# Patient Record
Sex: MALE | Race: WHITE | NOT HISPANIC OR LATINO | Employment: OTHER | URBAN - METROPOLITAN AREA
[De-identification: names, ages, dates, MRNs, and addresses within clinical notes are randomized per-mention and may not be internally consistent; named-entity substitution may affect disease eponyms.]

---

## 2017-01-02 ENCOUNTER — PATIENT MESSAGE (OUTPATIENT)
Dept: ORTHOPEDICS | Facility: CLINIC | Age: 72
End: 2017-01-02

## 2017-01-05 ENCOUNTER — ANESTHESIA EVENT (OUTPATIENT)
Dept: SURGERY | Facility: HOSPITAL | Age: 72
DRG: 470 | End: 2017-01-05
Payer: MEDICARE

## 2017-01-05 DIAGNOSIS — M79.661 PAIN OF RIGHT LOWER LEG: Primary | ICD-10-CM

## 2017-01-05 NOTE — ANESTHESIA PREPROCEDURE EVALUATION
Anesthesia Assessment: Preoperative EQUATION    Planned Procedure: Procedure(s) (LRB):  REPLACEMENT-KNEE-TOTAL right sherri (Right)  Requested Anesthesia Type:Regional  Surgeon: Jose Armando Knight MD  Service: Orthopedics  Known or anticipated Date of Surgery:1/24/2017    Surgeon notes: reviewed    Electronic QUestionnaire Assessment completed via nurse interview with patient.  Sakshi Dovricki PAT PegueroJorge [[id number]] - 71 y.o. Male        Providers Outside of [facility]      No data to display       Surgical Risk Level      Surgical Risk Level:  3           caRDScore (Clinical Anesthesia Rapid Decision Score)        Very High  Total Score: 30      34  Sum of Clinical Scores       caRDScores (Grouped)      caRDScore - Ane:  9                caRDScore - CVD:  3                caRDScore - Pul:  5                caRDScore - Met:  8                caRDScore - Phy:  5           caRDScore Items           Pre-admit from 1/24/2017 in [facility]     Anesthesia      Has large neck size >40cm (15.7in., large male shirt size, large male collar size >16)  Yes [21]     Has decreased jaw mobility/TMJ dysfunction  Yes     S/P facial/plastic reconstruction/chin implant  Yes [moh procedure]     Has severe degenerative arthritis (osteoarthritis)  Yes     Has sleep apnea confirmed by a sleep study / on CPAP  Yes     Has GERD, hiatal hernia, or chronic heartburn/dyspepsia requiring Rx some or all times  Yes     CVD      Activity similar to best ability for maximal activity or exercise  METS 2     Diagnosed with high blood pressure  Yes     Typical BP runs <150/90  Yes     Has/has had congestive heart failure  Yes     Pulmonary      Total smoking adds up to 20 - 40 years  Yes     During most of those years, smoked 2 packs per day or more  Yes     Has sleep apnea confirmed by a sleep study / on CPAP  Yes     Metabolic      Has been diagnosed with CKD  Yes [stage 3]     Type 2 Diabetes  Yes     Taking chronic Insulin Rx now or  in past, (not by pump)  Yes     Thyroid disease (Specify)  Yes     Physiologic      Obesity Status  Morbid Obesity (BMI 40-50)     Problems with memory  Yes     Other blood thinners  Apixaban (Eliquis)       Flags      Red Flag Score:  2                Yellow Flag Score:  11           Red Flags           Pre-admit from 1/24/2017 in [facility]     Obesity Status  Morbid Obesity (BMI 40-50)     Other blood thinners  Apixaban (Eliquis)     Has been diagnosed with CKD  Yes [stage 3]       Yellow Flags           Pre-admit from 1/24/2017 in [facility]     Has had steroids in the last year  Yes     Takes herbal medications or vitamin supplements  Yes     Obesity Status  Morbid Obesity (BMI 40-50)     Has significant hearing impairment  Yes     Is or has been a Smoker  Yes     Has decreased jaw mobility/TMJ dysfunction  Yes     S/P facial/plastic reconstruction/chin implant  Yes [moh procedure]     Has sleep apnea confirmed by a sleep study / on CPAP  Yes     Has/has had congestive heart failure  Yes     Has pain  Yes       PONV Risk Score (assumes periop narcotic use = +1, Max=4)      PONV Risk Score:  2           PONV Risk Factors  Total Score: 1      1 Non-Smoker at present       Sleep Apnea  Total Score: 1      1 Has sleep apnea confirmed by a sleep study / on CPAP       HEIDI STOP-Bang Risk Factors (Max=8)  Total Score: 5      1 Takes medication for high blood pressure     1 Obesity Status: Obesity (BMI >35)     1 Age >50     1 Has large neck size >40cm     1 Male       HEIDI Risk Level - 1 (Low), 2 (Moderate), 3 (High)      HEIDI Risk Level:  3           RCRI (Revised Cardiac Risk Indices of ACC/AHA guidelines, Max=6)  Total Score: 2      1 Has/has had CHF     1 Taking chronic insulin       CAD Risk Factors  Total Score: 5      1 Male. Age >45     1 Obesity Status: Morbid Obesity (BMI 40-50)     1 Total smoking adds up to 20 - 40 years     1 Diagnosed with high blood pressure     1  Has Diabetes       CHADS Score if applicable (history of atrial fib/flutter, Max=6)  Total Score: 3      1 Diagnosed with high blood pressure     1 Has/has had congestive heart failure     1 Has Diabetes       Maximal Exercise Capacity           Pre-admit from 1/24/2017 in [facility]     Maximal Exercise Capacity  METS 2       Summary of Dependence  Total Score: 1      1 Is totally independent of others for activities of daily living       Phone Fraility Score (Max = 17)  Total Score: 2      1 Uses 5 or more meds on reg basis     1 Functional capacity limit: METS 2       Pain Factors           Pre-admit from 1/24/2017 in [facility]     Has pain  Yes     Location and description of pain  knee pain sharp,      Duration of pain  Pain is chronic, has been present greater than 12 mo     Typical Pain Scores  7 to 10     Current regimen controls pain/makes pain tolerable  Yes       Risk Triggers (Evidence-Based Risk Triggers)        Pulmonary Risk Triggers  Total Score: 4      1 Age > or = 60     1 Obesity Status: Morbid Obesity (BMI 40-50)     1 Total smoking adds up to 20 - 40 years     1 Has sleep apnea confirmed by a sleep study / on CPAP       Renal Risk Triggers  Total Score: 4      1 Diagnosed with high blood pressure     1 Has been diagnosed with CKD     1 Type 2 Diabetes     1 Taking chronic Insulin Rx now or in past, (not by pump)       Delirium Risk Triggers  Total Score: 2      1 Problems with memory     1 Has significant hearing impairment       Urologic Risk Triggers  Total Score: 0        Logistics        Pre-op Clinic Logistics  Total Score: 10      2 Takes herbal medications or vitamin supplements     2 Problems with memory     2 Has significant hearing impairment     1 Major Ambulatory limits (cane, walker, wheelchair, stretcher)     1 Has had anesthesia, either as adult or as a child     1 Takes medication for high blood pressure     1 Has been diagnosed with CKD        [facility] Logistics  Total Score: 9      2 Problems with memory     2 Has significant hearing impairment     1 Major Ambulatory limits (cane, walker, wheelchair, stretcher)     2 S/P facial/plastic reconstruction/chin implant     1 Has been diagnosed with CKD     1 Has peripheral neuropathy       Discharge Logistics  Total Score: 11      1 Obesity Status: Morbid Obesity (BMI 40-50)     2 Functional capacity limit: METS 2     2 Problems with memory     1 Has significant hearing impairment     2 Major Ambulatory limits (cane, walker, wheelchair, stretcher)     1 Has severe degenerative arthritis (osteoarthritis)     1 Has sleep apnea confirmed by a sleep study / on CPAP     1 Has peripheral neuropathy       Discharge Planning           Pre-admit from 1/24/2017 in [facility]     Discharge Planning      Discharge plans  Home with assistance     Will assist patient 24/7, if needed  rebecca [phone] WIFE     Who will transport you to therapy, if need  family     Any home barriers to going home  Yes       Anes & Int Med <For office use only>      Total Score: 28        Surgical Risk Level Assessment                       Triage considerations:     The patient has no apparent active cardiac condition (No unstable coronary Syndrome such as severe unstable angina or recent [<1 month] myocardial infarction, decompensated CHF, severe valvular   disease or significant arrhythmia)    Previous anesthesia records:GETA  Grade II; Complicating Factors: Poor neck/head extension, Oropharyngeal edema or fat, Obesity;     Last PCP note: 3-6 months ago , within [facility]   Subspecialty notes: Cardiology: General, Endocrinology, Nephrology, Vascular Surgery, PLASTIC SURGERY    Other important co-morbidities:      Tests already available:  6/17 EKG, 5/30/16 2D ECHO, 12/6/16 BMP,A1C 5.9, 12/15/16 U/A            Instructions given. (See in Nurse's note)    Optimization:  Anesthesia Preop Clinic Assessment  Indicated     Medical Opinion Indicated  Dr DELVALLE            Pre-habilitation suggested:   Physical Therapy  Plan:      Testing:  Hematology Profile and PT/INR   Pre-anesthesia  visit       Visit focus: possible regional anesthesia and/or nerve block      Consultation:IM Perioperative Hospitalist    Navigation: Tests Scheduled.              Consults scheduled.             Results will be tracked by Preop Clinic.                 VLAD AYALA 1/5/17 01/05/2017  [facility] Cantor Jr. is a 71 y.o., male     LAST ANTICOAG 7 DAYS AGO    [facility] Anesthesia Evaluation    I have reviewed the Patient Summary Reports.     I have reviewed the Medications.     Review of Systems  Anesthesia Hx:  Hx of Anesthetic complications 6/2016:two person bag with no OA, mod difficult but good exchange); Intubated: Postinduction; Blade: Peters #2; Airway Device Size: 8.0; Style: Cuffed; Cuff Inflation: Minimal occlusive pressure; Placement Verified By: Auscultation, Capnometry, ETT Condensation; Grade: Grade II; Complicating Factors: Poor neck/head  extension, Oropharyngeal edema or fat, Obesity History of prior surgery of interest to airway management or planning: Denies Family Hx of Anesthesia complications.    Social:  Non-Smoker, No Alcohol Use    Hematology/Oncology:        Hematology Comments: apixaban will hold 5 days before sx Current/Recent Cancer. Oncology Comments: R-Cheek melanoma 12/2015 s/p excision with skin graft    R-Eye sx 2016 with skin graft    EENT/Dental:   Eyes: Slight redness and puffiness to right eye Jaw Problems: Hx of jaw locking when mouth is open wide  Cardiovascular:   Hypertension (b/p today 95/53. patient reports when in A-fib, his b/p would drop and he would get fatigued easily) Dysrhythmias (hx of cardioversion (stopped eliquis 7-8 days ago)) atrial fibrillation Uses cane. Housework, cooks,grocery shopping.   Climbs one  flight of stairs without difficulty. Diastolic dysfunction-2015 Chronic Venous Insufficiency, bilateral lower extremity    Pulmonary:   Sleep Apnea (Uses CPAP on occasions. s/p UPPP 2009 ), CPAP    Renal/:  Neoplasm/Tumor (renal cyst).  Kidney Function/Disease, Chronic Kidney Disease (CKD) (creat 1.6) , CKD Stage III (GFR 30-59)    Hepatic/GI:   GERD (TUMS at night) Denies Liver Disease.    Musculoskeletal:   Arthritis   Musculoskeletal General/Symptoms: Functional capacity is ambulatory with cane. L-TKR with spinal anesthesia 2003 Lumbar Spine Disorders, Lumbar Disc Disease (herniated/buldging disc) CBP- films in EPIC  Neurological:   Denies CVA. Denies Seizures.  Pain , onset is chronic , location of knee , quality of sharp , precipitating factors are standing , alleviating factors are sit or lie down and rest.  Peripheral Neuropathy    Endocrine:   Diabetes, type 2, using insulin A1c 5.9   Dermatological:  Skin Normal    Psych:  Psychiatric Normal           Physical Exam  General:  Morbid Obesity, Well nourished    Airway/Jaw/Neck:  Airway Findings: Mouth Opening: Normal Tongue: Large  General Airway Assessment: Adult  Mallampati: IV  Improves to III with phonation.  TM Distance: Normal, at least 6 cm  Jaw/Neck Findings:  Neck ROM: Normal ROM  Neck Findings:  Girth Increased      Dental:  Dental Findings: molar caps   Chest/Lungs:  Chest/Lungs Findings: Clear to auscultation, Normal Respiratory Rate     Heart/Vascular:  Heart Findings: Rate: Normal  Rhythm: Irregularly Irregular  Vascular Findings:  Edema Locations: BLE  Vascular Exam Findings: LLE > RLE. Difficulty placing regular XXL teds on therefore to the LLE pt has a velcro wrapping compression device.          Mental Status:  Mental Status Findings:  Cooperative, Alert and Oriented         Labs reviewed    Please refer to , Internal Medicine, perioperative risk assessment and recommendations.     Debbie Brar RN 1/11/17    Anesthesia  Plan  Type of Anesthesia, risks & benefits discussed:  Anesthesia Type:  general, spinal, regional, MAC  Patient's Preference:   Intra-op Monitoring Plan: standard ASA monitors  Intra-op Monitoring Plan Comments:   Post Op Pain Control Plan: continuous nerve block and single-shot nerve block  Post Op Pain Control Plan Comments:   Induction:   IV  Beta Blocker:  Patient is on a Beta-Blocker and has received one dose within the past 24 hours (No further documentation required).       Informed Consent: Patient understands risks and agrees with Anesthesia plan.  Questions answered. Anesthesia consent signed with patient.  ASA Score: 3     Day of Surgery Review of History & Physical:            Ready For Surgery From Anesthesia Perspective.

## 2017-01-10 ENCOUNTER — INITIAL CONSULT (OUTPATIENT)
Dept: INTERNAL MEDICINE | Facility: CLINIC | Age: 72
End: 2017-01-10
Payer: MEDICARE

## 2017-01-10 ENCOUNTER — HOSPITAL ENCOUNTER (OUTPATIENT)
Dept: PREADMISSION TESTING | Facility: HOSPITAL | Age: 72
Discharge: HOME | End: 2017-01-10
Attending: ANESTHESIOLOGY
Payer: MEDICARE

## 2017-01-10 ENCOUNTER — OFFICE VISIT (OUTPATIENT)
Dept: UROLOGY | Facility: CLINIC | Age: 72
End: 2017-01-10
Payer: MEDICARE

## 2017-01-10 ENCOUNTER — HOSPITAL ENCOUNTER (OUTPATIENT)
Dept: RADIOLOGY | Facility: HOSPITAL | Age: 72
Discharge: HOME | End: 2017-01-10
Attending: INTERNAL MEDICINE
Payer: MEDICARE

## 2017-01-10 VITALS
SYSTOLIC BLOOD PRESSURE: 95 MMHG | WEIGHT: 296.75 LBS | OXYGEN SATURATION: 97 % | HEART RATE: 84 BPM | DIASTOLIC BLOOD PRESSURE: 53 MMHG | BODY MASS INDEX: 43.95 KG/M2 | HEIGHT: 69 IN

## 2017-01-10 VITALS
WEIGHT: 296.06 LBS | HEART RATE: 85 BPM | SYSTOLIC BLOOD PRESSURE: 117 MMHG | HEIGHT: 69 IN | DIASTOLIC BLOOD PRESSURE: 69 MMHG | BODY MASS INDEX: 43.85 KG/M2

## 2017-01-10 DIAGNOSIS — N40.0 ENLARGED PROSTATE: ICD-10-CM

## 2017-01-10 DIAGNOSIS — I48.20 CHRONIC ATRIAL FIBRILLATION: ICD-10-CM

## 2017-01-10 DIAGNOSIS — E11.22 TYPE 2 DIABETES MELLITUS WITH STAGE 3 CHRONIC KIDNEY DISEASE, WITH LONG-TERM CURRENT USE OF INSULIN: ICD-10-CM

## 2017-01-10 DIAGNOSIS — N18.30 TYPE 2 DIABETES MELLITUS WITH STAGE 3 CHRONIC KIDNEY DISEASE, WITH LONG-TERM CURRENT USE OF INSULIN: Primary | ICD-10-CM

## 2017-01-10 DIAGNOSIS — I10 ESSENTIAL HYPERTENSION: ICD-10-CM

## 2017-01-10 DIAGNOSIS — N40.1 BPH (BENIGN PROSTATIC HYPERTROPHY) WITH URINARY OBSTRUCTION: ICD-10-CM

## 2017-01-10 DIAGNOSIS — I51.89 DIASTOLIC DYSFUNCTION: ICD-10-CM

## 2017-01-10 DIAGNOSIS — N18.30 CHRONIC KIDNEY DISEASE, STAGE III (MODERATE): ICD-10-CM

## 2017-01-10 DIAGNOSIS — Z79.4 TYPE 2 DIABETES MELLITUS WITH STAGE 3 CHRONIC KIDNEY DISEASE, WITH LONG-TERM CURRENT USE OF INSULIN: Primary | ICD-10-CM

## 2017-01-10 DIAGNOSIS — Z79.4 TYPE 2 DIABETES MELLITUS WITH STAGE 3 CHRONIC KIDNEY DISEASE, WITH LONG-TERM CURRENT USE OF INSULIN: ICD-10-CM

## 2017-01-10 DIAGNOSIS — E66.01 MORBID OBESITY WITH BMI OF 40.0-44.9, ADULT: ICD-10-CM

## 2017-01-10 DIAGNOSIS — Z79.01 LONG TERM CURRENT USE OF ANTICOAGULANT THERAPY: ICD-10-CM

## 2017-01-10 DIAGNOSIS — R35.0 URINARY FREQUENCY: ICD-10-CM

## 2017-01-10 DIAGNOSIS — R31.29 MICROHEMATURIA: ICD-10-CM

## 2017-01-10 DIAGNOSIS — L81.8 TATTOOS: ICD-10-CM

## 2017-01-10 DIAGNOSIS — N13.8 BPH (BENIGN PROSTATIC HYPERTROPHY) WITH URINARY OBSTRUCTION: ICD-10-CM

## 2017-01-10 DIAGNOSIS — I12.9 BENIGN HYPERTENSIVE RENAL DISEASE WITHOUT RENAL FAILURE: ICD-10-CM

## 2017-01-10 DIAGNOSIS — E11.22 TYPE 2 DIABETES MELLITUS WITH STAGE 3 CHRONIC KIDNEY DISEASE, WITH LONG-TERM CURRENT USE OF INSULIN: Primary | ICD-10-CM

## 2017-01-10 DIAGNOSIS — N18.30 TYPE 2 DIABETES MELLITUS WITH STAGE 3 CHRONIC KIDNEY DISEASE, WITH LONG-TERM CURRENT USE OF INSULIN: ICD-10-CM

## 2017-01-10 DIAGNOSIS — N20.0 CALCULUS OF KIDNEY: ICD-10-CM

## 2017-01-10 DIAGNOSIS — N28.1 ACQUIRED CYST OF KIDNEY: ICD-10-CM

## 2017-01-10 DIAGNOSIS — I87.2 VENOUS INSUFFICIENCY OF BOTH LOWER EXTREMITIES: ICD-10-CM

## 2017-01-10 DIAGNOSIS — I27.20 PULMONARY HYPERTENSION: ICD-10-CM

## 2017-01-10 DIAGNOSIS — G47.30 SLEEP APNEA, UNSPECIFIED TYPE: Primary | ICD-10-CM

## 2017-01-10 LAB
BILIRUB SERPL-MCNC: NORMAL MG/DL
BLOOD URINE, POC: NORMAL
COLOR, POC UA: NORMAL
GLUCOSE UR QL STRIP: NORMAL
KETONES UR QL STRIP: NORMAL
LEUKOCYTE ESTERASE URINE, POC: NORMAL
NITRITE, POC UA: NORMAL
PH, POC UA: 5
PROTEIN, POC: NORMAL
SPECIFIC GRAVITY, POC UA: 1.01
UROBILINOGEN, POC UA: NORMAL

## 2017-01-10 PROCEDURE — 3074F SYST BP LT 130 MM HG: CPT | Mod: S$GLB,,, | Performed by: UROLOGY

## 2017-01-10 PROCEDURE — 99499 UNLISTED E&M SERVICE: CPT | Mod: S$PBB,,, | Performed by: UROLOGY

## 2017-01-10 PROCEDURE — 2022F DILAT RTA XM EVC RTNOPTHY: CPT | Mod: S$GLB,,, | Performed by: HOSPITALIST

## 2017-01-10 PROCEDURE — 99999 PR PBB SHADOW E&M-EST. PATIENT-LVL II: CPT | Mod: PBBFAC,,, | Performed by: HOSPITALIST

## 2017-01-10 PROCEDURE — 3066F NEPHROPATHY DOC TX: CPT | Mod: S$GLB,,, | Performed by: HOSPITALIST

## 2017-01-10 PROCEDURE — 3078F DIAST BP <80 MM HG: CPT | Mod: S$GLB,,, | Performed by: UROLOGY

## 2017-01-10 PROCEDURE — 1157F ADVNC CARE PLAN IN RCRD: CPT | Mod: S$GLB,,, | Performed by: UROLOGY

## 2017-01-10 PROCEDURE — 76770 US EXAM ABDO BACK WALL COMP: CPT | Mod: 26,,, | Performed by: RADIOLOGY

## 2017-01-10 PROCEDURE — 81001 URINALYSIS AUTO W/SCOPE: CPT | Mod: S$GLB,,, | Performed by: UROLOGY

## 2017-01-10 PROCEDURE — 1159F MED LIST DOCD IN RCRD: CPT | Mod: S$GLB,,, | Performed by: UROLOGY

## 2017-01-10 PROCEDURE — 51798 US URINE CAPACITY MEASURE: CPT | Mod: S$GLB,,, | Performed by: UROLOGY

## 2017-01-10 PROCEDURE — 99204 OFFICE O/P NEW MOD 45 MIN: CPT | Mod: 25,S$GLB,, | Performed by: UROLOGY

## 2017-01-10 PROCEDURE — 1126F AMNT PAIN NOTED NONE PRSNT: CPT | Mod: S$GLB,,, | Performed by: UROLOGY

## 2017-01-10 PROCEDURE — 1159F MED LIST DOCD IN RCRD: CPT | Mod: S$GLB,,, | Performed by: HOSPITALIST

## 2017-01-10 PROCEDURE — 3066F NEPHROPATHY DOC TX: CPT | Mod: S$GLB,,, | Performed by: UROLOGY

## 2017-01-10 PROCEDURE — 1160F RVW MEDS BY RX/DR IN RCRD: CPT | Mod: S$GLB,,, | Performed by: HOSPITALIST

## 2017-01-10 PROCEDURE — 3044F HG A1C LEVEL LT 7.0%: CPT | Mod: S$GLB,,, | Performed by: UROLOGY

## 2017-01-10 PROCEDURE — 99499 UNLISTED E&M SERVICE: CPT | Mod: S$PBB,,, | Performed by: HOSPITALIST

## 2017-01-10 PROCEDURE — 3044F HG A1C LEVEL LT 7.0%: CPT | Mod: S$GLB,,, | Performed by: HOSPITALIST

## 2017-01-10 PROCEDURE — 99999 PR PBB SHADOW E&M-EST. PATIENT-LVL III: CPT | Mod: PBBFAC,,, | Performed by: UROLOGY

## 2017-01-10 PROCEDURE — 99214 OFFICE O/P EST MOD 30 MIN: CPT | Mod: S$GLB,,, | Performed by: HOSPITALIST

## 2017-01-10 PROCEDURE — 1160F RVW MEDS BY RX/DR IN RCRD: CPT | Mod: S$GLB,,, | Performed by: UROLOGY

## 2017-01-10 PROCEDURE — 1157F ADVNC CARE PLAN IN RCRD: CPT | Mod: S$GLB,,, | Performed by: HOSPITALIST

## 2017-01-10 RX ORDER — ASPIRIN 81 MG/1
81 TABLET ORAL DAILY
COMMUNITY
End: 2017-01-20 | Stop reason: ALTCHOICE

## 2017-01-10 RX ORDER — OXYBUTYNIN CHLORIDE 5 MG/1
5 TABLET, EXTENDED RELEASE ORAL DAILY
Qty: 30 TABLET | Refills: 11 | Status: SHIPPED | OUTPATIENT
Start: 2017-01-10 | End: 2018-01-02 | Stop reason: SDUPTHER

## 2017-01-10 RX ORDER — CLOTRIMAZOLE AND BETAMETHASONE DIPROPIONATE 10; .64 MG/G; MG/G
CREAM TOPICAL 2 TIMES DAILY
Qty: 45 G | Refills: 1 | Status: SHIPPED | OUTPATIENT
Start: 2017-01-10 | End: 2018-01-30

## 2017-01-10 NOTE — PROGRESS NOTES
Subjective:       Patient ID: Stephen Cantor Jr. is a 71 y.o. male.    Chief Complaint: Cyst (ref by kidney doctor)    HPI Comments: Stephen Cantor Jr. is a 71 y.o. Male referred by Dr. Moraes for parapelvic renal cyst and blood in the urine.  Ultrasound 2017 and 2015 showed this cyst. Cyst is 1cm in size.   Takes Lasix and has urinary frequency every 15-30 minutes for 4-5 hours.  Has urinary frequency every hours evening time.  Nocturia 3-4 times, but in last month only once.   Has sleep apnea. Has been wearing cpap in last month, but not last 3-4 days. Doesn't wear it when he has a cold.   No intermittency. No hesitancy. Has some urgency. Rare urge incontinence.   No gross hematuria.   History of tobacco 30 years ago. Smoked for 30 years. 1.5ppd.   Cysto 2010 with Dr. Overton - had BPH and obstruction noted.  CT 2010 showed left renal stone.    Lab Results       Component                Value               Date                       PSA                      1.7                 09/17/2015                 PSA                      1.5                 06/23/2014                 PSA                      1.45                10/05/2012                 PSA                      1.6                 11/14/2011                 PSA                      1.1                 07/12/2010                 PSA                      0.84                01/16/2009                 PSA                      0.6                 02/29/2008                 PSA                      0.8                 02/14/2007                 PSA                      4.0                 02/21/2006           No family hx of prostate cancer.              Cyst   Pertinent negatives include no chest pain, chills, fever, joint swelling or rash.       Past Surgical History   Procedure Laterality Date    Appendectomy      Joint replacement       Knee    Meniere's disease surgery      Total knee arthroplasty      Tonsillectomy      Cardioversion      Colonoscopy  N/A 3/3/2016     Procedure: COLONOSCOPY;  Surgeon: Bob Poe MD;  Location: [facility] ([address] [place] [address]);  Service: Endoscopy;  Laterality: N/A;  Holding Eliquis for 3 days prior to colonoscopy. EC       Past Medical History   Diagnosis Date    *Atrial fibrillation      On Coumadin    Anticoagulant long-term use     Basal cell carcinoma 12/2015     left eye brow    BCC (basal cell carcinoma) 12/2015     left shoulder    Cataract     Chronic kidney disease     Diabetes mellitus with renal manifestations, uncontrolled     Dyslipidemia associated with type 2 diabetes mellitus     Fever blister     HTN (hypertension)     Keloid cicatrix     Melanoma 12/07/2015     right cheek-in situ    Obesity     PN (peripheral neuropathy)     Screening for colon cancer 3/3/2016    Sleep apnea     Type II or unspecified type diabetes mellitus with other specified manifestations, uncontrolled        Social History     Social History    Marital status:      Spouse name: N/A    Number of children: 3    Years of education: N/A     Occupational History    retired Retired     Social History Main Topics    Smoking status: Former Smoker     Quit date: 7/10/1985    Smokeless tobacco: Never Used    Alcohol use No      Comment: quit 2007- had excess in the past    Drug use: No    Sexual activity: Not on file     Other Topics Concern    Not on file     Social History Narrative       Family History   Problem Relation Age of Onset    Diabetes Mother     Stroke Mother     Diabetes Father     Heart disease Father     Hypertension Father     Cancer Sister      brain    Eczema Sister     Cancer Brother      brain    Cancer Sister      leukemia, stomach cancer    No Known Problems Sister     ALS Brother      stomach cancer    No Known Problems Brother     No Known Problems Maternal Aunt     No Known Problems Maternal Uncle     No Known Problems Paternal Aunt     No Known Problems Paternal Uncle     No  "Known Problems Maternal Grandmother     No Known Problems Maternal Grandfather     No Known Problems Paternal Grandmother     No Known Problems Paternal Grandfather     Amblyopia Neg Hx     Blindness Neg Hx     Cataracts Neg Hx     Glaucoma Neg Hx     Macular degeneration Neg Hx     Retinal detachment Neg Hx     Strabismus Neg Hx     Thyroid disease Neg Hx     Melanoma Neg Hx     Psoriasis Neg Hx     Lupus Neg Hx     Acne Neg Hx        Current Outpatient Prescriptions   Medication Sig Dispense Refill    apixaban 5 mg Tab Take 1 tablet (5 mg total) by mouth 2 (two) times daily. 180 tablet 3    ascorbic acid (VITAMIN C) 100 MG tablet Take 1 tablet by mouth every morning.       aspirin (ECOTRIN) 81 MG EC tablet Take 81 mg by mouth once daily.      aspirin 325 MG tablet Take 1 tablet by mouth every morning.       azelastine (ASTELIN) 137 mcg (0.1 %) nasal spray 1 spray (137 mcg total) by Nasal route 2 (two) times daily. 30 mL 3    BD INSULIN PEN NEEDLE UF ORIG 29 x 1/2 " Ndle   12    blood-glucose meter kit Use as instructed, True Result meter 1 each 1    CINNAMON BARK (CINNAMON ORAL) Take by mouth once daily.      clobetasol (TEMOVATE) 0.05 % cream AAA finger bid 60 g 3    desonide (DESOWEN) 0.05 % lotion AAA bid prn redness, scaling, or itching 1 Bottle 3    desoximetasone (TOPICORT) 0.25 % cream 0.25 %.  Cream Topical .  AAA bid back      fenofibrate (TRICOR) 145 MG tablet TAKE ONE TABLET BY MOUTH DAILY (Patient taking differently: TAKE ONE TABLET BY MOUTH DAILY, morning) 90 tablet 3    fluticasone (FLONASE) 50 mcg/actuation nasal spray 1 spray by Each Nare route nightly as needed. 16 g 5    furosemide (LASIX) 40 MG tablet Take 1 tablet (40 mg total) by mouth daily as needed. 90 tablet 3    gabapentin (NEURONTIN) 300 MG capsule Take 1 capsule (300 mg total) by mouth 3 (three) times daily. 90 capsule 11    insulin aspart (NOVOLOG FLEXPEN) 100 unit/mL InPn pen 15-18-18 units with each " "meal, plus correction scale. TDD 81 units 5 Box 3    insulin glargine (LANTUS SOLOSTAR) 100 unit/mL (3 mL) InPn pen Inject 45 Units into the skin every evening. 2 Box 11    ketoconazole (NIZORAL) 2 % cream Apply topically once daily. 30 g 1    lancets 33 gauge Misc 1 lancet by Misc.(Non-Drug; Combo Route) route 4 (four) times daily. Pt uses True Result Meter, bg monitoring 4 times a day. 450 each 4    levothyroxine (SYNTHROID) 25 MCG tablet Take 1 tablet (25 mcg total) by mouth once daily. 90 tablet 1    losartan (COZAAR) 50 MG tablet Take 1 tablet (50 mg total) by mouth once daily. (Patient taking differently: Take 50 mg by mouth every evening. ) 30 tablet 11    metoprolol succinate (TOPROL-XL) 100 MG 24 hr tablet Take 1 tablet (100 mg total) by mouth once daily. 90 tablet 3    metronidazole (METROGEL) 0.75 % gel Apply topically 2 (two) times daily. 1 Tube 6    multivitamin capsule Take 1 capsule by mouth every morning.       mupirocin (BACTROBAN) 2 % ointment Apply topically 2 (two) times daily. Apply to wound twice daily as directed. 30 g 0    nystatin-triamcinolone (MYCOLOG II) cream apply TWICE DAILY (Patient taking differently: apply TWICE DAILY-using as needed for rash) 60 g 1    omega-3 acid ethyl esters (LOVAZA) 1 gram capsule TAKE THREE CAPSULE BY MOUTH TWICE DAILY 540 capsule 3    pen needle, diabetic (BD ULTRA-FINE BASIL PEN NEEDLES) 32 gauge x 5/32" Ndle Pt check blood sugar 4 times a day 150 each 12    pravastatin (PRAVACHOL) 10 MG tablet Take 1 tablet (10 mg total) by mouth once daily. 90 tablet 3    tamsulosin (FLOMAX) 0.4 mg Cp24 Take 1 capsule (0.4 mg total) by mouth once daily. 30 capsule 11    TRUETEST TEST STRIPS Strp 1 strip by Misc.(Non-Drug; Combo Route) route 4 (four) times daily. 450 strip 4    vitamin E 1000 UNIT capsule Take 1,000 Units by mouth every morning. 1 Capsule Oral Every day       No current facility-administered medications for this visit.        Review of " patient's allergies indicates:   Allergen Reactions    Ciprofloxacin Rash    Niacin Itching     Other reaction(s): facial flushing    Terbinafine Other (See Comments)     Other reaction(s): Hepatitis       Review of Systems   Constitutional: Negative for chills, fever and unexpected weight change.   HENT: Positive for hearing loss. Negative for nosebleeds.    Eyes: Positive for visual disturbance.        Has cataracts.   Respiratory: Negative for chest tightness.    Cardiovascular: Negative for chest pain.   Gastrointestinal: Negative for diarrhea.   Genitourinary: Positive for frequency, nocturia and urgency. Negative for dysuria, enuresis and hematuria.   Musculoskeletal: Negative for joint swelling.   Skin: Negative for rash.   Neurological: Negative for seizures.   Hematological: Does not bruise/bleed easily.   Psychiatric/Behavioral: Negative for behavioral problems.       Objective:      Physical Exam   Constitutional: He is oriented to person, place, and time. He appears well-developed and well-nourished.   HENT:   Head: Normocephalic and atraumatic.   Eyes: No scleral icterus.   Neck: Neck supple.   Cardiovascular: Normal rate and regular rhythm.    Pulmonary/Chest: Effort normal. No respiratory distress.   Abdominal: He exhibits no mass. Hernia confirmed negative in the right inguinal area and confirmed negative in the left inguinal area.   Genitourinary: Testes normal. Phimosis present.   Genitourinary Comments: Some mild erythema to glans.  Prostate was smooth without nodularity. No rectal masses.  40 grams.  No external hemorrhoids.   Normal perineum.     Musculoskeletal: He exhibits no tenderness.   Lymphadenopathy:     He has no cervical adenopathy. No inguinal adenopathy noted on the right or left side.   Neurological: He is alert and oriented to person, place, and time.   Skin: Skin is warm. No rash noted.     Psychiatric: He has a normal mood and affect.     urine dip clear.   A post void  residual bladder scan was performed immediately after voiding. The patient's PVR was noted to be 58cc.    Assessment:       1. Type 2 diabetes mellitus with stage 3 chronic kidney disease, with long-term current use of insulin    2. Morbid obesity with BMI of 40.0-44.9, adult    3. Chronic kidney disease, stage III (moderate)    4. Acquired cyst of kidney    5. Benign hypertensive renal disease without renal failure    6. Chronic atrial fibrillation    7. Calculus of kidney    8. Urinary frequency    9. Phimosis/adherent prepuce    10. Microhematuria    11. BPH (benign prostatic hypertrophy) with urinary obstruction        Plan:     oxybutynin XR 5mg trial for frequency.  lotrisome for phimosis.  Cystoscopy to evaluate for hematuria, micro. Last UA was negative.   Continue flomax.  Renal cyst is benign. No further follow up warranted.    I would like to thank Dr. Moraes for referral of this patient.

## 2017-01-10 NOTE — IP AVS SNAPSHOT
[facility]  [address]  [facility] [address]  Phone: [phone]           Patient Discharge Instructions    Our goal is to set you up for success. This packet includes information on your condition, medications, and your home care. It will help you to care for yourself so you don't get sicker.     Please ask your nurse if you have any questions.        There are many details to remember when preparing for your surgery. Here is what you will need to do, please ask your nurse if there are more specific instructions and if you have any questions:    1. 24 hours before procedure Do not smoke or drink alcoholic beverages 24 hours prior to your procedure    2. Eating before procedure Do not eat or drink anything 8 hours before your procedure - this includes gum, mints, and candy.     3. Day of procedure Please remove all jewelry for the procedure. If you wear contact lenses, dentures, hearing aids or glasses, bring a container to put them in during your surgery and give to a family member for safekeeping.  If your doctor has scheduled you for an overnight stay, bring a small overnight bag with any personal items that you need.    4. After procedure Make arrangements in advance for transportation home by a responsible adult. It is not safe to drive a vehicle during the 24 hours following surgery.     PLEASE NOTE: You may be contacted the day before your surgery to confirm your surgery date and arrival time. The Surgery schedule has many variables which may affect the time of your surgery case. Family members should be available if your surgery time changes.                [facility]  Unless otherwise directed by your provider, please contact [facility], our nurse care line that is available for 24/7 assistance.     [phone] (toll-free)    Registered nurses in the [facility] provide clinical advisement, health education, appointment booking, and other  "advisory services.                    ** Verify the list of medication(s) below is accurate and up to date. Carry this with you in case of emergency. If your medications have changed, please notify your healthcare provider.             Medication List      TAKE these medications        Additional Info                      apixaban 5 mg Tab   Quantity:  180 tablet   Refills:  3   Dose:  5 mg    Instructions:  Take 1 tablet (5 mg total) by mouth 2 (two) times daily.     Begin Date    AM    Noon    PM    Bedtime       aspirin 325 MG tablet   Refills:  0   Dose:  1 tablet    Instructions:  Take 1 tablet by mouth every morning.     Begin Date    AM    Noon    PM    Bedtime       azelastine 137 mcg (0.1 %) nasal spray   Commonly known as:  ASTELIN   Quantity:  30 mL   Refills:  3   Dose:  1 spray    Instructions:  1 spray (137 mcg total) by Nasal route 2 (two) times daily.     Begin Date    AM    Noon    PM    Bedtime       BD INSULIN PEN NEEDLE UF ORIG 29 gauge x 1/2" Ndle   Refills:  12   Generic drug:  pen needle, diabetic      Begin Date    AM    Noon    PM    Bedtime       pen needle, diabetic 32 gauge x 5/32" Ndle   Commonly known as:  BD ULTRA-FINE BASIL PEN NEEDLES   Quantity:  150 each   Refills:  12    Instructions:  Pt check blood sugar 4 times a day     Begin Date    AM    Noon    PM    Bedtime       blood-glucose meter kit   Quantity:  1 each   Refills:  1    Instructions:  Use as instructed, True Result meter     Begin Date    AM    Noon    PM    Bedtime       CINNAMON ORAL   Refills:  0    Instructions:  Take by mouth once daily.     Begin Date    AM    Noon    PM    Bedtime       clobetasol 0.05 % cream   Commonly known as:  TEMOVATE   Quantity:  60 g   Refills:  3    Instructions:  AAA finger bid     Begin Date    AM    Noon    PM    Bedtime       desonide 0.05 % lotion   Commonly known as:  DESOWEN   Quantity:  1 Bottle   Refills:  3    Instructions:  AAA bid prn redness, scaling, or itching     Begin " Date    AM    Noon    PM    Bedtime       fenofibrate 145 MG tablet   Commonly known as:  TRICOR   Quantity:  90 tablet   Refills:  3    Instructions:  TAKE ONE TABLET BY MOUTH DAILY     Begin Date    AM    Noon    PM    Bedtime       fluticasone 50 mcg/actuation nasal spray   Commonly known as:  FLONASE   Quantity:  16 g   Refills:  5   Dose:  1 spray    Instructions:  1 spray by Each Nare route nightly as needed.     Begin Date    AM    Noon    PM    Bedtime       furosemide 40 MG tablet   Commonly known as:  LASIX   Quantity:  90 tablet   Refills:  3   Dose:  40 mg    Instructions:  Take 1 tablet (40 mg total) by mouth daily as needed.     Begin Date    AM    Noon    PM    Bedtime       gabapentin 300 MG capsule   Commonly known as:  NEURONTIN   Quantity:  90 capsule   Refills:  11   Dose:  300 mg    Instructions:  Take 1 capsule (300 mg total) by mouth 3 (three) times daily.     Begin Date    AM    Noon    PM    Bedtime       insulin aspart 100 unit/mL Inpn pen   Commonly known as:  NOVOLOG FLEXPEN   Quantity:  5 Box   Refills:  3   Comments:  90 day prescription    Instructions:  15-18-18 units with each meal, plus correction scale. TDD 81 units     Begin Date    AM    Noon    PM    Bedtime       insulin glargine 100 unit/mL (3 mL) Inpn pen   Commonly known as:  LANTUS SOLOSTAR   Quantity:  2 Box   Refills:  11   Dose:  45 Units    Instructions:  Inject 45 Units into the skin every evening.     Begin Date    AM    Noon    PM    Bedtime       ketoconazole 2 % cream   Commonly known as:  NIZORAL   Quantity:  30 g   Refills:  1    Instructions:  Apply topically once daily.     Begin Date    AM    Noon    PM    Bedtime       lancets 33 gauge Misc   Quantity:  450 each   Refills:  4   Dose:  1 lancet    Instructions:  1 lancet by Misc.(Non-Drug; Combo Route) route 4 (four) times daily. Pt uses True Result Meter, bg monitoring 4 times a day.     Begin Date    AM    Noon    PM    Bedtime       levothyroxine 25 MCG  tablet   Commonly known as:  SYNTHROID   Quantity:  90 tablet   Refills:  1   Dose:  25 mcg    Instructions:  Take 1 tablet (25 mcg total) by mouth once daily.     Begin Date    AM    Noon    PM    Bedtime       losartan 50 MG tablet   Commonly known as:  COZAAR   Quantity:  30 tablet   Refills:  11   Dose:  50 mg    Instructions:  Take 1 tablet (50 mg total) by mouth once daily.     Begin Date    AM    Noon    PM    Bedtime       metoprolol succinate 100 MG 24 hr tablet   Commonly known as:  TOPROL-XL   Quantity:  90 tablet   Refills:  3   Dose:  100 mg    Instructions:  Take 1 tablet (100 mg total) by mouth once daily.     Begin Date    AM    Noon    PM    Bedtime       metronidazole 0.75 % gel   Commonly known as:  METROGEL   Quantity:  1 Tube   Refills:  6    Instructions:  Apply topically 2 (two) times daily.     Begin Date    AM    Noon    PM    Bedtime       multivitamin capsule   Refills:  0   Dose:  1 capsule    Instructions:  Take 1 capsule by mouth every morning.     Begin Date    AM    Noon    PM    Bedtime       mupirocin 2 % ointment   Commonly known as:  BACTROBAN   Quantity:  30 g   Refills:  0    Instructions:  Apply topically 2 (two) times daily. Apply to wound twice daily as directed.     Begin Date    AM    Noon    PM    Bedtime       nystatin-triamcinolone cream   Commonly known as:  MYCOLOG II   Quantity:  60 g   Refills:  1    Instructions:  apply TWICE DAILY     Begin Date    AM    Noon    PM    Bedtime       omega-3 acid ethyl esters 1 gram capsule   Commonly known as:  LOVAZA   Quantity:  540 capsule   Refills:  3    Instructions:  TAKE THREE CAPSULE BY MOUTH TWICE DAILY     Begin Date    AM    Noon    PM    Bedtime       pravastatin 10 MG tablet   Commonly known as:  PRAVACHOL   Quantity:  90 tablet   Refills:  3   Dose:  10 mg    Instructions:  Take 1 tablet (10 mg total) by mouth once daily.     Begin Date    AM    Noon    PM    Bedtime       tamsulosin 0.4 mg Cp24   Commonly known as:   FLOMAX   Quantity:  30 capsule   Refills:  11   Dose:  1 capsule   Comments:  This prescription was filled today. Any refills authorized will be placed on file.    Instructions:  Take 1 capsule (0.4 mg total) by mouth once daily.     Begin Date    AM    Noon    PM    Bedtime       TOPICORT 0.25 % cream   Refills:  0   Dose:  0.25 %   Generic drug:  desoximetasone    Instructions:  0.25 %.  Cream Topical .  AAA bid back     Begin Date    AM    Noon    PM    Bedtime       TRUETEST TEST STRIPS Strp   Quantity:  450 strip   Refills:  4   Dose:  1 strip   Generic drug:  blood sugar diagnostic    Instructions:  1 strip by Misc.(Non-Drug; Combo Route) route 4 (four) times daily.     Begin Date    AM    Noon    PM    Bedtime       VITAMIN C 100 MG tablet   Refills:  0   Dose:  1 tablet   Generic drug:  ascorbic acid (vitamin C)    Instructions:  Take 1 tablet by mouth every morning.     Begin Date    AM    Noon    PM    Bedtime       vitamin E 1000 UNIT capsule   Refills:  0   Dose:  1000 Units    Instructions:  Take 1,000 Units by mouth every morning. 1 Capsule Oral Every day     Begin Date    AM    Noon    PM    Bedtime                  Please bring to all follow up appointments:    1. A copy of your discharge instructions.  2. All medicines you are currently taking in their original bottles.  3. Identification and insurance card.    Please arrive 15 minutes ahead of scheduled appointment time.    Please call 24 hours in advance if you must reschedule your appointment and/or time.        Your Scheduled Appointments     Emmanuel 10, 2017 10:40 AM CST   Non-Fasting Lab with LAB, APPOINT[place] [facility] ([facility])    [address] [id number] [phone]            Emmanuel 10, 2017  3:00 PM CST   Consult with Yamilet Montero MD   [facility] - Urology 4th Floor ([facility] )    [address]  [place] [id number] [phone]            Feb 07,  2017  9:00 AM CST   Post OP with Sandy Adams NP   [facility] - Orthopedics ([facility] )    [address]  [place] [address] [phone]            Mar 06, 2017  8:10 AM CST   Fasting Lab with LAB, APPOINT[place] [facility] ([facility])    [address] [address] [phone]            Mar 07, 2017  9:00 AM CST   Established Patient Visit with Jose Hatch DPM   [facility] - Podiatry [facility] )    [address]  [place] [address] [phone]              Your Future Surgeries/Procedures     Jan 24, 2017   Surgery with Jose Armando Knight MD   [facility] ([facility])    [address] [address] [phone]                  Discharge Instructions       Your surgery has been scheduled for:__________________________________________    You should report to:  ____[facility], located on the [place] side of the first floor of the           [facility] ([phone])  ____The Second [facility] Center, located on the [facility] side of the            Second floor of the [facility] ([phone])  ____3rd Floor [facility] located on the [facility] side of the [facility] [phone]  Please Note   - Tell your doctor if you take Aspirin, products containing Aspirin, herbal medications  or blood thinners, such as Coumadin, Ticlid, or Plavix.  (Consult your provider regarding holding or stopping before surgery).  - Arrange for someone to drive you home following surgery.  You will not be allowed to leave the surgical facility alone or drive yourself home following sedation and anesthesia.  Before Surgery  - Stop taking all herbal medications 14days prior to surgery  - No Motrin/Advil (Ibuprofen) 7 days before surgery  - No Aleve (Naproxen) 7 days before surgery  - Stop Taking Asprin,  products containing Asprin _____days before surgery  - Stop taking blood thinners_______days before surgery  - Refrain from drinking alcoholic beverages for 24hours before and after surgery  - Stop or limit smoking _________days before surgery  Night before Surgery  - DO NOT EAT OR DRINK ANYTHING AFTER MIDNIGHT, INCLUDING GUM, HARD CANDY, MINTS, OR CHEWING TOBACCO.  - Take a shower or bath (shower is recommended).  Bathe with Hibiclens soap or an antibacterial soap from the neck down.  If not supplied by your surgeon, hibiclens soap will need to be purchased over the counter in pharmacy.  Rinse soap off thoroughly.  - Shampoo your hair with your regular shampoo  The Day of Surgery  - Take another bath or shower with hibiclens or any antibacterial soap, to reduce the chance of infection.  - Take heart and blood pressure medications with a small sip of water, as advised by the perioperative team.  - Do not take fluid pills  - You may brush your teeth and rinse your mouth, but do not swall any additional water.   - Do not apply perfumes, powder, body lotions or deodorant on the day of surgery.  - Nail polish should be removed.  - Do not wear makeup or moisturizer  - Wear comfortable clothes, such as a button front shirt and loose fitting pants.  - Leave all jewelry, including body piercings, and valuables at home.    - Bring any devices you will neeed after surgery such as crutches or canes.  - If you have sleep apnea, please bring your CPAP machine  In the event that your physical condition changes including the onset of a cold or respiratory illness, or if you have to delay or cancel your surgery, please notify your surgeon.  Anesthesia: Regional Anesthesia  Youre scheduled for surgery. During surgery, youll receive medication called anesthesia to keep you comfortable and pain-free. Your surgeon has decided that youll receive regional anesthesia. This sheet tells you what to expect with this type of  anesthesia.  What Is Regional Anesthesia?  Regional anesthesia numbs one region of your body. The anesthesia may be given around nerves or into veins in your arms, neck, or legs (nerve block or Willy block). Or it may be sent into the spinal fluid (spinal anesthesia) or into the space just outside the spinal fluid (epidural anesthesia). Sedatives may also be given to relax you.  Nerve Block or Willy Block  A small area of the body, such as an arm or leg, can be numbed using a nerve block or Willy block.  · Nerve block: During a nerve block, your skin is numbed. A needle is then inserted near nerves that serve the area to be numbed. Anesthetic is sent through the needle.  · IV regional or [place] block: For this type of block, an IV line is put into a vein. The blood flow to the area to be numbed is blocked for a short time. Anesthetic is sent through the IV.  Spinal Anesthesia  Spinal anesthesia numbs your body from about the waist down.  · Anesthetic is injected into the spinal fluid. This is a substance that surrounds the spinal cord in your spinal column. The anesthetic blocks pain traveling from the body to the brain.  · To receive the anesthetic, your skin is numbed at the injection site.  · A needle is then inserted into the spinal fluid. Anesthetic is sent through the needle.  Anesthesia Tools and Medications  · Local anesthetic given through a needle numbs one region of your body.  · Electrocardiography leads (electrodes) record your heart rate and rhythm.  · A blood pressure cuff monitors your blood pressure.  · A pulse oximeter on the end of the finger measures your blood oxygen level.  · Sedatives may be given through an IV to relax you and keep you comfortable. You may stay awake or sleep slightly.  · Oxygen may be given to you through a facemask.  Risks and Possible Complications  Regional anesthesia carries some risks. These include:  · Nausea and vomiting  · Headache  · Backache  · Decreased blood  pressure  · Allergic reaction to the anesthetic  · Ongoing numbness (rare)  · Irregular heartbeat (rare)  · Cardiac arrest (rare)   © 4447-6484 [employer] [facility], [address] [place] [address], [place] [address]. All rights reserved. This information is not intended as a substitute for professional medical care. Always follow your healthcare professional's instructions.      Admission Information     Date & Time Provider Department CSN    1/10/2017 10:00 AM Bhargav Nassar MD [facility] [id number]      Care Providers     Provider Role Specialty Primary office phone    Bhargav Nassar MD Attending Provider Anesthesiology [phone]      Recent Lab Values        11/19/2015 1/7/2016 1/13/2016 3/23/2016 4/20/2016 6/20/2016 8/23/2016 12/6/2016     11:55 AM 11:22 AM 10:40 AM 11:00 AM  9:54 AM 10:10 AM  9:55 AM  8:39 AM    A1C 7.5 (H) 7.3 (H) 7.2 (H) 7.5 (H) 7.6 (H) 7.2 (H) 7.6 (H) 5.9         7.6 (H)       Comment for A1C at  9:55 AM on 8/23/2016:  According to ADA guidelines, hemoglobin A1C <7.0% represents  optimal control in non-pregnant diabetic patients.  Different  metrics may apply to specific populations.   Standards of Medical Care in Diabetes - 2016.  For the purpose of screening for the presence of diabetes:  <5.7%     Consistent with the absence of diabetes  5.7-6.4%  Consistent with increasing risk for diabetes   (prediabetes)  >or=6.5%  Consistent with diabetes  Currently no consensus exists for use of hemoglobin A1C  for diagnosis of diabetes for children.      Comment for A1C at  8:39 AM on 12/6/2016:  According to ADA guidelines, hemoglobin A1C <7.0% represents  optimal control in non-pregnant diabetic patients.  Different  metrics may apply to specific populations.   Standards of Medical Care in Diabetes - 2016.  For the purpose of screening for the presence of diabetes:  <5.7%     Consistent with the absence of diabetes  5.7-6.4%  Consistent with increasing risk for diabetes    (prediabetes)  >or=6.5%  Consistent with diabetes  Currently no consensus exists for use of hemoglobin A1C  for diagnosis of diabetes for children.        Allergies as of 1/10/2017        Reactions    Ciprofloxacin Rash    Niacin Itching    Other reaction(s): facial flushing    Terbinafine Other (See Comments)    Other reaction(s): Hepatitis      Advance Directives     An advance directive is a document which, in the event you are no longer able to make decisions for yourself, tells your healthcare team what kind of treatment you do or do not want to receive, or who you would like to make those decisions for you.  If you do not currently have an advance directive, [facility] encourages you to create one.  For more information call:  [phone]), [phone] ([phone]),  or log on to [email or web address].        Smoking Cessation     If you would like to quit smoking:   You may be eligible for free services if you are a [place] resident and started smoking cigarettes before September 1, 1988.  Call the Smoking Cessation Trust (SCT) toll free at (919) 821-6062 or (535) 593-7079.   Call 4-156-QUIT-NOW if you do not meet the above criteria.            Language Assistance Services     ATTENTION: Language assistance services are available, free of charge. Please call [phone].      ATENCIÓN: Si habla español, tiene a mancilla disposición servicios gratuitos de asistencia lingüística. Llame al [phone].     CHÚ Ý: N?u b?n nói Ti?ng Vi?t, có các d?ch v? h? tr? ngôn ng? mi?n phí dành cho b?n. G?i s? [phone].        Chronic [place] Disease Education             Diabetes Discharge Instructions                                   Eliquis Informaiton       Apixaban Oral tablet  What is this medicine?  APIXABAN (a PIX a ban) is an anticoagulant (blood thinner). It is used to lower the chance of stroke in people with a medical condition called atrial fibrillation. It is also used to treat or  prevent blood clots in the lungs or in the veins.  This medicine may be used for other purposes; ask your health care provider or pharmacist if you have questions.  What should I tell my health care provider before I take this medicine?  They need to know if you have any of these conditions:  · bleeding disorders  · bleeding in the brain  · blood in your stools (black or tarry stools) or if you have blood in your vomit  · history of stomach bleeding  · kidney disease  · liver disease  · mechanical heart valve  · an unusual or allergic reaction to apixaban, other medicines, foods, dyes, or preservatives  · pregnant or trying to get pregnant  · breast-feeding  How should I use this medicine?  Take this medicine by mouth with a glass of water. Follow the directions on the prescription label. You can take it with or without food. If it upsets your stomach, take it with food. Take your medicine at regular intervals. Do not take it more often than directed. Do not stop taking except on your doctor's advice. Stopping this medicine may increase your risk of a blot clot. Be sure to refill your prescription before you run out of medicine.  Talk to your pediatrician regarding the use of this medicine in children. Special care may be needed.  Overdosage: If you think you have taken too much of this medicine contact a poison control center or emergency room at once.  NOTE: This medicine is only for you. Do not share this medicine with others.  What if I miss a dose?  If you miss a dose, take it as soon as you can. If it is almost time for your next dose, take only that dose. Do not take double or extra doses.  What may interact with this medicine?  This medicine may interact with the following:  · aspirin and aspirin-like medicines  · certain medicines for fungal infections like ketoconazole and itraconazole  · certain medicines for seizures like carbamazepine and phenytoin  · certain medicines that treat or prevent blood clots  like warfarin, enoxaparin, and dalteparin  · clarithromycin  · NSAIDs, medicines for pain and inflammation, like ibuprofen or naproxen  · rifampin  · ritonavir  · Memo's wort  This list may not describe all possible interactions. Give your health care provider a list of all the medicines, herbs, non-prescription drugs, or dietary supplements you use. Also tell them if you smoke, drink alcohol, or use illegal drugs. Some items may interact with your medicine.  What should I watch for while using this medicine?  Notify your doctor or health care professional and seek emergency treatment if you develop breathing problems; changes in vision; chest pain; severe, sudden headache; pain, swelling, warmth in the leg; trouble speaking; sudden numbness or weakness of the face, arm, or leg. These can be signs that your condition has gotten worse.  If you are going to have surgery, tell your doctor or health care professional that you are taking this medicine.  Tell your health care professional that you use this medicine before you have a spinal or epidural procedure. Sometimes people who take this medicine have bleeding problems around the spine when they have a spinal or epidural procedure. This bleeding is very rare. If you have a spinal or epidural procedure while on this medicine, call your health care professional immediately if you have back pain, numbness or tingling (especially in your legs and feet), muscle weakness, paralysis, or loss of bladder or bowel control.  Avoid sports and activities that might cause injury while you are using this medicine. Severe falls or injuries can cause unseen bleeding. Be careful when using sharp tools or knives. Consider using an electric razor. Take special care brushing or flossing your teeth. Report any injuries, bruising, or red spots on the skin to your doctor or health care professional.  What side effects may I notice from receiving this medicine?  Side effects that you  should report to your doctor or health care professional as soon as possible:  · allergic reactions like skin rash, itching or hives, swelling of the face, lips, or tongue  · signs and symptoms of bleeding such as bloody or black, tarry stools; red or dark-brown urine; spitting up blood or brown material that looks like coffee grounds; red spots on the skin; unusual bruising or bleeding from the eye, gums, or nose  This list may not describe all possible side effects. Call your doctor for medical advice about side effects. You may report side effects to FDA at 8-877-ERR-0319.  Where should I keep my medicine?  Keep out of the reach of children.  Store at room temperature between 20 and 25 degrees C (68 and 77 degrees F). Throw away any unused medicine after the expiration date.  NOTE: This sheet is a summary. It may not cover all possible information. If you have questions about this medicine, talk to your doctor, pharmacist, or health care provider.  NOTE:This sheet is a summary. It may not cover all possible information. If you have questions about this medicine, talk to your doctor, pharmacist, or health care provider. Copyright© 2016 [employer]               [facility] complies with applicable Federal civil rights laws and does not discriminate on the basis of race, color, national origin, age, disability, or sex.

## 2017-01-10 NOTE — DISCHARGE INSTRUCTIONS
Your surgery has been scheduled for:__________________________________________    You should report to:  ____[facility], located on the [facility] side of the first floor of the           [facility] ([phone])  ____[facility], located on the [facility] side of the            Second floor of the [facility] ([phone])  ____3rd Floor [facility] located on the [facility] side of the [facility] [phone]  Please Note   - Tell your doctor if you take Aspirin, products containing Aspirin, herbal medications  or blood thinners, such as Coumadin, Ticlid, or Plavix.  (Consult your provider regarding holding or stopping before surgery).  - Arrange for someone to drive you home following surgery.  You will not be allowed to leave the surgical facility alone or drive yourself home following sedation and anesthesia.  Before Surgery  - Stop taking all herbal medications 14days prior to surgery  - No Motrin/Advil (Ibuprofen) 7 days before surgery  - No Aleve (Naproxen) 7 days before surgery  - Stop Taking Asprin, products containing Asprin _____days before surgery  - Stop taking blood thinners_______days before surgery  - Refrain from drinking alcoholic beverages for 24hours before and after surgery  - Stop or limit smoking _________days before surgery  Night before Surgery  - DO NOT EAT OR DRINK ANYTHING AFTER MIDNIGHT, INCLUDING GUM, HARD CANDY, MINTS, OR CHEWING TOBACCO.  - Take a shower or bath (shower is recommended).  Bathe with Hibiclens soap or an antibacterial soap from the neck down.  If not supplied by your surgeon, hibiclens soap will need to be purchased over the counter in pharmacy.  Rinse soap off thoroughly.  - Shampoo your hair with your regular shampoo  The Day of Surgery  - Take another bath or shower with hibiclens or any antibacterial soap, to reduce the chance of infection.  - Take heart and blood  pressure medications with a small sip of water, as advised by the perioperative team.  - Do not take fluid pills  - You may brush your teeth and rinse your mouth, but do not swall any additional water.   - Do not apply perfumes, powder, body lotions or deodorant on the day of surgery.  - Nail polish should be removed.  - Do not wear makeup or moisturizer  - Wear comfortable clothes, such as a button front shirt and loose fitting pants.  - Leave all jewelry, including body piercings, and valuables at home.    - Bring any devices you will neeed after surgery such as crutches or canes.  - If you have sleep apnea, please bring your CPAP machine  In the event that your physical condition changes including the onset of a cold or respiratory illness, or if you have to delay or cancel your surgery, please notify your surgeon.  Anesthesia: Regional Anesthesia  Youre scheduled for surgery. During surgery, youll receive medication called anesthesia to keep you comfortable and pain-free. Your surgeon has decided that youll receive regional anesthesia. This sheet tells you what to expect with this type of anesthesia.  What Is Regional Anesthesia?  Regional anesthesia numbs one region of your body. The anesthesia may be given around nerves or into veins in your arms, neck, or legs (nerve block or [place] block). Or it may be sent into the spinal fluid (spinal anesthesia) or into the space just outside the spinal fluid (epidural anesthesia). Sedatives may also be given to relax you.  Nerve Block or [place] Block  A small area of the body, such as an arm or leg, can be numbed using a nerve block or Willy block.  · Nerve block: During a nerve block, your skin is numbed. A needle is then inserted near nerves that serve the area to be numbed. Anesthetic is sent through the needle.  · IV regional or [place] block: For this type of block, an IV line is put into a vein. The blood flow to the area to be numbed is blocked for a short time.  Anesthetic is sent through the IV.  Spinal Anesthesia  Spinal anesthesia numbs your body from about the waist down.  · Anesthetic is injected into the spinal fluid. This is a substance that surrounds the spinal cord in your spinal column. The anesthetic blocks pain traveling from the body to the brain.  · To receive the anesthetic, your skin is numbed at the injection site.  · A needle is then inserted into the spinal fluid. Anesthetic is sent through the needle.  Anesthesia Tools and Medications  · Local anesthetic given through a needle numbs one region of your body.  · Electrocardiography leads (electrodes) record your heart rate and rhythm.  · A blood pressure cuff monitors your blood pressure.  · A pulse oximeter on the end of the finger measures your blood oxygen level.  · Sedatives may be given through an IV to relax you and keep you comfortable. You may stay awake or sleep slightly.  · Oxygen may be given to you through a facemask.  Risks and Possible Complications  Regional anesthesia carries some risks. These include:  · Nausea and vomiting  · Headache  · Backache  · Decreased blood pressure  · Allergic reaction to the anesthetic  · Ongoing numbness (rare)  · Irregular heartbeat (rare)  · Cardiac arrest (rare)   © 9078-4333 [facility], [address] [place] [address], [place] [address]. All rights reserved. This information is not intended as a substitute for professional medical care. Always follow your healthcare professional's instructions.

## 2017-01-10 NOTE — PROGRESS NOTES
Chief complaint-Preoperative evaluation , Perioperative Medical management, complication reduction plan     Date of Evaluation- 01/10/2017    PCP-  Desmond Barlow MD    Future cases for Stephen Cantor Jr. [[id number]]     Case ID Status Date Time Richardson Procedure Provider Location    [id number] [facility] 1/24/2017 10:30  REPLACEMENT-KNEE-TOTAL right sherri Jose Armando Knight MD [[id number]] [facility] OR 2ND FLR      Rt    History of present illness- I had the pleasure of meeting this pleasant 71 y.o. gentleman in the pre op clinic prior to his elective Orthopedic surgery. The patient is known to me from pre op evaluation in March 2016  .     I have obtained the history by speaking to the patient and by reviewing the electronic health records.    Events leading up to surgery / History of presenting illness -    He has been troubled with moderate-severe  Rt knee  pain for 5 years . Pain increases with activity and decreases with resting.      Relevant health conditions of significance for the perioperative period/ History of presenting illness -    Chronic Atrial fibrillation   had DCCV and as per the history obtained from patient , it was  Not successful   Lately doing good , no problem   Long term current use of anticoagulant therapy- Apixaban    CHADS - No chronic CHF,No stroke . HTN, DM- 2   Acceptable to hold Anticoagulation for surgery-   And as per Dr Granda the Electrophysiologist- 5 days hold      Sleep apnea   Sleep apnea .Uses CPAP when nose does not run and at that time uses the mouth piece .Suggested bringing for hospital use . Informed the risk of worsening sleep apnea in the perioperative period and suggest using CPAP use any time in 24 hrs ( day or night )for planned sleep  Avoidance of  supine sleep, weight gain and alcoholic beverages discussed since all of these can worsen HEIDI     Morbid obesity   Suggested weight loss  Loosing weight - by avoiding starches for 3 months  Hopefully exercise will help with weight loss      HTN  Usual BP- 125/70-80     Type 2 Diabetes Mellitus  On treatment with  Insulin    Hemoglobin A1c- 12/6/2016 - 5.9  Capillary glucose check-yes  Pre breakfast -100-130  Pre lunch -  Pre iejoxx-  Diabetic polyneuropathy   Diabetic chronic kidney disease    Chronic kidney disease, stage III (moderate)   Stages of CKD discussed    mg daily - for 10 years and prior to that was taking 2 of them a day- as his grand mother who lived to be 86 Years used to do that   Renal, ulcer affects discussed      Enlarged prostate - slow urinary stream   Risk of urinary retention post op    Diastolic dysfunction - no recent heart failure    Active cardiac conditions- none    Revised cardiac risk index predictors- history of heart failure and insulin requiring diabetes mellitus     Functional capacity -Examples of physical activity- walks ,( can slowly take multiple flights of stairs)    can take a flight of stairs holding on to the railing and  swimming ----- He can undertake all the above activities without  chest pain,chest tightness, Shortness of breath ,dizziness,lightheadedness making his exercise tolerance more, than 4 Mets.       Review of symptoms    Constitutional -as above ,No fever  Eyes- No new vision changes  ENT- sleep apnea  Cardiac-As above   Respiratory- No cough, expectoration  GI- Bowel movements  regular  No overt GI/ blood losses   -hesitancy and No dysuria  MS-As above  Neurologic-No unilateral weakness   Numbness toes  Psychiatric- No depression,Anxiety  Endocrine-  Recent steroid use-no  Hematological/Lymphatic-No spontaneous bruising, bleeding    Past Medical history- reviewed in EPIC-    Pertinent negatives-   DVT-  Pulmonary embolism-  CAD-  Vascular stenting -    Past Surgical history - reviewed in EPIC-    Most recent surgery - June 2016 - Ectropion   No Anaesthetic,Bleeding ,Cardiac problems with previous surgeries-  No history of delirium -  No history of post operative   nausea, vomiting -    Family history- reviewed in EPIC    Heart disease- father- age of onset - 73/74-had heart surgery CABG  Thrombosis- None-  Anaesthetic complications- None-  Diabetes Mellitus - mother    Social history- reviewed in EPIC-    Lives with wife aged 70 with some health problems   Help available post op- daughter, wife     Medications and Allergies - reviewed in [facility]    Exam    Constitutional-General appearance-Conscious,Coherent  Eyes- No conjunctival icterus,pupils  round  and reactive to light   ENT-Oral cavity- moist  , Hearing grossly normal   Neck- No thyromegaly ,Trachea -central, No jugular venous distension, No Carotid Bruit   Cardiovascular -Heart Sounds-Irregular  and  no murmur   , No gallop rhythm   Respiratory - Normal Respiratory Effort, Normal breath sounds and  no wheeze    Peripheral pitting pedal edema-- mild, no calf pain   Gastrointestinal -Soft abdomen, No palpable masses, Non Tender,Liver,Spleen not palpable. No-- free fluid and shifting dullness  Musculoskeletal- No finger Clubbing. Strength grossly normal   Lymphatic-No Palpable cervical, axillary,Inguinal lymphadenopathy   Psychiatric - normal effect,Orientation  Rt Dorsalis pedis pulses-palpable    Lt Dorsalis pedis pulses- palpable   Rt Posterior tibial pulses -palpable   Left posterior tibial pulses -palpable   Miscellaneous -  no asterixis,  no dupuytren's contracture,  Surgical scarRt face , Left knee , telagiectasia Rt lower extremity, compression stocking Left lower extremity   and  no renal bruit    Investigations    Review of Medicine tests    EKG- I had independently reviewed the EKG from--June 17 th 2016   It was reported to be showing     12/15/2016 - UA no suggestion of UTI     Review of clinical lab tests-Date--- 12/6/2016Creatinine-1.5  Date--8/26/2016 Hemoglobin-15.0 - Platelet count--181    May 2013 -There is no evidence of a discrete hemodynamically significant coronary stenosis    Echo May 2016     1 -  Upper limit of normal left ventricular enlargement.     2 - Normal left ventricular systolic function (EF 55-60%).     3 - Normal left ventricular diastolic function.     4 - Right ventricle is upper limit of normal in size with normal systolic function.     5 - Trivial to mild tricuspid regurgitation.     6 - Trivial pericardial effusion.     7 - The estimated PA systolic pressure is 31 mm    Review of old records- Was done and information gathered regards to events leading to surgery and health conditions of significance in the perioperative period      Assessment and plan      Preoperative cardiac risk assessment    The patient  does not have any active cardiac conditions . Revised cardiac risk index predictors- ---.Functional capacity  Is more than 4 Mets. He will be undergoing a Orthopedic procedure that carries a intermediate risk     The estimated risk of the rate of adverse cardiac outcomes -6.6 %    No further cardiac work up is indicated prior to proceeding with the surgery     Perioperative Medical management    Chronic Atrial fibrillation   Acceptable to hold Anticoagulation for surgery-   As per Electrophysiologist- 5 days hold  Given the neur axial anaesthesia  Risk of cardio embolic event with interruption of anticoagulation-  intermediate  I suggest cardiac monitoring in the perioperative period . Uncontrolled pain, fever, hypovolemia can increase the ventricular rate    ASA use- holding 10 days pre op     Diuretic use-   I suggest fluid status, electrolyte, renal function monitoring in the perioperative period    Diabetes Mellitus type 2 controlled,Diabetic polyneuropathy,Diabetic chronic kidney disease   -I suggest monitoring the glucose in the perioperative period ( Before meals and bed time,if the patient is on oral feeds or every 6 hourly ,if the patient is NPO )  Blood glucose targets in hospitalized patients are ( Critical care patients 140-180 mg/dl, Med/Surg patients ( non critical care)  100-140 mg/dl, patients on continuous enteral or parenteral nutrition 140-180 mg/dl )  .Oral Hypoglycemic agents are generally avoided during the hospital stay . If glucose is consistently elevated ,I suggest using basal ,prandial Insulin regimen to control the glucose , as elevated glucose can be associated with adverse surgical out comes. Please consider involving Hospital Medicine or Endocrinology ,if any help is needed with Glucose control. Patient will be instructed based on the pre op clinic guidelines  about adjustment of diabetic treatment  considering the NPO status for Surgery     Hypothyroidism- I suggest continuation of synthroid replacement in the perioperative period      Hypertension-  Blood pressure is acceptable . I suggest continuation of losartan ( taking nightly), Toprol XL during the entire perioperative period. I suggest blood pressure, electrolyte and renal function monitoring as long as they are acceptable.I suggest addressing pain control as uncontrolled pain can increased blood pressure     HLD-I  suggest continuation of statin during the entire perioperative period.    Sleep apnea- I suggest a sleep study and suggest caution with usage of medication that can cause respiratory suppression in the perioperative period  potential ramifications of untreated sleep apnea, which could include daytime sleepiness, hypertension, heart disease and stroke were discussed    Chronic kidney disease, stage III (moderate)    I  suggest monitoring renal function, in put and out put status evangelina-operatively. I  suggest avoiding nephrotoxic medication including NSAIDs, COX2 inhibitors, intravenous contrast agent,avoiding hypotension to prevent further renal impairment.      Enlarged prostate - slow urinary stream   Risk of urinary retention post op    Diastolic dysfunction - Diastolic Dysfunction:  I  suggest watching his fluid status perioperatively and to watch out for fluid overload in view of his Diastolic  Dysfunction.  I suggest avoidance of the ordering of continuous IV fluids and if IV fluids are required ,to order for a specific duration of time and consider ordering lower IV fluid rate     Pulmonary Hypertension-The estimated PA systolic pressure is 31 mm I suggest that the anaesthesiologist be aware of this . I suggest close monitoring of the hemodynamics and avoid hypotension . I suggest avoiding conditions that can  aggravate pulmonary hypertension like  acidosis, hypercarbia, avoiding use of nitrous oxide and suggest  appropriate hydration     Tattoos- No suggestion of hepatic decompensation       Preventive perioperative care    Thromboembolic prophylaxis:  His risk factors for thrombosis include morbid obesity, surgical procedure and age.I suggest  thromboembolic prophylaxis ( mechanical/pharmacological, weighing the risk benefits of pharmacological agent use considering evangelina procedural bleeding )  during the perioperative period.I suggested being active in the post operative period. I suggest  physical therapy, if felt appropriate  in the post operative period  The patient is a candidate for extended DVT prophylaxis    Postoperative pulmonary complication prophylaxis-Risk factors for post operative pulmonary complications include sleep apnea and  age over 65  years - I suggest incentive Spirometry use,early ambulation and end tidal carbon dioxide  monitoring in the perioperative period.I suggested being active in the post operative period    Renal complication prophylaxis-Risk factors for renal complications include pre-existing renal disease, Diabetes Mellitus and Hypertension . I suggest keeping him well hydrated and avoidance/ minimizing the use of  NSAID's,LOYA 2 Inhibitors ,IV contrast if possible in the perioperative period.I suggested drinking 2 litre's of water a day     Surgical site Infection Prophylaxis-I  suggest appropriate antibiotic for Prophylaxis against Surgical site infections    In view  "of enlarged prostate the patient  is at risk of postoperative urinary retention.  I suggest avoidance / minimizing the of  Benzodiazepines,Anticholinergic medication,antihistamines ( Benadryl) , if possible in the perioperative period. I suggest using the minimum possible use of opioids for the minimum period of time in the perioperative period. Benadryl avoidance suggested     This visit was focussed on Preoperative evaluation , Perioperative Medical management, complication reduction plans and I suggest that the patient follows up with the primary care ,relevant sub specialists for on going health care      I appreciate the opportunity to be involved in this  pleasant  gentleman's care.Please feel free to contact me if there were any questions about this consultation     Patient is -optimized- for the above     Dr JAYME Loyd MD [facility] ( ),FACP   Perioperative Medicine  [facility]   Pager [phone]  -----    1/10/2016- 12 58     Given diabetes   Suggest change of  mg to 81 mg for the perioperative period ( while off of 325 mg )   ----------------------------  1/10/2016- 16 33     Visit Vitals    BP (!) 95/53    Pulse 84    Ht 5' 8.75" (1.746 m)    Wt 134.6 kg (296 lb 11.8 oz)    SpO2 97%    BMI 44.14 kg/m2       Lab 1/10/2017- Hb 15.6, plt 186 ,INR 1.0     --     1/12- 16 25     Called to follow up about BP   Left a message to call   -------------------------    1/20- 1621  Called to follow up about BP  Feeling good  No dizziness , light headedness   Last took Apixaban 1/17 - holding for surgery   Prefers holding ASA  ---------------------------    1/23- 12 56     Called to follow up   Spoke to patient   Doing good   Surgery moved to 1/25   Suggested to call, if needed  ----------------------    1/24- 9 54     Called to follow up   Spoke to patient   Doing really good   CBG- before supper 70- eating light - hypoglycemia awareness- feels hungry- no sweating , heart racing "   Yesterday pre meal 131,95,70- day before 135,103,153- day before 165,217, 136 ( had company ), day before 132, 120, 91  Suggested checking glucose at bed time night  ( goal 100-150)   Plans on having a light supper - reducing supper time novolog  Not monitoring BP  Medication instructions discussed

## 2017-01-10 NOTE — LETTER
January 10, 2017      Jose Armando Knight MD  [address] [facility] [address]           [facility]  [address] [facility]  1st Floor [address]  [facility] [address]  Phone: [phone]          Patient: [facility] PAT Cantor Jr.   MR Number: [id number]   YOB: 1945   Date of Visit: 1/10/2017       Dear Dr. Jose Armando Knight:    Thank you for referring [facility] to me for evaluation. Attached you will find relevant portions of my assessment and plan of care.    If you have questions, please do not hesitate to call me. I look forward to following [facility] along with you.    Sincerely,    Shaye Loyd MD    Enclosure  CC:  MD José Skaggs MD    If you would like to receive this communication electronically, please contact [email or web address] or [phone] to request more information on [employer] Link access.    For providers and/or their staff who would like to refer a patient to [facility], please contact us through our one-stop-shop provider referral line, [facility], at [phone].    If you feel you have received this communication in error or would no longer like to receive these types of communications, please e-mail [email or web address]

## 2017-01-10 NOTE — IP AVS SNAPSHOT
[address]  [place] [address]  Phone: [phone]           I have received a copy of my After Visit Summary and discharge instructions from [facility].    INSTRUCTIONS RECEIVED AND UNDERSTOOD BY:                     Patient/Patient Representative: ________________________________________________________________     Date/Time: ________________________________________________________________                     Instructions Given By: ________________________________________________________________     Date/Time: ________________________________________________________________

## 2017-01-13 RX ORDER — LOSARTAN POTASSIUM 50 MG/1
TABLET ORAL
Qty: 30 TABLET | Refills: 11 | Status: SHIPPED | OUTPATIENT
Start: 2017-01-13 | End: 2017-06-23 | Stop reason: SDUPTHER

## 2017-01-20 ENCOUNTER — TELEPHONE (OUTPATIENT)
Dept: ORTHOPEDICS | Facility: CLINIC | Age: 72
End: 2017-01-20

## 2017-01-20 NOTE — TELEPHONE ENCOUNTER
----- Message from Kristen Desai sent at 1/20/2017 12:11 PM CST -----  Contact: self @[phone]  Sx time and arrival

## 2017-01-24 ENCOUNTER — ANESTHESIA (OUTPATIENT)
Dept: SURGERY | Facility: HOSPITAL | Age: 72
DRG: 470 | End: 2017-01-24
Payer: MEDICARE

## 2017-01-25 ENCOUNTER — SURGERY (OUTPATIENT)
Age: 72
End: 2017-01-25

## 2017-01-25 PROBLEM — M17.11 PRIMARY OSTEOARTHRITIS OF RIGHT KNEE: Status: ACTIVE | Noted: 2017-01-25

## 2017-01-25 PROCEDURE — 25000003 PHARM REV CODE 250: Performed by: ANESTHESIOLOGY

## 2017-01-25 PROCEDURE — 27200750 HC INSULATED NEEDLE/ STIMUPLEX: Performed by: ANESTHESIOLOGY

## 2017-01-25 PROCEDURE — 27200670 HC STIMUCATH COMPLETE KIT: Performed by: ANESTHESIOLOGY

## 2017-01-25 PROCEDURE — D9220A PRA ANESTHESIA: Mod: ,,, | Performed by: ANESTHESIOLOGY

## 2017-01-25 PROCEDURE — 64450 NJX AA&/STRD OTHER PN/BRANCH: CPT | Mod: 59,RT,, | Performed by: ANESTHESIOLOGY

## 2017-01-25 PROCEDURE — 63600175 PHARM REV CODE 636 W HCPCS: Performed by: ANESTHESIOLOGY

## 2017-01-25 PROCEDURE — 76942 ECHO GUIDE FOR BIOPSY: CPT | Performed by: ANESTHESIOLOGY

## 2017-01-25 PROCEDURE — 64448 NJX AA&/STRD FEM NRV NFS IMG: CPT | Mod: 59,RT,, | Performed by: ANESTHESIOLOGY

## 2017-01-25 RX ORDER — SODIUM CHLORIDE 9 MG/ML
INJECTION, SOLUTION INTRAVENOUS CONTINUOUS PRN
Status: DISCONTINUED | OUTPATIENT
Start: 2017-01-25 | End: 2017-01-25

## 2017-01-25 RX ORDER — KETAMINE HCL IN 0.9 % NACL 50 MG/5 ML
SYRINGE (ML) INTRAVENOUS
Status: DISCONTINUED | OUTPATIENT
Start: 2017-01-25 | End: 2017-01-25

## 2017-01-25 RX ORDER — CEFAZOLIN SODIUM 1 G/3ML
INJECTION, POWDER, FOR SOLUTION INTRAMUSCULAR; INTRAVENOUS
Status: DISCONTINUED | OUTPATIENT
Start: 2017-01-25 | End: 2017-01-25

## 2017-01-25 RX ORDER — PHENYLEPHRINE HYDROCHLORIDE 10 MG/ML
INJECTION INTRAVENOUS
Status: DISCONTINUED | OUTPATIENT
Start: 2017-01-25 | End: 2017-01-25

## 2017-01-25 RX ORDER — DEXAMETHASONE SODIUM PHOSPHATE 4 MG/ML
INJECTION, SOLUTION INTRA-ARTICULAR; INTRALESIONAL; INTRAMUSCULAR; INTRAVENOUS; SOFT TISSUE
Status: DISCONTINUED | OUTPATIENT
Start: 2017-01-25 | End: 2017-01-25

## 2017-01-25 RX ORDER — MIDAZOLAM HYDROCHLORIDE 1 MG/ML
INJECTION, SOLUTION INTRAMUSCULAR; INTRAVENOUS
Status: DISCONTINUED | OUTPATIENT
Start: 2017-01-25 | End: 2017-01-25

## 2017-01-25 RX ORDER — PROPOFOL 10 MG/ML
VIAL (ML) INTRAVENOUS CONTINUOUS PRN
Status: DISCONTINUED | OUTPATIENT
Start: 2017-01-25 | End: 2017-01-25

## 2017-01-25 RX ADMIN — PHENYLEPHRINE HYDROCHLORIDE 100 MCG: 10 INJECTION INTRAVENOUS at 08:01

## 2017-01-25 RX ADMIN — SODIUM CHLORIDE, SODIUM GLUCONATE, SODIUM ACETATE, POTASSIUM CHLORIDE, MAGNESIUM CHLORIDE, SODIUM PHOSPHATE, DIBASIC, AND POTASSIUM PHOSPHATE: .53; .5; .37; .037; .03; .012; .00082 INJECTION, SOLUTION INTRAVENOUS at 09:01

## 2017-01-25 RX ADMIN — PHENYLEPHRINE HYDROCHLORIDE 100 MCG: 10 INJECTION INTRAVENOUS at 09:01

## 2017-01-25 RX ADMIN — Medication 40 MG: at 08:01

## 2017-01-25 RX ADMIN — PROPOFOL 60 MCG/KG/MIN: 10 INJECTION, EMULSION INTRAVENOUS at 08:01

## 2017-01-25 RX ADMIN — DEXAMETHASONE SODIUM PHOSPHATE 8 MG: 4 INJECTION, SOLUTION INTRAMUSCULAR; INTRAVENOUS at 08:01

## 2017-01-25 RX ADMIN — SODIUM CHLORIDE: 0.9 INJECTION, SOLUTION INTRAVENOUS at 07:01

## 2017-01-25 RX ADMIN — CEFAZOLIN 3 G: 1 INJECTION, POWDER, FOR SOLUTION INTRAVENOUS at 08:01

## 2017-01-25 RX ADMIN — EPHEDRINE SULFATE 10 MG: 50 INJECTION, SOLUTION INTRAMUSCULAR; INTRAVENOUS; SUBCUTANEOUS at 09:01

## 2017-01-25 RX ADMIN — EPHEDRINE SULFATE 5 MG: 50 INJECTION, SOLUTION INTRAMUSCULAR; INTRAVENOUS; SUBCUTANEOUS at 09:01

## 2017-01-25 RX ADMIN — EPHEDRINE SULFATE 10 MG: 50 INJECTION, SOLUTION INTRAMUSCULAR; INTRAVENOUS; SUBCUTANEOUS at 08:01

## 2017-01-25 RX ADMIN — EPHEDRINE SULFATE 5 MG: 50 INJECTION, SOLUTION INTRAMUSCULAR; INTRAVENOUS; SUBCUTANEOUS at 08:01

## 2017-01-25 RX ADMIN — MIDAZOLAM HYDROCHLORIDE 2 MG: 1 INJECTION, SOLUTION INTRAMUSCULAR; INTRAVENOUS at 07:01

## 2017-01-25 RX ADMIN — TRANEXAMIC ACID 3000 MG: 100 INJECTION, SOLUTION INTRAVENOUS at 08:01

## 2017-01-25 RX ADMIN — MEPIVACAINE HYDROCHLORIDE 45 MG: 15 INJECTION, SOLUTION EPIDURAL; INFILTRATION at 08:01

## 2017-01-25 RX ADMIN — SODIUM CHLORIDE, SODIUM GLUCONATE, SODIUM ACETATE, POTASSIUM CHLORIDE, MAGNESIUM CHLORIDE, SODIUM PHOSPHATE, DIBASIC, AND POTASSIUM PHOSPHATE: .53; .5; .37; .037; .03; .012; .00082 INJECTION, SOLUTION INTRAVENOUS at 08:01

## 2017-01-25 NOTE — TRANSFER OF CARE
"Anesthesia Transfer of Care Note    Patient: [facility] PAT Cantor Jr.    Procedure(s) Performed: Procedure(s) (LRB):  REPLACEMENT-KNEE-TOTAL right sherri (Right)    Patient location: PACU    Anesthesia Type: spinal    Transport from OR: Transported from OR on 100% O2 by closed face mask with adequate spontaneous ventilation    Post pain: adequate analgesia    Post assessment: no apparent anesthetic complications    Post vital signs: stable    Level of consciousness: awake, alert and oriented    Nausea/Vomiting: no nausea/vomiting    Complications: none          Last vitals:   Visit Vitals    /62 (BP Location: Left arm, Patient Position: Lying, BP Method: Automatic)    Pulse 65    Temp 36.4 °C (97.6 °F) (Oral)    Resp 16    Ht 5' 10" (1.778 m)    Wt 133.8 kg (295 lb)    SpO2 99%    BMI 42.33 kg/m2     "

## 2017-01-25 NOTE — ANESTHESIA PROCEDURE NOTES
IPACK Single Shot Block    Patient location during procedure: pre-op   Block not for primary anesthetic.  Reason for block: at surgeon's request and post-op pain management   Post-op Pain Location: right knee pain  Start time: 1/25/2017 7:06 AM  Timeout: 1/25/2017 7:05 AM   End time: 1/25/2017 7:30 AM  Staffing  Anesthesiologist: LINSEY MCFADDEN  Resident/CRNA: NADIA AZEVEDO  Performed by: resident/CRNA   Preanesthetic Checklist  Completed: patient identified, site marked, surgical consent, pre-op evaluation, timeout performed, IV checked, risks and benefits discussed and monitors and equipment checked  Peripheral Block  Patient position: supine  Prep: ChloraPrep  Patient monitoring: heart rate, cardiac monitor, continuous pulse ox, continuous capnometry and frequent blood pressure checks  Block type: I PACK  Laterality: right  Injection technique: single shot  Needle  Needle type: Stimuplex   Needle gauge: 21 G  Needle length: 4 in  Needle localization: anatomical landmarks and ultrasound guidance   -ultrasound image captured on disc.  Assessment  Injection assessment: negative aspiration, negative parasthesia and local visualized surrounding nerve  Paresthesia pain: none  Heart rate change: no  Slow fractionated injection: yes  Medications:  Bolus administered: 20 mL of 0.25 ropivacaine  Epinephrine added: 3.75 mcg/mL (1/300,000)  Additional Notes  VSS.  [facility] RN monitoring vitals throughout procedure.  Patient tolerated procedure well.

## 2017-01-25 NOTE — ANESTHESIA RELEASE NOTE
"Anesthesia Release from PACU Note    Patient: [facility] [place] Obert JrJorge    Procedure(s) Performed: Procedure(s) (LRB):  REPLACEMENT-KNEE-TOTAL right sherri (Right)    Anesthesia type: spinal    Post pain: Adequate analgesia    Post assessment: no apparent anesthetic complications, tolerated procedure well and no evidence of recall    Last Vitals:   Visit Vitals    BP (!) 103/59    Pulse 77    Temp 36.3 °C (97.3 °F) (Axillary)    Resp 15    Ht 5' 10" (1.778 m)    Wt 133.8 kg (295 lb)    SpO2 97%    BMI 42.33 kg/m2       Post vital signs: stable    Level of consciousness: awake, alert  and oriented    Nausea/Vomiting: no nausea/no vomiting    Complications: none    Airway Patency: patent    Respiratory: unassisted, spontaneous ventilation, room air    Cardiovascular: stable and blood pressure at baseline    Hydration: euvolemic  "

## 2017-01-25 NOTE — ANESTHESIA PROCEDURE NOTES
"Spinal    Patient location during procedure: OR  Start time: 1/25/2017 8:05 AM  Timeout: 1/25/2017 8:05 AM  End time: 1/25/2017 8:12 AM  Staffing  Anesthesiologist: KRYSTINA BRENNER  Resident/CRNA: RODRIGUEZ ROMANO  Performed by: resident/CRNA   Preanesthetic Checklist  Completed: patient identified, site marked, surgical consent, pre-op evaluation, timeout performed, IV checked, risks and benefits discussed and monitors and equipment checked  Spinal Block  Patient position: sitting  Prep: ChloraPrep and Draped.  Patient monitoring: heart rate, cardiac monitor and continuous pulse ox  Approach: midline  Location: L4-5  Injection technique: single shot  CSF Fluid: clear free-flowing CSF  Needle  Needle type: Ruben   Needle gauge: 25 G  Needle length: 5 in  Additional Documentation: negative aspiration for heme and no paresthesia on injection  Needle localization: anatomical landmarks  Assessment  Ease of block: easy  Patient's tolerance of the procedure: comfortable throughout block and no complaints  Medications:  Bolus administered: 3 mL of 1.5 mepivacaine  Epinephrine added: none  Additional Notes  Patient tolerated well.  + CSF with 5" spinal needle through spinal introducer, negative for heme, no paresthesias.  Mepivacaine LOT #[id number], exp 5/1/2019.            "

## 2017-01-25 NOTE — ANESTHESIA POSTPROCEDURE EVALUATION
"Anesthesia Post Evaluation    Patient: [facility] [place] PAT Sakshifreddy Mcconnell    Procedure(s) Performed: Procedure(s) (LRB):  REPLACEMENT-KNEE-TOTAL right sherri (Right)    Final Anesthesia Type: spinal  Patient location during evaluation: PACU  Patient participation: Yes- Able to Participate  Level of consciousness: awake and alert, awake and oriented  Post-procedure vital signs: reviewed and stable  Pain management: adequate  Airway patency: patent  PONV status at discharge: No PONV  Anesthetic complications: no      Cardiovascular status: blood pressure returned to baseline and hemodynamically stable  Respiratory status: unassisted, spontaneous ventilation and room air  Hydration status: euvolemic  Follow-up not needed.        Visit Vitals    BP (!) 103/59    Pulse 77    Temp 36.3 °C (97.3 °F) (Axillary)    Resp 15    Ht 5' 10" (1.778 m)    Wt 133.8 kg (295 lb)    SpO2 97%    BMI 42.33 kg/m2       Pain/Bessie Score: Pain Assessment Performed: Yes (1/25/2017 11:00 AM)  Presence of Pain: complains of pain/discomfort (1/25/2017 11:00 AM)  Pain Rating Prior to Med Admin: 0 (1/25/2017 10:43 AM)  Bessie Score: 10 (1/25/2017 11:00 AM)      "

## 2017-01-25 NOTE — ANESTHESIA PROCEDURE NOTES
Adductor Canal Catheter    Patient location during procedure: pre-op   Block not for primary anesthetic.  Reason for block: at surgeon's request and post-op pain management   Post-op Pain Location: right knee pain  Start time: 1/25/2017 7:06 AM  Timeout: 1/25/2017 7:05 AM   End time: 1/25/2017 7:30 AM  Staffing  Anesthesiologist: LINSEY MCFADDEN  Resident/CRNA: NADIA AZEVEDO  Performed by: resident/CRNA   Preanesthetic Checklist  Completed: patient identified, site marked, surgical consent, pre-op evaluation, timeout performed, IV checked, risks and benefits discussed and monitors and equipment checked  Peripheral Block  Patient position: supine  Prep: ChloraPrep and site prepped and draped  Patient monitoring: heart rate, cardiac monitor, continuous pulse ox, continuous capnometry and frequent blood pressure checks  Block type: adductor canal  Laterality: right  Injection technique: continuous  Needle  Needle type: Tuohy   Needle gauge: 17 G  Needle length: 3.5 in  Needle localization: anatomical landmarks and ultrasound guidance  Catheter type: spring wound  Catheter size: 19 G  Test dose: lidocaine 1.5% with Epi 1-to-200,000 and negative   -ultrasound image captured on disc.  Assessment  Injection assessment: negative aspiration, negative parasthesia and local visualized surrounding nerve  Paresthesia pain: none  Heart rate change: no  Slow fractionated injection: yes  Medications:  Bolus administered: 20 mL of 0.25 ropivacaine  Epinephrine added: 3.75 mcg/mL (1/300,000)  Additional Notes  VSS.  [facility] RN monitoring vitals throughout procedure.  Patient tolerated procedure well.

## 2017-01-26 PROCEDURE — 99231 SBSQ HOSP IP/OBS SF/LOW 25: CPT | Mod: ,,, | Performed by: ANESTHESIOLOGY

## 2017-01-26 NOTE — ANESTHESIA POST-OP PAIN MANAGEMENT
Acute Pain Service and Perioperative Surgical Home Progress Note    [place]  [facility] [place] a 71 y.o., male,     Interval history  NAEO. VSS on RA. PAin well controlled. Working w/ PT. Tolerating PO. + flatus, - BM. Glucose elevated yesterday evening 2/2 juice intake in PACU.     Surgery:  Procedure(s) (LRB):  REPLACEMENT-KNEE-TOTAL right sherri (Right)    Post Op Day #: 1    Catheter type: Perineural Adductor Canal    Infusion type: Ropivacaine 0.2%  8 ml/hr basal    Problem List:    Active Hospital Problems    Diagnosis  POA    *Primary osteoarthritis of right knee [M17.11]  Yes      Resolved Hospital Problems    Diagnosis Date Resolved POA   No resolved problems to display.       Subjective:       General appearance of alert, oriented, no complaints   Pain with rest: 2    Numbers   Pain with movement: 7    Numbers   Side Effects    1. Pruritis No    2. Nausea No    3. Motor Blockade No, 0=Ability to raise lower extremities off bed    4. Sedation No, 1=awake and alert    Schedule Medications:    acetaminophen  1,000 mg Oral Q6H    aspirin  325 mg Oral BID    ceFAZolin 2 g/50mL Dextrose IVPB  2 g Intravenous Q6H    famotidine  20 mg Oral BID    fenofibrate  145 mg Oral Daily    gabapentin  400 mg Oral TID    insulin aspart  12 Units Subcutaneous with breakfast    insulin aspart  15 Units Subcutaneous with lunch    insulin aspart  15 Units Subcutaneous TIDWM    insulin detemir  40 Units Subcutaneous QHS    levothyroxine  25 mcg Oral Daily    losartan  50 mg Oral Daily    metoprolol succinate  100 mg Oral QHS    oxybutynin  5 mg Oral Daily    polyethylene glycol  17 g Oral Daily    pravastatin  10 mg Oral QHS    senna-docusate 8.6-50 mg  1 tablet Oral BID    tamsulosin  1 capsule Oral Daily        Continuous Infusions:   ropivacaine (PF) 2 mg/ml (0.2%) 8 mL/hr (01/26/17 1042)    ropivacaine          PRN Medications:  bisacodyl, dextrose 50%, dextrose 50%, glucagon (human recombinant), glucose,  glucose, insulin aspart, morphine, morphine, naloxone, naloxone, naloxone, ondansetron, oxycodone, oxycodone, promethazine (PHENERGAN) IVPB, ramelteon, ropivacaine, sodium chloride 0.9%       Antibiotics:  Antibiotics     Start     Stop Route Frequency Ordered    01/25/17 1430  cefazolin (ANCEF) 2 gram in dextrose 5% 50 mL IVPB (premix)      -- IV Every 6 hours (non-standard times) 01/25/17 0959             Objective:     Catheter site clean, dry, intact          Vital Signs (Most Recent):  Temp: 36.3 °C (97.4 °F) (01/26/17 1145)  Pulse: 93 (01/26/17 1145)  Resp: 16 (01/26/17 1145)  BP: 123/72 (01/26/17 1145)  SpO2: 95 % (01/26/17 1145) Vital Signs Range (Last 24H):  Temp:  [35.6 °C (96.1 °F)-36.4 °C (97.6 °F)]   Pulse:  []   Resp:  [11-21]   BP: (116-149)/(59-88)   SpO2:  [94 %-100 %]          I & O (Last 24H):  Intake/Output Summary (Last 24 hours) at 01/26/17 1227  Last data filed at 01/26/17 1200   Gross per 24 hour   Intake                0 ml   Output             1175 ml   Net            -1175 ml       Physical Exam:    GA: Alert, comfortable, no acute distress.   Pulmonary: Clear to auscultation A/P/L. No wheezing, crackles, or rhonchi.  Cardiac: RRR S1 & S2 w/o rubs/murmurs/gallops.   Abdominal:Bowel sounds present. No tenderness to palpation or distension. No appreciable hepatosplenomegaly.   Skin: No jaundice, rashes, or visible lesions.         Laboratory:  CBC: No results for input(s): WBC, RBC, HGB, HCT, PLT, MCV, MCH, MCHC in the last 72 hours.    BMP: Recent Labs      01/25/17   0949   NA  135*   K  4.5   CO2  23   CL  105   BUN  23   CREATININE  1.6*   GLU  163*   CALCIUM  8.5*       No results for input(s): INR, PROTIME, APTT in the last 72 hours.    Invalid input(s): PT      Anticoagulant dose ASA at 325mg.    Assessment:         Pain control adequate    Plan:     1) Pain:    Adductor canal perineural catheter in place and infusing. Good level. Multimodal pain regimen with acetaminophen,  Celebrex, Lyrica, and prn oxycodone given  Will continue to monitor. Plan to D/C perineural catheter in AM or home with On-Q if patient discharged today.   2) CKD: Cr: 1.6, near baseline, no acute issues   3) DM2: cont detemir 32 daily and 12/15/15 TIDwM of aspart. F/u accuchecks. BS: 142 this AM, improved.   4) HTN: controlled on home emds   5) FEN/GI: Tolerating liquids, advance diet as tolerated. + flatus. - BM.   6) Dispo: Pt working well with PT/OT. Case management and SW for    placement. Plan for D/C tomorrow afternoon or possibly today if patient   works well with PT and meets goals.          Evaluator Urban Smith MD

## 2017-01-27 PROCEDURE — 99231 SBSQ HOSP IP/OBS SF/LOW 25: CPT | Mod: ,,, | Performed by: ANESTHESIOLOGY

## 2017-01-27 NOTE — ANESTHESIA POST-OP PAIN MANAGEMENT
Acute Pain Service and Perioperative Surgical Home Progress Note    HPI  [facility] [place] Cantor Jr. is a 71 y.o., male,     Interval history  NAEO. Per pt, pain intolerable at times. States leg sore from PT. Tolerating PO, + flatus, - BM. Working w/ PT. Voiding well. Catheter paused this AM.    Surgery:  Procedure(s) (LRB):  REPLACEMENT-KNEE-TOTAL right sherri (Right)    Post Op Day #: 2    Catheter type: Perineural Adductor Canal    Infusion type: Ropivacaine 0.2%  8 ml/hr basal    Problem List:    Active Hospital Problems    Diagnosis  POA    *Primary osteoarthritis of right knee [M17.11]  Yes      Resolved Hospital Problems    Diagnosis Date Resolved POA   No resolved problems to display.       Subjective:       General appearance of alert, oriented, no complaints   Pain with rest: 5    Numbers   Pain with movement: 8    Numbers   Side Effects    1. Pruritis No    2. Nausea No    3. Motor Blockade No, 0=Ability to raise lower extremities off bed    4. Sedation No, 1=awake and alert    Schedule Medications:    aspirin  325 mg Oral BID    ceFAZolin 2 g/50mL Dextrose IVPB  2 g Intravenous Q6H    famotidine  20 mg Oral BID    fenofibrate  145 mg Oral Daily    gabapentin  400 mg Oral TID    insulin aspart  12 Units Subcutaneous with breakfast    insulin aspart  15 Units Subcutaneous with lunch    insulin aspart  15 Units Subcutaneous TIDWM    insulin detemir  40 Units Subcutaneous QHS    levothyroxine  25 mcg Oral Daily    losartan  50 mg Oral Daily    metoprolol succinate  100 mg Oral QHS    oxybutynin  5 mg Oral Daily    polyethylene glycol  17 g Oral Daily    pravastatin  10 mg Oral QHS    senna-docusate 8.6-50 mg  1 tablet Oral BID    tamsulosin  1 capsule Oral Daily        Continuous Infusions:   ropivacaine (PF) 2 mg/ml (0.2%) 8 mL/hr (01/26/17 1042)    ropivacaine          PRN Medications:  bisacodyl, dextrose 50%, dextrose 50%, glucagon (human recombinant), glucose, glucose, insulin aspart,  morphine, morphine, naloxone, naloxone, naloxone, ondansetron, oxycodone, oxycodone, promethazine (PHENERGAN) IVPB, ramelteon, ropivacaine, sodium chloride 0.9%       Antibiotics:  Antibiotics     Start     Stop Route Frequency Ordered    01/25/17 1430  cefazolin (ANCEF) 2 gram in dextrose 5% 50 mL IVPB (premix)      -- IV Every 6 hours (non-standard times) 01/25/17 0959             Objective:     Catheter site clean, dry, intact          Vital Signs (Most Recent):  Temp: 36 °C (96.8 °F) (01/27/17 0400)  Pulse: 81 (01/27/17 0700)  Resp: 16 (01/27/17 0400)  BP: (!) 145/70 (01/27/17 0400)  SpO2: 98 % (01/27/17 0400) Vital Signs Range (Last 24H):  Temp:  [35.9 °C (96.7 °F)-36.6 °C (97.8 °F)]   Pulse:  []   Resp:  [16]   BP: (109-146)/(61-77)   SpO2:  [95 %-100 %]          I & O (Last 24H):  Intake/Output Summary (Last 24 hours) at 01/27/17 0847  Last data filed at 01/26/17 1200   Gross per 24 hour   Intake                0 ml   Output              425 ml   Net             -425 ml       Physical Exam:    GA: Alert, comfortable, no acute distress.   Pulmonary: Clear to auscultation A/P/L. No wheezing, crackles, or rhonchi.  Cardiac: RRR S1 & S2 w/o rubs/murmurs/gallops.   Abdominal:Bowel sounds present. No tenderness to palpation or distension. No appreciable hepatosplenomegaly.   Skin: No jaundice, rashes, or visible lesions.         Laboratory:  CBC: No results for input(s): WBC, RBC, HGB, HCT, PLT, MCV, MCH, MCHC in the last 72 hours.    BMP: Recent Labs      01/25/17   0949   NA  135*   K  4.5   CO2  23   CL  105   BUN  23   CREATININE  1.6*   GLU  163*   CALCIUM  8.5*       No results for input(s): INR, PROTIME, APTT in the last 72 hours.    Invalid input(s): PT      Anticoagulant dose ASA at 325mg.    Assessment:         Pain control adequate    Plan:     1) Pain:    Adductor canal perineural catheter stopped this AM. Will likely pull soon. Continue multimodal pain regimen with acetaminophen, Celebrex,  Lyrica, and prn oxycodone given.  Will continue to monitor.    2) CKD: Cr: 1.6, near baseline, no acute issues   3) DM2: cont detemir 32 daily and 12/15/15 TIDwM of aspart. F/u accuchecks. BS: 137 this AM   4) HTN: controlled on home meds   5) FEN/GI: Tolerating liquids, advance diet as tolerated. + flatus. - BM.    5) Dispo: Pt working well with PT/OT. Case management and SW for placement. Plan for D/C this afternoon.          Evaluator Urban Smith MD

## 2017-01-27 NOTE — ADDENDUM NOTE
Addendum  created 01/27/17 1156 by Sheridan Monge RN    Flowsheet accepted, LDA properties accepted, Sign clinical note

## 2017-01-27 NOTE — ADDENDUM NOTE
Addendum  created 01/27/17 0914 by Ashly Mendez MD    Charge Capture section accepted, Cosign clinical note with attestation

## 2017-01-28 NOTE — ADDENDUM NOTE
Addendum  created 01/28/17 0555 by Urban Smith MD    Anesthesia Event edited, Procedure Event Log accessed, Visit Navigator SmartForm Flowsheet section accepted

## 2017-01-29 ENCOUNTER — PATIENT MESSAGE (OUTPATIENT)
Dept: ORTHOPEDICS | Facility: CLINIC | Age: 72
End: 2017-01-29

## 2017-01-29 ENCOUNTER — NURSE TRIAGE (OUTPATIENT)
Dept: ADMINISTRATIVE | Facility: CLINIC | Age: 72
End: 2017-01-29

## 2017-01-29 NOTE — TELEPHONE ENCOUNTER
Reason for Disposition   Other post-op symptom or question (all triage questions negative)    Protocols used: ST POST-OP SYMPTOMS AND [id number]

## 2017-01-29 NOTE — TELEPHONE ENCOUNTER
Additional Information   Commented on: Answer Assessment - Initial Assessment Questions     Addendum (fluid color description) fluidyellowish-orange colored    Protocols used: ST POST-OP SYMPTOMS AND [id number]

## 2017-01-29 NOTE — TELEPHONE ENCOUNTER
"  Answer Assessment - Initial Assessment Questions  1. SYMPTOM: "What's the main symptom you're concerned about?" (e.g., pain, fever, vomiting)      Three fluid-filled blistered skin area under/near surgical dressing   2. ONSET: "When did ________  start?"      Today.   3. SURGERY: "What surgery was performed?"      Right knee replacement   4. DATE of SURGERY: "When was surgery performed?"       1/25/2017  5. ANESTHESIA: " What type of anesthesia did you have?" (e.g., general, spinal, epidural, local)      General   6. PAIN: "Is there any pain?" If so, ask: "How bad is it?"  (Scale 1-10; or mild, moderate, severe)      Moderate pain r/t surgery (not related to blisters)   7. FEVER: "Do you have a fever?" If so, ask: "What is your temperature, how was it measured, and when did it start?"      No  8. VOMITING: "Is there any vomiting?" If yes, ask: "How many times?"      No  9. BLEEDING: "Is there any bleeding?" If so, ask: "How much?" and "Where?"      No  10. OTHER SYMPTOMS: "Do you have any other symptoms?" (e.g., drainage from wound, painful urination, constipation)        No    Protocols used: ST POST-OP SYMPTOMS AND [id number]    "

## 2017-01-29 NOTE — TELEPHONE ENCOUNTER
Spoke with Dr. Ana María nieto to advised patient to arrange appointment with primary surgeon on Monday. MD states to avoid affected area to allow skin to remain intact, however, patient should cover area if open with dry sterile gauze until appointment. EC/Patient advised per OOC protocol verbalized understanding, agreed with recommendations. EC/Patient advised to call OOC again if symptoms persist or worsen or for the development of new symptoms; EC/patient had no further questions at end of call.

## 2017-01-29 NOTE — TELEPHONE ENCOUNTER
EC/patient observe three yellowish-orange colored fluid-filled blistered skin area under/near surgical dressing various sizes 1/4 inch -1 inch in diameter under or near surgical dressing following right knee replacement 1/25/2017. EC/patient denies any other symptoms and states pain is related to surgery. On-call orthopedist paged for notification/advisement.

## 2017-01-30 ENCOUNTER — TELEPHONE (OUTPATIENT)
Dept: ORTHOPEDICS | Facility: CLINIC | Age: 72
End: 2017-01-30

## 2017-01-30 ENCOUNTER — PATIENT MESSAGE (OUTPATIENT)
Dept: ORTHOPEDICS | Facility: CLINIC | Age: 72
End: 2017-01-30

## 2017-01-30 NOTE — TELEPHONE ENCOUNTER
Direct call to Ortho Triage per Merle Landeros RN/[facility] reporting two large blisters and one small blister on shin below pt's  - R TKA 1/25/17 - that have been reported to KAITY Adams NP today. LUIGI Landeros RN reports that one large blister is partially open. She reports that post-op dsg remains intact with minimal drainage. Notified Dr. Knight who instructs that Mepilex be applied to blisters. LUIGI Landeros RN instructed to apply Mepilex and spoke with KAITY Adams NP who gave TORB. Ortho Triage contact number provided for any further questions/concerns. LUIGI Landeros RN contact number: [phone]

## 2017-01-31 ENCOUNTER — PATIENT MESSAGE (OUTPATIENT)
Dept: WOUND CARE | Facility: CLINIC | Age: 72
End: 2017-01-31

## 2017-01-31 ENCOUNTER — TELEPHONE (OUTPATIENT)
Dept: WOUND CARE | Facility: CLINIC | Age: 72
End: 2017-01-31

## 2017-02-01 ENCOUNTER — TELEPHONE (OUTPATIENT)
Dept: WOUND CARE | Facility: CLINIC | Age: 72
End: 2017-02-01

## 2017-02-01 ENCOUNTER — OFFICE VISIT (OUTPATIENT)
Dept: WOUND CARE | Facility: CLINIC | Age: 72
End: 2017-02-01
Payer: MEDICARE

## 2017-02-01 VITALS
DIASTOLIC BLOOD PRESSURE: 71 MMHG | HEART RATE: 95 BPM | HEIGHT: 70 IN | TEMPERATURE: 98 F | SYSTOLIC BLOOD PRESSURE: 144 MMHG | WEIGHT: 304.38 LBS | BODY MASS INDEX: 43.57 KG/M2

## 2017-02-01 DIAGNOSIS — R60.0 BILATERAL LEG EDEMA: ICD-10-CM

## 2017-02-01 DIAGNOSIS — L97.911 ULCER OF LOWER LIMB, RIGHT, LIMITED TO BREAKDOWN OF SKIN: ICD-10-CM

## 2017-02-01 DIAGNOSIS — Z79.4 TYPE 2 DIABETES MELLITUS WITH COMPLICATION, WITH LONG-TERM CURRENT USE OF INSULIN: Primary | ICD-10-CM

## 2017-02-01 DIAGNOSIS — E11.8 TYPE 2 DIABETES MELLITUS WITH COMPLICATION, WITH LONG-TERM CURRENT USE OF INSULIN: Primary | ICD-10-CM

## 2017-02-01 PROCEDURE — 99999 PR PBB SHADOW E&M-EST. PATIENT-LVL V: CPT | Mod: PBBFAC,,, | Performed by: NURSE PRACTITIONER

## 2017-02-01 PROCEDURE — 3078F DIAST BP <80 MM HG: CPT | Mod: S$GLB,,, | Performed by: NURSE PRACTITIONER

## 2017-02-01 PROCEDURE — 3044F HG A1C LEVEL LT 7.0%: CPT | Mod: S$GLB,,, | Performed by: NURSE PRACTITIONER

## 2017-02-01 PROCEDURE — 1160F RVW MEDS BY RX/DR IN RCRD: CPT | Mod: S$GLB,,, | Performed by: NURSE PRACTITIONER

## 2017-02-01 PROCEDURE — 3066F NEPHROPATHY DOC TX: CPT | Mod: S$GLB,,, | Performed by: NURSE PRACTITIONER

## 2017-02-01 PROCEDURE — 99212 OFFICE O/P EST SF 10 MIN: CPT | Mod: 25,S$GLB,, | Performed by: NURSE PRACTITIONER

## 2017-02-01 PROCEDURE — 1159F MED LIST DOCD IN RCRD: CPT | Mod: S$GLB,,, | Performed by: NURSE PRACTITIONER

## 2017-02-01 PROCEDURE — 29580 STRAPPING UNNA BOOT: CPT | Mod: RT,S$GLB,, | Performed by: NURSE PRACTITIONER

## 2017-02-01 PROCEDURE — 1126F AMNT PAIN NOTED NONE PRSNT: CPT | Mod: S$GLB,,, | Performed by: NURSE PRACTITIONER

## 2017-02-01 PROCEDURE — 1157F ADVNC CARE PLAN IN RCRD: CPT | Mod: S$GLB,,, | Performed by: NURSE PRACTITIONER

## 2017-02-01 PROCEDURE — 3077F SYST BP >= 140 MM HG: CPT | Mod: S$GLB,,, | Performed by: NURSE PRACTITIONER

## 2017-02-01 NOTE — PROGRESS NOTES
Subjective:       Patient ID: [facility] PAT Cantor Jr. is a 71 y.o. male.    Chief Complaint: Wound Check    Wound Check        This patient is well known to my service.  He is seen today for evaluation and management of new ulcers to the right lower leg.  He is s/p right total knee replacement on 1/25/17 with Dr. Knight.  A few days later he developed leg edema with blisters and ulcer formation.  He has home health through [facility].  They are using mepilex foam border dressings on the wounds but they are not healing.  He is afebrile.  He denies increased redness or purulent drainage but does note increased swelling.  He does not complain of pain.  His medical history is significant for type II diabetes and venous insufficiency.  Review of Systems    Unchanged from prior visit.  Objective:      Physical Exam   Constitutional: He is oriented to person, place, and time. He appears well-developed and well-nourished. No distress.   HENT:   Head: Normocephalic and atraumatic.   Cardiovascular: Intact distal pulses.    Musculoskeletal: Normal range of motion. He exhibits edema (2-3+ lower leg). He exhibits no tenderness.        Legs:  Neurological: He is alert and oriented to person, place, and time.   Skin: Skin is warm and dry. No rash noted. He is not diaphoretic. No erythema.   Psychiatric: He has a normal mood and affect. His behavior is normal. Judgment and thought content normal.   Nursing note and vitals reviewed.      ..  Hemoglobin A1C   Date Value Ref Range Status   01/25/2017 6.1 4.5 - 6.2 % Final     Comment:     According to ADA guidelines, hemoglobin A1C <7.0% represents  optimal control in non-pregnant diabetic patients.  Different  metrics may apply to specific populations.   Standards of Medical Care in Diabetes - 2016.  For the purpose of screening for the presence of diabetes:  <5.7%     Consistent with the absence of diabetes  5.7-6.4%  Consistent with increasing risk for diabetes    (prediabetes)  >or=6.5%  Consistent with diabetes  Currently no consensus exists for use of hemoglobin A1C  for diagnosis of diabetes for children.     12/06/2016 5.9 4.5 - 6.2 % Final     Comment:     According to ADA guidelines, hemoglobin A1C <7.0% represents  optimal control in non-pregnant diabetic patients.  Different  metrics may apply to specific populations.   Standards of Medical Care in Diabetes - 2016.  For the purpose of screening for the presence of diabetes:  <5.7%     Consistent with the absence of diabetes  5.7-6.4%  Consistent with increasing risk for diabetes   (prediabetes)  >or=6.5%  Consistent with diabetes  Currently no consensus exists for use of hemoglobin A1C  for diagnosis of diabetes for children.     08/23/2016 7.6 (H) 4.5 - 6.2 % Final     Comment:     According to ADA guidelines, hemoglobin A1C <7.0% represents  optimal control in non-pregnant diabetic patients.  Different  metrics may apply to specific populations.   Standards of Medical Care in Diabetes - 2016.  For the purpose of screening for the presence of diabetes:  <5.7%     Consistent with the absence of diabetes  5.7-6.4%  Consistent with increasing risk for diabetes   (prediabetes)  >or=6.5%  Consistent with diabetes  Currently no consensus exists for use of hemoglobin A1C  for diagnosis of diabetes for children.       [facility] was seen in the clinic room and placed in the supine position on the treatment table.  The right leg was cleansed with Easi-clense sponges and dried thoroughly.  A hydrofiber dressing was applied to the wounds.  Eucerin cream was applied to the lower legs.  The patient's foot was positioned at a 90 degree angle.  A zinc oxide wrap, followed by kerlix roll gauze and coban were applied using a spiral technique avoiding creases or folds.  The wrap was started behind the first metatarsal and ended below the tibial tubercle of the knee.  There was overlap of each turn half the width of the previous turn.  The  compression wrap will be changed every 2-3 days.    Assessment:       1. Type 2 diabetes mellitus with complication, with long-term current use of insulin    2. Ulcer of lower limb, right, limited to breakdown of skin    3. Bilateral leg edema        Plan:           Mepore island dressing to right knee incision wound.  Mepilex foam border dressing to left leg wound.  Unna boot right lower leg as detailed above.  Patient was warned not to get the dressings wet and to use cast covers for showering.  Should the dressing become wet, he is to remove it, place a wet-to-dry dressing over the wound, cover with gauze and roll gauze and use ace wraps for compression and to secure bandages.  He should then notify home health as soon as possible to have a new dressing applied.  Return to clinic in 2 weeks.  [facility] notified of orders via [employer] fax.    Home health orders:  Mepore island dressing to right knee incision wound.  Cleanse right leg wounds with wound .  Hydrofiber dressing to right leg ulcers.  Unna boot (zinc oxide, kerlix and coban) right lower leg.  Mepilex foam border dressing to left leg wound.  Change dressings three times weekly.

## 2017-02-01 NOTE — TELEPHONE ENCOUNTER
----- Message from Patience Barker sent at 2/1/2017  1:32 PM CST -----  Contact: michael/home health  Pt is calling in ref to questions she has about unna boot placement and what doctor signs behind Mandi Mode . Please call Michael rossi @[phone].Thank you.

## 2017-02-01 NOTE — MR AVS SNAPSHOT
[facility]  [address]  [facility] [address]  Phone: [phone]                  [facility] PAT Cantor JrJorge   2017 10:20 AM   Office Visit    Description:  Male : 1945   Provider:  Mandi Coello NP   Department:  [facility]           Reason for Visit     Wound Check           Diagnoses this Visit        Comments    Type 2 diabetes mellitus with complication, with long-term current use of insulin    -  Primary     Ulcer of lower limb, right, limited to breakdown of skin         Bilateral leg edema                To Do List           Future Appointments        Provider Department Dept Phone    2/3/2017 10:30 AM Desmond Barlow MD [facility] [address] [phone]    2017 9:00 AM Sandy Adams NP [facility] [phone]    3/1/2017 10:15 AM CHERELLE UROLOGY [facility] [phone]    3/6/2017 8:10 AM LAB, APPOINTMENT [place] [facility] [phone]    3/7/2017 9:00 AM Jose Hatch DPM [facility] [phone]      Goals (5 Years of Data)     None      Follow-Up and Disposition     Return in about 2 weeks (around 2/15/2017) for Wound evaluation.      [facility]     [facility] Care [facility] - 24/7 Assistance  Registered nurses in the [facility] provide clinical advisement, health education, appointment booking, and other advisory services.  Call for this free service at [phone].             Medications           Message regarding Medications     Verify the changes and/or additions to your medication regime listed below are the same as discussed with your clinician today.  If any of these changes or additions are incorrect, please notify your healthcare provider.             Verify that the below list of medications is an accurate representation of the medications you are currently taking.  If none reported, the list may be blank. If incorrect, please contact  "your healthcare provider. Carry this list with you in case of emergency.           Current Medications     apixaban 5 mg Tab Take 1 tablet (5 mg total) by mouth 2 (two) times daily.    ascorbic acid (VITAMIN C) 100 MG tablet Take 1 tablet by mouth every morning.     aspirin 325 MG tablet Take 1 tablet by mouth every morning.     azelastine (ASTELIN) 137 mcg (0.1 %) nasal spray 1 spray (137 mcg total) by Nasal route 2 (two) times daily.    BD INSULIN PEN NEEDLE UF ORIG 29 x 1/2 " Ndle     blood-glucose meter kit Use as instructed, True Result meter    CINNAMON BARK (CINNAMON ORAL) Take by mouth once daily.    clobetasol (TEMOVATE) 0.05 % cream AAA finger bid    clotrimazole-betamethasone 1-0.05% (LOTRISONE) cream Apply topically 2 (two) times daily. Apply twice a day to affected area for 1 week, wait a week, then repeat x 1 week    desonide (DESOWEN) 0.05 % lotion AAA bid prn redness, scaling, or itching    desoximetasone (TOPICORT) 0.25 % cream 0.25 %.  Cream Topical .  AAA bid back    docusate sodium (COLACE) 100 MG capsule Take 1 capsule (100 mg total) by mouth 2 (two) times daily. Available OTC as well    fenofibrate (TRICOR) 145 MG tablet TAKE ONE TABLET BY MOUTH DAILY    fluticasone (FLONASE) 50 mcg/actuation nasal spray 1 spray by Each Nare route nightly as needed.    furosemide (LASIX) 40 MG tablet Take 1 tablet (40 mg total) by mouth daily as needed.    gabapentin (NEURONTIN) 300 MG capsule Take 1 capsule (300 mg total) by mouth 3 (three) times daily.    insulin aspart (NOVOLOG FLEXPEN) 100 unit/mL InPn pen 15-18-18 units with each meal, plus correction scale. TDD 81 units    insulin glargine (LANTUS SOLOSTAR) 100 unit/mL (3 mL) InPn pen Inject 45 Units into the skin every evening.    ketoconazole (NIZORAL) 2 % cream Apply topically once daily.    lancets 33 gauge Misc 1 lancet by Misc.(Non-Drug; Combo Route) route 4 (four) times daily. Pt uses True Result Meter, bg monitoring 4 times a day.    levothyroxine " "(SYNTHROID) 25 MCG tablet Take 1 tablet (25 mcg total) by mouth once daily.    losartan (COZAAR) 50 MG tablet TAKE ONE TABLET BY MOUTH EVERY DAY    metoprolol succinate (TOPROL-XL) 100 MG 24 hr tablet Take 1 tablet (100 mg total) by mouth once daily.    metronidazole (METROGEL) 0.75 % gel Apply topically 2 (two) times daily.    multivitamin capsule Take 1 capsule by mouth every morning.     mupirocin (BACTROBAN) 2 % ointment Apply topically 2 (two) times daily. Apply to wound twice daily as directed.    nystatin-triamcinolone (MYCOLOG II) cream apply TWICE DAILY    omega-3 acid ethyl esters (LOVAZA) 1 gram capsule TAKE THREE CAPSULE BY MOUTH TWICE DAILY    ondansetron (ZOFRAN) 8 MG tablet Take 1 tablet (8 mg total) by mouth every 12 (twelve) hours as needed for Nausea.    oxybutynin (DITROPAN-XL) 5 MG TR24 Take 1 tablet (5 mg total) by mouth once daily.    oxycodone-acetaminophen (PERCOCET)  mg per tablet Take 1 tablet by mouth every 4 (four) hours as needed for Pain.    pen needle, diabetic (BD ULTRA-FINE BASIL PEN NEEDLES) 32 gauge x 5/32" Ndle Pt check blood sugar 4 times a day    pravastatin (PRAVACHOL) 10 MG tablet Take 1 tablet (10 mg total) by mouth once daily.    tamsulosin (FLOMAX) 0.4 mg Cp24 Take 1 capsule (0.4 mg total) by mouth once daily.    TRUETEST TEST STRIPS Strp 1 strip by Misc.(Non-Drug; Combo Route) route 4 (four) times daily.    vitamin E 1000 UNIT capsule Take 1,000 Units by mouth every morning. 1 Capsule Oral Every day           Clinical Reference Information           Vital Signs - Last Recorded  Most recent update: 2/1/2017 10:30 AM by Mandi Coello NP    Ht Wt BMI          5' 10" (1.778 m) (!) 138.1 kg (304 lb 6.4 oz) 43.68 kg/m2        Allergies as of 2/1/2017     Ciprofloxacin    Niacin    Terbinafine      Immunizations Administered on Date of Encounter - 2/1/2017     None      Instructions    Elevate legs as much as possible. Do not get the dressings wet and use cast covers for " showering.  Should the dressing become wet, remove it, place a wet-to-dry dressing over the wound, cover with gauze and roll gauze and use ace wraps for compression and to secure bandages.  Notify home health as soon as possible to have a new dressing applied.

## 2017-02-01 NOTE — TELEPHONE ENCOUNTER
----- Message from Jocelyn Kumar LPN sent at 2/1/2017  3:52 PM CST -----  Contact: michael/home health  Please call Michael - has concerns about the compression wrap - office closes at 430PM     ----- Message -----     From: Patience Barker     Sent: 2/1/2017   1:32 PM       To: Mode Raymond Staff    Pt is calling in ref to questions she has about unna boot placement and what doctor signs behind Mandi Coello . Please call Michael rossi @[phone].Thank you.

## 2017-02-03 ENCOUNTER — LAB VISIT (OUTPATIENT)
Dept: LAB | Facility: HOSPITAL | Age: 72
End: 2017-02-03
Attending: INTERNAL MEDICINE
Payer: MEDICARE

## 2017-02-03 ENCOUNTER — TELEPHONE (OUTPATIENT)
Dept: ORTHOPEDICS | Facility: CLINIC | Age: 72
End: 2017-02-03

## 2017-02-03 ENCOUNTER — OFFICE VISIT (OUTPATIENT)
Dept: INTERNAL MEDICINE | Facility: CLINIC | Age: 72
End: 2017-02-03
Payer: MEDICARE

## 2017-02-03 VITALS
TEMPERATURE: 98 F | DIASTOLIC BLOOD PRESSURE: 63 MMHG | SYSTOLIC BLOOD PRESSURE: 92 MMHG | HEIGHT: 70 IN | BODY MASS INDEX: 43.52 KG/M2 | WEIGHT: 304 LBS | HEART RATE: 95 BPM

## 2017-02-03 DIAGNOSIS — I48.20 CHRONIC ATRIAL FIBRILLATION: ICD-10-CM

## 2017-02-03 DIAGNOSIS — E11.42 DIABETIC POLYNEUROPATHY ASSOCIATED WITH TYPE 2 DIABETES MELLITUS: ICD-10-CM

## 2017-02-03 DIAGNOSIS — I87.2 CHRONIC VENOUS INSUFFICIENCY: ICD-10-CM

## 2017-02-03 DIAGNOSIS — I10 ESSENTIAL HYPERTENSION: ICD-10-CM

## 2017-02-03 DIAGNOSIS — E11.21 TYPE 2 DIABETES MELLITUS WITH DIABETIC NEPHROPATHY, WITH LONG-TERM CURRENT USE OF INSULIN: Primary | ICD-10-CM

## 2017-02-03 DIAGNOSIS — Z79.4 TYPE 2 DIABETES MELLITUS WITH DIABETIC NEPHROPATHY, WITH LONG-TERM CURRENT USE OF INSULIN: ICD-10-CM

## 2017-02-03 DIAGNOSIS — Z79.4 TYPE 2 DIABETES MELLITUS WITH DIABETIC NEPHROPATHY, WITH LONG-TERM CURRENT USE OF INSULIN: Primary | ICD-10-CM

## 2017-02-03 DIAGNOSIS — E11.21 TYPE 2 DIABETES MELLITUS WITH DIABETIC NEPHROPATHY, WITH LONG-TERM CURRENT USE OF INSULIN: ICD-10-CM

## 2017-02-03 LAB
ANION GAP SERPL CALC-SCNC: 10 MMOL/L
BUN SERPL-MCNC: 38 MG/DL
CALCIUM SERPL-MCNC: 9.8 MG/DL
CHLORIDE SERPL-SCNC: 102 MMOL/L
CO2 SERPL-SCNC: 24 MMOL/L
CREAT SERPL-MCNC: 1.8 MG/DL
EST. GFR  (AFRICAN AMERICAN): 42.2 ML/MIN/1.73 M^2
EST. GFR  (NON AFRICAN AMERICAN): 36.5 ML/MIN/1.73 M^2
GLUCOSE SERPL-MCNC: 164 MG/DL
POTASSIUM SERPL-SCNC: 4.7 MMOL/L
SODIUM SERPL-SCNC: 136 MMOL/L

## 2017-02-03 PROCEDURE — 3078F DIAST BP <80 MM HG: CPT | Mod: S$GLB,,, | Performed by: INTERNAL MEDICINE

## 2017-02-03 PROCEDURE — 1125F AMNT PAIN NOTED PAIN PRSNT: CPT | Mod: S$GLB,,, | Performed by: INTERNAL MEDICINE

## 2017-02-03 PROCEDURE — 99999 PR PBB SHADOW E&M-EST. PATIENT-LVL III: CPT | Mod: PBBFAC,,, | Performed by: INTERNAL MEDICINE

## 2017-02-03 PROCEDURE — 3044F HG A1C LEVEL LT 7.0%: CPT | Mod: S$GLB,,, | Performed by: INTERNAL MEDICINE

## 2017-02-03 PROCEDURE — 3066F NEPHROPATHY DOC TX: CPT | Mod: S$GLB,,, | Performed by: INTERNAL MEDICINE

## 2017-02-03 PROCEDURE — 99499 UNLISTED E&M SERVICE: CPT | Mod: S$PBB,,, | Performed by: INTERNAL MEDICINE

## 2017-02-03 PROCEDURE — 1159F MED LIST DOCD IN RCRD: CPT | Mod: S$GLB,,, | Performed by: INTERNAL MEDICINE

## 2017-02-03 PROCEDURE — 1160F RVW MEDS BY RX/DR IN RCRD: CPT | Mod: S$GLB,,, | Performed by: INTERNAL MEDICINE

## 2017-02-03 PROCEDURE — 90732 PPSV23 VACC 2 YRS+ SUBQ/IM: CPT | Mod: S$GLB,,, | Performed by: INTERNAL MEDICINE

## 2017-02-03 PROCEDURE — 36415 COLL VENOUS BLD VENIPUNCTURE: CPT | Mod: PO

## 2017-02-03 PROCEDURE — 3074F SYST BP LT 130 MM HG: CPT | Mod: S$GLB,,, | Performed by: INTERNAL MEDICINE

## 2017-02-03 PROCEDURE — G0009 ADMIN PNEUMOCOCCAL VACCINE: HCPCS | Mod: S$GLB,,, | Performed by: INTERNAL MEDICINE

## 2017-02-03 PROCEDURE — 1157F ADVNC CARE PLAN IN RCRD: CPT | Mod: S$GLB,,, | Performed by: INTERNAL MEDICINE

## 2017-02-03 PROCEDURE — 99214 OFFICE O/P EST MOD 30 MIN: CPT | Mod: 25,S$GLB,, | Performed by: INTERNAL MEDICINE

## 2017-02-03 PROCEDURE — 80048 BASIC METABOLIC PNL TOTAL CA: CPT

## 2017-02-03 NOTE — TELEPHONE ENCOUNTER
----- Message from Jose Armando Knight MD sent at 2/3/2017  8:19 AM CST -----  [phone]  Absolutely. Just make sure the top of the sleeve does not rest on the incision. Must be below or above the incision. Preferably above.   ----- Message -----     From: Katy Poe MA     Sent: 2/2/2017   3:41 PM       To: Jose Armando Knight MD     Is this ok?  ----- Message -----     From: Erika Blake     Sent: 2/2/2017   3:32 PM       To: Antonia Suárez Staff    She is calling to speak with a staff member she want to know if it is ok to put compression sleeves on the surgery site.

## 2017-02-03 NOTE — TELEPHONE ENCOUNTER
Number provided for Siena was incorrect. Did not have the correct number to reach her. Spoke with Ms. Cantor instead and I informed her to explain to siena over at [facility], that it is fine for Mr. Cantor to wear compression sleeves, just as long as it's not on the incision. Ms.Sakshi verbalized understanding.

## 2017-02-03 NOTE — PROGRESS NOTES
REASON FOR VISIT:  This is a 71-year-old male who comes in for hospital   followup.  He is here with his daughter.  He underwent a right total knee   replacement on January 25th by Dr. Knight, essentially no complications.  He   was in the hospital for two days.  After coming home, he started noticing more   edema involving both legs. Three blisters that developed on his anterior leg   eventually popped.  He was seen by the [facility] two days ago.  Pictures   are noted in the note and instructions were given for care regarding dressings,   use of an Unna boot and cleaning.  Also Mepilex foam border dressing was   applied to the left lower leg for the extremely small ulcer.  He has been taking   his Lasix 40 mg once a day daily now, previously it might be half the time.  He   has noticed some improvement.    PAST MEDICAL HISTORY:  Type 2 diabetes with peripheral neuropathy.  Type 2 diabetes with chronic kidney disease.  Chronic atrial fibrillation.  Hypertension.  Hyperlipidemia.  Left total knee replacement.  Sleep apnea with the use of CPAP.  Renal cyst.  Chronic venous insufficiency of the lower extremities.  Based on recent evaluations with Urology in early January, he is going to be   having a cystoscopy for evaluation of hematuria.  From that visit oxybutynin was   added.  His digital rectal exam confirmed a 40 g prostate.    MEDICATIONS:  List is the same and as per MedCard with the exception that now   his Lantus is 45 units and NovoLog is 15 units with breakfast and 18 units with   supper and lunch.  This has gone down.    As a matter of fact within the past six months, he has dramatically changed his   diet where he was having meat or salad.  His hemoglobin A1c has tremendously   improved.    REVIEW OF SYMPTOMS:  He has no chest pain, some dyspnea on exertion.  No   abdominal pain.  He has been constipated for four days.  Urination is frequent.    Right now he cannot tell if the addition of the  oxybutynin has been helpful.    PHYSICAL EXAMINATION:  VITAL SIGNS:  His weight is 304 pounds.  His pulse is 92.  His blood pressure is   90/62.  LUNGS:  Clear.  HEART:  Irregularly irregular.  ABDOMEN:  Soft, nontender.  EXTREMITIES:  His right leg is wrapped.  His left leg has 2-3+ edema.    IMPRESSION:  1.  Type 2 diabetes associated with chronic kidney disease, under better   control.  2.  Diabetic peripheral neuropathy.  3.  Lower extremity edema with leg ulceration.  4.  Hypertension.  5.  Chronic atrial fibrillation.    PLAN:    Today we will repeat a basic metabolic profile. Based on the test results,   proper adjustments of medicines in particularly regarding the diuretic.    His antihypertensive right now does include metoprolol, losartan, Lasix.        /ls [id number] review        ANTONIETTA/EMPERATRIZ  dd: 02/03/2017 11:21:43 (CST)  td: 02/03/2017 15:34:46 (CST)  Doc ID   #[id number]  Job ID #[id number]    CC:

## 2017-02-03 NOTE — MR AVS SNAPSHOT
[facility] [address]  [address]  [address]  [facility] [address]  Phone: [phone]                  ricki Cantor Jr.   2/3/2017 10:30 AM   Office Visit    Description:  Male : 1945   Provider:  Desmond Barlow MD   Department:  [facility] [address]           Reason for Visit     Hospital Follow Up           Diagnoses this Visit        Comments    Type 2 diabetes mellitus with diabetic nephropathy, with long-term current use of insulin    -  Primary     Diabetic polyneuropathy associated with type 2 diabetes mellitus         Chronic venous insufficiency         Chronic atrial fibrillation         Essential hypertension                To Do List           Future Appointments        Provider Department Dept Phone    2/3/2017 11:45 AM ANTONIO, [place] [facility] [phone]    2017 9:00 AM RENE Esposito [facility]oped[facility] [phone]    2/15/2017 4:00 PM RENE [facility] - Wound Care [phone]    3/1/2017 10:15 AM CHERELLE UROLOGY [facility] [phone]    3/6/2017 8:10 AM LAB, APPOINTMENT [place] [facility] [phone]      Goals (5 Years of Data)     None      [facility]     [facility] Care [facility] -  Assistance  Registered nurses in the [facility] provide clinical advisement, health education, appointment booking, and other advisory services.  Call for this free service at [phone].             Medications           Message regarding Medications     Verify the changes and/or additions to your medication regime listed below are the same as discussed with your clinician today.  If any of these changes or additions are incorrect, please notify your healthcare provider.             Verify that the below list of medications is an accurate representation of the medications you are currently taking.  If none reported, the list may be  "blank. If incorrect, please contact your healthcare provider. Carry this list with you in case of emergency.           Current Medications     apixaban 5 mg Tab Take 1 tablet (5 mg total) by mouth 2 (two) times daily.    ascorbic acid (VITAMIN C) 100 MG tablet Take 1 tablet by mouth every morning.     aspirin 325 MG tablet Take 1 tablet by mouth every morning.     BD INSULIN PEN NEEDLE UF ORIG 29 x 1/2 " Ndle     blood-glucose meter kit Use as instructed, True Result meter    CINNAMON BARK (CINNAMON ORAL) Take by mouth once daily.    desonide (DESOWEN) 0.05 % lotion AAA bid prn redness, scaling, or itching    desoximetasone (TOPICORT) 0.25 % cream 0.25 %.  Cream Topical .  AAA bid back    fenofibrate (TRICOR) 145 MG tablet TAKE ONE TABLET BY MOUTH DAILY    furosemide (LASIX) 40 MG tablet Take 1 tablet (40 mg total) by mouth daily as needed.    gabapentin (NEURONTIN) 300 MG capsule Take 1 capsule (300 mg total) by mouth 3 (three) times daily.    insulin aspart (NOVOLOG FLEXPEN) 100 unit/mL InPn pen 15-18-18 units with each meal, plus correction scale. TDD 81 units    insulin glargine (LANTUS SOLOSTAR) 100 unit/mL (3 mL) InPn pen Inject 45 Units into the skin every evening.    ketoconazole (NIZORAL) 2 % cream Apply topically once daily.    lancets 33 gauge Misc 1 lancet by Misc.(Non-Drug; Combo Route) route 4 (four) times daily. Pt uses True Result Meter, bg monitoring 4 times a day.    levothyroxine (SYNTHROID) 25 MCG tablet Take 1 tablet (25 mcg total) by mouth once daily.    losartan (COZAAR) 50 MG tablet TAKE ONE TABLET BY MOUTH EVERY DAY    metoprolol succinate (TOPROL-XL) 100 MG 24 hr tablet Take 1 tablet (100 mg total) by mouth once daily.    metronidazole (METROGEL) 0.75 % gel Apply topically 2 (two) times daily.    multivitamin capsule Take 1 capsule by mouth every morning.     omega-3 acid ethyl esters (LOVAZA) 1 gram capsule TAKE THREE CAPSULE BY MOUTH TWICE DAILY    oxybutynin (DITROPAN-XL) 5 MG TR24 Take " "1 tablet (5 mg total) by mouth once daily.    oxycodone-acetaminophen (PERCOCET)  mg per tablet Take 1 tablet by mouth every 4 (four) hours as needed for Pain.    pen needle, diabetic (BD ULTRA-FINE BASIL PEN NEEDLES) 32 gauge x 5/32" Ndle Pt check blood sugar 4 times a day    pravastatin (PRAVACHOL) 10 MG tablet Take 1 tablet (10 mg total) by mouth once daily.    tamsulosin (FLOMAX) 0.4 mg Cp24 Take 1 capsule (0.4 mg total) by mouth once daily.    TRUETEST TEST STRIPS Strp 1 strip by Misc.(Non-Drug; Combo Route) route 4 (four) times daily.    vitamin E 1000 UNIT capsule Take 1,000 Units by mouth every morning. 1 Capsule Oral Every day    azelastine (ASTELIN) 137 mcg (0.1 %) nasal spray 1 spray (137 mcg total) by Nasal route 2 (two) times daily.    clobetasol (TEMOVATE) 0.05 % cream AAA finger bid    clotrimazole-betamethasone 1-0.05% (LOTRISONE) cream Apply topically 2 (two) times daily. Apply twice a day to affected area for 1 week, wait a week, then repeat x 1 week    docusate sodium (COLACE) 100 MG capsule Take 1 capsule (100 mg total) by mouth 2 (two) times daily. Available OTC as well    fluticasone (FLONASE) 50 mcg/actuation nasal spray 1 spray by Each Nare route nightly as needed.    mupirocin (BACTROBAN) 2 % ointment Apply topically 2 (two) times daily. Apply to wound twice daily as directed.    nystatin-triamcinolone (MYCOLOG II) cream apply TWICE DAILY    ondansetron (ZOFRAN) 8 MG tablet Take 1 tablet (8 mg total) by mouth every 12 (twelve) hours as needed for Nausea.           Clinical Reference Information           Your Vitals Were     BP Pulse Temp Height Weight BMI    92/63 (BP Location: Left arm, Patient Position: Sitting, BP Method: Automatic) 95 98.4 °F (36.9 °C) (Oral) 5' 10" (1.778 m) 137.9 kg (304 lb) 43.62 kg/m2      Blood Pressure          Most Recent Value    BP  92/63      Allergies as of 2/3/2017     Ciprofloxacin    Niacin    Terbinafine      Immunizations Administered on Date of " Encounter - 2/3/2017     Name Date Dose VIS Date Route    Pneumococcal Polysaccharide - 23 Valent 2/3/2017 0.5 mL 4/24/2015 Intramuscular      Orders Placed During Today's Visit      Normal Orders This Visit    Pneumococcal Polysaccharide Vaccine (23 Valent) (SQ/IM)     Future Labs/Procedures Expected by Expires    Basic metabolic panel  2/3/2017 2/3/2018      Language Assistance Services     ATTENTION: Language assistance services are available, free of charge. Please call [phone].      ATENCIÓN: Si habla aakash, tiene a mancilla disposición servicios gratuitos de asistencia lingüística. Llame al [phone].     CHÚ Ý: N?u b?n nói Ti?ng Vi?t, có các d?ch v? h? tr? ngôn ng? mi?n phí dành cho b?n. G?i s? [phone].         [facility] [address] complies with applicable Federal civil rights laws and does not discriminate on the basis of race, color, national origin, age, disability, or sex.

## 2017-02-07 ENCOUNTER — OFFICE VISIT (OUTPATIENT)
Dept: ORTHOPEDICS | Facility: CLINIC | Age: 72
End: 2017-02-07
Payer: MEDICARE

## 2017-02-07 ENCOUNTER — HOSPITAL ENCOUNTER (OUTPATIENT)
Dept: RADIOLOGY | Facility: HOSPITAL | Age: 72
Discharge: HOME | End: 2017-02-07
Attending: NURSE PRACTITIONER
Payer: MEDICARE

## 2017-02-07 VITALS — HEIGHT: 70 IN | WEIGHT: 304 LBS | TEMPERATURE: 98 F | BODY MASS INDEX: 43.52 KG/M2

## 2017-02-07 DIAGNOSIS — Z96.651 S/P TKR (TOTAL KNEE REPLACEMENT) USING CEMENT, RIGHT: ICD-10-CM

## 2017-02-07 DIAGNOSIS — Z96.651 S/P TKR (TOTAL KNEE REPLACEMENT) USING CEMENT, RIGHT: Primary | ICD-10-CM

## 2017-02-07 PROCEDURE — 73560 X-RAY EXAM OF KNEE 1 OR 2: CPT | Mod: 26,59,LT, | Performed by: RADIOLOGY

## 2017-02-07 PROCEDURE — 99999 PR PBB SHADOW E&M-EST. PATIENT-LVL V: CPT | Mod: PBBFAC,,, | Performed by: NURSE PRACTITIONER

## 2017-02-07 PROCEDURE — 99024 POSTOP FOLLOW-UP VISIT: CPT | Mod: S$GLB,,, | Performed by: NURSE PRACTITIONER

## 2017-02-07 PROCEDURE — 73562 X-RAY EXAM OF KNEE 3: CPT | Mod: 26,RT,, | Performed by: RADIOLOGY

## 2017-02-07 PROCEDURE — 73560 X-RAY EXAM OF KNEE 1 OR 2: CPT | Mod: TC,59,LT

## 2017-02-07 RX ORDER — OXYCODONE AND ACETAMINOPHEN 10; 325 MG/1; MG/1
1 TABLET ORAL EVERY 4 HOURS PRN
Qty: 90 TABLET | Refills: 0 | Status: SHIPPED | OUTPATIENT
Start: 2017-02-07 | End: 2017-04-17

## 2017-02-07 NOTE — PROGRESS NOTES
"[facility] PAT Cantor Jorge presents for initial post-operative visit following a right total knee arthroplasty performed by Dr. Knight on 1/24/2017. Tolerating pain medication well.      Exam:   Visit Vitals    Temp 98.1 °F (36.7 °C) (Oral)    Ht 5' 10" (1.778 m)    Wt (!) 137.9 kg (304 lb)    BMI 43.62 kg/m2     Ambulating well with assistive device.  Incision is clean and dry without drainage or erythema. ROM:5-100    Initial post-operative radiographs reviewed today revealing a well fixed and aligned prosthesis.    A/P:  2 weeks s/p right total knee replacement  - The patient was advised to keep the incision clean and dry for the next 24 hours after which he may wash the area with antibacterial soap in the shower. Will not submerge until the incision is completely healed.   - Outpatient PT: [facility] in [place]- phone [phone], fax [phone]  - Continue aspirin for 1 month from surgery.  - Pain medication refilled  - Follow up in 4 weeks with Dr. Knight. Pt will call clinic with problems/concerns.     "

## 2017-02-08 ENCOUNTER — TELEPHONE (OUTPATIENT)
Dept: WOUND CARE | Facility: CLINIC | Age: 72
End: 2017-02-08

## 2017-02-08 NOTE — TELEPHONE ENCOUNTER
----- Message from Erik Coelho sent at 2/8/2017 11:49 AM CST -----  Contact: Merle with [facility]  Caller said she needs some clarification on the frequency of the order. Please call her at [phone] or [phone] and for [place]

## 2017-02-15 ENCOUNTER — OFFICE VISIT (OUTPATIENT)
Dept: WOUND CARE | Facility: CLINIC | Age: 72
End: 2017-02-15
Payer: MEDICARE

## 2017-02-15 VITALS
TEMPERATURE: 98 F | HEART RATE: 89 BPM | HEIGHT: 71 IN | BODY MASS INDEX: 41.63 KG/M2 | DIASTOLIC BLOOD PRESSURE: 88 MMHG | WEIGHT: 297.38 LBS | SYSTOLIC BLOOD PRESSURE: 137 MMHG

## 2017-02-15 DIAGNOSIS — R60.0 BILATERAL LEG EDEMA: ICD-10-CM

## 2017-02-15 DIAGNOSIS — E11.8 TYPE 2 DIABETES MELLITUS WITH COMPLICATION, WITH LONG-TERM CURRENT USE OF INSULIN: Primary | ICD-10-CM

## 2017-02-15 DIAGNOSIS — Z79.4 TYPE 2 DIABETES MELLITUS WITH COMPLICATION, WITH LONG-TERM CURRENT USE OF INSULIN: Primary | ICD-10-CM

## 2017-02-15 DIAGNOSIS — B35.3 TINEA PEDIS OF BOTH FEET: ICD-10-CM

## 2017-02-15 DIAGNOSIS — L97.911 ULCER OF LOWER LIMB, RIGHT, LIMITED TO BREAKDOWN OF SKIN: ICD-10-CM

## 2017-02-15 DIAGNOSIS — I87.2 VENOUS INSUFFICIENCY OF BOTH LOWER EXTREMITIES: ICD-10-CM

## 2017-02-15 PROCEDURE — 29580 STRAPPING UNNA BOOT: CPT | Mod: RT,S$GLB,, | Performed by: NURSE PRACTITIONER

## 2017-02-15 PROCEDURE — 99499 UNLISTED E&M SERVICE: CPT | Mod: S$GLB,,, | Performed by: NURSE PRACTITIONER

## 2017-02-15 PROCEDURE — 99999 PR PBB SHADOW E&M-EST. PATIENT-LVL V: CPT | Mod: PBBFAC,,, | Performed by: NURSE PRACTITIONER

## 2017-02-15 NOTE — PROGRESS NOTES
Subjective:       [place] [facility] [place] is a 71 y.o. male.    Chief Complaint: Wound Check    Wound Check        This patient is seen today for reevaluation of ulcers to the right lower leg.  He is s/p right total knee replacement on 1/25/17 with Dr. Knight.  A few days later he developed leg edema with blisters and ulcer formation.  He has home health through [facility].  An unna boot was placed on the leg on the last visit.  The wounds are healing as evidenced by wound contracture and epitheliazation.  He is afebrile.  He denies increased redness, swelling or purulent drainage.  His pain level is 5/10.  His medical history is significant for type II diabetes and venous insufficiency.  Review of Systems    Unchanged from prior visit.  Objective:      Physical Exam   Constitutional: He is oriented to person, place, and time. He appears well-developed and well-nourished. No distress.   HENT:   Head: Normocephalic and atraumatic.   Cardiovascular: Intact distal pulses.    Musculoskeletal: Normal range of motion. He exhibits edema (2-3+ lower leg). He exhibits no tenderness.        Legs:  Neurological: He is alert and oriented to person, place, and time.   Skin: Skin is warm and dry. No rash noted. He is not diaphoretic. No erythema.   Psychiatric: He has a normal mood and affect. His behavior is normal. Judgment and thought content normal.   Nursing note and vitals reviewed.      ..  Hemoglobin A1C   Date Value Ref Range Status   01/25/2017 6.1 4.5 - 6.2 % Final     Comment:     According to ADA guidelines, hemoglobin A1C <7.0% represents  optimal control in non-pregnant diabetic patients.  Different  metrics may apply to specific populations.   Standards of Medical Care in Diabetes - 2016.  For the purpose of screening for the presence of diabetes:  <5.7%     Consistent with the absence of diabetes  5.7-6.4%  Consistent with increasing risk for diabetes   (prediabetes)  >or=6.5%   Consistent with diabetes  Currently no consensus exists for use of hemoglobin A1C  for diagnosis of diabetes for children.     12/06/2016 5.9 4.5 - 6.2 % Final     Comment:     According to ADA guidelines, hemoglobin A1C <7.0% represents  optimal control in non-pregnant diabetic patients.  Different  metrics may apply to specific populations.   Standards of Medical Care in Diabetes - 2016.  For the purpose of screening for the presence of diabetes:  <5.7%     Consistent with the absence of diabetes  5.7-6.4%  Consistent with increasing risk for diabetes   (prediabetes)  >or=6.5%  Consistent with diabetes  Currently no consensus exists for use of hemoglobin A1C  for diagnosis of diabetes for children.     08/23/2016 7.6 (H) 4.5 - 6.2 % Final     Comment:     According to ADA guidelines, hemoglobin A1C <7.0% represents  optimal control in non-pregnant diabetic patients.  Different  metrics may apply to specific populations.   Standards of Medical Care in Diabetes - 2016.  For the purpose of screening for the presence of diabetes:  <5.7%     Consistent with the absence of diabetes  5.7-6.4%  Consistent with increasing risk for diabetes   (prediabetes)  >or=6.5%  Consistent with diabetes  Currently no consensus exists for use of hemoglobin A1C  for diagnosis of diabetes for children.       Stephen was seen in the clinic room and placed in the supine position on the treatment table.  The right leg was cleansed with Easi-clense sponges and dried thoroughly.  A hydrofiber dressing was applied to the wounds.  Eucerin cream was applied to the lower legs.  The patient's foot was positioned at a 90 degree angle.  A zinc oxide wrap, followed by kerlix roll gauze and coban were applied using a spiral technique avoiding creases or folds.  The wrap was started behind the first metatarsal and ended below the tibial tubercle of the knee.  There was overlap of each turn half the width of the previous turn.  The compression wrap will be  changed every 2-3 days.    Assessment:       1. Type 2 diabetes mellitus with complication, with long-term current use of insulin    2. Ulcer of lower limb, right, limited to breakdown of skin    3. Venous insufficiency of both lower extremities    4. Tinea pedis of both feet    5. Bilateral leg edema        Plan:           Mepore island dressing to right knee incision wound.  Mepilex foam border dressing to left leg wound.  Unna boot right lower leg as detailed above.  Patient was warned not to get the dressings wet and to use cast covers for showering.  Should the dressing become wet, he is to remove it, place a wet-to-dry dressing over the wound, cover with gauze and roll gauze and use ace wraps for compression and to secure bandages.  He should then notify home health as soon as possible to have a new dressing applied.  Return to clinic in 2 weeks.  [facility] notified of orders via [employer] fax.    Home health orders:  Cleanse right leg wounds with wound .  Mepilex foam dressing to right leg ulcers.  Unna boot (zinc oxide, kerlix and coban) right lower leg.  Change dressings three times weekly.

## 2017-02-15 NOTE — MR AVS SNAPSHOT
[facility]  [address]  [facility] [address]  Phone: [phone]                  [facility] [place]   2/15/2017 4:00 PM   Office Visit    Description:  Male : 1945   Provider:  Mandi Coello NP   Department:  [facility]           Reason for Visit     Wound Check           Diagnoses this Visit        Comments    Type 2 diabetes mellitus with complication, with long-term current use of insulin    -  Primary     Ulcer of lower limb, right, limited to breakdown of skin         Venous insufficiency of both lower extremities         Tinea pedis of both feet         Bilateral leg edema                To Do List           Future Appointments        Provider Department Dept Phone    3/6/2017 8:10 AM LAB, APPOINTMENT [place] [facility] [phone]    3/6/2017 10:00 AM Jose Armando Knight MD [facility] Orthoped[facility] [phone]    3/7/2017 9:00 AM Jose Hatch DPM [facility] Podiatry [phone]    3/7/2017 10:00 AM Mandi Coello NP [facility] Wound Care [phone]    3/21/2017 10:00 AM CODY Daily,ANP-C [facility] - Endocrinology [phone]      Goals (5 Years of Data)     None      Follow-Up and Disposition     Return in about 2 weeks (around 3/1/2017) for Wound evaluation.    Follow-up and Disposition History      [facility]     [facility] -  Assistance  Registered nurses in the [facility] provide clinical advisement, health education, appointment booking, and other advisory services.  Call for this free service at [phone].             Medications           Message regarding Medications     Verify the changes and/or additions to your medication regime listed below are the same as discussed with your clinician today.  If any of these changes or additions are incorrect, please notify your healthcare provider.             Verify that the below list of medications is an accurate  "representation of the medications you are currently taking.  If none reported, the list may be blank. If incorrect, please contact your healthcare provider. Carry this list with you in case of emergency.           Current Medications     apixaban 5 mg Tab Take 1 tablet (5 mg total) by mouth 2 (two) times daily.    ascorbic acid (VITAMIN C) 100 MG tablet Take 1 tablet by mouth every morning.     aspirin 325 MG tablet Take 1 tablet by mouth every morning.     azelastine (ASTELIN) 137 mcg (0.1 %) nasal spray 1 spray (137 mcg total) by Nasal route 2 (two) times daily.    BD INSULIN PEN NEEDLE UF ORIG 29 x 1/2 " Ndle     blood-glucose meter kit Use as instructed, True Result meter    CINNAMON BARK (CINNAMON ORAL) Take by mouth once daily.    clobetasol (TEMOVATE) 0.05 % cream AAA finger bid    clotrimazole-betamethasone 1-0.05% (LOTRISONE) cream Apply topically 2 (two) times daily. Apply twice a day to affected area for 1 week, wait a week, then repeat x 1 week    desonide (DESOWEN) 0.05 % lotion AAA bid prn redness, scaling, or itching    desoximetasone (TOPICORT) 0.25 % cream 0.25 %.  Cream Topical .  AAA bid back    docusate sodium (COLACE) 100 MG capsule Take 1 capsule (100 mg total) by mouth 2 (two) times daily. Available OTC as well    fenofibrate (TRICOR) 145 MG tablet TAKE ONE TABLET BY MOUTH DAILY    fluticasone (FLONASE) 50 mcg/actuation nasal spray 1 spray by Each Nare route nightly as needed.    furosemide (LASIX) 40 MG tablet Take 1 tablet (40 mg total) by mouth daily as needed.    gabapentin (NEURONTIN) 300 MG capsule Take 1 capsule (300 mg total) by mouth 3 (three) times daily.    insulin aspart (NOVOLOG FLEXPEN) 100 unit/mL InPn pen 15-18-18 units with each meal, plus correction scale. TDD 81 units    insulin glargine (LANTUS SOLOSTAR) 100 unit/mL (3 mL) InPn pen Inject 45 Units into the skin every evening.    ketoconazole (NIZORAL) 2 % cream Apply topically once daily.    lancets [facility]ancet " "by Misc.(Non-Drug; Combo Route) route 4 (four) times daily. Pt uses True Result Meter, bg monitoring 4 times a day.    levothyroxine (SYNTHROID) 25 MCG tablet Take 1 tablet (25 mcg total) by mouth once daily.    losartan (COZAAR) 50 MG tablet TAKE ONE TABLET BY MOUTH EVERY DAY    metoprolol succinate (TOPROL-XL) 100 MG 24 hr tablet Take 1 tablet (100 mg total) by mouth once daily.    metronidazole (METROGEL) 0.75 % gel Apply topically 2 (two) times daily.    multivitamin capsule Take 1 capsule by mouth every morning.     mupirocin (BACTROBAN) 2 % ointment Apply topically 2 (two) times daily. Apply to wound twice daily as directed.    nystatin-triamcinolone (MYCOLOG II) cream apply TWICE DAILY    omega-3 acid ethyl esters (LOVAZA) 1 gram capsule TAKE THREE CAPSULE BY MOUTH TWICE DAILY    ondansetron (ZOFRAN) 8 MG tablet Take 1 tablet (8 mg total) by mouth every 12 (twelve) hours as needed for Nausea.    oxybutynin (DITROPAN-XL) 5 MG TR24 Take 1 tablet (5 mg total) by mouth once daily.    oxycodone-acetaminophen (PERCOCET)  mg per tablet Take 1 tablet by mouth every 4 (four) hours as needed for Pain.    pen needle, diabetic (BD ULTRA-FINE BASIL PEN NEEDLES) 32 gauge x 5/32" Ndle Pt check blood sugar 4 times a day    pravastatin (PRAVACHOL) 10 MG tablet Take 1 tablet (10 mg total) by mouth once daily.    tamsulosin (FLOMAX) 0.4 mg Cp24 Take 1 capsule (0.4 mg total) by mouth once daily.    TRUETEST TEST STRIPS Strp 1 strip by Misc.(Non-Drug; Combo Route) route 4 (four) times daily.    vitamin E 1000 UNIT capsule Take 1,000 Units by mouth every morning. 1 Capsule Oral Every day           Clinical Reference Information           Your Vitals Were     BP Pulse Temp Height Weight BMI    137/88 89 97.6 °F (36.4 °C) (Oral) 5' 11" (1.803 m) 134.9 kg (297 lb 6.4 oz) 41.48 kg/m2      Blood Pressure          Most Recent Value    BP  137/88      Allergies as of 2/15/2017     Ciprofloxacin    Niacin    Terbinafine    "   Immunizations Administered on Date of Encounter - 2/15/2017     None      Instructions    Elevate legs as much as possible. Do not get the dressings wet and use cast covers for showering.  Should the dressing become wet, remove it, place a wet-to-dry dressing over the wound, cover with gauze and roll gauze and use ace wraps for compression and to secure bandages.  Notify home health as soon as possible to have a new dressing applied.         Language Assistance Services     ATTENTION: Language assistance services are available, free of charge. Please call [phone].      ATENCIÓN: Si habla español, tiene a mancilla disposición servicios gratuitos de asistencia lingüística. Llame al [phone].     CHÚ Ý: N?u b?n nói Ti?ng Vi?t, có các d?ch v? h? tr? ngôn ng? mi?n phí dành cho b?n. G?i s? [phone].         [facility] complies with applicable Federal civil rights laws and does not discriminate on the basis of race, color, national origin, age, disability, or sex.

## 2017-02-27 ENCOUNTER — PATIENT MESSAGE (OUTPATIENT)
Dept: WOUND CARE | Facility: CLINIC | Age: 72
End: 2017-02-27

## 2017-03-01 ENCOUNTER — TELEPHONE (OUTPATIENT)
Dept: WOUND CARE | Facility: CLINIC | Age: 72
End: 2017-03-01

## 2017-03-01 NOTE — TELEPHONE ENCOUNTER
----- Message from Patience Barker sent at 3/1/2017  3:16 PM CST -----  Contact: Wife/Dorothy  Caller states the pt broke out with blisters around the knee where he had surgery. She states she would like to know if it is ok to keep it wrapped until his next appt on 03/07/2016 with wound care because, home health discharged him yesterday and he's no longer getting care at home for it. Please call [email or web address]@ [phone].Thank you.

## 2017-03-03 ENCOUNTER — TELEPHONE (OUTPATIENT)
Dept: WOUND CARE | Facility: CLINIC | Age: 72
End: 2017-03-03

## 2017-03-05 RX ORDER — FENOFIBRATE 145 MG/1
TABLET, FILM COATED ORAL
Qty: 90 TABLET | Refills: 2 | Status: SHIPPED | OUTPATIENT
Start: 2017-03-05 | End: 2018-02-24 | Stop reason: SDUPTHER

## 2017-03-06 ENCOUNTER — OFFICE VISIT (OUTPATIENT)
Dept: ORTHOPEDICS | Facility: CLINIC | Age: 72
End: 2017-03-06
Payer: MEDICARE

## 2017-03-06 ENCOUNTER — HOSPITAL ENCOUNTER (OUTPATIENT)
Dept: RADIOLOGY | Facility: HOSPITAL | Age: 72
Discharge: HOME | End: 2017-03-06
Attending: ORTHOPAEDIC SURGERY
Payer: MEDICARE

## 2017-03-06 VITALS
HEIGHT: 70 IN | SYSTOLIC BLOOD PRESSURE: 116 MMHG | HEART RATE: 106 BPM | DIASTOLIC BLOOD PRESSURE: 74 MMHG | BODY MASS INDEX: 42.52 KG/M2 | WEIGHT: 297 LBS

## 2017-03-06 DIAGNOSIS — M25.561 RIGHT KNEE PAIN, UNSPECIFIED CHRONICITY: ICD-10-CM

## 2017-03-06 DIAGNOSIS — M25.561 RIGHT KNEE PAIN, UNSPECIFIED CHRONICITY: Primary | ICD-10-CM

## 2017-03-06 PROCEDURE — 73560 X-RAY EXAM OF KNEE 1 OR 2: CPT | Mod: 26,RT,, | Performed by: RADIOLOGY

## 2017-03-06 PROCEDURE — 73560 X-RAY EXAM OF KNEE 1 OR 2: CPT | Mod: TC,RT

## 2017-03-06 PROCEDURE — 99999 PR PBB SHADOW E&M-EST. PATIENT-LVL III: CPT | Mod: PBBFAC,,, | Performed by: ORTHOPAEDIC SURGERY

## 2017-03-06 PROCEDURE — 99024 POSTOP FOLLOW-UP VISIT: CPT | Mod: S$GLB,,, | Performed by: ORTHOPAEDIC SURGERY

## 2017-03-06 NOTE — PROGRESS NOTES
Stephen Cantor Jr. presents for post-operative evaluation.  He is status-post  right knee arthroplasty.  The wound is healing well with no signs of erythema or warmth.  There is no drainage.  No clinical signs or symptoms of infection are present.  All staples were removed today.  ROM 0-105.    Post-operative radiographs were obtained today and show satisfactory position of the prosthesis.    We will continue post-operative physical therapy.    Follow-up in 6 weeks.

## 2017-03-06 NOTE — MR AVS SNAPSHOT
[facility]  [address] [facility] [address]  Phone: [phone]                  [facility] PAT Cantor JrJorge   3/6/2017 10:00 AM   Office Visit    Description:  Male : 1945   Provider:  Jose Armando Knight MD   Department:  [facility]           Reason for Visit     Post-op Evaluation           Diagnoses this Visit        Comments    Right knee pain, unspecified chronicity    -  Primary            To Do List           Future Appointments        Provider Department Dept Phone    3/7/2017 9:00 AM Jose Hatch DPM [facility] - Podiatry [phone]    3/7/2017 10:00 AM Mandi Coello NP [facility] - Wound Care [phone]    3/21/2017 10:00 AM CODY Daily,ANP-C [facility] - Endocrinology [phone]    2017 8:45 AM TANNER VILLARREAL [facility] [phone]    2017 10:00 AM Jose Armando Knight MD [facility] [phone]      Goals (5 Years of Data)     None      Follow-Up and Disposition     Return in about 6 weeks (around 2017).    Follow-up and Disposition History      [facility]     [facility] -  Assistance  Registered nurses in the [facility] provide clinical advisement, health education, appointment booking, and other advisory services.  Call for this free service at [phone].             Medications           Message regarding Medications     Verify the changes and/or additions to your medication regime listed below are the same as discussed with your clinician today.  If any of these changes or additions are incorrect, please notify your healthcare provider.             Verify that the below list of medications is an accurate representation of the medications you are currently taking.  If none reported, the list may be blank. If incorrect, please contact your healthcare provider. Carry this list with you in case of emergency.           Current Medications     apixaban  "5 mg Tab Take 1 tablet (5 mg total) by mouth 2 (two) times daily.    ascorbic acid (VITAMIN C) 100 MG tablet Take 1 tablet by mouth every morning.     aspirin 325 MG tablet Take 1 tablet by mouth every morning.     azelastine (ASTELIN) 137 mcg (0.1 %) nasal spray 1 spray (137 mcg total) by Nasal route 2 (two) times daily.    BD INSULIN PEN NEEDLE UF ORIG 29 x 1/2 " Ndle     blood-glucose meter kit Use as instructed, True Result meter    CINNAMON BARK (CINNAMON ORAL) Take by mouth once daily.    clobetasol (TEMOVATE) 0.05 % cream AAA finger bid    clotrimazole-betamethasone 1-0.05% (LOTRISONE) cream Apply topically 2 (two) times daily. Apply twice a day to affected area for 1 week, wait a week, then repeat x 1 week    desonide (DESOWEN) 0.05 % lotion AAA bid prn redness, scaling, or itching    desoximetasone (TOPICORT) 0.25 % cream 0.25 %.  Cream Topical .  AAA bid back    docusate sodium (COLACE) 100 MG capsule Take 1 capsule (100 mg total) by mouth 2 (two) times daily. Available OTC as well    fenofibrate (TRICOR) 145 MG tablet TAKE ONE TABLET BY MOUTH EVERY DAY    fluticasone (FLONASE) 50 mcg/actuation nasal spray 1 spray by Each Nare route nightly as needed.    furosemide (LASIX) 40 MG tablet Take 1 tablet (40 mg total) by mouth daily as needed.    gabapentin (NEURONTIN) 300 MG capsule Take 1 capsule (300 mg total) by mouth 3 (three) times daily.    insulin aspart (NOVOLOG FLEXPEN) 100 unit/mL InPn pen 15-18-18 units with each meal, plus correction scale. TDD 81 units    insulin glargine (LANTUS SOLOSTAR) 100 unit/mL (3 mL) InPn pen Inject 45 Units into the skin every evening.    ketoconazole (NIZORAL) 2 % cream Apply topically once daily.    lancets 33 gauge Misc 1 lancet by Misc.(Non-Drug; Combo Route) route 4 (four) times daily. Pt uses True Result Meter, bg monitoring 4 times a day.    levothyroxine (SYNTHROID) 25 MCG tablet Take 1 tablet (25 mcg total) by mouth once daily.    losartan (COZAAR) 50 MG tablet " "TAKE ONE TABLET BY MOUTH EVERY DAY    metoprolol succinate (TOPROL-XL) 100 MG 24 hr tablet Take 1 tablet (100 mg total) by mouth once daily.    metronidazole (METROGEL) 0.75 % gel Apply topically 2 (two) times daily.    multivitamin capsule Take 1 capsule by mouth every morning.     mupirocin (BACTROBAN) 2 % ointment Apply topically 2 (two) times daily. Apply to wound twice daily as directed.    nystatin-triamcinolone (MYCOLOG II) cream apply TWICE DAILY    omega-3 acid ethyl esters (LOVAZA) 1 gram capsule TAKE THREE CAPSULE BY MOUTH TWICE DAILY    ondansetron (ZOFRAN) 8 MG tablet Take 1 tablet (8 mg total) by mouth every 12 (twelve) hours as needed for Nausea.    oxybutynin (DITROPAN-XL) 5 MG TR24 Take 1 tablet (5 mg total) by mouth once daily.    oxycodone-acetaminophen (PERCOCET)  mg per tablet Take 1 tablet by mouth every 4 (four) hours as needed for Pain.    pen needle, diabetic (BD ULTRA-FINE BASIL PEN NEEDLES) 32 gauge x 5/32" Ndle Pt check blood sugar 4 times a day    pravastatin (PRAVACHOL) 10 MG tablet Take 1 tablet (10 mg total) by mouth once daily.    tamsulosin (FLOMAX) 0.4 mg Cp24 Take 1 capsule (0.4 mg total) by mouth once daily.    TRUETEST TEST STRIPS Strp 1 strip by Misc.(Non-Drug; Combo Route) route 4 (four) times daily.    vitamin E 1000 UNIT capsule Take 1,000 Units by mouth every morning. 1 Capsule Oral Every day           Clinical Reference Information           Your Vitals Were     BP Pulse Height Weight BMI    116/74 (BP Location: Right arm, Patient Position: Sitting, BP Method: Automatic) 106 5' 10" (1.778 m) 134.7 kg (297 lb) 42.62 kg/m2      Blood Pressure          Most Recent Value    BP  116/74      Allergies as of 3/6/2017     Ciprofloxacin    Niacin    Terbinafine      Immunizations Administered on Date of Encounter - 3/6/2017     None      Orders Placed During Today's Visit     Future Labs/Procedures Expected by Expires    X-Ray Knee 1 or 2 View Right  3/6/2017 3/6/2018    "   Language Assistance Services     ATTENTION: Language assistance services are available, free of charge. Please call [phone].      ATENCIÓN: Si habla aakash, tiene a mancilla disposición servicios gratuitos de asistencia lingüística. Llame al [phone].     CHÚ Ý: N?u b?n nói Ti?ng Vi?t, có các d?ch v? h? tr? ngôn ng? mi?n phí dành cho b?n. G?i s? [phone].         [facility] complies with applicable Federal civil rights laws and does not discriminate on the basis of race, color, national origin, age, disability, or sex.

## 2017-03-07 ENCOUNTER — OFFICE VISIT (OUTPATIENT)
Dept: WOUND CARE | Facility: CLINIC | Age: 72
End: 2017-03-07
Payer: MEDICARE

## 2017-03-07 ENCOUNTER — OFFICE VISIT (OUTPATIENT)
Dept: PODIATRY | Facility: CLINIC | Age: 72
End: 2017-03-07
Payer: MEDICARE

## 2017-03-07 VITALS
HEART RATE: 90 BPM | TEMPERATURE: 98 F | WEIGHT: 296.13 LBS | HEIGHT: 70 IN | DIASTOLIC BLOOD PRESSURE: 82 MMHG | BODY MASS INDEX: 42.39 KG/M2 | SYSTOLIC BLOOD PRESSURE: 121 MMHG

## 2017-03-07 VITALS
SYSTOLIC BLOOD PRESSURE: 128 MMHG | BODY MASS INDEX: 42.33 KG/M2 | HEART RATE: 85 BPM | WEIGHT: 295.69 LBS | HEIGHT: 70 IN | DIASTOLIC BLOOD PRESSURE: 72 MMHG

## 2017-03-07 DIAGNOSIS — B35.1 DERMATOPHYTOSIS OF NAIL: ICD-10-CM

## 2017-03-07 DIAGNOSIS — I87.2 VENOUS INSUFFICIENCY OF BOTH LOWER EXTREMITIES: Primary | ICD-10-CM

## 2017-03-07 DIAGNOSIS — I87.2 VENOUS STASIS DERMATITIS OF RIGHT LOWER EXTREMITY: ICD-10-CM

## 2017-03-07 DIAGNOSIS — B35.3 TINEA PEDIS OF BOTH FEET: ICD-10-CM

## 2017-03-07 DIAGNOSIS — L84 CORNS AND CALLOSITIES: ICD-10-CM

## 2017-03-07 DIAGNOSIS — Z79.01 LONG TERM CURRENT USE OF ANTICOAGULANT THERAPY: ICD-10-CM

## 2017-03-07 DIAGNOSIS — I89.0 LYMPHEDEMA OF BOTH LOWER EXTREMITIES: ICD-10-CM

## 2017-03-07 DIAGNOSIS — R60.0 BILATERAL LEG EDEMA: ICD-10-CM

## 2017-03-07 DIAGNOSIS — E11.42 DIABETIC POLYNEUROPATHY ASSOCIATED WITH TYPE 2 DIABETES MELLITUS: Primary | ICD-10-CM

## 2017-03-07 PROBLEM — E11.8 TYPE II DIABETES MELLITUS WITH COMPLICATION: Status: RESOLVED | Noted: 2017-02-01 | Resolved: 2017-03-07

## 2017-03-07 PROCEDURE — 1126F AMNT PAIN NOTED NONE PRSNT: CPT | Mod: S$GLB,,, | Performed by: PODIATRIST

## 2017-03-07 PROCEDURE — 1126F AMNT PAIN NOTED NONE PRSNT: CPT | Mod: S$GLB,,, | Performed by: NURSE PRACTITIONER

## 2017-03-07 PROCEDURE — 99499 UNLISTED E&M SERVICE: CPT | Mod: S$PBB,,, | Performed by: PODIATRIST

## 2017-03-07 PROCEDURE — 1159F MED LIST DOCD IN RCRD: CPT | Mod: S$GLB,,, | Performed by: NURSE PRACTITIONER

## 2017-03-07 PROCEDURE — 11721 DEBRIDE NAIL 6 OR MORE: CPT | Mod: Q9,S$GLB,, | Performed by: PODIATRIST

## 2017-03-07 PROCEDURE — 1157F ADVNC CARE PLAN IN RCRD: CPT | Mod: S$GLB,,, | Performed by: NURSE PRACTITIONER

## 2017-03-07 PROCEDURE — 1157F ADVNC CARE PLAN IN RCRD: CPT | Mod: S$GLB,,, | Performed by: PODIATRIST

## 2017-03-07 PROCEDURE — 99999 PR PBB SHADOW E&M-EST. PATIENT-LVL IV: CPT | Mod: PBBFAC,,, | Performed by: PODIATRIST

## 2017-03-07 PROCEDURE — 1160F RVW MEDS BY RX/DR IN RCRD: CPT | Mod: S$GLB,,, | Performed by: PODIATRIST

## 2017-03-07 PROCEDURE — 3074F SYST BP LT 130 MM HG: CPT | Mod: S$GLB,,, | Performed by: PODIATRIST

## 2017-03-07 PROCEDURE — 3078F DIAST BP <80 MM HG: CPT | Mod: S$GLB,,, | Performed by: PODIATRIST

## 2017-03-07 PROCEDURE — 3074F SYST BP LT 130 MM HG: CPT | Mod: S$GLB,,, | Performed by: NURSE PRACTITIONER

## 2017-03-07 PROCEDURE — 99213 OFFICE O/P EST LOW 20 MIN: CPT | Mod: 25,S$GLB,, | Performed by: PODIATRIST

## 2017-03-07 PROCEDURE — 1159F MED LIST DOCD IN RCRD: CPT | Mod: S$GLB,,, | Performed by: PODIATRIST

## 2017-03-07 PROCEDURE — 3066F NEPHROPATHY DOC TX: CPT | Mod: S$GLB,,, | Performed by: PODIATRIST

## 2017-03-07 PROCEDURE — 99212 OFFICE O/P EST SF 10 MIN: CPT | Mod: S$GLB,,, | Performed by: NURSE PRACTITIONER

## 2017-03-07 PROCEDURE — 99999 PR PBB SHADOW E&M-EST. PATIENT-LVL III: CPT | Mod: PBBFAC,,, | Performed by: NURSE PRACTITIONER

## 2017-03-07 PROCEDURE — 3044F HG A1C LEVEL LT 7.0%: CPT | Mod: S$GLB,,, | Performed by: PODIATRIST

## 2017-03-07 PROCEDURE — 3079F DIAST BP 80-89 MM HG: CPT | Mod: S$GLB,,, | Performed by: NURSE PRACTITIONER

## 2017-03-07 RX ORDER — TRIAMCINOLONE ACETONIDE 1 MG/G
CREAM TOPICAL 2 TIMES DAILY
Qty: 454 G | Refills: 0 | Status: SHIPPED | OUTPATIENT
Start: 2017-03-07 | End: 2020-01-20

## 2017-03-07 RX ORDER — KETOCONAZOLE 20 MG/G
CREAM TOPICAL DAILY
Qty: 30 G | Refills: 1 | Status: SHIPPED | OUTPATIENT
Start: 2017-03-07

## 2017-03-07 NOTE — PROGRESS NOTES
Subjective:      Patient ID: ricki Cantor Jr. is a 71 y.o. male.    Chief Complaint: Diabetes Mellitus (PCP Dr. Barlow 2/3/17); Diabetic Foot Exam; Routine Foot Care; and Peripheral Neuropathy    Stephen is a 71 y.o. male who presents to the clinic for evaluation and treatment of high risk feet. Stephen has a past medical history of *Atrial fibrillation; Anticoagulant long-term use; Basal cell carcinoma (12/2015); BCC (basal cell carcinoma) (12/2015); Cataract; Chronic kidney disease; Diabetes mellitus with renal manifestations, uncontrolled; Dyslipidemia associated with type 2 diabetes mellitus; Fever blister; HTN (hypertension); Keloid cicatrix; Melanoma (12/07/2015); Obesity; PN (peripheral neuropathy); Primary osteoarthritis of right knee (1/25/2017); Screening for colon cancer (3/3/2016); Sleep apnea; and Type II or unspecified type diabetes mellitus with other specified manifestations, uncontrolled. The patient's chief complaint is thick, fungal toenails. He is not able to cut them and his wife fears cutting him. Now wearing circaid type of compression stocking. Ulcer remains healed.   This patient has documented high risk feet requiring routine maintenance secondary to diabetes mellitis and those secondary complications of diabetes, as mentioned..    PCP: Desmond Barlow MD      Chief Complaint   Patient presents with    Diabetes Mellitus     PCP Dr. Barlow 2/3/17    Diabetic Foot Exam    Routine Foot Care    Peripheral Neuropathy        Current shoe gear:  Affected Foot: Rx diabetic extra depth shoes and custom accommodative insoles     Unaffected Foot: Rx diabetic extra depth shoes and custom accommodative insoles    Hemoglobin A1C   Date Value Ref Range Status   03/06/2017 6.2 4.5 - 6.2 % Final     Comment:     According to ADA guidelines, hemoglobin A1C <7.0% represents  optimal control in non-pregnant diabetic patients.  Different  metrics may apply to specific populations.   Standards of Medical Care in  Diabetes - 2016.  For the purpose of screening for the presence of diabetes:  <5.7%     Consistent with the absence of diabetes  5.7-6.4%  Consistent with increasing risk for diabetes   (prediabetes)  >or=6.5%  Consistent with diabetes  Currently no consensus exists for use of hemoglobin A1C  for diagnosis of diabetes for children.     01/25/2017 6.1 4.5 - 6.2 % Final     Comment:     According to ADA guidelines, hemoglobin A1C <7.0% represents  optimal control in non-pregnant diabetic patients.  Different  metrics may apply to specific populations.   Standards of Medical Care in Diabetes - 2016.  For the purpose of screening for the presence of diabetes:  <5.7%     Consistent with the absence of diabetes  5.7-6.4%  Consistent with increasing risk for diabetes   (prediabetes)  >or=6.5%  Consistent with diabetes  Currently no consensus exists for use of hemoglobin A1C  for diagnosis of diabetes for children.     12/06/2016 5.9 4.5 - 6.2 % Final     Comment:     According to ADA guidelines, hemoglobin A1C <7.0% represents  optimal control in non-pregnant diabetic patients.  Different  metrics may apply to specific populations.   Standards of Medical Care in Diabetes - 2016.  For the purpose of screening for the presence of diabetes:  <5.7%     Consistent with the absence of diabetes  5.7-6.4%  Consistent with increasing risk for diabetes   (prediabetes)  >or=6.5%  Consistent with diabetes  Currently no consensus exists for use of hemoglobin A1C  for diagnosis of diabetes for children.         Review of Systems   Constitution: Negative for chills, fever and night sweats.   Cardiovascular: Negative for chest pain and leg swelling.   Respiratory: Positive for sleep disturbances due to breathing (sleep apnea. Uses mouthguard). Negative for cough and shortness of breath.    Skin: Positive for dry skin (uses lotion) and nail changes.   Musculoskeletal: Positive for arthritis, joint pain and joint swelling.    Gastrointestinal: Negative for diarrhea, nausea and vomiting.   Neurological: Positive for numbness (in feet) and paresthesias (in feet).           Objective:      Physical Exam   Constitutional: He is oriented to person, place, and time. He appears well-developed and well-nourished. No distress.   Cardiovascular: Normal rate and intact distal pulses.    Dorsalis pedis and posterior tibial pulses are palpable Bilaterally. Toes are cool to touch. Feet are warm proximally.There is decreased digital hair. Skin is atrophic, hyperpigmented, and edematous.     Musculoskeletal: Normal range of motion. He exhibits edema (moderate). He exhibits no tenderness.   Neurological: He is alert and oriented to person, place, and time. He exhibits normal muscle tone.   [place] 5.07 monofilamant testing is absent distal to metatarsal heads [place] feet. Light touch absent Bilaterally.     Skin: Skin is warm and dry. No rash noted. He is not diaphoretic. No erythema. No pallor.   Wound at posterior left ankle: healed. No signs of infection.     Toenails 1-5 R and L are elongated by 2-3 mm, thickened by 1-3 mm, discolored/yellowed, dystrophic, brittle with subungual debris.    Multiple telangiectasias bilateral.   Psychiatric: He has a normal mood and affect. His behavior is normal. Judgment and thought content normal.   Nursing note and vitals reviewed.            Assessment:       Encounter Diagnoses   Name Primary?    Diabetic polyneuropathy associated with type 2 diabetes mellitus Yes    Lymphedema of both lower extremities     Corns and callosities     Dermatophytosis of nail     Long term current use of anticoagulant therapy     Tinea pedis of both feet    This patient has documented high risk feet requiring routine maintenance secondary to diabetes mellitis and those secondary complications of diabetes, as mentioned.        Plan:       [facility] today for diabetes mellitus, diabetic foot exam, routine foot care  and peripheral neuropathy.    Diagnoses and all orders for this visit:    Diabetic polyneuropathy associated with type 2 diabetes mellitus    Lymphedema of both lower extremities    Corns and callosities    Dermatophytosis of nail    Long term current use of anticoagulant therapy    Tinea pedis of both feet  -     ketoconazole (NIZORAL) 2 % cream; Apply topically once daily.    - Patient was given written and verbal instructions regarding foot condition.  I counseled the patient on his conditions, their implications and medical management.    - Shoe inspection. Diabetic Foot Education. Patient reminded of the importance of good nutrition and blood sugar control to help prevent podiatric complications of diabetes. Patient instructed on proper foot hygeine. We discussed wearing proper shoe gear, daily foot inspections, never walking without protective shoe gear, never putting sharp instruments to feet    - With patient's permission, nails were aggressively reduced and debrided x 10 to their soft tissue attachment mechanically and with electric , removing all offending nail and debris. Patient relates relief following the procedure. He will continue to monitor the areas daily, inspect her feet, wear protective shoe gear when ambulatory, moisturizer to maintain skin integrity and follow in this office in approximately 2-3 months, sooner p.r.n.    - Compression wraps 30-40 mmHg daily.

## 2017-03-07 NOTE — MR AVS SNAPSHOT
[facility]  [address]  [facility] [address]  Phone: [phone]                  [facility] PAT Cantor JrJorge   3/7/2017 10:00 AM   Office Visit    Description:  Male : 1945   Provider:  Mandi Coello NP   Department:  [facility]           Reason for Visit     Wound Check           Diagnoses this Visit        Comments    Venous insufficiency of both lower extremities    -  Primary     Tinea pedis of both feet         Venous stasis dermatitis of right lower extremity         Bilateral leg edema                To Do List           Future Appointments        Provider Department Dept Phone    3/21/2017 10:00 AM CODY Daily,ANP-C [facility] - Endocrinology [phone]    2017 8:45 AM CHERELLE UROLOGY [facility] [phone]    2017 10:00 AM MD Dae Partida [facility] - Orthopedics [phone]    2017 10:00 AM Jose Hatch DPM [facility] - Podiatry [phone]      Goals (5 Years of Data)     None      Follow-Up and Disposition     Return if symptoms worsen or fail to improve.       These Medications        Disp Refills Start End    triamcinolone acetonide 0.1% (KENALOG) 0.1 % cream 454 g 0 3/7/2017     Apply topically 2 (two) times daily. Apply to affected area twice daily as directed. - Topical (Top)    Pharmacy: [facility] [address] #: [phone]         [facility]     [facility] -  Assistance  Registered nurses in the [facility] provide clinical advisement, health education, appointment booking, and other advisory services.  Call for this free service at [phone].             Medications           Message regarding Medications     Verify the changes and/or additions to your medication regime listed below are the same as discussed with your clinician today.  If any of these changes or additions are incorrect, please  "notify your healthcare provider.        START taking these NEW medications        Refills    triamcinolone acetonide 0.1% (KENALOG) 0.1 % cream 0    Sig: Apply topically 2 (two) times daily. Apply to affected area twice daily as directed.    Class: Normal    Route: Topical (Top)           Verify that the below list of medications is an accurate representation of the medications you are currently taking.  If none reported, the list may be blank. If incorrect, please contact your healthcare provider. Carry this list with you in case of emergency.           Current Medications     apixaban 5 mg Tab Take 1 tablet (5 mg total) by mouth 2 (two) times daily.    ascorbic acid (VITAMIN C) 100 MG tablet Take 1 tablet by mouth every morning.     aspirin 325 MG tablet Take 1 tablet by mouth every morning.     azelastine (ASTELIN) 137 mcg (0.1 %) nasal spray 1 spray (137 mcg total) by Nasal route 2 (two) times daily.    BD INSULIN PEN NEEDLE UF ORIG 29 x 1/2 " Ndle     blood-glucose meter kit Use as instructed, True Result meter    CINNAMON BARK (CINNAMON ORAL) Take by mouth once daily.    clobetasol (TEMOVATE) 0.05 % cream AAA finger bid    clotrimazole-betamethasone 1-0.05% (LOTRISONE) cream Apply topically 2 (two) times daily. Apply twice a day to affected area for 1 week, wait a week, then repeat x 1 week    desonide (DESOWEN) 0.05 % lotion AAA bid prn redness, scaling, or itching    desoximetasone (TOPICORT) 0.25 % cream 0.25 %.  Cream Topical .  AAA bid back    docusate sodium (COLACE) 100 MG capsule Take 1 capsule (100 mg total) by mouth 2 (two) times daily. Available OTC as well    fenofibrate (TRICOR) 145 MG tablet TAKE ONE TABLET BY MOUTH EVERY DAY    fluticasone (FLONASE) 50 mcg/actuation nasal spray 1 spray by Each Nare route nightly as needed.    furosemide (LASIX) 40 MG tablet Take 1 tablet (40 mg total) by mouth daily as needed.    gabapentin (NEURONTIN) 300 MG capsule Take 1 capsule (300 mg total) by mouth 3 " "(three) times daily.    insulin aspart (NOVOLOG FLEXPEN) 100 unit/mL InPn pen 15-18-18 units with each meal, plus correction scale. TDD 81 units    insulin glargine (LANTUS SOLOSTAR) 100 unit/mL (3 mL) InPn pen Inject 45 Units into the skin every evening.    ketoconazole (NIZORAL) 2 % cream Apply topically once daily.    lancets 33 gauge Misc 1 lancet by Misc.(Non-Drug; Combo Route) route 4 (four) times daily. Pt uses True Result Meter, bg monitoring 4 times a day.    losartan (COZAAR) 50 MG tablet TAKE ONE TABLET BY MOUTH EVERY DAY    metoprolol succinate (TOPROL-XL) 100 MG 24 hr tablet Take 1 tablet (100 mg total) by mouth once daily.    metronidazole (METROGEL) 0.75 % gel Apply topically 2 (two) times daily.    multivitamin capsule Take 1 capsule by mouth every morning.     mupirocin (BACTROBAN) 2 % ointment Apply topically 2 (two) times daily. Apply to wound twice daily as directed.    nystatin-triamcinolone (MYCOLOG II) cream apply TWICE DAILY    omega-3 acid ethyl esters (LOVAZA) 1 gram capsule TAKE THREE CAPSULE BY MOUTH TWICE DAILY    ondansetron (ZOFRAN) 8 MG tablet Take 1 tablet (8 mg total) by mouth every 12 (twelve) hours as needed for Nausea.    oxybutynin (DITROPAN-XL) 5 MG TR24 Take 1 tablet (5 mg total) by mouth once daily.    oxycodone-acetaminophen (PERCOCET)  mg per tablet Take 1 tablet by mouth every 4 (four) hours as needed for Pain.    pen needle, diabetic (BD ULTRA-FINE BASIL PEN NEEDLES) 32 gauge x 5/32" Ndle Pt check blood sugar 4 times a day    pravastatin (PRAVACHOL) 10 MG tablet Take 1 tablet (10 mg total) by mouth once daily.    tamsulosin (FLOMAX) 0.4 mg Cp24 Take 1 capsule (0.4 mg total) by mouth once daily.    TRUETEST TEST STRIPS Strp 1 strip by Misc.(Non-Drug; Combo Route) route 4 (four) times daily.    vitamin E 1000 UNIT capsule Take 1,000 Units by mouth every morning. 1 Capsule Oral Every day    levothyroxine (SYNTHROID) 25 MCG tablet Take 1 tablet (25 mcg total) by mouth " "once daily.    triamcinolone acetonide 0.1% (KENALOG) 0.1 % cream Apply topically 2 (two) times daily. Apply to affected area twice daily as directed.           Clinical Reference Information           Your Vitals Were     BP Pulse Temp Height Weight BMI    121/82 90 98.2 °F (36.8 °C) (Oral) 5' 10" (1.778 m) 134.3 kg (296 lb 1.6 oz) 42.49 kg/m2      Blood Pressure          Most Recent Value    BP  121/82      Allergies as of 3/7/2017     Ciprofloxacin    Niacin    Terbinafine      Immunizations Administered on Date of Encounter - 3/7/2017     None      Orders Placed During Today's Visit      Normal Orders This Visit    COMPRESSION STOCKINGS       Instructions    Apply triamcinolone cream to your right lower leg twice daily.    Apply ketoconazole cream to your feet daily.    Apply compression hose first thing in the morning and remove at bedtime.       Language Assistance Services     ATTENTION: Language assistance services are available, free of charge. Please call [phone].      ATENCIÓN: Si habla aakash, tiene a mancilla disposición servicios gratuitos de asistencia lingüística. Llame al [phone].     ALEJANDRO Ý: N?u b?n nói Ti?ng Vi?t, có các d?ch v? h? tr? ngôn ng? mi?n phí dành cho b?n. G?i s? [phone].         [facility] complies with applicable Federal civil rights laws and does not discriminate on the basis of race, color, national origin, age, disability, or sex.        "

## 2017-03-07 NOTE — PROGRESS NOTES
Subjective:       Patient ID: [facility] Cantor Jr. is a 71 y.o. male.    Chief Complaint: Wound Check    Wound Check        This patient is seen today for reevaluation of ulcers to the right lower leg.  An unna boot is in place to the leg and the wounds are healed.  He is afebrile.  He denies increased redness, swelling or purulent drainage.  He does not complain of pain.  His medical history is significant for type II diabetes and venous insufficiency.  Review of Systems    Unchanged from prior visit.  Objective:      Physical Exam   Constitutional: He is oriented to person, place, and time. He appears well-developed and well-nourished. No distress.   HENT:   Head: Normocephalic and atraumatic.   Cardiovascular: Intact distal pulses.    Musculoskeletal: Normal range of motion. He exhibits edema (2-3+ lower leg). He exhibits no tenderness.        Legs:  Neurological: He is alert and oriented to person, place, and time.   Skin: Skin is warm and dry. No rash noted. He is not diaphoretic. No erythema.   Psychiatric: He has a normal mood and affect. His behavior is normal. Judgment and thought content normal.   Nursing note and vitals reviewed.      ..  Hemoglobin A1C   Date Value Ref Range Status   03/06/2017 6.2 4.5 - 6.2 % Final     Comment:     According to ADA guidelines, hemoglobin A1C <7.0% represents  optimal control in non-pregnant diabetic patients.  Different  metrics may apply to specific populations.   Standards of Medical Care in Diabetes - 2016.  For the purpose of screening for the presence of diabetes:  <5.7%     Consistent with the absence of diabetes  5.7-6.4%  Consistent with increasing risk for diabetes   (prediabetes)  >or=6.5%  Consistent with diabetes  Currently no consensus exists for use of hemoglobin A1C  for diagnosis of diabetes for children.     01/25/2017 6.1 4.5 - 6.2 % Final     Comment:     According to ADA guidelines, hemoglobin A1C <7.0% represents  optimal control in non-pregnant  diabetic patients.  Different  metrics may apply to specific populations.   Standards of Medical Care in Diabetes - 2016.  For the purpose of screening for the presence of diabetes:  <5.7%     Consistent with the absence of diabetes  5.7-6.4%  Consistent with increasing risk for diabetes   (prediabetes)  >or=6.5%  Consistent with diabetes  Currently no consensus exists for use of hemoglobin A1C  for diagnosis of diabetes for children.     12/06/2016 5.9 4.5 - 6.2 % Final     Comment:     According to ADA guidelines, hemoglobin A1C <7.0% represents  optimal control in non-pregnant diabetic patients.  Different  metrics may apply to specific populations.   Standards of Medical Care in Diabetes - 2016.  For the purpose of screening for the presence of diabetes:  <5.7%     Consistent with the absence of diabetes  5.7-6.4%  Consistent with increasing risk for diabetes   (prediabetes)  >or=6.5%  Consistent with diabetes  Currently no consensus exists for use of hemoglobin A1C  for diagnosis of diabetes for children.       Assessment:       1. Venous insufficiency of both lower extremities    2. Tinea pedis of both feet    3. Venous stasis dermatitis of right lower extremity    4. Bilateral leg edema        Plan:           Prescription given for 20-30 knee high compression hose and for circaid stockings.  Patient will decide which option is best for him.  Return to this clinic as needed.

## 2017-03-07 NOTE — MR AVS SNAPSHOT
[facility]  [address]  [facility] [address]  Phone: [phone]                  [facility] PAT Cantor JrJorge   3/7/2017 9:00 AM   Office Visit    Description:  Male : 1945   Provider:  Jose Hatch DPM   Department:  [facility]           Reason for Visit     Diabetes Mellitus     Diabetic Foot Exam     Routine Foot Care     Peripheral Neuropathy           Diagnoses this Visit        Comments    Diabetic polyneuropathy associated with type 2 diabetes mellitus    -  Primary     Lymphedema of both lower extremities         Corns and callosities         Dermatophytosis of nail         Long term current use of anticoagulant therapy         Tinea pedis of both feet                To Do List           Future Appointments        Provider Department Dept Phone    3/7/2017 10:00 AM Mandi Coello NP [facility] - Wound Care [phone]    3/21/2017 10:00 AM CODY Daily,ANP-C [facility] - Endocrinology [phone]    2017 8:45 AM CHERELLE UROLOGY [facility] [phone]    2017 10:00 AM Jose Armando Knight MD [facility] Orthopedics [phone]    2017 10:00 AM Jose Hatch DPM [facility] [phone]      Goals (5 Years of Data)     None       These Medications        Disp Refills Start End    ketoconazole (NIZORAL) 2 % cream 30 g 1 3/7/2017     Apply topically once daily. - Topical (Top)    [facility] [address] Ph #: [phone]         [facility]     [facility] On [facility] Nurse Care Line -  Assistance  Registered nurses in the [facility] provide clinical advisement, health education, appointment booking, and other advisory services.  Call for this free service at [phone].             Medications           Message regarding Medications     Verify the changes and/or additions to your medication regime listed below are the same as discussed with  "your clinician today.  If any of these changes or additions are incorrect, please notify your healthcare provider.             Verify that the below list of medications is an accurate representation of the medications you are currently taking.  If none reported, the list may be blank. If incorrect, please contact your healthcare provider. Carry this list with you in case of emergency.           Current Medications     apixaban 5 mg Tab Take 1 tablet (5 mg total) by mouth 2 (two) times daily.    ascorbic acid (VITAMIN C) 100 MG tablet Take 1 tablet by mouth every morning.     aspirin 325 MG tablet Take 1 tablet by mouth every morning.     azelastine (ASTELIN) 137 mcg (0.1 %) nasal spray 1 spray (137 mcg total) by Nasal route 2 (two) times daily.    BD INSULIN PEN NEEDLE UF ORIG 29 x 1/2 " Ndle     blood-glucose meter kit Use as instructed, True Result meter    CINNAMON BARK (CINNAMON ORAL) Take by mouth once daily.    clobetasol (TEMOVATE) 0.05 % cream AAA finger bid    clotrimazole-betamethasone 1-0.05% (LOTRISONE) cream Apply topically 2 (two) times daily. Apply twice a day to affected area for 1 week, wait a week, then repeat x 1 week    desonide (DESOWEN) 0.05 % lotion AAA bid prn redness, scaling, or itching    desoximetasone (TOPICORT) 0.25 % cream 0.25 %.  Cream Topical .  AAA bid back    docusate sodium (COLACE) 100 MG capsule Take 1 capsule (100 mg total) by mouth 2 (two) times daily. Available OTC as well    fenofibrate (TRICOR) 145 MG tablet TAKE ONE TABLET BY MOUTH EVERY DAY    fluticasone (FLONASE) 50 mcg/actuation nasal spray 1 spray by Each Nare route nightly as needed.    furosemide (LASIX) 40 MG tablet Take 1 tablet (40 mg total) by mouth daily as needed.    gabapentin (NEURONTIN) 300 MG capsule Take 1 capsule (300 mg total) by mouth 3 (three) times daily.    insulin aspart (NOVOLOG FLEXPEN) 100 unit/mL InPn pen 15-18-18 units with each meal, plus correction scale. TDD 81 units    insulin glargine " "(LANTUS SOLOSTAR) 100 unit/mL (3 mL) InPn pen Inject 45 Units into the skin every evening.    ketoconazole (NIZORAL) 2 % cream Apply topically once daily.    lancets 33 gauge Misc 1 lancet by Misc.(Non-Drug; Combo Route) route 4 (four) times daily. Pt uses True Result Meter, bg monitoring 4 times a day.    losartan (COZAAR) 50 MG tablet TAKE ONE TABLET BY MOUTH EVERY DAY    metoprolol succinate (TOPROL-XL) 100 MG 24 hr tablet Take 1 tablet (100 mg total) by mouth once daily.    metronidazole (METROGEL) 0.75 % gel Apply topically 2 (two) times daily.    multivitamin capsule Take 1 capsule by mouth every morning.     mupirocin (BACTROBAN) 2 % ointment Apply topically 2 (two) times daily. Apply to wound twice daily as directed.    nystatin-triamcinolone (MYCOLOG II) cream apply TWICE DAILY    omega-3 acid ethyl esters (LOVAZA) 1 gram capsule TAKE THREE CAPSULE BY MOUTH TWICE DAILY    ondansetron (ZOFRAN) 8 MG tablet Take 1 tablet (8 mg total) by mouth every 12 (twelve) hours as needed for Nausea.    oxybutynin (DITROPAN-XL) 5 MG TR24 Take 1 tablet (5 mg total) by mouth once daily.    oxycodone-acetaminophen (PERCOCET)  mg per tablet Take 1 tablet by mouth every 4 (four) hours as needed for Pain.    pen needle, diabetic (BD ULTRA-FINE BASIL PEN NEEDLES) 32 gauge x 5/32" Ndle Pt check blood sugar 4 times a day    pravastatin (PRAVACHOL) 10 MG tablet Take 1 tablet (10 mg total) by mouth once daily.    tamsulosin (FLOMAX) 0.4 mg Cp24 Take 1 capsule (0.4 mg total) by mouth once daily.    TRUETEST TEST STRIPS Strp 1 strip by Misc.(Non-Drug; Combo Route) route 4 (four) times daily.    vitamin E 1000 UNIT capsule Take 1,000 Units by mouth every morning. 1 Capsule Oral Every day    levothyroxine (SYNTHROID) 25 MCG tablet Take 1 tablet (25 mcg total) by mouth once daily.           Clinical Reference Information           Your Vitals Were     BP Pulse Height Weight BMI    128/72 85 5' 10" (1.778 m) 134.1 kg (295 lb 11.2 oz) " 42.43 kg/m2      Blood Pressure          Most Recent Value    BP  128/72      Allergies as of 3/7/2017     Ciprofloxacin    Niacin    Terbinafine      Immunizations Administered on Date of Encounter - 3/7/2017     None      Language Assistance Services     ATTENTION: Language assistance services are available, free of charge. Please call [phone].      ATENCIÓN: Si habla español, tiene a mancilla disposición servicios gratuitos de asistencia lingüística. Llame al [phone].     CHÚ Ý: N?u b?n nói Ti?ng Vi?t, có các d?ch v? h? tr? ngôn ng? mi?n phí dành cho b?n. G?i s? [phone].         [facility] complies with applicable Federal civil rights laws and does not discriminate on the basis of race, color, national origin, age, disability, or sex.

## 2017-03-07 NOTE — PATIENT INSTRUCTIONS
Apply triamcinolone cream to your right lower leg twice daily.    Apply ketoconazole cream to your feet daily.    Apply compression hose first thing in the morning and remove at bedtime.

## 2017-03-21 ENCOUNTER — OFFICE VISIT (OUTPATIENT)
Dept: ENDOCRINOLOGY | Facility: CLINIC | Age: 72
End: 2017-03-21
Payer: MEDICARE

## 2017-03-21 VITALS
WEIGHT: 289 LBS | BODY MASS INDEX: 41.37 KG/M2 | DIASTOLIC BLOOD PRESSURE: 69 MMHG | SYSTOLIC BLOOD PRESSURE: 122 MMHG | HEIGHT: 70 IN | HEART RATE: 62 BPM

## 2017-03-21 DIAGNOSIS — E78.5 DYSLIPIDEMIA ASSOCIATED WITH TYPE 2 DIABETES MELLITUS: ICD-10-CM

## 2017-03-21 DIAGNOSIS — Z79.4 TYPE 2 DIABETES MELLITUS WITH DIABETIC POLYNEUROPATHY, WITH LONG-TERM CURRENT USE OF INSULIN: ICD-10-CM

## 2017-03-21 DIAGNOSIS — E11.69 DYSLIPIDEMIA ASSOCIATED WITH TYPE 2 DIABETES MELLITUS: ICD-10-CM

## 2017-03-21 DIAGNOSIS — E11.22 TYPE 2 DIABETES MELLITUS WITH STAGE 3 CHRONIC KIDNEY DISEASE, WITH LONG-TERM CURRENT USE OF INSULIN: Primary | ICD-10-CM

## 2017-03-21 DIAGNOSIS — E66.01 MORBID OBESITY WITH BMI OF 40.0-44.9, ADULT: ICD-10-CM

## 2017-03-21 DIAGNOSIS — G47.30 SLEEP APNEA, UNSPECIFIED TYPE: ICD-10-CM

## 2017-03-21 DIAGNOSIS — I10 ESSENTIAL HYPERTENSION: ICD-10-CM

## 2017-03-21 DIAGNOSIS — E16.2 HYPOGLYCEMIA: ICD-10-CM

## 2017-03-21 DIAGNOSIS — E11.42 TYPE 2 DIABETES MELLITUS WITH DIABETIC POLYNEUROPATHY, WITH LONG-TERM CURRENT USE OF INSULIN: ICD-10-CM

## 2017-03-21 DIAGNOSIS — R60.9 EDEMA, UNSPECIFIED TYPE: ICD-10-CM

## 2017-03-21 DIAGNOSIS — I48.20 CHRONIC ATRIAL FIBRILLATION: ICD-10-CM

## 2017-03-21 DIAGNOSIS — Z79.4 TYPE 2 DIABETES MELLITUS WITH STAGE 3 CHRONIC KIDNEY DISEASE, WITH LONG-TERM CURRENT USE OF INSULIN: Primary | ICD-10-CM

## 2017-03-21 DIAGNOSIS — N18.30 TYPE 2 DIABETES MELLITUS WITH STAGE 3 CHRONIC KIDNEY DISEASE, WITH LONG-TERM CURRENT USE OF INSULIN: Primary | ICD-10-CM

## 2017-03-21 DIAGNOSIS — E11.42 DIABETIC POLYNEUROPATHY ASSOCIATED WITH TYPE 2 DIABETES MELLITUS: ICD-10-CM

## 2017-03-21 PROCEDURE — 1157F ADVNC CARE PLAN IN RCRD: CPT | Mod: S$GLB,,, | Performed by: NURSE PRACTITIONER

## 2017-03-21 PROCEDURE — 1159F MED LIST DOCD IN RCRD: CPT | Mod: S$GLB,,, | Performed by: NURSE PRACTITIONER

## 2017-03-21 PROCEDURE — 1126F AMNT PAIN NOTED NONE PRSNT: CPT | Mod: S$GLB,,, | Performed by: NURSE PRACTITIONER

## 2017-03-21 PROCEDURE — 99214 OFFICE O/P EST MOD 30 MIN: CPT | Mod: S$GLB,,, | Performed by: NURSE PRACTITIONER

## 2017-03-21 PROCEDURE — 1160F RVW MEDS BY RX/DR IN RCRD: CPT | Mod: S$GLB,,, | Performed by: NURSE PRACTITIONER

## 2017-03-21 PROCEDURE — 99999 PR PBB SHADOW E&M-EST. PATIENT-LVL V: CPT | Mod: PBBFAC,,, | Performed by: NURSE PRACTITIONER

## 2017-03-21 PROCEDURE — 3044F HG A1C LEVEL LT 7.0%: CPT | Mod: S$GLB,,, | Performed by: NURSE PRACTITIONER

## 2017-03-21 PROCEDURE — 3078F DIAST BP <80 MM HG: CPT | Mod: S$GLB,,, | Performed by: NURSE PRACTITIONER

## 2017-03-21 PROCEDURE — 3066F NEPHROPATHY DOC TX: CPT | Mod: S$GLB,,, | Performed by: NURSE PRACTITIONER

## 2017-03-21 PROCEDURE — 3074F SYST BP LT 130 MM HG: CPT | Mod: S$GLB,,, | Performed by: NURSE PRACTITIONER

## 2017-03-21 RX ORDER — INSULIN ASPART 100 [IU]/ML
INJECTION, SOLUTION INTRAVENOUS; SUBCUTANEOUS
Qty: 5 BOX | Refills: 3
Start: 2017-03-21 | End: 2017-04-25 | Stop reason: SDUPTHER

## 2017-03-21 RX ORDER — INSULIN GLARGINE 100 [IU]/ML
40 INJECTION, SOLUTION SUBCUTANEOUS NIGHTLY
Qty: 2 BOX | Refills: 11
Start: 2017-03-21 | End: 2017-11-16 | Stop reason: SDUPTHER

## 2017-03-21 NOTE — PROGRESS NOTES
"CC: The primary encounter diagnosis was Type 2 diabetes mellitus with stage 3 chronic kidney disease, with long-term current use of insulin. Diagnoses listed in the visit diagnosis were also pertinent to this visit.     HPI: Mr. Stephen Cantor Jr. was diagnosed with Type 2 DM " a long time ago".  He had been told by his doctor that he was borderline for 35-40 years. Started on metformin "around 2000". Had diarrhea with metformin. Has been on insulin for years. Denies hospitalizations due to DM.     At his last visit his insulin doses were adjusted r/t hypoglycemia. He had a right knee replacement in January 2017 and is was treated for a right lower extremity wound that developed.     No logs, but reports his BG have been under 120-130.     Reports one episode of hypoglycemia with a reading of 58 that occurred in the evening. Treated with pizza and a regular coke. Experienced tremors and hunger. Reports this was one episode since his last visit.     Doesn't miss any insulin doses.     Good appetite. Reports he has increased physical activity since his last knee replacement.     CURRENT DIABETIC MEDS: Lantus 45 units QHS, Novolog 15/18/18 units AC plus correction scale, goal 150, ISF 25.     Last Podiatry Exam: 6/2016 and is seen every 3 months    REVIEW OF SYSTEMS  Constitutional: no current fatigue or weakness; 14 lb weight loss since last visit.   Eyes: occasional blurry vision; (+) glaucoma; right eye redness;  last eye exam: July 2015 and then had f/u for ectropion repair in November 2016.   Cardiac: no palpitations, murmurs, or chest pain.   Respiratory: denies current dyspnea, but does admit to occasional dyspnea with exertion.   GI: no c/o abdominal pain, nausea, or bowel pattern changes  Skin: no rashes; decrease in bruising at insulin injection sites after changing to smaller needles. States he does rotate sites.   Neuro: numbness, tingling in bilateral feet; no dizziness;  last Podiatry exam: March " "2017    Vital Signs  /69  Pulse 62  Ht 5' 10" (1.778 m)  Wt 131.1 kg (289 lb)  BMI 41.47 kg/m2    Hemoglobin A1C   Date Value Ref Range Status   03/06/2017 6.2 4.5 - 6.2 % Final     Comment:     According to ADA guidelines, hemoglobin A1C <7.0% represents  optimal control in non-pregnant diabetic patients.  Different  metrics may apply to specific populations.   Standards of Medical Care in Diabetes - 2016.  For the purpose of screening for the presence of diabetes:  <5.7%     Consistent with the absence of diabetes  5.7-6.4%  Consistent with increasing risk for diabetes   (prediabetes)  >or=6.5%  Consistent with diabetes  Currently no consensus exists for use of hemoglobin A1C  for diagnosis of diabetes for children.     01/25/2017 6.1 4.5 - 6.2 % Final     Comment:     According to ADA guidelines, hemoglobin A1C <7.0% represents  optimal control in non-pregnant diabetic patients.  Different  metrics may apply to specific populations.   Standards of Medical Care in Diabetes - 2016.  For the purpose of screening for the presence of diabetes:  <5.7%     Consistent with the absence of diabetes  5.7-6.4%  Consistent with increasing risk for diabetes   (prediabetes)  >or=6.5%  Consistent with diabetes  Currently no consensus exists for use of hemoglobin A1C  for diagnosis of diabetes for children.     12/06/2016 5.9 4.5 - 6.2 % Final     Comment:     According to ADA guidelines, hemoglobin A1C <7.0% represents  optimal control in non-pregnant diabetic patients.  Different  metrics may apply to specific populations.   Standards of Medical Care in Diabetes - 2016.  For the purpose of screening for the presence of diabetes:  <5.7%     Consistent with the absence of diabetes  5.7-6.4%  Consistent with increasing risk for diabetes   (prediabetes)  >or=6.5%  Consistent with diabetes  Currently no consensus exists for use of hemoglobin A1C  for diagnosis of diabetes for children.         Chemistry        Component " Value Date/Time     02/03/2017 1136    K 4.7 02/03/2017 1136     02/03/2017 1136    CO2 24 02/03/2017 1136    BUN 38 (H) 02/03/2017 1136    CREATININE 1.8 (H) 02/03/2017 1136     (H) 02/03/2017 1136        Component Value Date/Time    CALCIUM 9.8 02/03/2017 1136    ALKPHOS 32 (L) 04/25/2016 0815    AST 21 04/25/2016 0815    ALT 16 04/25/2016 0815    BILITOT 0.7 04/25/2016 0815          Lab Results   Component Value Date    CHOL 133 03/06/2017    CHOL 170 03/23/2016    CHOL 181 01/07/2016     Lab Results   Component Value Date    HDL 28 (L) 03/06/2017    HDL 29 (L) 03/23/2016    HDL 28 (L) 01/07/2016     Lab Results   Component Value Date    LDLCALC 79.0 03/06/2017    LDLCALC 84.0 03/23/2016    LDLCALC 104.6 01/07/2016     Lab Results   Component Value Date    TRIG 130 03/06/2017    TRIG 285 (H) 03/23/2016    TRIG 242 (H) 01/07/2016     Lab Results   Component Value Date    CHOLHDL 21.1 03/06/2017    CHOLHDL 17.1 (L) 03/23/2016    CHOLHDL 15.5 (L) 01/07/2016     Lab Results   Component Value Date    TSH 1.847 11/07/2016     PHYSICAL EXAMINATION  Constitutional: Appears well, no distress, morbidly obese  EENT: Right eye chronic scleral redness; external ears no masses; nasal mucosa pink with septum midline; oral mucosa pink.  Neck: Supple, trachea midline; no thyromegaly.   Respiratory: CTA, even and unlabored.  Cardiovascular: RRR, S1 and S2 with no murmurs or carotid bruits. ble edema observed (2+).   Lymph: no cervical or supraclavicular lymphadenopathy;  Skin: warm and dry; no rash, insulin injection site reactions, or acanthosis nigracans observed.   Neuro: steady gait with use of rolling walker  Feet: appropriate footwear; last Podiatry exam: 3/2017    Assessment/Plan  1. Type 2 diabetes mellitus with stage 3 chronic kidney disease, with long-term current use of insulin  DM stable.     Decrease Lantus to 40 units QHS, Novolog 13/15/15 plus correction scale 25 ISF, goal 150.     Monitor BG  4x/day.Logs to next visit.     Notify me for any glycemic issues.    2. Type 2 diabetes mellitus with diabetic polyneuropathy, with long-term current use of insulin  On Gabapentin. Podiatry every 3 months.    3. Dyslipidemia associated with type 2 diabetes mellitus  Continue Tricor, Lovaza, and Pravastatin.    4. Chronic atrial fibrillation  Can worsen insulin resistance. Controlled at this time.   5. Essential hypertension  Continue Losartan, Furosemide, and Metoprolol.   6. Morbid obesity with BMI of 40.0-44.9, adult  Can worsen insulin resistance. Weight loss can help this. Body mass index is 41.47 kg/(m^2). 14 lb weight loss since last visit.    7. Sleep apnea, unspecified type  Sleeps with c-pap.    8.  Edema On Lasix.    9.  Hypoglycemia Insulin doses adjusted. Discussed treatment.      FOLLOW UP  Return in about 3 months (around 6/21/2017).      Orders Placed This Encounter   Procedures    Hemoglobin A1c

## 2017-03-21 NOTE — MR AVS SNAPSHOT
[facility] - Endocrinology  [address] [address]  Phone: [phone]                  [facility] PAT Cantor    3/21/2017 10:00 AM   Office Visit    Description:  Male : 1945   Provider:  CODY Daily,ANP-C   Department:  [facility] - Endocrinology           Reason for Visit     Diabetes Mellitus           Diagnoses this Visit        Comments    Type 2 diabetes mellitus with stage 3 chronic kidney disease, with long-term current use of insulin    -  Primary     Type 2 diabetes mellitus with diabetic polyneuropathy, with long-term current use of insulin         Dyslipidemia associated with type 2 diabetes mellitus         Chronic atrial fibrillation         Essential hypertension         Morbid obesity with BMI of 40.0-44.9, adult         Sleep apnea, unspecified type         Edema, unspecified type         Ulcer of right lower leg, with unspecified severity                To Do List           Future Appointments        Provider Department Dept Phone    2017 8:45 AM CHERELLE, UROLOGY [facility] [phone]    2017 10:00 AM Jose Armando Knight MD [facility] - Orthopedics [phone]    2017 11:00 AM SPECIMEN, MAIN CAMPUS [facility] [phone]    2017 11:20 AM LAB, APPOINTMENT [place] [facility] [phone]    2017 10:30 AM Ruperto Moraes MD [facility] - Nephrology [phone]      Goals (5 Years of Data)     None      Follow-Up and Disposition     Return in about 3 months (around 2017).      [facility]     [facility] Nurse Care [facility] -  Assistance  Registered nurses in the [facility] provide clinical advisement, health education, appointment booking, and other advisory services.  Call for this free service at [phone].             Medications           Message regarding Medications     Verify the changes and/or additions to your medication regime  "listed below are the same as discussed with your clinician today.  If any of these changes or additions are incorrect, please notify your healthcare provider.             Verify that the below list of medications is an accurate representation of the medications you are currently taking.  If none reported, the list may be blank. If incorrect, please contact your healthcare provider. Carry this list with you in case of emergency.           Current Medications     apixaban 5 mg Tab Take 1 tablet (5 mg total) by mouth 2 (two) times daily.    ascorbic acid (VITAMIN C) 100 MG tablet Take 1 tablet by mouth every morning.     aspirin 325 MG tablet Take 1 tablet by mouth every morning.     azelastine (ASTELIN) 137 mcg (0.1 %) nasal spray 1 spray (137 mcg total) by Nasal route 2 (two) times daily.    BD INSULIN PEN NEEDLE UF ORIG 29 x 1/2 " Ndle     blood-glucose meter kit Use as instructed, True Result meter    CINNAMON BARK (CINNAMON ORAL) Take by mouth once daily.    clobetasol (TEMOVATE) 0.05 % cream AAA finger bid    clotrimazole-betamethasone 1-0.05% (LOTRISONE) cream Apply topically 2 (two) times daily. Apply twice a day to affected area for 1 week, wait a week, then repeat x 1 week    desonide (DESOWEN) 0.05 % lotion AAA bid prn redness, scaling, or itching    desoximetasone (TOPICORT) 0.25 % cream 0.25 %.  Cream Topical .  AAA bid back    docusate sodium (COLACE) 100 MG capsule Take 1 capsule (100 mg total) by mouth 2 (two) times daily. Available OTC as well    fenofibrate (TRICOR) 145 MG tablet TAKE ONE TABLET BY MOUTH EVERY DAY    fluticasone (FLONASE) 50 mcg/actuation nasal spray 1 spray by Each Nare route nightly as needed.    furosemide (LASIX) 40 MG tablet Take 1 tablet (40 mg total) by mouth daily as needed.    gabapentin (NEURONTIN) 300 MG capsule Take 1 capsule (300 mg total) by mouth 3 (three) times daily.    insulin aspart (NOVOLOG FLEXPEN) 100 unit/mL InPn pen 15-18-18 units with each meal, plus " "correction scale. TDD 81 units    insulin glargine (LANTUS SOLOSTAR) 100 unit/mL (3 mL) InPn pen Inject 45 Units into the skin every evening.    ketoconazole (NIZORAL) 2 % cream Apply topically once daily.    lancets 33 gauge Misc 1 lancet by Misc.(Non-Drug; Combo Route) route 4 (four) times daily. Pt uses True Result Meter, bg monitoring 4 times a day.    levothyroxine (SYNTHROID) 25 MCG tablet Take 1 tablet (25 mcg total) by mouth once daily.    losartan (COZAAR) 50 MG tablet TAKE ONE TABLET BY MOUTH EVERY DAY    metoprolol succinate (TOPROL-XL) 100 MG 24 hr tablet Take 1 tablet (100 mg total) by mouth once daily.    metronidazole (METROGEL) 0.75 % gel Apply topically 2 (two) times daily.    multivitamin capsule Take 1 capsule by mouth every morning.     mupirocin (BACTROBAN) 2 % ointment Apply topically 2 (two) times daily. Apply to wound twice daily as directed.    nystatin-triamcinolone (MYCOLOG II) cream apply TWICE DAILY    omega-3 acid ethyl esters (LOVAZA) 1 gram capsule TAKE THREE CAPSULE BY MOUTH TWICE DAILY    ondansetron (ZOFRAN) 8 MG tablet Take 1 tablet (8 mg total) by mouth every 12 (twelve) hours as needed for Nausea.    oxybutynin (DITROPAN-XL) 5 MG TR24 Take 1 tablet (5 mg total) by mouth once daily.    oxycodone-acetaminophen (PERCOCET)  mg per tablet Take 1 tablet by mouth every 4 (four) hours as needed for Pain.    pen needle, diabetic (BD ULTRA-FINE BASIL PEN NEEDLES) 32 gauge x 5/32" Ndle Pt check blood sugar 4 times a day    pravastatin (PRAVACHOL) 10 MG tablet Take 1 tablet (10 mg total) by mouth once daily.    tamsulosin (FLOMAX) 0.4 mg Cp24 Take 1 capsule (0.4 mg total) by mouth once daily.    triamcinolone acetonide 0.1% (KENALOG) 0.1 % cream Apply topically 2 (two) times daily. Apply to affected area twice daily as directed.    TRUETEST TEST STRIPS Strp 1 strip by Misc.(Non-Drug; Combo Route) route 4 (four) times daily.    vitamin E 1000 UNIT capsule Take 1,000 Units by mouth every " "morning. 1 Capsule Oral Every day           Clinical Reference Information           Your Vitals Were     BP Pulse Height Weight BMI    122/69 62 5' 10" (1.778 m) 131.1 kg (289 lb) 41.47 kg/m2      Blood Pressure          Most Recent Value    BP  122/69      Allergies as of 3/21/2017     Ciprofloxacin    Niacin    Terbinafine      Immunizations Administered on Date of Encounter - 3/21/2017     None      Orders Placed During Today's Visit     Future Labs/Procedures Expected by Expires    Hemoglobin A1c  3/21/2017 3/21/2018      Language Assistance Services     ATTENTION: Language assistance services are available, free of charge. Please call [phone].      ATENCIÓN: Si habla español, tiene a mancilla disposición servicios gratuitos de asistencia lingüística. Llame al [phone].     ALEJANDRO Ý: N?u b?n nói Ti?ng Vi?t, có các d?ch v? h? tr? ngôn ng? mi?n phí dành cho b?n. G?i s? [phone].         [facility] complies with applicable Federal civil rights laws and does not discriminate on the basis of race, color, national origin, age, disability, or sex.        "

## 2017-03-28 ENCOUNTER — TELEPHONE (OUTPATIENT)
Dept: ORTHOPEDICS | Facility: CLINIC | Age: 72
End: 2017-03-28

## 2017-03-28 NOTE — TELEPHONE ENCOUNTER
----- Message from Marisela Castillo LPN sent at 3/28/2017  3:15 PM CDT -----  Contact: [email or web address]      ----- Message -----     From: Lorrie Wagoner     Sent: 3/28/2017   1:39 PM       To: Angie Delgado Staff    Patient states he had questions for the doctor please all patient.

## 2017-03-28 NOTE — TELEPHONE ENCOUNTER
Returned call to pt. He reports that he was having pain in his hip at his last appt and that Dr Knight offered him a cortisone injection in his hip. He is concerned that he won't have any therapy visits left if they are needed for his hip, so he is going to stop his PT for now.

## 2017-04-05 ENCOUNTER — HOSPITAL ENCOUNTER (OUTPATIENT)
Dept: UROLOGY | Facility: HOSPITAL | Age: 72
Discharge: HOME | End: 2017-04-05
Attending: UROLOGY
Payer: MEDICARE

## 2017-04-05 ENCOUNTER — TELEPHONE (OUTPATIENT)
Dept: UROLOGY | Facility: HOSPITAL | Age: 72
End: 2017-04-05

## 2017-04-05 VITALS
HEART RATE: 98 BPM | WEIGHT: 298 LBS | BODY MASS INDEX: 42.66 KG/M2 | SYSTOLIC BLOOD PRESSURE: 149 MMHG | RESPIRATION RATE: 20 BRPM | DIASTOLIC BLOOD PRESSURE: 87 MMHG | TEMPERATURE: 98 F | HEIGHT: 70 IN

## 2017-04-05 DIAGNOSIS — R31.29 MICROHEMATURIA: ICD-10-CM

## 2017-04-05 PROCEDURE — 52000 CYSTOURETHROSCOPY: CPT | Mod: ,,, | Performed by: UROLOGY

## 2017-04-05 PROCEDURE — 52000 CYSTOURETHROSCOPY: CPT

## 2017-04-05 RX ORDER — DOXYCYCLINE HYCLATE 100 MG
100 TABLET ORAL
Status: COMPLETED | OUTPATIENT
Start: 2017-04-05 | End: 2017-04-05

## 2017-04-05 RX ORDER — LIDOCAINE HYDROCHLORIDE 20 MG/ML
10 JELLY TOPICAL
Status: COMPLETED | OUTPATIENT
Start: 2017-04-05 | End: 2017-04-05

## 2017-04-05 RX ADMIN — LIDOCAINE HYDROCHLORIDE 10 ML: 20 JELLY TOPICAL at 08:04

## 2017-04-05 RX ADMIN — Medication 100 MG: at 08:04

## 2017-04-05 NOTE — H&P
Patient ID: Stephen Cantor Jr. is a 71 y.o. male.     Chief Complaint: Cyst (ref by kidney doctor)     HPI Comments: Stephen Cantor Jr. is a 71 y.o. Male referred by Dr. Moraes for parapelvic renal cyst and blood in the urine.  Ultrasound 2017 and 2015 showed this cyst. Cyst is 1cm in size.   Takes Lasix and has urinary frequency every 15-30 minutes for 4-5 hours.  Has urinary frequency every hours evening time.  Nocturia 3-4 times, but in last month only once.   Has sleep apnea. Has been wearing cpap in last month, but not last 3-4 days. Doesn't wear it when he has a cold.   No intermittency. No hesitancy. Has some urgency. Rare urge incontinence.   No gross hematuria.   History of tobacco 30 years ago. Smoked for 30 years. 1.5ppd.   Cysto 2010 with Dr. Overton - had BPH and obstruction noted.  CT 2010 showed left renal stone.     Lab Results  Component Value Date   PSA 1.7 09/17/2015   PSA 1.5 06/23/2014   PSA 1.45 10/05/2012   PSA 1.6 11/14/2011   PSA 1.1 07/12/2010   PSA 0.84 01/16/2009   PSA 0.6 02/29/2008   PSA 0.8 02/14/2007   PSA 4.0 02/21/2006      No family hx of prostate cancer.            Cyst   Pertinent negatives include no chest pain, chills, fever, joint swelling or rash.                Past Surgical History   Procedure Laterality Date    Appendectomy        Joint replacement           Knee    Meniere's disease surgery        Total knee arthroplasty        Tonsillectomy        Cardioversion        Colonoscopy N/A 3/3/2016       Procedure: COLONOSCOPY; Surgeon: Bob Poe MD; Location: [facility] ([address] [place] [address]); Service: Endoscopy; Laterality: N/A; Holding Eliquis for 3 days prior to colonoscopy. EC               Past Medical History   Diagnosis Date    *Atrial fibrillation         On Coumadin    Anticoagulant long-term use      Basal cell carcinoma 12/2015       left eye brow    BCC (basal cell carcinoma) 12/2015       left shoulder    Cataract      Chronic kidney disease      Diabetes  mellitus with renal manifestations, uncontrolled      Dyslipidemia associated with type 2 diabetes mellitus      Fever blister      HTN (hypertension)      Keloid cicatrix      Melanoma 12/07/2015       right cheek-in situ    Obesity      PN (peripheral neuropathy)      Screening for colon cancer 3/3/2016    Sleep apnea      Type II or unspecified type diabetes mellitus with other specified manifestations, uncontrolled            Social History    Social History            Social History    Marital status:        Spouse name: N/A    Number of children: 3    Years of education: N/A           Occupational History    retired Retired             Social History Main Topics    Smoking status: Former Smoker       Quit date: 7/10/1985    Smokeless tobacco: Never Used    Alcohol use No         Comment: quit 2007- had excess in the past    Drug use: No    Sexual activity: Not on file           Other Topics Concern    Not on file      Social History Narrative                   Family History   Problem Relation Age of Onset    Diabetes Mother      Stroke Mother      Diabetes Father      Heart disease Father      Hypertension Father      Cancer Sister         brain    Eczema Sister      Cancer Brother         brain    Cancer Sister         leukemia, stomach cancer    No Known Problems Sister      ALS Brother         stomach cancer    No Known Problems Brother      No Known Problems Maternal Aunt      No Known Problems Maternal Uncle      No Known Problems Paternal Aunt      No Known Problems Paternal Uncle      No Known Problems Maternal Grandmother      No Known Problems Maternal Grandfather      No Known Problems Paternal Grandmother      No Known Problems Paternal Grandfather      Amblyopia Neg Hx      Blindness Neg Hx      Cataracts Neg Hx      Glaucoma Neg Hx      Macular degeneration Neg Hx      Retinal detachment Neg Hx      Strabismus Neg Hx      Thyroid disease Neg  "Hx      Melanoma Neg Hx      Psoriasis Neg Hx      Lupus Neg Hx      Acne Neg Hx            Current Medications           Current Outpatient Prescriptions   Medication Sig Dispense Refill    apixaban 5 mg Tab Take 1 tablet (5 mg total) by mouth 2 (two) times daily. 180 tablet 3    ascorbic acid (VITAMIN C) 100 MG tablet Take 1 tablet by mouth every morning.         aspirin (ECOTRIN) 81 MG EC tablet Take 81 mg by mouth once daily.        aspirin 325 MG tablet Take 1 tablet by mouth every morning.         azelastine (ASTELIN) 137 mcg (0.1 %) nasal spray 1 spray (137 mcg total) by Nasal route 2 (two) times daily. 30 mL 3    BD INSULIN PEN NEEDLE UF ORIG 29 x 1/2 " Ndle     12    blood-glucose meter kit Use as instructed, True Result meter 1 each 1    CINNAMON BARK (CINNAMON ORAL) Take by mouth once daily.        clobetasol (TEMOVATE) 0.05 % cream AAA finger bid 60 g 3    desonide (DESOWEN) 0.05 % lotion AAA bid prn redness, scaling, or itching 1 Bottle 3    desoximetasone (TOPICORT) 0.25 % cream 0.25 %. Cream Topical . AAA bid back        fenofibrate (TRICOR) 145 MG tablet TAKE ONE TABLET BY MOUTH DAILY (Patient taking differently: TAKE ONE TABLET BY MOUTH DAILY, morning) 90 tablet 3    fluticasone (FLONASE) 50 mcg/actuation nasal spray 1 spray by Each Nare route nightly as needed. 16 g 5    furosemide (LASIX) 40 MG tablet Take 1 tablet (40 mg total) by mouth daily as needed. 90 tablet 3    gabapentin (NEURONTIN) 300 MG capsule Take 1 capsule (300 mg total) by mouth 3 (three) times daily. 90 capsule 11    insulin aspart (NOVOLOG FLEXPEN) 100 unit/mL InPn pen 15-18-18 units with each meal, plus correction scale. TDD 81 units 5 Box 3    insulin glargine (LANTUS SOLOSTAR) 100 unit/mL (3 mL) InPn pen Inject 45 Units into the skin every evening. 2 Box 11    ketoconazole (NIZORAL) 2 % cream Apply topically once daily. 30 g 1    lancets 33 gauge Misc 1 lancet by Misc.(Non-Drug; Combo Route) route 4 " "(four) times daily. Pt uses True Result Meter, bg monitoring 4 times a day. 450 each 4    levothyroxine (SYNTHROID) 25 MCG tablet Take 1 tablet (25 mcg total) by mouth once daily. 90 tablet 1    losartan (COZAAR) 50 MG tablet Take 1 tablet (50 mg total) by mouth once daily. (Patient taking differently: Take 50 mg by mouth every evening. ) 30 tablet 11    metoprolol succinate (TOPROL-XL) 100 MG 24 hr tablet Take 1 tablet (100 mg total) by mouth once daily. 90 tablet 3    metronidazole (METROGEL) 0.75 % gel Apply topically 2 (two) times daily. 1 Tube 6    multivitamin capsule Take 1 capsule by mouth every morning.         mupirocin (BACTROBAN) 2 % ointment Apply topically 2 (two) times daily. Apply to wound twice daily as directed. 30 g 0    nystatin-triamcinolone (MYCOLOG II) cream apply TWICE DAILY (Patient taking differently: apply TWICE DAILY-using as needed for rash) 60 g 1    omega-3 acid ethyl esters (LOVAZA) 1 gram capsule TAKE THREE CAPSULE BY MOUTH TWICE DAILY 540 capsule 3    pen needle, diabetic (BD ULTRA-FINE BASIL PEN NEEDLES) 32 gauge x 5/32" Ndle Pt check blood sugar 4 times a day 150 each 12    pravastatin (PRAVACHOL) 10 MG tablet Take 1 tablet (10 mg total) by mouth once daily. 90 tablet 3    tamsulosin (FLOMAX) 0.4 mg Cp24 Take 1 capsule (0.4 mg total) by mouth once daily. 30 capsule 11    TRUETEST TEST STRIPS Strp 1 strip by Misc.(Non-Drug; Combo Route) route 4 (four) times daily. 450 strip 4    vitamin E 1000 UNIT capsule Take 1,000 Units by mouth every morning. 1 Capsule Oral Every day          No current facility-administered medications for this visit.                   Review of patient's allergies indicates:   Allergen Reactions    Ciprofloxacin Rash    Niacin Itching       Other reaction(s): facial flushing    Terbinafine Other (See Comments)       Other reaction(s): Hepatitis         Review of Systems   Constitutional: Negative for chills, fever and unexpected weight change. "   HENT: Positive for hearing loss. Negative for nosebleeds.   Eyes: Positive for visual disturbance.   Has cataracts.   Respiratory: Negative for chest tightness.   Cardiovascular: Negative for chest pain.   Gastrointestinal: Negative for diarrhea.   Genitourinary: Positive for frequency, nocturia and urgency. Negative for dysuria, enuresis and hematuria.   Musculoskeletal: Negative for joint swelling.   Skin: Negative for rash.   Neurological: Negative for seizures.   Hematological: Does not bruise/bleed easily.   Psychiatric/Behavioral: Negative for behavioral problems.       Objective:      Physical Exam   Constitutional: He is oriented to person, place, and time. He appears well-developed and well-nourished.   HENT:   Head: Normocephalic and atraumatic.   Eyes: No scleral icterus.   Neck: Neck supple.   Cardiovascular: Normal rate and regular rhythm.   Pulmonary/Chest: Effort normal. No respiratory distress.   Abdominal: He exhibits no mass. Hernia confirmed negative in the right inguinal area and confirmed negative in the left inguinal area.   Genitourinary: Testes normal. Phimosis present.   Genitourinary Comments: Some mild erythema to glans.  Prostate was smooth without nodularity. No rectal masses. 40 grams.  No external hemorrhoids.   Normal perineum.    Musculoskeletal: He exhibits no tenderness.   Lymphadenopathy:   He has no cervical adenopathy. No inguinal adenopathy noted on the right or left side.   Neurological: He is alert and oriented to person, place, and time.   Skin: Skin is warm. No rash noted.     Psychiatric: He has a normal mood and affect.     urine dip clear.   A post void residual bladder scan was performed immediately after voiding. The patient's PVR was noted to be 58cc.     Assessment:       1. Type 2 diabetes mellitus with stage 3 chronic kidney disease, with long-term current use of insulin    2. Morbid obesity with BMI of 40.0-44.9, adult    3. Chronic kidney disease, stage III  (moderate)    4. Acquired cyst of kidney    5. Benign hypertensive renal disease without renal failure    6. Chronic atrial fibrillation    7. Calculus of kidney    8. Urinary frequency    9. Phimosis/adherent prepuce    10. Microhematuria    11. BPH (benign prostatic hypertrophy) with urinary obstruction        Plan:     oxybutynin XR 5mg trial for frequency.  lotrisome for phimosis.  Cystoscopy to evaluate for hematuria, micro. Last UA was negative.   Continue flomax.  Renal cyst is benign. No further follow up warranted.

## 2017-04-05 NOTE — IP AVS SNAPSHOT
[facility]  [address] [facility] [address]  Phone: [phone]  Fax: [phone]                  [facility] Cant[facility] JrJorge   2017  8:45 AM   Cystoscopy    Description:  Male : 1945   Provider:  TANNER VILLARREAL   Department:  [facility]           Visit Information     Date & Time Provider Department    2017  8:45 AM TANNER VILLARREAL [facility]      Recent Lab Values        2016 2016 12/15/2016 1/10/2017                  8:40 AM  8:13 AM  9:09 AM  3:25 PM        Urine Culture - - No significant growth -        Color Yellow Yellow Yellow -        Specific Gravity 1.015 1.010 1.010 1.015        pH 8.0 7.0 6.0 5        Leukocytes - - - n        Blood - - - n        Nitrite Negative Negative Negative n        Ketones Negative Negative Negative n        Bilirubin - - - n        Urobilinogen Negative Negative Negative n        Protein - - - n        Glucose - - - n                 Reason for Visit     Hematuria           Diagnoses this Visit        Comments    Microhematuria                To Do List           Your Scheduled Appointments     2017  8:45 AM CDT   Cystoscopy with TANNER VILLARREAL   [facility] ([facility])    [address] [facility] [address] [phone]            2017 10:00 AM CDT   Post OP with Jose Armando Knight MD   [facility] ([facility] )    [address]  [place] [address] [phone]            May 12, 2017 11:00 AM CDT   Urine with SPECIMEN, MAIN CAMPUS [facility] ([facility])    [address] [facility] [address] [phone]            May 12, 2017 11:20 AM CDT   Non-Fasting Lab with LAB, APPOINTMENT [place] [facility] ([facility])    [address] [address]  "  [phone]            May 30, 2017 10:30 AM CDT   Established Patient Visit with MD Dae Correa - Nephrology ([facility] )    [address]  [facility] [address] [phone]              Goals (5 Years of Data)     None           Medications                ** Verify the list of medication(s) below is accurate and up to date. Carry this with you in case of emergency. If your medications have changed, please notify your healthcare provider.             Medication List      TAKE these medications        Additional Info                      apixaban 5 mg Tab   Quantity:  180 tablet   Refills:  3   Dose:  5 mg    Instructions:  Take 1 tablet (5 mg total) by mouth 2 (two) times daily.     Begin Date    AM    Noon    PM    Bedtime       aspirin 325 MG tablet   Refills:  0   Dose:  1 tablet    Instructions:  Take 1 tablet by mouth every morning.     Begin Date    AM    Noon    PM    Bedtime       azelastine 137 mcg (0.1 %) nasal spray   Commonly known as:  ASTELIN   Quantity:  30 mL   Refills:  3   Dose:  1 spray    Instructions:  1 spray (137 mcg total) by Nasal route 2 (two) times daily.     Begin Date    AM    Noon    PM    Bedtime       * BD INSULIN PEN NEEDLE UF ORIG 29 gauge x 1/2" Ndle   Refills:  12   Generic drug:  pen needle, diabetic      Begin Date    AM    Noon    PM    Bedtime       * pen needle, diabetic 32 gauge x 5/32" Ndle   Commonly known as:  BD ULTRA-FINE BASIL PEN NEEDLES   Quantity:  150 each   Refills:  12    Instructions:  Pt check blood sugar 4 times a day     Begin Date    AM    Noon    PM    Bedtime       blood-glucose meter kit   Quantity:  1 each   Refills:  1    Instructions:  Use as instructed, True Result meter     Begin Date    AM    Noon    PM    Bedtime       CINNAMON ORAL   Refills:  0    Instructions:  Take by mouth once daily.     Begin Date    AM    Noon    PM    Bedtime       clobetasol 0.05 % cream   Commonly known as:  TEMOVATE "   Quantity:  60 g   Refills:  3    Instructions:  AAA finger bid     Begin Date    AM    Noon    PM    Bedtime       clotrimazole-betamethasone 1-0.05% cream   Commonly known as:  LOTRISONE   Quantity:  45 g   Refills:  1    Instructions:  Apply topically 2 (two) times daily. Apply twice a day to affected area for 1 week, wait a week, then repeat x 1 week     Begin Date    AM    Noon    PM    Bedtime       desonide 0.05 % lotion   Commonly known as:  DESOWEN   Quantity:  1 Bottle   Refills:  3    Instructions:  AAA bid prn redness, scaling, or itching     Begin Date    AM    Noon    PM    Bedtime       docusate sodium 100 MG capsule   Commonly known as:  COLACE   Quantity:  60 capsule   Refills:  0   Dose:  100 mg    Instructions:  Take 1 capsule (100 mg total) by mouth 2 (two) times daily. Available OTC as well     Begin Date    AM    Noon    PM    Bedtime       fenofibrate 145 MG tablet   Commonly known as:  TRICOR   Quantity:  90 tablet   Refills:  2   Comments:  This prescription was filled today(3/3/2017). Any refills authorized will be placed on file.    Instructions:  TAKE ONE TABLET BY MOUTH EVERY DAY     Begin Date    AM    Noon    PM    Bedtime       fluticasone 50 mcg/actuation nasal spray   Commonly known as:  FLONASE   Quantity:  16 g   Refills:  5   Dose:  1 spray    Instructions:  1 spray by Each Nare route nightly as needed.     Begin Date    AM    Noon    PM    Bedtime       furosemide 40 MG tablet   Commonly known as:  LASIX   Quantity:  90 tablet   Refills:  3   Dose:  40 mg    Instructions:  Take 1 tablet (40 mg total) by mouth daily as needed.     Begin Date    AM    Noon    PM    Bedtime       gabapentin 300 MG capsule   Commonly known as:  NEURONTIN   Quantity:  90 capsule   Refills:  11   Dose:  300 mg    Instructions:  Take 1 capsule (300 mg total) by mouth 3 (three) times daily.     Begin Date    AM    Noon    PM    Bedtime       insulin aspart 100 unit/mL Inpn pen   Commonly known as:   NOVOLOG FLEXPEN   Quantity:  5 Box   Refills:  3   Comments:  90 day prescription    Instructions:  13-15-15 units with each meal, plus correction scale. TDD 81 units     Begin Date    AM    Noon    PM    Bedtime       insulin glargine 100 unit/mL (3 mL) Inpn pen   Commonly known as:  LANTUS SOLOSTAR   Quantity:  2 Box   Refills:  11   Dose:  40 Units    Instructions:  Inject 40 Units into the skin every evening.     Begin Date    AM    Noon    PM    Bedtime       ketoconazole 2 % cream   Commonly known as:  NIZORAL   Quantity:  30 g   Refills:  1    Instructions:  Apply topically once daily.     Begin Date    AM    Noon    PM    Bedtime       lancets 33 gauge Misc   Quantity:  450 each   Refills:  4   Dose:  1 lancet    Instructions:  1 lancet by Misc.(Non-Drug; Combo Route) route 4 (four) times daily. Pt uses True Result Meter, bg monitoring 4 times a day.     Begin Date    AM    Noon    PM    Bedtime       levothyroxine 25 MCG tablet   Commonly known as:  SYNTHROID   Quantity:  90 tablet   Refills:  1   Dose:  25 mcg    Instructions:  Take 1 tablet (25 mcg total) by mouth once daily.     Begin Date    AM    Noon    PM    Bedtime       losartan 50 MG tablet   Commonly known as:  COZAAR   Quantity:  30 tablet   Refills:  11    Instructions:  TAKE ONE TABLET BY MOUTH EVERY DAY     Begin Date    AM    Noon    PM    Bedtime       metoprolol succinate 100 MG 24 hr tablet   Commonly known as:  TOPROL-XL   Quantity:  90 tablet   Refills:  3   Dose:  100 mg    Instructions:  Take 1 tablet (100 mg total) by mouth once daily.     Begin Date    AM    Noon    PM    Bedtime       metronidazole 0.75 % gel   Commonly known as:  METROGEL   Quantity:  1 Tube   Refills:  6    Instructions:  Apply topically 2 (two) times daily.     Begin Date    AM    Noon    PM    Bedtime       multivitamin capsule   Refills:  0   Dose:  1 capsule    Instructions:  Take 1 capsule by mouth every morning.     Begin Date    AM    Noon    PM    Bedtime        mupirocin 2 % ointment   Commonly known as:  BACTROBAN   Quantity:  30 g   Refills:  0    Instructions:  Apply topically 2 (two) times daily. Apply to wound twice daily as directed.     Begin Date    AM    Noon    PM    Bedtime       nystatin-triamcinolone cream   Commonly known as:  MYCOLOG II   Quantity:  60 g   Refills:  1    Instructions:  apply TWICE DAILY     Begin Date    AM    Noon    PM    Bedtime       omega-3 acid ethyl esters 1 gram capsule   Commonly known as:  LOVAZA   Quantity:  540 capsule   Refills:  3    Instructions:  TAKE THREE CAPSULE BY MOUTH TWICE DAILY     Begin Date    AM    Noon    PM    Bedtime       ondansetron 8 MG tablet   Commonly known as:  ZOFRAN   Quantity:  60 tablet   Refills:  0   Dose:  8 mg    Instructions:  Take 1 tablet (8 mg total) by mouth every 12 (twelve) hours as needed for Nausea.     Begin Date    AM    Noon    PM    Bedtime       oxybutynin 5 MG Tr24   Commonly known as:  DITROPAN-XL   Quantity:  30 tablet   Refills:  11   Dose:  5 mg    Instructions:  Take 1 tablet (5 mg total) by mouth once daily.     Begin Date    AM    Noon    PM    Bedtime       oxycodone-acetaminophen  mg per tablet   Commonly known as:  PERCOCET   Quantity:  90 tablet   Refills:  0   Dose:  1 tablet    Instructions:  Take 1 tablet by mouth every 4 (four) hours as needed for Pain.     Begin Date    AM    Noon    PM    Bedtime       pravastatin 10 MG tablet   Commonly known as:  PRAVACHOL   Quantity:  90 tablet   Refills:  3   Dose:  10 mg    Instructions:  Take 1 tablet (10 mg total) by mouth once daily.     Begin Date    AM    Noon    PM    Bedtime       tamsulosin 0.4 mg Cp24   Commonly known as:  FLOMAX   Quantity:  30 capsule   Refills:  11   Dose:  1 capsule   Comments:  This prescription was filled today. Any refills authorized will be placed on file.    Instructions:  Take 1 capsule (0.4 mg total) by mouth once daily.     Begin Date    AM    Noon    PM    Bedtime        "TOPICORT 0.25 % cream   Refills:  0   Dose:  0.25 %   Generic drug:  desoximetasone    Instructions:  0.25 %.  Cream Topical .  AAA bid back     Begin Date    AM    Noon    PM    Bedtime       triamcinolone acetonide 0.1% 0.1 % cream   Commonly known as:  KENALOG   Quantity:  454 g   Refills:  0    Instructions:  Apply topically 2 (two) times daily. Apply to affected area twice daily as directed.     Begin Date    AM    Noon    PM    Bedtime       TRUETEST TEST STRIPS Strp   Quantity:  450 strip   Refills:  4   Dose:  1 strip   Generic drug:  blood sugar diagnostic    Instructions:  1 strip by Misc.(Non-Drug; Combo Route) route 4 (four) times daily.     Begin Date    AM    Noon    PM    Bedtime       VITAMIN C 100 MG tablet   Refills:  0   Dose:  1 tablet   Generic drug:  ascorbic acid (vitamin C)    Instructions:  Take 1 tablet by mouth every morning.     Begin Date    AM    Noon    PM    Bedtime       vitamin E 1000 UNIT capsule   Refills:  0   Dose:  1000 Units    Instructions:  Take 1,000 Units by mouth every morning. 1 Capsule Oral Every day     Begin Date    AM    Noon    PM    Bedtime       * Notice:  This list has 2 medication(s) that are the same as other medications prescribed for you. Read the directions carefully, and ask your doctor or other care provider to review them with you.            Your Vitals Were     Temp Resp Height Weight BMI    98.1 °F (36.7 °C) (Oral) 20 5' 10" (1.778 m) 135.2 kg (298 lb) 42.76 kg/m2      Allergies as of 4/5/2017     Ciprofloxacin    Niacin    Terbinafine      Immunizations Administered on Date of Encounter - 4/5/2017     None      Instructions    What to Expect After a Cystoscopy  For the next 24-48 hours, you may feel a mild burning when you urinate. This burning is normal and expected. Drink plenty of water to dilute the urine to help relieve the burning sensation. You may also see a small amount of blood in your urine after the procedure.    Unless you are already " taking antibiotics, you may be given an antibiotic after the test to prevent infection.    Signs and Symptoms to Report  Call the [facility] at [phone] if you develop any of the following:  · Fever of 101 degrees or higher  · Chills or persistent bleeding  · Inability to urinate       Advance Directives     An advance directive is a document which, in the event you are no longer able to make decisions for yourself, tells your healthcare team what kind of treatment you do or do not want to receive, or who you would like to make those decisions for you.  If you do not currently have an advance directive, [facility] encourages you to create one.  For more information call:  [phone]), [phone] ([phone]),  or log on to [email or web address].        [facility]     [facility] Care Line - 24/7 Assistance  Unless otherwise directed by your provider, please contact [facility], our nurse care line that is available for 24/7 assistance.     Registered nurses in the [facility] provide: appointment scheduling, clinical advisement, health education, and other advisory services.  Call: [phone] (toll free)          Language Assistance Services     ATTENTION: Language assistance services are available, free of charge. Please call [phone].      ATENCIÓN: Si habla español, tiene a mancilla disposición servicios gratuitos de asistencia lingüística. Llame al [phone].     CHÚ Ý: N?u b?n nói Ti?ng Vi?t, có các d?ch v? h? tr? ngôn ng? mi?n phí dành cho b?n. G?i s? [phone].         [facility] complies with applicable Federal civil rights laws and does not discriminate on the basis of race, color, national origin, age, disability, or sex.

## 2017-04-05 NOTE — PATIENT INSTRUCTIONS
What to Expect After a Cystoscopy  For the next 24-48 hours, you may feel a mild burning when you urinate. This burning is normal and expected. Drink plenty of water to dilute the urine to help relieve the burning sensation. You may also see a small amount of blood in your urine after the procedure.    Unless you are already taking antibiotics, you may be given an antibiotic after the test to prevent infection.    Signs and Symptoms to Report  Call the [facility] at [phone] if you develop any of the following:  · Fever of 101 degrees or higher  · Chills or persistent bleeding  · Inability to urinate

## 2017-04-05 NOTE — TELEPHONE ENCOUNTER
----- Message from Yamilet Montero MD sent at 4/5/2017 10:17 AM CDT -----  Contact:  [phone]  Expected, please let him know.  Drinks lots of water  ----- Message -----     From: Dari Camarena LPN     Sent: 4/5/2017  10:12 AM       To: Yamilet Montero MD        ----- Message -----     From: Jessica Stauffer MA     Sent: 4/5/2017  10:06 AM       To: Winston STEWART Staff        [phone]  Pt had a cysto today and states that he just passed large amount of blood. He knew he would have some blood but he was concerned about the amount. Please call when able.

## 2017-04-06 NOTE — PROCEDURES
Procedures: Flexible cystourethroscopy    Pre Procedure Diagnosis:microhematuria    Post Procedure Diagnosis:BPH    Surgeon: Yamilet Montero MD    Anesthesia: 2% uro-jet lidocaine jelly for local analgesia    Flexible cysto-urethroscopy was performed after consent was obtained.  The risks and benefits were explained.    2% lidocaine urojet was used for local analgesia.  The genitalia was prepped and draped in the sterile fashion with betadine.    The flexible scope was advanced into the urethra and into the bladder.  Bilateral ureteral orifice were evaluated and noted to be normal with clear efflux.  The bladder was completely surveyed in a systematic fashion.   No bladder tumors or lesions were seen.  No strictures were noted.  The prostate showed Mild hypertrophy.  There was No median lobe present.    The patient tolerated the procedure well without complication.    They will follow up in 1 year.

## 2017-04-10 RX ORDER — METOPROLOL SUCCINATE 100 MG/1
TABLET, EXTENDED RELEASE ORAL
Qty: 90 TABLET | Refills: 3 | Status: SHIPPED | OUTPATIENT
Start: 2017-04-10 | End: 2018-03-13 | Stop reason: SDUPTHER

## 2017-04-16 RX ORDER — GABAPENTIN 300 MG/1
CAPSULE ORAL
Qty: 90 CAPSULE | Refills: 3 | Status: SHIPPED | OUTPATIENT
Start: 2017-04-16 | End: 2017-08-17 | Stop reason: SDUPTHER

## 2017-04-17 ENCOUNTER — OFFICE VISIT (OUTPATIENT)
Dept: ORTHOPEDICS | Facility: CLINIC | Age: 72
End: 2017-04-17
Payer: MEDICARE

## 2017-04-17 ENCOUNTER — HOSPITAL ENCOUNTER (OUTPATIENT)
Dept: RADIOLOGY | Facility: HOSPITAL | Age: 72
Discharge: HOME | End: 2017-04-17
Attending: ORTHOPAEDIC SURGERY
Payer: MEDICARE

## 2017-04-17 VITALS — WEIGHT: 290.56 LBS | BODY MASS INDEX: 41.6 KG/M2 | HEIGHT: 70 IN

## 2017-04-17 DIAGNOSIS — M25.562 PAIN IN BOTH KNEES, UNSPECIFIED CHRONICITY: ICD-10-CM

## 2017-04-17 DIAGNOSIS — M25.561 PAIN IN BOTH KNEES, UNSPECIFIED CHRONICITY: ICD-10-CM

## 2017-04-17 DIAGNOSIS — M70.62 GREATER TROCHANTERIC BURSITIS OF BOTH HIPS: Primary | ICD-10-CM

## 2017-04-17 DIAGNOSIS — M70.61 GREATER TROCHANTERIC BURSITIS OF BOTH HIPS: Primary | ICD-10-CM

## 2017-04-17 DIAGNOSIS — M16.0 BILATERAL PRIMARY OSTEOARTHRITIS OF HIP: ICD-10-CM

## 2017-04-17 PROCEDURE — 99999 PR PBB SHADOW E&M-EST. PATIENT-LVL III: CPT | Mod: PBBFAC,,, | Performed by: ORTHOPAEDIC SURGERY

## 2017-04-17 PROCEDURE — 73560 X-RAY EXAM OF KNEE 1 OR 2: CPT | Mod: 26,RT,, | Performed by: RADIOLOGY

## 2017-04-17 PROCEDURE — 99024 POSTOP FOLLOW-UP VISIT: CPT | Mod: S$GLB,,, | Performed by: ORTHOPAEDIC SURGERY

## 2017-04-17 PROCEDURE — 73560 X-RAY EXAM OF KNEE 1 OR 2: CPT | Mod: 50,TC

## 2017-04-17 PROCEDURE — 20610 DRAIN/INJ JOINT/BURSA W/O US: CPT | Mod: 58,RT,S$GLB, | Performed by: ORTHOPAEDIC SURGERY

## 2017-04-17 PROCEDURE — 73560 X-RAY EXAM OF KNEE 1 OR 2: CPT | Mod: 26,LT,, | Performed by: RADIOLOGY

## 2017-04-17 RX ORDER — TRIAMCINOLONE ACETONIDE 40 MG/ML
40 INJECTION, SUSPENSION INTRA-ARTICULAR; INTRAMUSCULAR
Status: DISCONTINUED | OUTPATIENT
Start: 2017-04-17 | End: 2017-04-17 | Stop reason: HOSPADM

## 2017-04-17 RX ADMIN — TRIAMCINOLONE ACETONIDE 40 MG: 40 INJECTION, SUSPENSION INTRA-ARTICULAR; INTRAMUSCULAR at 11:04

## 2017-04-17 NOTE — MR AVS SNAPSHOT
[facility]  [address]  [facility] [address]  Phone: [phone]                  [facility] PAT Cantor Jr.   2017 10:00 AM   Office Visit    Description:  Male : 1945   Provider:  Jose Armando Knight MD   [facility]  [facility]           Reason for Visit     Post-op Evaluation           Diagnoses this Visit        Comments    Greater trochanteric bursitis of both hips    -  Primary     Pain in both knees, unspecified chronicity         Bilateral primary osteoarthritis of hip                To Do List           Future Appointments        Provider Department Dept Phone    2017 11:00 AM SPECIMEN, MAIN CAMPUS [facility] [phone]    2017 11:20 AM LAB, APPOINTMENT [place] [facility] [phone]    2017 10:30 AM Ruperto Moraes MD [facility] [phone]    2017 10:00 AM Jose Hatch DPM [facility] [phone]    2017 10:30 AM LAB, APPOINTMENT [place] [facility] [phone]      Goals (5 Years of Data)     None      Follow-Up and Disposition     Return in about 3 months (around 2017).    Follow-up and Disposition History      [facility]     [facility] Care Line - 24/ Assistance  Unless otherwise directed by your provider, please contact [facility], our nurse care line that is available for / assistance.     Registered nurses in the [facility] provide: appointment scheduling, clinical advisement, health education, and other advisory services.  Call: [phone] (toll free)               Medications           Message regarding Medications     Verify the changes and/or additions to your medication regime listed below are the same as discussed with your clinician today.  If any of these changes or additions are incorrect, please notify your healthcare provider.        These medications were administered today   "      Dose Freq    triamcinolone acetonide injection 40 mg (Discontinued) 40 mg     Sig: Inject 40 mg into the articular space.    Class: Normal    Route: Intra-articular    Reason for Discontinue: Patient Discharge      STOP taking these medications     oxycodone-acetaminophen (PERCOCET)  mg per tablet Take 1 tablet by mouth every 4 (four) hours as needed for Pain.    ondansetron (ZOFRAN) 8 MG tablet Take 1 tablet (8 mg total) by mouth every 12 (twelve) hours as needed for Nausea.           Verify that the below list of medications is an accurate representation of the medications you are currently taking.  If none reported, the list may be blank. If incorrect, please contact your healthcare provider. Carry this list with you in case of emergency.           Current Medications     apixaban 5 mg Tab Take 1 tablet (5 mg total) by mouth 2 (two) times daily.    ascorbic acid (VITAMIN C) 100 MG tablet Take 1 tablet by mouth every morning.     aspirin 325 MG tablet Take 1 tablet by mouth every morning.     azelastine (ASTELIN) 137 mcg (0.1 %) nasal spray 1 spray (137 mcg total) by Nasal route 2 (two) times daily.    BD INSULIN PEN NEEDLE UF ORIG 29 x 1/2 " Ndle     blood-glucose meter kit Use as instructed, True Result meter    CINNAMON BARK (CINNAMON ORAL) Take by mouth once daily.    clobetasol (TEMOVATE) 0.05 % cream AAA finger bid    clotrimazole-betamethasone 1-0.05% (LOTRISONE) cream Apply topically 2 (two) times daily. Apply twice a day to affected area for 1 week, wait a week, then repeat x 1 week    desonide (DESOWEN) 0.05 % lotion AAA bid prn redness, scaling, or itching    desoximetasone (TOPICORT) 0.25 % cream 0.25 %.  Cream Topical .  AAA bid back    docusate sodium (COLACE) 100 MG capsule Take 1 capsule (100 mg total) by mouth 2 (two) times daily. Available OTC as well    fenofibrate (TRICOR) 145 MG tablet TAKE ONE TABLET BY MOUTH EVERY DAY    fluticasone (FLONASE) 50 mcg/actuation nasal spray 1 spray " "by Each Nare route nightly as needed.    furosemide (LASIX) 40 MG tablet Take 1 tablet (40 mg total) by mouth daily as needed.    gabapentin (NEURONTIN) 300 MG capsule TAKE ONE CAPSULE BY MOUTH THREE TIMES DAILY    insulin aspart (NOVOLOG FLEXPEN) 100 unit/mL InPn pen 13-15-15 units with each meal, plus correction scale. TDD 81 units    insulin glargine (LANTUS SOLOSTAR) 100 unit/mL (3 mL) InPn pen Inject 40 Units into the skin every evening.    ketoconazole (NIZORAL) 2 % cream Apply topically once daily.    lancets 33 gauge Misc 1 lancet by Misc.(Non-Drug; Combo Route) route 4 (four) times daily. Pt uses True Result Meter, bg monitoring 4 times a day.    levothyroxine (SYNTHROID) 25 MCG tablet Take 1 tablet (25 mcg total) by mouth once daily.    losartan (COZAAR) 50 MG tablet TAKE ONE TABLET BY MOUTH EVERY DAY    metoprolol succinate (TOPROL-XL) 100 MG 24 hr tablet TAKE ONE TABLET BY MOUTH EVERY DAY    metronidazole (METROGEL) 0.75 % gel Apply topically 2 (two) times daily.    multivitamin capsule Take 1 capsule by mouth every morning.     mupirocin (BACTROBAN) 2 % ointment Apply topically 2 (two) times daily. Apply to wound twice daily as directed.    nystatin-triamcinolone (MYCOLOG II) cream apply TWICE DAILY    omega-3 acid ethyl esters (LOVAZA) 1 gram capsule TAKE THREE CAPSULE BY MOUTH TWICE DAILY    oxybutynin (DITROPAN-XL) 5 MG TR24 Take 1 tablet (5 mg total) by mouth once daily.    pen needle, diabetic (BD ULTRA-FINE BASIL PEN NEEDLES) 32 gauge x 5/32" Ndle Pt check blood sugar 4 times a day    pravastatin (PRAVACHOL) 10 MG tablet Take 1 tablet (10 mg total) by mouth once daily.    tamsulosin (FLOMAX) 0.4 mg Cp24 Take 1 capsule (0.4 mg total) by mouth once daily.    triamcinolone acetonide 0.1% (KENALOG) 0.1 % cream Apply topically 2 (two) times daily. Apply to affected area twice daily as directed.    TRUETEST TEST STRIPS Strp 1 strip by Misc.(Non-Drug; Combo Route) route 4 (four) times daily.    vitamin E " "1000 UNIT capsule Take 1,000 Units by mouth every morning. 1 Capsule Oral Every day           Clinical Reference Information           Your Vitals Were     Height Weight BMI          5' 10" (1.778 m) 131.8 kg (290 lb 9.1 oz) 41.69 kg/m2        Allergies as of 4/17/2017     Ciprofloxacin    Niacin    Terbinafine      Immunizations Administered on Date of Encounter - 4/17/2017     None      Orders Placed During Today's Visit      Normal Orders This Visit    Large Joint Aspiration/Injection     Future Labs/Procedures Expected by Expires    X-Ray Knee 1 or 2 View Bilateral  4/17/2017 4/17/2018      Administrations This Visit     triamcinolone acetonide injection 40 mg     Admin Date Action Dose Route Administered By             04/17/2017 Given 40 mg Intra-articular Jose Armando Knight MD                      Language Assistance Services     ATTENTION: Language assistance services are available, free of charge. Please call [phone].      ATENCIÓN: Si habla español, tiene a mancilla disposición servicios gratuitos de asistencia lingüística. Llame al [phone].     CHÚ Ý: N?u b?n nói Ti?ng Vi?t, có các d?ch v? h? tr? ngôn ng? mi?n phí dành cho b?n. G?i s? [phone].         [facility] complies with applicable Federal civil rights laws and does not discriminate on the basis of race, color, national origin, age, disability, or sex.        "

## 2017-04-17 NOTE — PROCEDURES
Large Joint Aspiration/Injection  Date/Time: 4/17/2017 11:57 AM  Performed by: HERBERT ANTHONY  Authorized by: HERBERT ANTHONY     Consent Done?:  Yes (Written)  Indications:  Pain  Procedure site marked: Yes    Timeout: Prior to procedure the correct patient, procedure, and site was verified      Location:  Hip  Site:  R greater trochanteric bursa  Prep: Patient was prepped and draped in usual sterile fashion    Needle size:  22 G  Approach:  Lateral  Medications:  40 mg triamcinolone acetonide 40 mg/mL  Patient tolerance:  Patient tolerated the procedure well with no immediate complications

## 2017-04-17 NOTE — PROGRESS NOTES
CC:   Right hip pain and s/p right tka  HPI:  71 y.o. yo male who presents with right hip pain.      Patient is 3 months status post right total knee replacement.  He's doing very well with his total knee replacement.  He states that the pain is essentially absent from his right total knee.      He does however present today with a separate problem.  He states that his right hip is been hurting for quite some time.  It is on the outside of the hip.  It is sharp and stabbing.  It is a 5 out of 10 at its worst.  The pain is worse when he lays on that side.  It also is worse when he walks significant distances.  He's had no treatment for this.    PAST MEDICAL HISTORY:   Past Medical History:   Diagnosis Date    *Atrial fibrillation     On Coumadin    Anticoagulant long-term use     Basal cell carcinoma 12/2015    left eye brow    BCC (basal cell carcinoma) 12/2015    left shoulder    Cataract     Chronic kidney disease     Diabetes mellitus with renal manifestations, uncontrolled     Dyslipidemia associated with type 2 diabetes mellitus     Fever blister     HTN (hypertension)     Keloid cicatrix     Melanoma 12/07/2015    right cheek-in situ    Obesity     PN (peripheral neuropathy)     Primary osteoarthritis of right knee 1/25/2017    Screening for colon cancer 3/3/2016    Sleep apnea     wears CPAP at night    Type II or unspecified type diabetes mellitus with other specified manifestations, uncontrolled      PAST SURGICAL HISTORY:   Past Surgical History:   Procedure Laterality Date    APPENDECTOMY      CARDIOVERSION      COLONOSCOPY N/A 3/3/2016    Procedure: COLONOSCOPY;  Surgeon: Bob Poe MD;  Location: [address];  Service: Endoscopy;  Laterality: N/A;  Holding Eliquis for 3 days prior to colonoscopy. EC    JOINT REPLACEMENT      Knee    Meniere's disease surgery      TONSILLECTOMY      TOTAL KNEE ARTHROPLASTY       FAMILY HISTORY:   Family History   Problem Relation  Age of Onset    Diabetes Mother     Stroke Mother     Diabetes Father     Heart disease Father     Hypertension Father     Cancer Sister      brain    Eczema Sister     Cancer Brother      brain    Cancer Sister      leukemia, stomach cancer    No Known Problems Sister     ALS Brother      stomach cancer    No Known Problems Brother     No Known Problems Maternal Aunt     No Known Problems Maternal Uncle     No Known Problems Paternal Aunt     No Known Problems Paternal Uncle     No Known Problems Maternal Grandmother     No Known Problems Maternal Grandfather     No Known Problems Paternal Grandmother     No Known Problems Paternal Grandfather     Amblyopia Neg Hx     Blindness Neg Hx     Cataracts Neg Hx     Glaucoma Neg Hx     Macular degeneration Neg Hx     Retinal detachment Neg Hx     Strabismus Neg Hx     Thyroid disease Neg Hx     Melanoma Neg Hx     Psoriasis Neg Hx     Lupus Neg Hx     Acne Neg Hx      SOCIAL HISTORY:   Social History     Social History    Marital status:      Spouse name: N/A    Number of children: 3    Years of education: N/A     Occupational History    retired Retired     Social History Main Topics    Smoking status: Former Smoker     Quit date: 7/10/1985    Smokeless tobacco: Never Used    Alcohol use No      Comment: quit 2007- had excess in the past    Drug use: No    Sexual activity: Not on file     Other Topics Concern    Not on file     Social History Narrative       MEDICATIONS:   Current Outpatient Prescriptions:     apixaban 5 mg Tab, Take 1 tablet (5 mg total) by mouth 2 (two) times daily., Disp: 180 tablet, Rfl: 3    ascorbic acid (VITAMIN C) 100 MG tablet, Take 1 tablet by mouth every morning. , Disp: , Rfl:     aspirin 325 MG tablet, Take 1 tablet by mouth every morning. , Disp: , Rfl:     azelastine (ASTELIN) 137 mcg (0.1 %) nasal spray, 1 spray (137 mcg total) by Nasal route 2 (two) times daily., Disp: 30 mL, Rfl: 3     "BD INSULIN PEN NEEDLE UF ORIG 29 x 1/2 " Ndle, , Disp: , Rfl: 12    blood-glucose meter kit, Use as instructed, True Result meter, Disp: 1 each, Rfl: 1    CINNAMON BARK (CINNAMON ORAL), Take by mouth once daily., Disp: , Rfl:     clobetasol (TEMOVATE) 0.05 % cream, AAA finger bid, Disp: 60 g, Rfl: 3    clotrimazole-betamethasone 1-0.05% (LOTRISONE) cream, Apply topically 2 (two) times daily. Apply twice a day to affected area for 1 week, wait a week, then repeat x 1 week, Disp: 45 g, Rfl: 1    desonide (DESOWEN) 0.05 % lotion, AAA bid prn redness, scaling, or itching, Disp: 1 Bottle, Rfl: 3    desoximetasone (TOPICORT) 0.25 % cream, 0.25 %.  Cream Topical .  AAA bid back, Disp: , Rfl:     docusate sodium (COLACE) 100 MG capsule, Take 1 capsule (100 mg total) by mouth 2 (two) times daily. Available OTC as well, Disp: 60 capsule, Rfl: 0    fenofibrate (TRICOR) 145 MG tablet, TAKE ONE TABLET BY MOUTH EVERY DAY, Disp: 90 tablet, Rfl: 2    fluticasone (FLONASE) 50 mcg/actuation nasal spray, 1 spray by Each Nare route nightly as needed., Disp: 16 g, Rfl: 5    furosemide (LASIX) 40 MG tablet, Take 1 tablet (40 mg total) by mouth daily as needed., Disp: 90 tablet, Rfl: 3    gabapentin (NEURONTIN) 300 MG capsule, TAKE ONE CAPSULE BY MOUTH THREE TIMES DAILY, Disp: 90 capsule, Rfl: 3    insulin aspart (NOVOLOG FLEXPEN) 100 unit/mL InPn pen, 13-15-15 units with each meal, plus correction scale. TDD 81 units, Disp: 5 Box, Rfl: 3    insulin glargine (LANTUS SOLOSTAR) 100 unit/mL (3 mL) InPn pen, Inject 40 Units into the skin every evening., Disp: 2 Box, Rfl: 11    ketoconazole (NIZORAL) 2 % cream, Apply topically once daily., Disp: 30 g, Rfl: 1    lancets 33 gauge Misc, 1 lancet by Misc.(Non-Drug; Combo Route) route 4 (four) times daily. Pt uses True Result Meter, bg monitoring 4 times a day., Disp: 450 each, Rfl: 4    levothyroxine (SYNTHROID) 25 MCG tablet, Take 1 tablet (25 mcg total) by mouth once daily., " "Disp: 90 tablet, Rfl: 1    losartan (COZAAR) 50 MG tablet, TAKE ONE TABLET BY MOUTH EVERY DAY (Patient taking differently: TAKE ONE TABLET BY MOUTH EVERY DAY- taking nightly), Disp: 30 tablet, Rfl: 11    metoprolol succinate (TOPROL-XL) 100 MG 24 hr tablet, TAKE ONE TABLET BY MOUTH EVERY DAY, Disp: 90 tablet, Rfl: 3    metronidazole (METROGEL) 0.75 % gel, Apply topically 2 (two) times daily., Disp: 1 Tube, Rfl: 6    multivitamin capsule, Take 1 capsule by mouth every morning. , Disp: , Rfl:     mupirocin (BACTROBAN) 2 % ointment, Apply topically 2 (two) times daily. Apply to wound twice daily as directed., Disp: 30 g, Rfl: 0    nystatin-triamcinolone (MYCOLOG II) cream, apply TWICE DAILY (Patient taking differently: apply TWICE DAILY-using as needed for rash), Disp: 60 g, Rfl: 1    omega-3 acid ethyl esters (LOVAZA) 1 gram capsule, TAKE THREE CAPSULE BY MOUTH TWICE DAILY, Disp: 540 capsule, Rfl: 3    oxybutynin (DITROPAN-XL) 5 MG TR24, Take 1 tablet (5 mg total) by mouth once daily., Disp: 30 tablet, Rfl: 11    pen needle, diabetic (BD ULTRA-FINE BASIL PEN NEEDLES) 32 gauge x 5/32" Ndle, Pt check blood sugar 4 times a day, Disp: 150 each, Rfl: 12    pravastatin (PRAVACHOL) 10 MG tablet, Take 1 tablet (10 mg total) by mouth once daily. (Patient taking differently: Take 10 mg by mouth every evening. ), Disp: 90 tablet, Rfl: 3    tamsulosin (FLOMAX) 0.4 mg Cp24, Take 1 capsule (0.4 mg total) by mouth once daily., Disp: 30 capsule, Rfl: 11    triamcinolone acetonide 0.1% (KENALOG) 0.1 % cream, Apply topically 2 (two) times daily. Apply to affected area twice daily as directed., Disp: 454 g, Rfl: 0    TRUETEST TEST STRIPS Strp, 1 strip by Misc.(Non-Drug; Combo Route) route 4 (four) times daily., Disp: 450 strip, Rfl: 4    vitamin E 1000 UNIT capsule, Take 1,000 Units by mouth every morning. 1 Capsule Oral Every day, Disp: , Rfl:   ALLERGIES:   Review of patient's allergies indicates:   Allergen Reactions    " "Ciprofloxacin Rash    Niacin Itching     Other reaction(s): facial flushing    Terbinafine Other (See Comments)     Other reaction(s): Hepatitis       VITAL SIGNS: Ht 5' 10" (1.778 m)  Wt 131.8 kg (290 lb 9.1 oz)  BMI 41.69 kg/m2     Review of Systems   Constitution: Negative. Negative for chills, fever and night sweats.   HENT: Negative for congestion and headaches.    Eyes: Negative for blurred vision, left vision loss and right vision loss.   Cardiovascular: Negative for chest pain and syncope.   Respiratory: Negative for cough and shortness of breath.    Endocrine: Negative for polydipsia, polyphagia and polyuria.   Hematologic/Lymphatic: Negative for bleeding problem. Does not bruise/bleed easily.   Skin: Negative for dry skin, itching and rash.   Musculoskeletal: Negative for falls and muscle weakness. s/p right tka. Right hip pain.   Gastrointestinal: Negative for abdominal pain and bowel incontinence.   Genitourinary: Negative for bladder incontinence and nocturia.   Neurological: Negative for disturbances in coordination, loss of balance and seizures.   Psychiatric/Behavioral: Negative for depression. The patient does not have insomnia.    Allergic/Immunologic: Negative for hives and persistent infections.       Physical Exam   Constitutional: Oriented to person, place, and time. Appears well-developed and well-nourished.   HENT:   Head: Normocephalic and atraumatic.   Nose: Nose normal.   Eyes: No scleral icterus.   Neck: Normal range of motion. Neck supple.   Cardiovascular: Normal rate and regular rhythm.    Pulses:       Radial pulses are 2+ on the right side, and 2+ on the left side.   Pulmonary/Chest: Clear and normal  Abdominal: Soft.   Neurological: Alert and oriented to person, place, and time.   Skin: Skin is warm.   Psychiatric: Normal mood and affect.     Back    ROM shows normal flexion, extension and rotation    Palpation shows no masses    Stability is normal    Strength to flexion and " extension well maintained     Core strength is diminished    Skin shows no rashes or cafe au lait spots    Sensation intact to light touch    Exam his right knee incision is clean and dry.  He has 0- 120° range of motion.  No effusion.  On examination of his right hip he has internal rotation to 30° external rotation to 60° no pain in the groin.  He does has significant tenderness to palpation over the greater trochanter.        Xrays:  X-rays of the bilateral hips are reviewed and my interpretation is as follows: Mild osteoarthritis bilateral hips.    Assessment:  Encounter Diagnoses   Name Primary?    Greater trochanteric bursitis of both hips Yes    Pain in both knees, unspecified chronicity     Bilateral primary osteoarthritis of hip        Plan:    Orders Placed This Encounter    Large Joint Aspiration/Injection    X-Ray Knee 1 or 2 View Bilateral       Return in about 3 months (around 7/17/2017).      She has greater trochanteric bursitis of the right hip I will give him a steroid injection today.  He reported significant pain relief from this.  Hemoglobin A1C   Date Value Ref Range Status   03/06/2017 6.2 4.5 - 6.2 % Final     Comment:     According to ADA guidelines, hemoglobin A1C <7.0% represents  optimal control in non-pregnant diabetic patients.  Different  metrics may apply to specific populations.   Standards of Medical Care in Diabetes - 2016.  For the purpose of screening for the presence of diabetes:  <5.7%     Consistent with the absence of diabetes  5.7-6.4%  Consistent with increasing risk for diabetes   (prediabetes)  >or=6.5%  Consistent with diabetes  Currently no consensus exists for use of hemoglobin A1C  for diagnosis of diabetes for children.     01/25/2017 6.1 4.5 - 6.2 % Final     Comment:     According to ADA guidelines, hemoglobin A1C <7.0% represents  optimal control in non-pregnant diabetic patients.  Different  metrics may apply to specific populations.   Standards of Medical  "Care in Diabetes - 2016.  For the purpose of screening for the presence of diabetes:  <5.7%     Consistent with the absence of diabetes  5.7-6.4%  Consistent with increasing risk for diabetes   (prediabetes)  >or=6.5%  Consistent with diabetes  Currently no consensus exists for use of hemoglobin A1C  for diagnosis of diabetes for children.     12/06/2016 5.9 4.5 - 6.2 % Final     Comment:     According to ADA guidelines, hemoglobin A1C <7.0% represents  optimal control in non-pregnant diabetic patients.  Different  metrics may apply to specific populations.   Standards of Medical Care in Diabetes - 2016.  For the purpose of screening for the presence of diabetes:  <5.7%     Consistent with the absence of diabetes  5.7-6.4%  Consistent with increasing risk for diabetes   (prediabetes)  >or=6.5%  Consistent with diabetes  Currently no consensus exists for use of hemoglobin A1C  for diagnosis of diabetes for children.       Estimated body mass index is 41.69 kg/(m^2) as calculated from the following:    Height as of this encounter: 5' 10" (1.778 m).    Weight as of this encounter: 131.8 kg (290 lb 9.1 oz).            This was dictated using Dragon [employer].   "

## 2017-04-21 ENCOUNTER — TELEPHONE (OUTPATIENT)
Dept: INTERNAL MEDICINE | Facility: CLINIC | Age: 72
End: 2017-04-21

## 2017-04-21 RX ORDER — TAMSULOSIN HYDROCHLORIDE 0.4 MG/1
1 CAPSULE ORAL DAILY
Qty: 30 CAPSULE | Refills: 11 | Status: SHIPPED | OUTPATIENT
Start: 2017-04-21 | End: 2017-04-25 | Stop reason: SDUPTHER

## 2017-04-21 NOTE — TELEPHONE ENCOUNTER
----- Message from Tena Bruner sent at 4/21/2017 11:44 AM CDT -----  Contact: wife/theresa/[phone]  Pt wife called in regards to getting a Rx refill for all of his med. The drug store he used was [facility].      [address] [phone]  Please advise

## 2017-04-21 NOTE — TELEPHONE ENCOUNTER
Spoke with Pts wife and she told me about the [facility] raiding the [employer] [facility] drug store. And she said she will go online and send over the Rxs needed for refill and we will send them all over to the [facility] on [address]

## 2017-04-25 DIAGNOSIS — E11.42 DIABETIC POLYNEUROPATHY ASSOCIATED WITH TYPE 2 DIABETES MELLITUS: ICD-10-CM

## 2017-04-25 RX ORDER — INSULIN ASPART 100 [IU]/ML
INJECTION, SOLUTION INTRAVENOUS; SUBCUTANEOUS
Qty: 5 BOX | Refills: 3
Start: 2017-04-25 | End: 2017-06-29 | Stop reason: SDUPTHER

## 2017-04-25 RX ORDER — TAMSULOSIN HYDROCHLORIDE 0.4 MG/1
1 CAPSULE ORAL DAILY
Qty: 30 CAPSULE | Refills: 11 | Status: SHIPPED | OUTPATIENT
Start: 2017-04-25 | End: 2017-12-26 | Stop reason: SDUPTHER

## 2017-04-25 NOTE — TELEPHONE ENCOUNTER
----- Message from Justen Welch sent at 4/25/2017  3:12 PM CDT -----  Contact: [facility]  [phone]  Type: Rx    Name of medication(s): tamsulosin (FLOMAX) 0.4 mg Cp24 and insulin aspart (NOVOLOG FLEXPEN) 100 unit/mL InPn pen    Is this a refill? New rx? Refill      Who prescribed medication?    Pharmacy Name, Phone, & Location: [facility] [phone]    Comments: please advise , Thanks !

## 2017-05-11 ENCOUNTER — TELEPHONE (OUTPATIENT)
Dept: INTERNAL MEDICINE | Facility: CLINIC | Age: 72
End: 2017-05-11

## 2017-05-11 DIAGNOSIS — Z79.4 TYPE 2 DIABETES MELLITUS WITH DIABETIC POLYNEUROPATHY, WITH LONG-TERM CURRENT USE OF INSULIN: ICD-10-CM

## 2017-05-11 DIAGNOSIS — E11.22 TYPE 2 DIABETES MELLITUS WITH STAGE 3 CHRONIC KIDNEY DISEASE, WITH LONG-TERM CURRENT USE OF INSULIN: Primary | ICD-10-CM

## 2017-05-11 DIAGNOSIS — Z79.4 TYPE 2 DIABETES MELLITUS WITH STAGE 3 CHRONIC KIDNEY DISEASE, WITH LONG-TERM CURRENT USE OF INSULIN: Primary | ICD-10-CM

## 2017-05-11 DIAGNOSIS — N18.30 TYPE 2 DIABETES MELLITUS WITH STAGE 3 CHRONIC KIDNEY DISEASE, WITH LONG-TERM CURRENT USE OF INSULIN: Primary | ICD-10-CM

## 2017-05-11 DIAGNOSIS — I10 ESSENTIAL HYPERTENSION: ICD-10-CM

## 2017-05-11 DIAGNOSIS — I48.20 CHRONIC ATRIAL FIBRILLATION: ICD-10-CM

## 2017-05-11 DIAGNOSIS — E11.42 TYPE 2 DIABETES MELLITUS WITH DIABETIC POLYNEUROPATHY, WITH LONG-TERM CURRENT USE OF INSULIN: ICD-10-CM

## 2017-05-11 DIAGNOSIS — I87.2 VENOUS INSUFFICIENCY OF BOTH LOWER EXTREMITIES: ICD-10-CM

## 2017-05-11 NOTE — TELEPHONE ENCOUNTER
----- Message from Noman Roberts MA sent at 5/11/2017 12:59 PM CDT -----  Contact: self - [phone]   Type: Orders Request    What orders/ testing are being requested? Labs     Is there a future appointment scheduled for the patient with PCP? Yes    When? 6/2/17    Comments: Please call. Thanks!

## 2017-05-12 ENCOUNTER — LAB VISIT (OUTPATIENT)
Dept: LAB | Facility: HOSPITAL | Age: 72
End: 2017-05-12
Attending: INTERNAL MEDICINE
Payer: MEDICARE

## 2017-05-12 DIAGNOSIS — N18.30 CHRONIC KIDNEY DISEASE, STAGE III (MODERATE): ICD-10-CM

## 2017-05-12 PROCEDURE — 87086 URINE CULTURE/COLONY COUNT: CPT

## 2017-05-13 LAB — BACTERIA UR CULT: NORMAL

## 2017-05-15 ENCOUNTER — TELEPHONE (OUTPATIENT)
Dept: ELECTROPHYSIOLOGY | Facility: CLINIC | Age: 72
End: 2017-05-15

## 2017-05-15 NOTE — TELEPHONE ENCOUNTER
----- Message from Francoise Carter RN sent at 5/15/2017  9:44 AM CDT -----  Contact: Pt called      ----- Message -----     From: Marcela Laws     Sent: 5/15/2017   9:25 AM       To: Jesus Alberto [facility]ANN[facility] Staff    Pt called, and schedule his yearly appointment. Pt has an echo in the recall but orders ay transesophageal echo. Please contact him at ph [phone] or [phone]. Thank you

## 2017-05-16 NOTE — TELEPHONE ENCOUNTER
Pt scheduled for pre labs on 5/30/17 at [facility] because pt already has appts that day.

## 2017-05-22 ENCOUNTER — OFFICE VISIT (OUTPATIENT)
Dept: INTERNAL MEDICINE | Facility: CLINIC | Age: 72
End: 2017-05-22
Payer: MEDICARE

## 2017-05-22 ENCOUNTER — LAB VISIT (OUTPATIENT)
Dept: LAB | Facility: HOSPITAL | Age: 72
End: 2017-05-22
Attending: INTERNAL MEDICINE
Payer: MEDICARE

## 2017-05-22 VITALS
TEMPERATURE: 98 F | HEIGHT: 70 IN | HEART RATE: 95 BPM | WEIGHT: 296.88 LBS | BODY MASS INDEX: 42.5 KG/M2 | SYSTOLIC BLOOD PRESSURE: 118 MMHG | DIASTOLIC BLOOD PRESSURE: 70 MMHG

## 2017-05-22 DIAGNOSIS — E11.22 TYPE 2 DIABETES MELLITUS WITH STAGE 3 CHRONIC KIDNEY DISEASE, WITH LONG-TERM CURRENT USE OF INSULIN: ICD-10-CM

## 2017-05-22 DIAGNOSIS — E03.9 HYPOTHYROIDISM, UNSPECIFIED TYPE: ICD-10-CM

## 2017-05-22 DIAGNOSIS — Z79.4 TYPE 2 DIABETES MELLITUS WITH STAGE 3 CHRONIC KIDNEY DISEASE, WITH LONG-TERM CURRENT USE OF INSULIN: ICD-10-CM

## 2017-05-22 DIAGNOSIS — Z79.4 TYPE 2 DIABETES MELLITUS WITH DIABETIC POLYNEUROPATHY, WITH LONG-TERM CURRENT USE OF INSULIN: ICD-10-CM

## 2017-05-22 DIAGNOSIS — E11.42 TYPE 2 DIABETES MELLITUS WITH DIABETIC POLYNEUROPATHY, WITH LONG-TERM CURRENT USE OF INSULIN: ICD-10-CM

## 2017-05-22 DIAGNOSIS — R26.81 GAIT INSTABILITY: ICD-10-CM

## 2017-05-22 DIAGNOSIS — N18.30 TYPE 2 DIABETES MELLITUS WITH STAGE 3 CHRONIC KIDNEY DISEASE, WITH LONG-TERM CURRENT USE OF INSULIN: ICD-10-CM

## 2017-05-22 DIAGNOSIS — N41.0 ACUTE PROSTATITIS: Primary | ICD-10-CM

## 2017-05-22 DIAGNOSIS — N41.0 ACUTE PROSTATITIS: ICD-10-CM

## 2017-05-22 LAB
BACTERIA #/AREA URNS AUTO: ABNORMAL /HPF
BASOPHILS # BLD AUTO: 0.02 K/UL
BASOPHILS NFR BLD: 0.1 %
BILIRUB UR QL STRIP: NEGATIVE
CLARITY UR REFRACT.AUTO: ABNORMAL
COLOR UR AUTO: ABNORMAL
DIFFERENTIAL METHOD: ABNORMAL
EOSINOPHIL # BLD AUTO: 0.2 K/UL
EOSINOPHIL NFR BLD: 1.1 %
ERYTHROCYTE [DISTWIDTH] IN BLOOD BY AUTOMATED COUNT: 14.4 %
GLUCOSE UR QL STRIP: NEGATIVE
HCT VFR BLD AUTO: 41.7 %
HGB BLD-MCNC: 14.3 G/DL
HGB UR QL STRIP: NEGATIVE
HYALINE CASTS UR QL AUTO: 26 /LPF
KETONES UR QL STRIP: ABNORMAL
LEUKOCYTE ESTERASE UR QL STRIP: ABNORMAL
LYMPHOCYTES # BLD AUTO: 2.5 K/UL
LYMPHOCYTES NFR BLD: 15 %
MCH RBC QN AUTO: 30.9 PG
MCHC RBC AUTO-ENTMCNC: 34.3 %
MCV RBC AUTO: 90 FL
MICROSCOPIC COMMENT: ABNORMAL
MONOCYTES # BLD AUTO: 1.6 K/UL
MONOCYTES NFR BLD: 9.6 %
NEUTROPHILS # BLD AUTO: 12.4 K/UL
NEUTROPHILS NFR BLD: 74.2 %
NITRITE UR QL STRIP: NEGATIVE
PH UR STRIP: 7 [PH] (ref 5–8)
PLATELET # BLD AUTO: 169 K/UL
PMV BLD AUTO: 11.4 FL
PROT UR QL STRIP: ABNORMAL
RBC # BLD AUTO: 4.63 M/UL
RBC #/AREA URNS AUTO: 7 /HPF (ref 0–4)
SP GR UR STRIP: 1.02 (ref 1–1.03)
TSH SERPL DL<=0.005 MIU/L-ACNC: 2.22 UIU/ML
URN SPEC COLLECT METH UR: ABNORMAL
UROBILINOGEN UR STRIP-ACNC: NEGATIVE EU/DL
VIT B12 SERPL-MCNC: 1147 PG/ML
WBC # BLD AUTO: 16.75 K/UL
WBC #/AREA URNS AUTO: >100 /HPF (ref 0–5)

## 2017-05-22 PROCEDURE — 3074F SYST BP LT 130 MM HG: CPT | Mod: S$GLB,,, | Performed by: INTERNAL MEDICINE

## 2017-05-22 PROCEDURE — 36415 COLL VENOUS BLD VENIPUNCTURE: CPT | Mod: PO

## 2017-05-22 PROCEDURE — 99499 UNLISTED E&M SERVICE: CPT | Mod: S$PBB,,, | Performed by: INTERNAL MEDICINE

## 2017-05-22 PROCEDURE — 84443 ASSAY THYROID STIM HORMONE: CPT

## 2017-05-22 PROCEDURE — 99999 PR PBB SHADOW E&M-EST. PATIENT-LVL III: CPT | Mod: PBBFAC,,, | Performed by: INTERNAL MEDICINE

## 2017-05-22 PROCEDURE — 1126F AMNT PAIN NOTED NONE PRSNT: CPT | Mod: S$GLB,,, | Performed by: INTERNAL MEDICINE

## 2017-05-22 PROCEDURE — 1160F RVW MEDS BY RX/DR IN RCRD: CPT | Mod: S$GLB,,, | Performed by: INTERNAL MEDICINE

## 2017-05-22 PROCEDURE — 3044F HG A1C LEVEL LT 7.0%: CPT | Mod: S$GLB,,, | Performed by: INTERNAL MEDICINE

## 2017-05-22 PROCEDURE — 1159F MED LIST DOCD IN RCRD: CPT | Mod: S$GLB,,, | Performed by: INTERNAL MEDICINE

## 2017-05-22 PROCEDURE — 85025 COMPLETE CBC W/AUTO DIFF WBC: CPT | Mod: PO

## 2017-05-22 PROCEDURE — 99214 OFFICE O/P EST MOD 30 MIN: CPT | Mod: S$GLB,,, | Performed by: INTERNAL MEDICINE

## 2017-05-22 PROCEDURE — 3066F NEPHROPATHY DOC TX: CPT | Mod: S$GLB,,, | Performed by: INTERNAL MEDICINE

## 2017-05-22 PROCEDURE — 3078F DIAST BP <80 MM HG: CPT | Mod: S$GLB,,, | Performed by: INTERNAL MEDICINE

## 2017-05-22 PROCEDURE — 82607 VITAMIN B-12: CPT

## 2017-05-22 PROCEDURE — 83036 HEMOGLOBIN GLYCOSYLATED A1C: CPT

## 2017-05-22 RX ORDER — DOXYCYCLINE HYCLATE 100 MG/1
100 TABLET, DELAYED RELEASE ORAL 2 TIMES DAILY
Qty: 28 TABLET | Refills: 0 | Status: SHIPPED | OUTPATIENT
Start: 2017-05-22 | End: 2017-05-25

## 2017-05-22 NOTE — PROGRESS NOTES
REASON FOR VISIT:  This is a 71-year-old male who since yesterday has been   having dysuria, urinary frequency, and last night he had a fever of 100.4.  For   a week he was having right low back pain that was localized.  No abdominal pain.        Lately he has been noticing unsteadiness on his feet.  He knows that he has   gained weight.  The only numbness he has is involving his feet.  There is no   knee or leg pain.  He does feel that lately his diabetes has gotten a little bit   worse with glucose readings in the 160s, where normally it is in the 120s.    Actually he has been doing well for a while, but it also appears that his   NovoLog insulin has changed some.    PAST MEDICAL HISTORY:  Type 2 diabetes associated with peripheral neuropathy and chronic kidney disease   stage III.  Chronic atrial fibrillation.  Hypertension.  Hyperlipidemia.  Left total knee replacement.  Sleep apnea with the use of CPAP.  Chronic venous insufficiency of the legs.  BPH.  Overactive bladder.    CURRENT MEDICINES:  Fenofibrate 145 mg daily.  Eliquis 5 mg twice a day.  Lasix 40 mg a day.  Gabapentin 300 mg three times a day.  Lantus 40 units at night.  NovoLog 13 units with breakfast, 15 units with lunch and dinner.  Levothyroxine 0.025 mg.  Losartan 50 mg.  Metoprolol  mg.  Oxybutynin 5 mg.  Pravastatin 10 mg.  Tamsulosin 0.4 mg.    PHYSICAL EXAMINATION:  VITAL SIGNS:  Weight 296 pounds, temperature is 97.9, pulse 88, blood pressure   118/72.  LUNGS:  Clear.  HEART:  Irregularly irregular.  No murmurs.  ABDOMEN:  Soft, nontender.  NEUROMUSCULAR:  He has abnormal Romberg testing and 2-3+ edema.    IMPRESSION:  1.  Urinary tract infection with prostatitis.  2.  Type 2 diabetes with peripheral neuropathy.  3.  Type 2 diabetes with chronic kidney disease.  4.  Gait instability.    PLAN:  We will need to get gun up-to-date on certain labs.  We will get a CBC,   hemoglobin A1c, and TSH level.  We will start him on doxycycline 100  mg twice a   day for two weeks.  We will get a urine for UA, urine C and S.    /sc [id number] review      ANTONIETTA/EMPERATRIZ  dd: 05/22/2017 12:02:47 (CDT)  td: 05/23/2017 09:12:55 (CDT)  Doc ID   #[id number]  Job ID #[id number]    CC:

## 2017-05-23 LAB
ESTIMATED AVG GLUCOSE: 171 MG/DL
HBA1C MFR BLD HPLC: 7.6 %

## 2017-05-25 ENCOUNTER — DOCUMENTATION ONLY (OUTPATIENT)
Dept: INTERNAL MEDICINE | Facility: CLINIC | Age: 72
End: 2017-05-25

## 2017-05-25 ENCOUNTER — TELEPHONE (OUTPATIENT)
Dept: INTERNAL MEDICINE | Facility: CLINIC | Age: 72
End: 2017-05-25

## 2017-05-25 ENCOUNTER — PATIENT MESSAGE (OUTPATIENT)
Dept: INTERNAL MEDICINE | Facility: CLINIC | Age: 72
End: 2017-05-25

## 2017-05-25 LAB — BACTERIA UR CULT: NORMAL

## 2017-05-25 RX ORDER — CIPROFLOXACIN 500 MG/1
500 TABLET ORAL 2 TIMES DAILY
Qty: 28 TABLET | Refills: 0 | Status: SHIPPED | OUTPATIENT
Start: 2017-05-25 | End: 2017-06-08

## 2017-05-25 NOTE — PROGRESS NOTES
The urine culture noted    Will need to change to ciprofloxacin from doxycyline    Regarding diabetic status , he needs to get back on his no starch diet and go back to the original novolog of 15-18-18 units

## 2017-05-30 ENCOUNTER — HOSPITAL ENCOUNTER (OUTPATIENT)
Dept: CARDIOLOGY | Facility: CLINIC | Age: 72
Discharge: HOME | End: 2017-05-30
Payer: MEDICARE

## 2017-05-30 ENCOUNTER — OFFICE VISIT (OUTPATIENT)
Dept: NEPHROLOGY | Facility: CLINIC | Age: 72
End: 2017-05-30
Payer: MEDICARE

## 2017-05-30 VITALS
SYSTOLIC BLOOD PRESSURE: 138 MMHG | OXYGEN SATURATION: 94 % | HEART RATE: 85 BPM | BODY MASS INDEX: 42.01 KG/M2 | WEIGHT: 293.44 LBS | DIASTOLIC BLOOD PRESSURE: 82 MMHG | HEIGHT: 70 IN

## 2017-05-30 DIAGNOSIS — I10 ESSENTIAL HYPERTENSION: ICD-10-CM

## 2017-05-30 DIAGNOSIS — I48.20 CHRONIC ATRIAL FIBRILLATION: ICD-10-CM

## 2017-05-30 DIAGNOSIS — G47.30 SLEEP APNEA: ICD-10-CM

## 2017-05-30 DIAGNOSIS — I12.9 BENIGN HYPERTENSIVE RENAL DISEASE WITHOUT RENAL FAILURE: ICD-10-CM

## 2017-05-30 DIAGNOSIS — E66.01 MORBID OBESITY WITH BMI OF 40.0-44.9, ADULT: ICD-10-CM

## 2017-05-30 DIAGNOSIS — N39.0 URINARY TRACT INFECTION WITHOUT HEMATURIA, SITE UNSPECIFIED: ICD-10-CM

## 2017-05-30 DIAGNOSIS — N18.30 CHRONIC KIDNEY DISEASE, STAGE III (MODERATE): Primary | ICD-10-CM

## 2017-05-30 DIAGNOSIS — E11.22 TYPE 2 DIABETES MELLITUS WITH DIABETIC CHRONIC KIDNEY DISEASE, UNSPECIFIED CKD STAGE, UNSPECIFIED LONG TERM INSULIN USE STATUS: ICD-10-CM

## 2017-05-30 DIAGNOSIS — N28.1 ACQUIRED CYST OF KIDNEY: ICD-10-CM

## 2017-05-30 LAB
DIASTOLIC DYSFUNCTION: NO
ESTIMATED PA SYSTOLIC PRESSURE: 28
RETIRED EF AND QEF - SEE NOTES: 55 (ref 55–65)
TRICUSPID VALVE REGURGITATION: NORMAL

## 2017-05-30 PROCEDURE — 3066F NEPHROPATHY DOC TX: CPT | Mod: S$GLB,,, | Performed by: INTERNAL MEDICINE

## 2017-05-30 PROCEDURE — 1126F AMNT PAIN NOTED NONE PRSNT: CPT | Mod: S$GLB,,, | Performed by: INTERNAL MEDICINE

## 2017-05-30 PROCEDURE — 99214 OFFICE O/P EST MOD 30 MIN: CPT | Mod: S$GLB,,, | Performed by: INTERNAL MEDICINE

## 2017-05-30 PROCEDURE — 93306 TTE W/DOPPLER COMPLETE: CPT | Mod: S$GLB,,, | Performed by: INTERNAL MEDICINE

## 2017-05-30 PROCEDURE — 99499 UNLISTED E&M SERVICE: CPT | Mod: S$PBB,,, | Performed by: INTERNAL MEDICINE

## 2017-05-30 PROCEDURE — 1159F MED LIST DOCD IN RCRD: CPT | Mod: S$GLB,,, | Performed by: INTERNAL MEDICINE

## 2017-05-30 PROCEDURE — 3045F PR MOST RECENT HEMOGLOBIN A1C LEVEL 7.0-9.0%: CPT | Mod: S$GLB,,, | Performed by: INTERNAL MEDICINE

## 2017-05-30 PROCEDURE — 99999 PR PBB SHADOW E&M-EST. PATIENT-LVL III: CPT | Mod: PBBFAC,,, | Performed by: INTERNAL MEDICINE

## 2017-05-30 NOTE — PROGRESS NOTES
Subjective:       Patient ID: [facility] [place] Cant[place] Jr. is a 71 y.o. White male who presents for follow-up evaluation of CKD.     HPI     Mr. Cantor was seen in nephrology clinic for follow up on CKD. Pt was previously being followed by Dr. Mcallister and was last seen by him on 1/20/16. He established care with me on 7/28/16, last followed on 12/15/16. He has CKD III, diabetes, hypertension, HEIDI, PAF, chronic anticoagulation, morbid obesity, hyperlipidemia.    Lab trend noted for CKD since 2006. He has had episodes of SIL, hyperkalemia. Most recently labs from 5/12/17 noted for Na 140, K 4.8, bicarbonate 29, BUN 26, creatinine 1.4, eGFR 49.5, Ca 9.8, phos 3, albumin 3.4, vit D levels 31, PTH 40. Hep C/B screen is negative. Prior US from 7/15 noted for 11.5 and 13 cm kidneys and small cyst on right. His screening for hep C/B is negative.     Recently he had urinalysis and urine culture as obtained by his PCP. It showed cloudy urine, 3+ LE, 7 RBC, > 100 WBC and many bacteria. Urine culture grew providencia. Per his PCP, he has been started on Cipro. Pt states he had burning urination and very cloudy urine which is now clearing. He is advised 14 days of antibiotics.     He has no uremic symptoms and otherwise is in his usual state of health. He denies any nausea/ vomiting/ diarrhea/ flank pain. He has longstanding lymphedema. He has been on losartan. He was referred to urologist Dr. Montero due to complex cyst seen on US. Per her, it appears to be benign and he does not need any further follow up. He underwent cystoscopy in 4/17 which did not show any bladder lesion or tumor.     Review of Systems   Constitutional: Negative for appetite change, chills and fever.   HENT: Negative for sneezing and sore throat.    Eyes: Negative for visual disturbance.   Respiratory: Negative for cough, shortness of breath and wheezing.    Cardiovascular: Positive for leg swelling. Negative for chest pain.   Gastrointestinal: Negative for  abdominal pain, diarrhea, nausea and vomiting.   Endocrine: Negative for polyuria.   Genitourinary: Negative for decreased urine volume, difficulty urinating, dysuria, frequency and hematuria.   Musculoskeletal: Positive for back pain and gait problem.   Skin: Negative for pallor and rash.   Allergic/Immunologic: Negative for immunocompromised state.   Neurological: Negative for dizziness and headaches.   Psychiatric/Behavioral: Negative for behavioral problems. The patient is not nervous/anxious.        Objective:      Physical Exam   Constitutional: He is oriented to person, place, and time. He appears well-developed. No distress.   HENT:   Mouth/Throat: Oropharynx is clear and moist.   Eyes: Conjunctivae are normal. Right eye exhibits no discharge. Left eye exhibits no discharge.   Neck: Neck supple.   Cardiovascular: Normal rate and normal heart sounds.    irregular   Pulmonary/Chest: Effort normal and breath sounds normal. No respiratory distress. He has no wheezes.   Abdominal: Soft. Distention: obese. There is no tenderness.   Musculoskeletal: He exhibits edema (2+ BLE).   Lymphedema    Neurological: He is alert and oriented to person, place, and time.   Skin: Skin is warm and dry.   Psychiatric: He has a normal mood and affect. His behavior is normal.   Vitals reviewed.      Assessment:       1. Chronic kidney disease, stage III (moderate)    2. Benign hypertensive renal disease without renal failure    3. Acquired cyst of kidney    4. Type 2 diabetes mellitus with diabetic chronic kidney disease, unspecified CKD stage, unspecified long term insulin use status    5. Essential hypertension    6. Urinary tract infection without hematuria, site unspecified        Plan:     Mr. Cantor has CKD III from longstanding hypertension, diabetes, prior multiple SIL, morbid obesity. Labs, CKD staging, potential risk of progression, need for strict glycemic and BP control, weight loss by calorie restriction, avoiding  NSAID is important to prevent CKD progression. This was d/w pt in detail.    We did discuss to avoid multivitamin type preparation as it can have calcium and other minerals. Most recent slight worsening of labs could be in the setting of UTI/ pyelonephritis. He is being treated for UTI per his PCP. He has again mild hypercalcemia. Will repeat BMP in 1-2 weeks to confirm electrolytes are improving.     Plan  - periodically monitor renal panel for electrolytes, acid base status, eGFR  - risk of CKD progression d/w patient  - low salt diet  - follow PTH levels, vit D  - follow urine studies for proteinuria  - avoid NSAID/ bactrim/ IV contrast/ gadolinium/ aminoglycoside where possible. Avoid Toradol use.   - continue losartan containing regimen for RAAS blockade  - avoid high K containing foods, avoid multivitamin preparations with Ca as he has mild hypercalcemia based on corrected Ca levels   - management of atrial fibrillation and anticoagulation per his cardiologist    Labs, plan discussed with patient in detail.   RTC 4 months

## 2017-06-02 ENCOUNTER — OFFICE VISIT (OUTPATIENT)
Dept: INTERNAL MEDICINE | Facility: CLINIC | Age: 72
End: 2017-06-02
Payer: MEDICARE

## 2017-06-02 VITALS
SYSTOLIC BLOOD PRESSURE: 122 MMHG | BODY MASS INDEX: 42.61 KG/M2 | HEIGHT: 70 IN | HEART RATE: 77 BPM | DIASTOLIC BLOOD PRESSURE: 73 MMHG | WEIGHT: 297.63 LBS

## 2017-06-02 DIAGNOSIS — Z79.4 TYPE 2 DIABETES MELLITUS WITH DIABETIC POLYNEUROPATHY, WITH LONG-TERM CURRENT USE OF INSULIN: ICD-10-CM

## 2017-06-02 DIAGNOSIS — E11.42 TYPE 2 DIABETES MELLITUS WITH DIABETIC POLYNEUROPATHY, WITH LONG-TERM CURRENT USE OF INSULIN: ICD-10-CM

## 2017-06-02 DIAGNOSIS — Z79.4 TYPE 2 DIABETES MELLITUS WITH STAGE 3 CHRONIC KIDNEY DISEASE, WITH LONG-TERM CURRENT USE OF INSULIN: Primary | ICD-10-CM

## 2017-06-02 DIAGNOSIS — I10 ESSENTIAL HYPERTENSION: ICD-10-CM

## 2017-06-02 DIAGNOSIS — G47.33 OBSTRUCTIVE SLEEP APNEA SYNDROME: ICD-10-CM

## 2017-06-02 DIAGNOSIS — I87.2 VENOUS INSUFFICIENCY OF BOTH LOWER EXTREMITIES: ICD-10-CM

## 2017-06-02 DIAGNOSIS — N18.30 TYPE 2 DIABETES MELLITUS WITH STAGE 3 CHRONIC KIDNEY DISEASE, WITH LONG-TERM CURRENT USE OF INSULIN: Primary | ICD-10-CM

## 2017-06-02 DIAGNOSIS — N40.0 BENIGN NON-NODULAR PROSTATIC HYPERPLASIA WITHOUT LOWER URINARY TRACT SYMPTOMS: ICD-10-CM

## 2017-06-02 DIAGNOSIS — M70.61 TROCHANTERIC BURSITIS OF RIGHT HIP: ICD-10-CM

## 2017-06-02 DIAGNOSIS — I48.20 CHRONIC ATRIAL FIBRILLATION: ICD-10-CM

## 2017-06-02 DIAGNOSIS — E11.22 TYPE 2 DIABETES MELLITUS WITH STAGE 3 CHRONIC KIDNEY DISEASE, WITH LONG-TERM CURRENT USE OF INSULIN: Primary | ICD-10-CM

## 2017-06-02 DIAGNOSIS — N41.0 ACUTE PROSTATITIS: ICD-10-CM

## 2017-06-02 DIAGNOSIS — R97.20 PSA ELEVATION: ICD-10-CM

## 2017-06-02 PROCEDURE — 3066F NEPHROPATHY DOC TX: CPT | Mod: S$GLB,,, | Performed by: INTERNAL MEDICINE

## 2017-06-02 PROCEDURE — 1125F AMNT PAIN NOTED PAIN PRSNT: CPT | Mod: S$GLB,,, | Performed by: INTERNAL MEDICINE

## 2017-06-02 PROCEDURE — 1159F MED LIST DOCD IN RCRD: CPT | Mod: S$GLB,,, | Performed by: INTERNAL MEDICINE

## 2017-06-02 PROCEDURE — 99214 OFFICE O/P EST MOD 30 MIN: CPT | Mod: S$GLB,,, | Performed by: INTERNAL MEDICINE

## 2017-06-02 PROCEDURE — 3045F PR MOST RECENT HEMOGLOBIN A1C LEVEL 7.0-9.0%: CPT | Mod: S$GLB,,, | Performed by: INTERNAL MEDICINE

## 2017-06-02 PROCEDURE — 99999 PR PBB SHADOW E&M-EST. PATIENT-LVL III: CPT | Mod: PBBFAC,,, | Performed by: INTERNAL MEDICINE

## 2017-06-02 PROCEDURE — 99499 UNLISTED E&M SERVICE: CPT | Mod: S$PBB,,, | Performed by: INTERNAL MEDICINE

## 2017-06-02 NOTE — PROGRESS NOTES
Answers for HPI/ROS submitted by the patient on 5/31/2017   activity change: No  unexpected weight change: No  neck pain: No  hearing loss: Yes  rhinorrhea: No  trouble swallowing: No  eye discharge: No  visual disturbance: Yes  chest tightness: No  wheezing: No  chest pain: No  palpitations: No  blood in stool: No  constipation: No  vomiting: No  diarrhea: No  polydipsia: No  polyuria: No  difficulty urinating: Yes  urgency: No  hematuria: No  joint swelling: No  arthralgias: No  headaches: No  weakness: No  confusion: No  dysphoric mood: No  REASON FOR VISIT:  This is a 71-year-old male who comes in for a regular routine   visit.    In reference to his last visit, he is responding well to the use of   ciprofloxacin for treatment of his prostatitis.  He is not having the dysuria,   frequency, urgency, or nocturia.  Actually his urine flow is good, and he may   only get up one time at night.    He has had multiple appointments with various specialists within the past year.    He recently followed up with Nephrology regarding chronic kidney disease, stage   III.  There was no change in medication.    He has an upcoming appointment with Arrhythmia regarding his chronic atrial   fibrillation, but he has been maintained on Toprol and Eliquis.    A month ago, he followed up with Orthopedics regarding his right total knee   arthroplasty that he had back in January, and at that time they felt everything   was fine.  He also received injection for right trochanteric bursitis.    In April he underwent a cystoscopy, which was normal other than BPH for   evaluation of microscopic hematuria.    He saw Endocrinology back in March where his NovoLog was adjusted, but after his   visit with me last time, we decided to go back to the original NovoLog dose of   15 units in the morning, 17 units with supper, and 17 units with lunch.    Last year he met with Vascular Surgery regarding chronic venous insufficiency.    He does have  reflux on the left leg.  He also has seen Wound Care regarding a   skin ulceration on the left leg which has healed.  Recommended to use   compression stockings, but he has not able to do so because he cannot bend over   to put them on.    In March he followed up with Podiatry and his toenails were cut.  He was given   something for tinea pedis.    [facility].  He used to   be maintained on CPAP 10 cm.  He is admitting that he does not take it every day   because he gets rhinorrhea.    PAST MEDICAL HISTORY:  Type 2 diabetes with suboptimal control.  Peripheral neuropathy.  Chronic kidney disease stage III.  Hypertension.  Hyperlipidemia.  Chronic atrial fibrillation.  Sleep apnea with previous septoplasty, UPPP procedure.  Left and right total knee replacement.  Left shoulder surgery.  Prior history of Meniere's disease.  Lumbar degenerative disk disease.  History of nephrolithiasis.  Status post eyelid surgery for removal of ectropion.    SOCIAL HISTORY:  Tobacco and alcohol use - none.  Exercise is limited.    MEDICATIONS:  Eliquis 5 mg twice a day.  Aspirin 325 mg a day  Ciprofloxacin 500 mg twice a day  Fenofibrate 145 mg a day.  Flonase as needed.  Lasix 40 mg a day.  Lantus 40 units a day.  NovoLog 15, 17, and 17.  Levothyroxine 0.025 mg.  Losartan 50 mg.  Metoprolol  mg.  Ditropan XL 5 mg.  Pravastatin 10 mg.    RECENT LABS:  Hemoglobin A1c 7.7, has gone up some.  CBC normal.  Chemistry   normal except glucose 151 and creatinine 1.7.  Cholesterol 159, triglycerides   197, LDL 97.  TSH normal.  PSA came back at 8.3; a year ago it was 1.7, but he   is being treated for prostatitis.    REVIEW OF SYSTEMS:  No chest pain, shortness of breath, or abdominal pain.    Regular bowel function.  No chronic headaches.  No heartburn or indigestion.    Does have chronic edema.  He is feeling a little bit of trochanteric pain on the   right and a week ago he had a  fall, twisted his right knee, and is feeling a   discomfort on the medial aspect, although he feels that it is getting better.    PHYSICAL EXAMINATION:  VITAL SIGNS:  Weight is 297, pulse 76, blood pressure by me 122/72.  HEENT:  Tympanic membranes normal.  Nasal mucosa is clear.  Oropharynx, no   abnormal findings.  NECK:  No thyromegaly.  LUNGS:  Clear.  HEART:  Irregularly irregular.  No murmurs or gallops.  ABDOMEN:  Active bowel sounds, soft, nontender.  No hepatosplenomegaly or   abdominal masses.  PULSES:  2+ carotid pulses.  No bruits.  1+ pedal pulses.  EXTREMITIES:  There is 2-3+ edema.  Onychomycosis changes on the feet.  RECTAL:  Stool is brown, heme negative.  Prostate minimally enlarged.  MUSCULOSKELETAL:  Surgical scar on both knees.  No pain with flexion and   extension of leg at the knee joint.  The patient does not really display   tenderness over the greater trochanter, other than when he is standing up.    IMPRESSION:  1.  Type 2 diabetes associated with chronic kidney disease and diabetic   neuropathy.  2.  Hypertension.  3.  Hyperlipidemia.  4.  Lower extremity edema due to chronic venous insufficiency.  5.  Obstructive sleep apnea.  6.  Trochanteric bursitis.    PLAN:  We will probably extend the ciprofloxacin for a total of three weeks and   then afterwards repeat a PSA test in four weeks, and at that time we will repeat   a hemoglobin A1c.  Regular physical activity was discussed, but for the time   being he will be continued with current medications.  He should be having an   upcoming appointment with Ophthalmology and Cardiology.    /sc [id number] review      JAM/IN  dd: 06/02/2017 10:31:39 (CDT)  td: 06/03/2017 07:11:15 (CDSUSAN)  Doc ID   #[id number]  Job ID #[id number]    CC:

## 2017-06-09 ENCOUNTER — OFFICE VISIT (OUTPATIENT)
Dept: PODIATRY | Facility: CLINIC | Age: 72
End: 2017-06-09
Payer: MEDICARE

## 2017-06-09 VITALS
WEIGHT: 295.19 LBS | HEART RATE: 88 BPM | BODY MASS INDEX: 42.26 KG/M2 | DIASTOLIC BLOOD PRESSURE: 69 MMHG | SYSTOLIC BLOOD PRESSURE: 101 MMHG | HEIGHT: 70 IN

## 2017-06-09 DIAGNOSIS — E11.42 DIABETIC POLYNEUROPATHY ASSOCIATED WITH TYPE 2 DIABETES MELLITUS: Primary | ICD-10-CM

## 2017-06-09 DIAGNOSIS — E11.51 ONYCHOMYCOSIS OF MULTIPLE TOENAILS WITH TYPE 2 DIABETES MELLITUS AND PERIPHERAL ANGIOPATHY: ICD-10-CM

## 2017-06-09 DIAGNOSIS — E11.69 ONYCHOMYCOSIS OF MULTIPLE TOENAILS WITH TYPE 2 DIABETES MELLITUS AND PERIPHERAL ANGIOPATHY: ICD-10-CM

## 2017-06-09 DIAGNOSIS — B35.1 ONYCHOMYCOSIS OF MULTIPLE TOENAILS WITH TYPE 2 DIABETES MELLITUS AND PERIPHERAL ANGIOPATHY: ICD-10-CM

## 2017-06-09 DIAGNOSIS — I89.0 LYMPHEDEMA OF BOTH LOWER EXTREMITIES: ICD-10-CM

## 2017-06-09 PROCEDURE — 11721 DEBRIDE NAIL 6 OR MORE: CPT | Mod: Q9,S$GLB,, | Performed by: PODIATRIST

## 2017-06-09 PROCEDURE — 99999 PR PBB SHADOW E&M-EST. PATIENT-LVL II: CPT | Mod: PBBFAC,,, | Performed by: PODIATRIST

## 2017-06-09 PROCEDURE — 99499 UNLISTED E&M SERVICE: CPT | Mod: S$PBB,,, | Performed by: PODIATRIST

## 2017-06-09 PROCEDURE — 99499 UNLISTED E&M SERVICE: CPT | Mod: S$GLB,,, | Performed by: PODIATRIST

## 2017-06-09 NOTE — PROGRESS NOTES
Subjective:      Patient ID: Stephen Cantor Jr. is a 71 y.o. male.    Chief Complaint: Routine Foot Care (bilateral foot ) and Diabetes Mellitus (pcp 6/2/2017 ruy )    Stephen is a 71 y.o. male who presents to the clinic for evaluation and treatment of high risk feet. Stephen has a past medical history of *Atrial fibrillation; Anticoagulant long-term use; Basal cell carcinoma (12/2015); BCC (basal cell carcinoma) (12/2015); Cataract; Chronic kidney disease; Diabetes mellitus with renal manifestations, uncontrolled; Dyslipidemia associated with type 2 diabetes mellitus; Fever blister; HTN (hypertension); Keloid cicatrix; Melanoma (12/07/2015); Obesity; PN (peripheral neuropathy); Primary osteoarthritis of right knee (1/25/2017); Screening for colon cancer (3/3/2016); Sleep apnea; and Type II or unspecified type diabetes mellitus with other specified manifestations, uncontrolled. The patient's chief complaint is thick, fungal toenails. He is not able to cut them and his wife fears cutting him. Now wearing circaid type of compression stocking. Ulcer remains healed.   This patient has documented high risk feet requiring routine maintenance secondary to diabetes mellitis and those secondary complications of diabetes, as mentioned..    PCP: Desmond Barlow MD      Chief Complaint   Patient presents with    Routine Foot Care     bilateral foot     Diabetes Mellitus     pcp 6/2/2017 ruy         Current shoe gear:  Affected Foot: Rx diabetic extra depth shoes and custom accommodative insoles     Unaffected Foot: Rx diabetic extra depth shoes and custom accommodative insoles    Hemoglobin A1C   Date Value Ref Range Status   05/30/2017 7.7 (H) 4.5 - 6.2 % Final     Comment:     According to ADA guidelines, hemoglobin A1C <7.0% represents  optimal control in non-pregnant diabetic patients.  Different  metrics may apply to specific populations.   Standards of Medical Care in Diabetes - 2016.  For the purpose of screening for the  presence of diabetes:  <5.7%     Consistent with the absence of diabetes  5.7-6.4%  Consistent with increasing risk for diabetes   (prediabetes)  >or=6.5%  Consistent with diabetes  Currently no consensus exists for use of hemoglobin A1C  for diagnosis of diabetes for children.     05/22/2017 7.6 (H) 4.5 - 6.2 % Final     Comment:     According to ADA guidelines, hemoglobin A1C <7.0% represents  optimal control in non-pregnant diabetic patients.  Different  metrics may apply to specific populations.   Standards of Medical Care in Diabetes - 2016.  For the purpose of screening for the presence of diabetes:  <5.7%     Consistent with the absence of diabetes  5.7-6.4%  Consistent with increasing risk for diabetes   (prediabetes)  >or=6.5%  Consistent with diabetes  Currently no consensus exists for use of hemoglobin A1C  for diagnosis of diabetes for children.     03/06/2017 6.2 4.5 - 6.2 % Final     Comment:     According to ADA guidelines, hemoglobin A1C <7.0% represents  optimal control in non-pregnant diabetic patients.  Different  metrics may apply to specific populations.   Standards of Medical Care in Diabetes - 2016.  For the purpose of screening for the presence of diabetes:  <5.7%     Consistent with the absence of diabetes  5.7-6.4%  Consistent with increasing risk for diabetes   (prediabetes)  >or=6.5%  Consistent with diabetes  Currently no consensus exists for use of hemoglobin A1C  for diagnosis of diabetes for children.         Review of Systems   Constitution: Negative for chills, fever and night sweats.   Cardiovascular: Negative for chest pain and leg swelling.   Respiratory: Positive for sleep disturbances due to breathing (sleep apnea. Uses mouthguard). Negative for cough and shortness of breath.    Skin: Positive for dry skin (uses lotion) and nail changes.   Musculoskeletal: Positive for arthritis, joint pain and joint swelling.   Gastrointestinal: Negative for diarrhea, nausea and vomiting.    Neurological: Positive for numbness (in feet) and paresthesias (in feet).           Objective:      Physical Exam   Constitutional: He is oriented to person, place, and time. He appears well-developed and well-nourished. No distress.   Cardiovascular: Normal rate and intact distal pulses.    Dorsalis pedis and posterior tibial pulses are palpable Bilaterally. Toes are cool to touch. Feet are warm proximally.There is decreased digital hair. Skin is atrophic, hyperpigmented, and edematous.     Musculoskeletal: Normal range of motion. He exhibits edema (moderate). He exhibits no tenderness.   Neurological: He is alert and oriented to person, place, and time. He exhibits normal muscle tone.   Cannon-Peyton 5.07 monofilamant testing is absent distal to metatarsal heads Gulshan feet. Light touch absent Bilaterally.     Skin: Skin is warm and dry. No rash noted. He is not diaphoretic. No erythema. No pallor.   Wound at posterior left ankle: healed. No signs of infection.     Toenails 1-5 R and L are elongated by 2-3 mm, thickened by 1-3 mm, discolored/yellowed, dystrophic, brittle with subungual debris.    Multiple telangiectasias bilateral.   Psychiatric: He has a normal mood and affect. His behavior is normal. Judgment and thought content normal.   Nursing note and vitals reviewed.            Assessment:       Encounter Diagnoses   Name Primary?    Diabetic polyneuropathy associated with type 2 diabetes mellitus Yes    Lymphedema of both lower extremities     Onychomycosis of multiple toenails with type 2 diabetes mellitus and peripheral angiopathy    This patient has documented high risk feet requiring routine maintenance secondary to diabetes mellitis and those secondary complications of diabetes, as mentioned.        Plan:       [facility] was seen today for routine foot care and diabetes mellitus.    Diagnoses and all orders for this visit:    Diabetic polyneuropathy associated with type 2 diabetes  mellitus    Lymphedema of both lower extremities    Onychomycosis of multiple toenails with type 2 diabetes mellitus and peripheral angiopathy    - Patient was given written and verbal instructions regarding foot condition.  I counseled the patient on his conditions, their implications and medical management.    - Shoe inspection. Diabetic Foot Education. Patient reminded of the importance of good nutrition and blood sugar control to help prevent podiatric complications of diabetes. Patient instructed on proper foot hygeine. We discussed wearing proper shoe gear, daily foot inspections, never walking without protective shoe gear, never putting sharp instruments to feet    - With patient's permission, nails were aggressively reduced and debrided x 10 to their soft tissue attachment mechanically and with electric , removing all offending nail and debris. Patient relates relief following the procedure. He will continue to monitor the areas daily, inspect her feet, wear protective shoe gear when ambulatory, moisturizer to maintain skin integrity and follow in this office in approximately 2-3 months, sooner p.r.n.    - Compression wraps 30-40 mmHg daily.

## 2017-06-22 ENCOUNTER — LAB VISIT (OUTPATIENT)
Dept: LAB | Facility: HOSPITAL | Age: 72
End: 2017-06-22
Payer: MEDICARE

## 2017-06-22 DIAGNOSIS — E11.22 TYPE 2 DIABETES MELLITUS WITH STAGE 3 CHRONIC KIDNEY DISEASE, WITH LONG-TERM CURRENT USE OF INSULIN: ICD-10-CM

## 2017-06-22 DIAGNOSIS — Z79.4 TYPE 2 DIABETES MELLITUS WITH STAGE 3 CHRONIC KIDNEY DISEASE, WITH LONG-TERM CURRENT USE OF INSULIN: ICD-10-CM

## 2017-06-22 DIAGNOSIS — N18.30 TYPE 2 DIABETES MELLITUS WITH STAGE 3 CHRONIC KIDNEY DISEASE, WITH LONG-TERM CURRENT USE OF INSULIN: ICD-10-CM

## 2017-06-22 LAB
ESTIMATED AVG GLUCOSE: 157 MG/DL
HBA1C MFR BLD HPLC: 7.1 %

## 2017-06-22 PROCEDURE — 36415 COLL VENOUS BLD VENIPUNCTURE: CPT

## 2017-06-22 PROCEDURE — 83036 HEMOGLOBIN GLYCOSYLATED A1C: CPT

## 2017-06-23 ENCOUNTER — OFFICE VISIT (OUTPATIENT)
Dept: ELECTROPHYSIOLOGY | Facility: CLINIC | Age: 72
End: 2017-06-23
Payer: MEDICARE

## 2017-06-23 VITALS
HEART RATE: 85 BPM | SYSTOLIC BLOOD PRESSURE: 116 MMHG | WEIGHT: 304 LBS | BODY MASS INDEX: 43.52 KG/M2 | HEIGHT: 70 IN | DIASTOLIC BLOOD PRESSURE: 66 MMHG

## 2017-06-23 DIAGNOSIS — I48.20 CHRONIC ATRIAL FIBRILLATION: Primary | ICD-10-CM

## 2017-06-23 DIAGNOSIS — Z79.4 TYPE 2 DIABETES MELLITUS WITH STAGE 3 CHRONIC KIDNEY DISEASE, WITH LONG-TERM CURRENT USE OF INSULIN: ICD-10-CM

## 2017-06-23 DIAGNOSIS — I10 ESSENTIAL HYPERTENSION: ICD-10-CM

## 2017-06-23 DIAGNOSIS — G47.33 OBSTRUCTIVE SLEEP APNEA SYNDROME: ICD-10-CM

## 2017-06-23 DIAGNOSIS — I48.0 PAF (PAROXYSMAL ATRIAL FIBRILLATION): ICD-10-CM

## 2017-06-23 DIAGNOSIS — N18.30 TYPE 2 DIABETES MELLITUS WITH STAGE 3 CHRONIC KIDNEY DISEASE, WITH LONG-TERM CURRENT USE OF INSULIN: ICD-10-CM

## 2017-06-23 DIAGNOSIS — E11.22 TYPE 2 DIABETES MELLITUS WITH STAGE 3 CHRONIC KIDNEY DISEASE, WITH LONG-TERM CURRENT USE OF INSULIN: ICD-10-CM

## 2017-06-23 DIAGNOSIS — Z79.01 LONG TERM CURRENT USE OF ANTICOAGULANT THERAPY: ICD-10-CM

## 2017-06-23 DIAGNOSIS — N18.30 CHRONIC KIDNEY DISEASE, STAGE III (MODERATE): ICD-10-CM

## 2017-06-23 PROCEDURE — 1159F MED LIST DOCD IN RCRD: CPT | Mod: S$GLB,,, | Performed by: INTERNAL MEDICINE

## 2017-06-23 PROCEDURE — 3066F NEPHROPATHY DOC TX: CPT | Mod: S$GLB,,, | Performed by: INTERNAL MEDICINE

## 2017-06-23 PROCEDURE — 99499 UNLISTED E&M SERVICE: CPT | Mod: S$PBB,,, | Performed by: INTERNAL MEDICINE

## 2017-06-23 PROCEDURE — 3045F PR MOST RECENT HEMOGLOBIN A1C LEVEL 7.0-9.0%: CPT | Mod: S$GLB,,, | Performed by: INTERNAL MEDICINE

## 2017-06-23 PROCEDURE — 99999 PR PBB SHADOW E&M-EST. PATIENT-LVL V: CPT | Mod: PBBFAC,,, | Performed by: INTERNAL MEDICINE

## 2017-06-23 PROCEDURE — 93000 ELECTROCARDIOGRAM COMPLETE: CPT | Mod: S$GLB,,, | Performed by: INTERNAL MEDICINE

## 2017-06-23 PROCEDURE — 99214 OFFICE O/P EST MOD 30 MIN: CPT | Mod: S$GLB,,, | Performed by: INTERNAL MEDICINE

## 2017-06-23 PROCEDURE — 1126F AMNT PAIN NOTED NONE PRSNT: CPT | Mod: S$GLB,,, | Performed by: INTERNAL MEDICINE

## 2017-06-23 RX ORDER — LOSARTAN POTASSIUM 25 MG/1
25 TABLET ORAL DAILY
Qty: 90 TABLET | Refills: 3 | Status: SHIPPED | OUTPATIENT
Start: 2017-06-23 | End: 2018-02-07 | Stop reason: SDUPTHER

## 2017-06-23 NOTE — PROGRESS NOTES
"Subjective:    Patient ID:  [facility] [place] [facility] PAT Cantor Jr. is a 71 y.o. male who presents for follow-up of Atrial Fibrillation    Atrial Fibrillation   Symptoms are negative for chest pain, dizziness, palpitations, shortness of breath, syncope and weakness. Past medical history includes atrial fibrillation.    Comments:   71 y.o.  male  PAF, asymptomatic, chronic, on anticoag  HEIDI, stable, with CPAP  Cr fluctuating; CKD III      Mr. Cantor underwent a DCCV (01/02/14). Following this, he wore an event monitor, which showed pAF with no change in symptoms. His rate was well controlled.     Continues to do well, except on occasion he gets "drained out" feeling / "run down." At those times, his wrist SBP measurement is 60s-70s.     echo 55%. mod LAE.    I reviewed today's ECG which demonstrated AF at 80 bpm    Review of Systems   Constitution: Negative for decreased appetite, diaphoresis and weakness.   HENT: Negative.  Negative for congestion, ear pain, headaches, nosebleeds and tinnitus.    Eyes: Negative for blurred vision and double vision.   Cardiovascular: Negative for chest pain, claudication, cyanosis, irregular heartbeat, leg swelling, near-syncope, orthopnea, palpitations, paroxysmal nocturnal dyspnea and syncope.   Respiratory: Negative.  Negative for cough, hemoptysis, shortness of breath, sleep disturbances due to breathing, snoring, sputum production and wheezing.    Endocrine: Negative.  Negative for polyuria.   Hematologic/Lymphatic: Does not bruise/bleed easily.   Skin: Negative.  Negative for rash.   Musculoskeletal: Negative.  Negative for joint pain and muscle weakness.   Gastrointestinal: Negative.  Negative for abdominal pain, change in bowel habit, hematemesis, hematochezia, nausea and vomiting.   Genitourinary: Negative for frequency.   Neurological: Negative.  Negative for dizziness and light-headedness.   Psychiatric/Behavioral: Negative.  Negative for altered mental status and depression. The patient is " not nervous/anxious.    Allergic/Immunologic: Negative for environmental allergies.        Objective:    Physical Exam   Constitutional: He is oriented to person, place, and time. He appears well-developed and well-nourished.   HENT:   Head: Normocephalic and atraumatic.   Eyes: Conjunctivae, EOM and lids are normal. Pupils are equal, round, and reactive to light. No scleral icterus.   Neck: Normal range of motion. Neck supple. No JVD present. No tracheal deviation present. No thyromegaly present.   Cardiovascular: Normal rate, normal heart sounds and intact distal pulses.  An irregularly irregular rhythm present.  No extrasystoles are present. PMI is not displaced.  Exam reveals no gallop and no friction rub.    No murmur heard.  Pulses:       Radial pulses are 2+ on the right side, and 2+ on the left side.   Pulmonary/Chest: Effort normal and breath sounds normal. No accessory muscle usage. No tachypnea. No respiratory distress. He has no wheezes. He has no rales.   Abdominal: Soft. Bowel sounds are normal. He exhibits no distension. There is no hepatosplenomegaly. There is no tenderness.   Musculoskeletal: Normal range of motion. He exhibits no edema.   Neurological: He is alert and oriented to person, place, and time. He has normal reflexes. He exhibits normal muscle tone.   Skin: Skin is warm and dry. No rash noted.   Psychiatric: He has a normal mood and affect. His behavior is normal.   Nursing note and vitals reviewed.        Assessment:       1. Chronic atrial fibrillation    2. Essential hypertension    3. Obstructive sleep apnea syndrome    4. Chronic kidney disease, stage III (moderate)    5. Type 2 diabetes mellitus with stage 3 chronic kidney disease, with long-term current use of insulin    6. Long term current use of anticoagulant therapy         Plan:       Mr. Cantor is doing well from a rhythm perspective; rate-controlled on Toprol-XL and anticoagulated on Eliquis.  Relatively hypotensive, with  episodes of hypotension per HPI.  Decrease ARB. Split meds: BB in AM, ARB in PM.    Return in 1 year with echo, or earlier prn.

## 2017-06-26 DIAGNOSIS — N18.30 CHRONIC KIDNEY DISEASE, STAGE III (MODERATE): ICD-10-CM

## 2017-06-26 DIAGNOSIS — I48.20 CHRONIC ATRIAL FIBRILLATION: ICD-10-CM

## 2017-06-26 DIAGNOSIS — I50.31 ACUTE DIASTOLIC HEART FAILURE: ICD-10-CM

## 2017-06-26 DIAGNOSIS — I48.0 PAF (PAROXYSMAL ATRIAL FIBRILLATION): Primary | ICD-10-CM

## 2017-06-26 DIAGNOSIS — M17.11 PRIMARY OSTEOARTHRITIS OF RIGHT KNEE: ICD-10-CM

## 2017-06-26 DIAGNOSIS — I10 ESSENTIAL HYPERTENSION: ICD-10-CM

## 2017-06-26 DIAGNOSIS — Z79.4 TYPE 2 DIABETES MELLITUS WITH STAGE 3 CHRONIC KIDNEY DISEASE, WITH LONG-TERM CURRENT USE OF INSULIN: ICD-10-CM

## 2017-06-26 DIAGNOSIS — E11.22 TYPE 2 DIABETES MELLITUS WITH STAGE 3 CHRONIC KIDNEY DISEASE, WITH LONG-TERM CURRENT USE OF INSULIN: ICD-10-CM

## 2017-06-26 DIAGNOSIS — N18.30 TYPE 2 DIABETES MELLITUS WITH STAGE 3 CHRONIC KIDNEY DISEASE, WITH LONG-TERM CURRENT USE OF INSULIN: ICD-10-CM

## 2017-06-26 DIAGNOSIS — I87.2 VENOUS INSUFFICIENCY OF BOTH LOWER EXTREMITIES: ICD-10-CM

## 2017-06-26 DIAGNOSIS — E03.4 HYPOTHYROIDISM DUE TO ACQUIRED ATROPHY OF THYROID: ICD-10-CM

## 2017-06-26 RX ORDER — FUROSEMIDE 40 MG/1
40 TABLET ORAL DAILY PRN
Qty: 90 TABLET | Refills: 3 | Status: SHIPPED | OUTPATIENT
Start: 2017-06-26 | End: 2018-04-18 | Stop reason: SDUPTHER

## 2017-06-26 RX ORDER — LEVOTHYROXINE SODIUM 25 UG/1
25 TABLET ORAL DAILY
Qty: 90 TABLET | Refills: 1 | Status: SHIPPED | OUTPATIENT
Start: 2017-06-26 | End: 2017-12-20 | Stop reason: SDUPTHER

## 2017-06-26 RX ORDER — OMEGA-3-ACID ETHYL ESTERS 1 G/1
CAPSULE, LIQUID FILLED ORAL
Qty: 540 CAPSULE | Refills: 3 | Status: SHIPPED | OUTPATIENT
Start: 2017-06-26 | End: 2018-04-18 | Stop reason: SDUPTHER

## 2017-06-26 NOTE — TELEPHONE ENCOUNTER
----- Message from Patrizia Tucker sent at 6/26/2017  3:34 PM CDT -----  Contact: [facility]   Refill    levothyroxine (SYNTHROID) 25 MCG tablet   Sig - Route: Take 1 tablet (25 mcg total) by mouth once daily. - Oral    furosemide (LASIX) 40 MG tablet   Sig - Route: Take 1 tablet (40 mg total) by mouth daily as needed.    omega-3 acid ethyl esters (LOVAZA) 1 gram capsule   Sig: TAKE THREE CAPSULE BY MOUTH TWICE DAILY    90 day fills    [facility] [address] [phone] (phone) [phone] (Fax)    Thanks!

## 2017-06-29 ENCOUNTER — OFFICE VISIT (OUTPATIENT)
Dept: ENDOCRINOLOGY | Facility: CLINIC | Age: 72
End: 2017-06-29
Payer: MEDICARE

## 2017-06-29 VITALS
DIASTOLIC BLOOD PRESSURE: 82 MMHG | BODY MASS INDEX: 42.8 KG/M2 | WEIGHT: 299 LBS | HEART RATE: 69 BPM | SYSTOLIC BLOOD PRESSURE: 132 MMHG | HEIGHT: 70 IN

## 2017-06-29 DIAGNOSIS — E11.69 DYSLIPIDEMIA ASSOCIATED WITH TYPE 2 DIABETES MELLITUS: ICD-10-CM

## 2017-06-29 DIAGNOSIS — G47.33 OBSTRUCTIVE SLEEP APNEA SYNDROME: ICD-10-CM

## 2017-06-29 DIAGNOSIS — R60.9 EDEMA, UNSPECIFIED TYPE: ICD-10-CM

## 2017-06-29 DIAGNOSIS — Z79.4 TYPE 2 DIABETES MELLITUS WITH DIABETIC POLYNEUROPATHY, WITH LONG-TERM CURRENT USE OF INSULIN: ICD-10-CM

## 2017-06-29 DIAGNOSIS — Z79.4 TYPE 2 DIABETES MELLITUS WITH STAGE 3 CHRONIC KIDNEY DISEASE, WITH LONG-TERM CURRENT USE OF INSULIN: Primary | ICD-10-CM

## 2017-06-29 DIAGNOSIS — N18.30 TYPE 2 DIABETES MELLITUS WITH STAGE 3 CHRONIC KIDNEY DISEASE, WITH LONG-TERM CURRENT USE OF INSULIN: Primary | ICD-10-CM

## 2017-06-29 DIAGNOSIS — E11.42 TYPE 2 DIABETES MELLITUS WITH DIABETIC POLYNEUROPATHY, WITH LONG-TERM CURRENT USE OF INSULIN: ICD-10-CM

## 2017-06-29 DIAGNOSIS — E78.5 DYSLIPIDEMIA ASSOCIATED WITH TYPE 2 DIABETES MELLITUS: ICD-10-CM

## 2017-06-29 DIAGNOSIS — I48.20 CHRONIC ATRIAL FIBRILLATION: ICD-10-CM

## 2017-06-29 DIAGNOSIS — E11.22 TYPE 2 DIABETES MELLITUS WITH STAGE 3 CHRONIC KIDNEY DISEASE, WITH LONG-TERM CURRENT USE OF INSULIN: Primary | ICD-10-CM

## 2017-06-29 DIAGNOSIS — I10 ESSENTIAL HYPERTENSION: ICD-10-CM

## 2017-06-29 DIAGNOSIS — E66.01 MORBID OBESITY WITH BMI OF 40.0-44.9, ADULT: ICD-10-CM

## 2017-06-29 DIAGNOSIS — E11.42 DIABETIC POLYNEUROPATHY ASSOCIATED WITH TYPE 2 DIABETES MELLITUS: ICD-10-CM

## 2017-06-29 PROCEDURE — 99214 OFFICE O/P EST MOD 30 MIN: CPT | Mod: S$GLB,,, | Performed by: NURSE PRACTITIONER

## 2017-06-29 PROCEDURE — 3066F NEPHROPATHY DOC TX: CPT | Mod: S$GLB,,, | Performed by: NURSE PRACTITIONER

## 2017-06-29 PROCEDURE — 1126F AMNT PAIN NOTED NONE PRSNT: CPT | Mod: S$GLB,,, | Performed by: NURSE PRACTITIONER

## 2017-06-29 PROCEDURE — 1159F MED LIST DOCD IN RCRD: CPT | Mod: S$GLB,,, | Performed by: NURSE PRACTITIONER

## 2017-06-29 PROCEDURE — 99999 PR PBB SHADOW E&M-EST. PATIENT-LVL V: CPT | Mod: PBBFAC,,, | Performed by: NURSE PRACTITIONER

## 2017-06-29 PROCEDURE — 3045F PR MOST RECENT HEMOGLOBIN A1C LEVEL 7.0-9.0%: CPT | Mod: S$GLB,,, | Performed by: NURSE PRACTITIONER

## 2017-06-29 RX ORDER — INSULIN ASPART 100 [IU]/ML
INJECTION, SOLUTION INTRAVENOUS; SUBCUTANEOUS
Qty: 5 BOX | Refills: 3
Start: 2017-06-29 | End: 2017-09-14 | Stop reason: SDUPTHER

## 2017-06-29 NOTE — PROGRESS NOTES
"CC: The primary encounter diagnosis was Type 2 diabetes mellitus with stage 3 chronic kidney disease    HPI: Mr. Stephen Cantor Jr. was diagnosed with Type 2 DM " a long time ago".  He had been told by his doctor that he was borderline for 35-40 years. Started on metformin "around 2000". Had diarrhea with metformin. Has been on insulin for years. Denies hospitalizations due to DM.     Treated for prostatitis with Cipro since his last visit.     Reports since his visit his PCP changed his prandial insulin doses.     Review of BG meter shows readings are monitored 110s-160s with two readings in 190s and 200s    Doesn't miss any insulin doses.     Rotates insulin injection sites    Good appetite. Snacks at times on potato chips, fruit, or peanuts.     CURRENT DIABETIC MEDS: Lantus 40 units QHS, Novolog 15/17/17 units AC plus correction scale, goal 150, ISF 25.     Last Podiatry Exam: 6/2017 and is seen every 3 months    REVIEW OF SYSTEMS  Constitutional: no current fatigue or weakness; 14 lb weight loss since last visit.   Eyes: occasional right eye blurry vision; (+) glaucoma; right eye redness;  last eye exam: July 2015 and then had f/u for ectropion repair in November 2016 (pending cataract surgery soon)  Cardiac: no palpitations, murmurs, or chest pain.   Respiratory: denies current dyspnea, but does admit to occasional dyspnea with exertion.   GI: no c/o abdominal pain, nausea, or bowel pattern changes  Skin: no rashes; occasional bruising at insulin injection sites, which has improved   Neuro: chronic numbness, tingling in bilateral feet; no dizziness    Vital Signs  /82   Pulse 69   Ht 5' 10" (1.778 m)   Wt 135.6 kg (299 lb)   BMI 42.90 kg/m²     Hemoglobin A1C   Date Value Ref Range Status   06/22/2017 7.1 (H) 4.0 - 5.6 % Final     Comment:     According to ADA guidelines, hemoglobin A1c <7.0% represents  optimal control in non-pregnant diabetic patients. Different  metrics may apply to specific " patient populations.   Standards of Medical Care in Diabetes-2016.  For the purpose of screening for the presence of diabetes:  <5.7%     Consistent with the absence of diabetes  5.7-6.4%  Consistent with increasing risk for diabetes   (prediabetes)  >or=6.5%  Consistent with diabetes  Currently, no consensus exists for use of hemoglobin A1c  for diagnosis of diabetes for children.  This Hemoglobin A1c assay has significant interference with fetal   hemoglobin   (HbF). The results are invalid for patients with abnormal amounts of   HbF,   including those with known Hereditary Persistence   of Fetal Hemoglobin. Heterozygous hemoglobin variants (HbAS, HbAC,   HbAD, HbAE, HbA2) do not significantly interfere with this assay;   however, presence of multiple variants in a sample may impact the %   interference.     05/30/2017 7.7 (H) 4.5 - 6.2 % Final     Comment:     According to ADA guidelines, hemoglobin A1C <7.0% represents  optimal control in non-pregnant diabetic patients.  Different  metrics may apply to specific populations.   Standards of Medical Care in Diabetes - 2016.  For the purpose of screening for the presence of diabetes:  <5.7%     Consistent with the absence of diabetes  5.7-6.4%  Consistent with increasing risk for diabetes   (prediabetes)  >or=6.5%  Consistent with diabetes  Currently no consensus exists for use of hemoglobin A1C  for diagnosis of diabetes for children.     05/22/2017 7.6 (H) 4.5 - 6.2 % Final     Comment:     According to ADA guidelines, hemoglobin A1C <7.0% represents  optimal control in non-pregnant diabetic patients.  Different  metrics may apply to specific populations.   Standards of Medical Care in Diabetes - 2016.  For the purpose of screening for the presence of diabetes:  <5.7%     Consistent with the absence of diabetes  5.7-6.4%  Consistent with increasing risk for diabetes   (prediabetes)  >or=6.5%  Consistent with diabetes  Currently no consensus exists for use of  hemoglobin A1C  for diagnosis of diabetes for children.         Chemistry        Component Value Date/Time     05/30/2017 0946    K 5.0 05/30/2017 0946     05/30/2017 0946    CO2 24 05/30/2017 0946    BUN 35 (H) 05/30/2017 0946    CREATININE 1.7 (H) 05/30/2017 0946     (H) 05/30/2017 0946        Component Value Date/Time    CALCIUM 10.3 05/30/2017 0946    ALKPHOS 37 (L) 05/30/2017 0946    AST 14 05/30/2017 0946    ALT 10 05/30/2017 0946    BILITOT 0.6 05/30/2017 0946          Lab Results   Component Value Date    CHOL 159 05/30/2017    CHOL 133 03/06/2017    CHOL 170 03/23/2016     Lab Results   Component Value Date    HDL 22 (L) 05/30/2017    HDL 28 (L) 03/06/2017    HDL 29 (L) 03/23/2016     Lab Results   Component Value Date    LDLCALC 97.6 05/30/2017    LDLCALC 79.0 03/06/2017    LDLCALC 84.0 03/23/2016     Lab Results   Component Value Date    TRIG 197 (H) 05/30/2017    TRIG 130 03/06/2017    TRIG 285 (H) 03/23/2016     Lab Results   Component Value Date    CHOLHDL 13.8 (L) 05/30/2017    CHOLHDL 21.1 03/06/2017    CHOLHDL 17.1 (L) 03/23/2016     Lab Results   Component Value Date    TSH 2.848 05/30/2017     PHYSICAL EXAMINATION  Constitutional: Appears well, no distress, morbidly obese  EENT: Right eye chronic scleral redness; external ears no masses; nasal mucosa pink with septum midline; oral mucosa pink.  Neck: Supple, trachea midline; no thyromegaly.   Respiratory: CTA, even and unlabored.  Cardiovascular: RRR, S1 and S2 with no murmurs or carotid bruits. ble edema observed (2-3+).   Lymph: no cervical or supraclavicular lymphadenopathy;  Skin: warm and dry; no rash, insulin injection site reactions, or acanthosis nigracans observed.   Neuro: steady gait with use of rolling walker  Feet: appropriate footwear; last Podiatry exam: 3/2017    Assessment/Plan  1. Type 2 diabetes mellitus with stage 3 chronic kidney disease, with long-term current use of insulin  DM stable.     Continue Lantus  to 40 units QHS, Novolog 15/17/17 plus correction scale 25 ISF, goal 150.     Monitor BG 4x/day.Logs to next visit.     Notify me for any glycemic issues.    2. Type 2 diabetes mellitus with diabetic polyneuropathy, with long-term current use of insulin  On Gabapentin. Podiatry every 3 months.    3. Dyslipidemia associated with type 2 diabetes mellitus  Continue Tricor, Lovaza, and Pravastatin.    4. Chronic atrial fibrillation  Can worsen insulin resistance. Controlled at this time.   5. Essential hypertension  BP stable. Continue current medications.    6. Morbid obesity with BMI of 40.0-44.9, adult  Can worsen insulin resistance. Weight loss can help this. Body mass index is 42.9 kg/m².    7. Sleep apnea, unspecified type  Sleeps with c-pap off and on.    8.  Edema On Lasix. Managed by PCP.      FOLLOW UP  Return in about 3 months (around 9/29/2017).      Orders Placed This Encounter   Procedures    Hemoglobin A1c

## 2017-07-13 ENCOUNTER — OFFICE VISIT (OUTPATIENT)
Dept: DERMATOLOGY | Facility: CLINIC | Age: 72
End: 2017-07-13
Payer: MEDICARE

## 2017-07-13 ENCOUNTER — LAB VISIT (OUTPATIENT)
Dept: LAB | Facility: HOSPITAL | Age: 72
End: 2017-07-13
Attending: INTERNAL MEDICINE
Payer: MEDICARE

## 2017-07-13 DIAGNOSIS — L82.1 SEBORRHEIC KERATOSIS: ICD-10-CM

## 2017-07-13 DIAGNOSIS — Z85.820 HISTORY OF MALIGNANT MELANOMA OF SKIN: ICD-10-CM

## 2017-07-13 DIAGNOSIS — Z85.828 PERSONAL HISTORY OF OTHER MALIGNANT NEOPLASM OF SKIN: ICD-10-CM

## 2017-07-13 DIAGNOSIS — L57.0 AK (ACTINIC KERATOSIS): Primary | ICD-10-CM

## 2017-07-13 DIAGNOSIS — D22.9 MULTIPLE BENIGN NEVI: ICD-10-CM

## 2017-07-13 DIAGNOSIS — E11.22 TYPE 2 DIABETES MELLITUS WITH STAGE 3 CHRONIC KIDNEY DISEASE, WITH LONG-TERM CURRENT USE OF INSULIN: ICD-10-CM

## 2017-07-13 DIAGNOSIS — N18.30 TYPE 2 DIABETES MELLITUS WITH STAGE 3 CHRONIC KIDNEY DISEASE, WITH LONG-TERM CURRENT USE OF INSULIN: ICD-10-CM

## 2017-07-13 DIAGNOSIS — D18.00 ANGIOMA: ICD-10-CM

## 2017-07-13 DIAGNOSIS — R97.20 PSA ELEVATION: ICD-10-CM

## 2017-07-13 DIAGNOSIS — Z79.4 TYPE 2 DIABETES MELLITUS WITH STAGE 3 CHRONIC KIDNEY DISEASE, WITH LONG-TERM CURRENT USE OF INSULIN: ICD-10-CM

## 2017-07-13 LAB
ANION GAP SERPL CALC-SCNC: 8 MMOL/L
BUN SERPL-MCNC: 29 MG/DL
CALCIUM SERPL-MCNC: 10.4 MG/DL
CHLORIDE SERPL-SCNC: 105 MMOL/L
CO2 SERPL-SCNC: 27 MMOL/L
COMPLEXED PSA SERPL-MCNC: 2.7 NG/ML
CREAT SERPL-MCNC: 1.6 MG/DL
EST. GFR  (AFRICAN AMERICAN): 48.7 ML/MIN/1.73 M^2
EST. GFR  (NON AFRICAN AMERICAN): 42.1 ML/MIN/1.73 M^2
ESTIMATED AVG GLUCOSE: 146 MG/DL
GLUCOSE SERPL-MCNC: 105 MG/DL
HBA1C MFR BLD HPLC: 6.7 %
POTASSIUM SERPL-SCNC: 4.7 MMOL/L
SODIUM SERPL-SCNC: 140 MMOL/L

## 2017-07-13 PROCEDURE — 1126F AMNT PAIN NOTED NONE PRSNT: CPT | Mod: S$GLB,,, | Performed by: DERMATOLOGY

## 2017-07-13 PROCEDURE — 1159F MED LIST DOCD IN RCRD: CPT | Mod: S$GLB,,, | Performed by: DERMATOLOGY

## 2017-07-13 PROCEDURE — 99214 OFFICE O/P EST MOD 30 MIN: CPT | Mod: 25,S$GLB,, | Performed by: DERMATOLOGY

## 2017-07-13 PROCEDURE — 99999 PR PBB SHADOW E&M-EST. PATIENT-LVL II: CPT | Mod: PBBFAC,,, | Performed by: DERMATOLOGY

## 2017-07-13 PROCEDURE — 17003 DESTRUCT PREMALG LES 2-14: CPT | Mod: S$GLB,,, | Performed by: DERMATOLOGY

## 2017-07-13 PROCEDURE — 17000 DESTRUCT PREMALG LESION: CPT | Mod: S$GLB,,, | Performed by: DERMATOLOGY

## 2017-07-13 RX ORDER — LOSARTAN POTASSIUM 50 MG/1
TABLET ORAL
COMMUNITY
Start: 2017-07-05 | End: 2017-08-30 | Stop reason: SDUPTHER

## 2017-07-13 NOTE — PROGRESS NOTES
Subjective:       Patient ID:  [facility] PAT Cantor Jr. is a 71 y.o. male who presents for   Chief Complaint   Patient presents with    Skin Check     TBSE    Lesion     left cheek     History of Present Illness: The patient presents for follow up of skin check.    The patient was last seen on: 4/16 for shave bx of blue nevus  This is a high risk patient here to check for the development of new lesions.  H/o mis right cheek 11/15    Other skin complaints: black spot on left cheek x 2 months + scaling. No prev treatment          Review of Systems   Constitutional: Positive for fatigue. Negative for fever, chills, weight loss, weight gain (stable) and night sweats.   Musculoskeletal: Positive for muscle weakness (legs).   Skin: Negative for daily sunscreen use, activity-related sunscreen use (but wears hat) and recent sunburn.   Hematologic/Lymphatic: Negative for adenopathy. Bruises/bleeds easily (on xeralto).        Objective:    Physical Exam   Constitutional: He appears well-developed and well-nourished. He is obese.  No distress.   Neurological: He is alert and oriented to person, place, and time. He is not disoriented.   Psychiatric: He has a normal mood and affect.   Skin:   Areas Examined (abnormalities noted in diagram):   Scalp / Hair Palpated and Inspected  Head / Face Inspection Performed  Neck Inspection Performed  Chest / Axilla Inspection Performed  Abdomen Inspection Performed  Genitals / Buttocks / Groin Inspection Performed  Back Inspection Performed  RUE Inspected  LUE Inspection Performed  RLE Inspected  LLE Inspection Performed  Nails and Digits Inspection Performed                           Diagram Legend     Erythematous scaling macule/papule c/w actinic keratosis       Vascular papule c/w angioma      Pigmented verrucoid papule/plaque c/w seborrheic keratosis      Yellow umbilicated papule c/w sebaceous hyperplasia      Irregularly shaped tan macule c/w lentigo     1-2 mm smooth white papules  consistent with Milia      Movable subcutaneous cyst with punctum c/w epidermal inclusion cyst      Subcutaneous movable cyst c/w pilar cyst      Firm pink to brown papule c/w dermatofibroma      Pedunculated fleshy papule(s) c/w skin tag(s)      Evenly pigmented macule c/w junctional nevus     Mildly variegated pigmented, slightly irregular-bordered macule c/w mildly atypical nevus      Flesh colored to evenly pigmented papule c/w intradermal nevus       Pink pearly papule/plaque c/w basal cell carcinoma      Erythematous hyperkeratotic cursted plaque c/w SCC      Surgical scar with no sign of skin cancer recurrence      Open and closed comedones      Inflammatory papules and pustules      Verrucoid papule consistent consistent with wart     Erythematous eczematous patches and plaques     Dystrophic onycholytic nail with subungual debris c/w onychomycosis     Umbilicated papule    Erythematous-base heme-crusted tan verrucoid plaque consistent with inflamed seborrheic keratosis     Erythematous Silvery Scaling Plaque c/w Psoriasis     See annotation      Assessment / Plan:        AK (actinic keratosis)  Cryosurgery Procedure Note    Verbal consent from the patient is obtained and the patient is aware of the precancerous quality and need for treatment of these lesions. Liquid nitrogen cryosurgery is applied to the 3 actinic keratoses, as detailed in the physical exam, to produce a freeze injury. The patient is aware that blisters may form and is instructed on wound care with gentle cleansing and use of vaseline ointment to keep moist until healed. The patient is supplied a handout on cryosurgery and is instructed to call if lesions do not completely resolve.      Seborrheic keratosis   - stable and chronic    Angioma   - stable and chronic    Multiple benign nevi  total body skin examination performed today including at least 12 points as noted in physical examination. No lesions suspicious for malignancy  noted.  Reassurance provided.  Instructed patient to observe lesion(s) for changes and follow up in clinic if changes are noted. Discussed ABCDE's of moles and brochure provided.    History of malignant melanoma of skin and Personal history of other malignant neoplasm of skin  Area of previous nmsc and melanoma examined. Site well healed with no signs of recurrence.    Total body skin examination performed today including at least 12 points as noted in physical examination. No lesions suspicious for malignancy noted.           Return in about 6 months (around 1/13/2018).

## 2017-07-13 NOTE — PATIENT INSTRUCTIONS

## 2017-07-15 ENCOUNTER — PATIENT MESSAGE (OUTPATIENT)
Dept: INTERNAL MEDICINE | Facility: CLINIC | Age: 72
End: 2017-07-15

## 2017-07-15 ENCOUNTER — DOCUMENTATION ONLY (OUTPATIENT)
Dept: INTERNAL MEDICINE | Facility: CLINIC | Age: 72
End: 2017-07-15

## 2017-07-15 DIAGNOSIS — Z79.4 TYPE 2 DIABETES MELLITUS WITH STAGE 3 CHRONIC KIDNEY DISEASE, WITH LONG-TERM CURRENT USE OF INSULIN: ICD-10-CM

## 2017-07-15 DIAGNOSIS — E11.22 TYPE 2 DIABETES MELLITUS WITH STAGE 3 CHRONIC KIDNEY DISEASE, WITH LONG-TERM CURRENT USE OF INSULIN: ICD-10-CM

## 2017-07-15 DIAGNOSIS — N18.30 TYPE 2 DIABETES MELLITUS WITH STAGE 3 CHRONIC KIDNEY DISEASE, WITH LONG-TERM CURRENT USE OF INSULIN: ICD-10-CM

## 2017-07-15 DIAGNOSIS — E11.42 TYPE 2 DIABETES MELLITUS WITH DIABETIC POLYNEUROPATHY, WITH LONG-TERM CURRENT USE OF INSULIN: ICD-10-CM

## 2017-07-15 DIAGNOSIS — I10 ESSENTIAL HYPERTENSION: ICD-10-CM

## 2017-07-15 DIAGNOSIS — I48.20 CHRONIC ATRIAL FIBRILLATION: Primary | ICD-10-CM

## 2017-07-15 DIAGNOSIS — Z79.4 TYPE 2 DIABETES MELLITUS WITH DIABETIC POLYNEUROPATHY, WITH LONG-TERM CURRENT USE OF INSULIN: ICD-10-CM

## 2017-07-15 NOTE — PROGRESS NOTES
The recent repeat labs looks better    PSA is down to 2.7   he completed the course of ciprofloxacin    Hemoglobin A1c is down to 6.7    He has loose stools, recommend taking a probiotic and use of Metamucil    Return appointment in 4 months

## 2017-07-17 ENCOUNTER — OFFICE VISIT (OUTPATIENT)
Dept: ORTHOPEDICS | Facility: CLINIC | Age: 72
End: 2017-07-17
Payer: MEDICARE

## 2017-07-17 ENCOUNTER — HOSPITAL ENCOUNTER (OUTPATIENT)
Dept: RADIOLOGY | Facility: HOSPITAL | Age: 72
Discharge: HOME | End: 2017-07-17
Attending: ORTHOPAEDIC SURGERY
Payer: MEDICARE

## 2017-07-17 VITALS — BODY MASS INDEX: 43.33 KG/M2 | WEIGHT: 302.69 LBS | HEIGHT: 70 IN

## 2017-07-17 DIAGNOSIS — M25.562 ACUTE PAIN OF BOTH KNEES: Primary | ICD-10-CM

## 2017-07-17 DIAGNOSIS — M25.561 ACUTE PAIN OF BOTH KNEES: ICD-10-CM

## 2017-07-17 DIAGNOSIS — M25.562 ACUTE PAIN OF BOTH KNEES: ICD-10-CM

## 2017-07-17 DIAGNOSIS — M25.561 ACUTE PAIN OF BOTH KNEES: Primary | ICD-10-CM

## 2017-07-17 PROCEDURE — 73562 X-RAY EXAM OF KNEE 3: CPT | Mod: 26,LT,, | Performed by: RADIOLOGY

## 2017-07-17 PROCEDURE — 99999 PR PBB SHADOW E&M-EST. PATIENT-LVL III: CPT | Mod: PBBFAC,,, | Performed by: ORTHOPAEDIC SURGERY

## 2017-07-17 PROCEDURE — 99211 OFF/OP EST MAY X REQ PHY/QHP: CPT | Mod: S$GLB,,, | Performed by: ORTHOPAEDIC SURGERY

## 2017-07-17 PROCEDURE — 73562 X-RAY EXAM OF KNEE 3: CPT | Mod: TC,50

## 2017-07-17 PROCEDURE — 73562 X-RAY EXAM OF KNEE 3: CPT | Mod: 26,RT,, | Performed by: RADIOLOGY

## 2017-07-17 NOTE — PROGRESS NOTES
Patient is doing well after right total knee replacement.  He fell about 4 weeks ago and has some medial joint line pain that is gotten markedly better.  X-rays are taken today and show the prosthesis to be well aligned with no signs of loosening.  He'll continue weightbearing as tolerated and will follow up in 6 months.  He is no longer taking pain medication.

## 2017-07-26 ENCOUNTER — TELEPHONE (OUTPATIENT)
Dept: INTERNAL MEDICINE | Facility: CLINIC | Age: 72
End: 2017-07-26

## 2017-07-26 NOTE — TELEPHONE ENCOUNTER
Ep 4 months with prelabs (Routing comment) Patient schedule for ep appt/labs.    Mail out to home address on file.

## 2017-08-20 RX ORDER — GABAPENTIN 300 MG/1
300 CAPSULE ORAL 3 TIMES DAILY
Qty: 90 CAPSULE | Refills: 3 | Status: SHIPPED | OUTPATIENT
Start: 2017-08-20 | End: 2017-12-16 | Stop reason: SDUPTHER

## 2017-08-20 RX ORDER — PRAVASTATIN SODIUM 10 MG/1
10 TABLET ORAL DAILY
Qty: 90 TABLET | Refills: 3 | Status: SHIPPED | OUTPATIENT
Start: 2017-08-20 | End: 2018-08-13 | Stop reason: SDUPTHER

## 2017-08-30 ENCOUNTER — LAB VISIT (OUTPATIENT)
Dept: LAB | Facility: HOSPITAL | Age: 72
End: 2017-08-30
Attending: INTERNAL MEDICINE
Payer: MEDICARE

## 2017-08-30 ENCOUNTER — OFFICE VISIT (OUTPATIENT)
Dept: NEPHROLOGY | Facility: CLINIC | Age: 72
End: 2017-08-30
Payer: MEDICARE

## 2017-08-30 VITALS
SYSTOLIC BLOOD PRESSURE: 104 MMHG | OXYGEN SATURATION: 97 % | HEART RATE: 88 BPM | WEIGHT: 298.75 LBS | DIASTOLIC BLOOD PRESSURE: 68 MMHG | BODY MASS INDEX: 42.77 KG/M2 | HEIGHT: 70 IN

## 2017-08-30 DIAGNOSIS — N25.81 SECONDARY RENAL HYPERPARATHYROIDISM: ICD-10-CM

## 2017-08-30 DIAGNOSIS — N28.1 ACQUIRED CYST OF KIDNEY: ICD-10-CM

## 2017-08-30 DIAGNOSIS — E55.9 VITAMIN D DEFICIENCY: ICD-10-CM

## 2017-08-30 DIAGNOSIS — I12.9 BENIGN HYPERTENSIVE RENAL DISEASE WITHOUT RENAL FAILURE: ICD-10-CM

## 2017-08-30 DIAGNOSIS — N18.30 CHRONIC KIDNEY DISEASE, STAGE III (MODERATE): Primary | ICD-10-CM

## 2017-08-30 DIAGNOSIS — N18.30 CHRONIC KIDNEY DISEASE, STAGE III (MODERATE): ICD-10-CM

## 2017-08-30 DIAGNOSIS — N39.0 URINARY TRACT INFECTION WITHOUT HEMATURIA, SITE UNSPECIFIED: ICD-10-CM

## 2017-08-30 DIAGNOSIS — R31.29 MICROHEMATURIA: ICD-10-CM

## 2017-08-30 DIAGNOSIS — N40.0 ENLARGED PROSTATE: ICD-10-CM

## 2017-08-30 LAB
BACTERIA #/AREA URNS AUTO: NORMAL /HPF
BILIRUB UR QL STRIP: NEGATIVE
CLARITY UR REFRACT.AUTO: CLEAR
COLOR UR AUTO: YELLOW
CREAT UR-MCNC: 76 MG/DL
GLUCOSE UR QL STRIP: NEGATIVE
HGB UR QL STRIP: NEGATIVE
KETONES UR QL STRIP: NEGATIVE
LEUKOCYTE ESTERASE UR QL STRIP: ABNORMAL
MICROSCOPIC COMMENT: NORMAL
NITRITE UR QL STRIP: NEGATIVE
PH UR STRIP: 7 [PH] (ref 5–8)
PROT UR QL STRIP: NEGATIVE
PROT UR-MCNC: 13 MG/DL
PROT/CREAT RATIO, UR: 0.17
RBC #/AREA URNS AUTO: 0 /HPF (ref 0–4)
SP GR UR STRIP: 1.01 (ref 1–1.03)
SQUAMOUS #/AREA URNS AUTO: 1 /HPF
URN SPEC COLLECT METH UR: ABNORMAL
UROBILINOGEN UR STRIP-ACNC: NEGATIVE EU/DL
WBC #/AREA URNS AUTO: 1 /HPF (ref 0–5)

## 2017-08-30 PROCEDURE — 99999 PR PBB SHADOW E&M-EST. PATIENT-LVL III: CPT | Mod: PBBFAC,,, | Performed by: INTERNAL MEDICINE

## 2017-08-30 PROCEDURE — 3008F BODY MASS INDEX DOCD: CPT | Mod: S$GLB,,, | Performed by: INTERNAL MEDICINE

## 2017-08-30 PROCEDURE — 3074F SYST BP LT 130 MM HG: CPT | Mod: S$GLB,,, | Performed by: INTERNAL MEDICINE

## 2017-08-30 PROCEDURE — 99499 UNLISTED E&M SERVICE: CPT | Mod: S$PBB,,, | Performed by: INTERNAL MEDICINE

## 2017-08-30 PROCEDURE — 81001 URINALYSIS AUTO W/SCOPE: CPT

## 2017-08-30 PROCEDURE — 87086 URINE CULTURE/COLONY COUNT: CPT

## 2017-08-30 PROCEDURE — 99214 OFFICE O/P EST MOD 30 MIN: CPT | Mod: S$GLB,,, | Performed by: INTERNAL MEDICINE

## 2017-08-30 PROCEDURE — 1126F AMNT PAIN NOTED NONE PRSNT: CPT | Mod: S$GLB,,, | Performed by: INTERNAL MEDICINE

## 2017-08-30 PROCEDURE — 1159F MED LIST DOCD IN RCRD: CPT | Mod: S$GLB,,, | Performed by: INTERNAL MEDICINE

## 2017-08-30 PROCEDURE — 82570 ASSAY OF URINE CREATININE: CPT

## 2017-08-30 PROCEDURE — 3078F DIAST BP <80 MM HG: CPT | Mod: S$GLB,,, | Performed by: INTERNAL MEDICINE

## 2017-08-30 NOTE — PROGRESS NOTES
Subjective:       Patient ID: [facility] [place] PAT Cantor Jr. is a 71 y.o. White male who presents for follow-up evaluation of CKD.     HPI     Mr. Cantor was seen in nephrology clinic for follow up on CKD. Pt was previously being followed by Dr. Mcallister and was last seen by him on 1/20/16. He established care with me on 7/28/16, last followed on 5/30/17. He has CKD III, diabetes, hypertension, HEIDI, PAF, chronic anticoagulation, morbid obesity, hyperlipidemia.    Lab trend noted for CKD since 2006. He has had episodes of SIL, hyperkalemia. Most recently labs from 8/30/17 noted for Na 141, K 4.8, bicarbonate 27, BUN 26, creatinine 1.5, eGFR 45.5, Ca 10.1, phos 3.1, albumin 3.6, PTH 41, Hb 16. He had UTI in 5/17 due to providencia. Urinalysis from 7/17 noted for improvement in pyuria. He has urine culture sent to lab today, result pending. But he denies any symptoms to suggest UTI for now.    Prior labs noted for Hep C/B screen is negative. Prior US from 7/15 noted for 11.5 and 13 cm kidneys and small cyst on right. His screening for hep C/B is negative.     He has no uremic symptoms and otherwise is in his usual state of health. He denies any nausea/ vomiting/ diarrhea/ flank pain. He has longstanding lymphedema. He has been on losartan. He was referred to urologist Dr. Montero due to complex cyst seen on US. Per her, it appears to be benign and he does not need any further follow up. He underwent cystoscopy in 4/17 which did not show any bladder lesion or tumor.     Pt states occasionally he has noticed BP in low 100 range. He states his cardiologist lowered the dose of losartan to 25 mg daily. He did not bring his home BP readings.     Review of Systems   Constitutional: Negative for appetite change, chills and fever.   HENT: Negative for sneezing and sore throat.    Eyes: Negative for visual disturbance.   Respiratory: Negative for cough, shortness of breath and wheezing.    Cardiovascular: Positive for leg swelling.  Negative for chest pain.   Gastrointestinal: Negative for abdominal pain, diarrhea, nausea and vomiting.   Endocrine: Negative for polyuria.   Genitourinary: Negative for decreased urine volume, difficulty urinating, dysuria, frequency and hematuria.   Musculoskeletal: Positive for back pain and gait problem.   Skin: Negative for pallor and rash.   Allergic/Immunologic: Negative for immunocompromised state.   Neurological: Negative for dizziness and headaches.   Psychiatric/Behavioral: Negative for behavioral problems. The patient is not nervous/anxious.        Objective:      Physical Exam   Constitutional: He is oriented to person, place, and time. He appears well-developed. No distress.   HENT:   Mouth/Throat: Oropharynx is clear and moist.   Eyes: Conjunctivae are normal. Right eye exhibits no discharge. Left eye exhibits no discharge.   Neck: Neck supple.   Cardiovascular: Normal rate and normal heart sounds.    irregular   Pulmonary/Chest: Effort normal and breath sounds normal. No respiratory distress. He has no wheezes.   Abdominal: Soft. Distention: obese. There is no tenderness.   Musculoskeletal: He exhibits edema (2+ BLE).   Lymphedema    Neurological: He is alert and oriented to person, place, and time.   Skin: Skin is warm and dry.   Psychiatric: He has a normal mood and affect. His behavior is normal.   Vitals reviewed.      Assessment:       1. Chronic kidney disease, stage III (moderate)    2. Benign hypertensive renal disease without renal failure    3. Acquired cyst of kidney    4. Enlarged prostate    5. Microhematuria    6. Secondary renal hyperparathyroidism    7. Vitamin D deficiency        Plan:     Mr. Cantor has CKD III from longstanding hypertension, diabetes, prior multiple SIL, morbid obesity. Labs, CKD staging, potential risk of progression, need for strict glycemic and BP control, weight loss by calorie restriction, avoiding NSAID is important to prevent CKD progression. This was d/w  pt in detail.    We did discuss to avoid multivitamin type preparation as it can have calcium and other minerals. He has again mild hypercalcemia. Reminded him to consider stopping multivitamin preparations that contain calcium. He expressed understanding.     Plan  - periodically monitor renal panel for electrolytes, acid base status, eGFR  - risk of CKD progression d/w patient  - low salt diet  - follow PTH levels, vit D  - follow urine studies for proteinuria  - avoid NSAID/ bactrim/ IV contrast/ gadolinium/ aminoglycoside where possible. Avoid Toradol use.   - continue losartan containing regimen for RAAS blockade  - avoid high K containing foods, avoid multivitamin preparations with Ca as he has mild hypercalcemia based on corrected Ca levels   - management of atrial fibrillation and anticoagulation per his cardiologist    Labs, plan discussed with patient in detail.   RTC 4 months

## 2017-08-31 LAB — BACTERIA UR CULT: NORMAL

## 2017-09-05 DIAGNOSIS — I10 ESSENTIAL HYPERTENSION: ICD-10-CM

## 2017-09-05 DIAGNOSIS — I48.19 PERSISTENT ATRIAL FIBRILLATION: ICD-10-CM

## 2017-09-05 DIAGNOSIS — I48.0 PAROXYSMAL ATRIAL FIBRILLATION: ICD-10-CM

## 2017-09-14 DIAGNOSIS — E11.42 DIABETIC POLYNEUROPATHY ASSOCIATED WITH TYPE 2 DIABETES MELLITUS: ICD-10-CM

## 2017-09-14 RX ORDER — INSULIN ASPART 100 [IU]/ML
INJECTION, SOLUTION INTRAVENOUS; SUBCUTANEOUS
Qty: 2 BOX | Refills: 11 | Status: SHIPPED | OUTPATIENT
Start: 2017-09-14 | End: 2018-01-09 | Stop reason: SDUPTHER

## 2017-09-15 ENCOUNTER — TELEPHONE (OUTPATIENT)
Dept: INTERNAL MEDICINE | Facility: CLINIC | Age: 72
End: 2017-09-15

## 2017-09-15 RX ORDER — CIPROFLOXACIN 500 MG/1
500 TABLET ORAL 2 TIMES DAILY
Qty: 28 TABLET | Refills: 0 | Status: SHIPPED | OUTPATIENT
Start: 2017-09-15 | End: 2017-09-29

## 2017-09-15 NOTE — TELEPHONE ENCOUNTER
Pt states that he is having burning and pressure and when he goes to the bathroom nothing wants to come out but he he feels the urgency to go and he said cipro has worked in the past and he would like to have that called in to his pharmacy

## 2017-09-15 NOTE — TELEPHONE ENCOUNTER
----- Message from Justen Welch sent at 9/15/2017  7:42 AM CDT -----  Contact: self [phone]  Patient's wife stated patient UTI came back , and if possible can MD call in medication [facility] [phone] . Please call and advise , Thanks  !

## 2017-09-22 ENCOUNTER — LAB VISIT (OUTPATIENT)
Dept: LAB | Facility: HOSPITAL | Age: 72
End: 2017-09-22
Attending: INTERNAL MEDICINE
Payer: MEDICARE

## 2017-09-22 ENCOUNTER — TELEPHONE (OUTPATIENT)
Dept: INTERNAL MEDICINE | Facility: CLINIC | Age: 72
End: 2017-09-22

## 2017-09-22 ENCOUNTER — OFFICE VISIT (OUTPATIENT)
Dept: WOUND CARE | Facility: CLINIC | Age: 72
End: 2017-09-22
Payer: MEDICARE

## 2017-09-22 ENCOUNTER — TELEPHONE (OUTPATIENT)
Dept: WOUND CARE | Facility: CLINIC | Age: 72
End: 2017-09-22

## 2017-09-22 ENCOUNTER — OFFICE VISIT (OUTPATIENT)
Dept: INTERNAL MEDICINE | Facility: CLINIC | Age: 72
End: 2017-09-22
Payer: MEDICARE

## 2017-09-22 VITALS
WEIGHT: 309.5 LBS | SYSTOLIC BLOOD PRESSURE: 119 MMHG | DIASTOLIC BLOOD PRESSURE: 79 MMHG | BODY MASS INDEX: 44.31 KG/M2 | TEMPERATURE: 97 F | HEART RATE: 78 BPM | HEIGHT: 70 IN

## 2017-09-22 VITALS
DIASTOLIC BLOOD PRESSURE: 70 MMHG | HEIGHT: 70 IN | WEIGHT: 309.94 LBS | HEART RATE: 89 BPM | BODY MASS INDEX: 44.37 KG/M2 | SYSTOLIC BLOOD PRESSURE: 101 MMHG

## 2017-09-22 DIAGNOSIS — R60.0 BILATERAL LEG EDEMA: Primary | ICD-10-CM

## 2017-09-22 DIAGNOSIS — N18.30 TYPE 2 DIABETES MELLITUS WITH STAGE 3 CHRONIC KIDNEY DISEASE, WITH LONG-TERM CURRENT USE OF INSULIN: ICD-10-CM

## 2017-09-22 DIAGNOSIS — I10 ESSENTIAL HYPERTENSION: ICD-10-CM

## 2017-09-22 DIAGNOSIS — I87.2 VENOUS INSUFFICIENCY OF BOTH LOWER EXTREMITIES: ICD-10-CM

## 2017-09-22 DIAGNOSIS — N41.9 PROSTATITIS, UNSPECIFIED PROSTATITIS TYPE: ICD-10-CM

## 2017-09-22 DIAGNOSIS — R60.0 BILATERAL LEG EDEMA: ICD-10-CM

## 2017-09-22 DIAGNOSIS — L97.921 ULCER OF LEFT LOWER EXTREMITY, LIMITED TO BREAKDOWN OF SKIN: Primary | ICD-10-CM

## 2017-09-22 DIAGNOSIS — G47.33 OSA ON CPAP: ICD-10-CM

## 2017-09-22 DIAGNOSIS — R60.9 BODY FLUID RETENTION: ICD-10-CM

## 2017-09-22 DIAGNOSIS — E11.22 TYPE 2 DIABETES MELLITUS WITH STAGE 3 CHRONIC KIDNEY DISEASE, WITH LONG-TERM CURRENT USE OF INSULIN: ICD-10-CM

## 2017-09-22 DIAGNOSIS — Z79.4 TYPE 2 DIABETES MELLITUS WITH STAGE 3 CHRONIC KIDNEY DISEASE, WITH LONG-TERM CURRENT USE OF INSULIN: ICD-10-CM

## 2017-09-22 LAB
ANION GAP SERPL CALC-SCNC: 9 MMOL/L
BACTERIA #/AREA URNS AUTO: ABNORMAL /HPF
BILIRUB UR QL STRIP: NEGATIVE
BNP SERPL-MCNC: 129 PG/ML
BUN SERPL-MCNC: 31 MG/DL
CALCIUM SERPL-MCNC: 9.5 MG/DL
CHLORIDE SERPL-SCNC: 103 MMOL/L
CLARITY UR REFRACT.AUTO: CLEAR
CO2 SERPL-SCNC: 25 MMOL/L
COLOR UR AUTO: YELLOW
CREAT SERPL-MCNC: 1.9 MG/DL
EST. GFR  (AFRICAN AMERICAN): 39.6 ML/MIN/1.73 M^2
EST. GFR  (NON AFRICAN AMERICAN): 34.2 ML/MIN/1.73 M^2
GLUCOSE SERPL-MCNC: 223 MG/DL
GLUCOSE UR QL STRIP: NEGATIVE
HGB UR QL STRIP: NEGATIVE
HYALINE CASTS UR QL AUTO: 1 /LPF
KETONES UR QL STRIP: NEGATIVE
LEUKOCYTE ESTERASE UR QL STRIP: ABNORMAL
MICROSCOPIC COMMENT: ABNORMAL
NITRITE UR QL STRIP: NEGATIVE
NON-SQ EPI CELLS #/AREA URNS AUTO: <1 /HPF
PH UR STRIP: 6 [PH] (ref 5–8)
POTASSIUM SERPL-SCNC: 4.7 MMOL/L
PROT UR QL STRIP: NEGATIVE
RBC #/AREA URNS AUTO: 1 /HPF (ref 0–4)
SODIUM SERPL-SCNC: 137 MMOL/L
SP GR UR STRIP: 1.01 (ref 1–1.03)
SQUAMOUS #/AREA URNS AUTO: 1 /HPF
URN SPEC COLLECT METH UR: ABNORMAL
UROBILINOGEN UR STRIP-ACNC: NEGATIVE EU/DL
WBC #/AREA URNS AUTO: 6 /HPF (ref 0–5)

## 2017-09-22 PROCEDURE — 99999 PR PBB SHADOW E&M-EST. PATIENT-LVL III: CPT | Mod: PBBFAC,,, | Performed by: INTERNAL MEDICINE

## 2017-09-22 PROCEDURE — 1126F AMNT PAIN NOTED NONE PRSNT: CPT | Mod: S$GLB,,, | Performed by: NURSE PRACTITIONER

## 2017-09-22 PROCEDURE — 1159F MED LIST DOCD IN RCRD: CPT | Mod: S$GLB,,, | Performed by: NURSE PRACTITIONER

## 2017-09-22 PROCEDURE — 80048 BASIC METABOLIC PNL TOTAL CA: CPT

## 2017-09-22 PROCEDURE — 99212 OFFICE O/P EST SF 10 MIN: CPT | Mod: 25,S$GLB,, | Performed by: NURSE PRACTITIONER

## 2017-09-22 PROCEDURE — 83036 HEMOGLOBIN GLYCOSYLATED A1C: CPT

## 2017-09-22 PROCEDURE — 29580 STRAPPING UNNA BOOT: CPT | Mod: LT,S$GLB,, | Performed by: NURSE PRACTITIONER

## 2017-09-22 PROCEDURE — 36415 COLL VENOUS BLD VENIPUNCTURE: CPT | Mod: PO

## 2017-09-22 PROCEDURE — 3078F DIAST BP <80 MM HG: CPT | Mod: S$GLB,,, | Performed by: NURSE PRACTITIONER

## 2017-09-22 PROCEDURE — 3008F BODY MASS INDEX DOCD: CPT | Mod: S$GLB,,, | Performed by: NURSE PRACTITIONER

## 2017-09-22 PROCEDURE — 87086 URINE CULTURE/COLONY COUNT: CPT

## 2017-09-22 PROCEDURE — 99499 UNLISTED E&M SERVICE: CPT | Mod: S$PBB,,, | Performed by: INTERNAL MEDICINE

## 2017-09-22 PROCEDURE — 3074F SYST BP LT 130 MM HG: CPT | Mod: S$GLB,,, | Performed by: NURSE PRACTITIONER

## 2017-09-22 PROCEDURE — 83880 ASSAY OF NATRIURETIC PEPTIDE: CPT

## 2017-09-22 PROCEDURE — 81001 URINALYSIS AUTO W/SCOPE: CPT

## 2017-09-22 PROCEDURE — 99999 PR PBB SHADOW E&M-EST. PATIENT-LVL V: CPT | Mod: PBBFAC,,, | Performed by: NURSE PRACTITIONER

## 2017-09-22 PROCEDURE — 99214 OFFICE O/P EST MOD 30 MIN: CPT | Mod: S$GLB,,, | Performed by: INTERNAL MEDICINE

## 2017-09-22 NOTE — TELEPHONE ENCOUNTER
Pt has gained 11lbs in pt 3 weeks pt states he has increased SOB leg swelling horacio thinks he sold be seen today states pt already left but wants us to call and make him an appt today

## 2017-09-22 NOTE — PROGRESS NOTES
Subjective:       Patient ID: Jmc PAT Cantor Jr. is a 71 y.o. male.    Chief Complaint: Wound Check    Wound Check        This patient is well known to my service.  He is seen today for evaluation and management of an ulcer to the left posterior leg.  He has circaid stockings but has not been wearing them.  This wound began as a blister about 2 weeks ago that erupted leaving an ulcer in its place.  Also he has gained 11 pounds in the last 3 weeks.  He is having more shortness of breath than usual.  He is on lasix.  He had some supplies left over from the last time he had home health services.  He comes today with a mepore island dressing on the wound and has a calamine zinc oxide wrap and gauze over the wound.  The wound is not healing.  He is afebrile.  He denies increased redness or purulent drainage but does report increased leg swelling.  He does not complain of pain.  His medical history is significant for type II diabetes, chronic kidney disease and venous insufficiency. Coming to the clinic poses a hardship on this patient and he could benefit from home health services again.  Review of Systems    Unchanged from prior visit except for increased shortness of breath.  Objective:      Physical Exam   Constitutional: He is oriented to person, place, and time. He appears well-developed and well-nourished. No distress.   HENT:   Head: Normocephalic and atraumatic.   Cardiovascular: Intact distal pulses.    Musculoskeletal: Normal range of motion. He exhibits edema (3-4+ lower leg). He exhibits no tenderness.        Legs:  Neurological: He is alert and oriented to person, place, and time.   Skin: Skin is warm and dry. No rash noted. He is not diaphoretic. No erythema.   Psychiatric: He has a normal mood and affect. His behavior is normal. Judgment and thought content normal.   Nursing note and vitals reviewed.      ..  Hemoglobin A1C   Date Value Ref Range Status   07/13/2017 6.7 (H) 4.0 - 5.6 % Final     Comment:      According to ADA guidelines, hemoglobin A1c <7.0% represents  optimal control in non-pregnant diabetic patients. Different  metrics may apply to specific patient populations.   Standards of Medical Care in Diabetes-2016.  For the purpose of screening for the presence of diabetes:  <5.7%     Consistent with the absence of diabetes  5.7-6.4%  Consistent with increasing risk for diabetes   (prediabetes)  >or=6.5%  Consistent with diabetes  Currently, no consensus exists for use of hemoglobin A1c  for diagnosis of diabetes for children.  This Hemoglobin A1c assay has significant interference with fetal   hemoglobin   (HbF). The results are invalid for patients with abnormal amounts of   HbF,   including those with known Hereditary Persistence   of Fetal Hemoglobin. Heterozygous hemoglobin variants (HbAS, HbAC,   HbAD, HbAE, HbA2) do not significantly interfere with this assay;   however, presence of multiple variants in a sample may impact the %   interference.     06/22/2017 7.1 (H) 4.0 - 5.6 % Final     Comment:     According to ADA guidelines, hemoglobin A1c <7.0% represents  optimal control in non-pregnant diabetic patients. Different  metrics may apply to specific patient populations.   Standards of Medical Care in Diabetes-2016.  For the purpose of screening for the presence of diabetes:  <5.7%     Consistent with the absence of diabetes  5.7-6.4%  Consistent with increasing risk for diabetes   (prediabetes)  >or=6.5%  Consistent with diabetes  Currently, no consensus exists for use of hemoglobin A1c  for diagnosis of diabetes for children.  This Hemoglobin A1c assay has significant interference with fetal   hemoglobin   (HbF). The results are invalid for patients with abnormal amounts of   HbF,   including those with known Hereditary Persistence   of Fetal Hemoglobin. Heterozygous hemoglobin variants (HbAS, HbAC,   HbAD, HbAE, HbA2) do not significantly interfere with this assay;   however, presence of multiple  variants in a sample may impact the %   interference.     05/30/2017 7.7 (H) 4.5 - 6.2 % Final     Comment:     According to ADA guidelines, hemoglobin A1C <7.0% represents  optimal control in non-pregnant diabetic patients.  Different  metrics may apply to specific populations.   Standards of Medical Care in Diabetes - 2016.  For the purpose of screening for the presence of diabetes:  <5.7%     Consistent with the absence of diabetes  5.7-6.4%  Consistent with increasing risk for diabetes   (prediabetes)  >or=6.5%  Consistent with diabetes  Currently no consensus exists for use of hemoglobin A1C  for diagnosis of diabetes for children.       Stephen was seen in the clinic room and placed in the supine position on the treatment table.  The left leg was cleansed with Easi-clense sponges and dried thoroughly.  A hydrofiber dressing was applied to the wound.  Eucerin cream was applied to the lower legs.  The patient's foot was positioned at a 90 degree angle.  A zinc oxide wrap with calamine, followed by kerlix roll gauze and coban were applied using a spiral technique avoiding creases or folds.  The wrap was started behind the first metatarsal and ended below the tibial tubercle of the knee.  There was overlap of each turn half the width of the previous turn.  The compression wrap will be changed every 2-3 days.    Assessment:       1. Ulcer of left lower extremity, limited to breakdown of skin    2. Venous insufficiency of both lower extremities    3. Bilateral leg edema        Plan:           Calamine unna boot left lower leg as detailed above.  Patient was warned not to get the dressings wet and to use cast covers for showering.  Should the dressing become wet, he is to remove it, place a wet-to-dry dressing over the wound, cover with gauze and roll gauze and use ace wraps for compression and to secure bandages.  He should then notify this office as soon as possible to have a new dressing applied.  Return to clinic in  one week.  Patient will try to see Dr. Barlow so that he may be evaluated.

## 2017-09-22 NOTE — Clinical Note
He has had an 11 pound weight gain over the past 3 weeks with increased leg swelling, blistering with ulcer formation and increased SOB.  He is on Lasix but may need to be seen. Thanks, Lucinda

## 2017-09-22 NOTE — PROGRESS NOTES
PAST MEDICAL HISTORY:  Type 2 diabetes with suboptimal control.  Peripheral neuropathy.  Chronic kidney disease stage III.  Hypertension.  Hyperlipidemia.  Chronic atrial fibrillation.  Sleep apnea with previous septoplasty, UPPP procedure.  Left and right total knee replacement.  Left shoulder surgery.  Prior history of Meniere's disease.  Lumbar degenerative disk disease.  History of nephrolithiasis.  Status post eyelid surgery for removal of ectropion.    SOCIAL HISTORY:  Tobacco and alcohol use - none.  Exercise is limited.    MEDICATIONS:  Eliquis 5 mg twice a day.  Aspirin 325 mg a day  Ciprofloxacin 500 mg twice a day  Fenofibrate 145 mg a day.  Flonase as needed.  Lasix 40 mg a day.  Lantus 40 units a day.  NovoLog 15, 17, and 17.  Levothyroxine 0.025 mg.  Losartan 25 mg.  Metoprolol  mg.  Ditropan XL 5 mg.  Pravastatin 10 mg.      REASON FOR VISIT:  This is a 71-year-old male who has been noticing fluid   retention in his legs, gaining weight, feeling short of breath, and his clothes   are feeling tighter.  Compared to June, he has gained about 10 pounds.    Another issue is that he finds that he can fall asleep anytime during the day.    Pertinent in regard to the above conditions is that on September 15,   ciprofloxacin was called in because he was having evidence of another prostate   infection with dysuria and frequency and because of this, he decided to stop his   Lasix 40 mg.    Regarding his sleep apnea, he used the CPAP at 10 cm, but he decided to stop   using it because when he uses it, he will have a postnasal drip throughout the   day and it is bothersome.    PHYSICAL EXAMINATION:  VITAL SIGNS:  His weight is 309, pulse 88, blood pressure 108/72.  LUNGS:  Clear.  HEART:  Regular rate and rhythm.  ABDOMEN:  Protuberant, nontender.  EXTREMITIES:  He has 3+ edema.    IMPRESSION:  1.  Lower extremity edema.  2.  Fluid retention.  3.  Obstructive sleep apnea.  4.  Type 2 diabetes with chronic  kidney disease.    PLAN:  Today while he is here, we will repeat a basic metabolic profile with   hemoglobin A1c and get a BMP, restart Lasix 40 mg a day, and restart the use of   CPAP, but without humidification and hopefully this will not cause him to have a   postnasal drip.    /sc [id number] review      ANTONIETTA/EMPERATRIZ  dd: 09/22/2017 12:00:45 (CDT)  td: 09/23/2017 01:25:42 (CDT)  Doc ID   #[id number]  Job ID #[id number]    CC:

## 2017-09-22 NOTE — TELEPHONE ENCOUNTER
----- Message from Jane Majano sent at 9/22/2017  7:54 AM CDT -----  Contact: Lucinda from [facility] [id number]  Lucinda from [facility] needs to speak with someone asap regarding above patient who's there getting treatment, please call her back at [facility] [id number].    Thank you

## 2017-09-23 LAB
ESTIMATED AVG GLUCOSE: 157 MG/DL
HBA1C MFR BLD HPLC: 7.1 %

## 2017-09-25 LAB — BACTERIA UR CULT: NORMAL

## 2017-09-28 ENCOUNTER — OFFICE VISIT (OUTPATIENT)
Dept: WOUND CARE | Facility: CLINIC | Age: 72
End: 2017-09-28
Payer: MEDICARE

## 2017-09-28 VITALS
DIASTOLIC BLOOD PRESSURE: 74 MMHG | SYSTOLIC BLOOD PRESSURE: 128 MMHG | HEART RATE: 82 BPM | TEMPERATURE: 98 F | HEIGHT: 70 IN | BODY MASS INDEX: 44.05 KG/M2 | WEIGHT: 307.69 LBS

## 2017-09-28 DIAGNOSIS — R60.0 BILATERAL LEG EDEMA: ICD-10-CM

## 2017-09-28 DIAGNOSIS — L97.921 ULCER OF LEFT LOWER EXTREMITY, LIMITED TO BREAKDOWN OF SKIN: Primary | ICD-10-CM

## 2017-09-28 DIAGNOSIS — I87.2 VENOUS INSUFFICIENCY OF BOTH LOWER EXTREMITIES: ICD-10-CM

## 2017-09-28 PROCEDURE — 99499 UNLISTED E&M SERVICE: CPT | Mod: S$GLB,,, | Performed by: NURSE PRACTITIONER

## 2017-09-28 PROCEDURE — 99999 PR PBB SHADOW E&M-EST. PATIENT-LVL V: CPT | Mod: PBBFAC,,, | Performed by: NURSE PRACTITIONER

## 2017-09-28 PROCEDURE — 29580 STRAPPING UNNA BOOT: CPT | Mod: LT,S$GLB,, | Performed by: NURSE PRACTITIONER

## 2017-09-28 RX ORDER — LOSARTAN POTASSIUM 50 MG/1
TABLET ORAL
COMMUNITY
Start: 2017-09-02 | End: 2017-10-06

## 2017-10-05 NOTE — PROGRESS NOTES
"CC: The primary encounter diagnosis was Type 2 diabetes mellitus with stage 3 chronic kidney disease    HPI: Mr. Stephen Cantor Jr. was diagnosed with Type 2 DM " a long time ago".  He had been told by his doctor that he was borderline for 35-40 years. Started on metformin "around 2000". Had diarrhea with metformin. Has been on insulin for years. Denies hospitalizations due to DM.     A1C slightly trended up since last visit, but at goal considering age and comorbidities. Currently seeing CODY Correa for treatment of LLE ulcer.     Takes insulin 4x/day, checks BG 3x/day  AM: 125-200  Lunch: 128-221  Dinner: 108-209    No missed doses of insulin.     Rotates insulin injection sites    Good appetite. Snacks at times on potato chips, fruit, or peanuts. Drinks Crystal light or sugar free koolaid packs.     CURRENT DIABETIC MEDS: Lantus 40 units QHS, Novolog 15/17/17 units AC plus correction scale, goal 150, ISF 25.     Last Podiatry Exam: 2017 and is seen every 3 months    REVIEW OF SYSTEMS  Constitutional: + fatigue for the past week, which has been off and on, but more often now; weight stable.   Eyes: occasional right eye blurry vision; (+) glaucoma; chronic right eye redness;  last eye exam: July 2015 and then had f/u for ectropion repair in November 2016 (appointment November 2017)  Cardiac: no palpitations, murmurs, or chest pain.   Respiratory: denies current dyspnea, but does admit to occasional dyspnea with exertion.   GI: no c/o abdominal pain, nausea, or bowel pattern changes  Skin: no rashes; occasional bruising at insulin injection sites, which has improved   Neuro: chronic numbness, tingling in bilateral feet; no dizziness    Vital Signs  BP (!) 142/68   Pulse 78   Ht 5' 10" (1.778 m)   Wt (!) 137.9 kg (304 lb)   BMI 43.62 kg/m²     Hemoglobin A1C   Date Value Ref Range Status   09/22/2017 7.1 (H) 4.0 - 5.6 % Final     Comment:     According to ADA guidelines, hemoglobin A1c <7.0% " represents  optimal control in non-pregnant diabetic patients. Different  metrics may apply to specific patient populations.   Standards of Medical Care in Diabetes-2016.  For the purpose of screening for the presence of diabetes:  <5.7%     Consistent with the absence of diabetes  5.7-6.4%  Consistent with increasing risk for diabetes   (prediabetes)  >or=6.5%  Consistent with diabetes  Currently, no consensus exists for use of hemoglobin A1c  for diagnosis of diabetes for children.  This Hemoglobin A1c assay has significant interference with fetal   hemoglobin   (HbF). The results are invalid for patients with abnormal amounts of   HbF,   including those with known Hereditary Persistence   of Fetal Hemoglobin. Heterozygous hemoglobin variants (HbAS, HbAC,   HbAD, HbAE, HbA2) do not significantly interfere with this assay;   however, presence of multiple variants in a sample may impact the %   interference.     07/13/2017 6.7 (H) 4.0 - 5.6 % Final     Comment:     According to ADA guidelines, hemoglobin A1c <7.0% represents  optimal control in non-pregnant diabetic patients. Different  metrics may apply to specific patient populations.   Standards of Medical Care in Diabetes-2016.  For the purpose of screening for the presence of diabetes:  <5.7%     Consistent with the absence of diabetes  5.7-6.4%  Consistent with increasing risk for diabetes   (prediabetes)  >or=6.5%  Consistent with diabetes  Currently, no consensus exists for use of hemoglobin A1c  for diagnosis of diabetes for children.  This Hemoglobin A1c assay has significant interference with fetal   hemoglobin   (HbF). The results are invalid for patients with abnormal amounts of   HbF,   including those with known Hereditary Persistence   of Fetal Hemoglobin. Heterozygous hemoglobin variants (HbAS, HbAC,   HbAD, HbAE, HbA2) do not significantly interfere with this assay;   however, presence of multiple variants in a sample may impact the %    interference.     06/22/2017 7.1 (H) 4.0 - 5.6 % Final     Comment:     According to ADA guidelines, hemoglobin A1c <7.0% represents  optimal control in non-pregnant diabetic patients. Different  metrics may apply to specific patient populations.   Standards of Medical Care in Diabetes-2016.  For the purpose of screening for the presence of diabetes:  <5.7%     Consistent with the absence of diabetes  5.7-6.4%  Consistent with increasing risk for diabetes   (prediabetes)  >or=6.5%  Consistent with diabetes  Currently, no consensus exists for use of hemoglobin A1c  for diagnosis of diabetes for children.  This Hemoglobin A1c assay has significant interference with fetal   hemoglobin   (HbF). The results are invalid for patients with abnormal amounts of   HbF,   including those with known Hereditary Persistence   of Fetal Hemoglobin. Heterozygous hemoglobin variants (HbAS, HbAC,   HbAD, HbAE, HbA2) do not significantly interfere with this assay;   however, presence of multiple variants in a sample may impact the %   interference.         Chemistry        Component Value Date/Time     09/22/2017 1210    K 4.7 09/22/2017 1210     09/22/2017 1210    CO2 25 09/22/2017 1210    BUN 31 (H) 09/22/2017 1210    CREATININE 1.9 (H) 09/22/2017 1210     (H) 09/22/2017 1210        Component Value Date/Time    CALCIUM 9.5 09/22/2017 1210    ALKPHOS 37 (L) 05/30/2017 0946    AST 14 05/30/2017 0946    ALT 10 05/30/2017 0946    BILITOT 0.6 05/30/2017 0946          Lab Results   Component Value Date    CHOL 159 05/30/2017    CHOL 133 03/06/2017    CHOL 170 03/23/2016     Lab Results   Component Value Date    HDL 22 (L) 05/30/2017    HDL 28 (L) 03/06/2017    HDL 29 (L) 03/23/2016     Lab Results   Component Value Date    LDLCALC 97.6 05/30/2017    LDLCALC 79.0 03/06/2017    LDLCALC 84.0 03/23/2016     Lab Results   Component Value Date    TRIG 197 (H) 05/30/2017    TRIG 130 03/06/2017    TRIG 285 (H) 03/23/2016      Lab Results   Component Value Date    CHOLHDL 13.8 (L) 05/30/2017    CHOLHDL 21.1 03/06/2017    CHOLHDL 17.1 (L) 03/23/2016     Lab Results   Component Value Date    TSH 2.848 05/30/2017     PHYSICAL EXAMINATION  Constitutional: Appears well, no distress, morbidly obese  EENT: Right eye chronic scleral redness; external ears no masses; nasal mucosa pink with septum midline; oral mucosa pink.  Neck: Supple, trachea midline; no thyromegaly.   Respiratory: CTA, even and unlabored.  Cardiovascular: irregular;  no murmurs or carotid bruits. Chronic rle edema observed (2-3+).   Lymph: no cervical or supraclavicular lymphadenopathy;  Skin: warm and dry; no rash, insulin injection site reactions, or acanthosis nigracans observed.   Neuro: steady gait with use of rolling walker  Feet: appropriate footwear; last Podiatry exam: 3/2017; lle wrapped in gauze dressing.     Assessment/Plan  Type 2 diabetes mellitus with stage 3 chronic kidney disease, with long-term current use of insulin   A1C trended up, but at goal considering age and comorbidities  A1C goal ~7.5% or less without hypoglycemia  Keep doses the same for now  Avoid hypoglycemia  Continue to f/u with Nephrology for CKD  Send Diabetic supply information for new meter with strips--Needs True metrix meter and strips  Monitor BG 4x/day--logs to next visit    Type 2 diabetes mellitus with diabetic polyneuropathy, with long-term current use of insulin   Continue Gabapentin, f/u with Podiatry    Dyslipidemia associated with type 2 diabetes mellitus   On Pravastatin, Tricor, Lovaza  Per PCP    Chronic atrial fibrillation   Can worsen insulin resistance  On BB    Essential hypertension   Stable despite systolic BP slightly above goal  Continue current meds (On diuretic, BB, ARB)  Previous BPs stable    Morbid obesity with BMI of 40.0-44.9, adult   Body mass index is 43.62 kg/m².   Weight loss can help to reduce insulin resistance    Sleep apnea, unspecified type    Continue use of c-pap    Edema  On Furosemide  Per PCP     FOLLOW UP  Return in about 3 months (around 1/6/2018).

## 2017-10-06 ENCOUNTER — OFFICE VISIT (OUTPATIENT)
Dept: WOUND CARE | Facility: CLINIC | Age: 72
End: 2017-10-06
Payer: MEDICARE

## 2017-10-06 ENCOUNTER — OFFICE VISIT (OUTPATIENT)
Dept: ENDOCRINOLOGY | Facility: CLINIC | Age: 72
End: 2017-10-06
Payer: MEDICARE

## 2017-10-06 VITALS
WEIGHT: 304 LBS | DIASTOLIC BLOOD PRESSURE: 68 MMHG | BODY MASS INDEX: 43.52 KG/M2 | HEIGHT: 70 IN | SYSTOLIC BLOOD PRESSURE: 142 MMHG | HEART RATE: 78 BPM

## 2017-10-06 VITALS
HEIGHT: 71 IN | SYSTOLIC BLOOD PRESSURE: 137 MMHG | TEMPERATURE: 98 F | WEIGHT: 304.13 LBS | DIASTOLIC BLOOD PRESSURE: 83 MMHG | HEART RATE: 89 BPM | BODY MASS INDEX: 42.58 KG/M2

## 2017-10-06 DIAGNOSIS — L97.921 ULCER OF LEFT LOWER EXTREMITY, LIMITED TO BREAKDOWN OF SKIN: Primary | ICD-10-CM

## 2017-10-06 DIAGNOSIS — E78.5 DYSLIPIDEMIA ASSOCIATED WITH TYPE 2 DIABETES MELLITUS: ICD-10-CM

## 2017-10-06 DIAGNOSIS — G47.33 OBSTRUCTIVE SLEEP APNEA SYNDROME: ICD-10-CM

## 2017-10-06 DIAGNOSIS — N18.30 TYPE 2 DIABETES MELLITUS WITH STAGE 3 CHRONIC KIDNEY DISEASE, WITH LONG-TERM CURRENT USE OF INSULIN: Primary | ICD-10-CM

## 2017-10-06 DIAGNOSIS — I87.2 VENOUS STASIS DERMATITIS OF RIGHT LOWER EXTREMITY: ICD-10-CM

## 2017-10-06 DIAGNOSIS — R60.0 BILATERAL LEG EDEMA: ICD-10-CM

## 2017-10-06 DIAGNOSIS — B35.3 TINEA PEDIS OF BOTH FEET: ICD-10-CM

## 2017-10-06 DIAGNOSIS — I10 ESSENTIAL HYPERTENSION: ICD-10-CM

## 2017-10-06 DIAGNOSIS — E11.42 TYPE 2 DIABETES MELLITUS WITH DIABETIC POLYNEUROPATHY, WITH LONG-TERM CURRENT USE OF INSULIN: ICD-10-CM

## 2017-10-06 DIAGNOSIS — Z79.4 TYPE 2 DIABETES MELLITUS WITH DIABETIC POLYNEUROPATHY, WITH LONG-TERM CURRENT USE OF INSULIN: ICD-10-CM

## 2017-10-06 DIAGNOSIS — I87.2 VENOUS INSUFFICIENCY OF BOTH LOWER EXTREMITIES: ICD-10-CM

## 2017-10-06 DIAGNOSIS — E11.69 DYSLIPIDEMIA ASSOCIATED WITH TYPE 2 DIABETES MELLITUS: ICD-10-CM

## 2017-10-06 DIAGNOSIS — I48.20 CHRONIC ATRIAL FIBRILLATION: ICD-10-CM

## 2017-10-06 DIAGNOSIS — E66.01 MORBID OBESITY WITH BMI OF 40.0-44.9, ADULT: ICD-10-CM

## 2017-10-06 DIAGNOSIS — E11.22 TYPE 2 DIABETES MELLITUS WITH STAGE 3 CHRONIC KIDNEY DISEASE, WITH LONG-TERM CURRENT USE OF INSULIN: Primary | ICD-10-CM

## 2017-10-06 DIAGNOSIS — Z79.4 TYPE 2 DIABETES MELLITUS WITH STAGE 3 CHRONIC KIDNEY DISEASE, WITH LONG-TERM CURRENT USE OF INSULIN: Primary | ICD-10-CM

## 2017-10-06 DIAGNOSIS — R60.9 EDEMA, UNSPECIFIED TYPE: ICD-10-CM

## 2017-10-06 PROCEDURE — 99999 PR PBB SHADOW E&M-EST. PATIENT-LVL IV: CPT | Mod: PBBFAC,,, | Performed by: NURSE PRACTITIONER

## 2017-10-06 PROCEDURE — 99999 PR PBB SHADOW E&M-EST. PATIENT-LVL V: CPT | Mod: PBBFAC,,, | Performed by: NURSE PRACTITIONER

## 2017-10-06 PROCEDURE — 29580 STRAPPING UNNA BOOT: CPT | Mod: LT,S$GLB,, | Performed by: NURSE PRACTITIONER

## 2017-10-06 PROCEDURE — 99214 OFFICE O/P EST MOD 30 MIN: CPT | Mod: S$GLB,,, | Performed by: NURSE PRACTITIONER

## 2017-10-06 PROCEDURE — 99499 UNLISTED E&M SERVICE: CPT | Mod: S$GLB,,, | Performed by: NURSE PRACTITIONER

## 2017-10-06 NOTE — PROGRESS NOTES
Subjective:       Patient ID: Jm PAT Cantor Jr. is a 71 y.o. male.    Chief Complaint: Wound Check    Wound Check        This patient is seen today for reevaluation of an ulcer to the left posterior leg.  This wound began as a blister about in early September that erupted leaving an ulcer in its place.  An unna boot was placed on the leg on the last visit.  The wound is healing as evidenced by wound contracture and epithelialization.  He is afebrile.  He denies increased redness or purulent drainage but does report increased leg swelling.  He does not complain of pain.  His medical history is significant for type II diabetes, chronic kidney disease and venous insufficiency.   Review of Systems    Unchanged from prior visit except for increased shortness of breath.  Objective:      Physical Exam   Constitutional: He is oriented to person, place, and time. He appears well-developed and well-nourished. No distress.   HENT:   Head: Normocephalic and atraumatic.   Cardiovascular: Intact distal pulses.    Musculoskeletal: Normal range of motion. He exhibits edema (3-4+ lower leg). He exhibits no tenderness.        Legs:  Neurological: He is alert and oriented to person, place, and time.   Skin: Skin is warm and dry. No rash noted. He is not diaphoretic. No erythema.   Psychiatric: He has a normal mood and affect. His behavior is normal. Judgment and thought content normal.   Nursing note and vitals reviewed.      ..  Hemoglobin A1C   Date Value Ref Range Status   09/22/2017 7.1 (H) 4.0 - 5.6 % Final     Comment:     According to ADA guidelines, hemoglobin A1c <7.0% represents  optimal control in non-pregnant diabetic patients. Different  metrics may apply to specific patient populations.   Standards of Medical Care in Diabetes-2016.  For the purpose of screening for the presence of diabetes:  <5.7%     Consistent with the absence of diabetes  5.7-6.4%  Consistent with increasing risk for diabetes   (prediabetes)  >or=6.5%   Consistent with diabetes  Currently, no consensus exists for use of hemoglobin A1c  for diagnosis of diabetes for children.  This Hemoglobin A1c assay has significant interference with fetal   hemoglobin   (HbF). The results are invalid for patients with abnormal amounts of   HbF,   including those with known Hereditary Persistence   of Fetal Hemoglobin. Heterozygous hemoglobin variants (HbAS, HbAC,   HbAD, HbAE, HbA2) do not significantly interfere with this assay;   however, presence of multiple variants in a sample may impact the %   interference.     07/13/2017 6.7 (H) 4.0 - 5.6 % Final     Comment:     According to ADA guidelines, hemoglobin A1c <7.0% represents  optimal control in non-pregnant diabetic patients. Different  metrics may apply to specific patient populations.   Standards of Medical Care in Diabetes-2016.  For the purpose of screening for the presence of diabetes:  <5.7%     Consistent with the absence of diabetes  5.7-6.4%  Consistent with increasing risk for diabetes   (prediabetes)  >or=6.5%  Consistent with diabetes  Currently, no consensus exists for use of hemoglobin A1c  for diagnosis of diabetes for children.  This Hemoglobin A1c assay has significant interference with fetal   hemoglobin   (HbF). The results are invalid for patients with abnormal amounts of   HbF,   including those with known Hereditary Persistence   of Fetal Hemoglobin. Heterozygous hemoglobin variants (HbAS, HbAC,   HbAD, HbAE, HbA2) do not significantly interfere with this assay;   however, presence of multiple variants in a sample may impact the %   interference.     06/22/2017 7.1 (H) 4.0 - 5.6 % Final     Comment:     According to ADA guidelines, hemoglobin A1c <7.0% represents  optimal control in non-pregnant diabetic patients. Different  metrics may apply to specific patient populations.   Standards of Medical Care in Diabetes-2016.  For the purpose of screening for the presence of diabetes:  <5.7%     Consistent  with the absence of diabetes  5.7-6.4%  Consistent with increasing risk for diabetes   (prediabetes)  >or=6.5%  Consistent with diabetes  Currently, no consensus exists for use of hemoglobin A1c  for diagnosis of diabetes for children.  This Hemoglobin A1c assay has significant interference with fetal   hemoglobin   (HbF). The results are invalid for patients with abnormal amounts of   HbF,   including those with known Hereditary Persistence   of Fetal Hemoglobin. Heterozygous hemoglobin variants (HbAS, HbAC,   HbAD, HbAE, HbA2) do not significantly interfere with this assay;   however, presence of multiple variants in a sample may impact the %   interference.       Stephen was seen in the clinic room and placed in the supine position on the treatment table.  The left leg was cleansed with Easi-clense sponges and dried thoroughly.  A mepilex lite foam dressing was applied to the wound.  Eucerin cream was applied to the lower legs.  The patient's foot was positioned at a 90 degree angle.  A zinc oxide wrap with calamine, followed by kerlix roll gauze and coban were applied using a spiral technique avoiding creases or folds.  The wrap was started behind the first metatarsal and ended below the tibial tubercle of the knee.  There was overlap of each turn half the width of the previous turn.  The compression wrap will be changed every 2-3 days.    Assessment:       1. Ulcer of left lower extremity, limited to breakdown of skin    2. Venous insufficiency of both lower extremities    3. Tinea pedis of both feet    4. Venous stasis dermatitis of right lower extremity    5. Bilateral leg edema        Plan:           Calamine unna boot left lower leg as detailed above.  Patient was warned not to get the dressings wet and to use cast covers for showering.  Should the dressing become wet, he is to remove it, place a wet-to-dry dressing over the wound, cover with gauze and roll gauze and use ace wraps for compression and to secure  bandages.  He should then notify this office as soon as possible to have a new dressing applied.  Return to clinic in one week.

## 2017-10-12 ENCOUNTER — OFFICE VISIT (OUTPATIENT)
Dept: WOUND CARE | Facility: CLINIC | Age: 72
End: 2017-10-12
Payer: MEDICARE

## 2017-10-12 VITALS
DIASTOLIC BLOOD PRESSURE: 82 MMHG | BODY MASS INDEX: 44.38 KG/M2 | WEIGHT: 310 LBS | TEMPERATURE: 98 F | SYSTOLIC BLOOD PRESSURE: 128 MMHG | HEIGHT: 70 IN | HEART RATE: 82 BPM

## 2017-10-12 DIAGNOSIS — I87.2 VENOUS INSUFFICIENCY OF BOTH LOWER EXTREMITIES: Primary | ICD-10-CM

## 2017-10-12 DIAGNOSIS — I87.2 VENOUS STASIS DERMATITIS OF RIGHT LOWER EXTREMITY: ICD-10-CM

## 2017-10-12 DIAGNOSIS — B35.3 TINEA PEDIS OF BOTH FEET: ICD-10-CM

## 2017-10-12 DIAGNOSIS — R60.0 BILATERAL LEG EDEMA: ICD-10-CM

## 2017-10-12 PROBLEM — L97.921 ULCER OF LEFT LOWER EXTREMITY, LIMITED TO BREAKDOWN OF SKIN: Status: RESOLVED | Noted: 2017-09-22 | Resolved: 2017-10-12

## 2017-10-12 PROCEDURE — 99999 PR PBB SHADOW E&M-EST. PATIENT-LVL V: CPT | Mod: PBBFAC,,, | Performed by: NURSE PRACTITIONER

## 2017-10-12 PROCEDURE — 99211 OFF/OP EST MAY X REQ PHY/QHP: CPT | Mod: S$GLB,,, | Performed by: NURSE PRACTITIONER

## 2017-10-12 NOTE — PROGRESS NOTES
Subjective:       [place] [facility] [place] is a 71 y.o. male.    Chief Complaint: Wound Check    Wound Check        This patient is seen today for reevaluation of an ulcer to the left posterior leg.  This wound began as a blister about in early September that erupted leaving an ulcer in its place.  An unna boot was placed on the leg on the last visit.  The wound is now healed.  He is afebrile.  He denies increased redness or purulent drainage but does report increased leg swelling.  He does not complain of pain.  His medical history is significant for type II diabetes, chronic kidney disease and venous insufficiency.   Review of Systems    Unchanged from prior visit except for increased shortness of breath.  Objective:      Physical Exam   Constitutional: He is oriented to person, place, and time. He appears well-developed and well-nourished. No distress.   HENT:   Head: Normocephalic and atraumatic.   Cardiovascular: Intact distal pulses.    Musculoskeletal: Normal range of motion. He exhibits edema (3-4+ lower leg). He exhibits no tenderness.        Legs:  Neurological: He is alert and oriented to person, place, and time.   Skin: Skin is warm and dry. No rash noted. He is not diaphoretic. No erythema.   Psychiatric: He has a normal mood and affect. His behavior is normal. Judgment and thought content normal.   Nursing note and vitals reviewed.      ..  Hemoglobin A1C   Date Value Ref Range Status   09/22/2017 7.1 (H) 4.0 - 5.6 % Final     Comment:     According to ADA guidelines, hemoglobin A1c <7.0% represents  optimal control in non-pregnant diabetic patients. Different  metrics may apply to specific patient populations.   Standards of Medical Care in Diabetes-2016.  For the purpose of screening for the presence of diabetes:  <5.7%     Consistent with the absence of diabetes  5.7-6.4%  Consistent with increasing risk for diabetes   (prediabetes)  >or=6.5%  Consistent with diabetes  Currently, no consensus  exists for use of hemoglobin A1c  for diagnosis of diabetes for children.  This Hemoglobin A1c assay has significant interference with fetal   hemoglobin   (HbF). The results are invalid for patients with abnormal amounts of   HbF,   including those with known Hereditary Persistence   of Fetal Hemoglobin. Heterozygous hemoglobin variants (HbAS, HbAC,   HbAD, HbAE, HbA2) do not significantly interfere with this assay;   however, presence of multiple variants in a sample may impact the %   interference.     07/13/2017 6.7 (H) 4.0 - 5.6 % Final     Comment:     According to ADA guidelines, hemoglobin A1c <7.0% represents  optimal control in non-pregnant diabetic patients. Different  metrics may apply to specific patient populations.   Standards of Medical Care in Diabetes-2016.  For the purpose of screening for the presence of diabetes:  <5.7%     Consistent with the absence of diabetes  5.7-6.4%  Consistent with increasing risk for diabetes   (prediabetes)  >or=6.5%  Consistent with diabetes  Currently, no consensus exists for use of hemoglobin A1c  for diagnosis of diabetes for children.  This Hemoglobin A1c assay has significant interference with fetal   hemoglobin   (HbF). The results are invalid for patients with abnormal amounts of   HbF,   including those with known Hereditary Persistence   of Fetal Hemoglobin. Heterozygous hemoglobin variants (HbAS, HbAC,   HbAD, HbAE, HbA2) do not significantly interfere with this assay;   however, presence of multiple variants in a sample may impact the %   interference.     06/22/2017 7.1 (H) 4.0 - 5.6 % Final     Comment:     According to ADA guidelines, hemoglobin A1c <7.0% represents  optimal control in non-pregnant diabetic patients. Different  metrics may apply to specific patient populations.   Standards of Medical Care in Diabetes-2016.  For the purpose of screening for the presence of diabetes:  <5.7%     Consistent with the absence of diabetes  5.7-6.4%  Consistent  with increasing risk for diabetes   (prediabetes)  >or=6.5%  Consistent with diabetes  Currently, no consensus exists for use of hemoglobin A1c  for diagnosis of diabetes for children.  This Hemoglobin A1c assay has significant interference with fetal   hemoglobin   (HbF). The results are invalid for patients with abnormal amounts of   HbF,   including those with known Hereditary Persistence   of Fetal Hemoglobin. Heterozygous hemoglobin variants (HbAS, HbAC,   HbAD, HbAE, HbA2) do not significantly interfere with this assay;   however, presence of multiple variants in a sample may impact the %   interference.       Assessment:       1. Venous insufficiency of both lower extremities    2. Tinea pedis of both feet    3. Venous stasis dermatitis of right lower extremity    4. Bilateral leg edema        Plan:           Prescription given for 156-20 mmHg knee high compression hose.  Return to clinic as needed.

## 2017-10-26 ENCOUNTER — OFFICE VISIT (OUTPATIENT)
Dept: WOUND CARE | Facility: CLINIC | Age: 72
End: 2017-10-26
Payer: MEDICARE

## 2017-10-26 VITALS
HEART RATE: 109 BPM | HEIGHT: 70 IN | TEMPERATURE: 98 F | BODY MASS INDEX: 44.48 KG/M2 | WEIGHT: 310.69 LBS | SYSTOLIC BLOOD PRESSURE: 95 MMHG | DIASTOLIC BLOOD PRESSURE: 66 MMHG

## 2017-10-26 DIAGNOSIS — L97.921 ULCER OF LEFT LOWER EXTREMITY, LIMITED TO BREAKDOWN OF SKIN: Primary | ICD-10-CM

## 2017-10-26 DIAGNOSIS — I87.2 VENOUS INSUFFICIENCY OF BOTH LOWER EXTREMITIES: ICD-10-CM

## 2017-10-26 DIAGNOSIS — R60.0 BILATERAL LEG EDEMA: ICD-10-CM

## 2017-10-26 DIAGNOSIS — L97.911 ULCER OF RIGHT LOWER EXTREMITY, LIMITED TO BREAKDOWN OF SKIN: ICD-10-CM

## 2017-10-26 PROCEDURE — 99999 PR PBB SHADOW E&M-EST. PATIENT-LVL V: CPT | Mod: PBBFAC,,, | Performed by: NURSE PRACTITIONER

## 2017-10-26 PROCEDURE — 29580 STRAPPING UNNA BOOT: CPT | Mod: 50,S$GLB,, | Performed by: NURSE PRACTITIONER

## 2017-10-26 PROCEDURE — 99499 UNLISTED E&M SERVICE: CPT | Mod: S$GLB,,, | Performed by: NURSE PRACTITIONER

## 2017-10-26 NOTE — PROGRESS NOTES
Subjective:       Patient [place] [facility] [place] Cantor Jr. is a 71 y.o. male.    Chief Complaint: Wound Check    Wound Check        This patient is well known to my service.  He was last seen in this clinic on 10/12/17 at which time his wounds were healed.  He is seen today for evaluation and management of a recurrent ulcers to both lower legs.  The wounds began as blisters two days ago that erupted leaving an ulcer in its place.  He was using his circaid stockings and reports that he broke down while using them.  This is the second time this has happened.  He says his grandson is putting the stockings on for him as he cannot do this unassisted.  He is afebrile.  He denies increased redness or purulent drainage but does report increased leg swelling.  He does not complain of pain.  His medical history is significant for type II diabetes, chronic kidney disease and venous insufficiency.   Review of Systems    Unchanged from prior visit except for increased shortness of breath.  Objective:      Physical Exam   Constitutional: He is oriented to person, place, and time. He appears well-developed and well-nourished. No distress.   HENT:   Head: Normocephalic and atraumatic.   Cardiovascular: Intact distal pulses.    Musculoskeletal: Normal range of motion. He exhibits edema (4+ lower leg). He exhibits no tenderness.        Legs:  Neurological: He is alert and oriented to person, place, and time.   Skin: Skin is warm and dry. No rash noted. He is not diaphoretic. No erythema.   Psychiatric: He has a normal mood and affect. His behavior is normal. Judgment and thought content normal.   Nursing note and vitals reviewed.      ..  Hemoglobin A1C   Date Value Ref Range Status   09/22/2017 7.1 (H) 4.0 - 5.6 % Final     Comment:     According to ADA guidelines, hemoglobin A1c <7.0% represents  optimal control in non-pregnant diabetic patients. Different  metrics may apply to specific patient populations.   Standards of Medical Care in  Diabetes-2016.  For the purpose of screening for the presence of diabetes:  <5.7%     Consistent with the absence of diabetes  5.7-6.4%  Consistent with increasing risk for diabetes   (prediabetes)  >or=6.5%  Consistent with diabetes  Currently, no consensus exists for use of hemoglobin A1c  for diagnosis of diabetes for children.  This Hemoglobin A1c assay has significant interference with fetal   hemoglobin   (HbF). The results are invalid for patients with abnormal amounts of   HbF,   including those with known Hereditary Persistence   of Fetal Hemoglobin. Heterozygous hemoglobin variants (HbAS, HbAC,   HbAD, HbAE, HbA2) do not significantly interfere with this assay;   however, presence of multiple variants in a sample may impact the %   interference.     07/13/2017 6.7 (H) 4.0 - 5.6 % Final     Comment:     According to ADA guidelines, hemoglobin A1c <7.0% represents  optimal control in non-pregnant diabetic patients. Different  metrics may apply to specific patient populations.   Standards of Medical Care in Diabetes-2016.  For the purpose of screening for the presence of diabetes:  <5.7%     Consistent with the absence of diabetes  5.7-6.4%  Consistent with increasing risk for diabetes   (prediabetes)  >or=6.5%  Consistent with diabetes  Currently, no consensus exists for use of hemoglobin A1c  for diagnosis of diabetes for children.  This Hemoglobin A1c assay has significant interference with fetal   hemoglobin   (HbF). The results are invalid for patients with abnormal amounts of   HbF,   including those with known Hereditary Persistence   of Fetal Hemoglobin. Heterozygous hemoglobin variants (HbAS, HbAC,   HbAD, HbAE, HbA2) do not significantly interfere with this assay;   however, presence of multiple variants in a sample may impact the %   interference.     06/22/2017 7.1 (H) 4.0 - 5.6 % Final     Comment:     According to ADA guidelines, hemoglobin A1c <7.0% represents  optimal control in non-pregnant  diabetic patients. Different  metrics may apply to specific patient populations.   Standards of Medical Care in Diabetes-2016.  For the purpose of screening for the presence of diabetes:  <5.7%     Consistent with the absence of diabetes  5.7-6.4%  Consistent with increasing risk for diabetes   (prediabetes)  >or=6.5%  Consistent with diabetes  Currently, no consensus exists for use of hemoglobin A1c  for diagnosis of diabetes for children.  This Hemoglobin A1c assay has significant interference with fetal   hemoglobin   (HbF). The results are invalid for patients with abnormal amounts of   HbF,   including those with known Hereditary Persistence   of Fetal Hemoglobin. Heterozygous hemoglobin variants (HbAS, HbAC,   HbAD, HbAE, HbA2) do not significantly interfere with this assay;   however, presence of multiple variants in a sample may impact the %   interference.       Stephen was seen in the clinic room and placed in the supine position on the treatment table.  Both legs were cleansed with Easi-clense sponges and dried thoroughly.  Medihoney gel and  hydrofiber dressing was applied to the wounds.  Eucerin cream was applied to the lower legs.  The patient's feet were positioned at a 90 degree angle.  A zinc oxide wrap, followed by kerlix roll gauze and coban were applied using a spiral technique avoiding creases or folds.  The wraps were started behind the first metatarsal and ended below the tibial tubercle of the knee.  There was overlap of each turn half the width of the previous turn.  The compression wraps will be changed every 7 days.    Assessment:       1. Ulcer of left lower extremity, limited to breakdown of skin    2. Ulcer of right lower extremity, limited to breakdown of skin    3. Venous insufficiency of both lower extremities    4. Bilateral leg edema        Plan:           Unna boots bilateral lower legs as detailed above.  Patient was warned not to get the dressings wet and to use cast covers for  showering. Should the dressing become wet, he is to remove it, place a wet-to-dry dressing over the wound, cover with gauze and roll gauze and use ace wraps for compression and to secure bandages.  He should then notify this office as soon as possible to have a new dressing applied.  Return to clinic in one week.       Right anterior leg    Left anterior leg

## 2017-10-26 NOTE — Clinical Note
Cannot seem to keep him well.  He reports taking his lasix but is not watching his sodium intake as he should.

## 2017-11-03 ENCOUNTER — OFFICE VISIT (OUTPATIENT)
Dept: WOUND CARE | Facility: CLINIC | Age: 72
End: 2017-11-03
Payer: MEDICARE

## 2017-11-03 VITALS
TEMPERATURE: 97 F | HEIGHT: 70 IN | WEIGHT: 310 LBS | BODY MASS INDEX: 44.38 KG/M2 | DIASTOLIC BLOOD PRESSURE: 78 MMHG | HEART RATE: 92 BPM | SYSTOLIC BLOOD PRESSURE: 126 MMHG

## 2017-11-03 DIAGNOSIS — L97.921 ULCER OF LEFT LOWER EXTREMITY, LIMITED TO BREAKDOWN OF SKIN: ICD-10-CM

## 2017-11-03 DIAGNOSIS — B35.3 TINEA PEDIS OF BOTH FEET: ICD-10-CM

## 2017-11-03 DIAGNOSIS — I87.2 VENOUS INSUFFICIENCY OF BOTH LOWER EXTREMITIES: ICD-10-CM

## 2017-11-03 DIAGNOSIS — I87.2 VENOUS STASIS DERMATITIS OF RIGHT LOWER EXTREMITY: ICD-10-CM

## 2017-11-03 DIAGNOSIS — R60.0 BILATERAL LEG EDEMA: ICD-10-CM

## 2017-11-03 DIAGNOSIS — L97.911 ULCER OF RIGHT LOWER EXTREMITY, LIMITED TO BREAKDOWN OF SKIN: Primary | ICD-10-CM

## 2017-11-03 PROCEDURE — 99999 PR PBB SHADOW E&M-EST. PATIENT-LVL V: CPT | Mod: PBBFAC,,, | Performed by: NURSE PRACTITIONER

## 2017-11-03 PROCEDURE — 29580 STRAPPING UNNA BOOT: CPT | Mod: 50,S$GLB,, | Performed by: NURSE PRACTITIONER

## 2017-11-03 PROCEDURE — 99499 UNLISTED E&M SERVICE: CPT | Mod: S$GLB,,, | Performed by: NURSE PRACTITIONER

## 2017-11-03 NOTE — PROGRESS NOTES
Subjective:       [place] [facility] [place] is a 71 y.o. male.    Chief Complaint: Wound Check    Wound Check        This patient is well known to my service.  He was last seen in this clinic on 10/12/17 at which time his wounds were healed.  He is seen today for evaluation and management of a recurrent ulcers to both lower legs.  The wounds began as blisters two days ago that erupted leaving an ulcer in its place.  He was using his circaid stockings and reports that he broke down while using them.  This is the second time this has happened.  He says his grandson is putting the stockings on for him as he cannot do this unassisted.  Unna boots are on bilaterally and the wounds are healing as evidenced by wound contracture.  He is afebrile.  He denies increased redness or purulent drainage but does report increased leg swelling.  He does not complain of pain.  His medical history is significant for type II diabetes, chronic kidney disease and venous insufficiency.   Review of Systems    Unchanged from prior visit except for increased shortness of breath.  Objective:      Physical Exam   Constitutional: He is oriented to person, place, and time. He appears well-developed and well-nourished. No distress.   HENT:   Head: Normocephalic and atraumatic.   Cardiovascular: Intact distal pulses.    Musculoskeletal: Normal range of motion. He exhibits edema (4+ lower leg). He exhibits no tenderness.        Legs:  Neurological: He is alert and oriented to person, place, and time.   Skin: Skin is warm and dry. No rash noted. He is not diaphoretic. No erythema.   Psychiatric: He has a normal mood and affect. His behavior is normal. Judgment and thought content normal.   Nursing note and vitals reviewed.      ..  Hemoglobin A1C   Date Value Ref Range Status   09/22/2017 7.1 (H) 4.0 - 5.6 % Final     Comment:     According to ADA guidelines, hemoglobin A1c <7.0% represents  optimal control in non-pregnant diabetic patients.  Different  metrics may apply to specific patient populations.   Standards of Medical Care in Diabetes-2016.  For the purpose of screening for the presence of diabetes:  <5.7%     Consistent with the absence of diabetes  5.7-6.4%  Consistent with increasing risk for diabetes   (prediabetes)  >or=6.5%  Consistent with diabetes  Currently, no consensus exists for use of hemoglobin A1c  for diagnosis of diabetes for children.  This Hemoglobin A1c assay has significant interference with fetal   hemoglobin   (HbF). The results are invalid for patients with abnormal amounts of   HbF,   including those with known Hereditary Persistence   of Fetal Hemoglobin. Heterozygous hemoglobin variants (HbAS, HbAC,   HbAD, HbAE, HbA2) do not significantly interfere with this assay;   however, presence of multiple variants in a sample may impact the %   interference.     07/13/2017 6.7 (H) 4.0 - 5.6 % Final     Comment:     According to ADA guidelines, hemoglobin A1c <7.0% represents  optimal control in non-pregnant diabetic patients. Different  metrics may apply to specific patient populations.   Standards of Medical Care in Diabetes-2016.  For the purpose of screening for the presence of diabetes:  <5.7%     Consistent with the absence of diabetes  5.7-6.4%  Consistent with increasing risk for diabetes   (prediabetes)  >or=6.5%  Consistent with diabetes  Currently, no consensus exists for use of hemoglobin A1c  for diagnosis of diabetes for children.  This Hemoglobin A1c assay has significant interference with fetal   hemoglobin   (HbF). The results are invalid for patients with abnormal amounts of   HbF,   including those with known Hereditary Persistence   of Fetal Hemoglobin. Heterozygous hemoglobin variants (HbAS, HbAC,   HbAD, HbAE, HbA2) do not significantly interfere with this assay;   however, presence of multiple variants in a sample may impact the %   interference.     06/22/2017 7.1 (H) 4.0 - 5.6 % Final     Comment:      According to ADA guidelines, hemoglobin A1c <7.0% represents  optimal control in non-pregnant diabetic patients. Different  metrics may apply to specific patient populations.   Standards of Medical Care in Diabetes-2016.  For the purpose of screening for the presence of diabetes:  <5.7%     Consistent with the absence of diabetes  5.7-6.4%  Consistent with increasing risk for diabetes   (prediabetes)  >or=6.5%  Consistent with diabetes  Currently, no consensus exists for use of hemoglobin A1c  for diagnosis of diabetes for children.  This Hemoglobin A1c assay has significant interference with fetal   hemoglobin   (HbF). The results are invalid for patients with abnormal amounts of   HbF,   including those with known Hereditary Persistence   of Fetal Hemoglobin. Heterozygous hemoglobin variants (HbAS, HbAC,   HbAD, HbAE, HbA2) do not significantly interfere with this assay;   however, presence of multiple variants in a sample may impact the %   interference.       [facility] [place] was seen in the clinic room and placed in the supine position on the treatment table.  Both legs were cleansed with Easi-clense sponges and dried thoroughly.  A hydrofiber dressing was applied to the left leg wounds.  Eucerin cream was applied to the lower legs.  The patient's feet were positioned at a 90 degree angle.  A zinc oxide wrap, followed by kerlix roll gauze and coban were applied using a spiral technique avoiding creases or folds.  The wraps were started behind the first metatarsal and ended below the tibial tubercle of the knee.  There was overlap of each turn half the width of the previous turn.  The compression wraps will be changed every 7 days.    Assessment:       1. Ulcer of right lower extremity, limited to breakdown of skin    2. Ulcer of left lower extremity, limited to breakdown of skin    3. Venous insufficiency of both lower extremities    4. Tinea pedis of both feet    5. Venous stasis dermatitis of right lower extremity    6.  Bilateral leg edema        Plan:           Unna boots bilateral lower legs as detailed above.  Patient was warned not to get the dressings wet and to use cast covers for showering. Should the dressing become wet, he is to remove it, place a wet-to-dry dressing over the wound, cover with gauze and roll gauze and use ace wraps for compression and to secure bandages.  He should then notify this office as soon as possible to have a new dressing applied.  Return to clinic in one week.       Right anterior leg        Left anterior leg

## 2017-11-07 ENCOUNTER — OFFICE VISIT (OUTPATIENT)
Dept: INFECTIOUS DISEASES | Facility: CLINIC | Age: 72
End: 2017-11-07
Payer: MEDICARE

## 2017-11-07 ENCOUNTER — OFFICE VISIT (OUTPATIENT)
Dept: OPHTHALMOLOGY | Facility: CLINIC | Age: 72
End: 2017-11-07
Payer: MEDICARE

## 2017-11-07 VITALS
WEIGHT: 308.44 LBS | SYSTOLIC BLOOD PRESSURE: 135 MMHG | HEIGHT: 70 IN | TEMPERATURE: 98 F | HEART RATE: 79 BPM | DIASTOLIC BLOOD PRESSURE: 91 MMHG | BODY MASS INDEX: 44.16 KG/M2

## 2017-11-07 DIAGNOSIS — Z00.6 RESEARCH STUDY PATIENT: Primary | ICD-10-CM

## 2017-11-07 DIAGNOSIS — L71.9 ROSACEA: ICD-10-CM

## 2017-11-07 DIAGNOSIS — H02.112 CICATRICIAL ECTROPION OF RIGHT LOWER EYELID: Primary | ICD-10-CM

## 2017-11-07 PROCEDURE — 92285 EXTERNAL OCULAR PHOTOGRAPHY: CPT | Mod: S$GLB,,, | Performed by: OPHTHALMOLOGY

## 2017-11-07 PROCEDURE — 92012 INTRM OPH EXAM EST PATIENT: CPT | Mod: S$GLB,,, | Performed by: OPHTHALMOLOGY

## 2017-11-07 PROCEDURE — 99203 OFFICE O/P NEW LOW 30 MIN: CPT | Mod: S$PBB,,, | Performed by: INTERNAL MEDICINE

## 2017-11-07 PROCEDURE — 99999 PR PBB SHADOW E&M-EST. PATIENT-LVL II: CPT | Mod: PBBFAC,,, | Performed by: OPHTHALMOLOGY

## 2017-11-07 PROCEDURE — 99999 PR PBB SHADOW E&M-EST. PATIENT-LVL II: CPT | Mod: PBBFAC,,, | Performed by: INTERNAL MEDICINE

## 2017-11-07 NOTE — PROGRESS NOTES
Subjective:      Patient ID: [facility] PAT Cantor Jr. is a 71 y.o. male.      Chief Complaint: Research Study    History of Present Illness    Mr. Cantor is a 71 y.o. male who presented to clinic for screening visit for Study Title/IRB Number: A Phase 3, Placebo-Controlled, Randomized, Observer-Blinded Study To Evaluate The Efficacy, Safety, And Tolerability Of A Clostridium Difficile Vaccine In Adults 50 Years Of Age And Older ([id number]) / 2017.094.B (PI: Lulú).     We had contacted the patient ahead of time to discuss the study and the subject agreed to participate. Before any study procedures were performed, we reviewed the subject's inclusion/exclusion criteria and then obtained the informed consent from the subject.    Mr. Cantor has a past medical history of CKD III, diabetes, hypertension, HEIDI, PAF, chronic anticoagulation, morbid obesity, hyperlipidemia. Patient also has history of a cervical facial flap after a Mohs procedure in December 2015. Recently had a ectropion repair OD on 6/28/17 and is being followed by Ophthalmology. He has recurrent ulcers to bilateral lower legs that are followed by MARCE Coello with [facility]. Reports no problems or reactions with past vaccines. Denies any recent fever, chills, diarrhea or vomiting.     We reviewed the current IRB-approved consent in its entirety with the subject. Adequate time was given for the patient to ask questions and receive answers. Subject verbalized desire to continue participation in study, understanding that they can withdraw consent at any time. Subject and Louiseviviana Troy signed main study consent form. A signed copy was provided to the patient, and a copy will be scanned into their medical record (please see Media tab in EPIC).     Subject was informed of follow up visits and that they can contact me at any time.  Phone numbers were provided to that end. Subject did not have any further questions.    Review of Systems   Constitution: Positive  for weight gain. Negative for chills, decreased appetite, fever, weakness, malaise/fatigue, night sweats and weight loss.   HENT: Positive for congestion and tinnitus. Negative for ear pain, hearing loss, hoarse voice and sore throat.    Eyes: Negative for blurred vision, redness and visual disturbance.   Cardiovascular: Positive for leg swelling. Negative for chest pain and palpitations.   Respiratory: Positive for shortness of breath. Negative for cough, hemoptysis and sputum production.    Hematologic/Lymphatic: Negative for adenopathy. Bruises/bleeds easily.   Skin: Negative for dry skin, itching, rash and suspicious lesions.   Musculoskeletal: Positive for back pain. Negative for joint pain, myalgias and neck pain.   Gastrointestinal: Negative for abdominal pain, constipation, diarrhea, heartburn, nausea and vomiting.   Genitourinary: Positive for flank pain, frequency and urgency. Negative for dysuria, hematuria and hesitancy.   Neurological: Negative for dizziness, headaches, numbness and paresthesias.   Psychiatric/Behavioral: Negative for depression and memory loss. The patient has insomnia. The patient is not nervous/anxious.      Objective:   Physical Exam   Constitutional: He is oriented to person, place, and time. Vital signs are normal. He appears well-developed and well-nourished.   HENT:   Head: Normocephalic and atraumatic.   Right Ear: External ear normal.   Left Ear: External ear normal.   Nose: Nose normal.   Eyes: Lids are normal. Right eye exhibits discharge. Left eye exhibits no discharge.   Right lower eye lid with slight redness and swelling   Neck: Trachea normal, normal range of motion and full passive range of motion without pain. Neck supple. No JVD present.   Cardiovascular: Normal rate.    Irregular rhythm, history Afib   Pulmonary/Chest: Effort normal and breath sounds normal. No respiratory distress.   Abdominal: Soft. Normal appearance. He exhibits no distension. There is no  "tenderness. There is no guarding.   Genitourinary: No discharge found.   Musculoskeletal: Normal range of motion. He exhibits edema.   +4 edema BLE   Neurological: He is alert and oriented to person, place, and time.   Skin: Skin is warm, dry and intact. Capillary refill takes less than 2 seconds.   BLE extremities with unna boots clean, dry, intact   Psychiatric: He has a normal mood and affect. His speech is normal and behavior is normal. Judgment and thought content normal. Cognition and memory are normal.   Vitals reviewed.    Vitals:    11/07/17 1453   BP: (!) 135/91   Pulse: 79   Temp: 97.7 °F (36.5 °C)   Weight: (!) 139.9 kg (308 lb 6.8 oz)   Height: 5' 10" (1.778 m)       Assessment:       1. Research study patient          Plan:     1. Labs and procedures per protocol.  2.  Vaccine per protocol.  3.  Follow up per protocol.       "

## 2017-11-07 NOTE — PROGRESS NOTES
HPI     Mr. Mitchell is here today for a follow-up appointment ectropion repair OD on   6/28/2017. He states he feels like there is grit in his right eye most of   the time. He states when he washes around the eye that usually helps. He   is not using any drops or ointments.     Last edited by Angélica Pan PCT on 11/7/2017 10:20 AM. (History)            Assessment /Plan     For exam results, see Encounter Report.    Cicatricial ectropion of right lower eyelid  -     External/Slit Lamp Photography    Rosacea      Patient bothered by chronic tearing and irritation of right lower eyelid ectropion.  However, patient currently not using conservative therapy with artificial tears and ointment at night time.  Given information sheet with items described.      Return in two to three months to for reevaluation. If no significant improvement, plan for right lower eyelid ectropion repair with harvesting of skin graft from right postauricular area and Frost suture for upward tension. Prior skin graft was taken from left postauricular area.

## 2017-11-08 ENCOUNTER — RESEARCH ENCOUNTER (OUTPATIENT)
Dept: RESEARCH | Facility: HOSPITAL | Age: 72
End: 2017-11-08

## 2017-11-08 NOTE — PROGRESS NOTES
Date Consent signed: 11/7/2017    Sponsor: [employer]    Study Title/IRB Number: A Phase 3, Placebo-Controlled, Randomized, Observer-Blinded Study To Evaluate The Efficacy, Safety, And Tolerability Of A Clostridium Difficile Vaccine In Adults 50 Years Of Age And Older ([id number]) / 2017.094.B    Principle Investigator: Malinda Chino MD    Present for Discussion: Louise Troy, , Mr. BI Cantor, patient, Ms. Cantor    Is LAR Consenting for Subject: No    Prior to the Informed Consent (IC) being signed, or any study protocol required data collection, testing, procedure, or intervention being performed, the following was done and/or discussed:   Patient was given a copy of the IC for review    Purpose of the study and qualifications to participate    Study design, Follow up schedule, and tests or procedures done at each visit   Confidentiality and HIPAA Authorization for Release of Medical Records for the research trial/ subject's rights/research related injury   Risk, Benefits, Alternative Treatments, Compensation and Costs   Participation in the research trial is voluntary and patient may withdraw at anytime   Contact information for study related questions    Patient verbalizes understanding of the above: Yes   Contact information for CRC and PI given to patient: Yes  Patient able to adequately summarize: the purpose of the study, the risks associated with the study, and all procedures, testing, and follow-ups associated with the study: Yes    Mr. Cantor signed the informed consent form for the [employer] C-Diff research study with an IRB approval date of 10/4/2017.  Each page of the consent form was reviewed with  And Mrs. Cantor and all questions answered satisfactorily. Mr. Cantor signed the consent form and received a copy of same. The original consent was scanned into electronic medical records (EPIC) and filed into the subject's research study binder.    All visit one required  observations were completed per protocol.  Mr. Cantor was given a clincard with the token number [id number]

## 2017-11-09 ENCOUNTER — TELEPHONE (OUTPATIENT)
Dept: INFECTIOUS DISEASES | Facility: CLINIC | Age: 72
End: 2017-11-09

## 2017-11-09 ENCOUNTER — OFFICE VISIT (OUTPATIENT)
Dept: WOUND CARE | Facility: CLINIC | Age: 72
End: 2017-11-09
Payer: MEDICARE

## 2017-11-09 VITALS
BODY MASS INDEX: 44.24 KG/M2 | SYSTOLIC BLOOD PRESSURE: 128 MMHG | HEART RATE: 55 BPM | HEIGHT: 70 IN | WEIGHT: 309 LBS | TEMPERATURE: 98 F | DIASTOLIC BLOOD PRESSURE: 72 MMHG

## 2017-11-09 DIAGNOSIS — Z00.00 PREVENTATIVE HEALTH CARE: Primary | ICD-10-CM

## 2017-11-09 DIAGNOSIS — B35.3 TINEA PEDIS OF BOTH FEET: ICD-10-CM

## 2017-11-09 DIAGNOSIS — L97.921 ULCER OF LEFT LOWER EXTREMITY, LIMITED TO BREAKDOWN OF SKIN: Primary | ICD-10-CM

## 2017-11-09 DIAGNOSIS — I87.2 VENOUS INSUFFICIENCY OF BOTH LOWER EXTREMITIES: ICD-10-CM

## 2017-11-09 DIAGNOSIS — I87.2 VENOUS STASIS DERMATITIS OF RIGHT LOWER EXTREMITY: ICD-10-CM

## 2017-11-09 DIAGNOSIS — R60.0 BILATERAL LEG EDEMA: ICD-10-CM

## 2017-11-09 PROBLEM — L97.911 ULCER OF RIGHT LOWER EXTREMITY, LIMITED TO BREAKDOWN OF SKIN: Status: RESOLVED | Noted: 2017-10-26 | Resolved: 2017-11-09

## 2017-11-09 PROCEDURE — 29580 STRAPPING UNNA BOOT: CPT | Mod: LT,S$GLB,, | Performed by: NURSE PRACTITIONER

## 2017-11-09 PROCEDURE — 99999 PR PBB SHADOW E&M-EST. PATIENT-LVL V: CPT | Mod: PBBFAC,,, | Performed by: NURSE PRACTITIONER

## 2017-11-09 PROCEDURE — 99499 UNLISTED E&M SERVICE: CPT | Mod: S$GLB,,, | Performed by: NURSE PRACTITIONER

## 2017-11-09 NOTE — PROGRESS NOTES
Subjective:       [place] [facility] [place] a 71 y.o. male.    Chief Complaint: Wound Check    Wound Check        This patient is seen today for reevaluation of recurrent ulcers to both lower legs.  The wounds began as blisters that erupted leaving an ulcer in its place.  Unna boots are on bilaterally.  The right leg wounds are healed and only one wound remains on the left leg.  He is afebrile.  He denies increased redness or purulent drainage but does report increased leg swelling.  He does not complain of pain.  His medical history is significant for type II diabetes, chronic kidney disease and venous insufficiency.   Review of Systems    Unchanged from prior visit except for increased shortness of breath.  Objective:      Physical Exam   Constitutional: He is oriented to person, place, and time. He appears well-developed and well-nourished. No distress.   HENT:   Head: Normocephalic and atraumatic.   Cardiovascular: Intact distal pulses.    Musculoskeletal: Normal range of motion. He exhibits edema (4+ lower leg). He exhibits no tenderness.        Legs:  Neurological: He is alert and oriented to person, place, and time.   Skin: Skin is warm and dry. No rash noted. He is not diaphoretic. No erythema.   Psychiatric: He has a normal mood and affect. His behavior is normal. Judgment and thought content normal.   Nursing note and vitals reviewed.      ..  Hemoglobin A1C   Date Value Ref Range Status   09/22/2017 7.1 (H) 4.0 - 5.6 % Final     Comment:     According to ADA guidelines, hemoglobin A1c <7.0% represents  optimal control in non-pregnant diabetic patients. Different  metrics may apply to specific patient populations.   Standards of Medical Care in Diabetes-2016.  For the purpose of screening for the presence of diabetes:  <5.7%     Consistent with the absence of diabetes  5.7-6.4%  Consistent with increasing risk for diabetes   (prediabetes)  >or=6.5%  Consistent with diabetes  Currently, no consensus  exists for use of hemoglobin A1c  for diagnosis of diabetes for children.  This Hemoglobin A1c assay has significant interference with fetal   hemoglobin   (HbF). The results are invalid for patients with abnormal amounts of   HbF,   including those with known Hereditary Persistence   of Fetal Hemoglobin. Heterozygous hemoglobin variants (HbAS, HbAC,   HbAD, HbAE, HbA2) do not significantly interfere with this assay;   however, presence of multiple variants in a sample may impact the %   interference.     07/13/2017 6.7 (H) 4.0 - 5.6 % Final     Comment:     According to ADA guidelines, hemoglobin A1c <7.0% represents  optimal control in non-pregnant diabetic patients. Different  metrics may apply to specific patient populations.   Standards of Medical Care in Diabetes-2016.  For the purpose of screening for the presence of diabetes:  <5.7%     Consistent with the absence of diabetes  5.7-6.4%  Consistent with increasing risk for diabetes   (prediabetes)  >or=6.5%  Consistent with diabetes  Currently, no consensus exists for use of hemoglobin A1c  for diagnosis of diabetes for children.  This Hemoglobin A1c assay has significant interference with fetal   hemoglobin   (HbF). The results are invalid for patients with abnormal amounts of   HbF,   including those with known Hereditary Persistence   of Fetal Hemoglobin. Heterozygous hemoglobin variants (HbAS, HbAC,   HbAD, HbAE, HbA2) do not significantly interfere with this assay;   however, presence of multiple variants in a sample may impact the %   interference.     06/22/2017 7.1 (H) 4.0 - 5.6 % Final     Comment:     According to ADA guidelines, hemoglobin A1c <7.0% represents  optimal control in non-pregnant diabetic patients. Different  metrics may apply to specific patient populations.   Standards of Medical Care in Diabetes-2016.  For the purpose of screening for the presence of diabetes:  <5.7%     Consistent with the absence of diabetes  5.7-6.4%  Consistent  with increasing risk for diabetes   (prediabetes)  >or=6.5%  Consistent with diabetes  Currently, no consensus exists for use of hemoglobin A1c  for diagnosis of diabetes for children.  This Hemoglobin A1c assay has significant interference with fetal   hemoglobin   (HbF). The results are invalid for patients with abnormal amounts of   HbF,   including those with known Hereditary Persistence   of Fetal Hemoglobin. Heterozygous hemoglobin variants (HbAS, HbAC,   HbAD, HbAE, HbA2) do not significantly interfere with this assay;   however, presence of multiple variants in a sample may impact the %   interference.       Stephen was seen in the clinic room and placed in the supine position on the treatment table.  The unna boots were removed with scissors and the leg was cleansed with Easi-clense sponges and dried thoroughly.  A hydrofiber dressing was applied to the wound.  Eucerin cream was applied to the lower legs.  The patient's foot was positioned at a 90 degree angle.  A zinc oxide wrap, followed by kerlix roll gauze and coban were applied using a spiral technique avoiding creases or folds.  The wrap was started behind the first metatarsal and ended below the tibial tubercle of the knee.  There was overlap of each turn half the width of the previous turn.  The compression wrap will be changed every 7 days.    Assessment:       1. Ulcer of left lower extremity, limited to breakdown of skin    2. Venous insufficiency of both lower extremities    3. Tinea pedis of both feet    4. Venous stasis dermatitis of right lower extremity    5. Bilateral leg edema        Plan:           Unna boots left lower leg as detailed above.  Kerlix and coban right lower leg  Patient was warned not to get the dressings wet and to use cast covers for showering. Should the dressing become wet, he is to remove it, place a wet-to-dry dressing over the wound, cover with gauze and roll gauze and use ace wraps for compression and to secure  bandages.  He should then notify this office as soon as possible to have a new dressing applied.  Return to clinic in one week.           Left medial leg      Left anterior leg

## 2017-11-15 ENCOUNTER — LAB VISIT (OUTPATIENT)
Dept: LAB | Facility: HOSPITAL | Age: 72
End: 2017-11-15
Attending: INTERNAL MEDICINE
Payer: MEDICARE

## 2017-11-15 ENCOUNTER — OFFICE VISIT (OUTPATIENT)
Dept: WOUND CARE | Facility: CLINIC | Age: 72
End: 2017-11-15
Payer: MEDICARE

## 2017-11-15 VITALS
BODY MASS INDEX: 44.12 KG/M2 | HEIGHT: 70 IN | HEART RATE: 89 BPM | DIASTOLIC BLOOD PRESSURE: 77 MMHG | WEIGHT: 308.19 LBS | TEMPERATURE: 98 F | SYSTOLIC BLOOD PRESSURE: 131 MMHG

## 2017-11-15 DIAGNOSIS — N18.30 TYPE 2 DIABETES MELLITUS WITH STAGE 3 CHRONIC KIDNEY DISEASE, WITH LONG-TERM CURRENT USE OF INSULIN: ICD-10-CM

## 2017-11-15 DIAGNOSIS — I48.20 CHRONIC ATRIAL FIBRILLATION: ICD-10-CM

## 2017-11-15 DIAGNOSIS — E11.22 TYPE 2 DIABETES MELLITUS WITH STAGE 3 CHRONIC KIDNEY DISEASE, WITH LONG-TERM CURRENT USE OF INSULIN: ICD-10-CM

## 2017-11-15 DIAGNOSIS — B35.3 TINEA PEDIS OF BOTH FEET: ICD-10-CM

## 2017-11-15 DIAGNOSIS — I10 ESSENTIAL HYPERTENSION: ICD-10-CM

## 2017-11-15 DIAGNOSIS — R60.0 BILATERAL LEG EDEMA: ICD-10-CM

## 2017-11-15 DIAGNOSIS — I87.2 VENOUS STASIS DERMATITIS OF RIGHT LOWER EXTREMITY: ICD-10-CM

## 2017-11-15 DIAGNOSIS — I87.2 VENOUS INSUFFICIENCY OF BOTH LOWER EXTREMITIES: Primary | ICD-10-CM

## 2017-11-15 DIAGNOSIS — Z79.4 TYPE 2 DIABETES MELLITUS WITH STAGE 3 CHRONIC KIDNEY DISEASE, WITH LONG-TERM CURRENT USE OF INSULIN: ICD-10-CM

## 2017-11-15 DIAGNOSIS — Z79.4 TYPE 2 DIABETES MELLITUS WITH DIABETIC POLYNEUROPATHY, WITH LONG-TERM CURRENT USE OF INSULIN: ICD-10-CM

## 2017-11-15 DIAGNOSIS — E11.42 TYPE 2 DIABETES MELLITUS WITH DIABETIC POLYNEUROPATHY, WITH LONG-TERM CURRENT USE OF INSULIN: ICD-10-CM

## 2017-11-15 PROBLEM — L97.921 ULCER OF LEFT LOWER EXTREMITY, LIMITED TO BREAKDOWN OF SKIN: Status: RESOLVED | Noted: 2017-09-22 | Resolved: 2017-11-15

## 2017-11-15 LAB
ANION GAP SERPL CALC-SCNC: 9 MMOL/L
BASOPHILS # BLD AUTO: 0.04 K/UL
BASOPHILS NFR BLD: 0.5 %
BUN SERPL-MCNC: 29 MG/DL
CALCIUM SERPL-MCNC: 10.1 MG/DL
CHLORIDE SERPL-SCNC: 103 MMOL/L
CHOLEST SERPL-MCNC: 159 MG/DL
CHOLEST/HDLC SERPL: 6.1 {RATIO}
CO2 SERPL-SCNC: 26 MMOL/L
CREAT SERPL-MCNC: 1.7 MG/DL
DIFFERENTIAL METHOD: NORMAL
EOSINOPHIL # BLD AUTO: 0.4 K/UL
EOSINOPHIL NFR BLD: 4.3 %
ERYTHROCYTE [DISTWIDTH] IN BLOOD BY AUTOMATED COUNT: 13.3 %
EST. GFR  (AFRICAN AMERICAN): 45.2 ML/MIN/1.73 M^2
EST. GFR  (NON AFRICAN AMERICAN): 39.1 ML/MIN/1.73 M^2
ESTIMATED AVG GLUCOSE: 157 MG/DL
GLUCOSE SERPL-MCNC: 184 MG/DL
HBA1C MFR BLD HPLC: 7.1 %
HCT VFR BLD AUTO: 41.1 %
HDLC SERPL-MCNC: 26 MG/DL
HDLC SERPL: 16.4 %
HGB BLD-MCNC: 14.2 G/DL
IMM GRANULOCYTES # BLD AUTO: 0.04 K/UL
IMM GRANULOCYTES NFR BLD AUTO: 0.5 %
LDLC SERPL CALC-MCNC: 95.2 MG/DL
LYMPHOCYTES # BLD AUTO: 2 K/UL
LYMPHOCYTES NFR BLD: 25.2 %
MCH RBC QN AUTO: 30.9 PG
MCHC RBC AUTO-ENTMCNC: 34.5 G/DL
MCV RBC AUTO: 89 FL
MONOCYTES # BLD AUTO: 0.8 K/UL
MONOCYTES NFR BLD: 9.7 %
NEUTROPHILS # BLD AUTO: 4.8 K/UL
NEUTROPHILS NFR BLD: 59.8 %
NONHDLC SERPL-MCNC: 133 MG/DL
NRBC BLD-RTO: 0 /100 WBC
PLATELET # BLD AUTO: 204 K/UL
PMV BLD AUTO: 11.6 FL
POTASSIUM SERPL-SCNC: 4.8 MMOL/L
RBC # BLD AUTO: 4.6 M/UL
SODIUM SERPL-SCNC: 138 MMOL/L
TRIGL SERPL-MCNC: 189 MG/DL
WBC # BLD AUTO: 8.08 K/UL

## 2017-11-15 PROCEDURE — 80061 LIPID PANEL: CPT

## 2017-11-15 PROCEDURE — 80048 BASIC METABOLIC PNL TOTAL CA: CPT

## 2017-11-15 PROCEDURE — 99211 OFF/OP EST MAY X REQ PHY/QHP: CPT | Mod: S$GLB,,, | Performed by: NURSE PRACTITIONER

## 2017-11-15 PROCEDURE — 83036 HEMOGLOBIN GLYCOSYLATED A1C: CPT

## 2017-11-15 PROCEDURE — 99999 PR PBB SHADOW E&M-EST. PATIENT-LVL V: CPT | Mod: PBBFAC,,, | Performed by: NURSE PRACTITIONER

## 2017-11-15 PROCEDURE — 85025 COMPLETE CBC W/AUTO DIFF WBC: CPT

## 2017-11-15 PROCEDURE — 36415 COLL VENOUS BLD VENIPUNCTURE: CPT

## 2017-11-15 RX ORDER — PEN NEEDLE, DIABETIC 31 GX5/16"
NEEDLE, DISPOSABLE MISCELLANEOUS
Qty: 150 EACH | Refills: 11 | Status: ON HOLD | OUTPATIENT
Start: 2017-11-15 | End: 2018-07-31 | Stop reason: HOSPADM

## 2017-11-15 NOTE — PROGRESS NOTES
Subjective:       Patient ID: [facility] [place] PAT Cantor Jr. is a 71 y.o. male.    Chief Complaint: Wound Check    Wound Check        This patient is seen today for reevaluation of recurrent ulcers to both lower legs.  The wounds began as blisters that erupted leaving an ulcer in its place.  An unna boot is on the left leg and it is now healed.  He is wearing a circaid stocking on the right leg.  He is afebrile.  He denies increased redness or purulent drainage but does report increased leg swelling.  He does not complain of pain.  His medical history is significant for type II diabetes, chronic kidney disease and venous insufficiency.   Review of Systems    Unchanged from prior visit except for increased shortness of breath.  Objective:      Physical Exam   Constitutional: He is oriented to person, place, and time. He appears well-developed and well-nourished. No distress.   HENT:   Head: Normocephalic and atraumatic.   Cardiovascular: Intact distal pulses.    Musculoskeletal: Normal range of motion. He exhibits edema (4+ lower leg). He exhibits no tenderness.        Legs:  Neurological: He is alert and oriented to person, place, and time.   Skin: Skin is warm and dry. No rash noted. He is not diaphoretic. No erythema.   Psychiatric: He has a normal mood and affect. His behavior is normal. Judgment and thought content normal.   Nursing note and vitals reviewed.      ..  Hemoglobin A1C   Date Value Ref Range Status   09/22/2017 7.1 (H) 4.0 - 5.6 % Final     Comment:     According to ADA guidelines, hemoglobin A1c <7.0% represents  optimal control in non-pregnant diabetic patients. Different  metrics may apply to specific patient populations.   Standards of Medical Care in Diabetes-2016.  For the purpose of screening for the presence of diabetes:  <5.7%     Consistent with the absence of diabetes  5.7-6.4%  Consistent with increasing risk for diabetes   (prediabetes)  >or=6.5%  Consistent with diabetes  Currently, no consensus  exists for use of hemoglobin A1c  for diagnosis of diabetes for children.  This Hemoglobin A1c assay has significant interference with fetal   hemoglobin   (HbF). The results are invalid for patients with abnormal amounts of   HbF,   including those with known Hereditary Persistence   of Fetal Hemoglobin. Heterozygous hemoglobin variants (HbAS, HbAC,   HbAD, HbAE, HbA2) do not significantly interfere with this assay;   however, presence of multiple variants in a sample may impact the %   interference.     07/13/2017 6.7 (H) 4.0 - 5.6 % Final     Comment:     According to ADA guidelines, hemoglobin A1c <7.0% represents  optimal control in non-pregnant diabetic patients. Different  metrics may apply to specific patient populations.   Standards of Medical Care in Diabetes-2016.  For the purpose of screening for the presence of diabetes:  <5.7%     Consistent with the absence of diabetes  5.7-6.4%  Consistent with increasing risk for diabetes   (prediabetes)  >or=6.5%  Consistent with diabetes  Currently, no consensus exists for use of hemoglobin A1c  for diagnosis of diabetes for children.  This Hemoglobin A1c assay has significant interference with fetal   hemoglobin   (HbF). The results are invalid for patients with abnormal amounts of   HbF,   including those with known Hereditary Persistence   of Fetal Hemoglobin. Heterozygous hemoglobin variants (HbAS, HbAC,   HbAD, HbAE, HbA2) do not significantly interfere with this assay;   however, presence of multiple variants in a sample may impact the %   interference.     06/22/2017 7.1 (H) 4.0 - 5.6 % Final     Comment:     According to ADA guidelines, hemoglobin A1c <7.0% represents  optimal control in non-pregnant diabetic patients. Different  metrics may apply to specific patient populations.   Standards of Medical Care in Diabetes-2016.  For the purpose of screening for the presence of diabetes:  <5.7%     Consistent with the absence of diabetes  5.7-6.4%  Consistent  with increasing risk for diabetes   (prediabetes)  >or=6.5%  Consistent with diabetes  Currently, no consensus exists for use of hemoglobin A1c  for diagnosis of diabetes for children.  This Hemoglobin A1c assay has significant interference with fetal   hemoglobin   (HbF). The results are invalid for patients with abnormal amounts of   HbF,   including those with known Hereditary Persistence   of Fetal Hemoglobin. Heterozygous hemoglobin variants (HbAS, HbAC,   HbAD, HbAE, HbA2) do not significantly interfere with this assay;   however, presence of multiple variants in a sample may impact the %   interference.         Assessment:       1. Venous insufficiency of both lower extremities    2. Tinea pedis of both feet    3. Venous stasis dermatitis of right lower extremity    4. Bilateral leg edema        Plan:           Circaid stockings bilateral lower legs.  Return to this clinic as needed.     Left anterior leg

## 2017-11-16 DIAGNOSIS — Z79.4 TYPE 2 DIABETES MELLITUS WITH DIABETIC POLYNEUROPATHY, WITH LONG-TERM CURRENT USE OF INSULIN: ICD-10-CM

## 2017-11-16 DIAGNOSIS — E11.42 TYPE 2 DIABETES MELLITUS WITH DIABETIC POLYNEUROPATHY, WITH LONG-TERM CURRENT USE OF INSULIN: ICD-10-CM

## 2017-11-17 RX ORDER — INSULIN GLARGINE 100 [IU]/ML
40 INJECTION, SOLUTION SUBCUTANEOUS NIGHTLY
Qty: 1 BOX | Refills: 11 | Status: SHIPPED | OUTPATIENT
Start: 2017-11-17 | End: 2018-01-09 | Stop reason: SDUPTHER

## 2017-11-27 ENCOUNTER — OFFICE VISIT (OUTPATIENT)
Dept: INTERNAL MEDICINE | Facility: CLINIC | Age: 72
End: 2017-11-27
Payer: MEDICARE

## 2017-11-27 VITALS
WEIGHT: 306 LBS | HEART RATE: 86 BPM | HEIGHT: 70 IN | SYSTOLIC BLOOD PRESSURE: 102 MMHG | DIASTOLIC BLOOD PRESSURE: 72 MMHG | BODY MASS INDEX: 43.81 KG/M2

## 2017-11-27 DIAGNOSIS — E11.42 TYPE 2 DIABETES MELLITUS WITH DIABETIC POLYNEUROPATHY, WITH LONG-TERM CURRENT USE OF INSULIN: ICD-10-CM

## 2017-11-27 DIAGNOSIS — Z79.4 TYPE 2 DIABETES MELLITUS WITH STAGE 3 CHRONIC KIDNEY DISEASE, WITH LONG-TERM CURRENT USE OF INSULIN: Primary | ICD-10-CM

## 2017-11-27 DIAGNOSIS — I48.20 CHRONIC ATRIAL FIBRILLATION: ICD-10-CM

## 2017-11-27 DIAGNOSIS — N18.30 TYPE 2 DIABETES MELLITUS WITH STAGE 3 CHRONIC KIDNEY DISEASE, WITH LONG-TERM CURRENT USE OF INSULIN: Primary | ICD-10-CM

## 2017-11-27 DIAGNOSIS — G47.33 OSA ON CPAP: ICD-10-CM

## 2017-11-27 DIAGNOSIS — I87.2 VENOUS INSUFFICIENCY OF BOTH LOWER EXTREMITIES: ICD-10-CM

## 2017-11-27 DIAGNOSIS — N40.1 BENIGN PROSTATIC HYPERPLASIA WITH URINARY FREQUENCY: ICD-10-CM

## 2017-11-27 DIAGNOSIS — E03.9 HYPOTHYROIDISM, UNSPECIFIED TYPE: ICD-10-CM

## 2017-11-27 DIAGNOSIS — R35.0 BENIGN PROSTATIC HYPERPLASIA WITH URINARY FREQUENCY: ICD-10-CM

## 2017-11-27 DIAGNOSIS — Z79.4 TYPE 2 DIABETES MELLITUS WITH DIABETIC POLYNEUROPATHY, WITH LONG-TERM CURRENT USE OF INSULIN: ICD-10-CM

## 2017-11-27 DIAGNOSIS — I10 ESSENTIAL HYPERTENSION: ICD-10-CM

## 2017-11-27 DIAGNOSIS — E11.22 TYPE 2 DIABETES MELLITUS WITH STAGE 3 CHRONIC KIDNEY DISEASE, WITH LONG-TERM CURRENT USE OF INSULIN: Primary | ICD-10-CM

## 2017-11-27 PROCEDURE — 99499 UNLISTED E&M SERVICE: CPT | Mod: S$PBB,,, | Performed by: INTERNAL MEDICINE

## 2017-11-27 PROCEDURE — 99214 OFFICE O/P EST MOD 30 MIN: CPT | Mod: S$GLB,,, | Performed by: INTERNAL MEDICINE

## 2017-11-27 PROCEDURE — 99999 PR PBB SHADOW E&M-EST. PATIENT-LVL V: CPT | Mod: PBBFAC,,, | Performed by: INTERNAL MEDICINE

## 2017-11-27 RX ORDER — METOLAZONE 5 MG/1
5 TABLET ORAL DAILY
Qty: 7 TABLET | Refills: 0 | Status: SHIPPED | OUTPATIENT
Start: 2017-11-27 | End: 2018-04-12

## 2017-11-27 NOTE — PROGRESS NOTES
REASON FOR VISIT:  The reason for his visit is a regular follow-up.    He is still being followed by [facility] regarding lower   extremity edema, venous insufficiency, and leg ulceration.  He still has edema,   but it has improved some since getting back on Lasix, and he is wearing   compression stockings.  Regarding his diabetes, this has remained stable with   hemoglobin A1c 7.1.    Regarding sleep apnea, he admits that when he uses the CPAP machine that he   sleeps better and feels better the next day, but he just tends to use it twice a   week for reasons that are not clear.    PAST MEDICAL HISTORY:  Type 2 diabetes associated with peripheral neuropathy and chronic kidney   disease.  Chronic atrial fibrillation.  Hypertension.  Hyperlipidemia.  Sleep apnea.  Lumbar degenerative disc disease.  Chronic venous insufficiency.    MEDICATIONS:  Eliquis 5 mg twice a day.  Aspirin 325 mg a day.  Fenofibrate 145 mg a day.  Flonase two puffs a day.  Lasix 40 mg a day.  Gabapentin 300 mg three times a day.  Lantus 45 units in the morning.  NovoLog 15 units in the morning, 17 units with lunch and supper.  Levothyroxine 0.025 mg a day.  Losartan 25 mg a day.  Metoprolol succinate 100 mg a day.  Oxybutynin 5 mg a day.  Pravastatin 10 mg a day.  Flomax 0.4 mg a day.    RECENT LABS:  Cholesterol is 159, triglycerides 189, HDL 26, LDL 95.  CBC   normal.  Chemistry normal other than glucose 184, creatinine 1.7.  Hemoglobin   A1c 7.1.    REVIEW OF SYMPTOMS:  Reports no chest pain.  He will have some dyspnea that just   happens periodically.  No abdominal pain.  Regular bowel function.  Urination   at times can be frequent and urgent, but it is better than before and he admits   that when he uses the CPAP, he does not really have to get up at night to   urinate.    REVIEW OF THE CHART:  A 2D echo in May 2017 showed a normal ejection fraction.    PHYSICAL EXAMINATION:  VITAL SIGNS:  His weight is 306.  His  pulse rate is 84.  Blood pressure 102/72.  NECK:  No thyromegaly.  LUNGS:  Clear.  HEART:  Irregularly irregular, I/VI systolic murmur.  ABDOMEN:  Active bowel sounds, soft, nontender.  EXTREMITIES:  He has 1+ pedal pulse and 3 to 4+ edema in the lower extremity.    IMPRESSION:  1.  Type 2 diabetes with chronic kidney disease, stage III, and peripheral   neuropathy.  2.  Hypertension.  3.  Hyperlipidemia.  4.  Chronic atrial fibrillation.  5.  Obstructive sleep apnea.  6.  Chronic venous insufficiency of the legs.    PLAN:  Discussed the importance of using CPAP every night, consider taking a   Benadryl at night since he finds that sometimes he has a postnasal drip with   this and it might help him relax while taking the CPAP.  For seven days, I am   going to give him a prescription of metolazone 5 mg once a day to see if this   might even help further with the edema.  I asked him to stop the medicine if it   gets unusually weak.  Again, make a return appointment in four months.      ROSA MARIA  dd: 11/27/2017 14:14:23 (CST)  td: 11/28/2017 04:09:46 (CST)  Doc ID   #[id number]  Job ID #[id number]    CC:

## 2017-11-30 ENCOUNTER — CLINICAL SUPPORT (OUTPATIENT)
Dept: INFECTIOUS DISEASES | Facility: CLINIC | Age: 72
End: 2017-11-30
Payer: MEDICARE

## 2017-11-30 PROCEDURE — 99999 PR PBB SHADOW E&M-EST. PATIENT-LVL III: CPT | Mod: PBBFAC,,,

## 2017-11-30 PROCEDURE — 90662 IIV NO PRSV INCREASED AG IM: CPT | Mod: S$GLB,,, | Performed by: INTERNAL MEDICINE

## 2017-11-30 PROCEDURE — G0008 ADMIN INFLUENZA VIRUS VAC: HCPCS | Mod: S$GLB,,, | Performed by: INTERNAL MEDICINE

## 2017-12-12 ENCOUNTER — OFFICE VISIT (OUTPATIENT)
Dept: WOUND CARE | Facility: CLINIC | Age: 72
End: 2017-12-12
Payer: MEDICARE

## 2017-12-12 VITALS
HEIGHT: 70 IN | WEIGHT: 307.38 LBS | HEART RATE: 90 BPM | BODY MASS INDEX: 44.01 KG/M2 | SYSTOLIC BLOOD PRESSURE: 125 MMHG | TEMPERATURE: 98 F | DIASTOLIC BLOOD PRESSURE: 76 MMHG

## 2017-12-12 DIAGNOSIS — I87.2 VENOUS INSUFFICIENCY OF BOTH LOWER EXTREMITIES: ICD-10-CM

## 2017-12-12 DIAGNOSIS — R60.0 BILATERAL LEG EDEMA: ICD-10-CM

## 2017-12-12 DIAGNOSIS — L97.921 ULCER OF LEFT LOWER EXTREMITY, LIMITED TO BREAKDOWN OF SKIN: Primary | ICD-10-CM

## 2017-12-12 PROCEDURE — 29580 STRAPPING UNNA BOOT: CPT | Mod: LT,S$GLB,, | Performed by: NURSE PRACTITIONER

## 2017-12-12 PROCEDURE — 99999 PR PBB SHADOW E&M-EST. PATIENT-LVL V: CPT | Mod: PBBFAC,,, | Performed by: NURSE PRACTITIONER

## 2017-12-12 PROCEDURE — 99212 OFFICE O/P EST SF 10 MIN: CPT | Mod: 25,S$GLB,, | Performed by: NURSE PRACTITIONER

## 2017-12-12 NOTE — PROGRESS NOTES
Subjective:       Patient ID: [facility] [place] Cantor Jr. is a 72 y.o. male.    Chief Complaint: Wound Check    Wound Check        This patient is well known to my service.  When he was last seen in the clinic a month ago on 11/15/17 both legs were free of wounds.  He reports that he was putting on his stockings when his nail dug into the skin of his lower leg causing injury.  He has been using medihoney gel and a bandaid daily but the wound is worse.  He has responded well to compression therapy in the past.  He is wearing a circaid stocking on the right leg.  He is afebrile.  He denies increased redness or purulent drainage but does report increased leg swelling.  He does not complain of pain.  His medical history is significant for type II diabetes, chronic kidney disease and venous insufficiency.   Review of Systems    Unchanged from prior visit except for recurring wound to left leg.  Objective:      Physical Exam   Constitutional: He is oriented to person, place, and time. He appears well-developed and well-nourished. No distress.   HENT:   Head: Normocephalic and atraumatic.   Cardiovascular: Intact distal pulses.    Musculoskeletal: Normal range of motion. He exhibits edema (4+ lower leg). He exhibits no tenderness.        Legs:  Neurological: He is alert and oriented to person, place, and time.   Skin: Skin is warm and dry. No rash noted. He is not diaphoretic. No erythema.   Psychiatric: He has a normal mood and affect. His behavior is normal. Judgment and thought content normal.   Nursing note and vitals reviewed.      ..  Hemoglobin A1C   Date Value Ref Range Status   11/15/2017 7.1 (H) 4.0 - 5.6 % Final     Comment:     According to ADA guidelines, hemoglobin A1c <7.0% represents  optimal control in non-pregnant diabetic patients. Different  metrics may apply to specific patient populations.   Standards of Medical Care in Diabetes-2016.  For the purpose of screening for the presence of diabetes:  <5.7%      Consistent with the absence of diabetes  5.7-6.4%  Consistent with increasing risk for diabetes   (prediabetes)  >or=6.5%  Consistent with diabetes  Currently, no consensus exists for use of hemoglobin A1c  for diagnosis of diabetes for children.  This Hemoglobin A1c assay has significant interference with fetal   hemoglobin   (HbF). The results are invalid for patients with abnormal amounts of   HbF,   including those with known Hereditary Persistence   of Fetal Hemoglobin. Heterozygous hemoglobin variants (HbAS, HbAC,   HbAD, HbAE, HbA2) do not significantly interfere with this assay;   however, presence of multiple variants in a sample may impact the %   interference.     09/22/2017 7.1 (H) 4.0 - 5.6 % Final     Comment:     According to ADA guidelines, hemoglobin A1c <7.0% represents  optimal control in non-pregnant diabetic patients. Different  metrics may apply to specific patient populations.   Standards of Medical Care in Diabetes-2016.  For the purpose of screening for the presence of diabetes:  <5.7%     Consistent with the absence of diabetes  5.7-6.4%  Consistent with increasing risk for diabetes   (prediabetes)  >or=6.5%  Consistent with diabetes  Currently, no consensus exists for use of hemoglobin A1c  for diagnosis of diabetes for children.  This Hemoglobin A1c assay has significant interference with fetal   hemoglobin   (HbF). The results are invalid for patients with abnormal amounts of   HbF,   including those with known Hereditary Persistence   of Fetal Hemoglobin. Heterozygous hemoglobin variants (HbAS, HbAC,   HbAD, HbAE, HbA2) do not significantly interfere with this assay;   however, presence of multiple variants in a sample may impact the %   interference.     07/13/2017 6.7 (H) 4.0 - 5.6 % Final     Comment:     According to ADA guidelines, hemoglobin A1c <7.0% represents  optimal control in non-pregnant diabetic patients. Different  metrics may apply to specific patient populations.    Standards of Medical Care in Diabetes-2016.  For the purpose of screening for the presence of diabetes:  <5.7%     Consistent with the absence of diabetes  5.7-6.4%  Consistent with increasing risk for diabetes   (prediabetes)  >or=6.5%  Consistent with diabetes  Currently, no consensus exists for use of hemoglobin A1c  for diagnosis of diabetes for children.  This Hemoglobin A1c assay has significant interference with fetal   hemoglobin   (HbF). The results are invalid for patients with abnormal amounts of   HbF,   including those with known Hereditary Persistence   of Fetal Hemoglobin. Heterozygous hemoglobin variants (HbAS, HbAC,   HbAD, HbAE, HbA2) do not significantly interfere with this assay;   however, presence of multiple variants in a sample may impact the %   interference.       Stephen was seen in the clinic room and placed in the supine position on the treatment table.  The left leg was cleansed with Easi-clense sponges and dried thoroughly.  A silver alginate dressing, mextra pad and ABD pad was applied to the wound.  Eucerin cream was applied to the lower legs.  The patient's foot was positioned at a 90 degree angle.  A zinc oxide wrap, followed by kerlix roll gauze and coban were applied using a spiral technique avoiding creases or folds.  The wrap was started behind the first metatarsal and ended below the tibial tubercle of the knee.  There was overlap of each turn half the width of the previous turn.  The compression wrap will be changed every 7 days.    Assessment:       1. Ulcer of left lower extremity, limited to breakdown of skin    2. Venous insufficiency of both lower extremities    3. Bilateral leg edema        Plan:           Unna boot left lower extremity as detailed above.  Patient was warned not to get the dressings wet and to use cast covers for showering.  Should the dressing become wet, he is to remove it, place a wet-to-dry dressing over the wound, cover with gauze and roll gauze and  use ace wraps for compression and to secure bandages.  He should then notify this office as soon as possible to have a new dressing applied.  Return to clinic in one week.

## 2017-12-15 ENCOUNTER — RESEARCH ENCOUNTER (OUTPATIENT)
Dept: RESEARCH | Facility: HOSPITAL | Age: 72
End: 2017-12-15

## 2017-12-15 ENCOUNTER — OFFICE VISIT (OUTPATIENT)
Dept: INFECTIOUS DISEASES | Facility: CLINIC | Age: 72
End: 2017-12-15
Payer: MEDICARE

## 2017-12-15 VITALS
DIASTOLIC BLOOD PRESSURE: 75 MMHG | TEMPERATURE: 99 F | BODY MASS INDEX: 43.67 KG/M2 | SYSTOLIC BLOOD PRESSURE: 128 MMHG | HEIGHT: 70 IN | WEIGHT: 305 LBS | HEART RATE: 87 BPM

## 2017-12-15 DIAGNOSIS — Z00.6 RESEARCH SUBJECT: Primary | ICD-10-CM

## 2017-12-15 PROCEDURE — 99999 PR PBB SHADOW E&M-EST. PATIENT-LVL III: CPT | Mod: PBBFAC,,,

## 2017-12-15 PROCEDURE — 99213 OFFICE O/P EST LOW 20 MIN: CPT | Mod: S$PBB,,, | Performed by: INTERNAL MEDICINE

## 2017-12-15 NOTE — PROGRESS NOTES
Subjective:      Patient ID: [facility] PAT Cantor Jr. is a 72 y.o. male.    Chief Complaint:Research      History of Present Illness    Mr. Cantor is a 72 y.o. male who presented to clinic for visit two for Study Title/IRB Number: A Phase 3, Placebo-Controlled, Randomized, Observer-Blinded Study To Evaluate The Efficacy, Safety, And Tolerability Of A Clostridium Difficile Vaccine In Adults 50 Years Of Age And Older ([id number]) / 2017.094.B (PI: Lulú).       The subject's inclusion/exclusion criteria was reviewed. Patient reports no new complaints, no changes in health or medications since last vaccine administration. Continues to follow with MARCE Coello in wound care clinic for blisters to lower extermities. Denies fevers,chills, diarrhea. Tolerated first vaccine well with no complaints.      Subject verbalized desire to continue participation in study, understanding that he can withdraw consent at any time.      Subject was informed of follow up visits and that they can contact me at any time.  Phone numbers were provided to that end. Subject did not have any further questions.      Review of Systems   Constitution: Negative for chills, decreased appetite, fever, weakness, malaise/fatigue, night sweats, weight gain and weight loss.   HENT: Negative for congestion, ear pain, hearing loss, hoarse voice, sore throat and tinnitus.    Eyes: Positive for blurred vision. Negative for redness and visual disturbance.   Cardiovascular: Positive for leg swelling. Negative for chest pain and palpitations.   Respiratory: Negative for cough, hemoptysis, shortness of breath and sputum production.    Hematologic/Lymphatic: Negative for adenopathy. Does not bruise/bleed easily.   Skin: Negative for dry skin, itching, rash and suspicious lesions.        Leg ulcers   Musculoskeletal: Positive for back pain and joint pain. Negative for myalgias and neck pain.   Gastrointestinal: Negative for abdominal pain, constipation, diarrhea,  "heartburn, nausea and vomiting.   Genitourinary: Negative for dysuria, flank pain, frequency, hematuria, hesitancy and urgency.   Neurological: Negative for dizziness, headaches, numbness and paresthesias.   Psychiatric/Behavioral: Negative for depression and memory loss. The patient does not have insomnia and is not nervous/anxious.      Objective:   Physical Exam   Constitutional: He is oriented to person, place, and time. He appears well-developed and well-nourished.   HENT:   Head: Normocephalic and atraumatic.   Eyes: EOM are normal. Right conjunctiva is injected.   Neck: Normal range of motion. Neck supple.   Cardiovascular: Normal rate and normal heart sounds.    Pulmonary/Chest: Effort normal and breath sounds normal. No respiratory distress.   Abdominal: Soft.   Musculoskeletal: Normal range of motion.   Neurological: He is alert and oriented to person, place, and time.   Skin: Skin is warm.   BLE with dressing clean/dry/intact   Psychiatric: He has a normal mood and affect. His behavior is normal.   Vitals reviewed.    Vitals:    12/15/17 1100   BP: 128/75   Pulse: 87   Temp: 98.7 °F (37.1 °C)   Weight: (!) 138.3 kg (305 lb)   Height: 5' 10" (1.778 m)       Assessment:       1. Research subject          Plan:       1.  Labs and procedures per protocol.  2.  Vaccine per protocol.  3.  Follow up per protocol.       "

## 2017-12-15 NOTE — PROGRESS NOTES
Sponsor: [employer]     Study Title/IRB Number: A Phase 3, Placebo-Controlled, Randomized, Observer-Blinded Study To Evaluate The Efficacy, Safety, And Tolerability Of A Clostridium Difficile Vaccine In Adults 50 Years Of Age And Older ([id number]) / 2017.094.B    Visit 2 Date:12/15/2017    Patient continued eligibility confirmed: Yes    Patient willing to continue to participate in [employer] C-Diff study and comply with all study requirements: Yes    Vaccine or placebo administered: Yes    All study related observations complete: Yes    [employer] payment request submitted: Yes    Mr Cantor was able to complete all protocol required observations without difficulty.

## 2017-12-16 RX ORDER — GABAPENTIN 300 MG/1
CAPSULE ORAL
Qty: 90 CAPSULE | Refills: 2 | Status: SHIPPED | OUTPATIENT
Start: 2017-12-16 | End: 2018-03-13 | Stop reason: SDUPTHER

## 2017-12-20 ENCOUNTER — TELEPHONE (OUTPATIENT)
Dept: INTERNAL MEDICINE | Facility: CLINIC | Age: 72
End: 2017-12-20

## 2017-12-20 ENCOUNTER — LAB VISIT (OUTPATIENT)
Dept: LAB | Facility: HOSPITAL | Age: 72
End: 2017-12-20
Attending: INTERNAL MEDICINE
Payer: MEDICARE

## 2017-12-20 ENCOUNTER — PATIENT MESSAGE (OUTPATIENT)
Dept: INTERNAL MEDICINE | Facility: CLINIC | Age: 72
End: 2017-12-20

## 2017-12-20 ENCOUNTER — OFFICE VISIT (OUTPATIENT)
Dept: WOUND CARE | Facility: CLINIC | Age: 72
End: 2017-12-20
Payer: MEDICARE

## 2017-12-20 VITALS
TEMPERATURE: 98 F | DIASTOLIC BLOOD PRESSURE: 88 MMHG | HEART RATE: 81 BPM | HEIGHT: 70 IN | BODY MASS INDEX: 44.62 KG/M2 | WEIGHT: 311.69 LBS | SYSTOLIC BLOOD PRESSURE: 124 MMHG

## 2017-12-20 DIAGNOSIS — I87.2 VENOUS INSUFFICIENCY OF BOTH LOWER EXTREMITIES: ICD-10-CM

## 2017-12-20 DIAGNOSIS — I87.2 VENOUS STASIS DERMATITIS OF RIGHT LOWER EXTREMITY: ICD-10-CM

## 2017-12-20 DIAGNOSIS — R82.90 BAD ODOR OF URINE: ICD-10-CM

## 2017-12-20 DIAGNOSIS — E03.4 HYPOTHYROIDISM DUE TO ACQUIRED ATROPHY OF THYROID: ICD-10-CM

## 2017-12-20 DIAGNOSIS — L97.921 ULCER OF LEFT LOWER EXTREMITY, LIMITED TO BREAKDOWN OF SKIN: Primary | ICD-10-CM

## 2017-12-20 DIAGNOSIS — R60.0 BILATERAL LEG EDEMA: ICD-10-CM

## 2017-12-20 DIAGNOSIS — R82.90 BAD ODOR OF URINE: Primary | ICD-10-CM

## 2017-12-20 DIAGNOSIS — B35.3 TINEA PEDIS OF BOTH FEET: ICD-10-CM

## 2017-12-20 LAB
BACTERIA #/AREA URNS AUTO: ABNORMAL /HPF
BILIRUB UR QL STRIP: NEGATIVE
CLARITY UR REFRACT.AUTO: ABNORMAL
COLOR UR AUTO: YELLOW
GLUCOSE UR QL STRIP: ABNORMAL
HGB UR QL STRIP: ABNORMAL
HYALINE CASTS UR QL AUTO: 0 /LPF
KETONES UR QL STRIP: NEGATIVE
LEUKOCYTE ESTERASE UR QL STRIP: ABNORMAL
MICROSCOPIC COMMENT: ABNORMAL
NITRITE UR QL STRIP: NEGATIVE
NON-SQ EPI CELLS #/AREA URNS AUTO: 2 /HPF
PH UR STRIP: 7 [PH] (ref 5–8)
PROT UR QL STRIP: ABNORMAL
RBC #/AREA URNS AUTO: 27 /HPF (ref 0–4)
SP GR UR STRIP: 1.01 (ref 1–1.03)
SQUAMOUS #/AREA URNS AUTO: 0 /HPF
URN SPEC COLLECT METH UR: ABNORMAL
UROBILINOGEN UR STRIP-ACNC: NEGATIVE EU/DL
WBC #/AREA URNS AUTO: 43 /HPF (ref 0–5)
WBC CLUMPS UR QL AUTO: ABNORMAL
YEAST UR QL AUTO: ABNORMAL

## 2017-12-20 PROCEDURE — 81001 URINALYSIS AUTO W/SCOPE: CPT

## 2017-12-20 PROCEDURE — 99999 PR PBB SHADOW E&M-EST. PATIENT-LVL V: CPT | Mod: PBBFAC,,, | Performed by: NURSE PRACTITIONER

## 2017-12-20 PROCEDURE — 29580 STRAPPING UNNA BOOT: CPT | Mod: LT,S$GLB,, | Performed by: NURSE PRACTITIONER

## 2017-12-20 PROCEDURE — 87086 URINE CULTURE/COLONY COUNT: CPT

## 2017-12-20 PROCEDURE — 99499 UNLISTED E&M SERVICE: CPT | Mod: S$GLB,,, | Performed by: NURSE PRACTITIONER

## 2017-12-20 RX ORDER — CIPROFLOXACIN 500 MG/1
500 TABLET ORAL 2 TIMES DAILY
Qty: 60 TABLET | Refills: 0 | Status: SHIPPED | OUTPATIENT
Start: 2017-12-20 | End: 2018-01-19

## 2017-12-20 RX ORDER — LEVOTHYROXINE SODIUM 25 UG/1
25 TABLET ORAL DAILY
Qty: 90 TABLET | Refills: 2 | Status: ON HOLD | OUTPATIENT
Start: 2017-12-20 | End: 2018-07-31

## 2017-12-20 NOTE — PATIENT INSTRUCTIONS
Elevate legs as much as possible. Do not get the dressings wet and use cast covers for showering.  Should the dressing become wet, remove it, place a wet-to-dry dressing over the wound, cover with gauze and roll gauze and use ace wraps for compression and to secure bandages.  Notify this office as soon as possible to have a new dressing applied.      You may leave your compression wrap on for one week.  You may then unroll it and  shower using a mild soap such as Dove.  Irrigate the wounds with lukewarm water for 5 minutes and dry thoroughly.  Apply wound hydrogel to the wounds, cover with cotton gauze and secure with roll gauze.  Apply your circaid on top of the bandage for compression.  Change dressing daily.  Report any signs of infection.    You may purchase the wound hydrogel from [facility].

## 2017-12-20 NOTE — TELEPHONE ENCOUNTER
Called pt wife back and she states that he is having urgency and frequency and dark urine and a foul odor and maybe some blood in it and he has to strains and it burns when he urinates. He has an appt with wound care today at 120 and would like to drop off a sample then

## 2017-12-20 NOTE — PROGRESS NOTES
Subjective:       Patient ID: [facility] PAT Cantor Jr. is a 72 y.o. male.    Chief Complaint: Wound Check    Wound Check        This patient is seen today for reevaluation of a wounds to the left medial lower leg.  An unna boot was placed on the leg on the last visit.  His drainage is less than it was a week ago.  His abrasion is healed but he has a few superficial open areas noted.  He is wearing a circaid stocking on the right leg.  He is afebrile.  He denies increased redness or purulent drainage but does report increased leg swelling.  His pain level is 5/10.  His medical history is significant for type II diabetes, chronic kidney disease and venous insufficiency.   Review of Systems    Unchanged from prior visit except for recurring wound to left leg.  Objective:      Physical Exam   Constitutional: He is oriented to person, place, and time. He appears well-developed and well-nourished. No distress.   HENT:   Head: Normocephalic and atraumatic.   Cardiovascular: Intact distal pulses.    Musculoskeletal: Normal range of motion. He exhibits edema (4+ lower leg). He exhibits no tenderness.        Legs:  Neurological: He is alert and oriented to person, place, and time.   Skin: Skin is warm and dry. No rash noted. He is not diaphoretic. No erythema.   Psychiatric: He has a normal mood and affect. His behavior is normal. Judgment and thought content normal.   Nursing note and vitals reviewed.      ..  Hemoglobin A1C   Date Value Ref Range Status   11/15/2017 7.1 (H) 4.0 - 5.6 % Final     Comment:     According to ADA guidelines, hemoglobin A1c <7.0% represents  optimal control in non-pregnant diabetic patients. Different  metrics may apply to specific patient populations.   Standards of Medical Care in Diabetes-2016.  For the purpose of screening for the presence of diabetes:  <5.7%     Consistent with the absence of diabetes  5.7-6.4%  Consistent with increasing risk for diabetes   (prediabetes)  >or=6.5%  Consistent with  diabetes  Currently, no consensus exists for use of hemoglobin A1c  for diagnosis of diabetes for children.  This Hemoglobin A1c assay has significant interference with fetal   hemoglobin   (HbF). The results are invalid for patients with abnormal amounts of   HbF,   including those with known Hereditary Persistence   of Fetal Hemoglobin. Heterozygous hemoglobin variants (HbAS, HbAC,   HbAD, HbAE, HbA2) do not significantly interfere with this assay;   however, presence of multiple variants in a sample may impact the %   interference.     09/22/2017 7.1 (H) 4.0 - 5.6 % Final     Comment:     According to ADA guidelines, hemoglobin A1c <7.0% represents  optimal control in non-pregnant diabetic patients. Different  metrics may apply to specific patient populations.   Standards of Medical Care in Diabetes-2016.  For the purpose of screening for the presence of diabetes:  <5.7%     Consistent with the absence of diabetes  5.7-6.4%  Consistent with increasing risk for diabetes   (prediabetes)  >or=6.5%  Consistent with diabetes  Currently, no consensus exists for use of hemoglobin A1c  for diagnosis of diabetes for children.  This Hemoglobin A1c assay has significant interference with fetal   hemoglobin   (HbF). The results are invalid for patients with abnormal amounts of   HbF,   including those with known Hereditary Persistence   of Fetal Hemoglobin. Heterozygous hemoglobin variants (HbAS, HbAC,   HbAD, HbAE, HbA2) do not significantly interfere with this assay;   however, presence of multiple variants in a sample may impact the %   interference.     07/13/2017 6.7 (H) 4.0 - 5.6 % Final     Comment:     According to ADA guidelines, hemoglobin A1c <7.0% represents  optimal control in non-pregnant diabetic patients. Different  metrics may apply to specific patient populations.   Standards of Medical Care in Diabetes-2016.  For the purpose of screening for the presence of diabetes:  <5.7%     Consistent with the absence  of diabetes  5.7-6.4%  Consistent with increasing risk for diabetes   (prediabetes)  >or=6.5%  Consistent with diabetes  Currently, no consensus exists for use of hemoglobin A1c  for diagnosis of diabetes for children.  This Hemoglobin A1c assay has significant interference with fetal   hemoglobin   (HbF). The results are invalid for patients with abnormal amounts of   HbF,   including those with known Hereditary Persistence   of Fetal Hemoglobin. Heterozygous hemoglobin variants (HbAS, HbAC,   HbAD, HbAE, HbA2) do not significantly interfere with this assay;   however, presence of multiple variants in a sample may impact the %   interference.       Stephen was seen in the clinic room and placed in the supine position on the treatment table.  The left leg was cleansed with Easi-clense sponges and dried thoroughly.  A silver alginate dressing, mextra pad and ABD pad was applied to the wound.  Eucerin cream was applied to the lower legs.  The patient's foot was positioned at a 90 degree angle.  A zinc oxide wrap, followed by kerlix roll gauze and coban were applied using a spiral technique avoiding creases or folds.  The wrap was started behind the first metatarsal and ended below the tibial tubercle of the knee.  There was overlap of each turn half the width of the previous turn.  The compression wrap will be changed every 7 days.    Assessment:       1. Ulcer of left lower extremity, limited to breakdown of skin    2. Venous insufficiency of both lower extremities    3. Tinea pedis of both feet    4. Venous stasis dermatitis of right lower extremity    5. Bilateral leg edema        Plan:           Unna boot left lower extremity as detailed above.  Patient was warned not to get the dressings wet and to use cast covers for showering.  Should the dressing become wet, he is to remove it, place a wet-to-dry dressing over the wound, cover with gauze and roll gauze and use ace wraps for compression and to secure bandages.  He  should then notify this office as soon as possible to have a new dressing applied.  Return to clinic in one week.

## 2017-12-20 NOTE — TELEPHONE ENCOUNTER
----- Message from Elise Marley sent at 12/20/2017  7:46 AM CST -----  Contact: Self [phone]  Patient would like to get medical advice.  Symptoms (please be specific):  UTI  How long has patient had these symptoms:    Pharmacy name and phone #:  [facility] [phone]   Any drug allergies:    Comments: pt would like to bring by a urine sample

## 2017-12-21 LAB — BACTERIA UR CULT: NORMAL

## 2017-12-22 NOTE — PROGRESS NOTES
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

## 2017-12-26 RX ORDER — TAMSULOSIN HYDROCHLORIDE 0.4 MG/1
CAPSULE ORAL
Qty: 30 CAPSULE | Refills: 1 | Status: SHIPPED | OUTPATIENT
Start: 2017-12-26 | End: 2018-02-14 | Stop reason: SDUPTHER

## 2018-01-02 ENCOUNTER — OFFICE VISIT (OUTPATIENT)
Dept: WOUND CARE | Facility: CLINIC | Age: 73
End: 2018-01-02
Payer: MEDICARE

## 2018-01-02 ENCOUNTER — LAB VISIT (OUTPATIENT)
Dept: LAB | Facility: HOSPITAL | Age: 73
End: 2018-01-02
Payer: MEDICARE

## 2018-01-02 VITALS
BODY MASS INDEX: 45.02 KG/M2 | TEMPERATURE: 97 F | WEIGHT: 314.5 LBS | HEART RATE: 84 BPM | SYSTOLIC BLOOD PRESSURE: 127 MMHG | HEIGHT: 70 IN | DIASTOLIC BLOOD PRESSURE: 75 MMHG

## 2018-01-02 DIAGNOSIS — I87.2 VENOUS STASIS DERMATITIS OF RIGHT LOWER EXTREMITY: ICD-10-CM

## 2018-01-02 DIAGNOSIS — N18.30 TYPE 2 DIABETES MELLITUS WITH STAGE 3 CHRONIC KIDNEY DISEASE, WITH LONG-TERM CURRENT USE OF INSULIN: ICD-10-CM

## 2018-01-02 DIAGNOSIS — Z79.4 TYPE 2 DIABETES MELLITUS WITH STAGE 3 CHRONIC KIDNEY DISEASE, WITH LONG-TERM CURRENT USE OF INSULIN: ICD-10-CM

## 2018-01-02 DIAGNOSIS — R60.0 BILATERAL LEG EDEMA: ICD-10-CM

## 2018-01-02 DIAGNOSIS — E11.22 TYPE 2 DIABETES MELLITUS WITH STAGE 3 CHRONIC KIDNEY DISEASE, WITH LONG-TERM CURRENT USE OF INSULIN: ICD-10-CM

## 2018-01-02 DIAGNOSIS — I87.2 VENOUS INSUFFICIENCY OF BOTH LOWER EXTREMITIES: ICD-10-CM

## 2018-01-02 DIAGNOSIS — R35.0 URINARY FREQUENCY: ICD-10-CM

## 2018-01-02 DIAGNOSIS — B35.3 TINEA PEDIS OF BOTH FEET: ICD-10-CM

## 2018-01-02 DIAGNOSIS — L97.921 ULCER OF LEFT LOWER EXTREMITY, LIMITED TO BREAKDOWN OF SKIN: Primary | ICD-10-CM

## 2018-01-02 LAB
ESTIMATED AVG GLUCOSE: 174 MG/DL
HBA1C MFR BLD HPLC: 7.7 %

## 2018-01-02 PROCEDURE — 36415 COLL VENOUS BLD VENIPUNCTURE: CPT

## 2018-01-02 PROCEDURE — 99999 PR PBB SHADOW E&M-EST. PATIENT-LVL V: CPT | Mod: PBBFAC,,, | Performed by: NURSE PRACTITIONER

## 2018-01-02 PROCEDURE — 83036 HEMOGLOBIN GLYCOSYLATED A1C: CPT

## 2018-01-02 PROCEDURE — 99499 UNLISTED E&M SERVICE: CPT | Mod: S$GLB,,, | Performed by: NURSE PRACTITIONER

## 2018-01-02 PROCEDURE — 29580 STRAPPING UNNA BOOT: CPT | Mod: LT,S$GLB,, | Performed by: NURSE PRACTITIONER

## 2018-01-02 RX ORDER — LOSARTAN POTASSIUM 50 MG/1
TABLET ORAL
Qty: 30 TABLET | Refills: 11 | Status: SHIPPED | OUTPATIENT
Start: 2018-01-02 | End: 2018-01-09

## 2018-01-02 NOTE — PROGRESS NOTES
Subjective:       Patient ID: [facility] PAT Cantor Jr. is a 72 y.o. male.    Chief Complaint: Wound Check    Wound Check        This patient is seen today for reevaluation of a wounds to the left medial lower leg.  He has been using urea cream on the wound daily and it is not healing.  He is wearing a circaid stocking on both legs.  He is afebrile.  He denies increased redness or purulent drainage but does report increased leg swelling.  He does not have any pain.  His medical history is significant for type II diabetes, chronic kidney disease and venous insufficiency.   Review of Systems    Unchanged from prior visit except for recurring wound to left leg.  Objective:      Physical Exam   Constitutional: He is oriented to person, place, and time. He appears well-developed and well-nourished. No distress.   HENT:   Head: Normocephalic and atraumatic.   Cardiovascular: Intact distal pulses.    Musculoskeletal: Normal range of motion. He exhibits edema (4+ lower leg). He exhibits no tenderness.        Legs:  Neurological: He is alert and oriented to person, place, and time.   Skin: Skin is warm and dry. No rash noted. He is not diaphoretic. No erythema.   Psychiatric: He has a normal mood and affect. His behavior is normal. Judgment and thought content normal.   Nursing note and vitals reviewed.      ..  Hemoglobin A1C   Date Value Ref Range Status   11/15/2017 7.1 (H) 4.0 - 5.6 % Final     Comment:     According to ADA guidelines, hemoglobin A1c <7.0% represents  optimal control in non-pregnant diabetic patients. Different  metrics may apply to specific patient populations.   Standards of Medical Care in Diabetes-2016.  For the purpose of screening for the presence of diabetes:  <5.7%     Consistent with the absence of diabetes  5.7-6.4%  Consistent with increasing risk for diabetes   (prediabetes)  >or=6.5%  Consistent with diabetes  Currently, no consensus exists for use of hemoglobin A1c  for diagnosis of diabetes for  children.  This Hemoglobin A1c assay has significant interference with fetal   hemoglobin   (HbF). The results are invalid for patients with abnormal amounts of   HbF,   including those with known Hereditary Persistence   of Fetal Hemoglobin. Heterozygous hemoglobin variants (HbAS, HbAC,   HbAD, HbAE, HbA2) do not significantly interfere with this assay;   however, presence of multiple variants in a sample may impact the %   interference.     09/22/2017 7.1 (H) 4.0 - 5.6 % Final     Comment:     According to ADA guidelines, hemoglobin A1c <7.0% represents  optimal control in non-pregnant diabetic patients. Different  metrics may apply to specific patient populations.   Standards of Medical Care in Diabetes-2016.  For the purpose of screening for the presence of diabetes:  <5.7%     Consistent with the absence of diabetes  5.7-6.4%  Consistent with increasing risk for diabetes   (prediabetes)  >or=6.5%  Consistent with diabetes  Currently, no consensus exists for use of hemoglobin A1c  for diagnosis of diabetes for children.  This Hemoglobin A1c assay has significant interference with fetal   hemoglobin   (HbF). The results are invalid for patients with abnormal amounts of   HbF,   including those with known Hereditary Persistence   of Fetal Hemoglobin. Heterozygous hemoglobin variants (HbAS, HbAC,   HbAD, HbAE, HbA2) do not significantly interfere with this assay;   however, presence of multiple variants in a sample may impact the %   interference.     07/13/2017 6.7 (H) 4.0 - 5.6 % Final     Comment:     According to ADA guidelines, hemoglobin A1c <7.0% represents  optimal control in non-pregnant diabetic patients. Different  metrics may apply to specific patient populations.   Standards of Medical Care in Diabetes-2016.  For the purpose of screening for the presence of diabetes:  <5.7%     Consistent with the absence of diabetes  5.7-6.4%  Consistent with increasing risk for diabetes   (prediabetes)  >or=6.5%   Consistent with diabetes  Currently, no consensus exists for use of hemoglobin A1c  for diagnosis of diabetes for children.  This Hemoglobin A1c assay has significant interference with fetal   hemoglobin   (HbF). The results are invalid for patients with abnormal amounts of   HbF,   including those with known Hereditary Persistence   of Fetal Hemoglobin. Heterozygous hemoglobin variants (HbAS, HbAC,   HbAD, HbAE, HbA2) do not significantly interfere with this assay;   however, presence of multiple variants in a sample may impact the %   interference.       Stephen was seen in the clinic room and placed in the supine position on the treatment table.  The left leg was cleansed with Easi-clense sponges and dried thoroughly.  A hydrofiber dressing and ABD pad was applied to the wound.  Eucerin cream was applied to the lower legs.  The patient's foot was positioned at a 90 degree angle.  A zinc oxide wrap, followed by kerlix roll gauze and coban were applied using a spiral technique avoiding creases or folds.  The wrap was started behind the first metatarsal and ended below the tibial tubercle of the knee.  There was overlap of each turn half the width of the previous turn.  The compression wrap will be changed every 7 days.    Assessment:       1. Ulcer of left lower extremity, limited to breakdown of skin    2. Venous insufficiency of both lower extremities    3. Tinea pedis of both feet    4. Venous stasis dermatitis of right lower extremity    5. Bilateral leg edema        Plan:           Unna boot left lower extremity as detailed above.  Patient was warned not to get the dressings wet and to use cast covers for showering.  Should the dressing become wet, he is to remove it, place a wet-to-dry dressing over the wound, cover with gauze and roll gauze and use ace wraps for compression and to secure bandages.  He should then notify this office as soon as possible to have a new dressing applied.  Return to clinic in one  week.

## 2018-01-03 RX ORDER — OXYBUTYNIN CHLORIDE 5 MG/1
TABLET, EXTENDED RELEASE ORAL
Qty: 30 TABLET | Refills: 3 | Status: SHIPPED | OUTPATIENT
Start: 2018-01-03 | End: 2018-01-30

## 2018-01-04 ENCOUNTER — OFFICE VISIT (OUTPATIENT)
Dept: DERMATOLOGY | Facility: CLINIC | Age: 73
End: 2018-01-04
Payer: MEDICARE

## 2018-01-04 DIAGNOSIS — D18.00 ANGIOMA: ICD-10-CM

## 2018-01-04 DIAGNOSIS — L82.1 SEBORRHEIC KERATOSIS: ICD-10-CM

## 2018-01-04 DIAGNOSIS — L57.0 AK (ACTINIC KERATOSIS): Primary | ICD-10-CM

## 2018-01-04 DIAGNOSIS — Z85.820 HISTORY OF MALIGNANT MELANOMA OF SKIN: ICD-10-CM

## 2018-01-04 DIAGNOSIS — D22.9 MULTIPLE BENIGN NEVI: ICD-10-CM

## 2018-01-04 PROCEDURE — 99214 OFFICE O/P EST MOD 30 MIN: CPT | Mod: 25,S$GLB,, | Performed by: DERMATOLOGY

## 2018-01-04 PROCEDURE — 99999 PR PBB SHADOW E&M-EST. PATIENT-LVL II: CPT | Mod: PBBFAC,,, | Performed by: DERMATOLOGY

## 2018-01-04 PROCEDURE — 17000 DESTRUCT PREMALG LESION: CPT | Mod: S$GLB,,, | Performed by: DERMATOLOGY

## 2018-01-04 NOTE — PROGRESS NOTES
Subjective:       Patient ID:  [facility] PAT Cantor Jr. is a 72 y.o. male who presents for   Chief Complaint   Patient presents with    Skin Check     TBSE     History of Present Illness: The patient presents for follow up of skin check.    The patient was last seen on: 7/13/17 for cryo to ak's which have resolved and TBSE  This is a high risk patient here to check for the development of new lesions.  H/o mis right cheek 11/15    Other skin complaints: none        Review of Systems   Constitutional: Positive for weight gain (311# (20# in past 6 months)) and fatigue ( stable). Negative for fever, chills, weight loss and night sweats.   Musculoskeletal: Positive for muscle weakness (legs - stable).   Skin: Negative for daily sunscreen use, activity-related sunscreen use (but wears hat) and recent sunburn.   Hematologic/Lymphatic: Negative for adenopathy. Bruises/bleeds easily (on xeralto).        Objective:    Physical Exam   Constitutional: He appears well-developed and well-nourished. He is obese.  No distress.   Neurological: He is alert and oriented to person, place, and time. He is not disoriented.   Psychiatric: He has a normal mood and affect.   Skin:   Areas Examined (abnormalities noted in diagram):   Scalp / Hair Palpated and Inspected  Head / Face Inspection Performed  Neck Inspection Performed  Chest / Axilla Inspection Performed  Abdomen Inspection Performed  Genitals / Buttocks / Groin Inspection Performed  Back Inspection Performed  RUE Inspected  LUE Inspection Performed  RLE Inspected  LLE Inspection Performed  Nails and Digits Inspection Performed                           Diagram Legend     Erythematous scaling macule/papule c/w actinic keratosis       Vascular papule c/w angioma      Pigmented verrucoid papule/plaque c/w seborrheic keratosis      Yellow umbilicated papule c/w sebaceous hyperplasia      Irregularly shaped tan macule c/w lentigo     1-2 mm smooth white papules consistent with Milia       Movable subcutaneous cyst with punctum c/w epidermal inclusion cyst      Subcutaneous movable cyst c/w pilar cyst      Firm pink to brown papule c/w dermatofibroma      Pedunculated fleshy papule(s) c/w skin tag(s)      Evenly pigmented macule c/w junctional nevus     Mildly variegated pigmented, slightly irregular-bordered macule c/w mildly atypical nevus      Flesh colored to evenly pigmented papule c/w intradermal nevus       Pink pearly papule/plaque c/w basal cell carcinoma      Erythematous hyperkeratotic cursted plaque c/w SCC      Surgical scar with no sign of skin cancer recurrence      Open and closed comedones      Inflammatory papules and pustules      Verrucoid papule consistent consistent with wart     Erythematous eczematous patches and plaques     Dystrophic onycholytic nail with subungual debris c/w onychomycosis     Umbilicated papule    Erythematous-base heme-crusted tan verrucoid plaque consistent with inflamed seborrheic keratosis     Erythematous Silvery Scaling Plaque c/w Psoriasis     See annotation      Assessment / Plan:        AK (actinic keratosis) - ant scalp  Cryosurgery Procedure Note    Verbal consent from the patient is obtained and the patient is aware of the precancerous quality and need for treatment of these lesions. Liquid nitrogen cryosurgery is applied to the 1 actinic keratoses, as detailed in the physical exam, to produce a freeze injury. The patient is aware that blisters may form and is instructed on wound care with gentle cleansing and use of vaseline ointment to keep moist until healed. The patient is supplied a handout on cryosurgery and is instructed to call if lesions do not completely resolve.      Seborrheic keratosis/skin tags  These are benign inherited growths without a malignant potential. Reassurance given to patient. No treatment is necessary.     Angiomas   - stable and chronic      Multiple benign nevi   - stable and chronic      History of malignant  melanoma of skin  Area of previous melanoma examined. Site well healed with no signs of recurrence.    Total body skin examination performed today including at least 12 points as noted in physical examination. No lesions suspicious for malignancy noted.             Return in about 6 months (around 7/4/2018).

## 2018-01-04 NOTE — PATIENT INSTRUCTIONS

## 2018-01-08 NOTE — PROGRESS NOTES
"CC: The primary encounter diagnosis was Type 2 diabetes mellitus with stage 3 chronic kidney disease    HPI: Mr. Stephen Cantor Jr. was diagnosed with Type 2 DM " a long time ago".  He had been told by his doctor that he was borderline for 35-40 years. Started on metformin "around 2000". Had diarrhea with metformin. Has been on insulin for years. Denies hospitalizations due to DM.     No changes at last visit. A1C has increased. Still under care of CODY Correa for treatment of LLE ulcer. He sees her every week.     Currently taking Cipro for a UTI; he has been off and on Cipro for the past 2 months.     Takes insulin 4x/day, checks BG 3x/day and readings have been 150-286. No hypoglycemia.     No missed doses of insulin.     Rotates insulin injection sites    Good appetite. Snacks between meals on fruit, nuts. Drinks Crystal light or sugar free koolaid packs.     CURRENT DIABETIC MEDS: Lantus 45 units QHS, Novolog 17/19/19 units AC plus correction scale, goal 150, ISF 25.     Last Podiatry Exam: 2017 and is seen every 3 months    REVIEW OF SYSTEMS  Constitutional: + chronic fatigue; 8 lb weight gain since last visit.   Eyes: occasional right eye blurry vision; (+) glaucoma; chronic right eye redness;  last eye exam: November 2017  Cardiac: no palpitations, murmurs, or chest pain.   Respiratory: denies current dyspnea, but does admit to occasional dyspnea with exertion.   GI: no c/o abdominal pain, nausea, or bowel pattern changes  Skin: no rashes; no bruising or lipodystrophy at insulin injection sites.    Neuro: chronic numbness, tingling in bilateral feet; no dizziness    Vital Signs  /78   Pulse 70   Ht 5' 10" (1.778 m)   Wt (!) 141.5 kg (312 lb)   BMI 44.77 kg/m²     Hemoglobin A1C   Date Value Ref Range Status   01/02/2018 7.7 (H) 4.0 - 5.6 % Final     Comment:     According to ADA guidelines, hemoglobin A1c <7.0% represents  optimal control in non-pregnant diabetic patients. Different  metrics " may apply to specific patient populations.   Standards of Medical Care in Diabetes-2016.  For the purpose of screening for the presence of diabetes:  <5.7%     Consistent with the absence of diabetes  5.7-6.4%  Consistent with increasing risk for diabetes   (prediabetes)  >or=6.5%  Consistent with diabetes  Currently, no consensus exists for use of hemoglobin A1c  for diagnosis of diabetes for children.  This Hemoglobin A1c assay has significant interference with fetal   hemoglobin   (HbF). The results are invalid for patients with abnormal amounts of   HbF,   including those with known Hereditary Persistence   of Fetal Hemoglobin. Heterozygous hemoglobin variants (HbAS, HbAC,   HbAD, HbAE, HbA2) do not significantly interfere with this assay;   however, presence of multiple variants in a sample may impact the %   interference.     11/15/2017 7.1 (H) 4.0 - 5.6 % Final     Comment:     According to ADA guidelines, hemoglobin A1c <7.0% represents  optimal control in non-pregnant diabetic patients. Different  metrics may apply to specific patient populations.   Standards of Medical Care in Diabetes-2016.  For the purpose of screening for the presence of diabetes:  <5.7%     Consistent with the absence of diabetes  5.7-6.4%  Consistent with increasing risk for diabetes   (prediabetes)  >or=6.5%  Consistent with diabetes  Currently, no consensus exists for use of hemoglobin A1c  for diagnosis of diabetes for children.  This Hemoglobin A1c assay has significant interference with fetal   hemoglobin   (HbF). The results are invalid for patients with abnormal amounts of   HbF,   including those with known Hereditary Persistence   of Fetal Hemoglobin. Heterozygous hemoglobin variants (HbAS, HbAC,   HbAD, HbAE, HbA2) do not significantly interfere with this assay;   however, presence of multiple variants in a sample may impact the %   interference.     09/22/2017 7.1 (H) 4.0 - 5.6 % Final     Comment:     According to ADA  guidelines, hemoglobin A1c <7.0% represents  optimal control in non-pregnant diabetic patients. Different  metrics may apply to specific patient populations.   Standards of Medical Care in Diabetes-2016.  For the purpose of screening for the presence of diabetes:  <5.7%     Consistent with the absence of diabetes  5.7-6.4%  Consistent with increasing risk for diabetes   (prediabetes)  >or=6.5%  Consistent with diabetes  Currently, no consensus exists for use of hemoglobin A1c  for diagnosis of diabetes for children.  This Hemoglobin A1c assay has significant interference with fetal   hemoglobin   (HbF). The results are invalid for patients with abnormal amounts of   HbF,   including those with known Hereditary Persistence   of Fetal Hemoglobin. Heterozygous hemoglobin variants (HbAS, HbAC,   HbAD, HbAE, HbA2) do not significantly interfere with this assay;   however, presence of multiple variants in a sample may impact the %   interference.         Chemistry        Component Value Date/Time     11/15/2017 0823    K 4.8 11/15/2017 0823     11/15/2017 0823    CO2 26 11/15/2017 0823    BUN 29 (H) 11/15/2017 0823    CREATININE 1.7 (H) 11/15/2017 0823     (H) 11/15/2017 0823        Component Value Date/Time    CALCIUM 10.1 11/15/2017 0823    ALKPHOS 37 (L) 05/30/2017 0946    AST 14 05/30/2017 0946    ALT 10 05/30/2017 0946    BILITOT 0.6 05/30/2017 0946          Lab Results   Component Value Date    CHOL 159 11/15/2017    CHOL 159 05/30/2017    CHOL 133 03/06/2017     Lab Results   Component Value Date    HDL 26 (L) 11/15/2017    HDL 22 (L) 05/30/2017    HDL 28 (L) 03/06/2017     Lab Results   Component Value Date    LDLCALC 95.2 11/15/2017    LDLCALC 97.6 05/30/2017    LDLCALC 79.0 03/06/2017     Lab Results   Component Value Date    TRIG 189 (H) 11/15/2017    TRIG 197 (H) 05/30/2017    TRIG 130 03/06/2017     Lab Results   Component Value Date    CHOLHDL 16.4 (L) 11/15/2017    CHOLHDL 13.8 (L)  05/30/2017    CHOLHDL 21.1 03/06/2017     Lab Results   Component Value Date    TSH 2.848 05/30/2017     Assessment/Plan  Type 2 diabetes mellitus with stage 3 chronic kidney disease, with long-term current use of insulin   A1C trended up, but at goal considering age and comorbidities  A1C goal ~7.5% or less without hypoglycemia  Lantus to 55 units QHS, Novolog 20/22/22 plus correction scale 150/25  Avoid hypoglycemia  Continue to f/u with Nephrology for CKD  Send Diabetic supply information for new meter with strips--Needs True metrix meter and strips  Monitor BG 4x/day--logs to next visit    Type 2 diabetes mellitus with diabetic polyneuropathy, with long-term current use of insulin   Continue Gabapentin, f/u with Podiatry    Dyslipidemia associated with type 2 diabetes mellitus   On Pravastatin, Tricor, Lovaza  Per PCP    Chronic atrial fibrillation   Can worsen insulin resistance  On BB    Essential hypertension   Stable despite systolic BP slightly above goal  Continue current meds (On diuretic, BB, ARB)  Previous BPs stable    Morbid obesity with BMI of 40.0-44.9, adult   Body mass index is 44.77 kg/m².   Weight loss can help to reduce insulin resistance    Sleep apnea, unspecified type   Continue use of C-pap    Edema  On Furosemide  Per PCP     Ulcer of Left Lower Extremity  Managed by CODY Correa    UTI  On antibiotics  Infection can worsen BG readings    FOLLOW UP  Return in about 3 months (around 4/9/2018).

## 2018-01-09 ENCOUNTER — OFFICE VISIT (OUTPATIENT)
Dept: ENDOCRINOLOGY | Facility: CLINIC | Age: 73
End: 2018-01-09
Payer: MEDICARE

## 2018-01-09 ENCOUNTER — OFFICE VISIT (OUTPATIENT)
Dept: WOUND CARE | Facility: CLINIC | Age: 73
End: 2018-01-09
Payer: MEDICARE

## 2018-01-09 VITALS
HEART RATE: 97 BPM | DIASTOLIC BLOOD PRESSURE: 79 MMHG | BODY MASS INDEX: 44.81 KG/M2 | WEIGHT: 312 LBS | DIASTOLIC BLOOD PRESSURE: 78 MMHG | HEART RATE: 70 BPM | HEIGHT: 70 IN | BODY MASS INDEX: 44.67 KG/M2 | SYSTOLIC BLOOD PRESSURE: 122 MMHG | HEIGHT: 70 IN | TEMPERATURE: 97 F | WEIGHT: 313 LBS | SYSTOLIC BLOOD PRESSURE: 104 MMHG

## 2018-01-09 DIAGNOSIS — E11.42 DIABETIC POLYNEUROPATHY ASSOCIATED WITH TYPE 2 DIABETES MELLITUS: ICD-10-CM

## 2018-01-09 DIAGNOSIS — N18.30 TYPE 2 DIABETES MELLITUS WITH STAGE 3 CHRONIC KIDNEY DISEASE, WITH LONG-TERM CURRENT USE OF INSULIN: Primary | ICD-10-CM

## 2018-01-09 DIAGNOSIS — E11.22 TYPE 2 DIABETES MELLITUS WITH STAGE 3 CHRONIC KIDNEY DISEASE, WITH LONG-TERM CURRENT USE OF INSULIN: Primary | ICD-10-CM

## 2018-01-09 DIAGNOSIS — I10 ESSENTIAL HYPERTENSION: ICD-10-CM

## 2018-01-09 DIAGNOSIS — E78.5 DYSLIPIDEMIA ASSOCIATED WITH TYPE 2 DIABETES MELLITUS: ICD-10-CM

## 2018-01-09 DIAGNOSIS — L97.921 ULCER OF LEFT LOWER EXTREMITY, LIMITED TO BREAKDOWN OF SKIN: ICD-10-CM

## 2018-01-09 DIAGNOSIS — E11.42 TYPE 2 DIABETES MELLITUS WITH DIABETIC POLYNEUROPATHY, WITH LONG-TERM CURRENT USE OF INSULIN: ICD-10-CM

## 2018-01-09 DIAGNOSIS — I87.2 VENOUS INSUFFICIENCY OF BOTH LOWER EXTREMITIES: Primary | ICD-10-CM

## 2018-01-09 DIAGNOSIS — E11.69 DYSLIPIDEMIA ASSOCIATED WITH TYPE 2 DIABETES MELLITUS: ICD-10-CM

## 2018-01-09 DIAGNOSIS — N39.0 URINARY TRACT INFECTION WITHOUT HEMATURIA, SITE UNSPECIFIED: ICD-10-CM

## 2018-01-09 DIAGNOSIS — G47.33 OBSTRUCTIVE SLEEP APNEA SYNDROME: ICD-10-CM

## 2018-01-09 DIAGNOSIS — Z79.4 TYPE 2 DIABETES MELLITUS WITH STAGE 3 CHRONIC KIDNEY DISEASE, WITH LONG-TERM CURRENT USE OF INSULIN: Primary | ICD-10-CM

## 2018-01-09 DIAGNOSIS — I48.20 CHRONIC ATRIAL FIBRILLATION: ICD-10-CM

## 2018-01-09 DIAGNOSIS — I87.2 VENOUS STASIS DERMATITIS OF RIGHT LOWER EXTREMITY: ICD-10-CM

## 2018-01-09 DIAGNOSIS — R60.9 EDEMA, UNSPECIFIED TYPE: ICD-10-CM

## 2018-01-09 DIAGNOSIS — Z79.4 TYPE 2 DIABETES MELLITUS WITH DIABETIC POLYNEUROPATHY, WITH LONG-TERM CURRENT USE OF INSULIN: ICD-10-CM

## 2018-01-09 DIAGNOSIS — E66.01 MORBID OBESITY WITH BMI OF 40.0-44.9, ADULT: ICD-10-CM

## 2018-01-09 DIAGNOSIS — B35.3 TINEA PEDIS OF BOTH FEET: ICD-10-CM

## 2018-01-09 DIAGNOSIS — R60.0 BILATERAL LEG EDEMA: ICD-10-CM

## 2018-01-09 PROCEDURE — 99999 PR PBB SHADOW E&M-EST. PATIENT-LVL V: CPT | Mod: PBBFAC,,, | Performed by: NURSE PRACTITIONER

## 2018-01-09 PROCEDURE — 99999 PR PBB SHADOW E&M-EST. PATIENT-LVL IV: CPT | Mod: PBBFAC,,, | Performed by: NURSE PRACTITIONER

## 2018-01-09 PROCEDURE — 99214 OFFICE O/P EST MOD 30 MIN: CPT | Mod: S$GLB,,, | Performed by: NURSE PRACTITIONER

## 2018-01-09 PROCEDURE — 99211 OFF/OP EST MAY X REQ PHY/QHP: CPT | Mod: S$GLB,,, | Performed by: NURSE PRACTITIONER

## 2018-01-09 RX ORDER — INSULIN ASPART 100 [IU]/ML
INJECTION, SOLUTION INTRAVENOUS; SUBCUTANEOUS
Qty: 2 BOX | Refills: 11 | Status: SHIPPED | OUTPATIENT
Start: 2018-01-09 | End: 2020-03-12

## 2018-01-09 RX ORDER — INSULIN GLARGINE 100 [IU]/ML
55 INJECTION, SOLUTION SUBCUTANEOUS NIGHTLY
Qty: 2 BOX | Refills: 11 | Status: SHIPPED | OUTPATIENT
Start: 2018-01-09 | End: 2018-04-17

## 2018-01-09 NOTE — PROGRESS NOTES
Subjective:       Patient ID: [facility] PAT Cantor Jr. is a 72 y.o. male.    Chief Complaint: Wound Check    Wound Check        This patient is seen today for reevaluation of a wounds to the left medial lower leg.  An unna boot was placed on the leg on the last visit and the wound is now healed.  He is wearing a circaid stocking on the right leg.  He is afebrile.  He denies increased redness or purulent drainage but does report increased leg swelling.  He does not have any pain.  His medical history is significant for type II diabetes, chronic kidney disease and venous insufficiency.   Review of Systems    Unchanged from prior visit except for recurring wound to left leg.  Objective:      Physical Exam   Constitutional: He is oriented to person, place, and time. He appears well-developed and well-nourished. No distress.   HENT:   Head: Normocephalic and atraumatic.   Cardiovascular: Intact distal pulses.    Musculoskeletal: Normal range of motion. He exhibits edema (4+ lower leg). He exhibits no tenderness.        Legs:  Neurological: He is alert and oriented to person, place, and time.   Skin: Skin is warm and dry. No rash noted. He is not diaphoretic. No erythema.   Psychiatric: He has a normal mood and affect. His behavior is normal. Judgment and thought content normal.   Nursing note and vitals reviewed.      ..  Hemoglobin A1C   Date Value Ref Range Status   01/02/2018 7.7 (H) 4.0 - 5.6 % Final     Comment:     According to ADA guidelines, hemoglobin A1c <7.0% represents  optimal control in non-pregnant diabetic patients. Different  metrics may apply to specific patient populations.   Standards of Medical Care in Diabetes-2016.  For the purpose of screening for the presence of diabetes:  <5.7%     Consistent with the absence of diabetes  5.7-6.4%  Consistent with increasing risk for diabetes   (prediabetes)  >or=6.5%  Consistent with diabetes  Currently, no consensus exists for use of hemoglobin A1c  for diagnosis of  diabetes for children.  This Hemoglobin A1c assay has significant interference with fetal   hemoglobin   (HbF). The results are invalid for patients with abnormal amounts of   HbF,   including those with known Hereditary Persistence   of Fetal Hemoglobin. Heterozygous hemoglobin variants (HbAS, HbAC,   HbAD, HbAE, HbA2) do not significantly interfere with this assay;   however, presence of multiple variants in a sample may impact the %   interference.     11/15/2017 7.1 (H) 4.0 - 5.6 % Final     Comment:     According to ADA guidelines, hemoglobin A1c <7.0% represents  optimal control in non-pregnant diabetic patients. Different  metrics may apply to specific patient populations.   Standards of Medical Care in Diabetes-2016.  For the purpose of screening for the presence of diabetes:  <5.7%     Consistent with the absence of diabetes  5.7-6.4%  Consistent with increasing risk for diabetes   (prediabetes)  >or=6.5%  Consistent with diabetes  Currently, no consensus exists for use of hemoglobin A1c  for diagnosis of diabetes for children.  This Hemoglobin A1c assay has significant interference with fetal   hemoglobin   (HbF). The results are invalid for patients with abnormal amounts of   HbF,   including those with known Hereditary Persistence   of Fetal Hemoglobin. Heterozygous hemoglobin variants (HbAS, HbAC,   HbAD, HbAE, HbA2) do not significantly interfere with this assay;   however, presence of multiple variants in a sample may impact the %   interference.     09/22/2017 7.1 (H) 4.0 - 5.6 % Final     Comment:     According to ADA guidelines, hemoglobin A1c <7.0% represents  optimal control in non-pregnant diabetic patients. Different  metrics may apply to specific patient populations.   Standards of Medical Care in Diabetes-2016.  For the purpose of screening for the presence of diabetes:  <5.7%     Consistent with the absence of diabetes  5.7-6.4%  Consistent with increasing risk for diabetes    (prediabetes)  >or=6.5%  Consistent with diabetes  Currently, no consensus exists for use of hemoglobin A1c  for diagnosis of diabetes for children.  This Hemoglobin A1c assay has significant interference with fetal   hemoglobin   (HbF). The results are invalid for patients with abnormal amounts of   HbF,   including those with known Hereditary Persistence   of Fetal Hemoglobin. Heterozygous hemoglobin variants (HbAS, HbAC,   HbAD, HbAE, HbA2) do not significantly interfere with this assay;   however, presence of multiple variants in a sample may impact the %   interference.         Assessment:       1. Venous insufficiency of both lower extremities    2. Tinea pedis of both feet    3. Venous stasis dermatitis of right lower extremity    4. Bilateral leg edema        Plan:           Compression hose bilateral lower legs.  Return to this clinic as needed.

## 2018-01-12 ENCOUNTER — OFFICE VISIT (OUTPATIENT)
Dept: ORTHOPEDICS | Facility: CLINIC | Age: 73
End: 2018-01-12
Payer: MEDICARE

## 2018-01-12 ENCOUNTER — HOSPITAL ENCOUNTER (OUTPATIENT)
Dept: RADIOLOGY | Facility: HOSPITAL | Age: 73
Discharge: HOME | End: 2018-01-12
Attending: NURSE PRACTITIONER
Payer: MEDICARE

## 2018-01-12 VITALS — WEIGHT: 315 LBS | BODY MASS INDEX: 45.1 KG/M2 | HEIGHT: 70 IN

## 2018-01-12 DIAGNOSIS — M25.561 RIGHT KNEE PAIN, UNSPECIFIED CHRONICITY: ICD-10-CM

## 2018-01-12 DIAGNOSIS — M25.561 RIGHT KNEE PAIN, UNSPECIFIED CHRONICITY: Primary | ICD-10-CM

## 2018-01-12 PROCEDURE — 73564 X-RAY EXAM KNEE 4 OR MORE: CPT | Mod: 26,RT,, | Performed by: RADIOLOGY

## 2018-01-12 PROCEDURE — 99213 OFFICE O/P EST LOW 20 MIN: CPT | Mod: S$GLB,,, | Performed by: NURSE PRACTITIONER

## 2018-01-12 PROCEDURE — 99999 PR PBB SHADOW E&M-EST. PATIENT-LVL IV: CPT | Mod: PBBFAC,,, | Performed by: NURSE PRACTITIONER

## 2018-01-12 PROCEDURE — 73562 X-RAY EXAM OF KNEE 3: CPT | Mod: 26,59,LT, | Performed by: RADIOLOGY

## 2018-01-12 PROCEDURE — 73562 X-RAY EXAM OF KNEE 3: CPT | Mod: TC,LT,59

## 2018-01-12 NOTE — PROGRESS NOTES
CC: Pain and Post-op Evaluation of the Right Knee and Pain of the Right Hip      HPI: Pt with right hip pain for the past year. He reports that he had an injection in the bursa by Dr. Knight, but it did not relieve his pain. The pain is lateral and radiates down his leg. He describes it as a burning pain. He has known back problems, but states that he hasn't been seen by anyone in a long time. He had injections, but they did not help him. He had a right knee replacement last year and c/o stiffness and aching pain. He admits that he has not kept up with exercising his right knee. He is unable to take nsaids due to blood thinner use. He has taken tylenol without relief. He is ambulating with a cane which he reports he is using for balance. There is a limp..    ROS  General: denies fever and chills  Resp: no c/o sob  CVS: no c/o cp  MSK: c/o right knee stiffness and pain and right hip pain which is lateral and burning and radiates down the lateral leg    PE  General: AAOx3, pleasant and cooperative  Resp: respirations even and unlabored  MSK: right knee exam  0 degrees extension  100 degrees flexion  No warmth or erythema   - effusion    [facility]  - pain with internal and external rotation  + pain with straight leg raise  - pain over the greater trochanter    Xray:  Reviewed by me: Minimal lucency about the tibial stem, measuring less than 3 mm, but not seen previously.  Small joint effusion    Assessment:  Right sciatic nerve pain  Right knee stiffness due to lack of exercise    Plan:  Hip pain is most likely related to back issues. Appt made with Dr. De Souza as requested  Exercises for leg strengthening of both legs. Will discuss xray findings with Dr. Stuart to see if further evaluation of the prosthesis is needed.

## 2018-01-16 ENCOUNTER — OFFICE VISIT (OUTPATIENT)
Dept: INFECTIOUS DISEASES | Facility: CLINIC | Age: 73
End: 2018-01-16
Payer: MEDICARE

## 2018-01-16 ENCOUNTER — RESEARCH ENCOUNTER (OUTPATIENT)
Dept: RESEARCH | Facility: HOSPITAL | Age: 73
End: 2018-01-16

## 2018-01-16 ENCOUNTER — LAB VISIT (OUTPATIENT)
Dept: LAB | Facility: HOSPITAL | Age: 73
End: 2018-01-16
Attending: INTERNAL MEDICINE
Payer: MEDICARE

## 2018-01-16 DIAGNOSIS — N18.30 CHRONIC KIDNEY DISEASE, STAGE III (MODERATE): ICD-10-CM

## 2018-01-16 DIAGNOSIS — Z00.6 RESEARCH SUBJECT: Primary | ICD-10-CM

## 2018-01-16 LAB
25(OH)D3+25(OH)D2 SERPL-MCNC: 17 NG/ML
ALBUMIN SERPL BCP-MCNC: 3.4 G/DL
ANION GAP SERPL CALC-SCNC: 10 MMOL/L
BUN SERPL-MCNC: 30 MG/DL
CALCIUM SERPL-MCNC: 10 MG/DL
CHLORIDE SERPL-SCNC: 104 MMOL/L
CO2 SERPL-SCNC: 27 MMOL/L
CREAT SERPL-MCNC: 1.7 MG/DL
EST. GFR  (AFRICAN AMERICAN): 44.9 ML/MIN/1.73 M^2
EST. GFR  (NON AFRICAN AMERICAN): 38.9 ML/MIN/1.73 M^2
GLUCOSE SERPL-MCNC: 180 MG/DL
PHOSPHATE SERPL-MCNC: 3.5 MG/DL
POTASSIUM SERPL-SCNC: 4.5 MMOL/L
PTH-INTACT SERPL-MCNC: 48 PG/ML
SODIUM SERPL-SCNC: 141 MMOL/L

## 2018-01-16 PROCEDURE — 83970 ASSAY OF PARATHORMONE: CPT

## 2018-01-16 PROCEDURE — 80069 RENAL FUNCTION PANEL: CPT

## 2018-01-16 PROCEDURE — 36415 COLL VENOUS BLD VENIPUNCTURE: CPT

## 2018-01-16 PROCEDURE — 99499 UNLISTED E&M SERVICE: CPT | Mod: S$PBB,,, | Performed by: INTERNAL MEDICINE

## 2018-01-16 PROCEDURE — 99999 PR PBB SHADOW E&M-EST. PATIENT-LVL III: CPT | Mod: PBBFAC,,,

## 2018-01-16 PROCEDURE — 82306 VITAMIN D 25 HYDROXY: CPT

## 2018-01-16 NOTE — PROGRESS NOTES
Sponsor: [employer]     Study Title/IRB Number: A Phase 3, Placebo-Controlled, Randomized, Observer-Blinded Study To Evaluate The Efficacy, Safety, And Tolerability Of A Clostridium Difficile Vaccine In Adults 50 Years Of Age And Older ([id number]) / 2017.094.B    Visit 3 Date:1/16/2018    Patient continued eligibility confirmed:Yes    Patient willing to continue to participate in [employer] C-Diff study and comply with all study requirements: Yes    Blood sample collected for immunogenicity assessment: Yes    All study related observations complete:Yes    [employer] payment request submitted: Yes    Subject completed all visit 3 required observations per protocol, clinicard payment requested for clincard with token ID # [id number]

## 2018-01-16 NOTE — PROGRESS NOTES
Mr. Cantor  is a 72 y.o. male who presented to clinic for visit three for Study Title/IRB Number: A Phase 3, Placebo-Controlled, Randomized, Observer-Blinded Study To Evaluate The Efficacy, Safety, And Tolerability Of A Clostridium Difficile Vaccine In Adults 50 Years Of Age And Older ([id number]) / 2017.094.B (PI: Lulú).       The subject's inclusion/exclusion criteria was reviewed. No change in his eligibility.     Subject verbalized desire to continue participation in study, understanding that he can withdraw consent at any time.

## 2018-01-18 ENCOUNTER — TELEPHONE (OUTPATIENT)
Dept: NEPHROLOGY | Facility: CLINIC | Age: 73
End: 2018-01-18

## 2018-01-30 ENCOUNTER — OFFICE VISIT (OUTPATIENT)
Dept: UROLOGY | Facility: CLINIC | Age: 73
End: 2018-01-30
Payer: MEDICARE

## 2018-01-30 VITALS
HEART RATE: 82 BPM | DIASTOLIC BLOOD PRESSURE: 63 MMHG | WEIGHT: 315 LBS | SYSTOLIC BLOOD PRESSURE: 107 MMHG | BODY MASS INDEX: 45.1 KG/M2 | HEIGHT: 70 IN

## 2018-01-30 DIAGNOSIS — E66.01 MORBID OBESITY WITH BMI OF 40.0-44.9, ADULT: ICD-10-CM

## 2018-01-30 DIAGNOSIS — I10 ESSENTIAL HYPERTENSION: ICD-10-CM

## 2018-01-30 DIAGNOSIS — R31.29 MICROHEMATURIA: ICD-10-CM

## 2018-01-30 DIAGNOSIS — N18.30 CHRONIC KIDNEY DISEASE, STAGE III (MODERATE): ICD-10-CM

## 2018-01-30 DIAGNOSIS — Z79.01 LONG TERM CURRENT USE OF ANTICOAGULANT THERAPY: ICD-10-CM

## 2018-01-30 DIAGNOSIS — E66.01 SEVERE OBESITY (BMI 35.0-35.9 WITH COMORBIDITY): ICD-10-CM

## 2018-01-30 DIAGNOSIS — N18.30 TYPE 2 DIABETES MELLITUS WITH STAGE 3 CHRONIC KIDNEY DISEASE, WITH LONG-TERM CURRENT USE OF INSULIN: ICD-10-CM

## 2018-01-30 DIAGNOSIS — E11.22 TYPE 2 DIABETES MELLITUS WITH STAGE 3 CHRONIC KIDNEY DISEASE, WITH LONG-TERM CURRENT USE OF INSULIN: ICD-10-CM

## 2018-01-30 DIAGNOSIS — R35.0 URINARY FREQUENCY: ICD-10-CM

## 2018-01-30 DIAGNOSIS — N20.0 KIDNEY STONES: Primary | ICD-10-CM

## 2018-01-30 DIAGNOSIS — N40.0 ENLARGED PROSTATE: ICD-10-CM

## 2018-01-30 DIAGNOSIS — I51.89 DIASTOLIC DYSFUNCTION: ICD-10-CM

## 2018-01-30 DIAGNOSIS — Z79.4 TYPE 2 DIABETES MELLITUS WITH STAGE 3 CHRONIC KIDNEY DISEASE, WITH LONG-TERM CURRENT USE OF INSULIN: ICD-10-CM

## 2018-01-30 LAB
BACTERIA #/AREA URNS AUTO: ABNORMAL /HPF
BILIRUB SERPL-MCNC: NORMAL MG/DL
BILIRUB UR QL STRIP: NEGATIVE
BLOOD URINE, POC: 250
CLARITY UR REFRACT.AUTO: CLEAR
COLOR UR AUTO: YELLOW
COLOR, POC UA: NORMAL
GLUCOSE UR QL STRIP: NEGATIVE
GLUCOSE UR QL STRIP: NORMAL
HGB UR QL STRIP: ABNORMAL
KETONES UR QL STRIP: NEGATIVE
KETONES UR QL STRIP: NORMAL
LEUKOCYTE ESTERASE UR QL STRIP: ABNORMAL
LEUKOCYTE ESTERASE URINE, POC: NORMAL
MICROSCOPIC COMMENT: ABNORMAL
NITRITE UR QL STRIP: NEGATIVE
NITRITE, POC UA: NORMAL
PH UR STRIP: 7 [PH] (ref 5–8)
PH, POC UA: 5
PROT UR QL STRIP: NEGATIVE
PROTEIN, POC: NORMAL
RBC #/AREA URNS AUTO: 39 /HPF (ref 0–4)
SP GR UR STRIP: 1.01 (ref 1–1.03)
SPECIFIC GRAVITY, POC UA: 1.01
SQUAMOUS #/AREA URNS AUTO: 0 /HPF
URN SPEC COLLECT METH UR: ABNORMAL
UROBILINOGEN UR STRIP-ACNC: NEGATIVE EU/DL
UROBILINOGEN, POC UA: NORMAL
WBC #/AREA URNS AUTO: 4 /HPF (ref 0–5)

## 2018-01-30 PROCEDURE — 81001 URINALYSIS AUTO W/SCOPE: CPT

## 2018-01-30 PROCEDURE — 81001 URINALYSIS AUTO W/SCOPE: CPT | Mod: S$GLB,,, | Performed by: UROLOGY

## 2018-01-30 PROCEDURE — 99999 PR PBB SHADOW E&M-EST. PATIENT-LVL III: CPT | Mod: PBBFAC,,, | Performed by: UROLOGY

## 2018-01-30 PROCEDURE — 1126F AMNT PAIN NOTED NONE PRSNT: CPT | Mod: S$GLB,,, | Performed by: UROLOGY

## 2018-01-30 PROCEDURE — 3008F BODY MASS INDEX DOCD: CPT | Mod: S$GLB,,, | Performed by: UROLOGY

## 2018-01-30 PROCEDURE — 99214 OFFICE O/P EST MOD 30 MIN: CPT | Mod: 25,S$GLB,, | Performed by: UROLOGY

## 2018-01-30 PROCEDURE — 1159F MED LIST DOCD IN RCRD: CPT | Mod: S$GLB,,, | Performed by: UROLOGY

## 2018-01-30 RX ORDER — LOSARTAN POTASSIUM 50 MG/1
TABLET ORAL
COMMUNITY
Start: 2018-01-29 | End: 2018-04-12

## 2018-01-30 RX ORDER — CLOTRIMAZOLE AND BETAMETHASONE DIPROPIONATE 10; .64 MG/G; MG/G
CREAM TOPICAL 2 TIMES DAILY
Qty: 45 G | Refills: 3 | Status: SHIPPED | OUTPATIENT
Start: 2018-01-30 | End: 2019-08-13

## 2018-01-30 NOTE — PROGRESS NOTES
Subjective:       Patient ID: [facility] [place] PAT Cantor Jr. is a 72 y.o. male.    Chief Complaint: Urinary Tract Infection    [facility] [place] PAT Cantor Jr. is a 72 y.o. Male here for follow up.    No urinary frequency when he doesn't have lasix.   Has urinary frequency every 30 minutes when he takes lasix.   No nocturia.   Occasionally takes a sleep aid that contains benadryl.    Had wbc and rbc in urine in 2017, culture negative. Given cipro for 1 month.     Has CKD, stable. GFR in 30s.    Has long history of MH, negative workup 2010 and 3/2017, except for BPH.   CT 2010 showed a left renal stone.     Ultrasound 2017 and 2015 showed this cyst. Cyst is 1cm in size.     Has sleep apnea. Has been wearing cpap in last month, but not last 3-4 days. Doesn't wear it when he has a cold.   No intermittency. No hesitancy. Has some urgency. Rare urge incontinence.   No gross hematuria.   History of tobacco 30 years ago. Smoked for 30 years. 1.5ppd.   Cysto 2010 with Dr. Overton - had BPH and obstruction noted.  CT 2010 showed left renal stone.    He is on oxybutnin XR 5mg. He is not sure why.    Bilateral groin itching and bleeding b/c he is scratching. Uses yeast powder. Wants the cream.         Past Surgical History:   Procedure Laterality Date    APPENDECTOMY      CARDIOVERSION      COLONOSCOPY N/A 3/3/2016    Procedure: COLONOSCOPY;  Surgeon: Bob Poe MD;  Location: [address]);  Service: Endoscopy;  Laterality: N/A;  Holding Eliquis for 3 days prior to colonoscopy. EC    JOINT REPLACEMENT      Knee    Meniere's disease surgery      TONSILLECTOMY      TOTAL KNEE ARTHROPLASTY Right 01/25/2017    TKR       Past Medical History:   Diagnosis Date    *Atrial fibrillation     On Coumadin    Anticoagulant long-term use     Basal cell carcinoma 12/2015    left eye brow    BCC (basal cell carcinoma) 12/2015    left shoulder    Cataract     Chronic kidney disease     Diabetes mellitus with renal manifestations, uncontrolled      Dyslipidemia associated with type 2 diabetes mellitus     Fever blister     HTN (hypertension)     Keloid cicatrix     Melanoma 12/07/2015    right cheek-in situ    Obesity     PN (peripheral neuropathy)     Primary osteoarthritis of right knee 1/25/2017    Screening for colon cancer 3/3/2016    Sleep apnea     wears CPAP at night    Type II or unspecified type diabetes mellitus with other specified manifestations, uncontrolled        Social History     Social History    Marital status:      Spouse name: N/A    Number of children: 3    Years of education: N/A     Occupational History    retired Retired     Social History Main Topics    Smoking status: Former Smoker     Quit date: 7/10/1985    Smokeless tobacco: Never Used    Alcohol use No      Comment: quit 2007- had excess in the past    Drug use: No    Sexual activity: Not on file     Other Topics Concern    Not on file     Social History Narrative    No narrative on file       Family History   Problem Relation Age of Onset    Diabetes Mother     Stroke Mother     Diabetes Father     Heart disease Father     Hypertension Father     Cancer Sister      brain    Eczema Sister     Cancer Brother      brain    Cancer Sister      leukemia, stomach cancer    No Known Problems Sister     ALS Brother      stomach cancer    No Known Problems Brother     No Known Problems Maternal Aunt     No Known Problems Maternal Uncle     No Known Problems Paternal Aunt     No Known Problems Paternal Uncle     No Known Problems Maternal Grandmother     No Known Problems Maternal Grandfather     No Known Problems Paternal Grandmother     No Known Problems Paternal Grandfather     Amblyopia Neg Hx     Blindness Neg Hx     Cataracts Neg Hx     Glaucoma Neg Hx     Macular degeneration Neg Hx     Retinal detachment Neg Hx     Strabismus Neg Hx     Thyroid disease Neg Hx     Melanoma Neg Hx     Psoriasis Neg Hx     Lupus Neg Hx      Acne Neg Hx        Current Outpatient Prescriptions   Medication Sig Dispense Refill    apixaban 5 mg Tab Take 1 tablet (5 mg total) by mouth 2 (two) times daily. 180 tablet 3    ascorbic acid (VITAMIN C) 100 MG tablet Take 1 tablet by mouth every morning.       aspirin 325 MG tablet Take 1 tablet by mouth every morning.       CINNAMON BARK (CINNAMON ORAL) Take by mouth once daily.      desonide (DESOWEN) 0.05 % lotion AAA bid prn redness, scaling, or itching 1 Bottle 3    fenofibrate (TRICOR) 145 MG tablet TAKE ONE TABLET BY MOUTH EVERY DAY 90 tablet 2    fluticasone (FLONASE) 50 mcg/actuation nasal spray 1 spray by Each Nare route nightly as needed. 16 g 5    furosemide (LASIX) 40 MG tablet Take 1 tablet (40 mg total) by mouth daily as needed. 90 tablet 3    gabapentin (NEURONTIN) 300 MG capsule TAKE ONE CAPSULE BY MOUTH THREE TIMES DAILY 90 capsule 2    insulin aspart (NOVOLOG FLEXPEN) 100 unit/mL InPn pen Novolog 20 units breakfast, 22 units lunch and dinner plus correction scale. Max daily dose=100 units 2 Box 11    insulin glargine (LANTUS SOLOSTAR) 100 unit/mL (3 mL) InPn pen Inject 55 Units into the skin every evening. 2 Box 11    levothyroxine (SYNTHROID) 25 MCG tablet Take 1 tablet (25 mcg total) by mouth once daily. 90 tablet 2    losartan (COZAAR) 25 MG tablet Take 1 tablet (25 mg total) by mouth once daily. 90 tablet 3    losartan (COZAAR) 50 MG tablet       metoprolol succinate (TOPROL-XL) 100 MG 24 hr tablet TAKE ONE TABLET BY MOUTH EVERY DAY 90 tablet 3    multivitamin capsule Take 1 capsule by mouth every morning.       nystatin-triamcinolone (MYCOLOG II) cream apply TWICE DAILY (Patient taking differently: apply TWICE DAILY-using as needed for rash) 60 g 1    omega-3 acid ethyl esters (LOVAZA) 1 gram capsule TAKE THREE CAPSULE BY MOUTH TWICE DAILY 540 capsule 3    oxybutynin (DITROPAN-XL) 5 MG TR24 TAKE ONE TABLET BY MOUTH EVERY DAY 30 tablet 3    pravastatin (PRAVACHOL) 10  "MG tablet Take 1 tablet (10 mg total) by mouth once daily. 90 tablet 3    tamsulosin (FLOMAX) 0.4 mg Cp24 TAKE ONE CAPSULE BY MOUTH EVERY DAY 30 capsule 1    triamcinolone acetonide 0.1% (KENALOG) 0.1 % cream Apply topically 2 (two) times daily. Apply to affected area twice daily as directed. 454 g 0    vitamin E 1000 UNIT capsule Take 1,000 Units by mouth every morning. 1 Capsule Oral Every day      azelastine (ASTELIN) 137 mcg (0.1 %) nasal spray 1 spray (137 mcg total) by Nasal route 2 (two) times daily. 30 mL 3    BD INSULIN PEN NEEDLE UF ORIG 29 x 1/2 " Ndle   12    BD ULTRA-FINE BASIL PEN NEEDLES 32 gauge x 5/32" Ndle CHECK BLOOD SUGAR FOUR TIMES DAILY 150 each 11    clobetasol (TEMOVATE) 0.05 % cream AAA finger bid 60 g 3    clotrimazole-betamethasone 1-0.05% (LOTRISONE) cream Apply topically 2 (two) times daily. Apply twice a day to affected area for 1 week, wait a week, then repeat x 1 week 45 g 1    desoximetasone (TOPICORT) 0.25 % cream 0.25 %.  Cream Topical .  AAA bid back      INV C. DIFFICILE VACCINE/PLACEBO Administer as directed. FOR INVESTIGATIONAL USE ONLY. 1 Syringe 0    INV C. DIFFICILE VACCINE/PLACEBO Administer as directed. FOR INVESTIGATIONAL USE ONLY. 1 Syringe 0    ketoconazole (NIZORAL) 2 % cream Apply topically once daily. 30 g 1    lancets 33 gauge Misc 1 lancet by Misc.(Non-Drug; Combo Route) route 4 (four) times daily. Pt uses True Result Meter, bg monitoring 4 times a day. 450 each 4    metOLazone (ZAROXOLYN) 5 MG tablet Take 1 tablet (5 mg total) by mouth once daily. 7 tablet 0    metronidazole (METROGEL) 0.75 % gel Apply topically 2 (two) times daily. 1 Tube 6    mupirocin (BACTROBAN) 2 % ointment Apply topically 2 (two) times daily. Apply to wound twice daily as directed. 30 g 0     No current facility-administered medications for this visit.        Review of patient's allergies indicates:   Allergen Reactions    Niacin Itching and Other (See Comments)     Other " reaction(s): facial flushing and causes hepatitis     Terbinafine Other (See Comments)     Other reaction(s): Hepatitis        BMP  Lab Results   Component Value Date     01/16/2018    K 4.5 01/16/2018     01/16/2018    CO2 27 01/16/2018    BUN 30 (H) 01/16/2018    CREATININE 1.7 (H) 01/16/2018    CALCIUM 10.0 01/16/2018    ANIONGAP 10 01/16/2018    ESTGFRAFRICA 44.9 (A) 01/16/2018    EGFRNONAA 38.9 (A) 01/16/2018       Review of Systems   Constitutional: Negative for chills, fever and unexpected weight change.   HENT: Negative for hearing loss and nosebleeds.    Eyes: Negative for visual disturbance.   Respiratory: Negative for chest tightness.    Cardiovascular: Negative for chest pain.   Gastrointestinal: Negative for diarrhea.   Genitourinary: Negative for dysuria, frequency, hematuria, nocturia and urgency.   Musculoskeletal: Negative for joint swelling.   Skin: Negative for rash.   Neurological: Negative for seizures.   Hematological: Does not bruise/bleed easily.   Psychiatric/Behavioral: Negative for behavioral problems.     Objective:      Physical Exam   Constitutional: He is oriented to person, place, and time. He appears well-developed and well-nourished.   HENT:   Head: Normocephalic and atraumatic.   Eyes: No scleral icterus.   Neck: Neck supple.   Cardiovascular: Normal rate and regular rhythm.    Pulmonary/Chest: Effort normal. No respiratory distress.   Abdominal: He exhibits no mass.   obese   Genitourinary:   Genitourinary Comments: Buried penis. circ phallus. No phimosis. Some erythema on glans and foreskin.   Bilateral hydroceles. Stable per patient.   Bilateral groin candidiasis       Musculoskeletal: He exhibits no tenderness.   Lymphadenopathy:     He has no cervical adenopathy.   Neurological: He is alert and oriented to person, place, and time.   Skin: Skin is warm. No rash noted.     Psychiatric: He has a normal mood and affect.     1+ leuk, 250 blood  Assessment:       1.  Kidney stones        Plan:   [facility] [place] tract infection.    Diagnoses and all orders for this visit:    Kidney stones  -     CT Renal Stone Study ABD Pelvis WO; Future  -     POCT urinalysis, dipstick or tablet reag  -     Urinalysis    Type 2 diabetes mellitus with stage 3 chronic kidney disease, with long-term current use of insulin    Severe obesity (BMI 35.0-35.9 with comorbidity)    Enlarged prostate    Diastolic dysfunction    Microhematuria  -     POCT urinalysis, dipstick or tablet reag  -     Urinalysis    Morbid obesity with BMI of 40.0-44.9, adult    Essential hypertension    Long term current use of anticoagulant therapy    Chronic kidney disease, stage III (moderate)    Other orders  -     clotrimazole-betamethasone 1-0.05% (LOTRISONE) cream; Apply topically 2 (two) times daily.        Continue flomax.   Stop oxybutnin.   CT RSS to r/o stone.   lotrisone for candidiasis.   Recommend no further antibiotics for pyuria and microhematuria.   Home collect urine culture for future UTI symptoms.   Avoid benadryl.

## 2018-01-31 ENCOUNTER — PATIENT MESSAGE (OUTPATIENT)
Dept: UROLOGY | Facility: CLINIC | Age: 73
End: 2018-01-31

## 2018-01-31 DIAGNOSIS — R31.29 MICROHEMATURIA: Primary | ICD-10-CM

## 2018-02-02 ENCOUNTER — OFFICE VISIT (OUTPATIENT)
Dept: PODIATRY | Facility: CLINIC | Age: 73
End: 2018-02-02
Payer: MEDICARE

## 2018-02-02 ENCOUNTER — HOSPITAL ENCOUNTER (OUTPATIENT)
Dept: RADIOLOGY | Facility: HOSPITAL | Age: 73
Discharge: HOME | End: 2018-02-02
Attending: UROLOGY
Payer: MEDICARE

## 2018-02-02 VITALS — BODY MASS INDEX: 45.1 KG/M2 | HEIGHT: 70 IN | WEIGHT: 315 LBS

## 2018-02-02 DIAGNOSIS — E11.42 DIABETIC POLYNEUROPATHY ASSOCIATED WITH TYPE 2 DIABETES MELLITUS: Primary | ICD-10-CM

## 2018-02-02 DIAGNOSIS — B35.1 ONYCHOMYCOSIS OF MULTIPLE TOENAILS WITH TYPE 2 DIABETES MELLITUS AND PERIPHERAL ANGIOPATHY: ICD-10-CM

## 2018-02-02 DIAGNOSIS — N20.0 KIDNEY STONES: ICD-10-CM

## 2018-02-02 DIAGNOSIS — I89.0 LYMPHEDEMA OF BOTH LOWER EXTREMITIES: ICD-10-CM

## 2018-02-02 DIAGNOSIS — E11.69 ONYCHOMYCOSIS OF MULTIPLE TOENAILS WITH TYPE 2 DIABETES MELLITUS AND PERIPHERAL ANGIOPATHY: ICD-10-CM

## 2018-02-02 DIAGNOSIS — E11.51 ONYCHOMYCOSIS OF MULTIPLE TOENAILS WITH TYPE 2 DIABETES MELLITUS AND PERIPHERAL ANGIOPATHY: ICD-10-CM

## 2018-02-02 PROCEDURE — 1126F AMNT PAIN NOTED NONE PRSNT: CPT | Mod: S$GLB,,, | Performed by: PODIATRIST

## 2018-02-02 PROCEDURE — 1159F MED LIST DOCD IN RCRD: CPT | Mod: S$GLB,,, | Performed by: PODIATRIST

## 2018-02-02 PROCEDURE — 3008F BODY MASS INDEX DOCD: CPT | Mod: S$GLB,,, | Performed by: PODIATRIST

## 2018-02-02 PROCEDURE — 99999 PR PBB SHADOW E&M-EST. PATIENT-LVL III: CPT | Mod: PBBFAC,,, | Performed by: PODIATRIST

## 2018-02-02 PROCEDURE — 74176 CT ABD & PELVIS W/O CONTRAST: CPT | Mod: TC

## 2018-02-02 PROCEDURE — 99213 OFFICE O/P EST LOW 20 MIN: CPT | Mod: 25,S$GLB,, | Performed by: PODIATRIST

## 2018-02-02 PROCEDURE — 11721 DEBRIDE NAIL 6 OR MORE: CPT | Mod: Q9,S$GLB,, | Performed by: PODIATRIST

## 2018-02-02 PROCEDURE — 99499 UNLISTED E&M SERVICE: CPT | Mod: S$GLB,,, | Performed by: PODIATRIST

## 2018-02-02 PROCEDURE — 74176 CT ABD & PELVIS W/O CONTRAST: CPT | Mod: 26,,, | Performed by: RADIOLOGY

## 2018-02-02 RX ORDER — CICLOPIROX 80 MG/ML
SOLUTION TOPICAL NIGHTLY
Qty: 6.6 ML | Refills: 11 | Status: SHIPPED | OUTPATIENT
Start: 2018-02-02 | End: 2019-08-07

## 2018-02-02 NOTE — PROGRESS NOTES
Subjective:      Patient ID: Stephen Cantor Jr. is a 72 y.o. male.    Chief Complaint: Diabetic Foot Exam (Minerva Gay NP/Endo 01/09/2018)    Stephen is a 72 y.o. male who presents to the clinic for evaluation and treatment of high risk feet. Stephen has a past medical history of *Atrial fibrillation; Anticoagulant long-term use; Basal cell carcinoma (12/2015); BCC (basal cell carcinoma) (12/2015); Cataract; Chronic kidney disease; Diabetes mellitus with renal manifestations, uncontrolled; Dyslipidemia associated with type 2 diabetes mellitus; Fever blister; HTN (hypertension); Keloid cicatrix; Melanoma (12/07/2015); Obesity; PN (peripheral neuropathy); Primary osteoarthritis of right knee (1/25/2017); Screening for colon cancer (3/3/2016); Sleep apnea; and Type II or unspecified type diabetes mellitus with other specified manifestations, uncontrolled. The patient's chief complaint is long, thick toenails.  Gradual onset, worsening over past several weeks, aggravated by increased weight bearing, shoe gear, pressure.  Periodic debridement helps symptoms. This patient has documented high risk feet requiring routine maintenance secondary to diabetes mellitis and those secondary complications of diabetes, as mentioned..    PCP: Desmond Barlow MD    Date Last Seen by PCP:   Chief Complaint   Patient presents with    Diabetic Foot Exam     Minerva Gay NP/Endo 01/09/2018         Current shoe gear:  Affected Foot: Rx diabetic extra depth shoes and custom accommodative insoles     Unaffected Foot: Rx diabetic extra depth shoes and custom accommodative insoles    Hemoglobin A1C   Date Value Ref Range Status   01/02/2018 7.7 (H) 4.0 - 5.6 % Final     Comment:     According to ADA guidelines, hemoglobin A1c <7.0% represents  optimal control in non-pregnant diabetic patients. Different  metrics may apply to specific patient populations.   Standards of Medical Care in Diabetes-2016.  For the purpose of screening for the presence of  diabetes:  <5.7%     Consistent with the absence of diabetes  5.7-6.4%  Consistent with increasing risk for diabetes   (prediabetes)  >or=6.5%  Consistent with diabetes  Currently, no consensus exists for use of hemoglobin A1c  for diagnosis of diabetes for children.  This Hemoglobin A1c assay has significant interference with fetal   hemoglobin   (HbF). The results are invalid for patients with abnormal amounts of   HbF,   including those with known Hereditary Persistence   of Fetal Hemoglobin. Heterozygous hemoglobin variants (HbAS, HbAC,   HbAD, HbAE, HbA2) do not significantly interfere with this assay;   however, presence of multiple variants in a sample may impact the %   interference.     11/15/2017 7.1 (H) 4.0 - 5.6 % Final     Comment:     According to ADA guidelines, hemoglobin A1c <7.0% represents  optimal control in non-pregnant diabetic patients. Different  metrics may apply to specific patient populations.   Standards of Medical Care in Diabetes-2016.  For the purpose of screening for the presence of diabetes:  <5.7%     Consistent with the absence of diabetes  5.7-6.4%  Consistent with increasing risk for diabetes   (prediabetes)  >or=6.5%  Consistent with diabetes  Currently, no consensus exists for use of hemoglobin A1c  for diagnosis of diabetes for children.  This Hemoglobin A1c assay has significant interference with fetal   hemoglobin   (HbF). The results are invalid for patients with abnormal amounts of   HbF,   including those with known Hereditary Persistence   of Fetal Hemoglobin. Heterozygous hemoglobin variants (HbAS, HbAC,   HbAD, HbAE, HbA2) do not significantly interfere with this assay;   however, presence of multiple variants in a sample may impact the %   interference.     09/22/2017 7.1 (H) 4.0 - 5.6 % Final     Comment:     According to ADA guidelines, hemoglobin A1c <7.0% represents  optimal control in non-pregnant diabetic patients. Different  metrics may apply to specific  patient populations.   Standards of Medical Care in Diabetes-2016.  For the purpose of screening for the presence of diabetes:  <5.7%     Consistent with the absence of diabetes  5.7-6.4%  Consistent with increasing risk for diabetes   (prediabetes)  >or=6.5%  Consistent with diabetes  Currently, no consensus exists for use of hemoglobin A1c  for diagnosis of diabetes for children.  This Hemoglobin A1c assay has significant interference with fetal   hemoglobin   (HbF). The results are invalid for patients with abnormal amounts of   HbF,   including those with known Hereditary Persistence   of Fetal Hemoglobin. Heterozygous hemoglobin variants (HbAS, HbAC,   HbAD, HbAE, HbA2) do not significantly interfere with this assay;   however, presence of multiple variants in a sample may impact the %   interference.         Review of Systems   Constitution: Negative for chills, fever, malaise/fatigue and night sweats.   Cardiovascular: Positive for leg swelling. Negative for claudication and cyanosis.   Skin: Positive for nail changes. Negative for color change, itching, poor wound healing, rash and suspicious lesions.   Musculoskeletal: Negative for falls, gout, joint pain and myalgias.   Neurological: Positive for focal weakness, numbness, paresthesias and sensory change.           Objective:      Physical Exam   Constitutional: He is oriented to person, place, and time. He appears well-developed and well-nourished. He is cooperative.   Oriented to time, place, and person.   Cardiovascular:   Pulses:       Dorsalis pedis pulses are 1+ on the right side, and 1+ on the left side.        Posterior tibial pulses are 1+ on the right side, and 1+ on the left side.   Capillary fill time 3-5 seconds.  All toes warm to touch.      2-3 + pitting lower extremity edema bilateral.    Negative elevational pallor and dependent rubor bilateral.     Musculoskeletal:   Normal angle, base, station of gait. Decreased stride length, early heel  off, moderately propulsive toe off bilateral.    All ten toes without clubbing, cyanosis, or signs of ischemia.      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    No pain to palpation bilateral lower extremities.      Range of motion, stability, muscle strength, and muscle tone are age and health appropriate normal bilateral feet and legs.       Lymphadenopathy:   Negative lymphadenopathy bilateral popliteal fossa and tarsal tunnel.  Negative lymphangitic streaking bilateral foot/ankle bilateral.     Neurological: He is alert and oriented to person, place, and time. He has normal strength. He is not disoriented. He displays no atrophy and no tremor. A sensory deficit is present. He exhibits normal muscle tone.   Reflex Scores:       Patellar reflexes are 2+ on the right side and 2+ on the left side.       Achilles reflexes are 2+ on the right side and 2+ on the left side.  Decreased/absent vibratory sensation bilateral feet to 128Hz tuning fork.    Paresthesias, and burning bilateral feet with no clearly identified trigger or source.     Skin: Skin is warm, dry and intact. No abrasion, no bruising, no burn, no ecchymosis, no laceration, no lesion, no petechiae and no rash noted. He is not diaphoretic. No cyanosis or erythema. No pallor. Nails show no clubbing.   Skin thin, atrophic, with decreased density and distribution of pedal hair bilateral, but without hyperpigmentation,   ulcers, masses, nodules or cords palpated bilateral feet and legs.    Dependent rubor bilateral leg/ankle/foot consistent with stasis.    Toenails 1st, 2nd, 3rd, 4th, 5th  bilateral are hypertrophic thickened 2-3 mm, dystrophic, discolored tanish brown with tan, gray crumbly subungual debris.  Tender to distal nail plate pressure, without periungual skin abnormality of each.               Assessment:       Encounter Diagnoses   Name Primary?    Diabetic polyneuropathy associated with type 2 diabetes mellitus Yes    Lymphedema of both lower  extremities     Onychomycosis of multiple toenails with type 2 diabetes mellitus and peripheral angiopathy          Plan:       [facility] [place] was [facility] today for diabetic foot exam.    Diagnoses and all orders for this visit:    Diabetic polyneuropathy associated with type 2 diabetes mellitus    Lymphedema of both lower extremities    Onychomycosis of multiple toenails with type 2 diabetes mellitus and peripheral angiopathy    Other orders  -     ciclopirox (PENLAC) 8 % Soln; Apply topically nightly.      I counseled the patient on his conditions, their implications and medical management.        - Shoe inspection. Diabetic Foot Education. Patient reminded of the importance of good nutrition and blood sugar control to help prevent podiatric complications of diabetes. Patient instructed on proper foot hygeine. We discussed wearing proper shoe gear, daily foot inspections, never walking without protective shoe gear, never putting sharp instruments to feet, routine podiatric nail visits every 2-3 months.      - With patient's permission, nails were aggressively reduced and debrided x 10 to their soft tissue attachment mechanically and with electric , removing all offending nail and debris. Patient relates relief following the procedure. He will continue to monitor the areas daily, inspect his feet, wear protective shoe gear when ambulatory, moisturizer to maintain skin integrity and follow in this office in approximately 2-3 months, sooner p.r.n.      Discussed conservative treatment with shoes of adequate dimensions, material, and style to alleviate symptoms and delay or prevent surgical intervention.    Resume compression stockings and left afo he has at home.    Follow-up in about 3 months (around 5/2/2018).

## 2018-02-05 ENCOUNTER — PATIENT OUTREACH (OUTPATIENT)
Dept: DIABETES | Facility: CLINIC | Age: 73
End: 2018-02-05

## 2018-02-05 RX ORDER — INSULIN GLARGINE 100 [IU]/ML
INJECTION, SOLUTION SUBCUTANEOUS
Qty: 2 BOX | Refills: 11 | Status: SHIPPED | OUTPATIENT
Start: 2018-02-05 | End: 2018-04-17 | Stop reason: SDUPTHER

## 2018-02-05 NOTE — PROGRESS NOTES
Patient sent in BG logs for review - insulin changes were made at his last visit. Logs sent to ENDO for possible adjustment.

## 2018-02-06 ENCOUNTER — OFFICE VISIT (OUTPATIENT)
Dept: OPHTHALMOLOGY | Facility: CLINIC | Age: 73
End: 2018-02-06
Payer: MEDICARE

## 2018-02-06 ENCOUNTER — PATIENT OUTREACH (OUTPATIENT)
Dept: DIABETES | Facility: CLINIC | Age: 73
End: 2018-02-06

## 2018-02-06 DIAGNOSIS — H02.102 ECTROPION OF RIGHT LOWER EYELID, UNSPECIFIED ECTROPION TYPE: Primary | ICD-10-CM

## 2018-02-06 DIAGNOSIS — H25.813 COMBINED FORMS OF AGE-RELATED CATARACT OF BOTH EYES: ICD-10-CM

## 2018-02-06 DIAGNOSIS — L71.9 ROSACEA: ICD-10-CM

## 2018-02-06 DIAGNOSIS — H04.129 DRY EYE: ICD-10-CM

## 2018-02-06 PROCEDURE — 92285 EXTERNAL OCULAR PHOTOGRAPHY: CPT | Mod: S$GLB,,, | Performed by: OPHTHALMOLOGY

## 2018-02-06 PROCEDURE — 92012 INTRM OPH EXAM EST PATIENT: CPT | Mod: GC,S$GLB,, | Performed by: OPHTHALMOLOGY

## 2018-02-06 PROCEDURE — 99999 PR PBB SHADOW E&M-EST. PATIENT-LVL III: CPT | Mod: PBBFAC,,, | Performed by: OPHTHALMOLOGY

## 2018-02-06 NOTE — PROGRESS NOTES
HPI     Mr. Mitchell is here today for a follow-up appointment ectropion repair right   eye on 6/28/2017. He states he is doing well, he does complain of problems   with glare and FB sensation (sand) occasionally in the morning. He denies   any eye pain or irritation. He is using Systane or Refresh OU PRN     Last edited by Angélica Pan, PCT on 2/6/2018  8:03 AM. (History)            Assessment /Plan     For exam results, see Encounter Report.    Ectropion of right lower eyelid, unspecified ectropion type  -     External/Slit Lamp Photography    Dry eye    Combined forms of age-related cataract of both eyes    Rosacea           A/P:    S/p ectropion repair OD (6/28/17)  - Still with mild lower lid temporal laxity and +foreign body sensation  - Not using drops as frequently as prescribed   - Would like to wait on surgery at this time and continue with drops   - Increase drop use to 4x/day   - Ointment at bedtime       Patient bothered by severe glare while driving at night. Patient interested in cataract extraction as it is affecting ADL's.     Denis Greer for cataract eval    RTC PRN as needed     I have reviewed and concur with the resident's history, physical, assessment, and plan.  I have personally interviewed and examined the patient.

## 2018-02-07 ENCOUNTER — TELEPHONE (OUTPATIENT)
Dept: OPHTHALMOLOGY | Facility: CLINIC | Age: 73
End: 2018-02-07

## 2018-02-07 ENCOUNTER — TELEPHONE (OUTPATIENT)
Dept: ELECTROPHYSIOLOGY | Facility: CLINIC | Age: 73
End: 2018-02-07

## 2018-02-07 ENCOUNTER — OFFICE VISIT (OUTPATIENT)
Dept: OPHTHALMOLOGY | Facility: CLINIC | Age: 73
End: 2018-02-07
Payer: MEDICARE

## 2018-02-07 ENCOUNTER — PATIENT OUTREACH (OUTPATIENT)
Dept: DIABETES | Facility: CLINIC | Age: 73
End: 2018-02-07

## 2018-02-07 DIAGNOSIS — H25.13 NUCLEAR SCLEROSIS, BILATERAL: Primary | ICD-10-CM

## 2018-02-07 DIAGNOSIS — H25.12 NUCLEAR SCLEROTIC CATARACT OF LEFT EYE: Primary | ICD-10-CM

## 2018-02-07 PROCEDURE — 99999 PR PBB SHADOW E&M-EST. PATIENT-LVL II: CPT | Mod: PBBFAC,,, | Performed by: OPHTHALMOLOGY

## 2018-02-07 PROCEDURE — 92014 COMPRE OPH EXAM EST PT 1/>: CPT | Mod: S$GLB,,, | Performed by: OPHTHALMOLOGY

## 2018-02-07 PROCEDURE — 92136 OPHTHALMIC BIOMETRY: CPT | Mod: 26,LT,S$GLB, | Performed by: OPHTHALMOLOGY

## 2018-02-07 RX ORDER — MOXIFLOXACIN 5 MG/ML
1 SOLUTION/ DROPS OPHTHALMIC
Status: CANCELLED | OUTPATIENT
Start: 2018-02-07

## 2018-02-07 RX ORDER — TETRACAINE HYDROCHLORIDE 5 MG/ML
1 SOLUTION OPHTHALMIC
Status: CANCELLED | OUTPATIENT
Start: 2018-02-07

## 2018-02-07 RX ORDER — TROPICAMIDE 10 MG/ML
1 SOLUTION/ DROPS OPHTHALMIC
Status: CANCELLED | OUTPATIENT
Start: 2018-02-07

## 2018-02-07 RX ORDER — LOSARTAN POTASSIUM 25 MG/1
25 TABLET ORAL DAILY
Qty: 90 TABLET | Refills: 3 | Status: SHIPPED | OUTPATIENT
Start: 2018-02-07 | End: 2018-08-15 | Stop reason: SDUPTHER

## 2018-02-07 RX ORDER — PHENYLEPHRINE HYDROCHLORIDE 25 MG/ML
1 SOLUTION/ DROPS OPHTHALMIC
Status: CANCELLED | OUTPATIENT
Start: 2018-02-07

## 2018-02-07 NOTE — PROGRESS NOTES
Called patient and instructed him to increase his Lantus to 60 units per ENDO NP. Patient verbalized understanding. Patient advised Minerva was no longer here and that he would need to be scheduled with a new provider. Patient scheduled 4/17 with AVI Traore NP. Patient requested his spouse be scheduled at the same time since they both used to see Minerva together.

## 2018-02-07 NOTE — PROGRESS NOTES
HPI     Referred by Dr. Sabillon    *FLOMAX Patient    S/P Ectropion Repair OD 06/28/2016 Dr. Greer  S/P Blepharoplasty OU 1995 Dr. Soni, [place]     Patient here for a cataract evaluation. Patient states states he has major   sensitivity to oncoming headlights. Patient Denies flashes, floaters,   diplopia, HA's, or pain.    Last edited by Brianne Siu on 2/7/2018  1:24 PM. (History)            Assessment /Plan     For exam results, see Encounter Report.    Nuclear sclerosis, bilateral  -     IOL Master - MOD 26 - OS - Left eye      Visually Significant Cataract: Patient reports decreased vision consistent with the clinical amount of lenticular opacity, which reaches the level of visual significance and affects activities of daily living. Risks, benefits, and alternatives to cataract surgery were discussed and the consent reviewed. IOL options were discussed, including ATIOLs and the associated side effects and additional patient cost associated with them.   IOL Selections:   Right eye  IOL: pcboo 21.0     Left eye  IOL: DIE077 20.5 at 180    Pt wishes to have left eye done first.  The patient expresses a desire to reduce spectacle dependence. I reviewed various IOL and LASER refractive surgical options and we will attempt to minimize spectacle dependence by managing astigmatism and optimizing IOL selection. Femtosecond LASER assisted cataract surgery (FLACS) technology was explained to the patient with educational videos and discussion.  The patient voices understanding and wishes to implement this technology during the cataract procedure.  I explained the increased precision of the LASER versus manual techniques, especially as it relates to astigmatism reduction with arcuate incisions.  I emphasized that although our goal is to reduce the need for refractive correction after surgery, there may still be a need for spectacle correction to achieve optimal visual acuity, and that a reasonable range of functional  vision should be the expectation.  No guarantees are made about post operative refraction or visual acuity, as the eye may heal in unpredictable ways, and the standard risks, benefits, and alternatives to cataract surgery were explained.  The patient understands that the refractive portions of this cataract procedure are not covered by insurance, and that there is an out of pocket expense of $1500 per eye. I also explained that even though our pre-operative plan is to utilize advanced refractive technologies during surgery, that I may decide to eliminate part or all of this plan if surgical challenges or complications arise, or I feel that it is not in the patient's best interest. Consent forms and an ABN form were given to the patient to review.    Catalys Parameters:  Right Eye:   ZULMA:  12mm   ?Need to jag patient sitting up?: Yes  Capsulotomy: Scanned Capsule   rdGrdrrdarddrderd:rd rd3rd Arcuate:  OFF  Incisions: OFF  Left Eye:   ZULMA:  12mm   ?Need to jag patient sitting up?: Yes  Capsulotomy: Scanned Capsule  rdGrdrrdarddrderd:rd rd3rd Arcuate: Toric Jag: at  Axis: 180   Incisions:  OFF

## 2018-02-09 ENCOUNTER — PATIENT MESSAGE (OUTPATIENT)
Dept: UROLOGY | Facility: CLINIC | Age: 73
End: 2018-02-09

## 2018-02-14 RX ORDER — TAMSULOSIN HYDROCHLORIDE 0.4 MG/1
1 CAPSULE ORAL DAILY
Qty: 30 CAPSULE | Refills: 5 | Status: SHIPPED | OUTPATIENT
Start: 2018-02-14 | End: 2019-06-19

## 2018-02-22 ENCOUNTER — TELEPHONE (OUTPATIENT)
Dept: SPINE | Facility: CLINIC | Age: 73
End: 2018-02-22

## 2018-02-22 DIAGNOSIS — M54.50 LUMBAR SPINE PAIN: Primary | ICD-10-CM

## 2018-02-24 RX ORDER — FENOFIBRATE 145 MG/1
TABLET, FILM COATED ORAL
Qty: 90 TABLET | Refills: 1 | Status: SHIPPED | OUTPATIENT
Start: 2018-02-24 | End: 2018-08-20 | Stop reason: SDUPTHER

## 2018-02-28 ENCOUNTER — TELEPHONE (OUTPATIENT)
Dept: OPHTHALMOLOGY | Facility: CLINIC | Age: 73
End: 2018-02-28

## 2018-02-28 DIAGNOSIS — H25.11 NUCLEAR SCLEROTIC CATARACT OF RIGHT EYE: Primary | ICD-10-CM

## 2018-03-02 ENCOUNTER — HOSPITAL ENCOUNTER (OUTPATIENT)
Dept: RADIOLOGY | Facility: HOSPITAL | Age: 73
Discharge: HOME | End: 2018-03-02
Attending: ORTHOPAEDIC SURGERY
Payer: MEDICARE

## 2018-03-02 ENCOUNTER — OFFICE VISIT (OUTPATIENT)
Dept: ORTHOPEDICS | Facility: CLINIC | Age: 73
End: 2018-03-02
Payer: MEDICARE

## 2018-03-02 VITALS — HEIGHT: 70 IN | BODY MASS INDEX: 45.04 KG/M2 | WEIGHT: 314.63 LBS

## 2018-03-02 DIAGNOSIS — M54.50 LUMBAR SPINE PAIN: ICD-10-CM

## 2018-03-02 DIAGNOSIS — L30.1 DYSHIDROTIC ECZEMA: ICD-10-CM

## 2018-03-02 DIAGNOSIS — M43.10 SPONDYLOLISTHESIS, ACQUIRED: Primary | ICD-10-CM

## 2018-03-02 PROCEDURE — 72100 X-RAY EXAM L-S SPINE 2/3 VWS: CPT | Mod: 26,,, | Performed by: RADIOLOGY

## 2018-03-02 PROCEDURE — 99999 PR PBB SHADOW E&M-EST. PATIENT-LVL III: CPT | Mod: PBBFAC,,, | Performed by: ORTHOPAEDIC SURGERY

## 2018-03-02 PROCEDURE — 72120 X-RAY BEND ONLY L-S SPINE: CPT | Mod: 26,,, | Performed by: RADIOLOGY

## 2018-03-02 PROCEDURE — 72100 X-RAY EXAM L-S SPINE 2/3 VWS: CPT | Mod: TC

## 2018-03-02 PROCEDURE — 99214 OFFICE O/P EST MOD 30 MIN: CPT | Mod: S$GLB,,, | Performed by: ORTHOPAEDIC SURGERY

## 2018-03-02 RX ORDER — OXYBUTYNIN CHLORIDE 5 MG/1
5 TABLET, EXTENDED RELEASE ORAL DAILY
Refills: 3 | COMMUNITY
Start: 2018-03-01 | End: 2018-03-21

## 2018-03-02 RX ORDER — CLOBETASOL PROPIONATE 0.5 MG/G
CREAM TOPICAL
Qty: 60 G | Refills: 3 | Status: SHIPPED | OUTPATIENT
Start: 2018-03-02

## 2018-03-05 NOTE — PROGRESS NOTES
DATE: 3/5/2018  PATIENT: [facility] PAT Cantor Jr.    Attending Physician: Carroll De Souza M.D.    CHIEF COMPLAINT: low back pain    HISTORY:  Dov PAT Cantor Jr. is a 72 y.o. male  here for initial evaluation of low back pain (Back - 8). The pain has been present for years, but has been worse over about the past 5-7 years. The patient describes the pain as tightness in the low lumbar spine. There was no inciting event. He has no history of lumbar spine surgery.  The pain is worse with sitting or leaning forwards and improved by injections, PT and chirporactic care. There is no associated numbness and tingling. There is no subjective weakness. Prior treatments have included those listed above, but no surgery.    The Patient denies myelopathic symptoms such as handwriting changes or difficulty with buttons/coins/keys. Denies perineal paresthesias, bowel/bladder dysfunction.    PAST MEDICAL/SURGICAL HISTORY:  Past Medical History:   Diagnosis Date    *Atrial fibrillation     On Coumadin    Anticoagulant long-term use     Basal cell carcinoma 12/2015    left eye brow    BCC (basal cell carcinoma) 12/2015    left shoulder    Cataract     Chronic kidney disease     Diabetes mellitus with renal manifestations, uncontrolled     Dyslipidemia associated with type 2 diabetes mellitus     Fever blister     HTN (hypertension)     Keloid cicatrix     Melanoma 12/07/2015    right cheek-in situ    Obesity     PN (peripheral neuropathy)     Primary osteoarthritis of right knee 1/25/2017    Screening for colon cancer 3/3/2016    Sleep apnea     wears CPAP at night    Type II or unspecified type diabetes mellitus with other specified manifestations, uncontrolled      Past Surgical History:   Procedure Laterality Date    APPENDECTOMY      CARDIOVERSION      COLONOSCOPY N/A 3/3/2016    Procedure: COLONOSCOPY;  Surgeon: Bob Poe MD;  Location: [facility] ([address] [place] [address]);  Service: Endoscopy;  Laterality: N/A;  Holding  "Eliquis for 3 days prior to colonoscopy. EC    JOINT REPLACEMENT      Knee    Meniere's disease surgery      TONSILLECTOMY      TOTAL KNEE ARTHROPLASTY Right 01/25/2017    TKR    TOTAL KNEE ARTHROPLASTY Left     TKR, 1990's       Current Medications:   Current Outpatient Prescriptions:     apixaban 5 mg Tab, Take 1 tablet (5 mg total) by mouth 2 (two) times daily., Disp: 180 tablet, Rfl: 3    ascorbic acid (VITAMIN C) 100 MG tablet, Take 1 tablet by mouth every morning. , Disp: , Rfl:     aspirin 325 MG tablet, Take 1 tablet by mouth every morning. , Disp: , Rfl:     BD INSULIN PEN NEEDLE UF ORIG 29 x 1/2 " Ndle, , Disp: , Rfl: 12    BD ULTRA-FINE BASIL PEN NEEDLES 32 gauge x 5/32" Ndle, CHECK BLOOD SUGAR FOUR TIMES DAILY, Disp: 150 each, Rfl: 11    ciclopirox (PENLAC) 8 % Soln, Apply topically nightly., Disp: 6.6 mL, Rfl: 11    CINNAMON BARK (CINNAMON ORAL), Take by mouth once daily., Disp: , Rfl:     clotrimazole-betamethasone 1-0.05% (LOTRISONE) cream, Apply topically 2 (two) times daily., Disp: 45 g, Rfl: 3    desonide (DESOWEN) 0.05 % lotion, AAA bid prn redness, scaling, or itching, Disp: 1 Bottle, Rfl: 3    desoximetasone (TOPICORT) 0.25 % cream, 0.25 %.  Cream Topical .  AAA bid back, Disp: , Rfl:     fenofibrate (TRICOR) 145 MG tablet, TAKE ONE TABLET BY MOUTH EVERY DAY, Disp: 90 tablet, Rfl: 1    fluticasone (FLONASE) 50 mcg/actuation nasal spray, 1 spray by Each Nare route nightly as needed., Disp: 16 g, Rfl: 5    furosemide (LASIX) 40 MG tablet, Take 1 tablet (40 mg total) by mouth daily as needed., Disp: 90 tablet, Rfl: 3    gabapentin (NEURONTIN) 300 MG capsule, TAKE ONE CAPSULE BY MOUTH THREE TIMES DAILY, Disp: 90 capsule, Rfl: 2    insulin aspart (NOVOLOG FLEXPEN) 100 unit/mL InPn pen, Novolog 20 units breakfast, 22 units lunch and dinner plus correction scale. Max daily dose=100 units, Disp: 2 Box, Rfl: 11    insulin glargine (BASAGLAR KWIKPEN) 100 unit/mL (3 mL) InPn pen, Inject " 55 Units into the skin every evening, Disp: 2 Box, Rfl: 11    insulin glargine (LANTUS SOLOSTAR) 100 unit/mL (3 mL) InPn pen, Inject 55 Units into the skin every evening., Disp: 2 Box, Rfl: 11    INV C. DIFFICILE VACCINE/PLACEBO, Administer as directed. FOR INVESTIGATIONAL USE ONLY., Disp: 1 Syringe, Rfl: 0    INV C. DIFFICILE VACCINE/PLACEBO, Administer as directed. FOR INVESTIGATIONAL USE ONLY., Disp: 1 Syringe, Rfl: 0    ketoconazole (NIZORAL) 2 % cream, Apply topically once daily., Disp: 30 g, Rfl: 1    lancets 33 gauge Misc, 1 lancet by Misc.(Non-Drug; Combo Route) route 4 (four) times daily. Pt uses True Result Meter, bg monitoring 4 times a day., Disp: 450 each, Rfl: 4    losartan (COZAAR) 25 MG tablet, Take 1 tablet (25 mg total) by mouth once daily., Disp: 90 tablet, Rfl: 3    losartan (COZAAR) 50 MG tablet, , Disp: , Rfl:     metOLazone (ZAROXOLYN) 5 MG tablet, Take 1 tablet (5 mg total) by mouth once daily., Disp: 7 tablet, Rfl: 0    metoprolol succinate (TOPROL-XL) 100 MG 24 hr tablet, TAKE ONE TABLET BY MOUTH EVERY DAY, Disp: 90 tablet, Rfl: 3    multivitamin capsule, Take 1 capsule by mouth every morning. , Disp: , Rfl:     mupirocin (BACTROBAN) 2 % ointment, Apply topically 2 (two) times daily. Apply to wound twice daily as directed., Disp: 30 g, Rfl: 0    nystatin-triamcinolone (MYCOLOG II) cream, apply TWICE DAILY (Patient taking differently: apply TWICE DAILY-using as needed for rash), Disp: 60 g, Rfl: 1    omega-3 acid ethyl esters (LOVAZA) 1 gram capsule, TAKE THREE CAPSULE BY MOUTH TWICE DAILY, Disp: 540 capsule, Rfl: 3    pravastatin (PRAVACHOL) 10 MG tablet, Take 1 tablet (10 mg total) by mouth once daily., Disp: 90 tablet, Rfl: 3    tamsulosin (FLOMAX) 0.4 mg Cp24, Take 1 capsule (0.4 mg total) by mouth once daily., Disp: 30 capsule, Rfl: 5    triamcinolone acetonide 0.1% (KENALOG) 0.1 % cream, Apply topically 2 (two) times daily. Apply to affected area twice daily as  directed., Disp: 454 g, Rfl: 0    vitamin E 1000 UNIT capsule, Take 1,000 Units by mouth every morning. 1 Capsule Oral Every day, Disp: , Rfl:     azelastine (ASTELIN) 137 mcg (0.1 %) nasal spray, 1 spray (137 mcg total) by Nasal route 2 (two) times daily., Disp: 30 mL, Rfl: 3    clobetasol (TEMOVATE) 0.05 % cream, AAA finger bid, Disp: 60 g, Rfl: 3    levothyroxine (SYNTHROID) 25 MCG tablet, Take 1 tablet (25 mcg total) by mouth once daily., Disp: 90 tablet, Rfl: 2    metronidazole (METROGEL) 0.75 % gel, Apply topically 2 (two) times daily., Disp: 1 Tube, Rfl: 6    oxybutynin (DITROPAN-XL) 5 MG TR24, Take 5 mg by mouth once daily., Disp: , Rfl: 3    Social History:   Social History     Social History    Marital status:      Spouse name: N/A    Number of children: 3    Years of education: N/A     Occupational History    retired Retired     Social History Main Topics    Smoking status: Former Smoker     Quit date: 7/10/1985    Smokeless tobacco: Never Used    Alcohol use No      Comment: quit 2007- had excess in the past    Drug use: No    Sexual activity: Not on file     Other Topics Concern    Not on file     Social History Narrative    No narrative on file       REVIEW OF SYSTEMS:  Constitution: Negative. Negative for chills, fever and night sweats.   Cardiovascular: Negative for chest pain and syncope.   Respiratory: Negative for cough and shortness of breath.   Gastrointestinal: See HPI. Negative for nausea/vomiting. Negative for abdominal pain.  Genitourinary: See HPI. Negative for discoloration or dysuria.  Skin: Notable for chronic venous stasis dermatitis  Hematologic/Lymphatic: Negative for bleeding/clotting disorders.   Musculoskeletal: Negative for falls and muscle weakness.   Neurological: See HPI. no history of seizures. no history of cranial surgery or shunts.  Endocrine: Negative for polydipsia, polyphagia and polyuria.   Allergic/Immunologic: Negative for hives and persistent  "infections.    PHYSICAL EXAMINATION:    Ht 5' 10" (1.778 m)   Wt (!) 142.7 kg (314 lb 9.5 oz)   BMI 45.14 kg/m²     General: The patient is a pelasant 72 y.o. male in no apparent distress, the patient is orientatied to person, place and time.   Psych: Normal mood and affect  HEENT: Vision grossly intact, hearing intact to the spoken word.  Lungs: Respirations unlabored.  Gait: The patient walks with an antalgic gait with a cane in the R hand   Skin: Dorsal lumbar skin negative for rashes, lesions, hairy patches and surgical scars.  Range of motion: Lumbar range of motion is acceptable. There is no lumbar tenderness to palpation.  Spinal Balance: Global saggital and coronal spinal balance acceptable, no significant for scoliosis and kyphosis.  Musculoskeletal: No pain with the range of motion of the bilateral hips. No trochanteric tenderness to palpation.  Vascular: BLE notable for 2+ pitting edema with chronic venous stasis dermatitis. Bilateral lower extremities warm and well perfused, Dorsalis pedis pulses 1+ bilaterally.  Neurological: Normal strength and tone in all major motor groups in the bilateral lower extremities. Normal sensation to light touch in the L2-S1 dermatomes bilaterally.  Deep tendon reflexes symmetric 1+ in the bilateral lower extremities.  Negative Babinski bilaterally.    IMAGING:   Today I personally reviewed AP, Lat and Flex/Ex upright L-spine films that demonstrate a grade I L5/S1 isthmic spondylolisthesis     ASSESSMENT/PLAN:    Stephen was seen today for back pain.    Diagnoses and all orders for this visit:    Spondylolisthesis, acquired  -     MRI Lumbar Spine Without Contrast; Future      Plan for MRI lumbar spine. RTC to discuss options, he is not interested in surgery. Possible injections.  "

## 2018-03-13 RX ORDER — GABAPENTIN 300 MG/1
CAPSULE ORAL
Qty: 90 CAPSULE | Refills: 1 | Status: SHIPPED | OUTPATIENT
Start: 2018-03-13 | End: 2018-06-26 | Stop reason: SDUPTHER

## 2018-03-13 RX ORDER — METOPROLOL SUCCINATE 100 MG/1
TABLET, EXTENDED RELEASE ORAL
Qty: 90 TABLET | Refills: 1 | Status: ON HOLD | OUTPATIENT
Start: 2018-03-13 | End: 2018-07-31

## 2018-03-21 ENCOUNTER — HOSPITAL ENCOUNTER (OUTPATIENT)
Dept: UROLOGY | Facility: HOSPITAL | Age: 73
Discharge: HOME | End: 2018-03-21
Attending: UROLOGY
Payer: MEDICARE

## 2018-03-21 VITALS
TEMPERATURE: 98 F | WEIGHT: 217 LBS | HEIGHT: 70 IN | RESPIRATION RATE: 16 BRPM | SYSTOLIC BLOOD PRESSURE: 167 MMHG | HEART RATE: 95 BPM | DIASTOLIC BLOOD PRESSURE: 77 MMHG | BODY MASS INDEX: 31.07 KG/M2

## 2018-03-21 DIAGNOSIS — N39.0 URINARY TRACT INFECTION WITHOUT HEMATURIA, SITE UNSPECIFIED: Primary | ICD-10-CM

## 2018-03-21 DIAGNOSIS — R31.29 MICROHEMATURIA: ICD-10-CM

## 2018-03-21 PROCEDURE — 52000 CYSTOURETHROSCOPY: CPT | Mod: ,,, | Performed by: UROLOGY

## 2018-03-21 PROCEDURE — 52000 CYSTOURETHROSCOPY: CPT

## 2018-03-21 RX ORDER — CIPROFLOXACIN 250 MG/1
250 TABLET, FILM COATED ORAL ONCE
Status: COMPLETED | OUTPATIENT
Start: 2018-03-21 | End: 2018-03-21

## 2018-03-21 RX ORDER — LIDOCAINE HYDROCHLORIDE 20 MG/ML
10 JELLY TOPICAL
Status: COMPLETED | OUTPATIENT
Start: 2018-03-21 | End: 2018-03-21

## 2018-03-21 RX ADMIN — CIPROFLOXACIN 250 MG: 250 TABLET, FILM COATED ORAL at 09:03

## 2018-03-21 RX ADMIN — LIDOCAINE HYDROCHLORIDE 10 ML: 20 JELLY TOPICAL at 08:03

## 2018-03-21 NOTE — PATIENT INSTRUCTIONS
What to Expect After a Cystoscopy  For the next 24-48 hours, you may feel a mild burning when you urinate. This burning is normal and expected. Drink plenty of water to dilute the urine to help relieve the burning sensation. You may also see a small amount of blood in your urine after the procedure.    Unless you are already taking antibiotics, you may be given an antibiotic after the test to prevent infection.    Signs and Symptoms to Report  Call the [facility] at [phone] if you develop any of the following:  · Fever of 101 degrees or higher  · Chills or persistent bleeding  · Inability to urinate

## 2018-03-21 NOTE — H&P
[]Archie for attribution information  Subjective:       Patient ID: [facility] [place] PAT Cantor Jr. is a 72 y.o. male.     Chief Complaint: Urinary Tract Infection     [facility] [place] PAT Cantor Jr. is a 72 y.o. Male here for follow up.     No urinary frequency when he doesn't have lasix.   Has urinary frequency every 30 minutes when he takes lasix.   No nocturia.   Occasionally takes a sleep aid that contains benadryl.     Had wbc and rbc in urine in 2017, culture negative. Given cipro for 1 month.      Has CKD, stable. GFR in 30s.     Has long history of MH, negative workup 2010 and 3/2017, except for BPH.   CT 2010 showed a left renal stone.      Ultrasound 2017 and 2015 showed this cyst. Cyst is 1cm in size.      Has sleep apnea. Has been wearing cpap in last month, but not last 3-4 days. Doesn't wear it when he has a cold.   No intermittency. No hesitancy. Has some urgency. Rare urge incontinence.   No gross hematuria.   History of tobacco 30 years ago. Smoked for 30 years. 1.5ppd.   Cysto 2010 with Dr. Overton - had BPH and obstruction noted.  CT 2010 showed left renal stone.     He is on oxybutnin XR 5mg. He is not sure why.     Bilateral groin itching and bleeding b/c he is scratching. Uses yeast powder. Wants the cream.               Past Surgical History:   Procedure Laterality Date    APPENDECTOMY        CARDIOVERSION        COLONOSCOPY N/A 3/3/2016     Procedure: COLONOSCOPY;  Surgeon: Bob Poe MD;  Location: [address]);  Service: Endoscopy;  Laterality: N/A;  Holding Eliquis for 3 days prior to colonoscopy. EC    JOINT REPLACEMENT         Knee    Meniere's disease surgery        TONSILLECTOMY        TOTAL KNEE ARTHROPLASTY Right 01/25/2017     TKR              Past Medical History:   Diagnosis Date    *Atrial fibrillation       On Coumadin    Anticoagulant long-term use      Basal cell carcinoma 12/2015     left eye brow    BCC (basal cell carcinoma) 12/2015     left shoulder    Cataract      Chronic  kidney disease      Diabetes mellitus with renal manifestations, uncontrolled      Dyslipidemia associated with type 2 diabetes mellitus      Fever blister      HTN (hypertension)      Keloid cicatrix      Melanoma 12/07/2015     right cheek-in situ    Obesity      PN (peripheral neuropathy)      Primary osteoarthritis of right knee 1/25/2017    Screening for colon cancer 3/3/2016    Sleep apnea       wears CPAP at night    Type II or unspecified type diabetes mellitus with other specified manifestations, uncontrolled           Social History   Social History            Social History    Marital status:        Spouse name: N/A    Number of children: 3    Years of education: N/A           Occupational History    retired Retired             Social History Main Topics    Smoking status: Former Smoker       Quit date: 7/10/1985    Smokeless tobacco: Never Used    Alcohol use No         Comment: quit 2007- had excess in the past    Drug use: No    Sexual activity: Not on file           Other Topics Concern    Not on file          Social History Narrative    No narrative on file                   Family History   Problem Relation Age of Onset    Diabetes Mother      Stroke Mother      Diabetes Father      Heart disease Father      Hypertension Father      Cancer Sister         brain    Eczema Sister      Cancer Brother         brain    Cancer Sister         leukemia, stomach cancer    No Known Problems Sister      ALS Brother         stomach cancer    No Known Problems Brother      No Known Problems Maternal Aunt      No Known Problems Maternal Uncle      No Known Problems Paternal Aunt      No Known Problems Paternal Uncle      No Known Problems Maternal Grandmother      No Known Problems Maternal Grandfather      No Known Problems Paternal Grandmother      No Known Problems Paternal Grandfather      Amblyopia Neg Hx      Blindness Neg Hx      Cataracts Neg Hx       Glaucoma Neg Hx      Macular degeneration Neg Hx      Retinal detachment Neg Hx      Strabismus Neg Hx      Thyroid disease Neg Hx      Melanoma Neg Hx      Psoriasis Neg Hx      Lupus Neg Hx      Acne Neg Hx           Current Medications   Current Outpatient Prescriptions   Medication Sig Dispense Refill    apixaban 5 mg Tab Take 1 tablet (5 mg total) by mouth 2 (two) times daily. 180 tablet 3    ascorbic acid (VITAMIN C) 100 MG tablet Take 1 tablet by mouth every morning.         aspirin 325 MG tablet Take 1 tablet by mouth every morning.         CINNAMON BARK (CINNAMON ORAL) Take by mouth once daily.        desonide (DESOWEN) 0.05 % lotion AAA bid prn redness, scaling, or itching 1 Bottle 3    fenofibrate (TRICOR) 145 MG tablet TAKE ONE TABLET BY MOUTH EVERY DAY 90 tablet 2    fluticasone (FLONASE) 50 mcg/actuation nasal spray 1 spray by Each Nare route nightly as needed. 16 g 5    furosemide (LASIX) 40 MG tablet Take 1 tablet (40 mg total) by mouth daily as needed. 90 tablet 3    gabapentin (NEURONTIN) 300 MG capsule TAKE ONE CAPSULE BY MOUTH THREE TIMES DAILY 90 capsule 2    insulin aspart (NOVOLOG FLEXPEN) 100 unit/mL InPn pen Novolog 20 units breakfast, 22 units lunch and dinner plus correction scale. Max daily dose=100 units 2 Box 11    insulin glargine (LANTUS SOLOSTAR) 100 unit/mL (3 mL) InPn pen Inject 55 Units into the skin every evening. 2 Box 11    levothyroxine (SYNTHROID) 25 MCG tablet Take 1 tablet (25 mcg total) by mouth once daily. 90 tablet 2    losartan (COZAAR) 25 MG tablet Take 1 tablet (25 mg total) by mouth once daily. 90 tablet 3    losartan (COZAAR) 50 MG tablet          metoprolol succinate (TOPROL-XL) 100 MG 24 hr tablet TAKE ONE TABLET BY MOUTH EVERY DAY 90 tablet 3    multivitamin capsule Take 1 capsule by mouth every morning.         nystatin-triamcinolone (MYCOLOG II) cream apply TWICE DAILY (Patient taking differently: apply TWICE DAILY-using as needed for  "rash) 60 g 1    omega-3 acid ethyl esters (LOVAZA) 1 gram capsule TAKE THREE CAPSULE BY MOUTH TWICE DAILY 540 capsule 3    oxybutynin (DITROPAN-XL) 5 MG TR24 TAKE ONE TABLET BY MOUTH EVERY DAY 30 tablet 3    pravastatin (PRAVACHOL) 10 MG tablet Take 1 tablet (10 mg total) by mouth once daily. 90 tablet 3    tamsulosin (FLOMAX) 0.4 mg Cp24 TAKE ONE CAPSULE BY MOUTH EVERY DAY 30 capsule 1    triamcinolone acetonide 0.1% (KENALOG) 0.1 % cream Apply topically 2 (two) times daily. Apply to affected area twice daily as directed. 454 g 0    vitamin E 1000 UNIT capsule Take 1,000 Units by mouth every morning. 1 Capsule Oral Every day        azelastine (ASTELIN) 137 mcg (0.1 %) nasal spray 1 spray (137 mcg total) by Nasal route 2 (two) times daily. 30 mL 3    BD INSULIN PEN NEEDLE UF ORIG 29 x 1/2 " Ndle     12    BD ULTRA-FINE BASIL PEN NEEDLES 32 gauge x 5/32" Ndle CHECK BLOOD SUGAR FOUR TIMES DAILY 150 each 11    clobetasol (TEMOVATE) 0.05 % cream AAA finger bid 60 g 3    clotrimazole-betamethasone 1-0.05% (LOTRISONE) cream Apply topically 2 (two) times daily. Apply twice a day to affected area for 1 week, wait a week, then repeat x 1 week 45 g 1    desoximetasone (TOPICORT) 0.25 % cream 0.25 %.  Cream Topical .  AAA bid back        INV C. DIFFICILE VACCINE/PLACEBO Administer as directed. FOR INVESTIGATIONAL USE ONLY. 1 Syringe 0    INV C. DIFFICILE VACCINE/PLACEBO Administer as directed. FOR INVESTIGATIONAL USE ONLY. 1 Syringe 0    ketoconazole (NIZORAL) 2 % cream Apply topically once daily. 30 g 1    lancets 33 gauge Misc 1 lancet by Misc.(Non-Drug; Combo Route) route 4 (four) times daily. Pt uses True Result Meter, bg monitoring 4 times a day. 450 each 4    metOLazone (ZAROXOLYN) 5 MG tablet Take 1 tablet (5 mg total) by mouth once daily. 7 tablet 0    metronidazole (METROGEL) 0.75 % gel Apply topically 2 (two) times daily. 1 Tube 6    mupirocin (BACTROBAN) 2 % ointment Apply topically 2 (two) times " daily. Apply to wound twice daily as directed. 30 g 0      No current facility-administered medications for this visit.                   Review of patient's allergies indicates:   Allergen Reactions    Niacin Itching and Other (See Comments)       Other reaction(s): facial flushing and causes hepatitis     Terbinafine Other (See Comments)       Other reaction(s): Hepatitis         BMP        Lab Results   Component Value Date      01/16/2018     K 4.5 01/16/2018      01/16/2018     CO2 27 01/16/2018     BUN 30 (H) 01/16/2018     CREATININE 1.7 (H) 01/16/2018     CALCIUM 10.0 01/16/2018     ANIONGAP 10 01/16/2018     ESTGFRAFRICA 44.9 (A) 01/16/2018     EGFRNONAA 38.9 (A) 01/16/2018         Review of Systems   Constitutional: Negative for chills, fever and unexpected weight change.   HENT: Negative for hearing loss and nosebleeds.    Eyes: Negative for visual disturbance.   Respiratory: Negative for chest tightness.    Cardiovascular: Negative for chest pain.   Gastrointestinal: Negative for diarrhea.   Genitourinary: Negative for dysuria, frequency, hematuria, nocturia and urgency.   Musculoskeletal: Negative for joint swelling.   Skin: Negative for rash.   Neurological: Negative for seizures.   Hematological: Does not bruise/bleed easily.   Psychiatric/Behavioral: Negative for behavioral problems.      Objective:   Physical Exam   Constitutional: He is oriented to person, place, and time. He appears well-developed and well-nourished.   HENT:   Head: Normocephalic and atraumatic.   Eyes: No scleral icterus.   Neck: Neck supple.   Cardiovascular: Normal rate and regular rhythm.    Pulmonary/Chest: Effort normal. No respiratory distress.   Abdominal: He exhibits no mass.   obese   Genitourinary:   Genitourinary Comments: Buried penis. circ phallus. No phimosis. Some erythema on glans and foreskin.   Bilateral hydroceles. Stable per patient.   Bilateral groin candidiasis       Musculoskeletal: He  exhibits no tenderness.   Lymphadenopathy:     He has no cervical adenopathy.   Neurological: He is alert and oriented to person, place, and time.   Skin: Skin is warm. No rash noted.     Psychiatric: He has a normal mood and affect.     1+ leuk, 250 blood  Assessment:       1. Kidney stones        Plan:   [facility] [place] for urinary tract infection.     Diagnoses and all orders for this visit:     Kidney stones  -     CT Renal Stone Study ABD Pelvis WO; Future  -     POCT urinalysis, dipstick or tablet reag  -     Urinalysis     Type 2 diabetes mellitus with stage 3 chronic kidney disease, with long-term current use of insulin     Severe obesity (BMI 35.0-35.9 with comorbidity)     Enlarged prostate     Diastolic dysfunction     Microhematuria  -     POCT urinalysis, dipstick or tablet reag  -     Urinalysis     Morbid obesity with BMI of 40.0-44.9, adult     Essential hypertension     Long term current use of anticoagulant therapy     Chronic kidney disease, stage III (moderate)     Other orders  -     clotrimazole-betamethasone 1-0.05% (LOTRISONE) cream; Apply topically 2 (two) times daily.           Continue flomax.   Stop oxybutnin.   CT RSS to r/o stone.   lotrisone for candidiasis.   Recommend no further antibiotics for pyuria and microhematuria.   Home collect urine culture for future UTI symptoms.   Avoid benadryl.

## 2018-03-21 NOTE — PROCEDURES
Procedures: Flexible cystourethroscopy    Pre Procedure Diagnosis:microhematuria    Post Procedure Diagnosis:BPH with obstruction    Surgeon: Yamilet Montero MD    Anesthesia: 2% uro-jet lidocaine jelly for local analgesia    Flexible cysto-urethroscopy was performed after consent was obtained.  The risks and benefits were explained.    2% lidocaine urojet was used for local analgesia.  The genitalia was prepped and draped in the sterile fashion with betadine.    The flexible scope was advanced into the urethra and into the bladder.  Bilateral ureteral orifice were evaluated and noted to be normal with clear efflux.  The bladder was completely surveyed in a systematic fashion.   No bladder tumors or lesions were seen. Moderate trabeculations.  No strictures were noted.  The prostate showed Significant hypertrophy.  There was No median lobe present. 4cm in length prostate.     The patient tolerated the procedure well without complication.    They will follow up in 6 months. Patient currently happy with urinary symptoms.  Continue flomax. Not taking oxybutnin

## 2018-03-28 ENCOUNTER — HOSPITAL ENCOUNTER (OUTPATIENT)
Dept: RADIOLOGY | Facility: HOSPITAL | Age: 73
Discharge: HOME | End: 2018-03-28
Attending: ORTHOPAEDIC SURGERY
Payer: MEDICARE

## 2018-03-28 ENCOUNTER — OFFICE VISIT (OUTPATIENT)
Dept: ORTHOPEDICS | Facility: CLINIC | Age: 73
End: 2018-03-28
Payer: MEDICARE

## 2018-03-28 VITALS — WEIGHT: 217 LBS | BODY MASS INDEX: 31.07 KG/M2 | HEIGHT: 70 IN

## 2018-03-28 DIAGNOSIS — M48.062 SPINAL STENOSIS OF LUMBAR REGION WITH NEUROGENIC CLAUDICATION: Primary | ICD-10-CM

## 2018-03-28 DIAGNOSIS — M43.10 SPONDYLOLISTHESIS, ACQUIRED: ICD-10-CM

## 2018-03-28 DIAGNOSIS — D17.79 EPIDURAL LIPOMATOSIS: ICD-10-CM

## 2018-03-28 PROCEDURE — 72148 MRI LUMBAR SPINE W/O DYE: CPT | Mod: 26,,, | Performed by: RADIOLOGY

## 2018-03-28 PROCEDURE — 99213 OFFICE O/P EST LOW 20 MIN: CPT | Mod: S$GLB,,, | Performed by: ORTHOPAEDIC SURGERY

## 2018-03-28 PROCEDURE — 72148 MRI LUMBAR SPINE W/O DYE: CPT | Mod: TC

## 2018-03-28 PROCEDURE — 99999 PR PBB SHADOW E&M-EST. PATIENT-LVL III: CPT | Mod: PBBFAC,,, | Performed by: ORTHOPAEDIC SURGERY

## 2018-03-28 NOTE — PROGRESS NOTES
ricki Cantor Jr. returns today for evaluation of his lumbar back pain and right buttock pain. He is here for MRI results. He reports previous partial relief with back therapy at [facility]. He denies any gait changes or myelopathic symptoms. He denies bowel or bladder dysfunction.     He has known degenerative L5/S1 spondylolisthesis. His MRI shows multilevel disc bulging L3-S1 with some moderate neuroforaminal stenosis at L4/5 and L5/S1 on the right.    Treatment options discussed. He would like to proceed with conservative management with continued PT, weight loss and will refer to pain mgmt for possible YANDY.    I spent 15 minutes with the patient of which greater than 1/2 the time was devoted to counciling the patient regarding treatment options.

## 2018-04-02 ENCOUNTER — TELEPHONE (OUTPATIENT)
Dept: INTERNAL MEDICINE | Facility: CLINIC | Age: 73
End: 2018-04-02

## 2018-04-02 NOTE — TELEPHONE ENCOUNTER
----- Message from Leonides Alcantar sent at 4/2/2018  3:51 PM CDT -----  Contact: Patient [phone]  Patient would like for you to call this number [phone] at the [facility]/Pain Management, to inform them that it's ok for the patient to stop taking eliquis Rx.    Please call and advise  Thank you

## 2018-04-02 NOTE — TELEPHONE ENCOUNTER
Requesting clearance of Eliquis for 3 days so patient may be scheduled for Bilateral TF YANDY @ L5/S1.     Please advise if patient may hold medication.

## 2018-04-02 NOTE — TELEPHONE ENCOUNTER
----- Message from Nory Florian sent at 4/2/2018  9:45 AM CDT -----  Regarding: Eliquis clearance  Requesting clearance of Eliquis for 3 days so patient may be scheduled for Bilateral TF YANDY @ L5/S1.    Please advise if patient may hold medication.

## 2018-04-02 NOTE — TELEPHONE ENCOUNTER
Called number and was transferred to  office, but was not able to get in touch or leave a vm.    Please see chart notes per  for clearance to stop eliquis for 3 days per .      Please advise

## 2018-04-03 ENCOUNTER — RESEARCH ENCOUNTER (OUTPATIENT)
Dept: RESEARCH | Facility: HOSPITAL | Age: 73
End: 2018-04-03

## 2018-04-03 ENCOUNTER — TELEPHONE (OUTPATIENT)
Dept: INTERNAL MEDICINE | Facility: CLINIC | Age: 73
End: 2018-04-03

## 2018-04-03 NOTE — TELEPHONE ENCOUNTER
Spoke to pt wife and informed her that I called the office yesterday to approve the pt to stop the lexapro for three days per pcp. The note is in epic but a vm was left because the office did not

## 2018-04-03 NOTE — PROGRESS NOTES
Sponsor: [employer]     Study Title/IRB Number: A Phase 3, Placebo-Controlled, Randomized, Observer-Blinded Study To Evaluate The Efficacy, Safety, And Tolerability Of A Clostridium Difficile Vaccine In Adults 50 Years Of Age And Older ([id number]) / 2017.094.B     Patient continued eligibility confirmed: Yes    Patient willing to continue to participate in [employer] C-Diff study and comply with all study requirements: Yes    Date of Telephone Contact: [id number]    Contacted the subject by phone to instruct that per [employer], subjects are now being directed to log into their eDiaries every 30 days regardless of receiving a vaccine or completing vaccine questionnaires. Patient verbalized understanding.

## 2018-04-03 NOTE — TELEPHONE ENCOUNTER
----- Message from Louise Loredo sent at 4/2/2018  3:50 PM CDT -----  Contact: pt        Name of Who is Calling: pt      What is the request in detail: pt needs info about his blood thinner. Please call pt       Can the clinic reply by [facility]:[phone]      [facility]

## 2018-04-09 ENCOUNTER — HOSPITAL ENCOUNTER (EMERGENCY)
Facility: HOSPITAL | Age: 73
Discharge: HOME | End: 2018-04-10
Attending: EMERGENCY MEDICINE
Payer: MEDICARE

## 2018-04-09 DIAGNOSIS — M25.512 ACUTE PAIN OF LEFT SHOULDER: Primary | ICD-10-CM

## 2018-04-09 DIAGNOSIS — R07.9 CHEST PAIN: ICD-10-CM

## 2018-04-09 LAB
ALBUMIN SERPL BCP-MCNC: 3.5 G/DL
ALP SERPL-CCNC: 38 U/L
ALT SERPL W/O P-5'-P-CCNC: 14 U/L
ANION GAP SERPL CALC-SCNC: 11 MMOL/L
AST SERPL-CCNC: 19 U/L
BASOPHILS # BLD AUTO: 0.04 K/UL
BASOPHILS NFR BLD: 0.5 %
BILIRUB SERPL-MCNC: 0.6 MG/DL
BNP SERPL-MCNC: 59 PG/ML
BUN SERPL-MCNC: 27 MG/DL
CALCIUM SERPL-MCNC: 9.8 MG/DL
CHLORIDE SERPL-SCNC: 102 MMOL/L
CO2 SERPL-SCNC: 23 MMOL/L
CREAT SERPL-MCNC: 1.8 MG/DL
DIFFERENTIAL METHOD: ABNORMAL
EOSINOPHIL # BLD AUTO: 0.4 K/UL
EOSINOPHIL NFR BLD: 4.5 %
ERYTHROCYTE [DISTWIDTH] IN BLOOD BY AUTOMATED COUNT: 13.5 %
EST. GFR  (AFRICAN AMERICAN): 41.9 ML/MIN/1.73 M^2
EST. GFR  (NON AFRICAN AMERICAN): 36.3 ML/MIN/1.73 M^2
GLUCOSE SERPL-MCNC: 195 MG/DL
HCT VFR BLD AUTO: 42.4 %
HGB BLD-MCNC: 14.8 G/DL
IMM GRANULOCYTES # BLD AUTO: 0.04 K/UL
IMM GRANULOCYTES NFR BLD AUTO: 0.5 %
INR PPP: 1
LYMPHOCYTES # BLD AUTO: 2 K/UL
LYMPHOCYTES NFR BLD: 23.6 %
MCH RBC QN AUTO: 31.1 PG
MCHC RBC AUTO-ENTMCNC: 34.9 G/DL
MCV RBC AUTO: 89 FL
MONOCYTES # BLD AUTO: 0.7 K/UL
MONOCYTES NFR BLD: 8.5 %
NEUTROPHILS # BLD AUTO: 5.3 K/UL
NEUTROPHILS NFR BLD: 62.4 %
NRBC BLD-RTO: 0 /100 WBC
PLATELET # BLD AUTO: 200 K/UL
PMV BLD AUTO: 11.7 FL
POTASSIUM SERPL-SCNC: 4.5 MMOL/L
PROT SERPL-MCNC: 7.3 G/DL
PROTHROMBIN TIME: 10.8 SEC
RBC # BLD AUTO: 4.76 M/UL
SODIUM SERPL-SCNC: 136 MMOL/L
TROPONIN I SERPL DL<=0.01 NG/ML-MCNC: <0.006 NG/ML
WBC # BLD AUTO: 8.45 K/UL

## 2018-04-09 PROCEDURE — 80053 COMPREHEN METABOLIC PANEL: CPT

## 2018-04-09 PROCEDURE — 93005 ELECTROCARDIOGRAM TRACING: CPT

## 2018-04-09 PROCEDURE — 93010 ELECTROCARDIOGRAM REPORT: CPT | Mod: ,,, | Performed by: INTERNAL MEDICINE

## 2018-04-09 PROCEDURE — 85025 COMPLETE CBC W/AUTO DIFF WBC: CPT

## 2018-04-09 PROCEDURE — 84484 ASSAY OF TROPONIN QUANT: CPT

## 2018-04-09 PROCEDURE — 63600175 PHARM REV CODE 636 W HCPCS: Performed by: NURSE PRACTITIONER

## 2018-04-09 PROCEDURE — 96374 THER/PROPH/DIAG INJ IV PUSH: CPT

## 2018-04-09 PROCEDURE — 96372 THER/PROPH/DIAG INJ SC/IM: CPT | Mod: 59

## 2018-04-09 PROCEDURE — 83880 ASSAY OF NATRIURETIC PEPTIDE: CPT

## 2018-04-09 PROCEDURE — 85610 PROTHROMBIN TIME: CPT

## 2018-04-09 PROCEDURE — 99284 EMERGENCY DEPT VISIT MOD MDM: CPT | Mod: 25

## 2018-04-09 RX ORDER — ORPHENADRINE CITRATE 30 MG/ML
60 INJECTION INTRAMUSCULAR; INTRAVENOUS
Status: COMPLETED | OUTPATIENT
Start: 2018-04-09 | End: 2018-04-09

## 2018-04-09 RX ORDER — KETOROLAC TROMETHAMINE 30 MG/ML
15 INJECTION, SOLUTION INTRAMUSCULAR; INTRAVENOUS
Status: COMPLETED | OUTPATIENT
Start: 2018-04-09 | End: 2018-04-09

## 2018-04-09 RX ADMIN — KETOROLAC TROMETHAMINE 15 MG: 30 INJECTION, SOLUTION INTRAMUSCULAR at 09:04

## 2018-04-09 RX ADMIN — ORPHENADRINE CITRATE 60 MG: 30 INJECTION INTRAMUSCULAR; INTRAVENOUS at 09:04

## 2018-04-10 VITALS
WEIGHT: 305 LBS | HEART RATE: 78 BPM | DIASTOLIC BLOOD PRESSURE: 65 MMHG | HEIGHT: 70 IN | OXYGEN SATURATION: 96 % | BODY MASS INDEX: 43.67 KG/M2 | SYSTOLIC BLOOD PRESSURE: 105 MMHG | RESPIRATION RATE: 19 BRPM | TEMPERATURE: 98 F

## 2018-04-10 LAB — TROPONIN I SERPL DL<=0.01 NG/ML-MCNC: 0.01 NG/ML

## 2018-04-10 PROCEDURE — 84484 ASSAY OF TROPONIN QUANT: CPT

## 2018-04-10 RX ORDER — METHOCARBAMOL 500 MG/1
1000 TABLET, FILM COATED ORAL 3 TIMES DAILY
Qty: 30 TABLET | Refills: 0 | Status: SHIPPED | OUTPATIENT
Start: 2018-04-10 | End: 2018-04-15

## 2018-04-10 NOTE — ED PROVIDER NOTES
This patient was signed out to me by colleague Yessica.   Disposition was pending second troponin.   Repeat troponin negative.   Patient discharged home with Robaxin.  All questions answered.  Recommended follow-up with PCP     Leonard Gama PA-C  04/10/18 0112

## 2018-04-10 NOTE — ED TRIAGE NOTES
Pt reports for 3-4 weeks been experiencing left shoulder pain after reaching for something, rates pain 9/10 on scale. Pain is intermittant, has not taken any OTC medications for pain medications.

## 2018-04-10 NOTE — ED PROVIDER NOTES
Encounter Date: 4/9/2018       History     Chief Complaint   Patient presents with    Shoulder Pain     complains of shoulder pain and tingling in arm x 2 - 3 weeks progressively getting worse. pt denies sob or chest pain.      Pt is a 73 yo male with medical history of A-fib, BCC, CKD, Dm, Peripheral neuropathy presenting to the ED for left shoulder pain.  Pt states pain started last night in right side of chest and radiated to the right arm.  Pt states arm tingling last night.  Pt state pain resolved last night but came came back this evening approximately 1700. Patient also complains of chest pain. The patient describes the pain as intermittent, dull in nature, radiates to the left arm. Aggravating factors are movement.  Patient's cardiac risk factors are advanced age (older than 55 for men, 65 for women), diabetes mellitus, dyslipidemia, male gender, obesity (BMI >= 30 kg/m2) and sedentary lifestyle.  Previous cardiac testing: chest x-ray, echocardiogram, electrocardiogram (ECG), HDL and LDL.          Review of patient's allergies indicates:   Allergen Reactions    Niacin Itching and Other (See Comments)     Other reaction(s): facial flushing and causes hepatitis     Terbinafine Other (See Comments)     Other reaction(s): Hepatitis     Past Medical History:   Diagnosis Date    *Atrial fibrillation     On Coumadin    Anticoagulant long-term use     Basal cell carcinoma 12/2015    left eye brow    BCC (basal cell carcinoma) 12/2015    left shoulder    Cataract     Chronic kidney disease     Diabetes mellitus with renal manifestations, uncontrolled     Dyslipidemia associated with type 2 diabetes mellitus     Fever blister     HTN (hypertension)     Keloid cicatrix     Melanoma 12/07/2015    right cheek-in situ    Obesity     PN (peripheral neuropathy)     Primary osteoarthritis of right knee 1/25/2017    Screening for colon cancer 3/3/2016    Sleep apnea     wears CPAP at night    Type II or  unspecified type diabetes mellitus with other specified manifestations, uncontrolled      Past Surgical History:   Procedure Laterality Date    APPENDECTOMY      CARDIOVERSION      COLONOSCOPY N/A 3/3/2016    Procedure: COLONOSCOPY;  Surgeon: Bob Poe MD;  [facility] ([address] [place] [address]);  Service: Endoscopy;  Laterality: N/A;  Holding Eliquis for 3 days prior to colonoscopy. EC    JOINT REPLACEMENT      Knee    Meniere's disease surgery      TONSILLECTOMY      TOTAL KNEE ARTHROPLASTY Right 01/25/2017    TKR    TOTAL KNEE ARTHROPLASTY Left     TKR, 1990's     Family History   Problem Relation Age of Onset    Diabetes Mother     Stroke Mother     Diabetes Father     Heart disease Father     Hypertension Father     Cancer Sister      brain    Eczema Sister     Cancer Brother      brain    Cancer Sister      leukemia, stomach cancer    No Known Problems Sister     ALS Brother      stomach cancer    No Known Problems Brother     No Known Problems Maternal Aunt     No Known Problems Maternal Uncle     No Known Problems Paternal Aunt     No Known Problems Paternal Uncle     No Known Problems Maternal Grandmother     No Known Problems Maternal Grandfather     No Known Problems Paternal Grandmother     No Known Problems Paternal Grandfather     Amblyopia Neg Hx     Blindness Neg Hx     Cataracts Neg Hx     Glaucoma Neg Hx     Macular degeneration Neg Hx     Retinal detachment Neg Hx     Strabismus Neg Hx     Thyroid disease Neg Hx     Melanoma Neg Hx     Psoriasis Neg Hx     Lupus Neg Hx     Acne Neg Hx      Social History   Substance Use Topics    Smoking status: Former Smoker     Quit date: 7/10/1985    Smokeless tobacco: Never Used    Alcohol use No      Comment: quit 2007- had excess in the past     Review of Systems   Constitutional: Negative for activity change, appetite change, chills and fever.   HENT: Negative for congestion, ear discharge, sinus pain, sinus  pressure, sneezing and trouble swallowing.    Eyes: Negative for photophobia and discharge.   Respiratory: Negative for apnea, cough, chest tightness, shortness of breath and wheezing.    Cardiovascular: Positive for chest pain. Negative for palpitations and leg swelling.   Gastrointestinal: Negative for abdominal distention, abdominal pain, constipation, diarrhea, nausea and vomiting.   Genitourinary: Negative for difficulty urinating, flank pain and urgency.   Musculoskeletal: Positive for arthralgias ( left shoulder pain), back pain (chronic lower back pain) and myalgias. Negative for gait problem, joint swelling, neck pain and neck stiffness.   Skin: Negative for pallor and rash.   Allergic/Immunologic: Negative.    Neurological: Negative for dizziness, syncope, weakness, numbness and headaches.   Psychiatric/Behavioral: Negative.        Physical Exam     Initial Vitals [04/09/18 1957]   BP Pulse Resp Temp SpO2   131/76 88 -- 98.3 °F (36.8 °C) 97 %      MAP       94.33         Physical Exam    Nursing note and vitals reviewed.  Constitutional: Vital signs are normal. He appears well-developed and well-nourished. He is not diaphoretic. He is cooperative. He does not have a sickly appearance. He does not appear ill. No distress.   HENT:   Head: Normocephalic and atraumatic.   Mouth/Throat: Uvula is midline, oropharynx is clear and moist and mucous membranes are normal.   Eyes: Conjunctivae, EOM and lids are normal. Pupils are equal, round, and reactive to light.   Neck: Trachea normal, normal range of motion and phonation normal. Neck supple. No JVD present.   Cardiovascular: Normal rate and normal heart sounds. An irregular rhythm present.   Pulses:       Radial pulses are 2+ on the right side, and 2+ on the left side.        Dorsalis pedis pulses are 2+ on the right side, and 2+ on the left side.   Pulmonary/Chest: Effort normal. He has decreased breath sounds.   Abdominal: Soft. Normal appearance and bowel  sounds are normal. He exhibits no distension. There is no tenderness. There is no rigidity, no rebound and no guarding.   Musculoskeletal: Normal range of motion.        Left shoulder: He exhibits tenderness, pain and spasm. He exhibits normal range of motion, no bony tenderness, no swelling, no effusion, no crepitus, no deformity, no laceration, normal pulse and normal strength.   Neurological: He is alert and oriented to person, place, and time. He has normal strength. No cranial nerve deficit or sensory deficit. GCS eye subscore is 4. GCS verbal subscore is 5. GCS motor subscore is 6.   Skin: Skin is warm, dry and intact. Capillary refill takes less than 2 seconds. No abrasion and no rash noted. No cyanosis. Nails show no clubbing.   Psychiatric: He has a normal mood and affect. His speech is normal and behavior is normal. Judgment and thought content normal. Cognition and memory are normal.         ED Course   Procedures  Labs Reviewed   CBC W/ AUTO DIFFERENTIAL   COMPREHENSIVE METABOLIC PANEL   TROPONIN I   B-TYPE NATRIURETIC PEPTIDE     EKG Readings: (Independently Interpreted)   EKG reveals atrial fib w ventricular rate of 85 QRS duration 94 QTc 437 no significant ST changes noted                APC / Resident Notes:   Emergent evaluation of a 71 yo male patient presenting to the ER with chief complaint of left shoulder pain and left-sided chest pain.  Pt states pain started last night and then subsided.  Pt states the pain returned this evening around 1700 and resolved again.  On exam, pt endorses muscular shoulder pain on ROM.  No bony tenderness, no decreased strength.  EKG showing A-fib.  Breath sounds diminished. I will get labs, medicate, image and reassess.  Differential diagnoses include but are not limited to ACS, muscular shoulder pain, shoulder dislocation. I discussed the care of this patient with my Supervising Physician.    2200- Initial troponin WNL.  Pt reassessed.  Pt states feeling better  after medications.  Labs and imaging reviewed with pt and family.  Will obtain second troponin and reassess.             Attending Attestation:     Physician Attestation Statement for NP/PA:   I reviewed the chart but I did not personally examine the patient. The face to face encounter was performed by the NP/PA.    Other NP/PA Attestation Additions:    History of Present Illness: 72-year-old white male with a history of diabetes hypertension as well as chronic kidney disease presents for intermittent left shoulder pain over the past 2 days.  Despite initially denying chest pain to the triage nurse the patient to the NP complained of  chest pain  that occurred with his shoulder pain tonight as well as tonight.  He denies nausea sweats or shortness of breath with pain. He denies pain with movement of the extremity.    Medical Decision Making: Patient is presenting for shoulder pain however due to his atypical nature of his pain will undergo a cardiac evaluation with 2 serial troponins present time.                    Clinical Impression:   The encounter diagnosis was Chest pain.                           Sean Man MD  04/10/18 [id number]

## 2018-04-11 ENCOUNTER — OFFICE VISIT (OUTPATIENT)
Dept: ORTHOPEDICS | Facility: CLINIC | Age: 73
End: 2018-04-11
Payer: MEDICARE

## 2018-04-11 ENCOUNTER — TELEPHONE (OUTPATIENT)
Dept: OPHTHALMOLOGY | Facility: CLINIC | Age: 73
End: 2018-04-11

## 2018-04-11 ENCOUNTER — HOSPITAL ENCOUNTER (OUTPATIENT)
Dept: RADIOLOGY | Facility: HOSPITAL | Age: 73
Discharge: HOME | End: 2018-04-11
Attending: NURSE PRACTITIONER
Payer: MEDICARE

## 2018-04-11 ENCOUNTER — TELEPHONE (OUTPATIENT)
Dept: ORTHOPEDICS | Facility: CLINIC | Age: 73
End: 2018-04-11

## 2018-04-11 VITALS — HEIGHT: 70 IN | BODY MASS INDEX: 45.1 KG/M2 | WEIGHT: 315 LBS

## 2018-04-11 DIAGNOSIS — M25.561 RIGHT KNEE PAIN, UNSPECIFIED CHRONICITY: ICD-10-CM

## 2018-04-11 DIAGNOSIS — M25.561 RIGHT KNEE PAIN, UNSPECIFIED CHRONICITY: Primary | ICD-10-CM

## 2018-04-11 PROCEDURE — 73590 X-RAY EXAM OF LOWER LEG: CPT | Mod: 26,RT,, | Performed by: RADIOLOGY

## 2018-04-11 PROCEDURE — 99213 OFFICE O/P EST LOW 20 MIN: CPT | Mod: S$GLB,,, | Performed by: NURSE PRACTITIONER

## 2018-04-11 PROCEDURE — 73560 X-RAY EXAM OF KNEE 1 OR 2: CPT | Mod: 26,XS,LT, | Performed by: RADIOLOGY

## 2018-04-11 PROCEDURE — 73562 X-RAY EXAM OF KNEE 3: CPT | Mod: 26,RT,, | Performed by: RADIOLOGY

## 2018-04-11 PROCEDURE — 73590 X-RAY EXAM OF LOWER LEG: CPT | Mod: TC,RT

## 2018-04-11 PROCEDURE — 99999 PR PBB SHADOW E&M-EST. PATIENT-LVL V: CPT | Mod: PBBFAC,,, | Performed by: NURSE PRACTITIONER

## 2018-04-11 PROCEDURE — 73562 X-RAY EXAM OF KNEE 3: CPT | Mod: TC,RT

## 2018-04-11 PROCEDURE — 73560 X-RAY EXAM OF KNEE 1 OR 2: CPT | Mod: TC,LT,59

## 2018-04-11 NOTE — TELEPHONE ENCOUNTER
Called patient and informed him that Dr. Stuart's MA, Deepa, will call him to get him scheduled to see Dr. Stuart to discuss tibial stem revision.

## 2018-04-11 NOTE — TELEPHONE ENCOUNTER
I called patient.  He has decided to pursue TORIC Implant after thinking about the change overnight.

## 2018-04-11 NOTE — TELEPHONE ENCOUNTER
----- Message from Miguel Stuart MD sent at 4/11/2018  3:31 PM CDT -----  OK  ----- Message -----  From: Sandy Adams NP  Sent: 4/11/2018   2:07 PM  To: Miguel Stuart MD    This patient had a right knee replacement by Dr. Knight in January 2017. I saw him in January and he was having pain over the tibia. I repeated the xray today and there is definitely tibial loosening. I got a crp and it was 5.1, so I'm not concerned about infection. He's a super nice valerie. I'm not sure whether to refer him to you or if you'd rather me send him to Dr. Plaza? Let me know and I'll get him taken care of.    Sandy

## 2018-04-12 ENCOUNTER — OFFICE VISIT (OUTPATIENT)
Dept: INTERNAL MEDICINE | Facility: CLINIC | Age: 73
End: 2018-04-12
Payer: MEDICARE

## 2018-04-12 ENCOUNTER — TELEPHONE (OUTPATIENT)
Dept: OPHTHALMOLOGY | Facility: CLINIC | Age: 73
End: 2018-04-12

## 2018-04-12 ENCOUNTER — TELEPHONE (OUTPATIENT)
Dept: ORTHOPEDICS | Facility: CLINIC | Age: 73
End: 2018-04-12

## 2018-04-12 ENCOUNTER — HOSPITAL ENCOUNTER (OUTPATIENT)
Dept: RADIOLOGY | Facility: HOSPITAL | Age: 73
Discharge: HOME | End: 2018-04-12
Attending: INTERNAL MEDICINE
Payer: MEDICARE

## 2018-04-12 VITALS
WEIGHT: 315 LBS | HEIGHT: 70 IN | BODY MASS INDEX: 45.1 KG/M2 | HEART RATE: 78 BPM | DIASTOLIC BLOOD PRESSURE: 64 MMHG | SYSTOLIC BLOOD PRESSURE: 110 MMHG

## 2018-04-12 DIAGNOSIS — G89.29 CHRONIC LEFT SHOULDER PAIN: ICD-10-CM

## 2018-04-12 DIAGNOSIS — Z79.4 TYPE 2 DIABETES MELLITUS WITH DIABETIC POLYNEUROPATHY, WITH LONG-TERM CURRENT USE OF INSULIN: ICD-10-CM

## 2018-04-12 DIAGNOSIS — I10 ESSENTIAL HYPERTENSION: ICD-10-CM

## 2018-04-12 DIAGNOSIS — E11.22 TYPE 2 DIABETES MELLITUS WITH STAGE 3 CHRONIC KIDNEY DISEASE, WITH LONG-TERM CURRENT USE OF INSULIN: ICD-10-CM

## 2018-04-12 DIAGNOSIS — M25.512 CHRONIC LEFT SHOULDER PAIN: Primary | ICD-10-CM

## 2018-04-12 DIAGNOSIS — G47.33 OSA ON CPAP: ICD-10-CM

## 2018-04-12 DIAGNOSIS — G89.29 CHRONIC LEFT SHOULDER PAIN: Primary | ICD-10-CM

## 2018-04-12 DIAGNOSIS — E11.42 TYPE 2 DIABETES MELLITUS WITH DIABETIC POLYNEUROPATHY, WITH LONG-TERM CURRENT USE OF INSULIN: ICD-10-CM

## 2018-04-12 DIAGNOSIS — N18.30 TYPE 2 DIABETES MELLITUS WITH STAGE 3 CHRONIC KIDNEY DISEASE, WITH LONG-TERM CURRENT USE OF INSULIN: ICD-10-CM

## 2018-04-12 DIAGNOSIS — M25.512 CHRONIC LEFT SHOULDER PAIN: ICD-10-CM

## 2018-04-12 DIAGNOSIS — M54.12 CERVICAL RADICULOPATHY: ICD-10-CM

## 2018-04-12 DIAGNOSIS — I48.20 CHRONIC ATRIAL FIBRILLATION: ICD-10-CM

## 2018-04-12 DIAGNOSIS — G47.10 HYPERSOMNOLENCE: ICD-10-CM

## 2018-04-12 DIAGNOSIS — Z79.4 TYPE 2 DIABETES MELLITUS WITH STAGE 3 CHRONIC KIDNEY DISEASE, WITH LONG-TERM CURRENT USE OF INSULIN: ICD-10-CM

## 2018-04-12 DIAGNOSIS — E03.9 HYPOTHYROIDISM, UNSPECIFIED TYPE: ICD-10-CM

## 2018-04-12 PROCEDURE — 3045F PR MOST RECENT HEMOGLOBIN A1C LEVEL 7.0-9.0%: CPT | Mod: CPTII,S$GLB,, | Performed by: INTERNAL MEDICINE

## 2018-04-12 PROCEDURE — 72040 X-RAY EXAM NECK SPINE 2-3 VW: CPT | Mod: 26,,, | Performed by: RADIOLOGY

## 2018-04-12 PROCEDURE — 99214 OFFICE O/P EST MOD 30 MIN: CPT | Mod: S$GLB,,, | Performed by: INTERNAL MEDICINE

## 2018-04-12 PROCEDURE — 72040 X-RAY EXAM NECK SPINE 2-3 VW: CPT | Mod: TC,FY,PO

## 2018-04-12 PROCEDURE — 3078F DIAST BP <80 MM HG: CPT | Mod: CPTII,S$GLB,, | Performed by: INTERNAL MEDICINE

## 2018-04-12 PROCEDURE — 3074F SYST BP LT 130 MM HG: CPT | Mod: CPTII,S$GLB,, | Performed by: INTERNAL MEDICINE

## 2018-04-12 PROCEDURE — 99999 PR PBB SHADOW E&M-EST. PATIENT-LVL III: CPT | Mod: PBBFAC,,, | Performed by: INTERNAL MEDICINE

## 2018-04-12 RX ORDER — TIZANIDINE 4 MG/1
4 TABLET ORAL EVERY 6 HOURS PRN
Qty: 40 TABLET | Refills: 0 | Status: SHIPPED | OUTPATIENT
Start: 2018-04-12 | End: 2018-04-18 | Stop reason: SDUPTHER

## 2018-04-12 RX ORDER — AZELASTINE 1 MG/ML
1 SPRAY, METERED NASAL 2 TIMES DAILY
Qty: 30 ML | Refills: 3 | Status: SHIPPED | OUTPATIENT
Start: 2018-04-12

## 2018-04-12 NOTE — TELEPHONE ENCOUNTER
----- Message from Sandy Adams NP sent at 4/11/2018  4:18 PM CDT -----  Dr. Stuart agreed to see this patient for a tibial stem revision. Can you please see where you can fit him in.  Thanks  Sandy  ----- Message -----  From: Miguel Stuart MD  Sent: 4/11/2018   3:31 PM  To: Sandy Adams NP    OK  ----- Message -----  From: Sandy Adams NP  Sent: 4/11/2018   2:07 PM  To: Miguel Stuart MD    This patient had a right knee replacement by Dr. Knight in January 2017. I saw him in January and he was having pain over the tibia. I repeated the xray today and there is definitely tibial loosening. I got a crp and it was 5.1, so I'm not concerned about infection. He's a super nice valerie. I'm not sure whether to refer him to you or if you'd rather me send him to Dr. Plaza? Let me know and I'll get him taken care of.    Sandy

## 2018-04-12 NOTE — TELEPHONE ENCOUNTER
Spoke to pt and he was given an appointment with Dr. Stuart and the appointment slip was mailed to pt home.

## 2018-04-12 NOTE — TELEPHONE ENCOUNTER
Spoke with the patient and gave surgery arrival time 6:00am, also do not eat anything after 9:00pm the night before surgery. Patient may have water, gatorade, or powerade from 9:00pm til you leave your home the morning of surgery.

## 2018-04-12 NOTE — PROGRESS NOTES
PAST MEDICAL HISTORY:  Type 2 diabetes associated with peripheral neuropathy and chronic kidney   disease.  Chronic atrial fibrillation.  Hypertension.  Hyperlipidemia.  Sleep apnea.  Lumbar degenerative disc disease.  Chronic venous insufficiency  Bph    MEDICATIONS:  Eliquis 5 mg twice a day.  Aspirin 325 mg a day.  Fenofibrate 145 mg a day.  Astelin 1 puff bid  Flonase two puffs a day  Lasix 40 mg a day.  Gabapentin 300 mg three times a day.   Basaglar 60 units in the morning.  NovoLog 15 units in the morning, 17 units with lunch and supper.  Levothyroxine 0.025 mg a day.  Losartan 25 mg a day.  Metoprolol succinate 100 mg a day.  Pravastatin 10 mg a day.  Flomax 0.4 mg a day.    REASON FOR VISIT:  This is a 72-year-old male.  For a few weeks now he has been   having pain around his left shoulder.  Pain is really more right above the left   scapula.  It is a sharp shooting pain that keeps occurring and more noticeable   when he reaches forward or backwards.  However, on April 9th, he had to go to   the Emergency Room because he was cooking and all of a sudden he just felt the   pain and he felt like it was a sharp arrow going from his back to his chest, so   he had left-sided chest pain. In the Emergency Room, he did notice some pain on   the left side of his neck and tingling in his left arm.  He was evaluated and   determined that there was evidence of acute myocardial infarction, which was   negative.  Troponin and BNP was normal.  EKG showed atrial fibrillation, which   is unchanged.    He is reporting no shortness of breath, no other chest pain.  His wife does   report that during the day many times all of a sudden and instance, he could be   awake, looking up and then next time in a moment's notice his head is down and   sleeping.  He has a history of sleep apnea.  He uses CPAP but most nights he   does not use it, but it is also reported that even if he goes a stretch of using   it throughout the night,  it does not make a difference on how he feels the next   day.  He tends not to use it because he will get congested in his nose like he   has a cold.  However, he does have nasal congestion.  He has two sprays of   Astelin and Flonase.  He finds that Astelin is better.    PAST MEDICAL HISTORY:  Outlined above.    MEDICATIONS:  Outlined above.  Comments with this is that Lasix he is taking   most days of the week unless he is going somewhere like today.  He states that   the Flomax has been very helpful as far as not getting up in the middle of night   to urinate.    Recently because of hematuria, he had a CT urogram that showed mild bilateral   renal stones and a cystoscope that was unrevealing other than significant BPH.    Recently, he had an MRI that showed various degrees of facet arthropathy, spinal   stenosis.  He was seen by Orthopedic and Spine Surgery and plan was to be   referred to Physical Therapy and the Pain Center.    In regard to his diabetes, he readily admits the readings are depending on how   his diet goes.  When he is more attentive, it is anywhere from 120-145, when not   it is above 170.    PHYSICAL EXAMINATION:  VITAL SIGNS:  Weight is 316 pounds, pulse 72, blood pressure 100/64.  LUNGS:  Clear.  HEART:  Irregularly irregular.  MUSCULOSKELETAL:  He has 1+ biceps, 2+ triceps reflexes bilaterally.  There is   no restriction on moving his arms at the shoulder joint, but with the left, he   could feel discomfort in the back of his shoulder.    IMPRESSION:  1.  Left shoulder pain, which I think is related to muscular strain and possibly   that of cervical radiculopathy.  2.  Hypersomnolence with history of obstructive sleep apnea.  3.  Type 2 diabetes with both chronic kidney disease and peripheral neuropathy.    PLAN:  When he was in the Emergency Room, 30 pills of Robaxin were called in to   take two three times a day.  He is not certain if it is helpful.  I am going to   go ahead and give him a  trial of tizanidine 4 mg three times a day in its place.    Episodic cool packs recommended.  While he is here to get a cervical spine   film and be up-to-date on labs of a TSH and hemoglobin A1c.  In the Emergency   Room, a chemistry test and CBC test was normal other than glucose 195,   creatinine 1.8.  Also, we will have him meet with Sleep Center in regard to be   helpful in regard to the use of a CPAP, sleep apnea and hypersomnolence and I   advised him to use the Astelin nasal spray on a consistent basis now in place of   Flonase and to start using the CPAP on a regular basis for the time being to   see if over time it may eventually help.        /kaiden [id number] review        ANTONIETTA/EMPERATRIZ  dd: 04/12/2018 11:39:53 (CDT)  td: 04/13/2018 06:00:05 (CDT)  Doc ID   #[id number]  Job ID #[id number]    CC:

## 2018-04-13 ENCOUNTER — TELEPHONE (OUTPATIENT)
Dept: OPHTHALMOLOGY | Facility: CLINIC | Age: 73
End: 2018-04-13

## 2018-04-13 NOTE — TELEPHONE ENCOUNTER
----- Message from Amaya Koo sent at 4/13/2018  1:55 PM CDT -----  Contact: Mrs. Sakshi Cantor called to cancel her 's surgery on Monday because he keeps passing out. She would like for you to contact her [phone]

## 2018-04-13 NOTE — TELEPHONE ENCOUNTER
I spoke with wife.   is having episodes of passing out over the last few days.  Unexplained. Will need to address this prior to proceeding with cataract surgery.  Wife agrees.  Will cancel surgery for Monday, April 16.  Plan to proceed on 05/14. [facility]ula [facility] will refund FLACS payment.

## 2018-04-13 NOTE — PROGRESS NOTES
CC: Post-op Evaluation of the Right Knee      HPI: Pt with right knee pain located over the proximal tibia for the past year. He had a right knee replacement 1/25/17 by Dr. Knight. The pain is worse when he first starts walking. He has taken antiinflammatories without relief. He is ambulating with a cane.    ROS  General: denies fever and chills  Resp: no c/o sob  CVS: no c/o cp  MSK: c/o right knee pain located over the proximal tibia. The pain is worse with activity    PE  General: AAOx3, pleasant and cooperative  Resp: respirations even and unlabored  MSK: right knee exam  0 degrees extension  110 degrees flexion  No warmth or erythema   - effusion    Xray:  Reviewed by me: tibia stem loosening noted    Assessment:  Right knee prosthetic joint failure    Plan:  Discussed with Dr. Stuart and will schedule an appointment with him to discuss revision arthroplasty  Nsaids and RICE prn

## 2018-04-16 ENCOUNTER — TELEPHONE (OUTPATIENT)
Dept: INTERNAL MEDICINE | Facility: CLINIC | Age: 73
End: 2018-04-16

## 2018-04-16 RX ORDER — TEMAZEPAM 15 MG/1
15 CAPSULE ORAL NIGHTLY
Qty: 30 CAPSULE | Refills: 0 | Status: SHIPPED | OUTPATIENT
Start: 2018-04-16 | End: 2018-05-25 | Stop reason: SDUPTHER

## 2018-04-16 NOTE — TELEPHONE ENCOUNTER
----- Message from Isabella Xiong sent at 4/13/2018  1:34 PM CDT -----  Contact: Calli/Spouse/[phone]  .1 Patient would like to get medical advice.  Symptoms (please be specific): sleepy all the time  How long has patient had these symptoms: since yesterday 04/12/18    Pharmacy name and phone#: [facility] [place] [address] [phone] (Phone)  [phone] (Fax)  Any drug allergies: Niacin, terbinafine  Comments: Patient would like to get medical advice.

## 2018-04-16 NOTE — TELEPHONE ENCOUNTER
When he was seen by me on Thursday, April 12   hypersomnolence or that of excess sleeping was discussed and evaluated      The plan was to be seen by the sleep clinic as a consult   what is the status

## 2018-04-16 NOTE — TELEPHONE ENCOUNTER
Pt states he has been feeling this way for the last month in a half , he does feel dizzy and lightheaded but on on certain times. When he drinking a soda or having the hiccups .        Please advise       .1 Patient would like to get medical advice.   Symptoms (please be specific): sleepy all the time   How long has patient had these symptoms: since yesterday 04/12/18     Pharmacy name and phone#: & [facility] [place] [facility] [place] [facility] [phone] (Phone)   [phone] (Fax)   Any drug allergies: Niacin, terbinafine   Comments: Patient would like to get medical advice.

## 2018-04-17 ENCOUNTER — OFFICE VISIT (OUTPATIENT)
Dept: ENDOCRINOLOGY | Facility: CLINIC | Age: 73
End: 2018-04-17
Payer: MEDICARE

## 2018-04-17 VITALS
HEART RATE: 71 BPM | HEIGHT: 70 IN | SYSTOLIC BLOOD PRESSURE: 123 MMHG | BODY MASS INDEX: 45.1 KG/M2 | DIASTOLIC BLOOD PRESSURE: 75 MMHG | WEIGHT: 315 LBS

## 2018-04-17 DIAGNOSIS — E66.01 MORBID OBESITY WITH BMI OF 40.0-44.9, ADULT: ICD-10-CM

## 2018-04-17 DIAGNOSIS — M25.561 CHRONIC PAIN OF RIGHT KNEE: ICD-10-CM

## 2018-04-17 DIAGNOSIS — Z79.4 TYPE 2 DIABETES MELLITUS WITH STAGE 3 CHRONIC KIDNEY DISEASE, WITH LONG-TERM CURRENT USE OF INSULIN: Primary | ICD-10-CM

## 2018-04-17 DIAGNOSIS — N18.30 TYPE 2 DIABETES MELLITUS WITH STAGE 3 CHRONIC KIDNEY DISEASE, WITH LONG-TERM CURRENT USE OF INSULIN: Primary | ICD-10-CM

## 2018-04-17 DIAGNOSIS — E11.22 TYPE 2 DIABETES MELLITUS WITH STAGE 3 CHRONIC KIDNEY DISEASE, WITH LONG-TERM CURRENT USE OF INSULIN: Primary | ICD-10-CM

## 2018-04-17 DIAGNOSIS — E78.5 DYSLIPIDEMIA ASSOCIATED WITH TYPE 2 DIABETES MELLITUS: ICD-10-CM

## 2018-04-17 DIAGNOSIS — E11.69 DYSLIPIDEMIA ASSOCIATED WITH TYPE 2 DIABETES MELLITUS: ICD-10-CM

## 2018-04-17 DIAGNOSIS — G89.29 CHRONIC PAIN OF RIGHT KNEE: ICD-10-CM

## 2018-04-17 DIAGNOSIS — E11.42 TYPE 2 DIABETES MELLITUS WITH DIABETIC POLYNEUROPATHY, WITH LONG-TERM CURRENT USE OF INSULIN: ICD-10-CM

## 2018-04-17 DIAGNOSIS — I48.20 CHRONIC ATRIAL FIBRILLATION: ICD-10-CM

## 2018-04-17 DIAGNOSIS — Z79.4 TYPE 2 DIABETES MELLITUS WITH DIABETIC POLYNEUROPATHY, WITH LONG-TERM CURRENT USE OF INSULIN: ICD-10-CM

## 2018-04-17 DIAGNOSIS — Z92.89 HISTORY OF CARDIOVERSION: ICD-10-CM

## 2018-04-17 DIAGNOSIS — G47.33 OBSTRUCTIVE SLEEP APNEA SYNDROME: ICD-10-CM

## 2018-04-17 DIAGNOSIS — I51.89 DIASTOLIC DYSFUNCTION: ICD-10-CM

## 2018-04-17 DIAGNOSIS — E55.9 VITAMIN D DEFICIENCY: ICD-10-CM

## 2018-04-17 DIAGNOSIS — I10 ESSENTIAL HYPERTENSION: ICD-10-CM

## 2018-04-17 DIAGNOSIS — N18.30 CHRONIC KIDNEY DISEASE, STAGE III (MODERATE): ICD-10-CM

## 2018-04-17 PROBLEM — N39.0 URINARY TRACT INFECTION WITHOUT HEMATURIA: Status: RESOLVED | Noted: 2017-05-30 | Resolved: 2018-04-17

## 2018-04-17 PROCEDURE — 3078F DIAST BP <80 MM HG: CPT | Mod: CPTII,S$GLB,, | Performed by: NURSE PRACTITIONER

## 2018-04-17 PROCEDURE — 99214 OFFICE O/P EST MOD 30 MIN: CPT | Mod: S$GLB,,, | Performed by: NURSE PRACTITIONER

## 2018-04-17 PROCEDURE — 99999 PR PBB SHADOW E&M-EST. PATIENT-LVL V: CPT | Mod: PBBFAC,,, | Performed by: NURSE PRACTITIONER

## 2018-04-17 PROCEDURE — 3044F HG A1C LEVEL LT 7.0%: CPT | Mod: CPTII,S$GLB,, | Performed by: NURSE PRACTITIONER

## 2018-04-17 PROCEDURE — 3074F SYST BP LT 130 MM HG: CPT | Mod: CPTII,S$GLB,, | Performed by: NURSE PRACTITIONER

## 2018-04-17 RX ORDER — INSULIN GLARGINE 100 [IU]/ML
60 INJECTION, SOLUTION SUBCUTANEOUS DAILY
Qty: 2 BOX | Refills: 11 | Status: SHIPPED | OUTPATIENT
Start: 2018-04-17 | End: 2019-08-13 | Stop reason: CLARIF

## 2018-04-17 NOTE — PROGRESS NOTES
"CC: The primary encounter diagnosis was Type 2 diabetes mellitus with stage 3 chronic kidney disease and neuropathy. Arrives accompanied by wife today.     HPI: Mr. Stephen Cantor Jr. was diagnosed with Type 2 DM " a long time ago".  He had been told by his doctor that he was borderline for 35-40 years. Started on metformin "around 2000". Had diarrhea with metformin. Has been on insulin since 2010 - reports weight gain since starting insulin. Denies hospitalizations due to DM.   Last seen by MARCE Gay DNP in 1/2018 but is new to me today. A1c improved which he attributes to increase in MDI doses at last appt.   Seen in ED with shoulder pain earlier this month.   Frequently under the care of CODY Correa for treatment of chronicLLE ulcer - last seen 1/2018.     CURRENT DIABETIC MEDS:  Basaglar 60 units QHS, Novolog 20/22/22 units AC plus correction scale, goal 150, ISF 25.     Denies missed doses of insulin. Rotates insulin injection sites    checks BG 3x/day with True Metrix glucometer, brought meter and logs to clinic.  Rare hypoglycemia, feels s/s including hunger, weak when BG <80  7 day average 155  14 day average 160  30 day average 159          Good appetite. Less snacks between meals. Drinks Crystal light or sugar free koolaid packs. Eats out and at home    Exercise: limited r/t chronic back pain    Last Podiatry Exam: 2/2018    REVIEW OF SYSTEMS  Constitutional: + chronic fatigue; 4 lb weight gain since last visit.   Eyes: occasional right eye blurry vision; (+) glaucoma; chronic right eye redness;  last eye exam: pending cataract removal  Cardiac: no palpitations, murmurs, or chest pain.   Respiratory: denies current dyspnea, but does admit to occasional dyspnea with exertion.   GI: no c/o abdominal pain, nausea, or bowel pattern changes  Skin: no rashes; no bruising or lipodystrophy at insulin injection sites.    Neuro: chronic numbness, tingling in bilateral feet; no dizziness  MS: chronic back pain - " "going Friday for injection     Vital Signs  /75 (BP Location: Left arm, Patient Position: Sitting)   Pulse 71   Ht 5' 10" (1.778 m)   Wt (!) 144.2 kg (318 lb)   BMI 45.63 kg/m²     Hemoglobin A1C   Date Value Ref Range Status   04/12/2018 6.8 (H) 4.0 - 5.6 % Final     Comment:     According to ADA guidelines, hemoglobin A1c <7.0% represents  optimal control in non-pregnant diabetic patients. Different  metrics may apply to specific patient populations.   Standards of Medical Care in Diabetes-2016.  For the purpose of screening for the presence of diabetes:  <5.7%     Consistent with the absence of diabetes  5.7-6.4%  Consistent with increasing risk for diabetes   (prediabetes)  >or=6.5%  Consistent with diabetes  Currently, no consensus exists for use of hemoglobin A1c  for diagnosis of diabetes for children.  This Hemoglobin A1c assay has significant interference with fetal   hemoglobin   (HbF). The results are invalid for patients with abnormal amounts of   HbF,   including those with known Hereditary Persistence   of Fetal Hemoglobin. Heterozygous hemoglobin variants (HbAS, HbAC,   HbAD, HbAE, HbA2) do not significantly interfere with this assay;   however, presence of multiple variants in a sample may impact the %   interference.     01/02/2018 7.7 (H) 4.0 - 5.6 % Final     Comment:     According to ADA guidelines, hemoglobin A1c <7.0% represents  optimal control in non-pregnant diabetic patients. Different  metrics may apply to specific patient populations.   Standards of Medical Care in Diabetes-2016.  For the purpose of screening for the presence of diabetes:  <5.7%     Consistent with the absence of diabetes  5.7-6.4%  Consistent with increasing risk for diabetes   (prediabetes)  >or=6.5%  Consistent with diabetes  Currently, no consensus exists for use of hemoglobin A1c  for diagnosis of diabetes for children.  This Hemoglobin A1c assay has significant interference with fetal   hemoglobin   (HbF). " The results are invalid for patients with abnormal amounts of   HbF,   including those with known Hereditary Persistence   of Fetal Hemoglobin. Heterozygous hemoglobin variants (HbAS, HbAC,   HbAD, HbAE, HbA2) do not significantly interfere with this assay;   however, presence of multiple variants in a sample may impact the %   interference.     11/15/2017 7.1 (H) 4.0 - 5.6 % Final     Comment:     According to ADA guidelines, hemoglobin A1c <7.0% represents  optimal control in non-pregnant diabetic patients. Different  metrics may apply to specific patient populations.   Standards of Medical Care in Diabetes-2016.  For the purpose of screening for the presence of diabetes:  <5.7%     Consistent with the absence of diabetes  5.7-6.4%  Consistent with increasing risk for diabetes   (prediabetes)  >or=6.5%  Consistent with diabetes  Currently, no consensus exists for use of hemoglobin A1c  for diagnosis of diabetes for children.  This Hemoglobin A1c assay has significant interference with fetal   hemoglobin   (HbF). The results are invalid for patients with abnormal amounts of   HbF,   including those with known Hereditary Persistence   of Fetal Hemoglobin. Heterozygous hemoglobin variants (HbAS, HbAC,   HbAD, HbAE, HbA2) do not significantly interfere with this assay;   however, presence of multiple variants in a sample may impact the %   interference.         Chemistry        Component Value Date/Time     04/09/2018 2119    K 4.5 04/09/2018 2119     04/09/2018 2119    CO2 23 04/09/2018 2119    BUN 27 (H) 04/09/2018 2119    CREATININE 1.8 (H) 04/09/2018 2119     (H) 04/09/2018 2119        Component Value Date/Time    CALCIUM 9.8 04/09/2018 2119    ALKPHOS 38 (L) 04/09/2018 2119    AST 19 04/09/2018 2119    ALT 14 04/09/2018 2119    BILITOT 0.6 04/09/2018 2119        Lab Results   Component Value Date    CHOL 159 11/15/2017    CHOL 159 05/30/2017    CHOL 133 03/06/2017     Lab Results   Component  Value Date    HDL 26 (L) 11/15/2017    HDL 22 (L) 05/30/2017    HDL 28 (L) 03/06/2017     Lab Results   Component Value Date    LDLCALC 95.2 11/15/2017    LDLCALC 97.6 05/30/2017    LDLCALC 79.0 03/06/2017     Lab Results   Component Value Date    TRIG 189 (H) 11/15/2017    TRIG 197 (H) 05/30/2017    TRIG 130 03/06/2017     Lab Results   Component Value Date    CHOLHDL 16.4 (L) 11/15/2017    CHOLHDL 13.8 (L) 05/30/2017    CHOLHDL 21.1 03/06/2017     Lab Results   Component Value Date    TSH 2.864 04/12/2018     Assessment/Plan  Type 2 diabetes mellitus with stage 3 chronic kidney disease, with long-term current use of insulin   A1C trended down, below goal considering age and comorbidities  A1C goal ~7.5% or less without hypoglycemia  Avoid hypoglycemia  Continue MDI. Discussed insulins' onset, peak, duration, storage, dosing, administration, site rotation.   Monitor BG 4x/day  - takes ASA, ARB, statin      Type 2 diabetes mellitus with diabetic polyneuropathy, with long-term current use of insulin   Continue Gabapentin, f/u with Podiatry    CKD , stage III  GFR 36  avoid hypoglycemia  Caution with insulin stacking  F/u with renal as recommended  On ARB therapy  Avoiding metformin and SLGT2i    Dyslipidemia associated with type 2 diabetes mellitus    LDL goal < 100  - controlled, LDL at goal, on moderate intensity statin, LFTs WNL  On Pravastatin, Tricor, Lovaza    Chronic atrial fibrillation with hx of cardioversion  If uncontrolled, can worsen insulin resistance    Essential hypertension   controlled, continue meds as previously prescribed and monitor      Morbid obesity with BMI of 40.0-45.9, adult   Body mass index is 45.63 kg/m².   Weight loss can help to reduce insulin resistance    Sleep apnea  If uncontrolled, may contribute to insulin resistance  Continue use of C-pap    Vitamin D deficiency  Vit D, 25-Hydroxy   Date Value Ref Range Status   01/16/2018 17 (L) 30 - 96 ng/mL Final     Comment:     Vitamin D  deficiency.........<10 ng/mL                              Vitamin D insufficiency......10-29 ng/mL       Vitamin D sufficiency........> or equal to 30 ng/mL  Vitamin D toxicity............>100 ng/mL        Chronic knee pain/ DJD - pain limits mobility, pain may be elevating BG readings      Orders Placed This Encounter   Procedures    Hemoglobin A1c         FOLLOW UP  Follow-up in about 6 months (around 10/17/2018).

## 2018-04-18 DIAGNOSIS — I87.2 VENOUS INSUFFICIENCY OF BOTH LOWER EXTREMITIES: ICD-10-CM

## 2018-04-18 DIAGNOSIS — E11.22 TYPE 2 DIABETES MELLITUS WITH STAGE 3 CHRONIC KIDNEY DISEASE, WITH LONG-TERM CURRENT USE OF INSULIN: ICD-10-CM

## 2018-04-18 DIAGNOSIS — I48.20 CHRONIC ATRIAL FIBRILLATION: ICD-10-CM

## 2018-04-18 DIAGNOSIS — I50.31 ACUTE DIASTOLIC HEART FAILURE: ICD-10-CM

## 2018-04-18 DIAGNOSIS — N18.30 TYPE 2 DIABETES MELLITUS WITH STAGE 3 CHRONIC KIDNEY DISEASE, WITH LONG-TERM CURRENT USE OF INSULIN: ICD-10-CM

## 2018-04-18 DIAGNOSIS — N18.30 CHRONIC KIDNEY DISEASE, STAGE III (MODERATE): ICD-10-CM

## 2018-04-18 DIAGNOSIS — M17.11 PRIMARY OSTEOARTHRITIS OF RIGHT KNEE: ICD-10-CM

## 2018-04-18 DIAGNOSIS — Z79.4 TYPE 2 DIABETES MELLITUS WITH STAGE 3 CHRONIC KIDNEY DISEASE, WITH LONG-TERM CURRENT USE OF INSULIN: ICD-10-CM

## 2018-04-18 DIAGNOSIS — I10 ESSENTIAL HYPERTENSION: ICD-10-CM

## 2018-04-18 DIAGNOSIS — R35.0 URINARY FREQUENCY: ICD-10-CM

## 2018-04-19 RX ORDER — OXYBUTYNIN CHLORIDE 5 MG/1
TABLET, EXTENDED RELEASE ORAL
Qty: 30 TABLET | Refills: 12 | Status: SHIPPED | OUTPATIENT
Start: 2018-04-19 | End: 2018-05-07

## 2018-04-23 ENCOUNTER — CLINICAL SUPPORT (OUTPATIENT)
Dept: REHABILITATION | Facility: OTHER | Age: 73
End: 2018-04-23
Attending: ORTHOPAEDIC SURGERY
Payer: MEDICARE

## 2018-04-23 DIAGNOSIS — M54.50 CHRONIC BILATERAL LOW BACK PAIN WITHOUT SCIATICA: ICD-10-CM

## 2018-04-23 DIAGNOSIS — G89.29 CHRONIC BILATERAL LOW BACK PAIN WITHOUT SCIATICA: ICD-10-CM

## 2018-04-23 PROCEDURE — 97162 PT EVAL MOD COMPLEX 30 MIN: CPT

## 2018-04-23 PROCEDURE — G8979 MOBILITY GOAL STATUS: HCPCS | Mod: CK

## 2018-04-23 PROCEDURE — 97110 THERAPEUTIC EXERCISES: CPT

## 2018-04-23 PROCEDURE — G8978 MOBILITY CURRENT STATUS: HCPCS | Mod: CL

## 2018-04-23 NOTE — PLAN OF CARE
"  [facility] - PHYSICAL THERAPY EVALUATION     Name: ricki Cantor Jr.  Clinic Number: [id number]    Stephen is a 72 y.o. male evaluated on 04/23/2018    Time In: 1:00  Time out: 2:30    Diagnosis:   Encounter Diagnosis   Name Primary?    Chronic bilateral low back pain without sciatica    Physician: Carroll De Souza MD  Treatment Orders: PT Eval and Treat    Past Medical History:   Diagnosis Date    *Atrial fibrillation     On Coumadin    Anticoagulant long-term use     Basal cell carcinoma 12/2015    left eye brow    BCC (basal cell carcinoma) 12/2015    left shoulder    Cataract     Chronic kidney disease     Diabetes mellitus with renal manifestations, uncontrolled     Dyslipidemia associated with type 2 diabetes mellitus     Fever blister     HTN (hypertension)     Keloid cicatrix     Melanoma 12/07/2015    right cheek-in situ    Obesity     PN (peripheral neuropathy)     Primary osteoarthritis of right knee 1/25/2017    Screening for colon cancer 3/3/2016    Sleep apnea     wears CPAP at night    Type II or unspecified type diabetes mellitus with other specified manifestations, uncontrolled      Current Outpatient Prescriptions   Medication Sig    apixaban 5 mg Tab Take 1 tablet (5 mg total) by mouth 2 (two) times daily.    ascorbic acid (VITAMIN C) 100 MG tablet Take 1 tablet by mouth every morning.     aspirin 325 MG tablet Take 1 tablet by mouth every morning.     azelastine (ASTELIN) 137 mcg (0.1 %) nasal spray 1 spray (137 mcg total) by Nasal route 2 (two) times daily.    BD INSULIN PEN NEEDLE UF ORIG 29 x 1/2 " Ndle     BD ULTRA-FINE BASIL PEN NEEDLES 32 gauge x 5/32" Ndle CHECK BLOOD SUGAR FOUR TIMES DAILY    ciclopirox (PENLAC) 8 % Soln Apply topically nightly.    CINNAMON BARK (CINNAMON ORAL) Take by mouth once daily.    clobetasol (TEMOVATE) 0.05 % cream AAA finger bid    clotrimazole-betamethasone 1-0.05% (LOTRISONE) cream Apply topically 2 (two) times daily.    " desonide (DESOWEN) 0.05 % lotion AAA bid prn redness, scaling, or itching    desoximetasone (TOPICORT) 0.25 % cream 0.25 %.  Cream Topical .  AAA bid back    fenofibrate (TRICOR) 145 MG tablet TAKE ONE TABLET BY MOUTH EVERY DAY    furosemide (LASIX) 40 MG tablet Take 1 tablet (40 mg total) by mouth daily as needed.    gabapentin (NEURONTIN) 300 MG capsule TAKE ONE CAPSULE BY MOUTH THREE TIMES DAILY    insulin aspart (NOVOLOG FLEXPEN) 100 unit/mL InPn pen Novolog 20 units breakfast, 22 units lunch and dinner plus correction scale. Max daily dose=100 units    insulin glargine (BASAGLAR KWIKPEN U-100 INSULIN) 100 unit/mL (3 mL) InPn pen Inject 60 Units into the skin once daily.    INV C. DIFFICILE VACCINE/PLACEBO Administer as directed. FOR INVESTIGATIONAL USE ONLY.    ketoconazole (NIZORAL) 2 % cream Apply topically once daily.    lancets 33 gauge Misc 1 lancet by Misc.(Non-Drug; Combo Route) route 4 (four) times daily. Pt uses True Result Meter, bg monitoring 4 times a day.    levothyroxine (SYNTHROID) 25 MCG tablet Take 1 tablet (25 mcg total) by mouth once daily.    losartan (COZAAR) 25 MG tablet Take 1 tablet (25 mg total) by mouth once daily.    metoprolol succinate (TOPROL-XL) 100 MG 24 hr tablet TAKE ONE TABLET BY MOUTH EVERY DAY    metronidazole (METROGEL) 0.75 % gel Apply topically 2 (two) times daily.    multivitamin capsule Take 1 capsule by mouth every morning.     mupirocin (BACTROBAN) 2 % ointment Apply topically 2 (two) times daily. Apply to wound twice daily as directed.    nystatin-triamcinolone (MYCOLOG II) cream apply TWICE DAILY (Patient taking differently: apply TWICE DAILY-using as needed for rash)    omega-3 acid ethyl esters (LOVAZA) 1 gram capsule TAKE THREE CAPSULE BY MOUTH TWICE DAILY    oxybutynin (DITROPAN-XL) 5 MG TR24 TAKE ONE TABLET BY MOUTH EVERY DAY    pravastatin (PRAVACHOL) 10 MG tablet Take 1 tablet (10 mg total) by mouth once daily.    tamsulosin (FLOMAX) 0.4  mg Cp24 Take 1 capsule (0.4 mg total) by mouth once daily.    temazepam (RESTORIL) 15 mg Cap Take 1 capsule (15 mg total) by mouth every evening.    triamcinolone acetonide 0.1% (KENALOG) 0.1 % cream Apply topically 2 (two) times daily. Apply to affected area twice daily as directed.    vitamin E 1000 UNIT capsule Take 1,000 Units by mouth every morning. 1 Capsule Oral Every day     No current facility-administered medications for this visit.      Review of patient's allergies indicates:   Allergen Reactions    Niacin Itching and Other (See Comments)     Other reaction(s): facial flushing and causes hepatitis     Terbinafine Other (See Comments)     Other reaction(s): Hepatitis     Precautions: Fall     Visit # authorized: 12  Authorization period: 6/23/18  Plan of care Expiration: Sent 04/23/2018    HISTORY   History of Present Illness: Pt reports low back pain pain along entire belt line that significantly increases with standing and walking. Pt reports a numbing kind of pain. Pt also reports right hip aching pain. He reports continued right knee pain after attempting knee replacement. Will be following up with MD. Pt reports distal numbness secondary to diabetics. Pt reports HB helped a little but he has worsened since his last session. He did not continue secondary to getting knee replacement.     Left posterior shoulder pain  about out 1 month. Pt reports increased pain with reaching forwards and backwards. He went to ER due to severity of pain and was cleared of MI at time of evaluation.     Diagnostic Tests: From EPIC MRI  COMPARISON:  MRI lumbar spine without May 18, 2016.    FINDINGS:  Alignment: Grade 1 anterolisthesis of L5 on S1.    Vertebrae: L3 vertebral body hemangioma noted.  No fracture.    Discs: Normal height and signal.    Cord: Normal.  Conus terminates at L1.    Degenerative findings:    There is epidural lipomatosis throughout the lumbar spine, resulting in narrowing of the thecal sac at  all levels.    T12-L1: No significant spinal canal stenosis or neural foraminal narrowing.    L1-L2: Mild broad-based disc bulge, with bilateral facet arthropathy, resulting in mild spinal canal stenosis.    L2-L3: Broad-based disc bulge and bilateral facet arthropathy, resulting in mild spinal canal stenosis.    L3-L4: Broad-based disc bulge, with bilateral facet arthropathy, resulting in mild spinal canal stenosis, mild right-sided and moderate left-sided neural foraminal narrowing.    L4-L5: Broad-based disc bulge and bilateral facet arthropathy, resulting in mild spinal canal stenosis, moderate right-sided, and mild left-sided neural foraminal narrowing.    L5-S1: Mild facet arthropathy resulting in mild left neural foraminal narrowing.    Paraspinal muscles & soft tissues: Unremarkable.   Impression       Epidural lipomatosis with multilevel lumbar spondylosis as detailed above.         Pain Scale: [facility] rates pain on a scale of 0-10 to be 10 at worst; 8 currently; 0 at best using VAS.   Pain location: Low back, right hip and knee    Aggravating factors:  Standing, walking (10 minutes)    Easing Factors: sitting, leaning forward    Disturbed Sleep: Yes, does wake up with right hip pain. Usually sleeps on side or back without pillow. Pt educated on using pillow between legs while sleeping to improve pelvic alignment and potentially reduce morning symptoms      Pattern of pain questions:  1.  Where is your pain the worst? Low back   2.  Is your pain constant or intermittent? Intermittent   3.  Does bending forward make your typical pain worse? No, no pain with sitting   4.  Since the start of your back pain, has there been a change in your bowel or bladder? No   5.  What can't you do now that you use to be able to do? Walk, run jog, go to grocery store without buggy to help assist, going to [place]     Prior Treatment: Previous 14 session with HB, Has done PT on and off for years, [facility]ANDY in the '90s(no  improvement)  Prior functional status: Independent   DME owned/used: none, [facility], Has walker (Doesn't use often)   Occupation:  Retired (Used to work for [employer])    Leisure: [place]                      Pts goals:  Get around a little better     Red Flag Screening:  Cough  Sneeze  Strain: No  Bladder/ bowel: No  Falls: No, did have 1 fall a year ago when playing with dog, no injury   General health: Good   Night pain: No   Unexplained weight loss: No     OBJECTIVE     POSTURE  Posture Alignment :trunk deviated right  Postural examination/scapula alignment: Rounded shoulder, Head forward, Affected scapula abducted, Abnormal trunk flexion, Trunk deviated right and Increased kyphosis  Joint integrity: Not tested   Sitting: Poor  Standing: Poor  Correction of posture: Added slimline roll, Improved posture in sitting.     SLS: Unable to perform  Trendelenburg: Unable to perform  Functional squat: Unable to perform     MOVEMENT LOSS    ROM Loss   Flexion moderate loss and Tight hamstrings   Extension major loss and Unable to make to neutral ERP    Side bending Right moderate loss   Side bending Left moderate loss   Rotation Right moderate loss   Rotation Left moderate loss     Hip Flexiblity:   Supine Flexion:              Right moderate loss   Left moderate loss  Supine Internal rotation: Right moderate loss Left moderate loss  Hamstrings 90/90:          Right major loss Left major loss  Prone Quadriceps:         Right NT Left NT  Prone Extension:            Right NT Left NT      Lower Extremity Strength   Right LE  Left LE   Hip flexion: 4+/5 Hip flexion: 4+/5   Hip extension:  NT Hip extension: NT   Hip abduction: 4+/5 Hip abduction: 4+/5   Hip adduction:  5/5 Hip adduction:  5/5   Hip Internal rotation   5/5 Hip Internal rotation 5/5   Knee Flexion 5/5 Knee Flexion 5/5   Knee Extension 5/5 Knee Extension 5/5   Ankle dorsiflexion: 4+/5 Ankle dorsiflexion: 4+/5   Ankle plantarflexion: 4+/5 Ankle  plantarflexion: 4+/5         GAIT:  Assistive Device used: straight cane  Level of Assistance: Independent (Increased time to perform supine<>sit, sit<>stand transfers  Patient displays the following gait deviations: antalgic gait and Decreased hip and knee flexion, Right lateral trunk deviations.     Special Tests:   Test Name  Test Result   Prone Instability Test NT   SI Joint Provocation Test NT   Straight Leg Raise (--)   Neural Tension Test (--)   Crossed Straight Leg Raise (--)   Walking on toes Unable to perform   Walking on heels  Unable to perform       NEUROLOGICAL SCREENING     Sensory deficit: LE intact to light touch, though pt does report diminished sensation to most pressure     Reflexes:    Left Right   Patella Tendon 2+ 2+   Achilles Tendon 2+ 2+   Babinski NT NT   Clonus - -     REPEATED TEST MOVEMENTS:  Repeated Flexion in Standing better   Repeated Extension in Standing worse Initially felt a little better after 5 reps, no effect when performing 5 reps in sitting    Repeated Flexion in lying worse   Repeated Extension in lying  Not tested per difficulty with positioning        Baseline Isometric Testing on Med X equipment: Testing administered by PT     Baseline IM Testing Results:   Date of testin2018  ROM 30-0 deg   Max Peak Torque 140    Min Peak Torque 32    Flex/Ext Ratio 4.3   % below normative data -68       FOTO: Focus on Therapeutic Outcomes   Category: lumbar   % Impaired: 66%  Current Score  = CL = least 60% but < 80% impaired, limited or restricted  Goal at Discharge Score = CK = at least 40% but < 60% impaired, limited or restricted    Score interpretation is as follows:     TEST SCORE  Modifier  Impairment Limitation Restriction    050  CH  0 % impaired, limited or restricted   1-  CI  @ least 1% but less than 20% impaired, limited or restricted   10-  CJ  @ least 20%<40% impaired, limited or restricted   -  CK  @ least 40%<60% impaired, limited or  restricted   30-39/50  CL  @ least 60% <80% impaired, limited or restricted   40-49/50  CM  @ least 80%<100% impaired limited or restricted   50/50  CN  100% impaired, limited or restricted       Treatment   Time In: 1:00  Time Out: 2:30     PT Evaluation Completed? Yes  Discussed Plan of Care with patient: Yes      Written Home Exercises Provided:   Handouts were given to the patient. Pt demo good understanding of the education provided. [facility] demonstrated good return demonstration of activities.     - Patient received education regarding proper posture and body mechanics.    - [place] roll tried, recommended, and purchase information was provided.    - Patient received a handout regarding anticipated muscular soreness following the isometric test and strategies for management were reviewed with patient including stretching, using ice and scheduled rest.     HEP as follows     Flexion in sitting 10x, 3x/daily  Supine pelvic tilts 5-10 s/h 5x, 2x daily  LTR 10x 3x/daily    Z-lie 10-20 min, 2x daily      Pt was instructed in and performed the following:   Cardiovascular exercise and therapeutic exercise to improve posture, lumbar/cervical ROM, strength, and muscular endurance as follows:     [facility] received therapeutic exercises to develop/improve posture, lumbar/cervical ROM, strength and muscular endurance for 30 minutes including the following exercises: med-x lumbar machine testing    [facility] 4/23/2018   Visit Number 1   VAS Pain Rating 8   Recumbent Bike Seat Pos. -   Time -   Flexion in Lying -   Lumbar Extension Seat Pad 0   Femur Restraint 6   Top Dead Center 24   Counterweight 456   Lumbar Flexion 30   Lumbar Extension 0   Lumbar Peak Torque 140   Min Torque 32   Percent From Norm -68   Lumbar Weight -   Repetitions -   Rating of Perceived Exertion -   Ice - Z Lie (in min.) 10       [facility] received the following manual therapy techniques: None     Assessment   This is a 72 y.o. male referred to [facility] and presents with a medical diagnosis of   Encounter Diagnosis   Name Primary?    Chronic bilateral low back pain without sciatica     and demonstrates limitations as described below in the problem list. Pt rehab potential is Good. Pt presents with lumbar dysfunction, decreased lumbar strength and ROM compared to norms, decreased LE ROM, decreased LE strength, decreased hip flexibility, poor postural alignment, poor body mechanics, impaired SLS balance, gait deviations. Pt reported no significant change in symptoms.       Pain Pattern: 2    Patient received education on the [facility] program, purpose of the isometric test, progression of back strengthening as well as wellness approach and systemic strengthening.  Details of the program were discussed.  Reviewed that patient should feel support/pressure from med ex restraints but no pain or discomfort and patient expressed understanding.    Based on the above history and physical examination an active physical therapy program is recommended.  Pt will continue to benefit from skilled outpatient physical therapy to address the deficits listed below in the chart, provide pt/family education and to maximize pt's level of independence in the home and community environment. .     No environmental, cultural, spiritual, developmental or education needs expressed or noted    Medical necessity is demonstrated by the following problem list.    Pt presents with the following impairments:   History  Co-morbidities and personal factors that may impact the plan of care Examination  Body Structures and Functions, activity limitations and participation restrictions that may impact the plan of care Clinical Presentation   Decision Making/ Complexity Score   Co-morbidities:   See PMH        Personal Factors:   lifestyle Body Regions:   back  lower extremities    Body Systems:   gross symmetry  ROM  strength  gross coordinated  movement  balance  gait  transitions    Activity limitations:   Learning and applying knowledge  no deficits    General Tasks and Commands  no deficits    Communication  no deficits    Mobility  lifting and carrying objects  walking  Standing, sit<>stand    Self care  dressing    Domestic Life  shopping  cooking  doing house work (cleaning house, washing dishes, laundry)    Interactions/Relationships  family relationships  intimate relationships    Life Areas  no deficits    Community and Social Life  community life  recreation and leisure    Participation Restrictions:   None     evolving clinical presentation with changing clinical characteristics   Moderate         GOALS: Pt is in agreement with the following goals.    Short term goals:  6 weeks or 10 visits   1.  Pt will demonstrate increased lumbar ROM by at least 3 degrees from the initial ROM value with improvements noted in functional ROM and ability to perform ADLs  2.  Pt will demonstrate increased maximum isometric torque value by 10% when compared to the initial value resulting in improved ability to perform bending, lifting, and carrying activities safely, confidently.  3.  Patient report a reduction in worst pain score by 1-2 points for improved tolerance during work and recreational activities  4.  Pt able to perform HEP correctly with minimal cueing or supervision for therapist    Long term goals: 13 weeks or 20 visits   1. Pt will demonstrate increased lumbar ROM by at least 6 degrees from initial ROM value, resulting in improved ability to perform functional fwd bending while standing and sitting.   2. Pt will demonstrate increased maximum isometric torque value by 30% when compared to the initial value resulting in improved ability to perform bending, lifting, and carrying activities safely, confidently.  3. Pt to demonstrate ability to independently control and reduce their pain through posture positioning and mechanical movements throughout a  "typical day.  4.  Patient will demonstrate improved overall function per FOTO Survey to CK = at least 40% but < 60% impaired, limited or restricted score or less.  5. Pt will demonstrate ability to walk up to 10 minutes without significant increase in pain by 3-4 VAS points.       Plan   Outpatient physical therapy 2x week for 13 weeks or 20 visits to include the following:   - Patient education  - Therapeutic exercise  - Manual therapy  - Performance testing   - Neuromuscular Re-education  - Therapeutic activity   - Modalities    Pt may be seen by PTA as part of the rehabilitation team.     Therapist: Susan Grady, PT  4/23/2018    "I certify the need for these services furnished under this plan of treatment and while under my care."    ____________________________________  Physician/Referring Practitioner    _______________  Date of Signature            "

## 2018-04-24 ENCOUNTER — SURGERY (OUTPATIENT)
Age: 73
End: 2018-04-24

## 2018-04-24 ENCOUNTER — HOSPITAL ENCOUNTER (OUTPATIENT)
Facility: OTHER | Age: 73
Discharge: HOME | End: 2018-04-24
Attending: ANESTHESIOLOGY | Admitting: ANESTHESIOLOGY
Payer: MEDICARE

## 2018-04-24 VITALS
RESPIRATION RATE: 18 BRPM | TEMPERATURE: 99 F | HEIGHT: 70 IN | DIASTOLIC BLOOD PRESSURE: 77 MMHG | BODY MASS INDEX: 45.1 KG/M2 | OXYGEN SATURATION: 99 % | HEART RATE: 85 BPM | WEIGHT: 315 LBS | SYSTOLIC BLOOD PRESSURE: 164 MMHG

## 2018-04-24 DIAGNOSIS — M54.16 LUMBAR RADICULOPATHY: Primary | ICD-10-CM

## 2018-04-24 LAB — POCT GLUCOSE: 212 MG/DL (ref 70–110)

## 2018-04-24 PROCEDURE — 64483 NJX AA&/STRD TFRM EPI L/S 1: CPT | Mod: 50 | Performed by: ANESTHESIOLOGY

## 2018-04-24 PROCEDURE — 63600175 PHARM REV CODE 636 W HCPCS: Performed by: ANESTHESIOLOGY

## 2018-04-24 PROCEDURE — 25500020 PHARM REV CODE 255: Performed by: ANESTHESIOLOGY

## 2018-04-24 PROCEDURE — 64483 NJX AA&/STRD TFRM EPI L/S 1: CPT | Mod: 50,,, | Performed by: ANESTHESIOLOGY

## 2018-04-24 PROCEDURE — 25000003 PHARM REV CODE 250: Performed by: ANESTHESIOLOGY

## 2018-04-24 RX ORDER — METHYLPREDNISOLONE ACETATE 80 MG/ML
INJECTION, SUSPENSION INTRA-ARTICULAR; INTRALESIONAL; INTRAMUSCULAR; SOFT TISSUE
Status: DISCONTINUED | OUTPATIENT
Start: 2018-04-24 | End: 2018-04-24 | Stop reason: HOSPADM

## 2018-04-24 RX ORDER — BUPIVACAINE HYDROCHLORIDE 2.5 MG/ML
INJECTION, SOLUTION EPIDURAL; INFILTRATION; INTRACAUDAL
Status: DISCONTINUED | OUTPATIENT
Start: 2018-04-24 | End: 2018-04-24 | Stop reason: HOSPADM

## 2018-04-24 RX ORDER — LIDOCAINE HYDROCHLORIDE 10 MG/ML
INJECTION INFILTRATION; PERINEURAL
Status: DISCONTINUED | OUTPATIENT
Start: 2018-04-24 | End: 2018-04-24 | Stop reason: HOSPADM

## 2018-04-24 RX ADMIN — IOHEXOL 5 ML: 300 INJECTION, SOLUTION INTRAVENOUS at 03:04

## 2018-04-24 RX ADMIN — METHYLPREDNISOLONE ACETATE 80 MG: 80 INJECTION, SUSPENSION INTRA-ARTICULAR; INTRALESIONAL; INTRAMUSCULAR; SOFT TISSUE at 03:04

## 2018-04-24 RX ADMIN — BUPIVACAINE HYDROCHLORIDE 10 ML: 2.5 INJECTION, SOLUTION EPIDURAL; INFILTRATION; INTRACAUDAL; PERINEURAL at 03:04

## 2018-04-24 RX ADMIN — SODIUM BICARBONATE 5 ML: 0.2 INJECTION, SOLUTION INTRAVENOUS at 03:04

## 2018-04-24 RX ADMIN — LIDOCAINE HYDROCHLORIDE 10 ML: 10 INJECTION, SOLUTION INFILTRATION; PERINEURAL at 03:04

## 2018-04-24 NOTE — OP NOTE
Date: 04/24/2018    Procedure: Bilateral L5 Transforaminal Epidural Steroid Injection    Referring Provider: Dr. De Souza    Pre-op diagnosis: Spinal stenosis of lumbar region with neurogenic claudication [M48.062]    Post-op diagnosis: Spinal stenosis of lumbar region with neurogenic claudication [M48.062]     Physician: Dr. Ruthie Adamson     Assistant: Dr. Peters    Anesthestia: local    All medications, allergies, and relevant histories were reviewed. No recent antibiotics or infections.  A time-out was taken to verify the correct patient, procedure, laterality, and appropriate medications/allergies.    Fluoroscopically-Guided, Contrast-Controlled Transforaminal Epidural Steroid Injection:     Following denial of allergy and review of potential side effects and complications including but not necessarily limited to infection, allergic reaction, local tissue breakdown, nerve injury, and paresis, the patient indicated they understood and agreed to proceed.     The patient was positioned prone and prepped and draped in the usual sterile fashion. The right L5 pedicle was identified fluoroscopically via an oblique view. The skin was anesthetized via 25-gauge 1 needle with approximately 3 cc of bicarbonated 1% lidocaine. At this point, a 22-gauge 3.5 spinal needle was atraumatically introduced and advanced under fluoroscopic guidance to the anterosuperior aspect of the L5 neural foramen. Depth was gauged on lateral view. Needle position at approximately the 6 oclock position relative to the pedicle was confirmed in the AP view. Following negative aspiration for blood and CSF and confirming the absence of paresthesias, injection of approximately 1cc of Omnipaque 300 demonstrated excellent spread along the nerve root into the epidural space.  At this point, 1.5 cc of a mixture of 2 cc of 0.25% bupivacaine with 80 mg of Depo-Medrol was injected without complication. The needle was flushed with 1% lidocaine withdrawn.      The procedure was then repeated in an identical manner at the level of L5 on the left.    The patient was followed post procedure and discharged under their own power in excellent condition in the company of a responsible adult.       Future Management:   If helpful, can repeat as needed.    Follow up with Dr. De Souza    I certify that I provided the above services.  I was present for the entire procedure, which was performed by myself with the assistance of the resident physician.  There were no parts of the procedure that were performed not by myself or without my direct supervision.

## 2018-04-24 NOTE — DISCHARGE INSTRUCTIONS
Home Care Instructions Pain Management:    1. DIET:   You may resume your normal diet today.   2. BATHING:   You may shower with luke warm water.  3. DRESSING:   You may remove your bandage today.   4. ACTIVITY LEVEL:   You may resume your normal activities 24 hrs after your procedure.  5. MEDICATIONS:   You may resume your normal medications today.   6. SPECIAL INSTRUCTIONS:   No heat to the injection site for 24 hrs including, bath or shower, heating pad, moist heat, or hot tubs.    Use ice pack to injection site for any pain or discomfort.  Apply ice packs for 20 minute intervals as needed.   If you have received any sedatives by mouth today you may not drive for 12 hours.    If you have received any sedation through your IV, you may not drive for 24 hrs.     PLEASE CALL YOUR DOCTOR IF:  1. Redness or swelling around the injection site.  2. Fever of 101 degrees  3. Drainage (pus) from the injection site.  4. For any continuous bleeding (some dried blood over the incision is normal.)    FOR EMERGENCIES:   If any unusual problems or difficulties occur during clinic hours, call [phone] or [phone].     Thank you for allowing us to care for you today. You may receive a survey about the care we provided. Your feedback is valuable and helps us provide excellent care throughout the community.

## 2018-05-01 ENCOUNTER — OFFICE VISIT (OUTPATIENT)
Dept: NEPHROLOGY | Facility: CLINIC | Age: 73
End: 2018-05-01
Payer: MEDICARE

## 2018-05-01 VITALS
OXYGEN SATURATION: 97 % | BODY MASS INDEX: 43.93 KG/M2 | SYSTOLIC BLOOD PRESSURE: 130 MMHG | DIASTOLIC BLOOD PRESSURE: 68 MMHG | HEART RATE: 93 BPM | HEIGHT: 70 IN | WEIGHT: 306.88 LBS

## 2018-05-01 DIAGNOSIS — N20.0 CALCULUS OF KIDNEY: ICD-10-CM

## 2018-05-01 DIAGNOSIS — E11.22 TYPE 2 DIABETES MELLITUS WITH STAGE 3 CHRONIC KIDNEY DISEASE, WITH LONG-TERM CURRENT USE OF INSULIN: Primary | ICD-10-CM

## 2018-05-01 DIAGNOSIS — I12.9 BENIGN HYPERTENSIVE RENAL DISEASE WITHOUT RENAL FAILURE: ICD-10-CM

## 2018-05-01 DIAGNOSIS — N28.1 ACQUIRED CYST OF KIDNEY: ICD-10-CM

## 2018-05-01 DIAGNOSIS — R80.9 PROTEINURIA DUE TO TYPE 2 DIABETES MELLITUS: ICD-10-CM

## 2018-05-01 DIAGNOSIS — E55.9 VITAMIN D DEFICIENCY: ICD-10-CM

## 2018-05-01 DIAGNOSIS — Z79.4 TYPE 2 DIABETES MELLITUS WITH STAGE 3 CHRONIC KIDNEY DISEASE, WITH LONG-TERM CURRENT USE OF INSULIN: Primary | ICD-10-CM

## 2018-05-01 DIAGNOSIS — N18.30 CHRONIC KIDNEY DISEASE, STAGE III (MODERATE): ICD-10-CM

## 2018-05-01 DIAGNOSIS — N18.30 TYPE 2 DIABETES MELLITUS WITH STAGE 3 CHRONIC KIDNEY DISEASE, WITH LONG-TERM CURRENT USE OF INSULIN: Primary | ICD-10-CM

## 2018-05-01 DIAGNOSIS — E11.29 PROTEINURIA DUE TO TYPE 2 DIABETES MELLITUS: ICD-10-CM

## 2018-05-01 PROCEDURE — 3044F HG A1C LEVEL LT 7.0%: CPT | Mod: CPTII,S$GLB,, | Performed by: INTERNAL MEDICINE

## 2018-05-01 PROCEDURE — 99999 PR PBB SHADOW E&M-EST. PATIENT-LVL III: CPT | Mod: PBBFAC,,, | Performed by: INTERNAL MEDICINE

## 2018-05-01 PROCEDURE — 99214 OFFICE O/P EST MOD 30 MIN: CPT | Mod: S$GLB,,, | Performed by: INTERNAL MEDICINE

## 2018-05-01 PROCEDURE — 3078F DIAST BP <80 MM HG: CPT | Mod: CPTII,S$GLB,, | Performed by: INTERNAL MEDICINE

## 2018-05-01 PROCEDURE — 99499 UNLISTED E&M SERVICE: CPT | Mod: S$GLB,,, | Performed by: INTERNAL MEDICINE

## 2018-05-01 PROCEDURE — 3075F SYST BP GE 130 - 139MM HG: CPT | Mod: CPTII,S$GLB,, | Performed by: INTERNAL MEDICINE

## 2018-05-01 RX ORDER — LOSARTAN POTASSIUM 50 MG/1
TABLET ORAL
COMMUNITY
Start: 2018-04-30 | End: 2018-05-07

## 2018-05-01 NOTE — PROGRESS NOTES
Subjective:       [place] [facility] [place] Cantor Jr. is a 72 y.o. White male who presents for follow-up evaluation of CKD.     HPI     Mr. Cantor was seen in nephrology clinic for follow up on CKD. Pt was previously being followed by Dr. Mcallister and was last seen by him on 1/20/16. He established care with me on 7/28/16, last followed on 8/30/17. He has CKD III, diabetes, hypertension, HEIDI, PAF, chronic anticoagulation, morbid obesity, hyperlipidemia.    Interim, he has been doing ok and does not have any recent acute illness. He denies any nausea/ vomiting/ decreased urine/ flank pain. He states he has been trying to watch carbohydrates, starches. His last A1C is 6.8. He continues to have microhematuria. He reports he underwent cystoscopy by urology recently, reviewed cystoscope in 3/18 showed enlarged prostate.     He remains on losartan based regimen. He admits he does not follow BP daily at home. Denies any recent episode of dizziness. He has ran out of OTC vit D supplements.     Lab trend noted for CKD since 2006. He has had episodes of SIL, hyperkalemia.     Prior labs noted for Hep C/B screen is negative. Prior US from 7/15 noted for 11.5 and 13 cm kidneys and small cyst on right. His screening for hep C/B is negative.     Renal Function:  Lab Results   Component Value Date     (H) 04/09/2018     (H) 01/16/2018     04/09/2018     01/16/2018    K 4.5 04/09/2018    K 4.5 01/16/2018     04/09/2018     01/16/2018    CO2 23 04/09/2018    CO2 27 01/16/2018    BUN 27 (H) 04/09/2018    BUN 30 (H) 01/16/2018    CALCIUM 9.8 04/09/2018    CALCIUM 10.0 01/16/2018    CREATININE 1.8 (H) 04/09/2018    CREATININE 1.7 (H) 01/16/2018    ALBUMIN 3.5 04/09/2018    ALBUMIN 3.4 (L) 01/16/2018    PHOS 3.5 01/16/2018    PHOS 3.1 08/30/2017    ESTGFRAFRICA 41.9 (A) 04/09/2018    ESTGFRAFRICA 44.9 (A) 01/16/2018    EGFRNONAA 36.3 (A) 04/09/2018    EGFRNONAA 38.9 (A) 01/16/2018       Urinalysis:  Lab  Results   Component Value Date    APPEARANCEUA Clear 01/30/2018    PHUR 5 01/30/2018    SPECGRAV 1.010 01/30/2018    PROTEINUA Negative 01/30/2018    GLUCUA Negative 01/30/2018    OCCULTUA 2+ (A) 01/30/2018    NITRITE n 01/30/2018    LEUKOCYTESUR Trace (A) 01/30/2018       Protein/Creatinine Ratio:  Lab Results   Component Value Date    PROTEINURINE 14 01/16/2018    CREATRANDUR 60.0 01/16/2018    UTPCR 0.23 (H) 01/16/2018       CBC:  Lab Results   Component Value Date    WBC 8.45 04/09/2018    HGB 14.8 04/09/2018    HCT 42.4 04/09/2018     PTH 48  Last vit D 17    Review of Systems   Constitutional: Negative for appetite change, chills and fever.   HENT: Negative for sneezing and sore throat.    Eyes: Negative for visual disturbance.   Respiratory: Negative for cough, shortness of breath and wheezing.    Cardiovascular: Positive for leg swelling. Negative for chest pain.   Gastrointestinal: Negative for abdominal pain, diarrhea, nausea and vomiting.   Endocrine: Negative for polyuria.   Genitourinary: Negative for decreased urine volume, difficulty urinating, dysuria, frequency and hematuria.   Musculoskeletal: Positive for back pain and gait problem.   Skin: Negative for pallor and rash.   Allergic/Immunologic: Negative for immunocompromised state.   Neurological: Negative for dizziness and headaches.   Psychiatric/Behavioral: Negative for behavioral problems. The patient is not nervous/anxious.        Objective:      Physical Exam   Constitutional: He is oriented to person, place, and time. He appears well-developed. No distress.   HENT:   Mouth/Throat: Oropharynx is clear and moist.   Eyes: Conjunctivae are normal. Right eye exhibits no discharge. Left eye exhibits no discharge.   Neck: Neck supple.   Cardiovascular: Normal rate and normal heart sounds.    Pulmonary/Chest: Effort normal and breath sounds normal. No respiratory distress. He has no wheezes.   Abdominal: Soft. Distention: obese. There is no  tenderness.   Musculoskeletal: He exhibits edema (2+ BLE).   Lymphedema    Neurological: He is alert and oriented to person, place, and time.   Skin: Skin is warm and dry.   Psychiatric: He has a normal mood and affect. His behavior is normal.   Vitals reviewed.      Assessment:       1. Type 2 diabetes mellitus with stage 3 chronic kidney disease, with long-term current use of insulin    2. Vitamin D deficiency    3. Chronic kidney disease, stage III (moderate)    4. Calculus of kidney    5. Acquired cyst of kidney    6. Benign hypertensive renal disease without renal failure    7. Proteinuria due to type 2 diabetes mellitus        Plan:     Mr. Cantor has CKD III from longstanding hypertension, diabetes, prior multiple SIL, morbid obesity. Labs, CKD staging, potential risk of progression, need for strict glycemic and BP control, weight loss by calorie restriction, avoiding NSAID is important to prevent CKD progression. This was d/w pt in detail. He has morbid obesity.    He is higher risk for progression of CKD and/or ATN/ KRISTINE superimposed on CKD. Currently he appears to have slow progression of CKD with proteinuria.    For now, corrected Ca stable, pt to restart OTC use of vit D. PTH levels stable. Continue to trend.    Continue to follow with urology for microhematuria, complex cyst, prostate enlargement. Repeat US kidneys to follow on kidney size, cyst.       Plan  - periodically monitor renal panel for electrolytes, acid base status, eGFR  - risk of CKD progression d/w patient  - low salt diet  - follow PTH levels, vit D  - follow urine studies for proteinuria  - avoid NSAID/ bactrim/ IV contrast/ gadolinium/ aminoglycoside where possible. Avoid Toradol use.   - continue losartan containing regimen for RAAS blockade  - avoid high K containing foods  - management of atrial fibrillation and anticoagulation, diuretics per his cardiologist    Labs, plan discussed with patient in detail.   RTC 4 months

## 2018-05-07 ENCOUNTER — TELEPHONE (OUTPATIENT)
Dept: ELECTROPHYSIOLOGY | Facility: CLINIC | Age: 73
End: 2018-05-07

## 2018-05-07 DIAGNOSIS — I48.20 CHRONIC ATRIAL FIBRILLATION: Primary | ICD-10-CM

## 2018-05-07 NOTE — TELEPHONE ENCOUNTER
Verified dosage of losartan with Pt. He takes 25 mg QD. He also stated the pharmacy keeps sending me oxybutin and that was stopped.  Advised I would let the pharmacy know.     Called and spoke with Emeli at [facility] and updated Pt's medications.          ----- Message from Danette Bentley sent at 5/7/2018 11:39 AM CDT -----  Contact: [facility]  Pharmacy is calling to clarify an RX.  RX name:  Losartan  What do they need to clarify:  dosage  Comments: There are 2 rx's with different dosages.  Pls call [facility] at [facility] [phone].    Thank you

## 2018-05-08 ENCOUNTER — OFFICE VISIT (OUTPATIENT)
Dept: INFECTIOUS DISEASES | Facility: CLINIC | Age: 73
End: 2018-05-08

## 2018-05-08 DIAGNOSIS — Z00.6 RESEARCH SUBJECT: Primary | ICD-10-CM

## 2018-05-08 PROCEDURE — 99213 OFFICE O/P EST LOW 20 MIN: CPT | Mod: ,,, | Performed by: CLINICAL NURSE SPECIALIST

## 2018-05-08 PROCEDURE — 99999 PR PBB SHADOW E&M-EST. PATIENT-LVL V: CPT | Mod: PBBFAC,,, | Performed by: CLINICAL NURSE SPECIALIST

## 2018-05-09 ENCOUNTER — HOSPITAL ENCOUNTER (OUTPATIENT)
Dept: RADIOLOGY | Facility: HOSPITAL | Age: 73
Discharge: HOME | End: 2018-05-09
Attending: INTERNAL MEDICINE
Payer: MEDICARE

## 2018-05-09 ENCOUNTER — OFFICE VISIT (OUTPATIENT)
Dept: ORTHOPEDICS | Facility: CLINIC | Age: 73
End: 2018-05-09
Payer: MEDICARE

## 2018-05-09 ENCOUNTER — OFFICE VISIT (OUTPATIENT)
Dept: PODIATRY | Facility: CLINIC | Age: 73
End: 2018-05-09
Payer: MEDICARE

## 2018-05-09 VITALS — WEIGHT: 312 LBS | HEIGHT: 61 IN | BODY MASS INDEX: 58.91 KG/M2

## 2018-05-09 VITALS — BODY MASS INDEX: 44.82 KG/M2 | HEIGHT: 70 IN | WEIGHT: 313.06 LBS

## 2018-05-09 DIAGNOSIS — E11.22 TYPE 2 DIABETES MELLITUS WITH STAGE 3 CHRONIC KIDNEY DISEASE, WITH LONG-TERM CURRENT USE OF INSULIN: ICD-10-CM

## 2018-05-09 DIAGNOSIS — E66.01 MORBID OBESITY WITH BMI OF 40.0-44.9, ADULT: ICD-10-CM

## 2018-05-09 DIAGNOSIS — N18.30 TYPE 2 DIABETES MELLITUS WITH STAGE 3 CHRONIC KIDNEY DISEASE, WITH LONG-TERM CURRENT USE OF INSULIN: ICD-10-CM

## 2018-05-09 DIAGNOSIS — M21.372 LEFT FOOT DROP: ICD-10-CM

## 2018-05-09 DIAGNOSIS — B35.1 ONYCHOMYCOSIS OF MULTIPLE TOENAILS WITH TYPE 2 DIABETES MELLITUS AND PERIPHERAL ANGIOPATHY: ICD-10-CM

## 2018-05-09 DIAGNOSIS — T84.012A FAILED TOTAL RIGHT KNEE REPLACEMENT, INITIAL ENCOUNTER: Primary | ICD-10-CM

## 2018-05-09 DIAGNOSIS — N28.1 ACQUIRED CYST OF KIDNEY: ICD-10-CM

## 2018-05-09 DIAGNOSIS — I89.0 LYMPHEDEMA OF BOTH LOWER EXTREMITIES: ICD-10-CM

## 2018-05-09 DIAGNOSIS — Z87.2 HISTORY OF ULCER OF LOWER LIMB: ICD-10-CM

## 2018-05-09 DIAGNOSIS — E11.69 ONYCHOMYCOSIS OF MULTIPLE TOENAILS WITH TYPE 2 DIABETES MELLITUS AND PERIPHERAL ANGIOPATHY: ICD-10-CM

## 2018-05-09 DIAGNOSIS — Z79.4 TYPE 2 DIABETES MELLITUS WITH STAGE 3 CHRONIC KIDNEY DISEASE, WITH LONG-TERM CURRENT USE OF INSULIN: ICD-10-CM

## 2018-05-09 DIAGNOSIS — E11.42 DIABETIC POLYNEUROPATHY ASSOCIATED WITH TYPE 2 DIABETES MELLITUS: Primary | ICD-10-CM

## 2018-05-09 DIAGNOSIS — E11.51 ONYCHOMYCOSIS OF MULTIPLE TOENAILS WITH TYPE 2 DIABETES MELLITUS AND PERIPHERAL ANGIOPATHY: ICD-10-CM

## 2018-05-09 PROCEDURE — 76770 US EXAM ABDO BACK WALL COMP: CPT | Mod: TC

## 2018-05-09 PROCEDURE — 99214 OFFICE O/P EST MOD 30 MIN: CPT | Mod: S$GLB,,, | Performed by: ORTHOPAEDIC SURGERY

## 2018-05-09 PROCEDURE — 99212 OFFICE O/P EST SF 10 MIN: CPT | Mod: 25,S$GLB,, | Performed by: PODIATRIST

## 2018-05-09 PROCEDURE — 76770 US EXAM ABDO BACK WALL COMP: CPT | Mod: 26,,, | Performed by: RADIOLOGY

## 2018-05-09 PROCEDURE — 99999 PR PBB SHADOW E&M-EST. PATIENT-LVL II: CPT | Mod: PBBFAC,,, | Performed by: ORTHOPAEDIC SURGERY

## 2018-05-09 PROCEDURE — 3044F HG A1C LEVEL LT 7.0%: CPT | Mod: CPTII,S$GLB,, | Performed by: PODIATRIST

## 2018-05-09 PROCEDURE — 11721 DEBRIDE NAIL 6 OR MORE: CPT | Mod: Q9,S$GLB,, | Performed by: PODIATRIST

## 2018-05-09 PROCEDURE — 99999 PR PBB SHADOW E&M-EST. PATIENT-LVL III: CPT | Mod: PBBFAC,,, | Performed by: PODIATRIST

## 2018-05-09 RX ORDER — OXYBUTYNIN CHLORIDE 5 MG/1
TABLET, EXTENDED RELEASE ORAL
COMMUNITY
Start: 2018-04-21 | End: 2018-06-07 | Stop reason: CLARIF

## 2018-05-09 NOTE — LETTER
May 9, 2018      Sandy Adams NP  [address]  [facility] [address]           [facility]  [address]  [facility] [address]  Phone: [phone]          Patient: Stephen Cantor Jr.   MR Number: [id number]   YOB: 1945   Date of Visit: 5/9/2018       Dear Sandy Adams:    Thank you for referring Stephen Cantor to me for evaluation. Attached you will find relevant portions of my assessment and plan of care.    If you have questions, please do not hesitate to call me. I look forward to following Stephen Cantor along with you.    Sincerely,    Miguel Stuart MD    Enclosure  CC:  No Recipients    If you would like to receive this communication electronically, please contact [email or web address] or [phone] to request more information on [facility] Link access.    For providers and/or their staff who would like to refer a patient to [facility], please contact us through our one-stop-shop provider referral line, Judie Paula, at [phone].    If you feel you have received this communication in error or would no longer like to receive these types of communications, please e-mail [email or web address]

## 2018-05-09 NOTE — PROGRESS NOTES
Subjective:      Patient ID: ricki Cantor Jr. is a 72 y.o. male.    Chief Complaint: Diabetes Mellitus (ar care dr barlow 04/12/2018) and Diabetic Foot Exam    Stephen is a 72 y.o. male who presents to the clinic for evaluation and treatment of high risk feet. Stephen has a past medical history of *Atrial fibrillation; Anticoagulant long-term use; Basal cell carcinoma (12/2015); BCC (basal cell carcinoma) (12/2015); Cataract; Chronic kidney disease; Diabetes mellitus with renal manifestations, uncontrolled; Dyslipidemia associated with type 2 diabetes mellitus; Fever blister; HTN (hypertension); Keloid cicatrix; Melanoma (12/07/2015); Obesity; PN (peripheral neuropathy); Primary osteoarthritis of right knee (1/25/2017); Screening for colon cancer (3/3/2016); Sleep apnea; and Type II or unspecified type diabetes mellitus with other specified manifestations, uncontrolled. The patient's chief complaint is long, thick toenails.  Gradual onset, worsening over past several weeks, aggravated by increased weight bearing, shoe gear, pressure.  Periodic debridement helps symptoms. This patient has documented high risk feet requiring routine maintenance secondary to diabetes mellitis and those secondary complications of diabetes, as mentioned..    PCP: Desmond Barlow MD    Date Last Seen by PCP:   Chief Complaint   Patient presents with    Diabetes Mellitus     ar care dr barlow 04/12/2018    Diabetic Foot Exam         Current shoe gear:  Affected Foot: Rx diabetic extra depth shoes and custom accommodative insoles     Unaffected Foot: Rx diabetic extra depth shoes and custom accommodative insoles    Hemoglobin A1C   Date Value Ref Range Status   04/12/2018 6.8 (H) 4.0 - 5.6 % Final     Comment:     According to ADA guidelines, hemoglobin A1c <7.0% represents  optimal control in non-pregnant diabetic patients. Different  metrics may apply to specific patient populations.   Standards of Medical Care in Diabetes-2016.  For the  purpose of screening for the presence of diabetes:  <5.7%     Consistent with the absence of diabetes  5.7-6.4%  Consistent with increasing risk for diabetes   (prediabetes)  >or=6.5%  Consistent with diabetes  Currently, no consensus exists for use of hemoglobin A1c  for diagnosis of diabetes for children.  This Hemoglobin A1c assay has significant interference with fetal   hemoglobin   (HbF). The results are invalid for patients with abnormal amounts of   HbF,   including those with known Hereditary Persistence   of Fetal Hemoglobin. Heterozygous hemoglobin variants (HbAS, HbAC,   HbAD, HbAE, HbA2) do not significantly interfere with this assay;   however, presence of multiple variants in a sample may impact the %   interference.     01/02/2018 7.7 (H) 4.0 - 5.6 % Final     Comment:     According to ADA guidelines, hemoglobin A1c <7.0% represents  optimal control in non-pregnant diabetic patients. Different  metrics may apply to specific patient populations.   Standards of Medical Care in Diabetes-2016.  For the purpose of screening for the presence of diabetes:  <5.7%     Consistent with the absence of diabetes  5.7-6.4%  Consistent with increasing risk for diabetes   (prediabetes)  >or=6.5%  Consistent with diabetes  Currently, no consensus exists for use of hemoglobin A1c  for diagnosis of diabetes for children.  This Hemoglobin A1c assay has significant interference with fetal   hemoglobin   (HbF). The results are invalid for patients with abnormal amounts of   HbF,   including those with known Hereditary Persistence   of Fetal Hemoglobin. Heterozygous hemoglobin variants (HbAS, HbAC,   HbAD, HbAE, HbA2) do not significantly interfere with this assay;   however, presence of multiple variants in a sample may impact the %   interference.     11/15/2017 7.1 (H) 4.0 - 5.6 % Final     Comment:     According to ADA guidelines, hemoglobin A1c <7.0% represents  optimal control in non-pregnant diabetic patients.  Different  metrics may apply to specific patient populations.   Standards of Medical Care in Diabetes-2016.  For the purpose of screening for the presence of diabetes:  <5.7%     Consistent with the absence of diabetes  5.7-6.4%  Consistent with increasing risk for diabetes   (prediabetes)  >or=6.5%  Consistent with diabetes  Currently, no consensus exists for use of hemoglobin A1c  for diagnosis of diabetes for children.  This Hemoglobin A1c assay has significant interference with fetal   hemoglobin   (HbF). The results are invalid for patients with abnormal amounts of   HbF,   including those with known Hereditary Persistence   of Fetal Hemoglobin. Heterozygous hemoglobin variants (HbAS, HbAC,   HbAD, HbAE, HbA2) do not significantly interfere with this assay;   however, presence of multiple variants in a sample may impact the %   interference.         Review of Systems   Constitution: Negative for chills, fever, malaise/fatigue and night sweats.   Cardiovascular: Positive for leg swelling. Negative for claudication and cyanosis.   Skin: Positive for nail changes. Negative for color change, itching, poor wound healing, rash and suspicious lesions.   Musculoskeletal: Negative for falls, gout, joint pain and myalgias.   Neurological: Positive for focal weakness, numbness, paresthesias and sensory change.           Objective:      Physical Exam   Constitutional: He is oriented to person, place, and time. He appears well-developed and well-nourished. He is cooperative.   Oriented to time, place, and person.   Cardiovascular:   Pulses:       Dorsalis pedis pulses are 1+ on the right side, and 1+ on the left side.        Posterior tibial pulses are 1+ on the right side, and 1+ on the left side.   Capillary fill time 3-5 seconds.  All toes warm to touch.      2-3 + pitting lower extremity edema bilateral.    Negative elevational pallor and dependent rubor bilateral.     Musculoskeletal:   Normal angle, base, station of  gait. Decreased stride length, early heel off, moderately propulsive toe off bilateral.    All ten toes without clubbing, cyanosis, or signs of ischemia.      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    No pain to palpation bilateral lower extremities.      Range of motion, stability, muscle strength, and muscle tone are age and health appropriate normal bilateral feet and legs.       Lymphadenopathy:   Negative lymphadenopathy bilateral popliteal fossa and tarsal tunnel.  Negative lymphangitic streaking bilateral foot/ankle bilateral.     Neurological: He is alert and oriented to person, place, and time. He has normal strength. He is not disoriented. He displays no atrophy and no tremor. A sensory deficit is present. He exhibits normal muscle tone.   Reflex Scores:       Patellar reflexes are 2+ on the right side and 2+ on the left side.       Achilles reflexes are 2+ on the right side and 2+ on the left side.  Decreased/absent vibratory sensation bilateral feet to 128Hz tuning fork.    Paresthesias, and burning bilateral feet with no clearly identified trigger or source.     Skin: Skin is warm, dry and intact. No abrasion, no bruising, no burn, no ecchymosis, no laceration, no lesion, no petechiae and no rash noted. He is not diaphoretic. No cyanosis or erythema. No pallor. Nails show no clubbing.   Skin thin, atrophic, with decreased density and distribution of pedal hair bilateral, but without hyperpigmentation,   ulcers, masses, nodules or cords palpated bilateral feet and legs.    Dependent rubor bilateral leg/ankle/foot consistent with stasis.    Toenails 1st, 2nd, 3rd, 4th, 5th  bilateral are hypertrophic thickened 2-3 mm, dystrophic, discolored tanish brown with tan, gray crumbly subungual debris.  Tender to distal nail plate pressure, without periungual skin abnormality of each.               Assessment:       Encounter Diagnoses   Name Primary?    Diabetic polyneuropathy associated with type 2 diabetes  mellitus Yes    Lymphedema of both lower extremities     Onychomycosis of multiple toenails with type 2 diabetes mellitus and peripheral angiopathy     History of ulcer of lower limb          Plan:       [facility] [place] [facility] today for diabetes mellitus and diabetic foot exam.    Diagnoses and all orders for this visit:    Diabetic polyneuropathy associated with type 2 diabetes mellitus  -     DIABETIC SHOES FOR HOME USE    Lymphedema of both lower extremities  -     DIABETIC SHOES FOR HOME USE    Onychomycosis of multiple toenails with type 2 diabetes mellitus and peripheral angiopathy  -     DIABETIC SHOES FOR HOME USE    History of ulcer of lower limb      I counseled the patient on his conditions, their implications and medical management.        - Shoe inspection. Diabetic Foot Education. Patient reminded of the importance of good nutrition and blood sugar control to help prevent podiatric complications of diabetes. Patient instructed on proper foot hygeine. We discussed wearing proper shoe gear, daily foot inspections, never walking without protective shoe gear, never putting sharp instruments to feet, routine podiatric nail visits every 2-3 months.      - With patient's permission, nails were aggressively reduced and debrided x 10 to their soft tissue attachment mechanically and with electric , removing all offending nail and debris. Patient relates relief following the procedure. He will continue to monitor the areas daily, inspect his feet, wear protective shoe gear when ambulatory, moisturizer to maintain skin integrity and follow in this office in approximately 2-3 months, sooner p.r.n.      Discussed conservative treatment with shoes of adequate dimensions, material, and style to alleviate symptoms and delay or prevent surgical intervention.    Resume compression stockings and left afo he has at home.    Follow-up in about 9 months (around 2/9/2019).

## 2018-05-09 NOTE — PROGRESS NOTES
Subjective:      Patient ID: Stephen Cantor Jr. is a 72 y.o. male.    Chief Complaint: Pain and Swelling of the Right Knee    HPI    Stephen Cantor Jr. is a 72 year old male, on Eliquis for chronic atrial fibrillation, here with a 1 year(s) history of {right knee pain. Patient is retired. Patient underwent press-fit right TKA by Dr. Knight in January 2017.  He reports pain that started soon after surgery and never subsided.  He had no wound healing problems.  He has had an infection workup.  There is no locking or catching.  There is no popping of knee cap.      There was not a history of trauma.  The pain is severe The pain is located in the global aspect of the knee.  There is radiation.  The pain radiates to the thigh.  There are no feelings of instability.  The knee does not give away. The pain is described as achy.  It is aggravated by upon rising from a chair and gets better after a small amount of walking thereafter.    It is alleviated by rest. There is not numbness or tingling of the lower extremity. There is associated swelling.       Past Medical History:   Diagnosis Date    *Atrial fibrillation     On Coumadin    Anticoagulant long-term use     Basal cell carcinoma 12/2015    left eye brow    BCC (basal cell carcinoma) 12/2015    left shoulder    Cataract     Chronic kidney disease     Diabetes mellitus with renal manifestations, uncontrolled     Dyslipidemia associated with type 2 diabetes mellitus     Fever blister     HTN (hypertension)     Keloid cicatrix     Melanoma 12/07/2015    right cheek-in situ    Obesity     PN (peripheral neuropathy)     Primary osteoarthritis of right knee 1/25/2017    Screening for colon cancer 3/3/2016    Sleep apnea     wears CPAP at night    Type II or unspecified type diabetes mellitus with other specified manifestations, uncontrolled        Current Outpatient Prescriptions on File Prior to Visit   Medication Sig Dispense Refill    apixaban 5 mg Tab  "Take 1 tablet (5 mg total) by mouth 2 (two) times daily. 180 tablet 3    ascorbic acid (VITAMIN C) 100 MG tablet Take 1 tablet by mouth every morning.       aspirin 325 MG tablet Take 1 tablet by mouth every morning.       azelastine (ASTELIN) 137 mcg (0.1 %) nasal spray 1 spray (137 mcg total) by Nasal route 2 (two) times daily. 30 mL 3    BD INSULIN PEN NEEDLE UF ORIG 29 x 1/2 " Ndle   12    BD ULTRA-FINE BASIL PEN NEEDLES 32 gauge x 5/32" Ndle CHECK BLOOD SUGAR FOUR TIMES DAILY 150 each 11    ciclopirox (PENLAC) 8 % Soln Apply topically nightly. 6.6 mL 11    CINNAMON BARK (CINNAMON ORAL) Take by mouth once daily.      clobetasol (TEMOVATE) 0.05 % cream AAA finger bid 60 g 3    clotrimazole-betamethasone 1-0.05% (LOTRISONE) cream Apply topically 2 (two) times daily. 45 g 3    desonide (DESOWEN) 0.05 % lotion AAA bid prn redness, scaling, or itching 1 Bottle 3    desoximetasone (TOPICORT) 0.25 % cream 0.25 %.  Cream Topical .  AAA bid back      fenofibrate (TRICOR) 145 MG tablet TAKE ONE TABLET BY MOUTH EVERY DAY 90 tablet 1    furosemide (LASIX) 40 MG tablet Take 1 tablet (40 mg total) by mouth daily as needed. 90 tablet 3    gabapentin (NEURONTIN) 300 MG capsule TAKE ONE CAPSULE BY MOUTH THREE TIMES DAILY 90 capsule 1    insulin aspart (NOVOLOG FLEXPEN) 100 unit/mL InPn pen Novolog 20 units breakfast, 22 units lunch and dinner plus correction scale. Max daily dose=100 units 2 Box 11    insulin glargine (BASAGLAR KWIKPEN U-100 INSULIN) 100 unit/mL (3 mL) InPn pen Inject 60 Units into the skin once daily. 2 Box 11    INV C. DIFFICILE VACCINE/PLACEBO Administer as directed. FOR INVESTIGATIONAL USE ONLY. 1 Syringe 0    INV C. DIFFICILE VACCINE/PLACEBO Administer as directed. FOR INVESTIGATIONAL USE ONLY. 1 Syringe 0    ketoconazole (NIZORAL) 2 % cream Apply topically once daily. 30 g 1    lancets 33 gauge Misc 1 lancet by Misc.(Non-Drug; Combo Route) route 4 (four) times daily. Pt uses True Result " Meter, bg monitoring 4 times a day. 450 each 4    losartan (COZAAR) 25 MG tablet Take 1 tablet (25 mg total) by mouth once daily. 90 tablet 3    metoprolol succinate (TOPROL-XL) 100 MG 24 hr tablet TAKE ONE TABLET BY MOUTH EVERY DAY 90 tablet 1    multivitamin capsule Take 1 capsule by mouth every morning.       mupirocin (BACTROBAN) 2 % ointment Apply topically 2 (two) times daily. Apply to wound twice daily as directed. 30 g 0    nystatin-triamcinolone (MYCOLOG II) cream apply TWICE DAILY (Patient taking differently: apply TWICE DAILY-using as needed for rash) 60 g 1    omega-3 acid ethyl esters (LOVAZA) 1 gram capsule TAKE THREE CAPSULE BY MOUTH TWICE DAILY 540 capsule 3    oxybutynin (DITROPAN-XL) 5 MG TR24       pravastatin (PRAVACHOL) 10 MG tablet Take 1 tablet (10 mg total) by mouth once daily. 90 tablet 3    tamsulosin (FLOMAX) 0.4 mg Cp24 Take 1 capsule (0.4 mg total) by mouth once daily. 30 capsule 5    temazepam (RESTORIL) 15 mg Cap Take 1 capsule (15 mg total) by mouth every evening. 30 capsule 0    triamcinolone acetonide 0.1% (KENALOG) 0.1 % cream Apply topically 2 (two) times daily. Apply to affected area twice daily as directed. 454 g 0    vitamin E 1000 UNIT capsule Take 1,000 Units by mouth every morning. 1 Capsule Oral Every day      levothyroxine (SYNTHROID) 25 MCG tablet Take 1 tablet (25 mcg total) by mouth once daily. 90 tablet 2    metronidazole (METROGEL) 0.75 % gel Apply topically 2 (two) times daily. 1 Tube 6     No current facility-administered medications on file prior to visit.        Past Surgical History:   Procedure Laterality Date    APPENDECTOMY      CARDIOVERSION      COLONOSCOPY N/A 3/3/2016    Procedure: COLONOSCOPY;  Surgeon: Bob Poe MD;  Location: [address];  Service: Endoscopy;  Laterality: N/A;  Holding Eliquis for 3 days prior to colonoscopy. EC    epidural steroid injection      JOINT REPLACEMENT      Knee    Meniere's disease  surgery      TONSILLECTOMY      TOTAL KNEE ARTHROPLASTY Right 01/25/2017    TKR    TOTAL KNEE ARTHROPLASTY Left     TKR, 1990's       Family History   Problem Relation Age of Onset    Diabetes Mother     Stroke Mother     Diabetes Father     Heart disease Father     Hypertension Father     Cancer Sister         brain    Eczema Sister     Cancer Brother         brain    Cancer Sister         leukemia, stomach cancer    No Known Problems Sister     ALS Brother         stomach cancer    No Known Problems Brother     No Known Problems Maternal Aunt     No Known Problems Maternal Uncle     No Known Problems Paternal Aunt     No Known Problems Paternal Uncle     No Known Problems Maternal Grandmother     No Known Problems Maternal Grandfather     No Known Problems Paternal Grandmother     No Known Problems Paternal Grandfather     Amblyopia Neg Hx     Blindness Neg Hx     Cataracts Neg Hx     Glaucoma Neg Hx     Macular degeneration Neg Hx     Retinal detachment Neg Hx     Strabismus Neg Hx     Thyroid disease Neg Hx     Melanoma Neg Hx     Psoriasis Neg Hx     Lupus Neg Hx     Acne Neg Hx        Social History     Social History    Marital status:      Spouse name: N/A    Number of children: 3    Years of education: N/A     Occupational History    retired Retired     Social History Main Topics    Smoking status: Former Smoker     Quit date: 7/10/1985    Smokeless tobacco: Never Used    Alcohol use No      Comment: quit 2007- had excess in the past    Drug use: No    Sexual activity: Not on file     Other Topics Concern    Not on file     Social History Narrative    No narrative on file           Review of Systems   Constitution: Negative for chills and fever.   Eyes: Negative for discharge.   Skin: Positive for color change.   Musculoskeletal: Positive for joint pain and joint swelling.   Gastrointestinal: Negative.    Genitourinary: Negative.    Neurological:  Negative.    Psychiatric/Behavioral: Negative.    Allergic/Immunologic: Negative.          Objective:            General    Vitals reviewed.  Constitutional: He is oriented to person, place, and time. He appears well-developed and well-nourished.   obese   HENT:   Head: Normocephalic and atraumatic.   Eyes: Conjunctivae and EOM are normal.   Neck: Normal range of motion.   Cardiovascular: Normal rate, regular rhythm and intact distal pulses.    Pulmonary/Chest: Effort normal. No respiratory distress.   Abdominal: He exhibits no distension.   Neurological: He is alert and oriented to person, place, and time.   Psychiatric: He has a normal mood and affect. His behavior is normal.     General Musculoskeletal Exam   Gait: antalgic and abnormal       Right Knee Exam     Inspection   Erythema: absent  Scars: present (well healed midline knee incision)  Swelling: present  Effusion: present  Deformity: deformity (varus)  Bruising: absent    Tenderness   The patient is tender to palpation of the pes anserinus, medial joint line and lateral joint line.    Range of Motion   Extension: 5   Flexion: 110     Tests   Ligament Examination Lachman: normal (-1 to 2mm) PCL-Posterior Drawer: normal (0 to 2mm)     MCL - Valgus: normal (0 to 2mm)  LCL - Varus: normal  Posterior Sag Test: negative  Patella   Passive Patellar Tilt: neutral  Patellar Tracking: normal    Other   Sensation: normal    Comments:  Hyperpigmentation of anterior legs.    Left Knee Exam     Inspection   Erythema: absent  Scars: present  Swelling: absent  Effusion: absent  Deformity: deformity  Bruising: absent    Tenderness   The patient is experiencing no tenderness.         Range of Motion   Extension: 0   Flexion: 130     Tests   Stability Lachman: normal (-1 to 2mm)   MCL - Valgus: normal (0 to 2mm)  LCL - Varus: normal (0 to 2mm)  Patella   Passive Patellar Tilt: neutral    Other   Sensation: normal    Muscle Strength   Right Lower Extremity   Hip Abduction:  5/5   Quadriceps:  5/5   Hamstrin/5   Left Lower Extremity   Hip Abduction: 5/5   Quadriceps:  5/5   Hamstrin/5     Reflexes     Left Side  Quadriceps:  2+    Right Side   Quadriceps:  2+    Vascular Exam     Right Pulses  Dorsalis Pedis:      0  Posterior Tibial:      0        Left Pulses  Dorsalis Pedis:      0  Posterior Tibial:      0        Edema  Right Lower Leg: present (pitting edema)  Left Lower Leg: present    Radiographs taken 2018 and reviewed by me demonstrate right press fit total knee arthroplasty.  There is lucency under tibial tray that is not present in post op xrays. Femoral component without signs of loosening.    Sed Rate   Date Value Ref Range Status   2018 5 0 - 10 mm/Hr Final     CRP   Date Value Ref Range Status   2018 5.1 0.0 - 8.2 mg/L Final       2018: Hga1c 6.8           Assessment:       Encounter Diagnosis   Name Primary?    Failed total right knee replacement, initial encounter Yes          Plan:       Stephen was seen today for pain and swelling.    Diagnoses and all orders for this visit:    Failed total right knee replacement, initial encounter    Treatment options were discussed. The surgical process of revision right  knee replacement was discussed in detail with the patient including a detailed discussion of the procedure itself (including visual model, x-ray review, and literature review). The typical perioperative and post-operative course was discussed and perioperative risks were discussed to the patient's satisfaction.  Risks and complications discussed included but were not limited to the risks of anesthetic complications, infection, bleeding, wound healing complications, stiffness, aseptic loosening, instability, limb length inequality, neurologic dysfunction including numbness and weakness,additional surgery,  DVT, pulmonary embolism, perioperative medical risks (cardiac, pulmonary, renal, neurologic), and death and the patient elects to  proceed. [facility] [place] Cantor Jr. is aware that morbid obesity carries increased risks with joint replacement surgery.  These include problems with wound healing, alignment of the prosthesis, higher chance of infection and other medical complications, as well as the potential for early implant failure.  The patient should get medically cleared.  He will need cardiac and renal input. Renal optimization and cardiac advise regarding afib.  DVT prophylaxis pending cardiology eval  [place] revision.  Plan to revise tibia.  Will assess femur at that

## 2018-05-10 ENCOUNTER — TELEPHONE (OUTPATIENT)
Dept: OPTOMETRY | Facility: CLINIC | Age: 73
End: 2018-05-10

## 2018-05-10 ENCOUNTER — TELEPHONE (OUTPATIENT)
Dept: ORTHOPEDICS | Facility: CLINIC | Age: 73
End: 2018-05-10

## 2018-05-10 DIAGNOSIS — T84.012A FAILED TOTAL RIGHT KNEE REPLACEMENT, INITIAL ENCOUNTER: Primary | ICD-10-CM

## 2018-05-10 NOTE — TELEPHONE ENCOUNTER
Spoke to pt, scheduled pre-op/post-op appointments. Mailed slips. Informed pt that messages have been sent to cardiology and renal for eval appointments. Pt pleased and verbalized understanding.

## 2018-05-11 ENCOUNTER — TELEPHONE (OUTPATIENT)
Dept: NEPHROLOGY | Facility: CLINIC | Age: 73
End: 2018-05-11

## 2018-05-11 VITALS
WEIGHT: 315 LBS | DIASTOLIC BLOOD PRESSURE: 70 MMHG | SYSTOLIC BLOOD PRESSURE: 112 MMHG | HEART RATE: 80 BPM | BODY MASS INDEX: 45.1 KG/M2 | HEIGHT: 70 IN | TEMPERATURE: 98 F

## 2018-05-11 NOTE — PROGRESS NOTES
Subjective:      Patient ID: [facility] Cantor Jr. is a 72 y.o. male.    Chief Complaint:Research      History of Present Illness    Mr. Cantor is a 72 y.o. male who presented to clinic for visit four for Study Title/IRB Number: A Phase 3, Placebo-Controlled, Randomized, Observer-Blinded Study To Evaluate The Efficacy, Safety, And Tolerability Of A Clostridium Difficile Vaccine In Adults 50 Years Of Age And Older ([id number]) / 2017.094.B (PI: Lulú).       Mr. Cantor has a past medical history of CKD III, diabetes, hypertension, HEIDI, PAF, chronic anticoagulation, morbid obesity, hyperlipidemia. Followed by Urology for kidney stones, enlarge prostate and microhematuria. Patient also has history of a cervical facial flap after a Mohs procedure in December. History of ectropion repair OD on 6/28/17, followed by Ophthalmology.     Patient reports scheduled to have cataract removed from right eye this Monday 5/14/18.     Since we saw patient last he went to the ED for pain to left shoulder on 4/9/18. An MI was ruled out and he was treated for muscular pain with Toradol and Orphenadrine Citrate. Patient reports after this visit Tizanidine was prescribed by his PCP but it made him too sleepy so he only took it for 4 days. States shoulder is still sore, but pain is improving.     Recently started taking Restoril at night and using CPAP every  night. Reports he sleeping much better.     Has also had follow up with Dr. De Souza for his chronic back pain. Had a epidural steroid injection on 4/24/18 but reports it did not improve him pain. Reports it is chronic but manageable.     Denies any wounds to BLE. Wearing compression stocking to LLE. Is following up with Ortho tomorrow 5/6/18 for evaluation of pain to right TKA from January 2017.    Denies any recent fever, chills, diarrhea or vomiting. No other complaints.     The subject's inclusion/exclusion criteria was reviewed. Patient continues to meet eligibility. Subject  verbalized desire to continue participation in study, understanding that he can withdraw consent at any time.      Subject was informed of follow up visits and that they can contact me at any time.  Phone numbers were provided to that end. Subject did not have any further questions.      Review of Systems   Constitution: Negative for chills, decreased appetite, fever, weakness, malaise/fatigue, night sweats, weight gain and weight loss.   HENT: Negative for congestion, ear pain, hearing loss, hoarse voice, sore throat and tinnitus.    Eyes: Negative for blurred vision, redness and visual disturbance.   Cardiovascular: Negative for chest pain, leg swelling and palpitations.   Respiratory: Negative for cough, hemoptysis, shortness of breath and sputum production.    Hematologic/Lymphatic: Negative for adenopathy. Does not bruise/bleed easily.   Skin: Positive for itching and rash. Negative for dry skin and suspicious lesions.   Musculoskeletal: Positive for back pain. Negative for joint pain, myalgias and neck pain.   Gastrointestinal: Negative for abdominal pain, constipation, diarrhea, heartburn, nausea and vomiting.   Genitourinary: Positive for hematuria. Negative for dysuria, flank pain, frequency, hesitancy and urgency.   Neurological: Negative for dizziness, headaches, numbness and paresthesias.   Psychiatric/Behavioral: Negative for depression and memory loss. The patient does not have insomnia and is not nervous/anxious.      Objective:   Physical Exam   Constitutional: He is oriented to person, place, and time. Vital signs are normal. He appears well-developed and well-nourished.   HENT:   Head: Normocephalic and atraumatic.   Right Ear: External ear normal.   Left Ear: External ear normal.   Nose: Nose normal.   Eyes: Conjunctivae, EOM and lids are normal. Right eye exhibits no discharge. Left eye exhibits no discharge.   Neck: Trachea normal, normal range of motion and full passive range of motion without  pain. Neck supple. No JVD present.   Cardiovascular: Normal rate and normal heart sounds.    Pulmonary/Chest: Effort normal and breath sounds normal. No respiratory distress.   Abdominal: Soft. Normal appearance. He exhibits no distension. There is no tenderness. There is no guarding.   Genitourinary: No discharge found.   Musculoskeletal: Normal range of motion. He exhibits edema.   Neurological: He is alert and oriented to person, place, and time.   Skin: Skin is warm, dry and intact. Capillary refill takes less than 2 seconds.   Compression stocking to LLE   Psychiatric: He has a normal mood and affect. His speech is normal and behavior is normal. Judgment and thought content normal. Cognition and memory are normal.   Vitals reviewed.    Assessment:       1. Research subject          Plan:       1. Study procedures as per protocol   2. Vaccine per protocol  3. All study related observations completed. Patient observed for 30 minutes post vaccine. No reactions observed.   4. Patient reminded to charge, turn on and log into device every 30 days. Reviewed that he will need to log temperature and injection site for reaction for the next 7 days. Reminded that he can log in between 6p-12p each evening to do this. The last day will be Monday 5/14/18.   5. Reviewed to notify ID research if admitted to hospital or change in medical conditions. Reviewed stool collection criteria and procedure. Patient verbalized back understanding of instructions.   6. [employer] payment request submitted  7. Follow up per protocol. Next appointment for visit #5 scheduled for 6/19/18 at 1p.

## 2018-05-14 ENCOUNTER — HOSPITAL ENCOUNTER (OUTPATIENT)
Facility: OTHER | Age: 73
Discharge: HOME | End: 2018-05-14
Attending: OPHTHALMOLOGY | Admitting: OPHTHALMOLOGY
Payer: MEDICARE

## 2018-05-14 ENCOUNTER — ANESTHESIA EVENT (OUTPATIENT)
Dept: SURGERY | Facility: OTHER | Age: 73
End: 2018-05-14
Payer: MEDICARE

## 2018-05-14 ENCOUNTER — ANESTHESIA (OUTPATIENT)
Dept: SURGERY | Facility: OTHER | Age: 73
End: 2018-05-14
Payer: MEDICARE

## 2018-05-14 ENCOUNTER — SURGERY (OUTPATIENT)
Age: 73
End: 2018-05-14

## 2018-05-14 VITALS
HEIGHT: 70 IN | WEIGHT: 315 LBS | HEART RATE: 73 BPM | SYSTOLIC BLOOD PRESSURE: 113 MMHG | BODY MASS INDEX: 45.1 KG/M2 | OXYGEN SATURATION: 94 % | DIASTOLIC BLOOD PRESSURE: 62 MMHG

## 2018-05-14 DIAGNOSIS — H25.13 NUCLEAR SCLEROSIS, BILATERAL: ICD-10-CM

## 2018-05-14 LAB — POCT GLUCOSE: 140 MG/DL (ref 70–110)

## 2018-05-14 PROCEDURE — 37000009 HC ANESTHESIA EA ADD 15 MINS: Performed by: OPHTHALMOLOGY

## 2018-05-14 PROCEDURE — 66999 UNLISTED PX ANT SEGMENT EYE: CPT | Mod: ,,, | Performed by: OPHTHALMOLOGY

## 2018-05-14 PROCEDURE — V2632 POST CHMBR INTRAOCULAR LENS: HCPCS | Mod: WS | Performed by: OPHTHALMOLOGY

## 2018-05-14 PROCEDURE — 36000707: Performed by: OPHTHALMOLOGY

## 2018-05-14 PROCEDURE — 66984 XCAPSL CTRC RMVL W/O ECP: CPT | Mod: RT,,, | Performed by: OPHTHALMOLOGY

## 2018-05-14 PROCEDURE — 37000008 HC ANESTHESIA 1ST 15 MINUTES: Performed by: OPHTHALMOLOGY

## 2018-05-14 PROCEDURE — 36000706: Performed by: OPHTHALMOLOGY

## 2018-05-14 PROCEDURE — 25000003 PHARM REV CODE 250: Performed by: OPHTHALMOLOGY

## 2018-05-14 PROCEDURE — 25000003 PHARM REV CODE 250: Performed by: NURSE ANESTHETIST, CERTIFIED REGISTERED

## 2018-05-14 PROCEDURE — 63600175 PHARM REV CODE 636 W HCPCS: Performed by: NURSE ANESTHETIST, CERTIFIED REGISTERED

## 2018-05-14 PROCEDURE — 71000015 HC POSTOP RECOV 1ST HR: Performed by: OPHTHALMOLOGY

## 2018-05-14 PROCEDURE — 82962 GLUCOSE BLOOD TEST: CPT | Performed by: OPHTHALMOLOGY

## 2018-05-14 DEVICE — LENS IOL ITEC PRELOAD 21.0D: Type: IMPLANTABLE DEVICE | Site: EYE | Status: FUNCTIONAL

## 2018-05-14 RX ORDER — MIDAZOLAM HYDROCHLORIDE 1 MG/ML
INJECTION INTRAMUSCULAR; INTRAVENOUS
Status: DISCONTINUED | OUTPATIENT
Start: 2018-05-14 | End: 2018-05-14

## 2018-05-14 RX ORDER — SODIUM CHLORIDE 9 MG/ML
INJECTION, SOLUTION INTRAVENOUS CONTINUOUS PRN
Status: DISCONTINUED | OUTPATIENT
Start: 2018-05-14 | End: 2018-05-14

## 2018-05-14 RX ORDER — PHENYLEPHRINE HYDROCHLORIDE 100 MG/ML
SOLUTION/ DROPS OPHTHALMIC
Status: DISCONTINUED | OUTPATIENT
Start: 2018-05-14 | End: 2018-05-14 | Stop reason: HOSPADM

## 2018-05-14 RX ORDER — PROPARACAINE HYDROCHLORIDE 5 MG/ML
1 SOLUTION/ DROPS OPHTHALMIC
Status: DISCONTINUED | OUTPATIENT
Start: 2018-05-14 | End: 2018-05-14 | Stop reason: HOSPADM

## 2018-05-14 RX ORDER — MOXIFLOXACIN 5 MG/ML
SOLUTION/ DROPS OPHTHALMIC
Status: DISCONTINUED | OUTPATIENT
Start: 2018-05-14 | End: 2018-05-14 | Stop reason: HOSPADM

## 2018-05-14 RX ORDER — LIDOCAINE HYDROCHLORIDE 40 MG/ML
INJECTION, SOLUTION RETROBULBAR
Status: DISCONTINUED | OUTPATIENT
Start: 2018-05-14 | End: 2018-05-14 | Stop reason: HOSPADM

## 2018-05-14 RX ORDER — TETRACAINE HYDROCHLORIDE 5 MG/ML
SOLUTION OPHTHALMIC
Status: DISCONTINUED | OUTPATIENT
Start: 2018-05-14 | End: 2018-05-14 | Stop reason: HOSPADM

## 2018-05-14 RX ORDER — ACETAMINOPHEN 325 MG/1
650 TABLET ORAL EVERY 4 HOURS PRN
Status: DISCONTINUED | OUTPATIENT
Start: 2018-05-14 | End: 2018-05-14 | Stop reason: HOSPADM

## 2018-05-14 RX ORDER — TETRACAINE HYDROCHLORIDE 5 MG/ML
1 SOLUTION OPHTHALMIC
Status: COMPLETED | OUTPATIENT
Start: 2018-05-14 | End: 2018-05-14

## 2018-05-14 RX ORDER — MOXIFLOXACIN 5 MG/ML
1 SOLUTION/ DROPS OPHTHALMIC
Status: COMPLETED | OUTPATIENT
Start: 2018-05-14 | End: 2018-05-14

## 2018-05-14 RX ORDER — TROPICAMIDE 10 MG/ML
1 SOLUTION/ DROPS OPHTHALMIC
Status: COMPLETED | OUTPATIENT
Start: 2018-05-14 | End: 2018-05-14

## 2018-05-14 RX ORDER — PHENYLEPHRINE HYDROCHLORIDE 25 MG/ML
1 SOLUTION/ DROPS OPHTHALMIC
Status: COMPLETED | OUTPATIENT
Start: 2018-05-14 | End: 2018-05-14

## 2018-05-14 RX ADMIN — TROPICAMIDE 1 DROP: 10 SOLUTION/ DROPS OPHTHALMIC at 06:05

## 2018-05-14 RX ADMIN — TETRACAINE HYDROCHLORIDE 1 DROP: 5 SOLUTION OPHTHALMIC at 06:05

## 2018-05-14 RX ADMIN — MOXIFLOXACIN HYDROCHLORIDE 1 DROP: 5 SOLUTION/ DROPS OPHTHALMIC at 08:05

## 2018-05-14 RX ADMIN — PHENYLEPHRINE HYDROCHLORIDE 1 DROP: 25 SOLUTION/ DROPS OPHTHALMIC at 06:05

## 2018-05-14 RX ADMIN — BALANCED SALT SOLUTION ENRICHED WITH BICARBONATE, DEXTROSE, AND GLUTATHIONE 5 ML: KIT at 07:05

## 2018-05-14 RX ADMIN — BALANCED SALT SOLUTION ENRICHED WITH BICARBONATE, DEXTROSE, AND GLUTATHIONE 500 ML: KIT at 07:05

## 2018-05-14 RX ADMIN — PHENYLEPHRINE HYDROCHLORIDE 1 DROP: 100 SOLUTION/ DROPS OPHTHALMIC at 07:05

## 2018-05-14 RX ADMIN — MIDAZOLAM HYDROCHLORIDE 2 MG: 1 INJECTION, SOLUTION INTRAMUSCULAR; INTRAVENOUS at 07:05

## 2018-05-14 RX ADMIN — TETRACAINE HYDROCHLORIDE 1 DROP: 5 SOLUTION OPHTHALMIC at 07:05

## 2018-05-14 RX ADMIN — SODIUM CHLORIDE: 0.9 INJECTION, SOLUTION INTRAVENOUS at 07:05

## 2018-05-14 RX ADMIN — MOXIFLOXACIN HYDROCHLORIDE 1 DROP: 5 SOLUTION/ DROPS OPHTHALMIC at 06:05

## 2018-05-14 RX ADMIN — TETRACAINE HYDROCHLORIDE 2 DROP: 5 SOLUTION OPHTHALMIC at 07:05

## 2018-05-14 RX ADMIN — LIDOCAINE HYDROCHLORIDE 2 DROP: 40 INJECTION, SOLUTION RETROBULBAR; TOPICAL at 07:05

## 2018-05-14 NOTE — ANESTHESIA PREPROCEDURE EVALUATION
05/14/2018  [facility] PAT Cantor Jr. is a 72 y.o., male.  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]     Review of patient's allergies indicates:   Allergen Reactions    Niacin Itching and Other (See Comments)     Other reaction(s): facial flushing and causes hepatitis     Terbinafine Other (See Comments)     Other reaction(s): Hepatitis       Patient Active Problem List   Diagnosis    Enthesopathy of hip region    Sensory hearing loss, bilateral    Sprain and strain of unspecified site of hip and thigh    Chronic atrial fibrillation    Long term current use of anticoagulant therapy    Sleep apnea    Onychomycosis of multiple toenails with type 2 diabetes mellitus and peripheral angiopathy    Chronic kidney disease, stage III (moderate)    Benign hypertensive renal disease without renal failure    Acquired cyst of kidney    Calculus of kidney    Morbid obesity with BMI of 40.0-44.9, adult    HTN (hypertension)    Dyslipidemia associated with type 2 diabetes mellitus    Type 2 diabetes mellitus with diabetic polyneuropathy    Corns and callosities    Low back ache    Mohs defect of right cheek    Type 2 diabetes mellitus with diabetic chronic kidney disease    Enlarged prostate    Diastolic dysfunction    Ectropion of eyelid    Tinea pedis    Lymphedema of both lower extremities    History of cardioversion    Vitamin D deficiency    Venous insufficiency of both lower extremities    Chronic pain of right knee    Microhematuria    Pulmonary hypertension    Tattoos    Primary osteoarthritis of right knee    Dermatitis, stasis    Bilateral leg edema    Chronic bilateral low back pain without sciatica    Lumbar  radiculopathy    Proteinuria due to type 2 diabetes mellitus    Nuclear sclerosis, bilateral       Past Surgical History:   Procedure Laterality Date    APPENDECTOMY      CARDIOVERSION      COLONOSCOPY N/A 3/3/2016    Procedure: COLONOSCOPY;  Surgeon: Bob Poe MD;  Location: [facility] ([address] [place] [address]);  Service: Endoscopy;  Laterality: N/A;  Holding Eliquis for 3 days prior to colonoscopy. EC    epidural steroid injection      JOINT REPLACEMENT      Knee    Meniere's disease surgery      TONSILLECTOMY      TOTAL KNEE ARTHROPLASTY Right 01/25/2017    TKR    TOTAL KNEE ARTHROPLASTY Left     TKR, 1990's     Vitals - 1 value per visit 3/23/2016 3/23/2016 4/5/2016 4/12/2016 4/20/2016   SYSTOLIC 107 140 132 127 131   DIASTOLIC 65 74 89 76 66   PULSE 86 78 108 87 85     Vitals - 1 value per visit 4/20/2016 4/25/2016 4/26/2016 4/27/2016   SYSTOLIC 125 110 111 127   DIASTOLIC 72 57 66 74   PULSE 83 92 86 81     Vitals - 1 value per visit 4/27/2016 4/28/2016 6/1/2016 6/3/2016 6/17/2016   SYSTOLIC 108 130 137 122 94   DIASTOLIC 76 78 86 70 62   PULSE 89 78 81 74 83     Vitals - 1 value per visit 6/20/2016 6/28/2016   SYSTOLIC 133 126   DIASTOLIC 88 77   PULSE 76 78             Pre-op Assessment    I have reviewed the Patient Summary Reports.     I have reviewed the Nursing Notes.   I have reviewed the Medications.     Review of Systems  Anesthesia Hx:  No problems with previous Anesthesia  Denies Family Hx of Anesthesia complications.   Denies Personal Hx of Anesthesia complications.   Social:  Non-Smoker, No Alcohol Use    Hematology/Oncology:  Hematology Normal   Oncology Normal   Hematology Comments: Stopped blood thinner for atrial fib 8 days ago.  Oncology Comments: Chegael ocasiovarun 12/2015-nathaniel NOW.       Cardiovascular:   Hypertension (no sequelae.), well controlled Denies MI.    Denies Angina.         Uses cane,  Climbs one flight of stairs without difficulty.   Pulmonary:   Denies COPD.  Denies Asthma.   Denies Shortness of breath. Sleep Apnea (does not toleerate cpap)    Renal/:   Chronic Renal Disease (Cr. 1.6), CRI    Neurological:   Denies TIA. Denies CVA. Neuromuscular Disease, (diabetic)  Denies Seizures.   Peripheral Neuropathy    Endocrine:   Diabetes, type 2, using insulin        Physical Exam  General:  Well nourished    Airway/Jaw/Neck:  Airway Findings: Mouth Opening: Normal Tongue: Normal  General Airway Assessment: Adult, Average  Mallampati: II  TM Distance: Normal, at least 6 cm  Jaw/Neck Findings:  Neck ROM: Normal ROM            Mental Status:  Mental Status Findings:  Cooperative, Alert and Oriented         Anesthesia Plan  Type of Anesthesia, risks & benefits discussed:  Anesthesia Type:  MAC  Patient's Preference:   Intra-op Monitoring Plan: standard ASA monitors  Intra-op Monitoring Plan Comments:   Post Op Pain Control Plan:   Post Op Pain Control Plan Comments:   Induction:    Beta Blocker:  Patient is on a Beta-Blocker and has received one dose within the past 24 hours (No further documentation required).       Informed Consent: Patient understands risks and agrees with Anesthesia plan.  Questions answered. Anesthesia consent signed with patient.  ASA Score: 3     Day of Surgery Review of History & Physical:    H&P update referred to the surgeon.         Ready For Surgery From Anesthesia Perspective.

## 2018-05-14 NOTE — ANESTHESIA POSTPROCEDURE EVALUATION
"Anesthesia Post Evaluation    Patient: [facility] [place] Sakshi JrJorge    Procedure(s) Performed: Procedure(s) (LRB):  PHACOEMULSIFICATION-ASPIRATION-CATARACT (Right)  INSERTION-INTRAOCULAR LENS (IOL) (Right)    Final Anesthesia Type: MAC  Patient location during evaluation: [facility]  Patient participation: Yes- Able to Participate  Level of consciousness: awake and alert  Post-procedure vital signs: reviewed and stable  Pain management: adequate  Airway patency: patent  PONV status at discharge: No PONV  Anesthetic complications: no      Cardiovascular status: blood pressure returned to baseline  Respiratory status: unassisted  Hydration status: euvolemic  Follow-up not needed.        Visit Vitals  Ht 5' 10" (1.778 m)   Wt (!) 142.9 kg (315 lb)   BMI 45.20 kg/m²       Pain/Bessie Score: Pain Assessment Performed: Yes (5/14/2018  6:28 AM)  Presence of Pain: denies (5/14/2018  6:28 AM)      "

## 2018-05-14 NOTE — DISCHARGE SUMMARY
Outcome: Successful outpatient ophthalmic surgical procedure  Preprinted Instructions given to patient.  Regular diet.  Activity: No restrictions  Meds: see Med Rec  Condition: stable  Follow up: 1 day with Dr Greer  Disposition: Home  Diagnosis: s/p eye surgery

## 2018-05-14 NOTE — OR NURSING
Verified with patient twice the laterality of eye today, pt pointed to Right eye both times and has been using eye gtts at home on the Right eye.

## 2018-05-14 NOTE — PLAN OF CARE
Patient has been placing drops in the R eye.  Laterality changed on consent and in orders to reflect R eye instead of L eye.

## 2018-05-14 NOTE — OP NOTE
SURGEON:  Lawrence Greer M.D.    PREOPERATIVE DIAGNOSIS:    Nuclear Sclerotic Cataract Right Eye    POSTOPERATIVE DIAGNOSIS:    Nuclear Sclerotic Cataract Right Eye    PROCEDURES:    Phacoemulsification with  intraocular lens, Right eye (03502)  With Femtosecond LASER assist    DATE OF SURGERY: 05/14/2018    IMPLANT: pcboo 21.0    ANESTHESIA:  MAC with topical Lidocaine    COMPLICATIONS:  None    ESTIMATED BLOOD LOSS: None    SPECIMENS: None    INDICATIONS:    The patient has a history of painless progressive visual loss and  difficulty with activities of daily living secondary to cataract formation.  After a thorough discussion of the risks, benefits, and alternatives to cataract surgery, including, but not limited to, the rare risks of infection, retinal detachment, hemorrhage, need for additional surgery, loss of vision, and even loss of the eye, the patient voices understanding and desires to proceed.    DESCRIPTION OF PROCEDURE:    After verification of consent and marking of the operative eye, the patient was positioned under the femtosecond LASER. Topical anesthetic drops were administered. A surgical timeout was initiated with verification of patient identifiers and the laser surgical plan. The eye was docked securely and the laser portion of the cataract procedure was carried out without complication.  The patient was returned to the pre-operative area to await the intraocular surgical portion of the cataract procedure.  The patients IOL calculations were reviewed, and the lens selection confirmed.   After verification and marking of the proper eye in the preop holding area, the patient was brought to the operating room in supine position where the eye was prepped and draped in standard sterile fashion with 5% Betadine and a lid speculum placed in the eye.   Topical 4% Lidocaine was used in addition to the preoperative anesthesia and the procedure was begun by the creation of a paracentesis incision through  which viscoelastic was used to fill the anterior chamber.  Next, a keratome blade was used to create a triplanar temporal clear corneal incision and a cystotome and Utrata forceps used to fashion a continuous curvilinear capsulorrhexis.  Hydrodissection was carried out using the Solomon hydrodissection cannula and the nucleus was found to be mobile.  Phacoemulsification of the nucleus was carried out using a quick chop technique, and all remaining epinuclear and cortical material was removed.  The eye was then reformed with Viscoelastic and the  intraocular lens was implanted into the capsular bag.  All remaining viscoelastics were removed from the eye and at the end of the case the pupil was round, the lens was well-centered within the capsular bag and all wounds were found to be water tight.  Drops of Vigamox and Pred Forte were instilled and a shield was placed over the eye. The patient will follow up with Dr. Greer in the morning.

## 2018-05-15 ENCOUNTER — OFFICE VISIT (OUTPATIENT)
Dept: OPHTHALMOLOGY | Facility: CLINIC | Age: 73
End: 2018-05-15
Attending: OPHTHALMOLOGY
Payer: MEDICARE

## 2018-05-15 DIAGNOSIS — Z98.41 STATUS POST CATARACT EXTRACTION AND INSERTION OF INTRAOCULAR LENS OF RIGHT EYE: Primary | ICD-10-CM

## 2018-05-15 DIAGNOSIS — Z96.1 STATUS POST CATARACT EXTRACTION AND INSERTION OF INTRAOCULAR LENS OF RIGHT EYE: Primary | ICD-10-CM

## 2018-05-15 PROCEDURE — 99999 PR PBB SHADOW E&M-EST. PATIENT-LVL I: CPT | Mod: PBBFAC,,, | Performed by: OPHTHALMOLOGY

## 2018-05-15 PROCEDURE — 99024 POSTOP FOLLOW-UP VISIT: CPT | Mod: S$GLB,,, | Performed by: OPHTHALMOLOGY

## 2018-05-15 NOTE — PROGRESS NOTES
HPI     Post-op Evaluation    Additional comments: 1 day post op OD 5/14/18           Comments   Post op day 1 CE with IOL OD (5/14/18).  Pt states that vision seems to be doing well since having sx yesterday. Pt   states vision is bright. No pain or discomfort OU at this time. Pt admits   to blue flashes last night. Pt confirmd taking gtts as directed TID OD but   did not use any today yet.        Last edited by Vandana Barrios on 5/15/2018  8:11 AM. (History)            Assessment /Plan     For exam results, see Encounter Report.    Status post cataract extraction and insertion of intraocular lens of right eye      Slit lamp exam:  L/L: nl  K: clear, wound sealed  AC: 1+ cell  Lens: IOL centered and stable    POD1 s/p Phaco/IOL  Appropriate precautions and post op medications reviewed.  Patient instructed to call or come in if symptoms of redness, decreased vision, or pain are experienced.

## 2018-05-22 ENCOUNTER — CLINICAL SUPPORT (OUTPATIENT)
Dept: REHABILITATION | Facility: OTHER | Age: 73
End: 2018-05-22
Attending: ORTHOPAEDIC SURGERY
Payer: MEDICARE

## 2018-05-22 DIAGNOSIS — M54.50 CHRONIC BILATERAL LOW BACK PAIN WITHOUT SCIATICA: ICD-10-CM

## 2018-05-22 DIAGNOSIS — G89.29 CHRONIC BILATERAL LOW BACK PAIN WITHOUT SCIATICA: ICD-10-CM

## 2018-05-22 PROCEDURE — 97110 THERAPEUTIC EXERCISES: CPT

## 2018-05-22 NOTE — PROGRESS NOTES
[facility]      Name: [facility] [place] PAT Cantor Jr.  Clinic Number: [id number]  Date of Treatment: 2018   Diagnosis:   Encounter Diagnosis   Name Primary?    Chronic bilateral low back pain without sciatica      Physician: Carroll De Souza MD    Pain pattern determined: 2  Plan of care signed: 18  Time in: 2:00  Time Out: 3:05  Total Billable Units: 40  Precautions: Fall, CKD, right TKA failure/pain, a-fib, DM II, long-term anticoagulant  Visit #: 2  Deferred knee extension and flexion exercises per knee limitations. Plan to add SLR with quad set and add leg press and hip abduction next session.       Subjective   Stephen reports no significant change symptoms. He reports he has had a lot of appointments and cateract surgeries that have interfered with his ability to fully start PT.  He reported feeling a little looser and better by end of session.  He will be getting a revision of right TKA in July.     Patient reports tolerating previous visit without increased symptoms.     Patient reports their pain to be 8/10 on a 0-10 scale with 0 being no pain and 10 being the worst pain imaginable.    Pain Location: Low back, right hip and knee     Occupation:  Retired (Used to work for [employer])    Leisure: [place]                      Pts goals:  Get around a little better     Objective   Baseline IM Testing Results:   Date of testin2018  ROM 30-0 deg   Max Peak Torque 140    Min Peak Torque 32    Flex/Ext Ratio 4.3   % below normative data -68           Outcomes:  Initial score: 66%  Visit 5 score:  Goal: CK = at least 40% but < 60% impaired, limited or restricted      Treatment    Pt was instructed in and performed the following:     Stephen received therapeutic exercises to develop/improved posture, cardiovascular endurance, muscular endurance, lumbar/cervical ROM, strength and muscular endurance for 50 minutes including the following exercises:      [facility] 5/22/2018   Visit Number 2   VAS Pain Rating 3   Recumbent Bike Seat Pos. -   Time -   Flexion in Lying -   Lumbar Extension Seat Pad -   Femur Restraint -   Top Dead Center -   Counterweight -   Lumbar Flexion -   Lumbar Extension -   Lumbar Peak Torque -   Min Torque -   Percent From Norm -   Lumbar Weight 70   Repetitions 20   Rating of Perceived Exertion 3   Ice - Z Lie (in min.) 10     FIS 10x (Pt reported mild increase in low back irritation, no worse after)  PPT 10x  LTR 10x   Plan to add SLR with quad set    Peripheral muscle strengthening which included 1 set of 15-20 repetitions at a slow, controlled 7 second per rep pace focused on strengthening supporting musculature for improved body mechanics and functional mobility.  Pt and therapist focused on proper form during treatment to ensure optimal strengthening of each targeted muscle group.  Machines were utilized including torso rotation, chest press, upright row, Tricep extension, bicep curl. leg press, and hip abduction added visit 3 Deferred knee extension and flexion exercises per knee limitations. Plan to add SLR with quad set and add leg press and hip abduction next session.     Jmc received the following manual therapy techniques: None        Home Exercise Program as follows:     Flexion in sitting 10x, 3x/daily  Supine pelvic tilts 5-10 s/h 5x, 2x daily  LTR 10x 3x/daily     Handouts were given to the patient. Pt demo good understanding of the education provided. Jmc demonstrated good return demonstration of activities.   Lumbar roll use compliance: Unknown  Additional exercises taught this treatment session:   None    Assessment     Pt presents to physical therapy for 2nd visit following initial evaluation.  Reviewed HEP exercises. Pt performed with mild verbal cues. Pt tolerated Med X lumbar exercise weight of 50% peak torque  with reported 3/10 Edy Exertion scale. Pt required moderate verbal cues to maintian speed to 7  sec per rep. Pt reports he has followed up with MD and will have a correction of knee replacement in July. Pt is very familiar with Med X machines as he has participated in protocol before. Deferred knee extension and flexion exercises per knee limitations. Plan to add SLR with quad set and add leg press and hip abduction next session. Pt completed all peripheral resistance exercises with no reports of increased symptoms or discomfort.       Patient is making good progress towards established goals.  Pt will continue to benefit from skilled outpatient physical therapy to address the deficits stated in the impairment chart, provide pt/family education and to maximize pt's level of independence in the home and community environment.       Pt's spiritual, cultural and educational needs considered and pt agreeable to plan of care and goals as stated below:     Medical necessity is demonstrated by the following problem list.    Pt presents with the following impairments:   History  Co-morbidities and personal factors that may impact the plan of care Examination  Body Structures and Functions, activity limitations and participation restrictions that may impact the plan of care Clinical Presentation    Decision Making/ Complexity Score   Co-morbidities:   See PMH           Personal Factors:   lifestyle Body Regions:   back  lower extremities     Body Systems:   gross symmetry  ROM  strength  gross coordinated movement  balance  gait  transitions     Activity limitations:   Learning and applying knowledge  no deficits     General Tasks and Commands  no deficits     Communication  no deficits     Mobility  lifting and carrying objects  walking  Standing, sit<>stand     Self care  dressing     Domestic Life  shopping  cooking  doing house work (cleaning house, washing dishes, laundry)     Interactions/Relationships  family relationships  intimate relationships     Life Areas  no deficits     Community and Social  Life  community life  recreation and leisure     Participation Restrictions:   None       evolving clinical presentation with changing clinical characteristics    Moderate            GOALS: Pt is in agreement with the following goals.     Short term goals:  6 weeks or 10 visits   1.  Pt will demonstrate increased lumbar ROM by at least 3 degrees from the initial ROM value with improvements noted in functional ROM and ability to perform ADLs  2.  Pt will demonstrate increased maximum isometric torque value by 10% when compared to the initial value resulting in improved ability to perform bending, lifting, and carrying activities safely, confidently.  3.  Patient report a reduction in worst pain score by 1-2 points for improved tolerance during work and recreational activities  4.  Pt able to perform HEP correctly with minimal cueing or supervision for therapist     Long term goals: 13 weeks or 20 visits   1. Pt will demonstrate increased lumbar ROM by at least 6 degrees from initial ROM value, resulting in improved ability to perform functional fwd bending while standing and sitting.   2. Pt will demonstrate increased maximum isometric torque value by 30% when compared to the initial value resulting in improved ability to perform bending, lifting, and carrying activities safely, confidently.  3. Pt to demonstrate ability to independently control and reduce their pain through posture positioning and mechanical movements throughout a typical day.  4.  Patient will demonstrate improved overall function per FOTO Survey to CK = at least 40% but < 60% impaired, limited or restricted score or less.  5. Pt will demonstrate ability to walk up to 10 minutes without significant increase in pain by 3-4 VAS points.          Plan   Continue with established Plan of Care towards established PT goals.

## 2018-05-24 ENCOUNTER — CLINICAL SUPPORT (OUTPATIENT)
Dept: REHABILITATION | Facility: OTHER | Age: 73
End: 2018-05-24
Attending: ORTHOPAEDIC SURGERY
Payer: MEDICARE

## 2018-05-24 DIAGNOSIS — M54.50 CHRONIC BILATERAL LOW BACK PAIN WITHOUT SCIATICA: ICD-10-CM

## 2018-05-24 DIAGNOSIS — G89.29 CHRONIC BILATERAL LOW BACK PAIN WITHOUT SCIATICA: ICD-10-CM

## 2018-05-24 PROCEDURE — 97110 THERAPEUTIC EXERCISES: CPT

## 2018-05-24 NOTE — PROGRESS NOTES
[facility]      [place] [facility] [place]  Clinic Number: [id number]  Date of Treatment: 2018   Diagnosis:   Encounter Diagnosis   Name Primary?    Chronic bilateral low back pain without sciatica      Physician: Carroll De Souza MD    Pain pattern determined: 2  Plan of care signed: 18  Time in: 2:00  Time Out: 3:05  Total Billable Units: 40  Precautions: Fall, CKD, right TKA failure/pain, a-fib, DM II, long-term anticoagulant  Visit #: 3       Subjective   Jmc reports feeling a little better in back last session symptoms. He reported feeling a little looser and better by end of session.  He will be getting a revision of right TKA in July.     He reported increased right knee pain after ice in supine.     Patient reports tolerating previous visit without increased symptoms.     Patient reports their pain to be 7/10 on a 0-10 scale with 0 being no pain and 10 being the worst pain imaginable.    Pain Location: Low back, right hip and knee     Occupation:  Retired (Used to work for [employer])    Leisure: Going [place] [employer] [place]                      Pts goals:  Get around a little better     Objective   Baseline IM Testing Results:   Date of testin2018  ROM 30-0 deg   Max Peak Torque 140    Min Peak Torque 32    Flex/Ext Ratio 4.3   % below normative data -68           Outcomes:  Initial score: 66%  Visit 5 score:  Goal: CK = at least 40% but < 60% impaired, limited or restricted      Treatment    Pt was instructed in and performed the following:     [facility] received therapeutic exercises to develop/improved posture, cardiovascular endurance, muscular endurance, lumbar/cervical ROM, strength and muscular endurance for 50 minutes including the following exercises:   [facility] 2018   Visit Number 3   VAS Pain Rating 2   Recumbent Bike Seat Pos. 14   Time 10   Flexion in Lying -   Lumbar Extension Seat Pad -   Femur Restraint -   Top Dead Center -    Counterweight -   Lumbar Flexion -   Lumbar Extension -   Lumbar Peak Torque -   Min Torque -   Percent From Norm -   Lumbar Weight 74   Repetitions 15   Rating of Perceived Exertion 3   Ice - Z Lie (in min.) 10         FIS 10x   PPT 10x  LTR 10x   Attempted quad set 5 s/h 5x     Peripheral muscle strengthening which included 1 set of 15-20 repetitions at a slow, controlled 7 second per rep pace focused on strengthening supporting musculature for improved body mechanics and functional mobility.  Pt and therapist focused on proper form during treatment to ensure optimal strengthening of each targeted muscle group.  Machines were utilized including torso rotation, chest press, upright row, Tricep extension, bicep curl. leg press, and hip abduction added visit 3 Deferred knee extension and flexion exercises per knee limitations.     Jmc received the following manual therapy techniques: None        Home Exercise Program as follows:     Flexion in sitting 10x, 3x/daily  Supine pelvic tilts 5-10 s/h 5x, 2x daily  LTR 10x 3x/daily     Handouts were given to the patient. Pt demo good understanding of the education provided. Jmc demonstrated good return demonstration of activities.   Lumbar roll use compliance: Unknown  Additional exercises taught this treatment session:   Quad set 5 s/h 5x     Assessment     Pt presents with mild low back pain which improved throughout session. Pt did report increased right knee pain following ice but better after walking. Added quad set to help improve knee swelling and improve quad strength before surgery with no adverse reaction.  Reviewed HEP exercises. Pt performed with mild verbal cues. Pt tolerated Med X lumbar exercise weight of 50% peak torque  with reported 3/10 Edy Exertion scale. Pt reports he has followed up with MD and will have a correction of knee replacement in July. Pt is very familiar with Med X machines as he has participated in protocol before. Deferred knee  extension and flexion exercises per knee limitations.  Pt completed all peripheral resistance exercises with no reports of increased symptoms or discomfort.       Patient is making good progress towards established goals.  Pt will continue to benefit from skilled outpatient physical therapy to address the deficits stated in the impairment chart, provide pt/family education and to maximize pt's level of independence in the home and community environment.       Pt's spiritual, cultural and educational needs considered and pt agreeable to plan of care and goals as stated below:     Medical necessity is demonstrated by the following problem list.    Pt presents with the following impairments:   History  Co-morbidities and personal factors that may impact the plan of care Examination  Body Structures and Functions, activity limitations and participation restrictions that may impact the plan of care Clinical Presentation    Decision Making/ Complexity Score   Co-morbidities:   See PMH           Personal Factors:   lifestyle Body Regions:   back  lower extremities     Body Systems:   gross symmetry  ROM  strength  gross coordinated movement  balance  gait  transitions     Activity limitations:   Learning and applying knowledge  no deficits     General Tasks and Commands  no deficits     Communication  no deficits     Mobility  lifting and carrying objects  walking  Standing, sit<>stand     Self care  dressing     Domestic Life  shopping  cooking  doing house work (cleaning house, washing dishes, laundry)     Interactions/Relationships  family relationships  intimate relationships     Life Areas  no deficits     Community and Social Life  community life  recreation and leisure     Participation Restrictions:   None       evolving clinical presentation with changing clinical characteristics    Moderate            GOALS: Pt is in agreement with the following goals.     Short term goals:  6 weeks or 10 visits   1.  Pt will  demonstrate increased lumbar ROM by at least 3 degrees from the initial ROM value with improvements noted in functional ROM and ability to perform ADLs  2.  Pt will demonstrate increased maximum isometric torque value by 10% when compared to the initial value resulting in improved ability to perform bending, lifting, and carrying activities safely, confidently.  3.  Patient report a reduction in worst pain score by 1-2 points for improved tolerance during work and recreational activities  4.  Pt able to perform HEP correctly with minimal cueing or supervision for therapist     Long term goals: 13 weeks or 20 visits   1. Pt will demonstrate increased lumbar ROM by at least 6 degrees from initial ROM value, resulting in improved ability to perform functional fwd bending while standing and sitting.   2. Pt will demonstrate increased maximum isometric torque value by 30% when compared to the initial value resulting in improved ability to perform bending, lifting, and carrying activities safely, confidently.  3. Pt to demonstrate ability to independently control and reduce their pain through posture positioning and mechanical movements throughout a typical day.  4.  Patient will demonstrate improved overall function per FOTO Survey to CK = at least 40% but < 60% impaired, limited or restricted score or less.  5. Pt will demonstrate ability to walk up to 10 minutes without significant increase in pain by 3-4 VAS points.          Plan   Continue with established Plan of Care towards established PT goals.

## 2018-05-28 ENCOUNTER — OFFICE VISIT (OUTPATIENT)
Dept: OTOLARYNGOLOGY | Facility: CLINIC | Age: 73
End: 2018-05-28
Payer: MEDICARE

## 2018-05-28 ENCOUNTER — CLINICAL SUPPORT (OUTPATIENT)
Dept: AUDIOLOGY | Facility: CLINIC | Age: 73
End: 2018-05-28
Payer: MEDICARE

## 2018-05-28 VITALS — HEIGHT: 70 IN | BODY MASS INDEX: 45.1 KG/M2 | WEIGHT: 315 LBS

## 2018-05-28 DIAGNOSIS — H90.A31 MIXED CONDUCTIVE AND SENSORINEURAL HEARING LOSS OF RIGHT EAR WITH RESTRICTED HEARING OF LEFT EAR: Primary | ICD-10-CM

## 2018-05-28 DIAGNOSIS — H90.A31 MIXED CONDUCTIVE AND SENSORINEURAL HEARING LOSS OF RIGHT EAR WITH RESTRICTED HEARING OF LEFT EAR: ICD-10-CM

## 2018-05-28 DIAGNOSIS — H61.21 IMPACTED CERUMEN OF RIGHT EAR: Primary | ICD-10-CM

## 2018-05-28 PROCEDURE — G0268 REMOVAL OF IMPACTED WAX MD: HCPCS | Mod: GC,S$GLB,,

## 2018-05-28 PROCEDURE — 92550 TYMPANOMETRY & REFLEX THRESH: CPT | Mod: S$GLB,,, | Performed by: AUDIOLOGIST

## 2018-05-28 PROCEDURE — 92557 COMPREHENSIVE HEARING TEST: CPT | Mod: S$GLB,,, | Performed by: AUDIOLOGIST

## 2018-05-28 PROCEDURE — 99499 UNLISTED E&M SERVICE: CPT | Mod: S$GLB,,, | Performed by: OTOLARYNGOLOGY

## 2018-05-28 PROCEDURE — 99999 PR PBB SHADOW E&M-EST. PATIENT-LVL IV: CPT | Mod: PBBFAC,,, | Performed by: OTOLARYNGOLOGY

## 2018-05-28 NOTE — PROGRESS NOTES
Subjective:       Patient ID: Jmc PAT Cantor Jr. is a 72 y.o. male.    Chief Complaint: Cerumen Impaction    Mr Cantor returns to the ENT clinic after a 2 year hiatus. He complains of right ear fullness and decreased hearing on that side. He denies any otalgia or otorrhea. Has been using q-tips and oil in his ear to keep it clean.       Review of Systems   Constitutional: Negative for activity change, appetite change, diaphoresis and fatigue.   HENT: Positive for hearing loss. Negative for dental problem, ear discharge, ear pain, facial swelling, sinus pain and sinus pressure.    Eyes: Negative for discharge.   Respiratory: Negative for apnea and chest tightness.    Cardiovascular: Negative for chest pain.   Gastrointestinal: Negative for abdominal distention and abdominal pain.   Endocrine: Negative for cold intolerance.   Hematological: Negative for adenopathy.   Psychiatric/Behavioral: Negative for agitation and behavioral problems.       Objective:      Physical Exam   Constitutional: He is oriented to person, place, and time. He appears well-developed and well-nourished.   HENT:   Head: Normocephalic and atraumatic.   Right Ear: Ear canal normal. Tympanic membrane mobility is abnormal. A middle ear effusion is present. Decreased hearing is noted.   Left Ear: Ear canal normal. Tympanic membrane mobility is normal.  No middle ear effusion. Decreased hearing is noted.   Ears:    Neck: Normal range of motion. Neck supple. No JVD present.   Cardiovascular: Intact distal pulses.    Pulmonary/Chest: Effort normal. No stridor.   Abdominal: There is no guarding.   Musculoskeletal: Normal range of motion. He exhibits no edema.   Lymphadenopathy:     He has no cervical adenopathy.   Neurological: He is alert and oriented to person, place, and time. No cranial nerve deficit.   Skin: Skin is dry. Capillary refill takes less than 2 seconds.   Psychiatric: He has a normal mood and affect.         Microscopic ear exam: The  patient was taken to the scope room. Ears cleared of all cerumen and carefully examined under microscopic vision.      Assessment:       1. Impacted cerumen of right ear    2. Mixed conductive and sensorineural hearing loss of right ear with restricted hearing of left ear        Plan:       - Debrix  - RTC 6 months for ear cleaning

## 2018-05-28 NOTE — PROGRESS NOTES
Mr. Stephen Cantor was seen in the clinic today for an audiological evaluation.  Mr. Cantor reported aural fullness of the right ear and bilateral hearing loss, right ear worse than left ear.    Audiological testing revealed a mild to profound mixed hearing loss for the right ear and a mild to severe sensorineural hearing loss for the left ear with good discrimination and normal Type A tympanograms, bilaterally.  Ipsilateral acoustic reflexes were absent for the right ear and present for the left ear.    Recommendations:  1. Otologic evaluation  2. Follow-up audiological evaluation, as needed  3. Hearing protection when in noise   4. Hearing aid consultation following medical clearance

## 2018-05-29 ENCOUNTER — OFFICE VISIT (OUTPATIENT)
Dept: OPHTHALMOLOGY | Facility: CLINIC | Age: 73
End: 2018-05-29
Attending: OPHTHALMOLOGY
Payer: MEDICARE

## 2018-05-29 ENCOUNTER — TELEPHONE (OUTPATIENT)
Dept: OPHTHALMOLOGY | Facility: CLINIC | Age: 73
End: 2018-05-29

## 2018-05-29 DIAGNOSIS — H25.11 NUCLEAR SCLEROTIC CATARACT OF RIGHT EYE: Primary | ICD-10-CM

## 2018-05-29 DIAGNOSIS — Z98.890 POST-OPERATIVE STATE: ICD-10-CM

## 2018-05-29 DIAGNOSIS — H25.13 NUCLEAR SCLEROSIS, BILATERAL: Primary | ICD-10-CM

## 2018-05-29 PROCEDURE — 99024 POSTOP FOLLOW-UP VISIT: CPT | Mod: S$GLB,,, | Performed by: OPHTHALMOLOGY

## 2018-05-29 PROCEDURE — 99999 PR PBB SHADOW E&M-EST. PATIENT-LVL II: CPT | Mod: PBBFAC,,, | Performed by: OPHTHALMOLOGY

## 2018-05-29 RX ORDER — MOXIFLOXACIN 5 MG/ML
1 SOLUTION/ DROPS OPHTHALMIC
Status: CANCELLED | OUTPATIENT
Start: 2018-05-29

## 2018-05-29 RX ORDER — PHENYLEPHRINE HYDROCHLORIDE 25 MG/ML
1 SOLUTION/ DROPS OPHTHALMIC
Status: CANCELLED | OUTPATIENT
Start: 2018-05-29

## 2018-05-29 RX ORDER — TROPICAMIDE 10 MG/ML
1 SOLUTION/ DROPS OPHTHALMIC
Status: CANCELLED | OUTPATIENT
Start: 2018-05-29

## 2018-05-29 RX ORDER — TETRACAINE HYDROCHLORIDE 5 MG/ML
1 SOLUTION OPHTHALMIC
Status: CANCELLED | OUTPATIENT
Start: 2018-05-29

## 2018-05-29 NOTE — PROGRESS NOTES
HPI     Post op day 1 CE with IOL OD (5/14/18).  Pt states that vision is doing well OD since having sx. Pt denies any   signs of flashes or floaters OU at this time. No pain or discomfort OU. Pt   confirms taking gtts TID OD.       Last edited by Vandana Barrios on 5/29/2018 11:38 AM. (History)            Assessment /Plan     For exam results, see Encounter Report.    Nuclear sclerosis, bilateral    Post-operative state      Slit lamp exam:  L/L: nl  K: clear, wound sealed  AC: trace cell  Iris/Lens: IOL centered and stable    POW1 s/p phaco: Surgery healing well with no signs of infection or abnormal inflammation.    Patient wishes to proceed with surgery in the second eye. Risks, benefits, alternatives reviewed. IOL selection reviewed.     Left eye  IOL: [id number] 20.5 at 180    The patient expresses a desire to reduce spectacle dependence. I reviewed various IOL and LASER refractive surgical options and we will attempt to minimize spectacle dependence by managing astigmatism and optimizing IOL selection. Femtosecond LASER assisted cataract surgery (FLACS) technology was explained to the patient with educational videos and discussion.  The patient voices understanding and wishes to implement this technology during the cataract procedure.  I explained the increased precision of the LASER versus manual techniques, especially as it relates to astigmatism reduction with arcuate incisions.  I emphasized that although our goal is to reduce the need for refractive correction after surgery, there may still be a need for spectacle correction to achieve optimal visual acuity, and that a reasonable range of functional vision should be the expectation.  No guarantees are made about post operative refraction or visual acuity, as the eye may heal in unpredictable ways, and the standard risks, benefits, and alternatives to cataract surgery were explained.  The patient understands that the refractive portions of this cataract  procedure are not covered by insurance, and that there is an out of pocket expense of $1500 per eye. I also explained that even though our pre-operative plan is to utilize advanced refractive technologies during surgery, that I may decide to eliminate part or all of this plan if surgical challenges or complications arise, or I feel that it is not in the patient's best interest. Consent forms and an ABN form were given to the patient to review.    Catalys Parameters:    Left Eye:   ZULMA:  12mm   ?Need to jag patient sitting up?: Yes  Capsulotomy: Scanned Capsule  rdGrdrrdarddrderd:rd rd3rd Arcuate: Toric Jag: at  Axis: 180   Incisions:  OFF

## 2018-05-30 RX ORDER — TIZANIDINE 4 MG/1
TABLET ORAL
Qty: 40 TABLET | Refills: 0 | Status: ON HOLD | OUTPATIENT
Start: 2018-05-30 | End: 2018-07-31 | Stop reason: HOSPADM

## 2018-05-30 RX ORDER — TEMAZEPAM 15 MG/1
15 CAPSULE ORAL NIGHTLY PRN
Qty: 30 CAPSULE | OUTPATIENT
Start: 2018-05-30 | End: 2018-06-29

## 2018-05-30 RX ORDER — OMEGA-3-ACID ETHYL ESTERS 1 G/1
CAPSULE, LIQUID FILLED ORAL
Qty: 540 CAPSULE | Refills: 1 | Status: ON HOLD | OUTPATIENT
Start: 2018-05-30 | End: 2018-07-10 | Stop reason: HOSPADM

## 2018-05-30 RX ORDER — TEMAZEPAM 15 MG/1
CAPSULE ORAL
Qty: 30 CAPSULE | Refills: 1 | Status: ON HOLD | OUTPATIENT
Start: 2018-05-30 | End: 2018-07-26 | Stop reason: SDUPTHER

## 2018-05-30 RX ORDER — FUROSEMIDE 40 MG/1
TABLET ORAL
Qty: 90 TABLET | Refills: 1 | Status: ON HOLD | OUTPATIENT
Start: 2018-05-30 | End: 2018-07-31

## 2018-06-05 ENCOUNTER — ANESTHESIA EVENT (OUTPATIENT)
Dept: SURGERY | Facility: HOSPITAL | Age: 73
DRG: 467 | End: 2018-06-05
Payer: MEDICARE

## 2018-06-05 NOTE — PRE ADMISSION SCREENING
Anesthesia Assessment: Preoperative EQUATION    Planned Procedure: Procedure(s) (LRB):  REVISION-ARTHROPLASTY-KNEE-DAKOTA (Right)  Requested Anesthesia Type:Regional  Surgeon: Miguel Stuart MD  Service: Orthopedics  Known or anticipated Date of Surgery:7/10/2018        Plan:    Testing:  Hematology Profile, BMP, A1C and PT/INR   Pre-anesthesia  visit       Visit focus: possible regional anesthesia and/or nerve block      Consultation: Perioperative Hospitalist     Debbie Brar RN 06/05/2018

## 2018-06-05 NOTE — ANESTHESIA PREPROCEDURE EVALUATION
Anesthesia Assessment: Preoperative EQUATION    Planned Procedure: Procedure(s) (LRB):  REVISION-ARTHROPLASTY-KNEE-DAKOTA (Right)  Requested Anesthesia Type:Regional  Surgeon: Miguel Stuart MD  Service: Orthopedics  Known or anticipated Date of Surgery:7/10/2018        Plan:    Testing:  Hematology Profile, BMP, A1C and PT/INR   Pre-anesthesia  visit       Visit focus: possible regional anesthesia and/or nerve block, apixaban will hold       Consultation: Perioperative Hospitalist, renal evaluation 5/1, cardiac evaluation     Debbie Brar RN 06/05/2018 06/05/2018  [facility] [place] a 72 y.o., male.    Anesthesia Evaluation    I have reviewed the Patient Summary Reports.    I have reviewed the Nursing Notes.   I have reviewed the Medications.     Review of Systems  Anesthesia Hx:  Hx of Anesthetic complications 6/2016:two person bag with no OA, mod difficult but good exchange); Intubated: Postinduction; Blade: Peters #2; Airway Device Size: 8.0; Style: Cuffed; Cuff Inflation: Minimal occlusive pressure; Placement Verified By: Auscultation, Capnometry, ETT Condensation; Grade: Grade II; Complicating Factors: Poor neck/head  extension, Oropharyngeal edema or fat, Obesity    R-TKR 1/2017w/spinal History of prior surgery of interest to airway management or planning: Previous anesthesia: General, Spinal Denies Family Hx of Anesthesia complications.   Denies Personal Hx of Anesthesia complications.   Social:  Non-Smoker, No Alcohol Use    Hematology/Oncology:        Current/Recent Cancer. Oncology Comments: R-Cheek melanoma 12/2015 s/p excision with skin graft    R-Eye sx 2016 with skin graft   EENT/Dental:   Jaw Problems: Hx of jaw locking when mouth is open wide  Cardiovascular:   Hypertension Dysrhythmias (hx of cardioversion ; on apixaban) atrial fibrillation Uses cane. Housework, cooks,grocery  shopping.   Climbs one flight of stairs without difficulty. Valvular Heart Disease: Tricuspid Regurgitation (TR), mild   Diastolic dysfunction-2015  EF 55% Chronic Venous Insufficiency, bilateral lower extremity    Pulmonary:   Sleep Apnea (Uses CPAP s/p UPPP 2009. Repeat sleep s tudy scheduled 8/2018)  Pulmonary Hypertension Echocardiographic Estimated Pulmonary Artery Systolic Pressure >=35 - 45    Renal/:  Neoplasm/Tumor (renal cyst).  Kidney Function/Disease, Chronic Kidney Disease (CKD) , CKD Stage III (GFR 30-59)    Hepatic/GI:   GERD (TUMS at night) Denies Liver Disease.    Musculoskeletal:   Arthritis   Musculoskeletal General/Symptoms: Functional capacity is ambulatory with cane. L-TKR with spinal anesthesia 2003 Lumbar Spine Disorders, Lumbar Disc Disease (herniated/buldging disc) Chronic LBP-films in EPIC    MRI 3/2018: L3 vertebral body hemangioma noted. Discussed with Dr. Foster  Neurological:   Denies CVA. Denies Seizures.  Pain , onset is chronic , location of knee , quality of sharp , precipitating factors are standing , alleviating factors are sit or lie down and rest.  Peripheral Neuropathy    Endocrine:   Diabetes, type 2, using insulin A1c 7.0   Dermatological:  Skin Normal Patient reports he had 2 large blisters on his RLE after his dressing was removed.  Had to see wound care for 6 weeks after surgery.  BLE very swollen.  Patient has a velcro compression device on the LLE.  The RLE looks a little red but patient states that it looks normal   Psych:  Psychiatric Normal           Physical Exam  General:  Morbid Obesity    Airway/Jaw/Neck:  Airway Findings: Mouth Opening: Normal Tongue: Large  General Airway Assessment: Adult, Possible difficult mask airway, Possible difficult intubation  Mallampati: I  TM Distance: Normal, at least 6 cm  Jaw/Neck Findings:  Neck ROM: Extension Decreased, Mild  Neck Findings:  Girth Increased, Short Neck     Eyes/Ears/Nose:  Eyes/Ears/Nose Findings: Large face     Dental:  Dental Findings: molar caps   Chest/Lungs:  Chest/Lungs Findings: Normal Respiratory Rate     Heart/Vascular:  Vascular Findings:  Edema Locations: BLE  Vascular Exam Findings: LLE > RLE. Difficulty placing regular XXL teds on therefore to the LLE pt has a velcro wrapping compression device.          Mental Status:  Mental Status Findings:  Cooperative, Alert and Oriented         Labs reviewed bun 24, creat 1.4, gfr 49- improved     Discharge disposition:  Wife Leticia [phone]    Renal evaluation 5/1  Cardiac evaluation and echo 7/6    Last dose of apixaban 7/5    Please refer to , Internal Medicine, perioperative risk assessment and recommendations 6/19    Marilin Lyn RN 06/19/2018     ADDENDUM MARILIN LYN RN 7/7/18:    Per cardiology CODY Valderrama, NP-C-He is doing well in a rate control strategy, with normal EF per today's echo. He remains anticoagulated on eliquis and rate controlled on metoprolol. Will make no changes.   Continue current medications.  RTC in one year with echo, sooner if needed.            Anesthesia Plan  Type of Anesthesia, risks & benefits discussed:  Anesthesia Type:  CSE, epidural, general, spinal  Patient's Preference:   Intra-op Monitoring Plan:   Intra-op Monitoring Plan Comments:   Post Op Pain Control Plan:   Post Op Pain Control Plan Comments:   Induction:    Beta Blocker:  Patient is on a Beta-Blocker and has received one dose within the past 24 hours (No further documentation required).       Informed Consent: Patient understands risks and agrees with Anesthesia plan.  Questions answered. Anesthesia consent signed with patient.  ASA Score: 3     Day of Surgery Review of History & Physical:    H&P update referred to the surgeon.         Ready For Surgery From Anesthesia Perspective.

## 2018-06-07 DIAGNOSIS — R82.90 ABNORMAL URINALYSIS: ICD-10-CM

## 2018-06-07 DIAGNOSIS — M79.669 PAIN OF LOWER LEG, UNSPECIFIED LATERALITY: Primary | ICD-10-CM

## 2018-06-07 DIAGNOSIS — E11.9 DM TYPE 2, NOT AT GOAL: ICD-10-CM

## 2018-06-07 RX ORDER — ASPIRIN 325 MG
325 TABLET ORAL DAILY
Status: ON HOLD | COMMUNITY
End: 2018-07-10 | Stop reason: HOSPADM

## 2018-06-12 ENCOUNTER — TELEPHONE (OUTPATIENT)
Dept: ELECTROPHYSIOLOGY | Facility: CLINIC | Age: 73
End: 2018-06-12

## 2018-06-12 NOTE — TELEPHONE ENCOUNTER
----- Message from Tejas Mcbride MA sent at 6/12/2018  4:11 PM CDT -----  Contact: self      ----- Message -----  From: Margie Rogers MA  Sent: 6/12/2018   2:39 PM  To: Jesus Alberto SHANNON Staff    Pt. is calling to make an appt. before 7/10,going for knee surgery,also has a recall. No time available. Please call. [phone] Last visit 6/23/17. pt.

## 2018-06-14 ENCOUNTER — TELEPHONE (OUTPATIENT)
Dept: PREADMISSION TESTING | Facility: HOSPITAL | Age: 73
End: 2018-06-14

## 2018-06-14 NOTE — TELEPHONE ENCOUNTER
----- Message from Carissa Arias RN sent at 6/14/2018 10:41 AM CDT -----  Pt is cleared to hold Eliquis for 4 days but will need to see PCP or general cardiology for clearance.  ----- Message -----  From: Tejas Mcbride MA  Sent: 6/12/2018   4:12 PM  To: Carissa Arias RN        ----- Message -----  From: Debbie Brar RN  Sent: 6/12/2018   7:52 AM  To: Jesus Alberto SHANNON Staff    Patient is having a revision of a knee 7/10 with Dr. Stuart under spinal anesthesia.  Anesthesia is requesting cardiac clearance and management of Apixaban.  Does patient have any contraindications to holding Apixaban 4 days before surgery?      Debbie Brar RN  Preop [place]  [phone]

## 2018-06-19 ENCOUNTER — TELEPHONE (OUTPATIENT)
Dept: PREADMISSION TESTING | Facility: HOSPITAL | Age: 73
End: 2018-06-19

## 2018-06-19 ENCOUNTER — OFFICE VISIT (OUTPATIENT)
Dept: INFECTIOUS DISEASES | Facility: CLINIC | Age: 73
End: 2018-06-19
Payer: MEDICARE

## 2018-06-19 ENCOUNTER — INITIAL CONSULT (OUTPATIENT)
Dept: INTERNAL MEDICINE | Facility: CLINIC | Age: 73
End: 2018-06-19
Payer: MEDICARE

## 2018-06-19 ENCOUNTER — HOSPITAL ENCOUNTER (OUTPATIENT)
Dept: PREADMISSION TESTING | Facility: HOSPITAL | Age: 73
Discharge: HOME | End: 2018-06-19
Attending: ANESTHESIOLOGY
Payer: MEDICARE

## 2018-06-19 VITALS
DIASTOLIC BLOOD PRESSURE: 59 MMHG | BODY MASS INDEX: 45.1 KG/M2 | TEMPERATURE: 98 F | WEIGHT: 315 LBS | SYSTOLIC BLOOD PRESSURE: 120 MMHG | HEIGHT: 70 IN | HEART RATE: 86 BPM | OXYGEN SATURATION: 98 %

## 2018-06-19 VITALS
TEMPERATURE: 98 F | OXYGEN SATURATION: 98 % | HEIGHT: 70 IN | BODY MASS INDEX: 45.1 KG/M2 | HEART RATE: 86 BPM | RESPIRATION RATE: 20 BRPM | WEIGHT: 315 LBS | SYSTOLIC BLOOD PRESSURE: 120 MMHG | DIASTOLIC BLOOD PRESSURE: 59 MMHG

## 2018-06-19 DIAGNOSIS — I51.89 DIASTOLIC DYSFUNCTION: ICD-10-CM

## 2018-06-19 DIAGNOSIS — I10 ESSENTIAL HYPERTENSION: ICD-10-CM

## 2018-06-19 DIAGNOSIS — Z79.01 LONG TERM CURRENT USE OF ANTICOAGULANT THERAPY: ICD-10-CM

## 2018-06-19 DIAGNOSIS — E11.29 PROTEINURIA DUE TO TYPE 2 DIABETES MELLITUS: ICD-10-CM

## 2018-06-19 DIAGNOSIS — Z00.6 RESEARCH SUBJECT: Primary | ICD-10-CM

## 2018-06-19 DIAGNOSIS — L81.8 TATTOOS: ICD-10-CM

## 2018-06-19 DIAGNOSIS — G47.33 OBSTRUCTIVE SLEEP APNEA SYNDROME: ICD-10-CM

## 2018-06-19 DIAGNOSIS — R60.0 BILATERAL LEG EDEMA: ICD-10-CM

## 2018-06-19 DIAGNOSIS — H02.102 ECTROPION OF RIGHT LOWER EYELID, UNSPECIFIED ECTROPION TYPE: ICD-10-CM

## 2018-06-19 DIAGNOSIS — Z79.4 TYPE 2 DIABETES MELLITUS WITH STAGE 3 CHRONIC KIDNEY DISEASE, WITH LONG-TERM CURRENT USE OF INSULIN: ICD-10-CM

## 2018-06-19 DIAGNOSIS — Z01.818 PREOP EXAMINATION: Primary | ICD-10-CM

## 2018-06-19 DIAGNOSIS — I87.2 VENOUS INSUFFICIENCY OF BOTH LOWER EXTREMITIES: ICD-10-CM

## 2018-06-19 DIAGNOSIS — Z79.4 TYPE 2 DIABETES MELLITUS WITH DIABETIC POLYNEUROPATHY, WITH LONG-TERM CURRENT USE OF INSULIN: ICD-10-CM

## 2018-06-19 DIAGNOSIS — R80.9 PROTEINURIA DUE TO TYPE 2 DIABETES MELLITUS: ICD-10-CM

## 2018-06-19 DIAGNOSIS — E11.42 TYPE 2 DIABETES MELLITUS WITH DIABETIC POLYNEUROPATHY, WITH LONG-TERM CURRENT USE OF INSULIN: ICD-10-CM

## 2018-06-19 DIAGNOSIS — M54.5 CHRONIC MIDLINE LOW BACK PAIN, WITH SCIATICA PRESENCE UNSPECIFIED: ICD-10-CM

## 2018-06-19 DIAGNOSIS — E66.01 MORBID OBESITY: ICD-10-CM

## 2018-06-19 DIAGNOSIS — N18.30 CHRONIC KIDNEY DISEASE, STAGE III (MODERATE): ICD-10-CM

## 2018-06-19 DIAGNOSIS — Z79.02 ANTIPLATELET OR ANTITHROMBOTIC LONG-TERM USE: ICD-10-CM

## 2018-06-19 DIAGNOSIS — N40.0 ENLARGED PROSTATE: ICD-10-CM

## 2018-06-19 DIAGNOSIS — I48.20 CHRONIC ATRIAL FIBRILLATION: ICD-10-CM

## 2018-06-19 DIAGNOSIS — N18.30 TYPE 2 DIABETES MELLITUS WITH STAGE 3 CHRONIC KIDNEY DISEASE, WITH LONG-TERM CURRENT USE OF INSULIN: ICD-10-CM

## 2018-06-19 DIAGNOSIS — E11.22 TYPE 2 DIABETES MELLITUS WITH STAGE 3 CHRONIC KIDNEY DISEASE, WITH LONG-TERM CURRENT USE OF INSULIN: ICD-10-CM

## 2018-06-19 DIAGNOSIS — G89.29 CHRONIC MIDLINE LOW BACK PAIN, WITH SCIATICA PRESENCE UNSPECIFIED: ICD-10-CM

## 2018-06-19 PROCEDURE — 99214 OFFICE O/P EST MOD 30 MIN: CPT | Mod: S$GLB,,, | Performed by: HOSPITALIST

## 2018-06-19 PROCEDURE — 3078F DIAST BP <80 MM HG: CPT | Mod: CPTII,S$GLB,, | Performed by: HOSPITALIST

## 2018-06-19 PROCEDURE — 99499 UNLISTED E&M SERVICE: CPT | Mod: S$GLB,,, | Performed by: HOSPITALIST

## 2018-06-19 PROCEDURE — 3074F SYST BP LT 130 MM HG: CPT | Mod: CPTII,S$GLB,, | Performed by: HOSPITALIST

## 2018-06-19 PROCEDURE — 99999 PR PBB SHADOW E&M-EST. PATIENT-LVL III: CPT | Mod: PBBFAC,,, | Performed by: HOSPITALIST

## 2018-06-19 PROCEDURE — 99211 OFF/OP EST MAY X REQ PHY/QHP: CPT | Mod: S$GLB,,, | Performed by: CLINICAL NURSE SPECIALIST

## 2018-06-19 PROCEDURE — 3045F PR MOST RECENT HEMOGLOBIN A1C LEVEL 7.0-9.0%: CPT | Mod: CPTII,S$GLB,, | Performed by: HOSPITALIST

## 2018-06-19 NOTE — ASSESSMENT & PLAN NOTE
Edema- I suggested avoidance of added salt,avoidance of NSAID's unless advised or ordered and suggested Limb elevation and loren hose use

## 2018-06-19 NOTE — HPI
History of present illness- I had the pleasure of meeting this pleasant 72 y.o. gentleman in the pre op clinic prior to his elective Orthopedic surgery. The patient is known to me .    I have obtained the history by speaking to the patient and by reviewing the electronic health records.    Events leading up to surgery / History of presenting illness -    He has been troubled with moderate-severe  Rt knee  pain for over an year  . Pain increases with activity and decreases with resting.      Relevant health conditions of significance for the perioperative period/ History of presenting illness -    Patient Active Problem List    Diagnosis Date Noted    Antiplatelet or antithrombotic long-term use 06/19/2018      mg daily - for 11 years and prior to that was taking 2 of them a day- as his grand mother who lived to be 86 Years used to do that   Plans on holding 1 week pre op           Proteinuria due to type 2 diabetes mellitus 05/01/2018         Chronic midline low back pain 04/23/2018    Bilateral leg edema 09/22/2017     Uses compression stockings on Left lower extremity   Unable to use on Rt side due to knee pain                 Pulmonary hypertension- May 2017- The estimated PA systolic pressure is 28 mmHg 01/10/2017                   Venous insufficiency of both lower extremities                         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/2016     Rt eye       Enlarged prostate 03/04/2016     No Hesitancy        Diastolic dysfunction 03/04/2016    Type 2 diabetes mellitus with diabetic chronic kidney disease 01/20/2016       On treatment with  Insulin    Hemoglobin A1c- 6/19/2018- 7.0  Capillary glucose check-yes  Pre breakfast -120's  Pre lunch -145- 150  Pre [id number]  Has occasional hypoglycemia- care suggested - hypoglycemia awareness intact            Low back ache 10/08/2015         Type 2 diabetes mellitus with diabetic polyneuropathy      As per him , no open areas on the feet        Morbid obesity     HTN (hypertension)      Losartan  Toprol XL           Chronic kidney disease, stage III (moderate) 11/05/2012     Base line 1.7- 1.8       Benign hypertensive renal disease without renal failure 11/05/2012  11/05/2012  11/05/2012              Sleep apnea      Uses CPAP      Chronic atrial fibrillation 07/31/2012    Long term current use of anticoagulant therapy 07/31/2012             

## 2018-06-19 NOTE — ASSESSMENT & PLAN NOTE
Stages of CKD discussed   DM, HTN control , Hydration discussed   NSAID effects discussed    Creatinine on 6/19/2018 was 2.1   Been on a road trip to [place] , for 15 days   Had reduced fluid intake   Will recheck BMP in about a week

## 2018-06-19 NOTE — ASSESSMENT & PLAN NOTE
Suggested bringing CPAP for hospital use . Informed the risk of worsening sleep apnea in the perioperative period and suggest using CPAP use any time in 24 hrs ( day or night )for planned sleep  Avoidance of   weight gain and alcoholic beverages discussed since all of these can worsen HEIDI

## 2018-06-19 NOTE — DISCHARGE INSTRUCTIONS
Your surgery has been scheduled for:__________________________________________    You should report to:  ____[facility], located on the [place] side of the first floor of the           [facility] ([phone])  ____[facility], located on the [facility] side of the            Second floor of the [facility] ([phone])  ____3rd Floor [facility] located on the [facility] side of the [facility] [phone]  Please Note   - Tell your doctor if you take Aspirin, products containing Aspirin, herbal medications  or blood thinners, such as Coumadin, Ticlid, or Plavix.  (Consult your provider regarding holding or stopping before surgery).  - Arrange for someone to drive you home following surgery.  You will not be allowed to leave the surgical facility alone or drive yourself home following sedation and anesthesia.  Before Surgery  - Stop taking all herbal medications 14days prior to surgery  - No Motrin/Advil (Ibuprofen) 7 days before surgery  - No Aleve (Naproxen) 7 days before surgery  - No Goody's/BC powder 7 days before surgery  - Stop Taking Asprin, products containing Asprin _____days before surgery  - Stop taking blood thinners_______days before surgery  - Refrain from drinking alcoholic beverages for 24hours before and after surgery  - Stop or limit smoking _________days before surgery  - You may take Tylenol for pain  Night before Surgery  - DO NOT EAT OR DRINK ANYTHING AFTER MIDNIGHT, INCLUDING GUM, HARD CANDY, MINTS, OR CHEWING TOBACCO.  - Take a shower or bath (shower is recommended).  Bathe with Hibiclens soap or an antibacterial soap from the neck down.  If not supplied by your surgeon, hibiclens soap will need to be purchased over the counter in pharmacy.  Rinse soap off thoroughly.  - Shampoo your hair with your regular shampoo  The Day of Surgery  - Take another bath or shower with hibiclens or any  antibacterial soap, to reduce the chance of infection.  - Take heart and blood pressure medications with a small sip of water, as advised by the perioperative team.  - Do not take fluid pills  - You may brush your teeth and rinse your mouth, but do not swall any additional water.   - Do not apply perfumes, powder, body lotions or deodorant on the day of surgery.  - Nail polish should be removed.  - Do not wear makeup or moisturizer  - Wear comfortable clothes, such as a button front shirt and loose fitting pants.  - Leave all jewelry, including body piercings, and valuables at home.    - Bring any devices you will neeed after surgery such as crutches or canes.  - If you have sleep apnea, please bring your CPAP machine  In the event that your physical condition changes including the onset of a cold or respiratory illness, or if you have to delay or cancel your surgery, please notify your surgeon.  Anesthesia: Regional Anesthesia  Youre scheduled for surgery. During surgery, youll receive medication called anesthesia to keep you comfortable and pain-free. Your surgeon has decided that youll receive regional anesthesia. This sheet tells you what to expect with this type of anesthesia.  What Is Regional Anesthesia?  Regional anesthesia numbs one region of your body. The anesthesia may be given around nerves or into veins in your arms, neck, or legs (nerve block or Willy block). Or it may be sent into the spinal fluid (spinal anesthesia) or into the space just outside the spinal fluid (epidural anesthesia). Sedatives may also be given to relax you.  Nerve Block or Willy Block  A small area of the body, such as an arm or leg, can be numbed using a nerve block or [place] block.  · Nerve block: During a nerve block, your skin is numbed. A needle is then inserted near nerves that serve the area to be numbed. Anesthetic is sent through the needle.  · IV regional or Willy block: For this type of block, an IV line is put into a  vein. The blood flow to the area to be numbed is blocked for a short time. Anesthetic is sent through the IV.  Spinal Anesthesia  Spinal anesthesia numbs your body from about the waist down.  · Anesthetic is injected into the spinal fluid. This is a substance that surrounds the spinal cord in your spinal column. The anesthetic blocks pain traveling from the body to the brain.  · To receive the anesthetic, your skin is numbed at the injection site.  · A needle is then inserted into the spinal fluid. Anesthetic is sent through the needle.  Anesthesia Tools and Medications  · Local anesthetic given through a needle numbs one region of your body.  · Electrocardiography leads (electrodes) record your heart rate and rhythm.  · A blood pressure cuff monitors your blood pressure.  · A pulse oximeter on the end of the finger measures your blood oxygen level.  · Sedatives may be given through an IV to relax you and keep you comfortable. You may stay awake or sleep slightly.  · Oxygen may be given to you through a facemask.  Risks and Possible Complications  Regional anesthesia carries some risks. These include:  · Nausea and vomiting  · Headache  · Backache  · Decreased blood pressure  · Allergic reaction to the anesthetic  · Ongoing numbness (rare)  · Irregular heartbeat (rare)  · Cardiac arrest (rare)   © 4143-4461 [employer] [facility], [address] [place] [address], [place] [address]. All rights reserved. This information is not intended as a substitute for professional medical care. Always follow your healthcare professional's instructions

## 2018-06-19 NOTE — OUTPATIENT SUBJECTIVE & OBJECTIVE
Outpatient Subjective & Objective     Chief complaint-Preoperative evaluation, Perioperative Medical management, complication reduction plan     Active cardiac conditions- none    Revised cardiac risk index predictors- insulin requiring diabetes mellitus    Functional capacity -Examples of physical activity, walks , Limited due to back, Rt knee pain,  can take 1 flight of stairs ( can take multiple ) ----- He can undertake all the above activities without  chest pain,chest tightness, Shortness of breath ,dizziness,lightheadedness making his exercise tolerance more, than 4 Mets.   Winded on exertion, not new ,stable over time     Had been to ER neck, Left arm, back pain a few month shey   Was not hospitalized  Review of Systems   Constitutional: Negative for chills and fever.        No unusual weight changes  Weight loss--- over ---  Weight gain from reduced activity   HENT:        Sleep apnea   Eyes:        Doing good   Up coming eye surgery   Respiratory:        No cough , phlegm    No Hemoptysis   Cardiovascular:        As noted   Gastrointestinal:        Bowels- Regular, constipated  No overt GI/ blood losses   Endocrine:        Prednisone use > 20 mg daily for 3 weeks- None   Genitourinary: Negative for dysuria.        No urinary hesitancy    Musculoskeletal:        As above      Neurological: Negative for syncope.        No unilateral weakness   Hematological:        Current use of Anticoagulants  Antiplatelet agents  Yes   Psychiatric/Behavioral:        No Depression,Anxiety         No vascular stenting     Past Surgical History:   Procedure Laterality Date    APPENDECTOMY      CARDIOVERSION      CATARACT EXTRACTION      CATARACT EXTRACTION Right 05/14/2018    Dr. Greer    COLONOSCOPY N/A 3/3/2016    Procedure: COLONOSCOPY;  Surgeon: Bob Poe MD;  Location: [address];  Service: Endoscopy;  Laterality: N/A;  Holding Eliquis for 3 days prior to colonoscopy. EC    epidural steroid  "injection      JOINT REPLACEMENT      Knee    Meniere's disease surgery      TONSILLECTOMY      TOTAL KNEE ARTHROPLASTY Right 01/25/2017    TKR    TOTAL KNEE ARTHROPLASTY Left     TKR, 1990's       No anesthesia, bleeding, cardiac problems, PONV with previous surgeries/procedures.  Medications and Allergies reviewed in epic.   FH- No anesthesia, bleeding /thrombosis , early onset heart disease in family   Lives with wife, Help available post op  Daughters , sister, son lives close by   Physical Exam   HENT:   Head: Normocephalic.       Physical Exam   HENT:   Head: Normocephalic.     Constitutional- Vitals - Body mass index is 45.89 kg/m².,   Vitals:    06/19/18 1433   BP: (!) 120/59   Pulse: 86   Temp: 98.1 °F (36.7 °C)     General appearance-Conscious,Coherent  Eyes- No conjunctival icterus,pupils Left cataract ,  round , reactive to light  and Right sided intra ocular lens   ENT-Oral cavity- moist  , Hearing grossly normal   Neck- No thyromegaly ,Trachea -central, No jugular venous distension,   No Carotid Bruit   Cardiovascular -Heart Sounds-irregular  and  no murmur   , No gallop rhythm   Respiratory - Normal Respiratory Effort, Normal breath sounds,  no wheeze  and  no forced expiratory wheeze    Peripheral pitting pedal edema-- mild, no calf pain   Gastrointestinal -Soft abdomen, No palpable masses, Non Tender,Liver,Spleen not palpable. No-- free fluid and shifting dullness  Musculoskeletal- No finger Clubbing. Strength grossly normal   Lymphatic-No Palpable cervical, axillary,Inguinal lymphadenopathy   Psychiatric - normal effect,Orientation  Rt Dorsalis pedis pulses-palpable    Lt Dorsalis pedis pulses- palpable   Rt Posterior tibial pulses -palpable   Left posterior tibial pulses -palpable   Miscellaneous - Rash Rt groin area ,  no asterixis,  no dupuytren's contracture and  no renal bruit  Blood pressure (!) 120/59, pulse 86, temperature 98.1 °F (36.7 °C), temperature source Oral, height 5' 10" " (1.778 m), weight (!) 145.1 kg (319 lb 12.8 oz), SpO2 98 %.      Investigations  Lab and Imaging have been reviewed in epic.    Review of Medicine tests    EKG- I had independently reviewed the EKG from--4/9/2018   It was reported to be showing     Atrial fibrillation  Abnormal ECG  When compared with ECG of 23-JUN-2017 13:47,  No significant change was found    Review of clinical lab tests:  Lab Results   Component Value Date    CREATININE 2.1 (H) 06/19/2018    HGB 14.7 06/19/2018     06/19/2018             Review of old records- Was done and information gathered regards to events leading to surgery and health conditions of significance in the perioperative period.    Outpatient Subjective & Objective

## 2018-06-19 NOTE — LETTER
June 19, 2018      Ashly Mendez MD  [address] [facility] [address]           [facility] Consult  [address] [facility] [address]  Phone: [phone]          Patient: [facility] PAT Cantor Jr.   MR Number: [id number]   YOB: 1945   Date of Visit: 6/19/2018       Dear Dr. Ashly Mendez:    Thank you for referring [facility] Sakshi to me for evaluation. Attached you will find relevant portions of my assessment and plan of care.    If you have questions, please do not hesitate to call me. I look forward to following [facility] along with you.    Sincerely,    Shaye Loyd MD    Enclosure  CC:  Ruperto Moraes MD    If you would like to receive this communication electronically, please contact [email or web address] or [phone] to request more information on [employer] access.    For providers and/or their staff who would like to refer a patient to [facility], please contact us through our one-stop-shop provider referral line, [facility], at [phone].    If you feel you have received this communication in error or would no longer like to receive these types of communications, please e-mail [email or web address]

## 2018-06-19 NOTE — ASSESSMENT & PLAN NOTE
Diabetes Mellitus-I suggest monitoring the glucose in the perioperative period ( Before meals and bed time,if the patient is on oral feeds or every 6 hourly ,if the patient is NPO )  Blood glucose target in hospitalized patients is 140-180. Oral Hypoglycemic agents are generally avoided during the hospital stay . If glucose is consistently elevated ,I suggest using basal ,prandial Insulin regimen to control the glucose , as elevated glucose can be associated with adverse surgical out comes. Please consider involving Hospital Medicine or Endocrinology ,if any help is needed with Glucose control. Patient will be instructed based on the pre op clinic guidelines  about adjustment of diabetic treatment  considering the NPO status for Surgery     I had educated that uncontrolled DM can cause post op complications,risk of infection, wound healing problem,increased length of stay in hospital and its associated complications.I suggest exercise as much as possible and follow diabetic diet

## 2018-06-19 NOTE — PROGRESS NOTES
[facility] - Pre Op Consult  Progress Note    Patient Name: Stephen Cantor Jr.  MRN: [id number]  Date of Evaluation- 07/09/2018  PCP- Desmond Barlow MD    Future cases for Stephen Cantor Jr. [[id number]]     Case ID Status Date Time Richardson Procedure Provider Location    [id number] [facility] 7/10/2018  7:00  REVISION-ARTHROPLASTY-KNEE-DAKOTA Miguel Stuart MD [[id number]] [facility] OR 2ND FLR                 Rt    HPI:  History of present illness- I had the pleasure of meeting this pleasant 72 y.o. gentleman in the pre op clinic prior to his elective Orthopedic surgery. The patient is known to me .    I have obtained the history by speaking to the patient and by reviewing the electronic health records.    Events leading up to surgery / History of presenting illness -    He has been troubled with moderate-severe  Rt knee  pain for over an year  . Pain increases with activity and decreases with resting.      Relevant health conditions of significance for the perioperative period/ History of presenting illness -    Patient Active Problem List    Diagnosis Date Noted    Antiplatelet or antithrombotic long-term use 06/19/2018      mg daily - for 11 years and prior to that was taking 2 of them a day- as his grand mother who lived to be 86 Years used to do that   Plans on holding 1 week pre op           Proteinuria due to type 2 diabetes mellitus 05/01/2018         Chronic midline low back pain 04/23/2018    Bilateral leg edema 09/22/2017     Uses compression stockings on Left lower extremity   Unable to use on Rt side due to knee pain                 Pulmonary hypertension- May 2017- The estimated PA systolic pressure is 28 mmHg 01/10/2017                   Venous insufficiency of both lower extremities                         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/2016     Rt eye       Enlarged prostate 03/04/2016     No Hesitancy        Diastolic dysfunction 03/04/2016    Type 2 diabetes mellitus with diabetic chronic kidney  disease 01/20/2016       On treatment with  Insulin    Hemoglobin A1c- 6/19/2018- 7.0  Capillary glucose check-yes  Pre breakfast -120's  Pre lunch -145- 150  Pre [id number]  Has occasional hypoglycemia- care suggested - hypoglycemia awareness intact            Low back ache 10/08/2015         Type 2 diabetes mellitus with diabetic polyneuropathy      As per him , no open areas on the feet       Morbid obesity     HTN (hypertension)      Losartan  Toprol XL           Chronic kidney disease, stage III (moderate) 11/05/2012     Base line 1.7- 1.8       Benign hypertensive renal disease without renal failure 11/05/2012  11/05/2012  11/05/2012              Sleep apnea      Uses CPAP      Chronic atrial fibrillation 07/31/2012    Long term current use of anticoagulant therapy 07/31/2012                        Subjective/ Objective:          Chief complaint-Preoperative evaluation, Perioperative Medical management, complication reduction plan     Active cardiac conditions- none    Revised cardiac risk index predictors- insulin requiring diabetes mellitus    Functional capacity -Examples of physical activity, walks , Limited due to back, Rt knee pain,  can take 1 flight of stairs ( can take multiple ) ----- He can undertake all the above activities without  chest pain,chest tightness, Shortness of breath ,dizziness,lightheadedness making his exercise tolerance more, than 4 Mets.   Winded on exertion, not new ,stable over time     Had been to ER neck, Left arm, back pain a few month shey   Was not hospitalized  Review of Systems   Constitutional: Negative for chills and fever.        No unusual weight changes  Weight loss--- over ---  Weight gain from reduced activity   HENT:        Sleep apnea   Eyes:        Doing good   Up coming eye surgery   Respiratory:        No cough , phlegm    No Hemoptysis   Cardiovascular:        As noted   Gastrointestinal:        Bowels- Regular, constipated  No  overt GI/ blood losses   Endocrine:        Prednisone use > 20 mg daily for 3 weeks- None   Genitourinary: Negative for dysuria.        No urinary hesitancy    Musculoskeletal:        As above      Neurological: Negative for syncope.        No unilateral weakness   Hematological:        Current use of Anticoagulants  Antiplatelet agents  Yes   Psychiatric/Behavioral:        No Depression,Anxiety         No vascular stenting     Past Surgical History:   Procedure Laterality Date    APPENDECTOMY      CARDIOVERSION      CATARACT EXTRACTION      CATARACT EXTRACTION Right 05/14/2018    Dr. Greer    COLONOSCOPY N/A 3/3/2016    Procedure: COLONOSCOPY;  Surgeon: Bob Poe MD;  Location: [address];  Service: Endoscopy;  Laterality: N/A;  Holding Eliquis for 3 days prior to colonoscopy. EC    epidural steroid injection      JOINT REPLACEMENT      Knee    Meniere's disease surgery      TONSILLECTOMY      TOTAL KNEE ARTHROPLASTY Right 01/25/2017    TKR    TOTAL KNEE ARTHROPLASTY Left     TKR, 1990's       No anesthesia, bleeding, cardiac problems, PONV with previous surgeries/procedures.  Medications and Allergies reviewed in epic.   FH- No anesthesia, bleeding /thrombosis , early onset heart disease in family   Lives with wife, Help available post op  Daughters , sister, son lives close by   Physical Exam   HENT:   Head: Normocephalic.       Physical Exam   HENT:   Head: Normocephalic.     Constitutional- Vitals - Body mass index is 45.89 kg/m².,   Vitals:    06/19/18 1433   BP: (!) 120/59   Pulse: 86   Temp: 98.1 °F (36.7 °C)     General appearance-Conscious,Coherent  Eyes- No conjunctival icterus,pupils Left cataract ,  round , reactive to light  and Right sided intra ocular lens   ENT-Oral cavity- moist  , Hearing grossly normal   Neck- No thyromegaly ,Trachea -central, No jugular venous distension,   No Carotid Bruit   Cardiovascular -Heart Sounds-irregular  and  no murmur   , No gallop rhythm  "  Respiratory - Normal Respiratory Effort, Normal breath sounds,  no wheeze  and  no forced expiratory wheeze    Peripheral pitting pedal edema-- mild, no calf pain   Gastrointestinal -Soft abdomen, No palpable masses, Non Tender,Liver,Spleen not palpable. No-- free fluid and shifting dullness  Musculoskeletal- No finger Clubbing. Strength grossly normal   Lymphatic-No Palpable cervical, axillary,Inguinal lymphadenopathy   Psychiatric - normal effect,Orientation  Rt Dorsalis pedis pulses-palpable    Lt Dorsalis pedis pulses- palpable   Rt Posterior tibial pulses -palpable   Left posterior tibial pulses -palpable   Miscellaneous - Rash Rt groin area ,  no asterixis,  no dupuytren's contracture and  no renal bruit  Blood pressure (!) 120/59, pulse 86, temperature 98.1 °F (36.7 °C), temperature source Oral, height 5' 10" (1.778 m), weight (!) 145.1 kg (319 lb 12.8 oz), SpO2 98 %.      Investigations  Lab and Imaging have been reviewed in [facility].    Review of Medicine tests    EKG- I had independently reviewed the EKG from--4/9/2018   It was reported to be showing     Atrial fibrillation  Abnormal ECG  When compared with ECG of 23-JUN-2017 13:47,  No significant change was found    Review of clinical lab tests:  Lab Results   Component Value Date    CREATININE 2.1 (H) 06/19/2018    HGB 14.7 06/19/2018     06/19/2018             Review of old records- Was done and information gathered regards to events leading to surgery and health conditions of significance in the perioperative period.        Preoperative cardiac risk assessment-  The patient does not have any active cardiac conditions . Revised cardiac risk index predictors- 1---.Functional capacity is more than 4 Mets. He will be undergoing a Orthopedic procedure that carries a intermediate risk     The estimated risk of the rate of adverse cardiac outcomes  0.9%    No further cardiac work up is indicated prior to proceeding with the surgery     Orders Placed " This Encounter    Basic metabolic panel       American Society of Anesthesiologists Physical status classification ( ASA ) class: 3     Postoperative pulmonary complication risk assessment:      ARISCAT ( Canet) risk index- risk class -  Low, if duration of surgery is under 3 hours, intermediate, if duration of surgery is over 3 hours        Assessment/Plan:     Long term current use of anticoagulant therapy  holding Apixaban 4 days before surgery    Ectropion of eyelid  Under follow up   No recent problem     Sleep apnea  Suggested bringing CPAP for hospital use . Informed the risk of worsening sleep apnea in the perioperative period and suggest using CPAP use any time in 24 hrs ( day or night )for planned sleep  Avoidance of   weight gain and alcoholic beverages discussed since all of these can worsen HEIDI         Bilateral leg edema    Edema- I suggested avoidance of added salt,avoidance of NSAID's unless advised or ordered and suggested Limb elevation and loren hose use        Type 2 diabetes mellitus with diabetic polyneuropathy  Encouraged feet care     Type 2 diabetes mellitus with diabetic chronic kidney disease    Diabetes Mellitus-I suggest monitoring the glucose in the perioperative period ( Before meals and bed time,if the patient is on oral feeds or every 6 hourly ,if the patient is NPO )  Blood glucose target in hospitalized patients is 140-180. Oral Hypoglycemic agents are generally avoided during the hospital stay . If glucose is consistently elevated ,I suggest using basal ,prandial Insulin regimen to control the glucose , as elevated glucose can be associated with adverse surgical out comes. Please consider involving Hospital Medicine or Endocrinology ,if any help is needed with Glucose control. Patient will be instructed based on the pre op clinic guidelines  about adjustment of diabetic treatment  considering the NPO status for Surgery     I had educated that uncontrolled DM can cause post op  complications,risk of infection, wound healing problem,increased length of stay in hospital and its associated complications.I suggest exercise as much as possible and follow diabetic diet        Proteinuria due to type 2 diabetes mellitus  On losartan   Suggested Diabetes dare     Morbid obesity  Encouraged weight loss    Enlarged prostate  Increased risk of post operative urinary retention    Chronic kidney disease, stage III (moderate)  Stages of CKD discussed   DM, HTN control , Hydration discussed   NSAID effects discussed    Creatinine on 6/19/2018 was 2.1   Been on a road trip to [place] , for 15 days   Had reduced fluid intake   Will recheck BMP in about a week     Venous insufficiency of both lower extremities  Increased risk of thrombosis in the evangelina operative period     HTN (hypertension)  Hypertension-  Blood pressure is acceptable . I suggest continuation of Toprol XL during the entire perioperative period. I suggest holding Losartan - on the morning of the surgery and can continue that  post operatively under blood pressure, electrolyte and renal function monitoring as long as they are acceptable.I suggest addressing pain control as uncontrolled pain can increased blood pressure     Diastolic dysfunction  Diastolic Dysfunction:  I  suggest watching his fluid status perioperatively and to watch out for fluid overload in view of his Diastolic Dysfunction.  I suggest avoidance of the ordering of continuous IV fluids and if IV fluids are required ,to order for a specific duration of time and consider ordering lower IV fluid rate     Chronic atrial fibrillation  No recent problem   Suggest evangelina op cardiac monitoring     Tattoos    No suggestion of hepatic decompensation     Low back ache  MR March 2018   L3 vertebral body hemangioma   L2-L3: Broad-based disc bulge and bilateral facet arthropathy, resulting in mild spinal canal stenosis.    L3-L4: Broad-based disc bulge, with bilateral facet arthropathy,  resulting in mild spinal canal stenosis, mild right-sided and moderate left-sided neural foraminal narrowing.    I suggest that the perioperative team be aware of this          Preventive perioperative care    Thromboembolic prophylaxis:  His risk factors for thrombosis include venous insufficiency  morbid obesity, surgical procedure and age.I suggest  thromboembolic prophylaxis ( mechanical/pharmacological, weighing the risk benefits of pharmacological agent use considering evangelina procedural bleeding )  during the perioperative period.I suggested being active in the post operative period.      Postoperative pulmonary complication prophylaxis-Risk factors for post operative pulmonary complications include sleep apnea , age over 65 years, surgery lasting over 3 hours and ASA class >2- I suggest incentive spirometry use, early ambulation and end tidal carbon dioxide monitoring , oral care , head end of bed elevation      Renal complication prophylaxis-Risk factors for renal complications include pre-existing renal disease, diabetes mellitus and hypertension . I suggest keeping him well hydrated and avoidance/ minimizing the use of  NSAID's,LOYA 2 Inhibitors ,IV contrast if possible in the perioperative period.I suggested drinking 2 litre's of water a day      Surgical site Infection Prophylaxis-I  suggest appropriate antibiotic for Prophylaxis against Surgical site infections     In view of BPH , regional anesthesia, Joint replacement the patient  is at risk of postoperative urinary retention.  I suggest avoidance / minimizing the of  Benzodiazepines,Anticholinergic medication,antihistamines ( Benadryl) , if possible in the perioperative period. I suggest using the minimum possible use of opioids for the minimum period of time in the perioperative period. Benadryl avoidance suggested      This visit was focused on Preoperative evaluation, Perioperative Medical management, complication reduction plans. I suggest that the  patient follows up with primary care or relevant sub specialists for ongoing health care.    I appreciate the opportunity to be involved in this patients care. Please feel free to contact me if there were any questions about this consultation.    Patient is pending optimization- BMP check ion 1 week     Shaye Loyd MD  Perioperative Medicine  [facility]   Pager [phone]      Suggested not to drive when feels sleepy   BMP - 6/22   ---  6/22- 19 23     Had BMP today - Creatinine lot better at 1.4   -----  6/29- 14 46     stress test May  2013 -no evidence of a discrete hemodynamically significant coronary stenosis  -----  7/9- 17 22    Corresponded to Anesthesiology about 6/29-7/2    MRI  March 2018   L3 vertebral body hemangioma   L2-L3: Broad-based disc bulge and bilateral facet arthropathy, resulting in mild spinal canal stenosis.     L3-L4: Broad-based disc bulge, with bilateral facet arthropathy, resulting in mild spinal canal stenosis, mild right-sided and moderate left-sided neural foraminal narrowing.     It was felt that , will not cause any problems    Called to follow up   Doing good ,No changes to Medication, Health   Discussed Hydration helped renal function   Hydration discussed   ---  7/9- Echo July 6 th 2018      1 - Normal left ventricular systolic function (EF 55%).     2 - Indeterminate LV diastolic function.     3 - Right ventricular enlargement with low normal to mildly depressed systolic function.     4 - Mild left atrial enlargement.     5 - No wall motion abnormalities.     6 - The estimated PA systolic pressure is 35 mmHg.     7 - Mild tricuspid regurgitation.     8 - Intermediate central venous pressure.     EkG 7/6 /2018 personally reviewed reportedly showed   Atrial fibrillation  Abnormal ECG  When compared with ECG of 09-APR-2018 21:08,  No significant change was found

## 2018-06-19 NOTE — ASSESSMENT & PLAN NOTE
MR March 2018   L3 vertebral body hemangioma   L2-L3: Broad-based disc bulge and bilateral facet arthropathy, resulting in mild spinal canal stenosis.    L3-L4: Broad-based disc bulge, with bilateral facet arthropathy, resulting in mild spinal canal stenosis, mild right-sided and moderate left-sided neural foraminal narrowing.    I suggest that the perioperative team be aware of this

## 2018-06-19 NOTE — TELEPHONE ENCOUNTER
----- Message from Debbie Brar RN sent at 6/19/2018 12:40 PM CDT -----      ----- Message -----  From: Debbie Brar RN  Sent: 6/14/2018  11:29 AM  To: Carissa Arias RN    Thanks, will do.    Debbie  ----- Message -----  From: Carissa Arias RN  Sent: 6/14/2018  10:41 AM  To: Debbie Brar RN    Pt is cleared to hold Eliquis for 4 days but will need to see PCP or general cardiology for clearance.  ----- Message -----  From: Tejas Mcbride MA  Sent: 6/12/2018   4:12 PM  To: Carissa Arias RN        ----- Message -----  From: Debbie Brar RN  Sent: 6/12/2018   7:52 AM  To: Jesus Alberto SHANNON Staff    Patient is having a revision of a knee 7/10 with Dr. Stuart under spinal anesthesia.  Anesthesia is requesting cardiac clearance and management of Apixaban.  Does patient have any contraindications to holding Apixaban 4 days before surgery?      Debbie Brar RN  [facility]  [id number]

## 2018-06-19 NOTE — ASSESSMENT & PLAN NOTE
Hypertension-  Blood pressure is acceptable . I suggest continuation of Toprol XL during the entire perioperative period. I suggest holding Losartan - on the morning of the surgery and can continue that  post operatively under blood pressure, electrolyte and renal function monitoring as long as they are acceptable.I suggest addressing pain control as uncontrolled pain can increased blood pressure

## 2018-06-20 ENCOUNTER — TELEPHONE (OUTPATIENT)
Dept: NEPHROLOGY | Facility: CLINIC | Age: 73
End: 2018-06-20

## 2018-06-21 ENCOUNTER — TELEPHONE (OUTPATIENT)
Dept: OPTOMETRY | Facility: CLINIC | Age: 73
End: 2018-06-21

## 2018-06-21 NOTE — RESEARCH
Sponsor: [employer]     Study Title/IRB Number: A Phase 3, Placebo-Controlled, Randomized, Observer-Blinded Study To Evaluate The Efficacy, Safety, And Tolerability Of A Clostridium Difficile Vaccine In Adults 50 Years Of Age And Older ([id number]) / 2017.094.B     Visit 5 Date:6/19/2018     Patient continued eligibility confirmed: Yes     Patient willing to continue to participate in [employer] C-Diff study and comply with all study requirements: Yes     Blood sample collected for immunogenicity assessment: Yes     All study related observations complete: Yes     [employer] payment request submitted: Yes

## 2018-06-22 ENCOUNTER — LAB VISIT (OUTPATIENT)
Dept: LAB | Facility: OTHER | Age: 73
End: 2018-06-22
Attending: HOSPITALIST
Payer: MEDICARE

## 2018-06-22 DIAGNOSIS — N18.30 CHRONIC KIDNEY DISEASE, STAGE III (MODERATE): ICD-10-CM

## 2018-06-22 LAB
ANION GAP SERPL CALC-SCNC: 9 MMOL/L
BUN SERPL-MCNC: 24 MG/DL
CALCIUM SERPL-MCNC: 10 MG/DL
CHLORIDE SERPL-SCNC: 106 MMOL/L
CO2 SERPL-SCNC: 25 MMOL/L
CREAT SERPL-MCNC: 1.4 MG/DL
EST. GFR  (AFRICAN AMERICAN): 57 ML/MIN/1.73 M^2
EST. GFR  (NON AFRICAN AMERICAN): 49 ML/MIN/1.73 M^2
GLUCOSE SERPL-MCNC: 178 MG/DL
POTASSIUM SERPL-SCNC: 4.6 MMOL/L
SODIUM SERPL-SCNC: 140 MMOL/L

## 2018-06-22 PROCEDURE — 80048 BASIC METABOLIC PNL TOTAL CA: CPT

## 2018-06-22 PROCEDURE — 36415 COLL VENOUS BLD VENIPUNCTURE: CPT

## 2018-06-22 NOTE — PROGRESS NOTES
Mr. Stephen Cantor  is a 72 y.o. male who presented to clinic for visit five for Study Title/IRB Number: A Phase 3, Placebo-Controlled, Randomized, Observer-Blinded Study To Evaluate The Efficacy, Safety, And Tolerability Of A Clostridium Difficile Vaccine In Adults 50 Years Of Age And Older ([id number]) / 2017.094.B (PI: Lulú).       The subject's inclusion/exclusion criteria was reviewed. No change in his eligibility.  Patient is scheduled to have a revision of a right knee arthroplasty in July.     Subject verbalized desire to continue participation in study, understanding that he can withdraw consent at any time.     Plan:  1.  Labs and procedures per protocol: Blood sample (20ml) collected for immunogenicity assessment, processed, and frozen per research procedure  2. All study related observations completed   3. Patient reminded to charge, turn on and log into device every 30 days. Reviewed to notify ID research if admitted to hospital or change in medical conditions. Reviewed stool collection criteria and procedure. Patient verbalized back understanding of instructions.   4. Follow up per protocol. Next appointment is phone call follow up in December

## 2018-06-25 ENCOUNTER — HOSPITAL ENCOUNTER (OUTPATIENT)
Facility: OTHER | Age: 73
Discharge: HOME | End: 2018-06-25
Attending: OPHTHALMOLOGY | Admitting: OPHTHALMOLOGY
Payer: MEDICARE

## 2018-06-25 ENCOUNTER — ANESTHESIA EVENT (OUTPATIENT)
Dept: SURGERY | Facility: OTHER | Age: 73
End: 2018-06-25
Payer: MEDICARE

## 2018-06-25 ENCOUNTER — ANESTHESIA (OUTPATIENT)
Dept: SURGERY | Facility: OTHER | Age: 73
End: 2018-06-25
Payer: MEDICARE

## 2018-06-25 ENCOUNTER — SURGERY (OUTPATIENT)
Age: 73
End: 2018-06-25

## 2018-06-25 VITALS
DIASTOLIC BLOOD PRESSURE: 59 MMHG | BODY MASS INDEX: 45.1 KG/M2 | HEART RATE: 80 BPM | WEIGHT: 315 LBS | SYSTOLIC BLOOD PRESSURE: 88 MMHG | RESPIRATION RATE: 16 BRPM | TEMPERATURE: 98 F | HEIGHT: 70 IN | OXYGEN SATURATION: 95 %

## 2018-06-25 DIAGNOSIS — H25.13 NUCLEAR SCLEROSIS, BILATERAL: ICD-10-CM

## 2018-06-25 LAB — POCT GLUCOSE: 138 MG/DL (ref 70–110)

## 2018-06-25 PROCEDURE — 63600175 PHARM REV CODE 636 W HCPCS: Performed by: NURSE ANESTHETIST, CERTIFIED REGISTERED

## 2018-06-25 PROCEDURE — 37000008 HC ANESTHESIA 1ST 15 MINUTES: Performed by: OPHTHALMOLOGY

## 2018-06-25 PROCEDURE — 71000015 HC POSTOP RECOV 1ST HR: Performed by: OPHTHALMOLOGY

## 2018-06-25 PROCEDURE — 66999 UNLISTED PX ANT SEGMENT EYE: CPT | Mod: ,,, | Performed by: OPHTHALMOLOGY

## 2018-06-25 PROCEDURE — 36000706: Performed by: OPHTHALMOLOGY

## 2018-06-25 PROCEDURE — 25000003 PHARM REV CODE 250: Performed by: OPHTHALMOLOGY

## 2018-06-25 PROCEDURE — 66984 XCAPSL CTRC RMVL W/O ECP: CPT | Mod: 79,LT,, | Performed by: OPHTHALMOLOGY

## 2018-06-25 PROCEDURE — V2787 ASTIGMATISM-CORRECT FUNCTION: HCPCS | Performed by: OPHTHALMOLOGY

## 2018-06-25 PROCEDURE — 37000009 HC ANESTHESIA EA ADD 15 MINS: Performed by: OPHTHALMOLOGY

## 2018-06-25 PROCEDURE — 36000707: Performed by: OPHTHALMOLOGY

## 2018-06-25 PROCEDURE — 25000003 PHARM REV CODE 250: Performed by: NURSE ANESTHETIST, CERTIFIED REGISTERED

## 2018-06-25 PROCEDURE — 82962 GLUCOSE BLOOD TEST: CPT | Performed by: OPHTHALMOLOGY

## 2018-06-25 DEVICE — IMPLANTABLE DEVICE: Type: IMPLANTABLE DEVICE | Site: EYE | Status: FUNCTIONAL

## 2018-06-25 RX ORDER — ACETAMINOPHEN 325 MG/1
650 TABLET ORAL EVERY 4 HOURS PRN
Status: DISCONTINUED | OUTPATIENT
Start: 2018-06-25 | End: 2018-06-25 | Stop reason: HOSPADM

## 2018-06-25 RX ORDER — PHENYLEPHRINE HYDROCHLORIDE 10 MG/ML
INJECTION INTRAVENOUS
Status: DISCONTINUED | OUTPATIENT
Start: 2018-06-25 | End: 2018-06-25

## 2018-06-25 RX ORDER — MOXIFLOXACIN 5 MG/ML
1 SOLUTION/ DROPS OPHTHALMIC
Status: COMPLETED | OUTPATIENT
Start: 2018-06-25 | End: 2018-06-25

## 2018-06-25 RX ORDER — TROPICAMIDE 10 MG/ML
1 SOLUTION/ DROPS OPHTHALMIC
Status: COMPLETED | OUTPATIENT
Start: 2018-06-25 | End: 2018-06-25

## 2018-06-25 RX ORDER — MOXIFLOXACIN 5 MG/ML
SOLUTION/ DROPS OPHTHALMIC
Status: DISCONTINUED | OUTPATIENT
Start: 2018-06-25 | End: 2018-06-25 | Stop reason: HOSPADM

## 2018-06-25 RX ORDER — MIDAZOLAM HYDROCHLORIDE 1 MG/ML
INJECTION INTRAMUSCULAR; INTRAVENOUS
Status: DISCONTINUED | OUTPATIENT
Start: 2018-06-25 | End: 2018-06-25

## 2018-06-25 RX ORDER — PHENYLEPHRINE HYDROCHLORIDE 25 MG/ML
1 SOLUTION/ DROPS OPHTHALMIC
Status: COMPLETED | OUTPATIENT
Start: 2018-06-25 | End: 2018-06-25

## 2018-06-25 RX ORDER — PHENYLEPHRINE HYDROCHLORIDE 100 MG/ML
SOLUTION/ DROPS OPHTHALMIC
Status: DISCONTINUED | OUTPATIENT
Start: 2018-06-25 | End: 2018-06-25 | Stop reason: HOSPADM

## 2018-06-25 RX ORDER — TETRACAINE HYDROCHLORIDE 5 MG/ML
1 SOLUTION OPHTHALMIC
Status: COMPLETED | OUTPATIENT
Start: 2018-06-25 | End: 2018-06-25

## 2018-06-25 RX ORDER — PROPARACAINE HYDROCHLORIDE 5 MG/ML
1 SOLUTION/ DROPS OPHTHALMIC
Status: DISCONTINUED | OUTPATIENT
Start: 2018-06-25 | End: 2018-06-25 | Stop reason: HOSPADM

## 2018-06-25 RX ORDER — LIDOCAINE HYDROCHLORIDE 40 MG/ML
INJECTION, SOLUTION RETROBULBAR
Status: DISCONTINUED | OUTPATIENT
Start: 2018-06-25 | End: 2018-06-25 | Stop reason: HOSPADM

## 2018-06-25 RX ORDER — TETRACAINE HYDROCHLORIDE 5 MG/ML
SOLUTION OPHTHALMIC
Status: DISCONTINUED | OUTPATIENT
Start: 2018-06-25 | End: 2018-06-25 | Stop reason: HOSPADM

## 2018-06-25 RX ORDER — SODIUM CHLORIDE 9 MG/ML
INJECTION, SOLUTION INTRAVENOUS CONTINUOUS PRN
Status: DISCONTINUED | OUTPATIENT
Start: 2018-06-25 | End: 2018-06-25

## 2018-06-25 RX ADMIN — TROPICAMIDE 1 DROP: 10 SOLUTION/ DROPS OPHTHALMIC at 07:06

## 2018-06-25 RX ADMIN — PHENYLEPHRINE HYDROCHLORIDE 1 DROP: 25 SOLUTION/ DROPS OPHTHALMIC at 07:06

## 2018-06-25 RX ADMIN — MOXIFLOXACIN HYDROCHLORIDE 1 DROP: 5 SOLUTION/ DROPS OPHTHALMIC at 09:06

## 2018-06-25 RX ADMIN — MOXIFLOXACIN 1 DROP: 5 SOLUTION/ DROPS OPHTHALMIC at 07:06

## 2018-06-25 RX ADMIN — MIDAZOLAM HYDROCHLORIDE 2 MG: 1 INJECTION, SOLUTION INTRAMUSCULAR; INTRAVENOUS at 09:06

## 2018-06-25 RX ADMIN — TETRACAINE HYDROCHLORIDE 1 DROP: 5 SOLUTION OPHTHALMIC at 07:06

## 2018-06-25 RX ADMIN — BALANCED SALT SOLUTION ENRICHED WITH BICARBONATE, DEXTROSE, AND GLUTATHIONE 5 ML: KIT at 09:06

## 2018-06-25 RX ADMIN — SODIUM CHLORIDE: 9 INJECTION, SOLUTION INTRAVENOUS at 09:06

## 2018-06-25 RX ADMIN — PHENYLEPHRINE HYDROCHLORIDE 1 DROP: 100 SOLUTION/ DROPS OPHTHALMIC at 08:06

## 2018-06-25 RX ADMIN — TETRACAINE HYDROCHLORIDE 2 DROP: 5 SOLUTION OPHTHALMIC at 09:06

## 2018-06-25 RX ADMIN — TETRACAINE HYDROCHLORIDE 1 DROP: 5 SOLUTION OPHTHALMIC at 08:06

## 2018-06-25 RX ADMIN — LIDOCAINE HYDROCHLORIDE 2 DROP: 40 INJECTION, SOLUTION RETROBULBAR; TOPICAL at 09:06

## 2018-06-25 RX ADMIN — PHENYLEPHRINE HYDROCHLORIDE 100 MCG: 10 INJECTION INTRAVENOUS at 09:06

## 2018-06-25 RX ADMIN — BALANCED SALT SOLUTION ENRICHED WITH BICARBONATE, DEXTROSE, AND GLUTATHIONE 500 ML: KIT at 08:06

## 2018-06-25 NOTE — ANESTHESIA POSTPROCEDURE EVALUATION
"Anesthesia Post Evaluation    Patient: [facility] PAT Cantor Jr.    Procedure(s) Performed: Procedure(s) (LRB):  PHACOEMULSIFICATION, CATARACT (Left)  INSERTION, INTRAOCULAR LENS PROSTHESIS (Left)    Final Anesthesia Type: MAC  Patient location during evaluation: [facility]  Patient participation: Yes- Able to Participate  Level of consciousness: awake and alert  Post-procedure vital signs: reviewed and stable  Pain management: adequate  Airway patency: patent  PONV status at discharge: No PONV  Anesthetic complications: no      Cardiovascular status: blood pressure returned to baseline  Respiratory status: unassisted  Hydration status: euvolemic  Follow-up not needed.        Visit Vitals  BP (!) 101/58   Pulse 82   Temp 36.6 °C (97.9 °F) (Oral)   Resp 18   Ht 5' 10" (1.778 m)   Wt (!) 142.9 kg (315 lb)   SpO2 95%   BMI 45.20 kg/m²       Pain/Bessie Score: Pain Assessment Performed: Yes (6/25/2018  7:18 AM)  Presence of Pain: complains of pain/discomfort (6/25/2018  7:18 AM)      "

## 2018-06-25 NOTE — PLAN OF CARE
Stephen Cantor Jr. has met all discharge criteria from Phase II. Vital Signs are stable, ambulating  without difficulty. Discharge instructions given, patient verbalized understanding. Discharged from facility via wheelchair in stable condition.

## 2018-06-25 NOTE — DISCHARGE INSTRUCTIONS
Home Care Instructions for Eye Surgeries    1. ACTIVITY:   Limit your activity today. Increase activity gradually. You may return to work or school as directed by your physician.    2. DIET:   Drink plenty of fluids. Resume your normal diet unless instructed otherwise.  3. PAIN:   Expect a moderate amount of pain. If a prescription for pain is not sent home with you, you may take your commonly used pain reliever as directed. If this is not sufficient, call your physician. You may resume any other prescription medication unless otherwise directed by your physician.     4. DRESSING:   Keep your dressing dry and intact.  5. COMMENTS:   No driving, operating heavy machinery or singing legal documents for 24 hours.    No bending forward at the waist.   See attached schedule of eye drops.    Discuss any problem with your physician as soon as it arises. Do not Delay.      EMERGENCY- If you are unable to contact your physician, please go to the nearest Emergency Room.       Anesthesia: Monitored Anesthesia Care (MAC)    Youre due to have surgery. During surgery, youll be given medicine called anesthesia. This will keep you comfortable and pain-free. Your surgeon will use monitored anesthesia care (MAC). This sheet tells you more about this type of anesthesia.  What is monitored anesthesia care?  MAC keeps you very drowsy during surgery. You may be awake, but you will likely not remember much. And you wont feel pain. With MAC, medicines are given through an IV line into a vein in your arm or hand. A local anesthetic will usually be injected into the skin and muscle around the surgical site to numb it. The anesthesia provider monitors you during the procedure. He or she checks your heart rate and rhythm, blood pressure, and blood oxygen level.  Anesthesia tools and medicines that may be near you during your procedure  You will likely have:  · A pulse oximeter on the end of your finger. This measures your blood oxygen  level.  · Electrocardiography leads (electrodes) on your chest. These record your heart rate and rhythm.  · Medicines given through an IV. These relax you and prevent pain. You may be awake or sleep lightly. If you have local anesthetic, it is injected directly into your skin.  · A facemask to give you oxygen, if needed.  Risks and possible complications  MAC has some risks. These include:  · Breathing problems  · Nausea and vomiting  · Allergic reaction to the anesthetic    Anesthesia safety  Tips for anesthesia safety include the following:   · Follow all instructions you are given for how long not to eat or drink before your procedure.  · Be sure your healthcare provider knows what medicines you take, especially any anti-inflammatory medicine or blood thinners. This includes aspirin and any other over-the-counter medicines, herbs, and supplements.  · Have an adult family member or friend drive you home after the procedure.  · For the first 24 hours after your surgery:  ¨ Do not drive or use heavy equipment.  ¨ Do not make important decisions or sign documents.  ¨ Avoid alcohol.  ¨ Have someone stay with you, if possible. They can watch for problems and help keep you safe.  Date Last Reviewed: 12/1/2016  © 3319-0054 [email or web address]. [address] [place] [address], [place] [address]. All rights reserved. This information is not intended as a substitute for professional medical care. Always follow your healthcare professional's instructions.

## 2018-06-25 NOTE — ANESTHESIA PREPROCEDURE EVALUATION
06/25/2018  [facility] [place] a 72 y.o., male.  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]     Review of patient's allergies indicates:   Allergen Reactions    Niacin Itching and Other (See Comments)     Other reaction(s): facial flushing and causes hepatitis     Terbinafine Other (See Comments)     Other reaction(s): Hepatitis       Patient Active Problem List   Diagnosis    Enthesopathy of hip region    Sensory hearing loss, bilateral    Sprain and strain of unspecified site of hip and thigh    Chronic atrial fibrillation    Long term current use of anticoagulant therapy    Sleep apnea    Onychomycosis of multiple toenails with type 2 diabetes mellitus and peripheral angiopathy    Chronic kidney disease, stage III (moderate)    Benign hypertensive renal disease without renal failure    Acquired cyst of kidney    Calculus of kidney    Morbid obesity    HTN (hypertension)    Dyslipidemia associated with type 2 diabetes mellitus    Type 2 diabetes mellitus with diabetic polyneuropathy    Corns and callosities    Low back ache    Mohs defect of right cheek    Type 2 diabetes mellitus with diabetic chronic kidney disease    Enlarged prostate    Diastolic dysfunction    Ectropion of eyelid    Tinea pedis    Lymphedema of both lower extremities    History of cardioversion    Vitamin D deficiency    Venous insufficiency of both lower extremities    Chronic pain of right knee    Microhematuria    Pulmonary hypertension- May 2017- The estimated PA systolic pressure is 28 mmHg    Tattoos    Primary osteoarthritis of right knee    Dermatitis, stasis    Bilateral leg edema    Chronic midline low back pain     Lumbar radiculopathy    Proteinuria due to type 2 diabetes mellitus    Nuclear sclerosis, bilateral    Antiplatelet or antithrombotic long-term use       Past Surgical History:   Procedure Laterality Date    APPENDECTOMY      CARDIOVERSION      CATARACT EXTRACTION      CATARACT EXTRACTION Right 05/14/2018    Dr. Greer    COLONOSCOPY N/A 3/3/2016    Procedure: COLONOSCOPY;  Surgeon: Bob Poe MD;  Location: [facility] ([address] [place] [address]);  Service: Endoscopy;  Laterality: N/A;  Holding Eliquis for 3 days prior to colonoscopy. EC    epidural steroid injection      JOINT REPLACEMENT      Knee    Meniere's disease surgery      TONSILLECTOMY      TOTAL KNEE ARTHROPLASTY Right 01/25/2017    TKR    TOTAL KNEE ARTHROPLASTY Left     TKR, 1990's     Vitals - 1 value per visit 3/23/2016 3/23/2016 4/5/2016 4/12/2016 4/20/2016   SYSTOLIC 107 140 132 127 131   DIASTOLIC 65 74 89 76 66   PULSE 86 78 108 87 85     Vitals - 1 value per visit 4/20/2016 4/25/2016 4/26/2016 4/27/2016   SYSTOLIC 125 110 111 127   DIASTOLIC 72 57 66 74   PULSE 83 92 86 81     Vitals - 1 value per visit 4/27/2016 4/28/2016 6/1/2016 6/3/2016 6/17/2016   SYSTOLIC 108 130 137 122 94   DIASTOLIC 76 78 86 70 62   PULSE 89 78 81 74 83     Vitals - 1 value per visit 6/20/2016 6/28/2016   SYSTOLIC 133 126   DIASTOLIC 88 77   PULSE 76 78             Pre-op Assessment    I have reviewed the Patient Summary Reports.     I have reviewed the Nursing Notes.   I have reviewed the Medications.     Review of Systems  Anesthesia Hx:  No problems with previous Anesthesia  Denies Family Hx of Anesthesia complications.   Denies Personal Hx of Anesthesia complications.   Social:  Non-Smoker, No Alcohol Use    Hematology/Oncology:  Hematology Normal   Oncology Normal   Hematology Comments: Stopped blood thinner for atrial fib 8 days ago.  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       Cardiovascular:   Hypertension (no sequelae.), well controlled Denies  MI.    Denies Angina.         Uses cane,  Climbs one flight of stairs without difficulty.   Pulmonary:   Denies COPD.  Denies Asthma.  Denies Shortness of breath. Sleep Apnea (does not toleerate cpap)    Renal/:   Chronic Renal Disease (Cr. 1.6), CRI    Neurological:   Denies TIA. Denies CVA. Neuromuscular Disease, (diabetic)  Denies Seizures.   Peripheral Neuropathy    Endocrine:   Diabetes, type 2, using insulin        Physical Exam  General:  Well nourished    Airway/Jaw/Neck:  Airway Findings: Mouth Opening: Normal Tongue: Normal  General Airway Assessment: Adult, Average  Mallampati: II  TM Distance: Normal, at least 6 cm  Jaw/Neck Findings:  Neck ROM: Normal ROM            Mental Status:  Mental Status Findings:  Cooperative, Alert and Oriented         Anesthesia Plan  Type of Anesthesia, risks & benefits discussed:  Anesthesia Type:  MAC  Patient's Preference:   Intra-op Monitoring Plan: standard ASA monitors  Intra-op Monitoring Plan Comments:   Post Op Pain Control Plan:   Post Op Pain Control Plan Comments:   Induction:    Beta Blocker:  Patient is on a Beta-Blocker and has received one dose within the past 24 hours (No further documentation required).       Informed Consent: Patient understands risks and agrees with Anesthesia plan.  Questions answered. Anesthesia consent signed with patient.  ASA Score: 3     Day of Surgery Review of History & Physical:    H&P update referred to the surgeon.         Ready For Surgery From Anesthesia Perspective.

## 2018-06-25 NOTE — OP NOTE
SURGEON:  Lawrence Greer M.D.    PREOPERATIVE DIAGNOSIS:    Nuclear Sclerotic Cataract Left Eye    POSTOPERATIVE DIAGNOSIS:    Nuclear Sclerotic Cataract Left Eye    PROCEDURES:    Phacoemulsification with  intraocular lens, Left eye (30418)  With Femtosecond LASER assist    DATE OF SURGERY: 06/25/2018    IMPLANT: CTC133 20.0    ANESTHESIA:  MAC with topical Lidocaine    COMPLICATIONS:  None    ESTIMATED BLOOD LOSS: None    SPECIMENS: None    INDICATIONS:    The patient has a history of painless progressive visual loss and  difficulty with activities of daily living secondary to cataract formation.  After a thorough discussion of the risks, benefits, and alternatives to cataract surgery, including, but not limited to, the rare risks of infection, retinal detachment, hemorrhage, need for additional surgery, loss of vision, and even loss of the eye, the patient voices understanding and desires to proceed.    DESCRIPTION OF PROCEDURE:    After verification of consent and marking of the operative eye, the patient was positioned under the femtosecond LASER. Topical anesthetic drops were administered. A surgical timeout was initiated with verification of patient identifiers and the laser surgical plan. The eye was docked securely and the laser portion of the cataract procedure was carried out without complication.  The patient was returned to the pre-operative area to await the intraocular surgical portion of the cataract procedure.  The patients IOL calculations were reviewed, and the lens selection confirmed.   After verification and marking of the proper eye in the preop holding area, the patient was brought to the operating room in supine position where the eye was prepped and draped in standard sterile fashion with 5% Betadine and a lid speculum placed in the eye.   Topical 4% Lidocaine was used in addition to the preoperative anesthesia and the procedure was begun by the creation of a paracentesis incision through  which viscoelastic was used to fill the anterior chamber.  Next, a keratome blade was used to create a triplanar temporal clear corneal incision and a cystotome and Utrata forceps used to fashion a continuous curvilinear capsulorrhexis.  Hydrodissection was carried out using the Solomon hydrodissection cannula and the nucleus was found to be mobile.  Phacoemulsification of the nucleus was carried out using a quick chop technique, and all remaining epinuclear and cortical material was removed.  The eye was then reformed with Viscoelastic and the  intraocular lens was implanted into the capsular bag.  All remaining viscoelastics were removed from the eye and at the end of the case the pupil was round, the lens was well-centered within the capsular bag and all wounds were found to be water tight.  Drops of Vigamox and Pred Forte were instilled and a shield was placed over the eye. The patient will follow up with Dr. Greer in the morning.

## 2018-06-26 ENCOUNTER — OFFICE VISIT (OUTPATIENT)
Dept: OPHTHALMOLOGY | Facility: CLINIC | Age: 73
End: 2018-06-26
Attending: OPHTHALMOLOGY
Payer: MEDICARE

## 2018-06-26 DIAGNOSIS — Z98.890 POST-OPERATIVE STATE: Primary | ICD-10-CM

## 2018-06-26 DIAGNOSIS — H25.12 NUCLEAR SCLEROTIC CATARACT OF LEFT EYE: ICD-10-CM

## 2018-06-26 PROCEDURE — 99999 PR PBB SHADOW E&M-EST. PATIENT-LVL II: CPT | Mod: PBBFAC,,, | Performed by: OPHTHALMOLOGY

## 2018-06-26 PROCEDURE — 99024 POSTOP FOLLOW-UP VISIT: CPT | Mod: S$GLB,,, | Performed by: OPHTHALMOLOGY

## 2018-06-26 RX ORDER — LOSARTAN POTASSIUM 50 MG/1
50 TABLET ORAL DAILY
Refills: 11 | COMMUNITY
Start: 2018-05-29 | End: 2018-07-06

## 2018-06-26 RX ORDER — GABAPENTIN 300 MG/1
300 CAPSULE ORAL 3 TIMES DAILY
Qty: 90 CAPSULE | Refills: 1 | Status: SHIPPED | OUTPATIENT
Start: 2018-06-26 | End: 2018-08-22 | Stop reason: SDUPTHER

## 2018-06-26 NOTE — PROGRESS NOTES
HPI     Post op day 1 Phaco w/IOL - OS(06/25/18).      Pt states that vision is doing well os just having like a little flash of   light to the left side happening right now pretty often.      No pain or discomfort OU.        Eye Med(s) Pred/Gati/Brom - TID - OS    Last edited by Mandi Sharp MA on 6/26/2018  8:20 AM. (History)            Assessment /Plan     For exam results, see Encounter Report.    Post-operative state    Nuclear sclerotic cataract of left eye      Slit lamp exam:  L/L: nl  K: clear, wound sealed  AC: 1+ cell  Lens: IOL centered and stable    POD1 s/p Phaco/IOL  Appropriate precautions and post op medications reviewed.  Patient instructed to call or come in if symptoms of redness, decreased vision, or pain are experienced.

## 2018-07-02 ENCOUNTER — OFFICE VISIT (OUTPATIENT)
Dept: ORTHOPEDICS | Facility: CLINIC | Age: 73
End: 2018-07-02
Payer: MEDICARE

## 2018-07-02 ENCOUNTER — HOSPITAL ENCOUNTER (OUTPATIENT)
Dept: RADIOLOGY | Facility: HOSPITAL | Age: 73
Discharge: HOME | End: 2018-07-02
Attending: NURSE PRACTITIONER
Payer: MEDICARE

## 2018-07-02 VITALS — BODY MASS INDEX: 45.1 KG/M2 | HEIGHT: 70 IN | WEIGHT: 315 LBS

## 2018-07-02 DIAGNOSIS — M25.561 RIGHT KNEE PAIN, UNSPECIFIED CHRONICITY: Primary | ICD-10-CM

## 2018-07-02 DIAGNOSIS — T84.019A PROSTHETIC JOINT IMPLANT FAILURE: ICD-10-CM

## 2018-07-02 DIAGNOSIS — M25.561 RIGHT KNEE PAIN, UNSPECIFIED CHRONICITY: ICD-10-CM

## 2018-07-02 DIAGNOSIS — Z01.818 PREOP EXAMINATION: ICD-10-CM

## 2018-07-02 DIAGNOSIS — T84.019D PROSTHETIC JOINT IMPLANT FAILURE, SUBSEQUENT ENCOUNTER: ICD-10-CM

## 2018-07-02 PROCEDURE — 99999 PR PBB SHADOW E&M-EST. PATIENT-LVL III: CPT | Mod: PBBFAC,,, | Performed by: NURSE PRACTITIONER

## 2018-07-02 PROCEDURE — 73560 X-RAY EXAM OF KNEE 1 OR 2: CPT | Mod: 26,RT,, | Performed by: RADIOLOGY

## 2018-07-02 PROCEDURE — 99499 UNLISTED E&M SERVICE: CPT | Mod: S$GLB,,, | Performed by: NURSE PRACTITIONER

## 2018-07-02 PROCEDURE — 73560 X-RAY EXAM OF KNEE 1 OR 2: CPT | Mod: TC,RT

## 2018-07-02 RX ORDER — MUPIROCIN 20 MG/G
OINTMENT TOPICAL
Status: CANCELLED | OUTPATIENT
Start: 2018-07-02

## 2018-07-02 RX ORDER — SODIUM CHLORIDE 9 MG/ML
INJECTION, SOLUTION INTRAVENOUS CONTINUOUS
Status: CANCELLED | OUTPATIENT
Start: 2018-07-02

## 2018-07-02 NOTE — H&P
CC: Right knee pain    [facility] [place] PAT Cantor Jr. is a 72 y.o. male with a history of Right knee pain which has gotten progressively worse over the past few months. He had a right total knee replacement 1/25/17 by Dr. Knight. Pain is worse with activity and weight bearing.  Patient has experienced interference of activities of daily living due to decreased range of motion and an increase in joint pain and swelling.  [facility] Cantor Jr. currently ambulates with a cane.     Past Medical History:   Diagnosis Date    *Atrial fibrillation     Eliquis    Anticoagulant long-term use     apaxiban    Basal cell carcinoma 12/2015    left eye brow    BCC (basal cell carcinoma) 12/2015    left shoulder    Cataract     Chronic kidney disease     Diabetes mellitus with renal manifestations, uncontrolled     Dyslipidemia associated with type 2 diabetes mellitus     Fever blister     Hearing loss     HTN (hypertension)     Keloid cicatrix     Liver disease     Melanoma 12/07/2015    right cheek-in situ    Obesity     PN (peripheral neuropathy)     Primary osteoarthritis of right knee 1/25/2017    Screening for colon cancer 3/3/2016    Sleep apnea     wears CPAP at night    Thyroid disease     Type II or unspecified type diabetes mellitus with other specified manifestations, uncontrolled        Past Surgical History:   Procedure Laterality Date    APPENDECTOMY      CARDIOVERSION      CATARACT EXTRACTION      CATARACT EXTRACTION Right 05/14/2018    Dr. Greer    COLONOSCOPY N/A 3/3/2016    Procedure: COLONOSCOPY;  Surgeon: Bob Poe MD;  Location: [address];  Service: Endoscopy;  Laterality: N/A;  Holding Eliquis for 3 days prior to colonoscopy. EC    epidural steroid injection      INTRAOCULAR PROSTHESES INSERTION Left 6/25/2018    Procedure: INSERTION, INTRAOCULAR LENS PROSTHESIS;  Surgeon: Lawrence Greer MD;  Location: [facility];  Service: Ophthalmology;  Laterality: Left;    JOINT REPLACEMENT       "Knee    Meniere's disease surgery      PHACOEMULSIFICATION OF CATARACT Left 6/25/2018    Procedure: PHACOEMULSIFICATION, CATARACT;  Surgeon: Lawrence Greer MD;  Location: [facility];  Service: Ophthalmology;  Laterality: Left;    TONSILLECTOMY      TOTAL KNEE ARTHROPLASTY Right 01/25/2017    TKR    TOTAL KNEE ARTHROPLASTY Left     TKR, 1990's       Family History   Problem Relation Age of Onset    Diabetes Mother     Stroke Mother     Diabetes Father     Heart disease Father         over 45 years of age    Hypertension Father     Cancer Sister         brain    Eczema Sister     Cancer Brother         brain    Cancer Sister         leukemia, stomach cancer    No Known Problems Sister     ALS Brother         stomach cancer    No Known Problems Brother     No Known Problems Maternal Aunt     No Known Problems Maternal Uncle     No Known Problems Paternal Aunt     No Known Problems Paternal Uncle     No Known Problems Maternal Grandmother     No Known Problems Maternal Grandfather     No Known Problems Paternal Grandmother     No Known Problems Paternal Grandfather     Amblyopia Neg Hx     Blindness Neg Hx     Cataracts Neg Hx     Glaucoma Neg Hx     Macular degeneration Neg Hx     Retinal detachment Neg Hx     Strabismus Neg Hx     Thyroid disease Neg Hx     Melanoma Neg Hx     Psoriasis Neg Hx     Lupus Neg Hx     Acne Neg Hx        Review of patient's allergies indicates:   Allergen Reactions    Niacin Itching and Other (See Comments)     Other reaction(s): facial flushing and causes hepatitis     Terbinafine Other (See Comments)     Other reaction(s): Hepatitis    "gave me rash"         Current Outpatient Prescriptions:     apixaban 5 mg Tab, Take 1 tablet (5 mg total) by mouth 2 (two) times daily., Disp: 180 tablet, Rfl: 3    aspirin 325 MG tablet, Take 325 mg by mouth once daily., Disp: , Rfl:     azelastine (ASTELIN) 137 mcg (0.1 %) nasal spray, 1 spray (137 mcg total) by " "Nasal route 2 (two) times daily., Disp: 30 mL, Rfl: 3    BD INSULIN PEN NEEDLE UF ORIG 29 x 1/2 " Ndle, , Disp: , Rfl: 12    BD ULTRA-FINE BASIL PEN NEEDLES 32 gauge x 5/32" Ndle, CHECK BLOOD SUGAR FOUR TIMES DAILY, Disp: 150 each, Rfl: 11    ciclopirox (PENLAC) 8 % Soln, Apply topically nightly., Disp: 6.6 mL, Rfl: 11    clobetasol (TEMOVATE) 0.05 % cream, AAA finger bid, Disp: 60 g, Rfl: 3    clotrimazole-betamethasone 1-0.05% (LOTRISONE) cream, Apply topically 2 (two) times daily., Disp: 45 g, Rfl: 3    fenofibrate (TRICOR) 145 MG tablet, TAKE ONE TABLET BY MOUTH EVERY DAY, Disp: 90 tablet, Rfl: 1    furosemide (LASIX) 40 MG tablet, TAKE ONE TABLET BY MOUTH EVERY DAY AS NEEDED (Patient taking differently: TAKE ONE TABLET BY MOUTH EVERY DAY), Disp: 90 tablet, Rfl: 1    gabapentin (NEURONTIN) 300 MG capsule, Take 1 capsule (300 mg total) by mouth 3 (three) times daily., Disp: 90 capsule, Rfl: 1    insulin aspart (NOVOLOG FLEXPEN) 100 unit/mL InPn pen, Novolog 20 units breakfast, 22 units lunch and dinner plus correction scale. Max daily dose=100 units, Disp: 2 Box, Rfl: 11    insulin glargine (BASAGLAR KWIKPEN U-100 INSULIN) 100 unit/mL (3 mL) InPn pen, Inject 60 Units into the skin once daily. (Patient taking differently: Inject 60 Units into the skin every evening. ), Disp: 2 Box, Rfl: 11    ketoconazole (NIZORAL) 2 % cream, Apply topically once daily., Disp: 30 g, Rfl: 1    lancets 33 gauge Misc, 1 lancet by Misc.(Non-Drug; Combo Route) route 4 (four) times daily. Pt uses True Result Meter, bg monitoring 4 times a day., Disp: 450 each, Rfl: 4    losartan (COZAAR) 25 MG tablet, Take 1 tablet (25 mg total) by mouth once daily., Disp: 90 tablet, Rfl: 3    losartan (COZAAR) 50 MG tablet, Take 50 mg by mouth once daily., Disp: , Rfl: 11    metoprolol succinate (TOPROL-XL) 100 MG 24 hr tablet, TAKE ONE TABLET BY MOUTH EVERY DAY, Disp: 90 tablet, Rfl: 1    multivitamin capsule, Take 1 capsule by mouth " every morning. , Disp: , Rfl:     nystatin-triamcinolone (MYCOLOG II) cream, apply TWICE DAILY (Patient taking differently: apply TWICE DAILY-using as needed for rash), Disp: 60 g, Rfl: 1    omega-3 acid ethyl esters (LOVAZA) 1 gram capsule, TAKE THREE CAPSULE BY MOUTH TWICE DAILY, Disp: 540 capsule, Rfl: 1    pravastatin (PRAVACHOL) 10 MG tablet, Take 1 tablet (10 mg total) by mouth once daily. (Patient taking differently: Take 10 mg by mouth every evening. ), Disp: 90 tablet, Rfl: 3    tamsulosin (FLOMAX) 0.4 mg Cp24, Take 1 capsule (0.4 mg total) by mouth once daily., Disp: 30 capsule, Rfl: 5    temazepam (RESTORIL) 15 mg Cap, TAKE ONE CAPSULE BY MOUTH EVERY EVENING, Disp: 30 capsule, Rfl: 1    tiZANidine (ZANAFLEX) 4 MG tablet, TAKE ONE TABLET BY MOUTH EVERY 6 HOURS AS NEEDED, Disp: 40 tablet, Rfl: 0    triamcinolone acetonide 0.1% (KENALOG) 0.1 % cream, Apply topically 2 (two) times daily. Apply to affected area twice daily as directed., Disp: 454 g, Rfl: 0    vitamin E 1000 UNIT capsule, Take 1,000 Units by mouth every morning. 1 Capsule Oral Every day, Disp: , Rfl:     levothyroxine (SYNTHROID) 25 MCG tablet, Take 1 tablet (25 mcg total) by mouth once daily., Disp: 90 tablet, Rfl: 2    metronidazole (METROGEL) 0.75 % gel, Apply topically 2 (two) times daily., Disp: 1 Tube, Rfl: 6    Review of Systems:  Constitutional: no fever or chills  Eyes: no visual changes  ENT: no nasal congestion or sore throat  Respiratory: no cough or shortness of breath  Cardiovascular: no chest pain or palpitations  Gastrointestinal: no nausea or vomiting, tolerating diet  Genitourinary: no hematuria or dysuria  Integument/Breast: no rash or pruritis  Hematologic/Lymphatic: no easy bruising or lymphadenopathy  Musculoskeletal: positive for c/o right knee pain worse with activity  Neurological: no seizures or tremors  Behavioral/Psych: no auditory or visual hallucinations  Endocrine: no heat or cold intolerance    PE:  Ht  "5' 10" (1.778 m)   Wt (!) 144.2 kg (317 lb 14.5 oz)   BMI 45.61 kg/m²   General: Pleasant, cooperative, NAD   Gait: antalgic  HEENT: NCAT, sclera nonicteric   Lungs: Respirations clear bilaterally; equal and unlabored.   CV: S1S2; 2+ bilateral upper and lower extremity pulses.   Skin: Intact throughout with no rashes, erythema, or lesions  Extremities: No LE edema,  no erythema or warmth of the skin in either lower extremity.    Right knee exam:  Knee Range of Motion:limited by pain, pain with passive range of motion  Effusion:none  Condition of skin:intact  Location of tenderness:Medial joint line and Lateral joint line   Strength:limited by pain  Stability: stable to testing    Hip Examination:normal    Radiographs: Radiographs reveal Position and alignment are similar.  There is some lucency about the tibial plate    Diagnosis: right knee prosthetic joint failure    Plan: Right total knee revision arthroplasty    Due to the serious nature of total joint infection and the high prevalence of community acquired MRSA, vancomycin will be used perioperatively.            "

## 2018-07-02 NOTE — PROGRESS NOTES
Stephen PAT Sakshifreddy Mcconnell is a 72 y.o. year old here today for a pre-operative visit in preparation for a Right total knee revision arthroplasty to be performed by  Dr. Stuart on 7/10/18.  he was last seen and treated in the clinic on 5/9/2018. he will be medically optimized by the pre op center. There has been no significant change in medical status since last visit. No fever, chills, malaise, or unexplained weight change.      Allergies, Medications, past medical and surgical history reviewed.    Focused examination performed.    Patient declined to see Dr. Stuart today in clinic. All questions answered. Patient encouraged to call with questions. Contact information given.     Pre, evangelina, and post operative procedures and expectations discussed. Questions were answered. Stephen PAT Cantor Jr. has been educated and is ready to proceed with surgery. Approximately 30 minutes was spent discussing surgical outcomes, plans, procedures pre, evangelina, and post operative expections and care.  Surgical consent signed.    Stephen Cantor Jr. will contact us if there are any questions, concerns, or changes in medical status prior to surgery.

## 2018-07-03 ENCOUNTER — TELEPHONE (OUTPATIENT)
Dept: ELECTROPHYSIOLOGY | Facility: CLINIC | Age: 73
End: 2018-07-03

## 2018-07-03 DIAGNOSIS — I48.20 CHRONIC ATRIAL FIBRILLATION: Primary | ICD-10-CM

## 2018-07-05 NOTE — PROGRESS NOTES
"Mr. Cantor is a patient of Dr. Granda and was last seen in clinic 6/23/2017.      Subjective:   Patient ID:  [facility] PAT Cantor Jr. is a 72 y.o. male who presents for follow-up of No chief complaint on file.  .     HPI:    Mr. Cantor is a 72 y.o. male with pAF, HEIDI on CPAP, CKDIII here for follow up.     Background:    History of pAF.  Mr. Cantor underwent a DCCV (01/02/14). Following this, he wore an event monitor, which showed pAF with no change in symptoms. His rate was well controlled.   Continued to do well, except on occasion he gets "drained out" feeling / "run down." At those times, his wrist SBP measurement is 60s-70s.   Mr. Cantor is doing well from a rhythm perspective; rate-controlled on Toprol-XL and anticoagulated on Eliquis.  Relatively hypotensive, with episodes of hypotension per HPI.  Decrease ARB. Split meds: BB in AM, ARB in PM.    Update (07/06/2018):    Today he feels well. He has no cardiac complaints. Mr. Cantor denies chest pain with exertion or at rest, palpitations, SOB, BOYD, dizziness, or syncope. He reports no limitations in activity tolerance. He is compliant with his nightly CPAP.    He is currently taking eliquis 5mg BID for stroke prophylaxis and denies significant bleeding episodes. He is currently being treated with metoprolol succinate 100mg daily for HR control.  Kidney function is stable, with a creatinine of 1.4 on 6/22/2018.    I have personally reviewed the patient's EKG today, which shows atrial fibrillation 82mg. QTc is 408.    Recent Cardiac Tests:    2D Echo (7/6/2018):  CONCLUSIONS     1 - Normal left ventricular systolic function (EF 55%).     2 - Indeterminate LV diastolic function.     3 - Right ventricular enlargement with low normal to mildly depressed systolic function.     4 - Mild left atrial enlargement.     5 - No wall motion abnormalities.     6 - The estimated PA systolic pressure is 35 mmHg.     7 - Mild tricuspid regurgitation.     8 - Intermediate central " "venous pressure.     Current Outpatient Prescriptions   Medication Sig    apixaban 5 mg Tab Take 1 tablet (5 mg total) by mouth 2 (two) times daily.    aspirin 325 MG tablet Take 325 mg by mouth once daily.    azelastine (ASTELIN) 137 mcg (0.1 %) nasal spray 1 spray (137 mcg total) by Nasal route 2 (two) times daily.    BD INSULIN PEN NEEDLE UF ORIG 29 x 1/2 " Ndle     BD ULTRA-FINE BASIL PEN NEEDLES 32 gauge x 5/32" Ndle CHECK BLOOD SUGAR FOUR TIMES DAILY    ciclopirox (PENLAC) 8 % Soln Apply topically nightly.    clobetasol (TEMOVATE) 0.05 % cream AAA finger bid    clotrimazole-betamethasone 1-0.05% (LOTRISONE) cream Apply topically 2 (two) times daily.    fenofibrate (TRICOR) 145 MG tablet TAKE ONE TABLET BY MOUTH EVERY DAY    furosemide (LASIX) 40 MG tablet TAKE ONE TABLET BY MOUTH EVERY DAY AS NEEDED (Patient taking differently: TAKE ONE TABLET BY MOUTH EVERY DAY)    gabapentin (NEURONTIN) 300 MG capsule Take 1 capsule (300 mg total) by mouth 3 (three) times daily.    insulin aspart (NOVOLOG FLEXPEN) 100 unit/mL InPn pen Novolog 20 units breakfast, 22 units lunch and dinner plus correction scale. Max daily dose=100 units    insulin glargine (BASAGLAR KWIKPEN U-100 INSULIN) 100 unit/mL (3 mL) InPn pen Inject 60 Units into the skin once daily. (Patient taking differently: Inject 60 Units into the skin every evening. )    ketoconazole (NIZORAL) 2 % cream Apply topically once daily.    lancets 33 gauge Misc 1 lancet by Misc.(Non-Drug; Combo Route) route 4 (four) times daily. Pt uses True Result Meter, bg monitoring 4 times a day.    levothyroxine (SYNTHROID) 25 MCG tablet Take 1 tablet (25 mcg total) by mouth once daily.    losartan (COZAAR) 25 MG tablet Take 1 tablet (25 mg total) by mouth once daily.    metoprolol succinate (TOPROL-XL) 100 MG 24 hr tablet TAKE ONE TABLET BY MOUTH EVERY DAY    metronidazole (METROGEL) 0.75 % gel Apply topically 2 (two) times daily.    multivitamin capsule Take 1 " "capsule by mouth every morning.     nystatin-triamcinolone (MYCOLOG II) cream apply TWICE DAILY (Patient taking differently: apply TWICE DAILY-using as needed for rash)    omega-3 acid ethyl esters (LOVAZA) 1 gram capsule TAKE THREE CAPSULE BY MOUTH TWICE DAILY    pravastatin (PRAVACHOL) 10 MG tablet Take 1 tablet (10 mg total) by mouth once daily. (Patient taking differently: Take 10 mg by mouth every evening. )    tamsulosin (FLOMAX) 0.4 mg Cp24 Take 1 capsule (0.4 mg total) by mouth once daily.    temazepam (RESTORIL) 15 mg Cap TAKE ONE CAPSULE BY MOUTH EVERY EVENING    tiZANidine (ZANAFLEX) 4 MG tablet TAKE ONE TABLET BY MOUTH EVERY 6 HOURS AS NEEDED    triamcinolone acetonide 0.1% (KENALOG) 0.1 % cream Apply topically 2 (two) times daily. Apply to affected area twice daily as directed.    vitamin E 1000 UNIT capsule Take 1,000 Units by mouth every morning. 1 Capsule Oral Every day     No current facility-administered medications for this visit.      Review of Systems   Constitution: Negative for malaise/fatigue.   Cardiovascular: Negative for chest pain, dyspnea on exertion, irregular heartbeat, leg swelling and palpitations.   Respiratory: Negative for shortness of breath.    Hematologic/Lymphatic: Negative for bleeding problem.   Skin: Negative for rash.   Musculoskeletal: Negative for myalgias.   Gastrointestinal: Negative for hematemesis, hematochezia and nausea.   Genitourinary: Negative for hematuria.   Neurological: Negative for light-headedness.   Psychiatric/Behavioral: Negative for altered mental status.   Allergic/Immunologic: Negative for persistent infections.     Objective:        /62   Pulse 74   Ht 5' 10" (1.778 m)   Wt (!) 145 kg (319 lb 11.2 oz)   SpO2 98%   BMI 45.87 kg/m²     Physical Exam   Constitutional: He is oriented to person, place, and time. He appears well-developed and well-nourished.   HENT:   Head: Normocephalic.   Nose: Nose normal.   Eyes: Pupils are equal, " round, and reactive to light.   Cardiovascular: Normal rate, S1 normal and S2 normal.  An irregularly irregular rhythm present.   No murmur heard.  Pulses:       Radial pulses are 2+ on the right side, and 2+ on the left side.   Pulmonary/Chest: Breath sounds normal. No respiratory distress.   Abdominal: Normal appearance.   Musculoskeletal: Normal range of motion. He exhibits no edema.   Neurological: He is alert and oriented to person, place, and time.   Skin: Skin is warm and dry. No erythema.   Psychiatric: He has a normal mood and affect. His speech is normal and behavior is normal.   Nursing note and vitals reviewed.    Lab Results   Component Value Date     06/22/2018    K 4.6 06/22/2018    MG 2.0 05/27/2008    BUN 24 (H) 06/22/2018    CREATININE 1.4 06/22/2018    ALT 14 04/09/2018    AST 19 04/09/2018    HGB 14.7 06/19/2018    HCT 43.9 06/19/2018    TSH 2.864 04/12/2018    LDLCALC 95.2 11/15/2017       Recent Labs  Lab 01/10/17  1140 04/09/18  2119 06/19/18  1410   INR 1.0 1.0 1.1           Assessment:         1. Chronic atrial fibrillation    2. Essential hypertension    3. Long term current use of anticoagulant therapy    4. Obstructive sleep apnea syndrome      Plan:     In summary, Mr. Cantor is a 72 y.o. male with pAF, HEIDI on CPAP, CKDIII here for follow up. He is doing well in a rate control strategy, with normal EF per today's echo. He remains anticoagulated on eliquis and rate controlled on metoprolol. Will make no changes.    Continue current medications.  RTC in one year with echo, sooner if needed.    Follow-up in about 1 year (around 7/6/2019).    ------------------------------------------------------------------    CODY Arellano, NP-C  [facility]

## 2018-07-06 ENCOUNTER — HOSPITAL ENCOUNTER (OUTPATIENT)
Dept: CARDIOLOGY | Facility: CLINIC | Age: 73
Discharge: HOME | End: 2018-07-06
Payer: MEDICARE

## 2018-07-06 ENCOUNTER — DOCUMENTATION ONLY (OUTPATIENT)
Dept: CARDIOLOGY | Facility: CLINIC | Age: 73
End: 2018-07-06

## 2018-07-06 ENCOUNTER — OFFICE VISIT (OUTPATIENT)
Dept: ELECTROPHYSIOLOGY | Facility: CLINIC | Age: 73
End: 2018-07-06
Payer: MEDICARE

## 2018-07-06 VITALS
HEIGHT: 70 IN | SYSTOLIC BLOOD PRESSURE: 119 MMHG | DIASTOLIC BLOOD PRESSURE: 62 MMHG | OXYGEN SATURATION: 98 % | BODY MASS INDEX: 45.1 KG/M2 | HEART RATE: 74 BPM | WEIGHT: 315 LBS

## 2018-07-06 DIAGNOSIS — I48.20 CHRONIC ATRIAL FIBRILLATION: ICD-10-CM

## 2018-07-06 DIAGNOSIS — I48.20 CHRONIC ATRIAL FIBRILLATION: Primary | ICD-10-CM

## 2018-07-06 DIAGNOSIS — I10 ESSENTIAL HYPERTENSION: ICD-10-CM

## 2018-07-06 DIAGNOSIS — G47.33 OBSTRUCTIVE SLEEP APNEA SYNDROME: ICD-10-CM

## 2018-07-06 DIAGNOSIS — Z79.01 LONG TERM CURRENT USE OF ANTICOAGULANT THERAPY: ICD-10-CM

## 2018-07-06 LAB
ESTIMATED PA SYSTOLIC PRESSURE: 35.25
MITRAL VALVE MOBILITY: NORMAL
RETIRED EF AND QEF - SEE NOTES: 55 (ref 55–65)
TRICUSPID VALVE REGURGITATION: NORMAL

## 2018-07-06 PROCEDURE — 99999 PR PBB SHADOW E&M-EST. PATIENT-LVL III: CPT | Mod: PBBFAC,,, | Performed by: NURSE PRACTITIONER

## 2018-07-06 PROCEDURE — 93000 ELECTROCARDIOGRAM COMPLETE: CPT | Mod: S$GLB,,, | Performed by: INTERNAL MEDICINE

## 2018-07-06 PROCEDURE — 96374 THER/PROPH/DIAG INJ IV PUSH: CPT | Mod: 59,S$GLB,, | Performed by: INTERNAL MEDICINE

## 2018-07-06 PROCEDURE — 93306 TTE W/DOPPLER COMPLETE: CPT | Mod: S$GLB,,, | Performed by: INTERNAL MEDICINE

## 2018-07-06 PROCEDURE — 3074F SYST BP LT 130 MM HG: CPT | Mod: CPTII,S$GLB,, | Performed by: NURSE PRACTITIONER

## 2018-07-06 PROCEDURE — 3078F DIAST BP <80 MM HG: CPT | Mod: CPTII,S$GLB,, | Performed by: NURSE PRACTITIONER

## 2018-07-06 PROCEDURE — 99214 OFFICE O/P EST MOD 30 MIN: CPT | Mod: S$GLB,,, | Performed by: NURSE PRACTITIONER

## 2018-07-06 NOTE — PROGRESS NOTES
Patient identified by 2 identifiers. Denies previous reactions to blood transfusions and allergies reviewed.  Procedure explained & consent obtained.  20 g IV placed to Rt Hand, flushed w/ 10cc NS pre & post contrast administration.  1.5cc Definity administered, echo images obtained.  Pt tolerated procedure well.  IV D/C'ed, preasure dsg applied.  Pt D/C'ed to home.

## 2018-07-09 ENCOUNTER — TELEPHONE (OUTPATIENT)
Dept: ORTHOPEDICS | Facility: CLINIC | Age: 73
End: 2018-07-09

## 2018-07-09 ENCOUNTER — TELEPHONE (OUTPATIENT)
Dept: RESEARCH | Facility: HOSPITAL | Age: 73
End: 2018-07-09

## 2018-07-09 NOTE — TELEPHONE ENCOUNTER
Spoke to pt and informed him that the arrival time for his procedure is 0600 at the second floor sx center and no food or drinks after midnight tonight. Pt verbalized understanding.

## 2018-07-09 NOTE — TELEPHONE ENCOUNTER
Sponsor: [employer]  Study Title/IRB Number: A Phase 3, Placebo-Controlled, Randomized, Observer-Blinded Study To Evaluate The Efficacy, Safety, And Tolerability Of A Clostridium Difficile Vaccine In Adults 50 Years Of Age And Older ([id number]) / 2017.094.B    Patient continued eligibility confirmed: Yes    Patient willing to continue to participate in [employer] C-Diff study and comply with all study requirements: Yes    Date of Telephone Contact: [id number]    Contacted the subject by phone to request that patient log in to their eDiaries every 30 days regardless of receiving a vaccine or completing vaccine questionnaires. Patient verbalized understanding.

## 2018-07-10 ENCOUNTER — ANESTHESIA (OUTPATIENT)
Dept: SURGERY | Facility: HOSPITAL | Age: 73
DRG: 467 | End: 2018-07-10
Payer: MEDICARE

## 2018-07-10 ENCOUNTER — HOSPITAL ENCOUNTER (INPATIENT)
Facility: HOSPITAL | Age: 73
LOS: 2 days | Discharge: NURSING, REHAB OR LONG-TERM CARE | DRG: 467 | End: 2018-07-12
Attending: ORTHOPAEDIC SURGERY | Admitting: ORTHOPAEDIC SURGERY
Payer: MEDICARE

## 2018-07-10 DIAGNOSIS — T84.019A PROSTHETIC JOINT IMPLANT FAILURE: Primary | ICD-10-CM

## 2018-07-10 DIAGNOSIS — T84.019D PROSTHETIC JOINT IMPLANT FAILURE, SUBSEQUENT ENCOUNTER: ICD-10-CM

## 2018-07-10 DIAGNOSIS — M25.561 RIGHT KNEE PAIN, UNSPECIFIED CHRONICITY: ICD-10-CM

## 2018-07-10 DIAGNOSIS — I10 ESSENTIAL HYPERTENSION: ICD-10-CM

## 2018-07-10 DIAGNOSIS — Z01.818 PREOP EXAMINATION: ICD-10-CM

## 2018-07-10 DIAGNOSIS — I48.0 PAROXYSMAL ATRIAL FIBRILLATION: ICD-10-CM

## 2018-07-10 DIAGNOSIS — I48.19 PERSISTENT ATRIAL FIBRILLATION: ICD-10-CM

## 2018-07-10 LAB
ANION GAP SERPL CALC-SCNC: 7 MMOL/L
BASOPHILS # BLD AUTO: 0.04 K/UL
BASOPHILS NFR BLD: 0.4 %
BUN SERPL-MCNC: 25 MG/DL
CALCIUM SERPL-MCNC: 8.9 MG/DL
CHLORIDE SERPL-SCNC: 106 MMOL/L
CO2 SERPL-SCNC: 22 MMOL/L
CREAT SERPL-MCNC: 1.4 MG/DL
DIFFERENTIAL METHOD: ABNORMAL
EOSINOPHIL # BLD AUTO: 0.2 K/UL
EOSINOPHIL NFR BLD: 2.6 %
ERYTHROCYTE [DISTWIDTH] IN BLOOD BY AUTOMATED COUNT: 13.1 %
EST. GFR  (AFRICAN AMERICAN): 56.8 ML/MIN/1.73 M^2
EST. GFR  (NON AFRICAN AMERICAN): 49.1 ML/MIN/1.73 M^2
GLUCOSE SERPL-MCNC: 151 MG/DL
HCT VFR BLD AUTO: 39.7 %
HGB BLD-MCNC: 13.2 G/DL
IMM GRANULOCYTES # BLD AUTO: 0.06 K/UL
IMM GRANULOCYTES NFR BLD AUTO: 0.7 %
INR PPP: 1.1
LYMPHOCYTES # BLD AUTO: 1.1 K/UL
LYMPHOCYTES NFR BLD: 12 %
MCH RBC QN AUTO: 31.5 PG
MCHC RBC AUTO-ENTMCNC: 33.2 G/DL
MCV RBC AUTO: 95 FL
MONOCYTES # BLD AUTO: 0.8 K/UL
MONOCYTES NFR BLD: 8.3 %
NEUTROPHILS # BLD AUTO: 6.9 K/UL
NEUTROPHILS NFR BLD: 76 %
NRBC BLD-RTO: 0 /100 WBC
PLATELET # BLD AUTO: 174 K/UL
PMV BLD AUTO: 11.4 FL
POCT GLUCOSE: 119 MG/DL (ref 70–110)
POCT GLUCOSE: 143 MG/DL (ref 70–110)
POCT GLUCOSE: 168 MG/DL (ref 70–110)
POTASSIUM SERPL-SCNC: 4.7 MMOL/L
PROTHROMBIN TIME: 11 SEC
RBC # BLD AUTO: 4.19 M/UL
SODIUM SERPL-SCNC: 135 MMOL/L
WBC # BLD AUTO: 9.01 K/UL

## 2018-07-10 PROCEDURE — 0SPV0JZ REMOVAL OF SYNTHETIC SUBSTITUTE FROM RIGHT KNEE JOINT, TIBIAL SURFACE, OPEN APPROACH: ICD-10-PCS | Performed by: ORTHOPAEDIC SURGERY

## 2018-07-10 PROCEDURE — C1713 ANCHOR/SCREW BN/BN,TIS/BN: HCPCS | Performed by: ORTHOPAEDIC SURGERY

## 2018-07-10 PROCEDURE — 87102 FUNGUS ISOLATION CULTURE: CPT

## 2018-07-10 PROCEDURE — 88305 TISSUE EXAM BY PATHOLOGIST: CPT | Mod: 26,,, | Performed by: PATHOLOGY

## 2018-07-10 PROCEDURE — 37000009 HC ANESTHESIA EA ADD 15 MINS: Performed by: ORTHOPAEDIC SURGERY

## 2018-07-10 PROCEDURE — 37000008 HC ANESTHESIA 1ST 15 MINUTES: Performed by: ORTHOPAEDIC SURGERY

## 2018-07-10 PROCEDURE — 25000003 PHARM REV CODE 250: Performed by: NURSE PRACTITIONER

## 2018-07-10 PROCEDURE — 63600175 PHARM REV CODE 636 W HCPCS: Performed by: STUDENT IN AN ORGANIZED HEALTH CARE EDUCATION/TRAINING PROGRAM

## 2018-07-10 PROCEDURE — 25000003 PHARM REV CODE 250: Performed by: NURSE ANESTHETIST, CERTIFIED REGISTERED

## 2018-07-10 PROCEDURE — 63600175 PHARM REV CODE 636 W HCPCS: Performed by: NURSE ANESTHETIST, CERTIFIED REGISTERED

## 2018-07-10 PROCEDURE — 88300 SURGICAL PATH GROSS: CPT | Mod: 26,,, | Performed by: PATHOLOGY

## 2018-07-10 PROCEDURE — 87176 TISSUE HOMOGENIZATION CULTR: CPT

## 2018-07-10 PROCEDURE — 82962 GLUCOSE BLOOD TEST: CPT | Performed by: ORTHOPAEDIC SURGERY

## 2018-07-10 PROCEDURE — 87070 CULTURE OTHR SPECIMN AEROBIC: CPT

## 2018-07-10 PROCEDURE — 87075 CULTR BACTERIA EXCEPT BLOOD: CPT

## 2018-07-10 PROCEDURE — C1776 JOINT DEVICE (IMPLANTABLE): HCPCS | Performed by: ORTHOPAEDIC SURGERY

## 2018-07-10 PROCEDURE — 85610 PROTHROMBIN TIME: CPT

## 2018-07-10 PROCEDURE — 64448 NJX AA&/STRD FEM NRV NFS IMG: CPT | Mod: 59,LT,GC, | Performed by: ANESTHESIOLOGY

## 2018-07-10 PROCEDURE — 71000033 HC RECOVERY, INTIAL HOUR: Performed by: ORTHOPAEDIC SURGERY

## 2018-07-10 PROCEDURE — D9220A PRA ANESTHESIA: Mod: ANES,,, | Performed by: ANESTHESIOLOGY

## 2018-07-10 PROCEDURE — 87206 SMEAR FLUORESCENT/ACID STAI: CPT

## 2018-07-10 PROCEDURE — 27201423 OPTIME MED/SURG SUP & DEVICES STERILE SUPPLY: Performed by: ORTHOPAEDIC SURGERY

## 2018-07-10 PROCEDURE — 25000003 PHARM REV CODE 250: Performed by: ORTHOPAEDIC SURGERY

## 2018-07-10 PROCEDURE — 25000003 PHARM REV CODE 250: Performed by: STUDENT IN AN ORGANIZED HEALTH CARE EDUCATION/TRAINING PROGRAM

## 2018-07-10 PROCEDURE — 27486 REVISE/REPLACE KNEE JOINT: CPT | Mod: 22,RT,, | Performed by: ORTHOPAEDIC SURGERY

## 2018-07-10 PROCEDURE — 85025 COMPLETE CBC W/AUTO DIFF WBC: CPT

## 2018-07-10 PROCEDURE — 36000710: Performed by: ORTHOPAEDIC SURGERY

## 2018-07-10 PROCEDURE — 88331 PATH CONSLTJ SURG 1 BLK 1SPC: CPT | Mod: 26,,, | Performed by: PATHOLOGY

## 2018-07-10 PROCEDURE — 25000003 PHARM REV CODE 250: Performed by: SURGERY

## 2018-07-10 PROCEDURE — 27100025 HC TUBING, SET FLUID WARMER: Performed by: NURSE ANESTHETIST, CERTIFIED REGISTERED

## 2018-07-10 PROCEDURE — 0SRV0J9 REPLACEMENT OF RIGHT KNEE JOINT, TIBIAL SURFACE WITH SYNTHETIC SUBSTITUTE, CEMENTED, OPEN APPROACH: ICD-10-PCS | Performed by: ORTHOPAEDIC SURGERY

## 2018-07-10 PROCEDURE — 63600175 PHARM REV CODE 636 W HCPCS: Performed by: NURSE PRACTITIONER

## 2018-07-10 PROCEDURE — 36000711: Performed by: ORTHOPAEDIC SURGERY

## 2018-07-10 PROCEDURE — 87116 MYCOBACTERIA CULTURE: CPT

## 2018-07-10 PROCEDURE — 27000221 HC OXYGEN, UP TO 24 HOURS

## 2018-07-10 PROCEDURE — 94761 N-INVAS EAR/PLS OXIMETRY MLT: CPT

## 2018-07-10 PROCEDURE — 71000039 HC RECOVERY, EACH ADD'L HOUR: Performed by: ORTHOPAEDIC SURGERY

## 2018-07-10 PROCEDURE — D9220A PRA ANESTHESIA: Mod: CRNA,,, | Performed by: NURSE ANESTHETIST, CERTIFIED REGISTERED

## 2018-07-10 PROCEDURE — 27800517 HC TRAY,EPIDURAL-CONTINUOUS: Performed by: STUDENT IN AN ORGANIZED HEALTH CARE EDUCATION/TRAINING PROGRAM

## 2018-07-10 PROCEDURE — 88305 TISSUE EXAM BY PATHOLOGIST: CPT | Performed by: PATHOLOGY

## 2018-07-10 PROCEDURE — 80048 BASIC METABOLIC PNL TOTAL CA: CPT

## 2018-07-10 PROCEDURE — 12000002 HC ACUTE/MED SURGE SEMI-PRIVATE ROOM

## 2018-07-10 PROCEDURE — 76942 ECHO GUIDE FOR BIOPSY: CPT | Performed by: ANESTHESIOLOGY

## 2018-07-10 DEVICE — STEM EXT FEM KNEE TS 15X100MM: Type: IMPLANTABLE DEVICE | Site: KNEE | Status: FUNCTIONAL

## 2018-07-10 DEVICE — CEMENT BONE W/GENT SIMPLEX HV: Type: IMPLANTABLE DEVICE | Site: KNEE | Status: FUNCTIONAL

## 2018-07-10 DEVICE — IMPLANTABLE DEVICE: Type: IMPLANTABLE DEVICE | Site: KNEE | Status: FUNCTIONAL

## 2018-07-10 DEVICE — BASEPLATE TRIATHLON TS SZ4: Type: IMPLANTABLE DEVICE | Site: KNEE | Status: FUNCTIONAL

## 2018-07-10 RX ORDER — SODIUM CHLORIDE 0.9 % (FLUSH) 0.9 %
3 SYRINGE (ML) INJECTION EVERY 8 HOURS
Status: DISCONTINUED | OUTPATIENT
Start: 2018-07-11 | End: 2018-07-12 | Stop reason: HOSPADM

## 2018-07-10 RX ORDER — ONDANSETRON 2 MG/ML
4 INJECTION INTRAMUSCULAR; INTRAVENOUS EVERY 6 HOURS PRN
Status: DISCONTINUED | OUTPATIENT
Start: 2018-07-10 | End: 2018-07-12 | Stop reason: HOSPADM

## 2018-07-10 RX ORDER — LOSARTAN POTASSIUM 25 MG/1
25 TABLET ORAL DAILY
Status: DISCONTINUED | OUTPATIENT
Start: 2018-07-10 | End: 2018-07-12 | Stop reason: HOSPADM

## 2018-07-10 RX ORDER — CEFAZOLIN SODIUM 1 G/3ML
INJECTION, POWDER, FOR SOLUTION INTRAMUSCULAR; INTRAVENOUS
Status: DISCONTINUED | OUTPATIENT
Start: 2018-07-10 | End: 2018-07-10

## 2018-07-10 RX ORDER — CEFAZOLIN SODIUM 1 G/3ML
2 INJECTION, POWDER, FOR SOLUTION INTRAMUSCULAR; INTRAVENOUS
Status: COMPLETED | OUTPATIENT
Start: 2018-07-10 | End: 2018-07-11

## 2018-07-10 RX ORDER — OXYCODONE HYDROCHLORIDE 5 MG/1
10 TABLET ORAL
Status: DISCONTINUED | OUTPATIENT
Start: 2018-07-10 | End: 2018-07-11

## 2018-07-10 RX ORDER — ONDANSETRON 8 MG/1
8 TABLET, ORALLY DISINTEGRATING ORAL EVERY 6 HOURS PRN
Qty: 30 TABLET | Refills: 0 | Status: ON HOLD | OUTPATIENT
Start: 2018-07-10 | End: 2018-07-31 | Stop reason: HOSPADM

## 2018-07-10 RX ORDER — VASOPRESSIN 20 [USP'U]/ML
INJECTION, SOLUTION INTRAMUSCULAR; SUBCUTANEOUS
Status: DISCONTINUED | OUTPATIENT
Start: 2018-07-10 | End: 2018-07-10

## 2018-07-10 RX ORDER — NAPROXEN SODIUM 220 MG/1
81 TABLET, FILM COATED ORAL 2 TIMES DAILY
Status: DISCONTINUED | OUTPATIENT
Start: 2018-07-10 | End: 2018-07-11

## 2018-07-10 RX ORDER — OXYCODONE AND ACETAMINOPHEN 10; 325 MG/1; MG/1
1 TABLET ORAL EVERY 4 HOURS PRN
Qty: 61 TABLET | Refills: 0 | Status: ON HOLD | OUTPATIENT
Start: 2018-07-10 | End: 2018-07-31 | Stop reason: HOSPADM

## 2018-07-10 RX ORDER — MIDAZOLAM HYDROCHLORIDE 1 MG/ML
INJECTION, SOLUTION INTRAMUSCULAR; INTRAVENOUS
Status: DISCONTINUED | OUTPATIENT
Start: 2018-07-10 | End: 2018-07-10

## 2018-07-10 RX ORDER — MUPIROCIN 20 MG/G
1 OINTMENT TOPICAL 2 TIMES DAILY
Status: DISCONTINUED | OUTPATIENT
Start: 2018-07-10 | End: 2018-07-12 | Stop reason: HOSPADM

## 2018-07-10 RX ORDER — SODIUM CHLORIDE 0.9 % (FLUSH) 0.9 %
3 SYRINGE (ML) INJECTION
Status: DISCONTINUED | OUTPATIENT
Start: 2018-07-10 | End: 2018-07-12 | Stop reason: HOSPADM

## 2018-07-10 RX ORDER — PREGABALIN 50 MG/1
150 CAPSULE ORAL NIGHTLY
Status: DISCONTINUED | OUTPATIENT
Start: 2018-07-10 | End: 2018-07-10

## 2018-07-10 RX ORDER — AMOXICILLIN 250 MG
1 CAPSULE ORAL 2 TIMES DAILY
Status: DISCONTINUED | OUTPATIENT
Start: 2018-07-10 | End: 2018-07-12 | Stop reason: HOSPADM

## 2018-07-10 RX ORDER — MORPHINE SULFATE 2 MG/ML
4 INJECTION, SOLUTION INTRAMUSCULAR; INTRAVENOUS
Status: DISCONTINUED | OUTPATIENT
Start: 2018-07-10 | End: 2018-07-11

## 2018-07-10 RX ORDER — CELECOXIB 200 MG/1
200 CAPSULE ORAL DAILY
Status: DISCONTINUED | OUTPATIENT
Start: 2018-07-10 | End: 2018-07-10

## 2018-07-10 RX ORDER — ACETAMINOPHEN 500 MG
1000 TABLET ORAL EVERY 6 HOURS
Status: DISPENSED | OUTPATIENT
Start: 2018-07-10 | End: 2018-07-12

## 2018-07-10 RX ORDER — NAPROXEN SODIUM 220 MG/1
81 TABLET, FILM COATED ORAL 2 TIMES DAILY
Qty: 14 TABLET | Refills: 0 | Status: ON HOLD | OUTPATIENT
Start: 2018-07-10 | End: 2018-07-31 | Stop reason: HOSPADM

## 2018-07-10 RX ORDER — CELECOXIB 200 MG/1
400 CAPSULE ORAL ONCE
Status: DISCONTINUED | OUTPATIENT
Start: 2018-07-10 | End: 2018-07-10

## 2018-07-10 RX ORDER — DOCUSATE SODIUM 100 MG/1
100 CAPSULE, LIQUID FILLED ORAL 2 TIMES DAILY PRN
Qty: 60 CAPSULE | Refills: 1 | Status: SHIPPED | OUTPATIENT
Start: 2018-07-10 | End: 2018-08-15 | Stop reason: CLARIF

## 2018-07-10 RX ORDER — WARFARIN 2.5 MG/1
TABLET ORAL
Qty: 90 TABLET | Refills: 0 | Status: ON HOLD | OUTPATIENT
Start: 2018-07-10 | End: 2018-07-19 | Stop reason: ALTCHOICE

## 2018-07-10 RX ORDER — TAMSULOSIN HYDROCHLORIDE 0.4 MG/1
1 CAPSULE ORAL DAILY
Status: DISCONTINUED | OUTPATIENT
Start: 2018-07-11 | End: 2018-07-12 | Stop reason: HOSPADM

## 2018-07-10 RX ORDER — OXYCODONE HYDROCHLORIDE 5 MG/1
5 TABLET ORAL
Status: DISCONTINUED | OUTPATIENT
Start: 2018-07-10 | End: 2018-07-11

## 2018-07-10 RX ORDER — MORPHINE SULFATE 2 MG/ML
2 INJECTION, SOLUTION INTRAMUSCULAR; INTRAVENOUS
Status: DISCONTINUED | OUTPATIENT
Start: 2018-07-10 | End: 2018-07-11

## 2018-07-10 RX ORDER — BISACODYL 10 MG
10 SUPPOSITORY, RECTAL RECTAL EVERY 12 HOURS PRN
Status: DISCONTINUED | OUTPATIENT
Start: 2018-07-10 | End: 2018-07-12 | Stop reason: HOSPADM

## 2018-07-10 RX ORDER — DEXMEDETOMIDINE HYDROCHLORIDE 100 UG/ML
INJECTION, SOLUTION INTRAVENOUS
Status: DISCONTINUED | OUTPATIENT
Start: 2018-07-10 | End: 2018-07-10

## 2018-07-10 RX ORDER — IBUPROFEN 200 MG
24 TABLET ORAL
Status: DISCONTINUED | OUTPATIENT
Start: 2018-07-10 | End: 2018-07-12 | Stop reason: HOSPADM

## 2018-07-10 RX ORDER — FUROSEMIDE 40 MG/1
40 TABLET ORAL DAILY
Status: DISCONTINUED | OUTPATIENT
Start: 2018-07-11 | End: 2018-07-12 | Stop reason: HOSPADM

## 2018-07-10 RX ORDER — LIDOCAINE HYDROCHLORIDE AND EPINEPHRINE 15; 5 MG/ML; UG/ML
INJECTION, SOLUTION EPIDURAL
Status: DISCONTINUED | OUTPATIENT
Start: 2018-07-10 | End: 2018-07-10

## 2018-07-10 RX ORDER — MUPIROCIN 20 MG/G
OINTMENT TOPICAL
Status: DISCONTINUED | OUTPATIENT
Start: 2018-07-10 | End: 2018-07-10

## 2018-07-10 RX ORDER — INSULIN ASPART 100 [IU]/ML
1-10 INJECTION, SOLUTION INTRAVENOUS; SUBCUTANEOUS
Status: DISCONTINUED | OUTPATIENT
Start: 2018-07-10 | End: 2018-07-12 | Stop reason: HOSPADM

## 2018-07-10 RX ORDER — FENTANYL CITRATE 50 UG/ML
25 INJECTION, SOLUTION INTRAMUSCULAR; INTRAVENOUS EVERY 5 MIN PRN
Status: DISCONTINUED | OUTPATIENT
Start: 2018-07-10 | End: 2018-07-10

## 2018-07-10 RX ORDER — ACETAMINOPHEN 10 MG/ML
1000 INJECTION, SOLUTION INTRAVENOUS ONCE
Status: COMPLETED | OUTPATIENT
Start: 2018-07-10 | End: 2018-07-10

## 2018-07-10 RX ORDER — RAMELTEON 8 MG/1
8 TABLET ORAL NIGHTLY PRN
Status: DISCONTINUED | OUTPATIENT
Start: 2018-07-10 | End: 2018-07-12 | Stop reason: HOSPADM

## 2018-07-10 RX ORDER — SODIUM CHLORIDE 9 MG/ML
INJECTION, SOLUTION INTRAVENOUS CONTINUOUS
Status: ACTIVE | OUTPATIENT
Start: 2018-07-10 | End: 2018-07-11

## 2018-07-10 RX ORDER — MIDAZOLAM HYDROCHLORIDE 1 MG/ML
0.5 INJECTION INTRAMUSCULAR; INTRAVENOUS
Status: DISCONTINUED | OUTPATIENT
Start: 2018-07-10 | End: 2018-07-10

## 2018-07-10 RX ORDER — METOPROLOL SUCCINATE 100 MG/1
100 TABLET, EXTENDED RELEASE ORAL NIGHTLY
Status: DISCONTINUED | OUTPATIENT
Start: 2018-07-10 | End: 2018-07-12 | Stop reason: HOSPADM

## 2018-07-10 RX ORDER — PRAVASTATIN SODIUM 10 MG/1
10 TABLET ORAL NIGHTLY
Status: DISCONTINUED | OUTPATIENT
Start: 2018-07-10 | End: 2018-07-12 | Stop reason: HOSPADM

## 2018-07-10 RX ORDER — ROPIVACAINE HYDROCHLORIDE 2 MG/ML
8 INJECTION, SOLUTION EPIDURAL; INFILTRATION; PERINEURAL CONTINUOUS
Status: DISCONTINUED | OUTPATIENT
Start: 2018-07-10 | End: 2018-07-12

## 2018-07-10 RX ORDER — SODIUM CHLORIDE 9 MG/ML
INJECTION, SOLUTION INTRAVENOUS CONTINUOUS
Status: DISCONTINUED | OUTPATIENT
Start: 2018-07-10 | End: 2018-07-10

## 2018-07-10 RX ORDER — FENOFIBRATE 145 MG/1
145 TABLET, FILM COATED ORAL DAILY
Status: DISCONTINUED | OUTPATIENT
Start: 2018-07-11 | End: 2018-07-12 | Stop reason: HOSPADM

## 2018-07-10 RX ORDER — GABAPENTIN 300 MG/1
300 CAPSULE ORAL 3 TIMES DAILY
Status: DISCONTINUED | OUTPATIENT
Start: 2018-07-10 | End: 2018-07-12 | Stop reason: HOSPADM

## 2018-07-10 RX ORDER — PROPOFOL 10 MG/ML
VIAL (ML) INTRAVENOUS CONTINUOUS PRN
Status: DISCONTINUED | OUTPATIENT
Start: 2018-07-10 | End: 2018-07-10

## 2018-07-10 RX ORDER — FAMOTIDINE 20 MG/1
20 TABLET, FILM COATED ORAL 2 TIMES DAILY
Status: DISCONTINUED | OUTPATIENT
Start: 2018-07-10 | End: 2018-07-12 | Stop reason: HOSPADM

## 2018-07-10 RX ORDER — KETAMINE HYDROCHLORIDE 100 MG/ML
INJECTION, SOLUTION INTRAMUSCULAR; INTRAVENOUS
Status: DISCONTINUED | OUTPATIENT
Start: 2018-07-10 | End: 2018-07-10

## 2018-07-10 RX ORDER — IBUPROFEN 200 MG
16 TABLET ORAL
Status: DISCONTINUED | OUTPATIENT
Start: 2018-07-10 | End: 2018-07-12 | Stop reason: HOSPADM

## 2018-07-10 RX ORDER — POLYETHYLENE GLYCOL 3350 17 G/17G
17 POWDER, FOR SOLUTION ORAL DAILY
Status: DISCONTINUED | OUTPATIENT
Start: 2018-07-10 | End: 2018-07-12 | Stop reason: HOSPADM

## 2018-07-10 RX ORDER — GLUCAGON 1 MG
1 KIT INJECTION
Status: DISCONTINUED | OUTPATIENT
Start: 2018-07-10 | End: 2018-07-12 | Stop reason: HOSPADM

## 2018-07-10 RX ORDER — PROPOFOL 10 MG/ML
INJECTION, EMULSION INTRAVENOUS
Status: DISCONTINUED | OUTPATIENT
Start: 2018-07-10 | End: 2018-07-10

## 2018-07-10 RX ADMIN — VANCOMYCIN HYDROCHLORIDE 1500 MG: 10 INJECTION, POWDER, LYOPHILIZED, FOR SOLUTION INTRAVENOUS at 06:07

## 2018-07-10 RX ADMIN — MUPIROCIN: 20 OINTMENT TOPICAL at 06:07

## 2018-07-10 RX ADMIN — ACETAMINOPHEN 1000 MG: 10 INJECTION, SOLUTION INTRAVENOUS at 11:07

## 2018-07-10 RX ADMIN — LOSARTAN POTASSIUM 25 MG: 25 TABLET, FILM COATED ORAL at 02:07

## 2018-07-10 RX ADMIN — METOPROLOL SUCCINATE 100 MG: 100 TABLET, EXTENDED RELEASE ORAL at 09:07

## 2018-07-10 RX ADMIN — STANDARDIZED SENNA CONCENTRATE AND DOCUSATE SODIUM 1 TABLET: 8.6; 5 TABLET, FILM COATED ORAL at 09:07

## 2018-07-10 RX ADMIN — SODIUM CHLORIDE: 0.9 INJECTION, SOLUTION INTRAVENOUS at 11:07

## 2018-07-10 RX ADMIN — MIDAZOLAM HYDROCHLORIDE 0.5 MG: 1 INJECTION, SOLUTION INTRAMUSCULAR; INTRAVENOUS at 09:07

## 2018-07-10 RX ADMIN — OXYCODONE HYDROCHLORIDE 10 MG: 5 TABLET ORAL at 01:07

## 2018-07-10 RX ADMIN — GABAPENTIN 300 MG: 300 CAPSULE ORAL at 02:07

## 2018-07-10 RX ADMIN — ROPIVACAINE HYDROCHLORIDE 8 ML/HR: 2 INJECTION, SOLUTION EPIDURAL; INFILTRATION at 09:07

## 2018-07-10 RX ADMIN — OXYCODONE HYDROCHLORIDE 10 MG: 5 TABLET ORAL at 05:07

## 2018-07-10 RX ADMIN — OXYCODONE HYDROCHLORIDE 10 MG: 5 TABLET ORAL at 09:07

## 2018-07-10 RX ADMIN — WARFARIN SODIUM 7.5 MG: 2.5 TABLET ORAL at 05:07

## 2018-07-10 RX ADMIN — FENTANYL CITRATE 50 MCG: 50 INJECTION INTRAMUSCULAR; INTRAVENOUS at 07:07

## 2018-07-10 RX ADMIN — KETAMINE HYDROCHLORIDE 10 MG: 100 INJECTION, SOLUTION, CONCENTRATE INTRAMUSCULAR; INTRAVENOUS at 09:07

## 2018-07-10 RX ADMIN — CEFAZOLIN 2 G: 1 INJECTION, POWDER, FOR SOLUTION INTRAMUSCULAR; INTRAVENOUS at 09:07

## 2018-07-10 RX ADMIN — CEFAZOLIN 3 G: 330 INJECTION, POWDER, FOR SOLUTION INTRAMUSCULAR; INTRAVENOUS at 08:07

## 2018-07-10 RX ADMIN — PROPOFOL 20 MG: 10 INJECTION, EMULSION INTRAVENOUS at 09:07

## 2018-07-10 RX ADMIN — ACETAMINOPHEN 1000 MG: 500 TABLET, COATED ORAL at 05:07

## 2018-07-10 RX ADMIN — MIDAZOLAM HYDROCHLORIDE 1 MG: 1 INJECTION, SOLUTION INTRAMUSCULAR; INTRAVENOUS at 07:07

## 2018-07-10 RX ADMIN — GABAPENTIN 300 MG: 300 CAPSULE ORAL at 09:07

## 2018-07-10 RX ADMIN — ASPIRIN 81 MG CHEWABLE TABLET 81 MG: 81 TABLET CHEWABLE at 05:07

## 2018-07-10 RX ADMIN — MEPIVACAINE HYDROCHLORIDE 6 ML/HR: 20 INJECTION, SOLUTION EPIDURAL; INFILTRATION at 09:07

## 2018-07-10 RX ADMIN — MUPIROCIN 1 G: 20 OINTMENT TOPICAL at 09:07

## 2018-07-10 RX ADMIN — SODIUM CHLORIDE, SODIUM GLUCONATE, SODIUM ACETATE, POTASSIUM CHLORIDE, MAGNESIUM CHLORIDE, SODIUM PHOSPHATE, DIBASIC, AND POTASSIUM PHOSPHATE: .53; .5; .37; .037; .03; .012; .00082 INJECTION, SOLUTION INTRAVENOUS at 09:07

## 2018-07-10 RX ADMIN — CEFAZOLIN 2 G: 1 INJECTION, POWDER, FOR SOLUTION INTRAMUSCULAR; INTRAVENOUS at 02:07

## 2018-07-10 RX ADMIN — Medication 2 MG: at 01:07

## 2018-07-10 RX ADMIN — LIDOCAINE HYDROCHLORIDE,EPINEPHRINE BITARTRATE 3 ML: 15; .005 INJECTION, SOLUTION EPIDURAL; INFILTRATION; INTRACAUDAL; PERINEURAL at 09:07

## 2018-07-10 RX ADMIN — SODIUM CHLORIDE: 0.9 INJECTION, SOLUTION INTRAVENOUS at 06:07

## 2018-07-10 RX ADMIN — DEXMEDETOMIDINE HYDROCHLORIDE 8 MCG: 100 INJECTION, SOLUTION, CONCENTRATE INTRAVENOUS at 08:07

## 2018-07-10 RX ADMIN — VASOPRESSIN 1 UNITS: 20 INJECTION INTRAVENOUS at 08:07

## 2018-07-10 RX ADMIN — ROPIVACAINE HYDROCHLORIDE 8 ML/HR: 2 INJECTION, SOLUTION EPIDURAL; INFILTRATION at 11:07

## 2018-07-10 RX ADMIN — MIDAZOLAM HYDROCHLORIDE 0.5 MG: 1 INJECTION, SOLUTION INTRAMUSCULAR; INTRAVENOUS at 08:07

## 2018-07-10 RX ADMIN — PROPOFOL 50 MCG/KG/MIN: 10 INJECTION, EMULSION INTRAVENOUS at 08:07

## 2018-07-10 RX ADMIN — KETAMINE HYDROCHLORIDE 10 MG: 100 INJECTION, SOLUTION, CONCENTRATE INTRAMUSCULAR; INTRAVENOUS at 10:07

## 2018-07-10 RX ADMIN — FAMOTIDINE 20 MG: 20 TABLET ORAL at 09:07

## 2018-07-10 RX ADMIN — PRAVASTATIN SODIUM 10 MG: 10 TABLET ORAL at 09:07

## 2018-07-10 RX ADMIN — KETAMINE HYDROCHLORIDE 10 MG: 100 INJECTION, SOLUTION, CONCENTRATE INTRAMUSCULAR; INTRAVENOUS at 08:07

## 2018-07-10 NOTE — OP NOTE
DATE OF PROCEDURE:  07/10/2018.    PREOPERATIVE DIAGNOSES:  1.  Failed tibial component, status post right total knee.  2.  Morbid obesity with BMI of 46.    POSTOPERATIVE DIAGNOSES:  1.  Failed tibial component, status post right total knee.  2.  Morbid obesity with BMI of 46.    PROCEDURE PERFORMED:  Complex revision of tibial component, right knee (given   the patient's body habitus and the fact that we retain the femoral component,   exposure was quite difficult and this procedure required complex intraoperative   decision making as well as increased technical difficulty.  Therefore, this   procedure took twice as long as a typical tibial knee revision and qualifies as   a modifier 22).    ANESTHESIA:  Regional.    SURGEON:  Miguel Stuart M.D.    ASSISTANT:  Michael Casale, M.D. (RES).    SPECIMENS:  Explant and soft tissue.    FINDINGS:  Subsided tibial component.  Femoral component was well-fixed.    FLUIDS:  2 liters.    BLOOD LOSS:  100 mL.    COMPLICATIONS:  None.    DISPOSITION:  To PACU, stable.    IMPLANTS:  Samir size 4 tibial component with a 15 x 100 stem and a 4 x 16   total stabilizer insert with set screw.    INDICATIONS FOR PROCEDURE:  Mr. Cantor is a 72-year-old gentleman who is   morbidly obese.  He had a press-fit total knee arthroplasty performed   approximately 18 months ago.  He began having increasing pain and it was   apparent radiographically that the tibial component was subsiding with a limp   falling into varus.  Infection workup was negative.  Treatment options were   explained.  The symptoms were severely impacting his activities of daily living   and it was decided to proceed with a revision right total knee arthroplasty.  He   was aware of reasonable treatment options as well as risks and benefits and   elected to undergo the procedure.    PROCEDURE IN DETAIL:  After appropriate consent was obtained, the patient was   brought in the Operating Room.  Anesthesia was  administered.  He received   antibiotic prophylaxis.  Cast padding and tourniquet were applied to the   proximal right thigh.  The right lower extremity was then prepped and draped in   the usual sterile fashion.  A timeout was called.  The limb was elevated and   tourniquet was inflated.  His previous incision was utilized and extended   slightly proximally and distally.  Medial parapatellar arthrotomy followed by a   quadriceps snip was undertaken.  The medial and lateral gutters were   reestablished.  His leg was quite large and there was difficulty with exposure   due to the tightness of the patella.  We freed up soft tissue laterally around   the tibia and this did help somewhat.  Soft tissue was sent for frozen section.    There was no sign of any acute inflammation.  We also sent soft tissue for   permanent and for culture.  Once we were happy with our exposure, the knee was   flexed.  The tibial insert was easily removed.  The femoral component was   inspected and tested and found to be very well-positioned and very well-fixed.    We dissected around the tibia.  Once again, had good exposure, and with the use   of a saw and some osteotomes, we freed up the tibial component.  There was some   gross motion initially, especially on the medial side.  Eventually, we were able   to remove the tibial component without any damage to the femoral component.  It   should be noted that the femoral component was protected with a lap sponge and   a PCL retractor.  Tibial component was removed with minimal bone loss.  We then   reamed the tibia to accommodate a 15 x 100 stem.  A cleanup cut was made.  We   then sized the tibia, found it to be a size 4, placed the size 4 trial in the   appropriate rotation based off the medial one-third of the tibial tubercle, and   punched proximally.  There was no significant bone loss.  We did not require a   cone.  We then inserted the trial, which was a size 4 tibial baseplate with a  15   x 100 stem and the 16 mm insert gave very good flexion-extension gap balance.    There was some slight laxity laterally as the lateral collateral had most likely   elongated over time.  Therefore, I opted to use a total stabilizer insert.  We   trialed with this and were quite satisfied with knee range of motion and   stability of the knee and the patella tracked quite well.  There was no   instability on full extension, 30 degrees of flexion, mid flexion or full   flexion.  There was no recurvatum.  We removed the trial tibial component.  The   bone was prepared for cementing for pulsatile lavage and drying.  The actual   tibial component was assembled on the back table and was then cemented in place.    We were very satisfied with this.  Meticulous care was taken to remove excess   cement.  The tibial insert was firmly seated.  The locking post was placed.  The   knee was inspected.  There was no loose body, foreign body, or soft tissue   interposition.  The knee was then reduced and brought into extension.  Dilute   Betadine solution was placed and the wound was kept in place for 3 minutes and   then irrigated out with pulsatile lavage.  The incision was then closed.  The   arthrotomy and quadriceps snip with #1 Vicryl.  Once this was closed, the knee   was brought through range of motion.  He had full extension, no recurvatum, and   approximately 125 degrees of flexion.  There was no instability at full   extension, 30 degrees of flexion, mid flexion or full flexion.  The patella   tracked very well.  The remaining incision was closed with 0 Vicryl, 2-0 Vicryl,   Monocryl, and Dermabond.A incisional wound vac was placed.  A sterile dressing was applied.  He was then   transferred from the operating room table to a stretcher and brought to Recovery   Room in stable condition.  He tolerated the procedure well and there were no   known complications.      MAYKEL/EMPERATRIZ  dd: 07/10/2018 10:56:21 (CDT)  td: 07/10/2018  13:25:09 (CDT)  Doc ID   #[id number]  Job ID #[id number]    CC:

## 2018-07-10 NOTE — ANESTHESIA PROCEDURE NOTES
Adductor Canal Catheter    Patient location during procedure: pre-op   Block not for primary anesthetic.  Reason for block: at surgeon's request and post-op pain management   Post-op Pain Location: R knee pain  Start time: 7/10/2018 7:16 AM  Timeout: 7/10/2018 7:15 AM   End time: 7/10/2018 7:30 AM  Staffing  Anesthesiologist: LINSEY MCFADDEN  Resident/CRNA: HARRISON PALACIOS  Performed: resident/CRNA   Preanesthetic Checklist  Completed: patient identified, site marked, surgical consent, pre-op evaluation, timeout performed, IV checked, risks and benefits discussed and monitors and equipment checked  Peripheral Block  Patient position: supine  Prep: ChloraPrep and site prepped and draped  Patient monitoring: heart rate, cardiac monitor, continuous pulse ox, continuous capnometry and frequent blood pressure checks  Block type: adductor canal  Laterality: right  Injection technique: continuous  Needle  Needle type: Tuohy   Needle gauge: 17 G  Needle length: 3.5 in  Needle localization: anatomical landmarks and ultrasound guidance  Catheter type: spring wound  Catheter size: 19 G  Test dose: lidocaine 1.5% with Epi 1-to-200,000 and negative   -ultrasound image captured on disc.  Assessment  Injection assessment: negative aspiration, negative parasthesia and local visualized surrounding nerve  Paresthesia pain: none  Heart rate change: no  Slow fractionated injection: yes  Medications:  Bolus administered: 20 mL of 0.25 ropivacaine  Epinephrine added: 3.75 mcg/mL (1/300,000)  Additional Notes  VSS.  [facility] RN monitoring vitals throughout procedure.  Patient tolerated procedure well.

## 2018-07-10 NOTE — PLAN OF CARE
"[facility]    HOME HEALTH ORDERS  FACE TO FACE ENCOUNTER    Patient Name: [facility] Cantor Jr.  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       Future Appointments  Date Time Provider Department Center   7/24/2018 9:30 AM Sandy Adams NP [facility]   7/27/2018 1:00 PM Patrizia Brower OD [facility] OPTOM[facility]   8/2/2018 2:20 PM Christina Wilhelm NP [facility]     Follow-up Information     Sandy Adams NP. Go in 1 week.    Specialty:  Orthopedic Surgery  Why:  For wound re-check  Contact information:  [address]  [facility] [address]  [phone]                     I have seen and examined this patient face to face today. My clinical findings that support the need for the home health skilled services and home bound status are the following:  Weakness/numbness causing balance and gait disturbance due to Joint Replacement and Surgery making it taxing to leave home.  Requiring assistive device to leave home due to unsteady gait caused by Joint Replacement and Surgery.    Allergies:  Review of patient's allergies indicates:   Allergen Reactions    Niacin Itching and Other (See Comments)     Other reaction(s): facial flushing and causes hepatitis     Terbinafine Other (See Comments)     Other reaction(s): Hepatitis    "gave me rash"       Diet: diabetic diet: 2000 calorie    Activities: activity as tolerated    Nursing:   SN to complete comprehensive assessment including routine vital signs. Instruct on disease process and s/s of complications to report to MD. Follow specific " home health arthoplasty protocol. Review/verify medication list sent home with the patient at time of discharge  and instruct patient/caregiver as needed. If coumadin ordered, coumadin clinic to manage INR with INR draws 2x per week with a goal to maintain INR between 1.8 and 2.2. Frequency may be adjusted depending on start of care date.    Notify MD if SBP > 160 or < 90; DBP > 90 or < 50; HR > 120 or < 50; Temp > 101    Home Medical Equipment:  Walker, 3-1 bedside commode, transfer tub bench    CONSULTS:    Physical Therapy to evaluate and treat. Evaluate for home safety and equipment needs; Establish/upgrade home exercise program. Perform / instruct on therapeutic exercises, gait training, transfer training, and Range of Motion.    WOUND CARE ORDERS  Do not remove surgical dressing for 2 weeks post-op. This will be done only by MD at initial post-op visit. If dressing is completely saturated, replace with identical dressing - silver-impregnated hydrocolloid dressing.     Do not get dressings wet. Do not shower.     If dressing continues to be saturated or there are signs of infection, please call Ortho Clinic [phone] for further instructions and to make appt to be seen.         Medications: Review discharge medications with patient and family and provide education.      Current Discharge Medication List      START taking these medications    Details   aspirin 81 MG Chew Take 1 tablet (81 mg total) by mouth 2 (two) times daily. for 7 days  Qty: 14 tablet, Refills: 0      docusate sodium (COLACE) 100 MG capsule Take 1 capsule (100 mg total) by mouth 2 (two) times daily as needed for Constipation.  Qty: 60 capsule, Refills: 1      ondansetron (ZOFRAN-ODT) 8 MG TbDL Take 1 tablet (8 mg total) by mouth every 6 (six) hours as needed.  Qty: 30 tablet, Refills: 0      oxyCODONE-acetaminophen (PERCOCET)  mg per tablet Take 1 tablet by mouth every 4 (four) hours as needed for Pain.  Qty: 61 tablet, Refills: 0       warfarin (COUMADIN) 2.5 MG tablet Take 1-3 tablets daily as directed by the Coumadin clinic.  Qty: 90 tablet, Refills: 0         CONTINUE these medications which have CHANGED    Details   apixaban 5 mg Tab Take 1 tablet (5 mg total) by mouth 2 (two) times daily.  Qty: 180 tablet, Refills: 3    Comments: RESUME on 7/17/2018  Associated Diagnoses: Paroxysmal atrial fibrillation; Persistent atrial fibrillation; Essential hypertension         CONTINUE these medications which have NOT CHANGED    Details   azelastine (ASTELIN) 137 mcg (0.1 %) nasal spray 1 spray (137 mcg total) by Nasal route 2 (two) times daily.  Qty: 30 mL, Refills: 3      ciclopirox (PENLAC) 8 % Soln Apply topically nightly.  Qty: 6.6 mL, Refills: 11      clobetasol (TEMOVATE) 0.05 % cream AAA finger bid  Qty: 60 g, Refills: 3    Associated Diagnoses: Dyshidrotic eczema      clotrimazole-betamethasone 1-0.05% (LOTRISONE) cream Apply topically 2 (two) times daily.  Qty: 45 g, Refills: 3      fenofibrate (TRICOR) 145 MG tablet TAKE ONE TABLET BY MOUTH EVERY DAY  Qty: 90 tablet, Refills: 1      furosemide (LASIX) 40 MG tablet TAKE ONE TABLET BY MOUTH EVERY DAY AS NEEDED  Qty: 90 tablet, Refills: 1    Associated Diagnoses: Acute diastolic heart failure; Primary osteoarthritis of right knee; Chronic atrial fibrillation; Essential hypertension; Venous insufficiency of both lower extremities; Type 2 diabetes mellitus with stage 3 chronic kidney disease, with long-term current use of insulin; Chronic kidney disease, stage III (moderate)      gabapentin (NEURONTIN) 300 MG capsule Take 1 capsule (300 mg total) by mouth 3 (three) times daily.  Qty: 90 capsule, Refills: 1      insulin aspart (NOVOLOG FLEXPEN) 100 unit/mL InPn pen Novolog 20 units breakfast, 22 units lunch and dinner plus correction scale. Max daily dose=100 units  Qty: 2 Box, Refills: 11    Associated Diagnoses: Type 2 diabetes mellitus with diabetic polyneuropathy, with long-term current use of  "insulin      insulin glargine (BASAGLAR KWIKPEN U-100 INSULIN) 100 unit/mL (3 mL) InPn pen Inject 60 Units into the skin once daily.  Qty: 2 Box, Refills: 11      ketoconazole (NIZORAL) 2 % cream Apply topically once daily.  Qty: 30 g, Refills: 1    Associated Diagnoses: Tinea pedis of both feet      levothyroxine (SYNTHROID) 25 MCG tablet Take 1 tablet (25 mcg total) by mouth once daily.  Qty: 90 tablet, Refills: 2    Associated Diagnoses: Hypothyroidism due to acquired atrophy of thyroid      losartan (COZAAR) 25 MG tablet Take 1 tablet (25 mg total) by mouth once daily.  Qty: 90 tablet, Refills: 3      metoprolol succinate (TOPROL-XL) 100 MG 24 hr tablet TAKE ONE TABLET BY MOUTH EVERY DAY  Qty: 90 tablet, Refills: 1      nystatin-triamcinolone (MYCOLOG II) cream apply TWICE DAILY  Qty: 60 g, Refills: 1      pravastatin (PRAVACHOL) 10 MG tablet Take 1 tablet (10 mg total) by mouth once daily.  Qty: 90 tablet, Refills: 3      tamsulosin (FLOMAX) 0.4 mg Cp24 Take 1 capsule (0.4 mg total) by mouth once daily.  Qty: 30 capsule, Refills: 5      temazepam (RESTORIL) 15 mg Cap TAKE ONE CAPSULE BY MOUTH EVERY EVENING  Qty: 30 capsule, Refills: 1      tiZANidine (ZANAFLEX) 4 MG tablet TAKE ONE TABLET BY MOUTH EVERY 6 HOURS AS NEEDED  Qty: 40 tablet, Refills: 0      triamcinolone acetonide 0.1% (KENALOG) 0.1 % cream Apply topically 2 (two) times daily. Apply to affected area twice daily as directed.  Qty: 454 g, Refills: 0    Associated Diagnoses: Venous stasis dermatitis of right lower extremity      BD INSULIN PEN NEEDLE UF ORIG 29 x 1/2 " Ndle Refills: 12      BD ULTRA-FINE BASIL PEN NEEDLES 32 gauge x 5/32" Ndle CHECK BLOOD SUGAR FOUR TIMES DAILY  Qty: 150 each, Refills: 11      lancets 33 gauge Misc 1 lancet by Misc.(Non-Drug; Combo Route) route 4 (four) times daily. Pt uses True Result Meter, bg monitoring 4 times a day.  Qty: 450 each, Refills: 4    Associated Diagnoses: Type 2 diabetes mellitus with diabetic " polyneuropathy      metronidazole (METROGEL) 0.75 % gel Apply topically 2 (two) times daily.  Qty: 1 Tube, Refills: 6         STOP taking these medications       aspirin 325 MG tablet Comments:   Reason for Stopping:         multivitamin capsule Comments:   Reason for Stopping:         omega-3 acid ethyl esters (LOVAZA) 1 gram capsule Comments:   Reason for Stopping:         vitamin E 1000 UNIT capsule Comments:   Reason for Stopping:               I certify that this patient is confined to his home and needs intermittent skilled nursing care and physical therapy.

## 2018-07-10 NOTE — H&P (VIEW-ONLY)
CC: Right knee pain    [facility] PAT Cantor Jr. is a 72 y.o. male with a history of Right knee pain which has gotten progressively worse over the past few months. He had a right total knee replacement 1/25/17 by Dr. Knight. Pain is worse with activity and weight bearing.  Patient has experienced interference of activities of daily living due to decreased range of motion and an increase in joint pain and swelling.  [facility] PAT Cantor Jr. currently ambulates with a cane.     Past Medical History:   Diagnosis Date    *Atrial fibrillation     Eliquis    Anticoagulant long-term use     apaxiban    Basal cell carcinoma 12/2015    left eye brow    BCC (basal cell carcinoma) 12/2015    left shoulder    Cataract     Chronic kidney disease     Diabetes mellitus with renal manifestations, uncontrolled     Dyslipidemia associated with type 2 diabetes mellitus     Fever blister     Hearing loss     HTN (hypertension)     Keloid cicatrix     Liver disease     Melanoma 12/07/2015    right cheek-in situ    Obesity     PN (peripheral neuropathy)     Primary osteoarthritis of right knee 1/25/2017    Screening for colon cancer 3/3/2016    Sleep apnea     wears CPAP at night    Thyroid disease     Type II or unspecified type diabetes mellitus with other specified manifestations, uncontrolled        Past Surgical History:   Procedure Laterality Date    APPENDECTOMY      CARDIOVERSION      CATARACT EXTRACTION      CATARACT EXTRACTION Right 05/14/2018    Dr. Greer    COLONOSCOPY N/A 3/3/2016    Procedure: COLONOSCOPY;  Surgeon: Bob Poe MD;  Location: [address];  Service: Endoscopy;  Laterality: N/A;  Holding Eliquis for 3 days prior to colonoscopy. EC    epidural steroid injection      INTRAOCULAR PROSTHESES INSERTION Left 6/25/2018    Procedure: INSERTION, INTRAOCULAR LENS PROSTHESIS;  Surgeon: Lawrence Greer MD;  Location: [facility];  Service: Ophthalmology;  Laterality: Left;    JOINT REPLACEMENT       "Knee    Meniere's disease surgery      PHACOEMULSIFICATION OF CATARACT Left 6/25/2018    Procedure: PHACOEMULSIFICATION, CATARACT;  Surgeon: Lawrence Greer MD;  Location: [facility];  Service: Ophthalmology;  Laterality: Left;    TONSILLECTOMY      TOTAL KNEE ARTHROPLASTY Right 01/25/2017    TKR    TOTAL KNEE ARTHROPLASTY Left     TKR, 1990's       Family History   Problem Relation Age of Onset    Diabetes Mother     Stroke Mother     Diabetes Father     Heart disease Father         over 45 years of age    Hypertension Father     Cancer Sister         brain    Eczema Sister     Cancer Brother         brain    Cancer Sister         leukemia, stomach cancer    No Known Problems Sister     ALS Brother         stomach cancer    No Known Problems Brother     No Known Problems Maternal Aunt     No Known Problems Maternal Uncle     No Known Problems Paternal Aunt     No Known Problems Paternal Uncle     No Known Problems Maternal Grandmother     No Known Problems Maternal Grandfather     No Known Problems Paternal Grandmother     No Known Problems Paternal Grandfather     Amblyopia Neg Hx     Blindness Neg Hx     Cataracts Neg Hx     Glaucoma Neg Hx     Macular degeneration Neg Hx     Retinal detachment Neg Hx     Strabismus Neg Hx     Thyroid disease Neg Hx     Melanoma Neg Hx     Psoriasis Neg Hx     Lupus Neg Hx     Acne Neg Hx        Review of patient's allergies indicates:   Allergen Reactions    Niacin Itching and Other (See Comments)     Other reaction(s): facial flushing and causes hepatitis     Terbinafine Other (See Comments)     Other reaction(s): Hepatitis    "gave me rash"         Current Outpatient Prescriptions:     apixaban 5 mg Tab, Take 1 tablet (5 mg total) by mouth 2 (two) times daily., Disp: 180 tablet, Rfl: 3    aspirin 325 MG tablet, Take 325 mg by mouth once daily., Disp: , Rfl:     azelastine (ASTELIN) 137 mcg (0.1 %) nasal spray, 1 spray (137 mcg total) by " "Nasal route 2 (two) times daily., Disp: 30 mL, Rfl: 3    BD INSULIN PEN NEEDLE UF ORIG 29 x 1/2 " Ndle, , Disp: , Rfl: 12    BD ULTRA-FINE BASIL PEN NEEDLES 32 gauge x 5/32" Ndle, CHECK BLOOD SUGAR FOUR TIMES DAILY, Disp: 150 each, Rfl: 11    ciclopirox (PENLAC) 8 % Soln, Apply topically nightly., Disp: 6.6 mL, Rfl: 11    clobetasol (TEMOVATE) 0.05 % cream, AAA finger bid, Disp: 60 g, Rfl: 3    clotrimazole-betamethasone 1-0.05% (LOTRISONE) cream, Apply topically 2 (two) times daily., Disp: 45 g, Rfl: 3    fenofibrate (TRICOR) 145 MG tablet, TAKE ONE TABLET BY MOUTH EVERY DAY, Disp: 90 tablet, Rfl: 1    furosemide (LASIX) 40 MG tablet, TAKE ONE TABLET BY MOUTH EVERY DAY AS NEEDED (Patient taking differently: TAKE ONE TABLET BY MOUTH EVERY DAY), Disp: 90 tablet, Rfl: 1    gabapentin (NEURONTIN) 300 MG capsule, Take 1 capsule (300 mg total) by mouth 3 (three) times daily., Disp: 90 capsule, Rfl: 1    insulin aspart (NOVOLOG FLEXPEN) 100 unit/mL InPn pen, Novolog 20 units breakfast, 22 units lunch and dinner plus correction scale. Max daily dose=100 units, Disp: 2 Box, Rfl: 11    insulin glargine (BASAGLAR KWIKPEN U-100 INSULIN) 100 unit/mL (3 mL) InPn pen, Inject 60 Units into the skin once daily. (Patient taking differently: Inject 60 Units into the skin every evening. ), Disp: 2 Box, Rfl: 11    ketoconazole (NIZORAL) 2 % cream, Apply topically once daily., Disp: 30 g, Rfl: 1    lancets 33 gauge Misc, 1 lancet by Misc.(Non-Drug; Combo Route) route 4 (four) times daily. Pt uses True Result Meter, bg monitoring 4 times a day., Disp: 450 each, Rfl: 4    losartan (COZAAR) 25 MG tablet, Take 1 tablet (25 mg total) by mouth once daily., Disp: 90 tablet, Rfl: 3    losartan (COZAAR) 50 MG tablet, Take 50 mg by mouth once daily., Disp: , Rfl: 11    metoprolol succinate (TOPROL-XL) 100 MG 24 hr tablet, TAKE ONE TABLET BY MOUTH EVERY DAY, Disp: 90 tablet, Rfl: 1    multivitamin capsule, Take 1 capsule by mouth " every morning. , Disp: , Rfl:     nystatin-triamcinolone (MYCOLOG II) cream, apply TWICE DAILY (Patient taking differently: apply TWICE DAILY-using as needed for rash), Disp: 60 g, Rfl: 1    omega-3 acid ethyl esters (LOVAZA) 1 gram capsule, TAKE THREE CAPSULE BY MOUTH TWICE DAILY, Disp: 540 capsule, Rfl: 1    pravastatin (PRAVACHOL) 10 MG tablet, Take 1 tablet (10 mg total) by mouth once daily. (Patient taking differently: Take 10 mg by mouth every evening. ), Disp: 90 tablet, Rfl: 3    tamsulosin (FLOMAX) 0.4 mg Cp24, Take 1 capsule (0.4 mg total) by mouth once daily., Disp: 30 capsule, Rfl: 5    temazepam (RESTORIL) 15 mg Cap, TAKE ONE CAPSULE BY MOUTH EVERY EVENING, Disp: 30 capsule, Rfl: 1    tiZANidine (ZANAFLEX) 4 MG tablet, TAKE ONE TABLET BY MOUTH EVERY 6 HOURS AS NEEDED, Disp: 40 tablet, Rfl: 0    triamcinolone acetonide 0.1% (KENALOG) 0.1 % cream, Apply topically 2 (two) times daily. Apply to affected area twice daily as directed., Disp: 454 g, Rfl: 0    vitamin E 1000 UNIT capsule, Take 1,000 Units by mouth every morning. 1 Capsule Oral Every day, Disp: , Rfl:     levothyroxine (SYNTHROID) 25 MCG tablet, Take 1 tablet (25 mcg total) by mouth once daily., Disp: 90 tablet, Rfl: 2    metronidazole (METROGEL) 0.75 % gel, Apply topically 2 (two) times daily., Disp: 1 Tube, Rfl: 6    Review of Systems:  Constitutional: no fever or chills  Eyes: no visual changes  ENT: no nasal congestion or sore throat  Respiratory: no cough or shortness of breath  Cardiovascular: no chest pain or palpitations  Gastrointestinal: no nausea or vomiting, tolerating diet  Genitourinary: no hematuria or dysuria  Integument/Breast: no rash or pruritis  Hematologic/Lymphatic: no easy bruising or lymphadenopathy  Musculoskeletal: positive for c/o right knee pain worse with activity  Neurological: no seizures or tremors  Behavioral/Psych: no auditory or visual hallucinations  Endocrine: no heat or cold intolerance    PE:  Ht  "5' 10" (1.778 m)   Wt (!) 144.2 kg (317 lb 14.5 oz)   BMI 45.61 kg/m²   General: Pleasant, cooperative, NAD   Gait: antalgic  HEENT: NCAT, sclera nonicteric   Lungs: Respirations clear bilaterally; equal and unlabored.   CV: S1S2; 2+ bilateral upper and lower extremity pulses.   Skin: Intact throughout with no rashes, erythema, or lesions  Extremities: No LE edema,  no erythema or warmth of the skin in either lower extremity.    Right knee exam:  Knee Range of Motion:limited by pain, pain with passive range of motion  Effusion:none  Condition of skin:intact  Location of tenderness:Medial joint line and Lateral joint line   Strength:limited by pain  Stability: stable to testing    Hip Examination:normal    Radiographs: Radiographs reveal Position and alignment are similar.  There is some lucency about the tibial plate    Diagnosis: right knee prosthetic joint failure    Plan: Right total knee revision arthroplasty    Due to the serious nature of total joint infection and the high prevalence of community acquired MRSA, vancomycin will be used perioperatively.            "

## 2018-07-10 NOTE — PLAN OF CARE
Problem: Patient Care Overview  Goal: Plan of Care Review  Outcome: Ongoing (interventions implemented as appropriate)  Vital signs stable. Afebrile. Drowsy but oriented and following commands. Denies nausea. Dressing remains CDI with wound vac in place. IV fluids and ropivacaine at ordered rates. Tolerating PO fluids. Pt and family updated regarding POC. Will continue to monitor.

## 2018-07-10 NOTE — BRIEF OP NOTE
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   ASSISTANT: M. Casale, MD  SPECIMENS: Explant/ Soft Tissue  FINDINGS: Subsided Tibial Component  FLUID: 2000 mL.   BLOOD LOSS: 100 mL.   COMPLICATION: None  To PACU Stable

## 2018-07-10 NOTE — PROGRESS NOTES
Pt remains quite drowsy. Denies pain at this time. Pt able to move toes. Pain management plan discussed with Sheridan Monge RN and EtCO2 monitoring applied.

## 2018-07-10 NOTE — PLAN OF CARE
Problem: Patient Care Overview  Goal: Plan of Care Review  Patient AAOX4, siderails up x2, wound vac suctionining at 125, ropivacaine at 8ml/hr, NS @ 150/hr, site is clean, dry and intact. Right leg dressing, ace and wound vac, no polar ice to site per Zelda in PACU.    Patient's blood sugar did not require sliding scale dosing at dinner.  Pain managed with meds ordered.  Family at bedside.  Angela Barraza LPN

## 2018-07-10 NOTE — ANESTHESIA PROCEDURE NOTES
CSE    Start time: 7/10/2018 7:56 AM  Timeout: 7/10/2018 7:54 AM  End time: 7/10/2018 8:01 AM  Staffing  Anesthesiologist: CANDIDO LINO  Resident/CRNA: YAKOV BRITO  Performed: anesthesiologist   Preanesthetic Checklist  Completed: patient identified, site marked, surgical consent, pre-op evaluation, timeout performed, IV checked, risks and benefits discussed and monitors and equipment checked  CSE  Patient position: sitting  Prep: ChloraPrep  Patient monitoring: heart rate, continuous pulse ox and frequent blood pressure checks  Approach: midline  Spinal Needle  Needle type: Ruben   Needle gauge: 25 G  Needle length: 5 in  Epidural Needle  Injection technique: JOSIANE saline  Needle type: Tuohy   Needle gauge: 17 G  Needle length: 3.5 in  Needle insertion depth: 8.5 cm  Location: L3-4  Needle localization: anatomical landmarks  Catheter  Catheter type: [employer]  Catheter size: 19 G  Catheter at skin depth: 14 cm  Additional Documentation: negative aspiration for CSF and negative aspiration for heme  Assessment  Intrathecal Medications:  Bolus administered: 3 mL of 0.2 and 2.0 mepivacaine  administered: primary anesthetic mcg of    Additional Notes  1 pass, atraumatic

## 2018-07-10 NOTE — BRIEF OP NOTE
Notes mild-moderate discomfort  Vitals Stable  Dressing Dry/Intact  Bilateral lower extremities: Palpable DP, good capillary refill  Motor sensation intact  xrays taken today demonstrate a well fixed and positioned TKA. Stemmed tibial component  Labs reviewed  S/P Tibial Revision  Work on pain control..

## 2018-07-10 NOTE — NURSING TRANSFER
Nursing Transfer Note      7/10/2018     Transfer to 543A    Transfer via stretcher    Transfer with iv pole, sapphire pump and wound vac    Transported by pt transport    Medicines sent: ropivacaine    Chart send with patient: yes    Notified: wife    Patient reassessed at: 7/10/18 @ 1600

## 2018-07-10 NOTE — INTERVAL H&P NOTE
Stephen Cantor Jr. was interviewed, examined and the H and P reviewed.  There has been no interval change in his History and Physical.

## 2018-07-11 LAB
ANION GAP SERPL CALC-SCNC: 9 MMOL/L
BUN SERPL-MCNC: 23 MG/DL
CALCIUM SERPL-MCNC: 8.5 MG/DL
CHLORIDE SERPL-SCNC: 107 MMOL/L
CO2 SERPL-SCNC: 19 MMOL/L
CREAT SERPL-MCNC: 1.4 MG/DL
EST. GFR  (AFRICAN AMERICAN): 56.8 ML/MIN/1.73 M^2
EST. GFR  (NON AFRICAN AMERICAN): 49.1 ML/MIN/1.73 M^2
GLUCOSE SERPL-MCNC: 223 MG/DL
HCT VFR BLD AUTO: 38.2 %
HGB BLD-MCNC: 12.7 G/DL
INR PPP: 1.1
POCT GLUCOSE: 186 MG/DL (ref 70–110)
POCT GLUCOSE: 252 MG/DL (ref 70–110)
POCT GLUCOSE: 259 MG/DL (ref 70–110)
POCT GLUCOSE: 260 MG/DL (ref 70–110)
POTASSIUM SERPL-SCNC: 4.3 MMOL/L
PROTHROMBIN TIME: 11.5 SEC
SODIUM SERPL-SCNC: 135 MMOL/L

## 2018-07-11 PROCEDURE — 63600175 PHARM REV CODE 636 W HCPCS: Performed by: STUDENT IN AN ORGANIZED HEALTH CARE EDUCATION/TRAINING PROGRAM

## 2018-07-11 PROCEDURE — 25000003 PHARM REV CODE 250: Performed by: STUDENT IN AN ORGANIZED HEALTH CARE EDUCATION/TRAINING PROGRAM

## 2018-07-11 PROCEDURE — 12000002 HC ACUTE/MED SURGE SEMI-PRIVATE ROOM

## 2018-07-11 PROCEDURE — 97110 THERAPEUTIC EXERCISES: CPT

## 2018-07-11 PROCEDURE — 97530 THERAPEUTIC ACTIVITIES: CPT

## 2018-07-11 PROCEDURE — 85610 PROTHROMBIN TIME: CPT

## 2018-07-11 PROCEDURE — 80048 BASIC METABOLIC PNL TOTAL CA: CPT

## 2018-07-11 PROCEDURE — G8988 SELF CARE GOAL STATUS: HCPCS | Mod: CI

## 2018-07-11 PROCEDURE — 97535 SELF CARE MNGMENT TRAINING: CPT

## 2018-07-11 PROCEDURE — 97161 PT EVAL LOW COMPLEX 20 MIN: CPT

## 2018-07-11 PROCEDURE — G8989 SELF CARE D/C STATUS: HCPCS | Mod: CL

## 2018-07-11 PROCEDURE — 85014 HEMATOCRIT: CPT

## 2018-07-11 PROCEDURE — G8987 SELF CARE CURRENT STATUS: HCPCS | Mod: CL

## 2018-07-11 PROCEDURE — 99231 SBSQ HOSP IP/OBS SF/LOW 25: CPT | Mod: ,,, | Performed by: ANESTHESIOLOGY

## 2018-07-11 PROCEDURE — 25000003 PHARM REV CODE 250: Performed by: SURGERY

## 2018-07-11 PROCEDURE — 85018 HEMOGLOBIN: CPT

## 2018-07-11 PROCEDURE — A4216 STERILE WATER/SALINE, 10 ML: HCPCS | Performed by: STUDENT IN AN ORGANIZED HEALTH CARE EDUCATION/TRAINING PROGRAM

## 2018-07-11 PROCEDURE — 36415 COLL VENOUS BLD VENIPUNCTURE: CPT

## 2018-07-11 PROCEDURE — 97165 OT EVAL LOW COMPLEX 30 MIN: CPT

## 2018-07-11 RX ORDER — WARFARIN SODIUM 5 MG/1
5 TABLET ORAL DAILY
Status: DISCONTINUED | OUTPATIENT
Start: 2018-07-11 | End: 2018-07-12 | Stop reason: HOSPADM

## 2018-07-11 RX ORDER — ASPIRIN 81 MG/1
81 TABLET ORAL DAILY
Status: DISCONTINUED | OUTPATIENT
Start: 2018-07-11 | End: 2018-07-12 | Stop reason: HOSPADM

## 2018-07-11 RX ORDER — ACETAMINOPHEN 10 MG/ML
1000 INJECTION, SOLUTION INTRAVENOUS ONCE
Status: COMPLETED | OUTPATIENT
Start: 2018-07-11 | End: 2018-07-11

## 2018-07-11 RX ORDER — OXYCODONE HYDROCHLORIDE 5 MG/1
5 TABLET ORAL EVERY 6 HOURS PRN
Status: DISCONTINUED | OUTPATIENT
Start: 2018-07-11 | End: 2018-07-12 | Stop reason: HOSPADM

## 2018-07-11 RX ADMIN — WARFARIN SODIUM 5 MG: 5 TABLET ORAL at 05:07

## 2018-07-11 RX ADMIN — OXYCODONE HYDROCHLORIDE 5 MG: 5 TABLET ORAL at 02:07

## 2018-07-11 RX ADMIN — INSULIN ASPART 3 UNITS: 100 INJECTION, SOLUTION INTRAVENOUS; SUBCUTANEOUS at 12:07

## 2018-07-11 RX ADMIN — Medication 3 ML: at 02:07

## 2018-07-11 RX ADMIN — INSULIN ASPART 3 UNITS: 100 INJECTION, SOLUTION INTRAVENOUS; SUBCUTANEOUS at 05:07

## 2018-07-11 RX ADMIN — ROPIVACAINE HYDROCHLORIDE 8 ML/HR: 2 INJECTION, SOLUTION EPIDURAL; INFILTRATION at 09:07

## 2018-07-11 RX ADMIN — GABAPENTIN 300 MG: 300 CAPSULE ORAL at 09:07

## 2018-07-11 RX ADMIN — Medication 3 ML: at 06:07

## 2018-07-11 RX ADMIN — ROPIVACAINE HYDROCHLORIDE 8 ML/HR: 2 INJECTION, SOLUTION EPIDURAL; INFILTRATION at 06:07

## 2018-07-11 RX ADMIN — TAMSULOSIN HYDROCHLORIDE 0.4 MG: 0.4 CAPSULE ORAL at 09:07

## 2018-07-11 RX ADMIN — ACETAMINOPHEN 1000 MG: 500 TABLET, COATED ORAL at 09:07

## 2018-07-11 RX ADMIN — METOPROLOL SUCCINATE 100 MG: 100 TABLET, EXTENDED RELEASE ORAL at 09:07

## 2018-07-11 RX ADMIN — ACETAMINOPHEN 1000 MG: 500 TABLET, COATED ORAL at 12:07

## 2018-07-11 RX ADMIN — STANDARDIZED SENNA CONCENTRATE AND DOCUSATE SODIUM 1 TABLET: 8.6; 5 TABLET, FILM COATED ORAL at 09:07

## 2018-07-11 RX ADMIN — Medication 3 ML: at 09:07

## 2018-07-11 RX ADMIN — MUPIROCIN 1 G: 20 OINTMENT TOPICAL at 09:07

## 2018-07-11 RX ADMIN — GABAPENTIN 300 MG: 300 CAPSULE ORAL at 03:07

## 2018-07-11 RX ADMIN — LOSARTAN POTASSIUM 25 MG: 25 TABLET, FILM COATED ORAL at 09:07

## 2018-07-11 RX ADMIN — ASPIRIN 81 MG: 81 TABLET, COATED ORAL at 05:07

## 2018-07-11 RX ADMIN — CEFAZOLIN 2 G: 1 INJECTION, POWDER, FOR SOLUTION INTRAMUSCULAR; INTRAVENOUS at 01:07

## 2018-07-11 RX ADMIN — FUROSEMIDE 40 MG: 40 TABLET ORAL at 09:07

## 2018-07-11 RX ADMIN — FAMOTIDINE 20 MG: 20 TABLET ORAL at 09:07

## 2018-07-11 RX ADMIN — INSULIN ASPART 3 UNITS: 100 INJECTION, SOLUTION INTRAVENOUS; SUBCUTANEOUS at 09:07

## 2018-07-11 RX ADMIN — ACETAMINOPHEN 1000 MG: 10 INJECTION, SOLUTION INTRAVENOUS at 09:07

## 2018-07-11 RX ADMIN — PRAVASTATIN SODIUM 10 MG: 10 TABLET ORAL at 09:07

## 2018-07-11 NOTE — ANESTHESIA RELEASE NOTE
"Anesthesia Release from PACU Note    Patient: [facility] PAT Cantor Jr.    Procedure(s) Performed: Procedure(s) (LRB):  REVISION-ARTHROPLASTY-KNEE-DAKOTA (Right)    Anesthesia type: CSE    Post pain: Adequate analgesia    Post assessment: no apparent anesthetic complications and tolerated procedure well    Last Vitals:   Visit Vitals  BP (!) 115/59 (BP Location: Left arm, Patient Position: Sitting)   Pulse 89   Temp 36.8 °C (98.3 °F) (Oral)   Resp 18   Ht 5' 10" (1.778 m)   Wt (!) 144.7 kg (319 lb)   SpO2 97%   BMI 45.77 kg/m²       Post vital signs: stable    Level of consciousness: awake and alert     Nausea/Vomiting: no nausea/no vomiting    Complications: none    Airway Patency: patent    Respiratory: unassisted, spontaneous ventilation, room air    Cardiovascular: stable and blood pressure at baseline    Hydration: euvolemic  "

## 2018-07-11 NOTE — SUBJECTIVE & OBJECTIVE
"Principal Problem:Prosthetic joint implant failure    Principal Orthopedic Problem: s/p right revision TKA    Interval History: Patient seen and examined at bedside. Moderate pain at present. Overnight, the patient had an episode of disorientation and confusion. Mental status currently at baseline.      Review of patient's allergies indicates:   Allergen Reactions    Niacin Itching and Other (See Comments)     Other reaction(s): facial flushing and causes hepatitis     Terbinafine Other (See Comments)     Other reaction(s): Hepatitis    "gave me rash"       Current Facility-Administered Medications   Medication    acetaminophen (10 mg/mL) injection 1,000 mg    acetaminophen tablet 1,000 mg    aspirin chewable tablet 81 mg    bisacodyl suppository 10 mg    dextrose 50% injection 12.5 g    dextrose 50% injection 25 g    famotidine tablet 20 mg    fenofibrate tablet 145 mg    furosemide tablet 40 mg    gabapentin capsule 300 mg    glucagon (human recombinant) injection 1 mg    glucose chewable tablet 16 g    glucose chewable tablet 24 g    insulin aspart U-100 pen 1-10 Units    losartan tablet 25 mg    metoprolol succinate (TOPROL-XL) 24 hr tablet 100 mg    oxyCODONE immediate release tablet 5 mg    And    oxyCODONE immediate release tablet 10 mg    And    morphine injection 2 mg    And    morphine injection 4 mg    mupirocin 2 % ointment 1 g    ondansetron injection 4 mg    polyethylene glycol packet 17 g    pravastatin tablet 10 mg    promethazine (PHENERGAN) 6.25 mg in dextrose 5 % 50 mL IVPB    ramelteon tablet 8 mg    ropivacaine (PF) 2 mg/ml (0.2%) infusion    senna-docusate 8.6-50 mg per tablet 1 tablet    sodium chloride 0.9% flush 3 mL    sodium chloride 0.9% flush 3 mL    tamsulosin 24 hr capsule 0.4 mg    warfarin tablet 7.5 mg     Objective:     Vital Signs (Most Recent):  Temp: 97.6 °F (36.4 °C) (07/11/18 0445)  Pulse: 96 (07/11/18 0445)  Resp: 18 (07/11/18 0445)  BP: " "136/69 (07/11/18 0445)  SpO2: (!) 93 % (07/11/18 0445) Vital Signs (24h Range):  Temp:  [96.1 °F (35.6 °C)-98.2 °F (36.8 °C)] 97.6 °F (36.4 °C)  Pulse:  [] 96  Resp:  [8-20] 18  SpO2:  [93 %-100 %] 93 %  BP: (103-171)/(57-91) 136/69     Weight: (!) 144.7 kg (319 lb)  Height: 5' 10" (177.8 cm)  Body mass index is 45.77 kg/m².      Intake/Output Summary (Last 24 hours) at 07/11/18 0740  Last data filed at 07/10/18 1800   Gross per 24 hour   Intake           3790.5 ml   Output              300 ml   Net           3490.5 ml       Ortho/SPM Exam   Awake/alert/oriented x 3  No acute distress  AF. Moderate HTN.  Stable on room air  Incisional wound vac in place with good suction seal  Dressing c/d/i  Motor and sensation intact in bilateral UE/LE        Significant Labs: All pertinent labs within the past 24 hours have been reviewed.    Significant Imaging: I have reviewed and interpreted all pertinent imaging results/findings.  "

## 2018-07-11 NOTE — PT/OT/SLP EVAL
Physical Therapy Evaluation    Patient Name:  Stephen Cantor Jr.   MRN:  [id number]    Recommendations:     Discharge Recommendations:  nursing facility, skilled   Discharge Equipment Recommendations: walker, rolling   Barriers to discharge: increased assistance required for mobility at this time    Assessment:     Stephen Cantor Jr. is a 72 y.o. male admitted with a medical diagnosis of Prosthetic joint implant failure.  He presents with the following impairments/functional limitations:  weakness, impaired endurance, impaired self care skills, impaired balance, impaired functional mobilty, gait instability, decreased lower extremity function, decreased ROM, orthopedic precautions, pain, impaired joint extensibility, impaired skin.    -PT eval complete. Pt tolerated session fairly today. Pt required assistance for all functional tasks today but experienced no LOB when performing transfer back to bed from chair. Pt required increased assistance to perform sit<>stand from low chair. Pt tolerated supine exercises well with no complications. Pt currently requires SNF placement upon d/c 2/2 falls risk and requiring assistance for mobility.    Rehab Prognosis:  Good; patient would benefit from acute skilled PT services to address these deficits and reach maximum level of function.      Recent Surgery: Procedure(s) (LRB):  REVISION-ARTHROPLASTY-KNEE-DAKOTA (Right) 1 Day Post-Op    Plan:     During this hospitalization, patient to be seen daily to address the above listed problems via gait training, therapeutic activities, therapeutic exercises, neuromuscular re-education  · Plan of Care Expires:  08/11/18   Plan of Care Reviewed with: patient, family    Subjective     Communicated with nsg prior to session.  Patient found sitting upon PT entry to room, agreeable to evaluation.      Chief Complaint: impaired mobility and (B) LE weakness  Patient comments/goals: return home to [facility]  Pain/Comfort:  · Pain Rating 1: other (see  comments) (did not quantify)  · Location - Side 1: Right  · Location - Orientation 1: generalized  · Location 1: leg  · Pain Addressed 1: Pre-medicate for activity  · Pain Rating Post-Intervention 1: other (see comments) (did not quantify)    Patients cultural, spiritual, [employer]al conflicts given the current situation: none    Living Environment:  -Pt lives with his wife in a H with 4 PUNEET and (B) rails. Pt also has ramp access into home from back entrance. Pt has 24/7 (A) from family upon d/c.  Prior to admission, patients level of function was mod (I).  Patient has the following equipment: bedside commode, walker, rolling, cane, straight.  DME owned (not currently used): .  Upon discharge, patient will have assistance from family.    Objective:     Patient found with: telemetry, pulse ox (continuous), FCD, peripheral IV, perineural catheter     General Precautions: Standard, fall   Orthopedic Precautions:RLE weight bearing as tolerated   Braces: N/A     Exams:  · Cognitive Exam:  Patient is oriented to Person, Place, Time and Situation   · Sensation:    · -       Intact  light/touch (B) LE  · RLE ROM: Deficits: LAYLA formally 2/2 bulky dressing  · RLE Strength: Deficits: LAYLA formally, likely 3+/5 based t/f ability  · LLE ROM: WFL  · LLE Strength: Deficits: 4+/5 at knee, hip    Functional Mobility:  · Bed Mobility:     · Scooting: minimum assistance  · Sit to Supine: moderate assistance  · Transfers:     · Sit to Stand:  maximal assistance with rolling walker  · Gait: 8 steps to bed using RW with min (A). Pt cued for sequencing and posture.    AM-PAC 6 CLICK MOBILITY  Total Score:13       Therapeutic Activities and Exercises:   -Pt performed TKR protocol exercises x15/20 reps of:  AJorge AP  B. GS  C. QS  D. Heel slides-AAROM  E. Hip abd/add-AAROM    -Pt educated on:  A. PT POC and role of PT  B. Importance of OOB activity to improve functional outcomes after surgery  C. S/s associated with TKR procedure  D.  Importance of re-gaining ROM early in acute phase  E. DME mgmt and gait/transfer sequencing  F. Performing HEP to reduce risk of developing blood clots      Patient left supine with all lines intact, call button in reach and nsg notified.    GOALS:    Physical Therapy Goals        Problem: Physical Therapy Goal    Goal Priority Disciplines Outcome Goal Variances Interventions   Physical Therapy Goal     PT/OT, PT Ongoing (interventions implemented as appropriate)     Description:  Goals to be met by: 18     Patient will increase functional independence with mobility by performin. Supine to sit with Set-up [place]  2. Sit to supine with Set-up [place]  3. Sit to stand transfer with min (A)  4. Bed to chair transfer with CGA using Rolling Walker  5. Gait  x 100 feet with CGA using Rolling Walker.   6. Lower extremity exercise program x20-30 reps per handout, with independence                        History:     Past Medical History:   Diagnosis Date    *Atrial fibrillation     Eliquis    Anticoagulant long-term use     apaxiban    Basal cell carcinoma 2015    left eye brow    BCC (basal cell carcinoma) 2015    left shoulder    Cataract     Chronic kidney disease     Diabetes mellitus with renal manifestations, uncontrolled     Dyslipidemia associated with type 2 diabetes mellitus     Fever blister     Hearing loss     HTN (hypertension)     Keloid cicatrix     Liver disease     Melanoma 2015    right cheek-in situ    Obesity     PN (peripheral neuropathy)     Primary osteoarthritis of right knee 2017    Screening for colon cancer 3/3/2016    Sleep apnea     wears CPAP at night    Thyroid disease     Type II or unspecified type diabetes mellitus with other specified manifestations, uncontrolled        Past Surgical History:   Procedure Laterality Date    APPENDECTOMY      CARDIOVERSION      CATARACT EXTRACTION      CATARACT EXTRACTION Right 2018     Dr. Greer    COLONOSCOPY N/A 3/3/2016    Procedure: COLONOSCOPY;  Surgeon: Bob Poe MD;  Location: [facility] (4TH FLR);  Service: Endoscopy;  Laterality: N/A;  Holding Eliquis for 3 days prior to colonoscopy. EC    epidural steroid injection      INTRAOCULAR PROSTHESES INSERTION Left 6/25/2018    Procedure: INSERTION, INTRAOCULAR LENS PROSTHESIS;  Surgeon: Lawrence Greer MD;  Location: [facility];  Service: Ophthalmology;  Laterality: Left;    JOINT REPLACEMENT      Knee    Meniere's disease surgery      PHACOEMULSIFICATION OF CATARACT Left 6/25/2018    Procedure: PHACOEMULSIFICATION, CATARACT;  Surgeon: Lawrence Greer MD;  Location: [facility];  Service: Ophthalmology;  Laterality: Left;    REVISION OF KNEE ARTHROPLASTY Right 7/10/2018    Procedure: REVISION-ARTHROPLASTY-KNEE-SAMIR;  Surgeon: Miguel Stuart MD;  Location: [facility] 2ND FLR;  Service: Orthopedics;  Laterality: Right;  Samir    TONSILLECTOMY      TOTAL KNEE ARTHROPLASTY Right 01/25/2017    TKR    TOTAL KNEE ARTHROPLASTY Left     TKR, 1990's       Clinical Decision Making:     History  Co-morbidities and personal factors that may impact the plan of care Examination  Body Structures and Functions, activity limitations and participation restrictions that may impact the plan of care Clinical Presentation   Decision Making/ Complexity Score   Co-morbidities:   [] Time since onset of injury / illness / exacerbation  [x] Status of current condition  []Patient's cognitive status and safety concerns    [] Multiple Medical Problems (see med hx)  Personal Factors:   [] Patient's age  [x] Prior Level of function   [] Patient's home situation (environment and family support)  [] Patient's level of motivation  [] Expected progression of patient      HISTORY:(criteria)    [] 96965 - no personal factors/history    [x] 62758 - has 1-2 personal factor/comorbidity     [] 85260 - has >3 personal factor/comorbidity     Body Regions:  [] Objective  examination findings  [] Head     []  Neck  [] Trunk   [] Upper Extremity  [x] Lower Extremity    Body Systems:  [] For communication ability, affect, cognition, language, and learning style: the assessment of the ability to make needs known, consciousness, orientation (person, place, and time), expected emotional /behavioral responses, and learning preferences (eg, learning barriers, education  needs)  [x] For the neuromuscular system: a general assessment of gross coordinated movement (eg, balance, gait, locomotion, transfers, and transitions) and motor function  (motor control and motor learning)  [x] For the musculoskeletal system: the assessment of gross symmetry, gross range of motion, gross strength, height, and weight  [x] For the integumentary system: the assessment of pliability(texture), presence of scar formation, skin color, and skin integrity  [] For cardiovascular/pulmonary system: the assessment of heart rate, respiratory rate, blood pressure, and edema     Activity limitations:    [] Patient's cognitive status and saf ety concerns          [] Status of current condition      [] Weight bearing restriction  [] Cardiopulmunary Restriction    Participation Restrictions:   [] Goals and goal agreement with the patient     [] Rehab potential (prognosis) and probable outcome      Examination of Body System: (criteria)    [] 28289 - addressing 1-2 elements    [x] 21166 - addressing a total of 3 or more elements     [] 76096 -  Addressing a total of 4 or more elements         Clinical Presentation: (criteria)  Stable - 61993     On examination of body system using standardized tests and measures patient presents with 1-2 elements from any of the following: body structures and functions, activity limitations, and/or participation restrictions.  Leading to a clinical presentation that is considered stable and/or uncomplicated                              Clinical Decision Making  (Eval Complexity):  Low- 56026      Time Tracking:     PT Received On: 07/11/18  PT Start Time: 1345     PT Stop Time: 1417  PT Total Time (min): 32 min     Billable Minutes: Evaluation 9, Therapeutic Activity 13 and Therapeutic Exercise 10      Javon Forbes, JOANA  07/11/2018

## 2018-07-11 NOTE — PROGRESS NOTES
"[facility]  Orthopedics  Progress Note    Patient Name: [facility] Cantor Jr.  MRN: [id number]  Admission Date: 7/10/2018  Hospital Length of Stay: 1 days  Attending Provider: Miguel Stuart MD  Primary Care Provider: Desmond Barlow MD  Follow-up For: Procedure(s) (LRB):  REVISION-ARTHROPLASTY-KNEE-DAKOTA (Right)    Post-Operative Day: 1 Day Post-Op  Subjective:     Principal Problem:Prosthetic joint implant failure    Principal Orthopedic Problem: s/p right revision TKA    Interval History: Patient seen and examined at bedside. Moderate pain at present. Overnight, the patient had an episode of disorientation and confusion. Mental status currently at baseline.      Review of patient's allergies indicates:   Allergen Reactions    Niacin Itching and Other (See Comments)     Other reaction(s): facial flushing and causes hepatitis     Terbinafine Other (See Comments)     Other reaction(s): Hepatitis    "gave me rash"       Current Facility-Administered Medications   Medication    acetaminophen (10 mg/mL) injection 1,000 mg    acetaminophen tablet 1,000 mg    aspirin chewable tablet 81 mg    bisacodyl suppository 10 mg    dextrose 50% injection 12.5 g    dextrose 50% injection 25 g    famotidine tablet 20 mg    fenofibrate tablet 145 mg    furosemide tablet 40 mg    gabapentin capsule 300 mg    glucagon (human recombinant) injection 1 mg    glucose chewable tablet 16 g    glucose chewable tablet 24 g    insulin aspart U-100 pen 1-10 Units    losartan tablet 25 mg    metoprolol succinate (TOPROL-XL) 24 hr tablet 100 mg    oxyCODONE immediate release tablet 5 mg    And    oxyCODONE immediate release tablet 10 mg    And    morphine injection 2 mg    And    morphine injection 4 mg    mupirocin 2 % ointment 1 g    ondansetron injection 4 mg    polyethylene glycol packet 17 g    pravastatin tablet 10 mg    promethazine (PHENERGAN) 6.25 mg in dextrose 5 % 50 mL IVPB    ramelteon " "tablet 8 mg    ropivacaine (PF) 2 mg/ml (0.2%) infusion    senna-docusate 8.6-50 mg per tablet 1 tablet    sodium chloride 0.9% flush 3 mL    sodium chloride 0.9% flush 3 mL    tamsulosin 24 hr capsule 0.4 mg    warfarin tablet 7.5 mg     Objective:     Vital Signs (Most Recent):  Temp: 97.6 °F (36.4 °C) (07/11/18 0445)  Pulse: 96 (07/11/18 0445)  Resp: 18 (07/11/18 0445)  BP: 136/69 (07/11/18 0445)  SpO2: (!) 93 % (07/11/18 0445) Vital Signs (24h Range):  Temp:  [96.1 °F (35.6 °C)-98.2 °F (36.8 °C)] 97.6 °F (36.4 °C)  Pulse:  [] 96  Resp:  [8-20] 18  SpO2:  [93 %-100 %] 93 %  BP: (103-171)/(57-91) 136/69     Weight: (!) 144.7 kg (319 lb)  Height: 5' 10" (177.8 cm)  Body mass index is 45.77 kg/m².      Intake/Output Summary (Last 24 hours) at 07/11/18 0740  Last data filed at 07/10/18 1800   Gross per 24 hour   Intake           3790.5 ml   Output              300 ml   Net           3490.5 ml       Ortho/SPM Exam   Awake/alert/oriented x 3  No acute distress  AF. Moderate HTN.  Stable on room air  Incisional wound vac in place with good suction seal  Dressing c/d/i  Motor and sensation intact in bilateral UE/LE        Significant Labs: All pertinent labs within the past 24 hours have been reviewed.    Significant Imaging: I have reviewed and interpreted all pertinent imaging results/findings.    Assessment/Plan:     * Prosthetic joint implant failure    [facility] PAT Cantor Jr. is a 72 y.o. male who is POD1 s/p right revision TKA   -- Pain control: multimodal; will minimize narcotics due to delirium overnight   -- PT/OT: WBAT, ROM AT   -- DVT prophylaxis: Coumadin with ASA bridge (81mg BID); will restart home Eliquis on POD7. INR this AM = 1.1    -- Lines/drains: incisional wound vac in place   -- Labs: reviewed; Hgb stable. Cr stable at 1.4   -- Antibiotics: Ancef post-op     -- Dispo: pending PT/OT evaluation; possible SNF placement          Type 2 diabetes mellitus with diabetic polyneuropathy    -- Diabetic " diet  -- SSI        HTN (hypertension)    -- Continue home medications          Chronic kidney disease, stage III (moderate)    -- Cr stable at 1.4        Chronic atrial fibrillation    -- Will restart home Eliquis on POD7  -- Coumadin with ASA bridge in the meantime              Michael J. Casale, MD  Orthopedics  [facility]

## 2018-07-11 NOTE — PLAN OF CARE
Problem: Physical Therapy Goal  Goal: Physical Therapy Goal  Goals to be met by: 18     Patient will increase functional independence with mobility by performin. Supine to sit with Set-up [place]  2. Sit to supine with Set-up [place]  3. Sit to stand transfer with min (A)  4. Bed to chair transfer with CGA using Rolling Walker  5. Gait  x 100 feet with CGA using Rolling Walker.   6. Lower extremity exercise program x20-30 reps per handout, with independence      Outcome: Ongoing (interventions implemented as appropriate)  PT eval complete. Pt tolerated session fairly today. Pt required assistance for all functional tasks today but experienced no LOB when performing transfer back to bed from chair. Pt required increased assistance to perform sit<>stand from low chair. Pt tolerated supine exercises well with no complications. Pt currently requires SNF placement upon d/c 2/2 falls risk and requiring assistance for mobility.

## 2018-07-11 NOTE — NURSING
PCT walked in on pt half way out of bed. Disoriented to place and situation. Pulled out IV, attempted to pull out wound vac and PNC. Refused to get back in bed unless he speaks with family. Once pt spoke to family over phone agreed to get back in bed. Refused IV access until family arrives. Family notified, agreed to come to hospital. Pt is now calm. Will continue to monitor.

## 2018-07-11 NOTE — ANESTHESIA POSTPROCEDURE EVALUATION
"Anesthesia Post Evaluation    [place] [facility] [place]    Procedure(s) Performed: Procedure(s) (LRB):  REVISION-ARTHROPLASTY-KNEE-DAKOTA (Right)    Final Anesthesia Type: CSE  Patient location during evaluation: PACU  Patient participation: Yes- Able to Participate  Level of consciousness: awake and alert  Post-procedure vital signs: reviewed and stable  Pain management: adequate  Airway patency: patent  PONV status at discharge: No PONV  Anesthetic complications: no      Cardiovascular status: hemodynamically stable  Respiratory status: unassisted, spontaneous ventilation and room air  Hydration status: euvolemic  Follow-up not needed.        Visit Vitals  BP (!) 115/59 (BP Location: Left arm, Patient Position: Sitting)   Pulse 89   Temp 36.8 °C (98.3 °F) (Oral)   Resp 18   Ht 5' 10" (1.778 m)   Wt (!) 144.7 kg (319 lb)   SpO2 97%   BMI 45.77 kg/m²       Pain/Bessie Score: Pain Assessment Performed: Yes (7/11/2018  1:00 PM)  Presence of Pain: complains of pain/discomfort (7/10/2018  1:25 PM)  Pain Rating Prior to Med Admin: 6 (7/11/2018  1:00 PM)  Pain Rating Post Med Admin: 6 (7/11/2018 11:28 AM)  Bessie Score: 9 (7/10/2018  3:30 PM)      "

## 2018-07-11 NOTE — PLAN OF CARE
Problem: Patient Care Overview  Goal: Plan of Care Review  Outcome: Ongoing (interventions implemented as appropriate)  Patient AAOx4. Mild complaints of pain. VS stable, afrebrile. Neurovascular intact. Skin intact, with exception of RLE. Free from falls. Frequent rounds made for safety, pain and comfort. Bed at lowest position, call light within reach, side rails up x2. Family at bedside.

## 2018-07-11 NOTE — PLAN OF CARE
Problem: Patient Care Overview  Goal: Plan of Care Review  Patient AAOX4, patient has had episodes of confusion this shift and last night.  Patient states things are there that are not.  Patient is easily reoriented to place and situation.  IV access is clean, dry and intact, infusing NS @125ml.  Ropivacaine infusing @ 8ml and site is clean, dry and intact.  Wound vac is suctioning with 25ml output, dressing and bandage to surgical site.  Family has remained at bedside throughout shift, mainly son this shift.  Patient's blood sugars required sliding scale at lunch time. Pain being managed with meds on order and PNC.  Angela Barraza LPN

## 2018-07-11 NOTE — ANESTHESIA POST-OP PAIN MANAGEMENT
Acute Pain Service Progress Note    [facility] PAT Cantor Jr. is a 72 y.o., male, [id number].    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       Subjective:     General appearance of Patient awake and alert this AM.  Concern overnight that patient was confused and disoriented pulling out IV.     Pain with rest: 1    Numbers   Pain with movement: 3    Numbers   Side Effects    1. Pruritis No    2. Nausea No    3. Motor Blockade No, 0=Ability to raise lower extremities off bed    4. Sedation No, 1=awake and alert    Objective:     Catheter site clean, dry, intact         Vitals   Vitals:    07/11/18 0700   BP: (!) 140/73   Pulse: 87   Resp: 18   Temp: 36.8 °C (98.3 °F)        Labs    Admission on 07/10/2018   Component Date Value Ref Range Status    POCT Glucose 07/10/2018 119* 70 - 110 mg/dL Final    Anaerobic Culture 07/10/2018 Culture in progress   Preliminary    Aerobic Bacterial Culture 07/10/2018 No growth   Preliminary    Prothrombin Time 07/10/2018 11.0  9.0 - 12.5 sec Final    INR 07/10/2018 1.1  0.8 - 1.2 Final    Sodium 07/10/2018 135* 136 - 145 mmol/L Final    Potassium 07/10/2018 4.7  3.5 - 5.1 mmol/L Final    Chloride 07/10/2018 106  95 - 110 mmol/L Final    CO2 07/10/2018 22* 23 - 29 mmol/L Final    Glucose 07/10/2018 151* 70 - 110 mg/dL Final    BUN, Bld  07/10/2018 25* 8 - 23 mg/dL Final    Creatinine 07/10/2018 1.4  0.5 - 1.4 mg/dL Final    Calcium 07/10/2018 8.9  8.7 - 10.5 mg/dL Final    Anion Gap 07/10/2018 7* 8 - 16 mmol/L Final    eGFR if  07/10/2018 56.8* >60 mL/min/1.73 m^2 Final    eGFR if non African American 07/10/2018 49.1* >60 mL/min/1.73 m^2 Final    WBC 07/10/2018 9.01  3.90 - 12.70 K/uL Final    RBC 07/10/2018 4.19* 4.60 - 6.20 M/uL Final    Hemoglobin 07/10/2018 13.2* 14.0 - 18.0 g/dL Final    Hematocrit 07/10/2018 39.7* 40.0 - 54.0 % Final    MCV 07/10/2018 95  82 - 98 fL Final    MCH 07/10/2018 31.5* 27.0 - 31.0 pg Final    MCHC 07/10/2018 33.2  32.0 - 36.0 g/dL Final    RDW 07/10/2018 13.1  11.5 - 14.5 % Final    Platelets 07/10/2018 174  150 - 350 K/uL Final    MPV 07/10/2018 11.4  9.2 - 12.9 fL Final    Immature Granulocytes 07/10/2018 0.7* 0.0 - 0.5 % Final    Gran # (ANC) 07/10/2018 6.9  1.8 - 7.7 K/uL Final    Immature Grans (Abs) 07/10/2018 0.06* 0.00 - 0.04 K/uL Final    Lymph # 07/10/2018 1.1  1.0 - 4.8 K/uL Final    Mono # 07/10/2018 0.8  0.3 - 1.0 K/uL Final    Eos # 07/10/2018 0.2  0.0 - 0.5 K/uL Final    Baso # 07/10/2018 0.04  0.00 - 0.20 K/uL Final    nRBC 07/10/2018 0  0 /100 WBC Final    Gran% 07/10/2018 76.0* 38.0 - 73.0 % Final    Lymph% 07/10/2018 12.0* 18.0 - 48.0 % Final    Mono% 07/10/2018 8.3  4.0 - 15.0 % Final    Eosinophil% 07/10/2018 2.6  0.0 - 8.0 % Final    Basophil% 07/10/2018 0.4  0.0 - 1.9 % Final    Differential Method 07/10/2018 Automated   Final    POCT Glucose 07/10/2018 168* 70 - 110 mg/dL Final    POCT Glucose 07/10/2018 143* 70 - 110 mg/dL Final    Sodium 07/11/2018 135* 136 - 145 mmol/L Final    Potassium 07/11/2018 4.3  3.5 - 5.1 mmol/L Final    Chloride 07/11/2018 107  95 - 110 mmol/L Final    CO2 07/11/2018 19* 23 - 29 mmol/L Final    Glucose 07/11/2018 223* 70 - 110 mg/dL Final    BUN, Bld 07/11/2018 23  8 - 23 mg/dL Final    Creatinine 07/11/2018  1.4  0.5 - 1.4 mg/dL Final    Calcium 07/11/2018 8.5* 8.7 - 10.5 mg/dL Final    Anion Gap 07/11/2018 9  8 - 16 mmol/L Final    eGFR if  07/11/2018 56.8* >60 mL/min/1.73 m^2 Final    eGFR if non African American 07/11/2018 49.1* >60 mL/min/1.73 m^2 Final    Hemoglobin 07/11/2018 12.7* 14.0 - 18.0 g/dL Final    Hematocrit 07/11/2018 38.2* 40.0 - 54.0 % Final    Prothrombin Time 07/11/2018 11.5  9.0 - 12.5 sec Final    INR 07/11/2018 1.1  0.8 - 1.2 Final    POCT Glucose 07/11/2018 186* 70 - 110 mg/dL Final        Meds   Current Facility-Administered Medications   Medication Dose Route Frequency Provider Last Rate Last Dose    acetaminophen tablet 1,000 mg  1,000 mg Oral Q6H Jose G Burns MD   1,000 mg at 07/11/18 0035    aspirin EC tablet 81 mg  81 mg Oral Daily Michael Joseph Casale, MD        bisacodyl suppository 10 mg  10 mg Rectal Q12H PRN Michael Joseph Casale, MD        dextrose 50% injection 12.5 g  12.5 g Intravenous PRN Michael Joseph Casale, MD        dextrose 50% injection 25 g  25 g Intravenous PRN Michael Joseph Casale, MD        famotidine tablet 20 mg  20 mg Oral BID Michael Joseph Casale, MD   20 mg at 07/11/18 0915    fenofibrate tablet 145 mg  145 mg Oral Daily Michael Joseph Casale, MD        furosemide tablet 40 mg  40 mg Oral Daily Michael Joseph Casale, MD   40 mg at 07/11/18 0915    gabapentin capsule 300 mg  300 mg Oral TID Jose G Burns MD   300 mg at 07/11/18 0915    glucagon (human recombinant) injection 1 mg  1 mg Intramuscular PRN Michael Joseph Casale, MD        glucose chewable tablet 16 g  16 g Oral PRN Michael Joseph Casale, MD        glucose chewable tablet 24 g  24 g Oral PRN Michael Joseph Casale, MD        insulin aspart U-100 pen 1-10 Units  1-10 Units Subcutaneous QID (AC + HS) PRN Michael Joseph Casale, MD        losartan tablet 25 mg  25 mg Oral Daily Michael Joseph Casale, MD   25 mg at 07/11/18 0915    metoprolol succinate  (TOPROL-XL) 24 hr tablet 100 mg  100 mg Oral QHS Michael Joseph Casale, MD   100 mg at 07/10/18 2130    oxyCODONE immediate release tablet 5 mg  5 mg Oral Q3H PRN Pierre Alamo MD        And    oxyCODONE immediate release tablet 10 mg  10 mg Oral Q3H PRN Pierre Alamo MD   10 mg at 07/10/18 2133    And    morphine injection 2 mg  2 mg Intravenous Q1H PRN Pierre Alamo MD   2 mg at 07/10/18 1344    And    morphine injection 4 mg  4 mg Intravenous Q1H PRN Pierre Alamo MD        mupirocin 2 % ointment 1 g  1 g Nasal BID Michael Joseph Casale, MD   1 g at 07/11/18 0900    ondansetron injection 4 mg  4 mg Intravenous Q6H PRN Pierre Alamo MD        polyethylene glycol packet 17 g  17 g Oral Daily Michael Joseph Casale, MD        pravastatin tablet 10 mg  10 mg Oral QHS Michael Joseph Casale, MD   10 mg at 07/10/18 2132    promethazine (PHENERGAN) 6.25 mg in dextrose 5 % 50 mL IVPB  6.25 mg Intravenous Q6H PRN Michael Joseph Casale, MD        ramelteon tablet 8 mg  8 mg Oral Nightly PRN Michael Joseph Casale, MD        ropivacaine (PF) 2 mg/ml (0.2%) infusion  8 mL/hr Perineural Continuous Pierre Alamo MD 8 mL/hr at 07/11/18 0905 8 mL/hr at 07/11/18 0905    senna-docusate 8.6-50 mg per tablet 1 tablet  1 tablet Oral BID Michael Joseph Casale, MD   1 tablet at 07/11/18 0915    sodium chloride 0.9% flush 3 mL  3 mL Intravenous PRN Mary Jo Mnuguia MD        sodium chloride 0.9% flush 3 mL  3 mL Intravenous Q8H Michael Joseph Casale, MD   3 mL at 07/11/18 0600    tamsulosin 24 hr capsule 0.4 mg  1 capsule Oral Daily Michael Joseph Casale, MD   0.4 mg at 07/11/18 0915    warfarin (COUMADIN) tablet 5 mg  5 mg Oral Daily Michael Joseph Casale, MD            Anticoagulant dose Coumadin at 5mg daily.    Assessment:     Pain control currently adequate.    Plan:     Patient doing well, continue present treatment.  Will try to control pain with opiate sparing methods given recent confusion.         Evaluator Jose G Burns MD, CA-1          I have reviewed and concur with the resident's history, physical, assessment, and plan.  I have personally interviewed and examined the patient at bedside.  See below addendum for my evaluation and additional findings.

## 2018-07-11 NOTE — PLAN OF CARE
Problem: Occupational Therapy Goal  Goal: Occupational Therapy Goal  Goals to be met by: 7/25/18     Patient will increase functional independence with ADLs by performing:    UE Dressing with Modified [place].  LE Dressing with Modified [place] and Assistive Devices as needed.  Grooming while standing at sink with Modified [place].  Toileting from bedside commode with Modified [place] for hygiene and clothing management.   Bathing from  edge of bed with Modified [place].  Toilet transfer to bedside commode with Modified [place].  Increased functional strength to WFL for B UE.  Upper extremity exercise program x15 reps per handout, with independence.    POC initiated.

## 2018-07-11 NOTE — NURSING
Pt appears anxious, asking to sit in chair. Explained to pt that he has not started PT, therefore standing is not an option. Pt sat on side of bed; after assisting pt back in lying position pt appears SOB. O2 sats range between 91-92%, RR 20. O2 nasal cannula applied, 2L. O2 sats 95%. Pt appears less anxious.

## 2018-07-11 NOTE — PT/OT/SLP EVAL
Occupational Therapy   Evaluation    Name: [facility] PAT Cantor Jr.  MRN: [id number]  Admitting Diagnosis:  Prosthetic joint implant failure 1 Day Post-Op    Recommendations:     Discharge Recommendations: nursing facility, skilled  Discharge Equipment Recommendations:  walker, rolling  Barriers to discharge:  None    History:     Occupational Profile:  Living Environment: Pt lives in a one story house c ramp to enter and ramp has B rails on it.  Has both a walk-in and tub/shower combo.  Previous level of function: I PTA  Roles and Routines: Retired  Equipment Owned:  bedside commode, walker, rolling, cane, straight  Assistance upon Discharge: Wife    Past Medical History:   Diagnosis Date    *Atrial fibrillation     Eliquis    Anticoagulant long-term use     apaxiban    Basal cell carcinoma 12/2015    left eye brow    BCC (basal cell carcinoma) 12/2015    left shoulder    Cataract     Chronic kidney disease     Diabetes mellitus with renal manifestations, uncontrolled     Dyslipidemia associated with type 2 diabetes mellitus     Fever blister     Hearing loss     HTN (hypertension)     Keloid cicatrix     Liver disease     Melanoma 12/07/2015    right cheek-in situ    Obesity     PN (peripheral neuropathy)     Primary osteoarthritis of right knee 1/25/2017    Screening for colon cancer 3/3/2016    Sleep apnea     wears CPAP at night    Thyroid disease     Type II or unspecified type diabetes mellitus with other specified manifestations, uncontrolled        Past Surgical History:   Procedure Laterality Date    APPENDECTOMY      CARDIOVERSION      CATARACT EXTRACTION      CATARACT EXTRACTION Right 05/14/2018    Dr. Greer    COLONOSCOPY N/A 3/3/2016    Procedure: COLONOSCOPY;  Surgeon: Bob Poe MD;  Location: [address]);  Service: Endoscopy;  Laterality: N/A;  Holding Eliquis for 3 days prior to colonoscopy. EC    epidural steroid injection      INTRAOCULAR PROSTHESES INSERTION Left  6/25/2018    Procedure: INSERTION, INTRAOCULAR LENS PROSTHESIS;  Surgeon: Lawrence Greer MD;  Location: [facility];  Service: Ophthalmology;  Laterality: Left;    JOINT REPLACEMENT      Knee    Meniere's disease surgery      PHACOEMULSIFICATION OF CATARACT Left 6/25/2018    Procedure: PHACOEMULSIFICATION, CATARACT;  Surgeon: Lawrence Greer MD;  Location: [facility];  Service: Ophthalmology;  Laterality: Left;    TONSILLECTOMY      TOTAL KNEE ARTHROPLASTY Right 01/25/2017    TKR    TOTAL KNEE ARTHROPLASTY Left     TKR, 1990's       Subjective     Chief Complaint: Pt is s/p R TKA revision and is WBAT R LE.  Patient/Family stated goals: To get better.  Communicated with: RN prior to session.  Pain/Comfort:  · Pain Rating 1: 0/10    Patients cultural, spiritual, Anglican conflicts given the current situation: None    Objective:     Patient found with: telemetry, pulse ox (continuous), FCD, peripheral IV, perineural catheter    General Precautions: Standard, fall   Orthopedic Precautions:RLE weight bearing as tolerated   Braces:  ([place])     Occupational Performance:    Bed Mobility:    · Patient completed Supine to Sit with minimum assistance    Functional Mobility/Transfers:  · Patient completed Sit <> Stand Transfer with minimum assistance  with  rolling walker   · Patient completed Bed <> Chair Transfer using Stand Pivot technique with maximal assistance with rolling walker  · Functional Mobility: Pt was able to walk [facility] to bathroom before becoming light headed and diaphoretic.  BP was noted to be hypertensive at 192/82 and O2 SATS were 96 after oxygen was applied.  Pt stated that he felt SOB after walking.    Activities of Daily Living:  · Upper Body Dressing: moderate assistance to don hospital gown.  · Lower Body Dressing: total assistance to don socks.  · Toileting: minimum assistance to use urinal.    Cognitive/Visual Perceptual:  Cognitive/Psychosocial Skills:     -       Oriented to: Person, Place, Time and  "Situation   -       Follows Commands/attention:Follows multistep  commands    Physical Exam:  Upper Extremity Range of Motion:     -       Right Upper Extremity: WFL  -       Left Upper Extremity: WFL  Upper Extremity Strength:    -       Right Upper Extremity: WFL  -       Left Upper Extremity: WFL    Patient left up in chair with all lines intact, call button in reach, RN notified and family present    [facility] 6 Click:  [facility] Total Score: 16      Education:    Assessment:     [facility] [place] Jr. is a 72 y.o. male with a medical diagnosis of Prosthetic joint implant failure.  He was able to perform supine/sit and sit/stand T/F c min A and bed/chair T/F c mod A and RW.  Was able to walk [facility]DC to bathroom before becoming lightheaded, hypertensive, and SOB.  Able to perform UB dressing c mod A and LB dressing c total assist and toilet hygiene c min A.  Performance deficits affecting function are impaired self care skills, impaired functional mobilty, weakness, impaired endurance, decreased upper extremity function, decreased ROM, impaired cardiopulmonary response to activity, orthopedic precautions.      Rehab Prognosis:  Good; patient would benefit from acute skilled OT services to address these deficits and reach maximum level of function.         Clinical Decision Makin.  OT Low:  "Pt evaluation falls under low complexity for evaluation coding due to performance deficits noted in 1-3 areas as stated above and 0 co-morbities affecting current functional status. Data obtained from problem focused assessments. No modifications or assistance was required for completion of evaluation. Only brief occupational profile and history review completed."     Plan:     Patient to be seen 6 x/week to address the above listed problems via self-care/home management, therapeutic activities, therapeutic exercises  · Plan of Care Expires: 18  · Plan of Care Reviewed with: patient, family    This Plan of care has been " discussed with the patient who was involved in its development and understands and is in agreement with the identified goals and treatment plan    GOALS:    Occupational Therapy Goals        Problem: Occupational Therapy Goal    Goal Priority Disciplines Outcome Interventions   Occupational Therapy Goal     OT, PT/OT Ongoing (interventions implemented as appropriate)    Description:  Goals to be met by: 7/25/18     Patient will increase functional independence with ADLs by performing:    UE Dressing with Modified [place].  LE Dressing with Modified [place] and Assistive Devices as needed.  Grooming while standing at sink with Modified [place].  Toileting from bedside commode with Modified [place] for hygiene and clothing management.   Bathing from  edge of bed with Modified [place].  Toilet transfer to bedside commode with Modified [place].  Increased functional strength to WFL for B UE.  Upper extremity exercise program x15 reps per handout, with independence.                      Time Tracking:     OT Date of Treatment: 07/11/18  OT Start Time: 1040  OT Stop Time: 1115  OT Total Time (min): 35 min    Billable Minutes:Evaluation 15  Self Care/Home Management 20    VANESSA Baez  7/11/2018

## 2018-07-11 NOTE — ASSESSMENT & PLAN NOTE
Stephen Cantor Jr. is a 72 y.o. male who is POD1 s/p right revision TKA   -- Pain control: multimodal; will minimize narcotics due to delirium overnight   -- PT/OT: WBAT, ROM AT   -- DVT prophylaxis: Coumadin with ASA bridge (81mg BID); will restart home Eliquis on POD7. INR this AM = 1.1    -- Lines/drains: incisional wound vac in place   -- Labs: reviewed; Hgb stable. Cr stable at 1.4   -- Antibiotics: Ancef post-op     -- Dispo: pending PT/OT evaluation; possible SNF placement

## 2018-07-12 ENCOUNTER — HOSPITAL ENCOUNTER (INPATIENT)
Facility: HOSPITAL | Age: 73
LOS: 20 days | Discharge: HOME HEALTH | DRG: 949 | End: 2018-08-01
Attending: HOSPITALIST | Admitting: HOSPITALIST
Payer: MEDICARE

## 2018-07-12 VITALS
SYSTOLIC BLOOD PRESSURE: 144 MMHG | RESPIRATION RATE: 18 BRPM | TEMPERATURE: 99 F | OXYGEN SATURATION: 98 % | WEIGHT: 315 LBS | BODY MASS INDEX: 45.1 KG/M2 | HEART RATE: 93 BPM | HEIGHT: 70 IN | DIASTOLIC BLOOD PRESSURE: 66 MMHG

## 2018-07-12 DIAGNOSIS — I48.20 CHRONIC ATRIAL FIBRILLATION: ICD-10-CM

## 2018-07-12 DIAGNOSIS — I12.9 BENIGN HYPERTENSIVE RENAL DISEASE WITHOUT RENAL FAILURE: ICD-10-CM

## 2018-07-12 DIAGNOSIS — M17.11 PRIMARY OSTEOARTHRITIS OF RIGHT KNEE: ICD-10-CM

## 2018-07-12 DIAGNOSIS — E11.69 DYSLIPIDEMIA ASSOCIATED WITH TYPE 2 DIABETES MELLITUS: ICD-10-CM

## 2018-07-12 DIAGNOSIS — R53.81 DEBILITY: ICD-10-CM

## 2018-07-12 DIAGNOSIS — G47.33 OBSTRUCTIVE SLEEP APNEA SYNDROME: ICD-10-CM

## 2018-07-12 DIAGNOSIS — I10 ESSENTIAL HYPERTENSION: ICD-10-CM

## 2018-07-12 DIAGNOSIS — Z79.4 TYPE 2 DIABETES MELLITUS WITH STAGE 3 CHRONIC KIDNEY DISEASE, WITH LONG-TERM CURRENT USE OF INSULIN: ICD-10-CM

## 2018-07-12 DIAGNOSIS — N18.30 CHRONIC KIDNEY DISEASE, STAGE III (MODERATE): ICD-10-CM

## 2018-07-12 DIAGNOSIS — H25.13 NUCLEAR SCLEROSIS, BILATERAL: ICD-10-CM

## 2018-07-12 DIAGNOSIS — I87.2 VENOUS INSUFFICIENCY OF BOTH LOWER EXTREMITIES: ICD-10-CM

## 2018-07-12 DIAGNOSIS — E11.22 TYPE 2 DIABETES MELLITUS WITH STAGE 3 CHRONIC KIDNEY DISEASE, WITH LONG-TERM CURRENT USE OF INSULIN: ICD-10-CM

## 2018-07-12 DIAGNOSIS — E66.01 MORBID OBESITY: ICD-10-CM

## 2018-07-12 DIAGNOSIS — T84.019A PROSTHETIC JOINT IMPLANT FAILURE: ICD-10-CM

## 2018-07-12 DIAGNOSIS — I89.0 LYMPHEDEMA OF BOTH LOWER EXTREMITIES: ICD-10-CM

## 2018-07-12 DIAGNOSIS — Z79.01 LONG TERM CURRENT USE OF ANTICOAGULANT THERAPY: Primary | ICD-10-CM

## 2018-07-12 DIAGNOSIS — E11.42 TYPE 2 DIABETES MELLITUS WITH DIABETIC POLYNEUROPATHY, WITH LONG-TERM CURRENT USE OF INSULIN: ICD-10-CM

## 2018-07-12 DIAGNOSIS — E03.4 HYPOTHYROIDISM DUE TO ACQUIRED ATROPHY OF THYROID: ICD-10-CM

## 2018-07-12 DIAGNOSIS — I51.89 DIASTOLIC DYSFUNCTION: ICD-10-CM

## 2018-07-12 DIAGNOSIS — N18.30 TYPE 2 DIABETES MELLITUS WITH STAGE 3 CHRONIC KIDNEY DISEASE, WITH LONG-TERM CURRENT USE OF INSULIN: ICD-10-CM

## 2018-07-12 DIAGNOSIS — E78.5 DYSLIPIDEMIA ASSOCIATED WITH TYPE 2 DIABETES MELLITUS: ICD-10-CM

## 2018-07-12 DIAGNOSIS — Z79.02 ANTIPLATELET OR ANTITHROMBOTIC LONG-TERM USE: ICD-10-CM

## 2018-07-12 DIAGNOSIS — I50.31 ACUTE DIASTOLIC HEART FAILURE: ICD-10-CM

## 2018-07-12 DIAGNOSIS — Z79.4 TYPE 2 DIABETES MELLITUS WITH DIABETIC POLYNEUROPATHY, WITH LONG-TERM CURRENT USE OF INSULIN: ICD-10-CM

## 2018-07-12 LAB
ANION GAP SERPL CALC-SCNC: 9 MMOL/L
BUN SERPL-MCNC: 21 MG/DL
CALCIUM SERPL-MCNC: 9.1 MG/DL
CHLORIDE SERPL-SCNC: 108 MMOL/L
CO2 SERPL-SCNC: 18 MMOL/L
CREAT SERPL-MCNC: 1.3 MG/DL
EST. GFR  (AFRICAN AMERICAN): >60 ML/MIN/1.73 M^2
EST. GFR  (NON AFRICAN AMERICAN): 53.8 ML/MIN/1.73 M^2
GLUCOSE SERPL-MCNC: 209 MG/DL
HCT VFR BLD AUTO: 36.2 %
HGB BLD-MCNC: 12.5 G/DL
INR PPP: 1.4
POCT GLUCOSE: 331 MG/DL (ref 70–110)
POTASSIUM SERPL-SCNC: 4.1 MMOL/L
PROTHROMBIN TIME: 14.2 SEC
SODIUM SERPL-SCNC: 135 MMOL/L

## 2018-07-12 PROCEDURE — 11000004 HC SNF PRIVATE

## 2018-07-12 PROCEDURE — 99231 SBSQ HOSP IP/OBS SF/LOW 25: CPT | Mod: ,,, | Performed by: ANESTHESIOLOGY

## 2018-07-12 PROCEDURE — 85610 PROTHROMBIN TIME: CPT

## 2018-07-12 PROCEDURE — 25000003 PHARM REV CODE 250: Performed by: SURGERY

## 2018-07-12 PROCEDURE — A4216 STERILE WATER/SALINE, 10 ML: HCPCS | Performed by: STUDENT IN AN ORGANIZED HEALTH CARE EDUCATION/TRAINING PROGRAM

## 2018-07-12 PROCEDURE — 63600175 PHARM REV CODE 636 W HCPCS: Performed by: STUDENT IN AN ORGANIZED HEALTH CARE EDUCATION/TRAINING PROGRAM

## 2018-07-12 PROCEDURE — 36415 COLL VENOUS BLD VENIPUNCTURE: CPT

## 2018-07-12 PROCEDURE — 25000003 PHARM REV CODE 250: Performed by: STUDENT IN AN ORGANIZED HEALTH CARE EDUCATION/TRAINING PROGRAM

## 2018-07-12 PROCEDURE — 80048 BASIC METABOLIC PNL TOTAL CA: CPT

## 2018-07-12 PROCEDURE — 97535 SELF CARE MNGMENT TRAINING: CPT

## 2018-07-12 PROCEDURE — 85014 HEMATOCRIT: CPT

## 2018-07-12 PROCEDURE — 97530 THERAPEUTIC ACTIVITIES: CPT

## 2018-07-12 PROCEDURE — 85018 HEMOGLOBIN: CPT

## 2018-07-12 RX ORDER — ACETAMINOPHEN 325 MG/1
650 TABLET ORAL EVERY 6 HOURS PRN
Status: DISCONTINUED | OUTPATIENT
Start: 2018-07-12 | End: 2018-08-01 | Stop reason: HOSPADM

## 2018-07-12 RX ORDER — CALCIUM CARBONATE 200(500)MG
500 TABLET,CHEWABLE ORAL 2 TIMES DAILY PRN
Status: CANCELLED | OUTPATIENT
Start: 2018-07-12

## 2018-07-12 RX ORDER — FUROSEMIDE 40 MG/1
40 TABLET ORAL DAILY
Status: DISCONTINUED | OUTPATIENT
Start: 2018-07-13 | End: 2018-07-28

## 2018-07-12 RX ORDER — IBUPROFEN 200 MG
24 TABLET ORAL
Status: CANCELLED | OUTPATIENT
Start: 2018-07-12

## 2018-07-12 RX ORDER — WARFARIN 2.5 MG/1
5 TABLET ORAL DAILY
Status: DISCONTINUED | OUTPATIENT
Start: 2018-07-13 | End: 2018-07-13

## 2018-07-12 RX ORDER — MUPIROCIN 20 MG/G
1 OINTMENT TOPICAL 2 TIMES DAILY
Status: COMPLETED | OUTPATIENT
Start: 2018-07-12 | End: 2018-07-15

## 2018-07-12 RX ORDER — FENOFIBRATE 145 MG/1
145 TABLET, FILM COATED ORAL DAILY
Status: DISCONTINUED | OUTPATIENT
Start: 2018-07-13 | End: 2018-08-01 | Stop reason: HOSPADM

## 2018-07-12 RX ORDER — METOPROLOL SUCCINATE 100 MG/1
100 TABLET, EXTENDED RELEASE ORAL NIGHTLY
Status: DISCONTINUED | OUTPATIENT
Start: 2018-07-12 | End: 2018-07-26

## 2018-07-12 RX ORDER — CALCIUM CARBONATE 200(500)MG
500 TABLET,CHEWABLE ORAL 2 TIMES DAILY PRN
Status: DISCONTINUED | OUTPATIENT
Start: 2018-07-12 | End: 2018-08-01 | Stop reason: HOSPADM

## 2018-07-12 RX ORDER — WARFARIN SODIUM 5 MG/1
5 TABLET ORAL DAILY
Status: CANCELLED | OUTPATIENT
Start: 2018-07-12

## 2018-07-12 RX ORDER — POLYETHYLENE GLYCOL 3350 17 G/17G
17 POWDER, FOR SOLUTION ORAL DAILY
Status: DISCONTINUED | OUTPATIENT
Start: 2018-07-13 | End: 2018-07-26

## 2018-07-12 RX ORDER — ACETAMINOPHEN 325 MG/1
650 TABLET ORAL EVERY 6 HOURS PRN
Status: CANCELLED | OUTPATIENT
Start: 2018-07-12

## 2018-07-12 RX ORDER — MAG HYDROX/ALUMINUM HYD/SIMETH 200-200-20
15 SUSPENSION, ORAL (FINAL DOSE FORM) ORAL EVERY 6 HOURS PRN
Status: CANCELLED | OUTPATIENT
Start: 2018-07-12

## 2018-07-12 RX ORDER — PRAVASTATIN SODIUM 10 MG/1
10 TABLET ORAL NIGHTLY
Status: CANCELLED | OUTPATIENT
Start: 2018-07-12

## 2018-07-12 RX ORDER — INSULIN ASPART 100 [IU]/ML
1-10 INJECTION, SOLUTION INTRAVENOUS; SUBCUTANEOUS
Status: CANCELLED | OUTPATIENT
Start: 2018-07-12

## 2018-07-12 RX ORDER — GLUCAGON 1 MG
1 KIT INJECTION
Status: DISCONTINUED | OUTPATIENT
Start: 2018-07-12 | End: 2018-08-01 | Stop reason: HOSPADM

## 2018-07-12 RX ORDER — LOSARTAN POTASSIUM 25 MG/1
25 TABLET ORAL DAILY
Status: DISCONTINUED | OUTPATIENT
Start: 2018-07-13 | End: 2018-08-01 | Stop reason: HOSPADM

## 2018-07-12 RX ORDER — GABAPENTIN 300 MG/1
300 CAPSULE ORAL 3 TIMES DAILY
Status: CANCELLED | OUTPATIENT
Start: 2018-07-12

## 2018-07-12 RX ORDER — IBUPROFEN 200 MG
16 TABLET ORAL
Status: DISCONTINUED | OUTPATIENT
Start: 2018-07-12 | End: 2018-08-01 | Stop reason: HOSPADM

## 2018-07-12 RX ORDER — FENOFIBRATE 145 MG/1
145 TABLET, FILM COATED ORAL DAILY
Status: CANCELLED | OUTPATIENT
Start: 2018-07-13

## 2018-07-12 RX ORDER — RAMELTEON 8 MG/1
8 TABLET ORAL NIGHTLY PRN
Status: CANCELLED | OUTPATIENT
Start: 2018-07-12

## 2018-07-12 RX ORDER — FAMOTIDINE 20 MG/1
20 TABLET, FILM COATED ORAL 2 TIMES DAILY
Status: CANCELLED | OUTPATIENT
Start: 2018-07-12

## 2018-07-12 RX ORDER — TAMSULOSIN HYDROCHLORIDE 0.4 MG/1
1 CAPSULE ORAL DAILY
Status: CANCELLED | OUTPATIENT
Start: 2018-07-13

## 2018-07-12 RX ORDER — ASPIRIN 81 MG/1
81 TABLET ORAL DAILY
Status: DISCONTINUED | OUTPATIENT
Start: 2018-07-13 | End: 2018-07-14

## 2018-07-12 RX ORDER — RAMELTEON 8 MG/1
8 TABLET ORAL NIGHTLY PRN
Status: DISCONTINUED | OUTPATIENT
Start: 2018-07-12 | End: 2018-08-01 | Stop reason: HOSPADM

## 2018-07-12 RX ORDER — FUROSEMIDE 40 MG/1
40 TABLET ORAL DAILY
Status: CANCELLED | OUTPATIENT
Start: 2018-07-13

## 2018-07-12 RX ORDER — METOPROLOL SUCCINATE 100 MG/1
100 TABLET, EXTENDED RELEASE ORAL NIGHTLY
Status: CANCELLED | OUTPATIENT
Start: 2018-07-12

## 2018-07-12 RX ORDER — BISACODYL 10 MG
10 SUPPOSITORY, RECTAL RECTAL EVERY 12 HOURS PRN
Status: CANCELLED | OUTPATIENT
Start: 2018-07-12

## 2018-07-12 RX ORDER — GLUCAGON 1 MG
1 KIT INJECTION
Status: CANCELLED | OUTPATIENT
Start: 2018-07-12

## 2018-07-12 RX ORDER — BISACODYL 10 MG
10 SUPPOSITORY, RECTAL RECTAL EVERY 12 HOURS PRN
Status: DISCONTINUED | OUTPATIENT
Start: 2018-07-12 | End: 2018-08-01 | Stop reason: HOSPADM

## 2018-07-12 RX ORDER — GABAPENTIN 300 MG/1
300 CAPSULE ORAL 3 TIMES DAILY
Status: DISCONTINUED | OUTPATIENT
Start: 2018-07-13 | End: 2018-08-01 | Stop reason: HOSPADM

## 2018-07-12 RX ORDER — FAMOTIDINE 20 MG/1
20 TABLET, FILM COATED ORAL 2 TIMES DAILY
Status: DISCONTINUED | OUTPATIENT
Start: 2018-07-12 | End: 2018-08-01 | Stop reason: HOSPADM

## 2018-07-12 RX ORDER — LOSARTAN POTASSIUM 25 MG/1
25 TABLET ORAL DAILY
Status: CANCELLED | OUTPATIENT
Start: 2018-07-13

## 2018-07-12 RX ORDER — IBUPROFEN 200 MG
24 TABLET ORAL
Status: DISCONTINUED | OUTPATIENT
Start: 2018-07-12 | End: 2018-08-01 | Stop reason: HOSPADM

## 2018-07-12 RX ORDER — MUPIROCIN 20 MG/G
1 OINTMENT TOPICAL 2 TIMES DAILY
Status: CANCELLED | OUTPATIENT
Start: 2018-07-12 | End: 2018-07-15

## 2018-07-12 RX ORDER — TAMSULOSIN HYDROCHLORIDE 0.4 MG/1
1 CAPSULE ORAL DAILY
Status: DISCONTINUED | OUTPATIENT
Start: 2018-07-13 | End: 2018-08-01 | Stop reason: HOSPADM

## 2018-07-12 RX ORDER — IBUPROFEN 200 MG
16 TABLET ORAL
Status: CANCELLED | OUTPATIENT
Start: 2018-07-12

## 2018-07-12 RX ORDER — AMOXICILLIN 250 MG
1 CAPSULE ORAL 2 TIMES DAILY
Status: DISCONTINUED | OUTPATIENT
Start: 2018-07-12 | End: 2018-07-26

## 2018-07-12 RX ORDER — MAG HYDROX/ALUMINUM HYD/SIMETH 200-200-20
15 SUSPENSION, ORAL (FINAL DOSE FORM) ORAL EVERY 6 HOURS PRN
Status: DISCONTINUED | OUTPATIENT
Start: 2018-07-12 | End: 2018-08-01 | Stop reason: HOSPADM

## 2018-07-12 RX ORDER — AMOXICILLIN 250 MG
1 CAPSULE ORAL 2 TIMES DAILY
Status: CANCELLED | OUTPATIENT
Start: 2018-07-12

## 2018-07-12 RX ORDER — ONDANSETRON 2 MG/ML
4 INJECTION INTRAMUSCULAR; INTRAVENOUS EVERY 6 HOURS PRN
Status: DISCONTINUED | OUTPATIENT
Start: 2018-07-12 | End: 2018-07-13

## 2018-07-12 RX ORDER — POLYETHYLENE GLYCOL 3350 17 G/17G
17 POWDER, FOR SOLUTION ORAL DAILY
Status: CANCELLED | OUTPATIENT
Start: 2018-07-13

## 2018-07-12 RX ORDER — PRAVASTATIN SODIUM 10 MG/1
10 TABLET ORAL NIGHTLY
Status: DISCONTINUED | OUTPATIENT
Start: 2018-07-12 | End: 2018-08-01 | Stop reason: HOSPADM

## 2018-07-12 RX ORDER — ASPIRIN 81 MG/1
81 TABLET ORAL DAILY
Status: CANCELLED | OUTPATIENT
Start: 2018-07-13

## 2018-07-12 RX ORDER — ONDANSETRON 2 MG/ML
4 INJECTION INTRAMUSCULAR; INTRAVENOUS EVERY 6 HOURS PRN
Status: CANCELLED | OUTPATIENT
Start: 2018-07-12

## 2018-07-12 RX ORDER — INSULIN ASPART 100 [IU]/ML
1-10 INJECTION, SOLUTION INTRAVENOUS; SUBCUTANEOUS
Status: DISCONTINUED | OUTPATIENT
Start: 2018-07-12 | End: 2018-08-01 | Stop reason: HOSPADM

## 2018-07-12 RX ADMIN — INSULIN ASPART 4 UNITS: 100 INJECTION, SOLUTION INTRAVENOUS; SUBCUTANEOUS at 10:07

## 2018-07-12 RX ADMIN — Medication 3 ML: at 06:07

## 2018-07-12 RX ADMIN — PRAVASTATIN SODIUM 10 MG: 10 TABLET ORAL at 10:07

## 2018-07-12 RX ADMIN — FENOFIBRATE 145 MG: 145 TABLET ORAL at 09:07

## 2018-07-12 RX ADMIN — STANDARDIZED SENNA CONCENTRATE AND DOCUSATE SODIUM 1 TABLET: 8.6; 5 TABLET, FILM COATED ORAL at 09:07

## 2018-07-12 RX ADMIN — STANDARDIZED SENNA CONCENTRATE AND DOCUSATE SODIUM 1 TABLET: 8.6; 5 TABLET, FILM COATED ORAL at 10:07

## 2018-07-12 RX ADMIN — FUROSEMIDE 40 MG: 40 TABLET ORAL at 09:07

## 2018-07-12 RX ADMIN — FAMOTIDINE 20 MG: 20 TABLET ORAL at 10:07

## 2018-07-12 RX ADMIN — ACETAMINOPHEN 1000 MG: 500 TABLET, COATED ORAL at 06:07

## 2018-07-12 RX ADMIN — FAMOTIDINE 20 MG: 20 TABLET ORAL at 09:07

## 2018-07-12 RX ADMIN — RAMELTEON 8 MG: 8 TABLET, FILM COATED ORAL at 10:07

## 2018-07-12 RX ADMIN — Medication: at 01:07

## 2018-07-12 RX ADMIN — POLYETHYLENE GLYCOL 3350 17 G: 17 POWDER, FOR SOLUTION ORAL at 09:07

## 2018-07-12 RX ADMIN — Medication 3 ML: at 02:07

## 2018-07-12 RX ADMIN — MUPIROCIN 1 G: 20 OINTMENT TOPICAL at 09:07

## 2018-07-12 RX ADMIN — METOPROLOL SUCCINATE 100 MG: 100 TABLET, EXTENDED RELEASE ORAL at 10:07

## 2018-07-12 RX ADMIN — WARFARIN SODIUM 5 MG: 5 TABLET ORAL at 05:07

## 2018-07-12 RX ADMIN — ROPIVACAINE HYDROCHLORIDE 8 ML/HR: 2 INJECTION, SOLUTION EPIDURAL; INFILTRATION at 06:07

## 2018-07-12 RX ADMIN — GABAPENTIN 300 MG: 300 CAPSULE ORAL at 03:07

## 2018-07-12 RX ADMIN — TAMSULOSIN HYDROCHLORIDE 0.4 MG: 0.4 CAPSULE ORAL at 09:07

## 2018-07-12 RX ADMIN — LOSARTAN POTASSIUM 25 MG: 25 TABLET, FILM COATED ORAL at 09:07

## 2018-07-12 RX ADMIN — MUPIROCIN 1 G: 20 OINTMENT TOPICAL at 10:07

## 2018-07-12 RX ADMIN — ASPIRIN 81 MG: 81 TABLET, COATED ORAL at 09:07

## 2018-07-12 RX ADMIN — GABAPENTIN 300 MG: 300 CAPSULE ORAL at 09:07

## 2018-07-12 NOTE — PROGRESS NOTES
Pt converted to right adductor OnQ PNC.  Dressing CDI.  Educated regarding signs/symptoms of toxicity as well as pain management.  Understanding verbalized.  OnQ to be discontinued 7/14 @ 1330 and monitored by OSNF.

## 2018-07-12 NOTE — PROGRESS NOTES
"[facility]  Orthopedics  Progress Note    Patient Name: [facility] PAT Cantor Jr.  MRN: [id number]  Admission Date: 7/10/2018  Hospital Length of Stay: 2 days  Attending Provider: Miguel Stuart MD  Primary Care Provider: Desmond Barlow MD  Follow-up For: Procedure(s) (LRB):  REVISION-ARTHROPLASTY-KNEE-DAKOTA (Right)    Post-Operative Day: 2 Days Post-Op  Subjective:     Principal Problem:Prosthetic joint implant failure    Principal Orthopedic Problem: s/p right revision TKA    Interval History: Patient seen and examined at bedside. Much better night last night. Mild confusion, but no combative behavior. His pain is controlled sans opioids. He worked with PT yesterday and walked 8'.      Review of patient's allergies indicates:   Allergen Reactions    Niacin Itching and Other (See Comments)     Other reaction(s): facial flushing and causes hepatitis     Terbinafine Other (See Comments)     Other reaction(s): Hepatitis    "gave me rash"       Current Facility-Administered Medications   Medication    acetaminophen tablet 1,000 mg    aspirin EC tablet 81 mg    bisacodyl suppository 10 mg    dextrose 50% injection 12.5 g    dextrose 50% injection 25 g    famotidine tablet 20 mg    fenofibrate tablet 145 mg    furosemide tablet 40 mg    gabapentin capsule 300 mg    glucagon (human recombinant) injection 1 mg    glucose chewable tablet 16 g    glucose chewable tablet 24 g    insulin aspart U-100 pen 1-10 Units    losartan tablet 25 mg    metoprolol succinate (TOPROL-XL) 24 hr tablet 100 mg    mupirocin 2 % ointment 1 g    ondansetron injection 4 mg    oxyCODONE immediate release tablet 5 mg    polyethylene glycol packet 17 g    pravastatin tablet 10 mg    promethazine (PHENERGAN) 6.25 mg in dextrose 5 % 50 mL IVPB    ramelteon tablet 8 mg    ropivacaine (PF) 2 mg/ml (0.2%) infusion    senna-docusate 8.6-50 mg per tablet 1 tablet    sodium chloride 0.9% flush 3 mL    sodium " "chloride 0.9% flush 3 mL    tamsulosin 24 hr capsule 0.4 mg    warfarin (COUMADIN) tablet 5 mg     Objective:     Vital Signs (Most Recent):  Temp: 97.7 °F (36.5 °C) (07/12/18 0446)  Pulse: 93 (07/12/18 0446)  Resp: 18 (07/12/18 0446)  BP: 134/81 (07/12/18 0446)  SpO2: 98 % (07/12/18 0446) Vital Signs (24h Range):  Temp:  [97.7 °F (36.5 °C)-98.3 °F (36.8 °C)] 97.7 °F (36.5 °C)  Pulse:  [82-99] 93  Resp:  [18-20] 18  SpO2:  [93 %-98 %] 98 %  BP: (115-167)/(59-87) 134/81     Weight: (!) 144.7 kg (319 lb)  Height: 5' 10" (177.8 cm)  Body mass index is 45.77 kg/m².      Intake/Output Summary (Last 24 hours) at 07/12/18 0555  Last data filed at 07/11/18 1300   Gross per 24 hour   Intake             1712 ml   Output              325 ml   Net             1387 ml       Ortho/SPM Exam     Awake/alert/oriented x 3  No acute distress  AF. Mild HTN.  Stable on room air  Incisional wound vac in place with good suction seal  Dressing c/d/i  Motor and sensation intact in bilateral UE/LE        Significant Labs: All pertinent labs within the past 24 hours have been reviewed.    Significant Imaging: I have reviewed and interpreted all pertinent imaging results/findings.    Assessment/Plan:     * Prosthetic joint implant failure    [facility] PAT Cantor Jr. is a 72 y.o. male who is POD2 s/p right revision TKA     -- Pain control: multimodal; will continue to hold opioids given delirium, though improved   -- PT/OT: WBAT, ROM AT   -- DVT prophylaxis: Coumadin with ASA bridge (81mg BID); will restart home Eliquis on POD7. INR this AM is pending   -- Lines/drains: incisional wound vac in place   -- Labs: pending this AM     -- Dispo: likely SNF placement today          Type 2 diabetes mellitus with diabetic polyneuropathy    -- Diabetic diet  -- SSI        HTN (hypertension)    -- Continue home medications          Chronic kidney disease, stage III (moderate)    -- Cr stable at 1.4        Chronic atrial fibrillation    -- Will restart home " Eliquis on POD7  -- Coumadin with ASA bridge in the meantime              Michael J. Casale, MD  Orthopedics  [facility]

## 2018-07-12 NOTE — DISCHARGE SUMMARY
[facility]  Orthopedics  Discharge Summary      Patient Name: [facility] PAT Cantor Jr.  MRN: [id number]  Admission Date: 7/10/2018  Hospital Length of Stay: 2 days  Discharge Date and Time:  07/12/2018 3:11 PM  Attending Physician: Miguel Stuart MD   Discharging Provider: Michael J. Casale, MD  Primary Care Provider: Desmond Barlow MD    HPI:   No notes on file    Procedure(s) (LRB):  REVISION-ARTHROPLASTY-KNEE-DAKOTA (Right)      Hospital Course:  On 7/10/2018, the patient arrived to the [facility] on the second floor of [facility] for proper pre-operative management.  Upon completion of pre-operative preparation, the patient was taken back to the operative theatre.  A right revision TKA was performed without complication and the patient was transported to the post anesthesia care unit in stable condition.  After appropriate recovery from the anaesthetic agents used during the surgery, the patient was then transported to the hospital inpatient floor.  The interim of the hospital stay from arrival on the floor up to discharge has been uncomplicated. The patient's diet has progressed to a regular diet with no nausea or vomiting.  The patient's pain has been controlled with a multimodal approach with the the help of the anesthesia pain service; the patient is pleased with the pain control on this regimen. The patient began participation in physical therapy on post-operative day one and has progressed throughout the entire hospital stay.  Currently the patient is ambulating with moderate assistance and the physical therapy team feels that the patient's progress is insufficient to allow the patient to be discharged home safely.  As a result, their recommendations are for the patient to be admitted to an inpatient skilled nursing facility for continued strengthening and physical therapy until which point the patient is deemed able to be discharged home safely. The patient agrees  with this assessment and desires a discharge to the skilled nursing facility.        Please note the following medication instructions:   - Please discontinue ASA 81mg BID when the patient's INR becomes therapeutic (goal range = 1.8 - 2.2)   - Please discontinue Warfarin on POD7 and resume the patient's home dose of Apixiban        Consults         Status Ordering Provider     Inpatient consult to Pain Management  Once     Provider:  (Not yet assigned)    Acknowledged CASALE, MICHAEL JOSEPH     Inpatient consult to [facility]  Once     Provider:  (Not yet assigned)    Acknowledged CRISSY MANRIQUE     IP consult case management/social work  Once     Provider:  (Not yet assigned)    Acknowledged CASALE, MICHAEL JOSEPH          Significant Diagnostic Studies: Labs:   BMP:   Recent Labs  Lab 07/11/18  0352 07/12/18  0604   * 209*   * 135*   K 4.3 4.1    108   CO2 19* 18*   BUN 23 21   CREATININE 1.4 1.3   CALCIUM 8.5* 9.1   , CBC   Recent Labs  Lab 07/11/18  0353 07/12/18  0604   HGB 12.7* 12.5*   HCT 38.2* 36.2*    and INR   Lab Results   Component Value Date    INR 1.4 (H) 07/12/2018    INR 1.1 07/11/2018    INR 1.1 07/10/2018     Radiology: X-Ray: right knee    Pending Diagnostic Studies:     None        Final Active Diagnoses:    Diagnosis Date Noted POA    PRINCIPAL PROBLEM:  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 Go in 1 week.    Specialty:  Orthopedic Surgery  Why:  For wound re-check  Contact information:  [address]  [facility] [address]  [phone]                 Patient Instructions:     Ambulatory consult to Anticoagulation Monitoring   Referral Priority: Routine Referral Type: Consultation   Referral Reason: Specialty Services Required    Requested Specialty: Cardiology        Medications:  Transfer Medications (for Discharge Readmit only):   Current Facility-Administered Medications   Medication Dose Route Frequency Provider Last Rate Last Dose    aspirin EC tablet 81 mg  81 mg Oral Daily Michael Joseph Casale, MD   81 mg at 07/12/18 0958    bisacodyl suppository 10 mg  10 mg Rectal Q12H PRN Michael Joseph Casale, MD        dextrose 50% injection 12.5 g  12.5 g Intravenous PRN Michael Joseph Casale, MD        dextrose 50% injection 25 g  25 g Intravenous PRN Michael Joseph Casale, MD        famotidine tablet 20 mg  20 mg Oral BID Michael Joseph Casale, MD   20 mg at 07/12/18 0958    fenofibrate tablet 145 mg  145 mg Oral Daily Michael Joseph Casale, MD   145 mg at 07/12/18 0958    furosemide tablet 40 mg  40 mg Oral Daily Michael Joseph Casale, MD   40 mg at 07/12/18 0958    gabapentin capsule 300 mg  300 mg Oral TID Jose G Burns MD   300 mg at 07/12/18 0958    glucagon (human recombinant) injection 1 mg  1 mg Intramuscular PRN Michael Joseph Casale, MD        glucose chewable tablet 16 g  16 g Oral PRN Michael Joseph Casale, MD        glucose chewable tablet 24 g  24 g Oral PRN Michael Joseph Casale, MD        insulin aspart U-100 pen 1-10 Units  1-10 Units Subcutaneous QID (AC + HS) PRN Michael Joseph Casale, MD   3 Units at 07/11/18 2113    losartan tablet 25 mg  25 mg Oral Daily Michael Joseph Casale, MD   25 mg at 07/12/18 0958    metoprolol succinate (TOPROL-XL) 24 hr tablet 100 mg  100 mg Oral QHS Michael Joseph Casale, MD   100 mg at 07/11/18 2118    mupirocin 2 % ointment 1 g  1 g Nasal BID Michael Joseph Casale, MD   1 g at 07/12/18 0900    ondansetron injection 4 mg  4 mg  Intravenous Q6H PRN Pierre Alamo MD        oxyCODONE immediate release tablet 5 mg  5 mg Oral Q6H PRN Jose G Burns MD        polyethylene glycol packet 17 g  17 g Oral Daily Michael Joseph Casale, MD   17 g at 07/12/18 0958    pravastatin tablet 10 mg  10 mg Oral QHS Michael Joseph Casale, MD   10 mg at 07/11/18 2118    promethazine (PHENERGAN) 6.25 mg in dextrose 5 % 50 mL IVPB  6.25 mg Intravenous Q6H PRN Michael Joseph Casale, MD        ramelteon tablet 8 mg  8 mg Oral Nightly PRN Michael Joseph Casale, MD        ropivacaine 0.2% ON-Q C-BLOC 400 ML (SELECT A FLOW)   Perineural Continuous Jose G Burns MD 8 mL/hr at 07/12/18 1345      senna-docusate 8.6-50 mg per tablet 1 tablet  1 tablet Oral BID Michael Joseph Casale, MD   1 tablet at 07/12/18 0958    sodium chloride 0.9% flush 3 mL  3 mL Intravenous PRN Mary Jo Munguia MD        sodium chloride 0.9% flush 3 mL  3 mL Intravenous Q8H Michael Joseph Casale, MD   3 mL at 07/12/18 1400    tamsulosin 24 hr capsule 0.4 mg  1 capsule Oral Daily Michael Joseph Casale, MD   0.4 mg at 07/12/18 0958    warfarin (COUMADIN) tablet 5 mg  5 mg Oral Daily Michael Joseph Casale, MD   5 mg at 07/11/18 1743       Please note the following medication instructions:   - Please discontinue ASA 81mg BID when the patient's INR becomes therapeutic (goal range = 1.8 - 2.2)   - Please discontinue Warfarin on POD7 and resume the patient's home dose of Apixiban        Michael J. Casale, MD  Orthopedics  [facility]

## 2018-07-12 NOTE — PT/OT/SLP PROGRESS
Occupational Therapy   Treatment    Name: Stephen Cantor Jr.  MRN: [id number]  Admitting Diagnosis:  Prosthetic joint implant failure  2 Days Post-Op    Recommendations:     Discharge Recommendations: nursing facility, skilled  Discharge Equipment Recommendations:  walker, rolling  Barriers to discharge:  None    Subjective     Communicated with: RN prior to session.  Pain/Comfort:  · Pain Rating 1:  (Pt did not rate.)    Patients cultural, spiritual, Mormonism conflicts given the current situation: None    Objective:     Patient found with: telemetry, pulse ox (continuous), oxygen, perineural catheter, FCD    General Precautions: Standard, fall   Orthopedic Precautions:RLE weight bearing as tolerated   Braces: N/A     Occupational Performance:    Bed Mobility:    · Patient completed Supine to Sit with minimum assistance     Functional Mobility/Transfers:  · Patient completed Sit <> Stand Transfer with minimum assistance  with  rolling walker   · Patient completed Bed <> Chair Transfer using Stand Pivot technique with minimum assistance with rolling walker  · Patient completed Toilet Transfer Stand Pivot technique with minimum assistance with  rolling walker  · Functional Mobility: Pt was able to T/F c surfaces raised.      Activities of Daily Living:  · Toileting: modified independence for toilet hygiene.    Patient left up in chair with all lines intact, call button in reach, RN notified and family present    [facility] 6 Click:  [facility] Total Score: 16      Education:    Assessment:     [facility] [place] Jr. is a 72 y.o. male with a medical diagnosis of Prosthetic joint implant failure.  He was able to perform supine/sit, sit/stand, BSC, and bed/chair T/F c min A and RW from raised surfaces.  Able to perform toilet hygiene c min A.  Pt c SOB during tx session.  Performance deficits affecting function are weakness, impaired endurance, impaired self care skills, impaired functional mobilty, orthopedic precautions.      Rehab  Prognosis:  Good; patient would benefit from acute skilled OT services to address these deficits and reach maximum level of function.       Plan:     Patient to be seen 6 x/week to address the above listed problems via self-care/home management, therapeutic activities, therapeutic exercises  · Plan of Care Expires: 07/25/18  · Plan of Care Reviewed with: patient, family    This Plan of care has been discussed with the patient who was involved in its development and understands and is in agreement with the identified goals and treatment plan    GOALS:    Occupational Therapy Goals        Problem: Occupational Therapy Goal    Goal Priority Disciplines Outcome Interventions   Occupational Therapy Goal     OT, PT/OT Ongoing (interventions implemented as appropriate)    Description:  Goals to be met by: 7/25/18     Patient will increase functional independence with ADLs by performing:    UE Dressing with Modified [place].  LE Dressing with Modified [place] and Assistive Devices as needed.  Grooming while standing at sink with Modified [place].  Toileting from bedside commode with Modified [place] for hygiene and clothing management.   Bathing from  edge of bed with Modified [place].  Toilet transfer to bedside commode with Modified [place].  Increased functional strength to WFL for B UE.  Upper extremity exercise program x15 reps per handout, with independence.                      Time Tracking:     OT Date of Treatment: 07/12/18  OT Start Time: 0930  OT Stop Time: 1005  OT Total Time (min): 35 min    Billable Minutes:Self Care/Home Management 35    VANESSA Baez  7/12/2018

## 2018-07-12 NOTE — PLAN OF CARE
Problem: Occupational Therapy Goal  Goal: Occupational Therapy Goal  Goals to be met by: 7/25/18     Patient will increase functional independence with ADLs by performing:    UE Dressing with Modified [place].  LE Dressing with Modified [place] and Assistive Devices as needed.  Grooming while standing at sink with Modified [place].  Toileting from bedside commode with Modified [place] for hygiene and clothing management.   Bathing from  edge of bed with Modified [place].  Toilet transfer to bedside commode with Modified [place].  Increased functional strength to WFL for B UE.  Upper extremity exercise program x15 reps per handout, with independence.     Cont. POC

## 2018-07-12 NOTE — ADDENDUM NOTE
Addendum  created 07/12/18 1350 by Sheridan Monge RN    Order list changed, Sign clinical note

## 2018-07-12 NOTE — PLAN OF CARE
CM provided patient's daughter with a SNF list this morning per request. Daughter to review list with patient's spouse. Patent's daughter stated number one SNF choice will be [facility]. CM to obtain additional choices from patient's daughter this afternoon.    Brianne Eldridge RN, CM [facility]  [phone] -[phone]- [phone]

## 2018-07-12 NOTE — ADDENDUM NOTE
Addendum  created 07/12/18 1229 by Leonard Fair MD    Charge Capture section accepted, Sign clinical note

## 2018-07-12 NOTE — ANESTHESIA POST-OP PAIN MANAGEMENT
Acute Pain Service Progress Note    [facility] PAT Cantor Jr. is a 72 y.o., male, [id number].    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       Subjective:     General appearance of Patient awake and alert this AM.  Confusion improved from previous night.  Sedating medications have been held    Pain with rest: 1    Numbers   Pain with movement: 4    Numbers   Side Effects    1. Pruritis No    2. Nausea No    3. Motor Blockade No, 0=Ability to raise lower extremities off bed    4. Sedation No, 1=awake and alert    Objective:     Catheter site clean, dry, intact         Vitals   Vitals:    07/12/18 0446   BP: 134/81   Pulse: 93   Resp: 18   Temp: 36.5 °C (97.7 °F)        Labs    Admission on 07/10/2018   Component Date Value Ref Range Status    POCT Glucose 07/10/2018 119* 70 - 110 mg/dL Final    Anaerobic Culture 07/10/2018 Culture in progress   Preliminary    Aerobic Bacterial Culture 07/10/2018 No growth   Preliminary    AFB Culture & Smear 07/10/2018 Culture in progress   Preliminary    AFB CULTURE STAIN 07/10/2018 No acid fast bacilli seen.   Final    Prothrombin Time 07/10/2018 11.0  9.0 - 12.5 sec Final    INR 07/10/2018 1.1  0.8 - 1.2 Final    Sodium 07/10/2018 135* 136 - 145 mmol/L Final    Potassium 07/10/2018 4.7  3.5 - 5.1 mmol/L Final    Chloride 07/10/2018  106  95 - 110 mmol/L Final    CO2 07/10/2018 22* 23 - 29 mmol/L Final    Glucose 07/10/2018 151* 70 - 110 mg/dL Final    BUN, Bld 07/10/2018 25* 8 - 23 mg/dL Final    Creatinine 07/10/2018 1.4  0.5 - 1.4 mg/dL Final    Calcium 07/10/2018 8.9  8.7 - 10.5 mg/dL Final    Anion Gap 07/10/2018 7* 8 - 16 mmol/L Final    eGFR if  07/10/2018 56.8* >60 mL/min/1.73 m^2 Final    eGFR if non African American 07/10/2018 49.1* >60 mL/min/1.73 m^2 Final    WBC 07/10/2018 9.01  3.90 - 12.70 K/uL Final    RBC 07/10/2018 4.19* 4.60 - 6.20 M/uL Final    Hemoglobin 07/10/2018 13.2* 14.0 - 18.0 g/dL Final    Hematocrit 07/10/2018 39.7* 40.0 - 54.0 % Final    MCV 07/10/2018 95  82 - 98 fL Final    MCH 07/10/2018 31.5* 27.0 - 31.0 pg Final    MCHC 07/10/2018 33.2  32.0 - 36.0 g/dL Final    RDW 07/10/2018 13.1  11.5 - 14.5 % Final    Platelets 07/10/2018 174  150 - 350 K/uL Final    MPV 07/10/2018 11.4  9.2 - 12.9 fL Final    Immature Granulocytes 07/10/2018 0.7* 0.0 - 0.5 % Final    Gran # (ANC) 07/10/2018 6.9  1.8 - 7.7 K/uL Final    Immature Grans (Abs) 07/10/2018 0.06* 0.00 - 0.04 K/uL Final    Lymph # 07/10/2018 1.1  1.0 - 4.8 K/uL Final    Mono # 07/10/2018 0.8  0.3 - 1.0 K/uL Final    Eos # 07/10/2018 0.2  0.0 - 0.5 K/uL Final    Baso # 07/10/2018 0.04  0.00 - 0.20 K/uL Final    nRBC 07/10/2018 0  0 /100 WBC Final    Gran% 07/10/2018 76.0* 38.0 - 73.0 % Final    Lymph% 07/10/2018 12.0* 18.0 - 48.0 % Final    Mono% 07/10/2018 8.3  4.0 - 15.0 % Final    Eosinophil% 07/10/2018 2.6  0.0 - 8.0 % Final    Basophil% 07/10/2018 0.4  0.0 - 1.9 % Final    Differential Method 07/10/2018 Automated   Final    POCT Glucose 07/10/2018 168* 70 - 110 mg/dL Final    POCT Glucose 07/10/2018 143* 70 - 110 mg/dL Final    Sodium 07/11/2018 135* 136 - 145 mmol/L Final    Potassium 07/11/2018 4.3  3.5 - 5.1 mmol/L Final    Chloride 07/11/2018 107  95 - 110 mmol/L Final    CO2 07/11/2018 19* 23 - 29  mmol/L Final    Glucose 07/11/2018 223* 70 - 110 mg/dL Final    BUN, Bld 07/11/2018 23  8 - 23 mg/dL Final    Creatinine 07/11/2018 1.4  0.5 - 1.4 mg/dL Final    Calcium 07/11/2018 8.5* 8.7 - 10.5 mg/dL Final    Anion Gap 07/11/2018 9  8 - 16 mmol/L Final    eGFR if  07/11/2018 56.8* >60 mL/min/1.73 m^2 Final    eGFR if non African American 07/11/2018 49.1* >60 mL/min/1.73 m^2 Final    Hemoglobin 07/11/2018 12.7* 14.0 - 18.0 g/dL Final    Hematocrit 07/11/2018 38.2* 40.0 - 54.0 % Final    Prothrombin Time 07/11/2018 11.5  9.0 - 12.5 sec Final    INR 07/11/2018 1.1  0.8 - 1.2 Final    POCT Glucose 07/11/2018 186* 70 - 110 mg/dL Final    POCT Glucose 07/11/2018 260* 70 - 110 mg/dL Final    POCT Glucose 07/11/2018 259* 70 - 110 mg/dL Final    POCT Glucose 07/11/2018 252* 70 - 110 mg/dL Final    Sodium 07/12/2018 135* 136 - 145 mmol/L Final    Potassium 07/12/2018 4.1  3.5 - 5.1 mmol/L Final    Chloride 07/12/2018 108  95 - 110 mmol/L Final    CO2 07/12/2018 18* 23 - 29 mmol/L Final    Glucose 07/12/2018 209* 70 - 110 mg/dL Final    BUN, Bld 07/12/2018 21  8 - 23 mg/dL Final    Creatinine 07/12/2018 1.3  0.5 - 1.4 mg/dL Final    Calcium 07/12/2018 9.1  8.7 - 10.5 mg/dL Final    Anion Gap 07/12/2018 9  8 - 16 mmol/L Final    eGFR if African American 07/12/2018 >60.0  >60 mL/min/1.73 m^2 Final    eGFR if non African American 07/12/2018 53.8* >60 mL/min/1.73 m^2 Final    Hemoglobin 07/12/2018 12.5* 14.0 - 18.0 g/dL Final    Hematocrit 07/12/2018 36.2* 40.0 - 54.0 % Final    Prothrombin Time 07/12/2018 14.2* 9.0 - 12.5 sec Final    INR 07/12/2018 1.4* 0.8 - 1.2 Final        Meds   Current Facility-Administered Medications   Medication Dose Route Frequency Provider Last Rate Last Dose    acetaminophen tablet 1,000 mg  1,000 mg Oral Q6H Jose G Burns MD   1,000 mg at 07/12/18 0607    aspirin EC tablet 81 mg  81 mg Oral Daily Michael Joseph Casale, MD   81 mg at 07/11/18  1743    bisacodyl suppository 10 mg  10 mg Rectal Q12H PRN Michael Joseph Casale, MD        dextrose 50% injection 12.5 g  12.5 g Intravenous PRN Michael Joseph Casale, MD        dextrose 50% injection 25 g  25 g Intravenous PRN Michael Joseph Casale, MD        famotidine tablet 20 mg  20 mg Oral BID Michael Joseph Casale, MD   20 mg at 07/11/18 2118    fenofibrate tablet 145 mg  145 mg Oral Daily Michael Joseph Casale, MD        furosemide tablet 40 mg  40 mg Oral Daily Michael Joseph Casale, MD   40 mg at 07/11/18 0915    gabapentin capsule 300 mg  300 mg Oral TID Jose G Burns MD   300 mg at 07/11/18 2118    glucagon (human recombinant) injection 1 mg  1 mg Intramuscular PRN Michael Joseph Casale, MD        glucose chewable tablet 16 g  16 g Oral PRN Michael Joseph Casale, MD        glucose chewable tablet 24 g  24 g Oral PRN Michael Joseph Casale, MD        insulin aspart U-100 pen 1-10 Units  1-10 Units Subcutaneous QID (AC + HS) PRN Michael Joseph Casale, MD   3 Units at 07/11/18 2113    losartan tablet 25 mg  25 mg Oral Daily Michael Joseph Casale, MD   25 mg at 07/11/18 0915    metoprolol succinate (TOPROL-XL) 24 hr tablet 100 mg  100 mg Oral QHS Michael Joseph Casale, MD   100 mg at 07/11/18 2118    mupirocin 2 % ointment 1 g  1 g Nasal BID Michael Joseph Casale, MD   1 g at 07/11/18 2100    ondansetron injection 4 mg  4 mg Intravenous Q6H PRN Pierre Alamo MD        oxyCODONE immediate release tablet 5 mg  5 mg Oral Q6H PRN Jose G Burns MD        polyethylene glycol packet 17 g  17 g Oral Daily Michael Joseph Casale, MD        pravastatin tablet 10 mg  10 mg Oral QHS Michael Joseph Casale, MD   10 mg at 07/11/18 2118    promethazine (PHENERGAN) 6.25 mg in dextrose 5 % 50 mL IVPB  6.25 mg Intravenous Q6H PRN Michael Joseph Casale, MD        ramelteon tablet 8 mg  8 mg Oral Nightly PRN Michael Joseph Casale, MD        ropivacaine (PF) 2 mg/ml (0.2%) infusion  8 mL/hr  Perineural Continuous Pierre Alamo MD 8 mL/hr at 07/12/18 0606 8 mL/hr at 07/12/18 0606    senna-docusate 8.6-50 mg per tablet 1 tablet  1 tablet Oral BID Michael Joseph Casale, MD   1 tablet at 07/11/18 2118    sodium chloride 0.9% flush 3 mL  3 mL Intravenous PRN Mary Jo Munguia MD        sodium chloride 0.9% flush 3 mL  3 mL Intravenous Q8H Michael Joseph Casale, MD   3 mL at 07/12/18 0600    tamsulosin 24 hr capsule 0.4 mg  1 capsule Oral Daily Michael Joseph Casale, MD   0.4 mg at 07/11/18 0915    warfarin (COUMADIN) tablet 5 mg  5 mg Oral Daily Michael Joseph Casale, MD   5 mg at 07/11/18 1743        Anticoagulant dose Coumadin at 5mg daily.    Assessment:     Pain control currently adequate.    Plan:     Pain controlled with limited sedating medications.  Will continue PNC at this time.           Evaluator Jose G Burns MD, CA-1      Pt seen and examined with Dr. Burns.  Note reviewed.  Agree with H&P, assessment, and plan.      Leonard Fair M.D.  Attending Anesthesiologist

## 2018-07-12 NOTE — PLAN OF CARE
SW assigned to case today 7/12/2018. SW will assist team with DC needs. SW in communication with CM.    SW arranged transport with [facility]. Requested  time is 4:30PM.     Vandana Leoanrd LMSW  [facility]  [id number]

## 2018-07-12 NOTE — PT/OT/SLP PROGRESS
Physical Therapy Treatment and Discharge    Patient Name:  Stephen Cantor Jr.   MRN:  [id number]    Recommendations:     Discharge Recommendations:  nursing facility, skilled   Discharge Equipment Recommendations: walker, rolling   Barriers to discharge: Decreased caregiver support    Assessment:     Stephen Cantor Jr. is a 72 y.o. male admitted with a medical diagnosis of Prosthetic joint implant failure.  Pt tolerated treatment well and will be discharged to SNF today.  No further acute services needed.  Pt will continue to benefit from skilled PT services to address the below deficits and to increase functional independence.      Rehab Prognosis:  good; patient would benefit from acute skilled PT services to address these deficits and reach maximum level of function.      Recent Surgery: Procedure(s) (LRB):  REVISION-ARTHROPLASTY-KNEE-DAKOTA (Right) 2 Days Post-Op    Plan:     · D/C acute PT   Plan of Care Reviewed with: patient, family    Subjective     Communicated with RN prior to session.  Patient found seated in chair upon PT entry to room, agreeable to treatment.      Chief Complaint: would like to return to bed to rest before transfer to SNF    Pain/Comfort:  · Pain Rating 1:  (did not rate)  · Pain Rating Post-Intervention 1:  (did not rate)    Patients cultural, spiritual, Congregation conflicts given the current situation: none noted    Objective:     Patient found with: perineural catheter, telemetry, FCD, pulse ox (continuous)     General Precautions: Standard, fall   Orthopedic Precautions:RLE weight bearing as tolerated   Braces: N/A     Functional Mobility:    Bed Mobility:    · Sit to Supine: Mod A with HOB elevated    Transfers:  · Sit to Stand: Max A with RW  Chair/Bed: Stand Pivot with Maximum Assistance with Rolling Walker    AM-PAC 6 CLICK MOBILITY  Turning over in bed (including adjusting bedclothes, sheets and blankets)?: 2  Sitting down on and standing up from a chair with arms (e.g.,  wheelchair, bedside commode, etc.): 2  Moving from lying on back to sitting on the side of the bed?: 2  Moving to and from a bed to a chair (including a wheelchair)?: 2  Need to walk in hospital room?: 3  Climbing 3-5 steps with a railing?: 2  Basic Mobility Total Score: 13     Therapeutic Activities and Exercises:    · Pt educated on:   · Role of PT, safety with functional mobility.   · DME and discharge needs  · Importance of OOB activity  · Weightbearing precautions  · Gait sequencing       Patient left supine with all lines intact, call button in reach and RN notified.    GOALS:    Physical Therapy Goals        Problem: Physical Therapy Goal    Goal Priority Disciplines Outcome Goal Variances Interventions   Physical Therapy Goal     PT/OT, PT Ongoing (interventions implemented as appropriate)     Description:  Goals to be met by: 18     Patient will increase functional independence with mobility by performin. Supine to sit with Set-up [place]  2. Sit to supine with Set-up [place]  3. Sit to stand transfer with min (A)  4. Bed to chair transfer with CGA using Rolling Walker  5. Gait  x 100 feet with CGA using Rolling Walker.   6. Lower extremity exercise program x20-30 reps per handout, with independence                        Time Tracking:     PT Received On: 18  PT Start Time: 1511     PT Stop Time: 1525  PT Total Time (min): 14 min     Billable Minutes: Therapeutic Activity 14    Adam Mallory, PT, DPT  2018   [phone]

## 2018-07-12 NOTE — SUBJECTIVE & OBJECTIVE
"Principal Problem:Prosthetic joint implant failure    Principal Orthopedic Problem: s/p right revision TKA    Interval History: Patient seen and examined at bedside. Much better night last night. Mild confusion, but no combative behavior. His pain is controlled sans opioids. He worked with PT yesterday and walked 8'.      Review of patient's allergies indicates:   Allergen Reactions    Niacin Itching and Other (See Comments)     Other reaction(s): facial flushing and causes hepatitis     Terbinafine Other (See Comments)     Other reaction(s): Hepatitis    "gave me rash"       Current Facility-Administered Medications   Medication    acetaminophen tablet 1,000 mg    aspirin EC tablet 81 mg    bisacodyl suppository 10 mg    dextrose 50% injection 12.5 g    dextrose 50% injection 25 g    famotidine tablet 20 mg    fenofibrate tablet 145 mg    furosemide tablet 40 mg    gabapentin capsule 300 mg    glucagon (human recombinant) injection 1 mg    glucose chewable tablet 16 g    glucose chewable tablet 24 g    insulin aspart U-100 pen 1-10 Units    losartan tablet 25 mg    metoprolol succinate (TOPROL-XL) 24 hr tablet 100 mg    mupirocin 2 % ointment 1 g    ondansetron injection 4 mg    oxyCODONE immediate release tablet 5 mg    polyethylene glycol packet 17 g    pravastatin tablet 10 mg    promethazine (PHENERGAN) 6.25 mg in dextrose 5 % 50 mL IVPB    ramelteon tablet 8 mg    ropivacaine (PF) 2 mg/ml (0.2%) infusion    senna-docusate 8.6-50 mg per tablet 1 tablet    sodium chloride 0.9% flush 3 mL    sodium chloride 0.9% flush 3 mL    tamsulosin 24 hr capsule 0.4 mg    warfarin (COUMADIN) tablet 5 mg     Objective:     Vital Signs (Most Recent):  Temp: 97.7 °F (36.5 °C) (07/12/18 0446)  Pulse: 93 (07/12/18 0446)  Resp: 18 (07/12/18 0446)  BP: 134/81 (07/12/18 0446)  SpO2: 98 % (07/12/18 0446) Vital Signs (24h Range):  Temp:  [97.7 °F (36.5 °C)-98.3 °F (36.8 °C)] 97.7 °F (36.5 °C)  Pulse:  " "[82-99] 93  Resp:  [18-20] 18  SpO2:  [93 %-98 %] 98 %  BP: (115-167)/(59-87) 134/81     Weight: (!) 144.7 kg (319 lb)  Height: 5' 10" (177.8 cm)  Body mass index is 45.77 kg/m².      Intake/Output Summary (Last 24 hours) at 07/12/18 0555  Last data filed at 07/11/18 1300   Gross per 24 hour   Intake             1712 ml   Output              325 ml   Net             1387 ml       Ortho/SPM Exam     Awake/alert/oriented x 3  No acute distress  AF. Mild HTN.  Stable on room air  Incisional wound vac in place with good suction seal  Dressing c/d/i  Motor and sensation intact in bilateral UE/LE        Significant Labs: All pertinent labs within the past 24 hours have been reviewed.    Significant Imaging: I have reviewed and interpreted all pertinent imaging results/findings.  "

## 2018-07-12 NOTE — PLAN OF CARE
POD 1 s/p right knee revision. PT/OT ordered to eval and treat. PT/OT recommended SNF placement. Patient currently lives with his spouse and across the street from his daughter and son-in-law. Patient has good family support. CM completed discharge assessment and planning with patient. Patient verbalized understanding. All questions and concerns addressed. SW and CM will continue to follow for any additional needs. Plan A to discharge to SNF as soon as medically stable. Plan B to discharge home with home health.     PCP: Desmond Barlow MD    Pharmacy:   [facility] - [place], [place] - [address]  [address]  [place] [address]  Phone: [phone] Fax: [phone]    Payor: [employer] MEDICARE / Plan: [employer] MEDICARE / Product Type: Medicare Advantage /      07/11/18 1530   Discharge Assessment   Assessment Type Discharge Planning Assessment   Confirmed/corrected address and phone number on facesheet? Yes   Assessment information obtained from? Patient;Medical Record   Expected Length of Stay (days) 3   Communicated expected length of stay with patient/caregiver yes   Prior to hospitilization cognitive status: Alert/Oriented   Prior to hospitalization functional status: Assistive Equipment   Current cognitive status: Alert/Oriented   Current Functional Status: Assistive Equipment   Lives With spouse;other (see comments)  (across the street from daughter and son-in-law)   Able to Return to Prior Arrangements no   Is patient able to care for self after discharge? No   Who are your caregiver(s) and their phone number(s)? spouse- Dorothy Cantor [phone], [phone]   Patient's perception of discharge disposition skilled nursing facility   Readmission Within The Last 30 Days no previous admission in last 30 days   Patient currently being followed by outpatient case management? No   Patient currently receives any other outside agency services? No    Equipment Currently Used at Home cane, straight;shower chair;other (see comments);hip kit  (bariatric standard walker and bariatric bedside commode)   Do you have any problems affording any of your prescribed medications? No   Is the patient taking medications as prescribed? yes   Does the patient have transportation home? Yes   Transportation Available family or friend will provide;van, wheelchair accessible   Does the patient receive services at the [facility]? No   Discharge Plan A Skilled Nursing Facility   Discharge Plan B Home Health;Home with family   Patient/Family In Agreement With Plan yes

## 2018-07-12 NOTE — ASSESSMENT & PLAN NOTE
[facility] [place] 72 y.o. male who is POD2 s/p right revision TKA     -- Pain control: multimodal; will continue to hold opioids given delirium, though improved   -- PT/OT: WBAT, ROM AT   -- DVT prophylaxis: Coumadin with ASA bridge (81mg BID); will restart home Eliquis on POD7. INR this AM is pending   -- Lines/drains: incisional wound vac in place   -- Labs: pending this AM     -- Dispo: likely SNF placement today

## 2018-07-13 ENCOUNTER — PATIENT OUTREACH (OUTPATIENT)
Dept: ADMINISTRATIVE | Facility: CLINIC | Age: 73
End: 2018-07-13

## 2018-07-13 PROBLEM — R53.81 DEBILITY: Status: ACTIVE | Noted: 2018-07-13

## 2018-07-13 LAB
INR PPP: 1.5
POCT GLUCOSE: 245 MG/DL (ref 70–110)
POCT GLUCOSE: 290 MG/DL (ref 70–110)
POCT GLUCOSE: 291 MG/DL (ref 70–110)
POCT GLUCOSE: 308 MG/DL (ref 70–110)
PROTHROMBIN TIME: 14.5 SEC

## 2018-07-13 PROCEDURE — 94799 UNLISTED PULMONARY SVC/PX: CPT

## 2018-07-13 PROCEDURE — 25000003 PHARM REV CODE 250: Performed by: HOSPITALIST

## 2018-07-13 PROCEDURE — 36415 COLL VENOUS BLD VENIPUNCTURE: CPT

## 2018-07-13 PROCEDURE — 94761 N-INVAS EAR/PLS OXIMETRY MLT: CPT

## 2018-07-13 PROCEDURE — 97530 THERAPEUTIC ACTIVITIES: CPT

## 2018-07-13 PROCEDURE — 97116 GAIT TRAINING THERAPY: CPT

## 2018-07-13 PROCEDURE — 97110 THERAPEUTIC EXERCISES: CPT

## 2018-07-13 PROCEDURE — 25000003 PHARM REV CODE 250: Performed by: STUDENT IN AN ORGANIZED HEALTH CARE EDUCATION/TRAINING PROGRAM

## 2018-07-13 PROCEDURE — 99306 1ST NF CARE HIGH MDM 50: CPT | Mod: I,,, | Performed by: HOSPITALIST

## 2018-07-13 PROCEDURE — 27000221 HC OXYGEN, UP TO 24 HOURS

## 2018-07-13 PROCEDURE — 63600175 PHARM REV CODE 636 W HCPCS: Performed by: STUDENT IN AN ORGANIZED HEALTH CARE EDUCATION/TRAINING PROGRAM

## 2018-07-13 PROCEDURE — 63600175 PHARM REV CODE 636 W HCPCS: Performed by: HOSPITALIST

## 2018-07-13 PROCEDURE — 97535 SELF CARE MNGMENT TRAINING: CPT

## 2018-07-13 PROCEDURE — 97162 PT EVAL MOD COMPLEX 30 MIN: CPT

## 2018-07-13 PROCEDURE — 85610 PROTHROMBIN TIME: CPT

## 2018-07-13 PROCEDURE — 11000004 HC SNF PRIVATE

## 2018-07-13 PROCEDURE — 97165 OT EVAL LOW COMPLEX 30 MIN: CPT

## 2018-07-13 RX ORDER — WARFARIN 2.5 MG/1
5 TABLET ORAL DAILY
Status: DISCONTINUED | OUTPATIENT
Start: 2018-07-14 | End: 2018-07-16

## 2018-07-13 RX ORDER — WARFARIN 2.5 MG/1
7.5 TABLET ORAL DAILY
Status: COMPLETED | OUTPATIENT
Start: 2018-07-13 | End: 2018-07-13

## 2018-07-13 RX ADMIN — METOPROLOL SUCCINATE 100 MG: 100 TABLET, EXTENDED RELEASE ORAL at 09:07

## 2018-07-13 RX ADMIN — FUROSEMIDE 40 MG: 40 TABLET ORAL at 09:07

## 2018-07-13 RX ADMIN — FAMOTIDINE 20 MG: 20 TABLET ORAL at 09:07

## 2018-07-13 RX ADMIN — ASPIRIN 81 MG: 81 TABLET, COATED ORAL at 09:07

## 2018-07-13 RX ADMIN — INSULIN DETEMIR 20 UNITS: 100 INJECTION, SOLUTION SUBCUTANEOUS at 12:07

## 2018-07-13 RX ADMIN — PRAVASTATIN SODIUM 10 MG: 10 TABLET ORAL at 09:07

## 2018-07-13 RX ADMIN — POLYETHYLENE GLYCOL 3350 17 G: 17 POWDER, FOR SOLUTION ORAL at 09:07

## 2018-07-13 RX ADMIN — INSULIN ASPART 3 UNITS: 100 INJECTION, SOLUTION INTRAVENOUS; SUBCUTANEOUS at 10:07

## 2018-07-13 RX ADMIN — LOSARTAN POTASSIUM 25 MG: 25 TABLET, FILM COATED ORAL at 09:07

## 2018-07-13 RX ADMIN — TAMSULOSIN HYDROCHLORIDE 0.4 MG: 0.4 CAPSULE ORAL at 09:07

## 2018-07-13 RX ADMIN — INSULIN ASPART 8 UNITS: 100 INJECTION, SOLUTION INTRAVENOUS; SUBCUTANEOUS at 05:07

## 2018-07-13 RX ADMIN — STANDARDIZED SENNA CONCENTRATE AND DOCUSATE SODIUM 1 TABLET: 8.6; 5 TABLET, FILM COATED ORAL at 09:07

## 2018-07-13 RX ADMIN — GABAPENTIN 300 MG: 300 CAPSULE ORAL at 09:07

## 2018-07-13 RX ADMIN — INSULIN ASPART 6 UNITS: 100 INJECTION, SOLUTION INTRAVENOUS; SUBCUTANEOUS at 12:07

## 2018-07-13 RX ADMIN — WARFARIN SODIUM 7.5 MG: 2.5 TABLET ORAL at 04:07

## 2018-07-13 RX ADMIN — MUPIROCIN 1 G: 20 OINTMENT TOPICAL at 09:07

## 2018-07-13 RX ADMIN — FENOFIBRATE 145 MG: 145 TABLET ORAL at 09:07

## 2018-07-13 RX ADMIN — GABAPENTIN 300 MG: 300 CAPSULE ORAL at 03:07

## 2018-07-13 RX ADMIN — INSULIN DETEMIR 20 UNITS: 100 INJECTION, SOLUTION SUBCUTANEOUS at 09:07

## 2018-07-13 NOTE — PT/OT/SLP EVAL
PhysicalTherapy   Evaluation and treatment    [facility] [place] Cant[place] Jr.   MRN: [id number]     PT Received On: 07/13/18  PT Start Time: 0950     PT Stop Time: 1050    PT Total Time (min): 60 min       Billable Minutes:  Evaluation 1, Gait Training 15, Therapeutic Activity 15 and Therapeutic Exercise 15    Diagnosis: Prosthetic joint implant failure; TKA revision (RLE)  Past Medical History:   Diagnosis Date    *Atrial fibrillation     Eliquis    Anticoagulant long-term use     apaxiban    Basal cell carcinoma 12/2015    left eye brow    BCC (basal cell carcinoma) 12/2015    left shoulder    Cataract     Chronic kidney disease     Diabetes mellitus with renal manifestations, uncontrolled     Dyslipidemia associated with type 2 diabetes mellitus     Fever blister     Hearing loss     HTN (hypertension)     Keloid cicatrix     Liver disease     Melanoma 12/07/2015    right cheek-in situ    Obesity     PN (peripheral neuropathy)     Primary osteoarthritis of right knee 1/25/2017    Screening for colon cancer 3/3/2016    Sleep apnea     wears CPAP at night    Thyroid disease     Type II or unspecified type diabetes mellitus with other specified manifestations, uncontrolled       Past Surgical History:   Procedure Laterality Date    APPENDECTOMY      CARDIOVERSION      CATARACT EXTRACTION      CATARACT EXTRACTION Right 05/14/2018    Dr. Greer    COLONOSCOPY N/A 3/3/2016    Procedure: COLONOSCOPY;  Surgeon: Bob Poe MD;  Location: [address]);  Service: Endoscopy;  Laterality: N/A;  Holding Eliquis for 3 days prior to colonoscopy. EC    epidural steroid injection      INTRAOCULAR PROSTHESES INSERTION Left 6/25/2018    Procedure: INSERTION, INTRAOCULAR LENS PROSTHESIS;  Surgeon: Lawrence Greer MD;  Location: [facility];  Service: Ophthalmology;  Laterality: Left;    JOINT REPLACEMENT      Knee    Meniere's disease surgery      PHACOEMULSIFICATION OF CATARACT Left 6/25/2018    Procedure:  PHACOEMULSIFICATION, CATARACT;  Surgeon: Lawrence Greer MD;  Location: [facility] OR;  Service: Ophthalmology;  Laterality: Left;    REVISION OF KNEE ARTHROPLASTY Right 7/10/2018    Procedure: REVISION-ARTHROPLASTY-KNEE-DAKOTA;  Surgeon: Miguel Stuart MD;  Location: [facility] OR 2ND FLR;  Service: Orthopedics;  Laterality: Right;  [place]    TONSILLECTOMY      TOTAL KNEE ARTHROPLASTY Right 01/25/2017    TKR    TOTAL KNEE ARTHROPLASTY Left     TKR, 1990's         General Precautions: Standard, fall  Orthopedic Precautions: RLE weight bearing as tolerated   Braces: N/A    Do you have any cultural, spiritual, Protestant conflicts, given your current situation?: none stated    Patient History:  Lives With: spouse (Leticia)  Living Arrangements: house  Home Accessibility: stairs to enter home  Number of Stairs to Enter Home: 4  Stair Railings at Home: outside, present at both sides  Transportation Available: family or friend will provide  Living Environment Comment: Pt lives with wife, who uses a rolling walker herself (but independent with self-care). Pt was Mod I prior to admission with his SPC at all times, RW in community. They live in a 1 , [address] [facility] with L HR, has a ramp. Used hip kip prior to admission. Pt is a retired superintendent (since 2002). Will have help from daughters (live across the street and are school teachers). Pt uses a tub-shower, no grab bars.    Equipment Currently Used at Home: bedside commode, bath bench, walker, rolling, cane, straight  DME owned (not currently used): none    Previous Level of Function:  Ambulation Skills: needs device  Transfer Skills: needs device  ADL Skills: needs device  Work/Leisure Activity: needs device    Subjective:  Communicated with pt prior to session.  Agreeable to PT services.   Reports a torn meniscus 30 years ago on LLE.  Chief Complaint: Edema in both lower extremities  Patient goals: To return to prior level of function    Pain/Comfort  Pain Rating 1:  4/10  Location - Side 1: Right  Location - Orientation 1: generalized  Location 1: knee  Pain Addressed 1: Reposition  Pain Rating Post-Intervention 1: 4/10    Objective:  Patient found seated in wheelchair with Patient found with: perineural catheter, wound vac    Cognitive Exam:  Oriented to: Person, Place, Time and Situation  Follows Commands/attention: Follows multistep  commands  Communication: clear/fluent, though slow with processing.  Safety awareness/insight to disability: intact    Physical Exam:  Postural examination/scapula alignment: -       Rounded shoulders  -       Forward head    Skin integrity: Visible skin intact  Edema: Pitting both lower extremities.  Circumference: 18 inches on LLE and 22.3 inches on RLE (across patella)    Sensation:   -       Intact    Upper Extremity Range of Motion:  Right Upper Extremity: WNL  Left Upper Extremity: WNL    Upper Extremity Strength:  Right Upper Extremity: WNL  Left Upper Extremity: WNL    Lower Extremity Range of Motion:  Right Lower Extremity: 3/5 hip flexion, LAQ  Left Lower Extremity: WNL    Lower Extremity Strength:  Right Lower Extremity: R knee flexion: 68 degrees; R knee extension: 10 degrees  Left Lower Extremity: WNL     Fine motor coordination:  -       Intact    Gross motor coordination: WFL    Functional Status:  MDS G  Bed Mobility Functional Status: mod(A)-Max(A)  Transfer Functional Status: mod(A)-Max(A)  Transfer Level of (A): 3: Two+ persons physical (A)  Walk in Room Functional Status: CGA-Min (A)  Walk in Corridor Functional Status: CGA-Min (A)  Locomotion on Unit Functional Status: CGA-Min (A) (with RW)  Moving from seated to standing position: Not steady, only able to stabilize with staff assistance  Walking (with assistive device if used): Not steady, only able to stabilize with staff assistance  Turning around and facing the opposite direction while walking: Not steady, only able to stabilize with staff assistance  Moving on and off  the toilet: Not steady, only able to stabilize with staff assistance  Surface-to-surface transfer (transfer between bed and chair or wheelchair): Not steady, only able to stabilize with staff assistance         AM-PAC 6 CLICK MOBILITY  Total Score:13    Bed Mobility:  Sit>Supine:Max A for mgmt of BLE  Supine>Sit: Mod A for mgmt of trunk    Transfers:  Sit<>Stand: Mod A x 2 from wheelchair  Stand Pivot Transfer: Min A with use of rolling walker    Gait:  Amb 20 feet with a step-to gait pattern, use of a rolling walker, CGA and placed the rolling walker too far ahead of him (able to correct with verbal cues).    Therex:  Seated- hip flexion, long arc quads, ankle pumps with knee extension x 20 reps BLE  SAQ x 20 reps  QS  Heel slides (with AAROM) x 10 reps    Balance:  Requires UE support on rolling walker for static standing balance (with CGA).    Patient left up in chair with call button in reach.    Assessment:  [facility] [place] PAT Cantor Jr. is a 72 y.o. male with a medical diagnosis of Prosthetic joint implant failure. Mr. Cantor has an evolving presentation, requiring supplemental O2 (2L), requiring Mod A for sit<>stand transfers, and demonstrating slightly slow processing when answering questions.  In addition, he will need 24 hr care at this time from a functional stand-point, and his wife cannot provide this assistance (as she uses a rolling walker).  Mr. Cantor presents with a number of comorbidities, increased edema in both lower extremities, and impaired O2 saturation, which are personal factors that will affect his plan of care.  His evaluation is thus labeled moderate complexity, and he will benefit from continued skilled PT services.     Rehab identified problem list/impairments: weakness, impaired endurance, impaired self care skills, impaired functional mobilty, gait instability, impaired balance, decreased lower extremity function, pain, impaired cognition, edema    Rehab potential is good.    Activity  tolerance: Fair    Discharge recommendations: home health PT     Barriers to discharge: None    Equipment recommendations: wheelchair (maybe a WC )     GOALS:    Physical Therapy Goals        Problem: Physical Therapy Goal    Goal Priority Disciplines Outcome Goal Variances Interventions   Physical Therapy Goal     PT/OT, PT Ongoing (interventions implemented as appropriate)     Description:  Goals to be met by: 2 weeks (  )    Patient will increase functional independence with mobility by performin. Supine to sit with Contact Guard Assistance  2. Sit to supine with Contact Guard Assistance  3. Sit to stand transfer with Contact Guard Assistance  4. Bed to chair transfer with Contact Guard Assistance using Rolling Walker  5. Gait  x 150 feet with Contact Guard Assistance using Rolling Walker.   6. Ascend/descend 4 stair with bilateral Handrails Contact Guard Assistance, as his house has four PUNEET with B HR.  7. Ascend/Descend 4 inch curb step with Contact Guard Assistance using Rolling Walker.  8. Stand for 3 minutes with Contact Guard Assistance using Rolling Walker while performing a task.  9. Lower extremity exercise program x 20 reps per handout, with assistance as needed.  10. Pt will improve his R knee active extension from -10 degrees to -5 degrees to allow for better functional mobility..  11. Pt will improve his L knee active flexion from 68 degrees to 80 degrees to allow for better functional mobility.                      PLAN:    Patient to be seen 5 x/week  to address the above listed problems via gait training, therapeutic activities, therapeutic exercises, wheelchair management/training  Plan of Care Expires: 18    Betzaida Wheeler, PT 2018

## 2018-07-13 NOTE — PLAN OF CARE
Problem: Physical Therapy Goal  Goal: Physical Therapy Goal  Goals to be met by: 2 weeks (  )    Patient will increase functional independence with mobility by performin. Supine to sit with Contact Guard Assistance  2. Sit to supine with Contact Guard Assistance  3. Sit to stand transfer with Contact Guard Assistance  4. Bed to chair transfer with Contact Guard Assistance using Rolling Walker  5. Gait  x 150 feet with Contact Guard Assistance using Rolling Walker.   6. Ascend/descend 4 stair with bilateral Handrails Contact Guard Assistance, as his house has four PUNEET with B HR.  7. Ascend/Descend 4 inch curb step with Contact Guard Assistance using Rolling Walker.  8. Stand for 3 minutes with Contact Guard Assistance using Rolling Walker while performing a task.  9. Lower extremity exercise program x 20 reps per handout, with assistance as needed.  10. Pt will improve his R knee active extension from -10 degrees to -5 degrees to allow for better functional mobility..  11. Pt will improve his L knee active flexion from 68 degrees to 80 degrees to allow for better functional mobility.    Outcome: Ongoing (interventions implemented as appropriate)  Goals set today.

## 2018-07-13 NOTE — CLINICAL REVIEW
"Clinical Pharmacy Chart Review Note      Admit Date: 7/12/2018   LOS: 1 day       [facility] [place] a 72 y.o. male admitted to SNF for PT/OT after hospitalization for prosthetic joint implant failure.    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]  Yes    Debility [R53.81]  Yes    Proteinuria due to type 2 diabetes mellitus [E11.29, R80.9]  Yes    Bilateral leg edema [R60.0]  Yes     Uses compression stockings on Left lower extremity   Unable to use on Rt side due to knee pain       Diastolic dysfunction [I51.9]  Yes    Enlarged prostate [N40.0]  Yes     No Hesitancy        Type 2 diabetes mellitus with diabetic polyneuropathy [E11.42]  Yes     As per him , no open areas on the feet       Dyslipidemia associated with type 2 diabetes mellitus [E11.69, E78.5]  Yes    Chronic kidney disease, stage III (moderate) [N18.3]  Yes     Base line 1.7- 1.8       Benign hypertensive renal disease without renal failure [I12.9]  Yes    Sleep apnea [G47.30]  Yes     Uses CPAP      Chronic atrial fibrillation [I48.2]  Yes    Long term current use of anticoagulant therapy [Z79.01]  Not Applicable      Resolved Hospital Problems    Diagnosis Date Resolved POA   No resolved problems to display.     Review of patient's allergies indicates:   Allergen Reactions    Niacin Itching and Other (See Comments)     Other reaction(s): facial flushing and causes hepatitis     Terbinafine Other (See Comments)     Other reaction(s): Hepatitis    "gave me rash"     Patient Active Problem List    Diagnosis Date Noted    Debility 07/13/2018    Prosthetic joint implant failure 07/10/2018    Antiplatelet or antithrombotic long-term use 06/19/2018    Nuclear sclerosis, bilateral 05/14/2018    Proteinuria due to type 2 diabetes mellitus 05/01/2018    Lumbar radiculopathy 04/24/2018    Chronic midline low back pain 04/23/2018    Bilateral leg edema 09/22/2017    Dermatitis, stasis " 03/07/2017    Primary osteoarthritis of right knee 01/25/2017    Pulmonary hypertension- 7/6/2018 - The estimated PA systolic pressure is 35 mmHg 01/10/2017    Tattoos 01/10/2017    Microhematuria 12/15/2016    Chronic pain of right knee 12/07/2016    Venous insufficiency of both lower extremities     Vitamin D deficiency 07/28/2016    History of cardioversion 06/17/2016    Lymphedema of both lower extremities 06/03/2016    Tinea pedis 04/05/2016    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/2016    Enlarged prostate 03/04/2016    Diastolic dysfunction 03/04/2016    Type 2 diabetes mellitus with diabetic chronic kidney disease 01/20/2016    Mohs defect of right cheek 12/10/2015    Low back ache 10/08/2015    Corns and callosities 01/30/2015    Type 2 diabetes mellitus with diabetic polyneuropathy     Morbid obesity     HTN (hypertension)     Dyslipidemia associated with type 2 diabetes mellitus     Chronic kidney disease, stage III (moderate) 11/05/2012    Benign hypertensive renal disease without renal failure 11/05/2012    Acquired cyst of kidney 11/05/2012    Calculus of kidney 11/05/2012    Onychomycosis of multiple toenails with type 2 diabetes mellitus and peripheral angiopathy 10/09/2012    Sleep apnea     Chronic atrial fibrillation 07/31/2012    Long term current use of anticoagulant therapy 07/31/2012    Sprain and strain of unspecified site of hip and thigh 06/25/2012    Sensory hearing loss, bilateral 02/08/2012    Enthesopathy of hip region 02/01/2012       Scheduled Meds:    aspirin  81 mg Oral Daily    famotidine  20 mg Oral BID    fenofibrate  145 mg Oral Daily    furosemide  40 mg Oral Daily    gabapentin  300 mg Oral TID    insulin detemir U-100  20 Units Subcutaneous BID    losartan  25 mg Oral Daily    metoprolol succinate  100 mg Oral QHS    mupirocin  1 g Nasal BID    polyethylene glycol  17 g Oral Daily    pravastatin  10 mg Oral QHS    senna-docusate 8.6-50 mg  1  tablet Oral BID    tamsulosin  1 capsule Oral Daily    warfarin  5 mg Oral Daily     Continuous Infusions:    ropivacaine (PF) in 0.9 % NaCl       PRN Meds: acetaminophen, acetaminophen, aluminum-magnesium hydroxide-simethicone, bisacodyl, calcium carbonate, dextrose 50%, dextrose 50%, glucagon (human recombinant), glucose, glucose, insulin aspart U-100, ramelteon    OBJECTIVE:     Vital Signs (Last 24H)  Temp:  [97.8 °F (36.6 °C)-98.5 °F (36.9 °C)]   Pulse:  []   Resp:  [18-20]   BP: (115-149)/(56-79)   SpO2:  [95 %-98 %]     Laboratory:  CBC:   Recent Labs  Lab 07/10/18  1141 07/11/18  0353 07/12/18  0604   WBC 9.01  --   --    RBC 4.19*  --   --    HGB 13.2* 12.7* 12.5*   HCT 39.7* 38.2* 36.2*     --   --    MCV 95  --   --    MCH 31.5*  --   --    MCHC 33.2  --   --      BMP:   Recent Labs  Lab 07/10/18  1141 07/11/18  0352 07/12/18  0604   * 223* 209*   * 135* 135*   K 4.7 4.3 4.1    107 108   CO2 22* 19* 18*   BUN 25* 23 21   CREATININE 1.4 1.4 1.3   CALCIUM 8.9 8.5* 9.1     CMP:   Recent Labs  Lab 07/10/18  1141 07/11/18  0352 07/12/18  0604   * 223* 209*   CALCIUM 8.9 8.5* 9.1   * 135* 135*   K 4.7 4.3 4.1   CO2 22* 19* 18*    107 108   BUN 25* 23 21   CREATININE 1.4 1.4 1.3     LFTs: No results for input(s): ALT, AST, ALKPHOS, BILITOT, PROT, ALBUMIN in the last 168 hours.  Coagulation:   Recent Labs  Lab 07/11/18  0352 07/12/18  0604 07/13/18  0603   INR 1.1 1.4* 1.5*         ASSESSMENT/PLAN:     Active Hospital Problems    Diagnosis    *Prosthetic joint implant failure   --S/P right revision TKA  --See plan below      Debility   --PT/OT: APAP 650 mg q6hrs prn mild pain  --bowel regimen for constipation; hold for loose or frequent stools: Miralax daily; Senna-docusate twice daily  --DVT prophylaxis: Warfarin until 7/17 (monitor INR daily)      Proteinuria due to type 2 diabetes mellitus   --Continue Losartan      Bilateral leg edema   --Low Na diet,  leg elevation  --MATTI hose      Diastolic dysfunction   **Monitor weight  --Low Na diet  --Furosemide 40 mg daily (monitor BMP)  --Continue Losartan, Metoprolol      Enlarged prostate   --Tamsulosin 0.4 mg daily      Type 2 diabetes mellitus with diabetic polyneuropathy   **Monitor blood glucose  --SSI  --Levemir 20u twice daily  --Gabapentin 300 mg three times daily for polyneuropathy    Lab Results   Component Value Date    HGBA1C 7.0 (H) 06/19/2018     POCT Glucose   Date Value Ref Range Status   07/13/2018 291 (H) 70 - 110 mg/dL Final   07/13/2018 245 (H) 70 - 110 mg/dL Final   07/12/2018 331 (H) 70 - 110 mg/dL Final   07/11/2018 252 (H) 70 - 110 mg/dL Final   07/11/2018 259 (H) 70 - 110 mg/dL Final   07/11/2018 260 (H) 70 - 110 mg/dL Final   07/11/2018 186 (H) 70 - 110 mg/dL Final   07/10/2018 143 (H) 70 - 110 mg/dL Final         Dyslipidemia associated with type 2 diabetes mellitus   --Pravastatin 10 mg daily  --Fenofibrate 145 mg daily    Lab Results   Component Value Date    CHOL 159 11/15/2017    CHOL 159 05/30/2017    CHOL 133 03/06/2017     Lab Results   Component Value Date    HDL 26 (L) 11/15/2017    HDL 22 (L) 05/30/2017    HDL 28 (L) 03/06/2017     Lab Results   Component Value Date    LDLCALC 95.2 11/15/2017    LDLCALC 97.6 05/30/2017    LDLCALC 79.0 03/06/2017     Lab Results   Component Value Date    TRIG 189 (H) 11/15/2017    TRIG 197 (H) 05/30/2017    TRIG 130 03/06/2017     Lab Results   Component Value Date    CHOLHDL 16.4 (L) 11/15/2017    CHOLHDL 13.8 (L) 05/30/2017    CHOLHDL 21.1 03/06/2017     Lab Results   Component Value Date    ALT 14 04/09/2018    AST 19 04/09/2018    ALKPHOS 38 (L) 04/09/2018    BILITOT 0.6 04/09/2018         Chronic kidney disease, stage III (moderate)   **Monitor renal function and adjust medications accordingly  7/13/2018 Estimated Creatinine Clearance: 73.1 mL/min (based on SCr of 1.3 mg/dL).       Benign hypertensive renal disease without renal failure    **Monitor BP  --Losartan 25 mg daily (monitor BMP)  --Metoprolol succinate 100 mg nightly    BP Readings from Last 3 Encounters:   07/13/18 120/69   07/12/18 (!) 144/66   07/06/18 119/62     BMP  Lab Results   Component Value Date     (L) 07/12/2018    K 4.1 07/12/2018     07/12/2018    CO2 18 (L) 07/12/2018    BUN 21 07/12/2018    CREATININE 1.3 07/12/2018    CALCIUM 9.1 07/12/2018    ANIONGAP 9 07/12/2018    ESTGFRAFRICA >60.0 07/12/2018    EGFRNONAA 53.8 (A) 07/12/2018         Sleep apnea   --Continue CPAP qHS, refuses to wear while at [facility]      Chronic atrial fibrillation   **Monitor pulse  --Metoprolol succinate 100 mg daily for rate control  --Apixaban on hold to be resumed, 7 days post-op on 7/17     Pulse Readings from Last 3 Encounters:   07/13/18 89   07/12/18 93   07/06/18 74         Long term current use of anticoagulant therapy   --Resume Apixaban 5 mg twice daily on 7/17/18 (monitor CBC as per protocol)            I have reviewed the medications in compliance with CMS Regulation F329 of the RAMON Appendix PP.      Emma Gibson, Pharm. D.  Clinical Pharmacist  [facility]

## 2018-07-13 NOTE — PLAN OF CARE
Problem: Occupational Therapy Goal  Goal: Occupational Therapy Goal  Goals to be met by: 14 days     Patient will increase functional independence with ADLs by performing:    Feeding with Modified [place].  UE Dressing with Modified [place].  LE Dressing with Stand-by Assistance using A/E and A/D as needed..  Grooming while standing with Stand-by Assistance with RW to stand..  Toileting from bedside commode with Stand-by Assistance for hygiene and clothing management with DME as needed.  Bathing from  edge of bed with Minimal Assistance.  Rolling to Bilateral with Modified [place].   Supine to sit with Modified [place].  Stand pivot transfers with Supervision with RW.  Upper extremity exercise program x10 reps per set, with assistance as needed.  Pt will tolerate up to 10 minutes standing activity with RW to steady.    Outcome: Ongoing (interventions implemented as appropriate)  VICK Rodriguez/JAYME      7/13/2018

## 2018-07-13 NOTE — ASSESSMENT & PLAN NOTE
Will start 40 units of detemir insulin.   Low dose correction scale   Cont blood glucose monitoring   ADA diet  Cont gabapentin to treat for neuropathy.

## 2018-07-13 NOTE — PLAN OF CARE
Problem: Patient Care Overview  Goal: Plan of Care Review  Outcome: Ongoing (interventions implemented as appropriate)  BLOOD GLUCOSES MONITORED.NO PRESSURE ULCERS NOTED.FALL PRECAUTIONS MAINTAINED NO INJURIES NOTED.CONFUSED REORIENTED TO TIME  AND PLACE.CAMERA MONITORING DONE.

## 2018-07-13 NOTE — PLAN OF CARE
Problem: Patient Care Overview  Goal: Plan of Care Review  Outcome: Ongoing (interventions implemented as appropriate)   18   Coping/Psychosocial   Plan Of Care Reviewed With patient       Problem: Confusion, Acute (Adult)  Intervention: Monitor/Assist with Self Care   18   Activity   Activity Assistance Provided assistance, 2 people   Daily Care Interventions   Self-Care Promotion BADL personal routines maintained;BADL personal objects within reach   Functional Level Current   Swallowing 0 - swallows foods/liquids without difficulty     Intervention: Reduce Risk/Promote Restraint Free Environment   18   Safety Interventions   Environmental Safety Modification assistive device/personal items within reach;lighting adjusted   Prevent  Drop/Fall   Safety/Security Measures bed alarm set     Intervention: Evaluate Medications/Identify Contributors to Confusion   18   Safety Interventions   Medication Review/Management medications reviewed     Intervention: Optimize Communication   18   Cognitive Interventions   Communication Enhancement Strategies call light answered in person     Intervention: Provide Frequent Orientation/Reorientation   18   Cognitive Interventions   Reorientation Measures reorientation provided       Goal: Identify Related Risk Factors and Signs and Symptoms  Related risk factors and signs and symptoms are identified upon initiation of Human Response Clinical Practice Guideline (CPG)  Outcome: Ongoing (interventions implemented as appropriate)   18   Confusion, Acute   Related Risk Factors (Acute Confusion) mobility decreased;recent surgery with general anesthesia   Signs and Symptoms (Acute Confusion) acute onset of symptoms;memory disturbed;disorientation;reasoning/planning disturbed;thought process diminished/disorganized

## 2018-07-13 NOTE — ASSESSMENT & PLAN NOTE
Maintain euvolemia by monitoring I/O's and daily weights.  Fluid restriction (2 liters/24 hours)  Low Na diet  Cont ARB, BB, lasix.   Patient Vitals for the past 72 hrs (Last 3 readings):   Weight   07/12/18 2000 (!) 149.8 kg (330 lb 4 oz)

## 2018-07-13 NOTE — HPI
Chief Complaint/Reason for Admission: Debility    History of Present Illness:  Patient is a 72 y.o. male who has a past medical history of Atrial fibrillation on anticoagulant long-term use; Basal cell carcinoma; Cataract; Chronic kidney disease; Diabetes mellitus with renal manifestations, uncontrolled; Dyslipidemia; hypertension; Obesity; PN (peripheral neuropathy); Primary osteoarthritis of right knee; Sleep apnea; Thyroid disease presented with chronic pain to his Right knee which has gotten progressively worse over the past few months. He had a right press-fit total knee arthroplasty in 2017. Imaging showed that the tibial component was subsiding with a limp falling into varus. The symptoms were severely impacting his activities of daily living and it was decided to proceed with a revision right total knee arthroplasty. Patient complex revision of tibial component on 7/10/18 by Dr. Stuart. Patient tolerated procedure well with no significant inta operative complications. Post operatively, patient had episodes of confusion and combativeness likelydue to opioid induced delirium. Pain control maintained with limited sedating medications mainly perineural adductor. Patient has been working with PT/OT who recommend SNF for further balance/mobility training. Patient currently sitting up in chair without complaints. He is awake and alert. He is angry due to not being allowed to leave the facility. He wishes to go home and not be admitted to SNF. No family at available at bedside. He lives at home with his wife who is able to assist with ADL's. Patient currently denies any fever/chills, chest pain, dyspnea, weakness, numbness, abdominal pain, nausea/vomiting, dysuria/hematuria, or weight loss.

## 2018-07-13 NOTE — SUBJECTIVE & OBJECTIVE
Past Medical History:   Diagnosis Date    *Atrial fibrillation     Eliquis    Anticoagulant long-term use     apaxiban    Basal cell carcinoma 12/2015    left eye brow    BCC (basal cell carcinoma) 12/2015    left shoulder    Cataract     Chronic kidney disease     Diabetes mellitus with renal manifestations, uncontrolled     Dyslipidemia associated with type 2 diabetes mellitus     Fever blister     Hearing loss     HTN (hypertension)     Keloid cicatrix     Liver disease     Melanoma 12/07/2015    right cheek-in situ    Obesity     PN (peripheral neuropathy)     Primary osteoarthritis of right knee 1/25/2017    Screening for colon cancer 3/3/2016    Sleep apnea     wears CPAP at night    Thyroid disease     Type II or unspecified type diabetes mellitus with other specified manifestations, uncontrolled        Past Surgical History:   Procedure Laterality Date    APPENDECTOMY      CARDIOVERSION      CATARACT EXTRACTION      CATARACT EXTRACTION Right 05/14/2018    Dr. Greer    COLONOSCOPY N/A 3/3/2016    Procedure: COLONOSCOPY;  Surgeon: Bob Poe MD;  Location: [facility] (4TH FLR);  Service: Endoscopy;  Laterality: N/A;  Holding Eliquis for 3 days prior to colonoscopy. EC    epidural steroid injection      INTRAOCULAR PROSTHESES INSERTION Left 6/25/2018    Procedure: INSERTION, INTRAOCULAR LENS PROSTHESIS;  Surgeon: Lawrence Greer MD;  Location: [facility];  Service: Ophthalmology;  Laterality: Left;    JOINT REPLACEMENT      Knee    Meniere's disease surgery      PHACOEMULSIFICATION OF CATARACT Left 6/25/2018    Procedure: PHACOEMULSIFICATION, CATARACT;  Surgeon: Lawrence Greer MD;  Location: [facility];  Service: Ophthalmology;  Laterality: Left;    REVISION OF KNEE ARTHROPLASTY Right 7/10/2018    Procedure: REVISION-ARTHROPLASTY-KNEE-SAMIR;  Surgeon: Miguel Stuart MD;  Location: [facility] 2ND FLR;  Service: Orthopedics;  Laterality: Right;  Samir    TONSILLECTOMY       "TOTAL KNEE ARTHROPLASTY Right 01/25/2017    TKR    TOTAL KNEE ARTHROPLASTY Left     TKR, 1990's       Review of patient's allergies indicates:   Allergen Reactions    Niacin Itching and Other (See Comments)     Other reaction(s): facial flushing and causes hepatitis     Terbinafine Other (See Comments)     Other reaction(s): Hepatitis    "gave me rash"       Current Facility-Administered Medications on File Prior to Encounter   Medication    [DISCONTINUED] aspirin EC tablet 81 mg    [DISCONTINUED] bisacodyl suppository 10 mg    [DISCONTINUED] dextrose 50% injection 12.5 g    [DISCONTINUED] dextrose 50% injection 25 g    [DISCONTINUED] famotidine tablet 20 mg    [DISCONTINUED] fenofibrate tablet 145 mg    [DISCONTINUED] furosemide tablet 40 mg    [DISCONTINUED] gabapentin capsule 300 mg    [DISCONTINUED] glucagon (human recombinant) injection 1 mg    [DISCONTINUED] glucose chewable tablet 16 g    [DISCONTINUED] glucose chewable tablet 24 g    [DISCONTINUED] insulin aspart U-100 pen 1-10 Units    [DISCONTINUED] losartan tablet 25 mg    [DISCONTINUED] metoprolol succinate (TOPROL-XL) 24 hr tablet 100 mg    [DISCONTINUED] mupirocin 2 % ointment 1 g    [DISCONTINUED] ondansetron injection 4 mg    [DISCONTINUED] oxyCODONE immediate release tablet 5 mg    [DISCONTINUED] polyethylene glycol packet 17 g    [DISCONTINUED] pravastatin tablet 10 mg    [DISCONTINUED] promethazine (PHENERGAN) 6.25 mg in dextrose 5 % 50 mL IVPB    [DISCONTINUED] ramelteon tablet 8 mg    [DISCONTINUED] ropivacaine 0.2% ON-Q C-BLOC 400 ML (SELECT A FLOW)    [DISCONTINUED] senna-docusate 8.6-50 mg per tablet 1 tablet    [DISCONTINUED] sodium chloride 0.9% flush 3 mL    [DISCONTINUED] sodium chloride 0.9% flush 3 mL    [DISCONTINUED] tamsulosin 24 hr capsule 0.4 mg    [DISCONTINUED] warfarin (COUMADIN) tablet 5 mg     Current Outpatient Prescriptions on File Prior to Encounter   Medication Sig    apixaban 5 mg Tab Take 1 " "tablet (5 mg total) by mouth 2 (two) times daily.    aspirin 81 MG Chew Take 1 tablet (81 mg total) by mouth 2 (two) times daily. for 7 days    azelastine (ASTELIN) 137 mcg (0.1 %) nasal spray 1 spray (137 mcg total) by Nasal route 2 (two) times daily.    BD INSULIN PEN NEEDLE UF ORIG 29 x 1/2 " Ndle     BD ULTRA-FINE BASIL PEN NEEDLES 32 gauge x 5/32" Ndle CHECK BLOOD SUGAR FOUR TIMES DAILY    ciclopirox (PENLAC) 8 % Soln Apply topically nightly.    clobetasol (TEMOVATE) 0.05 % cream AAA finger bid    clotrimazole-betamethasone 1-0.05% (LOTRISONE) cream Apply topically 2 (two) times daily.    docusate sodium (COLACE) 100 MG capsule Take 1 capsule (100 mg total) by mouth 2 (two) times daily as needed for Constipation.    fenofibrate (TRICOR) 145 MG tablet TAKE ONE TABLET BY MOUTH EVERY DAY    furosemide (LASIX) 40 MG tablet TAKE ONE TABLET BY MOUTH EVERY DAY AS NEEDED (Patient taking differently: TAKE ONE TABLET BY MOUTH EVERY DAY)    gabapentin (NEURONTIN) 300 MG capsule Take 1 capsule (300 mg total) by mouth 3 (three) times daily.    insulin aspart (NOVOLOG FLEXPEN) 100 unit/mL InPn pen Novolog 20 units breakfast, 22 units lunch and dinner plus correction scale. Max daily dose=100 units    insulin glargine (BASAGLAR KWIKPEN U-100 INSULIN) 100 unit/mL (3 mL) InPn pen Inject 60 Units into the skin once daily. (Patient taking differently: Inject 60 Units into the skin every evening. )    ketoconazole (NIZORAL) 2 % cream Apply topically once daily.    lancets 33 gauge Misc 1 lancet by Misc.(Non-Drug; Combo Route) route 4 (four) times daily. Pt uses True Result Meter, bg monitoring 4 times a day.    levothyroxine (SYNTHROID) 25 MCG tablet Take 1 tablet (25 mcg total) by mouth once daily.    losartan (COZAAR) 25 MG tablet Take 1 tablet (25 mg total) by mouth once daily.    metoprolol succinate (TOPROL-XL) 100 MG 24 hr tablet TAKE ONE TABLET BY MOUTH EVERY DAY    metronidazole (METROGEL) 0.75 % gel " Apply topically 2 (two) times daily.    nystatin-triamcinolone (MYCOLOG II) cream apply TWICE DAILY (Patient taking differently: apply TWICE DAILY-using as needed for rash)    ondansetron (ZOFRAN-ODT) 8 MG TbDL Take 1 tablet (8 mg total) by mouth every 6 (six) hours as needed.    oxyCODONE-acetaminophen (PERCOCET)  mg per tablet Take 1 tablet by mouth every 4 (four) hours as needed for Pain.    pravastatin (PRAVACHOL) 10 MG tablet Take 1 tablet (10 mg total) by mouth once daily. (Patient taking differently: Take 10 mg by mouth every evening. )    tamsulosin (FLOMAX) 0.4 mg Cp24 Take 1 capsule (0.4 mg total) by mouth once daily.    temazepam (RESTORIL) 15 mg Cap TAKE ONE CAPSULE BY MOUTH EVERY EVENING    tiZANidine (ZANAFLEX) 4 MG tablet TAKE ONE TABLET BY MOUTH EVERY 6 HOURS AS NEEDED    triamcinolone acetonide 0.1% (KENALOG) 0.1 % cream Apply topically 2 (two) times daily. Apply to affected area twice daily as directed.    warfarin (COUMADIN) 2.5 MG tablet Take 1-3 tablets daily as directed by the Coumadin clinic.     Family History     Problem Relation (Age of Onset)    ALS Brother    Cancer Sister, Brother, Sister    Diabetes Mother, Father    Eczema Sister    Heart disease Father    Hypertension Father    No Known Problems Sister, Brother, Maternal Aunt, Maternal Uncle, Paternal Aunt, Paternal Uncle, Maternal Grandmother, Maternal Grandfather, Paternal Grandmother, Paternal Grandfather    Stroke Mother        Social History Main Topics    Smoking status: Former Smoker     Quit date: 7/10/1985    Smokeless tobacco: Never Used    Alcohol use No      Comment: quit 2007- had excess in the past    Drug use: No    Sexual activity: Not on file     Review of Systems   Constitutional: Negative for chills, fatigue and fever.   HENT: Negative for congestion, facial swelling, hearing loss and trouble swallowing.    Eyes: Negative for photophobia and visual disturbance.   Respiratory: Negative for chest  tightness, shortness of breath and wheezing.    Cardiovascular: Negative for chest pain, palpitations and leg swelling.   Gastrointestinal: Negative for abdominal pain, blood in stool, constipation, diarrhea, nausea and vomiting.   Endocrine: Negative.    Genitourinary: Negative.    Musculoskeletal: Negative for back pain, joint swelling and myalgias.   Skin: Negative.    Allergic/Immunologic: Negative.    Neurological: Negative for dizziness, facial asymmetry, speech difficulty, weakness and numbness.   Hematological: Negative.    Psychiatric/Behavioral: Negative for agitation, confusion and dysphoric mood. The patient is not nervous/anxious.      Objective:     Vital Signs (Most Recent):  Temp: 97.9 °F (36.6 °C) (07/13/18 0743)  Pulse: 89 (07/13/18 0743)  Resp: 18 (07/13/18 0743)  BP: 120/69 (07/13/18 0743)  SpO2: 95 % (07/13/18 0948) Vital Signs (24h Range):  Temp:  [97.8 °F (36.6 °C)-98.5 °F (36.9 °C)] 97.9 °F (36.6 °C)  Pulse:  [] 89  Resp:  [18-20] 18  SpO2:  [95 %-98 %] 95 %  BP: (115-149)/(56-79) 120/69     Weight: (!) 149.8 kg (330 lb 4 oz)  Body mass index is 47.39 kg/m².    Physical Exam   Constitutional: He is oriented to person, place, and time. Vital signs are normal. He appears well-developed and well-nourished.  Non-toxic appearance. He does not appear ill. No distress.   Morbidly obese    HENT:   Head: Normocephalic and atraumatic.   Eyes: Conjunctivae and EOM are normal. Pupils are equal, round, and reactive to light. No scleral icterus.   Neck: Normal range of motion and full passive range of motion without pain. Neck supple. No JVD present. Carotid bruit is not present. No thyromegaly present.   Cardiovascular: Normal rate, regular rhythm, S1 normal, S2 normal, normal heart sounds and normal pulses.  Exam reveals no gallop, no S3, no S4 and no friction rub.    No murmur heard.  Pulmonary/Chest: Effort normal and breath sounds normal. No accessory muscle usage. No tachypnea. No respiratory  distress. He has no decreased breath sounds. He has no wheezes. He has no rhonchi. He has no rales.   Abdominal: Soft. Normal appearance and bowel sounds are normal. He exhibits no shifting dullness, no distension, no abdominal bruit and no ascites. There is no hepatosplenomegaly. There is no tenderness. There is no rigidity and no guarding.   Musculoskeletal: He exhibits no edema.        Right knee: He exhibits decreased range of motion. Tenderness found.        Legs:  Neurological: He is alert and oriented to person, place, and time. He has normal strength. He is not disoriented. No cranial nerve deficit or sensory deficit. GCS eye subscore is 4. GCS verbal subscore is 5. GCS motor subscore is 6.   Skin: Skin is warm, dry and intact. No abrasion and no lesion noted.   Psychiatric: He has a normal mood and affect. Judgment normal. His mood appears not anxious. His speech is not slurred. He is agitated. He is not actively hallucinating. Thought content is paranoid. Cognition and memory are normal. He does not exhibit a depressed mood. He is attentive.     Significant Labs:   BMP:   Recent Labs  Lab 07/12/18  0604   *   *   K 4.1      CO2 18*   BUN 21   CREATININE 1.3   CALCIUM 9.1     CBC:   Recent Labs  Lab 07/12/18  0604   HGB 12.5*   HCT 36.2*     Coagulation:   Recent Labs  Lab 07/13/18  0603   INR 1.5*       Significant Imaging: I have reviewed all pertinent imaging results/findings within the past 24 hours.

## 2018-07-13 NOTE — H&P
[facility] - Skilled Nursing Care  Department of [facility] Medicine  History & Physical    Patient Name: Stephen Cantor Jr.  MRN: [id number]  Code Status: Full Code  Admission Date: 7/12/2018  Attending Physician: Jen Hewitt MD   Primary Care Provider: Desmond Barlow MD    Subjective:     Principal Problem:Prosthetic joint implant failure    No chief complaint on file.       HPI: Chief Complaint/Reason for Admission: Debility    History of Present Illness:  Patient is a 72 y.o. male who has a past medical history of Atrial fibrillation on anticoagulant long-term use; Basal cell carcinoma; Cataract; Chronic kidney disease; Diabetes mellitus with renal manifestations, uncontrolled; Dyslipidemia; hypertension; Obesity; PN (peripheral neuropathy); Primary osteoarthritis of right knee; Sleep apnea; Thyroid disease presented with chronic pain to his Right knee which has gotten progressively worse over the past few months. He had a right press-fit total knee arthroplasty in 2017. Imaging showed that the tibial component was subsiding with a limp falling into varus. The symptoms were severely impacting his activities of daily living and it was decided to proceed with a revision right total knee arthroplasty. Patient complex revision of tibial component on 7/10/18 by Dr. Stuart. Patient tolerated procedure well with no significant inta operative complications. Post operatively, patient had episodes of confusion and combativeness likelydue to opioid induced delirium. Pain control maintained with limited sedating medications mainly perineural adductor. Patient has been working with PT/OT who recommend SNF for further balance/mobility training. Patient currently sitting up in chair without complaints. He is awake and alert. He is angry due to not being allowed to leave the facility. He wishes to go home and not be admitted to SNF. No family at available at bedside. He lives at home with his wife who is able to assist with  ADL's. Patient currently denies any fever/chills, chest pain, dyspnea, weakness, numbness, abdominal pain, nausea/vomiting, dysuria/hematuria, or weight loss.         Past Medical History:   Diagnosis Date    *Atrial fibrillation     Eliquis    Anticoagulant long-term use     apaxiban    Basal cell carcinoma 12/2015    left eye brow    BCC (basal cell carcinoma) 12/2015    left shoulder    Cataract     Chronic kidney disease     Diabetes mellitus with renal manifestations, uncontrolled     Dyslipidemia associated with type 2 diabetes mellitus     Fever blister     Hearing loss     HTN (hypertension)     Keloid cicatrix     Liver disease     Melanoma 12/07/2015    right cheek-in situ    Obesity     PN (peripheral neuropathy)     Primary osteoarthritis of right knee 1/25/2017    Screening for colon cancer 3/3/2016    Sleep apnea     wears CPAP at night    Thyroid disease     Type II or unspecified type diabetes mellitus with other specified manifestations, uncontrolled        Past Surgical History:   Procedure Laterality Date    APPENDECTOMY      CARDIOVERSION      CATARACT EXTRACTION      CATARACT EXTRACTION Right 05/14/2018    Dr. Greer    COLONOSCOPY N/A 3/3/2016    Procedure: COLONOSCOPY;  Surgeon: Bob Poe MD;  Location: [address]);  Service: Endoscopy;  Laterality: N/A;  Holding Eliquis for 3 days prior to colonoscopy. EC    epidural steroid injection      INTRAOCULAR PROSTHESES INSERTION Left 6/25/2018    Procedure: INSERTION, INTRAOCULAR LENS PROSTHESIS;  Surgeon: Lawrence Greer MD;  Location: [facility];  Service: Ophthalmology;  Laterality: Left;    JOINT REPLACEMENT      Knee    Meniere's disease surgery      PHACOEMULSIFICATION OF CATARACT Left 6/25/2018    Procedure: PHACOEMULSIFICATION, CATARACT;  Surgeon: Lawrence Greer MD;  Location: [facility];  Service: Ophthalmology;  Laterality: Left;    REVISION OF KNEE ARTHROPLASTY Right 7/10/2018    Procedure:  "REVISION-ARTHROPLASTY-KNEE-DAKOTA;  Surgeon: Miguel Stuart MD;  Location: [facility] OR [address] [place] [address];  Service: Orthopedics;  Laterality: Right;  [place]    TONSILLECTOMY      TOTAL KNEE ARTHROPLASTY Right 01/25/2017    TKR    TOTAL KNEE ARTHROPLASTY Left     TKR, 1990's       Review of patient's allergies indicates:   Allergen Reactions    Niacin Itching and Other (See Comments)     Other reaction(s): facial flushing and causes hepatitis     Terbinafine Other (See Comments)     Other reaction(s): Hepatitis    "gave me rash"       Current Facility-Administered Medications on File Prior to Encounter   Medication    [DISCONTINUED] aspirin EC tablet 81 mg    [DISCONTINUED] bisacodyl suppository 10 mg    [DISCONTINUED] dextrose 50% injection 12.5 g    [DISCONTINUED] dextrose 50% injection 25 g    [DISCONTINUED] famotidine tablet 20 mg    [DISCONTINUED] fenofibrate tablet 145 mg    [DISCONTINUED] furosemide tablet 40 mg    [DISCONTINUED] gabapentin capsule 300 mg    [DISCONTINUED] glucagon (human recombinant) injection 1 mg    [DISCONTINUED] glucose chewable tablet 16 g    [DISCONTINUED] glucose chewable tablet 24 g    [DISCONTINUED] insulin aspart U-100 pen 1-10 Units    [DISCONTINUED] losartan tablet 25 mg    [DISCONTINUED] metoprolol succinate (TOPROL-XL) 24 hr tablet 100 mg    [DISCONTINUED] mupirocin 2 % ointment 1 g    [DISCONTINUED] ondansetron injection 4 mg    [DISCONTINUED] oxyCODONE immediate release tablet 5 mg    [DISCONTINUED] polyethylene glycol packet 17 g    [DISCONTINUED] pravastatin tablet 10 mg    [DISCONTINUED] promethazine (PHENERGAN) 6.25 mg in dextrose 5 % 50 mL IVPB    [DISCONTINUED] ramelteon tablet 8 mg    [DISCONTINUED] ropivacaine 0.2% ON-Q C-BLOC 400 ML (SELECT A FLOW)    [DISCONTINUED] senna-docusate 8.6-50 mg per tablet 1 tablet    [DISCONTINUED] sodium chloride 0.9% flush 3 mL    [DISCONTINUED] sodium chloride 0.9% flush 3 mL    [DISCONTINUED] tamsulosin 24 hr " "capsule 0.4 mg    [DISCONTINUED] warfarin (COUMADIN) tablet 5 mg     Current Outpatient Prescriptions on File Prior to Encounter   Medication Sig    apixaban 5 mg Tab Take 1 tablet (5 mg total) by mouth 2 (two) times daily.    aspirin 81 MG Chew Take 1 tablet (81 mg total) by mouth 2 (two) times daily. for 7 days    azelastine (ASTELIN) 137 mcg (0.1 %) nasal spray 1 spray (137 mcg total) by Nasal route 2 (two) times daily.    BD INSULIN PEN NEEDLE UF ORIG 29 x 1/2 " Ndle     BD ULTRA-FINE BASIL PEN NEEDLES 32 gauge x 5/32" Ndle CHECK BLOOD SUGAR FOUR TIMES DAILY    ciclopirox (PENLAC) 8 % Soln Apply topically nightly.    clobetasol (TEMOVATE) 0.05 % cream AAA finger bid    clotrimazole-betamethasone 1-0.05% (LOTRISONE) cream Apply topically 2 (two) times daily.    docusate sodium (COLACE) 100 MG capsule Take 1 capsule (100 mg total) by mouth 2 (two) times daily as needed for Constipation.    fenofibrate (TRICOR) 145 MG tablet TAKE ONE TABLET BY MOUTH EVERY DAY    furosemide (LASIX) 40 MG tablet TAKE ONE TABLET BY MOUTH EVERY DAY AS NEEDED (Patient taking differently: TAKE ONE TABLET BY MOUTH EVERY DAY)    gabapentin (NEURONTIN) 300 MG capsule Take 1 capsule (300 mg total) by mouth 3 (three) times daily.    insulin aspart (NOVOLOG FLEXPEN) 100 unit/mL InPn pen Novolog 20 units breakfast, 22 units lunch and dinner plus correction scale. Max daily dose=100 units    insulin glargine (BASAGLAR KWIKPEN U-100 INSULIN) 100 unit/mL (3 mL) InPn pen Inject 60 Units into the skin once daily. (Patient taking differently: Inject 60 Units into the skin every evening. )    ketoconazole (NIZORAL) 2 % cream Apply topically once daily.    lancets 33 gauge Misc 1 lancet by Misc.(Non-Drug; Combo Route) route 4 (four) times daily. Pt uses True Result Meter, bg monitoring 4 times a day.    levothyroxine (SYNTHROID) 25 MCG tablet Take 1 tablet (25 mcg total) by mouth once daily.    losartan (COZAAR) 25 MG tablet Take 1 " tablet (25 mg total) by mouth once daily.    metoprolol succinate (TOPROL-XL) 100 MG 24 hr tablet TAKE ONE TABLET BY MOUTH EVERY DAY    metronidazole (METROGEL) 0.75 % gel Apply topically 2 (two) times daily.    nystatin-triamcinolone (MYCOLOG II) cream apply TWICE DAILY (Patient taking differently: apply TWICE DAILY-using as needed for rash)    ondansetron (ZOFRAN-ODT) 8 MG TbDL Take 1 tablet (8 mg total) by mouth every 6 (six) hours as needed.    oxyCODONE-acetaminophen (PERCOCET)  mg per tablet Take 1 tablet by mouth every 4 (four) hours as needed for Pain.    pravastatin (PRAVACHOL) 10 MG tablet Take 1 tablet (10 mg total) by mouth once daily. (Patient taking differently: Take 10 mg by mouth every evening. )    tamsulosin (FLOMAX) 0.4 mg Cp24 Take 1 capsule (0.4 mg total) by mouth once daily.    temazepam (RESTORIL) 15 mg Cap TAKE ONE CAPSULE BY MOUTH EVERY EVENING    tiZANidine (ZANAFLEX) 4 MG tablet TAKE ONE TABLET BY MOUTH EVERY 6 HOURS AS NEEDED    triamcinolone acetonide 0.1% (KENALOG) 0.1 % cream Apply topically 2 (two) times daily. Apply to affected area twice daily as directed.    warfarin (COUMADIN) 2.5 MG tablet Take 1-3 tablets daily as directed by the Coumadin clinic.     Family History     Problem Relation (Age of Onset)    ALS Brother    Cancer Sister, Brother, Sister    Diabetes Mother, Father    Eczema Sister    Heart disease Father    Hypertension Father    No Known Problems Sister, Brother, Maternal Aunt, Maternal Uncle, Paternal Aunt, Paternal Uncle, Maternal Grandmother, Maternal Grandfather, Paternal Grandmother, Paternal Grandfather    Stroke Mother        Social History Main Topics    Smoking status: Former Smoker     Quit date: 7/10/1985    Smokeless tobacco: Never Used    Alcohol use No      Comment: quit 2007- had excess in the past    Drug use: No    Sexual activity: Not on file     Review of Systems   Constitutional: Negative for chills, fatigue and fever.    HENT: Negative for congestion, facial swelling, hearing loss and trouble swallowing.    Eyes: Negative for photophobia and visual disturbance.   Respiratory: Negative for chest tightness, shortness of breath and wheezing.    Cardiovascular: Negative for chest pain, palpitations and leg swelling.   Gastrointestinal: Negative for abdominal pain, blood in stool, constipation, diarrhea, nausea and vomiting.   Endocrine: Negative.    Genitourinary: Negative.    Musculoskeletal: Negative for back pain, joint swelling and myalgias.   Skin: Negative.    Allergic/Immunologic: Negative.    Neurological: Negative for dizziness, facial asymmetry, speech difficulty, weakness and numbness.   Hematological: Negative.    Psychiatric/Behavioral: Negative for agitation, confusion and dysphoric mood. The patient is not nervous/anxious.      Objective:     Vital Signs (Most Recent):  Temp: 97.9 °F (36.6 °C) (07/13/18 0743)  Pulse: 89 (07/13/18 0743)  Resp: 18 (07/13/18 0743)  BP: 120/69 (07/13/18 0743)  SpO2: 95 % (07/13/18 0948) Vital Signs (24h Range):  Temp:  [97.8 °F (36.6 °C)-98.5 °F (36.9 °C)] 97.9 °F (36.6 °C)  Pulse:  [] 89  Resp:  [18-20] 18  SpO2:  [95 %-98 %] 95 %  BP: (115-149)/(56-79) 120/69     Weight: (!) 149.8 kg (330 lb 4 oz)  Body mass index is 47.39 kg/m².    Physical Exam   Constitutional: He is oriented to person, place, and time. Vital signs are normal. He appears well-developed and well-nourished.  Non-toxic appearance. He does not appear ill. No distress.   Morbidly obese    HENT:   Head: Normocephalic and atraumatic.   Eyes: Conjunctivae and EOM are normal. Pupils are equal, round, and reactive to light. No scleral icterus.   Neck: Normal range of motion and full passive range of motion without pain. Neck supple. No JVD present. Carotid bruit is not present. No thyromegaly present.   Cardiovascular: Normal rate, regular rhythm, S1 normal, S2 normal, normal heart sounds and normal pulses.  Exam reveals  no gallop, no S3, no S4 and no friction rub.    No murmur heard.  Pulmonary/Chest: Effort normal and breath sounds normal. No accessory muscle usage. No tachypnea. No respiratory distress. He has no decreased breath sounds. He has no wheezes. He has no rhonchi. He has no rales.   Abdominal: Soft. Normal appearance and bowel sounds are normal. He exhibits no shifting dullness, no distension, no abdominal bruit and no ascites. There is no hepatosplenomegaly. There is no tenderness. There is no rigidity and no guarding.   Musculoskeletal: He exhibits no edema.        Right knee: He exhibits decreased range of motion. Tenderness found.        Legs:  Neurological: He is alert and oriented to person, place, and time. He has normal strength. He is not disoriented. No cranial nerve deficit or sensory deficit. GCS eye subscore is 4. GCS verbal subscore is 5. GCS motor subscore is 6.   Skin: Skin is warm, dry and intact. No abrasion and no lesion noted.   Psychiatric: He has a normal mood and affect. Judgment normal. His mood appears not anxious. His speech is not slurred. He is agitated. He is not actively hallucinating. Thought content is paranoid. Cognition and memory are normal. He does not exhibit a depressed mood. He is attentive.     Significant Labs:   BMP:   Recent Labs  Lab 07/12/18  0604   *   *   K 4.1      CO2 18*   BUN 21   CREATININE 1.3   CALCIUM 9.1     CBC:   Recent Labs  Lab 07/12/18  0604   HGB 12.5*   HCT 36.2*     Coagulation:   Recent Labs  Lab 07/13/18  0603   INR 1.5*       Significant Imaging: I have reviewed all pertinent imaging results/findings within the past 24 hours.    Assessment/Plan:     * Prosthetic joint implant failure    S/P right revision TKA  Will continue to monitor for drainage or erythema to surgical site   Cont with current pain control with perineural adductor. Holding opioids for now   Cont bowel regimen for opioid induced constipation  Cont with PT/OT for gait  training and strengthening and restoration of ADL's: WBAT per ortho   Fall precautions   Cont DVT prophylaxis per ortho:   - Please discontinue ASA 81mg BID when the patient's INR becomes therapeutic (goal range = 1.8 - 2.2)   - Please discontinue Warfarin on POD7 (7/17/18)and resume the patient's home dose of Apixiban  Anticoagulants   Medication Route Frequency    warfarin (COUMADIN) tablet 5 mg Oral Daily     Future Appointments  Date Time Provider Department Center   7/17/2018 1:00 PM Sandy Adams NP [facility] [address]   7/27/2018 1:00 PM Patrizia Brower OD [facility] [address]   8/2/2018 2:20 PM Christina Wilhelm NP [facility]           Debility    Cont PT/OT as prescribed        Proteinuria due to type 2 diabetes mellitus    Cont ARB to treat        Bilateral leg edema    Low Na diet  MATTI hose  Leg elevation           Diastolic dysfunction    Maintain euvolemia by monitoring I/O's and daily weights.  Fluid restriction (2 liters/24 hours)  Low Na diet  Cont ARB, BB, lasix.   Patient Vitals for the past 72 hrs (Last 3 readings):   Weight   07/12/18 2000 (!) 149.8 kg (330 lb 4 oz)             Enlarged prostate    Cont flomax to treat        Type 2 diabetes mellitus with diabetic polyneuropathy    Will start 40 units of detemir insulin.   Low dose correction scale   Cont blood glucose monitoring   ADA diet  Cont gabapentin to treat for neuropathy.         Dyslipidemia associated with type 2 diabetes mellitus    Cont pravastatin and fenofibrate to treat        Benign hypertensive renal disease without renal failure    BP Readings from Last 3 Encounters:   07/13/18 120/69   07/12/18 (!) 144/66   07/06/18 119/62     Well controlled  Cont Metoprolol, losartan lasix to treat.   Cont to monitor  Low Na diet        Chronic kidney disease, stage III (moderate)    Lab Results   Component Value Date    CREATININE 1.3 07/12/2018     Sr Cr stable  Cont to monitor with biweekly labs   Avoid  nephrotoxins.   Renally dose all medications           Sleep apnea    Cont CPAP QHS  Patient refuses to wear CPAP while at SNF.       Long term current use of anticoagulant therapy    Plan as above.         Chronic atrial fibrillation    Pulse Readings from Last 3 Encounters:   07/13/18 89   07/12/18 93   07/06/18 74     Cont rate control with metoprolol.   Anticoagulation as above.           I certify that SNF services are required to be given on an inpatient basis because [facility] PAT Cantor Jr.'s needs for skilled nursing care and/or skilled rehabilitation are required on a daily basis and such services can only practically be provided in a skilled nursing facility setting and are for an ongoing condition for which he received inpatient care in the hospital.     Future Appointments  Date Time Provider Department Center   7/17/2018 1:00 PM Sandy Adams NP [facility] [address]TH[address]   7/27/2018 1:00 PM Patrizia Brower OD [facility] [address]   8/2/2018 2:20 PM Christina Wilhelm NP [facility]       Patient's care plan and discharge planning will be discussed by the SNF team in IDT meeting weekly. Medications to be reviewed and discussed with the SNF unit clinical pharmacist.    Jen Mendez MD  Department of Hospital Medicine  [facility] - Skilled Nursing Care

## 2018-07-13 NOTE — ASSESSMENT & PLAN NOTE
Pulse Readings from Last 3 Encounters:   07/13/18 89   07/12/18 93   07/06/18 74     Cont rate control with metoprolol.   Anticoagulation as above.

## 2018-07-13 NOTE — PT/OT/SLP EVAL
Occupational Therapy  Evaluation/ Treatment    [facility] [place]   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    OT Date of Treatment: 07/13/18   OT Start Time: 0735  OT Stop Time: 0838  OT Total Time (min): 63 min    Billable Minutes:  Evaluation 20  Self Care/Home Management 43    Diagnosis: Prosthetic Joint Implant (R) LE    Past Medical History:   Diagnosis Date    *Atrial fibrillation     Eliquis    Anticoagulant long-term use     apaxiban    Basal cell carcinoma 12/2015    left eye brow    BCC (basal cell carcinoma) 12/2015    left shoulder    Cataract     Chronic kidney disease     Diabetes mellitus with renal manifestations, uncontrolled     Dyslipidemia associated with type 2 diabetes mellitus     Fever blister     Hearing loss     HTN (hypertension)     Keloid cicatrix     Liver disease     Melanoma 12/07/2015    right cheek-in situ    Obesity     PN (peripheral neuropathy)     Primary osteoarthritis of right knee 1/25/2017    Screening for colon cancer 3/3/2016    Sleep apnea     wears CPAP at night    Thyroid disease     Type II or unspecified type diabetes mellitus with other specified manifestations, uncontrolled       Past Surgical History:   Procedure Laterality Date    APPENDECTOMY      CARDIOVERSION      CATARACT EXTRACTION      CATARACT EXTRACTION Right 05/14/2018    Dr. Greer    COLONOSCOPY N/A 3/3/2016    Procedure: COLONOSCOPY;  Surgeon: Bob Poe MD;  Location: [address]);  Service: Endoscopy;  Laterality: N/A;  Holding Eliquis for 3 days prior to colonoscopy. EC    epidural steroid injection      INTRAOCULAR PROSTHESES INSERTION Left 6/25/2018    Procedure: INSERTION, INTRAOCULAR LENS PROSTHESIS;  Surgeon: Lawrence Greer MD;  Location: [facility];  Service: Ophthalmology;  Laterality: Left;    JOINT REPLACEMENT      Knee    Meniere's disease surgery      PHACOEMULSIFICATION OF CATARACT Left 6/25/2018    Procedure:  PHACOEMULSIFICATION, CATARACT;  Surgeon: Lawrence Greer MD;  Location: [facility] OR;  Service: Ophthalmology;  Laterality: Left;    REVISION OF KNEE ARTHROPLASTY Right 7/10/2018    Procedure: REVISION-ARTHROPLASTY-KNEE-DAKOTA;  Surgeon: Miguel Stuart MD;  Location: [facility] OR 2ND FLR;  Service: Orthopedics;  Laterality: Right;  [place]    TONSILLECTOMY      TOTAL KNEE ARTHROPLASTY Right 01/25/2017    TKR    TOTAL KNEE ARTHROPLASTY Left     TKR, 1990's         General Precautions: Standard, fall  Orthopedic Precautions: RLE weight bearing as tolerated  Braces: N/A    Do you have any cultural, spiritual, Episcopal conflicts, given your current situation?: none stated     Patient History:  Lives With: spouse (across the street from son in law and daughter.)  Living Arrangements: house  Home Accessibility: stairs to enter home  Number of Stairs to Enter Home: 4  Stair Railings at Home: outside, present on left side  Transportation Available: family or friend will provide  Living Environment Comment: Pt indicating that he lives with his wife in single stroy home with 4 PUNEET and HR (L) side but also has a ramp in place at rear entrance which was installed for his wife due to ambulatory difficulty.  He reports to needing no assist PTA stating that he was (I) PTA and using hip kit to perform LBD  due to prior knee surgery.  Equipment Currently Used at Home: bedside commode, walker, rolling, cane, straight    Prior level of function:   Bed Mobility/Transfers: needs device (Pt occasionally used RW and SC.)  Grooming: independent  Bathing: independent  Upper Body Dressing: independent  Lower Body Dressing: independent  Toileting: needs device (BSC)  Home Management Skills: independent (Pt reports that he needed no assist at home.)  Driving License: Yes  Mode of Transportation: Car, Family, Friends  Occupation: Retired  Type of Occupation:  at [employer]  Leisure and Hobbies: Golf and going to the  race track  IADL Comments: No assist reportedly needed for IADLs.     Dominant hand: right    Subjective:  Communicated with nurse prior to session.    Chief Complaint: Pt indicating that he needs help to get out of bed.  Patient/Family stated goals: Pt would like to return to PLOF.    Pain/Comfort  Pain Rating 1: 0/10  Pain Rating Post-Intervention 1: 0/10    Objective:   Patient found with: perineural catheter, peripheral IV, wound vac, oxygen    Cognitive Exam:  Oriented to: Person, Place and Situation  Follows Commands/attention: Follows two-step commands  Communication: clear/fluent  Memory:  No Deficits noted  Safety awareness/insight to disability: intact  Coping skills/emotional control: Appropriate to situation    Visual/perceptual:  Intact    Physical Exam:  Postural examination/scapula alignment:    -       Rounded shoulders  Skin integrity: Visible skin intact  Edema: None noted (B) UEs    Sensation:      -       Intact (B) UEs    Upper Extremity Range of Motion:  Right Upper Extremity: WFL  Left Upper Extremity: WFL    Upper Extremity Strength:  Right Upper Extremity: Grossly 4/5 throughout  Left Upper Extremity: Grossly 4/5 throughout   Strength: WFLS.    Fine motor coordination:      -       Intact  (B) UEs    Gross motor coordination: WFL    Functional Status:  MDS G  Bed Mobility Functional Status: -Max(A) (rolling and supine to sitting EOB.)     Transfer Functional Status: Mod(A) (sit to standing with RW from EOB and E/C Min (A). SPT EOB<> W/C and W/C<> BSC Mod (A))    Dressing Functional Status: Min to Max(A) (Min (A) UBD donning pullover shirt, LBD donning pants and socks, brief and shoes needing Max (A) and RW to Stand.)    Eating Functional Status: S-SBA (no assist)    Toilet Use Functional Status: )-Max(A) (Pt able to clean front [place] area but needing (A) with remainder. BSC and RW for safety.)    Personal Hygiene Functional Status: S-SBA (performed sitting sink side.)    Bathing  Functional Status: Max(A)- (with (A) to wash feet/lower legs, rear and to steady when standing. Max (A) overall.)      Moving from seated to standing position: Not steady, only able to stabilize with staff assistance  Walking (with assistive device if used): Not steady, only able to stabilize with staff assistance  Turning around and facing the opposite direction while walking: Not steady, only able to stabilize with staff assistance  Moving on and off the toilet: Not steady, only able to stabilize with staff assistance  Surface-to-surface transfer (transfer between bed and chair or wheelchair): Not steady, only able to stabilize with staff assistance         [facility] 6 Click:  [facility] Total Score: 16      Additional Treatment:  Pt edu on OT role/POC  - White board updated  - Multiple self care tasks completed-- as noted above   - He  performed dynamic sitting activity incorporating reaching in all planes while sitting unsupported EOB and working on     self care tasks.    Patient left up in chair with all lines intact, call button in reach and nurse notified    Assessment:  Stephen Cantor Jr. is a 72 y.o. male with a medical diagnosis of Prosthetic Joint Implant (R) LE.  Pt presents with performance deficits of Physical skills including impaired balance, functional mobility, strength, functional endurance,  functional balance, and  functional use of ( R) LE .  Pt is motivated and would benefit from OT intervention to further his functional (I)ce and safety.    Pt presented with a low complexity OT evaluation.  Pt required a brief an expanded review of medical history and occupational profile.  Pt demod 1-3 performance deficits (physical, cognitive, or psychosocial) resulting in limitations and engagement restrictions.  Clinical decision making required low analytical complexity with limited treatment options.  Pt with no cormorbidities and required no modification of task/assistance with assessment.      Physical-  skills refer to impairments of body structure or functions, balance, mobility; strength, endurance, FMC, GMC, sensation, dexterity, and posture.  Cognitive- skills refer to ability to attend, communicate, perceive, think, understand, problem solve, mentally sequence, learn, and remember resulting in ability to organize occupational performance in timely and safe manner.    Psychosocial- skills refer to interpersonal interactions, habits, routines, and behaviors, active use of coping strategies, and environmental adaptations to appropriately participate in everyday tasks and situations.       Rehab identified problem list/impairments: weakness, impaired endurance, impaired self care skills, impaired functional mobilty, gait instability, impaired balance, decreased coordination, decreased lower extremity function, decreased safety awareness, pain, decreased ROM, orthopedic precautions    Rehab potential is all lines intact, call button in reach and nurse notified    Activity tolerance: Good    Discharge recommendations: home with home health     Barriers to discharge: Decreased caregiver support     Equipment recommendations: wheelchair     GOALS:    Occupational Therapy Goals        Problem: Occupational Therapy Goal    Goal Priority Disciplines Outcome Interventions   Occupational Therapy Goal     OT, PT/OT Ongoing (interventions implemented as appropriate)    Description:  Goals to be met by: 14 days     Patient will increase functional independence with ADLs by performing:    Feeding with Modified [place].  UE Dressing with Modified [place].  LE Dressing with Stand-by Assistance using A/E and A/D as needed..  Grooming while standing with Stand-by Assistance with RW to stand..  Toileting from bedside commode with Stand-by Assistance for hygiene and clothing management with DME as needed.  Bathing from  edge of bed with Minimal Assistance.  Rolling to Bilateral with Modified [place].   Supine to sit  with Modified [place].  Stand pivot transfers with Supervision with RW.  Upper extremity exercise program x10 reps per set, with assistance as needed.  Pt will tolerate up to 10 minutes standing activity with RW to steady.                      PLAN: Patient to be seen 5 x/week, 6 x/week to address the above listed problems via self-care/home management, therapeutic activities, therapeutic exercises  Plan of Care expires: 08/13/18  Plan of Care reviewed with: patient    Adolfo Lord, OTR/JAYME  07/13/2018

## 2018-07-13 NOTE — ASSESSMENT & PLAN NOTE
Lab Results   Component Value Date    CREATININE 1.3 07/12/2018     Sr Cr stable  Cont to monitor with biweekly labs   Avoid nephrotoxins.   Renally dose all medications

## 2018-07-13 NOTE — ASSESSMENT & PLAN NOTE
S/P right revision TKA  Will continue to monitor for drainage or erythema to surgical site   Cont with current pain control with perineural adductor. Holding opioids for now   Cont bowel regimen for opioid induced constipation  Cont with PT/OT for gait training and strengthening and restoration of ADL's: WBAT per ortho   Fall precautions   Cont DVT prophylaxis per ortho:   - Please discontinue ASA 81mg BID when the patient's INR becomes therapeutic (goal range = 1.8 - 2.2)   - Please discontinue Warfarin on POD7 (7/17/18)and resume the patient's home dose of Apixiban  Anticoagulants   Medication Route Frequency    warfarin (COUMADIN) tablet 5 mg Oral Daily     Future Appointments  Date Time Provider Department Center   7/17/2018 1:00 PM Sandy Adams NP [facility] ORTHO [facility]   7/27/2018 1:00 PM Patrizia Brower OD [facility] OPTOMTY [facility]   8/2/2018 2:20 PM Christina Wilhelm NP [facility] SLEEP [facility]

## 2018-07-13 NOTE — ASSESSMENT & PLAN NOTE
BP Readings from Last 3 Encounters:   07/13/18 120/69   07/12/18 (!) 144/66   07/06/18 119/62     Well controlled  Cont Metoprolol, losartan lasix to treat.   Cont to monitor  Low Na diet

## 2018-07-14 LAB
BACTERIA SPEC AEROBE CULT: NO GROWTH
INR PPP: 2.1
POCT GLUCOSE: 256 MG/DL (ref 70–110)
POCT GLUCOSE: 264 MG/DL (ref 70–110)
POCT GLUCOSE: 294 MG/DL (ref 70–110)
POCT GLUCOSE: 306 MG/DL (ref 70–110)
PROTHROMBIN TIME: 20.1 SEC

## 2018-07-14 PROCEDURE — 97110 THERAPEUTIC EXERCISES: CPT

## 2018-07-14 PROCEDURE — 25000003 PHARM REV CODE 250: Performed by: HOSPITALIST

## 2018-07-14 PROCEDURE — 25000003 PHARM REV CODE 250: Performed by: STUDENT IN AN ORGANIZED HEALTH CARE EDUCATION/TRAINING PROGRAM

## 2018-07-14 PROCEDURE — 85610 PROTHROMBIN TIME: CPT

## 2018-07-14 PROCEDURE — 97530 THERAPEUTIC ACTIVITIES: CPT

## 2018-07-14 PROCEDURE — 11000004 HC SNF PRIVATE

## 2018-07-14 PROCEDURE — 63600175 PHARM REV CODE 636 W HCPCS: Performed by: HOSPITALIST

## 2018-07-14 PROCEDURE — 97116 GAIT TRAINING THERAPY: CPT

## 2018-07-14 RX ORDER — INSULIN ASPART 100 [IU]/ML
4 INJECTION, SOLUTION INTRAVENOUS; SUBCUTANEOUS
Status: DISCONTINUED | OUTPATIENT
Start: 2018-07-14 | End: 2018-07-16

## 2018-07-14 RX ADMIN — METOPROLOL SUCCINATE 100 MG: 100 TABLET, EXTENDED RELEASE ORAL at 10:07

## 2018-07-14 RX ADMIN — PRAVASTATIN SODIUM 10 MG: 10 TABLET ORAL at 10:07

## 2018-07-14 RX ADMIN — RAMELTEON 8 MG: 8 TABLET, FILM COATED ORAL at 10:07

## 2018-07-14 RX ADMIN — INSULIN ASPART 4 UNITS: 100 INJECTION, SOLUTION INTRAVENOUS; SUBCUTANEOUS at 04:07

## 2018-07-14 RX ADMIN — MUPIROCIN 1 G: 20 OINTMENT TOPICAL at 10:07

## 2018-07-14 RX ADMIN — INSULIN DETEMIR 20 UNITS: 100 INJECTION, SOLUTION SUBCUTANEOUS at 10:07

## 2018-07-14 RX ADMIN — TAMSULOSIN HYDROCHLORIDE 0.4 MG: 0.4 CAPSULE ORAL at 10:07

## 2018-07-14 RX ADMIN — ACETAMINOPHEN 650 MG: 325 TABLET, FILM COATED ORAL at 10:07

## 2018-07-14 RX ADMIN — FENOFIBRATE 145 MG: 145 TABLET ORAL at 10:07

## 2018-07-14 RX ADMIN — GABAPENTIN 300 MG: 300 CAPSULE ORAL at 02:07

## 2018-07-14 RX ADMIN — FUROSEMIDE 40 MG: 40 TABLET ORAL at 10:07

## 2018-07-14 RX ADMIN — ASPIRIN 81 MG: 81 TABLET, COATED ORAL at 09:07

## 2018-07-14 RX ADMIN — INSULIN ASPART 6 UNITS: 100 INJECTION, SOLUTION INTRAVENOUS; SUBCUTANEOUS at 11:07

## 2018-07-14 RX ADMIN — LOSARTAN POTASSIUM 25 MG: 25 TABLET, FILM COATED ORAL at 10:07

## 2018-07-14 RX ADMIN — INSULIN ASPART 8 UNITS: 100 INJECTION, SOLUTION INTRAVENOUS; SUBCUTANEOUS at 04:07

## 2018-07-14 RX ADMIN — GABAPENTIN 300 MG: 300 CAPSULE ORAL at 10:07

## 2018-07-14 RX ADMIN — WARFARIN SODIUM 5 MG: 2.5 TABLET ORAL at 05:07

## 2018-07-14 RX ADMIN — FAMOTIDINE 20 MG: 20 TABLET ORAL at 10:07

## 2018-07-14 NOTE — PROGRESS NOTES
Dr Mendez called and informed that Mr Cantor was taking eye drops poat cataract surgery. MD stated that he will take a look at patient's eye drops tomorrow.

## 2018-07-14 NOTE — PLAN OF CARE
Problem: Physical Therapy Goal  Goal: Physical Therapy Goal  Goals to be met by: 2 weeks (  )    Patient will increase functional independence with mobility by performin. Supine to sit with Contact Guard Assistance  2. Sit to supine with Contact Guard Assistance  3. Sit to stand transfer with Contact Guard Assistance  4. Bed to chair transfer with Contact Guard Assistance using Rolling Walker  5. Gait  x 150 feet with Contact Guard Assistance using Rolling Walker.   6. Ascend/descend 4 stair with bilateral Handrails Contact Guard Assistance, as his house has four PUNEET with B HR.  7. Ascend/Descend 4 inch curb step with Contact Guard Assistance using Rolling Walker.  8. Stand for 3 minutes with Contact Guard Assistance using Rolling Walker while performing a task.  9. Lower extremity exercise program x 20 reps per handout, with assistance as needed.  10. Pt will improve his R knee active extension from -10 degrees to -5 degrees to allow for better functional mobility..  11. Pt will improve his L knee active flexion from 68 degrees to 80 degrees to allow for better functional mobility.-met 18     Outcome: Ongoing (interventions implemented as appropriate)  Pt's goals remain appropriate and pt will continue to benefit from skilled PT services to work towards improved functional mobility including: bed mobility, transfers, up/down steps,  and gait.   Emma Michelle, PT  2018

## 2018-07-14 NOTE — PT/OT/SLP PROGRESS
"Physical Therapy  Treatment    [facility] [place].   MRN: [id number]   Admitting Diagnosis: Prosthetic joint implant failure    PT Received On: 07/14/18  Total Time (min): 58       Billable Minutes:  Gait Training 15, Therapeutic Activity 10 and Therapeutic Exercise 33    Treatment Type: Treatment  PT/PTA: PT     PTA Visit Number: 0       General Precautions: Standard, fall  Orthopedic Precautions: RLE weight bearing as tolerated   Braces: N/A    Do you have any cultural, spiritual, Confucianist conflicts, given your current situation?: none stated    Subjective:  Communicated with nurse prior to session.  "I feel better today, can you show me how to sneak out of this place?" (joking) "I want you to tell my wife she needs to help me with all this when I go home" (wiping after toileting and dressing)    Pain/Comfort  Pain Rating 1: 0/10  Location - Side 1: Right  Location - Orientation 1: generalized  Location 1: knee  Pain Addressed 1: Pre-medicate for activity, Reposition  Pain Rating Post-Intervention 1: 3/10    Objective:   Patient found with: perineural catheter, wound vac     Functional Status:  MDS G  Bed Mobility Functional Status: mod(A)-Max(A)  Transfer Functional Status: CGA-Min (A)  Walk in Room Functional Status: CGA-Min (A)  Walk in Corridor Functional Status: CGA-Min (A)  Locomotion on Unit Functional Status: CGA-Min (A)  Eval Only: Number of U/E limb <4/5 MMT: 2  Moving from seated to standing position: Not steady, only able to stabilize with staff assistance  Walking (with assistive device if used): Not steady, only able to stabilize with staff assistance  Turning around and facing the opposite direction while walking: Not steady, only able to stabilize with staff assistance  Moving on and off the toilet: Not steady, only able to stabilize with staff assistance  Surface-to-surface transfer (transfer between bed and chair or wheelchair): Not steady, only able to stabilize with staff assistance    AM-PAC 6 " CLICK MOBILITY  Total Score:15    Bed Mobility:  Sit>Supine: moderate assist; requires assist at trunk  Supine>Sit: minimal assist; pt able to lift his LEs onto the bed but requires manual cues to reposition his trunk    Transfers:  Sit<>Stand: minimal assist; 2 trials from commode with RW and grab bar assist; 2 trials from w/c with RW; 1 trial from mat with Rw    Gait:  Amb 10ft then 55ft then 45ft then 5ft with RW with CGA with verbal cues to step closer to the walker and for upright posture.     Therex:  Pt performed B LE exs per TKR protocol: (in supine)SLR with AAROM, abd/add, heel slides, ankle pumps, short arc quads, and quad sets x 20 reps.(in sitting) knee extension and hip flexion x 20 reps. Pt's R knee AROM: knee ext -9 deg in supine and knee flex 86 deg in sitting    Additional Treatment:  Pt had BM in bathroom and required max assist to clean himself in standing. Pt required max assist to thread pants on L LE and to pull his pants up until he could reach them to complete the task    Patient left up in chair with call button in reach.    Assessment:  [facility] a 72 y.o. male with a medical diagnosis of Prosthetic joint implant failure.  Pt is limited with functional mobility due to weakness, instability, decreased ROM, edema, and fatigue. Pt is motivated to progress with mobility.    Rehab identified problem list/impairments: weakness, impaired endurance, impaired self care skills, impaired functional mobilty, gait instability, impaired balance, decreased lower extremity function, pain, impaired cognition, edema    Rehab potential is good.    Activity tolerance: Good    Discharge recommendations: home health PT     Barriers to discharge: None    Equipment recommendations: wheelchair (maybe a WC )     GOALS:    Physical Therapy Goals        Problem: Physical Therapy Goal    Goal Priority Disciplines Outcome Goal Variances Interventions   Physical Therapy Goal     PT/OT, PT Ongoing  (interventions implemented as appropriate)     Description:  Goals to be met by: 2 weeks (  )    Patient will increase functional independence with mobility by performin. Supine to sit with Contact Guard Assistance  2. Sit to supine with Contact Guard Assistance  3. Sit to stand transfer with Contact Guard Assistance  4. Bed to chair transfer with Contact Guard Assistance using Rolling Walker  5. Gait  x 150 feet with Contact Guard Assistance using Rolling Walker.   6. Ascend/descend 4 stair with bilateral Handrails Contact Guard Assistance, as his house has four PUNEET with B HR.  7. Ascend/Descend 4 inch curb step with Contact Guard Assistance using Rolling Walker.  8. Stand for 3 minutes with Contact Guard Assistance using Rolling Walker while performing a task.  9. Lower extremity exercise program x 20 reps per handout, with assistance as needed.  10. Pt will improve his R knee active extension from -10 degrees to -5 degrees to allow for better functional mobility..  11. Pt will improve his L knee active flexion from 68 degrees to 80 degrees to allow for better functional mobility.-met 18                     PLAN:    Patient to be seen 5 x/week  to address the above listed problems via gait training, therapeutic activities, therapeutic exercises, wheelchair management/training  Plan of Care expires: 18  Plan of Care reviewed with: patient    Emma Michelle, PT  2018

## 2018-07-15 LAB
INR PPP: 2.8
POCT GLUCOSE: 207 MG/DL (ref 70–110)
POCT GLUCOSE: 234 MG/DL (ref 70–110)
POCT GLUCOSE: 236 MG/DL (ref 70–110)
POCT GLUCOSE: 289 MG/DL (ref 70–110)
PROTHROMBIN TIME: 26.8 SEC

## 2018-07-15 PROCEDURE — 25000003 PHARM REV CODE 250: Performed by: STUDENT IN AN ORGANIZED HEALTH CARE EDUCATION/TRAINING PROGRAM

## 2018-07-15 PROCEDURE — 25000242 PHARM REV CODE 250 ALT 637 W/ HCPCS: Performed by: NURSE PRACTITIONER

## 2018-07-15 PROCEDURE — 97110 THERAPEUTIC EXERCISES: CPT

## 2018-07-15 PROCEDURE — 11000004 HC SNF PRIVATE

## 2018-07-15 PROCEDURE — 85610 PROTHROMBIN TIME: CPT

## 2018-07-15 PROCEDURE — 25000003 PHARM REV CODE 250: Performed by: HOSPITALIST

## 2018-07-15 PROCEDURE — 27000221 HC OXYGEN, UP TO 24 HOURS

## 2018-07-15 PROCEDURE — 97535 SELF CARE MNGMENT TRAINING: CPT

## 2018-07-15 PROCEDURE — 94761 N-INVAS EAR/PLS OXIMETRY MLT: CPT

## 2018-07-15 RX ORDER — FLUTICASONE PROPIONATE 50 MCG
2 SPRAY, SUSPENSION (ML) NASAL DAILY
Status: DISCONTINUED | OUTPATIENT
Start: 2018-07-15 | End: 2018-08-01 | Stop reason: HOSPADM

## 2018-07-15 RX ADMIN — INSULIN ASPART 3 UNITS: 100 INJECTION, SOLUTION INTRAVENOUS; SUBCUTANEOUS at 10:07

## 2018-07-15 RX ADMIN — METOPROLOL SUCCINATE 100 MG: 100 TABLET, EXTENDED RELEASE ORAL at 10:07

## 2018-07-15 RX ADMIN — LOSARTAN POTASSIUM 25 MG: 25 TABLET, FILM COATED ORAL at 09:07

## 2018-07-15 RX ADMIN — RAMELTEON 8 MG: 8 TABLET, FILM COATED ORAL at 10:07

## 2018-07-15 RX ADMIN — INSULIN ASPART 4 UNITS: 100 INJECTION, SOLUTION INTRAVENOUS; SUBCUTANEOUS at 05:07

## 2018-07-15 RX ADMIN — TAMSULOSIN HYDROCHLORIDE 0.4 MG: 0.4 CAPSULE ORAL at 09:07

## 2018-07-15 RX ADMIN — GABAPENTIN 300 MG: 300 CAPSULE ORAL at 09:07

## 2018-07-15 RX ADMIN — GABAPENTIN 300 MG: 300 CAPSULE ORAL at 10:07

## 2018-07-15 RX ADMIN — INSULIN ASPART 4 UNITS: 100 INJECTION, SOLUTION INTRAVENOUS; SUBCUTANEOUS at 12:07

## 2018-07-15 RX ADMIN — INSULIN ASPART 4 UNITS: 100 INJECTION, SOLUTION INTRAVENOUS; SUBCUTANEOUS at 09:07

## 2018-07-15 RX ADMIN — FLUTICASONE PROPIONATE 100 MCG: 50 SPRAY, METERED NASAL at 10:07

## 2018-07-15 RX ADMIN — INSULIN DETEMIR 20 UNITS: 100 INJECTION, SOLUTION SUBCUTANEOUS at 10:07

## 2018-07-15 RX ADMIN — MUPIROCIN 1 G: 20 OINTMENT TOPICAL at 09:07

## 2018-07-15 RX ADMIN — WARFARIN SODIUM 5 MG: 2.5 TABLET ORAL at 05:07

## 2018-07-15 RX ADMIN — FAMOTIDINE 20 MG: 20 TABLET ORAL at 09:07

## 2018-07-15 RX ADMIN — FENOFIBRATE 145 MG: 145 TABLET ORAL at 09:07

## 2018-07-15 RX ADMIN — INSULIN DETEMIR 20 UNITS: 100 INJECTION, SOLUTION SUBCUTANEOUS at 09:07

## 2018-07-15 RX ADMIN — FUROSEMIDE 40 MG: 40 TABLET ORAL at 09:07

## 2018-07-15 RX ADMIN — FAMOTIDINE 20 MG: 20 TABLET ORAL at 10:07

## 2018-07-15 RX ADMIN — PRAVASTATIN SODIUM 10 MG: 10 TABLET ORAL at 10:07

## 2018-07-15 RX ADMIN — GABAPENTIN 300 MG: 300 CAPSULE ORAL at 01:07

## 2018-07-15 NOTE — PLAN OF CARE
Problem: Occupational Therapy Goal  Goal: Occupational Therapy Goal  Goals to be met by: 14 days     Patient will increase functional independence with ADLs by performing:    Feeding with Modified [place].  UE Dressing with Modified [place].  LE Dressing with Stand-by Assistance using A/E and A/D as needed..  Grooming while standing with Stand-by Assistance with RW to stand..  Toileting from bedside commode with Stand-by Assistance for hygiene and clothing management with DME as needed.  Bathing from  edge of bed with Minimal Assistance.  Rolling to Bilateral with Modified [place].   Supine to sit with Modified [place].  Stand pivot transfers with Supervision with RW.  Upper extremity exercise program x10 reps per set, with assistance as needed.  Pt will tolerate up to 10 minutes standing activity with RW to steady.     Outcome: Ongoing (interventions implemented as appropriate)  VICK Rodriguez/JAYME      7/15/2018

## 2018-07-15 NOTE — PT/OT/SLP PROGRESS
Occupational Therapy  Treatment    [facility] – [place] Cant[place] Jr.   MRN: [id number]   Admitting Diagnosis: Prosthetic joint implant failure    OT Date of Treatment: 07/15/18  Total Time (min): 53 min    Billable Minutes:  Self Care/Home Management 35 and Therapeutic Exercise 18    General Precautions: Standard, fall  Orthopedic Precautions: RLE weight bearing as tolerated  Braces: N/A    Do you have any cultural, spiritual, Pentecostal conflicts, given your current situation?: none stated    Subjective:  Communicated with nurse prior to session.      Pain/Comfort  Pain Rating 1: 0/10  Pain Rating Post-Intervention 1: 0/10    Objective:  Patient found with: perineural catheter, oxygen, wound vac    Functional Status:  MDS G  Bed Mobility Functional Status: CGA-Min (A) (CGA to perform supine to sitting EOB and rolling (L) and (R))    Transfer Functional Status: CGA-Min (A) (sit to standing transition and SPT with RW from EOB<>W/C CGA)    Dressing Functional Status: 2:CGA-Min (A) (UBD performed with SBA, CGA with LBD donning pants and socks using reacher and sock aide with RW to stand.)    Toilet Use Functional Status: CGA-Min (A) (CGA to steady when standing to perform toileting (urinal )  with RW to steady when standing.)      Moving from seated to standing position: Not steady, only able to stabilize with staff assistance  Walking (with assistive device if used): Not steady, only able to stabilize with staff assistance  Turning around and facing the opposite direction while walking: Not steady, only able to stabilize with staff assistance  Moving on and off the toilet: Not steady, only able to stabilize with staff assistance  Surface-to-surface transfer (transfer between bed and chair or wheelchair): Not steady, only able to stabilize with staff assistance           AMPA 6 Click:  AMPAC Total Score: 18    OT Exercises: UE Ergometer performed UE UBE with Mod resistance 15 minutes.  UE exercises performed to increase functional  endurance and strength.  Strengthening required in order increase independence when performing self care tasks, functional ambulation, W/C propulsion , functional standing activities as well as when performing functional tasks.    Additional Treatment:  Pt edu on OT role/POC  - White board updated  - Multiple self care tasks completed-- as noted above   - He performed dynamic sitting activity incorporating reaching in all planes while sitting unsupported EOB      and working on self care tasks.    Patient left up in chair with all lines intact, call button in reach and nurse notified    ASSESSMENT:  Stephen PAT Cushmanfreddy Mcconnell is a 72 y.o. male with a medical diagnosis of Prosthetic joint implant failure .  Pt was agreeable to OT and tolerated Tx without incidence but continues to require (A) to perform functional activities to completion. OT intervention required to address performance deficits affecting performance of self care tasks, functional mobility, functional transfers, functional strength and endurance to perform ADLS.  Pt is making progress but continues to require OT Intervention to perform functional transfers, functional standing activities, and self care tasks with standing component more independently. OT is recommended to further his functional (I)ce and safety. Goals remain appropriate and continued OT is recommended.    Rehab identified problem list/impairments: weakness, impaired endurance, impaired self care skills, impaired functional mobilty, gait instability, impaired balance, decreased coordination, decreased lower extremity function, decreased safety awareness, pain, decreased ROM, orthopedic precautions    Rehab potential is good    Activity tolerance: Good    Discharge recommendations: home with home health     Barriers to discharge: Decreased caregiver support     Equipment recommendations: wheelchair     GOALS:    Occupational Therapy Goals        Problem: Occupational Therapy Goal    Goal  Priority Disciplines Outcome Interventions   Occupational Therapy Goal     OT, PT/OT Ongoing (interventions implemented as appropriate)    Description:  Goals to be met by: 14 days     Patient will increase functional independence with ADLs by performing:    Feeding with Modified [place].  UE Dressing with Modified [place].  LE Dressing with Stand-by Assistance using A/E and A/D as needed..  Grooming while standing with Stand-by Assistance with RW to stand..  Toileting from bedside commode with Stand-by Assistance for hygiene and clothing management with DME as needed.  Bathing from  edge of bed with Minimal Assistance.  Rolling to Bilateral with Modified [place].   Supine to sit with Modified [place].  Stand pivot transfers with Supervision with RW.  Upper extremity exercise program x10 reps per set, with assistance as needed.  Pt will tolerate up to 10 minutes standing activity with RW to steady.                      Plan:  Patient to be seen 5 x/week, 6 x/week to address the above listed problems via self-care/home management, therapeutic activities, therapeutic exercises  Plan of Care expires: 08/13/18  Plan of Care reviewed with: patient    Adolfo Lord, ERIKAR/JAYME  07/15/2018

## 2018-07-16 LAB
ANION GAP SERPL CALC-SCNC: 6 MMOL/L
BASOPHILS # BLD AUTO: 0.03 K/UL
BASOPHILS NFR BLD: 0.5 %
BUN SERPL-MCNC: 21 MG/DL
CALCIUM SERPL-MCNC: 9.2 MG/DL
CHLORIDE SERPL-SCNC: 101 MMOL/L
CO2 SERPL-SCNC: 28 MMOL/L
CREAT SERPL-MCNC: 1.3 MG/DL
DIFFERENTIAL METHOD: ABNORMAL
EOSINOPHIL # BLD AUTO: 0.5 K/UL
EOSINOPHIL NFR BLD: 7.6 %
ERYTHROCYTE [DISTWIDTH] IN BLOOD BY AUTOMATED COUNT: 13 %
EST. GFR  (AFRICAN AMERICAN): >60 ML/MIN/1.73 M^2
EST. GFR  (NON AFRICAN AMERICAN): 53.8 ML/MIN/1.73 M^2
GLUCOSE SERPL-MCNC: 245 MG/DL
HCT VFR BLD AUTO: 34.2 %
HGB BLD-MCNC: 11.4 G/DL
IMM GRANULOCYTES # BLD AUTO: 0.02 K/UL
IMM GRANULOCYTES NFR BLD AUTO: 0.3 %
INR PPP: 1.8
LYMPHOCYTES # BLD AUTO: 1.1 K/UL
LYMPHOCYTES NFR BLD: 17.3 %
MAGNESIUM SERPL-MCNC: 1.9 MG/DL
MCH RBC QN AUTO: 31.1 PG
MCHC RBC AUTO-ENTMCNC: 33.3 G/DL
MCV RBC AUTO: 93 FL
MONOCYTES # BLD AUTO: 0.8 K/UL
MONOCYTES NFR BLD: 12.1 %
NEUTROPHILS # BLD AUTO: 3.9 K/UL
NEUTROPHILS NFR BLD: 62.2 %
NRBC BLD-RTO: 0 /100 WBC
PHOSPHATE SERPL-MCNC: 2.7 MG/DL
PLATELET # BLD AUTO: 191 K/UL
PMV BLD AUTO: 11.6 FL
POCT GLUCOSE: 260 MG/DL (ref 70–110)
POCT GLUCOSE: 260 MG/DL (ref 70–110)
POCT GLUCOSE: 290 MG/DL (ref 70–110)
POCT GLUCOSE: 299 MG/DL (ref 70–110)
POTASSIUM SERPL-SCNC: 4.2 MMOL/L
PROTHROMBIN TIME: 17.2 SEC
RBC # BLD AUTO: 3.67 M/UL
SODIUM SERPL-SCNC: 135 MMOL/L
WBC # BLD AUTO: 6.2 K/UL

## 2018-07-16 PROCEDURE — 97110 THERAPEUTIC EXERCISES: CPT

## 2018-07-16 PROCEDURE — 97535 SELF CARE MNGMENT TRAINING: CPT

## 2018-07-16 PROCEDURE — 36415 COLL VENOUS BLD VENIPUNCTURE: CPT

## 2018-07-16 PROCEDURE — 83735 ASSAY OF MAGNESIUM: CPT

## 2018-07-16 PROCEDURE — 97116 GAIT TRAINING THERAPY: CPT

## 2018-07-16 PROCEDURE — 25000003 PHARM REV CODE 250: Performed by: STUDENT IN AN ORGANIZED HEALTH CARE EDUCATION/TRAINING PROGRAM

## 2018-07-16 PROCEDURE — 25000003 PHARM REV CODE 250: Performed by: NURSE PRACTITIONER

## 2018-07-16 PROCEDURE — 11000004 HC SNF PRIVATE

## 2018-07-16 PROCEDURE — 85610 PROTHROMBIN TIME: CPT

## 2018-07-16 PROCEDURE — 84100 ASSAY OF PHOSPHORUS: CPT

## 2018-07-16 PROCEDURE — 99309 SBSQ NF CARE MODERATE MDM 30: CPT | Mod: ,,, | Performed by: NURSE PRACTITIONER

## 2018-07-16 PROCEDURE — 97802 MEDICAL NUTRITION INDIV IN: CPT

## 2018-07-16 PROCEDURE — 85025 COMPLETE CBC W/AUTO DIFF WBC: CPT

## 2018-07-16 PROCEDURE — 94761 N-INVAS EAR/PLS OXIMETRY MLT: CPT

## 2018-07-16 PROCEDURE — 80048 BASIC METABOLIC PNL TOTAL CA: CPT

## 2018-07-16 PROCEDURE — 97530 THERAPEUTIC ACTIVITIES: CPT

## 2018-07-16 RX ORDER — INSULIN ASPART 100 [IU]/ML
6 INJECTION, SOLUTION INTRAVENOUS; SUBCUTANEOUS
Status: DISCONTINUED | OUTPATIENT
Start: 2018-07-16 | End: 2018-07-18

## 2018-07-16 RX ORDER — WARFARIN 2.5 MG/1
5 TABLET ORAL DAILY
Status: COMPLETED | OUTPATIENT
Start: 2018-07-16 | End: 2018-07-16

## 2018-07-16 RX ORDER — CEPHALEXIN 500 MG/1
500 CAPSULE ORAL EVERY 6 HOURS
Status: DISCONTINUED | OUTPATIENT
Start: 2018-07-16 | End: 2018-07-18

## 2018-07-16 RX ORDER — PREDNISOLONE ACETATE-GATIFLOXACIN 5; 10 MG/ML; MG/ML
1 SUSPENSION/ DROPS OPHTHALMIC 3 TIMES DAILY
Status: DISCONTINUED | OUTPATIENT
Start: 2018-07-16 | End: 2018-08-01 | Stop reason: HOSPADM

## 2018-07-16 RX ADMIN — FAMOTIDINE 20 MG: 20 TABLET ORAL at 09:07

## 2018-07-16 RX ADMIN — FAMOTIDINE 20 MG: 20 TABLET ORAL at 08:07

## 2018-07-16 RX ADMIN — INSULIN ASPART 6 UNITS: 100 INJECTION, SOLUTION INTRAVENOUS; SUBCUTANEOUS at 04:07

## 2018-07-16 RX ADMIN — WARFARIN SODIUM 5 MG: 2.5 TABLET ORAL at 04:07

## 2018-07-16 RX ADMIN — FUROSEMIDE 40 MG: 40 TABLET ORAL at 09:07

## 2018-07-16 RX ADMIN — INSULIN ASPART 4 UNITS: 100 INJECTION, SOLUTION INTRAVENOUS; SUBCUTANEOUS at 08:07

## 2018-07-16 RX ADMIN — GABAPENTIN 300 MG: 300 CAPSULE ORAL at 09:07

## 2018-07-16 RX ADMIN — LOSARTAN POTASSIUM 25 MG: 25 TABLET, FILM COATED ORAL at 09:07

## 2018-07-16 RX ADMIN — FLUTICASONE PROPIONATE 100 MCG: 50 SPRAY, METERED NASAL at 09:07

## 2018-07-16 RX ADMIN — INSULIN ASPART 3 UNITS: 100 INJECTION, SOLUTION INTRAVENOUS; SUBCUTANEOUS at 08:07

## 2018-07-16 RX ADMIN — ACETAMINOPHEN 650 MG: 325 TABLET, FILM COATED ORAL at 08:07

## 2018-07-16 RX ADMIN — CEPHALEXIN 500 MG: 500 CAPSULE ORAL at 05:07

## 2018-07-16 RX ADMIN — GABAPENTIN 300 MG: 300 CAPSULE ORAL at 08:07

## 2018-07-16 RX ADMIN — INSULIN ASPART 6 UNITS: 100 INJECTION, SOLUTION INTRAVENOUS; SUBCUTANEOUS at 08:07

## 2018-07-16 RX ADMIN — INSULIN ASPART 6 UNITS: 100 INJECTION, SOLUTION INTRAVENOUS; SUBCUTANEOUS at 12:07

## 2018-07-16 RX ADMIN — GABAPENTIN 300 MG: 300 CAPSULE ORAL at 02:07

## 2018-07-16 RX ADMIN — INSULIN DETEMIR 20 UNITS: 100 INJECTION, SOLUTION SUBCUTANEOUS at 09:07

## 2018-07-16 RX ADMIN — PRAVASTATIN SODIUM 10 MG: 10 TABLET ORAL at 08:07

## 2018-07-16 RX ADMIN — TAMSULOSIN HYDROCHLORIDE 0.4 MG: 0.4 CAPSULE ORAL at 09:07

## 2018-07-16 RX ADMIN — INSULIN ASPART 4 UNITS: 100 INJECTION, SOLUTION INTRAVENOUS; SUBCUTANEOUS at 12:07

## 2018-07-16 RX ADMIN — FENOFIBRATE 145 MG: 145 TABLET ORAL at 09:07

## 2018-07-16 RX ADMIN — PREDNISOLONE ACETATE-GATIFLOXACIN 1 DROP: 5; 10 SUSPENSION/ DROPS OPHTHALMIC at 08:07

## 2018-07-16 NOTE — ASSESSMENT & PLAN NOTE
Evaluated on 7/16/18  · Chronic, Well controlled  · Continue Metoprolol, losartan lasix to treat.   · Continue to monitor  · Low Na diet

## 2018-07-16 NOTE — ASSESSMENT & PLAN NOTE
Evaluated on 7/16/18  · S/P right revision TKA  · Will continue to monitor for drainage or erythema to surgical site   · Continue with current pain control with perineural adductor. Holding opioids for now   · Continue bowel regimen for opioid induced constipation  · Continue with PT/OT for gait training and strengthening and restoration of ADL's: WBAT per ortho   · Fall precautions   · Continue DVT prophylaxis per ortho:   - Please discontinue ASA 81mg BID when the patient's INR becomes therapeutic (goal range = 1.8 - 2.2)   - Please discontinue Warfarin on POD7 (7/17/18)and resume the patient's home dose of Apixiban--ajustements made in epic

## 2018-07-16 NOTE — HOSPITAL COURSE
7/12/18: Patient admitted to SNF for ongoing Pt/Ot following a hospitalization for revision of right TKA.  7/16: Patient seen at bedside, edema noted to right leg, wound vac without output. CN changed dressing, distal end with small opening that produces small about bloody drainage with expressed, New dressing placed will be seen in ortho clinic tomorrow. Blood sugars elevated insulin adjusted.   7/17: seen in ortho clinic today, wound vac removed  7/24: Patient seen at bedside doing well, swelling improved with use of home compression wraps. Pain tolerable with medication. No other complaints voiced.  8/1: Patient seen at beside doing well, denies pain, swelling improved. Discussed discharge and answered all questions. Patient discharged home with home health services.

## 2018-07-16 NOTE — PT/OT/SLP PROGRESS
Physical Therapy  Treatment    [facility] [place]   MRN: [id number]   Admitting Diagnosis: Prosthetic joint implant failure    PT Received On: 07/16/18  Total Time (min): 68       Billable Minutes:  Gait Training 15, Therapeutic Activity 30 and Therapeutic Exercise 28    Treatment Type: Treatment  PT/PTA: PT     PTA Visit Number: 0       General Precautions: Standard, fall  Orthopedic Precautions: RLE weight bearing as tolerated   Braces: N/A    Do you have any cultural, spiritual, Bahai conflicts, given your current situation?: none stated    Subjective:  Communicated with pt prior to session.  Pt was agreeable to PT services.  Pain/Comfort  Pain Rating 1: 4/10  Location - Side 1: Right  Location - Orientation 1: generalized  Location 1: knee  Pain Addressed 1: Pre-medicate for activity  Pain Rating Post-Intervention 1: 4/10    Objective:  Patient found seated in bedside chair with Patient found with: wound vac       Functional Status:  MDS G  Bed Mobility Functional Status: mod(A)-Max(A)  Transfer Functional Status: CGA-Min (A)  Walk in Room Functional Status: CGA-Min (A)  Walk in Corridor Functional Status: CGA-Min (A)  Locomotion on Unit Functional Status: CGA-Min (A)  Moving from seated to standing position: Not steady, only able to stabilize with staff assistance  Walking (with assistive device if used): Not steady, only able to stabilize with staff assistance  Turning around and facing the opposite direction while walking: Not steady, only able to stabilize with staff assistance  Moving on and off the toilet: Not steady, only able to stabilize with staff assistance  Surface-to-surface transfer (transfer between bed and chair or wheelchair): Not steady, only able to stabilize with staff assistance          AM-PAC 6 CLICK MOBILITY  Total Score:15    Bed Mobility:  Sit>Supine:Mod A  Supine>Sit: Mod A    Transfers:  Sit<>Stand: Min A with reminders to use momentum to assist with transitioning from sit>stand. Also  needed cues for returning to seated posiiton (not falling backward but lowering himself slowly via R knee extension).  Stand Pivot Transfer: CGA with use of rolling walker    Gait:  Amb 38 feet x 2 trials with a slow pace using a rolling walker, step-to gait pattern. He tends to push his walker too far ahead of him and was educated to not do so.     WC propulsion:  35 feet with SBA, negotiating around turns within the gym, slowly performed with BUEs.    Therex (x20 reps):  LAQ  Hip flexion  Ankle pumps  SAQ  Hip abduction  Heel slides  Quad sets  Gluteal sets  SLR (x10 reps with AAROM for RLE)      Balance:  Requires UE support via RW, despite believing he can perform a stand pivot without use of an assistive device.  He was educated about his need for UE support at this time due to weakness in his RLE.    Additional Treatment:  AROM (RLE): -11 to 62 degrees    Patient left up in chair with call button in reach.    Assessment:  [facility] [place] a 72 y.o. male with a medical diagnosis of Prosthetic joint implant failure.  Mr. Cantor is having difficulty moving about his environment, as he has significant edema throughout his body, especially in his abdomen and lower extremities.  He requires Min A for sit<>stand transfers and CGA for all standing mobility (including a rolling walker). He will continue to benefit from skilled PT services.     Rehab identified problem list/impairments: weakness, impaired endurance, impaired self care skills, impaired functional mobilty, gait instability, impaired balance, decreased lower extremity function, pain, impaired cognition, edema    Rehab potential is good.    Activity tolerance: Fair    Discharge recommendations: home health PT     Barriers to discharge: None    Equipment recommendations: wheelchair (maybe a WC )     GOALS:    Physical Therapy Goals        Problem: Physical Therapy Goal    Goal Priority Disciplines Outcome Goal Variances Interventions   Physical Therapy  Goal     PT/OT, PT Ongoing (interventions implemented as appropriate)     Description:  Goals to be met by: 2 weeks (  )    Patient will increase functional independence with mobility by performin. Supine to sit with Contact Guard Assistance.  2. Sit to supine with Contact Guard Assistance.  3. Sit to stand transfer with Contact Guard Assistance.  4. Bed to chair transfer with Contact Guard Assistance using Rolling Walker  5. Gait  x 75 feet with Contact Guard Assistance using Rolling Walker.   6. Ascend/descend 4 stair with bilateral Handrails Contact Guard Assistance, as his house has four PUNEET with B HR.  7. Ascend/Descend 4 inch curb step with Contact Guard Assistance using Rolling Walker.  8. Stand for 3 minutes with Contact Guard Assistance using Rolling Walker while performing a task.  9. Lower extremity exercise program x 20 reps per handout, with assistance as needed.  10. Pt will improve his R knee active extension from -10 degrees to -5 degrees to allow for better functional mobility..  11. Pt will improve his L knee active flexion from 68 degrees to 80 degrees to allow for better functional mobility.-met 18                        PLAN:    Patient to be seen 5 x/week  to address the above listed problems via gait training, therapeutic activities, therapeutic exercises, wheelchair management/training  Plan of Care expires: 18  Plan of Care reviewed with: patient    Betzaida Wheeler, PT  2018

## 2018-07-16 NOTE — PT/OT/SLP PROGRESS
Occupational Therapy  Treatment    [facility] – [place] PAT Cantor Jr.   MRN: [id number]   Admitting Diagnosis: Prosthetic joint implant failure    OT Date of Treatment: 07/16/18  Total Time (min): 57 min    Billable Minutes:  Self Care/Home Management 45 and Therapeutic Exercise 12    General Precautions: Standard, fall  Orthopedic Precautions: RLE weight bearing as tolerated  Braces: N/A    Do you have any cultural, spiritual, Yazdanism conflicts, given your current situation?: none stated    Subjective:  Communicated with nurse prior to session.  Pt. Reported he did not like having a camera watching him in his room without his permission    Pain/Comfort  Pain Rating 1: 3/10  Location - Side 1: Right  Pain Rating Post-Intervention 1: 3/10    Objective:  Patient found with: wound vac (supine in bed )    Functional Status:  MDS G  Bed Mobility Functional Status: mod(A)-Max(A) with assist to guide RLE off bed initially as well as assist at trunk  Bed Mobility Level of (A):  (supine to sit)  Transfer Functional Status: CGA-Min (A) sit to stand x 3 trials from EOB with RW;  Pt. Required CGA to SPT from EOB to bedside chair.  Pt. Required vc's to make complete turn prior to trying to sit.    Transfer Level of (A):  (with RW from EOB)  Dressing Functional Status: 3:mod(A)-Max(A) LBD with assist to don socks, thread BLE initially as well as for management over hips in stand/UBD Set Up A  Toilet Use Functional Status: CGA-Min (A)  Personal Hygiene Functional Status: S-SBA seated EOB to brush teeth and comb hair  Bathing Functional Status: mod(A)-Max(A) with assist to wash buttocks region as well as below knees  Moving from seated to standing position: Not steady, only able to stabilize with staff assistance  Surface-to-surface transfer (transfer between bed and chair or wheelchair): Not steady, only able to stabilize with staff assistance           [facility] 6 Click:  [facility] Total Score: 17    OT Exercises: AROM with 2 # dowel x 3 sets 10 reps for  shoulder flex, chest presses as well as elbow flex/ext to assist with improving endurance level    Additional Treatment:  Pt. Educated on safety with transfers and ADL tasks.    Pt. Educated on role of oT and pOC  Pt. Educated on need for staff assist with ADL tasks  Pt. Educated on need to vary position in bed and nurse ( Jessica) notified of pt. With tender/reddened spot on buttocks and that pt. May require assist with positioning when in bed to maintain skin integrity    Patient left up in chair with all lines intact, call button in reach and [employer] camera system notified  notified    ASSESSMENT:  Stephen PAT Cantor Jr. is a 72 y.o. male with a medical diagnosis of Prosthetic joint implant failure and presents with deficits in self-care skills as well as functional mobility. Pt. Tolerated session well on this date.  Pt. Requires increased assist with bed mobility and self-care skills.  Pt. Would benefit from continued oT services.     Rehab identified problem list/impairments: weakness, impaired endurance, impaired self care skills, impaired functional mobilty, gait instability, impaired balance, decreased coordination, decreased lower extremity function, decreased safety awareness, pain, decreased ROM, orthopedic precautions    Rehab potential is good    Activity tolerance: Good    Discharge recommendations: home with home health and supervision    Barriers to discharge: Decreased caregiver support     Equipment recommendations: wheelchair     GOALS:    Occupational Therapy Goals        Problem: Occupational Therapy Goal    Goal Priority Disciplines Outcome Interventions   Occupational Therapy Goal     OT, PT/OT Ongoing (interventions implemented as appropriate)    Description:  Goals to be met by: 14 days     Patient will increase functional independence with ADLs by performing:    Feeding with Modified [place].  UE Dressing with Modified [place].  LE Dressing with Stand-by Assistance using A/E and A/D as  needed..  Grooming while standing with Stand-by Assistance with RW to stand..  Toileting from bedside commode with Stand-by Assistance for hygiene and clothing management with DME as needed.  Bathing from  edge of bed with Minimal Assistance.  Rolling to Bilateral with Modified [place].   Supine to sit with Modified [place].  Stand pivot transfers with Supervision with RW.  Upper extremity exercise program x10 reps per set, with assistance as needed.  Pt will tolerate up to 10 minutes standing activity with RW to steady.                      Plan:  Patient to be seen 5 x/week, 6 x/week to address the above listed problems via self-care/home management, therapeutic activities, therapeutic exercises  Plan of Care expires: 08/13/18  Plan of Care reviewed with: patient    VANESSA Zuñiga  07/16/2018

## 2018-07-16 NOTE — PLAN OF CARE
Problem: Physical Therapy Goal  Goal: Physical Therapy Goal  Goals to be met by: 2 weeks (  )    Patient will increase functional independence with mobility by performin. Supine to sit with Contact Guard Assistance.  2. Sit to supine with Contact Guard Assistance.  3. Sit to stand transfer with Contact Guard Assistance.  4. Bed to chair transfer with Contact Guard Assistance using Rolling Walker  5. Gait  x 75 feet with Contact Guard Assistance using Rolling Walker.   6. Ascend/descend 4 stair with bilateral Handrails Contact Guard Assistance, as his house has four PUNEET with B HR.  7. Ascend/Descend 4 inch curb step with Contact Guard Assistance using Rolling Walker.  8. Stand for 3 minutes with Contact Guard Assistance using Rolling Walker while performing a task.  9. Lower extremity exercise program x 20 reps per handout, with assistance as needed.  10. Pt will improve his R knee active extension from -10 degrees to -5 degrees to allow for better functional mobility..  11. Pt will improve his L knee active flexion from 68 degrees to 80 degrees to allow for better functional mobility.-met 18      Outcome: Ongoing (interventions implemented as appropriate)  Goals remain appropriate.

## 2018-07-16 NOTE — ASSESSMENT & PLAN NOTE
Evaluated on 7/16/18  · Chronic, uncontrolled  · detemir insulin adjusted to 22 units bid with novolog 6 with meals   · Low dose correction scale   · Continue blood glucose monitoring   · ADA diet  · Continue gabapentin to treat for neuropathy.   · Will monitor for need to adjust

## 2018-07-16 NOTE — PROGRESS NOTES
07/16/2018  2:50 PM    Discharge Planning-Met with patient and spouse in room to inform of discharge date of 7/25/2018. Discharge date written on dry erase board in room. Patient stated understanding to discharge date.  Paloma Richardson RN, CM Skilled  [id number]

## 2018-07-16 NOTE — SUBJECTIVE & OBJECTIVE
Hospital Course:  7/12/18: Patient admitted to SNF for ongoing Pt/Ot following a hospitalization for revision of right TKA.  7/16: Patient seen at bedside, edema noted to right leg, wound vac without output. CN changed dressing, distal end with small opening that produces small about bloody drainage with expressed, New dressing placed will be seen in ortho clinic tomorrow. Blood sugars elevated insulin adjusted.     Interval History: Patient seen at bedside, feels that he is doing well. No acute events overnight.     Review of Systems   Constitutional: Negative for appetite change, chills, fatigue and fever.   HENT: Negative for trouble swallowing.    Respiratory: Negative for cough, chest tightness, shortness of breath and wheezing.    Cardiovascular: Negative for chest pain, palpitations and leg swelling.   Gastrointestinal: Negative for abdominal pain, constipation, diarrhea and nausea.   Genitourinary: Negative for difficulty urinating, frequency and urgency.   Musculoskeletal: Positive for arthralgias. Negative for myalgias.        +right knee pain 3/10   Skin: Negative for rash.   Neurological: Negative for dizziness, weakness, light-headedness and headaches.   Psychiatric/Behavioral: Negative for sleep disturbance.     Scheduled Meds:   [START ON 7/17/2018] apixaban  5 mg Oral BID    cephALEXin  500 mg Oral Q6H    famotidine  20 mg Oral BID    fenofibrate  145 mg Oral Daily    fluticasone  2 spray Each Nare Daily    furosemide  40 mg Oral Daily    gabapentin  300 mg Oral TID    gatifloxacin-prednisolone  1 drop Both Eyes TID    insulin aspart U-100  6 Units Subcutaneous TIDWM    insulin detemir U-100  22 Units Subcutaneous BID    losartan  25 mg Oral Daily    metoprolol succinate  100 mg Oral QHS    polyethylene glycol  17 g Oral Daily    pravastatin  10 mg Oral QHS    senna-docusate 8.6-50 mg  1 tablet Oral BID    tamsulosin  1 capsule Oral Daily     Continuous Infusions:  PRN  Meds:.acetaminophen, acetaminophen, aluminum-magnesium hydroxide-simethicone, bisacodyl, calcium carbonate, dextrose 50%, dextrose 50%, glucagon (human recombinant), glucose, glucose, insulin aspart U-100, ramelteon    Objective:     Vital Signs (Most Recent):  Temp: 98 °F (36.7 °C) (07/16/18 0754)  Pulse: 100 (07/16/18 0754)  Resp: 18 (07/16/18 0754)  BP: 128/82 (07/16/18 0754)  SpO2: 100 % (07/16/18 0754) Vital Signs (24h Range):  Temp:  [97.7 °F (36.5 °C)-98 °F (36.7 °C)] 98 °F (36.7 °C)  Pulse:  [] 100  Resp:  [18] 18  SpO2:  [94 %-100 %] 100 %  BP: (124-128)/(65-82) 128/82     Weight: (!) 148.1 kg (326 lb 8 oz)  Body mass index is 46.85 kg/m².    Intake/Output Summary (Last 24 hours) at 07/16/18 1627  Last data filed at 07/16/18 1300   Gross per 24 hour   Intake              650 ml   Output              700 ml   Net              -50 ml      Physical Exam   Constitutional: He is oriented to person, place, and time. He appears well-developed and well-nourished. No distress.   Cardiovascular: Normal rate, regular rhythm and normal heart sounds.  Exam reveals no gallop and no friction rub.    No murmur heard.  Pulmonary/Chest: Effort normal and breath sounds normal. No respiratory distress. He has no wheezes. He has no rales.   Abdominal: Soft. Bowel sounds are normal. He exhibits no distension. There is no tenderness.   Musculoskeletal: He exhibits edema. He exhibits no tenderness.   Decreased ROM to right leg, + wound vac dressing to right knee incision, 2+ edema to right leg, 1+edema to left leg   Neurological: He is alert and oriented to person, place, and time.   Skin: Skin is warm and dry. No rash noted. He is not diaphoretic. No cyanosis. Nails show no clubbing.   Right lower leg with erythema and 3 intact blisters, left lower leg with small mild erythema    Psychiatric: He has a normal mood and affect. His behavior is normal.     Significant Labs:     Recent Labs  Lab 07/10/18  1141 07/11/18  0356  07/12/18  0604 07/16/18  0600   WBC 9.01  --   --  6.20   HGB 13.2* 12.7* 12.5* 11.4*   HCT 39.7* 38.2* 36.2* 34.2*     --   --  191       Recent Labs  Lab 07/11/18  0352 07/12/18  0604 07/16/18  0600   * 135* 135*   K 4.3 4.1 4.2    108 101   CO2 19* 18* 28   BUN 23 21 21   CREATININE 1.4 1.3 1.3   CALCIUM 8.5* 9.1 9.2     Lab Results   Component Value Date    LABPROT 17.2 (H) 07/16/2018    ALBUMIN 3.5 06/19/2018     Lab Results   Component Value Date    CALCIUM 9.2 07/16/2018    PHOS 2.7 07/16/2018     POCT Glucose   Date Value Ref Range Status   07/16/2018 260 (H) 70 - 110 mg/dL Final   07/16/2018 299 (H) 70 - 110 mg/dL Final   07/16/2018 260 (H) 70 - 110 mg/dL Final   07/15/2018 289 (H) 70 - 110 mg/dL Final   07/15/2018 234 (H) 70 - 110 mg/dL Final   07/15/2018 236 (H) 70 - 110 mg/dL Final   07/15/2018 207 (H) 70 - 110 mg/dL Final   07/14/2018 256 (H) 70 - 110 mg/dL Final   07/14/2018 306 (H) 70 - 110 mg/dL Final   07/14/2018 264 (H) 70 - 110 mg/dL Final   07/14/2018 294 (H) 70 - 110 mg/dL Final   07/13/2018 290 (H) 70 - 110 mg/dL Final     Significant Imaging: na

## 2018-07-16 NOTE — ASSESSMENT & PLAN NOTE
Evaluated on 7/16/18  · Maintain euvolemia by monitoring I/O's and daily weights.  · Fluid restriction (2 liters/24 hours)  · Low Na diet  · Continue ARB, BB, lasix.

## 2018-07-16 NOTE — PROGRESS NOTES
[facility] [place], [place] [facility] - Skilled Nursing Care  Department of [facility] Medicine  Progress Note    Patient Name: [facility] [place] PAT Cantor Jr.  MRN: [id number]  Code Status: Full Code  Admission Date: 7/12/2018  Length of Stay: 4 days  Attending Physician: Jen Hewitt MD  Primary Care Provider: Desmond Barlow MD    Subjective:     Principal Problem:Prosthetic joint implant failure    Chief Complaint/Reason for Admission: Debility    History of Present Illness:  Patient is a 72 y.o. male who has a past medical history of Atrial fibrillation on anticoagulant long-term use; Basal cell carcinoma; Cataract; Chronic kidney disease; Diabetes mellitus with renal manifestations, uncontrolled; Dyslipidemia; hypertension; Obesity; PN (peripheral neuropathy); Primary osteoarthritis of right knee; Sleep apnea; Thyroid disease presented with chronic pain to his Right knee which has gotten progressively worse over the past few months. He had a right press-fit total knee arthroplasty in 2017. Imaging showed that the tibial component was subsiding with a limp falling into varus. The symptoms were severely impacting his activities of daily living and it was decided to proceed with a revision right total knee arthroplasty. Patient complex revision of tibial component on 7/10/18 by Dr. Stuart. Patient tolerated procedure well with no significant inta operative complications. Post operatively, patient had episodes of confusion and combativeness likelydue to opioid induced delirium. Pain control maintained with limited sedating medications mainly perineural adductor. Patient has been working with PT/OT who recommend SNF for further balance/mobility training. Patient currently sitting up in chair without complaints. He is awake and alert. He is angry due to not being allowed to leave the facility. He wishes to go home and not be admitted to SNF. No family at available at bedside. He lives at home with his wife who is able to assist with ADL's. Patient currently  denies any fever/chills, chest pain, dyspnea, weakness, numbness, abdominal pain, nausea/vomiting, dysuria/hematuria, or weight loss.     Hospital Course:  7/12/18: Patient admitted to SNF for ongoing Pt/Ot following a hospitalization for revision of right TKA.  7/16: Patient seen at bedside, edema noted to right leg, wound vac without output. CN changed dressing, distal end with small opening that produces small about bloody drainage with expressed, New dressing placed will be seen in ortho clinic tomorrow. Blood sugars elevated insulin adjusted.     Interval History: Patient seen at bedside, feels that he is doing well. No acute events overnight.     Review of Systems   Constitutional: Negative for appetite change, chills, fatigue and fever.   HENT: Negative for trouble swallowing.    Respiratory: Negative for cough, chest tightness, shortness of breath and wheezing.    Cardiovascular: Negative for chest pain, palpitations and leg swelling.   Gastrointestinal: Negative for abdominal pain, constipation, diarrhea and nausea.   Genitourinary: Negative for difficulty urinating, frequency and urgency.   Musculoskeletal: Positive for arthralgias. Negative for myalgias.        +right knee pain 3/10   Skin: Negative for rash.   Neurological: Negative for dizziness, weakness, light-headedness and headaches.   Psychiatric/Behavioral: Negative for sleep disturbance.     Scheduled Meds:   [START ON 7/17/2018] apixaban  5 mg Oral BID    cephALEXin  500 mg Oral Q6H    famotidine  20 mg Oral BID    fenofibrate  145 mg Oral Daily    fluticasone  2 spray Each Nare Daily    furosemide  40 mg Oral Daily    gabapentin  300 mg Oral TID    gatifloxacin-prednisolone  1 drop Both Eyes TID    insulin aspart U-100  6 Units Subcutaneous TIDWM    insulin detemir U-100  22 Units Subcutaneous BID    losartan  25 mg Oral Daily    metoprolol succinate  100 mg Oral QHS    polyethylene glycol  17 g Oral Daily    pravastatin  10 mg  Oral QHS    senna-docusate 8.6-50 mg  1 tablet Oral BID    tamsulosin  1 capsule Oral Daily     Continuous Infusions:  PRN Meds:.acetaminophen, acetaminophen, aluminum-magnesium hydroxide-simethicone, bisacodyl, calcium carbonate, dextrose 50%, dextrose 50%, glucagon (human recombinant), glucose, glucose, insulin aspart U-100, ramelteon    Objective:     Vital Signs (Most Recent):  Temp: 98 °F (36.7 °C) (07/16/18 0754)  Pulse: 100 (07/16/18 0754)  Resp: 18 (07/16/18 0754)  BP: 128/82 (07/16/18 0754)  SpO2: 100 % (07/16/18 0754) Vital Signs (24h Range):  Temp:  [97.7 °F (36.5 °C)-98 °F (36.7 °C)] 98 °F (36.7 °C)  Pulse:  [] 100  Resp:  [18] 18  SpO2:  [94 %-100 %] 100 %  BP: (124-128)/(65-82) 128/82     Weight: (!) 148.1 kg (326 lb 8 oz)  Body mass index is 46.85 kg/m².    Intake/Output Summary (Last 24 hours) at 07/16/18 1627  Last data filed at 07/16/18 1300   Gross per 24 hour   Intake              650 ml   Output              700 ml   Net              -50 ml      Physical Exam   Constitutional: He is oriented to person, place, and time. He appears well-developed and well-nourished. No distress.   Cardiovascular: Normal rate, regular rhythm and normal heart sounds.  Exam reveals no gallop and no friction rub.    No murmur heard.  Pulmonary/Chest: Effort normal and breath sounds normal. No respiratory distress. He has no wheezes. He has no rales.   Abdominal: Soft. Bowel sounds are normal. He exhibits no distension. There is no tenderness.   Musculoskeletal: He exhibits edema. He exhibits no tenderness.   Decreased ROM to right leg, + wound vac dressing to right knee incision, 2+ edema to right leg, 1+edema to left leg   Neurological: He is alert and oriented to person, place, and time.   Skin: Skin is warm and dry. No rash noted. He is not diaphoretic. No cyanosis. Nails show no clubbing.   Right lower leg with erythema and 3 intact blisters, left lower leg with small mild erythema    Psychiatric: He has a  normal mood and affect. His behavior is normal.     Significant Labs:     Recent Labs  Lab 07/10/18  1141 07/11/18  0353 07/12/18  0604 07/16/18  0600   WBC 9.01  --   --  6.20   HGB 13.2* 12.7* 12.5* 11.4*   HCT 39.7* 38.2* 36.2* 34.2*     --   --  191       Recent Labs  Lab 07/11/18  0352 07/12/18  0604 07/16/18  0600   * 135* 135*   K 4.3 4.1 4.2    108 101   CO2 19* 18* 28   BUN 23 21 21   CREATININE 1.4 1.3 1.3   CALCIUM 8.5* 9.1 9.2     Lab Results   Component Value Date    LABPROT 17.2 (H) 07/16/2018    ALBUMIN 3.5 06/19/2018     Lab Results   Component Value Date    CALCIUM 9.2 07/16/2018    PHOS 2.7 07/16/2018     POCT Glucose   Date Value Ref Range Status   07/16/2018 260 (H) 70 - 110 mg/dL Final   07/16/2018 299 (H) 70 - 110 mg/dL Final   07/16/2018 260 (H) 70 - 110 mg/dL Final   07/15/2018 289 (H) 70 - 110 mg/dL Final   07/15/2018 234 (H) 70 - 110 mg/dL Final   07/15/2018 236 (H) 70 - 110 mg/dL Final   07/15/2018 207 (H) 70 - 110 mg/dL Final   07/14/2018 256 (H) 70 - 110 mg/dL Final   07/14/2018 306 (H) 70 - 110 mg/dL Final   07/14/2018 264 (H) 70 - 110 mg/dL Final   07/14/2018 294 (H) 70 - 110 mg/dL Final   07/13/2018 290 (H) 70 - 110 mg/dL Final     Significant Imaging: na    Assessment/Plan:      * Prosthetic joint implant failure    Evaluated on 7/16/18  · S/P right revision TKA  · Will continue to monitor for drainage or erythema to surgical site   · Continue with current pain control with perineural adductor. Holding opioids for now   · Continue bowel regimen for opioid induced constipation  · Continue with PT/OT for gait training and strengthening and restoration of ADL's: WBAT per ortho   · Fall precautions   · Continue DVT prophylaxis per ortho:   - Please discontinue ASA 81mg BID when the patient's INR becomes therapeutic (goal range = 1.8 - 2.2)   - Please discontinue Warfarin on POD7 (7/17/18)and resume the patient's home dose of Apixiban--ajustements made in epic         Proteinuria due to type 2 diabetes mellitus    Evaluated on 7/16/18  · Continue ARB to treat        Lymphedema of both lower extremities    Evaluated on 7/16/18  · 1+ edema to left lower extremity with small amount of mild erythema to lower leg (normal appearanc according to daughter at bedside)  · 2+ edema to right leg with large area of erythema to shin region with intact blisters (daughter reports normal appearance when fluid in leg increases)  · Started on keflex, ? Cellulitis,  patient to see orthopedic in clinic tomorrow  · Keep lower extremities elevated when not in therapy  · Teds hose  · Low Na diet        Diastolic dysfunction    Evaluated on 7/16/18  · Maintain euvolemia by monitoring I/O's and daily weights.  · Fluid restriction (2 liters/24 hours)  · Low Na diet  · Continue ARB, BB, lasix.         Enlarged prostate    Evaluated on 7/16/18  · Continue flomax to treat  · Patient with adequate urine output        Type 2 diabetes mellitus with diabetic polyneuropathy    Evaluated on 7/16/18  · Chronic, uncontrolled  · detemir insulin adjusted to 22 units bid with novolog 6 with meals   · Low dose correction scale   · Continue blood glucose monitoring   · ADA diet  · Continue gabapentin to treat for neuropathy.   · Will monitor for need to adjust        Dyslipidemia associated with type 2 diabetes mellitus    Evaluated on 7/16/18  · Continue pravastatin and fenofibrate to treat        Benign hypertensive renal disease without renal failure    Evaluated on 7/16/18  · Chronic, Well controlled  · Continue Metoprolol, losartan lasix to treat.   · Continue to monitor  · Low Na diet        Chronic kidney disease, stage III (moderate)    Evaluated on 7/16/18  · Sr Cr stable  · Continue to monitor with biweekly labs   · Avoid nephrotoxins.   · Renally dose all medications         Sleep apnea    Evaluated on 7/16/18  · Continue CPAP QHS        Long term current use of anticoagulant therapy    Evaluated on  7/16/18  · Plan as above.         Chronic atrial fibrillation    Evaluated on 7/16/18  · Continue rate control with metoprolol.   · Anticoagulation as above.           Future Appointments  Date Time Provider Department Center   7/17/2018 1:00 PM Sandy Adams NP [facility] ORTHO [address]   7/27/2018 1:00 PM Patrizia Brower OD [facility] OPTOMTY [address]   8/2/2018 2:20 PM Christina Wilhelm NP [facility] SLEEP [facility]       Sylvie Brady NP  Department of Hospital Medicine  [facility] [place] [facility] - Skilled Nursing Care

## 2018-07-16 NOTE — ASSESSMENT & PLAN NOTE
Evaluated on 7/16/18  · Sr Cr stable  · Continue to monitor with biweekly labs   · Avoid nephrotoxins.   · Renally dose all medications

## 2018-07-16 NOTE — PLAN OF CARE
Problem: Patient Care Overview  Goal: Plan of Care Review  Outcome: Ongoing (interventions implemented as appropriate)  BLOOD GLUCOSES MONITORED.LOWER BUTTOCKS MILD IRRITATION BARRIER CREAM APPLIED.FALL PRECAUTIONS MAINTAINED NO INJURIES NOTED.

## 2018-07-16 NOTE — PLAN OF CARE
Problem: Occupational Therapy Goal  Goal: Occupational Therapy Goal  Goals to be met by: 14 days     Patient will increase functional independence with ADLs by performing:    Feeding with Modified [employer].  UE Dressing with Modified [employer].  LE Dressing with Stand-by Assistance using A/E and A/D as needed..  Grooming while standing with Stand-by Assistance with RW to stand..  Toileting from bedside commode with Stand-by Assistance for hygiene and clothing management with DME as needed.  Bathing from  edge of bed with Minimal Assistance.  Rolling to Bilateral with Modified [employer].   Supine to sit with Modified [employer].  Stand pivot transfers with Supervision with RW.  Upper extremity exercise program x10 reps per set, with assistance as needed.  Pt will tolerate up to 10 minutes standing activity with RW to steady.     Pt. Tolerated  Session well but noted to be somewhat upset at end of session regarding avasys camera in room.

## 2018-07-17 ENCOUNTER — OFFICE VISIT (OUTPATIENT)
Dept: ORTHOPEDICS | Facility: CLINIC | Age: 73
DRG: 949 | End: 2018-07-17
Attending: HOSPITALIST
Payer: MEDICARE

## 2018-07-17 DIAGNOSIS — Z96.659 STATUS POST KNEE REPLACEMENT, UNSPECIFIED LATERALITY: Primary | ICD-10-CM

## 2018-07-17 LAB
BACTERIA SPEC ANAEROBE CULT: NORMAL
POCT GLUCOSE: 210 MG/DL (ref 70–110)
POCT GLUCOSE: 277 MG/DL (ref 70–110)
POCT GLUCOSE: 322 MG/DL (ref 70–110)
POCT GLUCOSE: 349 MG/DL (ref 70–110)

## 2018-07-17 PROCEDURE — 99024 POSTOP FOLLOW-UP VISIT: CPT | Mod: S$GLB,,, | Performed by: NURSE PRACTITIONER

## 2018-07-17 PROCEDURE — 97110 THERAPEUTIC EXERCISES: CPT

## 2018-07-17 PROCEDURE — 99999 PR PBB SHADOW E&M-EST. PATIENT-LVL IV: CPT | Mod: PBBFAC,,, | Performed by: NURSE PRACTITIONER

## 2018-07-17 PROCEDURE — 11000004 HC SNF PRIVATE

## 2018-07-17 PROCEDURE — 25000003 PHARM REV CODE 250: Performed by: STUDENT IN AN ORGANIZED HEALTH CARE EDUCATION/TRAINING PROGRAM

## 2018-07-17 PROCEDURE — 25000003 PHARM REV CODE 250: Performed by: NURSE PRACTITIONER

## 2018-07-17 PROCEDURE — 97530 THERAPEUTIC ACTIVITIES: CPT

## 2018-07-17 PROCEDURE — 97116 GAIT TRAINING THERAPY: CPT

## 2018-07-17 PROCEDURE — 97535 SELF CARE MNGMENT TRAINING: CPT

## 2018-07-17 RX ADMIN — INSULIN ASPART 8 UNITS: 100 INJECTION, SOLUTION INTRAVENOUS; SUBCUTANEOUS at 12:07

## 2018-07-17 RX ADMIN — INSULIN ASPART 6 UNITS: 100 INJECTION, SOLUTION INTRAVENOUS; SUBCUTANEOUS at 12:07

## 2018-07-17 RX ADMIN — CEPHALEXIN 500 MG: 500 CAPSULE ORAL at 12:07

## 2018-07-17 RX ADMIN — PREDNISOLONE ACETATE-GATIFLOXACIN 1 DROP: 5; 10 SUSPENSION/ DROPS OPHTHALMIC at 09:07

## 2018-07-17 RX ADMIN — CEPHALEXIN 500 MG: 500 CAPSULE ORAL at 05:07

## 2018-07-17 RX ADMIN — INSULIN ASPART 4 UNITS: 100 INJECTION, SOLUTION INTRAVENOUS; SUBCUTANEOUS at 09:07

## 2018-07-17 RX ADMIN — RAMELTEON 8 MG: 8 TABLET, FILM COATED ORAL at 09:07

## 2018-07-17 RX ADMIN — FLUTICASONE PROPIONATE 100 MCG: 50 SPRAY, METERED NASAL at 08:07

## 2018-07-17 RX ADMIN — LOSARTAN POTASSIUM 25 MG: 25 TABLET, FILM COATED ORAL at 08:07

## 2018-07-17 RX ADMIN — ACETAMINOPHEN 650 MG: 325 TABLET, FILM COATED ORAL at 09:07

## 2018-07-17 RX ADMIN — INSULIN ASPART 6 UNITS: 100 INJECTION, SOLUTION INTRAVENOUS; SUBCUTANEOUS at 05:07

## 2018-07-17 RX ADMIN — STANDARDIZED SENNA CONCENTRATE AND DOCUSATE SODIUM 1 TABLET: 8.6; 5 TABLET, FILM COATED ORAL at 09:07

## 2018-07-17 RX ADMIN — INSULIN ASPART 4 UNITS: 100 INJECTION, SOLUTION INTRAVENOUS; SUBCUTANEOUS at 08:07

## 2018-07-17 RX ADMIN — CEPHALEXIN 500 MG: 500 CAPSULE ORAL at 11:07

## 2018-07-17 RX ADMIN — GABAPENTIN 300 MG: 300 CAPSULE ORAL at 09:07

## 2018-07-17 RX ADMIN — FAMOTIDINE 20 MG: 20 TABLET ORAL at 08:07

## 2018-07-17 RX ADMIN — GABAPENTIN 300 MG: 300 CAPSULE ORAL at 08:07

## 2018-07-17 RX ADMIN — GABAPENTIN 300 MG: 300 CAPSULE ORAL at 03:07

## 2018-07-17 RX ADMIN — APIXABAN 5 MG: 2.5 TABLET, FILM COATED ORAL at 09:07

## 2018-07-17 RX ADMIN — TAMSULOSIN HYDROCHLORIDE 0.4 MG: 0.4 CAPSULE ORAL at 08:07

## 2018-07-17 RX ADMIN — METOPROLOL SUCCINATE 100 MG: 100 TABLET, EXTENDED RELEASE ORAL at 09:07

## 2018-07-17 RX ADMIN — INSULIN ASPART 6 UNITS: 100 INJECTION, SOLUTION INTRAVENOUS; SUBCUTANEOUS at 08:07

## 2018-07-17 RX ADMIN — FENOFIBRATE 145 MG: 145 TABLET ORAL at 08:07

## 2018-07-17 RX ADMIN — FAMOTIDINE 20 MG: 20 TABLET ORAL at 09:07

## 2018-07-17 RX ADMIN — FUROSEMIDE 40 MG: 40 TABLET ORAL at 08:07

## 2018-07-17 RX ADMIN — APIXABAN 5 MG: 2.5 TABLET, FILM COATED ORAL at 08:07

## 2018-07-17 RX ADMIN — PRAVASTATIN SODIUM 10 MG: 10 TABLET ORAL at 09:07

## 2018-07-17 RX ADMIN — PREDNISOLONE ACETATE-GATIFLOXACIN 1 DROP: 5; 10 SUSPENSION/ DROPS OPHTHALMIC at 03:07

## 2018-07-17 NOTE — NURSING
RETURNED TO ROOM FROM ORTHO CLINIC APPOINTMENT BY [facility] WHEELCHAIR  TRANSPORT SERVICE ACCOMPANIED BY TRANSPORTER.NO COMPLAINT OF DISCOMFORT VERBALIZED.MEPILEX DRESSING TO RIGHT KNEE DRY AND INTACT NCISIONAL WOUND VAC REMOVED IN CLINIC TODAY.

## 2018-07-17 NOTE — LETTER
July 18, 2018      Miguel Stuart MD  [address]  [facility] [address]           [facility] - Orthopedics  [address], [address]  [facility] [address]  Phone: [phone]          Patient: Stephen Cantor Jr.   MR Number: [id number]   YOB: 1945   Date of Visit: 7/17/2018       Dear Dr. Miguel Stuart:    Thank you for referring Stephen Cantor to me for evaluation. Attached you will find relevant portions of my assessment and plan of care.    If you have questions, please do not hesitate to call me. I look forward to following [facility] along with you.    Sincerely,    Sandy Adams, NP    Enclosure  CC:  No Recipients    If you would like to receive this communication electronically, please contact [email or web address] or [phone] to request more information on [employer] access.    For providers and/or their staff who would like to refer a patient to [facility], please contact us through our one-stop-shop provider referral line, [facility], at [phone].    If you feel you have received this communication in error or would no longer like to receive these types of communications, please e-mail [email or web address]

## 2018-07-17 NOTE — ASSESSMENT & PLAN NOTE
Morbid obesity related to excessive calorie intake, decreased activity as evidenced by BMI 47 and uncontrolled diabetes, with family report that diabetic diet is not followed strictly.  Interventions/Recommendations (treatment strategy):  2000 calorie diabetic diet, double meat if accepted  Diabetic education    Nutrition Diagnosis Status:   New

## 2018-07-17 NOTE — PLAN OF CARE
Problem: Physical Therapy Goal  Goal: Physical Therapy Goal  Goals to be met by: 2 weeks (  )    Patient will increase functional independence with mobility by performin. Supine to sit with Contact Guard Assistance.  2. Sit to supine with Contact Guard Assistance.  3. Sit to stand transfer with Contact Guard Assistance.  4. Bed to chair transfer with Contact Guard Assistance using Rolling Walker  5. Gait  x 75 feet with Contact Guard Assistance using Rolling Walker. Met (2018)  5a. Gait x 150 feet with stand-by assistance using a rolling walker with swing-through gait pattern, proper posture.   6. Ascend/descend 4 stair with bilateral Handrails Contact Guard Assistance, as his house has four PUNEET with B HR.  7. Ascend/Descend 4 inch curb step with Contact Guard Assistance using Rolling Walker.  8. Stand for 3 minutes with Contact Guard Assistance using Rolling Walker while performing a task.  9. Lower extremity exercise program x 20 reps per handout, with assistance as needed.  10. Pt will improve his R knee active extension from -10 degrees to -5 degrees to allow for better functional mobility..  11. Pt will improve his L knee active flexion from 68 degrees to 80 degrees to allow for better functional mobility.-met 18       Outcome: Ongoing (interventions implemented as appropriate)  Met one goal today.

## 2018-07-17 NOTE — PT/OT/SLP PROGRESS
Physical Therapy  Treatment (BID)    [facility] [place]   MRN: [id number]   Admitting Diagnosis: Prosthetic joint implant failure    PT Received On: 07/17/18  Total Time (min): 83   2 AM treatments    Billable Minutes:  Gait Training 30, Therapeutic Activity 30 and Therapeutic Exercise 23    Treatment Type: Treatment  PT/PTA: PT     PTA Visit Number: 0       General Precautions: Standard, fall  Orthopedic Precautions: RLE weight bearing as tolerated   Braces: N/A    Do you have any cultural, spiritual, Temple conflicts, given your current situation?: none stated    Subjective:  Communicated with pt prior to session.  Pt was agreeable to PT services. Needed to use the restroom at a moment's notice after lying supine.    Pain/Comfort  Pain Rating 1: 6/10  Location - Side 1: Right  Location - Orientation 1: generalized  Location 1: knee  Pain Addressed 1: Reposition  Pain Rating Post-Intervention 1: 6/10    Objective:  Patient found seated in wheelchair with Patient found with: wound vac       Functional Status:  MDS G  Bed Mobility Functional Status: mod(A)-Max(A)  Transfer Functional Status: CGA-Min (A)  Walk in Room Functional Status: CGA-Min (A)  Walk in Corridor Functional Status: CGA-Min (A)  Locomotion on Unit Functional Status: CGA-Min (A)          AM-PAC 6 CLICK MOBILITY  Total Score:15    Bed Mobility:  Sit>Supine:Min A for mgmt of LEs (x 2 trials)  Supine>Sit: Mod A for mgmt of trunk (x2 trials)  Transfers:  Sit<>Stand: Min A for assistance with anterior weight shift  Stand Pivot Transfer: CGA with use of rolling walker    Gait:  Amb 38 (Vitals: 97%, 106 bpm), 90, 113, 20 feet with a seated rest break in between trials using a rolling walker with swing-through gait pattern with CGA (but requires cues for proper posture, displays forward flexion of trunk).  Educated to keep rolling walker close to base of support for safety.      Therex:  LAQx 40 reps  Hip flexion x 40 reps  Ankle pumps x 20 reps BLE  Hip  abduction x 20 reps BLE  Heel slides x 20 reps BLE  Quad sets x 20 reps BLE  Gluteal sets x 20 reps BLE  SLR x 10 reps BLE      Balance:  Pt requires UE support on RW when performing any transfers (such as stand pivot) and/or ambulation. Also needs at least SBA for safety.     Additional Treatment:  Needed to use the urinal once he was in the supine position. He was able to urinate on his own with supervision.    Patient left up in chair with call button in reach. Pt will be getting his own stockings from home for edema control.    Assessment:  [facility] [place] a 72 y.o. male with a medical diagnosis of Prosthetic joint implant failure.  Mr. Cantor presents with difficulty performing bed mobility due to increased body edema and pain in his RLE as well as his lower back. He was able to increase his gait distance and meet a goal listed below, demonstrating progress. He will benefit from continued PT services to improve his deficits.  Also, pt goes to restroom often (urination). Ensure that he goes to restroom before and after therapy session.      Rehab identified problem list/impairments: weakness, impaired endurance, impaired self care skills, impaired functional mobilty, gait instability, impaired balance, decreased lower extremity function, pain, impaired cognition, edema    Rehab potential is good.    Activity tolerance: Good    Discharge recommendations: home health PT     Barriers to discharge: None    Equipment recommendations: wheelchair (maybe a WC )     GOALS:    Physical Therapy Goals        Problem: Physical Therapy Goal    Goal Priority Disciplines Outcome Goal Variances Interventions   Physical Therapy Goal     PT/OT, PT Ongoing (interventions implemented as appropriate)     Description:  Goals to be met by: 2 weeks (  )    Patient will increase functional independence with mobility by performin. Supine to sit with Contact Guard Assistance.  2. Sit to supine with Contact Guard  Assistance.  3. Sit to stand transfer with Contact Guard Assistance.  4. Bed to chair transfer with Contact Guard Assistance using Rolling Walker  5. Gait  x 75 feet with Contact Guard Assistance using Rolling Walker. Met (7/17/2018)  5a. Gait x 150 feet with stand-by assistance using a rolling walker with swing-through gait pattern, proper posture.   6. Ascend/descend 4 stair with bilateral Handrails Contact Guard Assistance, as his house has four PUNEET with B HR.  7. Ascend/Descend 4 inch curb step with Contact Guard Assistance using Rolling Walker.  8. Stand for 3 minutes with Contact Guard Assistance using Rolling Walker while performing a task.  9. Lower extremity exercise program x 20 reps per handout, with assistance as needed.  10. Pt will improve his R knee active extension from -10 degrees to -5 degrees to allow for better functional mobility..  11. Pt will improve his L knee active flexion from 68 degrees to 80 degrees to allow for better functional mobility.-met 7/14/18                         PLAN:    Patient to be seen 5 x/week  to address the above listed problems via gait training, therapeutic activities, therapeutic exercises, wheelchair management/training  Plan of Care expires: 08/12/18  Plan of Care reviewed with: patient    Betzaida J Summer, PT  07/17/2018

## 2018-07-17 NOTE — NURSING
TO [facility] BY [facility] WHEELCHAIR TRANSPORT SERVICE ACCOMPANIED BY TRANSPORTER.

## 2018-07-17 NOTE — CONSULTS
Consult received on patient's RLE. Prevena wound vac in place to right knee, seal intact. Patient scheduled to see Orthopedics today, will defer to orthopedics recommendations. Blister noted to RLE, 0.4cm x 0.4cm, hemosiderin staining noted to RLE. RLE edematous. Walt tobias noted to LLE. Patient states he has his own compression stockings at home, patient stated he can have family bring his own compression. Once family brings compression stockings, nursing to apply patient's compression to LLE. Discussed plan of care with Sylvie Brady NP, nursing to continue care.

## 2018-07-17 NOTE — PT/OT/SLP PROGRESS
"Occupational Therapy  Treatment    [facility] [place]   MRN: [id number]   Admitting Diagnosis: Prosthetic joint implant failure    OT Date of Treatment: 07/17/18  Total Time (min): 45 min    Billable Minutes:  Self Care/Home Management 35 and Therapeutic Exercise 10    General Precautions: Standard, fall  Orthopedic Precautions: RLE weight bearing as tolerated  Braces: N/A    Do you have any cultural, spiritual, Jewish conflicts, given your current situation?: none stated    Subjective:  Communicated with nurse prior to session.    "You are going to have to pull me up. " (Referring to sitting EOB.  OT instructed pt. On proper technique as well as use of BUE to assist with task. )    Pain/Comfort  Pain Rating 1: 5/10  Location - Side 1: Right  Location 1: knee  Pain Addressed 1: Reposition, Distraction  Pain Rating Post-Intervention 1: 5/10    Objective:  Patient found with: wound vac supine in bed    Functional Status:  MDS G  Bed Mobility Functional Status: mod(A) supine to sit with education on proper technique and how to use BUE to assist self.   Transfer Functional Status: CGA-Min (A) with RW sit to stand from EOB as well as w/c and SPT from bed to w/c  Dressing Functional Status: 2:CGA-Min (A) LBD with use of AE to don shorts with Min A  To manage over hips /UBD set up A  Eating Functional Status: Set Up A  Bathing Functional Status:Min (A) to wash buttocks region when standing with RW  Moving from seated to standing position: Not steady, only able to stabilize with staff assistance  Surface-to-surface transfer (transfer between bed and chair or wheelchair): Not steady, only able to stabilize with staff assistance           [facility] 6 Click:  [facility] Total Score: 21    OT Exercises: AROM with 2 # dowel for all major planes of BUE motion x 2 sets 10 reps in order to improve pt. overall level of endurance.     Additional Treatment:  Pt. Educated on proper technique and use of BUE to assist with transfers.  Pt. Educated " on use of AE to assist with LBD   Pt. Noted to have small blister on RLE nurse notified and already aware.     Patient left up in chair with call button in reach and wound vac in place    ASSESSMENT:  [facility] PAT Cantor Jr. is a 72 y.o. male with a medical diagnosis of Prosthetic joint implant failure and presents with limitations in self-care skills as well as functional mobility and overall level of endurance.  Pt. Tolerated session well on this date. Pt. Noted to have small blister formation on RLE anterior shin on this date. Pt. Would benefit from continued oT services.    Rehab identified problem list/impairments: weakness, impaired endurance, impaired self care skills, impaired functional mobilty, gait instability, impaired balance, decreased coordination, decreased lower extremity function, decreased safety awareness, pain, decreased ROM, orthopedic precautions    Rehab potential is good    Activity tolerance: Good    Discharge recommendations: home with home health  May require light assist on d/c    Barriers to discharge: Decreased caregiver support     Equipment recommendations: wheelchair     GOALS:    Occupational Therapy Goals        Problem: Occupational Therapy Goal    Goal Priority Disciplines Outcome Interventions   Occupational Therapy Goal     OT, PT/OT Ongoing (interventions implemented as appropriate)    Description:  Goals to be met by: 14 days     Patient will increase functional independence with ADLs by performing:    Feeding with Modified [place].  UE Dressing with Modified [place].  LE Dressing with Stand-by Assistance using A/E and A/D as needed..  Grooming while standing with Stand-by Assistance with RW to stand..  Toileting from bedside commode with Stand-by Assistance for hygiene and clothing management with DME as needed.  Bathing from  edge of bed with Minimal Assistance.  Rolling to Bilateral with Modified [place].   Supine to sit with Modified [place].  Stand pivot  transfers with Supervision with RW.  Upper extremity exercise program x10 reps per set, with assistance as needed.  Pt will tolerate up to 10 minutes standing activity with RW to steady.                      Plan:  Patient to be seen 5 x/week, 6 x/week to address the above listed problems via self-care/home management, therapeutic activities, therapeutic exercises  Plan of Care expires: 08/13/18  Plan of Care reviewed with: patient    VANESSA Zuñiga  07/17/2018

## 2018-07-17 NOTE — PLAN OF CARE
Problem: Patient Care Overview  Goal: Plan of Care Review  Outcome: Ongoing (interventions implemented as appropriate)  BLOOD GLUCOSES MONITORED.FALL PRECAUTIONS MAINTAINED NO INJURIES NOTED.BARRIER CREAM APPLIED TO LOWER BUTTOCK CHAFENESS.

## 2018-07-17 NOTE — CONSULTS
"  [facility] - Skilled Nursing Care  Adult Nutrition  Consult Note    SUMMARY     Recommendations    Recommendation/Intervention: Continue low sodium,  2000 diabetic diet, consider double meats, select diet, RD to follow  Goals: PO to meet 85% of EEN/EPN  Nutrition Goal Status: new  Communication of RD Recs: other (comment) (POC)    Reason for Assessment    Reason for Assessment: consult  Diagnosis:  (Debility, sp revision total R knee)  Relevant Medical History: DM uncontrolled, HTN, neuropathy, morbid obesity, CKDIII, HLD, HEIDI, AFIB,   Interdisciplinary Rounds: attended  General Information Comments: patient does not like hospital food per spouse, sleeping through dinner meal, no chewing or swallowing problems, no fish  Nutrition Discharge Planning: DC on cardiac diabetic diet 2000 calories    Nutrition Risk Screen    Nutrition Risk Screen: no indicators present    Nutrition/Diet History    Patient Reported Diet/Restrictions/Preferences: general (sometimes follow diabetic diet)  Typical Food/Fluid Intake: 3 meals day, snacks, limited activity, has been gaining weight  Food Preferences: no fish milk ok  Do you have any cultural, spiritual, Islam conflicts, given your current situation?: none  Food Allergies: NKFA  Factors Affecting Nutritional Intake: None identified at this time    Anthropometrics    Temp: 98 °F (36.7 °C)  Height Method: Stated  Height: 5' 10" (177.8 cm)  Height (inches): 70 in  Weight Method: Bed Scale  Weight: (!) 148.1 kg (326 lb 8 oz)  Weight (lb): (!) 326.5 lb  Ideal Body Weight (IBW), Male: 166 lb  % Ideal Body Weight, Male (lb): 196.69 lb  BMI (Calculated): 46.9  BMI Grade: greater than 40 - morbid obesity     Patient denies weight change  Lab/Procedures/Meds    Pertinent Labs Reviewed: reviewed  Pertinent Labs Comments: Na 135, glucose 245,HgA1c 7  Pertinent Medications Reviewed: reviewed  Pertinent Medications Comments: coumadin, ASA, insulin, famotidine, tamsulosin, statin, lasix, " senna, polyethylene glycol, gabapentin,     Physical Findings/Assessment  NFPE completed, severe edema bilateral lower extremities, ,no fat loss ,no muscle loss  Overall Physical Appearance: edematous, obese, nourished  Tubes:  (-)  Oral/Mouth Cavity: WDL  Skin: incision(s)    Estimated/Assessed Needs    Weight Used For Calorie Calculations: (!) 148.1 kg (326 lb 8 oz)  Energy Calorie Requirements (kcal): 2200  Energy Need Method: [place]-St Jeor (no activity factor)  Protein Requirements: 113g  Weight Used For Protein Calculations: 75 kg (165 lb 5.5 oz) (IBW x 1.5)  Fluid Requirements (mL): 2200 or per MD  Fluid Need Method: RDA Method  RDA Method (mL): 2200  CHO Requirement: 50% of calories      Nutrition Prescription Ordered    Current Diet Order: 2000 diabetic diet,   Nutrition Order Comments: consider double meats or milk with meals  Oral Nutrition Supplement: none    Evaluation of Received Nutrient/Fluid Intake    Energy Calories Required: meeting needs  Protein Required: not meeting needs  Fluid Required: meeting needs  Tolerance: tolerating  % Intake of Estimated Energy Needs: 75 - 100 %  % Meal Intake: 75 - 100 %    Nutrition Risk    Level of Risk/Frequency of Follow-up: low     Assessment and Plan    Morbid obesity    Morbid obesity related to excessive calorie intake, decreased activity as evidenced by BMI 47 and uncontrolled diabetes, with family report that diabetic diet is not followed strictly.  Interventions/Recommendations (treatment strategy):  2000 calorie diabetic low sodium diet, double meat if accepted  Diabetic education    Nutrition Diagnosis Status:   New               Monitor and Evaluation    Food and Nutrient Intake: food and beverage intake  Food and Nutrient Adminstration: diet order  Knowledge/Beliefs/Attitudes: food and nutrition knowledge/skill  Anthropometric Measurements: weight change  Biochemical Data, Medical Tests and Procedures: glucose/endocrine profile, inflammatory profile,  electrolyte and renal panel     Nutrition Follow-Up    RD Follow-up?: Yes

## 2018-07-17 NOTE — PLAN OF CARE
Problem: Occupational Therapy Goal  Goal: Occupational Therapy Goal  Goals to be met by: 14 days     Patient will increase functional independence with ADLs by performing:    Feeding with Modified [place].  UE Dressing with Modified [place].  LE Dressing with Stand-by Assistance using A/E and A/D as needed..  Grooming while standing with Stand-by Assistance with RW to stand..  Toileting from bedside commode with Stand-by Assistance for hygiene and clothing management with DME as needed.  Bathing from  edge of bed with Minimal Assistance.  Rolling to Bilateral with Modified [place].   Supine to sit with Modified [place].  Stand pivot transfers with Supervision with RW.  Upper extremity exercise program x10 reps per set, with assistance as needed.  Pt will tolerate up to 10 minutes standing activity with RW to steady.     Pt. Tolerated session well.

## 2018-07-18 LAB
POCT GLUCOSE: 248 MG/DL (ref 70–110)
POCT GLUCOSE: 256 MG/DL (ref 70–110)
POCT GLUCOSE: 277 MG/DL (ref 70–110)
POCT GLUCOSE: 292 MG/DL (ref 70–110)

## 2018-07-18 PROCEDURE — 97116 GAIT TRAINING THERAPY: CPT

## 2018-07-18 PROCEDURE — 25000003 PHARM REV CODE 250: Performed by: NURSE PRACTITIONER

## 2018-07-18 PROCEDURE — 97110 THERAPEUTIC EXERCISES: CPT

## 2018-07-18 PROCEDURE — 97530 THERAPEUTIC ACTIVITIES: CPT

## 2018-07-18 PROCEDURE — 11000004 HC SNF PRIVATE

## 2018-07-18 PROCEDURE — 25000003 PHARM REV CODE 250: Performed by: STUDENT IN AN ORGANIZED HEALTH CARE EDUCATION/TRAINING PROGRAM

## 2018-07-18 RX ORDER — INSULIN ASPART 100 [IU]/ML
9 INJECTION, SOLUTION INTRAVENOUS; SUBCUTANEOUS
Status: DISCONTINUED | OUTPATIENT
Start: 2018-07-18 | End: 2018-07-19

## 2018-07-18 RX ADMIN — APIXABAN 5 MG: 2.5 TABLET, FILM COATED ORAL at 10:07

## 2018-07-18 RX ADMIN — GABAPENTIN 300 MG: 300 CAPSULE ORAL at 10:07

## 2018-07-18 RX ADMIN — GABAPENTIN 300 MG: 300 CAPSULE ORAL at 03:07

## 2018-07-18 RX ADMIN — PREDNISOLONE ACETATE-GATIFLOXACIN 1 DROP: 5; 10 SUSPENSION/ DROPS OPHTHALMIC at 03:07

## 2018-07-18 RX ADMIN — LOSARTAN POTASSIUM 25 MG: 25 TABLET, FILM COATED ORAL at 10:07

## 2018-07-18 RX ADMIN — INSULIN ASPART 6 UNITS: 100 INJECTION, SOLUTION INTRAVENOUS; SUBCUTANEOUS at 10:07

## 2018-07-18 RX ADMIN — INSULIN ASPART 9 UNITS: 100 INJECTION, SOLUTION INTRAVENOUS; SUBCUTANEOUS at 12:07

## 2018-07-18 RX ADMIN — PREDNISOLONE ACETATE-GATIFLOXACIN 1 DROP: 5; 10 SUSPENSION/ DROPS OPHTHALMIC at 09:07

## 2018-07-18 RX ADMIN — TAMSULOSIN HYDROCHLORIDE 0.4 MG: 0.4 CAPSULE ORAL at 10:07

## 2018-07-18 RX ADMIN — FAMOTIDINE 20 MG: 20 TABLET ORAL at 10:07

## 2018-07-18 RX ADMIN — FUROSEMIDE 40 MG: 40 TABLET ORAL at 10:07

## 2018-07-18 RX ADMIN — FLUTICASONE PROPIONATE 100 MCG: 50 SPRAY, METERED NASAL at 10:07

## 2018-07-18 RX ADMIN — PRAVASTATIN SODIUM 10 MG: 10 TABLET ORAL at 10:07

## 2018-07-18 RX ADMIN — FENOFIBRATE 145 MG: 145 TABLET ORAL at 10:07

## 2018-07-18 RX ADMIN — INSULIN ASPART 6 UNITS: 100 INJECTION, SOLUTION INTRAVENOUS; SUBCUTANEOUS at 05:07

## 2018-07-18 RX ADMIN — INSULIN ASPART 4 UNITS: 100 INJECTION, SOLUTION INTRAVENOUS; SUBCUTANEOUS at 12:07

## 2018-07-18 RX ADMIN — INSULIN ASPART 3 UNITS: 100 INJECTION, SOLUTION INTRAVENOUS; SUBCUTANEOUS at 10:07

## 2018-07-18 RX ADMIN — CEPHALEXIN 500 MG: 500 CAPSULE ORAL at 12:07

## 2018-07-18 RX ADMIN — STANDARDIZED SENNA CONCENTRATE AND DOCUSATE SODIUM 1 TABLET: 8.6; 5 TABLET, FILM COATED ORAL at 10:07

## 2018-07-18 RX ADMIN — INSULIN ASPART 9 UNITS: 100 INJECTION, SOLUTION INTRAVENOUS; SUBCUTANEOUS at 05:07

## 2018-07-18 RX ADMIN — RAMELTEON 8 MG: 8 TABLET, FILM COATED ORAL at 10:07

## 2018-07-18 RX ADMIN — CEPHALEXIN 500 MG: 500 CAPSULE ORAL at 05:07

## 2018-07-18 NOTE — PT/OT/SLP PROGRESS
Physical Therapy  Treatment    [facility] [place]   MRN: [id number]   Admitting Diagnosis: Prosthetic joint implant failure    PT Received On: 07/18/18  Total Time (min): 90     Billable Minutes:  Gait Training 15, Therapeutic Activity 30 and Therapeutic Exercise 45    Treatment Type: Treatment  PT/PTA: PT     PTA Visit Number: 0       General Precautions: Standard, fall  Orthopedic Precautions: RLE weight bearing as tolerated   Braces: N/A    Do you have any cultural, spiritual, Rastafari conflicts, given your current situation?: none stated    Subjective:  Communicated with pt prior to session.  Pt was agreeable to PT services.    Pain/Comfort  Pain Rating 1: 3/10  Location - Side 1: Right  Location - Orientation 1: generalized  Location 1: knee  Pain Addressed 1: Pre-medicate for activity  Pain Rating Post-Intervention 1: 3/10    Objective:  Patient found seated in wheelchair with Patient found with: wound vac       Functional Status:  MDS G  Bed Mobility Functional Status: CGA-Min (A)  Transfer Functional Status: CGA-Min (A)  Walk in Room Functional Status: CGA-Min (A)  Walk in Corridor Functional Status: CGA-Min (A)  Locomotion on Unit Functional Status: CGA-Min (A)          AM-PAC 6 CLICK MOBILITY  Total Score:15    Bed Mobility:  Sit>Supine:SBA  Supine>Sit: ModA for trunk flexion and mgmt of LEs    Transfers:  Sit<>Stand: SBA from wheelchair, mat  Stand Pivot Transfer: SBA with use of rolling walker    Gait:  Amb 63, 80 feet with a seated rest break (using rolling walker) with swing-through gait pattern; requires SBA. Needs reminders to keep the rolling walker close to his base of support.   In PM: 200 feet with use of rolling walker with swing-through gait pattern and SBA (was better able to keep rolling walker near base of support)    Therex:  LAQ  Hip flexion  Ankle pumps  SAQ  Hip abduction  Heel slides  Quad sets  Gluteal sets  SLR    In PM:  Seated- hip flexion, long arc quads, ankle pumps with knee  extension x 25 reps BLE  Supine- quad sets, heel slides x 25 reps      Balance:  Able to stand with UE support on RW, SBA.    Additional Treatment:  -11 to 68 degrees in supine.    Patient left up in chair with call button in reach.    Assessment:  [facility] [place] Jr[place] is a 72 y.o. male with a medical diagnosis of Prosthetic joint implant failure.  Mr. Cantor will benefit from continued PT services, addressing his range of motion (in RLE), strength, bed mobility, and gait mechanics.    Rehab identified problem list/impairments: weakness, impaired endurance, impaired self care skills, impaired functional mobilty, gait instability, impaired balance, decreased lower extremity function, pain, impaired cognition, edema    Rehab potential is good.    Activity tolerance: Fair    Discharge recommendations: home health PT     Barriers to discharge: None    Equipment recommendations: wheelchair (maybe a WC )     GOALS:    Physical Therapy Goals        Problem: Physical Therapy Goal    Goal Priority Disciplines Outcome Goal Variances Interventions   Physical Therapy Goal     PT/OT, PT Ongoing (interventions implemented as appropriate)     Description:  Goals to be met by: 2 weeks (  )    Patient will increase functional independence with mobility by performin. Supine to sit with Contact Guard Assistance.  2. Sit to supine with Contact Guard Assistance.  3. Sit to stand transfer with Contact Guard Assistance.  4. Bed to chair transfer with Contact Guard Assistance using Rolling Walker  5. Gait  x 75 feet with Contact Guard Assistance using Rolling Walker. Met (2018)  5a. Gait x 150 feet with stand-by assistance using a rolling walker with swing-through gait pattern, proper posture.   6. Ascend/descend 4 stair with bilateral Handrails Contact Guard Assistance, as his house has four PUNEET with B HR.  7. Ascend/Descend 4 inch curb step with Contact Guard Assistance using Rolling Walker.  8. Stand for 3 minutes with  Contact Guard Assistance using Rolling Walker while performing a task.  9. Lower extremity exercise program x 20 reps per handout, with assistance as needed.  10. Pt will improve his R knee active extension from -10 degrees to -5 degrees to allow for better functional mobility..  11. Pt will improve his L knee active flexion from 68 degrees to 80 degrees to allow for better functional mobility.-met 7/14/18                         PLAN:    Patient to be seen 5 x/week  to address the above listed problems via gait training, therapeutic activities, therapeutic exercises, wheelchair management/training  Plan of Care expires: 08/12/18  Plan of Care reviewed with: patient    Betzaida J Summer, PT  07/18/2018

## 2018-07-18 NOTE — PLAN OF CARE
Problem: Patient Care Overview  Goal: Plan of Care Review  Outcome: Ongoing (interventions implemented as appropriate)   07/18/18 0329   Coping/Psychosocial   Plan Of Care Reviewed With patient       Problem: Fall Risk (Adult)  Goal: Identify Related Risk Factors and Signs and Symptoms  Related risk factors and signs and symptoms are identified upon initiation of Human Response Clinical Practice Guideline (CPG)   Outcome: Ongoing (interventions implemented as appropriate)   07/18/18 0329   Fall Risk   Related Risk Factors (Fall Risk) age-related changes;gait/mobility problems;fatigue/slow reaction

## 2018-07-18 NOTE — PLAN OF CARE
Problem: Physical Therapy Goal  Goal: Physical Therapy Goal  Goals to be met by: 2 weeks (  )    Patient will increase functional independence with mobility by performin. Supine to sit with Contact Guard Assistance.  2. Sit to supine with Contact Guard Assistance.  3. Sit to stand transfer with Contact Guard Assistance.  4. Bed to chair transfer with Contact Guard Assistance using Rolling Walker  5. Gait  x 75 feet with Contact Guard Assistance using Rolling Walker. Met (2018)  5a. Gait x 150 feet with stand-by assistance using a rolling walker with swing-through gait pattern, proper posture.   6. Ascend/descend 4 stair with bilateral Handrails Contact Guard Assistance, as his house has four PUNEET with B HR.  7. Ascend/Descend 4 inch curb step with Contact Guard Assistance using Rolling Walker.  8. Stand for 3 minutes with Contact Guard Assistance using Rolling Walker while performing a task.  9. Lower extremity exercise program x 20 reps per handout, with assistance as needed.  10. Pt will improve his R knee active extension from -10 degrees to -5 degrees to allow for better functional mobility..  11. Pt will improve his L knee active flexion from 68 degrees to 80 degrees to allow for better functional mobility.-met 18        Outcome: Ongoing (interventions implemented as appropriate)  Goals remain appropriate.

## 2018-07-18 NOTE — PROGRESS NOTES
Stephen Cantor Jr. presents for initial post-operative visit following a right total knee revision arthroplasty performed by Dr. Stuart on 7/10/2018. Tolerating pain medication well.      Exam:   There were no vitals taken for this visit.   Ambulating well with assistive device.Pt is currently working with PT at [facility]  Incision is clean and dry without drainage or erythema. Mepilex dressing removed. There are two small areas of bleeding noted once the bandage was removed. These area were secured with steri strips and a new dressing was placed. That dressing will be changed on Friday and his daughter was given the replacement bandage to bring to [facility]. Once he is discharged he will be able to leave the incision open to air and he will be able to shower on a bench.      Initial post-operative radiographs reviewed today revealing a well fixed and aligned prosthesis. He reports improvement in his knee pain since surgery and he is only requiring tylenol for pain at this time.    A/P:  2 weeks s/p right knee revision arthroplasty  - The dressing will be changed on Friday and his daughter was given the replacement bandage to bring to [facility]. Once he is discharged he will be able to leave the incision open to air and he will be able to shower on a bench.    - Outpatient PT: currently at [facility] which will be followed by home therapy, then outpatient therapy  - Continue warfarin as directed  - Pain medication: pt is currently taking tylenol for his pain  - Follow up in 4 weeks with Dr. Stuart. Pt will call clinic with problems/concerns.

## 2018-07-18 NOTE — PLAN OF CARE
Problem: Occupational Therapy Goal  Goal: Occupational Therapy Goal  Goals to be met by: 14 days     Patient will increase functional independence with ADLs by performing:    Feeding with Modified [place].  UE Dressing with Modified [place].  LE Dressing with Stand-by Assistance using A/E and A/D as needed..  Grooming while standing with Stand-by Assistance with RW to stand..  Toileting from bedside commode with Stand-by Assistance for hygiene and clothing management with DME as needed.  Bathing from  edge of bed with Minimal Assistance.  Rolling to Bilateral with Modified [place].   Supine to sit with Modified [place].  Stand pivot transfers with Supervision with RW.  Upper extremity exercise program x10 reps per set, with assistance as needed.  Pt will tolerate up to 10 minutes standing activity with RW to steady.     Outcome: Ongoing (interventions implemented as appropriate)  .

## 2018-07-18 NOTE — PT/OT/SLP PROGRESS
Occupational Therapy  Treatment    [facility] [place] .   MRN: [id number]   Admitting Diagnosis: Prosthetic joint implant failure    OT Date of Treatment: 07/18/18  Total Time (min): 45 min    Billable Minutes:  Therapeutic Activity 45    General Precautions: Standard, fall  Orthopedic Precautions: RLE weight bearing as tolerated  Braces: N/A    Do you have any cultural, spiritual, Oriental orthodox conflicts, given your current situation?: none stated    Subjective:  Communicated with nsg prior to session.      Pain/Comfort  Pain Rating 1: 3/10  Location - Side 1: Right  Location - Orientation 1: generalized  Location 1: knee  Pain Addressed 1: Pre-medicate for activity, Reposition, Distraction  Pain Rating Post-Intervention 1: 3/10    Objective:  Patient found with: wound vac (discontinued )    Functional Status:  MDS G  Transfer Functional Status: CGA-Min (A) from w/c with RW and cues for safety and hand placement   Dressing Functional Status: 2:CGA-Min (A) for LB dressing with short with use of AE and T(A) for BLE socks and use of Compression garment on LLE  Moving from seated to standing position: Not steady, only able to stabilize with staff assistance  Walking (with assistive device if used): Not steady, only able to stabilize with staff assistance  Surface-to-surface transfer (transfer between bed and chair or wheelchair): Not steady, only able to stabilize with staff assistance           [facility] 6 Click:  [facility] Total Score: 21    OT Exercises: UE Ergometer 10 min     Additional Treatment:  Pt. With standing act on this day with task. Pt. With CGA/SBA for balance aspects with task with RW and Relocation of pegs from one location to another to increase BLE standing balance and tolerance with selfcare task.    Pt. With standing and therex performed to increase ROM, endurance selfcare task and fxl mobility for independence     Patient left up in chair with all lines intact and call button in reach    ASSESSMENT:  Stephen Cantor  Jr. is a 72 y.o. male with a medical diagnosis of Prosthetic joint implant failure Pt. participated well with session on this day.Pt demos physical deficits with balance  functional mobility, UB strength, endurance  level of functional indep with daily tasks and activities and selfcare skills .Pt. Will continue to benefit from continued OT to progress towards goals      Rehab identified problem list/impairments: weakness, impaired endurance, impaired self care skills, impaired functional mobilty, gait instability, impaired balance, decreased coordination, decreased lower extremity function, decreased safety awareness, pain, decreased ROM, orthopedic precautions    Rehab potential is fair    Activity tolerance: Fair    Discharge recommendations: home with home health     Barriers to discharge: Decreased caregiver support     Equipment recommendations: wheelchair     GOALS:    Occupational Therapy Goals        Problem: Occupational Therapy Goal    Goal Priority Disciplines Outcome Interventions   Occupational Therapy Goal     OT, PT/OT Ongoing (interventions implemented as appropriate)    Description:  Goals to be met by: 14 days     Patient will increase functional independence with ADLs by performing:    Feeding with Modified [place].  UE Dressing with Modified [place].  LE Dressing with Stand-by Assistance using A/E and A/D as needed..  Grooming while standing with Stand-by Assistance with RW to stand..  Toileting from bedside commode with Stand-by Assistance for hygiene and clothing management with DME as needed.  Bathing from  edge of bed with Minimal Assistance.  Rolling to Bilateral with Modified [place].   Supine to sit with Modified [place].  Stand pivot transfers with Supervision with RW.  Upper extremity exercise program x10 reps per set, with assistance as needed.  Pt will tolerate up to 10 minutes standing activity with RW to steady.                  Plan:  Patient to be seen 5 x/week,  6 x/week to address the above listed problems via self-care/home management, therapeutic activities, therapeutic exercises  Plan of Care expires: 08/13/18  Plan of Care reviewed with: patient   The VANESSA and LINDA have collaborated and discussed the patient's status, treatment plan and progress toward established goals.   LINDA Martinez/JAYME 7/18/2018

## 2018-07-19 ENCOUNTER — ANTI-COAG VISIT (OUTPATIENT)
Dept: CARDIOLOGY | Facility: CLINIC | Age: 73
End: 2018-07-19

## 2018-07-19 DIAGNOSIS — T84.019D PROSTHETIC JOINT IMPLANT FAILURE, SUBSEQUENT ENCOUNTER: ICD-10-CM

## 2018-07-19 LAB
ANION GAP SERPL CALC-SCNC: 9 MMOL/L
BASOPHILS # BLD AUTO: 0.07 K/UL
BASOPHILS NFR BLD: 1 %
BUN SERPL-MCNC: 24 MG/DL
CALCIUM SERPL-MCNC: 9.4 MG/DL
CHLORIDE SERPL-SCNC: 101 MMOL/L
CO2 SERPL-SCNC: 25 MMOL/L
CREAT SERPL-MCNC: 1.5 MG/DL
DIFFERENTIAL METHOD: ABNORMAL
EOSINOPHIL # BLD AUTO: 0.4 K/UL
EOSINOPHIL NFR BLD: 6.3 %
ERYTHROCYTE [DISTWIDTH] IN BLOOD BY AUTOMATED COUNT: 12.9 %
EST. GFR  (AFRICAN AMERICAN): 52.3 ML/MIN/1.73 M^2
EST. GFR  (NON AFRICAN AMERICAN): 45.2 ML/MIN/1.73 M^2
GLUCOSE SERPL-MCNC: 315 MG/DL
HCT VFR BLD AUTO: 37.2 %
HGB BLD-MCNC: 12.9 G/DL
IMM GRANULOCYTES # BLD AUTO: 0.27 K/UL
IMM GRANULOCYTES NFR BLD AUTO: 3.9 %
LYMPHOCYTES # BLD AUTO: 1.8 K/UL
LYMPHOCYTES NFR BLD: 25.3 %
MAGNESIUM SERPL-MCNC: 1.8 MG/DL
MCH RBC QN AUTO: 31.5 PG
MCHC RBC AUTO-ENTMCNC: 34.7 G/DL
MCV RBC AUTO: 91 FL
MONOCYTES # BLD AUTO: 0.7 K/UL
MONOCYTES NFR BLD: 9.3 %
NEUTROPHILS # BLD AUTO: 3.8 K/UL
NEUTROPHILS NFR BLD: 54.2 %
NRBC BLD-RTO: 0 /100 WBC
PHOSPHATE SERPL-MCNC: 3.4 MG/DL
PLATELET # BLD AUTO: 230 K/UL
PMV BLD AUTO: 11.6 FL
POCT GLUCOSE: 227 MG/DL (ref 70–110)
POCT GLUCOSE: 240 MG/DL (ref 70–110)
POCT GLUCOSE: 309 MG/DL (ref 70–110)
POCT GLUCOSE: 342 MG/DL (ref 70–110)
POTASSIUM SERPL-SCNC: 4.1 MMOL/L
RBC # BLD AUTO: 4.1 M/UL
SODIUM SERPL-SCNC: 135 MMOL/L
WBC # BLD AUTO: 6.99 K/UL

## 2018-07-19 PROCEDURE — 36415 COLL VENOUS BLD VENIPUNCTURE: CPT

## 2018-07-19 PROCEDURE — 97110 THERAPEUTIC EXERCISES: CPT

## 2018-07-19 PROCEDURE — 84100 ASSAY OF PHOSPHORUS: CPT

## 2018-07-19 PROCEDURE — 25000003 PHARM REV CODE 250: Performed by: STUDENT IN AN ORGANIZED HEALTH CARE EDUCATION/TRAINING PROGRAM

## 2018-07-19 PROCEDURE — 97116 GAIT TRAINING THERAPY: CPT

## 2018-07-19 PROCEDURE — 25000003 PHARM REV CODE 250: Performed by: NURSE PRACTITIONER

## 2018-07-19 PROCEDURE — 11000004 HC SNF PRIVATE

## 2018-07-19 PROCEDURE — 83735 ASSAY OF MAGNESIUM: CPT

## 2018-07-19 PROCEDURE — 97530 THERAPEUTIC ACTIVITIES: CPT

## 2018-07-19 PROCEDURE — 85025 COMPLETE CBC W/AUTO DIFF WBC: CPT

## 2018-07-19 PROCEDURE — 80048 BASIC METABOLIC PNL TOTAL CA: CPT

## 2018-07-19 PROCEDURE — 99900058 HC 022 PAID UNDER SNF PPS

## 2018-07-19 PROCEDURE — 97535 SELF CARE MNGMENT TRAINING: CPT

## 2018-07-19 RX ORDER — INSULIN ASPART 100 [IU]/ML
11 INJECTION, SOLUTION INTRAVENOUS; SUBCUTANEOUS
Status: DISCONTINUED | OUTPATIENT
Start: 2018-07-19 | End: 2018-07-20

## 2018-07-19 RX ADMIN — INSULIN ASPART 11 UNITS: 100 INJECTION, SOLUTION INTRAVENOUS; SUBCUTANEOUS at 04:07

## 2018-07-19 RX ADMIN — INSULIN ASPART 8 UNITS: 100 INJECTION, SOLUTION INTRAVENOUS; SUBCUTANEOUS at 09:07

## 2018-07-19 RX ADMIN — INSULIN ASPART 8 UNITS: 100 INJECTION, SOLUTION INTRAVENOUS; SUBCUTANEOUS at 12:07

## 2018-07-19 RX ADMIN — INSULIN ASPART 4 UNITS: 100 INJECTION, SOLUTION INTRAVENOUS; SUBCUTANEOUS at 04:07

## 2018-07-19 RX ADMIN — FAMOTIDINE 20 MG: 20 TABLET ORAL at 09:07

## 2018-07-19 RX ADMIN — INSULIN ASPART 9 UNITS: 100 INJECTION, SOLUTION INTRAVENOUS; SUBCUTANEOUS at 09:07

## 2018-07-19 RX ADMIN — PRAVASTATIN SODIUM 10 MG: 10 TABLET ORAL at 09:07

## 2018-07-19 RX ADMIN — FENOFIBRATE 145 MG: 145 TABLET ORAL at 09:07

## 2018-07-19 RX ADMIN — LOSARTAN POTASSIUM 25 MG: 25 TABLET, FILM COATED ORAL at 09:07

## 2018-07-19 RX ADMIN — APIXABAN 5 MG: 2.5 TABLET, FILM COATED ORAL at 09:07

## 2018-07-19 RX ADMIN — STANDARDIZED SENNA CONCENTRATE AND DOCUSATE SODIUM 1 TABLET: 8.6; 5 TABLET, FILM COATED ORAL at 09:07

## 2018-07-19 RX ADMIN — GABAPENTIN 300 MG: 300 CAPSULE ORAL at 09:07

## 2018-07-19 RX ADMIN — GABAPENTIN 300 MG: 300 CAPSULE ORAL at 04:07

## 2018-07-19 RX ADMIN — TAMSULOSIN HYDROCHLORIDE 0.4 MG: 0.4 CAPSULE ORAL at 09:07

## 2018-07-19 RX ADMIN — PREDNISOLONE ACETATE-GATIFLOXACIN 1 DROP: 5; 10 SUSPENSION/ DROPS OPHTHALMIC at 09:07

## 2018-07-19 RX ADMIN — ACETAMINOPHEN 650 MG: 325 TABLET, FILM COATED ORAL at 09:07

## 2018-07-19 RX ADMIN — METOPROLOL SUCCINATE 100 MG: 100 TABLET, EXTENDED RELEASE ORAL at 09:07

## 2018-07-19 RX ADMIN — PREDNISOLONE ACETATE-GATIFLOXACIN 1 DROP: 5; 10 SUSPENSION/ DROPS OPHTHALMIC at 03:07

## 2018-07-19 RX ADMIN — FLUTICASONE PROPIONATE 100 MCG: 50 SPRAY, METERED NASAL at 09:07

## 2018-07-19 RX ADMIN — FUROSEMIDE 40 MG: 40 TABLET ORAL at 09:07

## 2018-07-19 RX ADMIN — INSULIN ASPART 11 UNITS: 100 INJECTION, SOLUTION INTRAVENOUS; SUBCUTANEOUS at 12:07

## 2018-07-19 NOTE — TREATMENT PLAN
Rehab Services' DME recommendations    Stephen Cantor Jr.  MRN: [id number]    [x]  No DME needed    [x] Home health PT, OT and Aide    VANESSA Zuñiga 7/19/2018

## 2018-07-19 NOTE — PROGRESS NOTES
Received referral prior to d/c post knee revision with Dr. Stuart. Plan was for warfarin then restart home Eliquis on POD7. h/o afib on Eliquis prior to admit. He was d/c to SNF 7/12. Confirmed transition to Eliquis 7/17. We will cancel referral

## 2018-07-19 NOTE — PROGRESS NOTES
07/19/2018  3:13 PM    Discharge Planning-Met with patient and spouse in room to inform of new discharge date of 8/1/2018. Discharge date written on dry erase board in room. Spouse stated understanding to discharge date.  Paloma Richardson RN, CM Skilled  [id number]

## 2018-07-19 NOTE — PLAN OF CARE
Problem: Occupational Therapy Goal  Goal: Occupational Therapy Goal  Goals to be met by: 14 days     Patient will increase functional independence with ADLs by performing:    Feeding with Modified [place].  UE Dressing with Modified [place].  LE Dressing with Stand-by Assistance using A/E and A/D as needed..  Grooming while standing with Stand-by Assistance with RW to stand..  Toileting from bedside commode with Stand-by Assistance for hygiene and clothing management with DME as needed.  Bathing from  edge of bed with Minimal Assistance.  Rolling to Bilateral with Modified [place].   Supine to sit with Modified [place].  Stand pivot transfers with Supervision with RW.  Upper extremity exercise program x10 reps per set, with assistance as needed.  Pt will tolerate up to 10 minutes standing activity with RW to steady.     Pt.tolerated session well

## 2018-07-19 NOTE — PLAN OF CARE
Problem: Fall Risk (Adult)  Goal: Absence of Falls  Patient will demonstrate the desired outcomes by discharge/transition of care.   Outcome: Ongoing (interventions implemented as appropriate)   07/18/18 2000   Fall Risk (Adult)   Absence of Falls making progress toward outcome

## 2018-07-19 NOTE — PT/OT/SLP PROGRESS
Occupational Therapy  Treatment    [facility] [place]   MRN: [id number]   Admitting Diagnosis: Prosthetic joint implant failure    OT Date of Treatment: 07/19/18  Total Time (min): 44 min    Billable Minutes:  Self Care/Home Management 10, Therapeutic Activity 15 and Therapeutic Exercise 16    General Precautions: Standard, fall  Orthopedic Precautions: RLE weight bearing as tolerated  Braces: N/A    Do you have any cultural, spiritual, Anabaptist conflicts, given your current situation?: none stated    Subjective:  Communicated with nurse prior to session.  Pt. Reported that he got rid of his wound vac    Pain/Comfort  Pain Rating 1: 6/10  Location - Side 1: Right  Location 1: knee  Pain Addressed 1: Nurse notified  Pain Rating Post-Intervention 1: 7/10    Objective:  Patient found with:  (seated in w/c)    Functional Status:  MDS G  Transfer Functional Status: CGA  Transfer Level of (A):  (sit to stand at table)  Moving from seated to standing position: Not steady, only able to stabilize with staff assistance  Surface-to-surface transfer (transfer between bed and chair or wheelchair): Not steady, only able to stabilize with staff assistance           [facility] 6 Click:  [facility] Total Score: 21    OT Exercises: AROM with 2 # dowel x 2 sets 10 reps for allmajor planes of bUE motion  UE Ergometer x 10 minutes with Mod A to improve levelof endurance    Additional Treatment:  Pt. Engaged in dynamic standing activities at table to practice weight shifting in different planes with SBA required for task completion.   Pt. Educated on how to order reacher from use of computer or medical supply store as pt. Has all other pieces of AE to assist with LBD  Pt. Also educated on use of AE to assist with LBD on d/c.     Patient left up in w/c in gym awaiting PT     ASSESSMENT:  [facility] [place] is a 72 y.o. male with a medical diagnosis of Prosthetic joint implant failure and presents with deficits in self-care skills, endurance as well as  functional mobility.  Pt. Noted to have pain as well as edema in RLE on this date. Pt.would benefit from continued OT services.  .    Rehab identified problem list/impairments: weakness, impaired endurance, impaired self care skills, impaired functional mobilty, gait instability, impaired balance, decreased coordination, decreased lower extremity function, decreased safety awareness, pain, decreased ROM, orthopedic precautions    Rehab potential is good    Activity tolerance: Good    Discharge recommendations: home with home health     Barriers to discharge: Decreased caregiver support     Equipment recommendations: wheelchair     GOALS:    Occupational Therapy Goals        Problem: Occupational Therapy Goal    Goal Priority Disciplines Outcome Interventions   Occupational Therapy Goal     OT, PT/OT Ongoing (interventions implemented as appropriate)    Description:  Goals to be met by: 14 days     Patient will increase functional independence with ADLs by performing:    Feeding with Modified [place].  UE Dressing with Modified [place].  LE Dressing with Stand-by Assistance using A/E and A/D as needed..  Grooming while standing with Stand-by Assistance with RW to stand..  Toileting from bedside commode with Stand-by Assistance for hygiene and clothing management with DME as needed.  Bathing from  edge of bed with Minimal Assistance.  Rolling to Bilateral with Modified [place].   Supine to sit with Modified [place].  Stand pivot transfers with Supervision with RW.  Upper extremity exercise program x10 reps per set, with assistance as needed.  Pt will tolerate up to 10 minutes standing activity with RW to steady.                      Plan:  Patient to be seen 5 x/week, 6 x/week to address the above listed problems via self-care/home management, therapeutic activities, therapeutic exercises  Plan of Care expires: 08/13/18  Plan of Care reviewed with: patient    Mireya Casiano,  LOTR  07/19/2018

## 2018-07-19 NOTE — PT/OT/SLP PROGRESS
Physical Therapy  Treatment    [facility] [place] Cantor Jr.   MRN: [id number]   Admitting Diagnosis: Prosthetic joint implant failure    PT Received On: 07/19/18  Total Time (min): 90       Billable Minutes:  Gait Training 30, Therapeutic Activity 30 and Therapeutic Exercise 30    Treatment Type: Treatment  PT/PTA: PT     PTA Visit Number: 0       General Precautions: Standard, fall  Orthopedic Precautions: RLE weight bearing as tolerated   Braces: N/A    Do you have any cultural, spiritual, Pentecostal conflicts, given your current situation?: none stated    Subjective:  Communicated with pt prior to session. Pt is agreeable to PT services.    Pain/Comfort  Pain Rating 1: 8/10  Location - Side 1: Right  Location - Orientation 1: generalized  Location 1: knee  Pain Addressed 1: Pre-medicate for activity  Pain Rating Post-Intervention 1: 8/10    Objective:  Patient found seated on couch in room with Patient found with:  (wound vac removed)       Functional Status:  MDS G  Bed Mobility Functional Status: CGA-Min (A)  Transfer Functional Status: CGA-Min (A)  Walk in Room Functional Status: CGA-Min (A)  Walk in Corridor Functional Status: CGA-Min (A)  Locomotion on Unit Functional Status: CGA-Min (A)  Locomotion Off Unit Functional Status: CGA-Min (A)          AM-PAC 6 CLICK MOBILITY  Total Score:15    Bed Mobility:  Sit>Supine:SBA with no railing (on mat) with increased time needed  Supine>Sit: Min A with no railing (on mat)- required assistance at trunk    Transfers:  Sit<>Stand: CGA with cues for proper foot and hand placement (when getting up from couch- slightly lower than what pt is used to).  Stand Pivot Transfer: SBA-CGA with use of rolling walker (wheelchair<>mat)    Gait:  Amb 90, 143, 76, 120 feet (seated rest breaks in between, divided among AM and PM sessions) with swing-through gait pattern with cues for keeping rolling walker close to center of gravity/base of support rather than pushing it too far forward (which  encourages forward flexion of trunk and instability). Able to adjust with verbal cueing.    Advanced Gait:  Stairs: 4 steps with step-to pattern with B HR- SBA  Curb Step: SBA with 4 inch curb step using a rolling walker     Therex/stretching (x25 reps):  LAQ  Hip flexion  Ankle pumps  SAQ  Hip abduction  Heel slides  Quad sets  Gluteal sets  Hamstring stretch in supine (to alleviate low back pain-successful): 2 trials x 20 second hold       Balance:  Requires UE support on stable surface and SBA.    Additional Treatment:  AROM (measured supine): -13 degrees to 80 degrees   Completed 15 minutes on the Recumbent stepper to improve his U/LE strength and endurance (level 2,  638 steps, 0.35 mi, 0.9 METs)    Patient left up in chair with call button in reach.    Assessment:  [facility] [place] PAT Sakshifreddy Mcconnell is a 72 y.o. male with a medical diagnosis of Prosthetic joint implant failure. Met two goals today, showed improvement in supine AROM of RLE (reached 80 degrees flexion) and will continue to benefit from therapy services to improve his endurance, balance, gait mechanics, and lower back strength (c/o pain often).     Rehab identified problem list/impairments: weakness, impaired endurance, impaired self care skills, impaired functional mobilty, gait instability, impaired balance, decreased lower extremity function, pain, impaired cognition, edema    Rehab potential is good.    Activity tolerance: Good    Discharge recommendations: home health PT     Barriers to discharge: None    Equipment recommendations: wheelchair (maybe a WC )     GOALS:    Physical Therapy Goals        Problem: Physical Therapy Goal    Goal Priority Disciplines Outcome Goal Variances Interventions   Physical Therapy Goal     PT/OT, PT Ongoing (interventions implemented as appropriate)     Description:  Goals to be met by: 2 weeks (  )    Patient will increase functional independence with mobility by performin. Supine to sit with Contact Guard  Assistance.  2. Sit to supine with Contact Guard Assistance.  3. Sit to stand transfer with Contact Guard Assistance.  4. Bed to chair transfer with Contact Guard Assistance using Rolling Walker  5. Gait  x 75 feet with Contact Guard Assistance using Rolling Walker. Met (7/17/2018)  5a. Gait x 150 feet with stand-by assistance using a rolling walker with swing-through gait pattern, proper posture.   6. Ascend/descend 4 stair with bilateral Handrails Contact Guard Assistance, as his house has four PUNEET with B HR.met (7/19/2018)  7. Ascend/Descend 4 inch curb step with Contact Guard Assistance using Rolling Walker. Met (7/19/2018)  8. Stand for 3 minutes with Contact Guard Assistance using Rolling Walker while performing a task.  9. Lower extremity exercise program x 20 reps per handout, with assistance as needed.  10. Pt will improve his R knee active extension from -10 degrees to -5 degrees to allow for better functional mobility..  11. Pt will improve his L knee active flexion from 68 degrees to 80 degrees to allow for better functional mobility.-met 7/14/18                         PLAN:    Patient to be seen 5 x/week  to address the above listed problems via gait training, therapeutic activities, therapeutic exercises, wheelchair management/training  Plan of Care expires: 08/12/18  Plan of Care reviewed with: patient    Betzaida Wheeler, PT  07/19/2018

## 2018-07-20 LAB
POCT GLUCOSE: 216 MG/DL (ref 70–110)
POCT GLUCOSE: 231 MG/DL (ref 70–110)
POCT GLUCOSE: 247 MG/DL (ref 70–110)
POCT GLUCOSE: 252 MG/DL (ref 70–110)

## 2018-07-20 PROCEDURE — 97803 MED NUTRITION INDIV SUBSEQ: CPT

## 2018-07-20 PROCEDURE — 63600175 PHARM REV CODE 636 W HCPCS: Performed by: NURSE PRACTITIONER

## 2018-07-20 PROCEDURE — 97110 THERAPEUTIC EXERCISES: CPT

## 2018-07-20 PROCEDURE — 97535 SELF CARE MNGMENT TRAINING: CPT

## 2018-07-20 PROCEDURE — 25000003 PHARM REV CODE 250: Performed by: NURSE PRACTITIONER

## 2018-07-20 PROCEDURE — 97116 GAIT TRAINING THERAPY: CPT

## 2018-07-20 PROCEDURE — 25000003 PHARM REV CODE 250: Performed by: STUDENT IN AN ORGANIZED HEALTH CARE EDUCATION/TRAINING PROGRAM

## 2018-07-20 PROCEDURE — 11000004 HC SNF PRIVATE

## 2018-07-20 PROCEDURE — 97530 THERAPEUTIC ACTIVITIES: CPT

## 2018-07-20 RX ORDER — INSULIN ASPART 100 [IU]/ML
13 INJECTION, SOLUTION INTRAVENOUS; SUBCUTANEOUS
Status: DISCONTINUED | OUTPATIENT
Start: 2018-07-20 | End: 2018-07-21

## 2018-07-20 RX ADMIN — FUROSEMIDE 40 MG: 40 TABLET ORAL at 09:07

## 2018-07-20 RX ADMIN — ACETAMINOPHEN 650 MG: 325 TABLET, FILM COATED ORAL at 09:07

## 2018-07-20 RX ADMIN — STANDARDIZED SENNA CONCENTRATE AND DOCUSATE SODIUM 1 TABLET: 8.6; 5 TABLET, FILM COATED ORAL at 09:07

## 2018-07-20 RX ADMIN — FAMOTIDINE 20 MG: 20 TABLET ORAL at 09:07

## 2018-07-20 RX ADMIN — PREDNISOLONE ACETATE-GATIFLOXACIN 1 DROP: 5; 10 SUSPENSION/ DROPS OPHTHALMIC at 09:07

## 2018-07-20 RX ADMIN — PREDNISOLONE ACETATE-GATIFLOXACIN 1 DROP: 5; 10 SUSPENSION/ DROPS OPHTHALMIC at 03:07

## 2018-07-20 RX ADMIN — TAMSULOSIN HYDROCHLORIDE 0.4 MG: 0.4 CAPSULE ORAL at 09:07

## 2018-07-20 RX ADMIN — GABAPENTIN 300 MG: 300 CAPSULE ORAL at 09:07

## 2018-07-20 RX ADMIN — PRAVASTATIN SODIUM 10 MG: 10 TABLET ORAL at 09:07

## 2018-07-20 RX ADMIN — INSULIN DETEMIR 28 UNITS: 100 INJECTION, SOLUTION SUBCUTANEOUS at 09:07

## 2018-07-20 RX ADMIN — RAMELTEON 8 MG: 8 TABLET, FILM COATED ORAL at 09:07

## 2018-07-20 RX ADMIN — INSULIN ASPART 6 UNITS: 100 INJECTION, SOLUTION INTRAVENOUS; SUBCUTANEOUS at 12:07

## 2018-07-20 RX ADMIN — GABAPENTIN 300 MG: 300 CAPSULE ORAL at 02:07

## 2018-07-20 RX ADMIN — FENOFIBRATE 145 MG: 145 TABLET ORAL at 09:07

## 2018-07-20 RX ADMIN — APIXABAN 5 MG: 2.5 TABLET, FILM COATED ORAL at 09:07

## 2018-07-20 RX ADMIN — INSULIN ASPART 13 UNITS: 100 INJECTION, SOLUTION INTRAVENOUS; SUBCUTANEOUS at 04:07

## 2018-07-20 RX ADMIN — FLUTICASONE PROPIONATE 100 MCG: 50 SPRAY, METERED NASAL at 09:07

## 2018-07-20 RX ADMIN — LOSARTAN POTASSIUM 25 MG: 25 TABLET, FILM COATED ORAL at 09:07

## 2018-07-20 RX ADMIN — INSULIN ASPART 13 UNITS: 100 INJECTION, SOLUTION INTRAVENOUS; SUBCUTANEOUS at 12:07

## 2018-07-20 RX ADMIN — INSULIN ASPART 4 UNITS: 100 INJECTION, SOLUTION INTRAVENOUS; SUBCUTANEOUS at 04:07

## 2018-07-20 NOTE — PROGRESS NOTES
" [facility] - Skilled Nursing Care  Adult Nutrition  Progress Note    SUMMARY       Recommendations    Recommendation/Intervention: Continue diabetic 2000 calories, low sodium diet,   Goals: PO to meet 85% of EEN/EPN  Nutrition Goal Status: progressing towards goal      Reason for Assessment    Reason for Assessment: RD follow-up  Diagnosis:  (Debility, sp revision total R knee)  Relevant Medical History: DM uncontrolled, HTN, neuropathy, morbid obesity, CKDIII, HLD, HEIDI, AFIB,   Interdisciplinary Rounds: attended  General Information Comments: PO %  Nutrition Discharge Planning: DC on cardiac diabetic diet 2000 calories    Nutrition Risk Screen    Nutrition Risk Screen: no indicators present    Nutrition/Diet History    Patient Reported Diet/Restrictions/Preferences: general (sometimes follow diabetic diet)  Typical Food/Fluid Intake: 3 meals day, snacks, limited activity, has been gaining weight  Food Preferences: no fish milk ok  Do you have any cultural, spiritual, Congregational conflicts, given your current situation?: none  Food Allergies: NKFA  Factors Affecting Nutritional Intake: None identified at this time    Anthropometrics    Temp: 97.5 °F (36.4 °C)  Height Method: Stated  Height: 5' 10" (177.8 cm)  Height (inches): 70 in  Weight Method: Standard Scale  Weight: (!) 141.4 kg (311 lb 11.7 oz)  Weight (lb): (!) 311.73 lb  Ideal Body Weight (IBW), Male: 166 lb  % Ideal Body Weight, Male (lb): 196.69 lb  BMI (Calculated): 46.9  BMI Grade: greater than 40 - morbid obesity       Lab/Procedures/Meds    Pertinent Labs Reviewed: reviewed  Pertinent Labs Comments: Na 135, glucose 216  Pertinent Medications Reviewed: reviewed  Pertinent Medications Comments: coumadin, ASA, insulin, famotidine, tamsulosin, statin, lasix, senna, polyethylene glycol, gabapentin,     Physical Findings/Assessment    Overall Physical Appearance: edematous, obese, nourished  Tubes:  (-)  Oral/Mouth Cavity: WDL  Skin: " incision(s)    Estimated/Assessed Needs    Weight Used For Calorie Calculations: (!) 148.1 kg (326 lb 8 oz)  Energy Calorie Requirements (kcal): 2200  Energy Need Method: Seward-St Jeor (no activity factor)  Protein Requirements: 113g  Weight Used For Protein Calculations: 75 kg (165 lb 5.5 oz) (IBW x 1.5)  Fluid Requirements (mL): 2200 or per MD  Fluid Need Method: RDA Method  RDA Method (mL): 2200  CHO Requirement: 50% of calories      Nutrition Prescription Ordered    Current Diet Order: 2000 diabetic, low sodium  Nutrition Order Comments: patient asking for salt, explained purpose of sodium restriction and encoourage pt to select his own menu daily by 6PM  Oral Nutrition Supplement: none    Evaluation of Received Nutrient/Fluid Intake    Energy Calories Required: meeting needs  Protein Required: meeting needs  Fluid Required: meeting needs  Comments: pt is upset he is not on his home insulin regime and about his high glucose  Tolerance: tolerating  % Intake of Estimated Energy Needs: 75 - 100 %  % Meal Intake: 75 - 100 %    Nutrition Risk    Level of Risk/Frequency of Follow-up: low     Assessment and Plan     Morbid obesity     Morbid obesity related to excessive calorie intake, decreased activity as evidenced by BMI 47 and uncontrolled diabetes, with family report that diabetic diet is not followed strictly.  Interventions/Recommendations (treatment strategy):  2000 calorie diabetic low sodium diet, double meat if accepted  Diabetic education     Nutrition Diagnosis Status: continues         Monitor and Evaluation    Food and Nutrient Intake: food and beverage intake  Food and Nutrient Adminstration: diet order  Knowledge/Beliefs/Attitudes: food and nutrition knowledge/skill  Anthropometric Measurements: weight change  Biochemical Data, Medical Tests and Procedures: glucose/endocrine profile, inflammatory profile, electrolyte and renal panel     Nutrition Follow-Up    RD Follow-up?: Yes

## 2018-07-20 NOTE — PLAN OF CARE
Problem: Occupational Therapy Goal  Goal: Occupational Therapy Goal  Goals to be met by: 14 days     Patient will increase functional independence with ADLs by performing:    Feeding with Modified [place].  UE Dressing with Modified [place].  LE Dressing with Stand-by Assistance using A/E and A/D as needed..  Grooming while standing with Stand-by Assistance with RW to stand..  Toileting from bedside commode with Stand-by Assistance for hygiene and clothing management with DME as needed.  Bathing from  edge of bed with Minimal Assistance.  Rolling to Bilateral with Modified [place].   Supine to sit with Modified [place].  Stand pivot transfers with Supervision with RW.  Upper extremity exercise program x10 reps per set, with assistance as needed.  Pt will tolerate up to 10 minutes standing activity with RW to steady.     Pt. Tolerated session well.

## 2018-07-20 NOTE — PLAN OF CARE
Problem: Physical Therapy Goal  Goal: Physical Therapy Goal  Goals to be met by: 2 weeks (  )    Patient will increase functional independence with mobility by performin. Supine to sit with Contact Guard Assistance.  2. Sit to supine with Contact Guard Assistance.  3. Sit to stand transfer with Contact Guard Assistance.  4. Bed to chair transfer with Contact Guard Assistance using Rolling Walker  5. Gait  x 75 feet with Contact Guard Assistance using Rolling Walker. Met (2018)  5a. Gait x 150 feet with stand-by assistance using a rolling walker with swing-through gait pattern, proper posture.   6. Ascend/descend 4 stair with bilateral Handrails Contact Guard Assistance, as his house has four PUNEET with B HR.  7. Ascend/Descend 4 inch curb step with Contact Guard Assistance using Rolling Walker.  8. Stand for 3 minutes with Contact Guard Assistance using Rolling Walker while performing a task.  9. Lower extremity exercise program x 20 reps per handout, with assistance as needed.  10. Pt will improve his R knee active extension from -10 degrees to -5 degrees to allow for better functional mobility..  11. Pt will improve his L knee active flexion from 68 degrees to 80 degrees to allow for better functional mobility.-met 18        Goals remain appropriate. Continue with PT POC as indicated.

## 2018-07-20 NOTE — PT/OT/SLP PROGRESS
Occupational Therapy  Treatment    [facility] [place]   MRN: [id number]   Admitting Diagnosis: Prosthetic joint implant failure    OT Date of Treatment: 07/20/18  Total Time (min): 54 min    Billable Minutes:  Self Care/Home Management 54    General Precautions: Standard, fall  Orthopedic Precautions: RLE weight bearing as tolerated  Braces: N/A    Do you have any cultural, spiritual, Yarsani conflicts, given your current situation?: none stated    Subjective:  Communicated with nurse prior to session.  Pt. Reported that he did not have a good night    Pain/Comfort  Pain Rating 1: 4/10  Location - Side 1: Right  Location 1: knee  Pain Addressed 1: Reposition, Distraction  Pain Rating Post-Intervention 1:  (pain increased with activity )    Objective:  Patient found with:  (seated in w/c with avasys camera in room)    Functional Status:  MDS G  Transfer Functional Status: CGA- sit to stand at sink x multiple trials during ADL tasks  Transfer Level of (A):  (at sink to perform ADL tasks)  Dressing Functional Status: 2:CGA LBD/ UBD set up A to don pull over shirt  Dressing Level of (A) :  (with AE to don shorts as well as attempted socks but BLE too swollen to utilize sock aid)  Personal Hygiene Functional Status: Set up A seated to brush teeth, wash face and comb hair at sink  Bathing Functional Status: Min (A) sponge bath at sink to wipe buttocks region in stand;  Assist to wipe feet but can utilize long handle sponge at hiome  Moving from seated to standing position: Not steady, only able to stabilize with staff assistance  Surface-to-surface transfer (transfer between bed and chair or wheelchair): Not steady, only able to stabilize with staff assistance           AMPA 6 Click:  AMPAC Total Score: 21      Additional Treatment:  Pt. Educated on safety with transfers as well as technique and hand placement.  Pt. Stood with pushing up through BUE into chair with CGA x multiple trials.   Pt. Educated on need to inspect  feet at home 2/2 pt. Being a diabetic    Patient left up in chair with call button in reach as well as ice applied to RLE to assist with reducing edema    ASSESSMENT:  Stephen Cantor . is a 72 y.o. male with a medical diagnosis of Prosthetic joint implant failure and presents with deficits in self-care skills, functional mobility and decreased endurance.  Pt. With some limitations noted 2/2 pain and edema in RLE.  A few small blisters were noted on pt. Michel on this date.  Nurse notified. .    Rehab identified problem list/impairments: weakness, impaired endurance, impaired self care skills, impaired functional mobilty, gait instability, impaired balance, decreased coordination, decreased lower extremity function, decreased safety awareness, pain, decreased ROM, orthopedic precautions    Rehab potential is good    Activity tolerance: Good    Discharge recommendations: home with home health     Barriers to discharge: Decreased caregiver support     Equipment recommendations: wheelchair     GOALS:    Occupational Therapy Goals        Problem: Occupational Therapy Goal    Goal Priority Disciplines Outcome Interventions   Occupational Therapy Goal     OT, PT/OT Ongoing (interventions implemented as appropriate)    Description:  Goals to be met by: 14 days     Patient will increase functional independence with ADLs by performing:    Feeding with Modified [place].  UE Dressing with Modified [place].  LE Dressing with Stand-by Assistance using A/E and A/D as needed..  Grooming while standing with Stand-by Assistance with RW to stand..  Toileting from bedside commode with Stand-by Assistance for hygiene and clothing management with DME as needed.  Bathing from  edge of bed with Minimal Assistance.  Rolling to Bilateral with Modified [place].   Supine to sit with Modified [place].  Stand pivot transfers with Supervision with RW.  Upper extremity exercise program x10 reps per set, with assistance as  needed.  Pt will tolerate up to 10 minutes standing activity with RW to steady.                      Plan:  Patient to be seen 5 x/week, 6 x/week to address the above listed problems via self-care/home management, therapeutic activities, therapeutic exercises  Plan of Care expires: 08/13/18  Plan of Care reviewed with: patient    Mireya VANESSA Castanon  07/20/2018

## 2018-07-20 NOTE — PT/OT/SLP PROGRESS
Physical Therapy  Treatment    [facility] [place] PAT Cantor Jr.   MRN: [id number]   Admitting Diagnosis: Prosthetic joint implant failure    PT Received On: 07/20/18  Total Time (min): 48       Billable Minutes:  Gait Training 10, Therapeutic Activity 16 and Therapeutic Exercise 22    Treatment Type: Treatment  PT/PTA: PTA     PTA Visit Number: 1       General Precautions: Standard, fall  Orthopedic Precautions: RLE weight bearing as tolerated   Braces: N/A    Do you have any cultural, spiritual, Adventist conflicts, given your current situation?: none stated    Subjective:  Communicated with nursing prior to session.  Pt agreed to work with therapy.     Pain/Comfort  Pain Rating 1: 0/10  Pain Rating Post-Intervention 1: 0/10    Objective:  Patient found seated w/c.         Functional Status:  MDS G  Bed Mobility Functional Status: CGA-Min (A)  Transfer Functional Status: CGA-Min (A)  Walk in Room Functional Status: CGA-Min (A)  Walk in Corridor Functional Status: CGA-Min (A)  Locomotion on Unit Functional Status: CGA-Min (A)  Locomotion Off Unit Functional Status: CGA-Min (A)  Moving from seated to standing position: Not steady, only able to stabilize with staff assistance  Walking (with assistive device if used): Not steady, only able to stabilize with staff assistance  Turning around and facing the opposite direction while walking: Not steady, only able to stabilize with staff assistance  Moving on and off the toilet: Not steady, only able to stabilize with staff assistance  Surface-to-surface transfer (transfer between bed and chair or wheelchair): Not steady, only able to stabilize with staff assistance          AM-PAC 6 CLICK MOBILITY  Total Score:15    Bed Mobility:  Not performed on this date 2/2 pt found and left seated UIC.    Transfers:  Sit<>Stand: CGA with RW to/from stepper, w/c  Stand Pivot Transfer: w/c<>stepper with RW and CGA    Gait:  Amb x2 trials (130 ft and 100 ft) CG-SBA with RW.      Therex:  -B LE therex x25  reps: AP, LAQ, and Hip Flexion  -Seated stepper 18 min (level 4) using BUE/BLEs to improve overall strength and endurance.     Patient left up in chair with call button in reach.    Assessment:  [facility] [place] PAT Cantor Jr. is a 72 y.o. male with a medical diagnosis of Prosthetic joint implant failure.  Pt tolerated treatment well, and will continue to benefit from PT services at this time. Continue with PT POC as indicated.    Rehab identified problem list/impairments: weakness, impaired endurance, impaired self care skills, impaired functional mobilty, gait instability, impaired balance, decreased lower extremity function, pain, impaired cognition, edema    Rehab potential is good.    Activity tolerance: Good    Discharge recommendations: home health PT     Barriers to discharge: None    Equipment recommendations: wheelchair (maybe a WC )     GOALS:    Physical Therapy Goals        Problem: Physical Therapy Goal    Goal Priority Disciplines Outcome Goal Variances Interventions   Physical Therapy Goal     PT/OT, PT Ongoing (interventions implemented as appropriate)     Description:  Goals to be met by: 2 weeks (  )    Patient will increase functional independence with mobility by performin. Supine to sit with Contact Guard Assistance.  2. Sit to supine with Contact Guard Assistance.  3. Sit to stand transfer with Contact Guard Assistance.  4. Bed to chair transfer with Contact Guard Assistance using Rolling Walker  5. Gait  x 75 feet with Contact Guard Assistance using Rolling Walker. Met (2018)  5a. Gait x 150 feet with stand-by assistance using a rolling walker with swing-through gait pattern, proper posture.   6. Ascend/descend 4 stair with bilateral Handrails Contact Guard Assistance, as his house has four PUNEET with B HR.  7. Ascend/Descend 4 inch curb step with Contact Guard Assistance using Rolling Walker.  8. Stand for 3 minutes with Contact Guard Assistance using Rolling Walker while performing a  task.  9. Lower extremity exercise program x 20 reps per handout, with assistance as needed.  10. Pt will improve his R knee active extension from -10 degrees to -5 degrees to allow for better functional mobility..  11. Pt will improve his L knee active flexion from 68 degrees to 80 degrees to allow for better functional mobility.-met 7/14/18                         PLAN:    Patient to be seen 5 x/week  to address the above listed problems via gait training, therapeutic activities, therapeutic exercises, wheelchair management/training  Plan of Care expires: 08/12/18  Plan of Care reviewed with: patient    Emma Marcelino, PTA  07/20/2018

## 2018-07-21 LAB
POCT GLUCOSE: 176 MG/DL (ref 70–110)
POCT GLUCOSE: 235 MG/DL (ref 70–110)
POCT GLUCOSE: 245 MG/DL (ref 70–110)
POCT GLUCOSE: 253 MG/DL (ref 70–110)

## 2018-07-21 PROCEDURE — 25000003 PHARM REV CODE 250: Performed by: NURSE PRACTITIONER

## 2018-07-21 PROCEDURE — 97110 THERAPEUTIC EXERCISES: CPT

## 2018-07-21 PROCEDURE — 97530 THERAPEUTIC ACTIVITIES: CPT

## 2018-07-21 PROCEDURE — 25000003 PHARM REV CODE 250: Performed by: STUDENT IN AN ORGANIZED HEALTH CARE EDUCATION/TRAINING PROGRAM

## 2018-07-21 PROCEDURE — 11000004 HC SNF PRIVATE

## 2018-07-21 PROCEDURE — 97535 SELF CARE MNGMENT TRAINING: CPT

## 2018-07-21 PROCEDURE — 97116 GAIT TRAINING THERAPY: CPT

## 2018-07-21 RX ORDER — INSULIN ASPART 100 [IU]/ML
18 INJECTION, SOLUTION INTRAVENOUS; SUBCUTANEOUS
Status: DISCONTINUED | OUTPATIENT
Start: 2018-07-21 | End: 2018-07-28

## 2018-07-21 RX ADMIN — GABAPENTIN 300 MG: 300 CAPSULE ORAL at 09:07

## 2018-07-21 RX ADMIN — FAMOTIDINE 20 MG: 20 TABLET ORAL at 09:07

## 2018-07-21 RX ADMIN — METOPROLOL SUCCINATE 100 MG: 100 TABLET, EXTENDED RELEASE ORAL at 09:07

## 2018-07-21 RX ADMIN — FENOFIBRATE 145 MG: 145 TABLET ORAL at 09:07

## 2018-07-21 RX ADMIN — APIXABAN 5 MG: 2.5 TABLET, FILM COATED ORAL at 09:07

## 2018-07-21 RX ADMIN — INSULIN ASPART 18 UNITS: 100 INJECTION, SOLUTION INTRAVENOUS; SUBCUTANEOUS at 04:07

## 2018-07-21 RX ADMIN — ACETAMINOPHEN 650 MG: 325 TABLET, FILM COATED ORAL at 09:07

## 2018-07-21 RX ADMIN — INSULIN ASPART 4 UNITS: 100 INJECTION, SOLUTION INTRAVENOUS; SUBCUTANEOUS at 09:07

## 2018-07-21 RX ADMIN — INSULIN DETEMIR 28 UNITS: 100 INJECTION, SOLUTION SUBCUTANEOUS at 09:07

## 2018-07-21 RX ADMIN — FUROSEMIDE 40 MG: 40 TABLET ORAL at 09:07

## 2018-07-21 RX ADMIN — INSULIN ASPART 13 UNITS: 100 INJECTION, SOLUTION INTRAVENOUS; SUBCUTANEOUS at 09:07

## 2018-07-21 RX ADMIN — ACETAMINOPHEN 650 MG: 325 TABLET ORAL at 09:07

## 2018-07-21 RX ADMIN — FLUTICASONE PROPIONATE 100 MCG: 50 SPRAY, METERED NASAL at 09:07

## 2018-07-21 RX ADMIN — PREDNISOLONE ACETATE-GATIFLOXACIN 1 DROP: 5; 10 SUSPENSION/ DROPS OPHTHALMIC at 09:07

## 2018-07-21 RX ADMIN — INSULIN ASPART 6 UNITS: 100 INJECTION, SOLUTION INTRAVENOUS; SUBCUTANEOUS at 11:07

## 2018-07-21 RX ADMIN — RAMELTEON 8 MG: 8 TABLET, FILM COATED ORAL at 09:07

## 2018-07-21 RX ADMIN — INSULIN ASPART 13 UNITS: 100 INJECTION, SOLUTION INTRAVENOUS; SUBCUTANEOUS at 11:07

## 2018-07-21 RX ADMIN — PRAVASTATIN SODIUM 10 MG: 10 TABLET ORAL at 09:07

## 2018-07-21 RX ADMIN — INSULIN DETEMIR 30 UNITS: 100 INJECTION, SOLUTION SUBCUTANEOUS at 09:07

## 2018-07-21 RX ADMIN — PREDNISOLONE ACETATE-GATIFLOXACIN 1 DROP: 5; 10 SUSPENSION/ DROPS OPHTHALMIC at 03:07

## 2018-07-21 RX ADMIN — INSULIN ASPART 2 UNITS: 100 INJECTION, SOLUTION INTRAVENOUS; SUBCUTANEOUS at 09:07

## 2018-07-21 RX ADMIN — TAMSULOSIN HYDROCHLORIDE 0.4 MG: 0.4 CAPSULE ORAL at 09:07

## 2018-07-21 RX ADMIN — GABAPENTIN 300 MG: 300 CAPSULE ORAL at 02:07

## 2018-07-21 NOTE — PLAN OF CARE
Problem: Physical Therapy Goal  Goal: Physical Therapy Goal  Goals to be met by: 2 weeks (  )    Patient will increase functional independence with mobility by performin. Supine to sit with Contact Guard Assistance.   2. Sit to supine with Contact Guard Assistance. Met (2018)  3. Sit to stand transfer with Contact Guard Assistance. Met (2018)  4. Bed to chair transfer with Contact Guard Assistance using Rolling Walker  5. Gait  x 75 feet with Contact Guard Assistance using Rolling Walker. Met (2018)  5a. Gait x 150 feet with stand-by assistance using a rolling walker with swing-through gait pattern, proper posture. Met (2018)  5b. Gait x 25 feet with Min A without use of a rolling walker, as this is the patient's goal (to ambulate without an assistive device).    6. Ascend/descend 4 stair with bilateral Handrails Contact Guard Assistance, as his house has four PUNEET with B HR. Met (2018)  7. Ascend/Descend 4 inch curb step with Contact Guard Assistance using Rolling Walker.  8. Stand for 3 minutes with Contact Guard Assistance using Rolling Walker while performing a task.  9. Lower extremity exercise program x 20 reps per handout, with assistance as needed.  10. Pt will improve his R knee active extension from -10 degrees to -5 degrees to allow for better functional mobility..  11. Pt will improve his L knee active flexion from 68 degrees to 80 degrees to allow for better functional mobility.-met 18  Revised (2018):  Pt will improve his L knee active flexion from 80 degrees to 90 degrees to allow for better functional mobility.         Outcome: Ongoing (interventions implemented as appropriate)  Met three goals (and one goal revised).

## 2018-07-21 NOTE — PT/OT/SLP PROGRESS
Physical Therapy  Treatment    [facility] [place]   MRN: [id number]   Admitting Diagnosis: Prosthetic joint implant failure    PT Received On: 07/21/18  Total Time (min): 60       Billable Minutes:  Gait Training 15, Therapeutic Activity 15 and Therapeutic Exercise 30    Treatment Type: Treatment  PT/PTA: PT     PTA Visit Number: 0       General Precautions: Standard, fall  Orthopedic Precautions: RLE weight bearing as tolerated   Braces: N/A    Do you have any cultural, spiritual, Sabianist conflicts, given your current situation?: none stated    Subjective:  Communicated with pt prior to session.  Pt was agreeable to PT services. Requested today to ambulate without the rolling walker.     Pain/Comfort  Pain Rating 1: 0/10    Objective:  Patient found seated in wheelchair.         Functional Status:  MDS G  Bed Mobility Functional Status: S-SBA  Transfer Functional Status: CGA-Min (A)  Walk in Room Functional Status: CGA-Min (A)  Walk in Corridor Functional Status: S-SBA  Locomotion on Unit Functional Status: S-SBA (via ambulation with RW)  Moving from seated to standing position: Not steady, only able to stabilize with staff assistance  Walking (with assistive device if used): Not steady, only able to stabilize with staff assistance  Turning around and facing the opposite direction while walking: Not steady, only able to stabilize with staff assistance  Moving on and off the toilet: Not steady, only able to stabilize with staff assistance  Surface-to-surface transfer (transfer between bed and chair or wheelchair): Not steady, only able to stabilize with staff assistance          AM-PAC 6 CLICK MOBILITY  Total Score:16    Bed Mobility:  Sit>Supine:Min A (assistance with LEs; able to manage his own trunk- requires increased time)  Supine>Sit: SBA    Transfers:  Sit<>Stand: SBA from wheelchair, edge of mat, stepper  Stand Pivot Transfer: SBA with rolling walker    Gait:  Amb 130, 150 feet with rolling walker, SBA (with  seated rest break in between trials) with swing-through gait and forward flexed posture with rolling walker too far forward.     Therex (x20 reps):  LAQ  Hip flexion  Ankle pumps  SAQ  Hip abduction  Heel slides  Quad sets  Gluteal sets        Balance:  With dynamic standing balance requires SBA and use of rolling walker and/or UE support on stable surface.    Additional Treatment:  AROM (supine): -7 to 85 degrees     Patient left up in chair with call button in reach.    Assessment:  ricki PAT [place] Mcconnell is a 72 y.o. male with a medical diagnosis of Prosthetic joint implant failure.  Pt met three goals today, and one goal was added to attempt ambulation without an assistive device, as this is one of the patient's goals while in our facility. Will attempt this on Monday.    Rehab identified problem list/impairments: weakness, impaired endurance, impaired self care skills, impaired functional mobilty, gait instability, impaired balance, decreased lower extremity function, pain, impaired cognition, edema    Rehab potential is good.    Activity tolerance: Good    Discharge recommendations: home health PT     Barriers to discharge: None    Equipment recommendations: wheelchair (maybe a WC )     GOALS:    Physical Therapy Goals        Problem: Physical Therapy Goal    Goal Priority Disciplines Outcome Goal Variances Interventions   Physical Therapy Goal     PT/OT, PT Ongoing (interventions implemented as appropriate)     Description:  Goals to be met by: 2 weeks (  )    Patient will increase functional independence with mobility by performin. Supine to sit with Contact Guard Assistance.   2. Sit to supine with Contact Guard Assistance. Met (2018)  3. Sit to stand transfer with Contact Guard Assistance. Met (2018)  4. Bed to chair transfer with Contact Guard Assistance using Rolling Walker  5. Gait  x 75 feet with Contact Guard Assistance using Rolling Walker. Met (2018)  5a. Gait x 150 feet with  stand-by assistance using a rolling walker with swing-through gait pattern, proper posture. Met (7/21/2018)  5b. Gait x 25 feet with Min A without use of a rolling walker, as this is the patient's goal (to ambulate without an assistive device).    6. Ascend/descend 4 stair with bilateral Handrails Contact Guard Assistance, as his house has four PUNEET with B HR. Met (7/19/2018)  7. Ascend/Descend 4 inch curb step with Contact Guard Assistance using Rolling Walker.  8. Stand for 3 minutes with Contact Guard Assistance using Rolling Walker while performing a task.  9. Lower extremity exercise program x 20 reps per handout, with assistance as needed.  10. Pt will improve his R knee active extension from -10 degrees to -5 degrees to allow for better functional mobility..  11. Pt will improve his L knee active flexion from 68 degrees to 80 degrees to allow for better functional mobility.-met 7/14/18  Revised (7/21/2018):  Pt will improve his L knee active flexion from 80 degrees to 90 degrees to allow for better functional mobility.                           PLAN:    Patient to be seen 5 x/week  to address the above listed problems via gait training, therapeutic activities, therapeutic exercises, wheelchair management/training  Plan of Care expires: 08/12/18  Plan of Care reviewed with: patient    Betzaida Wheeler, PT  07/21/2018

## 2018-07-21 NOTE — PLAN OF CARE
Problem: Patient Care Overview  Goal: Plan of Care Review  Outcome: Revised  Repositions minimal assist with encouragement, no new skin breakdowns noted. Rt knee new foam drsg dry and intact. steristrips in place/edema and bruising. Afebrile. Monitored for pain and safety. Safety maintained. Safety camera in place.

## 2018-07-21 NOTE — PLAN OF CARE
Problem: Occupational Therapy Goal  Goal: Occupational Therapy Goal  Goals to be met by: 14 days     Patient will increase functional independence with ADLs by performing:    Feeding with Modified [place].  UE Dressing with Modified [place].  LE Dressing with Stand-by Assistance using A/E and A/D as needed..  Grooming while standing with Stand-by Assistance with RW to stand..  Toileting from bedside commode with Stand-by Assistance for hygiene and clothing management with DME as needed.  Bathing from  edge of bed with Minimal Assistance.  Rolling to Bilateral with Modified [place].   Supine to sit with Modified [place].  Stand pivot transfers with Supervision with RW.  Upper extremity exercise program x10 reps per set, with assistance as needed.  Pt will tolerate up to 10 minutes standing activity with RW to steady.     Outcome: Ongoing (interventions implemented as appropriate)  Adolfo Lord OTR/JAYME      7/21/2018

## 2018-07-22 LAB
POCT GLUCOSE: 161 MG/DL (ref 70–110)
POCT GLUCOSE: 169 MG/DL (ref 70–110)
POCT GLUCOSE: 197 MG/DL (ref 70–110)
POCT GLUCOSE: 255 MG/DL (ref 70–110)

## 2018-07-22 PROCEDURE — 11000004 HC SNF PRIVATE

## 2018-07-22 PROCEDURE — 25000003 PHARM REV CODE 250: Performed by: NURSE PRACTITIONER

## 2018-07-22 PROCEDURE — 25000003 PHARM REV CODE 250: Performed by: STUDENT IN AN ORGANIZED HEALTH CARE EDUCATION/TRAINING PROGRAM

## 2018-07-22 RX ADMIN — INSULIN ASPART 6 UNITS: 100 INJECTION, SOLUTION INTRAVENOUS; SUBCUTANEOUS at 12:07

## 2018-07-22 RX ADMIN — INSULIN DETEMIR 30 UNITS: 100 INJECTION, SOLUTION SUBCUTANEOUS at 09:07

## 2018-07-22 RX ADMIN — PRAVASTATIN SODIUM 10 MG: 10 TABLET ORAL at 10:07

## 2018-07-22 RX ADMIN — RAMELTEON 8 MG: 8 TABLET, FILM COATED ORAL at 10:07

## 2018-07-22 RX ADMIN — APIXABAN 5 MG: 2.5 TABLET, FILM COATED ORAL at 10:07

## 2018-07-22 RX ADMIN — INSULIN ASPART 18 UNITS: 100 INJECTION, SOLUTION INTRAVENOUS; SUBCUTANEOUS at 12:07

## 2018-07-22 RX ADMIN — ACETAMINOPHEN 650 MG: 325 TABLET, FILM COATED ORAL at 10:07

## 2018-07-22 RX ADMIN — GABAPENTIN 300 MG: 300 CAPSULE ORAL at 10:07

## 2018-07-22 RX ADMIN — TAMSULOSIN HYDROCHLORIDE 0.4 MG: 0.4 CAPSULE ORAL at 09:07

## 2018-07-22 RX ADMIN — GABAPENTIN 300 MG: 300 CAPSULE ORAL at 09:07

## 2018-07-22 RX ADMIN — FENOFIBRATE 145 MG: 145 TABLET ORAL at 09:07

## 2018-07-22 RX ADMIN — FLUTICASONE PROPIONATE 100 MCG: 50 SPRAY, METERED NASAL at 09:07

## 2018-07-22 RX ADMIN — FAMOTIDINE 20 MG: 20 TABLET ORAL at 09:07

## 2018-07-22 RX ADMIN — APIXABAN 5 MG: 2.5 TABLET, FILM COATED ORAL at 09:07

## 2018-07-22 RX ADMIN — LOSARTAN POTASSIUM 25 MG: 25 TABLET, FILM COATED ORAL at 09:07

## 2018-07-22 RX ADMIN — INSULIN ASPART 18 UNITS: 100 INJECTION, SOLUTION INTRAVENOUS; SUBCUTANEOUS at 05:07

## 2018-07-22 RX ADMIN — STANDARDIZED SENNA CONCENTRATE AND DOCUSATE SODIUM 1 TABLET: 8.6; 5 TABLET, FILM COATED ORAL at 10:07

## 2018-07-22 RX ADMIN — FUROSEMIDE 40 MG: 40 TABLET ORAL at 09:07

## 2018-07-22 RX ADMIN — PREDNISOLONE ACETATE-GATIFLOXACIN 1 DROP: 5; 10 SUSPENSION/ DROPS OPHTHALMIC at 09:07

## 2018-07-22 RX ADMIN — INSULIN ASPART 18 UNITS: 100 INJECTION, SOLUTION INTRAVENOUS; SUBCUTANEOUS at 09:07

## 2018-07-22 RX ADMIN — FAMOTIDINE 20 MG: 20 TABLET ORAL at 10:07

## 2018-07-22 RX ADMIN — GABAPENTIN 300 MG: 300 CAPSULE ORAL at 03:07

## 2018-07-22 NOTE — PLAN OF CARE
Problem: Patient Care Overview  Goal: Plan of Care Review  Outcome: Ongoing (interventions implemented as appropriate)   07/22/18 0656   Coping/Psychosocial   Plan Of Care Reviewed With patient       Problem: Fall Risk (Adult)  Goal: Identify Related Risk Factors and Signs and Symptoms  Related risk factors and signs and symptoms are identified upon initiation of Human Response Clinical Practice Guideline (CPG)   Outcome: Ongoing (interventions implemented as appropriate)   07/22/18 0656   Fall Risk   Related Risk Factors (Fall Risk) fatigue/slow reaction;homeostatic imbalance

## 2018-07-23 LAB
ANION GAP SERPL CALC-SCNC: 10 MMOL/L
BASOPHILS # BLD AUTO: 0.06 K/UL
BASOPHILS NFR BLD: 0.8 %
BUN SERPL-MCNC: 21 MG/DL
CALCIUM SERPL-MCNC: 9.9 MG/DL
CHLORIDE SERPL-SCNC: 105 MMOL/L
CO2 SERPL-SCNC: 24 MMOL/L
CREAT SERPL-MCNC: 1.5 MG/DL
DIFFERENTIAL METHOD: ABNORMAL
EOSINOPHIL # BLD AUTO: 0.6 K/UL
EOSINOPHIL NFR BLD: 7.7 %
ERYTHROCYTE [DISTWIDTH] IN BLOOD BY AUTOMATED COUNT: 12.7 %
EST. GFR  (AFRICAN AMERICAN): 52.3 ML/MIN/1.73 M^2
EST. GFR  (NON AFRICAN AMERICAN): 45.2 ML/MIN/1.73 M^2
GLUCOSE SERPL-MCNC: 127 MG/DL
HCT VFR BLD AUTO: 38.9 %
HGB BLD-MCNC: 13.1 G/DL
IMM GRANULOCYTES # BLD AUTO: 0.07 K/UL
IMM GRANULOCYTES NFR BLD AUTO: 1 %
LYMPHOCYTES # BLD AUTO: 2.1 K/UL
LYMPHOCYTES NFR BLD: 28.3 %
MAGNESIUM SERPL-MCNC: 2.1 MG/DL
MCH RBC QN AUTO: 31 PG
MCHC RBC AUTO-ENTMCNC: 33.7 G/DL
MCV RBC AUTO: 92 FL
MONOCYTES # BLD AUTO: 0.6 K/UL
MONOCYTES NFR BLD: 8.3 %
NEUTROPHILS # BLD AUTO: 3.9 K/UL
NEUTROPHILS NFR BLD: 53.9 %
NRBC BLD-RTO: 0 /100 WBC
PHOSPHATE SERPL-MCNC: 3.8 MG/DL
PLATELET # BLD AUTO: 304 K/UL
PMV BLD AUTO: 11.7 FL
POCT GLUCOSE: 150 MG/DL (ref 70–110)
POCT GLUCOSE: 185 MG/DL (ref 70–110)
POCT GLUCOSE: 201 MG/DL (ref 70–110)
POCT GLUCOSE: 282 MG/DL (ref 70–110)
POTASSIUM SERPL-SCNC: 4.1 MMOL/L
RBC # BLD AUTO: 4.23 M/UL
SODIUM SERPL-SCNC: 139 MMOL/L
WBC # BLD AUTO: 7.24 K/UL

## 2018-07-23 PROCEDURE — 80048 BASIC METABOLIC PNL TOTAL CA: CPT

## 2018-07-23 PROCEDURE — 97530 THERAPEUTIC ACTIVITIES: CPT

## 2018-07-23 PROCEDURE — 85025 COMPLETE CBC W/AUTO DIFF WBC: CPT

## 2018-07-23 PROCEDURE — 25000003 PHARM REV CODE 250: Performed by: STUDENT IN AN ORGANIZED HEALTH CARE EDUCATION/TRAINING PROGRAM

## 2018-07-23 PROCEDURE — 25000003 PHARM REV CODE 250: Performed by: NURSE PRACTITIONER

## 2018-07-23 PROCEDURE — 36415 COLL VENOUS BLD VENIPUNCTURE: CPT

## 2018-07-23 PROCEDURE — 83735 ASSAY OF MAGNESIUM: CPT

## 2018-07-23 PROCEDURE — 97535 SELF CARE MNGMENT TRAINING: CPT

## 2018-07-23 PROCEDURE — 97110 THERAPEUTIC EXERCISES: CPT

## 2018-07-23 PROCEDURE — 97116 GAIT TRAINING THERAPY: CPT

## 2018-07-23 PROCEDURE — 11000004 HC SNF PRIVATE

## 2018-07-23 PROCEDURE — 84100 ASSAY OF PHOSPHORUS: CPT

## 2018-07-23 RX ADMIN — GABAPENTIN 300 MG: 300 CAPSULE ORAL at 08:07

## 2018-07-23 RX ADMIN — FAMOTIDINE 20 MG: 20 TABLET ORAL at 08:07

## 2018-07-23 RX ADMIN — PRAVASTATIN SODIUM 10 MG: 10 TABLET ORAL at 08:07

## 2018-07-23 RX ADMIN — PREDNISOLONE ACETATE-GATIFLOXACIN 1 DROP: 5; 10 SUSPENSION/ DROPS OPHTHALMIC at 09:07

## 2018-07-23 RX ADMIN — TAMSULOSIN HYDROCHLORIDE 0.4 MG: 0.4 CAPSULE ORAL at 09:07

## 2018-07-23 RX ADMIN — METOPROLOL SUCCINATE 100 MG: 100 TABLET, EXTENDED RELEASE ORAL at 08:07

## 2018-07-23 RX ADMIN — INSULIN ASPART 18 UNITS: 100 INJECTION, SOLUTION INTRAVENOUS; SUBCUTANEOUS at 12:07

## 2018-07-23 RX ADMIN — GABAPENTIN 300 MG: 300 CAPSULE ORAL at 09:07

## 2018-07-23 RX ADMIN — GABAPENTIN 300 MG: 300 CAPSULE ORAL at 03:07

## 2018-07-23 RX ADMIN — FAMOTIDINE 20 MG: 20 TABLET ORAL at 09:07

## 2018-07-23 RX ADMIN — FUROSEMIDE 40 MG: 40 TABLET ORAL at 09:07

## 2018-07-23 RX ADMIN — APIXABAN 5 MG: 2.5 TABLET, FILM COATED ORAL at 08:07

## 2018-07-23 RX ADMIN — LOSARTAN POTASSIUM 25 MG: 25 TABLET, FILM COATED ORAL at 09:07

## 2018-07-23 RX ADMIN — INSULIN DETEMIR 30 UNITS: 100 INJECTION, SOLUTION SUBCUTANEOUS at 09:07

## 2018-07-23 RX ADMIN — INSULIN ASPART 18 UNITS: 100 INJECTION, SOLUTION INTRAVENOUS; SUBCUTANEOUS at 09:07

## 2018-07-23 RX ADMIN — INSULIN ASPART 6 UNITS: 100 INJECTION, SOLUTION INTRAVENOUS; SUBCUTANEOUS at 12:07

## 2018-07-23 RX ADMIN — INSULIN ASPART 2 UNITS: 100 INJECTION, SOLUTION INTRAVENOUS; SUBCUTANEOUS at 08:07

## 2018-07-23 RX ADMIN — INSULIN ASPART 18 UNITS: 100 INJECTION, SOLUTION INTRAVENOUS; SUBCUTANEOUS at 06:07

## 2018-07-23 RX ADMIN — APIXABAN 5 MG: 2.5 TABLET, FILM COATED ORAL at 09:07

## 2018-07-23 RX ADMIN — RAMELTEON 8 MG: 8 TABLET, FILM COATED ORAL at 08:07

## 2018-07-23 RX ADMIN — FENOFIBRATE 145 MG: 145 TABLET ORAL at 09:07

## 2018-07-23 RX ADMIN — INSULIN DETEMIR 30 UNITS: 100 INJECTION, SOLUTION SUBCUTANEOUS at 08:07

## 2018-07-23 RX ADMIN — FLUTICASONE PROPIONATE 100 MCG: 50 SPRAY, METERED NASAL at 09:07

## 2018-07-23 RX ADMIN — STANDARDIZED SENNA CONCENTRATE AND DOCUSATE SODIUM 1 TABLET: 8.6; 5 TABLET, FILM COATED ORAL at 08:07

## 2018-07-23 NOTE — PLAN OF CARE
Problem: Physical Therapy Goal  Goal: Physical Therapy Goal  Goals to be met by: 2 weeks (  )    Patient will increase functional independence with mobility by performin. Supine to sit with Contact Guard Assistance. Met (2018)  2. Sit to supine with Contact Guard Assistance. Met (2018)  3. Sit to stand transfer with Contact Guard Assistance. Met (2018)  4. Bed to chair transfer with Contact Guard Assistance using Rolling Walker  5. Gait  x 75 feet with Contact Guard Assistance using Rolling Walker. Met (2018)  5a. Gait x 150 feet with stand-by assistance using a rolling walker with swing-through gait pattern, proper posture. Met (2018)  5b. Gait x 25 feet with Min A without use of a rolling walker, as this is the patient's goal (to ambulate without an assistive device).    6. Ascend/descend 4 stair with bilateral Handrails Contact Guard Assistance, as his house has four PUNEET with B HR. Met (2018)  7. Ascend/Descend 4 inch curb step with Contact Guard Assistance using Rolling Walker. Met (2018)  8. Stand for 3 minutes with Contact Guard Assistance using Rolling Walker while performing a task.  9. Lower extremity exercise program x 20 reps per handout, with assistance as needed.  10. Pt will improve his R knee active extension from -10 degrees to -5 degrees to allow for better functional mobility..  11. Pt will improve his L knee active flexion from 68 degrees to 80 degrees to allow for better functional mobility.-met 18  Revised (2018):  Pt will improve his L knee active flexion from 80 degrees to 90 degrees to allow for better functional mobility.          Outcome: Ongoing (interventions implemented as appropriate)  Met two goals today (bed mobility, step)

## 2018-07-23 NOTE — PT/OT/SLP PROGRESS
Physical Therapy  Treatment/family training     [place] PAT Cantor Jr.   MRN: [id number]   Admitting Diagnosis: Prosthetic joint implant failure    PT Received On: 07/23/18  Total Time (min): 90       Billable Minutes:  Gait Training 30, Therapeutic Activity 30 and Therapeutic Exercise 30    Treatment Type: Treatment  PT/PTA: PT     PTA Visit Number: 0       General Precautions: Standard, fall  Orthopedic Precautions: RLE weight bearing as tolerated   Braces: N/A    Do you have any cultural, spiritual, Buddhism conflicts, given your current situation?: none stated    Subjective:  Communicated with pt prior to session.  Pt was agreeable to PT services.    Pain/Comfort  Pain Rating 1: 4/10  Location - Side 1: Right  Location - Orientation 1: generalized  Location 1: knee  Pain Addressed 1: Reposition  Pain Rating Post-Intervention 1: 4/10    Objective:  Patient found seated in wheelchair in gym in AM; with grandson (LAKEISHA, 18 y.o., who will be caring for him for first week) and granddaughter, Danette (11 y.o.)         Functional Status:  MDS G  Bed Mobility Functional Status: S-SBA  Transfer Functional Status: S-SBA  Walk in Room Functional Status: S-SBA  Walk in Corridor Functional Status: S-SBA  Locomotion on Unit Functional Status: S-SBA  Locomotion Off Unit Functional Status: S-SBA (ambulation with RW)          AM-PAC 6 CLICK MOBILITY  Total Score:18    Bed Mobility:  Sit>Supine:SBA on mat and bed  Supine>Sit: SBA on mat and bed.    Transfers:  Sit<>Stand: SBA from wheelchair, edge of mat/bed  Stand Pivot Transfer: SBA-CGA with UE support on stable surface.    Gait:  Amb 120 feet x 2 trials with use of rolling walker, SBA, swing-through gait pattern; cues for standing upright and staying close to RW.   Amb in parallel bars with unilateral UE support with SBA x 4 trials (9 feet each) and with no UE support, displaying increased forward flexed posture, decreased stance time on RLE due to pain, and overall instability (CGA).      Advanced Gait:  Curb Step: 4 inch curb step- RW- SBA     Therex x 20 reps (HEP given to family):  LAQ  Hip flexion  Ankle pumps    SAQ  Hip abduction  Heel slides  Quad sets  Gluteal sets  SLR      Balance:  Able to stand with UE support with SBA-CGA statically and dynamically    Additional Treatment:  Completed 15 min on recumbent stepper  (level 8) to improve U/LE strength/endurance-  1.3 METs, 0.2 Mi, 12 [place], 375 steps.    AROM RLE: -8 to 85 degrees (supine)    Family were trained on the following: HEP, 4 inch curb step negotiation, wheelchair management, car transfer (will practice in future session), transfers, bed mobility, shower, lower body dressing.  Offered to host another family training for Rico and Danette's mothers.     Patient left supine with call button in reach.    Assessment:  [facility] [place] PAT Cantor Jr. is a 72 y.o. male with a medical diagnosis of Prosthetic joint implant failure.  Mr. Cantor is improving steadily, met two goals today and will benefit form further PT services to address his gait, balance, LE strength, and AROM. He still requires use of a rolling walker, and pt is in agreement about this.     Rehab identified problem list/impairments: weakness, impaired endurance, impaired self care skills, impaired functional mobilty, gait instability, impaired balance, decreased lower extremity function, pain, impaired cognition, edema    Rehab potential is good.    Activity tolerance: Good    Discharge recommendations: home health PT     Barriers to discharge: None    Equipment recommendations: wheelchair (maybe a WC )     GOALS:    Physical Therapy Goals        Problem: Physical Therapy Goal    Goal Priority Disciplines Outcome Goal Variances Interventions   Physical Therapy Goal     PT/OT, PT Ongoing (interventions implemented as appropriate)     Description:  Goals to be met by: 2 weeks (  )    Patient will increase functional independence with mobility by performin. Supine to sit with  Contact Guard Assistance. Met (7/23/2018)  2. Sit to supine with Contact Guard Assistance. Met (7/21/2018)  3. Sit to stand transfer with Contact Guard Assistance. Met (7/21/2018)  4. Bed to chair transfer with Contact Guard Assistance using Rolling Walker  5. Gait  x 75 feet with Contact Guard Assistance using Rolling Walker. Met (7/17/2018)  5a. Gait x 150 feet with stand-by assistance using a rolling walker with swing-through gait pattern, proper posture. Met (7/21/2018)  5b. Gait x 25 feet with Min A without use of a rolling walker, as this is the patient's goal (to ambulate without an assistive device).    6. Ascend/descend 4 stair with bilateral Handrails Contact Guard Assistance, as his house has four PUNEET with B HR. Met (7/19/2018)  7. Ascend/Descend 4 inch curb step with Contact Guard Assistance using Rolling Walker. Met (7/23/2018)  8. Stand for 3 minutes with Contact Guard Assistance using Rolling Walker while performing a task.  9. Lower extremity exercise program x 20 reps per handout, with assistance as needed.  10. Pt will improve his R knee active extension from -10 degrees to -5 degrees to allow for better functional mobility..  11. Pt will improve his L knee active flexion from 68 degrees to 80 degrees to allow for better functional mobility.-met 7/14/18  Revised (7/21/2018):  Pt will improve his L knee active flexion from 80 degrees to 90 degrees to allow for better functional mobility.                            PLAN:    Patient to be seen 5 x/week  to address the above listed problems via gait training, therapeutic activities, therapeutic exercises, wheelchair management/training  Plan of Care expires: 08/12/18  Plan of Care reviewed with: patient    Betzaida J Summer, PT  07/23/2018

## 2018-07-23 NOTE — PLAN OF CARE
Problem: Occupational Therapy Goal  Goal: Occupational Therapy Goal  Goals to be met by: 14 days     Patient will increase functional independence with ADLs by performing:    Feeding with Modified [place].  UE Dressing with Modified [place].  LE Dressing with Stand-by Assistance using A/E and A/D as needed..  Grooming while standing with Stand-by Assistance with RW to stand..  Toileting from bedside commode with Stand-by Assistance for hygiene and clothing management with DME as needed.  Bathing from  edge of bed with Minimal Assistance.  Rolling to Bilateral with Modified [place].   Supine to sit with Modified [place].  Stand pivot transfers with Supervision with RW.  Upper extremity exercise program x10 reps per set, with assistance as needed.  Pt will tolerate up to 10 minutes standing activity with RW to steady.     Pt. Tolerated session well.

## 2018-07-23 NOTE — PT/OT/SLP PROGRESS
Occupational Therapy  Treatment    [facility] [place].   MRN: [id number]   Admitting Diagnosis: Prosthetic joint implant failure    OT Date of Treatment: 07/23/18  Total Time (min): 47 min    Billable Minutes:  Self Care/Home Management 20 and Therapeutic Exercise 27    General Precautions: Standard, fall  Orthopedic Precautions: RLE weight bearing as tolerated  Braces: N/A    Do you have any cultural, spiritual, Caodaism conflicts, given your current situation?: none stated    Subjective:  Communicated with nurse prior to session.  Pt. Reported that he was doing ok but his right knee was aching.     Pain/Comfort  Pain Rating 1: 6/10  Location - Side 1: Right  Location 1: knee  Pain Addressed 1: Reposition, Distraction, Nurse notified  Pain Rating Post-Intervention 1: 6/10    Objective:  Patient found with:  (seated in bedside chair)    Functional Status:  MDS G  Bed Mobility Functional Status: CGA-Min (A)   Transfer Functional Status: CGA-Min (A) sit to stand and SPT from bed to w/c with RW  Transfer Level of (A):  (sit to stand from bedside chair with cuing for technique)  Walk in Room Functional Status: S-SBA with RW from bedside chair to doorway with RW and vc's for proper technique and sequencing of steps  Walk In Room Level of (A):  (with RW and vc's for proper technique /sequencing of steps)  Dressing Functional Status: 2:-Min (A)  Dressing Level of (A) :  (to don compression sock/socks on LLE)  Toilet Use Functional Status: CGA-  Toilet Use Level of (A) :  (to use urinal)  Moving from seated to standing position: Not steady, only able to stabilize with staff assistance  Surface-to-surface transfer (transfer between bed and chair or wheelchair): Not steady, only able to stabilize with staff assistance           [facility] 6 Click:  AMPA Total Score: 21    OT Exercises: AROM with 2 # dowel x 2 sets 10 reps for all major planes of BUE motion  UE Ergometer x 12 minutes with moderate resistance    Additional  Treatment:  Pt. Engaged in red theraband exercises for elbow flex/ext as well as shoulder ER  x 3 sets 10 reps to assist with improving BUE strength in order to assist with transfers    Patient left up in chair with in gym with Supervision awaiting PT session    ASSESSMENT:  Stephen Liufreddy Mcconnell is a 72 y.o. male with a medical diagnosis of Prosthetic joint implant failure and presents with deficits in self-care skills and functional mobility  Pt. Noted to have decreased edema in RLE on this date.   Pt. Tolerated session well.    Rehab identified problem list/impairments: weakness, impaired endurance, impaired self care skills, impaired functional mobilty, gait instability, impaired balance, decreased coordination, decreased lower extremity function, decreased safety awareness, pain, decreased ROM, orthopedic precautions    Rehab potential is good    Activity tolerance: Good    Discharge recommendations: home with home health and supervision    Barriers to discharge: Decreased caregiver support     Equipment recommendations: wheelchair     GOALS:    Occupational Therapy Goals        Problem: Occupational Therapy Goal    Goal Priority Disciplines Outcome Interventions   Occupational Therapy Goal     OT, PT/OT Ongoing (interventions implemented as appropriate)    Description:  Goals to be met by: 14 days     Patient will increase functional independence with ADLs by performing:    Feeding with Modified [place].  UE Dressing with Modified [place].  LE Dressing with Stand-by Assistance using A/E and A/D as needed..  Grooming while standing with Stand-by Assistance with RW to stand..  Toileting from bedside commode with Stand-by Assistance for hygiene and clothing management with DME as needed.  Bathing from  edge of bed with Minimal Assistance.  Rolling to Bilateral with Modified [place].   Supine to sit with Modified [place].  Stand pivot transfers with Supervision with RW.  Upper extremity exercise  program x10 reps per set, with assistance as needed.  Pt will tolerate up to 10 minutes standing activity with RW to steady.                      Plan:  Patient to be seen 5 x/week, 6 x/week to address the above listed problems via self-care/home management, therapeutic activities, therapeutic exercises  Plan of Care expires: 08/13/18  Plan of Care reviewed with: patient    Mireya VALERIO Casiano, VANESSA  07/23/2018

## 2018-07-23 NOTE — PLAN OF CARE
Problem: Patient Care Overview  Goal: Plan of Care Review  Outcome: Ongoing (interventions implemented as appropriate)   07/23/18 0453   Coping/Psychosocial   Plan Of Care Reviewed With patient       Problem: Fall Risk (Adult)  Goal: Identify Related Risk Factors and Signs and Symptoms  Related risk factors and signs and symptoms are identified upon initiation of Human Response Clinical Practice Guideline (CPG)   Outcome: Ongoing (interventions implemented as appropriate)   07/23/18 0453   Fall Risk   Related Risk Factors (Fall Risk) fatigue/slow reaction;gait/mobility problems;history of falls

## 2018-07-24 PROBLEM — Z78.9 PRESENCE OF SURGICAL INCISION: Status: ACTIVE | Noted: 2018-07-24

## 2018-07-24 LAB
POCT GLUCOSE: 121 MG/DL (ref 70–110)
POCT GLUCOSE: 140 MG/DL (ref 70–110)
POCT GLUCOSE: 229 MG/DL (ref 70–110)
POCT GLUCOSE: 96 MG/DL (ref 70–110)

## 2018-07-24 PROCEDURE — 25000003 PHARM REV CODE 250: Performed by: NURSE PRACTITIONER

## 2018-07-24 PROCEDURE — 99309 SBSQ NF CARE MODERATE MDM 30: CPT | Mod: ,,, | Performed by: NURSE PRACTITIONER

## 2018-07-24 PROCEDURE — 97110 THERAPEUTIC EXERCISES: CPT

## 2018-07-24 PROCEDURE — 97530 THERAPEUTIC ACTIVITIES: CPT

## 2018-07-24 PROCEDURE — 11000004 HC SNF PRIVATE

## 2018-07-24 PROCEDURE — 63600175 PHARM REV CODE 636 W HCPCS: Performed by: INTERNAL MEDICINE

## 2018-07-24 PROCEDURE — 97535 SELF CARE MNGMENT TRAINING: CPT

## 2018-07-24 PROCEDURE — 97116 GAIT TRAINING THERAPY: CPT

## 2018-07-24 PROCEDURE — 25000003 PHARM REV CODE 250: Performed by: STUDENT IN AN ORGANIZED HEALTH CARE EDUCATION/TRAINING PROGRAM

## 2018-07-24 RX ADMIN — INSULIN ASPART 4 UNITS: 100 INJECTION, SOLUTION INTRAVENOUS; SUBCUTANEOUS at 11:07

## 2018-07-24 RX ADMIN — GABAPENTIN 300 MG: 300 CAPSULE ORAL at 10:07

## 2018-07-24 RX ADMIN — INSULIN DETEMIR 30 UNITS: 100 INJECTION, SOLUTION SUBCUTANEOUS at 10:07

## 2018-07-24 RX ADMIN — TAMSULOSIN HYDROCHLORIDE 0.4 MG: 0.4 CAPSULE ORAL at 10:07

## 2018-07-24 RX ADMIN — INSULIN ASPART 18 UNITS: 100 INJECTION, SOLUTION INTRAVENOUS; SUBCUTANEOUS at 10:07

## 2018-07-24 RX ADMIN — PRAVASTATIN SODIUM 10 MG: 10 TABLET ORAL at 10:07

## 2018-07-24 RX ADMIN — PREDNISOLONE ACETATE-GATIFLOXACIN 1 DROP: 5; 10 SUSPENSION/ DROPS OPHTHALMIC at 03:07

## 2018-07-24 RX ADMIN — STANDARDIZED SENNA CONCENTRATE AND DOCUSATE SODIUM 1 TABLET: 8.6; 5 TABLET, FILM COATED ORAL at 10:07

## 2018-07-24 RX ADMIN — METOPROLOL SUCCINATE 100 MG: 100 TABLET, EXTENDED RELEASE ORAL at 10:07

## 2018-07-24 RX ADMIN — APIXABAN 5 MG: 2.5 TABLET, FILM COATED ORAL at 10:07

## 2018-07-24 RX ADMIN — PREDNISOLONE ACETATE-GATIFLOXACIN 1 DROP: 5; 10 SUSPENSION/ DROPS OPHTHALMIC at 09:07

## 2018-07-24 RX ADMIN — FAMOTIDINE 20 MG: 20 TABLET ORAL at 10:07

## 2018-07-24 RX ADMIN — FLUTICASONE PROPIONATE 100 MCG: 50 SPRAY, METERED NASAL at 10:07

## 2018-07-24 RX ADMIN — FENOFIBRATE 145 MG: 145 TABLET ORAL at 10:07

## 2018-07-24 RX ADMIN — RAMELTEON 8 MG: 8 TABLET, FILM COATED ORAL at 10:07

## 2018-07-24 RX ADMIN — INSULIN ASPART 18 UNITS: 100 INJECTION, SOLUTION INTRAVENOUS; SUBCUTANEOUS at 05:07

## 2018-07-24 RX ADMIN — GABAPENTIN 300 MG: 300 CAPSULE ORAL at 03:07

## 2018-07-24 RX ADMIN — INSULIN ASPART 18 UNITS: 100 INJECTION, SOLUTION INTRAVENOUS; SUBCUTANEOUS at 11:07

## 2018-07-24 RX ADMIN — FUROSEMIDE 40 MG: 40 TABLET ORAL at 10:07

## 2018-07-24 NOTE — PT/OT/SLP PROGRESS
Physical Therapy  Treatment    [facility] [place]   MRN: [id number]   Admitting Diagnosis: Prosthetic joint implant failure    PT Received On: 07/24/18  Total Time (min): 62       Billable Minutes:  Gait Training 12, Therapeutic Activity 20 and Therapeutic Exercise 30    Treatment Type: Treatment  PT/PTA: PTA     PTA Visit Number: 1       General Precautions: Standard, fall  Orthopedic Precautions: RLE weight bearing as tolerated   Braces: N/A    Do you have any cultural, spiritual, Rastafarian conflicts, given your current situation?: none stated    Subjective:  Communicated with nursing prior to session.  Pt agreed to work with therapy.     Pain/Comfort  Pain Rating 1: 0/10  Pain Rating Post-Intervention 1: 0/10    Objective:  Patient found seated bedside chair.          Functional Status:  MDS G  Bed Mobility Functional Status: S-SBA  Transfer Functional Status: S-SBA  Walk in Room Functional Status: S-SBA  Walk in Corridor Functional Status: S-SBA  Locomotion on Unit Functional Status: S-SBA  Locomotion Off Unit Functional Status: S-SBA  Moving from seated to standing position: Not steady, only able to stabilize with staff assistance  Walking (with assistive device if used): Not steady, only able to stabilize with staff assistance  Turning around and facing the opposite direction while walking: Not steady, only able to stabilize with staff assistance  Moving on and off the toilet: Not steady, only able to stabilize with staff assistance  Surface-to-surface transfer (transfer between bed and chair or wheelchair): Not steady, only able to stabilize with staff assistance          AM-PAC 6 CLICK MOBILITY  Total Score:18    Bed Mobility:  Sit>Supine:SBA on mat  Supine>Sit: SBA on mat    Transfers:  Sit<>Stand: SBA with/without RW to/from bedside chair,w/c,mat, and seated stepper  Stand Pivot Transfer: bedside chair to w/c with RW and CG-SBA    Gait:  Amb x2 trials of (128 feet and 140 feet) with RW and SBA; cueing for  standing upright and staying close to RW.     Therex x20 reps:   Ankle Pumps  Hip Flexion  LAQ    SAQ  Hip Abduction  Heel Slides  SLR  Gluteal sets    -Seated Stepper x20 min on level 8 using BUE/BLE to improve overall strength, and endurance.     Additional Treatment:  AROM (R) knee: -8 to 87 degrees (supine)    Patient left up in chair with call button in reach and nursing notified.    Assessment:  [facility] [place] PAT Cantor Jr. is a 72 y.o. male with a medical diagnosis of Prosthetic joint implant failure.  Pt tolerated treatment well, and will continue to benefit from PT services at this time. Continue with PT POC as indicated.    Rehab identified problem list/impairments: weakness, impaired endurance, impaired self care skills, impaired functional mobilty, gait instability, impaired balance, decreased lower extremity function, pain, impaired cognition, edema    Rehab potential is good.    Activity tolerance: Good    Discharge recommendations: home health PT     Barriers to discharge: None    Equipment recommendations: wheelchair (maybe a WC )     GOALS:    Physical Therapy Goals        Problem: Physical Therapy Goal    Goal Priority Disciplines Outcome Goal Variances Interventions   Physical Therapy Goal     PT/OT, PT Ongoing (interventions implemented as appropriate)     Description:  Goals to be met by: 2 weeks (  )    Patient will increase functional independence with mobility by performin. Supine to sit with Contact Guard Assistance. Met (2018)  2. Sit to supine with Contact Guard Assistance. Met (2018)  3. Sit to stand transfer with Contact Guard Assistance. Met (2018)  4. Bed to chair transfer with Contact Guard Assistance using Rolling Walker  5. Gait  x 75 feet with Contact Guard Assistance using Rolling Walker. Met (2018)  5a. Gait x 150 feet with stand-by assistance using a rolling walker with swing-through gait pattern, proper posture. Met (2018)  5b. Gait x 25 feet with Min A  without use of a rolling walker, as this is the patient's goal (to ambulate without an assistive device).    6. Ascend/descend 4 stair with bilateral Handrails Contact Guard Assistance, as his house has four PUNEET with B HR. Met (7/19/2018)  7. Ascend/Descend 4 inch curb step with Contact Guard Assistance using Rolling Walker. Met (7/23/2018)  8. Stand for 3 minutes with Contact Guard Assistance using Rolling Walker while performing a task.  9. Lower extremity exercise program x 20 reps per handout, with assistance as needed.  10. Pt will improve his R knee active extension from -10 degrees to -5 degrees to allow for better functional mobility..  11. Pt will improve his L knee active flexion from 68 degrees to 80 degrees to allow for better functional mobility.-met 7/14/18  Revised (7/21/2018):  Pt will improve his L knee active flexion from 80 degrees to 90 degrees to allow for better functional mobility.                            PLAN:    Patient to be seen 5 x/week  to address the above listed problems via gait training, therapeutic activities, therapeutic exercises, wheelchair management/training  Plan of Care expires: 08/12/18  Plan of Care reviewed with: patient    Emma Marcelino, PTA  07/24/2018

## 2018-07-24 NOTE — PT/OT/SLP PROGRESS
Occupational Therapy  Treatment    [facility] [place]   MRN: [id number]   Admitting Diagnosis: Prosthetic joint implant failure    OT Date of Treatment: 07/24/18  Total Time (min): 55 min    Billable Minutes:  Self Care/Home Management 15, Therapeutic Activity 25 and Therapeutic Exercise 15    General Precautions: Standard, fall  Orthopedic Precautions: RLE weight bearing as tolerated  Braces: N/A    Do you have any cultural, spiritual, Spiritism conflicts, given your current situation?: none stated    Subjective:  Communicated with nurse prior to session.  Pt. Agreeable to therapy session    Pain/Comfort  Pain Rating 1: 0/10  Location - Side 1: Right  Location 1: knee  Pain Rating Post-Intervention 1:  (reports discomfort with standing)    Objective:  Patient found with:  (seated in w/c)    Functional Status:  MDS G  Transfer Functional Status:SBA  Transfer Level of (A):  (with bariatric RW)  Walk in Room Functional Status: SBA  Walk In Room Level of (A):  (with RW)  Walk in Corridor Functional Status: SBA x ` 60 feet with RW  Walk In Corridor Level of (A):  (with RW)  Moving from seated to standing position: Not steady, only able to stabilize with staff assistance  Surface-to-surface transfer (transfer between bed and chair or wheelchair): Not steady, only able to stabilize with staff assistance           [facility] 6 Click:  [facility] Total Score: 21    OT Exercises: AROM with 2 # dowel x 2 sets 15 reps for all major planes of BUE motion to assist with improving level of endurance    Additional Treatment:  Pt. Engaged in dynamic standing activity (card game)  at table x 2 trials.  Pt was able to stand greater than 5 minutes each trial with SBA.  Pt. Did take seated rest break between trials.   Pt. Educated on need to elevate BLE to help reduce edema  Pt. Educated to wear polar ice throughout the day to assist with edema management    Patient left up in chair with call button in reach, family present and polar ice in place and  BLE elevated    ASSESSMENT:  [facility] [place] Cantor Jr. is a 72 y.o. male with a medical diagnosis of Prosthetic joint implant failure and presents with deficits in higher level ADL/IADL task performance. Pt. Tolerated session well. Pt. Would benefit from continued safety training with ADL task performance.     Rehab identified problem list/impairments: weakness, impaired endurance, impaired self care skills, impaired functional mobilty, gait instability, impaired balance, decreased coordination, decreased lower extremity function, decreased safety awareness, pain, decreased ROM, orthopedic precautions    Rehab potential is good    Activity tolerance: Good    Discharge recommendations: home with home health     Barriers to discharge: Decreased caregiver support     Equipment recommendations: wheelchair     GOALS:    Occupational Therapy Goals        Problem: Occupational Therapy Goal    Goal Priority Disciplines Outcome Interventions   Occupational Therapy Goal     OT, PT/OT Ongoing (interventions implemented as appropriate)    Description:  Goals to be met by: 14 days     Patient will increase functional independence with ADLs by performing:    Feeding with Modified [place].  UE Dressing with Modified [place].  LE Dressing with Stand-by Assistance using A/E and A/D as needed..  Grooming while standing with Stand-by Assistance with RW to stand..  Toileting from bedside commode with Stand-by Assistance for hygiene and clothing management with DME as needed.  Bathing from  edge of bed with Minimal Assistance.  Rolling to Bilateral with Modified [place].   Supine to sit with Modified [place].  Stand pivot transfers with Supervision with RW.  Upper extremity exercise program x10 reps per set, with assistance as needed.  Pt will tolerate up to 10 minutes standing activity with RW to steady.                      Plan:  Patient to be seen 5 x/week, 6 x/week to address the above listed problems via self-care/home  management, therapeutic activities, therapeutic exercises  Plan of Care expires: 08/13/18  Plan of Care reviewed with: patient    Mireya VANESSA Castanon  07/24/2018

## 2018-07-24 NOTE — PLAN OF CARE
Problem: Physical Therapy Goal  Goal: Physical Therapy Goal  Goals to be met by: 2 weeks (  )    Patient will increase functional independence with mobility by performin. Supine to sit with Contact Guard Assistance. Met (2018)  2. Sit to supine with Contact Guard Assistance. Met (2018)  3. Sit to stand transfer with Contact Guard Assistance. Met (2018)  4. Bed to chair transfer with Contact Guard Assistance using Rolling Walker  5. Gait  x 75 feet with Contact Guard Assistance using Rolling Walker. Met (2018)  5a. Gait x 150 feet with stand-by assistance using a rolling walker with swing-through gait pattern, proper posture. Met (2018)  5b. Gait x 25 feet with Min A without use of a rolling walker, as this is the patient's goal (to ambulate without an assistive device).    6. Ascend/descend 4 stair with bilateral Handrails Contact Guard Assistance, as his house has four PUNEET with B HR. Met (2018)  7. Ascend/Descend 4 inch curb step with Contact Guard Assistance using Rolling Walker. Met (2018)  8. Stand for 3 minutes with Contact Guard Assistance using Rolling Walker while performing a task.  9. Lower extremity exercise program x 20 reps per handout, with assistance as needed.  10. Pt will improve his R knee active extension from -10 degrees to -5 degrees to allow for better functional mobility..  11. Pt will improve his L knee active flexion from 68 degrees to 80 degrees to allow for better functional mobility.-met 18  Revised (2018):  Pt will improve his L knee active flexion from 80 degrees to 90 degrees to allow for better functional mobility.           Goals remain appropriate at time. Continue with PT POC as indicated.

## 2018-07-24 NOTE — PROGRESS NOTES
Patient seen for follow up care. Right knee incision well approximated. Two areas with steri-strips in place, recommend allowing to fall off on their own, dried drainage noted to lower steri-strips. Recommend keeping covered with incisional aquacel foam dressing and change weekly. Recommend following ortho's recommendations for discharge and follow up appointments. Sylvie Brady NP came to bedside to visualize incision, updated Sylvie Brady NP on recommendations for dressing incision. Nursing to continue care. Wound care will sign off, re-consult if needed.    Right knee

## 2018-07-24 NOTE — PLAN OF CARE
Problem: Fall Risk (Adult)  Goal: Absence of Falls  Patient will demonstrate the desired outcomes by discharge/transition of care.   Outcome: Ongoing (interventions implemented as appropriate)   07/23/18 2000   Fall Risk (Adult)   Absence of Falls making progress toward outcome

## 2018-07-25 LAB
POCT GLUCOSE: 135 MG/DL (ref 70–110)
POCT GLUCOSE: 198 MG/DL (ref 70–110)
POCT GLUCOSE: 238 MG/DL (ref 70–110)
POCT GLUCOSE: 239 MG/DL (ref 70–110)

## 2018-07-25 PROCEDURE — 97530 THERAPEUTIC ACTIVITIES: CPT

## 2018-07-25 PROCEDURE — 25000003 PHARM REV CODE 250: Performed by: NURSE PRACTITIONER

## 2018-07-25 PROCEDURE — 25000003 PHARM REV CODE 250: Performed by: STUDENT IN AN ORGANIZED HEALTH CARE EDUCATION/TRAINING PROGRAM

## 2018-07-25 PROCEDURE — 63600175 PHARM REV CODE 636 W HCPCS: Performed by: INTERNAL MEDICINE

## 2018-07-25 PROCEDURE — 97116 GAIT TRAINING THERAPY: CPT

## 2018-07-25 PROCEDURE — 97110 THERAPEUTIC EXERCISES: CPT

## 2018-07-25 PROCEDURE — 11000004 HC SNF PRIVATE

## 2018-07-25 PROCEDURE — 97535 SELF CARE MNGMENT TRAINING: CPT

## 2018-07-25 PROCEDURE — 99900058 HC 022 PAID UNDER SNF PPS

## 2018-07-25 RX ADMIN — FLUTICASONE PROPIONATE 100 MCG: 50 SPRAY, METERED NASAL at 08:07

## 2018-07-25 RX ADMIN — INSULIN DETEMIR 30 UNITS: 100 INJECTION, SOLUTION SUBCUTANEOUS at 08:07

## 2018-07-25 RX ADMIN — FAMOTIDINE 20 MG: 20 TABLET ORAL at 08:07

## 2018-07-25 RX ADMIN — GABAPENTIN 300 MG: 300 CAPSULE ORAL at 08:07

## 2018-07-25 RX ADMIN — INSULIN ASPART 2 UNITS: 100 INJECTION, SOLUTION INTRAVENOUS; SUBCUTANEOUS at 05:07

## 2018-07-25 RX ADMIN — PREDNISOLONE ACETATE-GATIFLOXACIN 1 DROP: 5; 10 SUSPENSION/ DROPS OPHTHALMIC at 09:07

## 2018-07-25 RX ADMIN — INSULIN ASPART 18 UNITS: 100 INJECTION, SOLUTION INTRAVENOUS; SUBCUTANEOUS at 05:07

## 2018-07-25 RX ADMIN — PRAVASTATIN SODIUM 10 MG: 10 TABLET ORAL at 08:07

## 2018-07-25 RX ADMIN — INSULIN ASPART 2 UNITS: 100 INJECTION, SOLUTION INTRAVENOUS; SUBCUTANEOUS at 09:07

## 2018-07-25 RX ADMIN — GABAPENTIN 300 MG: 300 CAPSULE ORAL at 04:07

## 2018-07-25 RX ADMIN — TAMSULOSIN HYDROCHLORIDE 0.4 MG: 0.4 CAPSULE ORAL at 08:07

## 2018-07-25 RX ADMIN — APIXABAN 5 MG: 2.5 TABLET, FILM COATED ORAL at 08:07

## 2018-07-25 RX ADMIN — PREDNISOLONE ACETATE-GATIFLOXACIN 1 DROP: 5; 10 SUSPENSION/ DROPS OPHTHALMIC at 03:07

## 2018-07-25 RX ADMIN — INSULIN DETEMIR 30 UNITS: 100 INJECTION, SOLUTION SUBCUTANEOUS at 09:07

## 2018-07-25 RX ADMIN — INSULIN ASPART 4 UNITS: 100 INJECTION, SOLUTION INTRAVENOUS; SUBCUTANEOUS at 11:07

## 2018-07-25 RX ADMIN — LOSARTAN POTASSIUM 25 MG: 25 TABLET, FILM COATED ORAL at 08:07

## 2018-07-25 RX ADMIN — INSULIN ASPART 18 UNITS: 100 INJECTION, SOLUTION INTRAVENOUS; SUBCUTANEOUS at 11:07

## 2018-07-25 RX ADMIN — FENOFIBRATE 145 MG: 145 TABLET ORAL at 08:07

## 2018-07-25 RX ADMIN — INSULIN ASPART 18 UNITS: 100 INJECTION, SOLUTION INTRAVENOUS; SUBCUTANEOUS at 08:07

## 2018-07-25 RX ADMIN — FUROSEMIDE 40 MG: 40 TABLET ORAL at 08:07

## 2018-07-25 NOTE — ASSESSMENT & PLAN NOTE
Evaluated on 7/24/18  · Sr Cr stable  · Continue to monitor with biweekly labs   · Avoid nephrotoxins.   · Renally dose all medications

## 2018-07-25 NOTE — PROGRESS NOTES
[facility] [place] [facility] - Skilled Nursing Care  Department of [facility] Medicine  Progress Note    Patient Name: [facility] [place] Cant[place] Jr.  MRN: [id number]  Code Status: Full Code  Admission Date: 7/12/2018  Length of Stay: 13 days  Attending Physician: Francoise Garcia MD  Primary Care Provider: Desmond Barlow MD    Subjective:     Principal Problem:Prosthetic joint implant failure    Chief Complaint/Reason for Admission: Debility    History of Present Illness:  Patient is a 72 y.o. male who has a past medical history of Atrial fibrillation on anticoagulant long-term use; Basal cell carcinoma; Cataract; Chronic kidney disease; Diabetes mellitus with renal manifestations, uncontrolled; Dyslipidemia; hypertension; Obesity; PN (peripheral neuropathy); Primary osteoarthritis of right knee; Sleep apnea; Thyroid disease presented with chronic pain to his Right knee which has gotten progressively worse over the past few months. He had a right press-fit total knee arthroplasty in 2017. Imaging showed that the tibial component was subsiding with a limp falling into varus. The symptoms were severely impacting his activities of daily living and it was decided to proceed with a revision right total knee arthroplasty. Patient complex revision of tibial component on 7/10/18 by Dr. Stuart. Patient tolerated procedure well with no significant inta operative complications. Post operatively, patient had episodes of confusion and combativeness likelydue to opioid induced delirium. Pain control maintained with limited sedating medications mainly perineural adductor. Patient has been working with PT/OT who recommend SNF for further balance/mobility training. Patient currently sitting up in chair without complaints. He is awake and alert. He is angry due to not being allowed to leave the facility. He wishes to go home and not be admitted to SNF. No family at available at bedside. He lives at home with his wife who is able to assist with ADL's. Patient  currently denies any fever/chills, chest pain, dyspnea, weakness, numbness, abdominal pain, nausea/vomiting, dysuria/hematuria, or weight loss.     Hospital Course:  7/12/18: Patient admitted to SNF for ongoing Pt/Ot following a hospitalization for revision of right TKA.  7/16: Patient seen at bedside, edema noted to right leg, wound vac without output. CN changed dressing, distal end with small opening that produces small about bloody drainage with expressed, New dressing placed will be seen in ortho clinic tomorrow. Blood sugars elevated insulin adjusted.   7/17: seen in ortho clinic today, wound vac removed  7/24: Patient seen at bedside doing well, swelling improved with use of home compression wraps. Pain tolerable with medication. No other complaints voiced.    Interval History: Patient seen at bedside, feels that he is doing well. No acute events overnight.     Review of Systems   Constitutional: Negative for appetite change, chills, fatigue and fever.   HENT: Negative for trouble swallowing.    Respiratory: Negative for cough, chest tightness, shortness of breath and wheezing.    Cardiovascular: Negative for chest pain, palpitations and leg swelling.   Gastrointestinal: Negative for abdominal pain, constipation, diarrhea and nausea.   Genitourinary: Negative for difficulty urinating, frequency and urgency.   Musculoskeletal: Positive for arthralgias. Negative for myalgias.        +right knee pain 3/10   Skin: Negative for rash.   Neurological: Negative for dizziness, weakness, light-headedness and headaches.   Psychiatric/Behavioral: Negative for sleep disturbance.     Scheduled Meds:   apixaban  5 mg Oral BID    famotidine  20 mg Oral BID    fenofibrate  145 mg Oral Daily    fluticasone  2 spray Each Nare Daily    furosemide  40 mg Oral Daily    gabapentin  300 mg Oral TID    gatifloxacin-prednisolone  1 drop Both Eyes TID    insulin aspart U-100  18 Units Subcutaneous TIDWM    insulin detemir  U-100  30 Units Subcutaneous BID    losartan  25 mg Oral Daily    metoprolol succinate  100 mg Oral QHS    polyethylene glycol  17 g Oral Daily    pravastatin  10 mg Oral QHS    senna-docusate 8.6-50 mg  1 tablet Oral BID    tamsulosin  1 capsule Oral Daily     Continuous Infusions:  PRN Meds:.acetaminophen, acetaminophen, aluminum-magnesium hydroxide-simethicone, bisacodyl, calcium carbonate, dextrose 50%, dextrose 50%, glucagon (human recombinant), glucose, glucose, insulin aspart U-100, ramelteon    Objective:     Vital Signs (Most Recent):  Temp: 98.3 °F (36.8 °C) (07/25/18 0836)  Pulse: 94 (07/25/18 0836)  Resp: 18 (07/25/18 0836)  BP: 129/62 (07/25/18 0836)  SpO2: 98 % (07/25/18 0836) Vital Signs (24h Range):  Temp:  [97.5 °F (36.4 °C)-98.3 °F (36.8 °C)] 98.3 °F (36.8 °C)  Pulse:  [92-94] 94  Resp:  [18-20] 18  SpO2:  [98 %] 98 %  BP: (114-129)/(62-68) 129/62     Weight: (!) 137.6 kg (303 lb 5.7 oz)  Body mass index is 43.53 kg/m².    Intake/Output Summary (Last 24 hours) at 07/25/18 0912  Last data filed at 07/25/18 0730   Gross per 24 hour   Intake                0 ml   Output              400 ml   Net             -400 ml      Physical Exam   Constitutional: He is oriented to person, place, and time. He appears well-developed and well-nourished. No distress.   Cardiovascular: Normal rate, regular rhythm and normal heart sounds.  Exam reveals no gallop and no friction rub.    No murmur heard.  Pulmonary/Chest: Effort normal and breath sounds normal. No respiratory distress. He has no wheezes. He has no rales.   Abdominal: Soft. Bowel sounds are normal. He exhibits no distension. There is no tenderness.   Musculoskeletal: He exhibits edema. He exhibits no tenderness.   Decreased ROM to right leg, island dressing to right knee c/d/i,  1+ edema to right leg, trace edema to left leg   Neurological: He is alert and oriented to person, place, and time.   Skin: Skin is warm and dry. No rash noted. He is not  diaphoretic. No cyanosis. Nails show no clubbing.   Erythema to BLE and blisters to RLE no longer present, mild discoloration noted to RLE (chronic)   Psychiatric: He has a normal mood and affect. His behavior is normal.     Significant Labs:     Recent Labs  Lab 07/19/18  0547 07/23/18  0552   WBC 6.99 7.24   HGB 12.9* 13.1*   HCT 37.2* 38.9*    304       Recent Labs  Lab 07/19/18  0547 07/23/18  0552   * 139   K 4.1 4.1    105   CO2 25 24   BUN 24* 21   CREATININE 1.5* 1.5*   CALCIUM 9.4 9.9     Lab Results   Component Value Date    LABPROT 17.2 (H) 07/16/2018    ALBUMIN 3.5 06/19/2018     Lab Results   Component Value Date    CALCIUM 9.9 07/23/2018    PHOS 3.8 07/23/2018     POCT Glucose   Date Value Ref Range Status   07/25/2018 135 (H) 70 - 110 mg/dL Final   07/24/2018 121 (H) 70 - 110 mg/dL Final   07/24/2018 96 70 - 110 mg/dL Final   07/24/2018 229 (H) 70 - 110 mg/dL Final   07/24/2018 140 (H) 70 - 110 mg/dL Final   07/23/2018 201 (H) 70 - 110 mg/dL Final   07/23/2018 185 (H) 70 - 110 mg/dL Final   07/23/2018 282 (H) 70 - 110 mg/dL Final   07/23/2018 150 (H) 70 - 110 mg/dL Final   07/22/2018 161 (H) 70 - 110 mg/dL Final   07/22/2018 169 (H) 70 - 110 mg/dL Final   07/22/2018 255 (H) 70 - 110 mg/dL Final     Significant Imaging: na    Assessment/Plan:      * Prosthetic joint implant failure    Evaluated on 7/24/18  · S/P right revision TKA  · Will continue to monitor for drainage or erythema to surgical site   · Continue with current pain control with perineural adductor. Holding opioids for now   · Continue bowel regimen for opioid induced constipation  · Continue with PT/OT for gait training and strengthening and restoration of ADL's: WBAT per ortho   · Fall precautions   · Continue DVT prophylaxis per ortho apixaban         Proteinuria due to type 2 diabetes mellitus    Evaluated on 7/24/18  · Continue ARB to treat        Lymphedema of both lower extremities    Evaluated on 7/16/18  · 1+  edema to left lower extremity with small amount of mild erythema to lower leg (normal appearanc according to daughter at bedside)  · 2+ edema to right leg with large area of erythema to shin region with intact blisters (daughter reports normal appearance when fluid in leg increases)  · Started on keflex, ? Cellulitis,  patient to see orthopedic in clinic tomorrow  · Keep lower extremities elevated when not in therapy  · Teds hose  · Low Na diet    Evaluated 7/24/18  · Erythema and blisters no longer present  · Keflex stopped on 7/20  · Chronic discoloration noted to RLE  · Continue home compression wraps  · Edema slightly improved        Diastolic dysfunction    Evaluated on 7/24/18  · Maintain euvolemia by monitoring I/O's and daily weights.  · Fluid restriction (2 liters/24 hours)  · Low Na diet  · Continue ARB, BB, lasix.         Type 2 diabetes mellitus with diabetic polyneuropathy    Evaluated on 7/24/18  · Chronic, better controlled with recent adjustments  · detemir insulin adjusted to 30 units bid with novolog 18 with meals   · Low dose correction scale   · Continue blood glucose monitoring   · ADA diet  · Continue gabapentin to treat for neuropathy.   · Will monitor for need to adjust  · Return to home dose at discharge        Dyslipidemia associated with type 2 diabetes mellitus    Evaluated on 7/24/18  · Continue pravastatin and fenofibrate to treat        Benign hypertensive renal disease without renal failure    Evaluated on 7/24/18  · Chronic, Well controlled  · Continue Metoprolol, losartan lasix to treat.   · Continue to monitor  · Low Na diet        Chronic kidney disease, stage III (moderate)    Evaluated on 7/24/18  · Sr Cr stable  · Continue to monitor with biweekly labs   · Avoid nephrotoxins.   · Renally dose all medications         Sleep apnea    Evaluated on 7/24/18  · Continue CPAP QHS        Long term current use of anticoagulant therapy    Evaluated on 7/24/18  · Plan as above.          Chronic atrial fibrillation    Evaluated on 7/24/18  · Continue rate control with metoprolol.   · Anticoagulation as above.           14 day RE-CERTIFICATION:    I certify that [facility] [place] [facility] Cantor Jr. needs continued inpatient skilled care on a daily basis per [facility] level for PT, OT, wound care and aftercare following surgery.    I estimate that the duration of inpatient skilled care will be 8 additional days with an expected discharge on 8/1/2018.      On discharge from [facility], he will receive home health care with f/u appointments after skilled care as noted below:    Future Appointments  Date Time Provider Department Center   7/27/2018 1:00 PM Patrizia Brower, ELLE [facility] OPTOMTY [facility] Velazquez   8/2/2018 2:20 PM Christina Wilhelm NP [facility] SLEEP [facility]   8/15/2018 1:00 PM Sandy Adams NP [facility] ORTHO [facility]     Continued SNF care is for conditions for which he received inpatient hosptial services: Prosthetic joint implant failure.    Sylvie Brady, NP  Department of Hospital Medicine  [facility] [place] [facility] [place] [facility] [place] [facility]

## 2018-07-25 NOTE — PT/OT/SLP PROGRESS
Occupational Therapy  Treatment    [facility] [place] PAT Cantor Jr.   MRN: [id number]   Admitting Diagnosis: Prosthetic joint implant failure    OT Date of Treatment: 07/25/18  Total Time (min): 60 min    Billable Minutes:  Self Care/Home Management 45 and Therapeutic Exercise 15    General Precautions: Standard, fall  Orthopedic Precautions: RLE weight bearing as tolerated  Braces: N/A    Do you have any cultural, spiritual, Roman Catholic conflicts, given your current situation?: none stated    Subjective:  Communicated with nurse prior to session.  Pt. Reported he had a weird night    Pain/Comfort  Pain Rating 1: 0/10  Pain Rating Post-Intervention 1: 0/10    Objective:  Patient found with:  (supine in bed with avasys camera)    Functional Status:  MDS G  Bed Mobility Functional Status: S-SBA  Transfer Functional Status: S-SBA  Transfer Level of (A):  (with Bariatric RW)  Walk in Room Functional Status: SBA  Walk In Room Level of (A):  (with Bariatric RW)  Dressing Functional Status: 1: SBA  Dressing Level of (A) :  (with use of reacher to assist with LBD)  Eating Functional Status: mod(I) - (I)  Toilet Use Functional Status: SBA  Toilet Use Level of (A) :  (on toilet with use of grab bar)  Personal Hygiene Functional Status: mod(I) - (I)  Personal Hygiene Level of (A) :  (seated  to brush teeth and wash face)  Bathing Functional Status: CGA-Min (A)  Bathing Level of (A) :  (with assist to help clean buttocks region reports at home can move better in his shower and his spouse sometimes helps him or he does best he can.  reommended long handle sponge to assist with task)  Moving from seated to standing position: Not steady, but able to stabilize without staff assistance  Moving on and off the toilet: Not steady, but able to stabilize without staff assistance  Surface-to-surface transfer (transfer between bed and chair or wheelchair): Not steady, but able to stabilize without staff assistance           AMPA 6 Click:  AMPA Total Score:  21    OT Exercises: UE Ergometer x 12 minutes with min/mod resistance to improve overall level of endurance    Additional Treatment:  Pt. Educated on safety with bed mobility, transfers, ADL task performance and need to sit on BSC at home near sink rather than stool he usually utilizes as more stable, higher surface and has sides pt. Can safely use to push up from to assist with stand from chair     Patient left up in chair with call button in reach    ASSESSMENT:  ricki PAT Sakshi . is a 72 y.o. male with a medical diagnosis of Prosthetic joint implant failure and presents with mild limitations in higher level ADL tasks and would benefit from continued OT services to maximize safety with ADL task performance.  Pt. demonstrated good understanding with ADL task performance. Pt. Tolerated session well on this date. .    Rehab identified problem list/impairments: weakness, impaired endurance, impaired self care skills, impaired functional mobilty, gait instability, impaired balance, decreased coordination, decreased lower extremity function, decreased safety awareness, pain, decreased ROM, orthopedic precautions    Rehab potential is good    Activity tolerance: Good    Discharge recommendations: home with home health and supervision/light assist    Barriers to discharge: Decreased caregiver support     Equipment recommendations: wheelchair     GOALS:    Occupational Therapy Goals        Problem: Occupational Therapy Goal    Goal Priority Disciplines Outcome Interventions   Occupational Therapy Goal     OT, PT/OT Ongoing (interventions implemented as appropriate)    Description:  Goals to be met by: 14 days     Patient will increase functional independence with ADLs by performing:    Feeding with Modified [place].  UE Dressing with Modified [place].  LE Dressing with Stand-by Assistance using A/E and A/D as needed..  Grooming while standing with Stand-by Assistance with RW to stand..  Toileting from bedside  commode with Stand-by Assistance for hygiene and clothing management with DME as needed.  Bathing from  edge of bed with Minimal Assistance.  Rolling to Bilateral with Modified [place].   Supine to sit with Modified [place].  Stand pivot transfers with Supervision with RW.  Upper extremity exercise program x10 reps per set, with assistance as needed.  Pt will tolerate up to 10 minutes standing activity with RW to steady.                      Plan:  Patient to be seen 5 x/week, 6 x/week to address the above listed problems via self-care/home management, therapeutic activities, therapeutic exercises  Plan of Care expires: 08/13/18  Plan of Care reviewed with: patient    VANESSA Zuñiga  07/25/2018

## 2018-07-25 NOTE — PLAN OF CARE
07/25/2018  11:52 AM    SW met with pt in room to discuss d/c plans.  SW notified pt of currently scheduled SNF d/c date of 8/1/18.  Pt expressed understanding and satisfaction regarding d/c date.  Pt states plans of returning home upon SNF d/c where he lives with his wife who is available at home to provide light assistance as needed.  Pt also has 2 daughters and son-in-law who live across the street but work during the day.  Pt also has a grandson (who is about to enter college) and a young granddaughter who visit pt regularly during the summer.  Pt lives in a [facility] with 4 PUNEET as well as ramp entrance in the back of house.  Pt has a tub/shower and a walk-in shower with built-in seat.  Pt owns a SC, BSC, WC (x2), RW, and hip kit.  Pt agreeable to placement with home health services upon SNF d/c and denies having preference for HH agency.  Pt plans to have wife accompany and transport him home upon SNF d/c.  SW remains available for further d/c planning assistance and will f/u as needed.    Francis Preston, MSW, LMSW  Ext. [phone]

## 2018-07-25 NOTE — ASSESSMENT & PLAN NOTE
Evaluated on 7/24/18  · Maintain euvolemia by monitoring I/O's and daily weights.  · Fluid restriction (2 liters/24 hours)  · Low Na diet  · Continue ARB, BB, lasix.

## 2018-07-25 NOTE — ASSESSMENT & PLAN NOTE
Evaluated on 7/24/18  · Chronic, better controlled with recent adjustments  · detemir insulin adjusted to 30 units bid with novolog 18 with meals   · Low dose correction scale   · Continue blood glucose monitoring   · ADA diet  · Continue gabapentin to treat for neuropathy.   · Will monitor for need to adjust  · Return to home dose at discharge

## 2018-07-25 NOTE — PT/OT/SLP PROGRESS
Physical Therapy  Treatment    [facility] [place]   MRN: [id number]   Admitting Diagnosis: Prosthetic joint implant failure    PT Received On: 07/25/18  Total Time (min): 60       Billable Minutes:  Gait Training 15, Therapeutic Activity 15 and Therapeutic Exercise 30    Treatment Type: Treatment  PT/PTA: PT     PTA Visit Number: 0       General Precautions: Standard, fall  Orthopedic Precautions: RLE weight bearing as tolerated   Braces: N/A    Do you have any cultural, spiritual, Mu-ism conflicts, given your current situation?: none stated    Subjective:  Communicated with pt prior to session.  Pt was agreeable to PT services this morning.    Pain/Comfort  Pain Rating 1: 0/10    Objective:  Patient found seated in wheelchair.          Functional Status:  MDS G  Bed Mobility Functional Status: S-SBA  Transfer Functional Status: S-SBA  Walk in Room Functional Status: S-SBA  Walk in Corridor Functional Status: S-SBA  Locomotion on Unit Functional Status: S-SBA  Locomotion Off Unit Functional Status: S-SBA          AM-PAC 6 CLICK MOBILITY  Total Score:18    Bed Mobility:  Sit>Supine:SBA  Supine>Sit: SBA with increased time (difficulty with trunk flexion).    Transfers:  Sit<>Stand: SBA with use of both UEs  Stand Pivot Transfer: CGA with no AD (wheelchair<>stepper)    Gait:  Amb 136 with rolling walker, swing-through gait pattern with SBA (slight forward flexion noted at trunk).    Therex (2x10 reps):  LAQ  Hip flexion  Ankle pumps    SAQ  Hip abduction  Heel slides  Quad sets  Gluteal sets  SLR    Balance:  Needs UE support during dynamic standing.     Additional Treatment:  Stretched pt's hamstrings (both LEs, 2x each, 20 second hold) to alleviate pain in low back (from lying supine on mat).    AROM (RLE):  -8 to 90 degrees in supine    Patient left up in chair with call button in reach.    Assessment:  [facility] [place] is a 72 y.o. male with a medical diagnosis of Prosthetic joint implant failure.  Mr. Cantor met  one goal today (R knee flexion 90 degrees), and is showing progress daily. He continues to remain appropriate for skilled PT services to address his bed mobility, dynamic standing balance, and strength in his core and LEs.    Rehab identified problem list/impairments: weakness, impaired endurance, impaired self care skills, impaired functional mobilty, gait instability, impaired balance, decreased lower extremity function, pain, impaired cognition, edema    Rehab potential is good.    Activity tolerance: Good    Discharge recommendations: home health PT     Barriers to discharge: None    Equipment recommendations: wheelchair (maybe a WC )     GOALS:    Physical Therapy Goals        Problem: Physical Therapy Goal    Goal Priority Disciplines Outcome Goal Variances Interventions   Physical Therapy Goal     PT/OT, PT Ongoing (interventions implemented as appropriate)     Description:  Goals to be met by: 2 weeks (  )    Patient will increase functional independence with mobility by performin. Supine to sit with Contact Guard Assistance. Met (2018)  2. Sit to supine with Contact Guard Assistance. Met (2018)  3. Sit to stand transfer with Contact Guard Assistance. Met (2018)  4. Bed to chair transfer with Contact Guard Assistance using Rolling Walker  5. Gait  x 75 feet with Contact Guard Assistance using Rolling Walker. Met (2018)  5a. Gait x 150 feet with stand-by assistance using a rolling walker with swing-through gait pattern, proper posture. Met (2018)  5b. Gait x 25 feet with Min A without use of a rolling walker, as this is the patient's goal (to ambulate without an assistive device).    6. Ascend/descend 4 stair with bilateral Handrails Contact Guard Assistance, as his house has four PUNEET with B HR. Met (2018)  7. Ascend/Descend 4 inch curb step with Contact Guard Assistance using Rolling Walker. Met (2018)  8. Stand for 3 minutes with Contact Guard Assistance using  Rolling Walker while performing a task.  9. Lower extremity exercise program x 20 reps per handout, with assistance as needed.  10. Pt will improve his R knee active extension from -10 degrees to -5 degrees to allow for better functional mobility..  11. Pt will improve his L knee active flexion from 68 degrees to 80 degrees to allow for better functional mobility.-met 7/14/18  Revised (7/21/2018):  Pt will improve his L knee active flexion from 80 degrees to 90 degrees to allow for better functional mobility. Met (7/25/2018)                            PLAN:    Patient to be seen 5 x/week  to address the above listed problems via gait training, therapeutic activities, therapeutic exercises, wheelchair management/training  Plan of Care expires: 08/12/18  Plan of Care reviewed with: patient    Betzaida REGGIE Wheeler, PT  07/25/2018

## 2018-07-25 NOTE — SUBJECTIVE & OBJECTIVE
Hospital Course:  7/12/18: Patient admitted to SNF for ongoing Pt/Ot following a hospitalization for revision of right TKA.  7/16: Patient seen at bedside, edema noted to right leg, wound vac without output. CN changed dressing, distal end with small opening that produces small about bloody drainage with expressed, New dressing placed will be seen in ortho clinic tomorrow. Blood sugars elevated insulin adjusted.   7/17: seen in ortho clinic today, wound vac removed  7/24: Patient seen at bedside doing well, swelling improved with use of home compression wraps. Pain tolerable with medication. No other complaints voiced.    Interval History: Patient seen at bedside, feels that he is doing well. No acute events overnight.     Review of Systems   Constitutional: Negative for appetite change, chills, fatigue and fever.   HENT: Negative for trouble swallowing.    Respiratory: Negative for cough, chest tightness, shortness of breath and wheezing.    Cardiovascular: Negative for chest pain, palpitations and leg swelling.   Gastrointestinal: Negative for abdominal pain, constipation, diarrhea and nausea.   Genitourinary: Negative for difficulty urinating, frequency and urgency.   Musculoskeletal: Positive for arthralgias. Negative for myalgias.        +right knee pain 3/10   Skin: Negative for rash.   Neurological: Negative for dizziness, weakness, light-headedness and headaches.   Psychiatric/Behavioral: Negative for sleep disturbance.     Scheduled Meds:   apixaban  5 mg Oral BID    famotidine  20 mg Oral BID    fenofibrate  145 mg Oral Daily    fluticasone  2 spray Each Nare Daily    furosemide  40 mg Oral Daily    gabapentin  300 mg Oral TID    gatifloxacin-prednisolone  1 drop Both Eyes TID    insulin aspart U-100  18 Units Subcutaneous TIDWM    insulin detemir U-100  30 Units Subcutaneous BID    losartan  25 mg Oral Daily    metoprolol succinate  100 mg Oral QHS    polyethylene glycol  17 g Oral Daily     pravastatin  10 mg Oral QHS    senna-docusate 8.6-50 mg  1 tablet Oral BID    tamsulosin  1 capsule Oral Daily     Continuous Infusions:  PRN Meds:.acetaminophen, acetaminophen, aluminum-magnesium hydroxide-simethicone, bisacodyl, calcium carbonate, dextrose 50%, dextrose 50%, glucagon (human recombinant), glucose, glucose, insulin aspart U-100, ramelteon    Objective:     Vital Signs (Most Recent):  Temp: 98.3 °F (36.8 °C) (07/25/18 0836)  Pulse: 94 (07/25/18 0836)  Resp: 18 (07/25/18 0836)  BP: 129/62 (07/25/18 0836)  SpO2: 98 % (07/25/18 0836) Vital Signs (24h Range):  Temp:  [97.5 °F (36.4 °C)-98.3 °F (36.8 °C)] 98.3 °F (36.8 °C)  Pulse:  [92-94] 94  Resp:  [18-20] 18  SpO2:  [98 %] 98 %  BP: (114-129)/(62-68) 129/62     Weight: (!) 137.6 kg (303 lb 5.7 oz)  Body mass index is 43.53 kg/m².    Intake/Output Summary (Last 24 hours) at 07/25/18 0912  Last data filed at 07/25/18 0730   Gross per 24 hour   Intake                0 ml   Output              400 ml   Net             -400 ml      Physical Exam   Constitutional: He is oriented to person, place, and time. He appears well-developed and well-nourished. No distress.   Cardiovascular: Normal rate, regular rhythm and normal heart sounds.  Exam reveals no gallop and no friction rub.    No murmur heard.  Pulmonary/Chest: Effort normal and breath sounds normal. No respiratory distress. He has no wheezes. He has no rales.   Abdominal: Soft. Bowel sounds are normal. He exhibits no distension. There is no tenderness.   Musculoskeletal: He exhibits edema. He exhibits no tenderness.   Decreased ROM to right leg, island dressing to right knee c/d/i,  1+ edema to right leg, trace edema to left leg   Neurological: He is alert and oriented to person, place, and time.   Skin: Skin is warm and dry. No rash noted. He is not diaphoretic. No cyanosis. Nails show no clubbing.   Erythema to BLE and blisters to RLE no longer present, mild discoloration noted to RLE (chronic)    Psychiatric: He has a normal mood and affect. His behavior is normal.     Significant Labs:     Recent Labs  Lab 07/19/18  0547 07/23/18  0552   WBC 6.99 7.24   HGB 12.9* 13.1*   HCT 37.2* 38.9*    304       Recent Labs  Lab 07/19/18  0547 07/23/18  0552   * 139   K 4.1 4.1    105   CO2 25 24   BUN 24* 21   CREATININE 1.5* 1.5*   CALCIUM 9.4 9.9     Lab Results   Component Value Date    LABPROT 17.2 (H) 07/16/2018    ALBUMIN 3.5 06/19/2018     Lab Results   Component Value Date    CALCIUM 9.9 07/23/2018    PHOS 3.8 07/23/2018     POCT Glucose   Date Value Ref Range Status   07/25/2018 135 (H) 70 - 110 mg/dL Final   07/24/2018 121 (H) 70 - 110 mg/dL Final   07/24/2018 96 70 - 110 mg/dL Final   07/24/2018 229 (H) 70 - 110 mg/dL Final   07/24/2018 140 (H) 70 - 110 mg/dL Final   07/23/2018 201 (H) 70 - 110 mg/dL Final   07/23/2018 185 (H) 70 - 110 mg/dL Final   07/23/2018 282 (H) 70 - 110 mg/dL Final   07/23/2018 150 (H) 70 - 110 mg/dL Final   07/22/2018 161 (H) 70 - 110 mg/dL Final   07/22/2018 169 (H) 70 - 110 mg/dL Final   07/22/2018 255 (H) 70 - 110 mg/dL Final     Significant Imaging: na

## 2018-07-25 NOTE — PLAN OF CARE
Problem: Physical Therapy Goal  Goal: Physical Therapy Goal  Goals to be met by: 2 weeks (  )    Patient will increase functional independence with mobility by performin. Supine to sit with Contact Guard Assistance. Met (2018)  2. Sit to supine with Contact Guard Assistance. Met (2018)  3. Sit to stand transfer with Contact Guard Assistance. Met (2018)  4. Bed to chair transfer with Contact Guard Assistance using Rolling Walker  5. Gait  x 75 feet with Contact Guard Assistance using Rolling Walker. Met (2018)  5a. Gait x 150 feet with stand-by assistance using a rolling walker with swing-through gait pattern, proper posture. Met (2018)  5b. Gait x 25 feet with Min A without use of a rolling walker, as this is the patient's goal (to ambulate without an assistive device).    6. Ascend/descend 4 stair with bilateral Handrails Contact Guard Assistance, as his house has four PUNEET with B HR. Met (2018)  7. Ascend/Descend 4 inch curb step with Contact Guard Assistance using Rolling Walker. Met (2018)  8. Stand for 3 minutes with Contact Guard Assistance using Rolling Walker while performing a task.  9. Lower extremity exercise program x 20 reps per handout, with assistance as needed.  10. Pt will improve his R knee active extension from -10 degrees to -5 degrees to allow for better functional mobility..  11. Pt will improve his L knee active flexion from 68 degrees to 80 degrees to allow for better functional mobility.-met 18  Revised (2018):  Pt will improve his L knee active flexion from 80 degrees to 90 degrees to allow for better functional mobility. Met (2018)            Outcome: Ongoing (interventions implemented as appropriate)  Met one goal today.

## 2018-07-25 NOTE — ASSESSMENT & PLAN NOTE
Evaluated on 7/24/18  · S/P right revision TKA  · Will continue to monitor for drainage or erythema to surgical site   · Continue with current pain control with perineural adductor. Holding opioids for now   · Continue bowel regimen for opioid induced constipation  · Continue with PT/OT for gait training and strengthening and restoration of ADL's: WBAT per ortho   · Fall precautions   · Continue DVT prophylaxis per ortho apixaban

## 2018-07-25 NOTE — ASSESSMENT & PLAN NOTE
Evaluated on 7/24/18  · Chronic, Well controlled  · Continue Metoprolol, losartan lasix to treat.   · Continue to monitor  · Low Na diet

## 2018-07-25 NOTE — ASSESSMENT & PLAN NOTE
Evaluated on 7/16/18  · 1+ edema to left lower extremity with small amount of mild erythema to lower leg (normal appearanc according to daughter at bedside)  · 2+ edema to right leg with large area of erythema to shin region with intact blisters (daughter reports normal appearance when fluid in leg increases)  · Started on keflex, ? Cellulitis,  patient to see orthopedic in clinic tomorrow  · Keep lower extremities elevated when not in therapy  · Teds hose  · Low Na diet    Evaluated 7/24/18  · Erythema and blisters no longer present  · Keflex stopped on 7/20  · Chronic discoloration noted to RLE  · Continue home compression wraps  · Edema slightly improved

## 2018-07-25 NOTE — PLAN OF CARE
Problem: Occupational Therapy Goal  Goal: Occupational Therapy Goal  Goals to be met by: 14 days     Patient will increase functional independence with ADLs by performing:    Feeding with Modified [place].  UE Dressing with Modified [place].  LE Dressing with Stand-by Assistance using A/E and A/D as needed..  Grooming while standing with Stand-by Assistance with RW to stand..  Toileting from bedside commode with Stand-by Assistance for hygiene and clothing management with DME as needed.  Bathing from  edge of bed with Minimal Assistance.  Rolling to Bilateral with Modified [place].   Supine to sit with Modified [place].  Stand pivot transfers with Supervision with RW.  Upper extremity exercise program x10 reps per set, with assistance as needed.  Pt will tolerate up to 10 minutes standing activity with RW to steady.     Pt. Tolerated session well.

## 2018-07-26 LAB
ANION GAP SERPL CALC-SCNC: 10 MMOL/L
BASOPHILS # BLD AUTO: 0.05 K/UL
BASOPHILS NFR BLD: 0.8 %
BUN SERPL-MCNC: 29 MG/DL
CALCIUM SERPL-MCNC: 9.8 MG/DL
CHLORIDE SERPL-SCNC: 102 MMOL/L
CO2 SERPL-SCNC: 23 MMOL/L
CREAT SERPL-MCNC: 1.8 MG/DL
DIFFERENTIAL METHOD: ABNORMAL
EOSINOPHIL # BLD AUTO: 0.5 K/UL
EOSINOPHIL NFR BLD: 7.8 %
ERYTHROCYTE [DISTWIDTH] IN BLOOD BY AUTOMATED COUNT: 12.8 %
EST. GFR  (AFRICAN AMERICAN): 41.9 ML/MIN/1.73 M^2
EST. GFR  (NON AFRICAN AMERICAN): 36.3 ML/MIN/1.73 M^2
GLUCOSE SERPL-MCNC: 158 MG/DL
HCT VFR BLD AUTO: 37.3 %
HGB BLD-MCNC: 12.3 G/DL
IMM GRANULOCYTES # BLD AUTO: 0.05 K/UL
IMM GRANULOCYTES NFR BLD AUTO: 0.8 %
LYMPHOCYTES # BLD AUTO: 1.8 K/UL
LYMPHOCYTES NFR BLD: 26.4 %
MAGNESIUM SERPL-MCNC: 2.1 MG/DL
MCH RBC QN AUTO: 30.1 PG
MCHC RBC AUTO-ENTMCNC: 33 G/DL
MCV RBC AUTO: 91 FL
MONOCYTES # BLD AUTO: 0.7 K/UL
MONOCYTES NFR BLD: 11.1 %
NEUTROPHILS # BLD AUTO: 3.5 K/UL
NEUTROPHILS NFR BLD: 53.1 %
NRBC BLD-RTO: 0 /100 WBC
PHOSPHATE SERPL-MCNC: 4.5 MG/DL
PLATELET # BLD AUTO: 323 K/UL
PMV BLD AUTO: 11.7 FL
POCT GLUCOSE: 161 MG/DL (ref 70–110)
POCT GLUCOSE: 192 MG/DL (ref 70–110)
POCT GLUCOSE: 230 MG/DL (ref 70–110)
POCT GLUCOSE: 277 MG/DL (ref 70–110)
POTASSIUM SERPL-SCNC: 4.1 MMOL/L
RBC # BLD AUTO: 4.09 M/UL
SODIUM SERPL-SCNC: 135 MMOL/L
WBC # BLD AUTO: 6.66 K/UL

## 2018-07-26 PROCEDURE — 97530 THERAPEUTIC ACTIVITIES: CPT

## 2018-07-26 PROCEDURE — 36415 COLL VENOUS BLD VENIPUNCTURE: CPT

## 2018-07-26 PROCEDURE — 85025 COMPLETE CBC W/AUTO DIFF WBC: CPT

## 2018-07-26 PROCEDURE — 97110 THERAPEUTIC EXERCISES: CPT

## 2018-07-26 PROCEDURE — 80048 BASIC METABOLIC PNL TOTAL CA: CPT

## 2018-07-26 PROCEDURE — 83735 ASSAY OF MAGNESIUM: CPT

## 2018-07-26 PROCEDURE — 97116 GAIT TRAINING THERAPY: CPT

## 2018-07-26 PROCEDURE — 97803 MED NUTRITION INDIV SUBSEQ: CPT

## 2018-07-26 PROCEDURE — 11000004 HC SNF PRIVATE

## 2018-07-26 PROCEDURE — 97535 SELF CARE MNGMENT TRAINING: CPT

## 2018-07-26 PROCEDURE — 25000003 PHARM REV CODE 250: Performed by: STUDENT IN AN ORGANIZED HEALTH CARE EDUCATION/TRAINING PROGRAM

## 2018-07-26 PROCEDURE — 25000003 PHARM REV CODE 250: Performed by: NURSE PRACTITIONER

## 2018-07-26 PROCEDURE — 84100 ASSAY OF PHOSPHORUS: CPT

## 2018-07-26 RX ORDER — AMOXICILLIN 250 MG
1 CAPSULE ORAL DAILY
Status: DISCONTINUED | OUTPATIENT
Start: 2018-07-27 | End: 2018-08-01 | Stop reason: HOSPADM

## 2018-07-26 RX ORDER — TEMAZEPAM 15 MG/1
CAPSULE ORAL
Qty: 30 CAPSULE | Refills: 5 | Status: SHIPPED | OUTPATIENT
Start: 2018-07-26 | End: 2019-01-12 | Stop reason: SDUPTHER

## 2018-07-26 RX ORDER — METOPROLOL SUCCINATE 50 MG/1
50 TABLET, EXTENDED RELEASE ORAL NIGHTLY
Status: DISCONTINUED | OUTPATIENT
Start: 2018-07-26 | End: 2018-08-01 | Stop reason: HOSPADM

## 2018-07-26 RX ADMIN — FAMOTIDINE 20 MG: 20 TABLET ORAL at 10:07

## 2018-07-26 RX ADMIN — FUROSEMIDE 40 MG: 40 TABLET ORAL at 10:07

## 2018-07-26 RX ADMIN — GABAPENTIN 300 MG: 300 CAPSULE ORAL at 10:07

## 2018-07-26 RX ADMIN — RAMELTEON 8 MG: 8 TABLET, FILM COATED ORAL at 10:07

## 2018-07-26 RX ADMIN — PREDNISOLONE ACETATE-GATIFLOXACIN 1 DROP: 5; 10 SUSPENSION/ DROPS OPHTHALMIC at 09:07

## 2018-07-26 RX ADMIN — INSULIN ASPART 6 UNITS: 100 INJECTION, SOLUTION INTRAVENOUS; SUBCUTANEOUS at 12:07

## 2018-07-26 RX ADMIN — APIXABAN 5 MG: 2.5 TABLET, FILM COATED ORAL at 10:07

## 2018-07-26 RX ADMIN — PREDNISOLONE ACETATE-GATIFLOXACIN 1 DROP: 5; 10 SUSPENSION/ DROPS OPHTHALMIC at 03:07

## 2018-07-26 RX ADMIN — PRAVASTATIN SODIUM 10 MG: 10 TABLET ORAL at 10:07

## 2018-07-26 RX ADMIN — INSULIN DETEMIR 30 UNITS: 100 INJECTION, SOLUTION SUBCUTANEOUS at 10:07

## 2018-07-26 RX ADMIN — TAMSULOSIN HYDROCHLORIDE 0.4 MG: 0.4 CAPSULE ORAL at 10:07

## 2018-07-26 RX ADMIN — GABAPENTIN 300 MG: 300 CAPSULE ORAL at 03:07

## 2018-07-26 RX ADMIN — INSULIN ASPART 18 UNITS: 100 INJECTION, SOLUTION INTRAVENOUS; SUBCUTANEOUS at 06:07

## 2018-07-26 RX ADMIN — FENOFIBRATE 145 MG: 145 TABLET ORAL at 10:07

## 2018-07-26 RX ADMIN — METOPROLOL SUCCINATE 50 MG: 50 TABLET, EXTENDED RELEASE ORAL at 10:07

## 2018-07-26 RX ADMIN — FLUTICASONE PROPIONATE 100 MCG: 50 SPRAY, METERED NASAL at 10:07

## 2018-07-26 RX ADMIN — INSULIN ASPART 1 UNITS: 100 INJECTION, SOLUTION INTRAVENOUS; SUBCUTANEOUS at 10:07

## 2018-07-26 RX ADMIN — INSULIN ASPART 18 UNITS: 100 INJECTION, SOLUTION INTRAVENOUS; SUBCUTANEOUS at 10:07

## 2018-07-26 RX ADMIN — INSULIN ASPART 18 UNITS: 100 INJECTION, SOLUTION INTRAVENOUS; SUBCUTANEOUS at 12:07

## 2018-07-26 RX ADMIN — INSULIN ASPART 2 UNITS: 100 INJECTION, SOLUTION INTRAVENOUS; SUBCUTANEOUS at 06:07

## 2018-07-26 NOTE — PLAN OF CARE
Problem: Patient Care Overview  Goal: Plan of Care Review  Outcome: Ongoing (interventions implemented as appropriate)   07/26/18 0148   Coping/Psychosocial   Plan Of Care Reviewed With patient       Problem: Fall Risk (Adult)  Goal: Identify Related Risk Factors and Signs and Symptoms  Related risk factors and signs and symptoms are identified upon initiation of Human Response Clinical Practice Guideline (CPG)    Outcome: Ongoing (interventions implemented as appropriate)   07/26/18 0148   Fall Risk   Related Risk Factors (Fall Risk) fatigue/slow reaction;fear of falling;gait/mobility problems;history of falls

## 2018-07-26 NOTE — PLAN OF CARE
Problem: Occupational Therapy Goal  Goal: Occupational Therapy Goal  Goals to be met by: 14 days     Patient will increase functional independence with ADLs by performing:    Feeding with Modified [place].  UE Dressing with Modified [place].  LE Dressing with Stand-by Assistance using A/E and A/D as needed..  Grooming while standing with Stand-by Assistance with RW to stand..  Toileting from bedside commode with Stand-by Assistance for hygiene and clothing management with DME as needed.  Bathing from  edge of bed with Minimal Assistance.  Rolling to Bilateral with Modified [place].   Supine to sit with Modified [place].  Stand pivot transfers with Supervision with RW.  Upper extremity exercise program x10 reps per set, with assistance as needed.  Pt will tolerate up to 10 minutes standing activity with RW to steady.     Pt. Tolerated session well.

## 2018-07-26 NOTE — PROGRESS NOTES
" [facility] - Skilled Nursing Care  Adult Nutrition  Progress Note    SUMMARY       Recommendations    Recommendation/Intervention: continue low sodium diet, 2000 diabetic diet,RD to folllow  Goals: PO to meet 85% of EEN/EPN  Nutrition Goal Status: goal met      Reason for Assessment    Reason for Assessment: RD follow-up  Diagnosis:  (Debility, sp revision total R knee)  Relevant Medical History: DM uncontrolled, HTN, neuropathy, morbid obesity, CKDIII, HLD, HEIDI, AFIB,   Interdisciplinary Rounds: attended    General Information Comments: desirable weight loss from diuresis discusssed with patient , he is happy with less swelling and states he has further to go with fluid loss and weight loss, family states he is adjusting to low sodium diet  Nutrition Discharge Planning: DC on cardiac diabetic diet 2000 calories  Nutrition/Diet History    Patient Reported Diet/Restrictions/Preferences: general (sometimes follow diabetic diet)  Typical Food/Fluid Intake: 3 meals day, snacks, limited activity, has been gaining weight  Food Preferences: no fish milk ok  Do you have any cultural, spiritual, Taoist conflicts, given your current situation?: none  Food Allergies: NKFA  Factors Affecting Nutritional Intake: None identified at this time    Anthropometrics    Temp: 97 °F (36.1 °C)  Height Method: Stated  Height: 5' 10" (177.8 cm)  Height (inches): 70 in  Weight Method: Standard Scale  Weight: (!) 138.1 kg (304 lb 7.3 oz)  Weight (lb): (!) 304.46 lb  Ideal Body Weight (IBW), Male: 166 lb  % Ideal Body Weight, Male (lb): 196.69 lb  BMI (Calculated): 46.9  BMI Grade: greater than 40 - morbid obesity       Lab/Procedures/Meds    Pertinent Labs Reviewed: reviewed  Pertinent Labs Comments: glucose 158, Cr 1.8  Pertinent Medications Reviewed: reviewed  Pertinent Medications Comments: coumadin, ASA, insulin, famotidine, tamsulosin, statin, lasix, senna, polyethylene glycol, gabapentin,     Physical Findings/Assessment    Overall " Physical Appearance: edematous, obese, nourished  Tubes:  (-)  Oral/Mouth Cavity: WDL  Skin: incision(s)    Estimated/Assessed Needs    Weight Used For Calorie Calculations: (!) 148.1 kg (326 lb 8 oz)  Energy Calorie Requirements (kcal): 2200  Energy Need Method: [place]-St Jeor (no activity factor)  Protein Requirements: 113g  Weight Used For Protein Calculations: 75 kg (165 lb 5.5 oz) (IBW x 1.5)  Fluid Requirements (mL): 2200 or per MD  Fluid Need Method: RDA Method  RDA Method (mL): 2200  CHO Requirement: 50% of calories      Nutrition Prescription Ordered    Current Diet Order: 2000 diabetic, 2 gm Na double meats  Nutrition Order Comments: patient asking for salt, explained purpose of sodium restriction and encoourage pt to select his own menu daily by 6PM  Oral Nutrition Supplement: none    Evaluation of Received Nutrient/Fluid Intake    Energy Calories Required: meeting needs  Protein Required: meeting needs  Fluid Required: meeting needs  Comments: pt is upset he is not on his home insulin regime and about his high glucose  Tolerance: tolerating  % Intake of Estimated Energy Needs: 75 - 100 %  % Meal Intake: 75 - 100 %    Nutrition Risk    Level of Risk/Frequency of Follow-up: low     Assessment and Plan  Morbid obesity     Morbid obesity related to excessive calorie intake, decreased activity as evidenced by BMI 47 and uncontrolled diabetes, with family report that diabetic diet is not followed strictly.  Interventions/Recommendations (treatment strategy):  2000 calorie diabetic low sodium diet, double meat if accepted  Diabetic education     Nutrition Diagnosis Status: continues       Monitor and Evaluation    Food and Nutrient Intake: food and beverage intake  Food and Nutrient Adminstration: diet order  Knowledge/Beliefs/Attitudes: food and nutrition knowledge/skill  Anthropometric Measurements: weight change  Biochemical Data, Medical Tests and Procedures: glucose/endocrine profile, inflammatory  profile, electrolyte and renal panel     Nutrition Follow-Up    RD Follow-up?: Yes

## 2018-07-26 NOTE — PT/OT/SLP PROGRESS
Occupational Therapy  Treatment    [facility] [place]   MRN: [id number]   Admitting Diagnosis: Prosthetic joint implant failure    OT Date of Treatment: 07/26/18  Total Time (min): 50 min    Billable Minutes:  Self Care/Home Management 20 and Therapeutic Exercise 30    General Precautions: Standard, fall  Orthopedic Precautions: RLE weight bearing as tolerated  Braces: N/A    Do you have any cultural, spiritual, Cheondoism conflicts, given your current situation?: none stated    Subjective:  Communicated with nurse prior to session.  I am not sure whose job it is to get ice for this machine. (OT instructed pt. To call  for assist if not in therapy)    Pain/Comfort  Pain Rating 1: 2/10  Location - Side 1: Right  Location 1: knee  Pain Addressed 1: Reposition, Distraction  Pain Rating Post-Intervention 1: 2/10    Objective:  Patient found with:  (seated in w/c)    Functional Status:  MDS G  Transfer Functional Status: S-SBA sit to stand   Transfer Level of (A):  (with BRW)  Walk in Room Functional Status: S-SBA to ambulate to and from bathroom  Walk In Room Level of (A):  (with BRW)  Walk in Corridor Functional Status: S-SBA to ambulate to and from gym from room with vc's for safety and walker management  Walk In Corridor Level of (A):  (with BRW)  Toilet Use Functional Status:SBA at sink   Toilet Use Level of (A) :  (to use urinal in stand)  Personal Hygiene Functional Status: SBA  Personal Hygiene Level of (A) :  (in stand to brush teeth at sink)  Moving from seated to standing position: Not steady, but able to stabilize without staff assistance  Surface-to-surface transfer (transfer between bed and chair or wheelchair): Not steady, but able to stabilize without staff assistance           [facility] 6 Click:  [facility] Total Score: 21    OT Exercises: UE Ergometer x 15 minutes with min resistance   AROM with with 2 # dowel x 2 sets 10 reps for all major planes of BUE motion to assist with improving overall level of  endurance.     Additional Treatment:  Pt. Educated on safety with self-care tasks in stand at sink  Pt. Also educated on need to elevate RLE when not in therapy to assist with reducing edema    Patient left up in chair with call button in reach and polar ice inplace and BLE elevated    ASSESSMENT:  Stephen Liufreddy Mcconnell is a 72 y.o. male with a medical diagnosis of Prosthetic joint implant failure and presents with deficits in higher level ADL task performance but is improving daily.  Pt. Would benefit from cotninued OT services for further safety training.     Rehab identified problem list/impairments: weakness, impaired endurance, impaired self care skills, impaired functional mobilty, gait instability, impaired balance, decreased coordination, decreased lower extremity function, decreased safety awareness, pain, decreased ROM, orthopedic precautions    Rehab potential is good    Activity tolerance: Good    Discharge recommendations: home with home health     Barriers to discharge: Decreased caregiver support     Equipment recommendations: wheelchair     GOALS:    Occupational Therapy Goals        Problem: Occupational Therapy Goal    Goal Priority Disciplines Outcome Interventions   Occupational Therapy Goal     OT, PT/OT Ongoing (interventions implemented as appropriate)    Description:  Goals to be met by: 14 days     Patient will increase functional independence with ADLs by performing:    Feeding with Modified [place].  UE Dressing with Modified [place].  LE Dressing with Stand-by Assistance using A/E and A/D as needed..  Grooming while standing with Stand-by Assistance with RW to stand..  Toileting from bedside commode with Stand-by Assistance for hygiene and clothing management with DME as needed.  Bathing from  edge of bed with Minimal Assistance.  Rolling to Bilateral with Modified [place].   Supine to sit with Modified [place].  Stand pivot transfers with Supervision with RW.  Upper  extremity exercise program x10 reps per set, with assistance as needed.  Pt will tolerate up to 10 minutes standing activity with RW to steady.                      Plan:  Patient to be seen 5 x/week, 6 x/week to address the above listed problems via self-care/home management, therapeutic activities, therapeutic exercises  Plan of Care expires: 08/13/18  Plan of Care reviewed with: patient    Mireya VALERIO Casiano, VANESSA  07/26/2018

## 2018-07-26 NOTE — PT/OT/SLP PROGRESS
"Physical Therapy  Treatment    [facility] [place] PAT Cantor Jr.   MRN: [id number]   Admitting Diagnosis: Prosthetic joint implant failure    PT Received On: 07/26/18  Total Time (min): 45       Billable Minutes:  Gait Training 15, Therapeutic Activity 15 and Therapeutic Exercise 15    Treatment Type: Treatment  PT/PTA: PT     PTA Visit Number: 0       General Precautions: Standard, fall  Orthopedic Precautions: RLE weight bearing as tolerated   Braces: N/A    Do you have any cultural, spiritual, Christian conflicts, given your current situation?: none stated    Subjective:  Communicated with pt prior to session.  Pt was agreeable to PT services. "They need to give me a shot in my (R) hip, next. It hurts."    Pain/Comfort  Pain Rating 1: 0/10    Objective:  Patient found seated in wheelchair in room.         Functional Status:  MDS G  Bed Mobility Functional Status: S-SBA  Transfer Functional Status: S-SBA  Walk in Room Functional Status: S-SBA  Walk in Corridor Functional Status: S-SBA  Locomotion on Unit Functional Status: S-SBA  Locomotion Off Unit Functional Status: S-SBA          AM-PAC 6 CLICK MOBILITY  Total Score:18    Bed Mobility:  Sit>Supine:SBA (able to perform quickly)  Supine>Sit: SBA - has trouble getting his elbow/shoulder underneath him to assist him with trunk flexion; requires increased time to complete transition.    Transfers:  Sit<>Stand: SBA from wheelchair with UEs  Stand Pivot Transfer: SBA with use of rolling walker    Gait:  Amb 200 feet with reciprocal gait pattern, SBA, use of rolling walker. Kept the walker closer to his body than usual and was able to respond to the following cues: increase heel strike upon initial contact and increase step length bilaterally.     Therex (x25 reps BLE):  LAQ  Hip flexion  Ankle pumps  SAQ  Hip abduction  Heel slides  Quad sets  Gluteal sets  SLR    Additional Treatment:  AROM (R knee- measured in supine): -6 degrees - 95 degrees    Patient left up in chair with call " button in reach.    Assessment:  [facility] [place] is a 72 y.o. male with a medical diagnosis of Prosthetic joint implant failure.  Mr. Cantor's R knee AROM is improving with each day. Will benefit from continued PT services to work on stair training (as he has four steps to negotiate once he goes home).     Rehab identified problem list/impairments: weakness, impaired endurance, impaired self care skills, impaired functional mobilty, gait instability, impaired balance, decreased lower extremity function, pain, impaired cognition, edema    Rehab potential is good.    Activity tolerance: Good    Discharge recommendations: home health PT     Barriers to discharge: None    Equipment recommendations: wheelchair (maybe a WC )     GOALS:    Physical Therapy Goals        Problem: Physical Therapy Goal    Goal Priority Disciplines Outcome Goal Variances Interventions   Physical Therapy Goal     PT/OT, PT Ongoing (interventions implemented as appropriate)     Description:  Goals to be met by: 2 weeks (  )    Patient will increase functional independence with mobility by performin. Supine to sit with Contact Guard Assistance. Met (2018)  2. Sit to supine with Contact Guard Assistance. Met (2018)  3. Sit to stand transfer with Contact Guard Assistance. Met (2018)  4. Bed to chair transfer with Contact Guard Assistance using Rolling Walker  5. Gait  x 75 feet with Contact Guard Assistance using Rolling Walker. Met (2018)  5a. Gait x 150 feet with stand-by assistance using a rolling walker with swing-through gait pattern, proper posture. Met (2018)  5b. Gait x 25 feet with Min A without use of a rolling walker, as this is the patient's goal (to ambulate without an assistive device).    6. Ascend/descend 4 stair with bilateral Handrails Contact Guard Assistance, as his house has four PUNEET with B HR. Met (2018)  7. Ascend/Descend 4 inch curb step with Contact Guard Assistance using Rolling  Walker. Met (7/23/2018)  8. Stand for 3 minutes with Contact Guard Assistance using Rolling Walker while performing a task.  9. Lower extremity exercise program x 20 reps per handout, with assistance as needed.  10. Pt will improve his R knee active extension from -10 degrees to -5 degrees to allow for better functional mobility..  11. Pt will improve his L knee active flexion from 68 degrees to 80 degrees to allow for better functional mobility.-met 7/14/18  Revised (7/21/2018):  Pt will improve his L knee active flexion from 80 degrees to 90 degrees to allow for better functional mobility. Met (7/25/2018)                             PLAN:    Patient to be seen 5 x/week  to address the above listed problems via gait training, therapeutic activities, therapeutic exercises, wheelchair management/training  Plan of Care expires: 08/12/18  Plan of Care reviewed with: patient    Betzaida PAREDES Summer, PT  07/26/2018

## 2018-07-26 NOTE — PLAN OF CARE
Problem: Physical Therapy Goal  Goal: Physical Therapy Goal  Goals to be met by: 2 weeks (  )    Patient will increase functional independence with mobility by performin. Supine to sit with Contact Guard Assistance. Met (2018)  2. Sit to supine with Contact Guard Assistance. Met (2018)  3. Sit to stand transfer with Contact Guard Assistance. Met (2018)  4. Bed to chair transfer with Contact Guard Assistance using Rolling Walker  5. Gait  x 75 feet with Contact Guard Assistance using Rolling Walker. Met (2018)  5a. Gait x 150 feet with stand-by assistance using a rolling walker with swing-through gait pattern, proper posture. Met (2018)  5b. Gait x 25 feet with Min A without use of a rolling walker, as this is the patient's goal (to ambulate without an assistive device).    6. Ascend/descend 4 stair with bilateral Handrails Contact Guard Assistance, as his house has four PUNEET with B HR. Met (2018)  7. Ascend/Descend 4 inch curb step with Contact Guard Assistance using Rolling Walker. Met (2018)  8. Stand for 3 minutes with Contact Guard Assistance using Rolling Walker while performing a task.  9. Lower extremity exercise program x 20 reps per handout, with assistance as needed.  10. Pt will improve his R knee active extension from -10 degrees to -5 degrees to allow for better functional mobility..  11. Pt will improve his L knee active flexion from 68 degrees to 80 degrees to allow for better functional mobility.-met 18  Revised (2018):  Pt will improve his L knee active flexion from 80 degrees to 90 degrees to allow for better functional mobility. Met (2018)            Outcome: Ongoing (interventions implemented as appropriate)  Goals remain appropriate.

## 2018-07-27 ENCOUNTER — OFFICE VISIT (OUTPATIENT)
Dept: OPTOMETRY | Facility: CLINIC | Age: 73
End: 2018-07-27
Payer: MEDICARE

## 2018-07-27 DIAGNOSIS — Z98.41 S/P BILATERAL CATARACT EXTRACTION: Primary | ICD-10-CM

## 2018-07-27 DIAGNOSIS — Z98.42 S/P BILATERAL CATARACT EXTRACTION: Primary | ICD-10-CM

## 2018-07-27 LAB
POCT GLUCOSE: 112 MG/DL (ref 70–110)
POCT GLUCOSE: 155 MG/DL (ref 70–110)
POCT GLUCOSE: 227 MG/DL (ref 70–110)
POCT GLUCOSE: 233 MG/DL (ref 70–110)

## 2018-07-27 PROCEDURE — 99024 POSTOP FOLLOW-UP VISIT: CPT | Mod: S$GLB,,, | Performed by: OPTOMETRIST

## 2018-07-27 PROCEDURE — 97110 THERAPEUTIC EXERCISES: CPT

## 2018-07-27 PROCEDURE — 99999 PR PBB SHADOW E&M-EST. PATIENT-LVL II: CPT | Mod: PBBFAC,,, | Performed by: OPTOMETRIST

## 2018-07-27 PROCEDURE — 25000003 PHARM REV CODE 250: Performed by: NURSE PRACTITIONER

## 2018-07-27 PROCEDURE — 11000004 HC SNF PRIVATE

## 2018-07-27 PROCEDURE — 97116 GAIT TRAINING THERAPY: CPT

## 2018-07-27 PROCEDURE — 25000003 PHARM REV CODE 250: Performed by: STUDENT IN AN ORGANIZED HEALTH CARE EDUCATION/TRAINING PROGRAM

## 2018-07-27 PROCEDURE — 97530 THERAPEUTIC ACTIVITIES: CPT

## 2018-07-27 RX ADMIN — FAMOTIDINE 20 MG: 20 TABLET ORAL at 09:07

## 2018-07-27 RX ADMIN — APIXABAN 5 MG: 2.5 TABLET, FILM COATED ORAL at 09:07

## 2018-07-27 RX ADMIN — PRAVASTATIN SODIUM 10 MG: 10 TABLET ORAL at 09:07

## 2018-07-27 RX ADMIN — LOSARTAN POTASSIUM 25 MG: 25 TABLET, FILM COATED ORAL at 09:07

## 2018-07-27 RX ADMIN — TAMSULOSIN HYDROCHLORIDE 0.4 MG: 0.4 CAPSULE ORAL at 09:07

## 2018-07-27 RX ADMIN — RAMELTEON 8 MG: 8 TABLET, FILM COATED ORAL at 09:07

## 2018-07-27 RX ADMIN — GABAPENTIN 300 MG: 300 CAPSULE ORAL at 09:07

## 2018-07-27 RX ADMIN — STANDARDIZED SENNA CONCENTRATE AND DOCUSATE SODIUM 1 TABLET: 8.6; 5 TABLET, FILM COATED ORAL at 09:07

## 2018-07-27 RX ADMIN — METOPROLOL SUCCINATE 50 MG: 50 TABLET, EXTENDED RELEASE ORAL at 09:07

## 2018-07-27 RX ADMIN — INSULIN ASPART 18 UNITS: 100 INJECTION, SOLUTION INTRAVENOUS; SUBCUTANEOUS at 09:07

## 2018-07-27 RX ADMIN — PREDNISOLONE ACETATE-GATIFLOXACIN 1 DROP: 5; 10 SUSPENSION/ DROPS OPHTHALMIC at 09:07

## 2018-07-27 RX ADMIN — FLUTICASONE PROPIONATE 100 MCG: 50 SPRAY, METERED NASAL at 09:07

## 2018-07-27 RX ADMIN — INSULIN DETEMIR 30 UNITS: 100 INJECTION, SOLUTION SUBCUTANEOUS at 09:07

## 2018-07-27 RX ADMIN — FENOFIBRATE 145 MG: 145 TABLET ORAL at 09:07

## 2018-07-27 RX ADMIN — INSULIN ASPART 2 UNITS: 100 INJECTION, SOLUTION INTRAVENOUS; SUBCUTANEOUS at 09:07

## 2018-07-27 RX ADMIN — FUROSEMIDE 40 MG: 40 TABLET ORAL at 09:07

## 2018-07-27 NOTE — PT/OT/SLP PROGRESS
Physical Therapy  Treatment    [facility] PAT Cantor Jr.   MRN: [id number]   Admitting Diagnosis: Prosthetic joint implant failure    PT Received On: 07/27/18  Total Time (min): 45       Billable Minutes:  Gait Training 10, Therapeutic Activity 15 and Therapeutic Exercise 20    Treatment Type: Treatment  PT/PTA: PTA     PTA Visit Number: 1       General Precautions: Standard, fall  Orthopedic Precautions: RLE weight bearing as tolerated   Braces: N/A    Do you have any cultural, spiritual, Anabaptist conflicts, given your current situation?: none stated    Subjective:  Communicated with nursing prior to session.  Pt agreed to work with therapy.     Pain/Comfort  Pain Rating 1: 0/10  Pain Rating Post-Intervention 1: 0/10    Objective:  Patient found seated w/c.          Functional Status:  MDS G  Bed Mobility Functional Status: S-SBA  Transfer Functional Status: S-SBA  Walk in Room Functional Status: S-SBA  Walk in Corridor Functional Status: S-SBA  Locomotion on Unit Functional Status: S-SBA  Locomotion Off Unit Functional Status: S-SBA  Moving from seated to standing position: Not steady, but able to stabilize without staff assistance  Walking (with assistive device if used): Not steady, only able to stabilize with staff assistance  Turning around and facing the opposite direction while walking: Not steady, only able to stabilize with staff assistance  Moving on and off the toilet: Not steady, but able to stabilize without staff assistance  Surface-to-surface transfer (transfer between bed and chair or wheelchair): Not steady, but able to stabilize without staff assistance          AM-PAC 6 CLICK MOBILITY  Total Score:18    Bed Mobility:  Sit>Supine:SBA on mat  Supine>Sit: SBA on mat    Transfers:  Sit<>Stand: SBA with RW (multiple trials)     Gait:  Amb 200 ft with RW and SBA     Wheelchair Mobility:  Patient propels w/c 100 ft with BUEs and SPV     Therex:  B LE therex x25 reps:    -LAQ   -Hip Flexion   -SAQ   -Ankle Pumps     -Heel Slides  ` -Quad Sets   -Gluteal Sets   -SLR    Balance:  -Dynamic Standing Activity: Pt tossed krishnan bags at board with target holes for ~3 min with RW for unilateral support and CG-SBA.    Additional Treatment:  AROM (R) knee: -6 degrees to 95 degrees (supine)     Patient left up in chair with call button in reach.    Assessment:  [facility] PAT Cantor Jr. is a 72 y.o. male with a medical diagnosis of Prosthetic joint implant failure.  Pt tolerated treatment well, and continues to progress towards goals. Pt will continue to benefit from PT services at this time. Continue with PT POC as indicated.    Rehab identified problem list/impairments: weakness, impaired endurance, impaired self care skills, impaired functional mobilty, impaired balance, gait instability, decreased lower extremity function, pain, impaired cognition, edema    Rehab potential is good.    Activity tolerance: Good    Discharge recommendations: home health PT     Barriers to discharge: None    Equipment recommendations: wheelchair     GOALS:    Physical Therapy Goals        Problem: Physical Therapy Goal    Goal Priority Disciplines Outcome Goal Variances Interventions   Physical Therapy Goal     PT/OT, PT Ongoing (interventions implemented as appropriate)     Description:  Goals to be met by: 2 weeks (  )    Patient will increase functional independence with mobility by performin. Supine to sit with Contact Guard Assistance. Met (2018)  2. Sit to supine with Contact Guard Assistance. Met (2018)  3. Sit to stand transfer with Contact Guard Assistance. Met (2018)  4. Bed to chair transfer with Contact Guard Assistance using Rolling Walker  5. Gait  x 75 feet with Contact Guard Assistance using Rolling Walker. Met (2018)  5a. Gait x 150 feet with stand-by assistance using a rolling walker with swing-through gait pattern, proper posture. Met (2018)  5b. Gait x 25 feet with Min A without use of a rolling walker, as  this is the patient's goal (to ambulate without an assistive device).    6. Ascend/descend 4 stair with bilateral Handrails Contact Guard Assistance, as his house has four PUNEET with B HR. Met (7/19/2018)  7. Ascend/Descend 4 inch curb step with Contact Guard Assistance using Rolling Walker. Met (7/23/2018)  8. Stand for 3 minutes with Contact Guard Assistance using Rolling Walker while performing a task.  9. Lower extremity exercise program x 20 reps per handout, with assistance as needed.  10. Pt will improve his R knee active extension from -10 degrees to -5 degrees to allow for better functional mobility..  11. Pt will improve his L knee active flexion from 68 degrees to 80 degrees to allow for better functional mobility.-met 7/14/18  Revised (7/21/2018):  Pt will improve his L knee active flexion from 80 degrees to 90 degrees to allow for better functional mobility. Met (7/25/2018)                             PLAN:    Patient to be seen 5 x/week  to address the above listed problems via gait training, therapeutic exercises, therapeutic activities, wheelchair management/training  Plan of Care expires: 08/12/18  Plan of Care reviewed with: patient    Emma Marcelino, PTA  07/27/2018

## 2018-07-27 NOTE — PLAN OF CARE
07/27/2018  12:38 PM     07/27/18 1238   Medicare Message   Important Message from Medicare regarding Discharge Appeal Rights Given to patient/caregiver;Explained to patient/caregiver;Signed/date by patient/caregiver   EMERALD served NOMNC to patient notifying of discharge date of 8/1/18 and appeal right.  Patient expressed understanding and satisfaction regarding discharge date.  Patient signed NOMNC, and EMERALD provided patient with copy.  EMERALD faxed copy of signed NOMNC to [employer] (fx [phone]) and placed original in patient's blue chart in nurse's station.    GEORGINA Baxter, LMSW  Ext. [phone]

## 2018-07-27 NOTE — PLAN OF CARE
Problem: Occupational Therapy Goal  Goal: Occupational Therapy Goal  Goals to be met by: 14 days     Patient will increase functional independence with ADLs by performing:    Feeding with Modified [place].  UE Dressing with Modified [place].  LE Dressing with Stand-by Assistance using A/E and A/D as needed..  Grooming while standing with Stand-by Assistance with RW to stand..  Toileting from bedside commode with Stand-by Assistance for hygiene and clothing management with DME as needed.  Bathing from  edge of bed with Minimal Assistance.  Rolling to Bilateral with Modified [place].   Supine to sit with Modified [place].  Stand pivot transfers with Supervision with RW.  Upper extremity exercise program x10 reps per set, with assistance as needed.  Pt will tolerate up to 10 minutes standing activity with RW to steady.     Outcome: Ongoing (interventions implemented as appropriate)  .

## 2018-07-27 NOTE — PROGRESS NOTES
HPI     Post op 1 month Phaco w/IOL - OS(06/25/18)    Pt states near VA is blurry. Pt denies pain and discomfort.     Eyemeds  Pred/Gati/Brom - TID  [facility]    Last edited by Cristiano Cole on 7/27/2018  1:12 PM. (History)            Assessment /Plan     For exam results, see Encounter Report.    S/P bilateral cataract extraction  -     OCT- Retina          1.  Pt doing well.  No rx given.  Recommend +3.00 OTC readers.  Eye health normal OU.  Return care to Dr. Graff.

## 2018-07-27 NOTE — PT/OT/SLP PROGRESS
Occupational Therapy  Treatment    [facility] PAT Cantor Jr.   MRN: [id number]   Admitting Diagnosis: Prosthetic joint implant failure    OT Date of Treatment: 07/27/18  Total Time (min): 53 min    Billable Minutes:  Therapeutic Activity 33  and Therapeutic Exercise 20    General Precautions: Standard, fall  Orthopedic Precautions: RLE weight bearing as tolerated  Braces: N/A    Do you have any cultural, spiritual, Orthodoxy conflicts, given your current situation?: none stated    Subjective:  Communicated withnsg prior to session.  I am  just about all ready     Pain/Comfort  Pain Rating 1: 2/10  Location - Side 1: Right  Location 1: knee  Pain Addressed 1: Reposition, Distraction, Pre-medicate for activity  Pain Rating Post-Intervention 1: 2/10  Pain Rating 2: 7/10  Location - Orientation 2: lower  Location 2: back  Pain Addressed 2: Reposition, Distraction  Pain Rating Post-Intervention 2: 6/10    Objective:   Pt. Seated at sink level on arrival    Functional Status:  MDS G  Transfer Functional Status: S-SBA from w/c with RW and cues for safety  Dressing Functional Status: 2:CGA-Min (A) for RLE with pants management into leg and pt. Able to manage over hips and LLE compression wrap   Personal Hygiene Functional Status: S-SBA at sink level   Moving from seated to standing position: Not steady, but able to stabilize without staff assistance  Walking (with assistive device if used): Not steady, only able to stabilize with staff assistance  Surface-to-surface transfer (transfer between bed and chair or wheelchair): Not steady, but able to stabilize without staff assistance           [facility] 6 Click:  AMPA Total Score: 21    OT Exercises: UE Ergometer 10 min    Additional Treatment:  Pt. With standing act on this day with task. Pt. With SBA for balance aspects with task with RW at raised counter height. Pt.with visual perception task with spatical relation board Pt. Able to stand x 5 min and then followed by seated rest break and  then another 5 to complete task.  Pt. Also with red thera band with x 20 reps with shd flex, triceps/bicep and add/abduction to increase BUE ROM  Pt. With standing and therex performed to increase ROM, endurance selfcare task and fxl mobility for independence     Patient left up in chair with all lines intact and call button in reach    ASSESSMENT:  Stephen PAT Sakshi  is a 72 y.o. male with a medical diagnosis of Prosthetic joint implant failure Pt. participated well with session on this day.Pt demos physical deficits with balance  functional mobility, UB strength, endurance  level of functional indep with daily tasks and activities and selfcare skills .Pt. Will continue to benefit from continued OT to progress towards goals      Rehab identified problem list/impairments: weakness, impaired endurance, impaired self care skills, impaired functional mobilty, gait instability, impaired balance, decreased coordination, decreased lower extremity function, decreased safety awareness, pain, decreased ROM, orthopedic precautions    Rehab potential is fair    Activity tolerance: Fair    Discharge recommendations: home with home health     Barriers to discharge: Decreased caregiver support     Equipment recommendations: wheelchair     GOALS:    Occupational Therapy Goals        Problem: Occupational Therapy Goal    Goal Priority Disciplines Outcome Interventions   Occupational Therapy Goal     OT, PT/OT Ongoing (interventions implemented as appropriate)    Description:  Goals to be met by: 14 days     Patient will increase functional independence with ADLs by performing:    Feeding with Modified [place].  UE Dressing with Modified [place].  LE Dressing with Stand-by Assistance using A/E and A/D as needed..  Grooming while standing with Stand-by Assistance with RW to stand..  Toileting from bedside commode with Stand-by Assistance for hygiene and clothing management with DME as needed.  Bathing from  edge of bed with  Minimal Assistance.  Rolling to Bilateral with Modified [place].   Supine to sit with Modified [place].  Stand pivot transfers with Supervision with RW.  Upper extremity exercise program x10 reps per set, with assistance as needed.  Pt will tolerate up to 10 minutes standing activity with RW to steady.                  Plan:  Patient to be seen 5 x/week, 6 x/week to address the above listed problems via self-care/home management, therapeutic activities, therapeutic exercises  Plan of Care expires: 08/13/18  Plan of Care reviewed with: patient    ISAMAR Martinez  07/27/2018

## 2018-07-28 LAB
POCT GLUCOSE: 153 MG/DL (ref 70–110)
POCT GLUCOSE: 172 MG/DL (ref 70–110)
POCT GLUCOSE: 178 MG/DL (ref 70–110)
POCT GLUCOSE: 215 MG/DL (ref 70–110)

## 2018-07-28 PROCEDURE — 25000003 PHARM REV CODE 250: Performed by: STUDENT IN AN ORGANIZED HEALTH CARE EDUCATION/TRAINING PROGRAM

## 2018-07-28 PROCEDURE — 25000003 PHARM REV CODE 250: Performed by: NURSE PRACTITIONER

## 2018-07-28 PROCEDURE — 11000004 HC SNF PRIVATE

## 2018-07-28 PROCEDURE — 63600175 PHARM REV CODE 636 W HCPCS: Performed by: INTERNAL MEDICINE

## 2018-07-28 RX ORDER — INSULIN ASPART 100 [IU]/ML
20 INJECTION, SOLUTION INTRAVENOUS; SUBCUTANEOUS
Status: DISCONTINUED | OUTPATIENT
Start: 2018-07-28 | End: 2018-08-01 | Stop reason: HOSPADM

## 2018-07-28 RX ORDER — FUROSEMIDE 40 MG/1
40 TABLET ORAL DAILY
Status: DISCONTINUED | OUTPATIENT
Start: 2018-07-30 | End: 2018-08-01 | Stop reason: HOSPADM

## 2018-07-28 RX ORDER — INSULIN ASPART 100 [IU]/ML
18 INJECTION, SOLUTION INTRAVENOUS; SUBCUTANEOUS
Status: DISCONTINUED | OUTPATIENT
Start: 2018-07-29 | End: 2018-08-01 | Stop reason: HOSPADM

## 2018-07-28 RX ADMIN — FLUTICASONE PROPIONATE 100 MCG: 50 SPRAY, METERED NASAL at 09:07

## 2018-07-28 RX ADMIN — FAMOTIDINE 20 MG: 20 TABLET ORAL at 09:07

## 2018-07-28 RX ADMIN — FENOFIBRATE 145 MG: 145 TABLET ORAL at 09:07

## 2018-07-28 RX ADMIN — INSULIN DETEMIR 30 UNITS: 100 INJECTION, SOLUTION SUBCUTANEOUS at 09:07

## 2018-07-28 RX ADMIN — PRAVASTATIN SODIUM 10 MG: 10 TABLET ORAL at 09:07

## 2018-07-28 RX ADMIN — GABAPENTIN 300 MG: 300 CAPSULE ORAL at 09:07

## 2018-07-28 RX ADMIN — INSULIN ASPART 2 UNITS: 100 INJECTION, SOLUTION INTRAVENOUS; SUBCUTANEOUS at 06:07

## 2018-07-28 RX ADMIN — GABAPENTIN 300 MG: 300 CAPSULE ORAL at 02:07

## 2018-07-28 RX ADMIN — INSULIN ASPART 20 UNITS: 100 INJECTION, SOLUTION INTRAVENOUS; SUBCUTANEOUS at 12:07

## 2018-07-28 RX ADMIN — INSULIN ASPART 20 UNITS: 100 INJECTION, SOLUTION INTRAVENOUS; SUBCUTANEOUS at 06:07

## 2018-07-28 RX ADMIN — INSULIN ASPART 4 UNITS: 100 INJECTION, SOLUTION INTRAVENOUS; SUBCUTANEOUS at 12:07

## 2018-07-28 RX ADMIN — TAMSULOSIN HYDROCHLORIDE 0.4 MG: 0.4 CAPSULE ORAL at 09:07

## 2018-07-28 RX ADMIN — INSULIN ASPART 1 UNITS: 100 INJECTION, SOLUTION INTRAVENOUS; SUBCUTANEOUS at 09:07

## 2018-07-28 RX ADMIN — PREDNISOLONE ACETATE-GATIFLOXACIN 1 DROP: 5; 10 SUSPENSION/ DROPS OPHTHALMIC at 03:07

## 2018-07-28 RX ADMIN — INSULIN ASPART 2 UNITS: 100 INJECTION, SOLUTION INTRAVENOUS; SUBCUTANEOUS at 09:07

## 2018-07-28 RX ADMIN — INSULIN ASPART 18 UNITS: 100 INJECTION, SOLUTION INTRAVENOUS; SUBCUTANEOUS at 09:07

## 2018-07-28 RX ADMIN — FUROSEMIDE 40 MG: 40 TABLET ORAL at 09:07

## 2018-07-28 RX ADMIN — APIXABAN 5 MG: 2.5 TABLET, FILM COATED ORAL at 09:07

## 2018-07-28 RX ADMIN — INSULIN DETEMIR 32 UNITS: 100 INJECTION, SOLUTION SUBCUTANEOUS at 09:07

## 2018-07-28 RX ADMIN — PREDNISOLONE ACETATE-GATIFLOXACIN 1 DROP: 5; 10 SUSPENSION/ DROPS OPHTHALMIC at 09:07

## 2018-07-28 NOTE — PLAN OF CARE
Problem: Diabetes, Type 2 (Adult)  Goal: Signs and Symptoms of Listed Potential Problems Will be Absent, Minimized or Managed (Diabetes, Type 2)  Signs and symptoms of listed potential problems will be absent, minimized or managed by discharge/transition of care (reference Diabetes, Type 2 (Adult) CPG).   Outcome: Ongoing (interventions implemented as appropriate)  Discussed plan of care with patient. Explained to patient orders for blood glucose coverage and  Follow up. Safety precautions maintained. Will follow up as needed.

## 2018-07-29 LAB
POCT GLUCOSE: 119 MG/DL (ref 70–110)
POCT GLUCOSE: 131 MG/DL (ref 70–110)
POCT GLUCOSE: 208 MG/DL (ref 70–110)
POCT GLUCOSE: 209 MG/DL (ref 70–110)

## 2018-07-29 PROCEDURE — 25000003 PHARM REV CODE 250: Performed by: NURSE PRACTITIONER

## 2018-07-29 PROCEDURE — 97116 GAIT TRAINING THERAPY: CPT

## 2018-07-29 PROCEDURE — 97110 THERAPEUTIC EXERCISES: CPT

## 2018-07-29 PROCEDURE — 63600175 PHARM REV CODE 636 W HCPCS: Performed by: INTERNAL MEDICINE

## 2018-07-29 PROCEDURE — 97530 THERAPEUTIC ACTIVITIES: CPT

## 2018-07-29 PROCEDURE — 25000003 PHARM REV CODE 250: Performed by: STUDENT IN AN ORGANIZED HEALTH CARE EDUCATION/TRAINING PROGRAM

## 2018-07-29 PROCEDURE — 11000004 HC SNF PRIVATE

## 2018-07-29 RX ADMIN — LOSARTAN POTASSIUM 25 MG: 25 TABLET, FILM COATED ORAL at 08:07

## 2018-07-29 RX ADMIN — PREDNISOLONE ACETATE-GATIFLOXACIN 1 DROP: 5; 10 SUSPENSION/ DROPS OPHTHALMIC at 09:07

## 2018-07-29 RX ADMIN — INSULIN ASPART 4 UNITS: 100 INJECTION, SOLUTION INTRAVENOUS; SUBCUTANEOUS at 05:07

## 2018-07-29 RX ADMIN — INSULIN DETEMIR 32 UNITS: 100 INJECTION, SOLUTION SUBCUTANEOUS at 10:07

## 2018-07-29 RX ADMIN — STANDARDIZED SENNA CONCENTRATE AND DOCUSATE SODIUM 1 TABLET: 8.6; 5 TABLET, FILM COATED ORAL at 08:07

## 2018-07-29 RX ADMIN — FLUTICASONE PROPIONATE 100 MCG: 50 SPRAY, METERED NASAL at 08:07

## 2018-07-29 RX ADMIN — PREDNISOLONE ACETATE-GATIFLOXACIN 1 DROP: 5; 10 SUSPENSION/ DROPS OPHTHALMIC at 03:07

## 2018-07-29 RX ADMIN — TAMSULOSIN HYDROCHLORIDE 0.4 MG: 0.4 CAPSULE ORAL at 08:07

## 2018-07-29 RX ADMIN — FAMOTIDINE 20 MG: 20 TABLET ORAL at 08:07

## 2018-07-29 RX ADMIN — GABAPENTIN 300 MG: 300 CAPSULE ORAL at 08:07

## 2018-07-29 RX ADMIN — INSULIN ASPART 18 UNITS: 100 INJECTION, SOLUTION INTRAVENOUS; SUBCUTANEOUS at 08:07

## 2018-07-29 RX ADMIN — APIXABAN 5 MG: 2.5 TABLET, FILM COATED ORAL at 09:07

## 2018-07-29 RX ADMIN — APIXABAN 5 MG: 2.5 TABLET, FILM COATED ORAL at 08:07

## 2018-07-29 RX ADMIN — INSULIN ASPART 20 UNITS: 100 INJECTION, SOLUTION INTRAVENOUS; SUBCUTANEOUS at 12:07

## 2018-07-29 RX ADMIN — RAMELTEON 8 MG: 8 TABLET, FILM COATED ORAL at 09:07

## 2018-07-29 RX ADMIN — FENOFIBRATE 145 MG: 145 TABLET ORAL at 08:07

## 2018-07-29 RX ADMIN — INSULIN DETEMIR 32 UNITS: 100 INJECTION, SOLUTION SUBCUTANEOUS at 08:07

## 2018-07-29 RX ADMIN — GABAPENTIN 300 MG: 300 CAPSULE ORAL at 09:07

## 2018-07-29 RX ADMIN — FAMOTIDINE 20 MG: 20 TABLET ORAL at 09:07

## 2018-07-29 RX ADMIN — PRAVASTATIN SODIUM 10 MG: 10 TABLET ORAL at 09:07

## 2018-07-29 RX ADMIN — GABAPENTIN 300 MG: 300 CAPSULE ORAL at 02:07

## 2018-07-29 RX ADMIN — INSULIN ASPART 20 UNITS: 100 INJECTION, SOLUTION INTRAVENOUS; SUBCUTANEOUS at 05:07

## 2018-07-29 NOTE — PLAN OF CARE
Problem: Patient Care Overview  Goal: Plan of Care Review  Outcome: Revised  Repositions independently, no new skin breakdowns noted. Rt knee drsg dry and intact, slight edema. Afebrile. Monitored for pain and safety.safety camera in use, pt forgetful. Denies pain

## 2018-07-29 NOTE — NURSING
Last recorded BM 7/26, pt refused suppository at this time. Tammy admin this am. Gave pt warmed prune juice. Instructed to call for assist, call light in reach

## 2018-07-29 NOTE — PT/OT/SLP PROGRESS
"Physical Therapy  Treatment    [facility] PAT Cantor Jr.   MRN: [id number]   Admitting Diagnosis: Prosthetic joint implant failure    PT Received On: 07/29/18  Total Time (min): 45       Billable Minutes:45    Gait Training 15, Therapeutic Activity 10 and Therapeutic Exercise 20    Treatment Type: Treatment  PT/PTA: PTA     PTA Visit Number: 2       General Precautions: Standard, fall  Orthopedic Precautions: RLE weight bearing as tolerated   Braces: N/A    Do you have any cultural, spiritual, Baptism conflicts, given your current situation?: none stated    Subjective:  Pt agreeable to therapy "can I walk back to my room?" post session      Pain/Comfort  Pain Rating 1: 0/10  Pain Rating Post-Intervention 1: 0/10    Objective:   Patient found with:  (in wc)       Functional Status:  MDS G  Bed Mobility Functional Status: S-SBA  Transfer Functional Status: S-SBA  Walk in Corridor Functional Status: S-SBA          AM-PAC 6 CLICK MOBILITY  Total Score:18    Bed Mobility:  Sit>Supine:SBA on mat  Supine>Sit: SBA on mat inc time for positioning    Transfers:  Sit<>Stand: SBA with RW  Stand Pivot Transfer: SBA with RW       Gait:  Amb with RW SBA ~ 190 ft and 150 ft seated rest break, no LOB    Therex:  x25 reps A/AA as needed AP,GS,QS,SAQ,HS,abd/add,LAQ,hip flex, knee flex    Patient left up in chair with call button in reach and belongings in reach.    Assessment:  [facility] PAT Cantor Jr. is a 72 y.o. male with a medical diagnosis of Prosthetic joint implant failure.  Pt tolerated well, pt is very pleasant, demo good effort, appears highly motivated pt would continue to benefit from skilled PT services to improve overall functional mobility, strength and endurance.  .    Rehab identified problem list/impairments: weakness, impaired endurance, impaired self care skills, impaired functional mobilty, impaired balance, gait instability, decreased lower extremity function, pain, impaired cognition, edema    Rehab potential is " good.    Activity tolerance: Fair    Discharge recommendations: home health PT     Barriers to discharge: None    Equipment recommendations: wheelchair     GOALS:    Physical Therapy Goals        Problem: Physical Therapy Goal    Goal Priority Disciplines Outcome Goal Variances Interventions   Physical Therapy Goal     PT/OT, PT Ongoing (interventions implemented as appropriate)     Description:  Goals to be met by: 2 weeks (  )    Patient will increase functional independence with mobility by performin. Supine to sit with Contact Guard Assistance. Met (2018)  2. Sit to supine with Contact Guard Assistance. Met (2018)  3. Sit to stand transfer with Contact Guard Assistance. Met (2018)  4. Bed to chair transfer with Contact Guard Assistance using Rolling Walker  5. Gait  x 75 feet with Contact Guard Assistance using Rolling Walker. Met (2018)  5a. Gait x 150 feet with stand-by assistance using a rolling walker with swing-through gait pattern, proper posture. Met (2018)  5b. Gait x 25 feet with Min A without use of a rolling walker, as this is the patient's goal (to ambulate without an assistive device).    6. Ascend/descend 4 stair with bilateral Handrails Contact Guard Assistance, as his house has four PUNEET with B HR. Met (2018)  7. Ascend/Descend 4 inch curb step with Contact Guard Assistance using Rolling Walker. Met (2018)  8. Stand for 3 minutes with Contact Guard Assistance using Rolling Walker while performing a task.  9. Lower extremity exercise program x 20 reps per handout, with assistance as needed.  10. Pt will improve his R knee active extension from -10 degrees to -5 degrees to allow for better functional mobility..  11. Pt will improve his L knee active flexion from 68 degrees to 80 degrees to allow for better functional mobility.-met 18  Revised (2018):  Pt will improve his L knee active flexion from 80 degrees to 90 degrees to allow for better  functional mobility. Met (7/25/2018)                             PLAN:    Patient to be seen 5 x/week  to address the above listed problems via gait training, therapeutic exercises, therapeutic activities, wheelchair management/training  Plan of Care expires: 08/12/18  Plan of Care reviewed with: patient    Amalia Lundberg, PTA  07/29/2018

## 2018-07-29 NOTE — PLAN OF CARE
Problem: Physical Therapy Goal  Goal: Physical Therapy Goal  Goals to be met by: 2 weeks (  )    Patient will increase functional independence with mobility by performin. Supine to sit with Contact Guard Assistance. Met (2018)  2. Sit to supine with Contact Guard Assistance. Met (2018)  3. Sit to stand transfer with Contact Guard Assistance. Met (2018)  4. Bed to chair transfer with Contact Guard Assistance using Rolling Walker  5. Gait  x 75 feet with Contact Guard Assistance using Rolling Walker. Met (2018)  5a. Gait x 150 feet with stand-by assistance using a rolling walker with swing-through gait pattern, proper posture. Met (2018)  5b. Gait x 25 feet with Min A without use of a rolling walker, as this is the patient's goal (to ambulate without an assistive device).    6. Ascend/descend 4 stair with bilateral Handrails Contact Guard Assistance, as his house has four PUNEET with B HR. Met (2018)  7. Ascend/Descend 4 inch curb step with Contact Guard Assistance using Rolling Walker. Met (2018)  8. Stand for 3 minutes with Contact Guard Assistance using Rolling Walker while performing a task.  9. Lower extremity exercise program x 20 reps per handout, with assistance as needed.  10. Pt will improve his R knee active extension from -10 degrees to -5 degrees to allow for better functional mobility..  11. Pt will improve his L knee active flexion from 68 degrees to 80 degrees to allow for better functional mobility.-met 18  Revised (2018):  Pt will improve his L knee active flexion from 80 degrees to 90 degrees to allow for better functional mobility. Met (2018)            Outcome: Ongoing (interventions implemented as appropriate)  Goals remain appropriate

## 2018-07-30 LAB
ANION GAP SERPL CALC-SCNC: 10 MMOL/L
BASOPHILS # BLD AUTO: 0.04 K/UL
BASOPHILS NFR BLD: 0.7 %
BUN SERPL-MCNC: 21 MG/DL
CALCIUM SERPL-MCNC: 9.5 MG/DL
CHLORIDE SERPL-SCNC: 106 MMOL/L
CO2 SERPL-SCNC: 24 MMOL/L
CREAT SERPL-MCNC: 1.3 MG/DL
DIFFERENTIAL METHOD: ABNORMAL
EOSINOPHIL # BLD AUTO: 0.4 K/UL
EOSINOPHIL NFR BLD: 7 %
ERYTHROCYTE [DISTWIDTH] IN BLOOD BY AUTOMATED COUNT: 13.1 %
EST. GFR  (AFRICAN AMERICAN): >60 ML/MIN/1.73 M^2
EST. GFR  (NON AFRICAN AMERICAN): 53.8 ML/MIN/1.73 M^2
GLUCOSE SERPL-MCNC: 103 MG/DL
HCT VFR BLD AUTO: 38.7 %
HGB BLD-MCNC: 13.1 G/DL
IMM GRANULOCYTES # BLD AUTO: 0.05 K/UL
IMM GRANULOCYTES NFR BLD AUTO: 0.9 %
LYMPHOCYTES # BLD AUTO: 1.8 K/UL
LYMPHOCYTES NFR BLD: 31.9 %
MAGNESIUM SERPL-MCNC: 1.9 MG/DL
MCH RBC QN AUTO: 31 PG
MCHC RBC AUTO-ENTMCNC: 33.9 G/DL
MCV RBC AUTO: 92 FL
MONOCYTES # BLD AUTO: 0.6 K/UL
MONOCYTES NFR BLD: 10.9 %
NEUTROPHILS # BLD AUTO: 2.8 K/UL
NEUTROPHILS NFR BLD: 48.6 %
NRBC BLD-RTO: 0 /100 WBC
PHOSPHATE SERPL-MCNC: 3.5 MG/DL
PLATELET # BLD AUTO: 299 K/UL
PMV BLD AUTO: 11.5 FL
POCT GLUCOSE: 116 MG/DL (ref 70–110)
POCT GLUCOSE: 162 MG/DL (ref 70–110)
POCT GLUCOSE: 212 MG/DL (ref 70–110)
POCT GLUCOSE: 240 MG/DL (ref 70–110)
POTASSIUM SERPL-SCNC: 4.2 MMOL/L
RBC # BLD AUTO: 4.23 M/UL
SODIUM SERPL-SCNC: 140 MMOL/L
WBC # BLD AUTO: 5.68 K/UL

## 2018-07-30 PROCEDURE — 25000003 PHARM REV CODE 250: Performed by: NURSE PRACTITIONER

## 2018-07-30 PROCEDURE — 25000003 PHARM REV CODE 250: Performed by: STUDENT IN AN ORGANIZED HEALTH CARE EDUCATION/TRAINING PROGRAM

## 2018-07-30 PROCEDURE — 97530 THERAPEUTIC ACTIVITIES: CPT

## 2018-07-30 PROCEDURE — 63600175 PHARM REV CODE 636 W HCPCS: Performed by: INTERNAL MEDICINE

## 2018-07-30 PROCEDURE — 97116 GAIT TRAINING THERAPY: CPT

## 2018-07-30 PROCEDURE — 97110 THERAPEUTIC EXERCISES: CPT

## 2018-07-30 PROCEDURE — 25000003 PHARM REV CODE 250: Performed by: INTERNAL MEDICINE

## 2018-07-30 PROCEDURE — 11000004 HC SNF PRIVATE

## 2018-07-30 PROCEDURE — 85025 COMPLETE CBC W/AUTO DIFF WBC: CPT

## 2018-07-30 PROCEDURE — 36415 COLL VENOUS BLD VENIPUNCTURE: CPT

## 2018-07-30 PROCEDURE — 83735 ASSAY OF MAGNESIUM: CPT

## 2018-07-30 PROCEDURE — 80048 BASIC METABOLIC PNL TOTAL CA: CPT

## 2018-07-30 PROCEDURE — 84100 ASSAY OF PHOSPHORUS: CPT

## 2018-07-30 RX ADMIN — GABAPENTIN 300 MG: 300 CAPSULE ORAL at 08:07

## 2018-07-30 RX ADMIN — INSULIN ASPART 1 UNITS: 100 INJECTION, SOLUTION INTRAVENOUS; SUBCUTANEOUS at 08:07

## 2018-07-30 RX ADMIN — LOSARTAN POTASSIUM 25 MG: 25 TABLET, FILM COATED ORAL at 08:07

## 2018-07-30 RX ADMIN — FLUTICASONE PROPIONATE 100 MCG: 50 SPRAY, METERED NASAL at 08:07

## 2018-07-30 RX ADMIN — INSULIN ASPART 18 UNITS: 100 INJECTION, SOLUTION INTRAVENOUS; SUBCUTANEOUS at 08:07

## 2018-07-30 RX ADMIN — INSULIN ASPART 4 UNITS: 100 INJECTION, SOLUTION INTRAVENOUS; SUBCUTANEOUS at 04:07

## 2018-07-30 RX ADMIN — INSULIN ASPART 20 UNITS: 100 INJECTION, SOLUTION INTRAVENOUS; SUBCUTANEOUS at 11:07

## 2018-07-30 RX ADMIN — APIXABAN 5 MG: 2.5 TABLET, FILM COATED ORAL at 08:07

## 2018-07-30 RX ADMIN — GABAPENTIN 300 MG: 300 CAPSULE ORAL at 02:07

## 2018-07-30 RX ADMIN — FUROSEMIDE 40 MG: 40 TABLET ORAL at 08:07

## 2018-07-30 RX ADMIN — INSULIN DETEMIR 32 UNITS: 100 INJECTION, SOLUTION SUBCUTANEOUS at 08:07

## 2018-07-30 RX ADMIN — PREDNISOLONE ACETATE-GATIFLOXACIN 1 DROP: 5; 10 SUSPENSION/ DROPS OPHTHALMIC at 08:07

## 2018-07-30 RX ADMIN — INSULIN ASPART 20 UNITS: 100 INJECTION, SOLUTION INTRAVENOUS; SUBCUTANEOUS at 04:07

## 2018-07-30 RX ADMIN — FENOFIBRATE 145 MG: 145 TABLET ORAL at 08:07

## 2018-07-30 RX ADMIN — TAMSULOSIN HYDROCHLORIDE 0.4 MG: 0.4 CAPSULE ORAL at 08:07

## 2018-07-30 RX ADMIN — INSULIN ASPART 4 UNITS: 100 INJECTION, SOLUTION INTRAVENOUS; SUBCUTANEOUS at 11:07

## 2018-07-30 RX ADMIN — FAMOTIDINE 20 MG: 20 TABLET ORAL at 08:07

## 2018-07-30 RX ADMIN — PREDNISOLONE ACETATE-GATIFLOXACIN 1 DROP: 5; 10 SUSPENSION/ DROPS OPHTHALMIC at 09:07

## 2018-07-30 RX ADMIN — PRAVASTATIN SODIUM 10 MG: 10 TABLET ORAL at 08:07

## 2018-07-30 RX ADMIN — PREDNISOLONE ACETATE-GATIFLOXACIN 1 DROP: 5; 10 SUSPENSION/ DROPS OPHTHALMIC at 02:07

## 2018-07-30 NOTE — PLAN OF CARE
Problem: Patient Care Overview  Goal: Plan of Care Review  Outcome: Revised  Repositions minimal assist, no new skin breakdowns noted. Rt knee dried scab incision with steristrips intact, island border in place. LLE home compression wrap in use. Afebrile. Monitored for pain and safety.safety camera in use. Denies pain

## 2018-07-30 NOTE — PT/OT/SLP PROGRESS
Occupational Therapy  Treatment    [facility] [place] Jr.   MRN: [id number]   Admitting Diagnosis: Prosthetic joint implant failure    OT Date of Treatment: 07/30/18  Total Time (min): 45 min    Billable Minutes:  Therapeutic Activity 25 and Therapeutic Exercise 20    General Precautions: Standard, fall  Orthopedic Precautions: RLE weight bearing as tolerated  Braces: N/A    Do you have any cultural, spiritual, Mandaen conflicts, given your current situation?: none stated    Subjective:  Communicated with nsg prior to session.   I am doing well today    Pain/Comfort  Pain Rating 1: 0/10  Pain Rating Post-Intervention 1: 0/10    Objective:   Pt. Seated in chair on arrival    Functional Status:  MDS G  Transfer Functional Status: S-SBA from chair to w/c with RW and cues for safety  Walk in Room Functional Status: S-SBA with RW  Walk in Corridor Functional Status: S-SBA from room to gym approx  150ft with RW  Dressing Functional Status: 1: S-SBA for LB dressing with use of AE for doffing/doning socks and pants with reacher/sockaid   Personal Hygiene Functional Status: S-SBA for hair care  Moving from seated to standing position: Not steady, but able to stabilize without staff assistance  Walking (with assistive device if used): Not steady, only able to stabilize with staff assistance  Turning around and facing the opposite direction while walking: Not steady, only able to stabilize with staff assistance  Surface-to-surface transfer (transfer between bed and chair or wheelchair): Not steady, but able to stabilize without staff assistance           [facility] 6 Click:  [facility] Total Score:      OT Exercises: UE Ergometer 10 min    Additional Treatment:  Pt. With fxl mobility from room to gym approx 150 ft with SBA  and cues for safety  Pt. also With 2# dowel activity x 20 reps  with shd flex, bicep curls horz adb/add and forward flex motion to increase BUE ROM and strength.    Patient left up in chair with all lines intact and call  button in reach    ASSESSMENT:  [facility] [place] PAT Cantor Jr. is a 72 y.o. male with a medical diagnosis of Prosthetic joint implant failure Pt. participated well with session on this day.Pt demos physical deficits with balance  functional mobility, UB strength, endurance  level of functional indep with daily tasks and activities and selfcare skills .Pt. Will continue to benefit from continued OT to progress towards goals  .    Rehab identified problem list/impairments: weakness, impaired endurance, impaired self care skills, impaired functional mobilty, gait instability, impaired balance, decreased coordination, decreased lower extremity function, decreased safety awareness, pain, decreased ROM, orthopedic precautions    Rehab potential is fair    Activity tolerance: Fair    Discharge recommendations: home with home health     Barriers to discharge: Decreased caregiver support     Equipment recommendations: wheelchair     GOALS:    Occupational Therapy Goals        Problem: Occupational Therapy Goal    Goal Priority Disciplines Outcome Interventions   Occupational Therapy Goal     OT, PT/OT Ongoing (interventions implemented as appropriate)    Description:  Goals to be met by: 14 days     Patient will increase functional independence with ADLs by performing:    Feeding with Modified [place].  UE Dressing with Modified [place].  LE Dressing with Stand-by Assistance using A/E and A/D as needed..  Grooming while standing with Stand-by Assistance with RW to stand..  Toileting from bedside commode with Stand-by Assistance for hygiene and clothing management with DME as needed.  Bathing from  edge of bed with Minimal Assistance.  Rolling to Bilateral with Modified [place].   Supine to sit with Modified [place].  Stand pivot transfers with Supervision with RW.  Upper extremity exercise program x10 reps per set, with assistance as needed.  Pt will tolerate up to 10 minutes standing activity with RW to steady.                   Plan:  Patient to be seen 5 x/week, 6 x/week to address the above listed problems via self-care/home management, therapeutic activities, therapeutic exercises  Plan of Care expires: 08/13/18  Plan of Care reviewed with: patient    ISAMAR Martinez  07/30/2018

## 2018-07-30 NOTE — PT/OT/SLP PROGRESS
Physical Therapy  Treatment    [facility] [place]   MRN: [id number]   Admitting Diagnosis: Prosthetic joint implant failure    PT Received On: 07/30/18  Total Time (min): 43       Billable Minutes:  Gait Training 13, Therapeutic Activity 15 and Therapeutic Exercise 15    Treatment Type: Treatment  PT/PTA: PTA     PTA Visit Number: 3       General Precautions: Standard, fall  Orthopedic Precautions: RLE weight bearing as tolerated   Braces: N/A    Do you have any cultural, spiritual, Religion conflicts, given your current situation?: none stated    Subjective:  Communicated with nursing prior to session.  Pt agreed to work with therapy.    Pain/Comfort  Pain Rating 1: 0/10  Pain Rating Post-Intervention 1: 0/10    Objective:  Patient found seated w/c.         Functional Status:  MDS G  Bed Mobility Functional Status: S-SBA  Transfer Functional Status: S-SBA  Walk in Room Functional Status: S-SBA  Walk in Corridor Functional Status: S-SBA  Locomotion on Unit Functional Status: S-SBA  Locomotion Off Unit Functional Status: S-SBA  Moving from seated to standing position: Not steady, but able to stabilize without staff assistance  Walking (with assistive device if used): Not steady, only able to stabilize with staff assistance  Turning around and facing the opposite direction while walking: Not steady, only able to stabilize with staff assistance  Moving on and off the toilet: Activity did not occur  Surface-to-surface transfer (transfer between bed and chair or wheelchair): Not steady, but able to stabilize without staff assistance          AM-PAC 6 CLICK MOBILITY  Total Score:18    Bed Mobility:  Sit>Supine: SBA on mat  Supine>Sit: SBA on mat    Transfers:  Sit<>Stand: SBA with RW (multiple trials)    Gait:  Amb x2 trials of ~190 feet each trial with RW and  SBA    Therex:  B LE therex x25 reps: AP,SAQ,LAQ,HF,GS, QS, SLR, and Hip Abd/Add    Additional Treatment:  Seated Stepper x10 min (level 8) using BUE/BLE to improve  overall strength and endurance.     AROM (R) knee: -6 degrees to 95 degrees (supine)    Patient left up in chair with call button in reach.    Assessment:  [facility] [place] a 72 y.o. male with a medical diagnosis of Prosthetic joint implant failure.  Pt tolerated treatment well, and will continue to benefit from PT services at this time. Continue with PT POC as indicated.    Rehab identified problem list/impairments: weakness, impaired endurance, impaired functional mobilty, gait instability, impaired balance, decreased lower extremity function, impaired cognition, edema    Rehab potential is good.    Activity tolerance: Good    Discharge recommendations: home health PT     Barriers to discharge: None    Equipment recommendations: wheelchair     GOALS:    Physical Therapy Goals        Problem: Physical Therapy Goal    Goal Priority Disciplines Outcome Goal Variances Interventions   Physical Therapy Goal     PT/OT, PT Ongoing (interventions implemented as appropriate)     Description:  Goals to be met by: 2 weeks (  )    Patient will increase functional independence with mobility by performin. Supine to sit with Contact Guard Assistance. Met (2018)  2. Sit to supine with Contact Guard Assistance. Met (2018)  3. Sit to stand transfer with Contact Guard Assistance. Met (2018)  4. Bed to chair transfer with Contact Guard Assistance using Rolling Walker  5. Gait  x 75 feet with Contact Guard Assistance using Rolling Walker. Met (2018)  5a. Gait x 150 feet with stand-by assistance using a rolling walker with swing-through gait pattern, proper posture. Met (2018)  5b. Gait x 25 feet with Min A without use of a rolling walker, as this is the patient's goal (to ambulate without an assistive device).    6. Ascend/descend 4 stair with bilateral Handrails Contact Guard Assistance, as his house has four PUNEET with B HR. Met (2018)  7. Ascend/Descend 4 inch curb step with Contact Guard  Assistance using Rolling Walker. Met (7/23/2018)  8. Stand for 3 minutes with Contact Guard Assistance using Rolling Walker while performing a task.  9. Lower extremity exercise program x 20 reps per handout, with assistance as needed.  10. Pt will improve his R knee active extension from -10 degrees to -5 degrees to allow for better functional mobility..  11. Pt will improve his L knee active flexion from 68 degrees to 80 degrees to allow for better functional mobility.-met 7/14/18  Revised (7/21/2018):  Pt will improve his L knee active flexion from 80 degrees to 90 degrees to allow for better functional mobility. Met (7/25/2018)                             PLAN:    Patient to be seen 5 x/week  to address the above listed problems via gait training, wheelchair management/training, therapeutic exercises, therapeutic activities  Plan of Care expires: 08/12/18  Plan of Care reviewed with: patient    Emma Marcelino, PTA  07/30/2018

## 2018-07-30 NOTE — PLAN OF CARE
Problem: Physical Therapy Goal  Goal: Physical Therapy Goal  Goals to be met by: 2 weeks (  )    Patient will increase functional independence with mobility by performin. Supine to sit with Contact Guard Assistance. Met (2018)  2. Sit to supine with Contact Guard Assistance. Met (2018)  3. Sit to stand transfer with Contact Guard Assistance. Met (2018)  4. Bed to chair transfer with Contact Guard Assistance using Rolling Walker  5. Gait  x 75 feet with Contact Guard Assistance using Rolling Walker. Met (2018)  5a. Gait x 150 feet with stand-by assistance using a rolling walker with swing-through gait pattern, proper posture. Met (2018)  5b. Gait x 25 feet with Min A without use of a rolling walker, as this is the patient's goal (to ambulate without an assistive device).    6. Ascend/descend 4 stair with bilateral Handrails Contact Guard Assistance, as his house has four PUNEET with B HR. Met (2018)  7. Ascend/Descend 4 inch curb step with Contact Guard Assistance using Rolling Walker. Met (2018)  8. Stand for 3 minutes with Contact Guard Assistance using Rolling Walker while performing a task.  9. Lower extremity exercise program x 20 reps per handout, with assistance as needed.  10. Pt will improve his R knee active extension from -10 degrees to -5 degrees to allow for better functional mobility..  11. Pt will improve his L knee active flexion from 68 degrees to 80 degrees to allow for better functional mobility.-met 18  Revised (2018):  Pt will improve his L knee active flexion from 80 degrees to 90 degrees to allow for better functional mobility. Met (2018)            Goals remain appropriate at time. Continue with PT POC as indicated.

## 2018-07-31 LAB
POCT GLUCOSE: 142 MG/DL (ref 70–110)
POCT GLUCOSE: 145 MG/DL (ref 70–110)
POCT GLUCOSE: 196 MG/DL (ref 70–110)
POCT GLUCOSE: 231 MG/DL (ref 70–110)

## 2018-07-31 PROCEDURE — 97530 THERAPEUTIC ACTIVITIES: CPT

## 2018-07-31 PROCEDURE — 25000003 PHARM REV CODE 250: Performed by: STUDENT IN AN ORGANIZED HEALTH CARE EDUCATION/TRAINING PROGRAM

## 2018-07-31 PROCEDURE — 97116 GAIT TRAINING THERAPY: CPT

## 2018-07-31 PROCEDURE — 25000003 PHARM REV CODE 250: Performed by: NURSE PRACTITIONER

## 2018-07-31 PROCEDURE — 11000004 HC SNF PRIVATE

## 2018-07-31 PROCEDURE — 25000003 PHARM REV CODE 250: Performed by: INTERNAL MEDICINE

## 2018-07-31 PROCEDURE — 97110 THERAPEUTIC EXERCISES: CPT

## 2018-07-31 RX ORDER — METOPROLOL SUCCINATE 50 MG/1
50 TABLET, EXTENDED RELEASE ORAL DAILY
Qty: 60 TABLET | Refills: 1 | Status: SHIPPED | OUTPATIENT
Start: 2018-07-31 | End: 2019-02-04

## 2018-07-31 RX ORDER — LEVOTHYROXINE SODIUM 25 UG/1
25 TABLET ORAL DAILY
Qty: 90 TABLET | Refills: 0 | Status: SHIPPED | OUTPATIENT
Start: 2018-07-31 | End: 2018-08-30

## 2018-07-31 RX ORDER — FUROSEMIDE 40 MG/1
40 TABLET ORAL DAILY
Qty: 30 TABLET | Refills: 1 | Status: SHIPPED | OUTPATIENT
Start: 2018-07-31 | End: 2020-04-13

## 2018-07-31 RX ORDER — ASPIRIN 325 MG
325 TABLET, DELAYED RELEASE (ENTERIC COATED) ORAL DAILY
Refills: 0 | COMMUNITY
Start: 2018-07-31 | End: 2018-08-15

## 2018-07-31 RX ADMIN — PRAVASTATIN SODIUM 10 MG: 10 TABLET ORAL at 10:07

## 2018-07-31 RX ADMIN — GABAPENTIN 300 MG: 300 CAPSULE ORAL at 10:07

## 2018-07-31 RX ADMIN — RAMELTEON 8 MG: 8 TABLET, FILM COATED ORAL at 10:07

## 2018-07-31 RX ADMIN — INSULIN ASPART 20 UNITS: 100 INJECTION, SOLUTION INTRAVENOUS; SUBCUTANEOUS at 11:07

## 2018-07-31 RX ADMIN — PREDNISOLONE ACETATE-GATIFLOXACIN 1 DROP: 5; 10 SUSPENSION/ DROPS OPHTHALMIC at 03:07

## 2018-07-31 RX ADMIN — METOPROLOL SUCCINATE 50 MG: 50 TABLET, EXTENDED RELEASE ORAL at 10:07

## 2018-07-31 RX ADMIN — GABAPENTIN 300 MG: 300 CAPSULE ORAL at 03:07

## 2018-07-31 RX ADMIN — TAMSULOSIN HYDROCHLORIDE 0.4 MG: 0.4 CAPSULE ORAL at 09:07

## 2018-07-31 RX ADMIN — FUROSEMIDE 40 MG: 40 TABLET ORAL at 09:07

## 2018-07-31 RX ADMIN — PREDNISOLONE ACETATE-GATIFLOXACIN 1 DROP: 5; 10 SUSPENSION/ DROPS OPHTHALMIC at 10:07

## 2018-07-31 RX ADMIN — INSULIN ASPART 20 UNITS: 100 INJECTION, SOLUTION INTRAVENOUS; SUBCUTANEOUS at 04:07

## 2018-07-31 RX ADMIN — FAMOTIDINE 20 MG: 20 TABLET ORAL at 09:07

## 2018-07-31 RX ADMIN — INSULIN ASPART 18 UNITS: 100 INJECTION, SOLUTION INTRAVENOUS; SUBCUTANEOUS at 07:07

## 2018-07-31 RX ADMIN — INSULIN ASPART 2 UNITS: 100 INJECTION, SOLUTION INTRAVENOUS; SUBCUTANEOUS at 11:07

## 2018-07-31 RX ADMIN — FLUTICASONE PROPIONATE 100 MCG: 50 SPRAY, METERED NASAL at 09:07

## 2018-07-31 RX ADMIN — INSULIN ASPART 2 UNITS: 100 INJECTION, SOLUTION INTRAVENOUS; SUBCUTANEOUS at 10:07

## 2018-07-31 RX ADMIN — FENOFIBRATE 145 MG: 145 TABLET ORAL at 09:07

## 2018-07-31 RX ADMIN — INSULIN DETEMIR 32 UNITS: 100 INJECTION, SOLUTION SUBCUTANEOUS at 09:07

## 2018-07-31 RX ADMIN — GABAPENTIN 300 MG: 300 CAPSULE ORAL at 09:07

## 2018-07-31 RX ADMIN — PREDNISOLONE ACETATE-GATIFLOXACIN 1 DROP: 5; 10 SUSPENSION/ DROPS OPHTHALMIC at 09:07

## 2018-07-31 RX ADMIN — INSULIN DETEMIR 32 UNITS: 100 INJECTION, SOLUTION SUBCUTANEOUS at 10:07

## 2018-07-31 RX ADMIN — APIXABAN 5 MG: 2.5 TABLET, FILM COATED ORAL at 09:07

## 2018-07-31 RX ADMIN — APIXABAN 5 MG: 2.5 TABLET, FILM COATED ORAL at 10:07

## 2018-07-31 RX ADMIN — FAMOTIDINE 20 MG: 20 TABLET ORAL at 10:07

## 2018-07-31 NOTE — PLAN OF CARE
Problem: Fall Risk (Adult)  Goal: Absence of Falls  Patient will demonstrate the desired outcomes by discharge/transition of care.   Plan of care reviewed with pt. Pt. Remains free of falls/trauma/injury. Pt. Educated on diabetes control, healthy eating, glucose checks, and insulin administration. Pt. Voices understanding. Pt tolerating plan of care well. Will continue to monitor.

## 2018-07-31 NOTE — PLAN OF CARE
[facility] [place] [facility] - Skilled Nursing Care    HOME HEALTH ORDERS  FACE TO FACE ENCOUNTER    Patient Name: [facility] [place] PAT Cantor Jr.  YOB: 1945    PCP: Desmond Barlow MD   PCP Address: [address] [facility] [place] [address]  PCP Phone Number: [phone]  PCP Fax: [phone]    Encounter Date: 07/31/2018    Admit to Home Health    Diagnoses:  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]  Yes    Presence of surgical incision [Z78.9]  Yes    Debility [R53.81]  Yes    Proteinuria due to type 2 diabetes mellitus [E11.29, R80.9]  Yes    Bilateral leg edema [R60.0]  Yes     Uses compression stockings on Left lower extremity   Unable to use on Rt side due to knee pain       Lymphedema of both lower extremities [I89.0]  Yes    Diastolic dysfunction [I51.9]  Yes    Enlarged prostate [N40.0]  Yes     No Hesitancy        Type 2 diabetes mellitus with diabetic polyneuropathy [E11.42]  Yes     As per him , no open areas on the feet       Dyslipidemia associated with type 2 diabetes mellitus [E11.69, E78.5]  Yes    Morbid obesity [E66.01]  Yes    Chronic kidney disease, stage III (moderate) [N18.3]  Yes     Base line 1.7- 1.8       Benign hypertensive renal disease without renal failure [I12.9]  Yes    Sleep apnea [G47.30]  Yes     Uses CPAP      Chronic atrial fibrillation [I48.2]  Yes    Long term current use of anticoagulant therapy [Z79.01]  Not Applicable      Resolved Hospital Problems    Diagnosis Date Resolved POA   No resolved problems to display.       Future Appointments  Date Time Provider Department Center   8/2/2018 2:20 PM Christina Wilhelm NP [facility] SLEEP [facility]   8/13/2018 9:30 AM Diego Mack MD [facility] IM [facility]   8/15/2018 1:00 PM Sandy Adams NP [facility] ORTHO [address] [place]     Follow-up Information     Diego Mack MD. Go on 8/13/2018.    Specialty:  Internal Medicine  Why:  [facility] Follow-Up appointment  "scheduled for 9:30 am  Contact information:  [address]  [facility] [address]  [phone]             Shenette S Choco, NP. Go on 8/2/2018.    Specialties:  Sleep Medicine, Internal Medicine  Contact information:  [address]  [address]  [facility] [address]  [phone]             Sandy [place], NP. Go on 8/15/2018.    Specialty:  Orthopedic Surgery  Contact information:  [address]  [facility] [address]  [phone]               I have seen and examined this patient face to face today. My clinical findings that support the need for the home health skilled services and home bound status are the following:  Weakness/numbness causing balance and gait disturbance due to Joint Replacement, Weakness/Debility and Surgery making it taxing to leave home.    Allergies:  Review of patient's allergies indicates:   Allergen Reactions    Niacin Itching and Other (See Comments)     Other reaction(s): facial flushing and causes hepatitis     Terbinafine Other (See Comments)     Other reaction(s): Hepatitis    "gave me rash"       Diet: diabetic diet: 2000 calorie and 2 gram sodium diet    Activities: activity as tolerated and no lifting, Driving, or Strenuous exercise    Nursing:   SN to complete comprehensive assessment including routine vital signs. Instruct on disease process and s/s of complications to report to MD. Review/verify medication list sent home with the patient at time of discharge  and instruct patient/caregiver as needed. Frequency may be adjusted depending on start of care date.    Notify MD if SBP > 160 or < 90; DBP > 90 or < 50; HR > 120 or < 50; Temp > 101      CONSULTS:    Physical Therapy to evaluate and treat. Evaluate for home safety and equipment needs; Establish/upgrade home exercise program. Perform / instruct on therapeutic exercises, gait training, transfer training, and Range of Motion.  Occupational Therapy to evaluate and treat. Evaluate home environment for " safety and equipment needs. Perform/Instruct on transfers, ADL training, ROM, and therapeutic exercises.  Aide to provide assistance with personal care, ADLs, and vital signs.    MISCELLANEOUS CARE:  Diabetic Care:   SN to perform and educate Diabetic management with blood glucose monitoring:, Fingerstick blood sugar AC and HS and Report CBG < 60 or > 350 to physician.  Heart Failure:      SN to instruct on the following:    Instruct on the definition of CHF.   Instruct on the signs/sympoms of CHF to be reported.   Instruct on and monitor daily weights.   Instruct on factors that cause exacerbation.   Instruct on action, dose, schedule, and side effects of medications.   Instruct on diet as prescribed.   Instruct on activity allowed.   Instruct on life-style modifications for life long management of CHF   SN to assess compliance with daily weights, diet, medications, fluid retention,    safety precautions, activities permitted and life-style modifications.   Additional 1-2 SN visits per week as needed for signs and symptoms     of CHF exacerbation.    For Weight Gain > 2-3 lbs in 1 day or 4-6 lbs over 1 week notify PCP        WOUND CARE ORDERS  n/a      Medications: Review discharge medications with patient and family and provide education.      Current Discharge Medication List      START taking these medications    Details   aspirin (ECOTRIN) 325 MG EC tablet Take 1 tablet (325 mg total) by mouth once daily.  Refills: 0         CONTINUE these medications which have CHANGED    Details   furosemide (LASIX) 40 MG tablet Take 1 tablet (40 mg total) by mouth once daily.  Qty: 30 tablet, Refills: 1    Associated Diagnoses: Acute diastolic heart failure; Primary osteoarthritis of right knee; Chronic atrial fibrillation; Essential hypertension; Venous insufficiency of both lower extremities; Type 2 diabetes mellitus with stage 3 chronic kidney disease, with long-term current use of insulin; Chronic kidney disease, stage  "III (moderate)      levothyroxine (SYNTHROID) 25 MCG tablet Take 1 tablet (25 mcg total) by mouth once daily.  Qty: 90 tablet, Refills: 0    Associated Diagnoses: Hypothyroidism due to acquired atrophy of thyroid      metoprolol succinate (TOPROL-XL) 50 MG 24 hr tablet Take 1 tablet (50 mg total) by mouth once daily.  Qty: 60 tablet, Refills: 1         CONTINUE these medications which have NOT CHANGED    Details   apixaban 5 mg Tab Take 1 tablet (5 mg total) by mouth 2 (two) times daily.  Qty: 180 tablet, Refills: 3    Comments: RESUME on 7/17/2018  Associated Diagnoses: Paroxysmal atrial fibrillation; Persistent atrial fibrillation; Essential hypertension      azelastine (ASTELIN) 137 mcg (0.1 %) nasal spray 1 spray (137 mcg total) by Nasal route 2 (two) times daily.  Qty: 30 mL, Refills: 3      BD INSULIN PEN NEEDLE UF ORIG 29 x 1/2 " Ndle Refills: 12      ciclopirox (PENLAC) 8 % Soln Apply topically nightly.  Qty: 6.6 mL, Refills: 11      clobetasol (TEMOVATE) 0.05 % cream AAA finger bid  Qty: 60 g, Refills: 3    Associated Diagnoses: Dyshidrotic eczema      clotrimazole-betamethasone 1-0.05% (LOTRISONE) cream Apply topically 2 (two) times daily.  Qty: 45 g, Refills: 3      docusate sodium (COLACE) 100 MG capsule Take 1 capsule (100 mg total) by mouth 2 (two) times daily as needed for Constipation.  Qty: 60 capsule, Refills: 1      fenofibrate (TRICOR) 145 MG tablet TAKE ONE TABLET BY MOUTH EVERY DAY  Qty: 90 tablet, Refills: 1      gabapentin (NEURONTIN) 300 MG capsule Take 1 capsule (300 mg total) by mouth 3 (three) times daily.  Qty: 90 capsule, Refills: 1      insulin aspart (NOVOLOG FLEXPEN) 100 unit/mL InPn pen Novolog 20 units breakfast, 22 units lunch and dinner plus correction scale. Max daily dose=100 units  Qty: 2 Box, Refills: 11    Associated Diagnoses: Type 2 diabetes mellitus with diabetic polyneuropathy, with long-term current use of insulin      insulin glargine (BASAGLAR KWIKPEN U-100 INSULIN) " "100 unit/mL (3 mL) InPn pen Inject 60 Units into the skin once daily.  Qty: 2 Box, Refills: 11      ketoconazole (NIZORAL) 2 % cream Apply topically once daily.  Qty: 30 g, Refills: 1    Associated Diagnoses: Tinea pedis of both feet      lancets 33 gauge Misc 1 lancet by Misc.(Non-Drug; Combo Route) route 4 (four) times daily. Pt uses True Result Meter, bg monitoring 4 times a day.  Qty: 450 each, Refills: 4    Associated Diagnoses: Type 2 diabetes mellitus with diabetic polyneuropathy      losartan (COZAAR) 25 MG tablet Take 1 tablet (25 mg total) by mouth once daily.  Qty: 90 tablet, Refills: 3      metronidazole (METROGEL) 0.75 % gel Apply topically 2 (two) times daily.  Qty: 1 Tube, Refills: 6      nystatin-triamcinolone (MYCOLOG II) cream apply TWICE DAILY  Qty: 60 g, Refills: 1      pravastatin (PRAVACHOL) 10 MG tablet Take 1 tablet (10 mg total) by mouth once daily.  Qty: 90 tablet, Refills: 3      tamsulosin (FLOMAX) 0.4 mg Cp24 Take 1 capsule (0.4 mg total) by mouth once daily.  Qty: 30 capsule, Refills: 5      temazepam (RESTORIL) 15 mg Cap TAKE ONE CAPSULE BY MOUTH EVERY EVENING  Qty: 30 capsule, Refills: 5      triamcinolone acetonide 0.1% (KENALOG) 0.1 % cream Apply topically 2 (two) times daily. Apply to affected area twice daily as directed.  Qty: 454 g, Refills: 0    Associated Diagnoses: Venous stasis dermatitis of right lower extremity         STOP taking these medications       aspirin 81 MG Chew Comments:   Reason for Stopping:         BD ULTRA-FINE BASIL PEN NEEDLES 32 gauge x 5/32" Ndle Comments:   Reason for Stopping:         ondansetron (ZOFRAN-ODT) 8 MG TbDL Comments:   Reason for Stopping:         oxyCODONE-acetaminophen (PERCOCET)  mg per tablet Comments:   Reason for Stopping:         tiZANidine (ZANAFLEX) 4 MG tablet Comments:   Reason for Stopping:               I certify that this patient is confined to his home and needs intermittent skilled nursing care, physical therapy and " occupational therapy.

## 2018-07-31 NOTE — PT/OT/SLP PROGRESS
"Physical Therapy  Treatment    [facility] [place] PAT Cantor Jr.   MRN: [id number]   Admitting Diagnosis: Prosthetic joint implant failure    PT Received On: 07/31/18  Total Time (min):60    Billable Minutes:  Gait Training 15, Therapeutic Activity 15 and Therapeutic Exercise 30    Treatment Type: Treatment  PT/PTA: PT     PTA Visit Number: 0       General Precautions: Standard, fall  Orthopedic Precautions: RLE weight bearing as tolerated   Braces: N/A    Do you have any cultural, spiritual, Hindu conflicts, given your current situation?: none stated    Subjective:  Communicated with pt prior to session. Agreeable to PT services. "I go home tomorrow."    Pain/Comfort  Pain Rating 1: 0/10    Objective:  Patient found seated in wheelchair.         Functional Status:  MDS G  Bed Mobility Functional Status: S-SBA  Transfer Functional Status: S-SBA  Walk in Room Functional Status: S-SBA  Walk in Corridor Functional Status: S-SBA  Locomotion on Unit Functional Status: S-SBA  Locomotion Off Unit Functional Status: S-SBA          AM-PAC 6 CLICK MOBILITY  Total Score:18    Bed Mobility:  Sit>Supine:SBA with instruction on how to properly perform  Supine>Sit: SBA    Transfers:  Sit<>Stand: SBA with UE support  Stand Pivot Transfer: SBA with UE support    Gait:  Amb 150 feet with RW, swing-through gait- SBA. Educated on improving his push off and hip extension. He was able to do so and improved his gait quality significantly.    Advanced Gait:  Stairs: 4 steps with step-to gait pattern-SBA (with use of handrails)    Therex (x25 reps):  LAQ  Hip flexion  Ankle pumps    SAQ  Hip abduction  Heel slides  Quad sets  Gluteal sets  SLR      Balance:  Able to stand with UE support- SBA-CGA with stand pivot transfers    Additional Treatment:  -5 to 95 degrees on R knee (supine)    Patient left up in chair with call button in reach.    Assessment:  [facility] [place] PAT Cantor Jr. is a 72 y.o. male with a medical diagnosis of Prosthetic joint implant failure.  Mr." Sakshi met two goals today, showing progress with both his transfers and the active range of motion in his RLE. Will benefit from further PT services via home health PT to continue improving functionally.    Rehab identified problem list/impairments: weakness, impaired endurance, impaired functional mobilty, gait instability, impaired balance, decreased lower extremity function, impaired cognition, edema    Rehab potential is good.    Activity tolerance: Good    Discharge recommendations: home health PT     Barriers to discharge: None    Equipment recommendations: wheelchair     GOALS:    Physical Therapy Goals        Problem: Physical Therapy Goal    Goal Priority Disciplines Outcome Goal Variances Interventions   Physical Therapy Goal     PT/OT, PT Ongoing (interventions implemented as appropriate)     Description:  Goals to be met by: 2 weeks (  )    Patient will increase functional independence with mobility by performin. Supine to sit with Contact Guard Assistance. Met (2018)  2. Sit to supine with Contact Guard Assistance. Met (2018)  3. Sit to stand transfer with Contact Guard Assistance. Met (2018)  4. Bed to chair transfer with Contact Guard Assistance using Rolling Walker met (2018)  5. Gait  x 75 feet with Contact Guard Assistance using Rolling Walker. Met (2018)  5a. Gait x 150 feet with stand-by assistance using a rolling walker with swing-through gait pattern, proper posture. Met (2018)  5b. Gait x 25 feet with Min A without use of a rolling walker, as this is the patient's goal (to ambulate without an assistive device).    6. Ascend/descend 4 stair with bilateral Handrails Contact Guard Assistance, as his house has four PUNEET with B HR. Met (2018)  7. Ascend/Descend 4 inch curb step with Contact Guard Assistance using Rolling Walker. Met (2018)  8. Stand for 3 minutes with Contact Guard Assistance using Rolling Walker while performing a task.  9. Lower  extremity exercise program x 20 reps per handout, with assistance as needed.  10. Pt will improve his R knee active extension from -10 degrees to -5 degrees to allow for better functional mobility. Met (7/31/2018)  11. Pt will improve his L knee active flexion from 68 degrees to 80 degrees to allow for better functional mobility.-met 7/14/18  Revised (7/21/2018):  Pt will improve his L knee active flexion from 80 degrees to 90 degrees to allow for better functional mobility. Met (7/25/2018)                              PLAN:    Patient to be seen 5 x/week  to address the above listed problems via gait training, wheelchair management/training, therapeutic exercises, therapeutic activities  Plan of Care expires: 08/12/18  Plan of Care reviewed with: patient    Betzaida REGGIE Wheeler, PT  07/31/2018

## 2018-07-31 NOTE — PLAN OF CARE
Problem: Physical Therapy Goal  Goal: Physical Therapy Goal  Goals to be met by: 2 weeks (  )    Patient will increase functional independence with mobility by performin. Supine to sit with Contact Guard Assistance. Met (2018)  2. Sit to supine with Contact Guard Assistance. Met (2018)  3. Sit to stand transfer with Contact Guard Assistance. Met (2018)  4. Bed to chair transfer with Contact Guard Assistance using Rolling Walker met (2018)  5. Gait  x 75 feet with Contact Guard Assistance using Rolling Walker. Met (2018)  5a. Gait x 150 feet with stand-by assistance using a rolling walker with swing-through gait pattern, proper posture. Met (2018)  5b. Gait x 25 feet with Min A without use of a rolling walker, as this is the patient's goal (to ambulate without an assistive device).    6. Ascend/descend 4 stair with bilateral Handrails Contact Guard Assistance, as his house has four PUNEET with B HR. Met (2018)  7. Ascend/Descend 4 inch curb step with Contact Guard Assistance using Rolling Walker. Met (2018)  8. Stand for 3 minutes with Contact Guard Assistance using Rolling Walker while performing a task.  9. Lower extremity exercise program x 20 reps per handout, with assistance as needed.  10. Pt will improve his R knee active extension from -10 degrees to -5 degrees to allow for better functional mobility.   11. Pt will improve his L knee active flexion from 68 degrees to 80 degrees to allow for better functional mobility.-met 18  Revised (2018):  Pt will improve his L knee active flexion from 80 degrees to 90 degrees to allow for better functional mobility. Met (2018)            Outcome: Ongoing (interventions implemented as appropriate)  Met two goals today.

## 2018-07-31 NOTE — PT/OT/SLP PROGRESS
Occupational Therapy  Treatment    [facility] [place] PAT Cantor Jr.   MRN: [id number]   Admitting Diagnosis: Prosthetic joint implant failure    OT Date of Treatment: 07/31/18  Total Time (min): 43 min    Billable Minutes:  Therapeutic Activity 15 and Therapeutic Exercise 28    General Precautions: Standard, fall  Orthopedic Precautions: RLE weight bearing as tolerated  Braces: N/A    Do you have any cultural, spiritual, Baptist conflicts, given your current situation?: none stated    Subjective:  Communicated with nurse prior to session.  Pt. Reported that his knee was a little achy    Pain/Comfort  Pain Rating 1: 4/10  Location - Side 1: Right  Location 1: knee  Pain Addressed 1: Reposition, Distraction    Objective:  Patient found with:  (seated in bedside chair)    Functional Status:  MDS G  Bed Mobility Functional Status: mod(I) - (I)  Transfer Functional Status: S-SBA with RW  Walk in Room Functional Status: S-SBA with RW  Walk in Corridor Functional Status: S-SBA with RW to and from gym with RW  Toilet Use Functional Status: S-SBA   Personal Hygiene Functional Status: S-SBA  Moving from seated to standing position: Not steady, but able to stabilize without staff assistance  Surface-to-surface transfer (transfer between bed and chair or wheelchair): Not steady, but able to stabilize without staff assistance           [facility] 6 Click:  [facility] Total Score: 21    OT Exercises: UE Ergometer x 15 minutes with Mod resistance to improve endurance    Additional Treatment:  Pt. Performed BUE there ex with 4 # dowel x 2 sets 10 reps for all major planes of bUE motion seated in w/c   Pt. Educated on need for staff assist for all transfers and functional mobility    Patient left up in chair with call button in reach    ASSESSMENT:  [facility] [place] PAT Cantor Jr. is a 72 y.o. male with a medical diagnosis of Prosthetic joint implant failure and presents with mild deficits with self-care skilsl and functional mobility  Pt. Family training to be held  tomorrow.,    Pt. Tolerated session well. .    Rehab identified problem list/impairments: weakness, impaired endurance, impaired self care skills, impaired functional mobilty, gait instability, impaired balance, decreased coordination, decreased lower extremity function, decreased safety awareness, pain, decreased ROM, orthopedic precautions    Rehab potential is good    Activity tolerance: Good    Discharge recommendations: home with home health  And supervision    Barriers to discharge: Decreased caregiver support     Equipment recommendations: wheelchair     GOALS:    Occupational Therapy Goals        Problem: Occupational Therapy Goal    Goal Priority Disciplines Outcome Interventions   Occupational Therapy Goal     OT, PT/OT Ongoing (interventions implemented as appropriate)    Description:  Goals to be met by: 14 days     Patient will increase functional independence with ADLs by performing:    Feeding with Modified [place].  UE Dressing with Modified [place].  LE Dressing with Stand-by Assistance using A/E and A/D as needed..  Grooming while standing with Stand-by Assistance with RW to stand..  Toileting from bedside commode with Stand-by Assistance for hygiene and clothing management with DME as needed.  Bathing from  edge of bed with Minimal Assistance.  Rolling to Bilateral with Modified [place].   Supine to sit with Modified [place].  Stand pivot transfers with Supervision with RW.  Upper extremity exercise program x10 reps per set, with assistance as needed.  Pt will tolerate up to 10 minutes standing activity with RW to steady.                      Plan:  Patient to be seen 5 x/week, 6 x/week to address the above listed problems via self-care/home management, therapeutic activities, therapeutic exercises  Plan of Care expires: 08/13/18  Plan of Care reviewed with: patient    Mireya LUO Oh, VANESSA  07/31/2018

## 2018-08-01 VITALS
OXYGEN SATURATION: 98 % | TEMPERATURE: 98 F | BODY MASS INDEX: 43.02 KG/M2 | HEIGHT: 70 IN | SYSTOLIC BLOOD PRESSURE: 124 MMHG | DIASTOLIC BLOOD PRESSURE: 60 MMHG | RESPIRATION RATE: 20 BRPM | HEART RATE: 86 BPM | WEIGHT: 300.5 LBS

## 2018-08-01 LAB
POCT GLUCOSE: 155 MG/DL (ref 70–110)
POCT GLUCOSE: 210 MG/DL (ref 70–110)

## 2018-08-01 PROCEDURE — 97110 THERAPEUTIC EXERCISES: CPT

## 2018-08-01 PROCEDURE — 97535 SELF CARE MNGMENT TRAINING: CPT

## 2018-08-01 PROCEDURE — 25000003 PHARM REV CODE 250: Performed by: STUDENT IN AN ORGANIZED HEALTH CARE EDUCATION/TRAINING PROGRAM

## 2018-08-01 PROCEDURE — 97116 GAIT TRAINING THERAPY: CPT

## 2018-08-01 PROCEDURE — 97530 THERAPEUTIC ACTIVITIES: CPT

## 2018-08-01 PROCEDURE — 99316 NF DSCHRG MGMT 30 MIN+: CPT | Mod: ,,, | Performed by: HOSPITALIST

## 2018-08-01 PROCEDURE — 25000003 PHARM REV CODE 250: Performed by: INTERNAL MEDICINE

## 2018-08-01 PROCEDURE — 25000003 PHARM REV CODE 250: Performed by: NURSE PRACTITIONER

## 2018-08-01 RX ADMIN — STANDARDIZED SENNA CONCENTRATE AND DOCUSATE SODIUM 1 TABLET: 8.6; 5 TABLET, FILM COATED ORAL at 08:08

## 2018-08-01 RX ADMIN — PREDNISOLONE ACETATE-GATIFLOXACIN 1 DROP: 5; 10 SUSPENSION/ DROPS OPHTHALMIC at 08:08

## 2018-08-01 RX ADMIN — FUROSEMIDE 40 MG: 40 TABLET ORAL at 08:08

## 2018-08-01 RX ADMIN — FENOFIBRATE 145 MG: 145 TABLET ORAL at 08:08

## 2018-08-01 RX ADMIN — INSULIN ASPART 18 UNITS: 100 INJECTION, SOLUTION INTRAVENOUS; SUBCUTANEOUS at 08:08

## 2018-08-01 RX ADMIN — FLUTICASONE PROPIONATE 100 MCG: 50 SPRAY, METERED NASAL at 08:08

## 2018-08-01 RX ADMIN — GABAPENTIN 300 MG: 300 CAPSULE ORAL at 08:08

## 2018-08-01 RX ADMIN — FAMOTIDINE 20 MG: 20 TABLET ORAL at 08:08

## 2018-08-01 RX ADMIN — APIXABAN 5 MG: 2.5 TABLET, FILM COATED ORAL at 08:08

## 2018-08-01 RX ADMIN — INSULIN DETEMIR 32 UNITS: 100 INJECTION, SOLUTION SUBCUTANEOUS at 08:08

## 2018-08-01 RX ADMIN — LOSARTAN POTASSIUM 25 MG: 25 TABLET, FILM COATED ORAL at 08:08

## 2018-08-01 RX ADMIN — TAMSULOSIN HYDROCHLORIDE 0.4 MG: 0.4 CAPSULE ORAL at 08:08

## 2018-08-01 NOTE — ASSESSMENT & PLAN NOTE
7/24/18  · S/P right revision TKA  · Will continue to monitor for drainage or erythema to surgical site   · Continue with current pain control with perineural adductor. Holding opioids for now   · Continue bowel regimen for opioid induced constipation  · Continue with PT/OT for gait training and strengthening and restoration of ADL's: WBAT per ortho   · Fall precautions   · Continue DVT prophylaxis per ortho apixaban

## 2018-08-01 NOTE — PLAN OF CARE
Problem: Patient Care Overview  Goal: Plan of Care Review  Outcome: Ongoing (interventions implemented as appropriate)  Plan of care reviewed with patient, involve in care  & able to make needs known, and respond appropriately to verbal stimuli, no distress nor c/o voiced .

## 2018-08-01 NOTE — PT/OT/SLP PROGRESS
Occupational Therapy  Treatment/d/c    [facility] [place] [facility] [place] Jr.   MRN: [id number]   Admitting Diagnosis: Prosthetic joint implant failure    OT Date of Treatment: 08/01/18  Total Time (min): 23 min    Billable Minutes:  Self Care/Home Management 23    General Precautions: Standard, fall  Orthopedic Precautions: RLE weight bearing as tolerated  Braces: N/A    Do you have any cultural, spiritual, Moravian conflicts, given your current situation?: none stated    Subjective:  Communicated with nurse prior to session.  Pt. Reported that he was ready to go home    Pain/Comfort  Pain Rating 1:  (minimal pain reported)  Location - Side 1: Right  Location 1: knee  Pain Rating Post-Intervention 1:  (minimal pain)    Objective:  Patient found with:  (seated on sofa in room with family present)    Functional Status:  MDS G  Bed Mobility Functional Status: mod(I) - (I)  Transfer Functional Status: S-SBA  Moving from seated to standing position: Not steady, but able to stabilize without staff assistance  Surface-to-surface transfer (transfer between bed and chair or wheelchair): Not steady, but able to stabilize without staff assistance           [facility] 6 Click:  [facility] Total Score: 21    OT Exercises: AROM with 2 # dowel x 2 sets 10 reps for all major planes of bUE motion    Additional Treatment:  Pt. Daughter educated on this date with pt. On:  Safety with ADL tasks , bathing, LBD as well as toileting  Safety with transfers and level of assist required.  Pt. Also issued HEP on this date for BUE and demonstrated good understanding of program.    Patient left seated on sofa with family with call button in reach and nurse notified    ASSESSMENT:  [facility] [place] [facility] [place] Cant[place] Jr. is a 72 y.o. male with a medical diagnosis of Prosthetic joint implant failure and presents with mild deficits in self-care skills as well as functional mobility and is being d/c'd home on this date with continued HH services. Pt. Tolerated session well and daughter expressed good  understanding of level of assist pt. Requires for ADL tasks.      Rehab identified problem list/impairments: weakness, impaired endurance, impaired self care skills, impaired functional mobilty, gait instability, impaired balance, decreased coordination, decreased lower extremity function, decreased safety awareness, pain, decreased ROM, orthopedic precautions    Rehab potential is good    Activity tolerance: Good    Discharge recommendations: home with home health     Barriers to discharge: Decreased caregiver support     Equipment recommendations: wheelchair     GOALS:    Occupational Therapy Goals        Problem: Occupational Therapy Goal    Goal Priority Disciplines Outcome Interventions   Occupational Therapy Goal     OT, PT/OT Ongoing (interventions implemented as appropriate)    Description:  Goals to be met by: 14 days     Patient will increase functional independence with ADLs by performing:    Feeding with Modified [place].  MET 08-01  UE Dressing with Modified [place]. MET 08-01  LE Dressing with Stand-by Assistance using A/E and A/D as needed..MET 08-01  Grooming while standing with Stand-by Assistance with RW to stand.. MET 08-01  Toileting from bedside commode with Stand-by Assistance for hygiene and clothing management with DME as needed. MET 08-01  Bathing from  edge of bed with Minimal Assistance. MET 08-01  Rolling to Bilateral with Modified [place]. MET  Supine to sit with Modified [place].MET  Stand pivot transfers with Supervision with RW. NOT MET SBA  Upper extremity exercise program x10 reps per set, with assistance as needed. MET 08-01  Pt will tolerate up to 10 minutes standing activity with RW to steady. MET 08-01                       Plan:  Patient to be seen 5 x/week, 6 x/week to address the above listed problems via self-care/home management, therapeutic activities, therapeutic exercises  Plan of Care expires: 08/13/18  Plan of Care reviewed with: patient    Mireya  VANESSA Castanon  08/01/2018

## 2018-08-01 NOTE — ASSESSMENT & PLAN NOTE
· Continue rate control with metoprolol.   · Anticoagulation therapy, patient takes apixaban 5mg bid and  daily at home; confirmed with outpatient cardiology notes

## 2018-08-01 NOTE — ASSESSMENT & PLAN NOTE
· Chronic, better controlled with recent adjustments  · detemir insulin adjusted to 30 units bid with novolog 18 with meals   · Low dose correction scale   · Continue blood glucose monitoring   · ADA diet  · Continue gabapentin to treat for neuropathy.   · Will monitor for need to adjust  · Return to home dose at discharge

## 2018-08-01 NOTE — PLAN OF CARE
Problem: Physical Therapy Goal  Goal: Physical Therapy Goal  Goals to be met by: 2 weeks (  )    Patient will increase functional independence with mobility by performin. Supine to sit with Contact Guard Assistance. Met (2018)  2. Sit to supine with Contact Guard Assistance. Met (2018)  3. Sit to stand transfer with Contact Guard Assistance. Met (2018)  4. Bed to chair transfer with Contact Guard Assistance using Rolling Walker met (2018)  5. Gait  x 75 feet with Contact Guard Assistance using Rolling Walker. Met (2018)  5a. Gait x 150 feet with stand-by assistance using a rolling walker with swing-through gait pattern, proper posture. Met (2018)  5b. Gait x 25 feet with Min A without use of a rolling walker, as this is the patient's goal (to ambulate without an assistive device).  Not met  6. Ascend/descend 4 stair with bilateral Handrails Contact Guard Assistance, as his house has four PUNEET with B HR. Met (2018)  7. Ascend/Descend 4 inch curb step with Contact Guard Assistance using Rolling Walker. Met (2018)  8. Stand for 3 minutes with Contact Guard Assistance using Rolling Walker while performing a task. Met (2018)  9. Lower extremity exercise program x 20 reps per handout, with assistance as needed. Met (2018)  10. Pt will improve his R knee active extension from -10 degrees to -5 degrees to allow for better functional mobility. Met (2018)  11. Pt will improve his L knee active flexion from 68 degrees to 80 degrees to allow for better functional mobility.-met 18  Revised (2018):  Pt will improve his L knee active flexion from 80 degrees to 90 degrees to allow for better functional mobility. Met (2018)              Outcome: Outcome(s) achieved Date Met: 18  Met all but one goal.

## 2018-08-01 NOTE — ASSESSMENT & PLAN NOTE
· Maintain euvolemia by monitoring I/O's and daily weights.  · Fluid restriction (2 liters/24 hours)  · Low Na diet  · Continue ARB, BB, lasix.

## 2018-08-01 NOTE — ASSESSMENT & PLAN NOTE
7/16/18  · 1+ edema to left lower extremity with small amount of mild erythema to lower leg (normal appearanc according to daughter at bedside)  · 2+ edema to right leg with large area of erythema to shin region with intact blisters (daughter reports normal appearance when fluid in leg increases)  · Started on keflex, ? Cellulitis,  patient to see orthopedic in clinic tomorrow  · Keep lower extremities elevated when not in therapy  · Teds hose  · Low Na diet    7/24/18  · Erythema and blisters no longer present  · Keflex stopped on 7/20  · Chronic discoloration noted to RLE  · Continue home compression wraps  · Edema slightly improved

## 2018-08-01 NOTE — DISCHARGE INSTRUCTIONS
Discharge instruction and education with patient and daughters, medication regimen, follow-up with appointments, Home health per [employer] will contact patient at home,Vital signs in stable condition, all verbalized understanding. Paperwork given to daughter. Assisted by nurse aid to wheelchair, and transport to the main entrance to family car, accompany by daughter.

## 2018-08-01 NOTE — PLAN OF CARE
08/01/2018  1:47 PM    SW faxed home health referral (facesheet, H&P, and HH orders) to [facility] (fx [phone]).  EMERALD awaiting call back from [employer] with name of HH agency.  Pt does not require DME to be ordered for SNF d/c.  Pt accompanied and transported home by family upon SNF d/c.  Pt to have assistance of spouse, children, and grandchildren upon d/c.    GEORGINA Baxter, LMSW  Ext. [phone]

## 2018-08-01 NOTE — PT/OT/SLP PROGRESS
Physical Therapy  Treatment/family training/discharge summary    [facility] [place] PAT Cantor Jr.   MRN: [id number]   Admitting Diagnosis: Prosthetic joint implant failure    PT Received On: 08/01/18  Total Time (min): 40       Billable Minutes:  Gait Training 15, Therapeutic Activity 15 and Therapeutic Exercise 10    Treatment Type: Treatment  PT/PTA: PT     PTA Visit Number: 0       General Precautions: Standard, fall  Orthopedic Precautions: RLE weight bearing as tolerated   Braces: N/A    Do you have any cultural, spiritual, Temple conflicts, given your current situation?: none stated    Subjective:  Communicated with pt prior to session. Agreeable to PT services.    Pain/Comfort  Pain Rating 1: 0/10    Objective:  Patient found seated in wheelchair.         Functional Status:  MDS G  Bed Mobility Functional Status: S-SBA  Transfer Functional Status: S-SBA  Walk in Room Functional Status: S-SBA  Walk in Corridor Functional Status: S-SBA  Locomotion on Unit Functional Status: S-SBA  Locomotion Off Unit Functional Status: S-SBA  Moving from seated to standing position: Not steady, but able to stabilize without staff assistance  Walking (with assistive device if used): Not steady, but able to stabilize without staff assistance  Turning around and facing the opposite direction while walking: Not steady, but able to stabilize without staff assistance  Moving on and off the toilet: Not steady, but able to stabilize without staff assistance  Surface-to-surface transfer (transfer between bed and chair or wheelchair): Not steady, but able to stabilize without staff assistance          AM-PAC 6 CLICK MOBILITY  Total Score:18    Bed Mobility:  Sit>Supine:SBA  Supine>Sit: SBA  *Pt will have a hospital bed at home with bed rails to assist him with getting into and out of bed.    Transfers:  Sit<>Stand: supervision  Stand Pivot Transfer: supervision with use of rolling walker    Gait:  Amb 150 feet x 2 trials (non-consecutively) with  rolling walker, step-through gait pattern- supervision.  Was seen with increased hip flexion during first trial, corrected this with verbal cues. Pt was educated to increase plantarflexion at toe-off/initial swing during second gait trial. Able to achieve this with better gait mechanics and better posture as a result.    Advanced Gait:  Stairs: 4 steps with B HR use- supervision; 4 steps with unilateral UE use- supervision (all with step-to gait pattern)  Curb Step: 4 inch curb step with supervision and use of rolling walker- even was able to descend while standing on surgical leg (RLE).    Therex (x20 reps- family educated on how to perform, how often, and pt had already been given a copy of the HEP):  LAQ  Hip flexion  Ankle pumps  SAQ  Hip abduction  Heel slides  Quad sets  Gluteal sets  SLR    Additional Treatment:  -5 to 97 degrees in supine (R knee).    Family training completed with daughter, Merle, and granddaughter, Danette.  Went over car transfer, bed height, bed mobility, transfers, gait (with RW), when to wean off of RW (with home health therapist), gait mechanics, and home exercise program.     Patient left up in chair with call button in reach.    Assessment:  [facility] [place] is a 72 y.o. male with a medical diagnosis of Prosthetic joint implant failure.  Pt is safe to discharge home with family assistance and home health PT services.    Rehab identified problem list/impairments: weakness, impaired endurance, impaired functional mobilty, gait instability, impaired balance, decreased lower extremity function, impaired cognition, edema    Rehab potential is good.    Activity tolerance: Good    Discharge recommendations: home health PT     Barriers to discharge: None    Equipment recommendations: wheelchair     GOALS:    Physical Therapy Goals     Not on file          Multidisciplinary Problems (Resolved)        Problem: Physical Therapy Goal    Goal Priority Disciplines Outcome Goal Variances  Interventions   Physical Therapy Goal   (Resolved)     PT/OT, PT Outcome(s) achieved     Description:  Goals to be met by: 2 weeks (  )    Patient will increase functional independence with mobility by performin. Supine to sit with Contact Guard Assistance. Met (2018)  2. Sit to supine with Contact Guard Assistance. Met (2018)  3. Sit to stand transfer with Contact Guard Assistance. Met (2018)  4. Bed to chair transfer with Contact Guard Assistance using Rolling Walker met (2018)  5. Gait  x 75 feet with Contact Guard Assistance using Rolling Walker. Met (2018)  5a. Gait x 150 feet with stand-by assistance using a rolling walker with swing-through gait pattern, proper posture. Met (2018)  5b. Gait x 25 feet with Min A without use of a rolling walker, as this is the patient's goal (to ambulate without an assistive device).  Not met  6. Ascend/descend 4 stair with bilateral Handrails Contact Guard Assistance, as his house has four PUNEET with B HR. Met (2018)  7. Ascend/Descend 4 inch curb step with Contact Guard Assistance using Rolling Walker. Met (2018)  8. Stand for 3 minutes with Contact Guard Assistance using Rolling Walker while performing a task. Met (2018)  9. Lower extremity exercise program x 20 reps per handout, with assistance as needed. Met (2018)  10. Pt will improve his R knee active extension from -10 degrees to -5 degrees to allow for better functional mobility. Met (2018)  11. Pt will improve his L knee active flexion from 68 degrees to 80 degrees to allow for better functional mobility.-met 18  Revised (2018):  Pt will improve his L knee active flexion from 80 degrees to 90 degrees to allow for better functional mobility. Met (2018)                                PLAN:    Discharge home with family and HHPT. Continue HEP 2x/day to continue improving AROM on R knee.  Betzaida Wheeler, PT  2018

## 2018-08-01 NOTE — ASSESSMENT & PLAN NOTE
· Chronic, Well controlled  · Continue Metoprolol, losartan lasix to treat.   · Continue to monitor  · Low Na diet

## 2018-08-01 NOTE — ASSESSMENT & PLAN NOTE
· Sr Cr stable  · Continue to monitor with biweekly labs   · Avoid nephrotoxins.   · Renally dose all medications

## 2018-08-01 NOTE — DISCHARGE SUMMARY
[facility] [place] [facility] - [facility] Nursing Care  Department of [facility] Medicine  Discharge Summary      Patient Name: ricki Cantor Jr.  MRN: [id number]  Admission Date: 7/12/2018  Hospital Length of Stay: 20 days  Discharge Date and Time: 8/1/2018 12:08 PM  Attending Physician: Jen Hewitt MD  Discharging Provider: Sylvie Brady NP  Primary Care Provider: Desmond Barlow MD      Chief Complaint/Reason for Admission: Debility    History of Present Illness:  Patient is a 72 y.o. male who has a past medical history of Atrial fibrillation on anticoagulant long-term use; Basal cell carcinoma; Cataract; Chronic kidney disease; Diabetes mellitus with renal manifestations, uncontrolled; Dyslipidemia; hypertension; Obesity; PN (peripheral neuropathy); Primary osteoarthritis of right knee; Sleep apnea; Thyroid disease presented with chronic pain to his Right knee which has gotten progressively worse over the past few months. He had a right press-fit total knee arthroplasty in 2017. Imaging showed that the tibial component was subsiding with a limp falling into varus. The symptoms were severely impacting his activities of daily living and it was decided to proceed with a revision right total knee arthroplasty. Patient complex revision of tibial component on 7/10/18 by Dr. Stuart. Patient tolerated procedure well with no significant inta operative complications. Post operatively, patient had episodes of confusion and combativeness likelydue to opioid induced delirium. Pain control maintained with limited sedating medications mainly perineural adductor. Patient has been working with PT/OT who recommend SNF for further balance/mobility training. Patient currently sitting up in chair without complaints. He is awake and alert. He is angry due to not being allowed to leave the facility. He wishes to go home and not be admitted to SNF. No family at available at bedside. He lives at home with his wife who is able to assist with ADL's. Patient  currently denies any fever/chills, chest pain, dyspnea, weakness, numbness, abdominal pain, nausea/vomiting, dysuria/hematuria, or weight loss.     Hospital Course:   7/12/18: Patient admitted to SNF for ongoing Pt/Ot following a hospitalization for revision of right TKA.  7/16: Patient seen at bedside, edema noted to right leg, wound vac without output. CN changed dressing, distal end with small opening that produces small about bloody drainage with expressed, New dressing placed will be seen in ortho clinic tomorrow. Blood sugars elevated insulin adjusted.   7/17: seen in ortho clinic today, wound vac removed  7/24: Patient seen at bedside doing well, swelling improved with use of home compression wraps. Pain tolerable with medication. No other complaints voiced.  8/1: Patient seen at beside doing well, denies pain, swelling improved. Discussed discharge and answered all questions. Patient discharged home with home health services.      Significant Diagnostic Studies:     Recent Labs  Lab 07/26/18  0540 07/30/18  0552   WBC 6.66 5.68   HGB 12.3* 13.1*   HCT 37.3* 38.7*    299         Recent Labs  Lab 07/26/18  0540 07/30/18  0552   * 140   K 4.1 4.2    106   CO2 23 24   BUN 29* 21   CREATININE 1.8* 1.3   CALCIUM 9.8 9.5     Lab Results   Component Value Date    LABPROT 17.2 (H) 07/16/2018    ALBUMIN 3.5 06/19/2018     Lab Results   Component Value Date    CALCIUM 9.5 07/30/2018    PHOS 3.5 07/30/2018     POCT Glucose   Date Value Ref Range Status   08/01/2018 210 (H) 70 - 110 mg/dL Final   08/01/2018 155 (H) 70 - 110 mg/dL Final   07/31/2018 231 (H) 70 - 110 mg/dL Final   07/31/2018 142 (H) 70 - 110 mg/dL Final   07/31/2018 196 (H) 70 - 110 mg/dL Final   07/31/2018 145 (H) 70 - 110 mg/dL Final   07/30/2018 162 (H) 70 - 110 mg/dL Final   07/30/2018 240 (H) 70 - 110 mg/dL Final   07/30/2018 212 (H) 70 - 110 mg/dL Final   07/30/2018 116 (H) 70 - 110 mg/dL Final   07/29/2018 131 (H) 70 - 110 mg/dL  Final       Final Active Diagnoses:    Diagnosis Date Noted POA    PRINCIPAL PROBLEM:  Prosthetic joint implant failure [T84.019A] 07/10/2018 Yes    Presence of surgical incision [Z78.9] 07/24/2018 Yes    Debility [R53.81] 07/13/2018 Yes    Proteinuria due to type 2 diabetes mellitus [E11.29, R80.9] 05/01/2018 Yes    Bilateral leg edema [R60.0] 09/22/2017 Yes    Lymphedema of both lower extremities [I89.0] 06/03/2016 Yes    Diastolic dysfunction [I51.9] 03/04/2016 Yes    Enlarged prostate [N40.0] 03/04/2016 Yes    Type 2 diabetes mellitus with diabetic polyneuropathy [E11.42]  Yes    Dyslipidemia associated with type 2 diabetes mellitus [E11.69, E78.5]  Yes    Morbid obesity [E66.01]  Yes    Chronic kidney disease, stage III (moderate) [N18.3] 11/05/2012 Yes    Benign hypertensive renal disease without renal failure [I12.9] 11/05/2012 Yes    Sleep apnea [G47.30]  Yes    Chronic atrial fibrillation [I48.2] 07/31/2012 Yes    Long term current use of anticoagulant therapy [Z79.01] 07/31/2012 Not Applicable      Problems Resolved During this Admission:    Diagnosis Date Noted Date Resolved POA      * Prosthetic joint implant failure     7/24/18  · S/P right revision TKA  · Will continue to monitor for drainage or erythema to surgical site   · Continue with current pain control with perineural adductor. Holding opioids for now   · Continue bowel regimen for opioid induced constipation  · Continue with PT/OT for gait training and strengthening and restoration of ADL's: WBAT per ortho   · Fall precautions   · Continue DVT prophylaxis per ortho apixaban         Proteinuria due to type 2 diabetes mellitus    · Continue ARB to treat        Bilateral leg edema    Low Na diet  MATTI hose  Leg elevation        Lymphedema of both lower extremities    7/16/18  · 1+ edema to left lower extremity with small amount of mild erythema to lower leg (normal appearanc according to daughter at bedside)  · 2+ edema to right leg  with large area of erythema to shin region with intact blisters (daughter reports normal appearance when fluid in leg increases)  · Started on keflex, ? Cellulitis,  patient to see orthopedic in clinic tomorrow  · Keep lower extremities elevated when not in therapy  · Teds hose  · Low Na diet    7/24/18  · Erythema and blisters no longer present  · Keflex stopped on 7/20  · Chronic discoloration noted to RLE  · Continue home compression wraps  · Edema slightly improved        Diastolic dysfunction    · Maintain euvolemia by monitoring I/O's and daily weights.  · Fluid restriction (2 liters/24 hours)  · Low Na diet  · Continue ARB, BB, lasix.         Enlarged prostate    · Continue flomax to treat  · Patient with adequate urine output        Type 2 diabetes mellitus with diabetic polyneuropathy    · Chronic, better controlled with recent adjustments  · detemir insulin adjusted to 30 units bid with novolog 18 with meals   · Low dose correction scale   · Continue blood glucose monitoring   · ADA diet  · Continue gabapentin to treat for neuropathy.   · Will monitor for need to adjust  · Return to home dose at discharge        Dyslipidemia associated with type 2 diabetes mellitus    · Continue pravastatin and fenofibrate to treat        Benign hypertensive renal disease without renal failure    · Chronic, Well controlled  · Continue Metoprolol, losartan lasix to treat.   · Continue to monitor  · Low Na diet        Chronic kidney disease, stage III (moderate)    · Sr Cr stable  · Continue to monitor with biweekly labs   · Avoid nephrotoxins.   · Renally dose all medications         Sleep apnea    · Continue CPAP QHS        Long term current use of anticoagulant therapy    · Plan as above.         Chronic atrial fibrillation    · Continue rate control with metoprolol.   · Anticoagulation therapy, patient takes apixaban 5mg bid and  daily at home; confirmed with outpatient cardiology notes        Pt madhav, NADINE Wheeler,  PT 8/1/18  General Precautions: Standard, fall  Orthopedic Precautions: RLE weight bearing as tolerated   Braces: N/A  Functional Status:  MDS G  Bed Mobility Functional Status: S-SBA  Transfer Functional Status: S-SBA  Walk in Room Functional Status: S-SBA  Walk in Corridor Functional Status: S-SBA  Locomotion on Unit Functional Status: S-SBA  Locomotion Off Unit Functional Status: S-SBA  Moving from seated to standing position: Not steady, but able to stabilize without staff assistance  Walking (with assistive device if used): Not steady, but able to stabilize without staff assistance  Turning around and facing the opposite direction while walking: Not steady, but able to stabilize without staff assistance  Moving on and off the toilet: Not steady, but able to stabilize without staff assistance  Surface-to-surface transfer (transfer between bed and chair or wheelchair): Not steady, but able to stabilize without staff assistance  Bed Mobility:  Sit>Supine:SBA  Supine>Sit: SBA  *Pt will have a hospital bed at home with bed rails to assist him with getting into and out of bed.  Transfers:  Sit<>Stand: supervision  Stand Pivot Transfer: supervision with use of rolling walker  Gait:  Amb 150 feet x 2 trials (non-consecutively) with rolling walker, step-through gait pattern- supervision.  Was seen with increased hip flexion during first trial, corrected this with verbal cues. Pt was educated to increase plantarflexion at toe-off/initial swing during second gait trial. Able to achieve this with better gait mechanics and better posture as a result.  Advanced Gait:  Stairs: 4 steps with B HR use- supervision; 4 steps with unilateral UE use- supervision (all with step-to gait pattern)  Curb Step: 4 inch curb step with supervision and use of rolling walker- even was able to descend while standing on surgical leg (RLE).  Discharge recommendations: home health PT       Ot MARIEL corey LOTR 8/1/18  General Precautions:  Standard, fall  Orthopedic Precautions: RLE weight bearing as tolerated  Braces: N/A  Functional Status:  MDS G  Bed Mobility Functional Status: mod(I) - (I)  Transfer Functional Status: S-SBA  Moving from seated to standing position: Not steady, but able to stabilize without staff assistance  Surface-to-surface transfer (transfer between bed and chair or wheelchair): Not steady, but able to stabilize without staff assistance  Discharge recommendations: home with home health      Discharged Condition: stable    [place] [facility]    Follow Up:  Follow-up Information     Diego Mack MD. Go on 8/13/2018.    Specialty:  Internal Medicine  Why:  Primary Care Hospital Follow-Up appointment scheduled for 9:30 am  Contact information:  [address]  [facility] [address]  [phone]             Shenette S Choco, NP. Go on 8/2/2018.    Specialties:  Sleep Medicine, Internal Medicine  Contact information:  [address]  [address]  [place] [facility] [address]  [phone]             Sandy [place], NP. Go on 8/15/2018.    Specialty:  Orthopedic Surgery  Contact information:  [address]  [facility] [address]  [phone]             [facility].    Specialty:  DME Provider  Why:  Home Health Agency/Services Questions/Concerns  Contact information:  [address]  [address]  [place] [facility] [address]  [phone]                 Future Appointments  Date Time Provider Department Center   8/2/2018 2:20 PM Christina Wilhelm NP [facility] SLEEP [facility]   8/13/2018 9:30 AM Diego Mack MD [facility]   8/15/2018 1:00 PM Sandy Adams NP [facility] ORTHO [place] [place]       Patient Instructions:     Diet diabetic   Order Comments: 2000 flori Diabetic diet; 2gm low Sodium diet     No driving until:     Notify your health care provider if you experience any of the following:  temperature >100.4     Notify your health care provider if you experience any of the following:   "persistent nausea and vomiting or diarrhea     Notify your health care provider if you experience any of the following:  severe uncontrolled pain     Notify your health care provider if you experience any of the following:  difficulty breathing or increased cough     Notify your health care provider if you experience any of the following:  severe persistent headache     Notify your health care provider if you experience any of the following:  worsening rash     Notify your health care provider if you experience any of the following:  persistent dizziness, light-headedness, or visual disturbances     Notify your health care provider if you experience any of the following:  increased confusion or weakness     No dressing needed     Activity as tolerated       Medications:  Reconciled Home Medications:      Medication List      START taking these medications    aspirin 325 MG EC tablet  Commonly known as:  ECOTRIN  Take 1 tablet (325 mg total) by mouth once daily.  Replaces:  aspirin 81 MG Chew        CHANGE how you take these medications    BD ULTRA-FINE ORIG PEN NEEDLE 29 gauge x 1/2" Ndle  Generic drug:  pen needle, diabetic  What changed:  Another medication with the same name was removed. Continue taking this medication, and follow the directions you see here.     furosemide 40 MG tablet  Commonly known as:  LASIX  Take 1 tablet (40 mg total) by mouth once daily.  What changed:  See the new instructions.     insulin glargine 100 unit/mL (3 mL) Inpn pen  Commonly known as:  BASAGLAR KWIKPEN U-100 INSULIN  Inject 60 Units into the skin once daily.  What changed:  when to take this     metoprolol succinate 50 MG 24 hr tablet  Commonly known as:  TOPROL-XL  Take 1 tablet (50 mg total) by mouth once daily.  What changed:  See the new instructions.     nystatin-triamcinolone cream  Commonly known as:  MYCOLOG II  apply TWICE DAILY  What changed:  See the new instructions.     pravastatin 10 MG tablet  Commonly known as: "  PRAVACHOL  Take 1 tablet (10 mg total) by mouth once daily.  What changed:  when to take this        CONTINUE taking these medications    apixaban 5 mg Tab  Take 1 tablet (5 mg total) by mouth 2 (two) times daily.     azelastine 137 mcg (0.1 %) nasal spray  Commonly known as:  ASTELIN  1 spray (137 mcg total) by Nasal route 2 (two) times daily.     ciclopirox 8 % Soln  Commonly known as:  PENLAC  Apply topically nightly.     clobetasol 0.05 % cream  Commonly known as:  TEMOVATE  AAA finger bid     clotrimazole-betamethasone 1-0.05% cream  Commonly known as:  LOTRISONE  Apply topically 2 (two) times daily.     docusate sodium 100 MG capsule  Commonly known as:  COLACE  Take 1 capsule (100 mg total) by mouth 2 (two) times daily as needed for Constipation.     fenofibrate 145 MG tablet  Commonly known as:  TRICOR  TAKE ONE TABLET BY MOUTH EVERY DAY     gabapentin 300 MG capsule  Commonly known as:  NEURONTIN  Take 1 capsule (300 mg total) by mouth 3 (three) times daily.     insulin aspart U-100 100 unit/mL Inpn pen  Commonly known as:  NovoLOG Flexpen U-100 Insulin  Novolog 20 units breakfast, 22 units lunch and dinner plus correction scale. Max daily dose=100 units     ketoconazole 2 % cream  Commonly known as:  NIZORAL  Apply topically once daily.     lancets 33 gauge Misc  1 lancet by Misc.(Non-Drug; Combo Route) route 4 (four) times daily. Pt uses True Result Meter, bg monitoring 4 times a day.     levothyroxine 25 MCG tablet  Commonly known as:  SYNTHROID  Take 1 tablet (25 mcg total) by mouth once daily.     losartan 25 MG tablet  Commonly known as:  COZAAR  Take 1 tablet (25 mg total) by mouth once daily.     metroNIDAZOLE 0.75 % gel  Commonly known as:  METROGEL  Apply topically 2 (two) times daily.     tamsulosin 0.4 mg Cap  Commonly known as:  FLOMAX  Take 1 capsule (0.4 mg total) by mouth once daily.     temazepam 15 mg Cap  Commonly known as:  RESTORIL  TAKE ONE CAPSULE BY MOUTH EVERY EVENING      triamcinolone acetonide 0.1% 0.1 % cream  Commonly known as:  KENALOG  Apply topically 2 (two) times daily. Apply to affected area twice daily as directed.        STOP taking these medications    aspirin 81 MG Chew  Replaced by:  aspirin 325 MG EC tablet     ondansetron 8 MG Tbdl  Commonly known as:  ZOFRAN-ODT     oxyCODONE-acetaminophen  mg per tablet  Commonly known as:  PERCOCET     tiZANidine 4 MG tablet  Commonly known as:  ZANAFLEX          Time spent on the discharge of patient: 25 minutes    Sylvie Brady NP  Department of Hospital Medicine  [facility]

## 2018-08-01 NOTE — PLAN OF CARE
Problem: Occupational Therapy Goal  Goal: Occupational Therapy Goal  Goals to be met by: 14 days     Patient will increase functional independence with ADLs by performing:    Feeding with Modified [place].  MET 08-01  UE Dressing with Modified [place]. MET 08-01  LE Dressing with Stand-by Assistance using A/E and A/D as needed..MET 08-01  Grooming while standing with Stand-by Assistance with RW to stand.. MET 08-01  Toileting from bedside commode with Stand-by Assistance for hygiene and clothing management with DME as needed. MET 08-01  Bathing from  edge of bed with Minimal Assistance. MET 08-01  Rolling to Bilateral with Modified [place]. MET  Supine to sit with Modified [place].MET  Stand pivot transfers with Supervision with RW. NOT MET SBA  Upper extremity exercise program x10 reps per set, with assistance as needed. MET 08-01  Pt will tolerate up to 10 minutes standing activity with RW to steady. MET 08-01      Pt. Family trained on this date on safety with ADL task performance.

## 2018-08-01 NOTE — PLAN OF CARE
Problem: Fall Risk (Adult)  Goal: Absence of Falls  Patient will demonstrate the desired outcomes by discharge/transition of care.   Outcome: Ongoing (interventions implemented as appropriate)  No fall nor injury noted, fall precaution and protocol maintain Patient ambulate per walker on the leonard way with PT personel per supervision, tolerating therapy well, no distress noted.

## 2018-08-02 ENCOUNTER — NURSE TRIAGE (OUTPATIENT)
Dept: ADMINISTRATIVE | Facility: CLINIC | Age: 73
End: 2018-08-02

## 2018-08-02 NOTE — TELEPHONE ENCOUNTER
Called patient's daughter and she reports that her dad slid out of his chair and she's concerned about his leg. He did not fall onto the knee and he is walking on it. He is currently resting with ice on his knee. She will have him f/u if no improvement.

## 2018-08-02 NOTE — TELEPHONE ENCOUNTER
Call transferred from the scheduling desk.  Daughter calling regarding Pt hiccupping, fell on floor.  Pt did not hit head but not sure if he re injured leg/knee.  Pt has no complaints of bleeding, pain or trouble moving leg.  Daughter states Pt just came home from PT.  Daugther wanted to know if his leg okay.  Will message MD and Staff.

## 2018-08-03 ENCOUNTER — TELEPHONE (OUTPATIENT)
Dept: INTERNAL MEDICINE | Facility: CLINIC | Age: 73
End: 2018-08-03

## 2018-08-03 NOTE — TELEPHONE ENCOUNTER
----- Message from Pat Soni sent at 8/3/2018  3:18 PM CDT -----  Contact: Airam with  [facility]  [phone]  Patient had a syncopal  episode on yesterday 8/2 at approximally 10:30 am while eating. Fell out of chair to floor but no injuries resulted from fall. Denied any head trauma or to his knee. Today he denied any Chest pains, SOB or dizziness.

## 2018-08-04 ENCOUNTER — HOSPITAL ENCOUNTER (EMERGENCY)
Facility: HOSPITAL | Age: 73
Discharge: HOME | End: 2018-08-05
Attending: EMERGENCY MEDICINE
Payer: MEDICARE

## 2018-08-04 ENCOUNTER — NURSE TRIAGE (OUTPATIENT)
Dept: ADMINISTRATIVE | Facility: CLINIC | Age: 73
End: 2018-08-04

## 2018-08-04 DIAGNOSIS — N17.9 AKI (ACUTE KIDNEY INJURY): ICD-10-CM

## 2018-08-04 DIAGNOSIS — N30.01 ACUTE CYSTITIS WITH HEMATURIA: Primary | ICD-10-CM

## 2018-08-04 LAB
ALBUMIN SERPL BCP-MCNC: 3.7 G/DL
ALP SERPL-CCNC: 49 U/L
ALT SERPL W/O P-5'-P-CCNC: 12 U/L
ANION GAP SERPL CALC-SCNC: 13 MMOL/L
APTT BLDCRRT: 21 SEC
AST SERPL-CCNC: 28 U/L
BACTERIA #/AREA URNS AUTO: ABNORMAL /HPF
BASOPHILS # BLD AUTO: 0.06 K/UL
BASOPHILS NFR BLD: 0.6 %
BILIRUB SERPL-MCNC: 0.8 MG/DL
BILIRUB UR QL STRIP: NEGATIVE
BUN SERPL-MCNC: 29 MG/DL
CALCIUM SERPL-MCNC: 10.1 MG/DL
CHLORIDE SERPL-SCNC: 102 MMOL/L
CLARITY UR REFRACT.AUTO: ABNORMAL
CO2 SERPL-SCNC: 19 MMOL/L
COLOR UR AUTO: YELLOW
CREAT SERPL-MCNC: 1.6 MG/DL
DIFFERENTIAL METHOD: ABNORMAL
EOSINOPHIL # BLD AUTO: 0.4 K/UL
EOSINOPHIL NFR BLD: 4.1 %
ERYTHROCYTE [DISTWIDTH] IN BLOOD BY AUTOMATED COUNT: 13.5 %
EST. GFR  (AFRICAN AMERICAN): 48.3 ML/MIN/1.73 M^2
EST. GFR  (NON AFRICAN AMERICAN): 41.8 ML/MIN/1.73 M^2
GLUCOSE SERPL-MCNC: 57 MG/DL
GLUCOSE UR QL STRIP: NEGATIVE
HCT VFR BLD AUTO: 42 %
HGB BLD-MCNC: 14 G/DL
HGB UR QL STRIP: ABNORMAL
HYALINE CASTS UR QL AUTO: 0 /LPF
IMM GRANULOCYTES # BLD AUTO: 0.03 K/UL
IMM GRANULOCYTES NFR BLD AUTO: 0.3 %
INR PPP: 1
KETONES UR QL STRIP: NEGATIVE
LEUKOCYTE ESTERASE UR QL STRIP: ABNORMAL
LYMPHOCYTES # BLD AUTO: 2.6 K/UL
LYMPHOCYTES NFR BLD: 24.3 %
MCH RBC QN AUTO: 30.8 PG
MCHC RBC AUTO-ENTMCNC: 33.3 G/DL
MCV RBC AUTO: 92 FL
MICROSCOPIC COMMENT: ABNORMAL
MONOCYTES # BLD AUTO: 0.9 K/UL
MONOCYTES NFR BLD: 8.9 %
NEUTROPHILS # BLD AUTO: 6.6 K/UL
NEUTROPHILS NFR BLD: 61.8 %
NITRITE UR QL STRIP: NEGATIVE
NRBC BLD-RTO: 0 /100 WBC
PH UR STRIP: 6 [PH] (ref 5–8)
PLATELET # BLD AUTO: 262 K/UL
PMV BLD AUTO: 11.2 FL
POTASSIUM SERPL-SCNC: 4.8 MMOL/L
PROT SERPL-MCNC: 8.4 G/DL
PROT UR QL STRIP: ABNORMAL
PROTHROMBIN TIME: 10.7 SEC
RBC # BLD AUTO: 4.55 M/UL
RBC #/AREA URNS AUTO: 51 /HPF (ref 0–4)
SODIUM SERPL-SCNC: 134 MMOL/L
SP GR UR STRIP: 1.01 (ref 1–1.03)
SQUAMOUS #/AREA URNS AUTO: 1 /HPF
URN SPEC COLLECT METH UR: ABNORMAL
UROBILINOGEN UR STRIP-ACNC: NEGATIVE EU/DL
WBC # BLD AUTO: 10.61 K/UL
WBC #/AREA URNS AUTO: >100 /HPF (ref 0–5)

## 2018-08-04 PROCEDURE — 99283 EMERGENCY DEPT VISIT LOW MDM: CPT | Mod: 25

## 2018-08-04 PROCEDURE — 80053 COMPREHEN METABOLIC PANEL: CPT

## 2018-08-04 PROCEDURE — 85025 COMPLETE CBC W/AUTO DIFF WBC: CPT

## 2018-08-04 PROCEDURE — 99284 EMERGENCY DEPT VISIT MOD MDM: CPT | Mod: ,,, | Performed by: PHYSICIAN ASSISTANT

## 2018-08-04 PROCEDURE — 85730 THROMBOPLASTIN TIME PARTIAL: CPT

## 2018-08-04 PROCEDURE — 81001 URINALYSIS AUTO W/SCOPE: CPT

## 2018-08-04 PROCEDURE — 85610 PROTHROMBIN TIME: CPT

## 2018-08-04 PROCEDURE — 87086 URINE CULTURE/COLONY COUNT: CPT

## 2018-08-04 RX ORDER — CEFTRIAXONE 1 G/1
1 INJECTION, POWDER, FOR SOLUTION INTRAMUSCULAR; INTRAVENOUS
Status: COMPLETED | OUTPATIENT
Start: 2018-08-04 | End: 2018-08-05

## 2018-08-04 RX ORDER — OMEGA-3-ACID ETHYL ESTERS 1 G/1
2 CAPSULE, LIQUID FILLED ORAL 2 TIMES DAILY
COMMUNITY
End: 2019-06-19

## 2018-08-04 RX ORDER — TIZANIDINE 4 MG/1
4 TABLET ORAL EVERY 6 HOURS PRN
COMMUNITY
End: 2018-08-15

## 2018-08-05 VITALS
DIASTOLIC BLOOD PRESSURE: 85 MMHG | TEMPERATURE: 98 F | HEIGHT: 70 IN | RESPIRATION RATE: 16 BRPM | SYSTOLIC BLOOD PRESSURE: 142 MMHG | BODY MASS INDEX: 42.95 KG/M2 | WEIGHT: 300 LBS | HEART RATE: 86 BPM | OXYGEN SATURATION: 97 %

## 2018-08-05 LAB
BUN SERPL-MCNC: 26 MG/DL (ref 6–30)
CHLORIDE SERPL-SCNC: 103 MMOL/L (ref 95–110)
CREAT SERPL-MCNC: 1.3 MG/DL (ref 0.5–1.4)
GLUCOSE SERPL-MCNC: 135 MG/DL (ref 70–110)
HCT VFR BLD CALC: 35 %PCV (ref 36–54)
POC IONIZED CALCIUM: 1.14 MMOL/L (ref 1.06–1.42)
POC TCO2 (MEASURED): 23 MMOL/L (ref 23–29)
POTASSIUM BLD-SCNC: 3.7 MMOL/L (ref 3.5–5.1)
SAMPLE: ABNORMAL
SODIUM BLD-SCNC: 137 MMOL/L (ref 136–145)

## 2018-08-05 PROCEDURE — 63600175 PHARM REV CODE 636 W HCPCS: Performed by: PHYSICIAN ASSISTANT

## 2018-08-05 PROCEDURE — 96374 THER/PROPH/DIAG INJ IV PUSH: CPT

## 2018-08-05 PROCEDURE — 25000003 PHARM REV CODE 250: Performed by: PHYSICIAN ASSISTANT

## 2018-08-05 PROCEDURE — 96361 HYDRATE IV INFUSION ADD-ON: CPT

## 2018-08-05 RX ORDER — CEPHALEXIN 500 MG/1
500 CAPSULE ORAL EVERY 12 HOURS
Qty: 14 CAPSULE | Refills: 0 | Status: SHIPPED | OUTPATIENT
Start: 2018-08-05 | End: 2018-08-12

## 2018-08-05 RX ADMIN — CEFTRIAXONE SODIUM 1 G: 1 INJECTION, POWDER, FOR SOLUTION INTRAMUSCULAR; INTRAVENOUS at 12:08

## 2018-08-05 RX ADMIN — SODIUM CHLORIDE 1000 ML: 0.9 INJECTION, SOLUTION INTRAVENOUS at 12:08

## 2018-08-05 NOTE — ED PROVIDER NOTES
"Encounter Date: 8/4/2018       History     Chief Complaint   Patient presents with    Hematuria     On apixaban. No fever. Mild pain. No clots     72-year-old male with multiple comorbidities including DM 2, Afib on Eliquis, CKD, hypertension presents for hematuria since this morning.  Patient reports initially his urine appeared dark and then started to pass drops of blood with occasional clots.  Reports associated dysuria and chills. Denies fever, nausea/vomiting, abdominal pain, flank pain, myalgias, lightheadedness or fatigue.  Reports similar episode in the past when he had urinary tract infections.          Review of patient's allergies indicates:   Allergen Reactions    Niacin Itching and Other (See Comments)     Other reaction(s): facial flushing and causes hepatitis     Terbinafine Other (See Comments)     Other reaction(s): Hepatitis    "gave me rash"     Past Medical History:   Diagnosis Date    *Atrial fibrillation     Eliquis    Anticoagulant long-term use     apaxiban    Basal cell carcinoma 12/2015    left eye brow    BCC (basal cell carcinoma) 12/2015    left shoulder    Cataract     Chronic kidney disease     Diabetes mellitus with renal manifestations, uncontrolled     Dyslipidemia associated with type 2 diabetes mellitus     Fever blister     Hearing loss     HTN (hypertension)     Keloid cicatrix     Liver disease     Melanoma 12/07/2015    right cheek-in situ    Obesity     PN (peripheral neuropathy)     Primary osteoarthritis of right knee 1/25/2017    Screening for colon cancer 3/3/2016    Sleep apnea     wears CPAP at night    Thyroid disease     Type II or unspecified type diabetes mellitus with other specified manifestations, uncontrolled      Past Surgical History:   Procedure Laterality Date    APPENDECTOMY      CARDIOVERSION      CATARACT EXTRACTION      CATARACT EXTRACTION Right 05/14/2018    Dr. Greer    COLONOSCOPY N/A 3/3/2016    Procedure: COLONOSCOPY;  " Surgeon: Bob Poe MD;  Location: [facility] (4TH FLR);  Service: Endoscopy;  Laterality: N/A;  Holding Eliquis for 3 days prior to colonoscopy. EC    epidural steroid injection      INTRAOCULAR PROSTHESES INSERTION Left 6/25/2018    Procedure: INSERTION, INTRAOCULAR LENS PROSTHESIS;  Surgeon: Lawrence Greer MD;  Location: [facility];  Service: Ophthalmology;  Laterality: Left;    JOINT REPLACEMENT      Knee    Meniere's disease surgery      PHACOEMULSIFICATION OF CATARACT Left 6/25/2018    Procedure: PHACOEMULSIFICATION, CATARACT;  Surgeon: Lawrence Greer MD;  Location: [facility];  Service: Ophthalmology;  Laterality: Left;    REVISION OF KNEE ARTHROPLASTY Right 7/10/2018    Procedure: REVISION-ARTHROPLASTY-KNEE-DAKOTA;  Surgeon: Miguel Stuart MD;  Location: [facility] 2ND FLR;  Service: Orthopedics;  Laterality: Right;  [place]    TONSILLECTOMY      TOTAL KNEE ARTHROPLASTY Right 01/25/2017    TKR    TOTAL KNEE ARTHROPLASTY Left     TKR, 1990's     Social History   Substance Use Topics    Smoking status: Former Smoker     Quit date: 7/10/1985    Smokeless tobacco: Never Used    Alcohol use No      Comment: quit 2007- had excess in the past     Review of Systems   Constitutional: Positive for chills. Negative for fatigue and fever.   Respiratory: Negative for shortness of breath.    Cardiovascular: Negative for chest pain and palpitations.   Gastrointestinal: Positive for constipation. Negative for abdominal pain, anal bleeding, blood in stool, diarrhea, nausea, rectal pain and vomiting.   Endocrine: Negative for polyuria.   Genitourinary: Positive for difficulty urinating, dysuria and hematuria. Negative for flank pain, frequency and urgency.   Musculoskeletal: Negative for back pain and myalgias.   Skin: Negative for color change.   Neurological: Negative for light-headedness and headaches.   Hematological: Does not bruise/bleed easily.       Physical Exam     Initial Vitals [08/04/18 2022]   BP  Pulse Resp Temp SpO2   138/74 86 16 98 °F (36.7 °C) 100 %      MAP       --         Physical Exam    Nursing note and vitals reviewed.  Constitutional: He appears well-developed and well-nourished. He is not diaphoretic. No distress.   Family at bedside   HENT:   Head: Normocephalic and atraumatic.   Eyes: EOM are normal. Pupils are equal, round, and reactive to light.   Neck: Normal range of motion. Neck supple.   Cardiovascular: Normal rate, regular rhythm and normal heart sounds. Exam reveals no gallop and no friction rub.    No murmur heard.  Pulmonary/Chest: Breath sounds normal. No respiratory distress. He has no wheezes. He has no rhonchi. He has no rales. He exhibits no tenderness.   Abdominal: Soft. Bowel sounds are normal. He exhibits no distension and no mass. There is no tenderness. There is no rebound and no guarding.   No CVA tenderness   Musculoskeletal: Normal range of motion. He exhibits no edema or tenderness.   Neurological: He is alert and oriented to person, place, and time.   Skin: Skin is warm and dry.   Psychiatric: He has a normal mood and affect.         ED Course   Procedures  Labs Reviewed   URINALYSIS, REFLEX TO URINE CULTURE - Abnormal; Notable for the following:        Result Value    Appearance, UA Hazy (*)     Protein, UA 2+ (*)     Occult Blood UA 3+ (*)     Leukocytes, UA 3+ (*)     All other components within normal limits    Narrative:     Preferred Collection Type->Urine, Clean Catch ONE URINE CUP   CBC W/ AUTO DIFFERENTIAL - Abnormal; Notable for the following:     RBC 4.55 (*)     All other components within normal limits   URINALYSIS MICROSCOPIC - Abnormal; Notable for the following:     RBC, UA 51 (*)     WBC, UA >100 (*)     All other components within normal limits    Narrative:     Preferred Collection Type->Urine, Clean Catch ONE URINE CUP   COMPREHENSIVE METABOLIC PANEL - Abnormal; Notable for the following:     Sodium 134 (*)     CO2 19 (*)     Glucose 57 (*)     BUN,  Bld 29 (*)     Creatinine 1.6 (*)     Alkaline Phosphatase 49 (*)     eGFR if  48.3 (*)     eGFR if non  41.8 (*)     All other components within normal limits   CULTURE, URINE   PROTIME-INR   APTT   POCT GLUCOSE MONITORING CONTINUOUS          Imaging Results    None          Medical Decision Making:   History:   Old Medical Records: I decided to obtain old medical records.  Clinical Tests:   Lab Tests: Ordered and Reviewed       APC / Resident Notes:   72-year-old male presenting hematuria and dysuria.  Stable vitals, no abdominal or CVA tenderness. DDx includes urinary traction, pyelonephritis, bladder injury, renal failure.  Will check labs and reassess.    Labs notable for greater than 100 WBCs and 51 RBCs in urine with rare bacteria.  BUN/creatinine elevated above baseline at a 29/1.6.  Hypoglycemia with glucose of 57.  Patient is mentating normally, he reports he has not eaten since approximately noon.  Will give patient crackers, juice, pudding and recheck sugar.  Will give IV fluids and ceftriaxone for UTI.    Repeat I-STAT shows improvement of hypoglycemia normalization of creatinine.  Will discharge patient with Keflex for UTI.  Discussed the importance of follow-up, strict ED return precautions given.  Patient voiced understanding and is comfortable with discharge.  I discussed this patient with my supervising physician.    Helen Prince PA-C           Attending Attestation:     Physician Attestation Statement for NP/PA:   I discussed this assessment and plan of this patient with the NP/PA, but I did not personally examine the patient. The face to face encounter was performed by the NP/PA.    Other NP/PA Attestation Additions:    History of Present Illness: 72-year-old man presents to the emerged department for evaluation of hematuria noted today                      Clinical Impression:   The primary encounter diagnosis was Acute cystitis with hematuria. A diagnosis  of SIL (acute kidney injury) was also pertinent to this visit.      Disposition:   Disposition: Discharged  Condition: Stable                        Helen Prince PA-C  08/05/18 0156

## 2018-08-05 NOTE — TELEPHONE ENCOUNTER
"  Reason for Disposition   Taking Coumadin (warfarin) or other strong blood thinner, or known bleeding disorder (e.g., thrombocytopenia)    Answer Assessment - Initial Assessment Questions  1. COLOR of URINE: "Describe the color of the urine."  (e.g., tea-colored, pink, red, blood clots, bloody)      Bloody   2. ONSET: "When did the bleeding start?"       This evening   3. EPISODES: "How many times has there been blood in the urine?" or "How many times today?"      1   4. PAIN with URINATION: "Is there any pain with passing your urine?" If so, ask: "How bad is the pain?"  (Scale 1-10; or mild, moderate, severe)     - MILD - complains slightly about urination hurting     - MODERATE - interferes with normal activities       - SEVERE - excruciating, unwilling or unable to urinate because of the pain       Moderate and severe   5. FEVER: "Do you have a fever?" If so, ask: "What is your temperature, how was it measured, and when did it start?"      No 98.1  6. ASSOCIATED SYMPTOMS: "Are you passing urine more frequently than usual?"      Urge and dribbling   7. OTHER SYMPTOMS: "Do you have any other symptoms?" (e.g., back/flank pain, abdominal pain, vomiting)      no  8. PREGNANCY: "Is there any chance you are pregnant?" "When was your last menstrual period?"      N/a    Protocols used:  URINE - BLOOD IN-[facility]    Patient is complaining of pain with urination. No back pain other than his normal. He is bleeding in his urine and he takes eloquis. No fever. Advised patient's daughter to bring him to the ED for evaluation. She verbalized understanding.   "

## 2018-08-05 NOTE — ED TRIAGE NOTES
"[facility] [place] a 72 y.o. male presents to the ED with hematuria that started last night. He is also complaining of burning with urination. Patient denies abdominal discomfort or flank pain. He is awake, alert and oriented and family at bedside.       Chief Complaint   Patient presents with    Hematuria     On apixaban. No fever. Mild pain. No clots     Review of patient's allergies indicates:   Allergen Reactions    Niacin Itching and Other (See Comments)     Other reaction(s): facial flushing and causes hepatitis     Terbinafine Other (See Comments)     Other reaction(s): Hepatitis    "gave me rash"     Past Medical History:   Diagnosis Date    *Atrial fibrillation     Eliquis    Anticoagulant long-term use     apaxiban    Basal cell carcinoma 12/2015    left eye brow    BCC (basal cell carcinoma) 12/2015    left shoulder    Cataract     Chronic kidney disease     Diabetes mellitus with renal manifestations, uncontrolled     Dyslipidemia associated with type 2 diabetes mellitus     Fever blister     Hearing loss     HTN (hypertension)     Keloid cicatrix     Liver disease     Melanoma 12/07/2015    right cheek-in situ    Obesity     PN (peripheral neuropathy)     Primary osteoarthritis of right knee 1/25/2017    Screening for colon cancer 3/3/2016    Sleep apnea     wears CPAP at night    Thyroid disease     Type II or unspecified type diabetes mellitus with other specified manifestations, uncontrolled        "

## 2018-08-05 NOTE — DISCHARGE INSTRUCTIONS
Please schedule an appointment with your Primary Care Doctor for follow up. Make sure to drink lots of fluids to keep yourself hydrated. If you start to have fever, abdominal pain, vomiting or back pain, please come back to the Emergency Department.

## 2018-08-06 ENCOUNTER — PES CALL (OUTPATIENT)
Dept: ADMINISTRATIVE | Facility: CLINIC | Age: 73
End: 2018-08-06

## 2018-08-06 LAB — BACTERIA UR CULT: NO GROWTH

## 2018-08-07 ENCOUNTER — TELEPHONE (OUTPATIENT)
Dept: ORTHOPEDICS | Facility: CLINIC | Age: 73
End: 2018-08-07

## 2018-08-07 NOTE — TELEPHONE ENCOUNTER
----- Message from Miguel Welch sent at 8/7/2018  8:55 AM CDT -----  Contact: self  Needs Advice    Reason for call:   Post op question  Communication Preference: Phone- [phone]  Additional Information: Patient ask for a call in regards to removing bandage off knee

## 2018-08-07 NOTE — TELEPHONE ENCOUNTER
Spoke with the pts wife and she asked if  bandage could be removed. I asked if the wound was bleeding or had any discharge and  stated that it did not. I informed her that it was fine for the bandage to be removed.

## 2018-08-07 NOTE — TELEPHONE ENCOUNTER
Called patient and let him know that he can leave the bandage off and shower now. He verbalized understanding.

## 2018-08-07 NOTE — TELEPHONE ENCOUNTER
----- Message from Katy Poe MA sent at 8/7/2018  1:23 PM CDT -----  Contact: NAIMA Pt- Airam   He's ok to shower correct?  ----- Message -----  From: Pam Vega  Sent: 8/7/2018  12:59 PM  To: Angie Delgado Staff    Pt would like to know can he shower now. Pt was told he can remove the bandages but forgot to ask he was able to shower. Pt HH can be reached at  [phone]. Pt can be reached at  [phone].

## 2018-08-13 RX ORDER — PRAVASTATIN SODIUM 10 MG/1
TABLET ORAL
Qty: 90 TABLET | Refills: 3 | Status: SHIPPED | OUTPATIENT
Start: 2018-08-13 | End: 2019-05-21 | Stop reason: SDUPTHER

## 2018-08-15 ENCOUNTER — OFFICE VISIT (OUTPATIENT)
Dept: ORTHOPEDICS | Facility: CLINIC | Age: 73
End: 2018-08-15
Payer: MEDICARE

## 2018-08-15 ENCOUNTER — OFFICE VISIT (OUTPATIENT)
Dept: SLEEP MEDICINE | Facility: CLINIC | Age: 73
End: 2018-08-15
Payer: MEDICARE

## 2018-08-15 ENCOUNTER — OFFICE VISIT (OUTPATIENT)
Dept: INTERNAL MEDICINE | Facility: CLINIC | Age: 73
End: 2018-08-15
Payer: MEDICARE

## 2018-08-15 ENCOUNTER — HOSPITAL ENCOUNTER (OUTPATIENT)
Dept: RADIOLOGY | Facility: HOSPITAL | Age: 73
Discharge: HOME | End: 2018-08-15
Attending: NURSE PRACTITIONER
Payer: MEDICARE

## 2018-08-15 ENCOUNTER — LAB VISIT (OUTPATIENT)
Dept: LAB | Facility: HOSPITAL | Age: 73
End: 2018-08-15
Attending: INTERNAL MEDICINE
Payer: MEDICARE

## 2018-08-15 VITALS
HEART RATE: 94 BPM | BODY MASS INDEX: 43.5 KG/M2 | DIASTOLIC BLOOD PRESSURE: 58 MMHG | HEIGHT: 70 IN | SYSTOLIC BLOOD PRESSURE: 93 MMHG | WEIGHT: 303.81 LBS

## 2018-08-15 VITALS — WEIGHT: 306.25 LBS | HEIGHT: 70 IN | BODY MASS INDEX: 43.84 KG/M2

## 2018-08-15 VITALS
DIASTOLIC BLOOD PRESSURE: 88 MMHG | HEIGHT: 70 IN | OXYGEN SATURATION: 99 % | SYSTOLIC BLOOD PRESSURE: 133 MMHG | WEIGHT: 304 LBS | HEART RATE: 95 BPM | BODY MASS INDEX: 43.52 KG/M2

## 2018-08-15 DIAGNOSIS — G47.33 OBSTRUCTIVE SLEEP APNEA: Primary | ICD-10-CM

## 2018-08-15 DIAGNOSIS — N18.30 TYPE 2 DIABETES MELLITUS WITH STAGE 3 CHRONIC KIDNEY DISEASE, WITH LONG-TERM CURRENT USE OF INSULIN: ICD-10-CM

## 2018-08-15 DIAGNOSIS — G47.33 OBSTRUCTIVE SLEEP APNEA SYNDROME: ICD-10-CM

## 2018-08-15 DIAGNOSIS — E11.42 TYPE 2 DIABETES MELLITUS WITH DIABETIC POLYNEUROPATHY, WITH LONG-TERM CURRENT USE OF INSULIN: Primary | ICD-10-CM

## 2018-08-15 DIAGNOSIS — E03.9 HYPOTHYROIDISM, UNSPECIFIED TYPE: ICD-10-CM

## 2018-08-15 DIAGNOSIS — R55 VASOVAGAL ATTACK: ICD-10-CM

## 2018-08-15 DIAGNOSIS — Z79.4 TYPE 2 DIABETES MELLITUS WITH DIABETIC POLYNEUROPATHY, WITH LONG-TERM CURRENT USE OF INSULIN: Primary | ICD-10-CM

## 2018-08-15 DIAGNOSIS — E11.22 TYPE 2 DIABETES MELLITUS WITH STAGE 3 CHRONIC KIDNEY DISEASE, WITH LONG-TERM CURRENT USE OF INSULIN: ICD-10-CM

## 2018-08-15 DIAGNOSIS — I10 ESSENTIAL HYPERTENSION: ICD-10-CM

## 2018-08-15 DIAGNOSIS — G47.33 OSA ON CPAP: ICD-10-CM

## 2018-08-15 DIAGNOSIS — I51.89 DIASTOLIC DYSFUNCTION: ICD-10-CM

## 2018-08-15 DIAGNOSIS — H10.9 CONJUNCTIVITIS OF RIGHT EYE, UNSPECIFIED CONJUNCTIVITIS TYPE: ICD-10-CM

## 2018-08-15 DIAGNOSIS — I48.20 CHRONIC ATRIAL FIBRILLATION: ICD-10-CM

## 2018-08-15 DIAGNOSIS — Z79.4 TYPE 2 DIABETES MELLITUS WITH STAGE 3 CHRONIC KIDNEY DISEASE, WITH LONG-TERM CURRENT USE OF INSULIN: ICD-10-CM

## 2018-08-15 DIAGNOSIS — E11.42 TYPE 2 DIABETES MELLITUS WITH DIABETIC POLYNEUROPATHY, WITH LONG-TERM CURRENT USE OF INSULIN: ICD-10-CM

## 2018-08-15 DIAGNOSIS — I87.2 VENOUS INSUFFICIENCY OF BOTH LOWER EXTREMITIES: ICD-10-CM

## 2018-08-15 DIAGNOSIS — M25.561 RIGHT KNEE PAIN, UNSPECIFIED CHRONICITY: Primary | ICD-10-CM

## 2018-08-15 DIAGNOSIS — M25.561 RIGHT KNEE PAIN, UNSPECIFIED CHRONICITY: ICD-10-CM

## 2018-08-15 DIAGNOSIS — Z79.4 TYPE 2 DIABETES MELLITUS WITH DIABETIC POLYNEUROPATHY, WITH LONG-TERM CURRENT USE OF INSULIN: ICD-10-CM

## 2018-08-15 LAB
ALBUMIN SERPL BCP-MCNC: 3.4 G/DL
ANION GAP SERPL CALC-SCNC: 11 MMOL/L
BUN SERPL-MCNC: 35 MG/DL
CALCIUM SERPL-MCNC: 9.8 MG/DL
CHLORIDE SERPL-SCNC: 105 MMOL/L
CO2 SERPL-SCNC: 25 MMOL/L
CREAT SERPL-MCNC: 2 MG/DL
EST. GFR  (AFRICAN AMERICAN): 36.9 ML/MIN/1.73 M^2
EST. GFR  (NON AFRICAN AMERICAN): 31.9 ML/MIN/1.73 M^2
ESTIMATED AVG GLUCOSE: 140 MG/DL
GLUCOSE SERPL-MCNC: 108 MG/DL
HBA1C MFR BLD HPLC: 6.5 %
PHOSPHATE SERPL-MCNC: 3.7 MG/DL
POTASSIUM SERPL-SCNC: 4.2 MMOL/L
SODIUM SERPL-SCNC: 141 MMOL/L
TSH SERPL DL<=0.005 MIU/L-ACNC: 2.69 UIU/ML

## 2018-08-15 PROCEDURE — 80069 RENAL FUNCTION PANEL: CPT

## 2018-08-15 PROCEDURE — 99214 OFFICE O/P EST MOD 30 MIN: CPT | Mod: S$GLB,,, | Performed by: INTERNAL MEDICINE

## 2018-08-15 PROCEDURE — 36415 COLL VENOUS BLD VENIPUNCTURE: CPT | Mod: PO

## 2018-08-15 PROCEDURE — 99499 UNLISTED E&M SERVICE: CPT | Mod: S$GLB,,, | Performed by: NURSE PRACTITIONER

## 2018-08-15 PROCEDURE — 99214 OFFICE O/P EST MOD 30 MIN: CPT | Mod: S$GLB,,, | Performed by: NURSE PRACTITIONER

## 2018-08-15 PROCEDURE — 3045F PR MOST RECENT HEMOGLOBIN A1C LEVEL 7.0-9.0%: CPT | Mod: CPTII,S$GLB,, | Performed by: INTERNAL MEDICINE

## 2018-08-15 PROCEDURE — 99999 PR PBB SHADOW E&M-EST. PATIENT-LVL IV: CPT | Mod: PBBFAC,,, | Performed by: NURSE PRACTITIONER

## 2018-08-15 PROCEDURE — 73562 X-RAY EXAM OF KNEE 3: CPT | Mod: TC,RT

## 2018-08-15 PROCEDURE — 3074F SYST BP LT 130 MM HG: CPT | Mod: CPTII,S$GLB,, | Performed by: NURSE PRACTITIONER

## 2018-08-15 PROCEDURE — 3079F DIAST BP 80-89 MM HG: CPT | Mod: CPTII,S$GLB,, | Performed by: INTERNAL MEDICINE

## 2018-08-15 PROCEDURE — 99499 UNLISTED E&M SERVICE: CPT | Mod: S$GLB,,, | Performed by: INTERNAL MEDICINE

## 2018-08-15 PROCEDURE — 3078F DIAST BP <80 MM HG: CPT | Mod: CPTII,S$GLB,, | Performed by: NURSE PRACTITIONER

## 2018-08-15 PROCEDURE — 73562 X-RAY EXAM OF KNEE 3: CPT | Mod: 26,RT,, | Performed by: RADIOLOGY

## 2018-08-15 PROCEDURE — 3075F SYST BP GE 130 - 139MM HG: CPT | Mod: CPTII,S$GLB,, | Performed by: INTERNAL MEDICINE

## 2018-08-15 PROCEDURE — 99999 PR PBB SHADOW E&M-EST. PATIENT-LVL IV: CPT | Mod: PBBFAC,,, | Performed by: INTERNAL MEDICINE

## 2018-08-15 PROCEDURE — 84443 ASSAY THYROID STIM HORMONE: CPT

## 2018-08-15 PROCEDURE — 83036 HEMOGLOBIN GLYCOSYLATED A1C: CPT

## 2018-08-15 PROCEDURE — 73560 X-RAY EXAM OF KNEE 1 OR 2: CPT | Mod: TC,59,LT

## 2018-08-15 PROCEDURE — 73560 X-RAY EXAM OF KNEE 1 OR 2: CPT | Mod: 26,XS,LT, | Performed by: RADIOLOGY

## 2018-08-15 RX ORDER — LOSARTAN POTASSIUM 25 MG/1
25 TABLET ORAL DAILY
Qty: 90 TABLET | Refills: 3 | Status: SHIPPED | OUTPATIENT
Start: 2018-08-15 | End: 2018-08-15

## 2018-08-15 NOTE — PROGRESS NOTES
Stephen Cantor Jr. seen in follow-up for HEIDI management and CPAP equipment check. Last seen by Dr. Caceres 11/17/2016. This is his initial visit with me.     INTERVAL HISTORY:    08/15/2018 SIMONE Wilhelm NP: CPAP use affected by rainout and congestion. Humidifier 5/5. Denies oral/nasal drying.  Currently using Dreamwear nasal pillows but considering different nasal interface since current mask shifts too much during repositioning in sleep. No longer using OA, only CPAP. Would like to resume regular use, but CPAP causes URI-like symptoms that takes up to 3 days to resolve.     CPAP Interrogation: CPAP 10 cm Therapy Hours: 1207.0, Blower Hours: 1296.6 No usage      11/17/2016 Dr. Caceres This 72 y.o. male patient returns for the management of obstructive sleep apnea. Sent by Dr. Barlow for sleep apnea evaluation in setting of chronic atrial fibrillation. Prior history of UPPP, T&A; He was using an oral appliance to treat HEIDI. Retesting with HST on HEIDI revealed ongoing HEIDI. He opted to pursue CPAP again. He completed titration sleep study, and has resumed CPAP trial at home.    In the interim, we have been in communication about reactive rhinorrhea while using CPAP.  After 3 days his rhinorrhea finally subsided.    He is using CPAP nightly, without other difficulty.  He likes his nasal mask (Dreamwear), though he has some slippage.  He is using it in combination with his older oral appliance    ESS = 10 (prior 20)    CPAP interrogation: Dreamstation set at 10 cm; 10/19/16 set up date; 22/30 days >4 hours; 5.8 hours/night; humidity set at adaptive.    He recently completed titration 11/14/15: Tirated to effective pressure of 10 cm; nasal mask (residual AHI 4.8); wt 283.  He is ready to proceed with new CPAP set up (prior machine >5 years old, and not clearly functioning)    DME = [employer]    PRIOR SLEEP HISTORY:  HST with oral appliance of 7/22/15: +HEIDI, AHI 52 RDI 69  Home sleep testing was done on 12/1/14 to monitor device  "efficacy: AHI 35 RDI 56; 1. This sleep testing was requested with patient's oral appliance  in place. Improvement of obstructive sleep apnea (327.23) was noted, reflected by RDI reduction approx 50% from baseline polysomnogram (PSG) RDI of 99.9. Compared to his baseline PSG from 5/15/07, the RDI on this home sleep study reflects >50% improvement while using oral appliance therapy.    Nasal stuffiness at night, but improved with Flonase, which he is using nightly. He discontinued Afrin per recommendation. He feels like his nasal breathing is improved and less dripping... Almost "cleared up". He has used Flonase in the past (following his nasal surgery). No problems with the spray.    Sleeps on his left side. He prefers to sleep "in the fetal position".    BASELINE PSG (pre-UPPP) 5/15/07: +HEIDI, severe, RDI 99.9, low sat 89%, split to effective pressure CPAP 11 cm    Prior CPAP intolerance due to excessive rhinitis following use;  He has an old Respironics machine, set at 11 cm    SLEEP ROUTINE:   Bed partner: wife (she has CPAP as well)   Time to bed: 10:30-MN   Sleep onset latency: 10-15    Disruptions or awakenings: 2 times   Wakeup time: 6-8 am   Perceived sleep quality: fair   Daytime naps: some      Past Medical History:   Diagnosis Date    *Atrial fibrillation     Eliquis    Anticoagulant long-term use     apaxiban    Basal cell carcinoma 12/2015    left eye brow    BCC (basal cell carcinoma) 12/2015    left shoulder    Cataract     Chronic kidney disease     Diabetes mellitus with renal manifestations, uncontrolled     Dyslipidemia associated with type 2 diabetes mellitus     Fever blister     Hearing loss     HTN (hypertension)     Keloid cicatrix     Liver disease     Melanoma 12/07/2015    right cheek-in situ    Obesity     PN (peripheral neuropathy)     Primary osteoarthritis of right knee 1/25/2017    Screening for colon cancer 3/3/2016    Sleep apnea     wears CPAP at night    Thyroid " disease     Type II or unspecified type diabetes mellitus with other specified manifestations, uncontrolled        Past Surgical History:   Procedure Laterality Date    APPENDECTOMY      CARDIOVERSION      CATARACT EXTRACTION      CATARACT EXTRACTION Right 2018    Dr. Greer    epidural steroid injection      JOINT REPLACEMENT      Knee    Meniere's disease surgery      TONSILLECTOMY      TOTAL KNEE ARTHROPLASTY Right 2017    TKR    TOTAL KNEE ARTHROPLASTY Left     TKR,        Family History   Problem Relation Age of Onset    Diabetes Mother     Stroke Mother     Diabetes Father     Heart disease Father         over 45 years of age    Hypertension Father     Cancer Sister         brain    Eczema Sister     Cancer Brother         brain    Cancer Sister         leukemia, stomach cancer    No Known Problems Sister     ALS Brother         stomach cancer    No Known Problems Brother     No Known Problems Maternal Aunt     No Known Problems Maternal Uncle     No Known Problems Paternal Aunt     No Known Problems Paternal Uncle     No Known Problems Maternal Grandmother     No Known Problems Maternal Grandfather     No Known Problems Paternal Grandmother     No Known Problems Paternal Grandfather     Amblyopia Neg Hx     Blindness Neg Hx     Cataracts Neg Hx     Glaucoma Neg Hx     Macular degeneration Neg Hx     Retinal detachment Neg Hx     Strabismus Neg Hx     Thyroid disease Neg Hx     Melanoma Neg Hx     Psoriasis Neg Hx     Lupus Neg Hx     Acne Neg Hx        Social History     Tobacco Use    Smoking status: Former Smoker     Last attempt to quit: 7/10/1985     Years since quittin.1    Smokeless tobacco: Never Used   Substance Use Topics    Alcohol use: No     Alcohol/week: 0.0 oz     Comment: quit - had excess in the past    Drug use: No       ALLERGIES: Reviewed in EPIC    CURRENT MEDICATIONS: Reviewed in EPIC    REVIEW OF SYSTEMS:  Sleep  "related symptoms as per HPI;    Weight stable;    Nasal stuffiness improved with flonase, he needs another prescription.   Knees aches, using walker    PHYSICAL EXAM:  BP (!) 93/58 (BP Location: Right arm, Patient Position: Sitting, BP Method: Large (Automatic))   Pulse 94   Ht 5' 10" (1.778 m)   Wt (!) 137.8 kg (303 lb 12.7 oz)   BMI 43.59 kg/m²   GENERAL: Well groomed; Normally developed;  PSYCH: Affect is full. Mood is normal.      ASSESSMENT:    1. Obstructive Sleep Apnea, severe by prior sleep study, but he has had UPPP, T&A, septoplasty since that time. The patient symptomatically has snoring, witnessed apneas, and unrefreshing sleep without CPAP. Currently unable to use CPAP meaningfully due to rain out and congestion.     2. Nightly chronic allergic Rhinitis, improved with flonase;    3. Sleep disturbances NEC - arthritic knee pain, waking up for bathroom    He has medical co-morbidities of hypertension, atrial fibrillation, and obesity (BMI >40).           PLAN:    Treatment:  -Resume CPAP at 10 cm; nasal mask; heated humidification. Live demo decrease humidity to 2-3/5, increase prn. Trial heated tubing.   -Continue Flonase to reduced nightly rhinitis and turbinate hypertrophy; continue Astelin; OTC antihistamine  -Okay to use oral appliance in conjunction with CPAP, if patient feels that this is beneficial; adjusted by dentist specializing in making these to treat sleep apnea.  He states that his son is the dentist, Dr. Cantor, DDS     -Patient counseled on weight reduction, which can improve his sleep apnea severity. Recommended portion reduction.    Precautions: The patient was advised to abstain from driving should they feel sleepy or drowsy.    RTC 2 - 3 months after resuming CPAP with new mask and heated tubing for rainout.     "

## 2018-08-15 NOTE — LETTER
August 15, 2018      Desmond Barlow MD  [address]  [facility] [address]           [facility]  [address]  [facility] [address]  Phone: [phone]          Patient: [facility] [place] Cantor Jr.   MR Number: [id number]   YOB: 1945   Date of Visit: 8/15/2018       Dear Dr. Desmond Barlow:    Thank you for referring [facility] to me for evaluation. Attached you will find relevant portions of my assessment and plan of care.    If you have questions, please do not hesitate to call me. I look forward to following [facility] along with you.    Sincerely,    Christina Wilhelm, RENE    Enclosure  CC:  No Recipients    If you would like to receive this communication electronically, please contact [email or web address] or [phone] to request more information on [facility] [employer] access.    For providers and/or their staff who would like to refer a patient to [facility], please contact us through our one-stop-shop provider referral line, [facility], at [phone].    If you feel you have received this communication in error or would no longer like to receive these types of communications, please e-mail [email or web address]

## 2018-08-15 NOTE — PROGRESS NOTES
"[facility] PAT Cantor Jr. presents for initial post-operative visit following a right total knee revision arthroplasty performed by Dr. Stuart on 7/10/2018. Tolerating pain medication well.      Exam:   Height 5' 10" (1.778 m), weight (!) 138.9 kg (306 lb 3.5 oz).   Ambulating well with assistive device- still using a walker.  Incision is completely healed ROM:0-120    Initial post-operative radiographs reviewed today revealing a well fixed and aligned prosthesis.    A/P:  6 weeks s/p right total knee revision arthroplasty  - Outpatient PT: home therapy ongoing  - Pain medication: he is taking advil or tylenol as needed  - Follow up in 6 weeks with Dr. Stuart. Pt will call clinic with problems/concerns.     "

## 2018-08-15 NOTE — PROGRESS NOTES
REASON FOR VISIT:  This is a 72-year-old male who comes in for hospital   followup.  He had right total knee revision surgery July 10th, was in skilled   nursing July 12th to August 1st.    While in the hospital, he had an episode of delirium and there was also an   episode where when physical therapy attempted to get him standing up and walking   he was getting diaphoretic, felt weak and then the next thing he was in a chair   and a bed.  He did not know that he passed out.  It was very transient.    He brings up that last week he had an episode when he was eating breakfast that   he was hiccupping and all of a sudden just transiently passed out.  His daughter   was able to witness this and this happened on one occasion in the past.    This morning, when he woke up, he felt there was an irritation within his eye.    He has a little bit of swelling below the lower eyelid and there is some watery   drainage.    After he was discharged five days later, he presented to the Emergency Room for   hematuria and urinary frequency and some dysuria.  He was diagnosed with a UTI.    He was given Keflex 500 mg twice a day for seven days and he responded well to   this.  Urinalysis was abnormal, but the urine culture showed no growth.    PAST MEDICAL HISTORY:  1.  Type 2 diabetes with peripheral neuropathy.  2.  Chronic kidney disease stage III.  3.  Chronic atrial fibrillation.  4.  Hypertension.  5.  Hyperlipidemia.  6.  Chronic venous insufficiency and lower extremity lymphedema.  7.  Chronic diastolic dysfunction.  8.  Pulmonary hypertension.  9.  Obstructive sleep apnea.  10.  Lumbar degenerative disk disease.    MEDICATIONS:  1.  Eliquis 5 mg twice a day.  2.  Astelin nasal spray two puffs a day.  3.  TriCor 145 mg a day.  4.  Lasix 40 mg a day.  5.  Gabapentin 300 mg three times a day.  6.  Basaglar insulin 50 units.  7.  NovoLog 16 units with each meal.  8.  Levothyroxine 0.025 mg.  9.  Losartan 25 mg.  10.  Metoprolol  XL 50 mg.  11.  Pravastatin 10 mg.  12.  Tamsulosin 0.4 mg.  13.  Temazepam 15 mg.    Regarding medications, he has been recently low in his insulin requirement   because of low blood glucose into the 80s, which he is still getting.  He says   he has made a change on diet.    REVIEW OF SYMPTOMS:  He has no pains in the chest, does not feel short of   breath, no abdominal pain.  Regular bowel function.  No difficulty urinating.    He feels that the knee pain in his right knee is improving.  He is getting   physical therapy.    PHYSICAL EXAMINATION:  HEENT:  I was able to remove a speck of mucus underneath his right lower eyelid   and it is erythematous.  EXTREMITIES:  He does have 2 to 3+ edema, left leg; 2+ right leg.  HEART:  Irregularly irregular.    IMPRESSION:  1.  Type 2 diabetes with peripheral neuropathy and chronic kidney disease, stage   III.  2.  Hypertension with relatively low blood pressure.  3.  Chronic renal insufficiency and lymphedema.  4.  Chronic diastolic heart failure.  5.  Pulmonary hypertension.  6.  Obstructive sleep apnea.  7.  Chronic atrial fibrillation.  8.  Conjunctivitis.  9.  Syncope event, which I think was related to vasovagal.    PLAN:  Today we will need to repeat labs of a basic metabolic profile,   hemoglobin A1c and TSH.  Regarding his conjunctivitis, put a cool pack over the   area.  We will see if this heals.  If not, we will call in eyedrops.  Regarding   his blood pressure, increase fluid intake and we will have him put a hold on the   medicine, losartan, but actually might need to lower the Lasix and phone review   to follow up.            /kaiden [id number] yaya(s)        JAM/EMPERATRIZ  dd: 08/15/2018 15:26:04 (CDT)  td: 08/16/2018 05:15:55 (CDT)  Doc ID   #[id number]  Job ID #[id number]    CC:

## 2018-08-16 ENCOUNTER — TELEPHONE (OUTPATIENT)
Dept: INTERNAL MEDICINE | Facility: CLINIC | Age: 73
End: 2018-08-16

## 2018-08-16 ENCOUNTER — DOCUMENTATION ONLY (OUTPATIENT)
Dept: INTERNAL MEDICINE | Facility: CLINIC | Age: 73
End: 2018-08-16

## 2018-08-16 DIAGNOSIS — Z79.4 TYPE 2 DIABETES MELLITUS WITH DIABETIC POLYNEUROPATHY, WITH LONG-TERM CURRENT USE OF INSULIN: ICD-10-CM

## 2018-08-16 DIAGNOSIS — E11.42 TYPE 2 DIABETES MELLITUS WITH DIABETIC POLYNEUROPATHY, WITH LONG-TERM CURRENT USE OF INSULIN: ICD-10-CM

## 2018-08-16 RX ORDER — DEXTROSE 4 G
TABLET,CHEWABLE ORAL
Qty: 1 EACH | Refills: 0 | Status: SHIPPED | OUTPATIENT
Start: 2018-08-16

## 2018-08-16 RX ORDER — LANCETS 33 GAUGE
1 EACH MISCELLANEOUS 4 TIMES DAILY
Qty: 450 EACH | Refills: 4 | Status: SHIPPED | OUTPATIENT
Start: 2018-08-16 | End: 2020-03-25 | Stop reason: SDUPTHER

## 2018-08-16 NOTE — PROGRESS NOTES
Lab results noted        Improvement with the diabetes with hemoglobin A1c 6.5      this is mainly due to better diet habits, in the sense that the daughter is preparing meals    The creatinine is higher     from his visit he was hypotensive      losartan was discontinued    Yesterday       will now put a hold on Lasix and repeat test in 5 days

## 2018-08-16 NOTE — TELEPHONE ENCOUNTER
----- Message from Isabella Xiong sent at 8/16/2018 12:26 PM CDT -----  Contact: Renan/[facility]/[phone]  Type:Home Health(orders,updates,clarifications,etc)    Home Health Agency/Nurse: [facility]    Phone number: [phone]    Reason for call:    Comments: Renan called to provide patient weight  On 08/07 -298 pounds, 08/15- 304 pounds. Ankle measurement   08/09 right ankle -33 centimeter left ankle 32.3 centimeter.   08/16 right ankle 36.4 centimeter, left ankle 36.2 centimeter. Thank you!!!

## 2018-08-16 NOTE — TELEPHONE ENCOUNTER
On 08/07 -298 pounds, 08/15- 304 pounds. Ankle measurement   08/09 right ankle -33 centimeter left ankle 32.3 centimeter.   08/16 right ankle 36.4 centimeter, left ankle 36.2 centimeter

## 2018-08-17 LAB — FUNGUS SPEC CULT: NORMAL

## 2018-08-20 RX ORDER — FENOFIBRATE 145 MG/1
TABLET, FILM COATED ORAL
Qty: 90 TABLET | Refills: 1 | Status: SHIPPED | OUTPATIENT
Start: 2018-08-20 | End: 2019-02-13 | Stop reason: SDUPTHER

## 2018-08-20 NOTE — TELEPHONE ENCOUNTER
Set up lab studies or next Tuesday or Wednesday     Patient is aware     Labs schedule patient was notified.

## 2018-08-21 ENCOUNTER — TELEPHONE (OUTPATIENT)
Dept: ORTHOPEDICS | Facility: CLINIC | Age: 73
End: 2018-08-21

## 2018-08-21 NOTE — TELEPHONE ENCOUNTER
Ortho Telephone Triage Call  [phone]  Caller: Gissel Choi RN/[facility] reporting 4+ pitting edema to pt's RLE and that pt is refusing to have Juxtalite compression stocking placed to RLE unless told to do so by KAITY Adams NP. DENNIS Choi RN requests that KAITY Adams NP contact pt to encourage use of Juxtalite to both LE due to dependent edema r/t HO CHF and general noncompliance with elevation of BLE. States concern that pain to L knee is mostly due to increased edema to R leg.   HX: R TKA 7/10/18  Triage Advice: Advised DENNIS Choi RN that KAITY Adams NP will be notified and address in AM, as she is out of clinic today. Offered to contact pt to encourage elevation of RLE. DENNIS Choi RN confirms that she has reinforced with pt and asks that KAITY Adams NP instruct pt.   Resolution:DENNIS Choi RN states understanding and asks that KAITY Adams NP call her tomorrow.

## 2018-08-21 NOTE — TELEPHONE ENCOUNTER
Called Gissel @ [phone] but no answer.    ----- Message from Pam Vega sent at 8/21/2018  3:05 PM CDT -----  Contact: Gissel Chauhan is requesting a call back regarding swelling in pt right leg. She stated pt refuse to wear compression socks. Gissel can be reached at [phone].

## 2018-08-22 ENCOUNTER — LAB VISIT (OUTPATIENT)
Dept: LAB | Facility: OTHER | Age: 73
End: 2018-08-22
Payer: MEDICARE

## 2018-08-22 DIAGNOSIS — E11.42 TYPE 2 DIABETES MELLITUS WITH DIABETIC POLYNEUROPATHY, WITH LONG-TERM CURRENT USE OF INSULIN: Primary | ICD-10-CM

## 2018-08-22 DIAGNOSIS — Z79.4 TYPE 2 DIABETES MELLITUS WITH DIABETIC POLYNEUROPATHY, WITH LONG-TERM CURRENT USE OF INSULIN: Primary | ICD-10-CM

## 2018-08-22 DIAGNOSIS — N18.30 TYPE 2 DIABETES MELLITUS WITH STAGE 3 CHRONIC KIDNEY DISEASE, WITH LONG-TERM CURRENT USE OF INSULIN: ICD-10-CM

## 2018-08-22 DIAGNOSIS — E11.22 TYPE 2 DIABETES MELLITUS WITH STAGE 3 CHRONIC KIDNEY DISEASE, WITH LONG-TERM CURRENT USE OF INSULIN: ICD-10-CM

## 2018-08-22 DIAGNOSIS — Z79.4 TYPE 2 DIABETES MELLITUS WITH STAGE 3 CHRONIC KIDNEY DISEASE, WITH LONG-TERM CURRENT USE OF INSULIN: ICD-10-CM

## 2018-08-22 LAB
ANION GAP SERPL CALC-SCNC: 7 MMOL/L
BUN SERPL-MCNC: 20 MG/DL
CALCIUM SERPL-MCNC: 9.8 MG/DL
CHLORIDE SERPL-SCNC: 106 MMOL/L
CO2 SERPL-SCNC: 25 MMOL/L
CREAT SERPL-MCNC: 1.3 MG/DL
EST. GFR  (AFRICAN AMERICAN): >60 ML/MIN/1.73 M^2
EST. GFR  (NON AFRICAN AMERICAN): 54 ML/MIN/1.73 M^2
GLUCOSE SERPL-MCNC: 102 MG/DL
POTASSIUM SERPL-SCNC: 4.1 MMOL/L
SODIUM SERPL-SCNC: 138 MMOL/L

## 2018-08-22 PROCEDURE — 36415 COLL VENOUS BLD VENIPUNCTURE: CPT

## 2018-08-22 PROCEDURE — 80048 BASIC METABOLIC PNL TOTAL CA: CPT

## 2018-08-22 RX ORDER — GABAPENTIN 300 MG/1
CAPSULE ORAL
Qty: 90 CAPSULE | Refills: 1 | Status: SHIPPED | OUTPATIENT
Start: 2018-08-22 | End: 2018-10-22 | Stop reason: SDUPTHER

## 2018-08-22 NOTE — TELEPHONE ENCOUNTER
Called and spoke to the home health nurse and she advised me that the patient is refusing his stocking because he's afraid it will be bad for his knee. She says that he needs to hear from me that it's ok. I called the patient and he says that he didn't know he needed to wear it and that he has no problem wearing it. I advised him that the excess fluid can be a risk for cellulitis which would be bad in light of his recent surgery. He states that he understands and he will comply.

## 2018-08-23 ENCOUNTER — TELEPHONE (OUTPATIENT)
Dept: ENDOCRINOLOGY | Facility: CLINIC | Age: 73
End: 2018-08-23

## 2018-08-23 RX ORDER — LOSARTAN POTASSIUM 25 MG/1
25 TABLET ORAL DAILY
Refills: 3 | COMMUNITY
Start: 2018-08-16 | End: 2019-02-05

## 2018-08-23 NOTE — TELEPHONE ENCOUNTER
Attempted to call [employer] re: patient. [facility] was closed at the time. Left a message with an  requesting a call back.

## 2018-08-23 NOTE — PROGRESS NOTES
The basic metabolic profile is significantly improved with a creatinine at 1.3      he has improved his fluid intake, and is currently off Lasix and losartan       however if his fluid intake remains adequate, recommend now that he restart losartan 25 mg, monitor blood pressure at home    He has an appointment on October 30  with Orthopedics and at that time, repeat basic metabolic profile with hemoglobin A1c

## 2018-08-23 NOTE — TELEPHONE ENCOUNTER
----- Message from Savanah Pickard sent at 8/21/2018  3:36 PM CDT -----  Contact: [employer]  [phone]  Needs Advice  /  [facility]       Reason for call:     Coverage Decision Request   Communication Preference:  Additional Information:  When calling back , following information is needed .    Name, Id # [id number], Ref# [id number]    Review was denied, Service  is pending .  Call  Back  to verify

## 2018-08-27 ENCOUNTER — TELEPHONE (OUTPATIENT)
Dept: ADMINISTRATIVE | Facility: CLINIC | Age: 73
End: 2018-08-27

## 2018-08-27 NOTE — TELEPHONE ENCOUNTER
Home Health SOC 08/03/2018 - 10/01/2018 with [facility] - Dr. Desmond Barlow. Patient received SN, PT and OT services.

## 2018-08-29 ENCOUNTER — LAB VISIT (OUTPATIENT)
Dept: LAB | Facility: OTHER | Age: 73
End: 2018-08-29
Payer: MEDICARE

## 2018-08-29 DIAGNOSIS — E11.22 TYPE 2 DIABETES MELLITUS WITH STAGE 3 CHRONIC KIDNEY DISEASE, WITH LONG-TERM CURRENT USE OF INSULIN: ICD-10-CM

## 2018-08-29 DIAGNOSIS — N18.30 TYPE 2 DIABETES MELLITUS WITH STAGE 3 CHRONIC KIDNEY DISEASE, WITH LONG-TERM CURRENT USE OF INSULIN: ICD-10-CM

## 2018-08-29 DIAGNOSIS — E11.42 TYPE 2 DIABETES MELLITUS WITH DIABETIC POLYNEUROPATHY, WITH LONG-TERM CURRENT USE OF INSULIN: ICD-10-CM

## 2018-08-29 DIAGNOSIS — Z79.4 TYPE 2 DIABETES MELLITUS WITH STAGE 3 CHRONIC KIDNEY DISEASE, WITH LONG-TERM CURRENT USE OF INSULIN: ICD-10-CM

## 2018-08-29 DIAGNOSIS — Z79.4 TYPE 2 DIABETES MELLITUS WITH DIABETIC POLYNEUROPATHY, WITH LONG-TERM CURRENT USE OF INSULIN: ICD-10-CM

## 2018-08-29 LAB
ANION GAP SERPL CALC-SCNC: 9 MMOL/L
BUN SERPL-MCNC: 26 MG/DL
CALCIUM SERPL-MCNC: 10.3 MG/DL
CHLORIDE SERPL-SCNC: 106 MMOL/L
CO2 SERPL-SCNC: 24 MMOL/L
CREAT SERPL-MCNC: 1.4 MG/DL
EST. GFR  (AFRICAN AMERICAN): 57 ML/MIN/1.73 M^2
EST. GFR  (NON AFRICAN AMERICAN): 49 ML/MIN/1.73 M^2
ESTIMATED AVG GLUCOSE: 123 MG/DL
GLUCOSE SERPL-MCNC: 72 MG/DL
HBA1C MFR BLD HPLC: 5.9 %
POTASSIUM SERPL-SCNC: 4.2 MMOL/L
SODIUM SERPL-SCNC: 139 MMOL/L

## 2018-08-29 PROCEDURE — 36415 COLL VENOUS BLD VENIPUNCTURE: CPT

## 2018-08-29 PROCEDURE — 80048 BASIC METABOLIC PNL TOTAL CA: CPT

## 2018-08-29 PROCEDURE — 83036 HEMOGLOBIN GLYCOSYLATED A1C: CPT

## 2018-08-30 ENCOUNTER — PATIENT MESSAGE (OUTPATIENT)
Dept: ADMINISTRATIVE | Facility: OTHER | Age: 73
End: 2018-08-30

## 2018-09-05 ENCOUNTER — TELEPHONE (OUTPATIENT)
Dept: SLEEP MEDICINE | Facility: CLINIC | Age: 73
End: 2018-09-05

## 2018-09-05 NOTE — TELEPHONE ENCOUNTER
----- Message from Danette Caban sent at 9/5/2018  2:59 PM CDT -----  Contact: Justina with [facility] [phone]  She needs you to fax over the pt orders for his CPAP supplies to her at #[phone].

## 2018-09-06 ENCOUNTER — TELEPHONE (OUTPATIENT)
Dept: INTERNAL MEDICINE | Facility: CLINIC | Age: 73
End: 2018-09-06

## 2018-09-06 NOTE — PROGRESS NOTES
Test results discuss     The hemoglobin A1c is continue looked better     Creatinine is stable      He is back on losartan 25 mg     recently he had some fluid retention I n his l egs     Will have him take Lasix 20 mg once a day for 3 days

## 2018-09-06 NOTE — TELEPHONE ENCOUNTER
----- Message from Ina Medina sent at 9/6/2018 11:28 AM CDT -----  Contact: Nurse Sae Cell# [phone]  She's calling to inform you that the patient have two open wounds one each lower extremities.  She said that they were blisters now they're open wounds.

## 2018-09-06 NOTE — TELEPHONE ENCOUNTER
Let home health nurse  noted that spoke with the patient      I advised him to restart his furosemide or Lasix medicine,  to take for 3 days for this weekend

## 2018-09-06 NOTE — TELEPHONE ENCOUNTER
Left voice message for homehealth to call back she states patient has 2 open wounds on each leg it was blisters but now they are wounds

## 2018-09-11 LAB
ACID FAST MOD KINY STN SPEC: NORMAL
MYCOBACTERIUM SPEC QL CULT: NORMAL

## 2018-09-13 DIAGNOSIS — E03.4 HYPOTHYROIDISM DUE TO ACQUIRED ATROPHY OF THYROID: ICD-10-CM

## 2018-09-13 RX ORDER — LEVOTHYROXINE SODIUM 25 UG/1
25 TABLET ORAL DAILY
Qty: 90 TABLET | Refills: 2 | Status: SHIPPED | OUTPATIENT
Start: 2018-09-13 | End: 2019-05-21 | Stop reason: SDUPTHER

## 2018-09-16 RX ORDER — METOPROLOL SUCCINATE 100 MG/1
TABLET, EXTENDED RELEASE ORAL
Qty: 90 TABLET | Refills: 1 | Status: SHIPPED | OUTPATIENT
Start: 2018-09-16 | End: 2019-03-11 | Stop reason: SDUPTHER

## 2018-09-17 ENCOUNTER — TELEPHONE (OUTPATIENT)
Dept: INTERNAL MEDICINE | Facility: CLINIC | Age: 73
End: 2018-09-17

## 2018-09-17 NOTE — TELEPHONE ENCOUNTER
Pt states the blisters bust now it Is open wounds , he has homehealth , it is red no swelling , he want supa recommendations on what to do Friday the blisters became into sores. Please advise thanks

## 2018-09-17 NOTE — TELEPHONE ENCOUNTER
----- Message from Eulogio Ambriz sent at 9/17/2018 12:08 PM CDT -----  Contact: Patient [phone]  Patient would like to get medical advice.    Symptoms (please be specific):  Blisters on the right leg    How long has patient had these symptoms:  4 days    Pharmacy name and phone #: [facility] [place]  [place] [facility] [phone] (Phone) [phone] (Fax)    Comments:  He said he thinks the Home Health Nurse may have called and asked for orders to take care of this.

## 2018-09-21 ENCOUNTER — LAB VISIT (OUTPATIENT)
Dept: LAB | Facility: HOSPITAL | Age: 73
End: 2018-09-21
Attending: INTERNAL MEDICINE
Payer: MEDICARE

## 2018-09-21 ENCOUNTER — OFFICE VISIT (OUTPATIENT)
Dept: INTERNAL MEDICINE | Facility: CLINIC | Age: 73
End: 2018-09-21
Payer: MEDICARE

## 2018-09-21 VITALS
HEART RATE: 79 BPM | HEIGHT: 70 IN | SYSTOLIC BLOOD PRESSURE: 112 MMHG | BODY MASS INDEX: 41.66 KG/M2 | WEIGHT: 291 LBS | DIASTOLIC BLOOD PRESSURE: 62 MMHG | OXYGEN SATURATION: 96 %

## 2018-09-21 DIAGNOSIS — E11.22 TYPE 2 DIABETES MELLITUS WITH STAGE 3 CHRONIC KIDNEY DISEASE, WITH LONG-TERM CURRENT USE OF INSULIN: ICD-10-CM

## 2018-09-21 DIAGNOSIS — R60.0 BILATERAL LEG EDEMA: ICD-10-CM

## 2018-09-21 DIAGNOSIS — N18.30 TYPE 2 DIABETES MELLITUS WITH STAGE 3 CHRONIC KIDNEY DISEASE, WITH LONG-TERM CURRENT USE OF INSULIN: ICD-10-CM

## 2018-09-21 DIAGNOSIS — Z79.4 TYPE 2 DIABETES MELLITUS WITH STAGE 3 CHRONIC KIDNEY DISEASE, WITH LONG-TERM CURRENT USE OF INSULIN: ICD-10-CM

## 2018-09-21 DIAGNOSIS — R60.0 BILATERAL LEG EDEMA: Primary | ICD-10-CM

## 2018-09-21 DIAGNOSIS — S81.801A WOUND OF RIGHT LOWER EXTREMITY, INITIAL ENCOUNTER: ICD-10-CM

## 2018-09-21 LAB
ANION GAP SERPL CALC-SCNC: 8 MMOL/L
BUN SERPL-MCNC: 26 MG/DL
CALCIUM SERPL-MCNC: 10.7 MG/DL
CHLORIDE SERPL-SCNC: 104 MMOL/L
CO2 SERPL-SCNC: 28 MMOL/L
CREAT SERPL-MCNC: 1.8 MG/DL
EST. GFR  (AFRICAN AMERICAN): 41.9 ML/MIN/1.73 M^2
EST. GFR  (NON AFRICAN AMERICAN): 36.3 ML/MIN/1.73 M^2
GLUCOSE SERPL-MCNC: 114 MG/DL
POTASSIUM SERPL-SCNC: 4.6 MMOL/L
SODIUM SERPL-SCNC: 140 MMOL/L

## 2018-09-21 PROCEDURE — 3044F HG A1C LEVEL LT 7.0%: CPT | Mod: CPTII,,, | Performed by: INTERNAL MEDICINE

## 2018-09-21 PROCEDURE — 99999 PR PBB SHADOW E&M-EST. PATIENT-LVL V: CPT | Mod: PBBFAC,,, | Performed by: INTERNAL MEDICINE

## 2018-09-21 PROCEDURE — 99214 OFFICE O/P EST MOD 30 MIN: CPT | Mod: S$PBB,,, | Performed by: INTERNAL MEDICINE

## 2018-09-21 PROCEDURE — 99215 OFFICE O/P EST HI 40 MIN: CPT | Mod: PBBFAC,PO | Performed by: INTERNAL MEDICINE

## 2018-09-21 PROCEDURE — 1101F PT FALLS ASSESS-DOCD LE1/YR: CPT | Mod: CPTII,,, | Performed by: INTERNAL MEDICINE

## 2018-09-21 PROCEDURE — 36415 COLL VENOUS BLD VENIPUNCTURE: CPT | Mod: PO

## 2018-09-21 PROCEDURE — 3078F DIAST BP <80 MM HG: CPT | Mod: CPTII,,, | Performed by: INTERNAL MEDICINE

## 2018-09-21 PROCEDURE — 3074F SYST BP LT 130 MM HG: CPT | Mod: CPTII,,, | Performed by: INTERNAL MEDICINE

## 2018-09-21 PROCEDURE — 80048 BASIC METABOLIC PNL TOTAL CA: CPT

## 2018-09-21 RX ORDER — METOLAZONE 5 MG/1
5 TABLET ORAL DAILY
Qty: 5 TABLET | Refills: 0 | Status: SHIPPED | OUTPATIENT
Start: 2018-09-21 | End: 2018-09-28

## 2018-09-21 NOTE — PROGRESS NOTES
This is a 72-year-old male.    REASON FOR THE VISIT:  He has a wound involving his right anterior lower   extremity where he had blisters and they popped.  There is actually no pain and   the fluid was clear.  No purulent discharge.  He has chronic edema involving   both lower extremities due to venous insufficiency and lymphedema.    In regard to my note of May 15th, blood pressure low and azotemia.  Losartan was   discontinued and then later Lasix.  Eventually, the losartan was restarted at   25 mg and Lasix at 40 mg.    PAST MEDICAL HISTORY:    Type 2 diabetes with peripheral neuropathy and chronic kidney disease stage III.  Chronic atrial fibrillation.  Hypertension.  Hyperlipidemia.  Chronic diastolic dysfunction.  Sleep apnea.  Pulmonary hypertension.    MEDICATIONS:  List per [employer], and also my May 15th note the only thing   different is that the Basaglar now is 30 units and the NovoLog is 12 units with   each meal.  He is getting better blood glucose readings.    PHYSICAL EXAMINATION:  VITAL SIGNS:  His weight is 291 pounds, blood pressure 112/62.  LUNGS:  Clear.  HEART:  Irregularly irregular.  EXTREMITIES:  The patient has 2 to 3+ edema involving both lower extremities.    Over the anterior aspect, there is some hyperpigmentation and over the left leg,   two superficial skin avulsions from previous blister.    IMPRESSION:  1.  Lower extremity edema.  2.  Skin avulsion.  3.  Type 2 diabetes with chronic kidney disease.    PLAN:  Just to help further with excess fluid we will give a prescription for   metolazone 5 mg once a day for five days.  Today we will be repeating a basic   metabolic profile and hemoglobin A1c again.          /kaiden [id number] review            ANTONIETTA/EMPERATRIZ  dd: 09/21/2018 16:37:50 (CDT)  td: 09/22/2018 09:37:58 (CDT)  Doc ID   #[id number]  Job ID #[id number]    CC:

## 2018-09-22 NOTE — PROGRESS NOTES
There was a slight bump up in the creatinine or status of kidney function    Still take the metolazone once a day for 5 days along with the other medicines    And continue to maintain proper fluid hydration

## 2018-09-24 ENCOUNTER — DOCUMENTATION ONLY (OUTPATIENT)
Dept: REHABILITATION | Facility: OTHER | Age: 73
End: 2018-09-24

## 2018-09-24 NOTE — PROGRESS NOTES
DC NOTE FOR [facility] PT    Pt was treated 3 times from 4/23/18 to 5/24/18.  Treatment consisted of stretching and strengthening exercises for the lumbar spine.  Goals of PT not met.  DC from [facility] PT as pt did not return for any further follow up.

## 2018-09-26 ENCOUNTER — OFFICE VISIT (OUTPATIENT)
Dept: WOUND CARE | Facility: CLINIC | Age: 73
End: 2018-09-26
Payer: MEDICARE

## 2018-09-26 VITALS
HEIGHT: 70 IN | HEART RATE: 108 BPM | WEIGHT: 287.06 LBS | BODY MASS INDEX: 41.1 KG/M2 | TEMPERATURE: 97 F | SYSTOLIC BLOOD PRESSURE: 140 MMHG | DIASTOLIC BLOOD PRESSURE: 85 MMHG

## 2018-09-26 DIAGNOSIS — R60.0 BILATERAL LEG EDEMA: ICD-10-CM

## 2018-09-26 DIAGNOSIS — L97.911 ULCER OF RIGHT LOWER EXTREMITY, LIMITED TO BREAKDOWN OF SKIN: Primary | ICD-10-CM

## 2018-09-26 DIAGNOSIS — I87.2 VENOUS INSUFFICIENCY OF BOTH LOWER EXTREMITIES: ICD-10-CM

## 2018-09-26 DIAGNOSIS — L97.921 ULCER OF LEFT LOWER EXTREMITY, LIMITED TO BREAKDOWN OF SKIN: ICD-10-CM

## 2018-09-26 PROCEDURE — 29580 STRAPPING UNNA BOOT: CPT | Mod: 50,PBBFAC | Performed by: NURSE PRACTITIONER

## 2018-09-26 PROCEDURE — 99999 PR PBB SHADOW E&M-EST. PATIENT-LVL V: CPT | Mod: PBBFAC,,, | Performed by: NURSE PRACTITIONER

## 2018-09-26 PROCEDURE — 29580 STRAPPING UNNA BOOT: CPT | Mod: 50,S$PBB,, | Performed by: NURSE PRACTITIONER

## 2018-09-26 PROCEDURE — 99212 OFFICE O/P EST SF 10 MIN: CPT | Mod: 25,S$PBB,, | Performed by: NURSE PRACTITIONER

## 2018-09-26 PROCEDURE — 99215 OFFICE O/P EST HI 40 MIN: CPT | Mod: PBBFAC | Performed by: NURSE PRACTITIONER

## 2018-09-26 PROCEDURE — 3077F SYST BP >= 140 MM HG: CPT | Mod: CPTII,,, | Performed by: NURSE PRACTITIONER

## 2018-09-26 PROCEDURE — 3079F DIAST BP 80-89 MM HG: CPT | Mod: CPTII,,, | Performed by: NURSE PRACTITIONER

## 2018-09-26 PROCEDURE — 1101F PT FALLS ASSESS-DOCD LE1/YR: CPT | Mod: CPTII,,, | Performed by: NURSE PRACTITIONER

## 2018-09-26 NOTE — PROGRESS NOTES
Subjective:       Patient ID: [facility] PAT Cantor Jr. is a 72 y.o. male.    Chief Complaint: Wound Check    Wound Check        This patient is well known to my service.  He is seen today for evaluation and management of a blisters and wounds to both lower legs.  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 Stuart.  He developed swelling and blisters to the right leg about a week after the surgery.  The left leg blister was first noticed a week ago.  He has been unable to get his circaid stockings on since his surgery.  He has home health through [facility].  He has responded well to compression therapy in the past. He is afebrile.  He denies increased redness or purulent drainage but does report increased right leg swelling.  His pain level is 8/10.  His medical history is significant for type II diabetes, chronic kidney disease and venous insufficiency. He has home health services through [facility].  Review of Systems    Unchanged from prior visit except for recurring wounds to both lower legs.  Objective:      Physical Exam   Constitutional: He is oriented to person, place, and time. He appears well-developed and well-nourished. No distress.   HENT:   Head: Normocephalic and atraumatic.   Cardiovascular: Intact distal pulses.   Musculoskeletal: Normal range of motion. He exhibits edema (4+ right lower leg, 2-3+ left lower leg). He exhibits no tenderness.        Legs:  Neurological: He is alert and oriented to person, place, and time.   Skin: Skin is warm and dry. No rash noted. He is not diaphoretic. No erythema.   Psychiatric: He has a normal mood and affect. His behavior is normal. Judgment and thought content normal.   Nursing note and vitals reviewed.      ..  Hemoglobin A1C   Date Value Ref Range Status   08/29/2018 5.9 (H) 4.0 - 5.6 % Final     Comment:     ADA Screening Guidelines:  5.7-6.4%  Consistent with prediabetes  >or=6.5%  Consistent with  diabetes  High levels of fetal hemoglobin interfere with the HbA1C  assay. Heterozygous hemoglobin variants (HbS, HgC, etc)do  not significantly interfere with this assay.   However, presence of multiple variants may affect accuracy.     08/15/2018 6.5 (H) 4.0 - 5.6 % Final     Comment:     ADA Screening Guidelines:  5.7-6.4%  Consistent with prediabetes  >or=6.5%  Consistent with diabetes  High levels of fetal hemoglobin interfere with the HbA1C  assay. Heterozygous hemoglobin variants (HbS, HgC, etc)do  not significantly interfere with this assay.   However, presence of multiple variants may affect accuracy.     06/19/2018 7.0 (H) 4.0 - 5.6 % Final     Comment:     ADA Screening Guidelines:  5.7-6.4%  Consistent with prediabetes  >or=6.5%  Consistent with diabetes  High levels of fetal hemoglobin interfere with the HbA1C  assay. Heterozygous hemoglobin variants (HbS, HgC, etc)do  not significantly interfere with this assay.   However, presence of multiple variants may affect accuracy.       Stephen was seen in the clinic room and placed in the supine position on the treatment table.  Both legs were cleansed with Easi-clense sponges and dried thoroughly.  A hydrofiber dressing was applied to the wounds.  Eucderin cream was applied to the lower legs.  The patient's feet were positioned at a 90 degree angle.  A zinc oxide wrap, followed by kerlix roll gauze and coban were applied using a spiral technique avoiding creases or folds.  The wraps were started behind the first metatarsal and ended below the tibial tubercle of the knee.  There was overlap of each turn half the width of the previous turn.  The compression wraps will be changed every 3-4 days.    Assessment:       1. Ulcer of right lower extremity, limited to breakdown of skin    2. Ulcer of left lower extremity, limited to breakdown of skin    3. Bilateral leg edema    4. Venous insufficiency of both lower extremities        Plan:           Unna boots bilateral  lower legs as detailed above.  Patient was warned not to get the dressings wet and to use cast covers for showering.  Should the dressing become wet, he is to remove it, place a wet-to-dry dressing over the wound, cover with gauze and roll gauze and use ace wraps for compression and to secure bandages.  He should then notify home health as soon as possible to have a new dressing applied.  [facility] notified of orders via Epic fax.  We will ask them to see the patient twice weekly.  Return to this clinic in 3 weeks.    Home Health Orders:  Cleanse wounds with wound cleanser.  Apply hydrofiber to bilateral leg ulcers.  Pad ankles with ABD pads.  Unna boot (zinc oxide, kerlix and coban) bilateral lower legs.  Change wraps twice weekly      Right shin    Left shin

## 2018-09-28 ENCOUNTER — TELEPHONE (OUTPATIENT)
Dept: INTERNAL MEDICINE | Facility: CLINIC | Age: 73
End: 2018-09-28

## 2018-09-28 NOTE — TELEPHONE ENCOUNTER
----- Message from Nory Fuentes sent at 9/28/2018 10:31 AM CDT -----  Contact: Gissel Choi// [phone]  Requesting to speak to nurse or Dr regarding patients BP and medications. Please advise.

## 2018-09-28 NOTE — TELEPHONE ENCOUNTER
Contacted pt back per there request in regards to BP and medication. Pt stated his home health nurse is telling him he shouldn't be taking his Lasartan.   Please advise

## 2018-10-03 ENCOUNTER — OFFICE VISIT (OUTPATIENT)
Dept: ORTHOPEDICS | Facility: CLINIC | Age: 73
End: 2018-10-03
Payer: MEDICARE

## 2018-10-03 VITALS — HEIGHT: 70 IN | WEIGHT: 284.13 LBS | BODY MASS INDEX: 40.68 KG/M2

## 2018-10-03 DIAGNOSIS — Z96.659 STATUS POST KNEE REPLACEMENT, UNSPECIFIED LATERALITY: Primary | ICD-10-CM

## 2018-10-03 PROCEDURE — 99999 PR PBB SHADOW E&M-EST. PATIENT-LVL II: CPT | Mod: PBBFAC,,, | Performed by: ORTHOPAEDIC SURGERY

## 2018-10-03 PROCEDURE — 99212 OFFICE O/P EST SF 10 MIN: CPT | Mod: PBBFAC | Performed by: ORTHOPAEDIC SURGERY

## 2018-10-03 PROCEDURE — 99024 POSTOP FOLLOW-UP VISIT: CPT | Mod: ,,, | Performed by: ORTHOPAEDIC SURGERY

## 2018-10-11 ENCOUNTER — CLINICAL SUPPORT (OUTPATIENT)
Dept: INFECTIOUS DISEASES | Facility: CLINIC | Age: 73
End: 2018-10-11
Payer: MEDICARE

## 2018-10-11 PROCEDURE — 99213 OFFICE O/P EST LOW 20 MIN: CPT | Mod: PBBFAC,25

## 2018-10-11 PROCEDURE — 99999 PR PBB SHADOW E&M-EST. PATIENT-LVL III: CPT | Mod: PBBFAC,,,

## 2018-10-11 PROCEDURE — 90662 IIV NO PRSV INCREASED AG IM: CPT | Mod: PBBFAC

## 2018-10-17 ENCOUNTER — OFFICE VISIT (OUTPATIENT)
Dept: WOUND CARE | Facility: CLINIC | Age: 73
End: 2018-10-17
Payer: MEDICARE

## 2018-10-17 VITALS
SYSTOLIC BLOOD PRESSURE: 124 MMHG | WEIGHT: 286.38 LBS | TEMPERATURE: 98 F | DIASTOLIC BLOOD PRESSURE: 80 MMHG | HEART RATE: 83 BPM | BODY MASS INDEX: 41 KG/M2 | HEIGHT: 70 IN

## 2018-10-17 DIAGNOSIS — L97.211 VARICOSE VEINS OF RIGHT LOWER EXTREMITY WITH ULCER OF CALF LIMITED TO BREAKDOWN OF SKIN: Primary | ICD-10-CM

## 2018-10-17 DIAGNOSIS — B35.3 TINEA PEDIS OF BOTH FEET: ICD-10-CM

## 2018-10-17 DIAGNOSIS — I83.012 VARICOSE VEINS OF RIGHT LOWER EXTREMITY WITH ULCER OF CALF LIMITED TO BREAKDOWN OF SKIN: Primary | ICD-10-CM

## 2018-10-17 DIAGNOSIS — R60.0 BILATERAL LEG EDEMA: ICD-10-CM

## 2018-10-17 DIAGNOSIS — I87.2 VENOUS STASIS DERMATITIS OF BOTH LOWER EXTREMITIES: ICD-10-CM

## 2018-10-17 DIAGNOSIS — I87.2 VENOUS INSUFFICIENCY OF BOTH LOWER EXTREMITIES: ICD-10-CM

## 2018-10-17 PROBLEM — I83.029 VARICOSE VEINS OF LEFT LOWER EXTREMITY WITH ULCER LIMITED TO BREAKDOWN OF SKIN: Status: RESOLVED | Noted: 2018-10-17 | Resolved: 2018-10-17

## 2018-10-17 PROBLEM — L97.921 VARICOSE VEINS OF LEFT LOWER EXTREMITY WITH ULCER LIMITED TO BREAKDOWN OF SKIN: Status: RESOLVED | Noted: 2018-10-17 | Resolved: 2018-10-17

## 2018-10-17 PROBLEM — I83.029 VARICOSE VEINS OF LEFT LOWER EXTREMITY WITH ULCER LIMITED TO BREAKDOWN OF SKIN: Status: ACTIVE | Noted: 2018-10-17

## 2018-10-17 PROBLEM — L97.921 VARICOSE VEINS OF LEFT LOWER EXTREMITY WITH ULCER LIMITED TO BREAKDOWN OF SKIN: Status: ACTIVE | Noted: 2018-10-17

## 2018-10-17 PROBLEM — L97.921 ULCER OF LEFT LOWER EXTREMITY, LIMITED TO BREAKDOWN OF SKIN: Status: RESOLVED | Noted: 2017-09-22 | Resolved: 2018-10-17

## 2018-10-17 PROCEDURE — 99215 OFFICE O/P EST HI 40 MIN: CPT | Mod: PBBFAC | Performed by: NURSE PRACTITIONER

## 2018-10-17 PROCEDURE — 29580 STRAPPING UNNA BOOT: CPT | Mod: PBBFAC,RT | Performed by: NURSE PRACTITIONER

## 2018-10-17 PROCEDURE — 99999 PR PBB SHADOW E&M-EST. PATIENT-LVL V: CPT | Mod: PBBFAC,,, | Performed by: NURSE PRACTITIONER

## 2018-10-17 PROCEDURE — 29580 STRAPPING UNNA BOOT: CPT | Mod: S$PBB,RT,, | Performed by: NURSE PRACTITIONER

## 2018-10-17 PROCEDURE — 99499 UNLISTED E&M SERVICE: CPT | Mod: S$PBB,,, | Performed by: NURSE PRACTITIONER

## 2018-10-17 NOTE — PROGRESS NOTES
Subjective:       Patient ID: [facility] PAT Cantor Jr. is a 72 y.o. male.    Chief Complaint: Wound Check    Wound Check        This patient is seen today for reevaluation of blisters and wounds to both lower legs.  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 Stuart.  He developed swelling and blisters to the right leg about a week after the surgery.  Unna boots are on both legs. The left leg wound is healed and the right leg wound is healing as evidenced by wound contracture.  He has home health through [facility].  He is afebrile.  He denies increased redness, swelling or purulent drainage.  His pain level is 3/10.  His medical history is significant for type II diabetes, chronic kidney disease and venous insufficiency. Review of Systems    Unchanged from prior visit.  Objective:      Physical Exam   Constitutional: He is oriented to person, place, and time. He appears well-developed and well-nourished. No distress.   HENT:   Head: Normocephalic and atraumatic.   Cardiovascular: Intact distal pulses.   Musculoskeletal: Normal range of motion. He exhibits edema (3+ right lower leg, 2-3+ left lower leg). He exhibits no tenderness.        Legs:  Neurological: He is alert and oriented to person, place, and time.   Skin: Skin is warm and dry. No rash noted. He is not diaphoretic. No erythema.   Psychiatric: He has a normal mood and affect. His behavior is normal. Judgment and thought content normal.   Nursing note and vitals reviewed.      ..  Hemoglobin A1C   Date Value Ref Range Status   08/29/2018 5.9 (H) 4.0 - 5.6 % Final     Comment:     ADA Screening Guidelines:  5.7-6.4%  Consistent with prediabetes  >or=6.5%  Consistent with diabetes  High levels of fetal hemoglobin interfere with the HbA1C  assay. Heterozygous hemoglobin variants (HbS, HgC, etc)do  not significantly interfere with this assay.   However, presence of multiple variants may affect accuracy.      08/15/2018 6.5 (H) 4.0 - 5.6 % Final     Comment:     ADA Screening Guidelines:  5.7-6.4%  Consistent with prediabetes  >or=6.5%  Consistent with diabetes  High levels of fetal hemoglobin interfere with the HbA1C  assay. Heterozygous hemoglobin variants (HbS, HgC, etc)do  not significantly interfere with this assay.   However, presence of multiple variants may affect accuracy.     06/19/2018 7.0 (H) 4.0 - 5.6 % Final     Comment:     ADA Screening Guidelines:  5.7-6.4%  Consistent with prediabetes  >or=6.5%  Consistent with diabetes  High levels of fetal hemoglobin interfere with the HbA1C  assay. Heterozygous hemoglobin variants (HbS, HgC, etc)do  not significantly interfere with this assay.   However, presence of multiple variants may affect accuracy.       Stephen was seen in the clinic room and placed in the supine position on the treatment table.  The unna boots were removed with scissors and the leg was cleansed with Easi-clense sponges and dried thoroughly.  A hydrofiber dressing was applied to the right leg wound.  Eucerin cream was applied to the lower legs.  The patient's foot was positioned at a 90 degree angle.  A zinc oxide wrap, followed by kerlix roll gauze and coban were applied using a spiral technique avoiding creases or folds.  The wrap was started behind the first metatarsal and ended below the tibial tubercle of the knee.  There was overlap of each turn half the width of the previous turn.  The compression wrap will be changed every 3-4 days.    Assessment:       1. 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 Venous insufficiency of both lower extremities    3. Tinea pedis of both feet    4. Venous stasis dermatitis of both lower extremities    5. Bilateral leg edema        Plan:           Unna boot right lower leg as detailed above.  Pad left ankle with ABD pad. Kerlix and coban for compression left lower leg.  May resume wearing circaid stocking  left lower leg.   Patient was warned not to get the dressings wet and to use cast covers for showering.  Should the dressing become wet, he is to remove it, place a wet-to-dry dressing over the wound, cover with gauze and roll gauze and use ace wraps for compression and to secure bandages.  He should then notify home health as soon as possible to have a new dressing applied.  [facility] notified of orders via Epic fax.  We will ask them to see the patient twice weekly.  Return to this clinic in 3 weeks.    Home Health Orders:  Cleanse wound with wound cleanser.  Apply hydrofiber to right leg ulcer.  Pad ankle with ABD pads.  Unna boot (zinc oxide, kerlix and coban) right lower leg.  May resume circaid stocking left lower leg.  Change wrap twice weekly.      Right shin        Left shin

## 2018-10-22 RX ORDER — GABAPENTIN 300 MG/1
CAPSULE ORAL
Qty: 90 CAPSULE | Refills: 1 | Status: SHIPPED | OUTPATIENT
Start: 2018-10-22 | End: 2018-12-18 | Stop reason: SDUPTHER

## 2018-11-02 ENCOUNTER — RESEARCH ENCOUNTER (OUTPATIENT)
Dept: RESEARCH | Facility: HOSPITAL | Age: 73
End: 2018-11-02

## 2018-11-02 NOTE — PROGRESS NOTES
Sponsor: [employer]  Study Title/IRB Number: A Phase 3, Placebo-Controlled, Randomized, Observer-Blinded Study To Evaluate The Efficacy, Safety, And Tolerability Of A Clostridium Difficile Vaccine In Adults 50 Years Of Age And Older ([id number]) / 2017.094.B    Patient continued eligibility confirmed: Yes    Patient willing to continue to participate in [employer] C-Diff study and comply with all study requirements: Yes    Visit 6. Date of Telephone Contact: 02-NOV-2018    Contacted the subject by phone to discuss with patient any adverse events, emergency room visits or hospital admissions since last visit. All applicable events and any medication changes recorded.    Subject understands how to use provided stool collection kit and perform all necessary procedures.    Subject understands to contact site staff or investigator immediately if s/he experiences 3 or more unformed stools in any 24-hour period.    Requested that patient log in to their eDiaries every 30 days regardless of receiving a vaccine or completing vaccine questionnaires.   All other study procedures performed per protocol.  Patient verbalized understanding.

## 2018-11-07 ENCOUNTER — TELEPHONE (OUTPATIENT)
Dept: INTERNAL MEDICINE | Facility: CLINIC | Age: 73
End: 2018-11-07

## 2018-11-07 ENCOUNTER — OFFICE VISIT (OUTPATIENT)
Dept: WOUND CARE | Facility: CLINIC | Age: 73
End: 2018-11-07
Payer: MEDICARE

## 2018-11-07 VITALS
DIASTOLIC BLOOD PRESSURE: 72 MMHG | WEIGHT: 279.56 LBS | HEART RATE: 90 BPM | SYSTOLIC BLOOD PRESSURE: 119 MMHG | HEIGHT: 70 IN | BODY MASS INDEX: 40.02 KG/M2 | TEMPERATURE: 96 F

## 2018-11-07 DIAGNOSIS — R60.0 BILATERAL LEG EDEMA: ICD-10-CM

## 2018-11-07 DIAGNOSIS — I87.2 VENOUS INSUFFICIENCY OF BOTH LOWER EXTREMITIES: Primary | ICD-10-CM

## 2018-11-07 PROBLEM — L97.211 VARICOSE VEINS OF RIGHT LOWER EXTREMITY WITH ULCER OF CALF LIMITED TO BREAKDOWN OF SKIN: Status: RESOLVED | Noted: 2017-10-26 | Resolved: 2018-11-07

## 2018-11-07 PROBLEM — I83.012 VARICOSE VEINS OF RIGHT LOWER EXTREMITY WITH ULCER OF CALF LIMITED TO BREAKDOWN OF SKIN: Status: RESOLVED | Noted: 2017-10-26 | Resolved: 2018-11-07

## 2018-11-07 PROCEDURE — 3078F DIAST BP <80 MM HG: CPT | Mod: CPTII,S$GLB,, | Performed by: NURSE PRACTITIONER

## 2018-11-07 PROCEDURE — 3074F SYST BP LT 130 MM HG: CPT | Mod: CPTII,S$GLB,, | Performed by: NURSE PRACTITIONER

## 2018-11-07 PROCEDURE — 99999 PR PBB SHADOW E&M-EST. PATIENT-LVL V: CPT | Mod: PBBFAC,,, | Performed by: NURSE PRACTITIONER

## 2018-11-07 PROCEDURE — 99211 OFF/OP EST MAY X REQ PHY/QHP: CPT | Mod: S$GLB,,, | Performed by: NURSE PRACTITIONER

## 2018-11-07 NOTE — TELEPHONE ENCOUNTER
----- Message from Isabella Xiong sent at 11/7/2018  9:30 AM CST -----  Contact: self/ [phone]  Caller is requesting a sooner appointment. Caller declined first available appointment listed below. Caller will not accept being placed on the wait list and is requesting a message be sent to the provider.    When is the next available appointment:  11/19/18  Did you offer to schedule the next available appt and put the patient on the wait list?:   Yes, no  What visit type: ep  Symptoms:  Blood pressure check   Patient preference of timeframe to be scheduled:  11/16/18 at 10:30am  What is the reason the patient is requesting a sooner appointment? (insurance terminating, changing jobs):  Because blood pressure, and his wife is having an appointment on 11/16/18  Would you prefer an answer via [employer]?:    Comments:  Thank you

## 2018-11-07 NOTE — PATIENT INSTRUCTIONS
Apply compression hose first thing in the morning and remove at bedtime.  Moisturize legs with Eucerin cream daily.  Use triamcinolone cream on a short term basis for dry flaky skin or rash and itching.

## 2018-11-07 NOTE — PROGRESS NOTES
Subjective:       Patient ID: [facility] PAT Cantor Jr. is a 72 y.o. male.    Chief Complaint: No chief complaint on file.    Wound Check        This patient is seen today for reevaluation of blisters and wounds to the right lower leg.  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 Stuart.  He developed swelling and blisters to the right leg about a week after the surgery.  An unna boot is on the leg and the wound is healed. He has home health through [facility].  He is afebrile.  He denies increased redness, swelling or purulent drainage.  He does not complain of pain.  His medical history is significant for type II diabetes, chronic kidney disease and venous insufficiency.     Review of Systems    Unchanged from prior visit.  Objective:      Physical Exam   Constitutional: He is oriented to person, place, and time. He appears well-developed and well-nourished. No distress.   HENT:   Head: Normocephalic and atraumatic.   Cardiovascular: Intact distal pulses.   Musculoskeletal: Normal range of motion. He exhibits edema (1+ lower leg). He exhibits no tenderness.        Legs:  Neurological: He is alert and oriented to person, place, and time.   Skin: Skin is warm and dry. No rash noted. He is not diaphoretic. No erythema.   Psychiatric: He has a normal mood and affect. His behavior is normal. Judgment and thought content normal.   Nursing note and vitals reviewed.      ..  Hemoglobin A1C   Date Value Ref Range Status   08/29/2018 5.9 (H) 4.0 - 5.6 % Final     Comment:     ADA Screening Guidelines:  5.7-6.4%  Consistent with prediabetes  >or=6.5%  Consistent with diabetes  High levels of fetal hemoglobin interfere with the HbA1C  assay. Heterozygous hemoglobin variants (HbS, HgC, etc)do  not significantly interfere with this assay.   However, presence of multiple variants may affect accuracy.     08/15/2018 6.5 (H) 4.0 - 5.6 % Final     Comment:     ADA Screening  Guidelines:  5.7-6.4%  Consistent with prediabetes  >or=6.5%  Consistent with diabetes  High levels of fetal hemoglobin interfere with the HbA1C  assay. Heterozygous hemoglobin variants (HbS, HgC, etc)do  not significantly interfere with this assay.   However, presence of multiple variants may affect accuracy.     06/19/2018 7.0 (H) 4.0 - 5.6 % Final     Comment:     ADA Screening Guidelines:  5.7-6.4%  Consistent with prediabetes  >or=6.5%  Consistent with diabetes  High levels of fetal hemoglobin interfere with the HbA1C  assay. Heterozygous hemoglobin variants (HbS, HgC, etc)do  not significantly interfere with this assay.   However, presence of multiple variants may affect accuracy.         Assessment:       1. Venous insufficiency of both lower extremities    2. Bilateral leg edema        Plan:           Compression hose bilateral lower legs.  [facility] notified to discharge patient from wound care via Epic fax.    Return to this clinic as needed.    Right shin

## 2018-11-08 DIAGNOSIS — N18.30 CHRONIC KIDNEY DISEASE, STAGE III (MODERATE): Primary | ICD-10-CM

## 2018-11-14 ENCOUNTER — LAB VISIT (OUTPATIENT)
Dept: LAB | Facility: HOSPITAL | Age: 73
End: 2018-11-14
Attending: INTERNAL MEDICINE
Payer: MEDICARE

## 2018-11-14 DIAGNOSIS — N18.30 CHRONIC KIDNEY DISEASE, STAGE III (MODERATE): ICD-10-CM

## 2018-11-14 LAB
25(OH)D3+25(OH)D2 SERPL-MCNC: 24 NG/ML
ALBUMIN SERPL BCP-MCNC: 3.8 G/DL
ANION GAP SERPL CALC-SCNC: 6 MMOL/L
BUN SERPL-MCNC: 28 MG/DL
CALCIUM SERPL-MCNC: 10.5 MG/DL
CHLORIDE SERPL-SCNC: 105 MMOL/L
CO2 SERPL-SCNC: 29 MMOL/L
CREAT SERPL-MCNC: 1.5 MG/DL
EST. GFR  (AFRICAN AMERICAN): 52.3 ML/MIN/1.73 M^2
EST. GFR  (NON AFRICAN AMERICAN): 45.2 ML/MIN/1.73 M^2
GLUCOSE SERPL-MCNC: 136 MG/DL
PHOSPHATE SERPL-MCNC: 2.6 MG/DL
POTASSIUM SERPL-SCNC: 4.6 MMOL/L
PTH-INTACT SERPL-MCNC: 38 PG/ML
SODIUM SERPL-SCNC: 140 MMOL/L

## 2018-11-14 PROCEDURE — 82306 VITAMIN D 25 HYDROXY: CPT

## 2018-11-14 PROCEDURE — 80069 RENAL FUNCTION PANEL: CPT

## 2018-11-14 PROCEDURE — 36415 COLL VENOUS BLD VENIPUNCTURE: CPT

## 2018-11-14 PROCEDURE — 83970 ASSAY OF PARATHORMONE: CPT

## 2018-11-15 ENCOUNTER — TELEPHONE (OUTPATIENT)
Dept: NEPHROLOGY | Facility: CLINIC | Age: 73
End: 2018-11-15

## 2018-11-30 ENCOUNTER — TELEPHONE (OUTPATIENT)
Dept: SLEEP MEDICINE | Facility: CLINIC | Age: 73
End: 2018-11-30

## 2018-11-30 ENCOUNTER — PATIENT MESSAGE (OUTPATIENT)
Dept: SLEEP MEDICINE | Facility: CLINIC | Age: 73
End: 2018-11-30

## 2018-12-01 RX ORDER — OMEGA-3-ACID ETHYL ESTERS 1 G/1
CAPSULE, LIQUID FILLED ORAL
Qty: 540 CAPSULE | Refills: 1 | Status: SHIPPED | OUTPATIENT
Start: 2018-12-01 | End: 2019-01-16 | Stop reason: SDUPTHER

## 2018-12-06 ENCOUNTER — TELEPHONE (OUTPATIENT)
Dept: SLEEP MEDICINE | Facility: CLINIC | Age: 73
End: 2018-12-06

## 2018-12-06 NOTE — TELEPHONE ENCOUNTER
"Scheduled 12/06/2018. Upon review [facility] data, very little CPAP use. Per pt when CPAP is used he has "dripping nose" for 2 - 3 days afterwards. While he is benefiting from PAP use (continuous and refreshing sleep) the side effects of PND and congestion is uncomfortable.   Uses Astelin per ENT recs daily.   He has also gotten replacement mask and heated tubing as previously recommended.     Plan:  -Continue Astelin. Add Flonase 2 sprays in each nostril daily. Add OTC antihistamine (Claritin, Allegra, or Zyrtec) once daily.   -Continue heated tubing  -Resume CPAP use, goal is nightly at least 6 hours  -RTC 6 - 8 weeks   "

## 2018-12-12 ENCOUNTER — OFFICE VISIT (OUTPATIENT)
Dept: PODIATRY | Facility: CLINIC | Age: 73
End: 2018-12-12
Payer: MEDICARE

## 2018-12-12 VITALS
SYSTOLIC BLOOD PRESSURE: 125 MMHG | DIASTOLIC BLOOD PRESSURE: 73 MMHG | HEIGHT: 61 IN | HEART RATE: 77 BPM | BODY MASS INDEX: 52.67 KG/M2 | WEIGHT: 279 LBS

## 2018-12-12 DIAGNOSIS — B35.1 ONYCHOMYCOSIS OF MULTIPLE TOENAILS WITH TYPE 2 DIABETES MELLITUS AND PERIPHERAL ANGIOPATHY: ICD-10-CM

## 2018-12-12 DIAGNOSIS — E11.69 ONYCHOMYCOSIS OF MULTIPLE TOENAILS WITH TYPE 2 DIABETES MELLITUS AND PERIPHERAL ANGIOPATHY: ICD-10-CM

## 2018-12-12 DIAGNOSIS — Z79.01 LONG TERM CURRENT USE OF ANTICOAGULANT THERAPY: ICD-10-CM

## 2018-12-12 DIAGNOSIS — E11.42 DIABETIC POLYNEUROPATHY ASSOCIATED WITH TYPE 2 DIABETES MELLITUS: Primary | ICD-10-CM

## 2018-12-12 DIAGNOSIS — E11.51 ONYCHOMYCOSIS OF MULTIPLE TOENAILS WITH TYPE 2 DIABETES MELLITUS AND PERIPHERAL ANGIOPATHY: ICD-10-CM

## 2018-12-12 PROCEDURE — 99499 UNLISTED E&M SERVICE: CPT | Mod: S$GLB,,, | Performed by: PODIATRIST

## 2018-12-12 PROCEDURE — 99999 PR PBB SHADOW E&M-EST. PATIENT-LVL III: CPT | Mod: PBBFAC,,, | Performed by: PODIATRIST

## 2018-12-12 PROCEDURE — 11721 DEBRIDE NAIL 6 OR MORE: CPT | Mod: Q9,S$GLB,, | Performed by: PODIATRIST

## 2018-12-12 RX ORDER — CICLOPIROX 80 MG/ML
SOLUTION TOPICAL NIGHTLY
Qty: 6.6 ML | Refills: 11 | Status: SHIPPED | OUTPATIENT
Start: 2018-12-12 | End: 2019-01-16 | Stop reason: SDUPTHER

## 2018-12-12 NOTE — PROGRESS NOTES
Subjective:      Patient ID: ricki Cantor Jr. is a 73 y.o. male.    Chief Complaint: Diabetes Mellitus (dr barlow 09/21/2018) and Diabetic Foot Exam    Stephen is a 73 y.o. male who presents to the clinic for evaluation and treatment of high risk feet. Stephen has a past medical history of *Atrial fibrillation, Anticoagulant long-term use, Basal cell carcinoma (12/2015), BCC (basal cell carcinoma) (12/2015), Cataract, Chronic kidney disease, Diabetes mellitus with renal manifestations, uncontrolled, Dyslipidemia associated with type 2 diabetes mellitus, Fever blister, Hearing loss, HTN (hypertension), Keloid cicatrix, Liver disease, Melanoma (12/07/2015), Obesity, PN (peripheral neuropathy), Primary osteoarthritis of right knee (1/25/2017), Screening for colon cancer (3/3/2016), Sleep apnea, Thyroid disease, Type II or unspecified type diabetes mellitus with other specified manifestations, uncontrolled, and Ulcer of left lower extremity, limited to breakdown of skin (9/22/2017). The patient's chief complaint is long, thick toenails.  Gradual onset, worsening over past several weeks, aggravated by increased weight bearing, shoe gear, pressure.  Periodic debridement helps symptoms. This patient has documented high risk feet requiring routine maintenance secondary to diabetes mellitis and those secondary complications of diabetes, as mentioned..    PCP: Desmond Barlow MD    Date Last Seen by PCP:   Chief Complaint   Patient presents with    Diabetes Mellitus     dr barlow 09/21/2018    Diabetic Foot Exam         Current shoe gear:  Affected Foot: Rx diabetic extra depth shoes and custom accommodative insoles     Unaffected Foot: Rx diabetic extra depth shoes and custom accommodative insoles    Hemoglobin A1C   Date Value Ref Range Status   08/29/2018 5.9 (H) 4.0 - 5.6 % Final     Comment:     ADA Screening Guidelines:  5.7-6.4%  Consistent with prediabetes  >or=6.5%  Consistent with diabetes  High levels of fetal  hemoglobin interfere with the HbA1C  assay. Heterozygous hemoglobin variants (HbS, HgC, etc)do  not significantly interfere with this assay.   However, presence of multiple variants may affect accuracy.     08/15/2018 6.5 (H) 4.0 - 5.6 % Final     Comment:     ADA Screening Guidelines:  5.7-6.4%  Consistent with prediabetes  >or=6.5%  Consistent with diabetes  High levels of fetal hemoglobin interfere with the HbA1C  assay. Heterozygous hemoglobin variants (HbS, HgC, etc)do  not significantly interfere with this assay.   However, presence of multiple variants may affect accuracy.     06/19/2018 7.0 (H) 4.0 - 5.6 % Final     Comment:     ADA Screening Guidelines:  5.7-6.4%  Consistent with prediabetes  >or=6.5%  Consistent with diabetes  High levels of fetal hemoglobin interfere with the HbA1C  assay. Heterozygous hemoglobin variants (HbS, HgC, etc)do  not significantly interfere with this assay.   However, presence of multiple variants may affect accuracy.         Review of Systems   Constitution: Negative for chills, fever, malaise/fatigue and night sweats.   Cardiovascular: Positive for leg swelling. Negative for claudication and cyanosis.   Skin: Positive for nail changes. Negative for color change, itching, poor wound healing, rash and suspicious lesions.   Musculoskeletal: Negative for falls, gout, joint pain and myalgias.   Neurological: Positive for focal weakness, numbness, paresthesias and sensory change.           Objective:      Physical Exam   Constitutional: He is oriented to person, place, and time. He appears well-developed and well-nourished. He is cooperative.   Oriented to time, place, and person.   Cardiovascular:   Pulses:       Dorsalis pedis pulses are 1+ on the right side, and 1+ on the left side.        Posterior tibial pulses are 1+ on the right side, and 1+ on the left side.   Capillary fill time 3-5 seconds.  All toes warm to touch.      2-3 + pitting lower extremity edema  bilateral.    Negative elevational pallor and dependent rubor bilateral.     Musculoskeletal:   Normal angle, base, station of gait. Decreased stride length, early heel off, moderately propulsive toe off bilateral.    All ten toes without clubbing, cyanosis, or signs of ischemia.      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    No pain to palpation bilateral lower extremities.      Range of motion, stability, muscle strength, and muscle tone are age and health appropriate normal bilateral feet and legs.       Lymphadenopathy:   Negative lymphadenopathy bilateral popliteal fossa and tarsal tunnel.  Negative lymphangitic streaking bilateral foot/ankle bilateral.     Neurological: He is alert and oriented to person, place, and time. He has normal strength. He is not disoriented. He displays no atrophy and no tremor. A sensory deficit is present. He exhibits normal muscle tone.   Reflex Scores:       Patellar reflexes are 2+ on the right side and 2+ on the left side.       Achilles reflexes are 2+ on the right side and 2+ on the left side.  Decreased/absent vibratory sensation bilateral feet to 128Hz tuning fork.    Paresthesias, and burning bilateral feet with no clearly identified trigger or source.     Skin: Skin is warm, dry and intact. No abrasion, no bruising, no burn, no ecchymosis, no laceration, no lesion, no petechiae and no rash noted. He is not diaphoretic. No cyanosis or erythema. No pallor. Nails show no clubbing.   Skin thin, atrophic, with decreased density and distribution of pedal hair bilateral, but without hyperpigmentation,   ulcers, masses, nodules or cords palpated bilateral feet and legs.    Dependent rubor bilateral leg/ankle/foot consistent with stasis.    Toenails 1st, 2nd, 3rd, 4th, 5th  bilateral are hypertrophic thickened 2-3 mm, dystrophic, discolored tanish brown with tan, gray crumbly subungual debris.  Tender to distal nail plate pressure, without periungual skin abnormality of each.                Assessment:       Encounter Diagnoses   Name Primary?    Diabetic polyneuropathy associated with type 2 diabetes mellitus Yes    Onychomycosis of multiple toenails with type 2 diabetes mellitus and peripheral angiopathy     Long term current use of anticoagulant therapy          Plan:       [facility] [place] diabetes mellitus and diabetic foot exam.    Diagnoses and all orders for this visit:    Diabetic polyneuropathy associated with type 2 diabetes mellitus    Onychomycosis of multiple toenails with type 2 diabetes mellitus and peripheral angiopathy    Long term current use of anticoagulant therapy      I counseled the patient on his conditions, their implications and medical management.    - Shoe inspection. Diabetic Foot Education. Patient reminded of the importance of good nutrition and blood sugar control to help prevent podiatric complications of diabetes. Patient instructed on proper foot hygeine. We discussed wearing proper shoe gear, daily foot inspections, never walking without protective shoe gear, never putting sharp instruments to feet, routine podiatric nail visits every 2-3 months.      - With patient's permission, nails were aggressively reduced and debrided x 10 to their soft tissue attachment mechanically and with electric , removing all offending nail and debris. Patient relates relief following the procedure. He will continue to monitor the areas daily, inspect his feet, wear protective shoe gear when ambulatory, moisturizer to maintain skin integrity and follow in this office in approximately 2-3 months, sooner p.r.n.      Discussed conservative treatment with shoes of adequate dimensions, material, and style to alleviate symptoms and delay or prevent surgical intervention.    Resume compression stockings and left afo he has at home.    Rx [facility]    Follow-up in about 1 year (around 12/12/2019).

## 2018-12-13 ENCOUNTER — RESEARCH ENCOUNTER (OUTPATIENT)
Dept: RESEARCH | Facility: HOSPITAL | Age: 73
End: 2018-12-13

## 2018-12-14 ENCOUNTER — OFFICE VISIT (OUTPATIENT)
Dept: INFECTIOUS DISEASES | Facility: CLINIC | Age: 73
End: 2018-12-14

## 2018-12-14 ENCOUNTER — RESEARCH ENCOUNTER (OUTPATIENT)
Dept: RESEARCH | Facility: HOSPITAL | Age: 73
End: 2018-12-14

## 2018-12-14 DIAGNOSIS — R19.7 DIARRHEA, UNSPECIFIED TYPE: Primary | ICD-10-CM

## 2018-12-14 DIAGNOSIS — Z00.6 RESEARCH SUBJECT: ICD-10-CM

## 2018-12-14 PROCEDURE — 99213 OFFICE O/P EST LOW 20 MIN: CPT | Mod: S$GLB,,, | Performed by: CLINICAL NURSE SPECIALIST

## 2018-12-14 PROCEDURE — 99999 PR PBB SHADOW E&M-EST. PATIENT-LVL II: CPT | Mod: PBBFAC,,, | Performed by: CLINICAL NURSE SPECIALIST

## 2018-12-14 NOTE — PROGRESS NOTES
Subjective:      Patient ID: [facility] [place] [place] PAT Cantor Jr. is a 73 y.o. male.    Chief Complaint:Diarrhea      History of Present Illness  Mr. Stephen Watt  is a 73 y.o. male who presented to clinic for an unscheduled visit for Study Title/IRB Number: A Phase 3, Placebo-Controlled, Randomized, Observer-Blinded Study To Evaluate The Efficacy, Safety, And Tolerability Of A Clostridium Difficile Vaccine In Adults 50 Years Of Age And Older ([id number]) / 2017.094.B (PI: Lulú).       Mr. Whitehead states on 12/13/18 he had a third bowel movement in 24 hour time frame that was soft to liquid in consistency. Reports that on Monday 12/10/18 began having bowel movements that were more loose than normal for him. He continued to have episodes of soft/liquid bowel movements 1-2 day throughout week. Reports had 1 Wednesday evening and when had 2 episode Thursday morning he collected the specimen as directed and shipped per study protocol. Reports he has had 1 episode today. Patient denies any change to his eating habits, no blood in stool, no recent illness, no new medications, no recent changes in ori. States his wife was recently admitted in the hospital for 3-4 days. Denies fever, chills, nausea, vomiting, abdominal pain or cramping.      The subject's inclusion/exclusion criteria was reviewed. No change in his eligibility. The following Adverse Event will be caputured     Adverse Event: diarrhea  Start Date: 12/10/18  Ongoing   Severity: Grade 2(Moderate)   Is the Adverse Event Serious: NO  Result of study medication error: NO  Related to study treatment: NOT RELATED  Related to: new event  Action taken with study treatment: Dose not changed  Another action taken: YES, stool collected per study protocol. Stool tests ordered to be collected  Outcome: Not recovered/not resolved  Did event cause discontinuation from study: NO  Related to prefilled syringe or medimop vial adapter: NO     Subject verbalized desire to continue  participation in study, understanding that he can withdraw consent at any time.    Review of Systems   Constitution: Negative for chills, decreased appetite, fever, weakness and malaise/fatigue.   HENT: Negative for congestion, hoarse voice and sore throat.    Eyes: Negative for blurred vision.   Cardiovascular: Negative for chest pain and leg swelling.   Respiratory: Negative for cough, hemoptysis, shortness of breath and sputum production.    Hematologic/Lymphatic: Negative for adenopathy. Does not bruise/bleed easily.   Skin: Negative for dry skin, itching, rash and suspicious lesions.   Musculoskeletal: Negative for back pain.   Gastrointestinal: Positive for diarrhea. Negative for abdominal pain, constipation, heartburn, nausea and vomiting.   Genitourinary: Negative for flank pain and hematuria.   Neurological: Negative for dizziness and headaches.   Psychiatric/Behavioral: Negative for depression and memory loss. The patient does not have insomnia and is not nervous/anxious.      Objective:   Physical Exam   Constitutional: He is oriented to person, place, and time. Vital signs are normal. He appears well-developed and well-nourished.   HENT:   Head: Normocephalic and atraumatic.   Right Ear: External ear normal.   Left Ear: External ear normal.   Nose: Nose normal.   Eyes: Conjunctivae, EOM and lids are normal.   Neck: Trachea normal, normal range of motion and full passive range of motion without pain. Neck supple.   Cardiovascular: Normal rate and regular rhythm.   Pulmonary/Chest: Effort normal and breath sounds normal. No respiratory distress.   Abdominal: Soft. Normal appearance and bowel sounds are normal. He exhibits no distension. There is no tenderness. There is no guarding.   Musculoskeletal: Normal range of motion.   Neurological: He is alert and oriented to person, place, and time.   Skin: Skin is warm, dry and intact. Capillary refill takes less than 2 seconds.   Psychiatric: He has a normal  mood and affect. His speech is normal and behavior is normal. Judgment and thought content normal. Cognition and memory are normal.   Vitals reviewed.    Assessment:       1. Diarrhea, unspecified type    2. Research subject        Per patient report episodes of loose bowel movements 1-2 day this week. Episodes decreased today from yesterday. Instructed patient if he has continued loose bowel movements to collect specimen and bring to [facility] for testing. Orders entered to test stool for C.Diff, culture, Giardia, Cryptospridum, Ova, Cysts, Parasites. Collection kit given to patient  Plan:       1. 20ml blood collected and processed as per study protocol  2. New stool collection kit dispensed to patient  3. If diarrhea continues patient to collect specimen and bring to [facility] lab for testing.   4. Instructed to call ID research team for further questions, problems/concerns related C. Diff study or further episodes of diarrhea

## 2018-12-14 NOTE — PROGRESS NOTES
Sponsor: [employer]  Study Title/IRB Number: A Phase 3, Placebo-Controlled, Randomized, Observer-Blinded Study To Evaluate The Efficacy, Safety, And Tolerability Of A Clostridium Difficile Vaccine In Adults 50 Years Of Age And Older ([id number]) / 2017.094.B    Date of Telephone Contact: [id number]    Patient continued eligibility confirmed: Yes    Patient willing to continue to participate in [employer] C-Diff study and comply with all study requirements: Yes    Contacted the subject by phone to assess potential CDI case. Subject contacted the site staff to report that he was having multiple diarrhea episodes that met qualification criteria for potential CDI per protocol. I, Regulo Polanco, returned his call and, after speaking with the subject, determined that the episodes did meet criteria for potential CDI. The subject collected his stool sample and, with my assistance, followed all per protocol instructions for collection, packaging and shipment of the sample to central processing lab. The site staff will follow-up with the subject to schedule in-clinic visit for assessment, study required procedures and to issue a new stool collection kit.

## 2018-12-14 NOTE — PROGRESS NOTES
Sponsor: [employer]     Study Title/IRB Number: A Phase 3, Placebo-Controlled, Randomized, Observer-Blinded Study To Evaluate The Efficacy, Safety, And Tolerability Of A Clostridium Difficile Vaccine In Adults 50 Years Of Age And Older ([id number]) / 2017.094.B     Visit: Unscheduled Visit - Potential CDI  Date: 14DEC2018    Patient chart reviewed for new concomitant medications, diseases and potential adverse events since last research encounter.  All new entries confirmed with patient and documented in source.    Patient continued eligibility reviewed and confirmed by Regulo Polanco , and signed confirmation in source by Paloma Ferguson     Patient willing to continue to participate in [employer] C-Diff study and comply with all study requirements: Yes    The patient was seen in clinic today for the assessment of a Potential CDI episode as required per protocol.  The patient contacted our site on [id number] to inform us that he was having multiple recurring episodes of unformed or loose stools.  The patient collected a stool sample from the third qualifying bowel movement as instructed per protocol and informed the study site.  This sample was properly packaged and shipped for processing at the sponsor's chosen central lab. The patient was seen in-clinic today as required, where all study-related procedures were followed to include blood draw and sample processing.     Blood sample collected by Paloma Ferguson for immunogenicity assessment and processed per protocol by Regulo Polanco.     All study related observations complete: Yes     Post-visit documentation completed, to include [employer] payment request submitted.  Patient was also issued a new stool collection kit.: Yes

## 2018-12-17 ENCOUNTER — LAB VISIT (OUTPATIENT)
Dept: LAB | Facility: HOSPITAL | Age: 73
End: 2018-12-17
Attending: INTERNAL MEDICINE
Payer: MEDICARE

## 2018-12-17 DIAGNOSIS — R19.7 DIARRHEA, UNSPECIFIED TYPE: ICD-10-CM

## 2018-12-17 LAB
C DIFF GDH STL QL: NEGATIVE
C DIFF TOX A+B STL QL IA: NEGATIVE
WBC #/AREA STL HPF: NORMAL /[HPF]

## 2018-12-17 PROCEDURE — 89055 LEUKOCYTE ASSESSMENT FECAL: CPT

## 2018-12-17 PROCEDURE — 87045 FECES CULTURE AEROBIC BACT: CPT

## 2018-12-17 PROCEDURE — 87427 SHIGA-LIKE TOXIN AG IA: CPT

## 2018-12-17 PROCEDURE — 87046 STOOL CULTR AEROBIC BACT EA: CPT

## 2018-12-17 PROCEDURE — 87324 CLOSTRIDIUM AG IA: CPT

## 2018-12-17 PROCEDURE — 87329 GIARDIA AG IA: CPT

## 2018-12-17 PROCEDURE — 87209 SMEAR COMPLEX STAIN: CPT

## 2018-12-17 PROCEDURE — 87328 CRYPTOSPORIDIUM AG IA: CPT

## 2018-12-18 DIAGNOSIS — I50.31 ACUTE DIASTOLIC HEART FAILURE: ICD-10-CM

## 2018-12-18 DIAGNOSIS — E11.22 TYPE 2 DIABETES MELLITUS WITH STAGE 3 CHRONIC KIDNEY DISEASE, WITH LONG-TERM CURRENT USE OF INSULIN: ICD-10-CM

## 2018-12-18 DIAGNOSIS — I10 ESSENTIAL HYPERTENSION: ICD-10-CM

## 2018-12-18 DIAGNOSIS — I87.2 VENOUS INSUFFICIENCY OF BOTH LOWER EXTREMITIES: ICD-10-CM

## 2018-12-18 DIAGNOSIS — I48.20 CHRONIC ATRIAL FIBRILLATION: ICD-10-CM

## 2018-12-18 DIAGNOSIS — Z79.4 TYPE 2 DIABETES MELLITUS WITH STAGE 3 CHRONIC KIDNEY DISEASE, WITH LONG-TERM CURRENT USE OF INSULIN: ICD-10-CM

## 2018-12-18 DIAGNOSIS — M17.11 PRIMARY OSTEOARTHRITIS OF RIGHT KNEE: ICD-10-CM

## 2018-12-18 DIAGNOSIS — N18.30 CHRONIC KIDNEY DISEASE, STAGE III (MODERATE): ICD-10-CM

## 2018-12-18 DIAGNOSIS — N18.30 TYPE 2 DIABETES MELLITUS WITH STAGE 3 CHRONIC KIDNEY DISEASE, WITH LONG-TERM CURRENT USE OF INSULIN: ICD-10-CM

## 2018-12-18 LAB
CRYPTOSP AG STL QL IA: NEGATIVE
G LAMBLIA AG STL QL IA: NEGATIVE
O+P STL TRI STN: NORMAL

## 2018-12-18 RX ORDER — FUROSEMIDE 40 MG/1
TABLET ORAL
Qty: 90 TABLET | Refills: 1 | Status: SHIPPED | OUTPATIENT
Start: 2018-12-18 | End: 2019-03-25

## 2018-12-18 RX ORDER — GABAPENTIN 300 MG/1
CAPSULE ORAL
Qty: 90 CAPSULE | Refills: 1 | Status: SHIPPED | OUTPATIENT
Start: 2018-12-18 | End: 2019-04-11 | Stop reason: SDUPTHER

## 2018-12-18 NOTE — TELEPHONE ENCOUNTER
----- Message from Leonides Alcantar sent at 12/18/2018 12:30 PM CST -----  Contact: Spouse [phone] or [phone]  Spouse stating patient is having Diarrhea now for a couple of days and comes out very soft, requesting a call back from office to inform suggestions or Rx.    [facility] [place] [facility] [place] [facility]--[phone] (Phone) [phone] (Fax)    Please call an advise  Thank you

## 2018-12-19 LAB
E COLI SXT1 STL QL IA: NEGATIVE
E COLI SXT2 STL QL IA: NEGATIVE

## 2018-12-20 LAB — BACTERIA STL CULT: NORMAL

## 2019-01-04 DIAGNOSIS — M25.561 RIGHT KNEE PAIN, UNSPECIFIED CHRONICITY: Primary | ICD-10-CM

## 2019-01-07 DIAGNOSIS — I10 ESSENTIAL HYPERTENSION: ICD-10-CM

## 2019-01-07 DIAGNOSIS — N18.30 CHRONIC KIDNEY DISEASE, STAGE III (MODERATE): ICD-10-CM

## 2019-01-07 DIAGNOSIS — M17.11 PRIMARY OSTEOARTHRITIS OF RIGHT KNEE: ICD-10-CM

## 2019-01-07 DIAGNOSIS — N18.30 TYPE 2 DIABETES MELLITUS WITH STAGE 3 CHRONIC KIDNEY DISEASE, WITH LONG-TERM CURRENT USE OF INSULIN: ICD-10-CM

## 2019-01-07 DIAGNOSIS — I87.2 VENOUS INSUFFICIENCY OF BOTH LOWER EXTREMITIES: ICD-10-CM

## 2019-01-07 DIAGNOSIS — E11.22 TYPE 2 DIABETES MELLITUS WITH STAGE 3 CHRONIC KIDNEY DISEASE, WITH LONG-TERM CURRENT USE OF INSULIN: ICD-10-CM

## 2019-01-07 DIAGNOSIS — I50.31 ACUTE DIASTOLIC HEART FAILURE: ICD-10-CM

## 2019-01-07 DIAGNOSIS — I48.20 CHRONIC ATRIAL FIBRILLATION: ICD-10-CM

## 2019-01-07 DIAGNOSIS — Z79.4 TYPE 2 DIABETES MELLITUS WITH STAGE 3 CHRONIC KIDNEY DISEASE, WITH LONG-TERM CURRENT USE OF INSULIN: ICD-10-CM

## 2019-01-14 DIAGNOSIS — E11.9 TYPE 2 DIABETES MELLITUS WITHOUT COMPLICATION: ICD-10-CM

## 2019-01-16 ENCOUNTER — OFFICE VISIT (OUTPATIENT)
Dept: ORTHOPEDICS | Facility: CLINIC | Age: 74
End: 2019-01-16
Payer: MEDICARE

## 2019-01-16 ENCOUNTER — HOSPITAL ENCOUNTER (OUTPATIENT)
Dept: RADIOLOGY | Facility: HOSPITAL | Age: 74
Discharge: HOME | End: 2019-01-16
Attending: ORTHOPAEDIC SURGERY
Payer: MEDICARE

## 2019-01-16 VITALS — HEIGHT: 70 IN | BODY MASS INDEX: 40.02 KG/M2 | WEIGHT: 279.56 LBS

## 2019-01-16 DIAGNOSIS — E66.01 MORBID OBESITY WITH BMI OF 40.0-44.9, ADULT: Primary | ICD-10-CM

## 2019-01-16 DIAGNOSIS — M25.561 RIGHT KNEE PAIN, UNSPECIFIED CHRONICITY: ICD-10-CM

## 2019-01-16 DIAGNOSIS — M25.561 CHRONIC PAIN OF RIGHT KNEE: ICD-10-CM

## 2019-01-16 DIAGNOSIS — M70.61 GREATER TROCHANTERIC BURSITIS OF RIGHT HIP: ICD-10-CM

## 2019-01-16 DIAGNOSIS — G89.29 CHRONIC PAIN OF RIGHT KNEE: ICD-10-CM

## 2019-01-16 PROCEDURE — 20610 DRAIN/INJ JOINT/BURSA W/O US: CPT | Mod: RT,S$GLB,, | Performed by: ORTHOPAEDIC SURGERY

## 2019-01-16 PROCEDURE — 1101F PT FALLS ASSESS-DOCD LE1/YR: CPT | Mod: CPTII,S$GLB,, | Performed by: ORTHOPAEDIC SURGERY

## 2019-01-16 PROCEDURE — 20610 PR DRAIN/INJECT LARGE JOINT/BURSA: ICD-10-PCS | Mod: RT,S$GLB,, | Performed by: ORTHOPAEDIC SURGERY

## 2019-01-16 PROCEDURE — 73562 X-RAY EXAM OF KNEE 3: CPT | Mod: 26,RT,, | Performed by: RADIOLOGY

## 2019-01-16 PROCEDURE — 73560 XR KNEE ORTHO RIGHT: ICD-10-PCS | Mod: 26,XS,RT, | Performed by: RADIOLOGY

## 2019-01-16 PROCEDURE — 1101F PR PT FALLS ASSESS DOC 0-1 FALLS W/OUT INJ PAST YR: ICD-10-PCS | Mod: CPTII,S$GLB,, | Performed by: ORTHOPAEDIC SURGERY

## 2019-01-16 PROCEDURE — 73560 X-RAY EXAM OF KNEE 1 OR 2: CPT | Mod: 26,XS,RT, | Performed by: RADIOLOGY

## 2019-01-16 PROCEDURE — 99999 PR PBB SHADOW E&M-EST. PATIENT-LVL III: ICD-10-PCS | Mod: PBBFAC,,, | Performed by: ORTHOPAEDIC SURGERY

## 2019-01-16 PROCEDURE — 99213 PR OFFICE/OUTPT VISIT, EST, LEVL III, 20-29 MIN: ICD-10-PCS | Mod: 25,S$GLB,, | Performed by: ORTHOPAEDIC SURGERY

## 2019-01-16 PROCEDURE — 73562 X-RAY EXAM OF KNEE 3: CPT | Mod: TC,RT

## 2019-01-16 PROCEDURE — 73562 XR KNEE ORTHO RIGHT: ICD-10-PCS | Mod: 26,RT,, | Performed by: RADIOLOGY

## 2019-01-16 PROCEDURE — 73560 X-RAY EXAM OF KNEE 1 OR 2: CPT | Mod: TC,RT

## 2019-01-16 PROCEDURE — 99213 OFFICE O/P EST LOW 20 MIN: CPT | Mod: 25,S$GLB,, | Performed by: ORTHOPAEDIC SURGERY

## 2019-01-16 PROCEDURE — 99999 PR PBB SHADOW E&M-EST. PATIENT-LVL III: CPT | Mod: PBBFAC,,, | Performed by: ORTHOPAEDIC SURGERY

## 2019-01-16 RX ORDER — TRIAMCINOLONE ACETONIDE 40 MG/ML
40 INJECTION, SUSPENSION INTRA-ARTICULAR; INTRAMUSCULAR
Status: COMPLETED | OUTPATIENT
Start: 2019-01-16 | End: 2019-01-16

## 2019-01-16 RX ADMIN — TRIAMCINOLONE ACETONIDE 40 MG: 40 INJECTION, SUSPENSION INTRA-ARTICULAR; INTRAMUSCULAR at 08:01

## 2019-01-16 NOTE — PROGRESS NOTES
"Subjective:      Patient ID: Stephen Cantor Jr. is a 73 y.o. male.    Chief Complaint: Pain of the Right Hip and Pain of the Right Knee    HPI  Stephen Cantor Jr. has right hip pain.  The pain has worsened slightly. The pain is located in the lateral aspect of the hip.  There  is not radiation.   The pain is described as sharp. The pain is aggravated by laying on it.  It is alleviated by changing position.  There is associated back pain. Mild anterior knee pain 6 months s/p right knee revision.  His history, medications and problem list were reviewed.    Review of Systems   Constitution: Negative for chills, fever and night sweats.   HENT: Negative for hearing loss.    Eyes: Negative for blurred vision and double vision.   Cardiovascular: Negative for chest pain, claudication and leg swelling.   Respiratory: Negative for shortness of breath.    Endocrine: Negative for polydipsia, polyphagia and polyuria.   Hematologic/Lymphatic: Negative for adenopathy and bleeding problem. Does not bruise/bleed easily.   Skin: Negative for poor wound healing.   Musculoskeletal: Positive for joint pain.   Gastrointestinal: Negative for diarrhea and heartburn.   Genitourinary: Negative for bladder incontinence.   Neurological: Negative for focal weakness, headaches, numbness, paresthesias and sensory change.   Psychiatric/Behavioral: The patient is not nervous/anxious.    Allergic/Immunologic: Negative for persistent infections.         Objective:      Body mass index is 40.11 kg/m².  Vitals:    01/16/19 0836   Weight: 126.8 kg (279 lb 8.7 oz)   Height: 5' 10" (1.778 m)           General    Constitutional: He is oriented to person, place, and time. He appears well-developed and well-nourished.   obese   HENT:   Head: Normocephalic and atraumatic.   Eyes: EOM are normal.   Cardiovascular: Normal rate and regular rhythm.    Pulmonary/Chest: Effort normal.   Neurological: He is alert and oriented to person, place, and time.   Psychiatric: " He has a normal mood and affect. His behavior is normal.     General Musculoskeletal Exam   Gait: normal   Pelvic Obliquity: none      Right Knee Exam     Inspection   Alignment:  normal  Erythema: absent  Scars: present  Swelling: absent  Effusion: absent  Deformity: absent  Bruising: absent    Tenderness   The patient is tender to palpation of the patellar tendon.    Range of Motion   Extension: 0   Flexion: 120     Tests   Ligament Examination Lachman: normal (-1 to 2mm)   MCL - Valgus: normal (0 to 2mm)  LCL - Varus: normal  Patella   Passive Patellar Tilt: neutral    Other   Sensation: normal    Left Knee Exam     Inspection   Alignment:  normal  Erythema: absent  Scars: absent  Swelling: absent  Effusion: absent  Deformity: absent  Bruising: absent    Tenderness   The patient is experiencing no tenderness.     Range of Motion   Extension: 0   Flexion: 120     Tests   Stability Lachman: normal (-1 to 2mm)   MCL - Valgus: normal (0 to 2mm)  LCL - Varus: normal (0 to 2mm)  Patella   Passive Patellar Tilt: neutral    Other   Sensation: normal    Right Hip Exam     Inspection   Scars: absent  Swelling: absent  Bruising: absent  No deformity of hip.  Quadriceps Atrophy:  Negative  Erythema: absent    Tenderness   The patient tender to palpation of the trochanteric bursa.    Range of Motion   Abduction: 25   Adduction: 20   Extension: 0   Flexion: 100   External rotation: 30   Internal rotation: 25     Tests   Pain w/ forced internal rotation (LANCE): absent  Stinchfield test: negative    Other   Sensation: normal  Left Hip Exam     Inspection   Scars: absent  Swelling: absent  No deformity of hip.  Quadriceps Atrophy:  negative  Erythema: absent  Bruising: absent    Range of Motion   Abduction: 25   Adduction: 20   Extension: 0   Flexion: 100   External rotation: 30   Internal rotation: 25     Tests   Pain w/ forced internal rotation (LANCE): absent  Stinchfield test: negative    Other   Sensation: normal      Back  (L-Spine & T-Spine) / Neck (C-Spine) Exam     Back (L-Spine & T-Spine) Range of Motion   The patient has abnormal back ROM.      Muscle Strength   Right Lower Extremity   Hip Abduction: 5/5   Hip Adduction: 5/5   Hip Flexion: 5/5   Quadriceps:  5/5   Hamstrin/5   Ankle Dorsiflexion:  5/5   Left Lower Extremity   Hip Abduction: 5/5   Hip Adduction: 5/5   Hip Flexion: 5/5   Quadriceps:  5/5   Hamstrin/5   Ankle Dorsiflexion:  5/5     Reflexes     Left Side  Quadriceps:  2+    Right Side   Quadriceps:  2+    Vascular Exam     Right Pulses  Dorsalis Pedis:      2+          Left Pulses  Dorsalis Pedis:      2+          Capillary Refill  Right Hand: normal capillary refill  Left Hand: normal capillary refill    Edema  Right Upper Leg: absent  Right Lower Leg: absent  Left Upper Leg: absent  Left Lower Leg: absent    Radiographs taken today were reviewed by me.  There is a prosthetic replacement of the right knee(s).  The prosthesis is well positioned.  There is not evidence of bone loss, osteolysis, or loosening. There is not a fracture.                Assessment:       Encounter Diagnoses   Name Primary?    Morbid obesity with BMI of 40.0-44.9, adult Yes    Chronic pain of right knee     Greater trochanteric bursitis of right hip           Plan:       [facility] was seen today for pain and pain.    Diagnoses and all orders for this visit:    Morbid obesity with BMI of 40.0-44.9, adult    Chronic pain of right knee    Greater trochanteric bursitis of right hip    Other orders  -     Cancel: X-ray Knee Ortho Right; Future  -     triamcinolone acetonide injection 40 mg      Treatment options were discussed. Verbal consent was obtained.   After time out was performed and patient ID, side, and site were verified, the right hip was sterilely prepped in the standard fashion.  A 22-gauge needle was introduced intothe trochanteric bursa without complication.The bursa was then injected with 40 mg Kenalog and 9 cc 1% plain  lidocaine.  Sterile dressing was applied.  The patient was informed that they may resume activities as tolerated.  Elevation of glucose in diabetic patients was discussed.

## 2019-01-20 RX ORDER — FUROSEMIDE 40 MG/1
TABLET ORAL
Qty: 30 TABLET | Refills: 1 | OUTPATIENT
Start: 2019-01-20

## 2019-01-28 DIAGNOSIS — M51.36 DDD (DEGENERATIVE DISC DISEASE), LUMBAR: Primary | ICD-10-CM

## 2019-02-01 ENCOUNTER — TELEPHONE (OUTPATIENT)
Dept: ORTHOPEDICS | Facility: CLINIC | Age: 74
End: 2019-02-01

## 2019-02-04 ENCOUNTER — INITIAL CONSULT (OUTPATIENT)
Dept: ORTHOPEDICS | Facility: CLINIC | Age: 74
End: 2019-02-04
Payer: MEDICARE

## 2019-02-04 ENCOUNTER — HOSPITAL ENCOUNTER (OUTPATIENT)
Dept: RADIOLOGY | Facility: HOSPITAL | Age: 74
Discharge: HOME | End: 2019-02-04
Attending: PHYSICIAN ASSISTANT
Payer: MEDICARE

## 2019-02-04 VITALS
HEIGHT: 70 IN | BODY MASS INDEX: 38.39 KG/M2 | HEART RATE: 74 BPM | DIASTOLIC BLOOD PRESSURE: 70 MMHG | RESPIRATION RATE: 18 BRPM | SYSTOLIC BLOOD PRESSURE: 123 MMHG | WEIGHT: 268.19 LBS

## 2019-02-04 DIAGNOSIS — M51.36 DDD (DEGENERATIVE DISC DISEASE), LUMBAR: ICD-10-CM

## 2019-02-04 DIAGNOSIS — M43.10 SPONDYLOLISTHESIS, ACQUIRED: Primary | ICD-10-CM

## 2019-02-04 PROCEDURE — 72120 X-RAY BEND ONLY L-S SPINE: CPT | Mod: 26,,, | Performed by: RADIOLOGY

## 2019-02-04 PROCEDURE — 3078F PR MOST RECENT DIASTOLIC BLOOD PRESSURE < 80 MM HG: ICD-10-PCS | Mod: CPTII,S$GLB,, | Performed by: PHYSICIAN ASSISTANT

## 2019-02-04 PROCEDURE — 99214 PR OFFICE/OUTPT VISIT, EST, LEVL IV, 30-39 MIN: ICD-10-PCS | Mod: S$GLB,,, | Performed by: PHYSICIAN ASSISTANT

## 2019-02-04 PROCEDURE — 1101F PR PT FALLS ASSESS DOC 0-1 FALLS W/OUT INJ PAST YR: ICD-10-PCS | Mod: CPTII,S$GLB,, | Performed by: PHYSICIAN ASSISTANT

## 2019-02-04 PROCEDURE — 3074F PR MOST RECENT SYSTOLIC BLOOD PRESSURE < 130 MM HG: ICD-10-PCS | Mod: CPTII,S$GLB,, | Performed by: PHYSICIAN ASSISTANT

## 2019-02-04 PROCEDURE — 99999 PR PBB SHADOW E&M-EST. PATIENT-LVL V: ICD-10-PCS | Mod: PBBFAC,,, | Performed by: PHYSICIAN ASSISTANT

## 2019-02-04 PROCEDURE — 72100 X-RAY EXAM L-S SPINE 2/3 VWS: CPT | Mod: 26,,, | Performed by: RADIOLOGY

## 2019-02-04 PROCEDURE — 3074F SYST BP LT 130 MM HG: CPT | Mod: CPTII,S$GLB,, | Performed by: PHYSICIAN ASSISTANT

## 2019-02-04 PROCEDURE — 72120 XR LUMBAR SPINE AP AND LAT WITH FLEX/EXT: ICD-10-PCS | Mod: 26,,, | Performed by: RADIOLOGY

## 2019-02-04 PROCEDURE — 3078F DIAST BP <80 MM HG: CPT | Mod: CPTII,S$GLB,, | Performed by: PHYSICIAN ASSISTANT

## 2019-02-04 PROCEDURE — 1101F PT FALLS ASSESS-DOCD LE1/YR: CPT | Mod: CPTII,S$GLB,, | Performed by: PHYSICIAN ASSISTANT

## 2019-02-04 PROCEDURE — 99214 OFFICE O/P EST MOD 30 MIN: CPT | Mod: S$GLB,,, | Performed by: PHYSICIAN ASSISTANT

## 2019-02-04 PROCEDURE — 99999 PR PBB SHADOW E&M-EST. PATIENT-LVL V: CPT | Mod: PBBFAC,,, | Performed by: PHYSICIAN ASSISTANT

## 2019-02-04 PROCEDURE — 72100 XR LUMBAR SPINE AP AND LAT WITH FLEX/EXT: ICD-10-PCS | Mod: 26,,, | Performed by: RADIOLOGY

## 2019-02-04 PROCEDURE — 72100 X-RAY EXAM L-S SPINE 2/3 VWS: CPT | Mod: TC

## 2019-02-04 NOTE — PROGRESS NOTES
DATE: 2/4/2019  [place] [facility] [place]    Supervising Physician: aCrroll De Souza M.D.    CHIEF COMPLAINT: back pain    HISTORY:  Dov PAT Cantor Jr. is a 73 y.o. male here for initial evaluation of low back pain (Back - 5, Leg - 0).  He was seen by Dr. De Souza 4/2/2018.   The pain has been present for many years. The patient describes the pain as aching.  The pain is worse with standing and improved by sitting. There is no associated numbness and tingling. There is no subjective weakness. Prior treatments have included medications, physical therapy and ESIs, but no surgery.  He had an YANDY 4/24/2018 which did not give him significant relief.     The patient denies myelopathic symptoms such as handwriting changes or difficulty with buttons/coins/keys. Denies perineal paresthesias, bowel/bladder dysfunction.    PAST MEDICAL/SURGICAL HISTORY:  Past Medical History:   Diagnosis Date    *Atrial fibrillation     Eliquis    Anticoagulant long-term use     apaxiban    Basal cell carcinoma 12/2015    left eye brow    BCC (basal cell carcinoma) 12/2015    left shoulder    Cataract     Chronic kidney disease     Diabetes mellitus with renal manifestations, uncontrolled     Dyslipidemia associated with type 2 diabetes mellitus     Fever blister     Hearing loss     HTN (hypertension)     Keloid cicatrix     Liver disease     Melanoma 12/07/2015    right cheek-in situ    Obesity     PN (peripheral neuropathy)     Primary osteoarthritis of right knee 1/25/2017    Screening for colon cancer 3/3/2016    Sleep apnea     wears CPAP at night    Thyroid disease     Type II or unspecified type diabetes mellitus with other specified manifestations, uncontrolled     Ulcer of left lower extremity, limited to breakdown of skin 9/22/2017     Past Surgical History:   Procedure Laterality Date    APPENDECTOMY      CARDIOVERSION      CARDIOVERSION N/A 1/2/2014    [facility] CATH LAB     CATARACT EXTRACTION      CATARACT EXTRACTION Right 05/14/2018    Dr. Greer    COLONOSCOPY N/A 3/3/2016    Performed by Bob Poe MD at [facility] ENDO (4TH FLR)    epidural steroid injection      FLAP Right 12/14/2015    Performed by Danis Martinez MD at [facility] OR 2ND FLR    FULL THICKNESS SKIN GRAFT/HARVESTING OF LEFT POSTAURICULAR SKIN GRAFT Left 6/28/2016    Performed by Edita Sabillon MD at [facility] OR 1ST FLR    INJECTION-STEROID-EPIDURAL-TRANSFORAMINAL Bilateral 4/24/2018    Performed by [facility]on [facility] at [facility] PAIN MGT    INSERTION, INTRAOCULAR LENS PROSTHESIS Left 6/25/2018    Performed by Lawrence Greer MD at [facility] OR    INSERTION-INTRAOCULAR LENS (IOL) Right 5/14/2018    Performed by Lawrence [facility] at [facility] OR    JOINT REPLACEMENT      Knee    Meniere's disease surgery      PHACOEMULSIFICATION, CATARACT Left 6/25/2018    Performed by Lawrence [facility] at [facility] OR    PHACOEMULSIFICATION-ASPIRATION-CATARACT Right 5/14/2018    Performed by Lawrence [facility] at [facility] OR    REPAIR-ECTROPION Right 6/28/2016    Performed by Edita Sabillon MD at [facility] OR 1ST FLR    REPAIR-ECTROPION Right Lower Eyelid 90mins Right 3/7/2016    Performed by Danis Martinez MD at [facility] OR 2ND FLR    REPAIR-MOHS DEFECT Rt Cheek Right 12/14/2015    Performed by Danis Martinez MD at [facility] OR 2ND FLR    REPLACEMENT-KNEE-TOTAL right sherri Right 1/25/2017    Performed by Jose Armando Knight MD at [facility] OR 2ND FLR    REVISION-ARTHROPLASTY-KNEE-SHERRI Right 7/10/2018    Performed by Miguel Stuart MD at [facility] OR 2ND FLR    TONSILLECTOMY      TOTAL KNEE ARTHROPLASTY Right 01/25/2017    TKR    TOTAL KNEE ARTHROPLASTY Left     TKR, 1990's    TRANSESOPHAGEAL ECHOCARDIOGRAM (ELSY) N/A 1/2/2014    Performed by José Granda MD at [facility] CATH LAB       Medications:   Current Outpatient Medications on File Prior to Visit   Medication Sig Dispense Refill    apixaban 5 mg Tab Take 1 tablet (5 mg total)  "by mouth 2 (two) times daily. 180 tablet 3    azelastine (ASTELIN) 137 mcg (0.1 %) nasal spray 1 spray (137 mcg total) by Nasal route 2 (two) times daily. 30 mL 3    BD INSULIN PEN NEEDLE UF ORIG 29 x 1/2 " Ndle   12    blood sugar diagnostic Strp 1 strip by Misc.(Non-Drug; Combo Route) route 3 (three) times daily. 270 strip 6    blood-glucose meter Misc To check BG as discussed 1 each 0    ciclopirox (PENLAC) 8 % Soln Apply topically nightly. 6.6 mL 11    clobetasol (TEMOVATE) 0.05 % cream AAA finger bid 60 g 3    clotrimazole-betamethasone 1-0.05% (LOTRISONE) cream Apply topically 2 (two) times daily. 45 g 3    fenofibrate (TRICOR) 145 MG tablet TAKE ONE TABLET BY MOUTH EVERY DAY 90 tablet 1    furosemide (LASIX) 40 MG tablet Take 1 tablet (40 mg total) by mouth once daily. 30 tablet 1    furosemide (LASIX) 40 MG tablet TAKE ONE TABLET BY MOUTH EVERY DAY AS NEEDED 90 tablet 1    gabapentin (NEURONTIN) 300 MG capsule TAKE ONE CAPSULE BY MOUTH THREE TIMES DAILY 90 capsule 1    insulin aspart (NOVOLOG FLEXPEN) 100 unit/mL InPn pen Novolog 20 units breakfast, 22 units lunch and dinner plus correction scale. Max daily dose=100 units 2 Box 11    insulin glargine (BASAGLAR KWIKPEN U-100 INSULIN) 100 unit/mL (3 mL) InPn pen Inject 60 Units into the skin once daily. (Patient taking differently: Inject 50 Units into the skin every evening. ) 2 Box 11    ketoconazole (NIZORAL) 2 % cream Apply topically once daily. 30 g 1    lancets 33 gauge Misc 1 lancet by Misc.(Non-Drug; Combo Route) route 4 (four) times daily. Pt uses True Result Meter, bg monitoring 4 times a day. 450 each 4    levothyroxine (SYNTHROID) 25 MCG tablet Take 1 tablet (25 mcg total) by mouth once daily. 90 tablet 2    losartan (COZAAR) 25 MG tablet Take 25 mg by mouth once daily.  3    metoprolol succinate (TOPROL-XL) 100 MG 24 hr tablet TAKE ONE TABLET BY MOUTH EVERY DAY 90 tablet 1    nystatin-triamcinolone (MYCOLOG II) cream apply TWICE " DAILY (Patient taking differently: apply TWICE DAILY-using as needed for rash) 60 g 1    omega-3 acid ethyl esters (LOVAZA) 1 gram capsule Take 2 g by mouth 2 (two) times daily.      pravastatin (PRAVACHOL) 10 MG tablet TAKE ONE TABLET BY MOUTH ONCE EVERY DAY 90 tablet 3    tamsulosin (FLOMAX) 0.4 mg Cp24 Take 1 capsule (0.4 mg total) by mouth once daily. 30 capsule 5    temazepam (RESTORIL) 15 mg Cap TAKE ONE CAPSULE BY MOUTH EVERY EVENING 30 capsule 5    triamcinolone acetonide 0.1% (KENALOG) 0.1 % cream Apply topically 2 (two) times daily. Apply to affected area twice daily as directed. 454 g 0    [DISCONTINUED] metoprolol succinate (TOPROL-XL) 50 MG 24 hr tablet Take 1 tablet (50 mg total) by mouth once daily. 60 tablet 1    metronidazole (METROGEL) 0.75 % gel Apply topically 2 (two) times daily. 1 Tube 6     No current facility-administered medications on file prior to visit.        Social History:   Social History     Socioeconomic History    Marital status:      Spouse name: Not on file    Number of children: 3    Years of education: Not on file    Highest education level: Not on file   Social Needs    Financial resource strain: Not on file    Food insecurity - worry: Not on file    Food insecurity - inability: Not on file    Transportation needs - medical: Not on file    Transportation needs - non-medical: Not on file   Occupational History    Occupation: retired     Employer: RETIRED   Tobacco Use    Smoking status: Former Smoker     Last attempt to quit: 7/10/1985     Years since quittin.5    Smokeless tobacco: Never Used   Substance and Sexual Activity    Alcohol use: No     Alcohol/week: 0.0 oz     Comment: quit - had excess in the past    Drug use: No    Sexual activity: Not on file   Other Topics Concern    Not on file   Social History Narrative    Not on file       REVIEW OF SYSTEMS:  Constitution: Negative. Negative for chills, fever and night sweats.  "  Cardiovascular: Negative for chest pain and syncope.   Respiratory: Negative for cough and shortness of breath.   Gastrointestinal: See HPI. Negative for nausea/vomiting. Negative for abdominal pain.  Genitourinary: See HPI. Negative for discoloration or dysuria.  Skin: Negative for dry skin, itching and rash.   Hematologic/Lymphatic: Negative for bleeding problem. Does not bruise/bleed easily.   Musculoskeletal: Negative for falls and muscle weakness.   Neurological: See HPI. No seizures.   Endocrine: Negative for polydipsia, polyphagia and polyuria.   Allergic/Immunologic: Negative for hives and persistent infections.     EXAM:  /70   Pulse 74   Resp 18   Ht 5' 10" (1.778 m)   Wt 121.6 kg (268 lb 3 oz)   BMI 38.48 kg/m²     General: The patient is a very pleasant 73 y.o. male in no apparent distress, the patient is oriented to person, place and time.  Psych: Normal mood and affect  HEENT: Vision grossly intact, hearing intact to the spoken word.  Lungs: Respirations unlabored.  Gait: Antalgic station and gait, unable to perform toe or heel walk.   Skin: Dorsal lumbar skin negative for rashes, lesions, hairy patches and surgical scars. There is mild lumbar tenderness to palpation.  Range of motion: Lumbar range of motion is acceptable.  Spinal Balance: Global saggital and coronal spinal balance acceptable, not significant for scoliosis and kyphosis.  Musculoskeletal: No pain with the range of motion of the bilateral hips. No trochanteric tenderness to palpation.  Vascular: Bilateral lower extremities warm and well perfused, dorsalis pedis pulses 2+ bilaterally.  Neurological: Normal strength and tone in all major motor groups in the bilateral lower extremities. Normal sensation to light touch in the L2-S1 dermatomes bilaterally.  Deep tendon reflexes symmetric 1+ in the bilateral lower extremities.  Negative Babinski bilaterally. Straight leg raise negative bilaterally.    IMAGING:      Today I " personally reviewed AP, Lat and Flex/Ex  upright L-spine films that demonstrate anterolisthesis of L5/S1.       Body mass index is 38.48 kg/m².    Hemoglobin A1C   Date Value Ref Range Status   08/29/2018 5.9 (H) 4.0 - 5.6 % Final     Comment:     ADA Screening Guidelines:  5.7-6.4%  Consistent with prediabetes  >or=6.5%  Consistent with diabetes  High levels of fetal hemoglobin interfere with the HbA1C  assay. Heterozygous hemoglobin variants (HbS, HgC, etc)do  not significantly interfere with this assay.   However, presence of multiple variants may affect accuracy.     08/15/2018 6.5 (H) 4.0 - 5.6 % Final     Comment:     ADA Screening Guidelines:  5.7-6.4%  Consistent with prediabetes  >or=6.5%  Consistent with diabetes  High levels of fetal hemoglobin interfere with the HbA1C  assay. Heterozygous hemoglobin variants (HbS, HgC, etc)do  not significantly interfere with this assay.   However, presence of multiple variants may affect accuracy.     06/19/2018 7.0 (H) 4.0 - 5.6 % Final     Comment:     ADA Screening Guidelines:  5.7-6.4%  Consistent with prediabetes  >or=6.5%  Consistent with diabetes  High levels of fetal hemoglobin interfere with the HbA1C  assay. Heterozygous hemoglobin variants (HbS, HgC, etc)do  not significantly interfere with this assay.   However, presence of multiple variants may affect accuracy.             ASSESSMENT/PLAN:    ricki was seen today for consult.    Diagnoses and all orders for this visit:    Spondylolisthesis, acquired  -     Ambulatory Consult to [facility]        The patient would like to get back into physical therapy.  Orders placed today.  Follow up after therapy if symptoms persist.  We may consider getting a new MRI at that time.         Follow-up if symptoms worsen or fail to improve.

## 2019-02-04 NOTE — LETTER
February 4, 2019      Miguel Stuart MD  [address]  [place] [address]           [facility]  [address]  [place] [address]  Phone: [phone]          Patient: [facility] [place].   MR Number: [id number]   YOB: 1945   Date of Visit: 2/4/2019       Dear Dr. Miguel Stuart:    Thank you for referring [facility] to me for evaluation. Attached you will find relevant portions of my assessment and plan of care.    If you have questions, please do not hesitate to call me. I look forward to following [facility] along with you.    Sincerely,    Leonila Ridley PA-C    Enclosure  CC:  No Recipients    If you would like to receive this communication electronically, please contact [email or web address] or [phone] to request more information on "[employer] Link access.    For providers and/or their staff who would like to refer a patient to [facility], please contact us through our one-stop-shop provider referral line, [facility] , at [phone].    If you feel you have received this communication in error or would no longer like to receive these types of communications, please e-mail [email or web address]

## 2019-02-05 ENCOUNTER — OFFICE VISIT (OUTPATIENT)
Dept: DERMATOLOGY | Facility: CLINIC | Age: 74
End: 2019-02-05
Payer: MEDICARE

## 2019-02-05 DIAGNOSIS — Z86.006 HISTORY OF MELANOMA IN SITU: ICD-10-CM

## 2019-02-05 DIAGNOSIS — Z85.828 PERSONAL HISTORY OF OTHER MALIGNANT NEOPLASM OF SKIN: ICD-10-CM

## 2019-02-05 DIAGNOSIS — L82.0 INFLAMED SEBORRHEIC KERATOSIS: ICD-10-CM

## 2019-02-05 DIAGNOSIS — L30.4 INTERTRIGO: ICD-10-CM

## 2019-02-05 DIAGNOSIS — L57.0 AK (ACTINIC KERATOSIS): Primary | ICD-10-CM

## 2019-02-05 DIAGNOSIS — D22.9 MULTIPLE BENIGN NEVI: ICD-10-CM

## 2019-02-05 DIAGNOSIS — L82.1 SEBORRHEIC KERATOSIS: ICD-10-CM

## 2019-02-05 DIAGNOSIS — D18.00 ANGIOMA: ICD-10-CM

## 2019-02-05 PROCEDURE — 1101F PR PT FALLS ASSESS DOC 0-1 FALLS W/OUT INJ PAST YR: ICD-10-PCS | Mod: CPTII,S$GLB,, | Performed by: DERMATOLOGY

## 2019-02-05 PROCEDURE — 17000 DESTRUCT PREMALG LESION: CPT | Mod: 59,S$GLB,, | Performed by: DERMATOLOGY

## 2019-02-05 PROCEDURE — 99999 PR PBB SHADOW E&M-EST. PATIENT-LVL II: ICD-10-PCS | Mod: PBBFAC,,, | Performed by: DERMATOLOGY

## 2019-02-05 PROCEDURE — 99214 OFFICE O/P EST MOD 30 MIN: CPT | Mod: 25,S$GLB,, | Performed by: DERMATOLOGY

## 2019-02-05 PROCEDURE — 17110 DESTRUCTION B9 LES UP TO 14: CPT | Mod: S$GLB,,, | Performed by: DERMATOLOGY

## 2019-02-05 PROCEDURE — 99214 PR OFFICE/OUTPT VISIT, EST, LEVL IV, 30-39 MIN: ICD-10-PCS | Mod: 25,S$GLB,, | Performed by: DERMATOLOGY

## 2019-02-05 PROCEDURE — 17000 PR DESTRUCTION(LASER SURGERY,CRYOSURGERY,CHEMOSURGERY),PREMALIGNANT LESIONS,FIRST LESION: ICD-10-PCS | Mod: 59,S$GLB,, | Performed by: DERMATOLOGY

## 2019-02-05 PROCEDURE — 99999 PR PBB SHADOW E&M-EST. PATIENT-LVL II: CPT | Mod: PBBFAC,,, | Performed by: DERMATOLOGY

## 2019-02-05 PROCEDURE — 17110 PR DESTRUCTION BENIGN LESIONS UP TO 14: ICD-10-PCS | Mod: S$GLB,,, | Performed by: DERMATOLOGY

## 2019-02-05 PROCEDURE — 1101F PT FALLS ASSESS-DOCD LE1/YR: CPT | Mod: CPTII,S$GLB,, | Performed by: DERMATOLOGY

## 2019-02-05 RX ORDER — TRIAMCINOLONE ACETONIDE 1 MG/G
CREAM TOPICAL
Qty: 1 BOTTLE | Refills: 3 | Status: SHIPPED | OUTPATIENT
Start: 2019-02-05 | End: 2019-06-19 | Stop reason: SDUPTHER

## 2019-02-05 NOTE — PROGRESS NOTES
Subjective:       Patient ID:  Jmc PAT Cantor Jr. is a 73 y.o. male who presents for   Chief Complaint   Patient presents with    Skin Check     TBSE    Lesion     back and left jawline     History of Present Illness: The patient presents for follow up of skin check.    The patient was last seen on: 1/4/18 for cryo to ak's which have resolved and TBSE  This is a high risk patient here to check for the development of new lesions.  H/o mis right cheek 11/15    Other skin complaints: lesion on back x 3 months + tender. tx neosporin - helped a little  C/o lesion left jawline x 5 months no symptoms and no previous treatment          Review of Systems   Constitutional: Positive for weight loss (265#  (46# since last year)). Negative for fever, chills, weight gain, fatigue and night sweats.   Musculoskeletal: Positive for muscle weakness (legs - stable).   Skin: Negative for daily sunscreen use, activity-related sunscreen use (but wears hat) and recent sunburn.   Hematologic/Lymphatic: Negative for adenopathy. Bruises/bleeds easily (on xeralto).        Objective:    Physical Exam   Constitutional: He appears well-developed and well-nourished. He is obese.  No distress.   Neurological: He is alert and oriented to person, place, and time. He is not disoriented.   Psychiatric: He has a normal mood and affect.   Skin:   Areas Examined (abnormalities noted in diagram):   Scalp / Hair Palpated and Inspected  Head / Face Inspection Performed  Neck Inspection Performed  Chest / Axilla Inspection Performed  Abdomen Inspection Performed  Genitals / Buttocks / Groin Inspection Performed  Back Inspection Performed  RUE Inspected  LUE Inspection Performed  RLE Inspected  LLE Inspection Performed  Nails and Digits Inspection Performed                       Diagram Legend     Erythematous scaling macule/papule c/w actinic keratosis       Vascular papule c/w angioma      Pigmented verrucoid papule/plaque c/w seborrheic keratosis       Yellow umbilicated papule c/w sebaceous hyperplasia      Irregularly shaped tan macule c/w lentigo     1-2 mm smooth white papules consistent with Milia      Movable subcutaneous cyst with punctum c/w epidermal inclusion cyst      Subcutaneous movable cyst c/w pilar cyst      Firm pink to brown papule c/w dermatofibroma      Pedunculated fleshy papule(s) c/w skin tag(s)      Evenly pigmented macule c/w junctional nevus     Mildly variegated pigmented, slightly irregular-bordered macule c/w mildly atypical nevus      Flesh colored to evenly pigmented papule c/w intradermal nevus       Pink pearly papule/plaque c/w basal cell carcinoma      Erythematous hyperkeratotic cursted plaque c/w SCC      Surgical scar with no sign of skin cancer recurrence      Open and closed comedones      Inflammatory papules and pustules      Verrucoid papule consistent consistent with wart     Erythematous eczematous patches and plaques     Dystrophic onycholytic nail with subungual debris c/w onychomycosis     Umbilicated papule    Erythematous-base heme-crusted tan verrucoid plaque consistent with inflamed seborrheic keratosis     Erythematous Silvery Scaling Plaque c/w Psoriasis     See annotation      Assessment / Plan:        AK (actinic keratosis)  Cryosurgery Procedure Note    Verbal consent from the patient is obtained including, but not limited to, risk of hypopigmentation/hyperpigmentation, scar, recurrence of lesion. The patient is aware of the precancerous quality and need for treatment of these lesions. Liquid nitrogen cryosurgery is applied to the 1 actinic keratoses, as detailed in the physical exam, to produce a freeze injury. The patient is aware that blisters may form and is instructed on wound care with gentle cleansing and use of vaseline ointment to keep moist until healed. The patient is supplied a handout on cryosurgery and is instructed to call if lesions do not completely resolve.      Inflamed seborrheic  keratosis - left jawline  Procedure note for destruction via hyfrecation and curettage:    Verbal consent obtained. Risks of recurrence and scarring discussed.   1 lesions cleaned with alcohol and anesthetized with 1% lidocaine with epinephrine. Areas then lightly hyfrecated and curetted to remove gross lesion. Hemostasis achieved with aluminum chloride application. No complications.   Areas dressed with Vaseline jelly and bandage. Wound care discussed.      Intertrigo - right inguinal fold  Shake lotion  Recommend white vinegar: water 1:1 compresses 2x/day followed by cool blow dry and then application of prescription medication.     Cool blow dry after showering. Once clear, use Zeasorb AF powder for maintenance.    -     triamcinolone acetonide 0.1% (KENALOG) 0.1 % cream; AAA bid after cool blow dry  Dispense: 1 Bottle; Refill: 3    Seborrheic keratosis  These are benign inherited growths without a malignant potential. Reassurance given to patient. No treatment is necessary.       Angiomas  This is a benign vascular lesion. Reassurance given. No treatment required.       Multiple benign nevi   - stable and chronic      History of melanoma in situ and Personal history of other malignant neoplasm of skin  Area of previous melanoma in situ examined. Site well healed with no signs of recurrence.    Total body skin examination performed today including at least 12 points as noted in physical examination. No lesions suspicious for malignancy noted.               Follow-up in about 6 months (around 8/5/2019).

## 2019-02-05 NOTE — PATIENT INSTRUCTIONS
Recommend white vinegar: water 1:1 compresses 2x/day followed by cool blow dry and then application of shake lotion    Cool blow dry after showering. Once clear, use Zeasorb AF powder for maintenance.    CRYOSURGERY      Your doctor has used a method called cryosurgery to treat your skin condition. Cryosurgery refers to the use of very cold substances to treat a variety of skin conditions such as warts, pre-skin cancers, molluscum contagiosum, sun spots, and several benign growths. The substance we use in cryosurgery is liquid nitrogen and is so cold (-195 degrees Celsius) that is burns when administered.     Following treatment in the office, the skin may immediately burn and become red. You may find the area around the lesion is affected as well. It is sometimes necessary to treat not only the lesion, but a small area of the surrounding normal skin to achieve a good response.     A blister, and even a blood filled blister, may form after treatment.   This is a normal response. If the blister is painful, it is acceptable to sterilize a needle and with rubbing alcohol and gently pop the blister. It is important that you gently wash the area with soap and warm water as the blister fluid may contain wart virus if a wart was treated. Do no remove the roof of the blister.     The area treated can take anywhere from 1-3 weeks to heal. Healing time depends on the kind skin lesion treated, the location, and how aggressively the lesion was treated. It is recommended that the areas treated are covered with Vaseline or bacitracin ointment and a band-aid. If a band-aid is not practical, just ointment applied several times per day will do. Keeping these areas moist will speed the healing time.    Treatment with liquid nitrogen can leave a scar. In dark skin, it may be a light or dark scar, in light skin it may be a white or pink scar. These will generally fade with time, but may never go away completely.     If you have any  concerns after your treatment, please feel free to call the office.       [address] [facility] [place] [address]/ [phone] FAX/ [email or web address]    Wound Care    A band aid and vaseline ointment has been placed over the site.  This should remain in place for 24 hours.  It is recommended that you keep the area dry for the first 24 hours.  After 24 hours, you may remove the band aid and wash the area with warm soap and water and apply Vaseline jelly.  Many patients prefer to use Neosporin or Bacitracin ointment.  This is acceptable; however, know that you can develop an allergy to this medication even if you have used it safely for years.  It is important to keep the area moist.  Letting it dry out and get air slows healing time, and will worsen the scar.  Band aid is optional after first 24 hours.      If you notice increasing redness, tenderness, pain, or yellow drainage at the site, please notify your doctor.  These are signs of an infection.    If your site is bleeding, apply firm pressure for 15 minutes straight.  Repeat for another 15 minutes, if it is still bleeding.   If the surgical site continues to bleed, then please contact your doctor.      [address] [facility] [place] [address]/ [phone] FAX/ [email or web address]

## 2019-02-13 RX ORDER — FENOFIBRATE 145 MG/1
TABLET, FILM COATED ORAL
Qty: 90 TABLET | Refills: 1 | Status: SHIPPED | OUTPATIENT
Start: 2019-02-13 | End: 2019-05-21 | Stop reason: SDUPTHER

## 2019-02-13 RX ORDER — TEMAZEPAM 15 MG/1
CAPSULE ORAL
Qty: 30 CAPSULE | Refills: 5 | Status: SHIPPED | OUTPATIENT
Start: 2019-02-13 | End: 2019-07-25 | Stop reason: SDUPTHER

## 2019-02-19 ENCOUNTER — TELEPHONE (OUTPATIENT)
Dept: ADMINISTRATIVE | Facility: CLINIC | Age: 74
End: 2019-02-19

## 2019-03-11 RX ORDER — METOPROLOL SUCCINATE 100 MG/1
TABLET, EXTENDED RELEASE ORAL
Qty: 90 TABLET | Refills: 1 | Status: SHIPPED | OUTPATIENT
Start: 2019-03-11 | End: 2019-08-28 | Stop reason: SDUPTHER

## 2019-03-25 ENCOUNTER — TELEPHONE (OUTPATIENT)
Dept: NEPHROLOGY | Facility: CLINIC | Age: 74
End: 2019-03-25

## 2019-03-25 ENCOUNTER — OFFICE VISIT (OUTPATIENT)
Dept: OPTOMETRY | Facility: CLINIC | Age: 74
End: 2019-03-25
Payer: MEDICARE

## 2019-03-25 DIAGNOSIS — H04.123 DRY EYE SYNDROME, BILATERAL: ICD-10-CM

## 2019-03-25 DIAGNOSIS — H02.112 CICATRICIAL ECTROPION OF RIGHT LOWER EYELID: ICD-10-CM

## 2019-03-25 DIAGNOSIS — Z98.42 S/P BILATERAL CATARACT EXTRACTION: ICD-10-CM

## 2019-03-25 DIAGNOSIS — E11.9 TYPE 2 DIABETES MELLITUS WITHOUT OPHTHALMIC MANIFESTATIONS: Primary | ICD-10-CM

## 2019-03-25 DIAGNOSIS — H26.493 PCO (POSTERIOR CAPSULAR OPACIFICATION), BILATERAL: ICD-10-CM

## 2019-03-25 DIAGNOSIS — Z98.41 S/P BILATERAL CATARACT EXTRACTION: ICD-10-CM

## 2019-03-25 PROCEDURE — 99999 PR PBB SHADOW E&M-EST. PATIENT-LVL II: ICD-10-PCS | Mod: PBBFAC,,, | Performed by: OPTOMETRIST

## 2019-03-25 PROCEDURE — 99999 PR PBB SHADOW E&M-EST. PATIENT-LVL II: CPT | Mod: PBBFAC,,, | Performed by: OPTOMETRIST

## 2019-03-25 PROCEDURE — 92014 COMPRE OPH EXAM EST PT 1/>: CPT | Mod: S$GLB,,, | Performed by: OPTOMETRIST

## 2019-03-25 PROCEDURE — 92014 PR EYE EXAM, EST PATIENT,COMPREHESV: ICD-10-PCS | Mod: S$GLB,,, | Performed by: OPTOMETRIST

## 2019-03-25 NOTE — PROGRESS NOTES
HPI     Pt c/o of floaters and black specks that look like little bugs or roaches   crawling around his vision. Pt denies flashes of light, eye pain, itching   or burning, or double vision. Pt uses AT's OU prn. Pt says he uses OTC   readers +1.50 but did not bring today. Pt having problems with glare at   night.     Last edited by Darlene Booker on 3/25/2019  2:37 PM. (History)            Assessment /Plan     For exam results, see Encounter Report.    Type 2 diabetes mellitus without ophthalmic manifestations   No retinopathy, monitor yearly    S/P bilateral cataract extraction  PCO (posterior capsular opacification), bilateral   Stable    Dry eye syndrome, bilateral  Cicatricial ectropion of right lower eyelid   S/p ectropion repair OD (6/28/17)  - Still with mild lower lid temporal laxity and +foreign body sensation  - Not using drops as frequently as prescribed   -- Increase drop use to 4x/day   - Ointment at bedtime

## 2019-03-25 NOTE — TELEPHONE ENCOUNTER
I forward the message to dr muse on the message below I also notified the pt renay that I did forward message and that im waiting on response    ----- Message from Margie Ballard sent at 3/25/2019  1:32 PM CDT -----  Contact: [facility]  tel:    [phone]  Pt.says his wife is coming to see you 3/28th .  He wants to come and see you on the same appt..   On  3/28th at 1:30pm.   Can  You see the  too at that time and day.   Pls call.

## 2019-03-27 ENCOUNTER — LAB VISIT (OUTPATIENT)
Dept: LAB | Facility: HOSPITAL | Age: 74
End: 2019-03-27
Attending: INTERNAL MEDICINE
Payer: MEDICARE

## 2019-03-27 DIAGNOSIS — N18.30 CHRONIC KIDNEY DISEASE, STAGE III (MODERATE): Primary | ICD-10-CM

## 2019-03-27 DIAGNOSIS — N18.30 CHRONIC KIDNEY DISEASE, STAGE III (MODERATE): ICD-10-CM

## 2019-03-27 LAB
25(OH)D3+25(OH)D2 SERPL-MCNC: 32 NG/ML (ref 30–96)
ALBUMIN SERPL BCP-MCNC: 3.9 G/DL (ref 3.5–5.2)
ANION GAP SERPL CALC-SCNC: 9 MMOL/L (ref 8–16)
BUN SERPL-MCNC: 23 MG/DL (ref 8–23)
CALCIUM SERPL-MCNC: 10.3 MG/DL (ref 8.7–10.5)
CHLORIDE SERPL-SCNC: 102 MMOL/L (ref 95–110)
CO2 SERPL-SCNC: 28 MMOL/L (ref 23–29)
CREAT SERPL-MCNC: 1.3 MG/DL (ref 0.5–1.4)
EST. GFR  (AFRICAN AMERICAN): >60 ML/MIN/1.73 M^2
EST. GFR  (NON AFRICAN AMERICAN): 53.4 ML/MIN/1.73 M^2
GLUCOSE SERPL-MCNC: 101 MG/DL (ref 70–110)
PHOSPHATE SERPL-MCNC: 3.1 MG/DL (ref 2.7–4.5)
POTASSIUM SERPL-SCNC: 4.1 MMOL/L (ref 3.5–5.1)
PTH-INTACT SERPL-MCNC: 42 PG/ML (ref 9–77)
SODIUM SERPL-SCNC: 139 MMOL/L (ref 136–145)

## 2019-03-27 PROCEDURE — 82306 VITAMIN D 25 HYDROXY: CPT

## 2019-03-27 PROCEDURE — 83970 ASSAY OF PARATHORMONE: CPT

## 2019-03-27 PROCEDURE — 80069 RENAL FUNCTION PANEL: CPT

## 2019-03-27 PROCEDURE — 36415 COLL VENOUS BLD VENIPUNCTURE: CPT

## 2019-03-28 ENCOUNTER — OFFICE VISIT (OUTPATIENT)
Dept: NEPHROLOGY | Facility: CLINIC | Age: 74
End: 2019-03-28
Payer: MEDICARE

## 2019-03-28 VITALS
HEIGHT: 70 IN | BODY MASS INDEX: 37.87 KG/M2 | OXYGEN SATURATION: 97 % | SYSTOLIC BLOOD PRESSURE: 124 MMHG | WEIGHT: 264.56 LBS | HEART RATE: 76 BPM | DIASTOLIC BLOOD PRESSURE: 70 MMHG

## 2019-03-28 DIAGNOSIS — N18.30 CHRONIC KIDNEY DISEASE, STAGE III (MODERATE): Primary | ICD-10-CM

## 2019-03-28 DIAGNOSIS — R80.9 PROTEINURIA DUE TO TYPE 2 DIABETES MELLITUS: ICD-10-CM

## 2019-03-28 DIAGNOSIS — N18.30 TYPE 2 DIABETES MELLITUS WITH STAGE 3 CHRONIC KIDNEY DISEASE, WITH LONG-TERM CURRENT USE OF INSULIN: ICD-10-CM

## 2019-03-28 DIAGNOSIS — N20.0 CALCULUS OF KIDNEY: ICD-10-CM

## 2019-03-28 DIAGNOSIS — R31.29 MICROHEMATURIA: ICD-10-CM

## 2019-03-28 DIAGNOSIS — E11.22 TYPE 2 DIABETES MELLITUS WITH STAGE 3 CHRONIC KIDNEY DISEASE, WITH LONG-TERM CURRENT USE OF INSULIN: ICD-10-CM

## 2019-03-28 DIAGNOSIS — E55.9 VITAMIN D DEFICIENCY: ICD-10-CM

## 2019-03-28 DIAGNOSIS — Z79.4 TYPE 2 DIABETES MELLITUS WITH STAGE 3 CHRONIC KIDNEY DISEASE, WITH LONG-TERM CURRENT USE OF INSULIN: ICD-10-CM

## 2019-03-28 DIAGNOSIS — E11.29 PROTEINURIA DUE TO TYPE 2 DIABETES MELLITUS: ICD-10-CM

## 2019-03-28 PROCEDURE — 3044F HG A1C LEVEL LT 7.0%: CPT | Mod: CPTII,S$GLB,, | Performed by: INTERNAL MEDICINE

## 2019-03-28 PROCEDURE — 99999 PR PBB SHADOW E&M-EST. PATIENT-LVL III: CPT | Mod: PBBFAC,,, | Performed by: INTERNAL MEDICINE

## 2019-03-28 PROCEDURE — 3074F SYST BP LT 130 MM HG: CPT | Mod: CPTII,S$GLB,, | Performed by: INTERNAL MEDICINE

## 2019-03-28 PROCEDURE — 3044F PR MOST RECENT HEMOGLOBIN A1C LEVEL <7.0%: ICD-10-PCS | Mod: CPTII,S$GLB,, | Performed by: INTERNAL MEDICINE

## 2019-03-28 PROCEDURE — 1101F PT FALLS ASSESS-DOCD LE1/YR: CPT | Mod: CPTII,S$GLB,, | Performed by: INTERNAL MEDICINE

## 2019-03-28 PROCEDURE — 99214 OFFICE O/P EST MOD 30 MIN: CPT | Mod: S$GLB,,, | Performed by: INTERNAL MEDICINE

## 2019-03-28 PROCEDURE — 99999 PR PBB SHADOW E&M-EST. PATIENT-LVL III: ICD-10-PCS | Mod: PBBFAC,,, | Performed by: INTERNAL MEDICINE

## 2019-03-28 PROCEDURE — 3078F DIAST BP <80 MM HG: CPT | Mod: CPTII,S$GLB,, | Performed by: INTERNAL MEDICINE

## 2019-03-28 PROCEDURE — 99214 PR OFFICE/OUTPT VISIT, EST, LEVL IV, 30-39 MIN: ICD-10-PCS | Mod: S$GLB,,, | Performed by: INTERNAL MEDICINE

## 2019-03-28 PROCEDURE — 99499 UNLISTED E&M SERVICE: CPT | Mod: S$GLB,,, | Performed by: INTERNAL MEDICINE

## 2019-03-28 PROCEDURE — 1101F PR PT FALLS ASSESS DOC 0-1 FALLS W/OUT INJ PAST YR: ICD-10-PCS | Mod: CPTII,S$GLB,, | Performed by: INTERNAL MEDICINE

## 2019-03-28 PROCEDURE — 3078F PR MOST RECENT DIASTOLIC BLOOD PRESSURE < 80 MM HG: ICD-10-PCS | Mod: CPTII,S$GLB,, | Performed by: INTERNAL MEDICINE

## 2019-03-28 PROCEDURE — 99499 RISK ADDL DX/OHS AUDIT: ICD-10-PCS | Mod: S$GLB,,, | Performed by: INTERNAL MEDICINE

## 2019-03-28 PROCEDURE — 3074F PR MOST RECENT SYSTOLIC BLOOD PRESSURE < 130 MM HG: ICD-10-PCS | Mod: CPTII,S$GLB,, | Performed by: INTERNAL MEDICINE

## 2019-03-28 NOTE — Clinical Note
Dr. Barlow, this is a mutual patient. CKD III is stable. He was taken off losartan last year following his knee surgery due to hypotension, pt informed me. I was wondering if this can be restarted, even a low dose would be beneficial if his BP can tolerate as he has diabetic nephropathy causing CKD and RAAS blockade would be beneficial in the long term, but of course if his BP can tolerate or if his other antihypertensive regimen can be adjusted to allow use of ARB.Thanks,Ruperto Moraes

## 2019-03-29 ENCOUNTER — CLINICAL SUPPORT (OUTPATIENT)
Dept: REHABILITATION | Facility: OTHER | Age: 74
End: 2019-03-29
Attending: PHYSICIAN ASSISTANT
Payer: MEDICARE

## 2019-03-29 DIAGNOSIS — G89.29 CHRONIC BILATERAL LOW BACK PAIN WITHOUT SCIATICA: ICD-10-CM

## 2019-03-29 DIAGNOSIS — M54.50 CHRONIC BILATERAL LOW BACK PAIN WITHOUT SCIATICA: ICD-10-CM

## 2019-03-29 PROCEDURE — G8979 MOBILITY GOAL STATUS: HCPCS | Mod: CK

## 2019-03-29 PROCEDURE — G8978 MOBILITY CURRENT STATUS: HCPCS | Mod: CK

## 2019-03-29 PROCEDURE — 97161 PT EVAL LOW COMPLEX 20 MIN: CPT

## 2019-03-29 PROCEDURE — 97110 THERAPEUTIC EXERCISES: CPT

## 2019-03-29 NOTE — PLAN OF CARE
"  [facility] - PHYSICAL THERAPY EVALUATION     Name: ricki Cantor Jr.  Clinic Number: [id number]    Stephen is a 73 y.o. male evaluated on 03/29/2019.   Time In: 1pm  Time out: 230pm    Diagnosis:   Encounter Diagnosis   Name Primary?    Chronic bilateral low back pain without sciatica      Physician: Leonila Ridley PA-C  Treatment Orders: PT Eval and Treat    Past Medical History:   Diagnosis Date    *Atrial fibrillation     Eliquis    Anticoagulant long-term use     apaxiban    Basal cell carcinoma 12/2015    left eye brow    BCC (basal cell carcinoma) 12/2015    left shoulder    Cataract     Chronic kidney disease     Diabetes mellitus with renal manifestations, uncontrolled     Dyslipidemia associated with type 2 diabetes mellitus     Fever blister     Hearing loss     HTN (hypertension)     Keloid cicatrix     Liver disease     Melanoma 12/07/2015    right cheek-in situ    Obesity     PN (peripheral neuropathy)     Primary osteoarthritis of right knee 1/25/2017    Screening for colon cancer 3/3/2016    Sleep apnea     wears CPAP at night    Thyroid disease     Type II or unspecified type diabetes mellitus with other specified manifestations, uncontrolled     Ulcer of left lower extremity, limited to breakdown of skin 9/22/2017     Current Outpatient Medications   Medication Sig    apixaban 5 mg Tab Take 1 tablet (5 mg total) by mouth 2 (two) times daily.    azelastine (ASTELIN) 137 mcg (0.1 %) nasal spray 1 spray (137 mcg total) by Nasal route 2 (two) times daily.    BD INSULIN PEN NEEDLE UF ORIG 29 x 1/2 " Ndle     blood sugar diagnostic Strp 1 strip by Misc.(Non-Drug; Combo Route) route 3 (three) times daily.    blood-glucose meter Misc To check BG as discussed    ciclopirox (PENLAC) 8 % Soln Apply topically nightly.    clobetasol (TEMOVATE) 0.05 % cream AAA finger bid    clotrimazole-betamethasone 1-0.05% (LOTRISONE) cream Apply topically 2 (two) times daily.    " fenofibrate (TRICOR) 145 MG tablet TAKE ONE TABLET BY MOUTH EVERY DAY    furosemide (LASIX) 40 MG tablet Take 1 tablet (40 mg total) by mouth once daily.    gabapentin (NEURONTIN) 300 MG capsule TAKE ONE CAPSULE BY MOUTH THREE TIMES DAILY    insulin aspart (NOVOLOG FLEXPEN) 100 unit/mL InPn pen Novolog 20 units breakfast, 22 units lunch and dinner plus correction scale. Max daily dose=100 units    insulin glargine (BASAGLAR KWIKPEN U-100 INSULIN) 100 unit/mL (3 mL) InPn pen Inject 60 Units into the skin once daily. (Patient taking differently: Inject 50 Units into the skin every evening. )    ketoconazole (NIZORAL) 2 % cream Apply topically once daily.    lancets 33 gauge Misc 1 lancet by Misc.(Non-Drug; Combo Route) route 4 (four) times daily. Pt uses True Result Meter, bg monitoring 4 times a day.    levothyroxine (SYNTHROID) 25 MCG tablet Take 1 tablet (25 mcg total) by mouth once daily.    metoprolol succinate (TOPROL-XL) 100 MG 24 hr tablet TAKE ONE TABLET BY MOUTH EVERY DAY    metronidazole (METROGEL) 0.75 % gel Apply topically 2 (two) times daily.    nystatin-triamcinolone (MYCOLOG II) cream apply TWICE DAILY (Patient taking differently: apply TWICE DAILY-using as needed for rash)    omega-3 acid ethyl esters (LOVAZA) 1 gram capsule Take 2 g by mouth 2 (two) times daily.    pravastatin (PRAVACHOL) 10 MG tablet TAKE ONE TABLET BY MOUTH ONCE EVERY DAY    tamsulosin (FLOMAX) 0.4 mg Cp24 Take 1 capsule (0.4 mg total) by mouth once daily.    temazepam (RESTORIL) 15 mg Cap TAKE ONE CAPSULE BY MOUTH EVERY EVENING    triamcinolone acetonide 0.1% (KENALOG) 0.1 % cream Apply topically 2 (two) times daily. Apply to affected area twice daily as directed.    triamcinolone acetonide 0.1% (KENALOG) 0.1 % cream AAA bid after cool blow dry     No current facility-administered medications for this visit.      Review of patient's allergies indicates:   Allergen Reactions    Niacin Itching and Other (See  "Comments)     Other reaction(s): facial flushing and causes hepatitis     Terbinafine Other (See Comments)     Other reaction(s): Hepatitis    "gave me rash"     Precautions: DM/BLE edema/peripheral neuropathy/CKD/HTN/atrial fibrillation     Visit # authorized: 1  Authorization period: 2/4/2020  Plan of care Expiration: 6/29/19      HISTORY   History of Present Illness: Chronic LBP without incident since his teenage years, progressively worsening.  Pt has had previous treatment with [facility] program/YANDY/PT with relief.  Pt desrcibes pain as achy, no numbness and tingling in LE's.  Pt reports back dominant and intermittent.  Pain is worsened with standing and walking ,better with sitting.    Pmh  Left ant tib tendon tear 2012  MD note:  [facility] a 73 y.o. male here for initial evaluation of low back pain (Back - 5, Leg - 0).  He was seen by Dr. De Souza 4/2/2018.   The pain has been present for many years. The patient describes the pain as aching.  The pain is worse with standing and improved by sitting. There is no associated numbness and tingling. There is no subjective weakness. Prior treatments have included medications, physical therapy and ESIs, but no surgery.  He had an YANDY 4/24/2018 which did not give him significant relief.      The patient denies myelopathic symptoms such as handwriting changes or difficulty with buttons/coins/keys. Denies perineal paresthesias, bowel/bladder dysfunction.           Diagnostic Tests: From EPIC Radiographs  FINDINGS:  Moderate DJD.  Grade 1 L5/S1 anterolisthesis.  The disc spaces are narrowed between L3 and S1 vertebral segments.  No acute fracture or dislocation.  No bone destruction identified      Impression         Pain Scale: [facility] [place] pain on a scale of 0-10 to be 9 at worst; n/a currently; n/a at best using VAS.   Pain location: B LB    Aggravating factors: walk 1/3 block/standing  Easing Factors: sitting/lay down  Disturbed Sleep No    Pattern of pain " "questions:  1.  Where is your pain the worst? Low back   2.  Is your pain constant or intermittent?  Intermittent  3.  Does bending forward make your typical pain worse? Yes , sometimes  4.  Since the start of your back pain, has there been a change in your bowel or bladder? No  5.  What can't you do now that you use to be able to do? Golf/walking/bike riding    Prior Treatment: No previous treatment for current symptoms  Prior functional status: Independent  DME owned/used: B LE compression garments  Occupation:  retired                     Pts goals:  " walk more than a block or two without pain, go to [employer] without scooter, get my back stronger"    Red Flag Screening:   Cough  Sneeze  Strain: No  Bladder/ bowel: No  Falls: No  General health: Good  Night pain: No   Unexplained weight loss: No     OBJECTIVE     POSTURE  Posture Alignment :slouched posture  Postural examination/scapula alignment: Rounded shoulder, Head forward, Increased kyphosis and Decreased lordosis  Joint integrity: WNL  as seen through spring testing  Skin integrity: WNL   Edema: Not significant   Sitting: Poor  Standing: Poor  Correction of posture: Added slimline roll, Improved posture in sitting.     MOVEMENT LOSS    ROM Loss   Flexion moderate loss   Extension major loss   Side bending Right moderate loss   Side bending Left moderate loss   Rotation Right moderate loss   Rotation Left moderate loss     Lower Extremity Strength  Right LE  Left LE    Hip flexion: 5/5 Hip flexion: 4-/5   Hip extension:  5/5 Hip extension: 5/5   Hip abduction: 5/5 Hip abduction: 4/5   Hip adduction:  5/5 Hip adduction:  4/5   Hip Internal rotation   n/t Hip Internal rotation n/t   Knee Flexion 5/5 Knee Flexion 5/5   Knee Extension 5/5 Knee Extension 5/5   Ankle dorsiflexion: 5/5 Ankle dorsiflexion: 2/5   Ankle plantarflexion: 5/5 Ankle plantarflexion: 2/5       GAIT:  Assistive Device used: None  Level of Assistance: Independent  Patient displays the " following gait deviations: decreased mavis/ wide DAJUAN/decreased heel strike L and push off left     Special Tests:   Test Name  Test Result   Prone Instability Test (--)   SI Joint Provocation Test (--)   Straight Leg Raise (--)   Neural Tension Test (--)   Crossed Straight Leg Raise (+)   Walking on toes unable   Walking on heels  unable       NEUROLOGICAL SCREENING     Sensory deficit: intact B LE's    Reflexes:    Left Right   Patella Tendon 1+ 1+   Achilles Tendon 1+ 1+   Babinski NT NT   Clonus - -     REPEATED TEST MOVEMENTS:  Repeated Flexion in Standing no worse   Repeated Extension in Standing no worse   Repeated Flexion in lying no effect   Repeated Extension in lying  Not tested       STATIC TESTS   Sitting slouched  worse   Sitting erect better   Standing slouched no worse   Standing erect  no better   Lying prone in extension  not tested   Long sitting   not tested       Baseline Isometric Testing on Med X equipment: Testing administered by PT    Baseline IM Testing Results:   Date of testing: 3/29/19  ROM 0-36 deg   Max Peak Torque 91    Min Peak Torque 26    Flex/Ext Ratio 3.5   % below normative data 42       FOTO: Focus on Therapeutic Outcomes   Category: lumbar   % Impaired: 50%  Current Score  = CK = at least 40% but < 60% impaired, limited or restricted  Goal at Discharge Score = CK = at least 40% but < 60% impaired, limited or restricted    Score interpretation is as follows:     TEST SCORE  Modifier  Impairment Limitation Restriction    0/50  CH  0 % impaired, limited or restricted   1-9/50  CI  @ least 1% but less than 20% impaired, limited or restricted   10-19/50  CJ  @ least 20%<40% impaired, limited or restricted   20-29/50  CK  @ least 40%<60% impaired, limited or restricted   30-39/50  CL  @ least 60% <80% impaired, limited or restricted   40-49/50  CM  @ least 80%<100% impaired limited or restricted   50/50  CN  100% impaired, limited or restricted       Treatment   Time In: 1pm  Time  Out: 230pm    PT Evaluation Completed? Yes  Discussed Plan of Care with patient: Yes      Written Home Exercises Provided:   Handouts were given to the patient. Pt demo good understanding of the education provided. Jmc demonstrated good return demonstration of activities.     - Patient received education regarding proper posture and body mechanics.    - [place] roll tried, recommended, and purchase information was provided.    - Patient received a handout regarding anticipated muscular soreness following the isometric test and strategies for management were reviewed with patient including stretching, using ice and scheduled rest.     HEP as follows    Flexion in lying 10x, 3x/day   PPT   LTR 10x 3x/day    Jmc received therapeutic exercises to develop/improve posture, lumbar/cervical ROM, strength and muscular endurance for 30 minutes including the following exercises: med-x lumbar machine testing  [facility] 3/29/2019   Visit Number 1   VAS Pain Rating 0   Recumbent Bike Seat Pos. -   Time -   Flexion in Lying -   Lumbar Extension Seat Pad 0   Femur Restraint 6   Top Dead Center 24   Counterweight 327   Lumbar Flexion 36   Lumbar Extension 0   Lumbar Peak Torque 91   Min Torque 26   Test Percent Below Normative Data 42   Lumbar Weight -   Repetitions -   Rating of Perceived Exertion -   Ice - Z Lie (in min.) 10       .   Assessment   This is a 73 y.o. male referred to [facility] and presents with a medical diagnosis of   Encounter Diagnosis   Name Primary?    Chronic bilateral low back pain without sciatica     and demonstrates limitations as described below in the problem list. Pt rehab potential is Good. Pt presents with lumbar dysfunction, poor posture.decreased ROM, decreased strength, decreased functional mobility, decreased LE strength, and 50% FOTO disability score.  Pt has a fairly signifcant medical history and recently began to loose weight to assist in lowering some of his risk  factors.  Pt is very motivated to increase his functional mobility skills    Pain Pattern: No directional preference  Noted, work in both directions    Patient received education on the [facility] program, purpose of the isometric test, progression of back strengthening as well as wellness approach and systemic strengthening.  Details of the program were discussed.  Reviewed that patient should feel support/pressure from med ex restraints but no pain or discomfort and patient expressed understanding.    Based on the above history and physical examination an active physical therapy program is recommended.  Pt will continue to benefit from skilled outpatient physical therapy to address the deficits listed below in the chart, provide pt/family education and to maximize pt's level of independence in the home and community environment. .     No environmental, cultural, spiritual, developmental or education needs expressed or noted    Medical necessity is demonstrated by the following problem list.    Pt presents with the following impairments:   History  Co-morbidities and personal factors that may impact the plan of care Examination  Body Structures and Functions, activity limitations and participation restrictions that may impact the plan of care Clinical Presentation   Decision Making/ Complexity Score   Co-morbidities:   diabetes, HTN and LE edema/neuropathy/CKD/A fib        Personal Factors:   age  lifestyle Body Regions:   back    Body Systems:   gross symmetry  ROM  strength  gross coordinated movement  balance  gait  transfers  transitions  motor control  motor learning    Activity limitations:   Learning and applying knowledge  no deficits    General Tasks and Commands  no deficits    Communication  no deficits    Mobility  lifting and carrying objects  walking    Self care  no deficits    Domestic Life  shopping  cooking  doing house work (cleaning house, washing dishes,  laundry)    Interactions/Relationships  no deficits    Life Areas  no deficits    Community and Social Life  community life  recreation and leisure    Participation Restrictions:   Walking > 1/3 block/standing/lifting     stable and uncomplicated   low         GOALS: Pt is in agreement with the following goals.    Short term goals:  6 weeks or 10 visits   1.  Pt will demonstrate increased lumbar ROM by at least 3 degrees from the initial ROM value with improvements noted in functional ROM and ability to perform ADLs  2.  Pt will demonstrate increased maximum isometric torque value by 10 when compared to the initial value resulting in improved ability to perform bending, lifting, and carrying activities safely, confidently.  3.  Patient report a reduction in worst pain score by 1-2 points for improved tolerance during work and recreational activities  4.  Pt able to perform HEP correctly with minimal cueing or supervision for therapist    Long term goals: 13 weeks or 20 visits   1. Pt will demonstrate increased lumbar ROM by at least 6 degrees from initial ROM value, resulting in improved ability to perform functional fwd bending while standing and sitting.   2. Pt will demonstrate increased maximum isometric torque value by 30% when compared to the initial value resulting in improved ability to perform bending, lifting, and carrying activities safely, confidently.  3. Pt to demonstrate ability to independently control and reduce their pain through posture positioning and mechanical movements throughout a typical day.  4.  Patient will demonstrate improved overall function per FOTO Survey to CK = at least 40% but < 60% impaired, limited or restricted score or less.      Plan   Outpatient physical therapy 2x week for 13 weeks or 20 visits to include the following:   - Patient education  - Therapeutic exercise  - Manual therapy  - Performance testing   - Neuromuscular Re-education  - Therapeutic activity   -  "Modalities    Pt may be seen by PTA as part of the rehabilitation team.     Therapist: Nadira Logan, PT  3/29/2019    "I certify the need for these services furnished under this plan of treatment and while under my care."    ____________________________________  Physician/Referring Practitioner    _______________  Date of Signature              "

## 2019-03-29 NOTE — PATIENT INSTRUCTIONS
Knee-to-Chest Stretch: Bilateral        With hands behind knees, pull both knees in to chest until a comfortable stretch is felt in lower back and buttocks. Keep back relaxed. Hold ____ seconds.  Repeat ____ times per set. Do ____ sets per session. Do ____ sessions per day.     https://Widdle/128     Copyright © [employer]. All rights reserved.   Lower Trunk Rotation Stretch        Keeping back flat and feet together, rotate knees to left side. Hold ____ seconds.  Repeat ____ times per set. Do ____ sets per session. Do ____ sessions per day.     https://Widdle/122     Copyright © [employer]. All rights reserved.   Pelvic Tilt        Flatten back by tightening stomach muscles and buttocks.  Repeat ____ times per set. Do ____ sets per session. Do ____ sessions per day.     https://Widdle/134     Copyright © [employer]. All rights reserved.

## 2019-03-31 NOTE — PROGRESS NOTES
Subjective:       [place] [facility] [place] is a 73 y.o. White male who presents for follow-up evaluation of CKD.     HPI     Mr. Cantor was seen in nephrology clinic for follow up on CKD. Pt was previously being followed by Dr. Mcallister and was last seen by him on 1/20/16. He established care with me on 7/28/16, last followed on 5/1/18. He has CKD III, diabetes, hypertension, HEIDI, PAF, chronic anticoagulation, morbid obesity, hyperlipidemia.    Interim, he has been doing ok. He denies any nausea/ vomiting/ decreased urine/ flank pain. He states he has been trying to watch carbohydrates, starches. He continues to have microhematuria. He reports he underwent cystoscopy by urology, reviewed cystoscope in 3/18 showed enlarged prostate.     He reports he had knee surgery in 2018 when he was taken off losartan due to episodes of low BP, he describes BP declining to SBP 90's that time. In the last few months he has lost around 30-40 lbs by diet control. He reports most recently his BP has been stable.     Lab trend noted for CKD since 2006. He has had episodes of SIL, hyperkalemia.     Prior labs noted for Hep C/B screen is negative. Prior US from 7/15 noted for 11.5 and 13 cm kidneys and small cyst on right. His screening for hep C/B is negative.     Renal Function:  Lab Results   Component Value Date     03/27/2019     (H) 11/14/2018     03/27/2019     11/14/2018    K 4.1 03/27/2019    K 4.6 11/14/2018     03/27/2019     11/14/2018    CO2 28 03/27/2019    CO2 29 11/14/2018    BUN 23 03/27/2019    BUN 28 (H) 11/14/2018    CALCIUM 10.3 03/27/2019    CALCIUM 10.5 11/14/2018    CREATININE 1.3 03/27/2019    CREATININE 1.5 (H) 11/14/2018    ALBUMIN 3.9 03/27/2019    ALBUMIN 3.8 11/14/2018    PHOS 3.1 03/27/2019    PHOS 2.6 (L) 11/14/2018    ESTGFRAFRICA >60.0 03/27/2019    ESTGFRAFRICA 52.3 (A) 11/14/2018    EGFRNONAA 53.4 (A) 03/27/2019    EGFRNONAA 45.2 (A) 11/14/2018        Urinalysis:  Lab Results   Component Value Date    APPEARANCEUA Clear 03/27/2019    PHUR 7.0 03/27/2019    SPECGRAV 1.010 03/27/2019    PROTEINUA Negative 03/27/2019    GLUCUA Negative 03/27/2019    OCCULTUA Negative 03/27/2019    NITRITE Negative 03/27/2019    LEUKOCYTESUR 1+ (A) 03/27/2019       Protein/Creatinine Ratio:  Lab Results   Component Value Date    PROTEINURINE <7 03/27/2019    CREATRANDUR 28.0 03/27/2019    UTPCR Unable to calculate 03/27/2019       CBC:  Lab Results   Component Value Date    WBC 10.61 08/04/2018    HGB 14.0 08/04/2018    HCT 35 (L) 08/05/2018     PTH 42  vit D 32    US kidneys 5/2018  10.8 and 13.3 cm kidney size, mild cortical thinning, simple bilateral renal cysts, possible non obstructing renal stone on left    Review of Systems   Constitutional: Negative for appetite change, chills and fever.   HENT: Negative for sneezing and sore throat.    Eyes: Negative for visual disturbance.   Respiratory: Negative for cough, shortness of breath and wheezing.    Cardiovascular: Positive for leg swelling. Negative for chest pain.   Gastrointestinal: Negative for abdominal pain, diarrhea, nausea and vomiting.   Endocrine: Negative for polyuria.   Genitourinary: Negative for decreased urine volume, difficulty urinating, dysuria, frequency and hematuria.   Musculoskeletal: Positive for back pain and gait problem.   Skin: Negative for pallor and rash.   Allergic/Immunologic: Negative for immunocompromised state.   Neurological: Negative for dizziness and headaches.   Psychiatric/Behavioral: Negative for behavioral problems. The patient is not nervous/anxious.        Objective:      Physical Exam   Constitutional: He is oriented to person, place, and time. He appears well-developed. No distress.   HENT:   Mouth/Throat: Oropharynx is clear and moist.   Eyes: Conjunctivae are normal. Right eye exhibits no discharge. Left eye exhibits no discharge.   Neck: Neck supple.   Cardiovascular: Normal  rate and normal heart sounds.   Pulmonary/Chest: Effort normal and breath sounds normal. No respiratory distress. He has no wheezes.   Abdominal: Soft. Distention: obese. There is no tenderness.   Musculoskeletal: He exhibits edema (2+ BLE).   Lymphedema    Neurological: He is alert and oriented to person, place, and time.   Skin: Skin is warm and dry.   Psychiatric: He has a normal mood and affect. His behavior is normal.   Vitals reviewed.      Assessment:       1. Chronic kidney disease, stage III (moderate)    2. Proteinuria due to type 2 diabetes mellitus    3. Type 2 diabetes mellitus with stage 3 chronic kidney disease, with long-term current use of insulin    4. Vitamin D deficiency    5. Microhematuria    6. Calculus of kidney        Plan:     Mr. Cantor has CKD III from longstanding hypertension, diabetes, prior multiple SIL, severe obesity. Labs, CKD staging, potential risk of progression, need for strict glycemic and BP control, weight loss by calorie restriction, avoiding NSAID is important to prevent CKD progression. This was d/w pt in detail.     He is higher risk for progression of CKD and/or ATN/ KRISTINE superimposed on CKD. Currently he appears to have slow progression of CKD with proteinuria.    For now, corrected Ca stable, vit D, PTH levels stable. Continue to trend.    Continue to follow with urology for microhematuria, complex cyst. Repeat US kidneys to follow on kidney size, cyst.     Off losartan since last year after knee surgery due to hypotensive episodes back then.     He continues to take metoprolol  mg per day and lasix 40 mg per day.    If BP allows would suggest restarting losartan at a lower dose like 25 mg as it will offer RAAS blocking effect and help slow down progression of diabetic CKD. But he will need close monitoring of BP and K levels as well.     Weight loss of > 35 lbs since last visit. Would anticipate lower needs for antihypertensive medicines.       Plan  -  periodically monitor renal panel for electrolytes, acid base status, eGFR  - risk of CKD progression d/w patient  - low salt diet  - follow PTH levels, vit D  - follow urine studies for proteinuria  - avoid NSAID/ bactrim/ IV contrast/ gadolinium/ aminoglycoside where possible. Avoid Toradol use.   - if BP allows please consider restarting losartan containing regimen for RAAS blockade, defer to PCP  - avoid high K containing foods  - management of atrial fibrillation and anticoagulation, diuretics per his cardiologist    Labs, plan discussed with patient in detail.   RTC 6 months

## 2019-04-02 RX ORDER — PEN NEEDLE, DIABETIC 31 GX5/16"
NEEDLE, DISPOSABLE MISCELLANEOUS
Qty: 400 EACH | Refills: 3 | Status: SHIPPED | OUTPATIENT
Start: 2019-04-02 | End: 2020-03-06

## 2019-04-04 ENCOUNTER — PES CALL (OUTPATIENT)
Dept: ADMINISTRATIVE | Facility: CLINIC | Age: 74
End: 2019-04-04

## 2019-04-06 DIAGNOSIS — I48.20 CHRONIC ATRIAL FIBRILLATION: Primary | ICD-10-CM

## 2019-04-10 ENCOUNTER — CLINICAL SUPPORT (OUTPATIENT)
Dept: AUDIOLOGY | Facility: CLINIC | Age: 74
End: 2019-04-10
Payer: MEDICARE

## 2019-04-10 ENCOUNTER — OFFICE VISIT (OUTPATIENT)
Dept: OTOLARYNGOLOGY | Facility: CLINIC | Age: 74
End: 2019-04-10
Payer: MEDICARE

## 2019-04-10 VITALS
BODY MASS INDEX: 37.31 KG/M2 | DIASTOLIC BLOOD PRESSURE: 71 MMHG | HEART RATE: 69 BPM | WEIGHT: 260 LBS | SYSTOLIC BLOOD PRESSURE: 120 MMHG

## 2019-04-10 DIAGNOSIS — H90.3 SENSORINEURAL HEARING LOSS, BILATERAL: Primary | ICD-10-CM

## 2019-04-10 DIAGNOSIS — H90.3 HEARING LOSS, SENSORINEURAL, HIGH FREQUENCY, BILATERAL: Primary | ICD-10-CM

## 2019-04-10 PROCEDURE — 99999 PR PBB SHADOW E&M-EST. PATIENT-LVL I: CPT | Mod: PBBFAC,,,

## 2019-04-10 PROCEDURE — 1101F PR PT FALLS ASSESS DOC 0-1 FALLS W/OUT INJ PAST YR: ICD-10-PCS | Mod: CPTII,S$GLB,, | Performed by: OTOLARYNGOLOGY

## 2019-04-10 PROCEDURE — 3078F PR MOST RECENT DIASTOLIC BLOOD PRESSURE < 80 MM HG: ICD-10-PCS | Mod: CPTII,S$GLB,, | Performed by: OTOLARYNGOLOGY

## 2019-04-10 PROCEDURE — G0268 REMOVAL OF IMPACTED WAX MD: HCPCS | Mod: S$GLB,,, | Performed by: OTOLARYNGOLOGY

## 2019-04-10 PROCEDURE — G0268 PR REMOVAL OF IMPACTED WAX MD: ICD-10-PCS | Mod: S$GLB,,, | Performed by: OTOLARYNGOLOGY

## 2019-04-10 PROCEDURE — 99999 PR PBB SHADOW E&M-EST. PATIENT-LVL V: ICD-10-PCS | Mod: PBBFAC,,, | Performed by: OTOLARYNGOLOGY

## 2019-04-10 PROCEDURE — 99999 PR PBB SHADOW E&M-EST. PATIENT-LVL V: CPT | Mod: PBBFAC,,, | Performed by: OTOLARYNGOLOGY

## 2019-04-10 PROCEDURE — 92557 COMPREHENSIVE HEARING TEST: CPT | Mod: S$GLB,,, | Performed by: AUDIOLOGIST

## 2019-04-10 PROCEDURE — 92557 PR COMPREHENSIVE HEARING TEST: ICD-10-PCS | Mod: S$GLB,,, | Performed by: AUDIOLOGIST

## 2019-04-10 PROCEDURE — 1101F PT FALLS ASSESS-DOCD LE1/YR: CPT | Mod: CPTII,S$GLB,, | Performed by: OTOLARYNGOLOGY

## 2019-04-10 PROCEDURE — 99999 PR PBB SHADOW E&M-EST. PATIENT-LVL I: ICD-10-PCS | Mod: PBBFAC,,,

## 2019-04-10 PROCEDURE — 3074F PR MOST RECENT SYSTOLIC BLOOD PRESSURE < 130 MM HG: ICD-10-PCS | Mod: CPTII,S$GLB,, | Performed by: OTOLARYNGOLOGY

## 2019-04-10 PROCEDURE — 3074F SYST BP LT 130 MM HG: CPT | Mod: CPTII,S$GLB,, | Performed by: OTOLARYNGOLOGY

## 2019-04-10 PROCEDURE — 92567 TYMPANOMETRY: CPT | Mod: S$GLB,,, | Performed by: AUDIOLOGIST

## 2019-04-10 PROCEDURE — 3078F DIAST BP <80 MM HG: CPT | Mod: CPTII,S$GLB,, | Performed by: OTOLARYNGOLOGY

## 2019-04-10 PROCEDURE — 92567 PR TYMPA2METRY: ICD-10-PCS | Mod: S$GLB,,, | Performed by: AUDIOLOGIST

## 2019-04-10 PROCEDURE — 99212 OFFICE O/P EST SF 10 MIN: CPT | Mod: 25,S$GLB,, | Performed by: OTOLARYNGOLOGY

## 2019-04-10 PROCEDURE — 99212 PR OFFICE/OUTPT VISIT, EST, LEVL II, 10-19 MIN: ICD-10-PCS | Mod: 25,S$GLB,, | Performed by: OTOLARYNGOLOGY

## 2019-04-10 NOTE — PATIENT INSTRUCTIONS
Cerumen impactions removed from both eacs; AD C.I. ( on anterior AD TM) suctioned/irigarted  Audiometry reviewed, results compared to previous studies

## 2019-04-10 NOTE — PROGRESS NOTES
Mr. Cantor was seen today for a hearing evaluation.     Pure tone audiometry revealed a mild to severe SNHL, AU  SRT and PTA are in good agreement bilaterally.    SRT was obtained at 25dB for the right ear with 96% speech discrimination and 25dB for the left ear with 100% speech discrimination.   Tympanometry revealed Type A, AU.     Recommendations:  1. Otologic Evaluation  2. Annual Audiogram  3. Hearing Protection  4. Hearing Aid Consult .

## 2019-04-11 RX ORDER — GABAPENTIN 300 MG/1
300 CAPSULE ORAL 3 TIMES DAILY
Qty: 90 CAPSULE | Refills: 1 | Status: SHIPPED | OUTPATIENT
Start: 2019-04-11 | End: 2019-05-21 | Stop reason: SDUPTHER

## 2019-04-11 NOTE — PROGRESS NOTES
CC:  Blocked sensation in his right ear +pain and ringing  HPI:Mr. Cantor is a 73-year-old  gentleman who indicates blocked sensation in his right ears well as ringing in ear pain symptoms.  He presents in a wheelchair accompanied by his wife.  He describes a clogged sensation in his right ear specifically. He has has been unable to remove wax from his right ear canal.  He completed an audiometric study in May of 2018 ordered by my colleague Dr. Dotson which indicated his right ear ( mixed HL) greater than left hearing loss.  SRT scores measured 30 decibels for the right ear versus 25 for the left.  Tympanometry revealed a negative pressure for the right ear i.e. -60.  Acoustic reflex testing indicated an abscess response for the right ear tested ipsilaterally.   A cerumen impaction was extracted from his right ear canal at that visit.    He is followed in the Nephrology department for chronic kidney disease stage 3.  He has a history of diabetes, hypertension, HEIDI, paroxysmal atrial fibrillation with chronic anticoagulation, hyperlipidemia and morbid obesity.  Also has a history of kidney stone.      Past Medical History:   Diagnosis Date    *Atrial fibrillation     Eliquis    Anticoagulant long-term use     apaxiban    Basal cell carcinoma 12/2015    left eye brow    BCC (basal cell carcinoma) 12/2015    left shoulder    Cataract     Chronic kidney disease     Diabetes mellitus with renal manifestations, uncontrolled     Dyslipidemia associated with type 2 diabetes mellitus     Fever blister     Hearing loss     HTN (hypertension)     Keloid cicatrix     Liver disease     Melanoma 12/07/2015    right cheek-in situ    Obesity     PN (peripheral neuropathy)     Primary osteoarthritis of right knee 1/25/2017    Screening for colon cancer 3/3/2016    Sleep apnea     wears CPAP at night    Thyroid disease     Type II or unspecified type diabetes mellitus with other specified  "manifestations, uncontrolled     Ulcer of left lower extremity, limited to breakdown of skin 9/22/2017     Current Outpatient Medications on File Prior to Visit   Medication Sig Dispense Refill    apixaban 5 mg Tab Take 1 tablet (5 mg total) by mouth 2 (two) times daily. 180 tablet 3    azelastine (ASTELIN) 137 mcg (0.1 %) nasal spray 1 spray (137 mcg total) by Nasal route 2 (two) times daily. 30 mL 3    BD INSULIN PEN NEEDLE UF ORIG 29 x 1/2 " Ndle   12    BD ULTRA-FINE BASIL PEN NEEDLE 32 gauge x 5/32" Ndle CHECK BLOOD SUGAR FOUR TIMES DAILY 400 each 3    blood sugar diagnostic Strp 1 strip by Misc.(Non-Drug; Combo Route) route 3 (three) times daily. 270 strip 6    blood-glucose meter Misc To check BG as discussed 1 each 0    ciclopirox (PENLAC) 8 % Soln Apply topically nightly. 6.6 mL 11    clobetasol (TEMOVATE) 0.05 % cream AAA finger bid 60 g 3    clotrimazole-betamethasone 1-0.05% (LOTRISONE) cream Apply topically 2 (two) times daily. 45 g 3    fenofibrate (TRICOR) 145 MG tablet TAKE ONE TABLET BY MOUTH EVERY DAY 90 tablet 1    furosemide (LASIX) 40 MG tablet Take 1 tablet (40 mg total) by mouth once daily. 30 tablet 1    gabapentin (NEURONTIN) 300 MG capsule TAKE ONE CAPSULE BY MOUTH THREE TIMES DAILY 90 capsule 1    insulin aspart (NOVOLOG FLEXPEN) 100 unit/mL InPn pen Novolog 20 units breakfast, 22 units lunch and dinner plus correction scale. Max daily dose=100 units 2 Box 11    insulin glargine (BASAGLAR KWIKPEN U-100 INSULIN) 100 unit/mL (3 mL) InPn pen Inject 60 Units into the skin once daily. (Patient taking differently: Inject 50 Units into the skin every evening. ) 2 Box 11    ketoconazole (NIZORAL) 2 % cream Apply topically once daily. 30 g 1    lancets 33 gauge Misc 1 lancet by Misc.(Non-Drug; Combo Route) route 4 (four) times daily. Pt uses True Result Meter, bg monitoring 4 times a day. 450 each 4    levothyroxine (SYNTHROID) 25 MCG tablet Take 1 tablet (25 mcg total) by mouth " once daily. 90 tablet 2    metoprolol succinate (TOPROL-XL) 100 MG 24 hr tablet TAKE ONE TABLET BY MOUTH EVERY DAY 90 tablet 1    nystatin-triamcinolone (MYCOLOG II) cream apply TWICE DAILY (Patient taking differently: apply TWICE DAILY-using as needed for rash) 60 g 1    omega-3 acid ethyl esters (LOVAZA) 1 gram capsule Take 2 g by mouth 2 (two) times daily.      pravastatin (PRAVACHOL) 10 MG tablet TAKE ONE TABLET BY MOUTH ONCE EVERY DAY 90 tablet 3    tamsulosin (FLOMAX) 0.4 mg Cp24 Take 1 capsule (0.4 mg total) by mouth once daily. 30 capsule 5    temazepam (RESTORIL) 15 mg Cap TAKE ONE CAPSULE BY MOUTH EVERY EVENING 30 capsule 5    triamcinolone acetonide 0.1% (KENALOG) 0.1 % cream Apply topically 2 (two) times daily. Apply to affected area twice daily as directed. 454 g 0    triamcinolone acetonide 0.1% (KENALOG) 0.1 % cream AAA bid after cool blow dry 1 Bottle 3    metronidazole (METROGEL) 0.75 % gel Apply topically 2 (two) times daily. 1 Tube 6     No current facility-administered medications on file prior to visit.          PE:  Blood pressure 120/71 pulse 69 height 5 ft 10 in weight 260 lb  General:  Alert and oriented gentleman in no acute distress  Both ears were examined under the microscope in the micro procedure room  A deeply imbedded cerumen impaction is carefully removed from the right ear canal with suction and water irrigation.  The cerumen impaction was lodged against the anterior portion of the right eardrum.  The right eardrum is intact with slightly dull and slightly retracted in appearance when visualized in its entirety after the cleaning procedure.  A small volume of cerumen is removed from left ear canal.    Mr. Cantor completed an audiometric study today results of which were duplicated below in compared to his previous study.    DIAGNOSIS:     ICD-10-CM ICD-9-CM    1. Hearing loss, sensorineural, high frequency, bilateral H91.93 389.8      PLAN: Cerumen impactions removed from  both eacs; AD C.I. ( on anterior AD TM) suctioned/irigarted  Audiometry reviewed, results compared to previous studies

## 2019-04-15 ENCOUNTER — CLINICAL SUPPORT (OUTPATIENT)
Dept: REHABILITATION | Facility: OTHER | Age: 74
End: 2019-04-15
Attending: PHYSICIAN ASSISTANT
Payer: MEDICARE

## 2019-04-15 DIAGNOSIS — G89.29 CHRONIC BILATERAL LOW BACK PAIN WITHOUT SCIATICA: ICD-10-CM

## 2019-04-15 DIAGNOSIS — M54.50 CHRONIC BILATERAL LOW BACK PAIN WITHOUT SCIATICA: ICD-10-CM

## 2019-04-15 PROCEDURE — 97110 THERAPEUTIC EXERCISES: CPT

## 2019-04-15 NOTE — PROGRESS NOTES
"[facility]      [place] [facility] [place].  Clinic Number: [id number]    Therapy Diagnosis:   Encounter Diagnosis   Name Primary?    Chronic bilateral low back pain without sciatica      Physician: Leonila Ridley PA-C    Visit Date: 4/15/2019    Physician Orders: PT Evaluate and Treat  Medical Diagnosis: Chronic bilateral low back pain without sciatica  Evaluation Date: 03/29/2019  Authorization period: 2/4/2020  Plan of care Expiration: 6/29/19  Re-Assessment Due: 04/29/2019  Visit #/Visits authorized: 2/ 12     Time In: 1430  Time Out: 1520  Total Billable Time: 50 minutes    Precautions: DM/BLE edema/peripheral neuropathy/CKD/HTN/atrial fibrillation    Pain Pattern: No directional preference  Noted, work in both directions    Subjective   Stephen reports feeling "good" since last session, but admits that he has a lot of work to do to improve his functional mobility.       Patient reports their pain to be 0/10 on a 0-10 scale with 0 being no pain and 10 being the worst pain imaginable.  Pain Location: B LB     Occupation:  retired   Pt goals:  " walk more than a block or two without pain, go to [place] without scooter, get my back stronger"    Objective   Baseline Isometric Testing on Med X equipment: Testing administered by PT  Baseline IM Testing Results:   Date of testing: 3/29/19  ROM 0-36 deg   Max Peak Torque 91    Min Peak Torque 26    Flex/Ext Ratio 3.5   % below normative data 42         FOTO: Focus on Therapeutic Outcomes   Category: lumbar   % Impaired: 50%  Current Score  = CK = at least 40% but < 60% impaired, limited or restricted  Goal at Discharge Score = CK = at least 40% but < 60% impaired, limited or restricted    Treatment    Pt was instructed in and performed the following:     Stephen received therapeutic exercises to develop/improved posture, cardiovascular endurance, muscular endurance, lumbar/cervical ROM, strength and muscular endurance for 40 minutes including " the following exercises:                 Flexion in lying x10              PPT x10              LTR x20   Bridging x10    [facility] 4/15/2019   Visit Number 2   VAS Pain Rating 0   Recumbent Bike Seat Pos. -   Time 10   Lumbar Weight 50   Repetitions 20   Rating of Perceived Exertion 2   Ice - Z Lie (in min.) 10       Peripheral muscle strengthening which included 1 set of 15-20 repetitions at a slow, controlled 10-13 second per rep pace focused on strengthening supporting musculature for improved body mechanics and functional mobility.  Pt and therapist focused on proper form during treatment to ensure optimal strengthening of each targeted muscle group.  Machines were utilized including torso rotation, leg extension, leg curl, chest press, upright row. Tricep extension, bicep curl, leg press, and hip abduction added visit 3      Home Exercises Provided and Patient Education Provided               Flexion in lying 10x, 3x/day              PPT              LTR 10x 3x/day    Education provided:   - PT educated patient on goals to advance LE strength and hip stability with progressive exercises to improve level of function.    Written Home Exercises Provided: Patient instructed to cont prior HEP.  Exercises were reviewed and [facility] was able to demonstrate them prior to the end of the session.  [facility] demonstrated good  understanding of the education provided.     See EMR under Patient Instructions for exercises provided prior visit.    Assessment     Stephen tolerated initial follow-up very well this date with good performance on the MedX dynamically with increased ease at a weight set just above 50% of his test results. Plan to increase load significantly to apply challenge to lumbar extensors. PT to continue advancing sessions toward long term goals.     -Patient is making good progress towards established goals. He performed x20 reps of 50 ft/lbs at an RPE of 2/10; Increase 15-20% as appropriate   Pt will continue  to benefit from skilled outpatient physical therapy to address the deficits stated in the impairment chart, provide pt/family education and to maximize pt's level of independence in the home and community environment.     Anticipated Barriers for therapy: None  Pt's spiritual, cultural and educational needs considered and pt agreeable to plan of care and goals as stated below:     Medical necessity is demonstrated by the following problem list.    Pt presents with the following impairments:   History  Co-morbidities and personal factors that may impact the plan of care Examination  Body Structures and Functions, activity limitations and participation restrictions that may impact the plan of care Clinical Presentation    Decision Making/ Complexity Score   Co-morbidities:   diabetes, HTN and LE edema/neuropathy/CKD/A fib           Personal Factors:   age  lifestyle Body Regions:   back     Body Systems:   gross symmetry  ROM  strength  gross coordinated movement  balance  gait  transfers  transitions  motor control  motor learning     Activity limitations:   Learning and applying knowledge  no deficits     General Tasks and Commands  no deficits     Communication  no deficits     Mobility  lifting and carrying objects  walking     Self care  no deficits     Domestic Life  shopping  cooking  doing house work (cleaning house, washing dishes, laundry)     Interactions/Relationships  no deficits     Life Areas  no deficits     Community and Social Life  community life  recreation and leisure     Participation Restrictions:   Walking > 1/3 block/standing/lifting       stable and uncomplicated    low      GOALS: Pt is in agreement with the following goals.     Short term goals:  6 weeks or 10 visits   1.  Pt will demonstrate increased lumbar ROM by at least 3 degrees from the initial ROM value with improvements noted in functional ROM and ability to perform ADLs  2.  Pt will demonstrate increased maximum isometric torque  value by 10 when compared to the initial value resulting in improved ability to perform bending, lifting, and carrying activities safely, confidently.  3.  Patient report a reduction in worst pain score by 1-2 points for improved tolerance during work and recreational activities  4.  Pt able to perform HEP correctly with minimal cueing or supervision for therapist     Long term goals: 13 weeks or 20 visits   1. Pt will demonstrate increased lumbar ROM by at least 6 degrees from initial ROM value, resulting in improved ability to perform functional fwd bending while standing and sitting.   2. Pt will demonstrate increased maximum isometric torque value by 30% when compared to the initial value resulting in improved ability to perform bending, lifting, and carrying activities safely, confidently.  3. Pt to demonstrate ability to independently control and reduce their pain through posture positioning and mechanical movements throughout a typical day.  4.  Patient will demonstrate improved overall function per FOTO Survey to CK = at least 40% but < 60% impaired, limited or restricted score or less.        Plan   Outpatient physical therapy 2x week for 13 weeks or 20 visits to include the following:   - Patient education  - Therapeutic exercise  - Manual therapy  - Performance testing   - Neuromuscular Re-education  - Therapeutic activity   - Modalities     Pt may be seen by PTA as part of the rehabilitation team.     Plan   Continue with established Plan of Care towards established PT goals.

## 2019-04-18 DIAGNOSIS — E11.9 TYPE 2 DIABETES MELLITUS WITHOUT COMPLICATION: ICD-10-CM

## 2019-05-01 ENCOUNTER — CLINICAL SUPPORT (OUTPATIENT)
Dept: REHABILITATION | Facility: OTHER | Age: 74
End: 2019-05-01
Attending: PHYSICIAN ASSISTANT
Payer: MEDICARE

## 2019-05-01 DIAGNOSIS — G89.29 CHRONIC BILATERAL LOW BACK PAIN WITHOUT SCIATICA: ICD-10-CM

## 2019-05-01 DIAGNOSIS — M54.50 CHRONIC BILATERAL LOW BACK PAIN WITHOUT SCIATICA: ICD-10-CM

## 2019-05-01 PROCEDURE — 97110 THERAPEUTIC EXERCISES: CPT

## 2019-05-01 NOTE — PROGRESS NOTES
"[facility]      Name: [facility] [place] [facility] Cantor Jr.  Clinic Number: [id number]    Therapy Diagnosis:   Encounter Diagnosis   Name Primary?    Chronic bilateral low back pain without sciatica      Physician: Leonila Ridley PA-C    Visit Date: 5/1/2019    Physician Orders: PT Evaluate and Treat  Medical Diagnosis: Chronic bilateral low back pain without sciatica  Evaluation Date: 03/29/2019  Authorization period: 2/4/2020  Plan of care Expiration: 6/29/19  Re-Assessment Due: 6/1/19  Visit #/Visits authorized: 3/ 12 ( 66ft/lbs)    Time In: 130pm  Time Out: 230  Total Billable Time: 45 minutes    Precautions: DM/BLE edema/peripheral neuropathy/CKD/HTN/atrial fibrillation    Pain Pattern: No directional preference  Noted, work in both directions    Subjective   [facility] reports feeling "good" since last session, no DOMS after session    Patient reports their pain to be 4/10 on a 0-10 scale with 0 being no pain and 10 being the worst pain imaginable.  Pain Location: B LB/ R knee     Occupation:  retired   Pt goals:  " walk more than a block or two without pain, go to [employer] without scooter, get my back stronger"    Objective   Baseline Isometric Testing on Med X equipment: Testing administered by PT  Baseline IM Testing Results:   Date of testing: 3/29/19  ROM 0-36 deg   Max Peak Torque 91    Min Peak Torque 26    Flex/Ext Ratio 3.5   % below normative data 42         FOTO: Focus on Therapeutic Outcomes   Category: lumbar   % Impaired: 50%  Current Score  = CK = at least 40% but < 60% impaired, limited or restricted  Goal at Discharge Score = CK = at least 40% but < 60% impaired, limited or restricted    Treatment    Pt was instructed in and performed the following:     [facility] received therapeutic exercises to develop/improved posture, cardiovascular endurance, muscular endurance, lumbar/cervical ROM, strength and muscular endurance for 60 minutes including the following exercises:   [facility] " Therapy 5/1/2019   Visit Number 3   VAS Pain Rating 4   Recumbent Bike Seat Pos. -   Time 10   Flexion in Lying 10   Lumbar Extension Seat Pad -   Femur Restraint -   Top Dead Center -   Counterweight -   Lumbar Flexion -   Lumbar Extension -   Lumbar Peak Torque -   Min Torque -   Test Percent Below Normative Data -   Lumbar Weight 60   Repetitions 20   Rating of Perceived Exertion 3.5   Ice - Z Lie (in min.) 10                   Flexion in lying x10              PPT x10              LTR x20   Bridging x10    Peripheral muscle strengthening which included 1 set of 15-20 repetitions at a slow, controlled 10-13 second per rep pace focused on strengthening supporting musculature for improved body mechanics and functional mobility.  Pt and therapist focused on proper form during treatment to ensure optimal strengthening of each targeted muscle group.  Machines were utilized including torso rotation, leg extension, leg curl, chest press, upright row. Tricep extension, bicep curl, leg press, and hip abduction added visit 3      Home Exercises Provided and Patient Education Provided               Flexion in lying 10x, 3x/day              PPT              LTR 10x 3x/day    Education provided:   - PT educated patient on goals to advance LE strength and hip stability with progressive exercises to improve level of function.    Written Home Exercises Provided: Patient instructed to cont prior HEP.  Exercises were reviewed and [facility] was able to demonstrate them prior to the end of the session.  c demonstrated good  understanding of the education provided.     See EMR under Patient Instructions for exercises provided prior visit.    Assessment     Jmc tolerated treatment well.  Increase Med X weights 20% to 60ft/lbs with pt completing 20 reps and rating exertion 3.5/10.  Pt not having DOMS following sessions.  Increase intensity of exercises with TB or LE movements next visit.    -Patient is making good progress towards  established goals.   Pt will continue to benefit from skilled outpatient physical therapy to address the deficits stated in the impairment chart, provide pt/family education and to maximize pt's level of independence in the home and community environment.     Anticipated Barriers for therapy: None  Pt's spiritual, cultural and educational needs considered and pt agreeable to plan of care and goals as stated below:   GOALS: Pt is in agreement with the following goals.     Short term goals:  6 weeks or 10 visits   1.  Pt will demonstrate increased lumbar ROM by at least 3 degrees from the initial ROM value with improvements noted in functional ROM and ability to perform ADLs  2.  Pt will demonstrate increased maximum isometric torque value by 10 when compared to the initial value resulting in improved ability to perform bending, lifting, and carrying activities safely, confidently.  3.  Patient report a reduction in worst pain score by 1-2 points for improved tolerance during work and recreational activities  4.  Pt able to perform HEP correctly with minimal cueing or supervision for therapist     Long term goals: 13 weeks or 20 visits   1. Pt will demonstrate increased lumbar ROM by at least 6 degrees from initial ROM value, resulting in improved ability to perform functional fwd bending while standing and sitting.   2. Pt will demonstrate increased maximum isometric torque value by 30% when compared to the initial value resulting in improved ability to perform bending, lifting, and carrying activities safely, confidently.  3. Pt to demonstrate ability to independently control and reduce their pain through posture positioning and mechanical movements throughout a typical day.  4.  Patient will demonstrate improved overall function per FOTO Survey to CK = at least 40% but < 60% impaired, limited or restricted score or less.        Plan   Outpatient physical therapy 2x week for 13 weeks or 20 visits to include the  following:   - Patient education  - Therapeutic exercise  - Manual therapy  - Performance testing   - Neuromuscular Re-education  - Therapeutic activity   - Modalities     Pt may be seen by PTA as part of the rehabilitation team.     Plan   Continue with established Plan of Care towards established PT goals.  Increase Med X  66ft/lbs next visit

## 2019-05-03 DIAGNOSIS — M25.551 PAIN OF RIGHT HIP JOINT: Primary | ICD-10-CM

## 2019-05-06 ENCOUNTER — CLINICAL SUPPORT (OUTPATIENT)
Dept: REHABILITATION | Facility: OTHER | Age: 74
End: 2019-05-06
Attending: PHYSICIAN ASSISTANT
Payer: MEDICARE

## 2019-05-06 DIAGNOSIS — M54.50 CHRONIC BILATERAL LOW BACK PAIN WITHOUT SCIATICA: ICD-10-CM

## 2019-05-06 DIAGNOSIS — G89.29 CHRONIC BILATERAL LOW BACK PAIN WITHOUT SCIATICA: ICD-10-CM

## 2019-05-06 PROCEDURE — 97110 THERAPEUTIC EXERCISES: CPT

## 2019-05-06 NOTE — PROGRESS NOTES
"[facility]      Name: [facility] PAT Cantor Jr.  Clinic Number: [id number]    Therapy Diagnosis:   Encounter Diagnosis   Name Primary?    Chronic bilateral low back pain without sciatica      Physician: Leonila Ridley PA-C    Visit Date: 5/6/2019    Physician Orders: PT Evaluate and Treat  Medical Diagnosis: Chronic bilateral low back pain without sciatica  Evaluation Date: 03/29/2019  Authorization period: 2/4/2020  Plan of care Expiration: 6/29/19  Re-Assessment Due: 06/1/2019  Visit #/Visits authorized: 4/ 12     Time In: 1:00pm  Time Out: 2:00  Total Billable Time: 50 minutes    Precautions: DM/BLE edema/peripheral neuropathy/CKD/HTN/atrial fibrillation    Pain Pattern: No directional preference  Noted, work in both directions    Subjective   Stephen reports feeling "good"with no LBP since last session, no DOMS after last session. His R knee hurt him more today.     Patient reports their pain to be 0/10 on a 0-10 scale with 0 being no pain and 10 being the worst pain imaginable.  Pain Location: B LB/ R knee     Occupation:  retired   Pt goals:  " walk more than a block or two without pain, go to [place] without scooter, get my back stronger"    Objective   Baseline Isometric Testing on Med X equipment: Testing administered by PT  Baseline IM Testing Results:   Date of testing: 3/29/19  ROM 0-36 deg   Max Peak Torque 91    Min Peak Torque 26    Flex/Ext Ratio 3.5   % below normative data 42         FOTO: Focus on Therapeutic Outcomes   Category: lumbar   % Impaired: 50%  Current Score  = CK = at least 40% but < 60% impaired, limited or restricted  Goal at Discharge Score = CK = at least 40% but < 60% impaired, limited or restricted    Treatment    Pt was instructed in and performed the following:     ricki received therapeutic exercises to develop/improved posture, cardiovascular endurance, muscular endurance, lumbar/cervical ROM, strength and muscular endurance for 60 minutes including " the following exercises:     [facility] 5/6/2019   Visit Number 4   VAS Pain Rating 0   Recumbent Bike Seat Pos. -   Time 10   Flexion in Lying 10   Lumbar Extension Seat Pad -   Femur Restraint -   Top Dead Center -   Counterweight -   Lumbar Flexion -   Lumbar Extension -   Lumbar Peak Torque -   Min Torque -   Test Percent Below Normative Data -   Lumbar Weight 66   Repetitions 20   Rating of Perceived Exertion 3   Ice - Z Lie (in min.) 10   Flexion in lying x10              PPT x10              LTR x20   Bridging x10    Peripheral muscle strengthening which included 1 set of 15-20 repetitions at a slow, controlled 10-13 second per rep pace focused on strengthening supporting musculature for improved body mechanics and functional mobility.  Pt and therapist focused on proper form during treatment to ensure optimal strengthening of each targeted muscle group.  Machines were utilized including torso rotation, leg extension, leg curl, chest press, upright row. Tricep extension, bicep curl, leg press, and hip abduction added visit 3      Home Exercises Provided and Patient Education Provided               Flexion in lying 10x, 3x/day              PPT              LTR 10x 3x/day    Education provided:   - PT educated patient on goals to advance LE strength and hip stability with progressive exercises to improve level of function.    Written Home Exercises Provided: Patient instructed to cont prior HEP.  Exercises were reviewed and [facility] was able to demonstrate them prior to the end of the session.  c demonstrated good  understanding of the education provided.     See EMR under Patient Instructions for exercises provided prior visit.    Assessment     c tolerated treatment well.  Increase Med X weight today with no c/o LBP. Educated pt to cont to stretch daily. Pt unable to get his bottom off the mat with bridging due to weakness. No LB pain with session.     -Patient is making good progress towards  established goals.   Pt will continue to benefit from skilled outpatient physical therapy to address the deficits stated in the impairment chart, provide pt/family education and to maximize pt's level of independence in the home and community environment.     Anticipated Barriers for therapy: None  Pt's spiritual, cultural and educational needs considered and pt agreeable to plan of care and goals as stated below:   GOALS: Pt is in agreement with the following goals.     Short term goals:  6 weeks or 10 visits   1.  Pt will demonstrate increased lumbar ROM by at least 3 degrees from the initial ROM value with improvements noted in functional ROM and ability to perform ADLs  2.  Pt will demonstrate increased maximum isometric torque value by 10 when compared to the initial value resulting in improved ability to perform bending, lifting, and carrying activities safely, confidently.  3.  Patient report a reduction in worst pain score by 1-2 points for improved tolerance during work and recreational activities  4.  Pt able to perform HEP correctly with minimal cueing or supervision for therapist     Long term goals: 13 weeks or 20 visits   1. Pt will demonstrate increased lumbar ROM by at least 6 degrees from initial ROM value, resulting in improved ability to perform functional fwd bending while standing and sitting.   2. Pt will demonstrate increased maximum isometric torque value by 30% when compared to the initial value resulting in improved ability to perform bending, lifting, and carrying activities safely, confidently.  3. Pt to demonstrate ability to independently control and reduce their pain through posture positioning and mechanical movements throughout a typical day.  4.  Patient will demonstrate improved overall function per FOTO Survey to CK = at least 40% but < 60% impaired, limited or restricted score or less.        Plan   Outpatient physical therapy 2x week for 13 weeks or 20 visits to include the  following:   - Patient education  - Therapeutic exercise  - Manual therapy  - Performance testing   - Neuromuscular Re-education  - Therapeutic activity   - Modalities     Pt may be seen by PTA as part of the rehabilitation team.     Plan   Continue with established Plan of Care towards established PT goals.  Increase Med X  66ft/lbs next visit

## 2019-05-07 ENCOUNTER — TELEPHONE (OUTPATIENT)
Dept: OPHTHALMOLOGY | Facility: CLINIC | Age: 74
End: 2019-05-07

## 2019-05-08 ENCOUNTER — HOSPITAL ENCOUNTER (OUTPATIENT)
Dept: RADIOLOGY | Facility: HOSPITAL | Age: 74
Discharge: HOME | End: 2019-05-08
Attending: ORTHOPAEDIC SURGERY
Payer: MEDICARE

## 2019-05-08 ENCOUNTER — CLINICAL SUPPORT (OUTPATIENT)
Dept: REHABILITATION | Facility: OTHER | Age: 74
End: 2019-05-08
Attending: PHYSICIAN ASSISTANT
Payer: MEDICARE

## 2019-05-08 ENCOUNTER — OFFICE VISIT (OUTPATIENT)
Dept: ORTHOPEDICS | Facility: CLINIC | Age: 74
End: 2019-05-08
Payer: MEDICARE

## 2019-05-08 VITALS — WEIGHT: 261.81 LBS | BODY MASS INDEX: 37.56 KG/M2

## 2019-05-08 DIAGNOSIS — M70.61 TROCHANTERIC BURSITIS, RIGHT HIP: Primary | ICD-10-CM

## 2019-05-08 DIAGNOSIS — G89.29 CHRONIC BILATERAL LOW BACK PAIN WITHOUT SCIATICA: ICD-10-CM

## 2019-05-08 DIAGNOSIS — M54.50 CHRONIC BILATERAL LOW BACK PAIN WITHOUT SCIATICA: ICD-10-CM

## 2019-05-08 DIAGNOSIS — M25.551 PAIN OF RIGHT HIP JOINT: ICD-10-CM

## 2019-05-08 PROCEDURE — 1101F PT FALLS ASSESS-DOCD LE1/YR: CPT | Mod: CPTII,S$GLB,, | Performed by: ORTHOPAEDIC SURGERY

## 2019-05-08 PROCEDURE — 73502 X-RAY EXAM HIP UNI 2-3 VIEWS: CPT | Mod: 26,RT,, | Performed by: RADIOLOGY

## 2019-05-08 PROCEDURE — G8979 MOBILITY GOAL STATUS: HCPCS | Mod: CK

## 2019-05-08 PROCEDURE — 20610 PR DRAIN/INJECT LARGE JOINT/BURSA: ICD-10-PCS | Mod: RT,S$GLB,, | Performed by: ORTHOPAEDIC SURGERY

## 2019-05-08 PROCEDURE — 99999 PR PBB SHADOW E&M-EST. PATIENT-LVL II: ICD-10-PCS | Mod: PBBFAC,,, | Performed by: ORTHOPAEDIC SURGERY

## 2019-05-08 PROCEDURE — 73502 XR HIP 2 VIEW RIGHT: ICD-10-PCS | Mod: 26,RT,, | Performed by: RADIOLOGY

## 2019-05-08 PROCEDURE — 20610 DRAIN/INJ JOINT/BURSA W/O US: CPT | Mod: RT,S$GLB,, | Performed by: ORTHOPAEDIC SURGERY

## 2019-05-08 PROCEDURE — 99213 PR OFFICE/OUTPT VISIT, EST, LEVL III, 20-29 MIN: ICD-10-PCS | Mod: 25,S$GLB,, | Performed by: ORTHOPAEDIC SURGERY

## 2019-05-08 PROCEDURE — 97110 THERAPEUTIC EXERCISES: CPT

## 2019-05-08 PROCEDURE — 73502 X-RAY EXAM HIP UNI 2-3 VIEWS: CPT | Mod: TC,RT

## 2019-05-08 PROCEDURE — 1101F PR PT FALLS ASSESS DOC 0-1 FALLS W/OUT INJ PAST YR: ICD-10-PCS | Mod: CPTII,S$GLB,, | Performed by: ORTHOPAEDIC SURGERY

## 2019-05-08 PROCEDURE — G8978 MOBILITY CURRENT STATUS: HCPCS | Mod: CK

## 2019-05-08 PROCEDURE — 99213 OFFICE O/P EST LOW 20 MIN: CPT | Mod: 25,S$GLB,, | Performed by: ORTHOPAEDIC SURGERY

## 2019-05-08 PROCEDURE — 99999 PR PBB SHADOW E&M-EST. PATIENT-LVL II: CPT | Mod: PBBFAC,,, | Performed by: ORTHOPAEDIC SURGERY

## 2019-05-08 RX ORDER — TRIAMCINOLONE ACETONIDE 40 MG/ML
40 INJECTION, SUSPENSION INTRA-ARTICULAR; INTRAMUSCULAR
Status: COMPLETED | OUTPATIENT
Start: 2019-05-08 | End: 2019-05-08

## 2019-05-08 RX ADMIN — TRIAMCINOLONE ACETONIDE 40 MG: 40 INJECTION, SUSPENSION INTRA-ARTICULAR; INTRAMUSCULAR at 03:05

## 2019-05-08 NOTE — PROGRESS NOTES
Subjective:      Patient ID: Stephen Cantor Jr. is a 73 y.o. male.    Chief Complaint: Pain of the Right Hip and Pain of the Right Knee    HPI  Stephen Cantor Jr. has right hip pain.  The pain is unchanged and moderat. The pain is located in the lateral and SI.  There  is not radiation.  The pain is described as achy. The pain is aggravated by activity and laying on his side.  It is alleviated by rest.  There is associated back pain.  His history, medications and problem list were reviewed.    Review of Systems   Constitution: Negative for chills, fever and night sweats.   HENT: Negative for hearing loss.    Eyes: Negative for blurred vision and double vision.   Cardiovascular: Negative for chest pain, claudication and leg swelling.   Respiratory: Negative for shortness of breath.    Endocrine: Negative for polydipsia, polyphagia and polyuria.   Hematologic/Lymphatic: Negative for adenopathy and bleeding problem. Does not bruise/bleed easily.   Skin: Negative for poor wound healing.   Musculoskeletal: Positive for joint pain.   Gastrointestinal: Negative for diarrhea and heartburn.   Genitourinary: Negative for bladder incontinence.   Neurological: Negative for focal weakness, headaches, numbness, paresthesias and sensory change.   Psychiatric/Behavioral: The patient is not nervous/anxious.    Allergic/Immunologic: Negative for persistent infections.         Objective:      Body mass index is 37.56 kg/m².  Vitals:    05/08/19 1533   Weight: 118.8 kg (261 lb 12.7 oz)           General    Constitutional: He is oriented to person, place, and time. He appears well-developed and well-nourished.   HENT:   Head: Normocephalic and atraumatic.   Eyes: EOM are normal.   Cardiovascular: Normal rate.    Pulmonary/Chest: Effort normal.   Neurological: He is alert and oriented to person, place, and time.   Psychiatric: He has a normal mood and affect. His behavior is normal.     General Musculoskeletal Exam   Gait: normal   Pelvic  Obliquity: none      Right Knee Exam     Inspection   Alignment:  normal  Erythema: absent  Scars: absent  Swelling: absent  Effusion: absent  Deformity: absent  Bruising: absent    Tenderness   The patient is experiencing no tenderness.     Range of Motion   Extension: 0   Flexion: 130     Tests   Ligament Examination Lachman: normal (-1 to 2mm)   MCL - Valgus: normal (0 to 2mm)  LCL - Varus: normal  Patella   Passive Patellar Tilt: neutral    Other   Sensation: normal    Left Knee Exam     Inspection   Alignment:  normal  Erythema: absent  Scars: absent  Swelling: absent  Effusion: absent  Deformity: absent  Bruising: absent    Tenderness   The patient is experiencing no tenderness.     Range of Motion   Extension: 0   Flexion: 130     Tests   Stability Lachman: normal (-1 to 2mm)   MCL - Valgus: normal (0 to 2mm)  LCL - Varus: normal (0 to 2mm)  Patella   Passive Patellar Tilt: neutral    Other   Sensation: normal    Right Hip Exam     Inspection   Scars: absent  Swelling: absent  Bruising: absent  No deformity of hip.  Quadriceps Atrophy:  Negative  Erythema: absent    Tenderness   The patient tender to palpation of the trochanteric bursa.    Range of Motion   Abduction: 25   Adduction: 20   Extension: 0   Flexion: 100   External rotation: 20   Internal rotation: 20     Tests   Pain w/ forced internal rotation (LANCE): present  Stinchfield test: negative    Other   Sensation: normal  Left Hip Exam     Inspection   Scars: absent  Swelling: absent  No deformity of hip.  Quadriceps Atrophy:  negative  Erythema: absent  Bruising: absent    Range of Motion   Abduction: 25   Adduction: 20   Extension: 0   Flexion: 100   External rotation: 30   Internal rotation: 25     Tests   Pain w/ forced internal rotation (LANCE): absent  Stinchfield test: negative    Other   Sensation: normal      Back (L-Spine & T-Spine) / Neck (C-Spine) Exam     Back (L-Spine & T-Spine) Range of Motion   The patient has abnormal back  ROM.      Muscle Strength   Right Lower Extremity   Hip Abduction: 5/5   Hip Adduction: 5/5   Hip Flexion: 5/5   Quadriceps:  5/5   Hamstrin/5   Ankle Dorsiflexion:  5/5   Left Lower Extremity   Hip Abduction: 5/5   Hip Adduction: 5/5   Hip Flexion: 5/5   Quadriceps:  5/5   Hamstrin/5   Ankle Dorsiflexion:  5/5     Reflexes     Left Side  Quadriceps:  2+    Right Side   Quadriceps:  2+    Vascular Exam     Right Pulses  Dorsalis Pedis:      2+          Left Pulses  Dorsalis Pedis:      2+          Capillary Refill  Right Hand: normal capillary refill  Left Hand: normal capillary refill    Edema  Right Upper Leg: absent  Right Lower Leg: absent  Left Upper Leg: absent  Left Lower Leg: absent    Radiographs taken today and reviewed by me  demonstrate mild arthritic change of the right hip(s).There  is not bone destruction.  There is not a fracture.  The visualized portion of the lumbar spine demonstrates moderate arthritic change.               Assessment:       Encounter Diagnosis   Name Primary?    Trochanteric bursitis, right hip Yes          Plan:       [facility] was seen today for pain and pain.    Diagnoses and all orders for this visit:    Trochanteric bursitis, right hip    Other orders  -     triamcinolone acetonide injection 40 mg      Treatment options were discussed. Verbal consent was obtained.   After time out was performed and patient ID, side, and site were verified, the right hip was sterilely prepped in the standard fashion.  A 22-gauge needle was introduced intothe trochanteric bursa without complication.The bursa was then injected with 40 mg Kenalog and 9 cc 1% plain lidocaine.  Sterile dressing was applied.  The patient was informed that they may resume activities as tolerated.  Elevation of glucose  was discussed.  F/U in 6 weeks. Sooner for any problems.

## 2019-05-08 NOTE — PROGRESS NOTES
"[facility]      Name: [facility] PAT Cantor Jr.  Clinic Number: [id number]    Therapy Diagnosis:   Encounter Diagnosis   Name Primary?    Chronic bilateral low back pain without sciatica      Physician: Leonila Ridley PA-C    Visit Date: 5/8/2019    Physician Orders: PT Evaluate and Treat  Medical Diagnosis: Chronic bilateral low back pain without sciatica  Evaluation Date: 03/29/2019  Authorization period: 2/4/2020  Plan of care Expiration: 6/29/19  Re-Assessment Due: 06/1/2019  Visit #/Visits authorized: 5/ 12     Time In: 11:00pm  Time Out: 1200  Total Billable Time: 50 minutes    Precautions: DM/BLE edema/peripheral neuropathy/CKD/HTN/atrial fibrillation    Pain Pattern: No directional preference  Noted, work in both directions    Subjective   Jmc reports feeling good Pt reports his back feels sore.     Patient reports their pain to be 0/10 on a 0-10 scale with 0 being no pain and 10 being the worst pain imaginable.  Pain Location: B LB/ R knee     Occupation:  retired   Pt goals:  " walk more than a block or two without pain, go to [employer] without scooter, get my back stronger"    Objective   Baseline Isometric Testing on Med X equipment: Testing administered by PT  Baseline IM Testing Results:   Date of testing: 3/29/19  ROM 0-36 deg   Max Peak Torque 91    Min Peak Torque 26    Flex/Ext Ratio 3.5   % below normative data 42         FOTO: Focus on Therapeutic Outcomes   Category: lumbar   % Impaired: 50%  Current Score  = CK = at least 40% but < 60% impaired, limited or restricted  Goal at Discharge Score = CK = at least 40% but < 60% impaired, limited or restricted  Visit 5   45 %  Treatment    Pt was instructed in and performed the following:     [facility] received therapeutic exercises to develop/improved posture, cardiovascular endurance, muscular endurance, lumbar/cervical ROM, strength and muscular endurance for 60 minutes including the following exercises:   [facility] " 5/8/2019   Visit Number 5   VAS Pain Rating 5   Recumbent Bike Seat Pos. -   Time 10   Flexion in Lying 10   Lumbar Extension Seat Pad -   Femur Restraint -   Top Dead Center -   Counterweight -   Lumbar Flexion -   Lumbar Extension -   Lumbar Peak Torque -   Min Torque -   Test Percent Below Normative Data -   Lumbar Weight 69   Repetitions 20   Rating of Perceived Exertion 4   Ice - Z Lie (in min.) 10         Flexion in lying x10              PPT x10              LTR x20   Bridging x10    Peripheral muscle strengthening which included 1 set of 15-20 repetitions at a slow, controlled 10-13 second per rep pace focused on strengthening supporting musculature for improved body mechanics and functional mobility.  Pt and therapist focused on proper form during treatment to ensure optimal strengthening of each targeted muscle group.  Machines were utilized including torso rotation, leg extension, leg curl, chest press, upright row. Tricep extension, bicep curl, leg press, and hip abduction added visit 3      Home Exercises Provided and Patient Education Provided               Flexion in lying 10x, 3x/day              PPT              LTR 10x 3x/day    Education provided:   - PT educated patient on goals to advance LE strength and hip stability with progressive exercises to improve level of function.    Written Home Exercises Provided: Patient instructed to cont prior HEP.  Exercises were reviewed and [facility] was able to demonstrate them prior to the end of the session.  [facility] demonstrated good  understanding of the education provided.     See EMR under Patient Instructions for exercises provided prior visit.    Assessment     Jmc tolerated treatment well.  Increased 5% on Med X with pt completing 20 reps with 4/10 exertion.  Discussed increasing weight to > 70ft/lbs next visit which would include a warm up. Pt continues to have limited mobility with bridging secondary to weakness.  -Patient is making good progress towards  established goals.   Pt will continue to benefit from skilled outpatient physical therapy to address the deficits stated in the impairment chart, provide pt/family education and to maximize pt's level of independence in the home and community environment.     Anticipated Barriers for therapy: None  Pt's spiritual, cultural and educational needs considered and pt agreeable to plan of care and goals as stated below:   GOALS: Pt is in agreement with the following goals.     Short term goals:  6 weeks or 10 visits   1.  Pt will demonstrate increased lumbar ROM by at least 3 degrees from the initial ROM value with improvements noted in functional ROM and ability to perform ADLs  2.  Pt will demonstrate increased maximum isometric torque value by 10 when compared to the initial value resulting in improved ability to perform bending, lifting, and carrying activities safely, confidently.  3.  Patient report a reduction in worst pain score by 1-2 points for improved tolerance during work and recreational activities  4.  Pt able to perform HEP correctly with minimal cueing or supervision for therapist     Long term goals: 13 weeks or 20 visits   1. Pt will demonstrate increased lumbar ROM by at least 6 degrees from initial ROM value, resulting in improved ability to perform functional fwd bending while standing and sitting.   2. Pt will demonstrate increased maximum isometric torque value by 30% when compared to the initial value resulting in improved ability to perform bending, lifting, and carrying activities safely, confidently.  3. Pt to demonstrate ability to independently control and reduce their pain through posture positioning and mechanical movements throughout a typical day.  4.  Patient will demonstrate improved overall function per FOTO Survey to CK = at least 40% but < 60% impaired, limited or restricted score or less.        Plan   Outpatient physical therapy 2x week for 13 weeks or 20 visits to include the  following:   - Patient education  - Therapeutic exercise  - Manual therapy  - Performance testing   - Neuromuscular Re-education  - Therapeutic activity   - Modalities     Pt may be seen by PTA as part of the rehabilitation team.     Plan   Continue with established Plan of Care towards established PT goals.

## 2019-05-13 ENCOUNTER — CLINICAL SUPPORT (OUTPATIENT)
Dept: REHABILITATION | Facility: OTHER | Age: 74
End: 2019-05-13
Attending: PHYSICIAN ASSISTANT
Payer: MEDICARE

## 2019-05-13 DIAGNOSIS — M54.50 CHRONIC BILATERAL LOW BACK PAIN WITHOUT SCIATICA: ICD-10-CM

## 2019-05-13 DIAGNOSIS — G89.29 CHRONIC BILATERAL LOW BACK PAIN WITHOUT SCIATICA: ICD-10-CM

## 2019-05-13 PROCEDURE — 97110 THERAPEUTIC EXERCISES: CPT

## 2019-05-13 NOTE — PROGRESS NOTES
"[facility]      [place] [facility] [place] [facility]  Clinic Number: [id number]    Therapy Diagnosis:   Encounter Diagnosis   Name Primary?    Chronic bilateral low back pain without sciatica      Physician: Leonila Ridley PA-C    Visit Date: 5/13/2019    Physician Orders: PT Evaluate and Treat  Medical Diagnosis: Chronic bilateral low back pain without sciatica  Evaluation Date: 03/29/2019  Authorization period: 2/4/2020  Plan of care Expiration: 6/29/19  Re-Assessment Due: 06/14/2019  Visit #/Visits authorized: 6/ 12     Time In: 2:00pm  Time Out: 3:00  Total Billable Time: 30 minutes    Precautions: DM/BLE edema/peripheral neuropathy/CKD/HTN/atrial fibrillation    Pain Pattern: No directional preference  Noted, work in both directions    Subjective   Stephen reports feeling good overall. He reports he is able to stand and walk for longer without increased symptoms.     Patient reports their pain to be 0/10 on a 0-10 scale with 0 being no pain and 10 being the worst pain imaginable.  Pain Location: B LB/ R knee     Occupation:  retired   Pt goals:  " walk more than a block or two without pain, go to [place] without scooter, get my back stronger"    Objective   Baseline Isometric Testing on Med X equipment: Testing administered by PT  Baseline IM Testing Results:   Date of testing: 3/29/19  ROM 0-36 deg (increased to 48-0 on 5/13/19)   Max Peak Torque 91    Min Peak Torque 26    Flex/Ext Ratio 3.5   % below normative data 42         FOTO: Focus on Therapeutic Outcomes   Category: lumbar   % Impaired: 50%  Current Score  = CK = at least 40% but < 60% impaired, limited or restricted  Goal at Discharge Score = CK = at least 40% but < 60% impaired, limited or restricted  Visit 5   45 %  Treatment    Pt was instructed in and performed the following:     [facility] [place] therapeutic exercises to develop/improved posture, cardiovascular endurance, muscular endurance, lumbar/cervical ROM, strength and " muscular endurance for 60 minutes including the following exercises:     [facility] 5/13/2019   Visit Number 6   VAS Pain Rating 0   Recumbent Bike Seat Pos. -   Time 10   Flexion in Lying -   Lumbar Extension Seat Pad -   Femur Restraint -   Top Dead Center -   Counterweight -   Lumbar Flexion 48   Lumbar Extension 0   Lumbar Peak Torque -   Min Torque -   Test Percent Below Normative Data -   Lumbar Weight 69   Repetitions 20   Rating of Perceived Exertion 3   Ice - Z Lie (in min.) 10         SKTC 10x 5 s/h   Flexion in lying with theraball 10x 5 s/h   PPT x10  LTR x20     Hip/back disassociation 10x   Sit<>stand squat training 3x 5 reps        Peripheral muscle strengthening which included 1 set of 15-20 repetitions at a slow, controlled 10-13 second per rep pace focused on strengthening supporting musculature for improved body mechanics and functional mobility.  Pt and therapist focused on proper form during treatment to ensure optimal strengthening of each targeted muscle group.  Machines were utilized including torso rotation, leg extension, leg curl, chest press, upright row. Tricep extension, bicep curl, leg press, and hip abduction added visit 3      Home Exercises Provided and Patient Education Provided               Flexion in lying 10x, 3x/day              PPT              LTR 10x 3x/day    Education provided:   - PT educated patient on goals to advance LE strength and hip stability with progressive exercises to improve level of function.    Written Home Exercises Provided: Patient instructed to cont prior HEP.  Exercises were reviewed and Stephen was able to demonstrate them prior to the end of the session.  Stephen demonstrated good  understanding of the education provided.     See EMR under Patient Instructions for exercises provided prior visit.    Assessment     Stephen tolerated treatment well. He is reporting improved ambulation and standing tolerance since starting PT. Pt continues to have limited  mobility with bridging secondary to weakness. Defer exercise at this time per pt's increased pain with the exercise. Pt demonstrates poor sit<>stand technique and instability with descending to chair. Today's session focused on teaching pt seated core and postural training, hip/back disassocation sit<>stand technique. Pt demo'd improved transitional movement tolerance/stability  but still needed some UE on knee assistance, and elevation with seat pad. Plan to continue to progress as able. Reassessed Med X lumbar ROM with patient  able to tolerate increase to 48-0 at same weight without increased symptoms. Pt cued to perform with minimal core contraction with pt reporting improved motor control of lumbar extension.     -Patient is making good progress towards established goals.   Pt will continue to benefit from skilled outpatient physical therapy to address the deficits stated in the impairment chart, provide pt/family education and to maximize pt's level of independence in the home and community environment.     Anticipated Barriers for therapy: None  Pt's spiritual, cultural and educational needs considered and pt agreeable to plan of care and goals as stated below:   GOALS: Pt is in agreement with the following goals.     Short term goals:  6 weeks or 10 visits   1.  Pt will demonstrate increased lumbar ROM by at least 3 degrees from the initial ROM value with improvements noted in functional ROM and ability to perform ADLs Met 5/13/19  2.  Pt will demonstrate increased maximum isometric torque value by 10 when compared to the initial value resulting in improved ability to perform bending, lifting, and carrying activities safely, confidently. Progressing, not met.  3.  Patient report a reduction in worst pain score by 1-2 points for improved tolerance during work and recreational activities Progressing, not met.  4.  Pt able to perform HEP correctly with minimal cueing or supervision for therapist Progressing,  not met.     Long term goals: 13 weeks or 20 visits   1. Pt will demonstrate increased lumbar ROM by at least 6 degrees from initial ROM value, resulting in improved ability to perform functional fwd bending while standing and sitting.  Met 5/13/19  2. Pt will demonstrate increased maximum isometric torque value by 30% when compared to the initial value resulting in improved ability to perform bending, lifting, and carrying activities safely, confidently. Progressing, not met.  3. Pt to demonstrate ability to independently control and reduce their pain through posture positioning and mechanical movements throughout a typical day. Progressing, not met.  4.  Patient will demonstrate improved overall function per FOTO Survey to CK = at least 40% but < 60% impaired, limited or restricted score or less. Progressing, not met.      Plan   Continue with established Plan of Care towards established PT goals.

## 2019-05-14 PROBLEM — G89.29 CHRONIC MIDLINE LOW BACK PAIN: Status: RESOLVED | Noted: 2018-04-23 | Resolved: 2019-05-14

## 2019-05-14 PROBLEM — M54.50 CHRONIC MIDLINE LOW BACK PAIN: Status: RESOLVED | Noted: 2018-04-23 | Resolved: 2019-05-14

## 2019-05-15 ENCOUNTER — CLINICAL SUPPORT (OUTPATIENT)
Dept: REHABILITATION | Facility: OTHER | Age: 74
End: 2019-05-15
Attending: PHYSICIAN ASSISTANT
Payer: MEDICARE

## 2019-05-15 DIAGNOSIS — G89.29 CHRONIC BILATERAL LOW BACK PAIN WITHOUT SCIATICA: ICD-10-CM

## 2019-05-15 DIAGNOSIS — M54.50 CHRONIC BILATERAL LOW BACK PAIN WITHOUT SCIATICA: ICD-10-CM

## 2019-05-15 PROCEDURE — 97110 THERAPEUTIC EXERCISES: CPT

## 2019-05-15 NOTE — PROGRESS NOTES
"[facility]      Name: [facility] [place] Jr.  Clinic Number: [id number]    Therapy Diagnosis:   Encounter Diagnosis   Name Primary?    Chronic bilateral low back pain without sciatica      Physician: Leonila Ridley PA-C    Visit Date: 5/15/2019    Physician Orders: PT Evaluate and Treat  Medical Diagnosis: Chronic bilateral low back pain without sciatica  Evaluation Date: 03/29/2019  Authorization period: 2/4/2020  Plan of care Expiration: 6/29/19  Re-Assessment Due: 06/14/2019  Visit #/Visits authorized: 7/ 12     Time In: 2:00pm  Time Out: 3:00  Total Billable Time: 30 minutes    Precautions: DM/BLE edema/peripheral neuropathy/CKD/HTN/atrial fibrillation    Pain Pattern: No directional preference  Noted, work in both directions    Subjective   ricki reports feeling good overall. He reports he is able to stand and walk for longer without increased symptoms. He likes addition of sit<>stand exercises and is noting some improvement with his ability to rise from a chair.     Patient reports their pain to be 0/10 on a 0-10 scale with 0 being no pain and 10 being the worst pain imaginable.  Pain Location: B LB/ R knee     Occupation:  retired   Pt goals:  " walk more than a block or two without pain, go to [place] without scooter, get my back stronger"    Objective   Baseline Isometric Testing on Med X equipment: Testing administered by PT  Baseline IM Testing Results:   Date of testing: 3/29/19  ROM 0-36 deg (increased to 48-0 on 5/13/19)   Max Peak Torque 91    Min Peak Torque 26    Flex/Ext Ratio 3.5   % below normative data 42         FOTO: Focus on Therapeutic Outcomes   Category: lumbar   % Impaired: 50%  Current Score  = CK = at least 40% but < 60% impaired, limited or restricted  Goal at Discharge Score = CK = at least 40% but < 60% impaired, limited or restricted  Visit 5   45 %  Treatment    Pt was instructed in and performed the following:     [facility] received therapeutic exercises " to develop/improved posture, cardiovascular endurance, muscular endurance, lumbar/cervical ROM, strength and muscular endurance for 60 minutes including the following exercises:     [facility] 5/13/2019   Visit Number 6   VAS Pain Rating 0   Recumbent Bike Seat Pos. -   Time 10   Flexion in Lying -   Lumbar Extension Seat Pad -   Femur Restraint -   Top Dead Center -   Counterweight -   Lumbar Flexion 48   Lumbar Extension 0   Lumbar Peak Torque -   Min Torque -   Test Percent Below Normative Data -   Lumbar Weight 69   Repetitions 20   Rating of Perceived Exertion 3   Ice - Z Lie (in min.) 10         SKTC 10x 5 s/h   Flexion in lying with theraball 10x 5 s/h   PPT x10  LTR x20     Seated PPT 10x   Hip/back disassociation 10x   Sit<>stand squat training 3x 5 reps   (start with 2-3 seat pads and allow use of UE assistance on knees at start of exercise)       Peripheral muscle strengthening which included 1 set of 15-20 repetitions at a slow, controlled 10-13 second per rep pace focused on strengthening supporting musculature for improved body mechanics and functional mobility.  Pt and therapist focused on proper form during treatment to ensure optimal strengthening of each targeted muscle group.  Machines were utilized including torso rotation, leg extension, leg curl, chest press, upright row. Tricep extension, bicep curl, leg press, and hip abduction added visit 3      Home Exercises Provided and Patient Education Provided               Flexion in lying 10x, 3x/day              PPT              LTR 10x 3x/day    Education provided:   - PT educated patient on goals to advance LE strength and hip stability with progressive exercises to improve level of function.    Written Home Exercises Provided: Patient instructed to cont prior HEP.  Exercises were reviewed and Dovc was able to demonstrate them prior to the end of the session.  Jmc demonstrated good  understanding of the education provided.     See EMR  under Patient Instructions for exercises provided prior visit.    Assessment     [facility] tolerated treatment well. He is reporting improved ambulation and standing tolerance since starting PT. Pt demonstrates poor sit<>stand technique and instability with descending to chair. Today's session focused continuing to teach seated core and postural training, hip/back disassocation, and sit<>stand technique. Pt demo'd improved transitional movement tolerance/stability  but still needed some UE on knee assistance and elevation with seat pad to initiate first few reps.  Plan to continue to progress as able.  Pt was able to tolerate Med X lumbar extension exercise with 5% weight increase without  increased symptoms.  Plan to increase Med X 5% next session.       -Patient is making good progress towards established goals.   Pt will continue to benefit from skilled outpatient physical therapy to address the deficits stated in the impairment chart, provide pt/family education and to maximize pt's level of independence in the home and community environment.     Anticipated Barriers for therapy: None  Pt's spiritual, cultural and educational needs considered and pt agreeable to plan of care and goals as stated below:   GOALS: Pt is in agreement with the following goals.     Short term goals:  6 weeks or 10 visits   1.  Pt will demonstrate increased lumbar ROM by at least 3 degrees from the initial ROM value with improvements noted in functional ROM and ability to perform ADLs Met 5/13/19  2.  Pt will demonstrate increased maximum isometric torque value by 10 when compared to the initial value resulting in improved ability to perform bending, lifting, and carrying activities safely, confidently. Progressing, not met.  3.  Patient report a reduction in worst pain score by 1-2 points for improved tolerance during work and recreational activities Progressing, not met.  4.  Pt able to perform HEP correctly with minimal cueing or  supervision for therapist Progressing, not met.     Long term goals: 13 weeks or 20 visits   1. Pt will demonstrate increased lumbar ROM by at least 6 degrees from initial ROM value, resulting in improved ability to perform functional fwd bending while standing and sitting.  Met 5/13/19  2. Pt will demonstrate increased maximum isometric torque value by 30% when compared to the initial value resulting in improved ability to perform bending, lifting, and carrying activities safely, confidently. Progressing, not met.  3. Pt to demonstrate ability to independently control and reduce their pain through posture positioning and mechanical movements throughout a typical day. Progressing, not met.  4.  Patient will demonstrate improved overall function per FOTO Survey to CK = at least 40% but < 60% impaired, limited or restricted score or less. Progressing, not met.      Plan   Continue with established Plan of Care towards established PT goals.

## 2019-05-20 ENCOUNTER — CLINICAL SUPPORT (OUTPATIENT)
Dept: REHABILITATION | Facility: OTHER | Age: 74
End: 2019-05-20
Attending: ORTHOPAEDIC SURGERY
Payer: MEDICARE

## 2019-05-20 ENCOUNTER — TELEPHONE (OUTPATIENT)
Dept: ELECTROPHYSIOLOGY | Facility: CLINIC | Age: 74
End: 2019-05-20

## 2019-05-20 DIAGNOSIS — M54.50 CHRONIC BILATERAL LOW BACK PAIN WITHOUT SCIATICA: ICD-10-CM

## 2019-05-20 DIAGNOSIS — G89.29 CHRONIC BILATERAL LOW BACK PAIN WITHOUT SCIATICA: ICD-10-CM

## 2019-05-20 PROCEDURE — 97110 THERAPEUTIC EXERCISES: CPT

## 2019-05-20 NOTE — PROGRESS NOTES
"[facility]      Name: [facility] PAT Cantor Jr.  Clinic Number: [id number]    Therapy Diagnosis:   Encounter Diagnosis   Name Primary?    Chronic bilateral low back pain without sciatica      Physician: Leonila Ridley PA-C    Visit Date: 5/20/2019    Physician Orders: PT Evaluate and Treat  Medical Diagnosis: Chronic bilateral low back pain without sciatica  Evaluation Date: 03/29/2019  Authorization period: 2/4/2020  Plan of care Expiration: 6/29/19  Re-Assessment Due: 06/14/2019  Visit #/Visits authorized: 7/ 12     Time In: 1:00pm  Time Out: 2:00pm  Total Billable Time: 30 minutes    Precautions: DM/BLE edema/peripheral neuropathy/CKD/HTN/atrial fibrillation    Pain Pattern: No directional preference  Noted, work in both directions    Subjective   Stehpen reports feeling better when he stretching, but he has more pain today.     Patient reports their pain to be 8/10 on a 0-10 scale with 0 being no pain and 10 being the worst pain imaginable.  Pain Location: B LB/ R knee     Occupation:  retired   Pt goals:  " walk more than a block or two without pain, go to [place] without scooter, get my back stronger"    Objective   Baseline Isometric Testing on Med X equipment: Testing administered by PT  Baseline IM Testing Results:   Date of testing: 3/29/19  ROM 0-36 deg (increased to 48-0 on 5/13/19)   Max Peak Torque 91    Min Peak Torque 26    Flex/Ext Ratio 3.5   % below normative data 42         FOTO: Focus on Therapeutic Outcomes   Category: lumbar   % Impaired: 50%  Current Score  = CK = at least 40% but < 60% impaired, limited or restricted  Goal at Discharge Score = CK = at least 40% but < 60% impaired, limited or restricted  Visit 5   45 %  Treatment    Pt was instructed in and performed the following:     [facility] received therapeutic exercises to develop/improved posture, cardiovascular endurance, muscular endurance, lumbar/cervical ROM, strength and muscular endurance for 60 minutes " including the following exercises:         [facility] 5/20/2019   Visit Number 8   VAS Pain Rating 8   Treadmill Time (in min.) -   Recumbent Bike Seat Pos. -   Time 10   Flexion in Lying -   Lumbar Extension Seat Pad -   Femur Restraint -   Top Dead Center -   Counterweight -   Lumbar Flexion -   Lumbar Extension -   Lumbar Peak Torque -   Min Torque -   Test Percent Below Normative Data -   Lumbar Weight 73   Repetitions 20   Rating of Perceived Exertion 3   Ice - Z Lie (in min.) 10       SKTC 10x 5 s/h   Flexion in lying with theraball 10x 5 s/h (does not stretch his back as much)  PPT x10  LTR x20     Seated PPT 10x   Hip/back disassociation 10x   Sit<>stand squat training 3x 5 reps   (start with 2-3 seat pads and allow use of UE assistance on knees at start of exercise)       Peripheral muscle strengthening which included 1 set of 15-20 repetitions at a slow, controlled 10-13 second per rep pace focused on strengthening supporting musculature for improved body mechanics and functional mobility.  Pt and therapist focused on proper form during treatment to ensure optimal strengthening of each targeted muscle group.  Machines were utilized including torso rotation, leg extension, leg curl, chest press, upright row. Tricep extension, bicep curl, leg press, and hip abduction added visit 3      Home Exercises Provided and Patient Education Provided               Flexion in lying 10x, 3x/day (He does not feel the stretch)              PPT              LTR 10x 3x/day  SKTC 10x (he likes)    Education provided:   - PT educated patient on goals to advance LE strength and hip stability with progressive exercises to improve level of function.    Written Home Exercises Provided: Patient instructed to cont prior HEP.  Exercises were reviewed and Stephen was able to demonstrate them prior to the end of the session.  Jmc demonstrated good  understanding of the education provided.     See EMR under Patient Instructions for  exercises provided prior visit.    Assessment     [facility] tolerated treatment well. He is reporting improved ambulation and standing tolerance since starting PT.   Plan to continue to progress as able.  Pt was able to tolerate Med X lumbar extension exercise with 5% weight increase without  increased symptoms.        -Patient is making good progress towards established goals.   Pt will continue to benefit from skilled outpatient physical therapy to address the deficits stated in the impairment chart, provide pt/family education and to maximize pt's level of independence in the home and community environment.     Anticipated Barriers for therapy: None  Pt's spiritual, cultural and educational needs considered and pt agreeable to plan of care and goals as stated below:   GOALS: Pt is in agreement with the following goals.     Short term goals:  6 weeks or 10 visits   1.  Pt will demonstrate increased lumbar ROM by at least 3 degrees from the initial ROM value with improvements noted in functional ROM and ability to perform ADLs Met 5/13/19  2.  Pt will demonstrate increased maximum isometric torque value by 10 when compared to the initial value resulting in improved ability to perform bending, lifting, and carrying activities safely, confidently. Progressing, not met.  3.  Patient report a reduction in worst pain score by 1-2 points for improved tolerance during work and recreational activities Progressing, not met.  4.  Pt able to perform HEP correctly with minimal cueing or supervision for therapist Progressing, not met.     Long term goals: 13 weeks or 20 visits   1. Pt will demonstrate increased lumbar ROM by at least 6 degrees from initial ROM value, resulting in improved ability to perform functional fwd bending while standing and sitting.  Met 5/13/19  2. Pt will demonstrate increased maximum isometric torque value by 30% when compared to the initial value resulting in improved ability to perform bending, lifting,  and carrying activities safely, confidently. Progressing, not met.  3. Pt to demonstrate ability to independently control and reduce their pain through posture positioning and mechanical movements throughout a typical day. Progressing, not met.  4.  Patient will demonstrate improved overall function per FOTO Survey to CK = at least 40% but < 60% impaired, limited or restricted score or less. Progressing, not met.      Plan   Continue with established Plan of Care towards established PT goals.

## 2019-05-20 NOTE — TELEPHONE ENCOUNTER
Spoke to pts wife. She stated she will call back after 7/15 to reschedule pts appt.  ----- Message from Tameka Ratliff sent at 5/20/2019 12:52 PM CDT -----  Contact: Patient's wife  The Pt's wife is calling to change the date for his appointment after July 15. Please call her back @ [phone]. Thanks, Tameka

## 2019-05-21 DIAGNOSIS — N18.30 TYPE 2 DIABETES MELLITUS WITH STAGE 3 CHRONIC KIDNEY DISEASE, WITH LONG-TERM CURRENT USE OF INSULIN: ICD-10-CM

## 2019-05-21 DIAGNOSIS — M17.11 PRIMARY OSTEOARTHRITIS OF RIGHT KNEE: ICD-10-CM

## 2019-05-21 DIAGNOSIS — E11.22 TYPE 2 DIABETES MELLITUS WITH STAGE 3 CHRONIC KIDNEY DISEASE, WITH LONG-TERM CURRENT USE OF INSULIN: ICD-10-CM

## 2019-05-21 DIAGNOSIS — I48.19 PERSISTENT ATRIAL FIBRILLATION: ICD-10-CM

## 2019-05-21 DIAGNOSIS — N18.30 CHRONIC KIDNEY DISEASE, STAGE III (MODERATE): ICD-10-CM

## 2019-05-21 DIAGNOSIS — I48.20 CHRONIC ATRIAL FIBRILLATION: ICD-10-CM

## 2019-05-21 DIAGNOSIS — I10 ESSENTIAL HYPERTENSION: ICD-10-CM

## 2019-05-21 DIAGNOSIS — I50.31 ACUTE DIASTOLIC HEART FAILURE: ICD-10-CM

## 2019-05-21 DIAGNOSIS — I48.0 PAROXYSMAL ATRIAL FIBRILLATION: ICD-10-CM

## 2019-05-21 DIAGNOSIS — Z79.4 TYPE 2 DIABETES MELLITUS WITH STAGE 3 CHRONIC KIDNEY DISEASE, WITH LONG-TERM CURRENT USE OF INSULIN: ICD-10-CM

## 2019-05-21 DIAGNOSIS — E03.4 HYPOTHYROIDISM DUE TO ACQUIRED ATROPHY OF THYROID: ICD-10-CM

## 2019-05-21 DIAGNOSIS — I87.2 VENOUS INSUFFICIENCY OF BOTH LOWER EXTREMITIES: ICD-10-CM

## 2019-05-21 RX ORDER — APIXABAN 5 MG/1
TABLET, FILM COATED ORAL
Qty: 180 TABLET | Refills: 3 | OUTPATIENT
Start: 2019-05-21

## 2019-05-21 RX ORDER — LEVOTHYROXINE SODIUM 25 UG/1
TABLET ORAL
Qty: 90 TABLET | Refills: 3 | Status: SHIPPED | OUTPATIENT
Start: 2019-05-21 | End: 2020-04-13

## 2019-05-21 RX ORDER — FENOFIBRATE 145 MG/1
TABLET, FILM COATED ORAL
Qty: 90 TABLET | Refills: 3 | Status: SHIPPED | OUTPATIENT
Start: 2019-05-21 | End: 2020-04-13

## 2019-05-21 RX ORDER — FUROSEMIDE 40 MG/1
TABLET ORAL
Qty: 90 TABLET | Refills: 3 | Status: SHIPPED | OUTPATIENT
Start: 2019-05-21 | End: 2019-06-19 | Stop reason: SDUPTHER

## 2019-05-21 RX ORDER — PRAVASTATIN SODIUM 10 MG/1
TABLET ORAL
Qty: 90 TABLET | Refills: 3 | Status: SHIPPED | OUTPATIENT
Start: 2019-05-21 | End: 2020-04-13

## 2019-05-21 RX ORDER — GABAPENTIN 300 MG/1
300 CAPSULE ORAL 3 TIMES DAILY
Qty: 270 CAPSULE | Refills: 1 | Status: SHIPPED | OUTPATIENT
Start: 2019-05-21 | End: 2019-11-07 | Stop reason: SDUPTHER

## 2019-05-23 ENCOUNTER — CLINICAL SUPPORT (OUTPATIENT)
Dept: REHABILITATION | Facility: OTHER | Age: 74
End: 2019-05-23
Attending: ORTHOPAEDIC SURGERY
Payer: MEDICARE

## 2019-05-23 DIAGNOSIS — M54.50 CHRONIC BILATERAL LOW BACK PAIN WITHOUT SCIATICA: ICD-10-CM

## 2019-05-23 DIAGNOSIS — G89.29 CHRONIC BILATERAL LOW BACK PAIN WITHOUT SCIATICA: ICD-10-CM

## 2019-05-23 PROCEDURE — 97110 THERAPEUTIC EXERCISES: CPT

## 2019-05-23 NOTE — PROGRESS NOTES
"[facility]      Name: [place] PAT Cantor Jr.  Clinic Number: [id number]    Therapy Diagnosis:   Encounter Diagnosis   Name Primary?    Chronic bilateral low back pain without sciatica      Physician: Leonila Ridley PA-C    Visit Date: 5/23/2019    Physician Orders: PT Evaluate and Treat  Medical Diagnosis: Chronic bilateral low back pain without sciatica  Evaluation Date: 03/29/2019  Authorization period: 2/4/2020  Plan of care Expiration: 6/29/19  Re-Assessment Due: 06/14/2019  Visit #/Visits authorized: 9/ 12  (resend POC next session)     Time In: 1:30pm  Time Out: 2:30pm  Total Billable Time: 30 minutes    Precautions: DM/BLE edema/peripheral neuropathy/CKD/HTN/atrial fibrillation    Pain Pattern: No directional preference  Noted, work in both directions    Subjective   Stephen reports feeling better when he stretching, but he continues to report low back pain. He finds he is walking and getting up from sitting a little better but still feels limited by pain and weakness.     Patient reports their pain to be 4/10 on a 0-10 scale with 0 being no pain and 10 being the worst pain imaginable.  Pain Location: B LB/ R knee     Occupation:  retired   Pt goals:  " walk more than a block or two without pain, go to [place] without scooter, get my back stronger"    Objective   Baseline Isometric Testing on Med X equipment: Testing administered by PT  Baseline IM Testing Results:   Date of testing: 3/29/19  ROM 0-36 deg (increased to 48-0 on 5/13/19)   Max Peak Torque 91    Min Peak Torque 26    Flex/Ext Ratio 3.5   % below normative data 42         FOTO: Focus on Therapeutic Outcomes   Category: lumbar   % Impaired: 50%  Current Score  = CK = at least 40% but < 60% impaired, limited or restricted  Goal at Discharge Score = CK = at least 40% but < 60% impaired, limited or restricted  Visit 5   45 %  Treatment    Pt was instructed in and performed the following:     [place] received therapeutic " exercises to develop/improved posture, cardiovascular endurance, muscular endurance, lumbar/cervical ROM, strength and muscular endurance for 60 minutes including the following exercises:     [facility] 5/24/2019   Visit Number 9   VAS Pain Rating 4   Treadmill Time (in min.) -   Recumbent Bike Seat Pos. -   Time 10   Flexion in Lying -   Lumbar Extension Seat Pad -   Femur Restraint -   Top Dead Center -   Counterweight -   Lumbar Flexion -   Lumbar Extension -   Lumbar Peak Torque -   Min Torque -   Test Percent Below Normative Data -   Lumbar Weight 76   Repetitions 20   Rating of Perceived Exertion 3   Ice - Z Lie (in min.) 10     SKTC 10x 5 s/h   Flexion in lying with theraball 10x 5 s/h (does not stretch his back as much)  PPT x10  LTR x20     Seated PPT 10x   Hip/back disassociation 10x   Sit<>stand squat training 3x 5 reps   (start with 2-3 seat pads and allow use of UE assistance on knees at start of exercise)       Peripheral muscle strengthening which included 1 set of 15-20 repetitions at a slow, controlled 10-13 second per rep pace focused on strengthening supporting musculature for improved body mechanics and functional mobility.  Pt and therapist focused on proper form during treatment to ensure optimal strengthening of each targeted muscle group.  Machines were utilized including torso rotation, leg extension, leg curl, chest press, upright row. Tricep extension, bicep curl, leg press, and hip abduction added visit 3      Home Exercises Provided and Patient Education Provided               Flexion in lying 10x, 3x/day (He does not feel the stretch)              PPT              LTR 10x 3x/day  SKTC 10x (he likes)    Education provided:   - PT educated patient on goals to advance LE strength and hip stability with progressive exercises to improve level of function.    Written Home Exercises Provided: Patient instructed to cont prior HEP.  Exercises were reviewed and Stephen was able to  demonstrate them prior to the end of the session.  Stephen demonstrated good  understanding of the education provided.     See EMR under Patient Instructions for exercises provided prior visit.    Assessment     Stephen tolerated treatment well. He is reporting improved ambulation and standing tolerance since starting PT. Reviewed sit<>stand exercises with moderate v/c and SBA. Pt is continuing to improved with motor control and stability of exercise.  Plan to continue to progress as able.  Pt was able to tolerate Med X lumbar extension exercise with 5% weight increase without  increased symptoms.  Retest next session.       -Patient is making good progress towards established goals.   Pt will continue to benefit from skilled outpatient physical therapy to address the deficits stated in the impairment chart, provide pt/family education and to maximize pt's level of independence in the home and community environment.     Anticipated Barriers for therapy: None  Pt's spiritual, cultural and educational needs considered and pt agreeable to plan of care and goals as stated below:   GOALS: Pt is in agreement with the following goals.     Short term goals:  6 weeks or 10 visits   1.  Pt will demonstrate increased lumbar ROM by at least 3 degrees from the initial ROM value with improvements noted in functional ROM and ability to perform ADLs Met 5/13/19  2.  Pt will demonstrate increased maximum isometric torque value by 10 when compared to the initial value resulting in improved ability to perform bending, lifting, and carrying activities safely, confidently. Progressing, not met.  3.  Patient report a reduction in worst pain score by 1-2 points for improved tolerance during work and recreational activities Progressing, not met.  4.  Pt able to perform HEP correctly with minimal cueing or supervision for therapist Progressing, not met.     Long term goals: 13 weeks or 20 visits   1. Pt will demonstrate increased lumbar ROM by at  least 6 degrees from initial ROM value, resulting in improved ability to perform functional fwd bending while standing and sitting.  Met 5/13/19  2. Pt will demonstrate increased maximum isometric torque value by 30% when compared to the initial value resulting in improved ability to perform bending, lifting, and carrying activities safely, confidently. Progressing, not met.  3. Pt to demonstrate ability to independently control and reduce their pain through posture positioning and mechanical movements throughout a typical day. Progressing, not met.  4.  Patient will demonstrate improved overall function per FOTO Survey to CK = at least 40% but < 60% impaired, limited or restricted score or less. Progressing, not met.      Plan   Continue with established Plan of Care towards established PT goals.

## 2019-05-27 ENCOUNTER — CLINICAL SUPPORT (OUTPATIENT)
Dept: REHABILITATION | Facility: OTHER | Age: 74
End: 2019-05-27
Attending: ORTHOPAEDIC SURGERY
Payer: MEDICARE

## 2019-05-27 DIAGNOSIS — M54.50 CHRONIC BILATERAL LOW BACK PAIN WITHOUT SCIATICA: ICD-10-CM

## 2019-05-27 DIAGNOSIS — G89.29 CHRONIC BILATERAL LOW BACK PAIN WITHOUT SCIATICA: ICD-10-CM

## 2019-05-27 PROCEDURE — G8979 MOBILITY GOAL STATUS: HCPCS | Mod: CK

## 2019-05-27 PROCEDURE — 97110 THERAPEUTIC EXERCISES: CPT

## 2019-05-27 PROCEDURE — G8978 MOBILITY CURRENT STATUS: HCPCS | Mod: CK

## 2019-05-27 NOTE — PROGRESS NOTES
"[facility]      Name: [facility] PAT Cantor Jr.  Clinic Number: [id number]    Therapy Diagnosis:   Encounter Diagnosis   Name Primary?    Chronic bilateral low back pain without sciatica      Physician: Leonila Ridley PA-C    Visit Date: 2019    Physician Orders: PT Evaluate and Treat  Medical Diagnosis: Chronic bilateral low back pain without sciatica  Evaluation Date: 2019  Authorization period: 2020  Plan of care Expiration: 19  Re-Assessment Due: 2019  Visit #/Visits authorized: 10/ 12     Time In: 1350  Time Out: 1450  Total Time: 60 minutes    Precautions: DM/BLE edema/peripheral neuropathy/CKD/HTN/atrial fibrillation    Pain Pattern: No directional preference  Noted, work in both directions    Subjective   [facility] reports only minimal pain this date but is pleased with progress with reducing symptoms thus far.    Patient reports their pain to be 1-2/10 on a 0-10 scale with 0 being no pain and 10 being the worst pain imaginable.  Pain Location: B LB/ R knee     Occupation:  retired   Pt goals:  " walk more than a block or two without pain, go to [place] without scooter, get my back stronger"    Objective   Baseline Isometric Testing on Med X equipment: Testing administered by PT  Baseline IM Testing Results:   Date of testing: 3/29/19  ROM 0-36 deg (increased to 48-0 on 19)   Max Peak Torque 91    Min Peak Torque 26    Flex/Ext Ratio 3.5   % below normative data 42      Mid-Point Isometric Testing on Med X equipment: Testing administered by PT  10th Visit IM Testing Results:   Date of testin19  ROM  48-0 deg   Max Peak Torque  160 ft/lbs   Min Peak Torque  68 ft/lbs   Flex/Ext Ratio  2.35   % normative data  -35%    % initial testing           +95%     CMS Impairment/Limitation/Restriction for FOTO Lumbar Spine Survey  Status Limitation G-Code CMS Severity Modifier  Intake 50% 50%  Predicted 58% 42% Goal Status+ CK - At least 40 percent but " less than 60 percent  5/8/2019 55% 45%  5/27/2019 43% 57% Current Status CK - At least 40 percent but less than 60 percent  Treatment    Pt was instructed in and performed the following:     Jmc received therapeutic exercises to develop/improved posture, cardiovascular endurance, muscular endurance, lumbar/cervical ROM, strength and muscular endurance for 50 minutes including the following exercises:     SKTC 10x 5 s/h   Flexion in lying with theraball 10x 5 s/h (does not stretch his back as much)  PPT x10  LTR x20   Seated PPT 10x   Hip/back disassociation 10x   Sit<>stand squat training 3x 5 reps   (start with 2-3 seat pads and allow use of UE assistance on knees at start of exercise)     **[facility]Point Testing As Listed Above   Peripheral muscle strengthening which included 1 set of 15-20 repetitions at a slow, controlled 10-13 second per rep pace focused on strengthening supporting musculature for improved body mechanics and functional mobility.  Pt and therapist focused on proper form during treatment to ensure optimal strengthening of each targeted muscle group.  Machines were utilized including torso rotation, leg extension, leg curl, chest press, upright row. Tricep extension, bicep curl, leg press, and hip abduction added visit 3      Home Exercises Provided and Patient Education Provided               Flexion in lying 10x, 3x/day (He does not feel the stretch)              PPT              LTR 10x 3x/day  SKTC 10x (he likes)    Education provided:   - PT educated patient on goals to advance LE strength and hip stability with progressive exercises to improve level of function.    Written Home Exercises Provided: Patient instructed to cont prior HEP.  Exercises were reviewed and [facility] was able to demonstrate them prior to the end of the session.  c demonstrated good  understanding of the education provided.     See EMR under Patient Instructions for exercises provided prior visit.    Assessment     [facility]  demonstrated good progress through therapy plan thus far with 10th visit testing. Per isometrics on the MedX, his strength has improved by 95% since initial evaluation; he remains 35% below age-related norm. His ROM is improving and his general mobility is progress as core strength increases. PT plan to continue advancing per protocol as appropriate.    Pt will continue to benefit from skilled outpatient physical therapy to address the deficits stated in the impairment chart, provide pt/family education and to maximize pt's level of independence in the home and community environment.     Anticipated Barriers for therapy: None  Pt's spiritual, cultural and educational needs considered and pt agreeable to plan of care and goals as stated below:   GOALS: Pt is in agreement with the following goals.     Short term goals:  6 weeks or 10 visits   1.  Pt will demonstrate increased lumbar ROM by at least 3 degrees from the initial ROM value with improvements noted in functional ROM and ability to perform ADLs Met 5/13/19  2.  Pt will demonstrate increased maximum isometric torque value by 10 when compared to the initial value resulting in improved ability to perform bending, lifting, and carrying activities safely, confidently. Met  3.  Patient report a reduction in worst pain score by 1-2 points for improved tolerance during work and recreational activities Progressing  4.  Pt able to perform HEP correctly with minimal cueing or supervision for therapist Progressin     Long term goals: 13 weeks or 20 visits   1. Pt will demonstrate increased lumbar ROM by at least 6 degrees from initial ROM value, resulting in improved ability to perform functional fwd bending while standing and sitting.  Met 5/13/19  2. Pt will demonstrate increased maximum isometric torque value by 30% when compared to the initial value resulting in improved ability to perform bending, lifting, and carrying activities safely, confidently. Met  3. Pt to  demonstrate ability to independently control and reduce their pain through posture positioning and mechanical movements throughout a typical day. Progressing, not met.  4.  Patient will demonstrate improved overall function per FOTO Survey to CK = at least 40% but < 60% impaired, limited or restricted score or less. Met thus far      Plan   Continue with established Plan of Care towards established PT goals.

## 2019-05-29 ENCOUNTER — CLINICAL SUPPORT (OUTPATIENT)
Dept: REHABILITATION | Facility: OTHER | Age: 74
End: 2019-05-29
Attending: ORTHOPAEDIC SURGERY
Payer: MEDICARE

## 2019-05-29 DIAGNOSIS — G89.29 CHRONIC BILATERAL LOW BACK PAIN WITHOUT SCIATICA: ICD-10-CM

## 2019-05-29 DIAGNOSIS — M54.50 CHRONIC BILATERAL LOW BACK PAIN WITHOUT SCIATICA: ICD-10-CM

## 2019-05-29 PROCEDURE — 97110 THERAPEUTIC EXERCISES: CPT

## 2019-05-29 NOTE — PLAN OF CARE
"[facility]      Name: [facility] PAT Cantor Jr.  Clinic Number: [id number]    Therapy Diagnosis:   Encounter Diagnosis   Name Primary?    Chronic bilateral low back pain without sciatica      Physician: Leonila Ridley PA-C    Visit Date: 2019    Physician Orders: PT Evaluate and Treat  Medical Diagnosis: Chronic bilateral low back pain without sciatica  Evaluation Date: 2019  Authorization period: 2020  Plan of care Expiration: 19  Re-Assessment Due: 2019  Visit #/Visits authorized: 10/ 12      **RECOMMEND EXTENSION OF APPROVED VISITS FOR ANOTHER x8 SESSIONS**    Time In: 1350  Time Out: 1450  Total Time: 60 minutes    Precautions: DM/BLE edema/peripheral neuropathy/CKD/HTN/atrial fibrillation    Pain Pattern: No directional preference  Noted, work in both directions    Subjective   [facility] reports only minimal pain this date but is pleased with progress with reducing symptoms thus far.    Patient reports their pain to be 1-2/10 on a 0-10 scale with 0 being no pain and 10 being the worst pain imaginable.  Pain Location: B LB/ R knee     Occupation:  retired   Pt goals:  " walk more than a block or two without pain, go to [place] without scooter, get my back stronger"    Objective   Baseline Isometric Testing on Med X equipment: Testing administered by PT  Baseline IM Testing Results:   Date of testing: 3/29/19  ROM 0-36 deg (increased to 48-0 on 19)   Max Peak Torque 91    Min Peak Torque 26    Flex/Ext Ratio 3.5   % below normative data 42      Mid-Point Isometric Testing on Med X equipment: Testing administered by PT  10th Visit IM Testing Results:   Date of testin19  ROM  48-0 deg   Max Peak Torque  160 ft/lbs   Min Peak Torque  68 ft/lbs   Flex/Ext Ratio  2.35   % normative data  -35%    % initial testing           +95%     CMS Impairment/Limitation/Restriction for FOTO Lumbar Spine Survey  Status Limitation G-Code CMS Severity Modifier  Intake " 50% 50%  Predicted 58% 42% Goal Status+ CK - At least 40 percent but less than 60 percent  5/8/2019 55% 45%  5/27/2019 43% 57% Current Status CK - At least 40 percent but less than 60 percent  Treatment    Pt was instructed in and performed the following:     Jmc received therapeutic exercises to develop/improved posture, cardiovascular endurance, muscular endurance, lumbar/cervical ROM, strength and muscular endurance for 50 minutes including the following exercises:     SKTC 10x 5 s/h   Flexion in lying with theraball 10x 5 s/h (does not stretch his back as much)  PPT x10  LTR x20   Seated PPT 10x   Hip/back disassociation 10x   Sit<>stand squat training 3x 5 reps   (start with 2-3 seat pads and allow use of UE assistance on knees at start of exercise)     **[facility]Point Testing As Listed Above   Peripheral muscle strengthening which included 1 set of 15-20 repetitions at a slow, controlled 10-13 second per rep pace focused on strengthening supporting musculature for improved body mechanics and functional mobility.  Pt and therapist focused on proper form during treatment to ensure optimal strengthening of each targeted muscle group.  Machines were utilized including torso rotation, leg extension, leg curl, chest press, upright row. Tricep extension, bicep curl, leg press, and hip abduction added visit 3      Home Exercises Provided and Patient Education Provided               Flexion in lying 10x, 3x/day (He does not feel the stretch)              PPT              LTR 10x 3x/day  SKTC 10x (he likes)    Education provided:   - PT educated patient on goals to advance LE strength and hip stability with progressive exercises to improve level of function.    Written Home Exercises Provided: Patient instructed to cont prior HEP.  Exercises were reviewed and ricki was able to demonstrate them prior to the end of the session.  c demonstrated good  understanding of the education provided.     See EMR under Patient  Instructions for exercises provided prior visit.    Assessment     [facility] demonstrated good progress through therapy plan thus far with 10th visit testing. Per isometrics on the MedX, his strength has improved by 95% since initial evaluation; he remains 35% below age-related norm. His ROM is improving and his general mobility is progress as core strength increases. PT plan to continue advancing per protocol as appropriate.    Pt will continue to benefit from skilled outpatient physical therapy to address the deficits stated in the impairment chart, provide pt/family education and to maximize pt's level of independence in the home and community environment.     Anticipated Barriers for therapy: None  Pt's spiritual, cultural and educational needs considered and pt agreeable to plan of care and goals as stated below:   GOALS: Pt is in agreement with the following goals.     Short term goals:  6 weeks or 10 visits   1.  Pt will demonstrate increased lumbar ROM by at least 3 degrees from the initial ROM value with improvements noted in functional ROM and ability to perform ADLs Met 5/13/19  2.  Pt will demonstrate increased maximum isometric torque value by 10 when compared to the initial value resulting in improved ability to perform bending, lifting, and carrying activities safely, confidently. Met  3.  Patient report a reduction in worst pain score by 1-2 points for improved tolerance during work and recreational activities Progressing  4.  Pt able to perform HEP correctly with minimal cueing or supervision for therapist Progressin     Long term goals: 13 weeks or 20 visits   1. Pt will demonstrate increased lumbar ROM by at least 6 degrees from initial ROM value, resulting in improved ability to perform functional fwd bending while standing and sitting.  Met 5/13/19  2. Pt will demonstrate increased maximum isometric torque value by 30% when compared to the initial value resulting in improved ability to perform  bending, lifting, and carrying activities safely, confidently. Met  3. Pt to demonstrate ability to independently control and reduce their pain through posture positioning and mechanical movements throughout a typical day. Progressing, not met.  4.  Patient will demonstrate improved overall function per FOTO Survey to CK = at least 40% but < 60% impaired, limited or restricted score or less. Met thus far      Plan   Continue with established Plan of Care towards established PT goals.

## 2019-05-29 NOTE — PROGRESS NOTES
"[facility]      Name: [facility] PAT Cantor Jr.  Clinic Number: [id number]    Therapy Diagnosis:   Encounter Diagnosis   Name Primary?    Chronic bilateral low back pain without sciatica      Physician: Leonila Ridley PA-C    Visit Date: 2019    Physician Orders: PT Evaluate and Treat  Medical Diagnosis: Chronic bilateral low back pain without sciatica  Evaluation Date: 2019  Authorization period: 2020  Plan of care Expiration: 19  Re-Assessment Due: 2019  Visit #/Visits authorized:   Extension request for another x8 visits     Time In: 1400  Time Out: 1500  Total Time: 60 minutes    Precautions: DM/BLE edema/peripheral neuropathy/CKD/HTN/atrial fibrillation    Pain Pattern: No directional preference  Noted, work in both directions    Subjective   [facility] reports only minimal discomfort today. He states feeling "only a little sore" following the last session with re-test protocol.    Patient reports their pain to be 1-2/10 on a 0-10 scale with 0 being no pain and 10 being the worst pain imaginable.  Pain Location: B LB/ R knee     Occupation:  retired   Pt goals:  " walk more than a block or two without pain, go to [place] without scooter, get my back stronger"    Objective   Baseline Isometric Testing on Med X equipment: Testing administered by PT  Baseline IM Testing Results:   Date of testing: 3/29/19  ROM 0-36 deg (increased to 48-0 on 19)   Max Peak Torque 91    Min Peak Torque 26    Flex/Ext Ratio 3.5   % below normative data 42      Mid-Point Isometric Testing on Med X equipment: Testing administered by PT  10th Visit IM Testing Results:   Date of testin19  ROM  48-0 deg   Max Peak Torque  160 ft/lbs   Min Peak Torque  68 ft/lbs   Flex/Ext Ratio  2.35   % normative data  -35%    % initial testing           +95%     CMS Impairment/Limitation/Restriction for FOTO Lumbar Spine Survey  Status Limitation G-Code CMS Severity Modifier  Intake " 50% 50%  Predicted 58% 42% Goal Status+ CK - At least 40 percent but less than 60 percent  5/8/2019 55% 45%  5/27/2019 43% 57% Current Status CK - At least 40 percent but less than 60 percent  Treatment    Pt was instructed in and performed the following:     Jmc received therapeutic exercises to develop/improved posture, cardiovascular endurance, muscular endurance, lumbar/cervical ROM, strength and muscular endurance for 50 minutes including the following exercises:     Flexion in lying with theraball 10x 5 s/h (does not stretch his back as much)  LTR x20  PPT x10 --> TrA +Marching x20   Seated PPT 10x   Hip/back disassociation 10x   Sit<>stand squat training 3x 5 reps  **Standard Chair Height     [facility] 5/29/2019   Visit Number 11   VAS Pain Rating 2   Treadmill Time (in min.) -   Recumbent Bike Seat Pos. -   Time 10   Lumbar Weight 76   Repetitions 20   Rating of Perceived Exertion 3   Ice - Z Lie (in min.) 10       Peripheral muscle strengthening which included 1 set of 15-20 repetitions at a slow, controlled 10-13 second per rep pace focused on strengthening supporting musculature for improved body mechanics and functional mobility.  Pt and therapist focused on proper form during treatment to ensure optimal strengthening of each targeted muscle group.  Machines were utilized including torso rotation, leg extension, leg curl, chest press, upright row. Tricep extension, bicep curl, leg press, and hip abduction added visit 3      Home Exercises Provided and Patient Education Provided               Flexion in lying 10x, 3x/day (He does not feel the stretch)              PPT              LTR 10x 3x/day  SKTC 10x (he likes)    Education provided:   - PT educated patient on goals to advance LE strength and hip stability with progressive exercises to improve level of function.    Written Home Exercises Provided: Patient instructed to cont prior HEP.  Exercises were reviewed and [facility] was able to demonstrate  them prior to the end of the session.  Stephen demonstrated good  understanding of the education provided.     See EMR under Patient Instructions for exercises provided prior visit.    Assessment     Stephen tolerated session very well with progression of core strengthening and dynamic exercises. Per observation, his gait steadiness and speed continues to improve; he does exhibit some widened DAJUAN and increased lateral weight shifting. He did well with significant increase in ease of movement on the MedX with completion of reps at previous weight. Plan to continue advancing as appropriate working toward long term goals of increased functional strength. POC sent from last session; continue to recommend extension of x8 visits to work toward goals.     -PT plan to continue advancing per protocol as appropriate. He performed x20 reps of 76 ft/lbs at an RPE of 3/10; Increase 5-10% as appropriate   Pt will continue to benefit from skilled outpatient physical therapy to address the deficits stated in the impairment chart, provide pt/family education and to maximize pt's level of independence in the home and community environment.     Anticipated Barriers for therapy: None  Pt's spiritual, cultural and educational needs considered and pt agreeable to plan of care and goals as stated below:   GOALS: Pt is in agreement with the following goals.     Short term goals:  6 weeks or 10 visits   1.  Pt will demonstrate increased lumbar ROM by at least 3 degrees from the initial ROM value with improvements noted in functional ROM and ability to perform ADLs Met 5/13/19  2.  Pt will demonstrate increased maximum isometric torque value by 10 when compared to the initial value resulting in improved ability to perform bending, lifting, and carrying activities safely, confidently. Met  3.  Patient report a reduction in worst pain score by 1-2 points for improved tolerance during work and recreational activities Progressing  4.  Pt able to perform  HEP correctly with minimal cueing or supervision for therapist Progressin     Long term goals: 13 weeks or 20 visits   1. Pt will demonstrate increased lumbar ROM by at least 6 degrees from initial ROM value, resulting in improved ability to perform functional fwd bending while standing and sitting.  Met 5/13/19  2. Pt will demonstrate increased maximum isometric torque value by 30% when compared to the initial value resulting in improved ability to perform bending, lifting, and carrying activities safely, confidently. Met  3. Pt to demonstrate ability to independently control and reduce their pain through posture positioning and mechanical movements throughout a typical day. Progressing, not met.  4.  Patient will demonstrate improved overall function per FOTO Survey to CK = at least 40% but < 60% impaired, limited or restricted score or less. Met thus far      Plan   Continue with established Plan of Care towards established PT goals.

## 2019-06-06 ENCOUNTER — CLINICAL SUPPORT (OUTPATIENT)
Dept: REHABILITATION | Facility: OTHER | Age: 74
End: 2019-06-06
Attending: ORTHOPAEDIC SURGERY
Payer: MEDICARE

## 2019-06-06 ENCOUNTER — TELEPHONE (OUTPATIENT)
Dept: OPHTHALMOLOGY | Facility: CLINIC | Age: 74
End: 2019-06-06

## 2019-06-06 ENCOUNTER — OFFICE VISIT (OUTPATIENT)
Dept: OPHTHALMOLOGY | Facility: CLINIC | Age: 74
End: 2019-06-06
Payer: MEDICARE

## 2019-06-06 DIAGNOSIS — H02.112 CICATRICIAL ECTROPION OF RIGHT LOWER EYELID: Primary | ICD-10-CM

## 2019-06-06 DIAGNOSIS — G89.29 CHRONIC BILATERAL LOW BACK PAIN WITHOUT SCIATICA: ICD-10-CM

## 2019-06-06 DIAGNOSIS — M54.50 CHRONIC BILATERAL LOW BACK PAIN WITHOUT SCIATICA: ICD-10-CM

## 2019-06-06 DIAGNOSIS — H04.129 DRY EYE: ICD-10-CM

## 2019-06-06 PROCEDURE — 92285 EXTERNAL OCULAR PHOTOGRAPHY: CPT | Mod: S$GLB,,, | Performed by: OPHTHALMOLOGY

## 2019-06-06 PROCEDURE — 92012 PR EYE EXAM, EST PATIENT,INTERMED: ICD-10-PCS | Mod: S$GLB,,, | Performed by: OPHTHALMOLOGY

## 2019-06-06 PROCEDURE — 97110 THERAPEUTIC EXERCISES: CPT

## 2019-06-06 PROCEDURE — 99999 PR PBB SHADOW E&M-EST. PATIENT-LVL II: CPT | Mod: PBBFAC,,, | Performed by: OPHTHALMOLOGY

## 2019-06-06 PROCEDURE — 92012 INTRM OPH EXAM EST PATIENT: CPT | Mod: S$GLB,,, | Performed by: OPHTHALMOLOGY

## 2019-06-06 PROCEDURE — 92285 EXTERNAL PHOTOGRAPHY - OU - BOTH EYES: ICD-10-PCS | Mod: S$GLB,,, | Performed by: OPHTHALMOLOGY

## 2019-06-06 PROCEDURE — 99999 PR PBB SHADOW E&M-EST. PATIENT-LVL II: ICD-10-PCS | Mod: PBBFAC,,, | Performed by: OPHTHALMOLOGY

## 2019-06-06 NOTE — PROGRESS NOTES
HPI     Concerns About Ocular Health      Additional comments: ectropion              Comments     Patient here for lid-recheck. Patient c/o white discharge and denies any   other complaints.    tobradex gtts prn OU    S/p ectropion repair (6/28/2016)  S/p CE w/ IOL OS (6/25/2018)  S/p CE w/ IOL OD (5/14/2018)  Ectropion of eyelid  H/o cataracts OU  HTN            Last edited by Rosalind Cruz on 6/6/2019  2:48 PM. (History)            Assessment /Plan     For exam results, see Encounter Report.    Cicatricial ectropion of right lower eyelid  -     External Photography - OU - Both Eyes    Dry eye      Patient is a pleasant 73-year-old male here for follow-up.  The patient has a history of melanoma resection along the right mid face and reconstructed with a cervical facial flap.  Patient subsequently had a mechanical ectropion laterally.  This was repaired by me in the past with a ectropion repair, midface lift, and skin graft placement.    At this time, patient has recurrent cicatricial ectropion laterally.  The patient is interested in surgical intervention.  He has tried topical lubrication but he continues to have symptoms of irritation and tearing.    Plan for right lower eyelid cicatricial ectropion repair with release of scar tissue, midface lift, placement of skin graft and Frost suture. Patient may need supraclavicular graft if postauricular skin is insufficient.      Informed consent obtained after extensive risks/benefits/alternatives were discussed with the patient including but not limited to pain, bleeding, infection, ocular injury, loss of the eye, asymmetry, need for revision in future, scarring.  Alternatives such as waiting were discussed.  All questions were answered.      Hold ASA, NSAIDS, and fish oil 5 to 7 days prior to procedure.    Return for surgery

## 2019-06-06 NOTE — PROGRESS NOTES
"[facility]      [place] [facility] [place] Cantor Jr.  Clinic Number: [id number]    Therapy Diagnosis:   Encounter Diagnosis   Name Primary?    Chronic bilateral low back pain without sciatica      Physician: Leonila Ridley PA-C    Visit Date: 2019    Physician Orders: PT Evaluate and Treat  Medical Diagnosis: Chronic bilateral low back pain without sciatica  Evaluation Date: 2019  Authorization period: 2020  Plan of care Expiration: 19  Re-Assessment Due: 2019  Visit #/Visits authorized:   Extension request for another x8 visits     Time In: 1:00  Time Out: 2:00  Total Time: 50 minutes    Precautions: DM/BLE edema/peripheral neuropathy/CKD/HTN/atrial fibrillation    Pain Pattern: No directional preference  Noted, work in both directions    Subjective   [facility] reports no LBP today. He is complaint with his HEP.     Patient reports their pain to be 0/10 on a 0-10 scale with 0 being no pain and 10 being the worst pain imaginable.  Pain Location: B LB/ R knee     Occupation:  retired   Pt goals:  " walk more than a block or two without pain, go to [place] without scooter, get my back stronger"    Objective   Baseline Isometric Testing on Med X equipment: Testing administered by PT  Baseline IM Testing Results:   Date of testing: 3/29/19  ROM 0-36 deg (increased to 48-0 on 19)   Max Peak Torque 91    Min Peak Torque 26    Flex/Ext Ratio 3.5   % below normative data 42      Mid-Point Isometric Testing on Med X equipment: Testing administered by PT  10th Visit IM Testing Results:   Date of testin19  ROM  48-0 deg   Max Peak Torque  160 ft/lbs   Min Peak Torque  68 ft/lbs   Flex/Ext Ratio  2.35   % normative data  -35%    % initial testing           +95%     CMS Impairment/Limitation/Restriction for FOTO Lumbar Spine Survey  Status Limitation G-Code CMS Severity Modifier  Intake 50% 50%  Predicted 58% 42% Goal Status+ CK - At least 40 percent but less than " 60 percent  5/8/2019 55% 45%  5/27/2019 43% 57% Current Status CK - At least 40 percent but less than 60 percent  Treatment    Pt was instructed in and performed the following:     Jmc received therapeutic exercises to develop/improved posture, cardiovascular endurance, muscular endurance, lumbar/cervical ROM, strength and muscular endurance for 50 minutes including the following exercises:   [facility] 6/6/2019   Visit Number 12   VAS Pain Rating 0   Treadmill Time (in min.) -   Recumbent Bike Seat Pos. -   Time 10   Flexion in Lying 10   Lumbar Extension Seat Pad -   Femur Restraint -   Top Dead Center -   Counterweight -   Lumbar Flexion -   Lumbar Extension -   Lumbar Peak Torque -   Min Torque -   Test Percent Below Normative Data -   Test Percent Gain in Strength from Initial  -   Lumbar Weight 80   Repetitions 15   Rating of Perceived Exertion 3   Ice - Z Lie (in min.) 10   Flexion in lying with theraball 10x 5 s/h (does not stretch his back as much)  LTR x20  PPT x10 --> TrA +Marching x20   Seated PPT 10x   Hip/back disassociation 10x   Sit<>stand squat training 3x 5 reps  **Standard Chair Height         Peripheral muscle strengthening which included 1 set of 15-20 repetitions at a slow, controlled 10-13 second per rep pace focused on strengthening supporting musculature for improved body mechanics and functional mobility.  Pt and therapist focused on proper form during treatment to ensure optimal strengthening of each targeted muscle group.  Machines were utilized including torso rotation, leg extension, leg curl, chest press, upright row. Tricep extension, bicep curl, leg press, and hip abduction added visit 3      Home Exercises Provided and Patient Education Provided               Flexion in lying 10x, 3x/day (He does not feel the stretch)              PPT              LTR 10x 3x/day  SKTC 10x (he likes)    Education provided:   - PT educated patient on goals to advance LE strength and hip  stability with progressive exercises to improve level of function.    Written Home Exercises Provided: Patient instructed to cont prior HEP.  Exercises were reviewed and [facility] was able to demonstrate them prior to the end of the session.  c demonstrated good  understanding of the education provided.     See EMR under Patient Instructions for exercises provided prior visit.    Assessment     Jmc tolerated session very well with progression of core strengthening and dynamic exercises. No c/o pain with session. PT plan to continue advancing per protocol as appropriate. He performed x15 reps with a weight increase and  at an RPE of 3/10.  Pt will continue to benefit from skilled outpatient physical therapy to address the deficits stated in the impairment chart, provide pt/family education and to maximize pt's level of independence in the home and community environment.     Anticipated Barriers for therapy: None  Pt's spiritual, cultural and educational needs considered and pt agreeable to plan of care and goals as stated below:   GOALS: Pt is in agreement with the following goals.     Short term goals:  6 weeks or 10 visits   1.  Pt will demonstrate increased lumbar ROM by at least 3 degrees from the initial ROM value with improvements noted in functional ROM and ability to perform ADLs Met 5/13/19  2.  Pt will demonstrate increased maximum isometric torque value by 10 when compared to the initial value resulting in improved ability to perform bending, lifting, and carrying activities safely, confidently. Met  3.  Patient report a reduction in worst pain score by 1-2 points for improved tolerance during work and recreational activities Progressing  4.  Pt able to perform HEP correctly with minimal cueing or supervision for therapist Progressin     Long term goals: 13 weeks or 20 visits   1. Pt will demonstrate increased lumbar ROM by at least 6 degrees from initial ROM value, resulting in improved ability to perform  functional fwd bending while standing and sitting.  Met 5/13/19  2. Pt will demonstrate increased maximum isometric torque value by 30% when compared to the initial value resulting in improved ability to perform bending, lifting, and carrying activities safely, confidently. Met  3. Pt to demonstrate ability to independently control and reduce their pain through posture positioning and mechanical movements throughout a typical day. Progressing, not met.  4.  Patient will demonstrate improved overall function per FOTO Survey to CK = at least 40% but < 60% impaired, limited or restricted score or less. Met thus far      Plan   Continue with established Plan of Care towards established PT goals.

## 2019-06-11 ENCOUNTER — CLINICAL SUPPORT (OUTPATIENT)
Dept: REHABILITATION | Facility: OTHER | Age: 74
End: 2019-06-11
Attending: ORTHOPAEDIC SURGERY
Payer: MEDICARE

## 2019-06-11 DIAGNOSIS — G89.29 CHRONIC BILATERAL LOW BACK PAIN WITHOUT SCIATICA: ICD-10-CM

## 2019-06-11 DIAGNOSIS — M54.50 CHRONIC BILATERAL LOW BACK PAIN WITHOUT SCIATICA: ICD-10-CM

## 2019-06-11 PROCEDURE — 97110 THERAPEUTIC EXERCISES: CPT

## 2019-06-11 NOTE — PROGRESS NOTES
"[facility]      Name: [facility] PAT Cantor Jr.  Clinic Number: [id number]    Therapy Diagnosis:   Encounter Diagnosis   Name Primary?    Chronic bilateral low back pain without sciatica      Physician: Leonila Ridley PA-C    Visit Date: 2019    Physician Orders: PT Evaluate and Treat  Medical Diagnosis: Chronic bilateral low back pain without sciatica  Evaluation Date: 2019  Authorization period: 2020  Plan of care Expiration: 19  Re-Assessment Due: 2019  Visit #/Visits authorized:   Extension request for another x8 visits     Time In: 1:30  Time Out: 2:30  Total Time: 60 minutes    Precautions: DM/BLE edema/peripheral neuropathy/CKD/HTN/atrial fibrillation    Pain Pattern: 1 PEN    Subjective   [facility] reports Increase in R hip pain today without incident. Pt reports he feels he is unable to stand for as long as previous.    Patient reports their pain to be 6/10 on a 0-10 scale with 0 being no pain and 10 being the worst pain imaginable.  Pain Location: B LB/ R knee     Occupation:  retired   Pt goals:  " walk more than a block or two without pain, go to [place] without scooter, get my back stronger"    Objective   Baseline Isometric Testing on Med X equipment: Testing administered by PT  Baseline IM Testing Results:   Date of testing: 3/29/19  ROM 0-36 deg (increased to 48-0 on 19)   Max Peak Torque 91    Min Peak Torque 26    Flex/Ext Ratio 3.5   % below normative data 42      Mid-Point Isometric Testing on Med X equipment: Testing administered by PT  10th Visit IM Testing Results:   Date of testin19  ROM  48-0 deg   Max Peak Torque  160 ft/lbs   Min Peak Torque  68 ft/lbs   Flex/Ext Ratio  2.35   % normative data  -35%    % initial testing           +95%     CMS Impairment/Limitation/Restriction for FOTO Lumbar Spine Survey  Status Limitation G-Code CMS Severity Modifier  Intake 50% 50%  Predicted 58% 42% Goal Status+ CK - At least 40 " percent but less than 60 percent  5/8/2019 55% 45%  5/27/2019 43% 57% Current Status CK - At least 40 percent but less than 60 percent  Treatment    Pt was instructed in and performed the following:     Jmc received therapeutic exercises to develop/improved posture, cardiovascular endurance, muscular endurance, lumbar/cervical ROM, strength and muscular endurance for 60 minutes including the following exercises:   [facility] 6/6/2019   Visit Number 12   VAS Pain Rating 0   Treadmill Time (in min.) -   Recumbent Bike Seat Pos. -   Time 10   Flexion in Lying 10   Lumbar Extension Seat Pad -   Femur Restraint -   Top Dead Center -   Counterweight -   Lumbar Flexion -   Lumbar Extension -   Lumbar Peak Torque -   Min Torque -   Test Percent Below Normative Data -   Test Percent Gain in Strength from Initial  -   Lumbar Weight 80   Repetitions 15   Rating of Perceived Exertion 3   Ice - Z Lie (in min.) 10   Flexion in lying with theraball 10x 5 s/h (does not stretch his back as much)  LTR x20  PPT x10 --> TrA +Marching x20   Seated PPT 10x   Hip/back disassociation 10x N/P  Sit<>stand squat training 3x 5 reps  **Standard Chair Height     Repeat flex (supine) better  Repeat ext   (prone) no effect    Peripheral muscle strengthening which included 1 set of 15-20 repetitions at a slow, controlled 10-13 second per rep pace focused on strengthening supporting musculature for improved body mechanics and functional mobility.  Pt and therapist focused on proper form during treatment to ensure optimal strengthening of each targeted muscle group.  Machines were utilized including torso rotation, leg extension, leg curl, chest press, upright row. Tricep extension, bicep curl, leg press, and hip abduction added visit 3      Home Exercises Provided and Patient Education Provided               Flexion in lying 10x, 3x/day (He does not feel the stretch)              PPT              LTR 10x 3x/day  SKTC 10x     Education  provided:   - PT educated patient on goals to advance LE strength and hip stability with progressive exercises to improve level of function.    Written Home Exercises Provided: Patient instructed to cont prior HEP.  Exercises were reviewed and [facility] was able to demonstrate them prior to the end of the session.  [facility] demonstrated good  understanding of the education provided.     See EMR under Patient Instructions for exercises provided prior visit.    Assessment     c arrives today with c/o of hip pain and LBP.  Assessed repeat movements today and pt and pt classified as 1 PEN. Pt completed 20 reps at 80ft/lbs  c/4/10 exertion level.  Inc 5% next visit and continue to assess movement patterns.  Pt will continue to benefit from skilled outpatient physical therapy to address the deficits stated in the impairment chart, provide pt/family education and to maximize pt's level of independence in the home and community environment.     Anticipated Barriers for therapy: None  Pt's spiritual, cultural and educational needs considered and pt agreeable to plan of care and goals as stated below:   GOALS: Pt is in agreement with the following goals.     Short term goals:  6 weeks or 10 visits   1.  Pt will demonstrate increased lumbar ROM by at least 3 degrees from the initial ROM value with improvements noted in functional ROM and ability to perform ADLs Met 5/13/19  2.  Pt will demonstrate increased maximum isometric torque value by 10 when compared to the initial value resulting in improved ability to perform bending, lifting, and carrying activities safely, confidently. Met  3.  Patient report a reduction in worst pain score by 1-2 points for improved tolerance during work and recreational activities Progressing  4.  Pt able to perform HEP correctly with minimal cueing or supervision for therapist Progressin     Long term goals: 13 weeks or 20 visits   1. Pt will demonstrate increased lumbar ROM by at least 6 degrees from  initial ROM value, resulting in improved ability to perform functional fwd bending while standing and sitting.  Met 5/13/19  2. Pt will demonstrate increased maximum isometric torque value by 30% when compared to the initial value resulting in improved ability to perform bending, lifting, and carrying activities safely, confidently. Met  3. Pt to demonstrate ability to independently control and reduce their pain through posture positioning and mechanical movements throughout a typical day. Progressing, not met.  4.  Patient will demonstrate improved overall function per FOTO Survey to CK = at least 40% but < 60% impaired, limited or restricted score or less. Met thus far      Plan   Continue with established Plan of Care towards established PT goals.

## 2019-06-12 ENCOUNTER — TELEPHONE (OUTPATIENT)
Dept: OPHTHALMOLOGY | Facility: CLINIC | Age: 74
End: 2019-06-12

## 2019-06-12 DIAGNOSIS — H02.112 CICATRICIAL ECTROPION OF RIGHT LOWER EYELID: ICD-10-CM

## 2019-06-12 DIAGNOSIS — H04.129 DRY EYE: Primary | ICD-10-CM

## 2019-06-13 ENCOUNTER — CLINICAL SUPPORT (OUTPATIENT)
Dept: REHABILITATION | Facility: OTHER | Age: 74
End: 2019-06-13
Attending: ORTHOPAEDIC SURGERY
Payer: MEDICARE

## 2019-06-13 DIAGNOSIS — G89.29 CHRONIC BILATERAL LOW BACK PAIN WITHOUT SCIATICA: Primary | ICD-10-CM

## 2019-06-13 DIAGNOSIS — M54.50 CHRONIC BILATERAL LOW BACK PAIN WITHOUT SCIATICA: Primary | ICD-10-CM

## 2019-06-13 PROCEDURE — 97110 THERAPEUTIC EXERCISES: CPT

## 2019-06-13 NOTE — PROGRESS NOTES
"[facility]      [place]: [facility] [place]or Jr.  Clinic Number: [id number]    Therapy Diagnosis:   Encounter Diagnosis   Name Primary?    Chronic bilateral low back pain without sciatica Yes     Physician: Leonila Ridley PA-C    Visit Date: 2019    Physician Orders: PT Evaluate and Treat  Medical Diagnosis: Chronic bilateral low back pain without sciatica  Evaluation Date: 2019  Authorization period: 2020  Plan of care Expiration: 19  Re-Assessment Due: 19  Visit #/Visits authorized:   Extension request for another x8 visits     Time In: 1100am  Time Out: 1200  Total Time: 60 minutes    Precautions: DM/BLE edema/peripheral neuropathy/CKD/HTN/atrial fibrillation    Pain Pattern: 1 PEN    Subjective   [facility] [place] reports he is not having significant pain today upon arrival    Patient reports their pain to be 2/10 on a 0-10 scale with 0 being no pain and 10 being the worst pain imaginable.  Pain Location: B LB/ R knee     Occupation:  retired   Pt goals:  " walk more than a block or two without pain, go to [employer] without scooter, get my back stronger"    Objective   Baseline Isometric Testing on Med X equipment: Testing administered by PT  Baseline IM Testing Results:   Date of testing: 3/29/19  ROM 0-36 deg (increased to 48-0 on 19)   Max Peak Torque 91    Min Peak Torque 26    Flex/Ext Ratio 3.5   % below normative data 42      Mid-Point Isometric Testing on Med X equipment: Testing administered by PT  10th Visit IM Testing Results:   Date of testin19  ROM  48-0 deg   Max Peak Torque  160 ft/lbs   Min Peak Torque  68 ft/lbs   Flex/Ext Ratio  2.35   % normative data  -35%    % initial testing           +95%     CMS Impairment/Limitation/Restriction for FOTO Lumbar Spine Survey  Status Limitation G-Code CMS Severity Modifier  Intake 50% 50%  Predicted 58% 42% Goal Status+ CK - At least 40 percent but less than 60 percent  2019 55% 45%  2019 " 43% 57% Current Status CK - At least 40 percent but less than 60 percent  Treatment    Pt was instructed in and performed the following:     c received therapeutic exercises to develop/improved posture, cardiovascular endurance, muscular endurance, lumbar/cervical ROM, strength and muscular endurance for 60 minutes including the following exercises:   [facility] 6/13/2019   Visit Number 13   VAS Pain Rating 0   Treadmill Time (in min.) -   Recumbent Bike Seat Pos. -   Time 10   Flexion in Lying 10   Lumbar Extension Seat Pad -   Femur Restraint -   Top Dead Center -   Counterweight -   Lumbar Flexion -   Lumbar Extension -   Lumbar Peak Torque -   Min Torque -   Test Percent Below Normative Data -   Test Percent Gain in Strength from Initial  -   Lumbar Weight 80   Repetitions 20   Rating of Perceived Exertion 3   Ice - Z Lie (in min.) 10       Flexion in lying with theraball 10x 5 s/h (does not stretch his back as much)  LTR x20  PPT x10 --> TrA +Marching x20   Seated PPT 10x   Hip/back disassociation 10x N/P  Sit<>stand squat training 3x 5 reps  **Standard Chair Height   Bridge 10x  Repeat flex (supine) better  Repeat ext   (prone) no effect    Peripheral muscle strengthening which included 1 set of 15-20 repetitions at a slow, controlled 10-13 second per rep pace focused on strengthening supporting musculature for improved body mechanics and functional mobility.  Pt and therapist focused on proper form during treatment to ensure optimal strengthening of each targeted muscle group.  Machines were utilized including torso rotation, leg extension, leg curl, chest press, upright row. Tricep extension, bicep curl, leg press, and hip abduction added visit 3      Home Exercises Provided and Patient Education Provided               Flexion in lying 10x, 3x/day (He does not feel the stretch)              PPT              LTR 10x 3x/day  SKTC 10x     Education provided:   - PT educated patient on goals to  advance LE strength and hip stability with progressive exercises to improve level of function.    Written Home Exercises Provided: Patient instructed to cont prior HEP.  Exercises were reviewed and [facility] was able to demonstrate them prior to the end of the session.  c demonstrated good  understanding of the education provided.     See EMR under Patient Instructions for exercises provided prior visit.    Assessment     Jmc arrives today with min c/o pain in LB today.  Pt completed 20 reps today at 3/10 exertion at 80ft/lbs on Med X.  Increase 5-10% next visit based upon DOMS.   Pt reports he feels stronger since starting program.  Added bridges today, pt required cueing to lift hips off table approx 3-4 inches.  Pt will continue to benefit from skilled outpatient physical therapy to address the deficits stated in the impairment chart, provide pt/family education and to maximize pt's level of independence in the home and community environment.     Anticipated Barriers for therapy: None  Pt's spiritual, cultural and educational needs considered and pt agreeable to plan of care and goals as stated below:   GOALS: Pt is in agreement with the following goals.     Short term goals:  6 weeks or 10 visits   1.  Pt will demonstrate increased lumbar ROM by at least 3 degrees from the initial ROM value with improvements noted in functional ROM and ability to perform ADLs Met 5/13/19  2.  Pt will demonstrate increased maximum isometric torque value by 10 when compared to the initial value resulting in improved ability to perform bending, lifting, and carrying activities safely, confidently. Met  3.  Patient report a reduction in worst pain score by 1-2 points for improved tolerance during work and recreational activities Progressing  4.  Pt able to perform HEP correctly with minimal cueing or supervision for therapist Progressin     Long term goals: 13 weeks or 20 visits   1. Pt will demonstrate increased lumbar ROM by at least  6 degrees from initial ROM value, resulting in improved ability to perform functional fwd bending while standing and sitting.  Met 5/13/19  2. Pt will demonstrate increased maximum isometric torque value by 30% when compared to the initial value resulting in improved ability to perform bending, lifting, and carrying activities safely, confidently. Met  3. Pt to demonstrate ability to independently control and reduce their pain through posture positioning and mechanical movements throughout a typical day. Progressing, not met.  4.  Patient will demonstrate improved overall function per FOTO Survey to CK = at least 40% but < 60% impaired, limited or restricted score or less. Met thus far      Plan   Continue with established Plan of Care towards established PT goals.

## 2019-06-18 ENCOUNTER — CLINICAL SUPPORT (OUTPATIENT)
Dept: REHABILITATION | Facility: OTHER | Age: 74
End: 2019-06-18
Attending: ORTHOPAEDIC SURGERY
Payer: MEDICARE

## 2019-06-18 DIAGNOSIS — M54.50 CHRONIC BILATERAL LOW BACK PAIN WITHOUT SCIATICA: Primary | ICD-10-CM

## 2019-06-18 DIAGNOSIS — G89.29 CHRONIC BILATERAL LOW BACK PAIN WITHOUT SCIATICA: Primary | ICD-10-CM

## 2019-06-18 PROCEDURE — 97110 THERAPEUTIC EXERCISES: CPT

## 2019-06-18 NOTE — PROGRESS NOTES
"[facility]      [place] [facility] [place]  Clinic Number: [id number]    Therapy Diagnosis:   No diagnosis found.  Physician: Leonila Ridley PA-C    Visit Date: 2019    Physician Orders: PT Evaluate and Treat  Medical Diagnosis: Chronic bilateral low back pain without sciatica  Evaluation Date: 2019  Authorization period: 2020  Plan of care Expiration: 19  Re-Assessment Due: 2019  Visit #/Visits authorized:   Extension request for another x8 visits     Time In: 1100am  Time Out: 1200  Total Time: 60 minutes    Precautions: DM/BLE edema/peripheral neuropathy/CKD/HTN/atrial fibrillation    Pain Pattern: 1 PEN    Subjective   [facility] [place] not having significant pain today upon arrival    Patient reports their pain to be 2/10 on a 0-10 scale with 0 being no pain and 10 being the worst pain imaginable.  Pain Location: B LB/ R knee     Occupation:  retired   Pt goals:  " walk more than a block or two without pain, go to [employer] without scooter, get my back stronger"    Objective   Baseline Isometric Testing on Med X equipment: Testing administered by PT  Baseline IM Testing Results:   Date of testing: 3/29/19  ROM 0-36 deg (increased to 48-0 on 19)   Max Peak Torque 91    Min Peak Torque 26    Flex/Ext Ratio 3.5   % below normative data 42      Mid-Point Isometric Testing on Med X equipment: Testing administered by PT  10th Visit IM Testing Results:   Date of testin19  ROM  48-0 deg   Max Peak Torque  160 ft/lbs   Min Peak Torque  68 ft/lbs   Flex/Ext Ratio  2.35   % normative data  -35%    % initial testing           +95%     CMS Impairment/Limitation/Restriction for FOTO Lumbar Spine Survey  Status Limitation G-Code CMS Severity Modifier  Intake 50% 50%  Predicted 58% 42% Goal Status+ CK - At least 40 percent but less than 60 percent  2019 55% 45%  2019 43% 57% Current Status CK - At least 40 percent but less than 60 " percent  Treatment    Pt was instructed in and performed the following:     c received therapeutic exercises to develop/improved posture, cardiovascular endurance, muscular endurance, lumbar/cervical ROM, strength and muscular endurance for 60 minutes including the following exercises:   [facility] 6/13/2019   Visit Number 13   VAS Pain Rating 0   Treadmill Time (in min.) -   Recumbent Bike Seat Pos. -   Time 10   Flexion in Lying 10   Lumbar Extension Seat Pad -   Femur Restraint -   Top Dead Center -   Counterweight -   Lumbar Flexion -   Lumbar Extension -   Lumbar Peak Torque -   Min Torque -   Test Percent Below Normative Data -   Test Percent Gain in Strength from Initial  -   Lumbar Weight 80   Repetitions 20   Rating of Perceived Exertion 3   Ice - Z Lie (in min.) 10       Flexion in lying with theraball 10x 5 s/h (does not stretch his back as much)  LTR x20  PPT x10 --> TrA +Marching x20   Seated PPT 10x   Hip/back disassociation 10x N/P  Sit<>stand squat training 3x 5 reps  **Standard Chair Height   Bridge 10x  Repeat flex (supine) better  Repeat ext   (prone) no effect    Peripheral muscle strengthening which included 1 set of 15-20 repetitions at a slow, controlled 10-13 second per rep pace focused on strengthening supporting musculature for improved body mechanics and functional mobility.  Pt and therapist focused on proper form during treatment to ensure optimal strengthening of each targeted muscle group.  Machines were utilized including torso rotation, leg extension, leg curl, chest press, upright row. Tricep extension, bicep curl, leg press, and hip abduction added visit 3      Home Exercises Provided and Patient Education Provided               Flexion in lying 10x, 3x/day (He does not feel the stretch)              PPT              LTR 10x 3x/day  SKTC 10x     Education provided:   - PT educated patient on goals to advance LE strength and hip stability with progressive exercises to  improve level of function.    Written Home Exercises Provided: Patient instructed to cont prior HEP.  Exercises were reviewed and [facility] [place] able to demonstrate them prior to the end of the session.  c demonstrated good  understanding of the education provided.     See EMR under Patient Instructions for exercises provided prior visit.    Assessment     Jmc arrives today with min c/o pain in LB today.  Pt completed 20 reps today at 3/10 exertion at 80ft/lbs on Med X.  Increase 5-10% next visit based upon DOMS.   Pt reports he feels stronger since starting program.  Added bridges today, pt required cueing to lift hips off table approx 3-4 inches.  Pt will continue to benefit from skilled outpatient physical therapy to address the deficits stated in the impairment chart, provide pt/family education and to maximize pt's level of independence in the home and community environment.     Anticipated Barriers for therapy: None  Pt's spiritual, cultural and educational needs considered and pt agreeable to plan of care and goals as stated below:   GOALS: Pt is in agreement with the following goals.     Short term goals:  6 weeks or 10 visits   1.  Pt will demonstrate increased lumbar ROM by at least 3 degrees from the initial ROM value with improvements noted in functional ROM and ability to perform ADLs Met 5/13/19  2.  Pt will demonstrate increased maximum isometric torque value by 10 when compared to the initial value resulting in improved ability to perform bending, lifting, and carrying activities safely, confidently. Met  3.  Patient report a reduction in worst pain score by 1-2 points for improved tolerance during work and recreational activities Progressing  4.  Pt able to perform HEP correctly with minimal cueing or supervision for therapist Progressin     Long term goals: 13 weeks or 20 visits   1. Pt will demonstrate increased lumbar ROM by at least 6 degrees from initial ROM value, resulting in improved ability to  perform functional fwd bending while standing and sitting.  Met 5/13/19  2. Pt will demonstrate increased maximum isometric torque value by 30% when compared to the initial value resulting in improved ability to perform bending, lifting, and carrying activities safely, confidently. Met  3. Pt to demonstrate ability to independently control and reduce their pain through posture positioning and mechanical movements throughout a typical day. Progressing, not met.  4.  Patient will demonstrate improved overall function per FOTO Survey to CK = at least 40% but < 60% impaired, limited or restricted score or less. Met thus far      Plan   Continue with established Plan of Care towards established PT goals.

## 2019-06-18 NOTE — PROGRESS NOTES
"[facility]      Name: [facility] [place] PAT Cantor Jr.  Clinic Number: [id number]    Therapy Diagnosis:   Encounter Diagnosis   Name Primary?    Chronic bilateral low back pain without sciatica Yes     Physician: Leonila Ridley PA-C    Visit Date: 2019    Physician Orders: PT Evaluate and Treat  Medical Diagnosis: Chronic bilateral low back pain without sciatica  Evaluation Date: 2019  Authorization period: 2020  Plan of care Expiration: 19  Re-Assessment Due: 19  Visit #/Visits authorized:   Extension request for another x8 visits     Time In: 2:30pm  Time Out: 3:30  Total Time: 60 minutes    Precautions: DM/BLE edema/peripheral neuropathy/CKD/HTN/atrial fibrillation    Pain Pattern: 1 PEN    Subjective   [facility] reports having more LBP today.     Patient reports their pain to be 4/10 on a 0-10 scale with 0 being no pain and 10 being the worst pain imaginable.  Pain Location: B LB/ R knee     Occupation:  retired   Pt goals:  " walk more than a block or two without pain, go to [employer] without scooter, get my back stronger"    Objective   Baseline Isometric Testing on Med X equipment: Testing administered by PT  Baseline IM Testing Results:   Date of testing: 3/29/19  ROM 0-36 deg (increased to 48-0 on 19)   Max Peak Torque 91    Min Peak Torque 26    Flex/Ext Ratio 3.5   % below normative data 42      Mid-Point Isometric Testing on Med X equipment: Testing administered by PT  10th Visit IM Testing Results:   Date of testin19  ROM  48-0 deg   Max Peak Torque  160 ft/lbs   Min Peak Torque  68 ft/lbs   Flex/Ext Ratio  2.35   % normative data  -35%    % initial testing           +95%     CMS Impairment/Limitation/Restriction for FOTO Lumbar Spine Survey  Status Limitation G-Code CMS Severity Modifier  Intake 50% 50%  Predicted 58% 42% Goal Status+ CK - At least 40 percent but less than 60 percent  2019 55% 45%  2019 43% 57% Current Status CK - At " least 40 percent but less than 60 percent  Treatment    Pt was instructed in and performed the following:     Jmc received therapeutic exercises to develop/improved posture, cardiovascular endurance, muscular endurance, lumbar/cervical ROM, strength and muscular endurance for 60 minutes including the following exercises:   [facility] 6/18/2019   Visit Number 14   VAS Pain Rating 4   Treadmill Time (in min.) -   Recumbent Bike Seat Pos. -   Time 10   Flexion in Lying -   Lumbar Extension Seat Pad -   Femur Restraint -   Top Dead Center -   Counterweight -   Lumbar Flexion -   Lumbar Extension -   Lumbar Peak Torque -   Min Torque -   Test Percent Below Normative Data -   Test Percent Gain in Strength from Initial  -   Lumbar Weight 84   Repetitions 18   Rating of Perceived Exertion 4   Ice - Z Lie (in min.) 10   Flexion in lying 10x 5 s/h (with no use of ball)  LTR x20  PPT x10 --> TrA +Marching x20   Seated PPT 10x   Hip/back disassociation 10x N/P  Sit<>stand squat training 3x 5 reps (NT)  Bridge 10x    Peripheral muscle strengthening which included 1 set of 15-20 repetitions at a slow, controlled 10-13 second per rep pace focused on strengthening supporting musculature for improved body mechanics and functional mobility.  Pt and therapist focused on proper form during treatment to ensure optimal strengthening of each targeted muscle group.  Machines were utilized including torso rotation, leg extension, leg curl, chest press, upright row. Tricep extension, bicep curl, leg press, and hip abduction added visit 3      Home Exercises Provided and Patient Education Provided               Flexion in lying 10x, 3x/day (no use of ball)              PPT              LTR 10x 3x/day  SKTC 10x     Education provided:   - PT educated patient on goals to advance LE strength and hip stability with progressive exercises to improve level of function.    Written Home Exercises Provided: Patient instructed to cont prior  HEP.  Exercises were reviewed and [facility] [place] was able to demonstrate them prior to the end of the session.  c demonstrated good  understanding of the education provided.     See EMR under Patient Instructions for exercises provided prior visit.    Assessment     Jmc arrives today with min c/o pain in LB today that decreased with session.   Pt completed lumbar medx with a weight increase, 18 reps and 4 RPE.      Pt reports he feels stronger since starting program, his pain is better, but occasional he will have a flair up.   Pt will continue to benefit from skilled outpatient physical therapy to address the deficits stated in the impairment chart, provide pt/family education and to maximize pt's level of independence in the home and community environment.     Anticipated Barriers for therapy: None  Pt's spiritual, cultural and educational needs considered and pt agreeable to plan of care and goals as stated below:   GOALS: Pt is in agreement with the following goals.     Short term goals:  6 weeks or 10 visits   1.  Pt will demonstrate increased lumbar ROM by at least 3 degrees from the initial ROM value with improvements noted in functional ROM and ability to perform ADLs Met 5/13/19  2.  Pt will demonstrate increased maximum isometric torque value by 10 when compared to the initial value resulting in improved ability to perform bending, lifting, and carrying activities safely, confidently. Met  3.  Patient report a reduction in worst pain score by 1-2 points for improved tolerance during work and recreational activities Progressing  4.  Pt able to perform HEP correctly with minimal cueing or supervision for therapist Progressin     Long term goals: 13 weeks or 20 visits   1. Pt will demonstrate increased lumbar ROM by at least 6 degrees from initial ROM value, resulting in improved ability to perform functional fwd bending while standing and sitting.  Met 5/13/19  2. Pt will demonstrate increased maximum isometric  torque value by 30% when compared to the initial value resulting in improved ability to perform bending, lifting, and carrying activities safely, confidently. Met  3. Pt to demonstrate ability to independently control and reduce their pain through posture positioning and mechanical movements throughout a typical day. Progressing, not met.  4.  Patient will demonstrate improved overall function per FOTO Survey to CK = at least 40% but < 60% impaired, limited or restricted score or less. Met thus far      Plan   Continue with established Plan of Care towards established PT goals.

## 2019-06-19 ENCOUNTER — OFFICE VISIT (OUTPATIENT)
Dept: ORTHOPEDICS | Facility: CLINIC | Age: 74
End: 2019-06-19
Payer: MEDICARE

## 2019-06-19 VITALS — WEIGHT: 260.25 LBS | BODY MASS INDEX: 37.26 KG/M2 | HEIGHT: 70 IN

## 2019-06-19 DIAGNOSIS — M70.61 TROCHANTERIC BURSITIS, RIGHT HIP: Primary | ICD-10-CM

## 2019-06-19 PROCEDURE — 20610 DRAIN/INJ JOINT/BURSA W/O US: CPT | Mod: RT,S$GLB,, | Performed by: ORTHOPAEDIC SURGERY

## 2019-06-19 PROCEDURE — 99999 PR PBB SHADOW E&M-EST. PATIENT-LVL III: ICD-10-PCS | Mod: PBBFAC,,, | Performed by: ORTHOPAEDIC SURGERY

## 2019-06-19 PROCEDURE — 99213 PR OFFICE/OUTPT VISIT, EST, LEVL III, 20-29 MIN: ICD-10-PCS | Mod: 25,S$GLB,, | Performed by: ORTHOPAEDIC SURGERY

## 2019-06-19 PROCEDURE — 1101F PT FALLS ASSESS-DOCD LE1/YR: CPT | Mod: CPTII,S$GLB,, | Performed by: ORTHOPAEDIC SURGERY

## 2019-06-19 PROCEDURE — 99213 OFFICE O/P EST LOW 20 MIN: CPT | Mod: 25,S$GLB,, | Performed by: ORTHOPAEDIC SURGERY

## 2019-06-19 PROCEDURE — 1101F PR PT FALLS ASSESS DOC 0-1 FALLS W/OUT INJ PAST YR: ICD-10-PCS | Mod: CPTII,S$GLB,, | Performed by: ORTHOPAEDIC SURGERY

## 2019-06-19 PROCEDURE — 99999 PR PBB SHADOW E&M-EST. PATIENT-LVL III: CPT | Mod: PBBFAC,,, | Performed by: ORTHOPAEDIC SURGERY

## 2019-06-19 PROCEDURE — 20610 PR DRAIN/INJECT LARGE JOINT/BURSA: ICD-10-PCS | Mod: RT,S$GLB,, | Performed by: ORTHOPAEDIC SURGERY

## 2019-06-19 RX ORDER — TRIAMCINOLONE ACETONIDE 40 MG/ML
40 INJECTION, SUSPENSION INTRA-ARTICULAR; INTRAMUSCULAR
Status: COMPLETED | OUTPATIENT
Start: 2019-06-19 | End: 2019-06-19

## 2019-06-19 RX ADMIN — TRIAMCINOLONE ACETONIDE 40 MG: 40 INJECTION, SUSPENSION INTRA-ARTICULAR; INTRAMUSCULAR at 04:06

## 2019-06-19 NOTE — PROGRESS NOTES
"Subjective:      Patient ID: Stephen Cantor Jr. is a 73 y.o. male.    Chief Complaint: Pain of the Right Hip    HPI  Stephen Cantor Jr. has right hip pain.  The pain ishas worsened. The pain is located in the lateral thigh.  There  is not radiation.  The pain is described as achy. The pain is aggravated by activity and laying on it.  It is alleviated by rest.  There is associated back pain.  His history, medications and problem list were reviewed.    Review of Systems   Constitution: Negative for chills, fever and night sweats.   HENT: Negative for hearing loss.    Eyes: Negative for blurred vision and double vision.   Cardiovascular: Negative for chest pain, claudication and leg swelling.   Respiratory: Negative for shortness of breath.    Endocrine: Negative for polydipsia, polyphagia and polyuria.   Hematologic/Lymphatic: Negative for adenopathy and bleeding problem. Does not bruise/bleed easily.   Skin: Negative for poor wound healing.   Gastrointestinal: Negative for diarrhea and heartburn.   Genitourinary: Negative for bladder incontinence.   Neurological: Negative for focal weakness, headaches, numbness, paresthesias and sensory change.   Psychiatric/Behavioral: The patient is not nervous/anxious.    Allergic/Immunologic: Negative for persistent infections.         Objective:      Body mass index is 37.34 kg/m².  Vitals:    06/19/19 1533   Weight: 118.1 kg (260 lb 4.1 oz)   Height: 5' 10" (1.778 m)           General    Constitutional: He is oriented to person, place, and time. He appears well-developed and well-nourished.   HENT:   Head: Normocephalic and atraumatic.   Eyes: EOM are normal.   Cardiovascular: Normal rate.    Pulmonary/Chest: Effort normal.   Neurological: He is alert and oriented to person, place, and time.   Psychiatric: He has a normal mood and affect. His behavior is normal.     General Musculoskeletal Exam   Gait: normal   Pelvic Obliquity: none      Right Knee Exam     Inspection "   Alignment:  normal  Effusion: absent    Left Knee Exam     Inspection   Alignment:  normal  Effusion: absent    Right Hip Exam     Inspection   Scars: absent  Swelling: absent  Bruising: absent  No deformity of hip.  Quadriceps Atrophy:  Negative  Erythema: absent    Tenderness   The patient tender to palpation of the trochanteric bursa.    Range of Motion   Abduction: 25   Adduction: 20   Extension: 0   Flexion: 100   External rotation: 30   Internal rotation: 25     Tests   Pain w/ forced internal rotation (LANCE): absent  Stinchfield test: negative    Other   Sensation: normal  Left Hip Exam     Inspection   Scars: absent  Swelling: absent  No deformity of hip.  Quadriceps Atrophy:  negative  Erythema: absent  Bruising: absent    Range of Motion   Abduction: 25   Adduction: 20   Extension: 0   Flexion: 100   External rotation: 30   Internal rotation: 25     Tests   Pain w/ forced internal rotation (LANCE): absent  Stinchfield test: negative    Other   Sensation: normal      Back (L-Spine & T-Spine) / Neck (C-Spine) Exam   Back exam is normal.      Muscle Strength   Right Lower Extremity   Hip Abduction: 5/5   Hip Adduction: 5/5   Hip Flexion: 5/5   Ankle Dorsiflexion:  5/5   Left Lower Extremity   Hip Abduction: 5/5   Hip Adduction: 5/5   Hip Flexion: 5/5   Ankle Dorsiflexion:  5/5     Reflexes     Left Side  Quadriceps:  2+    Right Side   Quadriceps:  2+    Vascular Exam     Right Pulses  Dorsalis Pedis:      2+          Left Pulses  Dorsalis Pedis:      2+          Capillary Refill  Right Hand: normal capillary refill  Left Hand: normal capillary refill    Edema  Right Upper Leg: absent  Left Upper Leg: absent    Radiographs taken today and reviewed by me demonstrate mild arthritic change of the bilateral hip(s).There  is not bone destruction.  There is not a fracture. Degenerative spine            Assessment:       Encounter Diagnosis   Name Primary?    Trochanteric bursitis, right hip Yes          Plan:        [facility] was seen today for pain.    Diagnoses and all orders for this visit:    Trochanteric bursitis, right hip        Treatment options were discussed. Verbal consent was obtained.   After time out was performed and patient ID, side, and site were verified, the right hip was sterilely prepped in the standard fashion.  A 22-gauge needle was introduced intothe trochanteric bursa without complication.The bursa was then injected with 40 mg Kenalog and 9 cc 1% plain lidocaine.  Sterile dressing was applied.  The patient was informed that they may resume activities as tolerated.  Elevation of glucose in diabetic patients was discussed.    F/U in 6 weeks

## 2019-06-20 ENCOUNTER — CLINICAL SUPPORT (OUTPATIENT)
Dept: REHABILITATION | Facility: OTHER | Age: 74
End: 2019-06-20
Attending: ORTHOPAEDIC SURGERY
Payer: MEDICARE

## 2019-06-20 DIAGNOSIS — G89.29 CHRONIC BILATERAL LOW BACK PAIN WITHOUT SCIATICA: Primary | ICD-10-CM

## 2019-06-20 DIAGNOSIS — M54.50 CHRONIC BILATERAL LOW BACK PAIN WITHOUT SCIATICA: Primary | ICD-10-CM

## 2019-06-20 PROCEDURE — 97110 THERAPEUTIC EXERCISES: CPT

## 2019-06-20 NOTE — PROGRESS NOTES
"[facility]      Name: [facility] [place] Cantor Jr.  Clinic Number: [id number]    Therapy Diagnosis:   Encounter Diagnosis   Name Primary?    Chronic bilateral low back pain without sciatica Yes     Physician: Leonila Ridley PA-C    Visit Date: 2019    Physician Orders: PT Evaluate and Treat  Medical Diagnosis: Chronic bilateral low back pain without sciatica  Evaluation Date: 2019  Authorization period: 2020  Plan of care Expiration: 19 (Resent POC 19, recommend extension to 19 as needed to complete HB protocol)   Re-Assessment Due: 19  Visit #/Visits authorized: 15/ 24     Time In: 12:50  Time Out: 2:05  Total Time: 45 minutes    Precautions: DM/BLE edema/peripheral neuropathy/CKD/HTN/atrial fibrillation    Pain Pattern: 1 PEN    Subjective   [facility] reports feeling better than he did last session. He was sore after last session but it felt like a good work out. He continues to express concern about his standing and walking balance but reports some improvement since starting PT.     Patient reports their pain to be 2/10 on a 0-10 scale with 0 being no pain and 10 being the worst pain imaginable.  Pain Location: B LB/ R knee     Occupation:  retired   Pt goals:  " walk more than a block or two without pain, go to [place] without scooter, get my back stronger"    Objective   Baseline Isometric Testing on Med X equipment: Testing administered by PT  Baseline IM Testing Results:   Date of testing: 3/29/19  ROM 0-36 deg (increased to 48-0 on 19)   Max Peak Torque 91    Min Peak Torque 26    Flex/Ext Ratio 3.5   % below normative data 42      Mid-Point Isometric Testing on Med X equipment: Testing administered by PT  10th Visit IM Testing Results:   Date of testin19  ROM  48-0 deg   Max Peak Torque  160 ft/lbs   Min Peak Torque  68 ft/lbs   Flex/Ext Ratio  2.35   % normative data  -35%    % initial testing           +95%     CMS " Impairment/Limitation/Restriction for FOTO Lumbar Spine Survey  Status Limitation G-Code CMS Severity Modifier  Intake 50% 50%  Predicted 58% 42% Goal Status+ CK - At least 40 percent but less than 60 percent  5/8/2019 55% 45%  5/27/2019 43% 57% Current Status CK - At least 40 percent but less than 60 percent  Treatment    Pt was instructed in and performed the following:     c received therapeutic exercises to develop/improved posture, cardiovascular endurance, muscular endurance, lumbar/cervical ROM, strength and muscular endurance for 60 minutes including the following exercises:     [facility] 6/20/2019   Visit Number 15   VAS Pain Rating 2   Treadmill Time (in min.) -   Recumbent Bike Seat Pos. -   Time 10   Flexion in Lying -   Lumbar Extension Seat Pad -   Femur Restraint -   Top Dead Center -   Counterweight -   Lumbar Flexion -   Lumbar Extension -   Lumbar Peak Torque -   Min Torque -   Test Percent Below Normative Data -   Test Percent Gain in Strength from Initial  -   Lumbar Weight 84   Repetitions 20   Rating of Perceived Exertion 8   Ice - Z Lie (in min.) 10       Flexion in lying 10x 5 s/h (with no use of ball)  LTR x20  PPT x10 --> TrA +Marching x20   Bridge 10x  EIS with towel overpressure 10x     Standing hip abduction 5 reps 3 sets  (with toe touch)   Standing hip extension 5 reps 3 sets (with toe touch)         Peripheral muscle strengthening which included 1 set of 15-20 repetitions at a slow, controlled 10-13 second per rep pace focused on strengthening supporting musculature for improved body mechanics and functional mobility.  Pt and therapist focused on proper form during treatment to ensure optimal strengthening of each targeted muscle group.  Machines were utilized including torso rotation, leg extension, leg curl, chest press, upright row. Tricep extension, bicep curl, leg press, and hip abduction added visit 3      Home Exercises Provided and Patient Education Provided                Flexion in lying 10x, 3x/day (no use of ball)              PPT              LTR 10x 3x/day  SKTC 10x   Bridge 10x   Seated PPT 10x   Hip/back disassociation 10x  Sit<>stand squat training 3x 5 reps       Education provided:   - PT educated patient on goals to advance LE strength and hip stability with progressive exercises to improve level of function.    Written Home Exercises Provided: Patient instructed to cont prior HEP.  Exercises were reviewed and [facility] was able to demonstrate them prior to the end of the session.  c demonstrated good  understanding of the education provided.     See EMR under Patient Instructions for exercises provided prior visit.    Assessment     Jmc arrives today with min c/o pain in LB today that decreased with session. Pt reports he feels stronger and more stable since starting program, as well has reduced pain, but occasional he still will have a flair up. He also expresses he continues to have difficulty with balance with walking and standing but is improving overall. Added standing hip abduction/exteison to improve dynamic LE reaching mobility and stabilization with moderate v/c and UE support. Plan to continue to progress as able. Review sit<>stand techniques next session. Pt is making good progress towards treatment goals.   Pt will continue to benefit from skilled outpatient physical therapy to address the deficits stated in the impairment chart, provide pt/family education and to maximize pt's level of independence in the home and community environment.     Anticipated Barriers for therapy: None  Pt's spiritual, cultural and educational needs considered and pt agreeable to plan of care and goals as stated below:   GOALS: Pt is in agreement with the following goals.     Short term goals:  6 weeks or 10 visits   1.  Pt will demonstrate increased lumbar ROM by at least 3 degrees from the initial ROM value with improvements noted in functional ROM and ability to perform ADLs  Met 5/13/19  2.  Pt will demonstrate increased maximum isometric torque value by 10 when compared to the initial value resulting in improved ability to perform bending, lifting, and carrying activities safely, confidently. Met  3.  Patient report a reduction in worst pain score by 1-2 points for improved tolerance during work and recreational activities Progressing  4.  Pt able to perform HEP correctly with minimal cueing or supervision for therapist Progressin     Long term goals: 13 weeks or 20 visits   1. Pt will demonstrate increased lumbar ROM by at least 6 degrees from initial ROM value, resulting in improved ability to perform functional fwd bending while standing and sitting.  Met 5/13/19  2. Pt will demonstrate increased maximum isometric torque value by 30% when compared to the initial value resulting in improved ability to perform bending, lifting, and carrying activities safely, confidently. Met  3. Pt to demonstrate ability to independently control and reduce their pain through posture positioning and mechanical movements throughout a typical day. Progressing, not met.  4.  Patient will demonstrate improved overall function per FOTO Survey to CK = at least 40% but < 60% impaired, limited or restricted score or less. Met thus far      Plan   Continue with established Plan of Care towards established PT goals.

## 2019-06-21 NOTE — PLAN OF CARE
"[facility]      Name: [facility] [place] PAT Cantor Jr.  Clinic Number: [id number]    Therapy Diagnosis:   Encounter Diagnosis   Name Primary?    Chronic bilateral low back pain without sciatica Yes     Physician: Leonila Ridley PA-C    Visit Date: 2019    Physician Orders: PT Evaluate and Treat  Medical Diagnosis: Chronic bilateral low back pain without sciatica  Evaluation Date: 2019  Authorization period: 2020  Plan of care Expiration: 19 (Resent POC 19, recommend extension to 19 as needed to complete HB protocol)   Re-Assessment Due: 19  Visit #/Visits authorized: 15/ 24     Time In: 12:50  Time Out: 2:05  Total Time: 45 minutes    Precautions: DM/BLE edema/peripheral neuropathy/CKD/HTN/atrial fibrillation    Pain Pattern: 1 PEN    Subjective   [facility] [place] [facility] reports feeling better than he did last session. He was sore after last session but it felt like a good work out. He continues to express concern about his standing and walking balance but reports some improvement since starting PT.     Patient reports their pain to be 2/10 on a 0-10 scale with 0 being no pain and 10 being the worst pain imaginable.  Pain Location: B LB/ R knee     Occupation:  retired   Pt goals:  " walk more than a block or two without pain, go to [place] without scooter, get my back stronger"    Objective   Baseline Isometric Testing on Med X equipment: Testing administered by PT  Baseline IM Testing Results:   Date of testing: 3/29/19  ROM 0-36 deg (increased to 48-0 on 19)   Max Peak Torque 91    Min Peak Torque 26    Flex/Ext Ratio 3.5   % below normative data 42      [facility]-Point Isometric Testing on Med X equipment: Testing administered by PT  10th Visit IM Testing Results:   Date of testin19  ROM  48-0 deg   Max Peak Torque  160 ft/lbs   Min Peak Torque  68 ft/lbs   Flex/Ext Ratio  2.35   % normative data  -35%    % initial testing           +95%     CMS " Impairment/Limitation/Restriction for FOTO Lumbar Spine Survey  Status Limitation G-Code CMS Severity Modifier  Intake 50% 50%  Predicted 58% 42% Goal Status+ CK - At least 40 percent but less than 60 percent  5/8/2019 55% 45%  5/27/2019 43% 57% Current Status CK - At least 40 percent but less than 60 percent  Treatment    Pt was instructed in and performed the following:     c received therapeutic exercises to develop/improved posture, cardiovascular endurance, muscular endurance, lumbar/cervical ROM, strength and muscular endurance for 60 minutes including the following exercises:     [facility] 6/20/2019   Visit Number 15   VAS Pain Rating 2   Treadmill Time (in min.) -   Recumbent Bike Seat Pos. -   Time 10   Flexion in Lying -   Lumbar Extension Seat Pad -   Femur Restraint -   Top Dead Center -   Counterweight -   Lumbar Flexion -   Lumbar Extension -   Lumbar Peak Torque -   Min Torque -   Test Percent Below Normative Data -   Test Percent Gain in Strength from Initial  -   Lumbar Weight 84   Repetitions 20   Rating of Perceived Exertion 8   Ice - Z Lie (in min.) 10       Flexion in lying 10x 5 s/h (with no use of ball)  LTR x20  PPT x10 --> TrA +Marching x20   Bridge 10x  EIS with towel overpressure 10x     Standing hip abduction 5 reps 3 sets  (with toe touch)   Standing hip extension 5 reps 3 sets (with toe touch)         Peripheral muscle strengthening which included 1 set of 15-20 repetitions at a slow, controlled 10-13 second per rep pace focused on strengthening supporting musculature for improved body mechanics and functional mobility.  Pt and therapist focused on proper form during treatment to ensure optimal strengthening of each targeted muscle group.  Machines were utilized including torso rotation, leg extension, leg curl, chest press, upright row. Tricep extension, bicep curl, leg press, and hip abduction added visit 3      Home Exercises Provided and Patient Education Provided                Flexion in lying 10x, 3x/day (no use of ball)              PPT              LTR 10x 3x/day  SKTC 10x   Bridge 10x   Seated PPT 10x   Hip/back disassociation 10x  Sit<>stand squat training 3x 5 reps       Education provided:   - PT educated patient on goals to advance LE strength and hip stability with progressive exercises to improve level of function.    Written Home Exercises Provided: Patient instructed to cont prior HEP.  Exercises were reviewed and [facility] [place] [facility] was able to demonstrate them prior to the end of the session.  c demonstrated good  understanding of the education provided.     See EMR under Patient Instructions for exercises provided prior visit.    Assessment     Jmc arrives today with min c/o pain in LB today that decreased with session. Pt reports he feels stronger and more stable since starting program, as well has reduced pain, but occasional he still will have a flair up. He also expresses he continues to have difficulty with balance with walking and standing but is improving overall. Added standing hip abduction/exteison to improve dynamic LE reaching mobility and stabilization with moderate v/c and UE support. Plan to continue to progress as able. Review sit<>stand techniques next session. Pt is making good progress towards treatment goals.   Pt will continue to benefit from skilled outpatient physical therapy to address the deficits stated in the impairment chart, provide pt/family education and to maximize pt's level of independence in the home and community environment.     Anticipated Barriers for therapy: None  Pt's spiritual, cultural and educational needs considered and pt agreeable to plan of care and goals as stated below:   GOALS: Pt is in agreement with the following goals.     Short term goals:  6 weeks or 10 visits   1.  Pt will demonstrate increased lumbar ROM by at least 3 degrees from the initial ROM value with improvements noted in functional ROM and ability to perform ADLs  Met 5/13/19  2.  Pt will demonstrate increased maximum isometric torque value by 10 when compared to the initial value resulting in improved ability to perform bending, lifting, and carrying activities safely, confidently. Met  3.  Patient report a reduction in worst pain score by 1-2 points for improved tolerance during work and recreational activities Progressing  4.  Pt able to perform HEP correctly with minimal cueing or supervision for therapist Progressin     Long term goals: 13 weeks or 20 visits   1. Pt will demonstrate increased lumbar ROM by at least 6 degrees from initial ROM value, resulting in improved ability to perform functional fwd bending while standing and sitting.  Met 5/13/19  2. Pt will demonstrate increased maximum isometric torque value by 30% when compared to the initial value resulting in improved ability to perform bending, lifting, and carrying activities safely, confidently. Met  3. Pt to demonstrate ability to independently control and reduce their pain through posture positioning and mechanical movements throughout a typical day. Progressing, not met.  4.  Patient will demonstrate improved overall function per FOTO Survey to CK = at least 40% but < 60% impaired, limited or restricted score or less. Met thus far      Plan   Continue with established Plan of Care towards established PT goals.

## 2019-06-24 ENCOUNTER — TELEPHONE (OUTPATIENT)
Dept: ELECTROPHYSIOLOGY | Facility: CLINIC | Age: 74
End: 2019-06-24

## 2019-06-24 NOTE — TELEPHONE ENCOUNTER
Tried calling pt to let him know that he is scheduled for an ekg, echo & DM appt. Pt did not answer & VM was unvailable on both #'s listed.   ----- Message from Lucía Garcia sent at 6/24/2019  1:12 PM CDT -----  Contact: Leticia pt wife [phone]  Pt wife says she is waiting for a call in ref to the pt appts scheduled 7/10/19. Please call her at the number listed.    Thanks

## 2019-06-25 ENCOUNTER — CLINICAL SUPPORT (OUTPATIENT)
Dept: REHABILITATION | Facility: OTHER | Age: 74
End: 2019-06-25
Attending: ORTHOPAEDIC SURGERY
Payer: MEDICARE

## 2019-06-25 DIAGNOSIS — G89.29 CHRONIC BILATERAL LOW BACK PAIN WITHOUT SCIATICA: Primary | ICD-10-CM

## 2019-06-25 DIAGNOSIS — M54.50 CHRONIC BILATERAL LOW BACK PAIN WITHOUT SCIATICA: Primary | ICD-10-CM

## 2019-06-25 PROCEDURE — 97110 THERAPEUTIC EXERCISES: CPT

## 2019-06-25 NOTE — PROGRESS NOTES
"[facility]      [place] [facility] [place]  Clinic Number: [id number]    Therapy Diagnosis:   Encounter Diagnosis   Name Primary?    Chronic bilateral low back pain without sciatica Yes     Physician: Leonila Ridley PA-C    Visit Date: 2019    Physician Orders: PT Evaluate and Treat  Medical Diagnosis: Chronic bilateral low back pain without sciatica  Evaluation Date: 2019  Authorization period: 2020  Plan of care Expiration: 19 (Resent POC 19, recommend extension to 19 as needed to complete HB protocol)   Re-Assessment Due: 19  Visit #/Visits authorized:      Time In: 1;05  Time Out: 2:05  Total Time: 35 minutes    Precautions: DM/BLE edema/peripheral neuropathy/CKD/HTN/atrial fibrillation    Pain Pattern: 1 PEN    Subjective   [facility] some increased central low back pain. He states he felt really good for a couple day's after last session but it worsened this morning.  He continues to express concern about his standing and walking balance but reports some improvement since starting PT.     Patient reports their pain to be 3/10 on a 0-10 scale with 0 being no pain and 10 being the worst pain imaginable.  Pain Location: B LB/ R knee     Occupation:  retired   Pt goals:  " walk more than a block or two without pain, go to [place] without scooter, get my back stronger"    Objective   Baseline Isometric Testing on Med X equipment: Testing administered by PT  Baseline IM Testing Results:   Date of testing: 3/29/19  ROM 0-36 deg (increased to 48-0 on 19)   Max Peak Torque 91    Min Peak Torque 26    Flex/Ext Ratio 3.5   % below normative data 42      [facility] Isometric Testing on Med X equipment: Testing administered by PT  10th Visit IM Testing Results:   Date of testin19  ROM  48-0 deg   Max Peak Torque  160 ft/lbs   Min Peak Torque  68 ft/lbs   Flex/Ext Ratio  2.35   % normative data  -35%    % initial testing       "     +95%     CMS Impairment/Limitation/Restriction for FOTO Lumbar Spine Survey  Status Limitation G-Code CMS Severity Modifier  Intake 50% 50%  Predicted 58% 42% Goal Status+ CK - At least 40 percent but less than 60 percent  5/8/2019 55% 45%  5/27/2019 43% 57% Current Status CK - At least 40 percent but less than 60 percent  Treatment    Pt was instructed in and performed the following:     [facility] received therapeutic exercises to develop/improved posture, cardiovascular endurance, muscular endurance, lumbar/cervical ROM, strength and muscular endurance for 60 minutes including the following exercises:     [facility] 6/25/2019   Visit Number 16   VAS Pain Rating 2   Treadmill Time (in min.) -   Recumbent Bike Seat Pos. -   Time 10   Flexion in Lying -   Lumbar Extension Seat Pad -   Femur Restraint -   Top Dead Center -   Counterweight -   Lumbar Flexion -   Lumbar Extension -   Lumbar Peak Torque -   Min Torque -   Test Percent Below Normative Data -   Test Percent Gain in Strength from Initial  -   Lumbar Weight 88   Repetitions 18   Rating of Perceived Exertion 5   Ice - Z Lie (in min.) 10     Flexion in lying 10x 5 s/h (with no use of ball)  LTR x20  PPT x10 --> TrA +Marching x20   Bridge 10x  EIS with towel overpressure 10x     Standing hip abduction 5 reps 3 sets  (with toe touch)   Standing hip extension 5 reps 3 sets (with toe touch)         Peripheral muscle strengthening which included 1 set of 15-20 repetitions at a slow, controlled 10-13 second per rep pace focused on strengthening supporting musculature for improved body mechanics and functional mobility.  Pt and therapist focused on proper form during treatment to ensure optimal strengthening of each targeted muscle group.  Machines were utilized including torso rotation, leg extension, leg curl, chest press, upright row. Tricep extension, bicep curl, leg press, and hip abduction added visit 3      Home Exercises Provided and Patient Education  Provided               Flexion in lying 10x, 3x/day (no use of ball)              PPT              LTR 10x 3x/day  SKTC 10x   Bridge 10x   Seated PPT 10x   Hip/back disassociation 10x  Sit<>stand squat training 3x 5 reps       Education provided:   - PT educated patient on goals to advance LE strength and hip stability with progressive exercises to improve level of function.    Written Home Exercises Provided: Patient instructed to cont prior HEP.  Exercises were reviewed and [facility] [place] [facility] was able to demonstrate them prior to the end of the session.  c demonstrated good  understanding of the education provided.     See EMR under Patient Instructions for exercises provided prior visit.    Assessment     Jmc arrives today with min c/o pain in LB today that decreased with session. Pt encouraged to perform EIS with towel overpressure more often throughout the day to maintain mobility between sessions and maximize HEP benefits.  He expresses he continues to have difficulty with balance with walking and standing but is improving overall. Reviewed standing hip abduction/exteison to improve dynamic LE reaching mobility and stabilization with minimal v/c and UE support. Pt demo'd better stability of exercise compared to last session.  Plan to continue to progress as able. Pt is making good progress towards treatment goals.   Pt will continue to benefit from skilled outpatient physical therapy to address the deficits stated in the impairment chart, provide pt/family education and to maximize pt's level of independence in the home and community environment.     Anticipated Barriers for therapy: None  Pt's spiritual, cultural and educational needs considered and pt agreeable to plan of care and goals as stated below:   GOALS: Pt is in agreement with the following goals.     Short term goals:  6 weeks or 10 visits   1.  Pt will demonstrate increased lumbar ROM by at least 3 degrees from the initial ROM value with improvements noted in  functional ROM and ability to perform ADLs Met 5/13/19  2.  Pt will demonstrate increased maximum isometric torque value by 10 when compared to the initial value resulting in improved ability to perform bending, lifting, and carrying activities safely, confidently. Met  3.  Patient report a reduction in worst pain score by 1-2 points for improved tolerance during work and recreational activities Progressing  4.  Pt able to perform HEP correctly with minimal cueing or supervision for therapist Progressin     Long term goals: 13 weeks or 20 visits   1. Pt will demonstrate increased lumbar ROM by at least 6 degrees from initial ROM value, resulting in improved ability to perform functional fwd bending while standing and sitting.  Met 5/13/19  2. Pt will demonstrate increased maximum isometric torque value by 30% when compared to the initial value resulting in improved ability to perform bending, lifting, and carrying activities safely, confidently. Met  3. Pt to demonstrate ability to independently control and reduce their pain through posture positioning and mechanical movements throughout a typical day. Progressing, not met.  4.  Patient will demonstrate improved overall function per FOTO Survey to CK = at least 40% but < 60% impaired, limited or restricted score or less. Met thus far      Plan   Continue with established Plan of Care towards established PT goals.

## 2019-06-28 ENCOUNTER — CLINICAL SUPPORT (OUTPATIENT)
Dept: REHABILITATION | Facility: OTHER | Age: 74
End: 2019-06-28
Attending: ORTHOPAEDIC SURGERY
Payer: MEDICARE

## 2019-06-28 DIAGNOSIS — G89.29 CHRONIC BILATERAL LOW BACK PAIN WITHOUT SCIATICA: Primary | ICD-10-CM

## 2019-06-28 DIAGNOSIS — M54.50 CHRONIC BILATERAL LOW BACK PAIN WITHOUT SCIATICA: Primary | ICD-10-CM

## 2019-06-28 PROCEDURE — 97110 THERAPEUTIC EXERCISES: CPT

## 2019-06-28 NOTE — PROGRESS NOTES
"[facility]      Name: [facility] [place] PAT Cantor Jr.  Clinic Number: [id number]    Therapy Diagnosis:   No diagnosis found.  Physician: Leonila Ridley PA-C    Visit Date: 2019    Physician Orders: PT Evaluate and Treat  Medical Diagnosis: Chronic bilateral low back pain without sciatica  Evaluation Date: 2019  Authorization period: 2020  Plan of care Expiration: 19 (Resent POC 19, recommend extension to 19 as needed to complete HB protocol)   Re-Assessment Due: 19  Visit #/Visits authorized:      Time In: 1:00  Time Out: 2:00  Total Time: 30 minutes    Precautions: DM/BLE edema/peripheral neuropathy/CKD/HTN/atrial fibrillation    Pain Pattern: 1 PEN    Subjective   c reports his back is doing ok today. Pt reports he is leaving for a prolonged trip with family this weekend. Verbalizes understanding of importance of HEP compliance.     Patient reports their pain to be 4/10 on a 0-10 scale with 0 being no pain and 10 being the worst pain imaginable.  Pain Location: B LB/ R knee     Occupation:  retired   Pt goals:  " walk more than a block or two without pain, go to [place] without scooter, get my back stronger"    Objective   Baseline Isometric Testing on Med X equipment: Testing administered by PT  Baseline IM Testing Results:   Date of testing: 3/29/19  ROM 0-36 deg (increased to 48-0 on 19)   Max Peak Torque 91    Min Peak Torque 26    Flex/Ext Ratio 3.5   % below normative data 42      [facility] Isometric Testing on Med X equipment: Testing administered by PT  10th Visit IM Testing Results:   Date of testin19  ROM  48-0 deg   Max Peak Torque  160 ft/lbs   Min Peak Torque  68 ft/lbs   Flex/Ext Ratio  2.35   % normative data  -35%    % initial testing           +95%     CMS Impairment/Limitation/Restriction for FOTO Lumbar Spine Survey  Status Limitation G-Code CMS Severity Modifier  Intake 50% 50%  Predicted 58% 42% Goal Status+ CK - " At least 40 percent but less than 60 percent  5/8/2019 55% 45%  5/27/2019 43% 57% Current Status CK - At least 40 percent but less than 60 percent  Treatment    Pt was instructed in and performed the following:     Jmc received therapeutic exercises to develop/improved posture, cardiovascular endurance, muscular endurance, lumbar/cervical ROM, strength and muscular endurance for 60 minutes including the following exercises:   [facility] 6/28/2019   Visit Number 17   VAS Pain Rating 5   Treadmill Time (in min.) -   Recumbent Bike Seat Pos. -   Time 10   Flexion in Lying 10   Lumbar Extension Seat Pad -   Femur Restraint -   Top Dead Center -   Counterweight -   Lumbar Flexion -   Lumbar Extension -   Lumbar Peak Torque -   Min Torque -   Test Percent Below Normative Data -   Test Percent Gain in Strength from Initial  -   Lumbar Weight 88   Repetitions 20   Rating of Perceived Exertion 5   Ice - Z Lie (in min.) 10     Flexion in lying 10x 5 s/h (with no use of ball)  LTR x20  PPT x10 --> TrA +Marching x20   Bridge 10x  EIS with towel overpressure 10x     Standing hip abduction 5 reps 3 sets  (with toe touch)   Standing hip extension 5 reps 3 sets (with toe touch)         Peripheral muscle strengthening which included 1 set of 15-20 repetitions at a slow, controlled 10-13 second per rep pace focused on strengthening supporting musculature for improved body mechanics and functional mobility.  Pt and therapist focused on proper form during treatment to ensure optimal strengthening of each targeted muscle group.  Machines were utilized including torso rotation, leg extension, leg curl, chest press, upright row. Tricep extension, bicep curl, leg press, and hip abduction added visit 3      Home Exercises Provided and Patient Education Provided               Flexion in lying 10x, 3x/day (no use of ball)              PPT              LTR 10x 3x/day  SKTC 10x   Bridge 10x   Seated PPT 10x   Hip/back disassociation  10x  Sit<>stand squat training 3x 5 reps       Education provided:   - PT educated patient on goals to advance LE strength and hip stability with progressive exercises to improve level of function.    Written Home Exercises Provided: Patient instructed to cont prior HEP.  Exercises were reviewed and [facility] was able to demonstrate them prior to the end of the session.  c demonstrated good  understanding of the education provided.     See EMR under Patient Instructions for exercises provided prior visit.    Assessment     Pt able to tolerate all components of session with no adverse effects on this date. Pt reports he is going on vacation this weekend with family. Verbalizes an understanding of importance of HEP completion. Pt able to achieve all 20 repetitions of lumbar medx machine with appopriate exertion level. Resistance will increase at following session. Pt will be progressed as tolerated.   Pt will continue to benefit from skilled outpatient physical therapy to address the deficits stated in the impairment chart, provide pt/family education and to maximize pt's level of independence in the home and community environment.     Anticipated Barriers for therapy: None  Pt's spiritual, cultural and educational needs considered and pt agreeable to plan of care and goals as stated below:   GOALS: Pt is in agreement with the following goals.     Short term goals:  6 weeks or 10 visits   1.  Pt will demonstrate increased lumbar ROM by at least 3 degrees from the initial ROM value with improvements noted in functional ROM and ability to perform ADLs Met 5/13/19  2.  Pt will demonstrate increased maximum isometric torque value by 10 when compared to the initial value resulting in improved ability to perform bending, lifting, and carrying activities safely, confidently. Met  3.  Patient report a reduction in worst pain score by 1-2 points for improved tolerance during work and recreational activities Progressing  4.  Pt  able to perform HEP correctly with minimal cueing or supervision for therapist Progressin     Long term goals: 13 weeks or 20 visits   1. Pt will demonstrate increased lumbar ROM by at least 6 degrees from initial ROM value, resulting in improved ability to perform functional fwd bending while standing and sitting.  Met 5/13/19  2. Pt will demonstrate increased maximum isometric torque value by 30% when compared to the initial value resulting in improved ability to perform bending, lifting, and carrying activities safely, confidently. Met  3. Pt to demonstrate ability to independently control and reduce their pain through posture positioning and mechanical movements throughout a typical day. Progressing, not met.  4.  Patient will demonstrate improved overall function per FOTO Survey to CK = at least 40% but < 60% impaired, limited or restricted score or less. Met thus far      Plan   Continue with established Plan of Care towards established PT goals.

## 2019-07-18 ENCOUNTER — CLINICAL SUPPORT (OUTPATIENT)
Dept: REHABILITATION | Facility: OTHER | Age: 74
End: 2019-07-18
Attending: ORTHOPAEDIC SURGERY
Payer: MEDICARE

## 2019-07-18 DIAGNOSIS — M54.50 CHRONIC BILATERAL LOW BACK PAIN WITHOUT SCIATICA: Primary | ICD-10-CM

## 2019-07-18 DIAGNOSIS — G89.29 CHRONIC BILATERAL LOW BACK PAIN WITHOUT SCIATICA: Primary | ICD-10-CM

## 2019-07-18 PROCEDURE — 97110 THERAPEUTIC EXERCISES: CPT

## 2019-07-18 NOTE — PROGRESS NOTES
"[facility]      Name: [facility] [place] PAT Cantor Jr.  Clinic Number: [id number]    Therapy Diagnosis:   Encounter Diagnosis   Name Primary?    Chronic bilateral low back pain without sciatica Yes     Physician: Leonila Ridley PA-C    Visit Date: 2019    Physician Orders: PT Evaluate and Treat  Medical Diagnosis: Chronic bilateral low back pain without sciatica  Evaluation Date: 2019  Authorization period: 2020  Plan of care Expiration: 19 (Resent POC 19, recommend extension to 19 as needed to complete HB protocol)   Re-Assessment Due: 19  Visit #/Visits authorized:      Time In: 1:30  Time Out: 2:30  Total Time: 30 minutes    Precautions: DM/BLE edema/peripheral neuropathy/CKD/HTN/atrial fibrillation    Pain Pattern: 1 PEN    Subjective   [facility] [place] reports his back was in pain today. Pt reports driving a lot  and he feels sitting prolonged my have been why.     Patient reports their pain to be 7/10 on a 0-10 scale with 0 being no pain and 10 being the worst pain imaginable.  Pain Location: B LB/ R knee     Occupation:  retired   Pt goals:  " walk more than a block or two without pain, go to [place] without scooter, get my back stronger"    Objective   Baseline Isometric Testing on Med X equipment: Testing administered by PT  Baseline IM Testing Results:   Date of testing: 3/29/19  ROM 0-36 deg (increased to 48-0 on 19)   Max Peak Torque 91    Min Peak Torque 26    Flex/Ext Ratio 3.5   % below normative data 42      [facility] Isometric Testing on Med X equipment: Testing administered by PT  10th Visit IM Testing Results:   Date of testin19  ROM  48-0 deg   Max Peak Torque  160 ft/lbs   Min Peak Torque  68 ft/lbs   Flex/Ext Ratio  2.35   % normative data  -35%    % initial testing           +95%     CMS Impairment/Limitation/Restriction for FOTO Lumbar Spine Survey  Status Limitation G-Code CMS Severity Modifier  Intake 50% 50%  Predicted " 58% 42% Goal Status+ CK - At least 40 percent but less than 60 percent  5/8/2019 55% 45%  5/27/2019 43% 57% Current Status CK - At least 40 percent but less than 60 percent  Treatment    Pt was instructed in and performed the following:     Jmc received therapeutic exercises to develop/improved posture, cardiovascular endurance, muscular endurance, lumbar/cervical ROM, strength and muscular endurance for 60 minutes including the following exercises:     Flexion in lying 10x 5 s/h (with no use of ball)  LTR x20  PPT x10 --> TrA +Marching x20   Bridge 10x  EIS with towel overpressure 10x     Standing hip abduction 5 reps 3 sets  (with toe touch)   Standing hip extension 5 reps 3 sets (with toe touch)         Peripheral muscle strengthening which included 1 set of 15-20 repetitions at a slow, controlled 10-13 second per rep pace focused on strengthening supporting musculature for improved body mechanics and functional mobility.  Pt and therapist focused on proper form during treatment to ensure optimal strengthening of each targeted muscle group.  Machines were utilized including torso rotation, leg extension, leg curl, chest press, upright row. Tricep extension, bicep curl, leg press, and hip abduction added visit 3    [facility] 7/18/2019   Visit Number 18   VAS Pain Rating 7   Treadmill Time (in min.) -   Recumbent Bike Seat Pos. -   Time 10   Flexion in Lying -   Lumbar Extension Seat Pad -   Femur Restraint -   Top Dead Center -   Counterweight -   Lumbar Flexion -   Lumbar Extension -   Lumbar Peak Torque -   Min Torque -   Test Percent Below Normative Data -   Test Percent Gain in Strength from Initial  -   Lumbar Weight 92   Repetitions 18   Rating of Perceived Exertion 3   Ice - Z Lie (in min.) 10   Home Exercises Provided and Patient Education Provided               Flexion in lying 10x, 3x/day (no use of ball)              PPT              LTR 10x 3x/day  SKTC 10x   Bridge 10x   Seated PPT 10x    Hip/back disassociation 10x  Sit<>stand squat training 3x 5 reps       Education provided:   - PT educated patient on goals to advance LE strength and hip stability with progressive exercises to improve level of function.    Written Home Exercises Provided: Patient instructed to cont prior HEP.  Exercises were reviewed and [facility] was able to demonstrate them prior to the end of the session.  c demonstrated good  understanding of the education provided.     See EMR under Patient Instructions for exercises provided prior visit.    Assessment     Pt with a decrease in his LBP post HEP and session.   Educated him to stretch if he has pain or not due to pt has not been stretching.Verbalizes an understanding of importance of HEP completion. Pt able to increase his weight on the lumbar machine with  Reps and RPE  Pt will be progressed as tolerated.   Pt will continue to benefit from skilled outpatient physical therapy to address the deficits stated in the impairment chart, provide pt/family education and to maximize pt's level of independence in the home and community environment.     Anticipated Barriers for therapy: None  Pt's spiritual, cultural and educational needs considered and pt agreeable to plan of care and goals as stated below:   GOALS: Pt is in agreement with the following goals.     Short term goals:  6 weeks or 10 visits   1.  Pt will demonstrate increased lumbar ROM by at least 3 degrees from the initial ROM value with improvements noted in functional ROM and ability to perform ADLs Met 5/13/19  2.  Pt will demonstrate increased maximum isometric torque value by 10 when compared to the initial value resulting in improved ability to perform bending, lifting, and carrying activities safely, confidently. Met  3.  Patient report a reduction in worst pain score by 1-2 points for improved tolerance during work and recreational activities Progressing  4.  Pt able to perform HEP correctly with minimal cueing or  supervision for therapist Progressin     Long term goals: 13 weeks or 20 visits   1. Pt will demonstrate increased lumbar ROM by at least 6 degrees from initial ROM value, resulting in improved ability to perform functional fwd bending while standing and sitting.  Met 5/13/19  2. Pt will demonstrate increased maximum isometric torque value by 30% when compared to the initial value resulting in improved ability to perform bending, lifting, and carrying activities safely, confidently. Met  3. Pt to demonstrate ability to independently control and reduce their pain through posture positioning and mechanical movements throughout a typical day. Progressing, not met.  4.  Patient will demonstrate improved overall function per FOTO Survey to CK = at least 40% but < 60% impaired, limited or restricted score or less. Met thus far      Plan   Continue with established Plan of Care towards established PT goals.

## 2019-07-19 ENCOUNTER — HOSPITAL ENCOUNTER (OUTPATIENT)
Dept: CARDIOLOGY | Facility: CLINIC | Age: 74
Discharge: HOME | End: 2019-07-19
Payer: MEDICARE

## 2019-07-19 ENCOUNTER — OFFICE VISIT (OUTPATIENT)
Dept: ELECTROPHYSIOLOGY | Facility: CLINIC | Age: 74
End: 2019-07-19
Payer: MEDICARE

## 2019-07-19 ENCOUNTER — HOSPITAL ENCOUNTER (OUTPATIENT)
Dept: CARDIOLOGY | Facility: CLINIC | Age: 74
Discharge: HOME | End: 2019-07-19
Attending: NURSE PRACTITIONER
Payer: MEDICARE

## 2019-07-19 VITALS
SYSTOLIC BLOOD PRESSURE: 120 MMHG | HEIGHT: 70 IN | HEART RATE: 68 BPM | DIASTOLIC BLOOD PRESSURE: 70 MMHG | BODY MASS INDEX: 37.65 KG/M2 | WEIGHT: 263 LBS

## 2019-07-19 VITALS
SYSTOLIC BLOOD PRESSURE: 121 MMHG | HEIGHT: 70 IN | HEART RATE: 65 BPM | WEIGHT: 260 LBS | DIASTOLIC BLOOD PRESSURE: 70 MMHG | BODY MASS INDEX: 37.22 KG/M2

## 2019-07-19 DIAGNOSIS — I48.20 CHRONIC ATRIAL FIBRILLATION: ICD-10-CM

## 2019-07-19 DIAGNOSIS — E78.5 DYSLIPIDEMIA ASSOCIATED WITH TYPE 2 DIABETES MELLITUS: ICD-10-CM

## 2019-07-19 DIAGNOSIS — G47.33 OBSTRUCTIVE SLEEP APNEA SYNDROME: ICD-10-CM

## 2019-07-19 DIAGNOSIS — Z79.01 LONG TERM CURRENT USE OF ANTICOAGULANT THERAPY: ICD-10-CM

## 2019-07-19 DIAGNOSIS — E11.69 DYSLIPIDEMIA ASSOCIATED WITH TYPE 2 DIABETES MELLITUS: ICD-10-CM

## 2019-07-19 DIAGNOSIS — I48.20 CHRONIC ATRIAL FIBRILLATION: Primary | ICD-10-CM

## 2019-07-19 DIAGNOSIS — I10 ESSENTIAL HYPERTENSION: ICD-10-CM

## 2019-07-19 LAB
ASCENDING AORTA: 3.26 CM
AV INDEX (PROSTH): 0.69
AV MEAN GRADIENT: 3 MMHG
AV PEAK GRADIENT: 6 MMHG
AV VALVE AREA: 2.66 CM2
AV VELOCITY RATIO: 0.64
BSA FOR ECHO PROCEDURE: 2.41 M2
CV ECHO LV RWT: 0.4 CM
DOP CALC AO PEAK VEL: 1.21 M/S
DOP CALC AO VTI: 25.29 CM
DOP CALC LVOT AREA: 3.8 CM2
DOP CALC LVOT DIAMETER: 2.21 CM
DOP CALC LVOT PEAK VEL: 0.77 M/S
DOP CALC LVOT STROKE VOLUME: 67.21 CM3
DOP CALCLVOT PEAK VEL VTI: 17.53 CM
E WAVE DECELERATION TIME: 176.67 MSEC
E/A RATIO: 3.93
E/E' RATIO: 12.22 M/S
ECHO LV POSTERIOR WALL: 0.98 CM (ref 0.6–1.1)
FRACTIONAL SHORTENING: 39 % (ref 28–44)
INTERVENTRICULAR SEPTUM: 0.9 CM (ref 0.6–1.1)
IVRT: 0.11 MSEC
LA MAJOR: 6.09 CM
LA MINOR: 6.05 CM
LA WIDTH: 4.35 CM
LEFT ATRIUM SIZE: 3.91 CM
LEFT ATRIUM VOLUME INDEX: 37.6 ML/M2
LEFT ATRIUM VOLUME: 87.75 CM3
LEFT INTERNAL DIMENSION IN SYSTOLE: 2.99 CM (ref 2.1–4)
LEFT VENTRICLE DIASTOLIC VOLUME INDEX: 48.08 ML/M2
LEFT VENTRICLE DIASTOLIC VOLUME: 112.19 ML
LEFT VENTRICLE MASS INDEX: 69 G/M2
LEFT VENTRICLE SYSTOLIC VOLUME INDEX: 14.8 ML/M2
LEFT VENTRICLE SYSTOLIC VOLUME: 34.6 ML
LEFT VENTRICULAR INTERNAL DIMENSION IN DIASTOLE: 4.89 CM (ref 3.5–6)
LEFT VENTRICULAR MASS: 161.47 G
LV LATERAL E/E' RATIO: 11 M/S
LV SEPTAL E/E' RATIO: 13.75 M/S
MV PEAK A VEL: 0.28 M/S
MV PEAK E VEL: 1.1 M/S
PISA TR MAX VEL: 2.65 M/S
RA MAJOR: 5.29 CM
RA PRESSURE: 3 MMHG
RA WIDTH: 4.37 CM
RIGHT VENTRICULAR END-DIASTOLIC DIMENSION: 3.45 CM
RV TISSUE DOPPLER FREE WALL SYSTOLIC VELOCITY 1 (APICAL 4 CHAMBER VIEW): 8.31 CM/S
SINUS: 3.34 CM
STJ: 2.64 CM
TDI LATERAL: 0.1 M/S
TDI SEPTAL: 0.08 M/S
TDI: 0.09 M/S
TR MAX PG: 28 MMHG
TRICUSPID ANNULAR PLANE SYSTOLIC EXCURSION: 1.71 CM
TV REST PULMONARY ARTERY PRESSURE: 31 MMHG

## 2019-07-19 PROCEDURE — 3078F DIAST BP <80 MM HG: CPT | Mod: CPTII,S$GLB,, | Performed by: INTERNAL MEDICINE

## 2019-07-19 PROCEDURE — 3078F PR MOST RECENT DIASTOLIC BLOOD PRESSURE < 80 MM HG: ICD-10-PCS | Mod: CPTII,S$GLB,, | Performed by: INTERNAL MEDICINE

## 2019-07-19 PROCEDURE — 93306 TTE W/DOPPLER COMPLETE: CPT | Mod: S$GLB,,, | Performed by: INTERNAL MEDICINE

## 2019-07-19 PROCEDURE — 99999 PR PBB SHADOW E&M-EST. PATIENT-LVL III: CPT | Mod: PBBFAC,,, | Performed by: INTERNAL MEDICINE

## 2019-07-19 PROCEDURE — 93010 RHYTHM STRIP: ICD-10-PCS | Mod: S$GLB,,, | Performed by: INTERNAL MEDICINE

## 2019-07-19 PROCEDURE — 3044F PR MOST RECENT HEMOGLOBIN A1C LEVEL <7.0%: ICD-10-PCS | Mod: CPTII,S$GLB,, | Performed by: INTERNAL MEDICINE

## 2019-07-19 PROCEDURE — 93005 RHYTHM STRIP: ICD-10-PCS | Mod: S$GLB,,, | Performed by: INTERNAL MEDICINE

## 2019-07-19 PROCEDURE — 99214 PR OFFICE/OUTPT VISIT, EST, LEVL IV, 30-39 MIN: ICD-10-PCS | Mod: S$GLB,,, | Performed by: INTERNAL MEDICINE

## 2019-07-19 PROCEDURE — 3074F PR MOST RECENT SYSTOLIC BLOOD PRESSURE < 130 MM HG: ICD-10-PCS | Mod: CPTII,S$GLB,, | Performed by: INTERNAL MEDICINE

## 2019-07-19 PROCEDURE — 93010 ELECTROCARDIOGRAM REPORT: CPT | Mod: S$GLB,,, | Performed by: INTERNAL MEDICINE

## 2019-07-19 PROCEDURE — 93306 TRANSTHORACIC ECHO (TTE) COMPLETE: ICD-10-PCS | Mod: S$GLB,,, | Performed by: INTERNAL MEDICINE

## 2019-07-19 PROCEDURE — 1101F PT FALLS ASSESS-DOCD LE1/YR: CPT | Mod: CPTII,S$GLB,, | Performed by: INTERNAL MEDICINE

## 2019-07-19 PROCEDURE — 93005 ELECTROCARDIOGRAM TRACING: CPT | Mod: S$GLB,,, | Performed by: INTERNAL MEDICINE

## 2019-07-19 PROCEDURE — 99214 OFFICE O/P EST MOD 30 MIN: CPT | Mod: S$GLB,,, | Performed by: INTERNAL MEDICINE

## 2019-07-19 PROCEDURE — 3074F SYST BP LT 130 MM HG: CPT | Mod: CPTII,S$GLB,, | Performed by: INTERNAL MEDICINE

## 2019-07-19 PROCEDURE — 1101F PR PT FALLS ASSESS DOC 0-1 FALLS W/OUT INJ PAST YR: ICD-10-PCS | Mod: CPTII,S$GLB,, | Performed by: INTERNAL MEDICINE

## 2019-07-19 PROCEDURE — 3044F HG A1C LEVEL LT 7.0%: CPT | Mod: CPTII,S$GLB,, | Performed by: INTERNAL MEDICINE

## 2019-07-19 PROCEDURE — 99999 PR PBB SHADOW E&M-EST. PATIENT-LVL III: ICD-10-PCS | Mod: PBBFAC,,, | Performed by: INTERNAL MEDICINE

## 2019-07-19 NOTE — PROGRESS NOTES
"Mr. Cantor is a patient of Dr. Granda and was last seen in clinic 6/23/2017.      Subjective:   Patient ID:  [facility] [place] PAT Cantor Jr. is a 73 y.o. male who presents for follow-up of Atrial Fibrillation  .     HPI:    Mr. Cantor is a 73 y.o. male with pAF, HEIDI on CPAP, CKDIII here for follow up.     Background:    History of pAF.  Mr. Cantor underwent a DCCV (01/02/14). Following this, he wore an event monitor, which showed pAF with no change in symptoms. His rate was well controlled.   Continued to do well, except on occasion he gets "drained out" feeling / "run down." At those times, his wrist SBP measurement is 60s-70s.   Mr. Cantro is doing well from a rhythm perspective; rate-controlled on Toprol-XL and anticoagulated on Eliquis.  Relatively hypotensive, with episodes of hypotension per HPI.  Decrease ARB. Split meds: BB in AM, ARB in PM.    Update (07/19/2019):  He has no cardiac complaints. Mr. Cantor denies chest pain with exertion or at rest, palpitations, SOB, BOYD, dizziness, or syncope. He reports no limitations in activity tolerance. He is compliant with his nightly CPAP.    I have personally reviewed the patient's EKG today, which shows rate-controlled atrial fibrillation 82mg.     Recent Cardiac Tests:  echo 58% LVEF    Current Outpatient Prescriptions   Medication Sig    apixaban 5 mg Tab Take 1 tablet (5 mg total) by mouth 2 (two) times daily.    aspirin 325 MG tablet Take 325 mg by mouth once daily.    azelastine (ASTELIN) 137 mcg (0.1 %) nasal spray 1 spray (137 mcg total) by Nasal route 2 (two) times daily.    BD INSULIN PEN NEEDLE UF ORIG 29 x 1/2 " Ndle     BD ULTRA-FINE BASIL PEN NEEDLES 32 gauge x 5/32" Ndle CHECK BLOOD SUGAR FOUR TIMES DAILY    ciclopirox (PENLAC) 8 % Soln Apply topically nightly.    clobetasol (TEMOVATE) 0.05 % cream AAA finger bid    clotrimazole-betamethasone 1-0.05% (LOTRISONE) cream Apply topically 2 (two) times daily.    fenofibrate (TRICOR) 145 MG tablet TAKE ONE " TABLET BY MOUTH EVERY DAY    furosemide (LASIX) 40 MG tablet TAKE ONE TABLET BY MOUTH EVERY DAY AS NEEDED (Patient taking differently: TAKE ONE TABLET BY MOUTH EVERY DAY)    gabapentin (NEURONTIN) 300 MG capsule Take 1 capsule (300 mg total) by mouth 3 (three) times daily.    insulin aspart (NOVOLOG FLEXPEN) 100 unit/mL InPn pen Novolog 20 units breakfast, 22 units lunch and dinner plus correction scale. Max daily dose=100 units    insulin glargine (BASAGLAR KWIKPEN U-100 INSULIN) 100 unit/mL (3 mL) InPn pen Inject 60 Units into the skin once daily. (Patient taking differently: Inject 60 Units into the skin every evening. )    ketoconazole (NIZORAL) 2 % cream Apply topically once daily.    lancets 33 gauge Misc 1 lancet by Misc.(Non-Drug; Combo Route) route 4 (four) times daily. Pt uses True Result Meter, bg monitoring 4 times a day.    levothyroxine (SYNTHROID) 25 MCG tablet Take 1 tablet (25 mcg total) by mouth once daily.    losartan (COZAAR) 25 MG tablet Take 1 tablet (25 mg total) by mouth once daily.    metoprolol succinate (TOPROL-XL) 100 MG 24 hr tablet TAKE ONE TABLET BY MOUTH EVERY DAY    metronidazole (METROGEL) 0.75 % gel Apply topically 2 (two) times daily.    multivitamin capsule Take 1 capsule by mouth every morning.     nystatin-triamcinolone (MYCOLOG II) cream apply TWICE DAILY (Patient taking differently: apply TWICE DAILY-using as needed for rash)    omega-3 acid ethyl esters (LOVAZA) 1 gram capsule TAKE THREE CAPSULE BY MOUTH TWICE DAILY    pravastatin (PRAVACHOL) 10 MG tablet Take 1 tablet (10 mg total) by mouth once daily. (Patient taking differently: Take 10 mg by mouth every evening. )    tamsulosin (FLOMAX) 0.4 mg Cp24 Take 1 capsule (0.4 mg total) by mouth once daily.    temazepam (RESTORIL) 15 mg Cap TAKE ONE CAPSULE BY MOUTH EVERY EVENING    tiZANidine (ZANAFLEX) 4 MG tablet TAKE ONE TABLET BY MOUTH EVERY 6 HOURS AS NEEDED    triamcinolone acetonide 0.1% (KENALOG) 0.1 %  "cream Apply topically 2 (two) times daily. Apply to affected area twice daily as directed.    vitamin E 1000 UNIT capsule Take 1,000 Units by mouth every morning. 1 Capsule Oral Every day     No current facility-administered medications for this visit.      Review of Systems   Constitution: Negative. Negative for malaise/fatigue.   HENT: Negative.  Negative for ear pain and tinnitus.    Eyes: Negative for blurred vision.   Cardiovascular: Negative.  Negative for chest pain, dyspnea on exertion, irregular heartbeat, leg swelling, near-syncope, palpitations and syncope.   Respiratory: Negative.  Negative for shortness of breath.    Endocrine: Negative.  Negative for polyuria.   Hematologic/Lymphatic: Negative for bleeding problem. Does not bruise/bleed easily.   Skin: Negative.  Negative for rash.   Musculoskeletal: Negative.  Negative for joint pain, muscle weakness and myalgias.   Gastrointestinal: Negative.  Negative for abdominal pain, change in bowel habit, hematemesis, hematochezia and nausea.   Genitourinary: Negative for frequency and hematuria.   Neurological: Negative.  Negative for dizziness, light-headedness and weakness.   Psychiatric/Behavioral: Negative.  Negative for altered mental status and depression. The patient is not nervous/anxious.    Allergic/Immunologic: Negative for environmental allergies and persistent infections.     Objective:        /70   Pulse 68   Ht 5' 10" (1.778 m)   Wt 119.3 kg (263 lb 0.1 oz)   BMI 37.74 kg/m²     Physical Exam   Constitutional: He is oriented to person, place, and time. He appears well-developed and well-nourished.   HENT:   Head: Normocephalic and atraumatic.   Nose: Nose normal.   Eyes: Pupils are equal, round, and reactive to light. Conjunctivae, EOM and lids are normal. No scleral icterus.       Neck: Normal range of motion. No JVD present. No tracheal deviation present. No thyromegaly present.   Cardiovascular: Normal rate, S1 normal, S2 normal " and normal heart sounds. An irregularly irregular rhythm present.  No extrasystoles are present. PMI is not displaced. Exam reveals no gallop and no friction rub.   No murmur heard.  Pulses:       Radial pulses are 2+ on the right side, and 2+ on the left side.   Pulmonary/Chest: Effort normal and breath sounds normal. No accessory muscle usage. No tachypnea. No respiratory distress. He has no wheezes. He has no rales.   Abdominal: Soft. Normal appearance and bowel sounds are normal. He exhibits no distension. There is no hepatosplenomegaly. There is no tenderness.   Musculoskeletal: Normal range of motion. He exhibits no edema.   Neurological: He is alert and oriented to person, place, and time. He has normal reflexes. He exhibits normal muscle tone.   Skin: Skin is warm and dry. No rash noted. No erythema.   Psychiatric: He has a normal mood and affect. His speech is normal and behavior is normal.   Nursing note and vitals reviewed.    Lab Results   Component Value Date     03/27/2019    K 4.1 03/27/2019    MG 1.9 07/30/2018    BUN 23 03/27/2019    CREATININE 1.3 03/27/2019    ALT 12 08/04/2018    AST 28 08/04/2018    HGB 14.0 08/04/2018    HCT 35 (L) 08/05/2018    TSH 2.690 08/15/2018    LDLCALC 95.2 11/15/2017     Recent Labs   Lab 07/14/18  0819 07/15/18  0617 07/16/18  0600 08/04/18  2239   INR 2.1 H 2.8 H 1.8 H 1.0           Assessment:         1. Chronic atrial fibrillation    2. Essential hypertension    3. Dyslipidemia associated with type 2 diabetes mellitus    4. Long term current use of anticoagulant therapy    5. Obstructive sleep apnea syndrome      Plan:     In summary, Mr. Cantor is a 73 y.o. male with pAF, HEIDI on CPAP, CKDIII here for follow up. He is doing well in a rate control strategy, with normal EF per today's echo. He remains anticoagulated on eliquis and rate controlled on metoprolol. Will make no changes.    OK for eye surgery to hold a/c x3 days before. Restart afterward when  surgically safe.    Return in 1 year with echo, or earlier prn.

## 2019-07-23 ENCOUNTER — CLINICAL SUPPORT (OUTPATIENT)
Dept: REHABILITATION | Facility: OTHER | Age: 74
End: 2019-07-23
Attending: ORTHOPAEDIC SURGERY
Payer: MEDICARE

## 2019-07-23 DIAGNOSIS — G89.29 CHRONIC BILATERAL LOW BACK PAIN WITHOUT SCIATICA: Primary | ICD-10-CM

## 2019-07-23 DIAGNOSIS — M54.50 CHRONIC BILATERAL LOW BACK PAIN WITHOUT SCIATICA: Primary | ICD-10-CM

## 2019-07-23 PROCEDURE — 97110 THERAPEUTIC EXERCISES: CPT

## 2019-07-23 NOTE — PLAN OF CARE
"[facility]     Name: [facility] [place] PAT Cantor Jr.  Clinic Number: [id number]    Therapy Diagnosis:   Encounter Diagnosis   Name Primary?    Chronic bilateral low back pain without sciatica Yes     Physician: Leonila Ridley PA-C    Visit Date: 2019    Physician Orders: PT Evaluate and Treat  Medical Diagnosis: Chronic bilateral low back pain without sciatica  Evaluation Date: 2019  Authorization period: 2020  Plan of care Expiration: 19  Re-Assessment Due: 19  Visit #/Visits authorized:      Time In: 1:30  Time Out: 2:30  Total Time: 30 minutes    Precautions: DM/BLE edema/peripheral neuropathy/CKD/HTN/atrial fibrillation    Pain Pattern: 1 PEN    Subjective   c reports his back was in pain today. Pt reports driving a lot  and he feels sitting prolonged my have been why.     Patient reports their pain to be 7/10 on a 0-10 scale with 0 being no pain and 10 being the worst pain imaginable.  Pain Location: B LB/ R knee     Occupation:  retired   Pt goals:  " walk more than a block or two without pain, go to [employer] without scooter, get my back stronger"    Objective   Baseline Isometric Testing on Med X equipment: Testing administered by PT  Baseline IM Testing Results:   Date of testing: 3/29/19  ROM 0-36 deg (increased to 48-0 on 19)   Max Peak Torque 91    Min Peak Torque 26    Flex/Ext Ratio 3.5   % below normative data 42      Mid-Point Isometric Testing on Med X equipment: Testing administered by PT  10th Visit IM Testing Results:   Date of testin19  ROM  48-0 deg   Max Peak Torque  160 ft/lbs   Min Peak Torque  68 ft/lbs   Flex/Ext Ratio  2.35   % normative data  -35%    % initial testing           +95%     end Isometric Testing on Med X equipment: Testing administered by PT  10th Visit IM Testing Results:   Date of testin19  ROM  48-0 deg   Max Peak Torque  190 ft/lbs   Min Peak Torque 71 ft/lbs   Flex/Ext Ratio  2.6 "   % normative data  -27%    % initial testing          118%     CMS Impairment/Limitation/Restriction for FOTO Lumbar Spine Survey  Status Limitation G-Code CMS Severity Modifier  Intake 50% 50% strength gain  Predicted 58% 42% Goal Status+ CK - At least 40 percent but less than 60 percent  5/8/2019 55% 45%  5/27/2019 43% 57% Current Status CK - At least 40 percent but less than 60 percent  7/23/19 53%  Treatment    Pt was instructed in and performed the following:     [facility] received therapeutic exercises to develop/improved posture, cardiovascular endurance, muscular endurance, lumbar/cervical ROM, strength and muscular endurance for 60 minutes including the following exercises:   [facility] 7/23/2019   Visit Number 20   VAS Pain Rating 0   Treadmill Time (in min.) -   Recumbent Bike Seat Pos. -   Time 10   Flexion in Lying 10   Lumbar Extension Seat Pad -   Femur Restraint -   Top Dead Center -   Counterweight -   Lumbar Flexion -   Lumbar Extension -   Lumbar Peak Torque 191   Min Torque 71   Test Percent Below Normative Data 27   Test Percent Gain in Strength from Initial  118   Lumbar Weight -   Repetitions -   Rating of Perceived Exertion -   Ice - Z Lie (in min.) 10     Flexion in lying 10x 5 s/h (with no use of ball)  LTR x20  PPT x10 --> TrA +Marching x20   Bridge 10x  EIS with towel overpressure 10x     Standing hip abduction 5 reps 3 sets  (with toe touch)   Standing hip extension 5 reps 3 sets (with toe touch)     Peripheral muscle strengthening which included 1 set of 15-20 repetitions at a slow, controlled 10-13 second per rep pace focused on strengthening supporting musculature for improved body mechanics and functional mobility.  Pt and therapist focused on proper form during treatment to ensure optimal strengthening of each targeted muscle group.  Machines were utilized including torso rotation, leg extension, leg curl, chest press, upright row. Tricep extension, bicep curl, leg press, and  hip abduction added visit 3  Home Exercises Provided and Patient Education Provided               Flexion in lying 10x, 3x/day (no use of ball)              PPT              LTR 10x 3x/day  SKTC 10x   Bridge 10x   Seated PPT 10x   Hip/back disassociation 10x  Sit<>stand squat training 3x 5 reps       Education provided:   - PT educated patient on goals to advance LE strength and hip stability with progressive exercises to improve level of function.    Written Home Exercises Provided: Patient instructed to cont prior HEP.  Exercises were reviewed and [facility] was able to demonstrate them prior to the end of the session.  c demonstrated good  understanding of the education provided.     See EMR under Patient Instructions for exercises provided prior visit.    Assessment     Pt tested today on visit 20/24.  118% increased strength overall and 27% below norms.  Pt will continue x 4 visits then transition to wellness program to maintain strength Pt will continue to benefit from skilled outpatient physical therapy to address the deficits stated in the impairment chart, provide pt/family education and to maximize pt's level of independence in the home and community environment.     Anticipated Barriers for therapy: None  Pt's spiritual, cultural and educational needs considered and pt agreeable to plan of care and goals as stated below:   GOALS: Pt is in agreement with the following goals.     Short term goals:  6 weeks or 10 visits   1.  Pt will demonstrate increased lumbar ROM by at least 3 degrees from the initial ROM value with improvements noted in functional ROM and ability to perform ADLs Met 5/13/19  2.  Pt will demonstrate increased maximum isometric torque value by 10 when compared to the initial value resulting in improved ability to perform bending, lifting, and carrying activities safely, confidently. Met  3.  Patient report a reduction in worst pain score by 1-2 points for improved tolerance during work and  recreational activities Progressing  4.  Pt able to perform HEP correctly with minimal cueing or supervision for therapist Progressin     Long term goals: 13 weeks or 20 visits   1. Pt will demonstrate increased lumbar ROM by at least 6 degrees from initial ROM value, resulting in improved ability to perform functional fwd bending while standing and sitting.  Met 5/13/19  2. Pt will demonstrate increased maximum isometric torque value by 30% when compared to the initial value resulting in improved ability to perform bending, lifting, and carrying activities safely, confidently. Met  3. Pt to demonstrate ability to independently control and reduce their pain through posture positioning and mechanical movements throughout a typical day. Progressing, not met.  4.  Patient will demonstrate improved overall function per FOTO Survey to CK = at least 40% but < 60% impaired, limited or restricted score or less. Met thus far      Plan   Continue with established Plan of Care towards established PT goals. Cont x 2 wks to finish visits and transition to wellness program

## 2019-07-23 NOTE — PROGRESS NOTES
"[facility]     Name: [facility] [place] PAT Cantor Jr.  Clinic Number: [id number]    Therapy Diagnosis:   Encounter Diagnosis   Name Primary?    Chronic bilateral low back pain without sciatica Yes     Physician: Leonila Ridley PA-C    Visit Date: 2019    Physician Orders: PT Evaluate and Treat  Medical Diagnosis: Chronic bilateral low back pain without sciatica  Evaluation Date: 2019  Authorization period: 2020  Plan of care Expiration: 19  Re-Assessment Due: 19  Visit #/Visits authorized:      Time In: 1:30  Time Out: 2:30  Total Time: 30 minutes    Precautions: DM/BLE edema/peripheral neuropathy/CKD/HTN/atrial fibrillation    Pain Pattern: 1 PEN    Subjective   c reports his back was in pain today. Pt reports driving a lot  and he feels sitting prolonged my have been why.     Patient reports their pain to be 7/10 on a 0-10 scale with 0 being no pain and 10 being the worst pain imaginable.  Pain Location: B LB/ R knee     Occupation:  retired   Pt goals:  " walk more than a block or two without pain, go to [email or web address] without scooter, get my back stronger"    Objective   Baseline Isometric Testing on Med X equipment: Testing administered by PT  Baseline IM Testing Results:   Date of testing: 3/29/19  ROM 0-36 deg (increased to 48-0 on 19)   Max Peak Torque 91    Min Peak Torque 26    Flex/Ext Ratio 3.5   % below normative data 42      Mid-Point Isometric Testing on Med X equipment: Testing administered by PT  10th Visit IM Testing Results:   Date of testin19  ROM  48-0 deg   Max Peak Torque  160 ft/lbs   Min Peak Torque  68 ft/lbs   Flex/Ext Ratio  2.35   % normative data  -35%    % initial testing           +95%     end Isometric Testing on Med X equipment: Testing administered by PT  10th Visit IM Testing Results:   Date of testin19  ROM  48-0 deg   Max Peak Torque  190 ft/lbs   Min Peak Torque 71 ft/lbs   Flex/Ext Ratio  2.6 "   % normative data  -27%    % initial testing          118%     CMS Impairment/Limitation/Restriction for FOTO Lumbar Spine Survey  Status Limitation G-Code CMS Severity Modifier  Intake 50% 50% strength gain  Predicted 58% 42% Goal Status+ CK - At least 40 percent but less than 60 percent  5/8/2019 55% 45%  5/27/2019 43% 57% Current Status CK - At least 40 percent but less than 60 percent  7/23/19 53%  Treatment    Pt was instructed in and performed the following:     [facility] received therapeutic exercises to develop/improved posture, cardiovascular endurance, muscular endurance, lumbar/cervical ROM, strength and muscular endurance for 60 minutes including the following exercises:   [facility] 7/23/2019   Visit Number 20   VAS Pain Rating 0   Treadmill Time (in min.) -   Recumbent Bike Seat Pos. -   Time 10   Flexion in Lying 10   Lumbar Extension Seat Pad -   Femur Restraint -   Top Dead Center -   Counterweight -   Lumbar Flexion -   Lumbar Extension -   Lumbar Peak Torque 191   Min Torque 71   Test Percent Below Normative Data 27   Test Percent Gain in Strength from Initial  118   Lumbar Weight -   Repetitions -   Rating of Perceived Exertion -   Ice - Z Lie (in min.) 10     Flexion in lying 10x 5 s/h (with no use of ball)  LTR x20  PPT x10 --> TrA +Marching x20   Bridge 10x  EIS with towel overpressure 10x     Standing hip abduction 5 reps 3 sets  (with toe touch)   Standing hip extension 5 reps 3 sets (with toe touch)     Peripheral muscle strengthening which included 1 set of 15-20 repetitions at a slow, controlled 10-13 second per rep pace focused on strengthening supporting musculature for improved body mechanics and functional mobility.  Pt and therapist focused on proper form during treatment to ensure optimal strengthening of each targeted muscle group.  Machines were utilized including torso rotation, leg extension, leg curl, chest press, upright row. Tricep extension, bicep curl, leg press, and  hip abduction added visit 3  Home Exercises Provided and Patient Education Provided               Flexion in lying 10x, 3x/day (no use of ball)              PPT              LTR 10x 3x/day  SKTC 10x   Bridge 10x   Seated PPT 10x   Hip/back disassociation 10x  Sit<>stand squat training 3x 5 reps       Education provided:   - PT educated patient on goals to advance LE strength and hip stability with progressive exercises to improve level of function.    Written Home Exercises Provided: Patient instructed to cont prior HEP.  Exercises were reviewed and [facility] was able to demonstrate them prior to the end of the session.  c demonstrated good  understanding of the education provided.     See EMR under Patient Instructions for exercises provided prior visit.    Assessment     Pt tested today on visit 20/24.  118% increased strength overall and 27% below norms.  Pt will continue x 4 visits then transition to wellness program to maintain strength Pt will continue to benefit from skilled outpatient physical therapy to address the deficits stated in the impairment chart, provide pt/family education and to maximize pt's level of independence in the home and community environment.     Anticipated Barriers for therapy: None  Pt's spiritual, cultural and educational needs considered and pt agreeable to plan of care and goals as stated below:   GOALS: Pt is in agreement with the following goals.     Short term goals:  6 weeks or 10 visits   1.  Pt will demonstrate increased lumbar ROM by at least 3 degrees from the initial ROM value with improvements noted in functional ROM and ability to perform ADLs Met 5/13/19  2.  Pt will demonstrate increased maximum isometric torque value by 10 when compared to the initial value resulting in improved ability to perform bending, lifting, and carrying activities safely, confidently. Met  3.  Patient report a reduction in worst pain score by 1-2 points for improved tolerance during work and  recreational activities Progressing  4.  Pt able to perform HEP correctly with minimal cueing or supervision for therapist Progressin     Long term goals: 13 weeks or 20 visits   1. Pt will demonstrate increased lumbar ROM by at least 6 degrees from initial ROM value, resulting in improved ability to perform functional fwd bending while standing and sitting.  Met 5/13/19  2. Pt will demonstrate increased maximum isometric torque value by 30% when compared to the initial value resulting in improved ability to perform bending, lifting, and carrying activities safely, confidently. Met  3. Pt to demonstrate ability to independently control and reduce their pain through posture positioning and mechanical movements throughout a typical day. Progressing, not met.  4.  Patient will demonstrate improved overall function per FOTO Survey to CK = at least 40% but < 60% impaired, limited or restricted score or less. Met thus far      Plan   Continue with established Plan of Care towards established PT goals. Cont x 2 wks to finish visits and transition to wellness program

## 2019-07-24 DIAGNOSIS — I48.19 PERSISTENT ATRIAL FIBRILLATION: ICD-10-CM

## 2019-07-24 DIAGNOSIS — I10 ESSENTIAL HYPERTENSION: ICD-10-CM

## 2019-07-24 DIAGNOSIS — I48.0 PAROXYSMAL ATRIAL FIBRILLATION: ICD-10-CM

## 2019-07-24 RX ORDER — APIXABAN 5 MG/1
TABLET, FILM COATED ORAL
Qty: 180 TABLET | Refills: 3 | OUTPATIENT
Start: 2019-07-24

## 2019-07-25 RX ORDER — TEMAZEPAM 15 MG/1
CAPSULE ORAL
Qty: 30 CAPSULE | Refills: 5 | Status: SHIPPED | OUTPATIENT
Start: 2019-07-25 | End: 2020-10-12

## 2019-07-29 RX ORDER — INSULIN GLARGINE 100 [IU]/ML
INJECTION, SOLUTION SUBCUTANEOUS
Qty: 30 ML | Refills: 6 | Status: SHIPPED | OUTPATIENT
Start: 2019-07-29 | End: 2020-07-13 | Stop reason: SDUPTHER

## 2019-07-31 ENCOUNTER — OFFICE VISIT (OUTPATIENT)
Dept: ORTHOPEDICS | Facility: CLINIC | Age: 74
End: 2019-07-31
Payer: MEDICARE

## 2019-07-31 VITALS — HEIGHT: 70 IN | BODY MASS INDEX: 37.8 KG/M2 | WEIGHT: 264 LBS

## 2019-07-31 DIAGNOSIS — M70.61 TROCHANTERIC BURSITIS, RIGHT HIP: Primary | ICD-10-CM

## 2019-07-31 DIAGNOSIS — Z96.651 STATUS POST RIGHT KNEE REPLACEMENT: ICD-10-CM

## 2019-07-31 PROCEDURE — 99999 PR PBB SHADOW E&M-EST. PATIENT-LVL III: ICD-10-PCS | Mod: PBBFAC,,, | Performed by: ORTHOPAEDIC SURGERY

## 2019-07-31 PROCEDURE — 99213 OFFICE O/P EST LOW 20 MIN: CPT | Mod: S$GLB,,, | Performed by: ORTHOPAEDIC SURGERY

## 2019-07-31 PROCEDURE — 1101F PR PT FALLS ASSESS DOC 0-1 FALLS W/OUT INJ PAST YR: ICD-10-PCS | Mod: CPTII,S$GLB,, | Performed by: ORTHOPAEDIC SURGERY

## 2019-07-31 PROCEDURE — 99213 PR OFFICE/OUTPT VISIT, EST, LEVL III, 20-29 MIN: ICD-10-PCS | Mod: S$GLB,,, | Performed by: ORTHOPAEDIC SURGERY

## 2019-07-31 PROCEDURE — 1101F PT FALLS ASSESS-DOCD LE1/YR: CPT | Mod: CPTII,S$GLB,, | Performed by: ORTHOPAEDIC SURGERY

## 2019-07-31 PROCEDURE — 99999 PR PBB SHADOW E&M-EST. PATIENT-LVL III: CPT | Mod: PBBFAC,,, | Performed by: ORTHOPAEDIC SURGERY

## 2019-07-31 NOTE — PROGRESS NOTES
"Subjective:      Patient ID: Stephen Cantor Jr. is a 73 y.o. male.    Chief Complaint: Pain of the Right Hip    HPI  Stephen Cantor Jr. has right hip pain.  The pain has improved, but the hip is still bothersome. The pain is located in the lateral.  There  is not radiation.   The pain is described as achy. The pain is aggravated by activity.  It is alleviated by rest. His knee does not hurt but it angelia on occasion and there is weakness with stairs There is associated back pain.  His history, medications and problem list were reviewed.    Review of Systems   Constitution: Negative for chills, fever and night sweats.   HENT: Negative for hearing loss.    Eyes: Negative for blurred vision and double vision.   Cardiovascular: Negative for chest pain, claudication and leg swelling.   Respiratory: Negative for shortness of breath.    Endocrine: Negative for polydipsia, polyphagia and polyuria.   Hematologic/Lymphatic: Negative for adenopathy and bleeding problem. Does not bruise/bleed easily.   Skin: Negative for poor wound healing.   Musculoskeletal: Positive for joint pain.   Gastrointestinal: Negative for diarrhea and heartburn.   Genitourinary: Negative for bladder incontinence.   Neurological: Negative for focal weakness, headaches, numbness, paresthesias and sensory change.   Psychiatric/Behavioral: The patient is not nervous/anxious.    Allergic/Immunologic: Negative for persistent infections.         Objective:      Body mass index is 37.88 kg/m².  Vitals:    07/31/19 1531   Weight: 119.7 kg (264 lb)   Height: 5' 10" (1.778 m)               General Musculoskeletal Exam   Gait: normal   Pelvic Obliquity: none      Right Knee Exam     Inspection   Alignment:  normal  Erythema: absent  Scars: present  Swelling: absent  Effusion: absent  Deformity: absent  Bruising: absent    Tenderness   The patient is experiencing no tenderness.     Range of Motion   Extension: 0   Flexion: 120     Tests   Ligament Examination " Lachman: normal (-1 to 2mm)   MCL - Valgus: normal (0 to 2mm)  LCL - Varus: normal  Patella   Passive Patellar Tilt: neutral    Other   Sensation: normal    Left Knee Exam     Inspection   Alignment:  normal  Erythema: absent  Scars: present  Swelling: absent  Effusion: absent  Deformity: absent  Bruising: absent    Tenderness   The patient is experiencing no tenderness.     Range of Motion   Extension: 0   Flexion: 120     Tests   Stability Lachman: normal (-1 to 2mm)   MCL - Valgus: normal (0 to 2mm)  LCL - Varus: normal (0 to 2mm)  Patella   Passive Patellar Tilt: neutral    Other   Sensation: normal    Right Hip Exam     Inspection   Scars: absent  Swelling: absent  Bruising: absent  No deformity of hip.  Quadriceps Atrophy:  Negative  Erythema: absent    Tenderness   The patient tender to palpation of the trochanteric bursa.    Range of Motion   Abduction: 25   Adduction: 20   Extension: 0   Flexion: 100   External rotation: 30   Internal rotation: 25     Tests   Pain w/ forced internal rotation (LANCE): absent  Stinchfield test: negative    Other   Sensation: normal  Left Hip Exam     Inspection   Scars: absent  Swelling: absent  No deformity of hip.  Quadriceps Atrophy:  negative  Erythema: absent  Bruising: absent    Range of Motion   Abduction: 25   Adduction: 20   Extension: 0   Flexion: 100   External rotation: 30   Internal rotation: 25     Tests   Pain w/ forced internal rotation (LANCE): absent  Stinchfield test: negative    Other   Sensation: normal      Back (L-Spine & T-Spine) / Neck (C-Spine) Exam   Back exam is normal.      Muscle Strength   Right Lower Extremity   Hip Abduction: 5/5   Hip Adduction: 5/5   Hip Flexion: 5/5   Quadriceps:  5/5   Hamstrin/5   Ankle Dorsiflexion:  5/5   Left Lower Extremity   Hip Abduction: 5/5   Hip Adduction: 5/5   Hip Flexion: 5/5   Quadriceps:  5/5   Hamstrin/5   Ankle Dorsiflexion:  5/5     Reflexes     Left Side  Quadriceps:  2+    Right Side    Quadriceps:  2+    Vascular Exam     Right Pulses  Dorsalis Pedis:      2+          Left Pulses  Dorsalis Pedis:      2+          Capillary Refill  Right Hand: normal capillary refill  Left Hand: normal capillary refill    Edema  Right Upper Leg: absent  Right Lower Leg: present  Left Upper Leg: absent  Left Lower Leg: present    Radiographs taken today and reviewed by me demonstrate mild arthritic change of the right hip(s).There  is not bone destruction.  There is not a fracture.        Assessment:       Encounter Diagnoses   Name Primary?    Trochanteric bursitis, right hip Yes    Status post right knee replacement           Plan:       [facility] [place].    Diagnoses and all orders for this visit:    Trochanteric bursitis, right hip    Status post right knee replacement      Options were discussed.  We will pursue a course of physical therapy.  F/U in 6 weeks.    Address hip and knee

## 2019-08-02 ENCOUNTER — TELEPHONE (OUTPATIENT)
Dept: ORTHOPEDICS | Facility: CLINIC | Age: 74
End: 2019-08-02

## 2019-08-02 NOTE — TELEPHONE ENCOUNTER
----- Message from Joyce Olivier sent at 8/2/2019  8:55 AM CDT -----  Contact: [facility]  Needs Advice    Reason for call: Calling  regarding physical therapy        Communication Preference: [phone] Member ID  [id number]  Ref #[id number]    Additional Information:          There is a problem with the phone #  After it is dialed it goes straight to a dial tone   They must call us again

## 2019-08-07 ENCOUNTER — OFFICE VISIT (OUTPATIENT)
Dept: PODIATRY | Facility: CLINIC | Age: 74
End: 2019-08-07
Payer: MEDICARE

## 2019-08-07 VITALS
HEART RATE: 56 BPM | WEIGHT: 264 LBS | HEIGHT: 70 IN | DIASTOLIC BLOOD PRESSURE: 71 MMHG | BODY MASS INDEX: 37.8 KG/M2 | SYSTOLIC BLOOD PRESSURE: 120 MMHG

## 2019-08-07 DIAGNOSIS — Z79.01 LONG TERM CURRENT USE OF ANTICOAGULANT THERAPY: ICD-10-CM

## 2019-08-07 DIAGNOSIS — B35.3 TINEA PEDIS OF BOTH FEET: ICD-10-CM

## 2019-08-07 DIAGNOSIS — B35.1 ONYCHOMYCOSIS OF MULTIPLE TOENAILS WITH TYPE 2 DIABETES MELLITUS AND PERIPHERAL ANGIOPATHY: ICD-10-CM

## 2019-08-07 DIAGNOSIS — E11.51 ONYCHOMYCOSIS OF MULTIPLE TOENAILS WITH TYPE 2 DIABETES MELLITUS AND PERIPHERAL ANGIOPATHY: ICD-10-CM

## 2019-08-07 DIAGNOSIS — E11.69 ONYCHOMYCOSIS OF MULTIPLE TOENAILS WITH TYPE 2 DIABETES MELLITUS AND PERIPHERAL ANGIOPATHY: ICD-10-CM

## 2019-08-07 DIAGNOSIS — E11.42 DIABETIC POLYNEUROPATHY ASSOCIATED WITH TYPE 2 DIABETES MELLITUS: Primary | ICD-10-CM

## 2019-08-07 PROCEDURE — 1101F PR PT FALLS ASSESS DOC 0-1 FALLS W/OUT INJ PAST YR: ICD-10-PCS | Mod: CPTII,S$GLB,, | Performed by: PODIATRIST

## 2019-08-07 PROCEDURE — 3044F HG A1C LEVEL LT 7.0%: CPT | Mod: CPTII,S$GLB,, | Performed by: PODIATRIST

## 2019-08-07 PROCEDURE — 99213 OFFICE O/P EST LOW 20 MIN: CPT | Mod: 25,S$GLB,, | Performed by: PODIATRIST

## 2019-08-07 PROCEDURE — 3074F PR MOST RECENT SYSTOLIC BLOOD PRESSURE < 130 MM HG: ICD-10-PCS | Mod: CPTII,S$GLB,, | Performed by: PODIATRIST

## 2019-08-07 PROCEDURE — 99213 PR OFFICE/OUTPT VISIT, EST, LEVL III, 20-29 MIN: ICD-10-PCS | Mod: 25,S$GLB,, | Performed by: PODIATRIST

## 2019-08-07 PROCEDURE — 3074F SYST BP LT 130 MM HG: CPT | Mod: CPTII,S$GLB,, | Performed by: PODIATRIST

## 2019-08-07 PROCEDURE — 99499 RISK ADDL DX/OHS AUDIT: ICD-10-PCS | Mod: S$GLB,,, | Performed by: PODIATRIST

## 2019-08-07 PROCEDURE — 1101F PT FALLS ASSESS-DOCD LE1/YR: CPT | Mod: CPTII,S$GLB,, | Performed by: PODIATRIST

## 2019-08-07 PROCEDURE — 99999 PR PBB SHADOW E&M-EST. PATIENT-LVL III: CPT | Mod: PBBFAC,,, | Performed by: PODIATRIST

## 2019-08-07 PROCEDURE — 99999 PR PBB SHADOW E&M-EST. PATIENT-LVL III: ICD-10-PCS | Mod: PBBFAC,,, | Performed by: PODIATRIST

## 2019-08-07 PROCEDURE — 11721 PR DEBRIDEMENT OF NAILS, 6 OR MORE: ICD-10-PCS | Mod: Q9,S$GLB,, | Performed by: PODIATRIST

## 2019-08-07 PROCEDURE — 3078F PR MOST RECENT DIASTOLIC BLOOD PRESSURE < 80 MM HG: ICD-10-PCS | Mod: CPTII,S$GLB,, | Performed by: PODIATRIST

## 2019-08-07 PROCEDURE — 3044F PR MOST RECENT HEMOGLOBIN A1C LEVEL <7.0%: ICD-10-PCS | Mod: CPTII,S$GLB,, | Performed by: PODIATRIST

## 2019-08-07 PROCEDURE — 99499 UNLISTED E&M SERVICE: CPT | Mod: S$GLB,,, | Performed by: PODIATRIST

## 2019-08-07 PROCEDURE — 11721 DEBRIDE NAIL 6 OR MORE: CPT | Mod: Q9,S$GLB,, | Performed by: PODIATRIST

## 2019-08-07 PROCEDURE — 3078F DIAST BP <80 MM HG: CPT | Mod: CPTII,S$GLB,, | Performed by: PODIATRIST

## 2019-08-07 RX ORDER — CICLOPIROX 80 MG/ML
SOLUTION TOPICAL NIGHTLY
Qty: 6.6 ML | Refills: 11 | Status: SHIPPED | OUTPATIENT
Start: 2019-08-07 | End: 2020-05-26

## 2019-08-07 RX ORDER — CICLOPIROX 7.7 MG/G
GEL TOPICAL 2 TIMES DAILY
Qty: 100 G | Refills: 3 | Status: SHIPPED | OUTPATIENT
Start: 2019-08-07 | End: 2019-10-11 | Stop reason: SDUPTHER

## 2019-08-07 NOTE — PROGRESS NOTES
Subjective:      Patient ID: Stephen Cantor Jr. is a 73 y.o. male.    Chief Complaint: Diabetic Foot Exam (dr deborah barlow  8/16/19)    Stephen is a 73 y.o. male who presents to the clinic for evaluation and treatment of high risk feet. Stephen has a past medical history of *Atrial fibrillation, Anticoagulant long-term use, Basal cell carcinoma (12/2015), BCC (basal cell carcinoma) (12/2015), Cataract, Chronic kidney disease, Diabetes mellitus with renal manifestations, uncontrolled, Dyslipidemia associated with type 2 diabetes mellitus, Fever blister, Hearing loss, HTN (hypertension), Keloid cicatrix, Liver disease, Melanoma (12/07/2015), Obesity, PN (peripheral neuropathy), Primary osteoarthritis of right knee (1/25/2017), Screening for colon cancer (3/3/2016), Sleep apnea, Thyroid disease, Type II or unspecified type diabetes mellitus with other specified manifestations, uncontrolled, and Ulcer of left lower extremity, limited to breakdown of skin (9/22/2017). The patient's chief complaint is long, thick toenails.  Gradual onset, worsening over past several weeks, aggravated by increased weight bearing, shoe gear, pressure.  Periodic debridement helps symptoms. This patient has documented high risk feet requiring routine maintenance secondary to diabetes mellitis and those secondary complications of diabetes, as mentioned..    PCP: Deborah Barlow MD    Date Last Seen by PCP:   Chief Complaint   Patient presents with    Diabetic Foot Exam     dr deborah barlow  8/16/19         Current shoe gear:  Affected Foot: Rx diabetic extra depth shoes and custom accommodative insoles     Unaffected Foot: Rx diabetic extra depth shoes and custom accommodative insoles    Hemoglobin A1C   Date Value Ref Range Status   08/29/2018 5.9 (H) 4.0 - 5.6 % Final     Comment:     ADA Screening Guidelines:  5.7-6.4%  Consistent with prediabetes  >or=6.5%  Consistent with diabetes  High levels of fetal hemoglobin interfere with the  HbA1C  assay. Heterozygous hemoglobin variants (HbS, HgC, etc)do  not significantly interfere with this assay.   However, presence of multiple variants may affect accuracy.     08/15/2018 6.5 (H) 4.0 - 5.6 % Final     Comment:     ADA Screening Guidelines:  5.7-6.4%  Consistent with prediabetes  >or=6.5%  Consistent with diabetes  High levels of fetal hemoglobin interfere with the HbA1C  assay. Heterozygous hemoglobin variants (HbS, HgC, etc)do  not significantly interfere with this assay.   However, presence of multiple variants may affect accuracy.     06/19/2018 7.0 (H) 4.0 - 5.6 % Final     Comment:     ADA Screening Guidelines:  5.7-6.4%  Consistent with prediabetes  >or=6.5%  Consistent with diabetes  High levels of fetal hemoglobin interfere with the HbA1C  assay. Heterozygous hemoglobin variants (HbS, HgC, etc)do  not significantly interfere with this assay.   However, presence of multiple variants may affect accuracy.         Review of Systems   Constitution: Negative for chills, fever, malaise/fatigue and night sweats.   Cardiovascular: Positive for leg swelling. Negative for claudication and cyanosis.   Skin: Positive for nail changes. Negative for color change, itching, poor wound healing, rash and suspicious lesions.   Musculoskeletal: Negative for falls, gout, joint pain and myalgias.   Neurological: Positive for focal weakness, numbness, paresthesias and sensory change.           Objective:      Physical Exam   Constitutional: He is oriented to person, place, and time. He appears well-developed and well-nourished. He is cooperative.   Oriented to time, place, and person.   Cardiovascular:   Pulses:       Dorsalis pedis pulses are 1+ on the right side, and 1+ on the left side.        Posterior tibial pulses are 1+ on the right side, and 1+ on the left side.   Capillary fill time 3-5 seconds.  All toes warm to touch.      2 + pitting lower extremity edema bilateral.    Negative elevational pallor and  dependent rubor bilateral.     Musculoskeletal:   Normal angle, base, station of gait. Decreased stride length, early heel off, moderately propulsive toe off bilateral.    All ten toes without clubbing, cyanosis, or signs of ischemia.      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    No pain to palpation bilateral lower extremities.      Range of motion, stability, muscle strength, and muscle tone are age and health appropriate normal bilateral feet and legs.       Lymphadenopathy:   Negative lymphadenopathy bilateral popliteal fossa and tarsal tunnel.  Negative lymphangitic streaking bilateral foot/ankle bilateral.     Neurological: He is alert and oriented to person, place, and time. He has normal strength. He is not disoriented. He displays no atrophy and no tremor. A sensory deficit is present. He exhibits normal muscle tone.   Reflex Scores:       Patellar reflexes are 2+ on the right side and 2+ on the left side.       Achilles reflexes are 2+ on the right side and 2+ on the left side.  Decreased/absent vibratory sensation bilateral feet to 128Hz tuning fork.    Paresthesias, and burning bilateral feet with no clearly identified trigger or source.     Skin: Skin is warm, dry and intact. No abrasion, no bruising, no burn, no ecchymosis, no laceration, no lesion, no petechiae and no rash noted. He is not diaphoretic. No cyanosis or erythema. No pallor. Nails show no clubbing.   Dry scale with superficial flakes over an erythematous base in moccasin distribution both feet without ulceration, drainage, pus, tracking, fluctuance, malodor, or cardinal signs infection.    Otherwise, Skin thin, atrophic, with decreased density and distribution of pedal hair bilateral, but without hyperpigmentation,   ulcers, masses, nodules or cords palpated bilateral feet and legs.    Dependent rubor bilateral leg/ankle/foot consistent with stasis.    Toenails 1st, 2nd, 3rd, 4th, 5th  bilateral are hypertrophic thickened 2-3 mm,  dystrophic, discolored tanish brown with tan, gray crumbly subungual debris.  Tender to distal nail plate pressure, without periungual skin abnormality of each.               Assessment:       Encounter Diagnoses   Name Primary?    Diabetic polyneuropathy associated with type 2 diabetes mellitus Yes    Onychomycosis of multiple toenails with type 2 diabetes mellitus and peripheral angiopathy     Long term current use of anticoagulant therapy     Tinea pedis of both feet          Plan:       [facility] today for diabetic foot exam.    Diagnoses and all orders for this visit:    Diabetic polyneuropathy associated with type 2 diabetes mellitus    Onychomycosis of multiple toenails with type 2 diabetes mellitus and peripheral angiopathy    Long term current use of anticoagulant therapy    Tinea pedis of both feet    Other orders  -     ciclopirox (PENLAC) 8 % Soln; Apply topically nightly.  -     ciclopirox 0.77 % Gel; Apply topically 2 (two) times daily.      I counseled the patient on his conditions, their implications and medical management.    - Shoe inspection. Diabetic Foot Education. Patient reminded of the importance of good nutrition and blood sugar control to help prevent podiatric complications of diabetes. Patient instructed on proper foot hygeine. We discussed wearing proper shoe gear, daily foot inspections, never walking without protective shoe gear, never putting sharp instruments to feet, routine podiatric nail visits every 2-3 months.      - With patient's permission, nails were aggressively reduced and debrided x 10 to their soft tissue attachment mechanically and with electric , removing all offending nail and debris. Patient relates relief following the procedure. He will continue to monitor the areas daily, inspect his feet, wear protective shoe gear when ambulatory, moisturizer to maintain skin integrity and follow in this office in approximately 2-3 months, sooner p.r.n.       Discussed conservative treatment with shoes of adequate dimensions, material, and style to alleviate symptoms and delay or prevent surgical intervention.    Resume compression stockings and left afo he has at home.    Rx penlac    Follow up in about 1 year (around 8/7/2020).

## 2019-08-08 PROBLEM — Z96.651 STATUS POST TOTAL KNEE REPLACEMENT, RIGHT: Status: ACTIVE | Noted: 2019-08-08

## 2019-08-08 PROBLEM — M70.61 TROCHANTERIC BURSITIS OF RIGHT HIP: Status: ACTIVE | Noted: 2019-08-08

## 2019-08-09 ENCOUNTER — TELEPHONE (OUTPATIENT)
Dept: OPHTHALMOLOGY | Facility: CLINIC | Age: 74
End: 2019-08-09

## 2019-08-13 ENCOUNTER — TELEPHONE (OUTPATIENT)
Dept: OPHTHALMOLOGY | Facility: CLINIC | Age: 74
End: 2019-08-13

## 2019-08-13 ENCOUNTER — PATIENT MESSAGE (OUTPATIENT)
Dept: INTERNAL MEDICINE | Facility: CLINIC | Age: 74
End: 2019-08-13

## 2019-08-13 ENCOUNTER — OFFICE VISIT (OUTPATIENT)
Dept: INTERNAL MEDICINE | Facility: CLINIC | Age: 74
End: 2019-08-13
Payer: MEDICARE

## 2019-08-13 ENCOUNTER — LAB VISIT (OUTPATIENT)
Dept: LAB | Facility: HOSPITAL | Age: 74
End: 2019-08-13
Attending: INTERNAL MEDICINE
Payer: MEDICARE

## 2019-08-13 VITALS
HEIGHT: 70 IN | WEIGHT: 263 LBS | DIASTOLIC BLOOD PRESSURE: 72 MMHG | SYSTOLIC BLOOD PRESSURE: 110 MMHG | BODY MASS INDEX: 37.65 KG/M2 | OXYGEN SATURATION: 97 % | HEART RATE: 65 BPM

## 2019-08-13 DIAGNOSIS — E11.22 TYPE 2 DIABETES MELLITUS WITH STAGE 3 CHRONIC KIDNEY DISEASE, WITH LONG-TERM CURRENT USE OF INSULIN: ICD-10-CM

## 2019-08-13 DIAGNOSIS — I48.20 CHRONIC ATRIAL FIBRILLATION: ICD-10-CM

## 2019-08-13 DIAGNOSIS — K63.5 POLYP OF COLON, UNSPECIFIED PART OF COLON, UNSPECIFIED TYPE: ICD-10-CM

## 2019-08-13 DIAGNOSIS — N18.30 TYPE 2 DIABETES MELLITUS WITH STAGE 3 CHRONIC KIDNEY DISEASE, WITH LONG-TERM CURRENT USE OF INSULIN: ICD-10-CM

## 2019-08-13 DIAGNOSIS — N18.30 TYPE 2 DIABETES MELLITUS WITH STAGE 3 CHRONIC KIDNEY DISEASE, WITH LONG-TERM CURRENT USE OF INSULIN: Primary | ICD-10-CM

## 2019-08-13 DIAGNOSIS — I10 ESSENTIAL HYPERTENSION: ICD-10-CM

## 2019-08-13 DIAGNOSIS — Z79.4 TYPE 2 DIABETES MELLITUS WITH STAGE 3 CHRONIC KIDNEY DISEASE, WITH LONG-TERM CURRENT USE OF INSULIN: Primary | ICD-10-CM

## 2019-08-13 DIAGNOSIS — E11.42 TYPE 2 DIABETES MELLITUS WITH DIABETIC POLYNEUROPATHY, WITH LONG-TERM CURRENT USE OF INSULIN: ICD-10-CM

## 2019-08-13 DIAGNOSIS — Z79.4 TYPE 2 DIABETES MELLITUS WITH DIABETIC POLYNEUROPATHY, WITH LONG-TERM CURRENT USE OF INSULIN: ICD-10-CM

## 2019-08-13 DIAGNOSIS — E03.9 HYPOTHYROIDISM, UNSPECIFIED TYPE: ICD-10-CM

## 2019-08-13 DIAGNOSIS — Z12.11 SCREENING FOR COLON CANCER: ICD-10-CM

## 2019-08-13 DIAGNOSIS — E78.5 HYPERLIPIDEMIA, UNSPECIFIED HYPERLIPIDEMIA TYPE: ICD-10-CM

## 2019-08-13 DIAGNOSIS — I87.2 VENOUS INSUFFICIENCY OF BOTH LOWER EXTREMITIES: ICD-10-CM

## 2019-08-13 DIAGNOSIS — E11.22 TYPE 2 DIABETES MELLITUS WITH STAGE 3 CHRONIC KIDNEY DISEASE, WITH LONG-TERM CURRENT USE OF INSULIN: Primary | ICD-10-CM

## 2019-08-13 DIAGNOSIS — Z79.4 TYPE 2 DIABETES MELLITUS WITH STAGE 3 CHRONIC KIDNEY DISEASE, WITH LONG-TERM CURRENT USE OF INSULIN: ICD-10-CM

## 2019-08-13 LAB
ALBUMIN SERPL BCP-MCNC: 3.6 G/DL (ref 3.5–5.2)
ALP SERPL-CCNC: 34 U/L (ref 55–135)
ALT SERPL W/O P-5'-P-CCNC: 11 U/L (ref 10–44)
ANION GAP SERPL CALC-SCNC: 7 MMOL/L (ref 8–16)
AST SERPL-CCNC: 15 U/L (ref 10–40)
BASOPHILS # BLD AUTO: 0.04 K/UL (ref 0–0.2)
BASOPHILS NFR BLD: 0.6 % (ref 0–1.9)
BILIRUB SERPL-MCNC: 0.9 MG/DL (ref 0.1–1)
BUN SERPL-MCNC: 24 MG/DL (ref 8–23)
CALCIUM SERPL-MCNC: 10.4 MG/DL (ref 8.7–10.5)
CHLORIDE SERPL-SCNC: 104 MMOL/L (ref 95–110)
CHOLEST SERPL-MCNC: 167 MG/DL (ref 120–199)
CHOLEST/HDLC SERPL: 4.8 {RATIO} (ref 2–5)
CO2 SERPL-SCNC: 29 MMOL/L (ref 23–29)
CREAT SERPL-MCNC: 1.3 MG/DL (ref 0.5–1.4)
DIFFERENTIAL METHOD: ABNORMAL
EOSINOPHIL # BLD AUTO: 0.2 K/UL (ref 0–0.5)
EOSINOPHIL NFR BLD: 3.1 % (ref 0–8)
ERYTHROCYTE [DISTWIDTH] IN BLOOD BY AUTOMATED COUNT: 12.4 % (ref 11.5–14.5)
EST. GFR  (AFRICAN AMERICAN): >60 ML/MIN/1.73 M^2
EST. GFR  (NON AFRICAN AMERICAN): 53.4 ML/MIN/1.73 M^2
ESTIMATED AVG GLUCOSE: 140 MG/DL (ref 68–131)
GLUCOSE SERPL-MCNC: 221 MG/DL (ref 70–110)
HBA1C MFR BLD HPLC: 6.5 % (ref 4–5.6)
HCT VFR BLD AUTO: 45.6 % (ref 40–54)
HDLC SERPL-MCNC: 35 MG/DL (ref 40–75)
HDLC SERPL: 21 % (ref 20–50)
HGB BLD-MCNC: 15.1 G/DL (ref 14–18)
IMM GRANULOCYTES # BLD AUTO: 0.03 K/UL (ref 0–0.04)
IMM GRANULOCYTES NFR BLD AUTO: 0.4 % (ref 0–0.5)
LDLC SERPL CALC-MCNC: 104.2 MG/DL (ref 63–159)
LYMPHOCYTES # BLD AUTO: 1.6 K/UL (ref 1–4.8)
LYMPHOCYTES NFR BLD: 23.3 % (ref 18–48)
MCH RBC QN AUTO: 32.1 PG (ref 27–31)
MCHC RBC AUTO-ENTMCNC: 33.1 G/DL (ref 32–36)
MCV RBC AUTO: 97 FL (ref 82–98)
MONOCYTES # BLD AUTO: 0.6 K/UL (ref 0.3–1)
MONOCYTES NFR BLD: 8.3 % (ref 4–15)
NEUTROPHILS # BLD AUTO: 4.4 K/UL (ref 1.8–7.7)
NEUTROPHILS NFR BLD: 64.3 % (ref 38–73)
NONHDLC SERPL-MCNC: 132 MG/DL
NRBC BLD-RTO: 0 /100 WBC
PLATELET # BLD AUTO: 189 K/UL (ref 150–350)
PMV BLD AUTO: 12.3 FL (ref 9.2–12.9)
POTASSIUM SERPL-SCNC: 4.2 MMOL/L (ref 3.5–5.1)
PROT SERPL-MCNC: 6.9 G/DL (ref 6–8.4)
RBC # BLD AUTO: 4.7 M/UL (ref 4.6–6.2)
SODIUM SERPL-SCNC: 140 MMOL/L (ref 136–145)
TRIGL SERPL-MCNC: 139 MG/DL (ref 30–150)
TSH SERPL DL<=0.005 MIU/L-ACNC: 1.96 UIU/ML (ref 0.4–4)
WBC # BLD AUTO: 6.83 K/UL (ref 3.9–12.7)

## 2019-08-13 PROCEDURE — 99499 RISK ADDL DX/OHS AUDIT: ICD-10-PCS | Mod: S$GLB,,, | Performed by: INTERNAL MEDICINE

## 2019-08-13 PROCEDURE — 85025 COMPLETE CBC W/AUTO DIFF WBC: CPT

## 2019-08-13 PROCEDURE — 99499 UNLISTED E&M SERVICE: CPT | Mod: ,,, | Performed by: INTERNAL MEDICINE

## 2019-08-13 PROCEDURE — 99214 OFFICE O/P EST MOD 30 MIN: CPT | Mod: S$GLB,,, | Performed by: INTERNAL MEDICINE

## 2019-08-13 PROCEDURE — 84443 ASSAY THYROID STIM HORMONE: CPT

## 2019-08-13 PROCEDURE — 1101F PT FALLS ASSESS-DOCD LE1/YR: CPT | Mod: CPTII,S$GLB,, | Performed by: INTERNAL MEDICINE

## 2019-08-13 PROCEDURE — 99499 RISK ADDL DX/OHS AUDIT: ICD-10-PCS | Mod: ,,, | Performed by: INTERNAL MEDICINE

## 2019-08-13 PROCEDURE — 3074F PR MOST RECENT SYSTOLIC BLOOD PRESSURE < 130 MM HG: ICD-10-PCS | Mod: CPTII,S$GLB,, | Performed by: INTERNAL MEDICINE

## 2019-08-13 PROCEDURE — 3074F SYST BP LT 130 MM HG: CPT | Mod: CPTII,S$GLB,, | Performed by: INTERNAL MEDICINE

## 2019-08-13 PROCEDURE — 3078F PR MOST RECENT DIASTOLIC BLOOD PRESSURE < 80 MM HG: ICD-10-PCS | Mod: CPTII,S$GLB,, | Performed by: INTERNAL MEDICINE

## 2019-08-13 PROCEDURE — 36415 COLL VENOUS BLD VENIPUNCTURE: CPT | Mod: PO

## 2019-08-13 PROCEDURE — 3044F PR MOST RECENT HEMOGLOBIN A1C LEVEL <7.0%: ICD-10-PCS | Mod: CPTII,S$GLB,, | Performed by: INTERNAL MEDICINE

## 2019-08-13 PROCEDURE — 99499 UNLISTED E&M SERVICE: CPT | Mod: S$GLB,,, | Performed by: INTERNAL MEDICINE

## 2019-08-13 PROCEDURE — 83036 HEMOGLOBIN GLYCOSYLATED A1C: CPT

## 2019-08-13 PROCEDURE — 99999 PR PBB SHADOW E&M-EST. PATIENT-LVL III: CPT | Mod: PBBFAC,,, | Performed by: INTERNAL MEDICINE

## 2019-08-13 PROCEDURE — 80053 COMPREHEN METABOLIC PANEL: CPT

## 2019-08-13 PROCEDURE — 99214 PR OFFICE/OUTPT VISIT, EST, LEVL IV, 30-39 MIN: ICD-10-PCS | Mod: S$GLB,,, | Performed by: INTERNAL MEDICINE

## 2019-08-13 PROCEDURE — 3044F HG A1C LEVEL LT 7.0%: CPT | Mod: CPTII,S$GLB,, | Performed by: INTERNAL MEDICINE

## 2019-08-13 PROCEDURE — 1101F PR PT FALLS ASSESS DOC 0-1 FALLS W/OUT INJ PAST YR: ICD-10-PCS | Mod: CPTII,S$GLB,, | Performed by: INTERNAL MEDICINE

## 2019-08-13 PROCEDURE — 3078F DIAST BP <80 MM HG: CPT | Mod: CPTII,S$GLB,, | Performed by: INTERNAL MEDICINE

## 2019-08-13 PROCEDURE — 99999 PR PBB SHADOW E&M-EST. PATIENT-LVL III: ICD-10-PCS | Mod: PBBFAC,,, | Performed by: INTERNAL MEDICINE

## 2019-08-13 PROCEDURE — 80061 LIPID PANEL: CPT

## 2019-08-13 NOTE — TELEPHONE ENCOUNTER
----- Message from Dari Aguilar sent at 8/13/2019  2:59 PM CDT -----  Contact: Stephen Cantor Jr.  Pt returned the called back ,can we call the pt back please ma'am,pt can be reached at [phone] cell please thank you.

## 2019-08-13 NOTE — PROGRESS NOTES
PAST MEDICAL HISTORY:    Type 2 diabetes with peripheral neuropathy and chronic kidney disease stage III.  Chronic atrial fibrillation.  Hypertension.  Hyperlipidemia.  Chronic diastolic dysfunction.  Sleep apnea, history of septoplasty and UPPP .  Pulmonary hypertension.  Chronic venous insufficiency  Lymphedema  Lumbar degenerative disc disease  BPH  Right total knee replacement and revision arthroplasty  Left total knee arthroplasty 2003  Left shoulder surgery.  Meniere's disease.  Lumbar degenerative disk disease.  Nephrolithiasis.  Status post eyelid surgery for removal of ectropion  Basis cell carcinoma, status post Mohs    SOCIAL HISTORY:  Tobacco use and alcohol use, none.   , has three children.  No formal exercise routine.     FAMILY HISTORY:  Mother is , had diabetes, stroke.  Father is , had diabetes and heart disease.  Three sisters, two sisters , one with brain cancer, another with leukemia.  Two brothers, one  from ALS, another  of brain cancer.  He has one brother that is alive.              REASON FOR VISIT:  This is a 73-year-old male who comes in for just routine   evaluation.  Tomorrow he is scheduled to have repair of ectropion of his right   lower eyelid.  He had a similar procedure back in 2016.    PAST MEDICAL HISTORY:  Outlined above.    CURRENT MEDICATIONS:  Eliquis 5 mg b.i.d.  Astelin nasal spray as needed.  Penlac solution.  Fenofibrate 145 mg.  Lasix 40 mg.  Gabapentin 300 mg two in the morning and one at night.  Basaglar insulin 30 units.  NovoLog units with each meal plus sliding scale at 6 units breakfest 8 units with   lunch and dinner.  Levothyroxine 0.025 mg.  Metoprolol  mg at night.  Pravastatin 10 mg.  Temazepam 15 mg at night as needed.    REVIEW OF SYMPTOMS:  He does not report pains in the chest or palpitations.  His   breathing is fine.  There has been no abdominal pain.  Bowel function every   day.  Urination frequent, nocturia  x2.  Does have chronic low back pain.    Occasional right shoulder pain.  He has no indigestion or heartburn.    In checking his blood glucose, he states rarely it gets above 150 and rarely   does he have that to the sliding scale.    He does have numbness in his feet or legs, but no pain.    He is followed regularly by Cardiology, Nephrology, Dermatology, and   Ophthalmology.    In regard to sleep apnea, he does not use CPAP at all.  Every time he uses he   has nasal congestion and rhinorrhea.    PHYSICAL EXAMINATION:  VITAL SIGNS:  Weight is 263 pounds, pulse rate 64, and blood pressure by me   110/72.  NECK:  No thyromegaly.  LUNGS:  Clear breath sounds.  HEART:  Irregular regular.  No murmurs.  ABDOMEN:  Active bowel sounds, soft, nontender.  No hepatosplenomegaly or   abdominal masses.  PULSES:  2+ carotid pulses.  2+ pedal pulses.  EXTREMITIES:  Trace pedal pretibial edema (he does have compression stockings   on).  RECTAL:  Stool is brown.  Prostate is mildly enlarged.    In review of the chart, a colonoscopy in March 2016 revealed three polyps, so it   was recommended to repeat in three years' time.    In review of the chart, a 2D echo on 07/19/2019 revealed a normal ejection   fraction, normal diastolic dysfunction, and PA pressure of 31.    IMPRESSION:  1. Type 2 diabetes with peripheral neuropathy and chronic kidney disease, stage   III.  2. Hypertension.  3. Hyperlipidemia.  4. Chronic atrial fibrillation.  5. Chronic venous insufficiency.  6. BPH.  7. Colon polyps.  8. History of chronic diastolic dysfunction and pulmonary hypertension for which   a recent 2D echo did not support.    PLAN:  Today, we will get a CBC, chemistry, lipid, hemoglobin A1c and TSH.  He   can pursue the surgery tomorrow.  He has his Eliquis put on hold.  On the   morning of surgery, he will put a hold on his NovoLog insulin and he can take   his metoprolol and levothyroxine.    ADDENDUM TO IMPRESSION:  Hypothyroid  disease.        ANTONIETTA/EMPERATRIZ  dd: 08/13/2019 10:55:50 (CDT)  td: 08/14/2019 01:43:41 (CDT)  Doc ID   #[id number]  Job ID #[id number]    CC:

## 2019-08-14 ENCOUNTER — ANESTHESIA (OUTPATIENT)
Dept: SURGERY | Facility: HOSPITAL | Age: 74
End: 2019-08-14
Payer: MEDICARE

## 2019-08-14 ENCOUNTER — HOSPITAL ENCOUNTER (OUTPATIENT)
Facility: HOSPITAL | Age: 74
Discharge: HOME | End: 2019-08-14
Attending: OPHTHALMOLOGY | Admitting: OPHTHALMOLOGY
Payer: MEDICARE

## 2019-08-14 ENCOUNTER — ANESTHESIA EVENT (OUTPATIENT)
Dept: SURGERY | Facility: HOSPITAL | Age: 74
End: 2019-08-14
Payer: MEDICARE

## 2019-08-14 VITALS
OXYGEN SATURATION: 96 % | BODY MASS INDEX: 37.51 KG/M2 | WEIGHT: 262 LBS | HEART RATE: 71 BPM | SYSTOLIC BLOOD PRESSURE: 120 MMHG | HEIGHT: 70 IN | DIASTOLIC BLOOD PRESSURE: 59 MMHG | RESPIRATION RATE: 18 BRPM | TEMPERATURE: 97 F

## 2019-08-14 DIAGNOSIS — H02.112 CICATRICIAL ECTROPION OF RIGHT LOWER EYELID: ICD-10-CM

## 2019-08-14 DIAGNOSIS — H02.102 ECTROPION OF RIGHT LOWER EYELID, UNSPECIFIED ECTROPION TYPE: Primary | ICD-10-CM

## 2019-08-14 LAB
POCT GLUCOSE: 105 MG/DL (ref 70–110)
POCT GLUCOSE: 98 MG/DL (ref 70–110)

## 2019-08-14 PROCEDURE — 63600175 PHARM REV CODE 636 W HCPCS: Performed by: NURSE ANESTHETIST, CERTIFIED REGISTERED

## 2019-08-14 PROCEDURE — 67875 PR TEMP CLOSURE EYELID BY SUTURE: ICD-10-PCS | Mod: 51,RT,, | Performed by: OPHTHALMOLOGY

## 2019-08-14 PROCEDURE — 36000706: Performed by: OPHTHALMOLOGY

## 2019-08-14 PROCEDURE — 25000003 PHARM REV CODE 250: Performed by: NURSE ANESTHETIST, CERTIFIED REGISTERED

## 2019-08-14 PROCEDURE — 25000003 PHARM REV CODE 250: Performed by: STUDENT IN AN ORGANIZED HEALTH CARE EDUCATION/TRAINING PROGRAM

## 2019-08-14 PROCEDURE — 71000015 HC POSTOP RECOV 1ST HR: Performed by: OPHTHALMOLOGY

## 2019-08-14 PROCEDURE — 15260 PR FULL THICK GRFT NOS,EAR,LID <20 SQCM: ICD-10-PCS | Mod: 51,RT,, | Performed by: OPHTHALMOLOGY

## 2019-08-14 PROCEDURE — 67917 PR FIX ECTROPION,ENTENSV LID REPAIR: ICD-10-PCS | Mod: 51,RT,, | Performed by: OPHTHALMOLOGY

## 2019-08-14 PROCEDURE — D9220A PRA ANESTHESIA: ICD-10-PCS | Mod: ANES,,, | Performed by: ANESTHESIOLOGY

## 2019-08-14 PROCEDURE — S0020 INJECTION, BUPIVICAINE HYDRO: HCPCS | Performed by: OPHTHALMOLOGY

## 2019-08-14 PROCEDURE — 15260 FTH/GFT FR N/E/E/L 20 SQCM/<: CPT | Mod: 51,RT,, | Performed by: OPHTHALMOLOGY

## 2019-08-14 PROCEDURE — 67917 REPAIR EYELID DEFECT: CPT | Mod: 51,RT,, | Performed by: OPHTHALMOLOGY

## 2019-08-14 PROCEDURE — 25000003 PHARM REV CODE 250

## 2019-08-14 PROCEDURE — 82962 GLUCOSE BLOOD TEST: CPT | Performed by: OPHTHALMOLOGY

## 2019-08-14 PROCEDURE — 15004 PR WND PREP PED, FACE/NCK/HND/FT/GEN 1ST 100 CM: ICD-10-PCS | Mod: 59,,, | Performed by: OPHTHALMOLOGY

## 2019-08-14 PROCEDURE — 37000009 HC ANESTHESIA EA ADD 15 MINS: Performed by: OPHTHALMOLOGY

## 2019-08-14 PROCEDURE — D9220A PRA ANESTHESIA: ICD-10-PCS | Mod: CRNA,,, | Performed by: NURSE ANESTHETIST, CERTIFIED REGISTERED

## 2019-08-14 PROCEDURE — 37000008 HC ANESTHESIA 1ST 15 MINUTES: Performed by: OPHTHALMOLOGY

## 2019-08-14 PROCEDURE — D9220A PRA ANESTHESIA: Mod: ANES,,, | Performed by: ANESTHESIOLOGY

## 2019-08-14 PROCEDURE — 63600175 PHARM REV CODE 636 W HCPCS: Performed by: ANESTHESIOLOGY

## 2019-08-14 PROCEDURE — 15004 WOUND PREP F/N/HF/G: CPT | Mod: 59,,, | Performed by: OPHTHALMOLOGY

## 2019-08-14 PROCEDURE — D9220A PRA ANESTHESIA: Mod: CRNA,,, | Performed by: NURSE ANESTHETIST, CERTIFIED REGISTERED

## 2019-08-14 PROCEDURE — 63600175 PHARM REV CODE 636 W HCPCS: Performed by: OPHTHALMOLOGY

## 2019-08-14 PROCEDURE — 67875 CLOSURE OF EYELID BY SUTURE: CPT | Mod: 51,RT,, | Performed by: OPHTHALMOLOGY

## 2019-08-14 PROCEDURE — 71000044 HC DOSC ROUTINE RECOVERY FIRST HOUR: Performed by: OPHTHALMOLOGY

## 2019-08-14 PROCEDURE — 15730 PR CREATION FLAP, MIDFACE  W/PRESERVATION OF VASC PEDICLES: ICD-10-PCS | Mod: RT,,, | Performed by: OPHTHALMOLOGY

## 2019-08-14 PROCEDURE — 36000707: Performed by: OPHTHALMOLOGY

## 2019-08-14 PROCEDURE — 25000003 PHARM REV CODE 250: Performed by: OPHTHALMOLOGY

## 2019-08-14 PROCEDURE — 15730 MDFC FLAP W/PRSRV VASC PEDCL: CPT | Mod: RT,,, | Performed by: OPHTHALMOLOGY

## 2019-08-14 RX ORDER — ROCURONIUM BROMIDE 10 MG/ML
INJECTION, SOLUTION INTRAVENOUS
Status: DISCONTINUED | OUTPATIENT
Start: 2019-08-14 | End: 2019-08-14

## 2019-08-14 RX ORDER — ACETAMINOPHEN 325 MG/1
650 TABLET ORAL EVERY 4 HOURS PRN
Status: DISCONTINUED | OUTPATIENT
Start: 2019-08-14 | End: 2019-08-14 | Stop reason: HOSPADM

## 2019-08-14 RX ORDER — FENTANYL CITRATE 50 UG/ML
25 INJECTION, SOLUTION INTRAMUSCULAR; INTRAVENOUS EVERY 5 MIN PRN
Status: DISCONTINUED | OUTPATIENT
Start: 2019-08-14 | End: 2019-08-14 | Stop reason: HOSPADM

## 2019-08-14 RX ORDER — PHENYLEPHRINE HYDROCHLORIDE 10 MG/ML
INJECTION INTRAVENOUS
Status: DISCONTINUED | OUTPATIENT
Start: 2019-08-14 | End: 2019-08-14

## 2019-08-14 RX ORDER — HYDROCODONE BITARTRATE AND ACETAMINOPHEN 5; 325 MG/1; MG/1
1 TABLET ORAL EVERY 6 HOURS PRN
Qty: 16 TABLET | Refills: 0 | Status: SHIPPED | OUTPATIENT
Start: 2019-08-14 | End: 2020-09-14

## 2019-08-14 RX ORDER — ONDANSETRON 2 MG/ML
4 INJECTION INTRAMUSCULAR; INTRAVENOUS EVERY 12 HOURS PRN
Status: DISCONTINUED | OUTPATIENT
Start: 2019-08-14 | End: 2019-08-14 | Stop reason: HOSPADM

## 2019-08-14 RX ORDER — ACETAMINOPHEN 10 MG/ML
INJECTION, SOLUTION INTRAVENOUS
Status: DISCONTINUED | OUTPATIENT
Start: 2019-08-14 | End: 2019-08-14

## 2019-08-14 RX ORDER — LIDOCAINE HCL/PF 100 MG/5ML
SYRINGE (ML) INTRAVENOUS
Status: DISCONTINUED | OUTPATIENT
Start: 2019-08-14 | End: 2019-08-14

## 2019-08-14 RX ORDER — HYDROCODONE BITARTRATE AND ACETAMINOPHEN 5; 325 MG/1; MG/1
1 TABLET ORAL EVERY 4 HOURS PRN
Status: DISCONTINUED | OUTPATIENT
Start: 2019-08-14 | End: 2019-08-14 | Stop reason: HOSPADM

## 2019-08-14 RX ORDER — GLYCOPYRROLATE 0.2 MG/ML
INJECTION INTRAMUSCULAR; INTRAVENOUS
Status: DISCONTINUED | OUTPATIENT
Start: 2019-08-14 | End: 2019-08-14

## 2019-08-14 RX ORDER — FENTANYL CITRATE 50 UG/ML
INJECTION, SOLUTION INTRAMUSCULAR; INTRAVENOUS
Status: DISCONTINUED | OUTPATIENT
Start: 2019-08-14 | End: 2019-08-14

## 2019-08-14 RX ORDER — SODIUM CHLORIDE 9 MG/ML
INJECTION, SOLUTION INTRAVENOUS CONTINUOUS PRN
Status: DISCONTINUED | OUTPATIENT
Start: 2019-08-14 | End: 2019-08-14

## 2019-08-14 RX ORDER — EPHEDRINE SULFATE 50 MG/ML
INJECTION, SOLUTION INTRAVENOUS
Status: DISCONTINUED | OUTPATIENT
Start: 2019-08-14 | End: 2019-08-14

## 2019-08-14 RX ORDER — BACITRACIN ZINC 500 UNIT/G
OINTMENT (GRAM) TOPICAL
Status: DISCONTINUED | OUTPATIENT
Start: 2019-08-14 | End: 2019-08-14 | Stop reason: HOSPADM

## 2019-08-14 RX ORDER — SODIUM CHLORIDE 0.9 % (FLUSH) 0.9 %
3 SYRINGE (ML) INJECTION EVERY 30 MIN PRN
Status: DISCONTINUED | OUTPATIENT
Start: 2019-08-14 | End: 2019-08-14 | Stop reason: HOSPADM

## 2019-08-14 RX ORDER — TOBRAMYCIN 3 MG/ML
SOLUTION/ DROPS OPHTHALMIC
Status: DISCONTINUED | OUTPATIENT
Start: 2019-08-14 | End: 2019-08-14 | Stop reason: HOSPADM

## 2019-08-14 RX ORDER — ONDANSETRON 2 MG/ML
4 INJECTION INTRAMUSCULAR; INTRAVENOUS DAILY PRN
Status: DISCONTINUED | OUTPATIENT
Start: 2019-08-14 | End: 2019-08-14 | Stop reason: HOSPADM

## 2019-08-14 RX ORDER — CEFAZOLIN SODIUM 1 G/3ML
INJECTION, POWDER, FOR SOLUTION INTRAMUSCULAR; INTRAVENOUS
Status: DISCONTINUED | OUTPATIENT
Start: 2019-08-14 | End: 2019-08-14

## 2019-08-14 RX ORDER — TETRACAINE HYDROCHLORIDE 5 MG/ML
1 SOLUTION OPHTHALMIC ONCE
Status: DISCONTINUED | OUTPATIENT
Start: 2019-08-14 | End: 2019-08-14 | Stop reason: HOSPADM

## 2019-08-14 RX ORDER — PROPOFOL 10 MG/ML
VIAL (ML) INTRAVENOUS
Status: DISCONTINUED | OUTPATIENT
Start: 2019-08-14 | End: 2019-08-14

## 2019-08-14 RX ORDER — CEPHALEXIN 500 MG/1
500 CAPSULE ORAL 3 TIMES DAILY
Qty: 30 CAPSULE | Refills: 0 | Status: SHIPPED | OUTPATIENT
Start: 2019-08-14 | End: 2019-08-24

## 2019-08-14 RX ORDER — BUPIVACAINE HYDROCHLORIDE 5 MG/ML
INJECTION, SOLUTION EPIDURAL; INTRACAUDAL
Status: DISCONTINUED | OUTPATIENT
Start: 2019-08-14 | End: 2019-08-14 | Stop reason: HOSPADM

## 2019-08-14 RX ORDER — LIDOCAINE HYDROCHLORIDE AND EPINEPHRINE 20; 10 MG/ML; UG/ML
INJECTION, SOLUTION INFILTRATION; PERINEURAL
Status: DISCONTINUED | OUTPATIENT
Start: 2019-08-14 | End: 2019-08-14 | Stop reason: HOSPADM

## 2019-08-14 RX ORDER — SODIUM CHLORIDE 0.9 % (FLUSH) 0.9 %
10 SYRINGE (ML) INJECTION
Status: DISCONTINUED | OUTPATIENT
Start: 2019-08-14 | End: 2019-08-14 | Stop reason: HOSPADM

## 2019-08-14 RX ORDER — ONDANSETRON 2 MG/ML
INJECTION INTRAMUSCULAR; INTRAVENOUS
Status: DISCONTINUED | OUTPATIENT
Start: 2019-08-14 | End: 2019-08-14

## 2019-08-14 RX ADMIN — PHENYLEPHRINE HYDROCHLORIDE 200 MCG: 10 INJECTION INTRAVENOUS at 01:08

## 2019-08-14 RX ADMIN — HYDROCODONE BITARTRATE AND ACETAMINOPHEN 1 TABLET: 5; 325 TABLET ORAL at 05:08

## 2019-08-14 RX ADMIN — SODIUM CHLORIDE 0.2 MCG/KG/MIN: 9 INJECTION, SOLUTION INTRAVENOUS at 02:08

## 2019-08-14 RX ADMIN — SODIUM CHLORIDE, SODIUM GLUCONATE, SODIUM ACETATE, POTASSIUM CHLORIDE, MAGNESIUM CHLORIDE, SODIUM PHOSPHATE, DIBASIC, AND POTASSIUM PHOSPHATE: .53; .5; .37; .037; .03; .012; .00082 INJECTION, SOLUTION INTRAVENOUS at 02:08

## 2019-08-14 RX ADMIN — GLYCOPYRROLATE 0.2 MG: 0.2 INJECTION, SOLUTION INTRAMUSCULAR; INTRAVENOUS at 02:08

## 2019-08-14 RX ADMIN — ACETAMINOPHEN 1000 MG: 10 INJECTION, SOLUTION INTRAVENOUS at 01:08

## 2019-08-14 RX ADMIN — EPHEDRINE SULFATE 10 MG: 50 INJECTION, SOLUTION INTRAMUSCULAR; INTRAVENOUS; SUBCUTANEOUS at 02:08

## 2019-08-14 RX ADMIN — FENTANYL CITRATE 25 MCG: 50 INJECTION INTRAMUSCULAR; INTRAVENOUS at 05:08

## 2019-08-14 RX ADMIN — SODIUM CHLORIDE: 0.9 INJECTION, SOLUTION INTRAVENOUS at 12:08

## 2019-08-14 RX ADMIN — EPHEDRINE SULFATE 10 MG: 50 INJECTION, SOLUTION INTRAMUSCULAR; INTRAVENOUS; SUBCUTANEOUS at 01:08

## 2019-08-14 RX ADMIN — FENTANYL CITRATE 100 MCG: 50 INJECTION, SOLUTION INTRAMUSCULAR; INTRAVENOUS at 01:08

## 2019-08-14 RX ADMIN — FENTANYL CITRATE 50 MCG: 50 INJECTION, SOLUTION INTRAMUSCULAR; INTRAVENOUS at 04:08

## 2019-08-14 RX ADMIN — ONDANSETRON 4 MG: 2 INJECTION INTRAMUSCULAR; INTRAVENOUS at 03:08

## 2019-08-14 RX ADMIN — LIDOCAINE HYDROCHLORIDE 100 MG: 20 INJECTION, SOLUTION INTRAVENOUS at 01:08

## 2019-08-14 RX ADMIN — ROCURONIUM BROMIDE 50 MG: 10 INJECTION, SOLUTION INTRAVENOUS at 01:08

## 2019-08-14 RX ADMIN — CEFAZOLIN 2 G: 330 INJECTION, POWDER, FOR SOLUTION INTRAMUSCULAR; INTRAVENOUS at 01:08

## 2019-08-14 RX ADMIN — PROPOFOL 120 MG: 10 INJECTION, EMULSION INTRAVENOUS at 01:08

## 2019-08-14 NOTE — PROGRESS NOTES
Pt discharged to home.  Discharge instructions given, pt and family stated understanding.  Dressing to eye dry and intact, IV removed.  Pt left via wheelchair with family to home.  Rx delivered to bedside.

## 2019-08-14 NOTE — DISCHARGE INSTRUCTIONS
Eyelid Surgery (Blepharoplasty)    Eyelid surgery (blepharoplasty) is a type of cosmetic surgery. It is most often done to improve the look of the eyelids. Both the upper and lower eyelids can be treated during the surgery. Discuss your treatment goals with your doctor. He or she can tell you more about what to expect.  Preparing for surgery  Prepare for the surgery as you have been told. In addition:  · Tell your doctor about all medicines you take. This includes herbs and other supplements. It also includes any blood thinners, such as warfarin, clopidogrel, or daily aspirin. You may need to stop taking some or all of them before surgery.  · Follow any directions you are given for not eating or drinking before surgery.  The day of the surgery  The surgery takes about 1 to 2 hours. You will likely go home the same day.  Before the surgery begins:  · Youll remove any makeup from your eyes. Youll also remove contact lenses if you wear them.  · An IV line is put into a vein in your arm or hand. This line supplies fluids and medicines.  · Youll be given medicine to keep you free of pain during the surgery. You may have general anesthesia, which puts you into a state like deep sleep during the surgery. (A tube may be inserted into your throat to help you breathe.) Or you may have sedation, which makes you relaxed and sleepy. With sedation, local anesthesia will be injected to numb the areas being worked on. The anesthesiologist will discuss your options with you.  During the surgery:  · For the upper eyelids, an incision is made along the eyelid crease.  · For the lower eyelids, an incision is made inside the lower eyelid. Or it is made in the skin just under the lower lash line.  · With either the upper or lower eyelids, fat may be shaped or removed. Skin may be removed. If muscles are loose, stretched, or torn, they may be tightened or repaired.  · Incisions are closed with stitches (sutures). In some cases,  surgical glue is used.  After the surgery  Youre taken to a recovery room to wake up from the anesthesia. You may feel sleepy and nauseated. If a breathing tube was used, your throat may be sore at first. If needed, youll be given pain medicine to relieve any discomfort. Youll sit semi-inclined or with your head propped on pillows, and may have cold packs on your eyes. These measures help reduce bruising and swelling. When you are ready to leave the hospital, an adult family member or friend must drive you.  Recovering at home  Once at home, follow all instructions you are given. Your doctor will tell you when you can return to your normal routine. You may have some bruising and swelling around your eyes and your vision may be blurry. This is normal and should improve within a week or two. And you may notice your eyes burning or feeling strained during certain activities, such as watching TV, reading a book, or using the computer. While this persists, avoid doing such activities for too long at a time. Be sure to:  · Take all medicines exactly as directed. These may include applying eye ointment or using eye drops.  · Apply an ice pack or cold compress to the eyes as directed, for the first 12 to 24 hours after surgery.  · Care for your incisions as instructed.  · Wear protective sunglasses as directed.  · Avoid wearing eye makeup and contact lenses as directed.  · Avoid swimming or placing your head under water as directed.  · Avoid heavy lifting and strenuous activities as directed.  · Avoid driving until your doctor says its OK. Do not drive while taking medications that make you drowsy or sleepy.  When to call the doctor  Call the doctor if you have any of the following:  · Chest pain or trouble breathing (call 911 or other emergency service)  · Fever of 100.4°F (38.0°C) or higher (or as directed by your doctor)  · Increased redness, tearing, or itching of the eyes  · Changes in vision, such as double  vision, blurry vision, or loss of light perception  · Symptoms of infection at an incision site, such as increased redness or swelling, warmth, worsening pain, or foul-smelling drainage  · Discomfort or swelling thats worse in one eye than the other  · Pain that cannot be managed with medicines  · Other signs or symptoms as indicated by your doctor   Follow-up  Youll have follow-up visits with your doctor. During these visits, your doctor will check the results of your surgery and how well youre healing. If stitches need to be removed, this is done in about 5 to 7 days.  Risks and complications  Risks and possible complications include:  · Bleeding  · Infection  · Problems with vision, such as blurry or double vision and trouble closing the eyes  · Dry or teary eyes  · Changes in sensation, such as numbness or pain  · Skin discoloration  · Damage or injury to the eyes  · Vision problems (including blindness)  · Displacement of the lower eyelid margin (ectropia), which can be temporary or permanent  · Not happy with cosmetic results  · Risks of anesthesia (the anesthesiologist will discuss these with you)   Date Last Reviewed: 7/13/2015  © 4614-3988 [employer]. [address] [place] [address]. All rights reserved. This information is not intended as a substitute for professional medical care. Always follow your healthcare professional's instructions.        Recovery After Procedural Sedation (Adult)  You have been given medicine by vein to make you sleep during your surgery. This may have included both a pain medicine and sleeping medicine. Most of the effects have worn off. But you may still have some drowsiness for the next 6 to 8 hours.  Home care  Follow these guidelines when you get home:  · For the next 8 hours, you should be watched by a responsible adult. This person should make sure your condition is not getting worse.  · Don't drink any alcohol for the next 24 hours.  · Don't  drive, operate dangerous machinery, or make important business or personal decisions during the next 24 hours.  Note: Your healthcare provider may tell you not to take any medicine by mouth for pain or sleep in the next 4 hours. These medicines may react with the medicines you were given in the hospital. This could cause a much stronger response than usual.  Follow-up care  Follow up with your healthcare provider if you are not alert and back to your usual level of activity within 12 hours.  When to seek medical advice  Call your healthcare provider right away if any of these occur:  · Drowsiness gets worse  · Weakness or dizziness gets worse  · Repeated vomiting  · You can't be awakened   Date Last Reviewed: 10/18/2016  © 5978-1792 [employer]. [address] [place] [address], [place] [address]. All rights reserved. This information is not intended as a substitute for professional medical care. Always follow your healthcare professional's instructions.

## 2019-08-14 NOTE — H&P
Pre-operative History and Physical  Ophthalmology Service        HPI:   Patient is a 73 y.o. 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    Past Medical History:   Diagnosis Date    *Atrial fibrillation     Eliquis    Anticoagulant long-term use     apaxiban    Basal cell carcinoma 12/2015    left eye brow    BCC (basal cell carcinoma) 12/2015    left shoulder    Cataract     Chronic kidney disease     Diabetes mellitus with renal manifestations, uncontrolled     Dyslipidemia associated with type 2 diabetes mellitus     Fever blister     Hearing loss     HTN (hypertension)     Keloid cicatrix     Liver disease     Melanoma 12/07/2015    right cheek-in situ    Obesity     PN (peripheral neuropathy)     Primary osteoarthritis of right knee 1/25/2017    Screening for colon cancer 3/3/2016    Sleep apnea     wears CPAP at night    Thyroid disease     Type II or unspecified type diabetes mellitus with other specified manifestations, uncontrolled     Ulcer of left lower extremity, limited to breakdown of skin 9/22/2017       Past Surgical History:   Procedure Laterality Date    APPENDECTOMY      CARDIOVERSION      CARDIOVERSION N/A 1/2/2014    [facility] CATH LAB    CATARACT EXTRACTION      CATARACT EXTRACTION Right 05/14/2018    Dr. Greer    COLONOSCOPY N/A 3/3/2016    [facility] Poe [facility] at [facility] ENDO (4TH FLR)    epidural steroid injection      FLAP Right 12/14/2015    [facility]is [facility] at [facility] OR 2ND FLR    FULL THICKNESS SKIN GRAFT/HARVESTING OF LEFT POSTAURICULAR SKIN GRAFT Left 6/28/2016    [facility]illon [facility] at [facility] OR 1ST FLR    INJECTION-STEROID-EPIDURAL-TRANSFORAMINAL Bilateral 4/24/2018    [facility]    INSERTION, INTRAOCULAR LENS PROSTHESIS Left 6/25/2018    [facility]  at [facility]    INSERTION-INTRAOCULAR LENS (IOL) Right 5/14/2018    [facility] at [facility] OR    JOINT REPLACEMENT      Knee    Meniere's disease surgery      PHACOEMULSIFICATION, CATARACT Left 6/25/2018    [facility] at [facility]    PHACOEMULSIFICATION-ASPIRATION-CATARACT Right 5/14/2018    [facility] at [facility] OR    REPAIR-ECTROPION Right 6/28/2016    [facility] at [facility] OR 1ST FLR    REPAIR-ECTROPION Right Lower Eyelid 90mins Right 3/7/2016    Performed by Danis Martinez MD at [facility] OR 2ND FLR    REPAIR-MOHS DEFECT Rt Cheek Right 12/14/2015    Performed by Danis Martinez MD at [facility] OR 2ND FLR    REPLACEMENT-KNEE-TOTAL right sherri Right 1/25/2017    Performed by Jose Armando Knight MD at [facility] OR 2ND FLR    REVISION-ARTHROPLASTY-KNEE-SHERRI Right 7/10/2018    Performed by Miguel Stuart MD at [facility] OR 2ND FLR    TONSILLECTOMY      TOTAL KNEE ARTHROPLASTY Right 01/25/2017    TKR    TOTAL KNEE ARTHROPLASTY Left     TKR, 1990's    TRANSESOPHAGEAL ECHOCARDIOGRAM (ELSY) N/A 1/2/2014    Performed by José Granda MD at [facility] CATH LAB       Family History   Problem Relation Age of Onset    Diabetes Mother     Stroke Mother     Diabetes Father     Heart disease Father         over 45 years of age    Hypertension Father     Cancer Sister         brain    Eczema Sister     Cancer Brother         brain    Cancer Sister         leukemia, stomach cancer    No Known Problems Sister     ALS Brother         stomach cancer    No Known Problems Brother     No Known Problems Maternal Aunt     No Known Problems Maternal Uncle     No Known Problems Paternal Aunt     No Known Problems Paternal Uncle     No Known Problems Maternal Grandmother     No Known Problems Maternal Grandfather     No Known Problems Paternal Grandmother     No Known Problems Paternal Grandfather     Amblyopia Neg Hx     Blindness Neg  Hx     Cataracts Neg Hx     Glaucoma Neg Hx     Macular degeneration Neg Hx     Retinal detachment Neg Hx     Strabismus Neg Hx     Thyroid disease Neg Hx     Melanoma Neg Hx     Psoriasis Neg Hx     Lupus Neg Hx     Acne Neg Hx        Social History     Socioeconomic History    Marital status:      Spouse name: Not on file    Number of children: 3    Years of education: Not on file    Highest education level: Not on file   Occupational History    Occupation: retired     Employer: RETIRED   Social Needs    Financial resource strain: Not very hard    Food insecurity:     Worry: Never true     Inability: Never true    Transportation needs:     Medical: No     Non-medical: No   Tobacco Use    Smoking status: Former Smoker     Last attempt to quit: 7/10/1985     Years since quittin.1    Smokeless tobacco: Never Used   Substance and Sexual Activity    Alcohol use: No     Alcohol/week: 0.0 oz     Frequency: Never     Drinks per session: Patient refused     Comment: quit - had excess in the past    Drug use: No    Sexual activity: Not on file   Lifestyle    Physical activity:     Days per week: 1 day     Minutes per session: 30 min    Stress: To some extent   Relationships    Social connections:     Talks on phone: More than three times a week     Gets together: Three times a week     Attends Baptism service: Not on file     Active member of club or organization: No     Attends meetings of clubs or organizations: Never     Relationship status:    Other Topics Concern    Not on file   Social History Narrative    Not on file       No current facility-administered medications for this encounter.      Current Outpatient Medications   Medication Sig Dispense Refill    azelastine (ASTELIN) 137 mcg (0.1 %) nasal spray 1 spray (137 mcg total) by Nasal route 2 (two) times daily. (Patient taking differently: 1 spray by Nasal route 2 (two) times daily as needed. ) 30 mL 3     "fenofibrate (TRICOR) 145 MG tablet TAKE ONE TABLET BY MOUTH EVERY DAY 90 tablet 3    furosemide (LASIX) 40 MG tablet Take 1 tablet (40 mg total) by mouth once daily. 30 tablet 1    gabapentin (NEURONTIN) 300 MG capsule Take 1 capsule (300 mg total) by mouth 3 (three) times daily. (Patient taking differently: Take 300 mg by mouth 3 (three) times daily. ) 270 capsule 1    insulin (BASAGLAR KWIKPEN U-100 INSULIN) glargine 100 units/mL (3mL) SubQ pen Inject 55-60 units at night. (Patient taking differently: Inject 30 Units into the skin every evening. Take 30 Units every HS) 30 mL 6    insulin aspart (NOVOLOG FLEXPEN) 100 unit/mL InPn pen Novolog 20 units breakfast, 22 units lunch and dinner plus correction scale. Max daily dose=100 units (Patient taking differently: Novolog 6 units breakfast, 8 units lunch and dinner plus correction scale.) 2 Box 11    metoprolol succinate (TOPROL-XL) 100 MG 24 hr tablet TAKE ONE TABLET BY MOUTH EVERY DAY (Patient taking differently: TAKE ONE TABLET BY MOUTH EVERY HS) 90 tablet 1    nystatin-triamcinolone (MYCOLOG II) cream apply TWICE DAILY (Patient taking differently: apply TWICE DAILY-using as needed for rash) 60 g 1    pravastatin (PRAVACHOL) 10 MG tablet TAKE ONE TABLET BY MOUTH ONCE EVERY DAY (Patient taking differently: TAKE ONE TABLET BY MOUTH ONCE EVERY EVENING) 90 tablet 3    temazepam (RESTORIL) 15 mg Cap TAKE ONE CAPSULE BY MOUTH EVERY EVENING 30 capsule 5    triamcinolone acetonide 0.1% (KENALOG) 0.1 % cream Apply topically 2 (two) times daily. Apply to affected area twice daily as directed. 454 g 0    apixaban 5 mg Tab Take 1 tablet (5 mg total) by mouth 2 (two) times daily. 180 tablet 3    BD INSULIN PEN NEEDLE UF ORIG 29 x 1/2 " Ndle   12    BD ULTRA-FINE BASIL PEN NEEDLE 32 gauge x 5/32" Ndle CHECK BLOOD SUGAR FOUR TIMES DAILY 400 each 3    blood sugar diagnostic Strp 1 strip by Misc.(Non-Drug; Combo Route) route 3 (three) times daily. 270 strip 6    " "blood-glucose meter Misc To check BG as discussed 1 each 0    ciclopirox (PENLAC) 8 % Soln Apply topically nightly. 6.6 mL 11    ciclopirox 0.77 % Gel Apply topically 2 (two) times daily. 100 g 3    clobetasol (TEMOVATE) 0.05 % cream AAA finger bid 60 g 3    ketoconazole (NIZORAL) 2 % cream Apply topically once daily. 30 g 1    lancets 33 gauge Misc 1 lancet by Misc.(Non-Drug; Combo Route) route 4 (four) times daily. Pt uses True Result Meter, bg monitoring 4 times a day. 450 each 4    levothyroxine (SYNTHROID) 25 MCG tablet TAKE ONE TABLET BY MOUTH ONCE EVERY DAY 90 tablet 3       Review of patient's allergies indicates:   Allergen Reactions    Niacin Itching and Other (See Comments)     Other reaction(s): facial flushing and causes hepatitis     Terbinafine Other (See Comments)     Other reaction(s): Hepatitis    "gave me rash"         Review of systems:  Gen: denies fevers, chills, nausea, vomitting, weight changes  HEENT: Denies discharge, coughing/sneezing, sore throat  Resp: Denies SOB  CV: Denies CP or palpitations  Abd: Denies tenderness, diarrhea, constipation, nausea  Ext:  Denies pain or edema      Vital Signs (Most Recent)       Physical Exam  General: No apparent distress  HEENT: Normocephalic, atraumatic, cicatricial ectropion OD  Lungs: Adequate respirations, no respiratory distress  Heart: Pulses intact  Abdomen: Soft, no distention  Rectal/pelvic: Deferred    Assessment and Plan  Patient is a 73 y.o. male with cicatricial ectropion, right eye   - Risks/benefits/alternatives of the procedure including, but not limited to scarring, bleeding, infection, loss or decreased vision, and/or need for possible repeat surgery discussed with the patient and/or family, and Informed consent obtained prior to surgery and the patient/family voiced good understanding, witnessed, and placed in chart.  - Will proceed with right lower eyelid cicatricial ectropion repair with release of scar tissue, midface " "lift, placement of skin graft and Frost suture.  - Plan General anesthesia     - Patient has not taken any blood thinners in the last 5-7 days (aspirin, motrin, aleve, NSAIDS, Good-E powder, BC powder, Excedrin, Warfarin/Coumadin, Xarelto, Eliquis, Plavix)    -   Review of patient's allergies indicates:   Allergen Reactions    Niacin Itching and Other (See Comments)     Other reaction(s): facial flushing and causes hepatitis     Terbinafine Other (See Comments)     Other reaction(s): Hepatitis    "gave me rash"       "

## 2019-08-14 NOTE — TRANSFER OF CARE
"Anesthesia Transfer of Care Note    [place] [facility] [place] Cantor Jr.    Procedure(s) Performed: Procedure(s) (LRB):  REPAIR, ECTROPION, EYELID /       #09287- Skin Flaps(Deep Tissue) (OD) (Right)  PREPARATION, WOUND, PRIOR TO SKIN GRAFT APPLICATION (Right)  APPLICATION, GRAFT, SKIN, FULL-THICKNESS (Right)  BLEPHARORRHAPHY (Right)    Patient location: PACU    Anesthesia Type: general    Transport from OR: Transported from OR on 6-10 L/min O2 by face mask with adequate spontaneous ventilation    Post pain: adequate analgesia    Post assessment: no apparent anesthetic complications and tolerated procedure well    Post vital signs: stable    Level of consciousness: awake    Nausea/Vomiting: no nausea/vomiting    Complications: none    Transfer of care protocol was followed      Last vitals:   Visit Vitals  BP (!) 118/59   Pulse 75   Temp 36.3 °C (97.3 °F) (Temporal)   Resp 14   Ht 5' 10" (1.778 m)   Wt 118.8 kg (262 lb)   SpO2 97%   BMI 37.59 kg/m²     "

## 2019-08-14 NOTE — ANESTHESIA PREPROCEDURE EVALUATION
08[place]14/2019  [facility] [place] is a 73 y.o., male.    Anesthesia Evaluation    I have reviewed the Patient Summary Reports.     I have reviewed the Medications.     Review of Systems  Anesthesia Hx:  No problems with previous Anesthesia  History of prior surgery of interest to airway management or planning: Denies Family Hx of Anesthesia complications.   Denies Personal Hx of Anesthesia complications.   Cardiovascular:   Exercise tolerance: good Hypertension Denies MI.  Dysrhythmias (on xarelto, held x 5 days for surgery) atrial fibrillation ECG has been reviewed.    Pulmonary:   Denies COPD. Sleep Apnea    Renal/:   Chronic Renal Disease, CRI    Musculoskeletal:   Arthritis     Neurological:  Neurology Normal    Endocrine:   Diabetes, type 2, using insulin Hypothyroidism        Physical Exam  General:  Well nourished, Obesity    Airway/Jaw/Neck:  Airway Findings: Mouth Opening: Normal Tongue: Normal  General Airway Assessment: Adult  Mallampati: II  TM Distance: Normal, at least 6 cm      Dental:  Dental Findings: In tact   Chest/Lungs:  Chest/Lungs Findings: Normal Respiratory Rate     Heart/Vascular:  Heart Findings: Rate: Normal        Mental Status:  Mental Status Findings:  Alert and Oriented         Anesthesia Plan  Type of Anesthesia, risks & benefits discussed:  Anesthesia Type:  general  Patient's Preference:   Intra-op Monitoring Plan: standard ASA monitors  Intra-op Monitoring Plan Comments:   Post Op Pain Control Plan: multimodal analgesia, IV/PO Opioids PRN and per primary service following discharge from PACU  Post Op Pain Control Plan Comments:   Induction:   IV  Beta Blocker:  Patient is on a Beta-Blocker and has received one dose within the past 24 hours (No further documentation required).       Informed Consent: Patient understands risks and agrees with Anesthesia plan.  Questions  answered. Anesthesia consent signed with patient.  ASA Score: 3     Day of Surgery Review of History & Physical:    H&P update referred to the surgeon.         Ready For Surgery From Anesthesia Perspective.

## 2019-08-14 NOTE — ANESTHESIA POSTPROCEDURE EVALUATION
Anesthesia Post Evaluation    [place] [facility] [place] Jr[place]    Procedure(s) Performed: Procedure(s) (LRB):  REPAIR, ECTROPION, EYELID /       #47032- Skin Flaps(Deep Tissue) (OD) (Right)  PREPARATION, WOUND, PRIOR TO SKIN GRAFT APPLICATION (Right)  APPLICATION, GRAFT, SKIN, FULL-THICKNESS (Right)  BLEPHARORRHAPHY (Right)    Final Anesthesia Type: general  Patient location during evaluation: PACU  Patient participation: Yes- Able to Participate  Level of consciousness: awake and alert  Post-procedure vital signs: reviewed and stable  Pain management: adequate  Airway patency: patent  PONV status at discharge: No PONV  Anesthetic complications: no      Cardiovascular status: blood pressure returned to baseline  Respiratory status: unassisted, room air and spontaneous ventilation  Hydration status: euvolemic  Follow-up not needed.          Vitals Value Taken Time   /54 8/14/2019  5:16 PM   Temp 36.3 °C (97.3 °F) 8/14/2019  4:59 PM   Pulse 71 8/14/2019  5:16 PM   Resp 19 8/14/2019  5:16 PM   SpO2 93 % 8/14/2019  5:16 PM   Vitals shown include unvalidated device data.      No case tracking events are documented in the log.      Pain/Ebssie Score: Bessie Score: 8 (8/14/2019  5:00 PM)

## 2019-08-14 NOTE — BRIEF OP NOTE
Brief Operative Note  Ophthalmology Service      Date of Procedure: 8/14/2019     Attending Physician: Edita Sabillon MD     Assistant : Phan Mandel MD    Pre-Operative Diagnosis: Cicatricial ectropion of right lower eyelid [H02.112]  Dry eye [H04.129]  Cicatricial ectropion of right lower eyelid [H02.112]  Cicatricial ectropion of right lower eyelid [H02.112]     Post-Operative Diagnosis: Same as pre-operative diagnosis    Treatments/Procedures: Procedure(s) (LRB):  REPAIR, ECTROPION, EYELID /       #20404- Skin Flaps(Deep Tissue) (OD) (Right)  PREPARATION, WOUND, PRIOR TO SKIN GRAFT APPLICATION (Right)  APPLICATION, GRAFT, SKIN, FULL-THICKNESS (Right)  BLEPHARORRHAPHY (Right)      Anesthesia: general    Complications: None    Estimated Blood Loss:minimal    Specimens: None    -------------------------------------------------------------  Full dictated Operative Report to follow.

## 2019-08-16 NOTE — OP NOTE
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 male with chronic irritation and tearing of the right eye secondary to lateral cicatricial ectropion.  The patient has a history of melanoma resection on the face with subsequent large right cervical facial flap with downward traction on the eyelid. Informed consent obtained after extensive risks/benefits/alternatives were discussed with the patient including but not limited to pain, bleeding, infection, ocular injury, loss of the eye, asymmetry, need for revision in future, scarring.  Alternatives such as waiting were discussed.  All questions were answered.      The patient was brought to the Operating Room table and placed in a supine position. General anesthesia was achieved without complication and the patient was then prepped and draped in usual sterile fashion for surgery using topical 5% betadine.  The right lower eyelid and midface was injected with 2% lidocaine with epinephrine, 0.5% bupivacaine, and Vitrase.  A 4-0 silk suture was placed through the lid margin of the lower lid with traction pulled upward.  Attention was brought to the right lower lid where  increased tension from the previous reconstruction surgery was noted.  A subciliary incision was marked with a marking pen.  A subcilicary incision with a 15 blade was made about midway to the eyelid using a #15 blade.  Previous scar tissue was dissected through and released using Ame scissors.  Hemostasis was achieved with cautery.  Area of new graft placement was then measured to be 25 mm (H) x 10 mm (v).   Next, a lateral canthal incision was started on the right side using a #15 scalpel. A canthotomy followed by cantholysis was performed with Villanueva scissors and monopolar cautery respectively. Blunt dissection with cotton-tipped applicators was used to free up the skin and muscle flap from prior surgery along the preperiosteal plane lateral and inferior to the orbital rim.  It was noted that the patient had a lot of scar tissue.  The thickness of the anterior tissues overlying the lateral canthus was quite thickened as expected from a prior rotational flap.  The area to the periosteum was noted to be very deep.  Hemostasis was obtained through cautery and local. The right lower lid was draped over the lateral wall and marked with cautery. A 15 blade was used to split the anterior and posterior lamella.  It was noted that part of the tarsus was missing likely due to previous surgery and reconstruction.   The mucocutaneous junction was removed with Diego scissors. The lower lid retractors were cut from the remaining inferior tarsal border on the lateral aspect of the strip. A 15 blade was used to denude the conjunctival epithelium on the posterior aspect of the tarsus. Villanueva scissors were used to trim the tarsal strip by approximately 2 mm. Hemostasis was maintained with monopolar cautery.  A 4-0 Vicryl suture on a P-2 needle was passed through the tarsal strip and temporarily sutured to the periosteum. Apposition and alignment of the eyelids to the globe was confirmed. A second anchoring suture was  passed through the tarsal strip and anchored to the same spot. These two sutures were tied permanently.  Attention was then turned to left postauricular area. A template of the defect was drawn on the postauricular area.  The area of 25 mm x 10 mm was removed using westcotts.  Hemostasis was obtained after the skin graft was removed. Skin was then closed using 4-0 plain gut.  The skin graft then had the dermis removed and placed over the defect area.  It was trimmed down to size and sutured down with 6-0 silk interrupted left long and running 6-0 plain gut.  The long tails of the 6-0 were then closed over a telfa bolster placed over the graft.     The lateral canthal angle was reformed with a 6-0 Vicryl on S-14 buried stitch placed though the wound of the lower eyelid and exiting out through the gray line and back through the gray line of the upper eyelid and passing back through the wound of the upper eyelid. Next the skin and muscle from the mid face flap was elevated and anchored to the periosteum of the zygoma with two buried interrupted 4-0 Vicryl suture on a PS-2 needle.      The skin on the lateral canthus area was closed with a running 6-0 Prolene on a P-1 needle. Tobradex ointment was applied to the surgical wounds. And Bacitracin to the postauricular area.The patient tolerated the procedure well. There were no complications. The patient was awakened and transported to the recovery room in stable fashion. The patient was discharged home in stable condition.

## 2019-08-19 NOTE — DISCHARGE SUMMARY
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    Disposition: discharged home    Patient Instructions:   Discharge Medication List as of 8/14/2019  5:40 PM      START taking these medications    Details   cephALEXin (KEFLEX) 500 MG capsule Take 1 capsule (500 mg total) by mouth 3 (three) times daily. for 10 days, Starting Wed 8/14/2019, Until Sat 8/24/2019, Print      HYDROcodone-acetaminophen (NORCO) 5-325 mg per tablet Take 1 tablet by mouth every 6 (six) hours as needed for Pain., Starting Wed 8/14/2019, Print         CONTINUE these medications which have NOT CHANGED    Details   apixaban 5 mg Tab Take 1 tablet (5 mg total) by mouth 2 (two) times daily., Starting Tue 7/10/2018, Normal      azelastine (ASTELIN) 137 mcg (0.1 %) nasal spray 1 spray (137 mcg total) by Nasal route 2 (two) times daily., Starting Thu 4/12/2018, Normal      BD INSULIN PEN NEEDLE UF ORIG 29 x 1/2 " Ndle Starting 7/28/2015, Until Discontinued, Historical Med      BD ULTRA-FINE BASIL PEN NEEDLE 32 gauge x 5/32" Ndle CHECK BLOOD SUGAR FOUR TIMES DAILY, Normal      blood sugar diagnostic Strp 1 strip by Misc.(Non-Drug; Combo Route) route 3 (three) times daily., Starting Thu 8/16/2018, Print      blood-glucose meter Misc To check BG as discussed, Print      ciclopirox (PENLAC) 8 % Soln Apply topically nightly., Starting Wed 8/7/2019, Normal      ciclopirox 0.77 % Gel Apply topically 2 (two) times daily., Starting Wed 8/7/2019, Normal      clobetasol (TEMOVATE) 0.05 % cream AAA finger bid, Normal      fenofibrate " (TRICOR) 145 MG tablet TAKE ONE TABLET BY MOUTH EVERY DAY, Normal      furosemide (LASIX) 40 MG tablet Take 1 tablet (40 mg total) by mouth once daily., Starting Tue 7/31/2018, Normal      gabapentin (NEURONTIN) 300 MG capsule Take 1 capsule (300 mg total) by mouth 3 (three) times daily., Starting Tue 5/21/2019, Normal      insulin (BASAGLAR KWIKPEN U-100 INSULIN) glargine 100 units/mL (3mL) SubQ pen Inject 55-60 units at night., Normal      insulin aspart (NOVOLOG FLEXPEN) 100 unit/mL InPn pen Novolog 20 units breakfast, 22 units lunch and dinner plus correction scale. Max daily dose=100 units, Normal      ketoconazole (NIZORAL) 2 % cream Apply topically once daily., Starting 3/7/2017, Until Discontinued, Normal      lancets 33 gauge Misc 1 lancet by Misc.(Non-Drug; Combo Route) route 4 (four) times daily. Pt uses True Result Meter, bg monitoring 4 times a day., Starting Thu 8/16/2018, Print      levothyroxine (SYNTHROID) 25 MCG tablet TAKE ONE TABLET BY MOUTH ONCE EVERY DAY, Normal      metoprolol succinate (TOPROL-XL) 100 MG 24 hr tablet TAKE ONE TABLET BY MOUTH EVERY DAY, Normal      nystatin-triamcinolone (MYCOLOG II) cream apply TWICE DAILY, Normal      pravastatin (PRAVACHOL) 10 MG tablet TAKE ONE TABLET BY MOUTH ONCE EVERY DAY, Normal      temazepam (RESTORIL) 15 mg Cap TAKE ONE CAPSULE BY MOUTH EVERY EVENING, Normal      triamcinolone acetonide 0.1% (KENALOG) 0.1 % cream Apply topically 2 (two) times daily. Apply to affected area twice daily as directed., Starting 3/7/2017, Until Discontinued, Normal             Discharge Procedure Orders (must include Diet, Follow-up, Activity):  No discharge procedures on file.  Please see printed instructions given directly to patient's family.

## 2019-08-20 ENCOUNTER — OFFICE VISIT (OUTPATIENT)
Dept: OPHTHALMOLOGY | Facility: CLINIC | Age: 74
End: 2019-08-20
Payer: MEDICARE

## 2019-08-20 DIAGNOSIS — H02.112 CICATRICIAL ECTROPION OF RIGHT LOWER EYELID: ICD-10-CM

## 2019-08-20 DIAGNOSIS — Z98.890 POST-OPERATIVE STATE: Primary | ICD-10-CM

## 2019-08-20 PROCEDURE — 99999 PR PBB SHADOW E&M-EST. PATIENT-LVL II: CPT | Mod: PBBFAC,,, | Performed by: OPHTHALMOLOGY

## 2019-08-20 PROCEDURE — 92285 EXTERNAL OCULAR PHOTOGRAPHY: CPT | Mod: S$GLB,,, | Performed by: OPHTHALMOLOGY

## 2019-08-20 PROCEDURE — 99024 PR POST-OP FOLLOW-UP VISIT: ICD-10-PCS | Mod: S$GLB,,, | Performed by: OPHTHALMOLOGY

## 2019-08-20 PROCEDURE — 99024 POSTOP FOLLOW-UP VISIT: CPT | Mod: S$GLB,,, | Performed by: OPHTHALMOLOGY

## 2019-08-20 PROCEDURE — 99999 PR PBB SHADOW E&M-EST. PATIENT-LVL II: ICD-10-PCS | Mod: PBBFAC,,, | Performed by: OPHTHALMOLOGY

## 2019-08-20 PROCEDURE — 92285 EXTERNAL PHOTOGRAPHY - OU - BOTH EYES: ICD-10-PCS | Mod: S$GLB,,, | Performed by: OPHTHALMOLOGY

## 2019-08-20 NOTE — PROGRESS NOTES
HPI     S/p ectropion repair full thickness graft blepharorrhaphy 8/14/19  No complaints    Last edited by Dixie Joshi on 8/20/2019  2:57 PM. (History)            Assessment /Plan     For exam results, see Encounter Report.    Post-operative state    Cicatricial ectropion of right lower eyelid      Cicatricial ectropion, right eye  - pt doing well, lid in good position, graft pink without signs of necrosis  - patch and bolster removed  - with small area of graft dehiscence medially--placing 3 vicryl sutures laterally today in that area  - continue radha with taper     RTC 3w POM #1 visit    I have reviewed and concur with the resident's history, physical, assessment, and plan.  I have personally interviewed and examined the patient.      Procedure note:    Right lower eyelid injected 1.5 cc of 2% lidocaine with epinephrine and sodium bicarbonate.  The patient was prepped and draped in the usual sterile manner for ophthalmic plastic surgery. The medial aspect of the graft was reapposed to the adjacent skin with 3 interrupted 6 0 Vicryl sutures. The patient tolerated procedure well. There were no complications.

## 2019-08-22 ENCOUNTER — TELEPHONE (OUTPATIENT)
Dept: ENDOSCOPY | Facility: HOSPITAL | Age: 74
End: 2019-08-22

## 2019-08-22 NOTE — TELEPHONE ENCOUNTER
Dr. Granda,    Patient called to schedule his colonoscopy this morning ordered by his PCP, Dr. Barlow for his 3 year screening follow up.  Per endoscopy protocol we must get clearance from the prescribing provider to HOLD his Eliquis x 2 days prior to scheduling his procedure.      Please Advise.    Thank You,  JAMIE Woodrad

## 2019-08-23 DIAGNOSIS — Z86.010 ENCOUNTER FOR COLONOSCOPY DUE TO HISTORY OF COLONIC POLYP: Primary | ICD-10-CM

## 2019-08-23 DIAGNOSIS — Z12.11 ENCOUNTER FOR COLONOSCOPY DUE TO HISTORY OF COLONIC POLYP: Primary | ICD-10-CM

## 2019-08-23 RX ORDER — POLYETHYLENE GLYCOL 3350, SODIUM SULFATE ANHYDROUS, SODIUM BICARBONATE, SODIUM CHLORIDE, POTASSIUM CHLORIDE 236; 22.74; 6.74; 5.86; 2.97 G/4L; G/4L; G/4L; G/4L; G/4L
4 POWDER, FOR SOLUTION ORAL ONCE
Qty: 4000 ML | Refills: 0 | Status: SHIPPED | OUTPATIENT
Start: 2019-08-23 | End: 2019-08-23

## 2019-08-23 NOTE — TELEPHONE ENCOUNTER
José Granda MD  You; Jesus Alberto SHANNON Staff 15 hours ago (4:52 PM)      ok    Routing comment        JAMIE Kern MD 20 hours ago (11:51 AM)      Are you okay to hold eliquis 2 days prior to colonoscopy??    Routing comment        Cally Solis MA routed conversation to Carissa Arias RN 23 hours ago (9:16 AM)       You routed conversation to José Granda MD; Jesus Alberto SHANNON Staff 23 hours ago (8:58 AM)      You 23 hours ago (8:49 AM)         Dr. Granda,     Patient called to schedule his colonoscopy this morning ordered by his PCP, Dr. Barlow for his 3 year screening follow up.  Per endoscopy protocol we must get clearance from the prescribing provider to HOLD his Eliquis x 2 days prior to scheduling his procedure.       Please Advise.     Thank You,  JAMIE Woodard           Documentation

## 2019-08-29 RX ORDER — METOPROLOL SUCCINATE 100 MG/1
TABLET, EXTENDED RELEASE ORAL
Qty: 90 TABLET | Refills: 1 | Status: SHIPPED | OUTPATIENT
Start: 2019-08-29 | End: 2020-03-06

## 2019-08-30 ENCOUNTER — TELEPHONE (OUTPATIENT)
Dept: PODIATRY | Facility: CLINIC | Age: 74
End: 2019-08-30

## 2019-08-30 NOTE — TELEPHONE ENCOUNTER
----- Message from Maddie Gilbert sent at 8/29/2019  4:39 PM CDT -----  Contact: [facility] [employer]  Please call [facility] at [phone] #[phone]    Fax# [phone]    Please fax a diabetic shoes order along with the clinical notes    Thank you

## 2019-09-05 ENCOUNTER — OFFICE VISIT (OUTPATIENT)
Dept: OPHTHALMOLOGY | Facility: CLINIC | Age: 74
End: 2019-09-05
Payer: MEDICARE

## 2019-09-05 DIAGNOSIS — H04.129 DRY EYE: ICD-10-CM

## 2019-09-05 DIAGNOSIS — H02.112 CICATRICIAL ECTROPION OF RIGHT LOWER EYELID: ICD-10-CM

## 2019-09-05 DIAGNOSIS — Z98.890 POST-OPERATIVE STATE: Primary | ICD-10-CM

## 2019-09-05 PROCEDURE — 99024 PR POST-OP FOLLOW-UP VISIT: ICD-10-PCS | Mod: S$GLB,,, | Performed by: OPHTHALMOLOGY

## 2019-09-05 PROCEDURE — 99999 PR PBB SHADOW E&M-EST. PATIENT-LVL III: ICD-10-PCS | Mod: PBBFAC,,, | Performed by: OPHTHALMOLOGY

## 2019-09-05 PROCEDURE — 92285 EXTERNAL OCULAR PHOTOGRAPHY: CPT | Mod: S$GLB,,, | Performed by: OPHTHALMOLOGY

## 2019-09-05 PROCEDURE — 92285 EXTERNAL PHOTOGRAPHY - OU - BOTH EYES: ICD-10-PCS | Mod: S$GLB,,, | Performed by: OPHTHALMOLOGY

## 2019-09-05 PROCEDURE — 99999 PR PBB SHADOW E&M-EST. PATIENT-LVL III: CPT | Mod: PBBFAC,,, | Performed by: OPHTHALMOLOGY

## 2019-09-05 PROCEDURE — 99024 POSTOP FOLLOW-UP VISIT: CPT | Mod: S$GLB,,, | Performed by: OPHTHALMOLOGY

## 2019-09-05 NOTE — PROGRESS NOTES
HPI     Post-op Evaluation      Additional comments: 1 mo              Comments     Pt here for 1 mo PO.  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 (History)            Assessment /Plan     For exam results, see Encounter Report.    Post-operative state  -     External Photography - OU - Both Eyes    Cicatricial ectropion of right lower eyelid    Dry eye      Patient doing well! Post-operative instructions reviewed. All questions answered. Return in 4 weeks prn sooner any worsening of vision/symptoms or any concerns.

## 2019-09-11 ENCOUNTER — TELEPHONE (OUTPATIENT)
Dept: OPHTHALMOLOGY | Facility: CLINIC | Age: 74
End: 2019-09-11

## 2019-09-11 NOTE — TELEPHONE ENCOUNTER
----- Message from Rosalind Cruz sent at 9/6/2019  1:49 PM CDT -----  Is patient cleared for physical therapy?

## 2019-09-17 ENCOUNTER — TELEPHONE (OUTPATIENT)
Dept: OPHTHALMOLOGY | Facility: CLINIC | Age: 74
End: 2019-09-17

## 2019-09-19 DIAGNOSIS — Z96.651 STATUS POST RIGHT KNEE REPLACEMENT: Primary | ICD-10-CM

## 2019-09-20 ENCOUNTER — HOSPITAL ENCOUNTER (OUTPATIENT)
Facility: HOSPITAL | Age: 74
Discharge: HOME | End: 2019-09-20
Attending: SURGERY | Admitting: SURGERY
Payer: MEDICARE

## 2019-09-20 ENCOUNTER — ANESTHESIA EVENT (OUTPATIENT)
Dept: ENDOSCOPY | Facility: HOSPITAL | Age: 74
End: 2019-09-20
Payer: MEDICARE

## 2019-09-20 ENCOUNTER — ANESTHESIA (OUTPATIENT)
Dept: ENDOSCOPY | Facility: HOSPITAL | Age: 74
End: 2019-09-20
Payer: MEDICARE

## 2019-09-20 VITALS
DIASTOLIC BLOOD PRESSURE: 82 MMHG | HEART RATE: 78 BPM | SYSTOLIC BLOOD PRESSURE: 127 MMHG | OXYGEN SATURATION: 98 % | WEIGHT: 262 LBS | RESPIRATION RATE: 16 BRPM | BODY MASS INDEX: 37.51 KG/M2 | TEMPERATURE: 98 F | HEIGHT: 70 IN

## 2019-09-20 DIAGNOSIS — Z86.010 HISTORY OF COLON POLYPS: Primary | ICD-10-CM

## 2019-09-20 PROBLEM — Z86.0100 HISTORY OF COLON POLYPS: Status: ACTIVE | Noted: 2019-09-20

## 2019-09-20 LAB
GLUCOSE SERPL-MCNC: 116 MG/DL (ref 70–110)
POCT GLUCOSE: 116 MG/DL (ref 70–110)

## 2019-09-20 PROCEDURE — 63600175 PHARM REV CODE 636 W HCPCS: Performed by: SURGERY

## 2019-09-20 PROCEDURE — E9220 PRA ENDO ANESTHESIA: HCPCS | Mod: PT,,, | Performed by: NURSE ANESTHETIST, CERTIFIED REGISTERED

## 2019-09-20 PROCEDURE — 88305 TISSUE EXAM BY PATHOLOGIST: CPT | Mod: 59 | Performed by: PATHOLOGY

## 2019-09-20 PROCEDURE — 45380 COLONOSCOPY AND BIOPSY: CPT | Performed by: SURGERY

## 2019-09-20 PROCEDURE — 37000009 HC ANESTHESIA EA ADD 15 MINS: Performed by: SURGERY

## 2019-09-20 PROCEDURE — 45380 PR COLONOSCOPY,BIOPSY: ICD-10-PCS | Mod: PT,,, | Performed by: SURGERY

## 2019-09-20 PROCEDURE — 27201012 HC FORCEPS, HOT/COLD, DISP: Performed by: SURGERY

## 2019-09-20 PROCEDURE — 37000008 HC ANESTHESIA 1ST 15 MINUTES: Performed by: SURGERY

## 2019-09-20 PROCEDURE — 88305 TISSUE SPECIMEN TO PATHOLOGY - SURGERY: ICD-10-PCS | Mod: 26,,, | Performed by: PATHOLOGY

## 2019-09-20 PROCEDURE — E9220 PRA ENDO ANESTHESIA: ICD-10-PCS | Mod: PT,,, | Performed by: NURSE ANESTHETIST, CERTIFIED REGISTERED

## 2019-09-20 PROCEDURE — 45380 COLONOSCOPY AND BIOPSY: CPT | Mod: PT,,, | Performed by: SURGERY

## 2019-09-20 PROCEDURE — 63600175 PHARM REV CODE 636 W HCPCS: Performed by: NURSE ANESTHETIST, CERTIFIED REGISTERED

## 2019-09-20 RX ORDER — SODIUM CHLORIDE 9 MG/ML
INJECTION, SOLUTION INTRAVENOUS CONTINUOUS
Status: DISCONTINUED | OUTPATIENT
Start: 2019-09-20 | End: 2019-09-20 | Stop reason: HOSPADM

## 2019-09-20 RX ORDER — LIDOCAINE HCL/PF 100 MG/5ML
SYRINGE (ML) INTRAVENOUS
Status: DISCONTINUED | OUTPATIENT
Start: 2019-09-20 | End: 2019-09-20

## 2019-09-20 RX ORDER — SODIUM CHLORIDE 0.9 % (FLUSH) 0.9 %
10 SYRINGE (ML) INJECTION
Status: DISCONTINUED | OUTPATIENT
Start: 2019-09-20 | End: 2019-09-20 | Stop reason: HOSPADM

## 2019-09-20 RX ORDER — PROPOFOL 10 MG/ML
VIAL (ML) INTRAVENOUS CONTINUOUS PRN
Status: DISCONTINUED | OUTPATIENT
Start: 2019-09-20 | End: 2019-09-20

## 2019-09-20 RX ORDER — PROPOFOL 10 MG/ML
VIAL (ML) INTRAVENOUS
Status: DISCONTINUED | OUTPATIENT
Start: 2019-09-20 | End: 2019-09-20

## 2019-09-20 RX ADMIN — PROPOFOL 70 MG: 10 INJECTION, EMULSION INTRAVENOUS at 09:09

## 2019-09-20 RX ADMIN — LIDOCAINE HYDROCHLORIDE 100 MG: 20 INJECTION, SOLUTION INTRAVENOUS at 09:09

## 2019-09-20 RX ADMIN — PROPOFOL 150 MCG/KG/MIN: 10 INJECTION, EMULSION INTRAVENOUS at 09:09

## 2019-09-20 RX ADMIN — SODIUM CHLORIDE: 0.9 INJECTION, SOLUTION INTRAVENOUS at 08:09

## 2019-09-20 NOTE — [employer]ATION PATIENT INSTRUCTIONS
Discharge Summary/Instructions after an Endoscopic Procedure  Patient Name: [facility] [place] [place] Sakshi  Patient MRN: [id number]  Patient YOB: 1945  Friday, September 20, 2019  Akbar Curtis MD  RESTRICTIONS:  During your procedure today, you received medications for sedation.  These   medications may affect your judgment, balance and coordination.  Therefore,   for 24 hours, you have the following restrictions:   - DO NOT drive a car, operate machinery, make legal/financial decisions,   sign important papers or drink alcohol.    ACTIVITY:  Today: no heavy lifting, straining or running due to procedural   sedation/anesthesia.  The following day: return to full activity including work.  DIET:  Eat and drink normally unless instructed otherwise.     TREATMENT FOR COMMON SIDE EFFECTS:  - Mild abdominal pain, nausea, belching, bloating or excessive gas:  rest,   eat lightly and use a heating pad.  - Sore Throat: treat with throat lozenges and/or gargle with warm salt   water.  - Because air was used during the procedure, expelling large amounts of air   from your rectum or belching is normal.  - If a bowel prep was taken, you may not have a bowel movement for 1-3 days.    This is normal.  SYMPTOMS TO WATCH FOR AND REPORT TO YOUR PHYSICIAN:  1. Abdominal pain or bloating, other than gas cramps.  2. Chest pain.  3. Back pain.  4. Signs of infection such as: chills or fever occurring within 24 hours   after the procedure.  5. Rectal bleeding, which would show as bright red, maroon, or black stools.   (A tablespoon of blood from the rectum is not serious, especially if   hemorrhoids are present.)  6. Vomiting.  7. Weakness or dizziness.  GO DIRECTLY TO THE NEAREST EMERGENCY ROOM IF YOU HAVE ANY OF THE FOLLOWING:      Difficulty breathing              Chills and/or fever over 101 F   Persistent vomiting and/or vomiting blood   Severe abdominal pain   Severe chest pain   Black, tarry stools   Bleeding- more than one  tablespoon   Any other symptom or condition that you feel may need urgent attention  Your doctor recommends these additional instructions:  If any biopsies were taken, your doctors clinic will contact you in 1 to 2   weeks with any results.  - Discharge patient to home (ambulatory).   - Resume regular diet.   - Continue present medications.   - Resume Eliquis (apixaban) at prior dose in 3 days.   - Await pathology results.   - Repeat colonoscopy in 5 years for surveillance.   - Return to my office PRN.   - Patient has a contact number available for emergencies.  The signs and   symptoms of potential delayed complications were discussed with the   patient.  Return to normal activities tomorrow.  Written discharge   instructions were provided to the patient.  For questions, problems or results please call your physician - Akbar Curtis MD at Work:  [phone].  [facility], EMERGENCY ROOM PHONE NUMBER: [phone]  IF A COMPLICATION OR EMERGENCY SITUATION ARISES AND YOU ARE UNABLE TO REACH   YOUR PHYSICIAN - GO DIRECTLY TO THE EMERGENCY ROOM.  Akbar Curtis MD  9/20/2019 9:36:30 AM  This report has been verified and signed electronically.  [employer]

## 2019-09-20 NOTE — H&P
[facility]  History & Physical     Subjective:     Chief Complaint/Reason for Admission: history of colon polyps    History of Present Illness:   Patient 73 y.o. male presents for surveillance colonoscopy. Pt notes that he had colonoscopy 3 years ago demonstrating polyps.  These were biopsied and removed.  PT feeling well.  He denies abd pain.  No changes in bowel habits.  Pt with PMhx significant for HTn, Afib.  He is on eliquis which he stopped 4 days ago.  No significant PShx.     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/10/2018    Antiplatelet or antithrombotic long-term use 06/19/2018    Nuclear sclerosis, bilateral 05/14/2018    Proteinuria due to type 2 diabetes mellitus 05/01/2018    Lumbar radiculopathy 04/24/2018    Bilateral leg edema 09/22/2017    Dermatitis, stasis 03/07/2017    Primary osteoarthritis of right knee 01/25/2017    Pulmonary hypertension- 7/6/2018 - The estimated PA systolic pressure is 35 mmHg 01/10/2017    Tattoos 01/10/2017    Microhematuria 12/15/2016    Chronic pain of right knee 12/07/2016    Venous insufficiency of both lower extremities     Vitamin D deficiency 07/28/2016    History of cardioversion 06/17/2016    Lymphedema of both lower extremities 06/03/2016    Tinea pedis 04/05/2016    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/2016    Enlarged prostate 03/04/2016    Diastolic dysfunction 03/04/2016    Type 2 diabetes mellitus with stage 3 chronic kidney disease, with long-term current use of insulin 01/20/2016    Mohs defect of right cheek 12/10/2015    Corns and callosities 01/30/2015    Type 2  "diabetes mellitus with diabetic polyneuropathy     Morbid obesity     Essential hypertension     Dyslipidemia associated with type 2 diabetes mellitus     Chronic kidney disease, stage III (moderate) 11/05/2012    Benign hypertensive renal disease without renal failure 11/05/2012    Acquired cyst of kidney 11/05/2012    Calculus of kidney 11/05/2012    Onychomycosis of multiple toenails with type 2 diabetes mellitus and peripheral angiopathy 10/09/2012    Hyperlipidemia     Sleep apnea     Chronic atrial fibrillation 07/31/2012    Long term current use of anticoagulant therapy 07/31/2012    Sprain and strain of unspecified site of hip and thigh 06/25/2012    Sensory hearing loss, bilateral 02/08/2012    Enthesopathy of hip region 02/01/2012        Medications Prior to Admission   Medication Sig Dispense Refill Last Dose    azelastine (ASTELIN) 137 mcg (0.1 %) nasal spray 1 spray (137 mcg total) by Nasal route 2 (two) times daily. (Patient taking differently: 1 spray by Nasal route 2 (two) times daily as needed. ) 30 mL 3 Past Week at Unknown time    BD INSULIN PEN NEEDLE UF ORIG 29 x 1/2 " Ndle   12 Past Week at Unknown time    BD ULTRA-FINE BASIL PEN NEEDLE 32 gauge x 5/32" Ndle CHECK BLOOD SUGAR FOUR TIMES DAILY 400 each 3 Past Week at Unknown time    blood sugar diagnostic Strp 1 strip by Misc.(Non-Drug; Combo Route) route 3 (three) times daily. 270 strip 6 9/19/2019 at Unknown time    blood-glucose meter Misc To check BG as discussed 1 each 0 9/19/2019 at Unknown time    ciclopirox (PENLAC) 8 % Soln Apply topically nightly. 6.6 mL 11 Past Week at Unknown time    ciclopirox 0.77 % Gel Apply topically 2 (two) times daily. 100 g 3 Past Week at Unknown time    clobetasol (TEMOVATE) 0.05 % cream AAA finger bid 60 g 3 Past Week at Unknown time    fenofibrate (TRICOR) 145 MG tablet TAKE ONE TABLET BY MOUTH EVERY DAY 90 tablet 3 9/19/2019 at Unknown time    furosemide (LASIX) 40 MG tablet Take 1 " tablet (40 mg total) by mouth once daily. 30 tablet 1 Past Week at Unknown time    gabapentin (NEURONTIN) 300 MG capsule Take 1 capsule (300 mg total) by mouth 3 (three) times daily. (Patient taking differently: Take 300 mg by mouth 3 (three) times daily. ) 270 capsule 1 9/19/2019 at Unknown time    insulin (BASAGLAR KWIKPEN U-100 INSULIN) glargine 100 units/mL (3mL) SubQ pen Inject 55-60 units at night. (Patient taking differently: Inject 30 Units into the skin every evening. Take 30 Units every HS) 30 mL 6 Past Week at Unknown time    insulin aspart (NOVOLOG FLEXPEN) 100 unit/mL InPn pen Novolog 20 units breakfast, 22 units lunch and dinner plus correction scale. Max daily dose=100 units (Patient taking differently: Novolog 6 units breakfast, 8 units lunch and dinner plus correction scale.) 2 Box 11 9/19/2019 at Unknown time    ketoconazole (NIZORAL) 2 % cream Apply topically once daily. 30 g 1 Past Week at Unknown time    lancets 33 gauge Misc 1 lancet by Misc.(Non-Drug; Combo Route) route 4 (four) times daily. Pt uses True Result Meter, bg monitoring 4 times a day. 450 each 4 9/19/2019 at Unknown time    levothyroxine (SYNTHROID) 25 MCG tablet TAKE ONE TABLET BY MOUTH ONCE EVERY DAY 90 tablet 3 Past Month at Unknown time    metoprolol succinate (TOPROL-XL) 100 MG 24 hr tablet TAKE ONE TABLET BY MOUTH EVERY DAY 90 tablet 1 9/19/2019 at Unknown time    nystatin-triamcinolone (MYCOLOG II) cream apply TWICE DAILY (Patient taking differently: apply TWICE DAILY-using as needed for rash) 60 g 1 Past Week at Unknown time    pravastatin (PRAVACHOL) 10 MG tablet TAKE ONE TABLET BY MOUTH ONCE EVERY DAY (Patient taking differently: TAKE ONE TABLET BY MOUTH ONCE EVERY EVENING) 90 tablet 3 Past Week at Unknown time    temazepam (RESTORIL) 15 mg Cap TAKE ONE CAPSULE BY MOUTH EVERY EVENING 30 capsule 5 Past Week at Unknown time    triamcinolone acetonide 0.1% (KENALOG) 0.1 % cream Apply topically 2 (two) times daily.  "Apply to affected area twice daily as directed. 454 g 0 Past Week at Unknown time    apixaban 5 mg Tab Take 1 tablet (5 mg total) by mouth 2 (two) times daily. 180 tablet 3 9/16/2019    HYDROcodone-acetaminophen (NORCO) 5-325 mg per tablet Take 1 tablet by mouth every 6 (six) hours as needed for Pain. 16 tablet 0 More than a month at Unknown time     Review of patient's allergies indicates:   Allergen Reactions    Niacin Itching and Other (See Comments)     Other reaction(s): facial flushing and causes hepatitis     Terbinafine Other (See Comments)     Other reaction(s): Hepatitis    "gave me rash"        Past Medical History:   Diagnosis Date    *Atrial fibrillation     Eliquis    Anticoagulant long-term use     apaxiban    Basal cell carcinoma 12/2015    left eye brow    BCC (basal cell carcinoma) 12/2015    left shoulder    Cataract     Chronic kidney disease     Diabetes mellitus with renal manifestations, uncontrolled     Dyslipidemia associated with type 2 diabetes mellitus     Fever blister     Hearing loss     HTN (hypertension)     Keloid cicatrix     Liver disease     Melanoma 12/07/2015    right cheek-in situ    Obesity     PN (peripheral neuropathy)     Primary osteoarthritis of right knee 1/25/2017    Screening for colon cancer 3/3/2016    Sleep apnea     wears CPAP at night    Thyroid disease     Type II or unspecified type diabetes mellitus with other specified manifestations, uncontrolled     Ulcer of left lower extremity, limited to breakdown of skin 9/22/2017      Past Surgical History:   Procedure Laterality Date    APPENDECTOMY      APPLICATION, GRAFT, SKIN, FULL-THICKNESS Right 8/14/2019    Performed by [facility] at [facility] OR 2ND FLR    BLEPHARORRHAPHY Right 8/14/2019    Performed by [facility] at [facility] OR 2ND FLR    CARDIOVERSION      CARDIOVERSION N/A 1/2/2014    Performed by [facility]anda [facility] at [facility] CATH LAB    CATARACT EXTRACTION      CATARACT " EXTRACTION Right 05/14/2018    Dr. Greer    COLONOSCOPY N/A 3/3/2016    Performed by [facility]oe [facility] at [facility] ENDO (4TH FLR)    epidural steroid injection      FLAP Right 12/14/2015    Performed by Danis Martinez [facility] at [facility] OR 2ND FLR    FULL THICKNESS SKIN GRAFT/HARVESTING OF LEFT POSTAURICULAR SKIN GRAFT Left 6/28/2016    Performed by [facility] at [facility] OR 1ST FLR    INJECTION-STEROID-EPIDURAL-TRANSFORAMINAL Bilateral 4/24/2018    Performed by [facility]ie [facility]on [facility] at [facility] [place] PAIN MGT    INSERTION, INTRAOCULAR LENS PROSTHESIS Left 6/25/2018    Performed by Lawrence [facility]er [facility] at [facility] [place]    INSERTION-INTRAOCULAR LENS (IOL) Right 5/14/2018    Performed by Lawrence [facility] at [facility] [place]    JOINT REPLACEMENT      Knee    Meniere's disease surgery      PHACOEMULSIFICATION, CATARACT Left 6/25/2018    Performed by Lawrence [facility] at [facility] [place]    PHACOEMULSIFICATION-ASPIRATION-CATARACT Right 5/14/2018    Performed by Lawrence [facility] at [facility] [place]    PREPARATION, WOUND, PRIOR TO SKIN GRAFT APPLICATION Right 8/14/2019    Performed by [facility] at [facility] OR 2ND FLR    REPAIR, ECTROPION, EYELID /       #[id number]- Skin Flaps(Deep Tissue) (OD) Right 8/14/2019    Performed by Edita [facility]illon [facility] at [facility] OR 2ND FLR    REPAIR-ECTROPION Right 6/28/2016    Performed by Edita [facility]illon [facility] at [facility] OR 1ST FLR    REPAIR-ECTROPION Right Lower Eyelid 90mins Right 3/7/2016    Performed by Danis Martinez MD at [facility] OR 2ND FLR    REPAIR-MOHS DEFECT Rt Cheek Right 12/14/2015    Performed by Danis Martinez MD at [facility] OR 2ND FLR    REPLACEMENT-KNEE-TOTAL right sherri Right 1/25/2017    Performed by Jose Armando Knight MD at [facility] OR 2ND FLR    REVISION-ARTHROPLASTY-KNEE-SHERRI Right 7/10/2018    Performed by Miguel Stuart MD at [facility] OR 2ND FLR    TONSILLECTOMY      TOTAL KNEE ARTHROPLASTY Right 01/25/2017    TKR    TOTAL KNEE ARTHROPLASTY Left     TKR, 1990's    TRANSESOPHAGEAL  "ECHOCARDIOGRAM (ELSY) N/A 2014    Performed [facility] at [facility] CATH LAB      Family History   Problem Relation Age of Onset    Diabetes Mother     Stroke Mother     Diabetes Father     Heart disease Father         over 45 years of age    Hypertension Father     Cancer Sister         brain    Eczema Sister     Cancer Brother         brain    Cancer Sister         leukemia, stomach cancer    No Known Problems Sister     ALS Brother         stomach cancer    Cancer Brother     No Known Problems Brother     No Known Problems Maternal Aunt     No Known Problems Maternal Uncle     No Known Problems Paternal Aunt     No Known Problems Paternal Uncle     No Known Problems Maternal Grandmother     No Known Problems Maternal Grandfather     No Known Problems Paternal Grandmother     No Known Problems Paternal Grandfather     Amblyopia Neg Hx     Blindness Neg Hx     Cataracts Neg Hx     Glaucoma Neg Hx     Macular degeneration Neg Hx     Retinal detachment Neg Hx     Strabismus Neg Hx     Thyroid disease Neg Hx     Melanoma Neg Hx     Psoriasis Neg Hx     Lupus Neg Hx     Acne Neg Hx       Social History     Tobacco Use    Smoking status: Former Smoker     Last attempt to quit: 7/10/1985     Years since quittin.2    Smokeless tobacco: Never Used   Substance Use Topics    Alcohol use: No     Alcohol/week: 0.0 oz     Frequency: Never     Drinks per session: Patient refused     Comment: quit - had excess in the past        Review of Systems:  A comprehensive review of systems was negative.    OBJECTIVE:     Patient Vitals for the past 8 hrs:   BP Temp Pulse Resp SpO2 Height Weight   19 0843 -- -- -- -- -- -- 118.8 kg (262 lb)   19 0842 (!) 136/56 97.1 °F (36.2 °C) 77 18 100 % 5' 10" (1.778 m) --   19 0837 (!) 136/56 97.7 °F (36.5 °C) 77 18 100 % 5' 10" (1.778 m) 118.8 kg (262 lb)     AAOx3  CTA B  Soft/nt/nd  2+ pulses B        ASSESSMENT/PLAN: "     Active Hospital Problems    Diagnosis  POA    History of colon polyps [Z86.010]  Not Applicable      Resolved Hospital Problems   No resolved problems to display.       Plan:  To have surveillance colonoscopy today

## 2019-09-20 NOTE — DISCHARGE INSTRUCTIONS
Understanding Colon and Rectal Polyps    The colon (also called the large intestine) is a muscular tube that forms the last part of the digestive tract. It absorbs water and stores food waste. The colon is about 4 to 6 feet long. The rectum is the last 6 inches of the colon. The colon and rectum have a smooth lining composed of millions of cells. Changes in these cells can lead to growths in the colon that can become cancerous and should be removed. Multiple tests are available to screen for colon cancer, but the colonoscopy is the most recommended test. During colonoscopy, these polyps can be removed. How often you need this test depends on many things including your condition, your family history, symptoms, and what the findings were at the previous colonoscopy.   When the colon lining changes  Changes that happen in the cells that line the colon or rectum can lead to growths called polyps. Over a period of years, polyps can turn cancerous. Removing polyps early may prevent cancer from ever forming.  Polyps  Polyps are fleshy clumps of tissue that form on the lining of the colon or rectum. Small polyps are usually benign (not cancerous). However, over time, cells in a polyp can change and become cancerous. Certain types of polyps known as adenomatous polyps are premalignant. The risk for invasive cancer increases with the size of the polyp and certain cell and gene features. This means that they can become cancerous if they're not removed. Hyperplastic polyps are benign. They can grow quite large and not turn cancerous.   Cancer  Almost all colorectal cancers start when polyp cells begin growing abnormally. As a cancerous tumor grows, it may involve more and more of the colon or rectum. In time, cancer can also grow beyond the colon or rectum and spread to nearby organs or to glands called lymph nodes. The cells can also travel to other parts of the body. This is known as metastasis. The earlier a cancerous  tumor is removed, the better the chance of preventing its spread.    Date Last Reviewed: 8/1/2016  © 7429-8718 [employer]. [address] [place] [address], [place] [address]. All rights reserved. This information is not intended as a substitute for professional medical care. Always follow your healthcare professional's instructions.        Colonoscopy     A camera attached to a flexible tube with a viewing lens is used to take video pictures.     Colonoscopy is a test to view the inside of your lower digestive tract (colon and rectum). Sometimes it can show the last part of the small intestine (ileum). During the test, small pieces of tissue may be removed for testing. This is called a biopsy. Small growths, such as polyps, may also be removed.   Why is colonoscopy done?  The test is done to help look for colon cancer. And it can help find the source of abdominal pain, bleeding, and changes in bowel habits. It may be needed once a year, depending on factors such as your:  · Age  · Health history  · Family health history  · Symptoms  · Results from any prior colonoscopy  Risks and possible complications  These include:  · Bleeding               · A puncture or tear in the colon   · Risks of anesthesia  · A cancer lesion not being seen  Getting ready   To prepare for the test:  · Talk with your healthcare provider about the risks of the test (see below). Also ask your healthcare provider about alternatives to the test.  · Tell your healthcare provider about any medicines you take. Also tell him or her about any health conditions you may have.  · Make sure your rectum and colon are empty for the test. Follow the diet and bowel prep instructions exactly. If you dont, the test may need to be rescheduled.  · Plan for a friend or family member to drive you home after the test.     Colonoscopy provides an inside view of the entire colon.     You may discuss the results with your doctor right away or at a future  visit.  During the test   The test is usually done in the hospital on an outpatient basis. This means you go home the same day. The procedure takes about 30 minutes. During that time:  · You are given relaxing (sedating) medicine through an IV line. You may be drowsy, or fully asleep.  · The healthcare provider will first give you a physical exam to check for anal and rectal problems.  · Then the anus is lubricated and the scope inserted.  · If you are awake, you may have a feeling similar to needing to have a bowel movement. You may also feel pressure as air is pumped into the colon. Its OK to pass gas during the procedure.  · Biopsy, polyp removal, or other treatments may be done during the test.  After the test   You may have gas right after the test. It can help to try to pass it to help prevent later bloating. Your healthcare provider may discuss the results with you right away. Or you may need to schedule a follow-up visit to talk about the results. After the test, you can go back to your normal eating and other activities. You may be tired from the sedation and need to rest for a few hours.  Date Last Reviewed: 11/1/2016  © 6840-3128 [employer]. [address] [place] [address], [place] [address]. All rights reserved. This information is not intended as a substitute for professional medical care. Always follow your healthcare professional's instructions.

## 2019-09-20 NOTE — ANESTHESIA PREPROCEDURE EVALUATION
09/20/2019  Pre-operative evaluation for Procedure(s) (LRB):  COLONOSCOPY (N/A)    [facility] [place] PAT Cantor Jr. is a 73 y.o. male     · Normal left ventricular systolic function. The estimated ejection fraction is 58%  · Septal wall has abnormal motion.  · Normal LV diastolic function.  · Mild left atrial enlargement.  · Normal right ventricular systolic function.  · Mild right atrial enlargement.  · Mild tricuspid regurgitation.  · Normal central venous pressure (3 mm Hg).  · The estimated PA systolic pressure is 31 mm Hg    Patient Active Problem List   Diagnosis    Enthesopathy of hip region    Sensory hearing loss, bilateral    Sprain and strain of unspecified site of hip and thigh    Chronic atrial fibrillation    Long term current use of anticoagulant therapy    Hyperlipidemia    Sleep apnea    Onychomycosis of multiple toenails with type 2 diabetes mellitus and peripheral angiopathy    Chronic kidney disease, stage III (moderate)    Benign hypertensive renal disease without renal failure    Acquired cyst of kidney    Calculus of kidney    Morbid obesity    Essential hypertension    Dyslipidemia associated with type 2 diabetes mellitus    Type 2 diabetes mellitus with diabetic polyneuropathy    Corns and callosities    Mohs defect of right cheek    Type 2 diabetes mellitus with stage 3 chronic kidney disease, with long-term current use of insulin    Enlarged prostate    Diastolic dysfunction    Ectropion of eyelid    Tinea pedis    Lymphedema of both lower extremities    History of cardioversion    Vitamin D deficiency    Venous insufficiency of both lower extremities    Chronic pain of right knee    Microhematuria    Pulmonary hypertension- 7/6/2018 - The estimated PA systolic pressure is 35 mmHg    Tattoos    Primary osteoarthritis of right knee    Dermatitis, stasis     "Bilateral leg edema    Lumbar radiculopathy    Proteinuria due to type 2 diabetes mellitus    Nuclear sclerosis, bilateral    Antiplatelet or antithrombotic long-term use    Prosthetic joint implant failure    Debility    Presence of surgical incision    Chronic bilateral low back pain without sciatica    Trochanteric bursitis of right hip    Status post total knee replacement, right    Hypothyroidism       Review of patient's allergies indicates:   Allergen Reactions    Niacin Itching and Other (See Comments)     Other reaction(s): facial flushing and causes hepatitis     Terbinafine Other (See Comments)     Other reaction(s): Hepatitis    "gave me rash"       No current facility-administered medications on file prior to encounter.      Current Outpatient Medications on File Prior to Encounter   Medication Sig Dispense Refill    azelastine (ASTELIN) 137 mcg (0.1 %) nasal spray 1 spray (137 mcg total) by Nasal route 2 (two) times daily. (Patient taking differently: 1 spray by Nasal route 2 (two) times daily as needed. ) 30 mL 3    BD INSULIN PEN NEEDLE UF ORIG 29 x 1/2 " Ndle   12    BD ULTRA-FINE BASIL PEN NEEDLE 32 gauge x 5/32" Ndle CHECK BLOOD SUGAR FOUR TIMES DAILY 400 each 3    blood sugar diagnostic Strp 1 strip by Misc.(Non-Drug; Combo Route) route 3 (three) times daily. 270 strip 6    blood-glucose meter Misc To check BG as discussed 1 each 0    ciclopirox (PENLAC) 8 % Soln Apply topically nightly. 6.6 mL 11    ciclopirox 0.77 % Gel Apply topically 2 (two) times daily. 100 g 3    clobetasol (TEMOVATE) 0.05 % cream AAA finger bid 60 g 3    fenofibrate (TRICOR) 145 MG tablet TAKE ONE TABLET BY MOUTH EVERY DAY 90 tablet 3    furosemide (LASIX) 40 MG tablet Take 1 tablet (40 mg total) by mouth once daily. 30 tablet 1    gabapentin (NEURONTIN) 300 MG capsule Take 1 capsule (300 mg total) by mouth 3 (three) times daily. (Patient taking differently: Take 300 mg by mouth 3 (three) times " daily. ) 270 capsule 1    insulin (BASAGLAR KWIKPEN U-100 INSULIN) glargine 100 units/mL (3mL) SubQ pen Inject 55-60 units at night. (Patient taking differently: Inject 30 Units into the skin every evening. Take 30 Units every HS) 30 mL 6    insulin aspart (NOVOLOG FLEXPEN) 100 unit/mL InPn pen Novolog 20 units breakfast, 22 units lunch and dinner plus correction scale. Max daily dose=100 units (Patient taking differently: Novolog 6 units breakfast, 8 units lunch and dinner plus correction scale.) 2 Box 11    ketoconazole (NIZORAL) 2 % cream Apply topically once daily. 30 g 1    lancets 33 gauge Misc 1 lancet by Misc.(Non-Drug; Combo Route) route 4 (four) times daily. Pt uses True Result Meter, bg monitoring 4 times a day. 450 each 4    levothyroxine (SYNTHROID) 25 MCG tablet TAKE ONE TABLET BY MOUTH ONCE EVERY DAY 90 tablet 3    nystatin-triamcinolone (MYCOLOG II) cream apply TWICE DAILY (Patient taking differently: apply TWICE DAILY-using as needed for rash) 60 g 1    pravastatin (PRAVACHOL) 10 MG tablet TAKE ONE TABLET BY MOUTH ONCE EVERY DAY (Patient taking differently: TAKE ONE TABLET BY MOUTH ONCE EVERY EVENING) 90 tablet 3    temazepam (RESTORIL) 15 mg Cap TAKE ONE CAPSULE BY MOUTH EVERY EVENING 30 capsule 5    triamcinolone acetonide 0.1% (KENALOG) 0.1 % cream Apply topically 2 (two) times daily. Apply to affected area twice daily as directed. 454 g 0    apixaban 5 mg Tab Take 1 tablet (5 mg total) by mouth 2 (two) times daily. 180 tablet 3    HYDROcodone-acetaminophen (NORCO) 5-325 mg per tablet Take 1 tablet by mouth every 6 (six) hours as needed for Pain. 16 tablet 0       Past Surgical History:   Procedure Laterality Date    APPENDECTOMY      APPLICATION, GRAFT, SKIN, FULL-THICKNESS Right 8/14/2019    Performed by [facility] at [facility] OR 2ND FLR    BLEPHARORRHAPHY Right 8/14/2019    Performed by [facility] at [facility] OR 2ND FLR    CARDIOVERSION      CARDIOVERSION N/A 1/2/2014     Performed by [facility]anda [facility] at [facility] CATH LAB    CATARACT EXTRACTION      CATARACT EXTRACTION Right 05/14/2018    Dr. Greer    COLONOSCOPY N/A 3/3/2016    Performed by Bob Poe MD at [facility] ENDO (4TH FLR)    epidural steroid injection      FLAP Right 12/14/2015    Performed by Danis Martinez MD at [facility] OR 2ND FLR    FULL THICKNESS SKIN GRAFT/HARVESTING OF LEFT POSTAURICULAR SKIN GRAFT Left 6/28/2016    Performed by [facility]illon [facility] at [facility] OR 1ST FLR    INJECTION-STEROID-EPIDURAL-TRANSFORAMINAL Bilateral 4/24/2018    Performed by Ruthie Adamson MD at [facility] [place] PAIN MGT    INSERTION, INTRAOCULAR LENS PROSTHESIS Left 6/25/2018    Performed by Lawrence Greer MD at [facility] [place] OR    INSERTION-INTRAOCULAR LENS (IOL) Right 5/14/2018    Performed by Lawrence Greer MD at [facility] [place] OR    JOINT REPLACEMENT      Knee    Meniere's disease surgery      PHACOEMULSIFICATION, CATARACT Left 6/25/2018    Performed by Lawrence Greer MD at [facility] [place] OR    PHACOEMULSIFICATION-ASPIRATION-CATARACT Right 5/14/2018    Performed by Lawrence Greer MD at [facility] [place] OR    PREPARATION, WOUND, PRIOR TO SKIN GRAFT APPLICATION Right 8/14/2019    Performed by Edita [facility]illon MD at [facility] OR 2ND FLR    REPAIR, ECTROPION, EYELID /       #[id number]- Skin Flaps(Deep Tissue) (OD) Right 8/14/2019    Performed by Edita Sabillon MD at [facility] OR 2ND FLR    REPAIR-ECTROPION Right 6/28/2016    Performed by Edita Sabillon MD at [facility] OR 1ST FLR    REPAIR-ECTROPION Right Lower Eyelid 90mins Right 3/7/2016    Performed by Danis Martinez MD at [facility] OR 2ND FLR    REPAIR-MOHS DEFECT Rt Cheek Right 12/14/2015    Performed by Danis Martinez MD at [facility] OR 2ND FLR    REPLACEMENT-KNEE-TOTAL right sherri Right 1/25/2017    Performed by Jose Armando Knight MD at [facility] OR 2ND FLR    REVISION-ARTHROPLASTY-KNEE-SHERRI Right 7/10/2018    Performed by Miguel Stuart MD at [facility] OR 2ND FLR    TONSILLECTOMY      TOTAL KNEE ARTHROPLASTY Right  2017    TKR    TOTAL KNEE ARTHROPLASTY Left     TKR,     TRANSESOPHAGEAL ECHOCARDIOGRAM (ELSY) N/A 2014    Performed by [facility] at [facility] CATH LAB       Social History     Socioeconomic History    Marital status:      Spouse name: Not on file    Number of children: 3    Years of education: Not on file    Highest education level: Not on file   Occupational History    Occupation: retired     Employer: RETIRED   Social Needs    Financial resource strain: Not very hard    Food insecurity:     Worry: Never true     Inability: Never true    Transportation needs:     Medical: No     Non-medical: No   Tobacco Use    Smoking status: Former Smoker     Last attempt to quit: 7/10/1985     Years since quittin.2    Smokeless tobacco: Never Used   Substance and Sexual Activity    Alcohol use: No     Alcohol/week: 0.0 oz     Frequency: Never     Drinks per session: Patient refused     Comment: quit - had excess in the past    Drug use: No    Sexual activity: Not on file   Lifestyle    Physical activity:     Days per week: 1 day     Minutes per session: 30 min    Stress: To some extent   Relationships    Social connections:     Talks on phone: More than three times a week     Gets together: Three times a week     Attends Jain service: Not on file     Active member of club or organization: No     Attends meetings of clubs or organizations: Never     Relationship status:    Other Topics Concern    Not on file   Social History Narrative    Not on file         CBC: No results for input(s): WBC, RBC, HGB, HCT, PLT, MCV, MCH, MCHC in the last 72 hours.    CMP: No results for input(s): NA, K, CL, CO2, BUN, CREATININE, GLU, MG, PHOS, CALCIUM, ALBUMIN, PROT, ALKPHOS, ALT, AST, BILITOT in the last 72 hours.    INR  No results for input(s): PT, INR, PROTIME, APTT in the last 72 hours.        Diagnostic Studies:      EKD Echo:  Results for orders placed or  performed during the hospital encounter of 07/06/18   2D echo with color flow doppler   Result Value Ref Range    QEF 55 55 - 65    Est. PA Systolic Pressure 35.25     Mitral Valve Mobility NORMAL     Tricuspid Valve Regurgitation MILD          Anesthesia Evaluation    I have reviewed the Patient Summary Reports.    I have reviewed the Nursing Notes.   I have reviewed the Medications.     Review of Systems  Anesthesia Hx:  No problems with previous Anesthesia  History of prior surgery of interest to airway management or planning: Denies Family Hx of Anesthesia complications.   Denies Personal Hx of Anesthesia complications.   Hematology/Oncology:         -- Denies Anemia:   Cardiovascular:   Exercise tolerance: poor Hypertension Denies Valvular problems/Murmurs.  Denies CAD.   Dysrhythmias atrial fibrillation        Pulmonary:   Denies COPD.  Denies Asthma. Sleep Apnea    Renal/:   Chronic Renal Disease    Hepatic/GI:   GERD, well controlled Liver Disease,    Neurological:   Denies CVA. Denies Seizures.    Endocrine:   Diabetes, using insulin        Physical Exam  General:  Well nourished, Obesity    Airway/Jaw/Neck:  Airway Findings: Mouth Opening: Normal Tongue: Normal  General Airway Assessment: Adult  Mallampati: III  Improves to II with phonation.  TM Distance: Normal, at least 6 cm  Jaw/Neck Findings:  Neck ROM: Normal ROM      Dental:  Dental Findings: In tact   Chest/Lungs:  Chest/Lungs Findings: Clear to auscultation, Normal Respiratory Rate     Heart/Vascular:  Heart Findings: Rate: Normal  Sounds: Normal        Mental Status:  Mental Status Findings:  Cooperative, Alert and Oriented         Anesthesia Plan  Type of Anesthesia, risks & benefits discussed:  Anesthesia Type:  general  Patient's Preference:   Intra-op Monitoring Plan: standard ASA monitors  Intra-op Monitoring Plan Comments:   Post Op Pain Control Plan: per primary service following discharge from PACU  Post Op Pain Control Plan Comments:    Induction:   IV  Beta Blocker:  Patient is on a Beta-Blocker and has received one dose within the past 24 hours (No further documentation required).       Informed Consent: Patient understands risks and agrees with Anesthesia plan.  Questions answered. Anesthesia consent signed with patient.  ASA Score: 3     Day of Surgery Review of History & Physical:    H&P update referred to the provider.         Ready For Surgery From Anesthesia Perspective.

## 2019-09-20 NOTE — ANESTHESIA POSTPROCEDURE EVALUATION
Anesthesia Post Evaluation    Patient: ricki PAT Sakshi JrJorge    Procedure(s) Performed: Procedure(s) (LRB):  COLONOSCOPY (N/A)    Final Anesthesia Type: general  Patient location during evaluation: PACU  Patient participation: Yes- Able to Participate  Level of consciousness: awake and alert and oriented  Post-procedure vital signs: reviewed and stable  Pain management: adequate  Airway patency: patent  PONV status at discharge: No PONV  Anesthetic complications: no      Cardiovascular status: blood pressure returned to baseline, hemodynamically stable and stable  Respiratory status: unassisted, room air and spontaneous ventilation  Hydration status: euvolemic  Follow-up not needed.          Vitals Value Taken Time   /82 9/20/2019 10:09 AM   Temp 36.5 °C (97.7 °F) 9/20/2019  9:39 AM   Pulse 78 9/20/2019 10:09 AM   Resp 16 9/20/2019 10:09 AM   SpO2 98 % 9/20/2019 10:09 AM         Event Time     Out of Recovery 10:15:29          Pain/Bessie Score: Bessie Score: 10 (9/20/2019 10:15 AM)

## 2019-09-20 NOTE — TRANSFER OF CARE
"Anesthesia Transfer of Care Note    [place] [facility] [place]    Procedure(s) Performed: Procedure(s) (LRB):  COLONOSCOPY (N/A)    Patient location: PACU    Anesthesia Type: general    Transport from OR: Transported from OR on 2-3 L/min O2 by NC with adequate spontaneous ventilation    Post pain: adequate analgesia    Post assessment: no apparent anesthetic complications and tolerated procedure well    Post vital signs: stable    Level of consciousness: sedated    Nausea/Vomiting: no nausea/vomiting    Complications: none    Transfer of care protocol was followed      Last vitals:   Visit Vitals  BP (!) 97/52   Pulse 78   Temp 36.5 °C (97.7 °F)   Resp 16   Ht 5' 10" (1.778 m)   Wt 118.8 kg (262 lb)   SpO2 (!) 94%   BMI 37.59 kg/m²     "

## 2019-09-27 ENCOUNTER — PATIENT OUTREACH (OUTPATIENT)
Dept: ADMINISTRATIVE | Facility: OTHER | Age: 74
End: 2019-09-27

## 2019-09-27 ENCOUNTER — TELEPHONE (OUTPATIENT)
Dept: ENDOSCOPY | Facility: HOSPITAL | Age: 74
End: 2019-09-27

## 2019-09-30 ENCOUNTER — INFUSION (OUTPATIENT)
Dept: INFECTIOUS DISEASES | Facility: HOSPITAL | Age: 74
End: 2019-09-30
Attending: INTERNAL MEDICINE
Payer: MEDICARE

## 2019-09-30 ENCOUNTER — HOSPITAL ENCOUNTER (OUTPATIENT)
Dept: RADIOLOGY | Facility: HOSPITAL | Age: 74
Discharge: HOME | End: 2019-09-30
Attending: ORTHOPAEDIC SURGERY
Payer: MEDICARE

## 2019-09-30 ENCOUNTER — OFFICE VISIT (OUTPATIENT)
Dept: ORTHOPEDICS | Facility: CLINIC | Age: 74
End: 2019-09-30
Payer: MEDICARE

## 2019-09-30 VITALS — HEIGHT: 70 IN | WEIGHT: 263.31 LBS | BODY MASS INDEX: 37.7 KG/M2

## 2019-09-30 DIAGNOSIS — M70.61 TROCHANTERIC BURSITIS, RIGHT HIP: ICD-10-CM

## 2019-09-30 DIAGNOSIS — Z96.651 STATUS POST RIGHT KNEE REPLACEMENT: Primary | ICD-10-CM

## 2019-09-30 DIAGNOSIS — Z96.651 STATUS POST RIGHT KNEE REPLACEMENT: ICD-10-CM

## 2019-09-30 PROCEDURE — 90715 TDAP VACCINE 7 YRS/> IM: CPT | Mod: ,,, | Performed by: INTERNAL MEDICINE

## 2019-09-30 PROCEDURE — G0008 FLU VACCINE - HIGH DOSE (65+) PRESERVATIVE FREE IM: ICD-10-PCS | Mod: 59,,, | Performed by: INTERNAL MEDICINE

## 2019-09-30 PROCEDURE — 73560 X-RAY EXAM OF KNEE 1 OR 2: CPT | Mod: 26,59,LT, | Performed by: RADIOLOGY

## 2019-09-30 PROCEDURE — 73562 X-RAY EXAM OF KNEE 3: CPT | Mod: TC,RT

## 2019-09-30 PROCEDURE — 73560 X-RAY EXAM OF KNEE 1 OR 2: CPT | Mod: TC,LT

## 2019-09-30 PROCEDURE — G0008 ADMIN INFLUENZA VIRUS VAC: HCPCS | Mod: 59,,, | Performed by: INTERNAL MEDICINE

## 2019-09-30 PROCEDURE — 90662 IIV NO PRSV INCREASED AG IM: CPT | Mod: ,,, | Performed by: INTERNAL MEDICINE

## 2019-09-30 PROCEDURE — 73560 XR KNEE ORTHO RIGHT: ICD-10-PCS | Mod: 26,59,LT, | Performed by: RADIOLOGY

## 2019-09-30 PROCEDURE — 99999 PR PBB SHADOW E&M-EST. PATIENT-LVL III: CPT | Mod: PBBFAC,,, | Performed by: ORTHOPAEDIC SURGERY

## 2019-09-30 PROCEDURE — 99213 OFFICE O/P EST LOW 20 MIN: CPT | Mod: S$GLB,,, | Performed by: ORTHOPAEDIC SURGERY

## 2019-09-30 PROCEDURE — 1101F PR PT FALLS ASSESS DOC 0-1 FALLS W/OUT INJ PAST YR: ICD-10-PCS | Mod: CPTII,S$GLB,, | Performed by: ORTHOPAEDIC SURGERY

## 2019-09-30 PROCEDURE — 90471 IMMUNIZATION ADMIN: CPT | Mod: ,,, | Performed by: INTERNAL MEDICINE

## 2019-09-30 PROCEDURE — 73562 X-RAY EXAM OF KNEE 3: CPT | Mod: 26,RT,, | Performed by: RADIOLOGY

## 2019-09-30 PROCEDURE — 99213 PR OFFICE/OUTPT VISIT, EST, LEVL III, 20-29 MIN: ICD-10-PCS | Mod: S$GLB,,, | Performed by: ORTHOPAEDIC SURGERY

## 2019-09-30 PROCEDURE — 99999 PR PBB SHADOW E&M-EST. PATIENT-LVL III: ICD-10-PCS | Mod: PBBFAC,,, | Performed by: ORTHOPAEDIC SURGERY

## 2019-09-30 PROCEDURE — 90471 TDAP VACCINE GREATER THAN OR EQUAL TO 7YO IM: ICD-10-PCS | Mod: ,,, | Performed by: INTERNAL MEDICINE

## 2019-09-30 PROCEDURE — 73562 XR KNEE ORTHO RIGHT: ICD-10-PCS | Mod: 26,RT,, | Performed by: RADIOLOGY

## 2019-09-30 PROCEDURE — 90662 FLU VACCINE - HIGH DOSE (65+) PRESERVATIVE FREE IM: ICD-10-PCS | Mod: ,,, | Performed by: INTERNAL MEDICINE

## 2019-09-30 PROCEDURE — 1101F PT FALLS ASSESS-DOCD LE1/YR: CPT | Mod: CPTII,S$GLB,, | Performed by: ORTHOPAEDIC SURGERY

## 2019-09-30 PROCEDURE — 90715 TDAP VACCINE GREATER THAN OR EQUAL TO 7YO IM: ICD-10-PCS | Mod: ,,, | Performed by: INTERNAL MEDICINE

## 2019-09-30 NOTE — PROGRESS NOTES
Patient received HD Flu w/vis to left deltoid, to the right side, and Tdap w/vis to the left deltoid, to the left side.  Tolerated well and left in NAD.

## 2019-09-30 NOTE — PROGRESS NOTES
"Subjective:      Patient ID: Stephen Cantor Jr. is a 73 y.o. male.    Chief Complaint: Pain of the Right Knee and Pain of the Right Hip    HPI  Stephen Cantor Jr. has mild right hip and really no knee pain.  The pain has improved with PT. The pain is located in the lateral.  There  is not radiation. The pain is described as achy. The pain is aggravated by activity.  It is alleviated by rest.  There is mild associated back pain.  His history, medications and problem list were reviewed.    Review of Systems   Constitution: Negative for chills, fever and night sweats.   HENT: Negative for hearing loss.    Eyes: Negative for blurred vision and double vision.   Cardiovascular: Negative for chest pain, claudication and leg swelling.   Respiratory: Negative for shortness of breath.    Endocrine: Negative for polydipsia, polyphagia and polyuria.   Hematologic/Lymphatic: Negative for adenopathy and bleeding problem. Does not bruise/bleed easily.   Skin: Negative for poor wound healing.   Musculoskeletal: Positive for joint pain.   Gastrointestinal: Negative for diarrhea and heartburn.   Genitourinary: Negative for bladder incontinence.   Neurological: Negative for focal weakness, headaches, numbness, paresthesias and sensory change.   Psychiatric/Behavioral: The patient is not nervous/anxious.    Allergic/Immunologic: Negative for persistent infections.         Objective:      Body mass index is 37.79 kg/m².  Vitals:    09/30/19 1359   Weight: 119.4 kg (263 lb 5.4 oz)   Height: 5' 10" (1.778 m)           General    Constitutional: He is oriented to person, place, and time. He appears well-developed and well-nourished.   HENT:   Head: Normocephalic and atraumatic.   Eyes: EOM are normal.   Cardiovascular: Normal rate.    Pulmonary/Chest: Effort normal.   Neurological: He is alert and oriented to person, place, and time.   Psychiatric: He has a normal mood and affect. His behavior is normal.     General Musculoskeletal Exam "   Gait: abnormal   Pelvic Obliquity: none      Right Knee Exam     Inspection   Alignment:  normal  Erythema: absent  Scars: present  Swelling: absent  Effusion: absent  Deformity: absent  Bruising: absent    Tenderness   The patient is tender to palpation of the lateral retinaculum.    Range of Motion   Extension: 0   Flexion: 120     Tests   Ligament Examination Lachman: normal (-1 to 2mm)   MCL - Valgus: normal (0 to 2mm)  LCL - Varus: normal  Patella   Passive Patellar Tilt: neutral    Other   Sensation: normal    Left Knee Exam     Inspection   Alignment:  normal  Erythema: absent  Scars: present  Swelling: absent  Effusion: absent  Deformity: absent  Bruising: absent    Tenderness   The patient is experiencing no tenderness.     Range of Motion   Extension: 0   Flexion: 120     Tests   Stability Lachman: normal (-1 to 2mm)   MCL - Valgus: normal (0 to 2mm)  LCL - Varus: normal (0 to 2mm)  Patella   Passive Patellar Tilt: neutral    Other   Sensation: normal    Right Hip Exam     Inspection   Scars: absent  Swelling: absent  Bruising: absent  No deformity of hip.  Quadriceps Atrophy:  Negative  Erythema: absent    Tenderness   The patient tender to palpation of the trochanteric bursa.    Range of Motion   Abduction: 25   Adduction: 20   Extension: 0   Flexion: 110   External rotation: 30   Internal rotation: 25     Tests   Pain w/ forced internal rotation (LANCE): absent  Stinchfield test: negative    Other   Sensation: normal  Left Hip Exam     Inspection   Scars: absent  Swelling: absent  No deformity of hip.  Quadriceps Atrophy:  negative  Erythema: absent  Bruising: absent    Range of Motion   Abduction: 25   Adduction: 20   Extension: 0   Flexion: 100   External rotation: 30   Internal rotation: 25     Tests   Pain w/ forced internal rotation (LANCE): absent  Stinchfield test: negative    Other   Sensation: normal      Back (L-Spine & T-Spine) / Neck (C-Spine) Exam   Back exam is normal.      Muscle  Strength   Right Lower Extremity   Hip Abduction: 5/5   Hip Adduction: 5/5   Hip Flexion: 5/5   Quadriceps:  5/5   Hamstrin/5   Ankle Dorsiflexion:  5/5   Left Lower Extremity   Hip Abduction: 5/5   Hip Adduction: 5/5   Hip Flexion: 5/5   Quadriceps:  5/5   Hamstrin/5   Ankle Dorsiflexion:  5/5     Reflexes     Left Side  Quadriceps:  2+    Right Side   Quadriceps:  2+    Vascular Exam     Right Pulses  Dorsalis Pedis:      2+          Left Pulses  Dorsalis Pedis:      2+          Capillary Refill  Right Hand: normal capillary refill  Left Hand: normal capillary refill    Edema  Right Upper Leg: absent  Right Lower Leg: absent  Left Upper Leg: absent  Left Lower Leg: absent    Radiographs taken today were reviewed by me.  There is a prosthetic replacement of the bilateral knee(s).  The prostheses are well positioned.  There is not evidence of bone loss, osteolysis, or loosening. There is not a fracture.  Revision components on the right  ]          Assessment:       Encounter Diagnoses   Name Primary?    Status post right knee replacement Yes    Trochanteric bursitis, right hip           Plan:       [facility] was seen today for pain and pain.    Diagnoses and all orders for this visit:    Status post right knee replacement    Trochanteric bursitis, right hip        Complete PT.  F/U in 6 months

## 2019-10-10 ENCOUNTER — TELEPHONE (OUTPATIENT)
Dept: SURGERY | Facility: CLINIC | Age: 74
End: 2019-10-10

## 2019-10-10 NOTE — TELEPHONE ENCOUNTER
Called and attempted to reviewed pathology with pt.      1. Descending colon polyp, biopsy:  Tubular adenoma.  Negative for high grade dysplasia or malignancy.  2. Rectum polyp, biopsy:  Tubular adenoma.  Negative for high grade dysplasia or malignancy      Pt did not answer  Recommend repeat cscope in 5 years    WIll have office reach out to pt

## 2019-10-11 RX ORDER — CICLOPIROX 7.7 MG/G
GEL TOPICAL
Qty: 100 G | Refills: 3 | Status: SHIPPED | OUTPATIENT
Start: 2019-10-11 | End: 2020-05-26

## 2019-10-28 ENCOUNTER — OFFICE VISIT (OUTPATIENT)
Dept: DERMATOLOGY | Facility: CLINIC | Age: 74
End: 2019-10-28
Payer: MEDICARE

## 2019-10-28 DIAGNOSIS — L57.0 AK (ACTINIC KERATOSIS): ICD-10-CM

## 2019-10-28 DIAGNOSIS — Z86.006 HISTORY OF MELANOMA IN SITU: ICD-10-CM

## 2019-10-28 DIAGNOSIS — D22.9 MULTIPLE BENIGN NEVI: ICD-10-CM

## 2019-10-28 DIAGNOSIS — D23.9 DERMATOFIBROMA: ICD-10-CM

## 2019-10-28 DIAGNOSIS — D18.00 HEMANGIOMA, UNSPECIFIED SITE: ICD-10-CM

## 2019-10-28 DIAGNOSIS — L82.0 INFLAMED SEBORRHEIC KERATOSIS: ICD-10-CM

## 2019-10-28 DIAGNOSIS — L82.1 SEBORRHEIC KERATOSIS: ICD-10-CM

## 2019-10-28 DIAGNOSIS — Z85.828 PERSONAL HISTORY OF OTHER MALIGNANT NEOPLASM OF SKIN: ICD-10-CM

## 2019-10-28 DIAGNOSIS — D48.5 NEOPLASM OF UNCERTAIN BEHAVIOR OF SKIN: Primary | ICD-10-CM

## 2019-10-28 PROCEDURE — 99213 PR OFFICE/OUTPT VISIT, EST, LEVL III, 20-29 MIN: ICD-10-PCS | Mod: 25,S$GLB,, | Performed by: DERMATOLOGY

## 2019-10-28 PROCEDURE — 99213 OFFICE O/P EST LOW 20 MIN: CPT | Mod: 25,S$GLB,, | Performed by: DERMATOLOGY

## 2019-10-28 PROCEDURE — 17110 DESTRUCTION B9 LES UP TO 14: CPT | Mod: S$GLB,,, | Performed by: DERMATOLOGY

## 2019-10-28 PROCEDURE — 1101F PR PT FALLS ASSESS DOC 0-1 FALLS W/OUT INJ PAST YR: ICD-10-PCS | Mod: CPTII,S$GLB,, | Performed by: DERMATOLOGY

## 2019-10-28 PROCEDURE — 99999 PR PBB SHADOW E&M-EST. PATIENT-LVL II: CPT | Mod: PBBFAC,,, | Performed by: DERMATOLOGY

## 2019-10-28 PROCEDURE — 88305 TISSUE EXAM BY PATHOLOGIST: CPT | Performed by: PATHOLOGY

## 2019-10-28 PROCEDURE — 17110 PR DESTRUCTION BENIGN LESIONS UP TO 14: ICD-10-PCS | Mod: S$GLB,,, | Performed by: DERMATOLOGY

## 2019-10-28 PROCEDURE — 11102 PR TANGENTIAL BIOPSY, SKIN, SINGLE LESION: ICD-10-PCS | Mod: 59,S$GLB,, | Performed by: DERMATOLOGY

## 2019-10-28 PROCEDURE — 88305 TISSUE EXAM BY PATHOLOGIST: CPT | Mod: 26,,, | Performed by: PATHOLOGY

## 2019-10-28 PROCEDURE — 1101F PT FALLS ASSESS-DOCD LE1/YR: CPT | Mod: CPTII,S$GLB,, | Performed by: DERMATOLOGY

## 2019-10-28 PROCEDURE — 17000 PR DESTRUCTION(LASER SURGERY,CRYOSURGERY,CHEMOSURGERY),PREMALIGNANT LESIONS,FIRST LESION: ICD-10-PCS | Mod: 59,S$GLB,, | Performed by: DERMATOLOGY

## 2019-10-28 PROCEDURE — 99999 PR PBB SHADOW E&M-EST. PATIENT-LVL II: ICD-10-PCS | Mod: PBBFAC,,, | Performed by: DERMATOLOGY

## 2019-10-28 PROCEDURE — 17000 DESTRUCT PREMALG LESION: CPT | Mod: 59,S$GLB,, | Performed by: DERMATOLOGY

## 2019-10-28 PROCEDURE — 88305 TISSUE SPECIMEN TO PATHOLOGY, DERMATOLOGY: ICD-10-PCS | Mod: 26,,, | Performed by: PATHOLOGY

## 2019-10-28 PROCEDURE — 11102 TANGNTL BX SKIN SINGLE LES: CPT | Mod: 59,S$GLB,, | Performed by: DERMATOLOGY

## 2019-10-28 NOTE — PROGRESS NOTES
Subjective:       Patient ID:  Jmc PAT Cantor Jr. is a 73 y.o. male who presents for   Chief Complaint   Patient presents with    Skin Check     TBSE, h/o BCC and MM    Growth     Left side, face, x 1 year, itching, flaky, scaly, Tx none     History of Present Illness: The patient presents for follow up of skin check.    The patient was last seen on: 2/5/19 for cryo to ak's which have resolved and TBSE and destruction of ISK and treatment of intertrigo with shake lotion - helps but recurs  This is a high risk patient here to check for the development of new lesions.  H/o mis right cheek 11/15    Other skin complaints: growth left sideburn x 1 year + itching and burning. No prev treatment      Review of Systems   Constitutional: Positive for weight loss (262#  (intentional) ). Negative for fever, chills, weight gain, fatigue and night sweats.   Musculoskeletal: Negative for muscle weakness (legs -resolved).   Skin: Positive for wears hat (for activities only). Negative for daily sunscreen use, activity-related sunscreen use (but wears hat) and recent sunburn.   Hematologic/Lymphatic: Negative for adenopathy. Bruises/bleeds easily (on xeralto).        Objective:    Physical Exam   Constitutional: He appears well-developed and well-nourished. He is obese.  No distress.   Neurological: He is alert and oriented to person, place, and time. He is not disoriented.   Psychiatric: He has a normal mood and affect.   Skin:   Areas Examined (abnormalities noted in diagram):   Scalp / Hair Palpated and Inspected  Head / Face Inspection Performed  Neck Inspection Performed  Chest / Axilla Inspection Performed  Abdomen Inspection Performed  Genitals / Buttocks / Groin Inspection Performed  Back Inspection Performed  RUE Inspected  LUE Inspection Performed  RLE Inspected  LLE Inspection Performed  Nails and Digits Inspection Performed                       Diagram Legend     Erythematous scaling macule/papule c/w actinic  keratosis       Vascular papule c/w angioma      Pigmented verrucoid papule/plaque c/w seborrheic keratosis      Yellow umbilicated papule c/w sebaceous hyperplasia      Irregularly shaped tan macule c/w lentigo     1-2 mm smooth white papules consistent with Milia      Movable subcutaneous cyst with punctum c/w epidermal inclusion cyst      Subcutaneous movable cyst c/w pilar cyst      Firm pink to brown papule c/w dermatofibroma      Pedunculated fleshy papule(s) c/w skin tag(s)      Evenly pigmented macule c/w junctional nevus     Mildly variegated pigmented, slightly irregular-bordered macule c/w mildly atypical nevus      Flesh colored to evenly pigmented papule c/w intradermal nevus       Pink pearly papule/plaque c/w basal cell carcinoma      Erythematous hyperkeratotic cursted plaque c/w SCC      Surgical scar with no sign of skin cancer recurrence      Open and closed comedones      Inflammatory papules and pustules      Verrucoid papule consistent consistent with wart     Erythematous eczematous patches and plaques     Dystrophic onycholytic nail with subungual debris c/w onychomycosis     Umbilicated papule    Erythematous-base heme-crusted tan verrucoid plaque consistent with inflamed seborrheic keratosis     Erythematous Silvery Scaling Plaque c/w Psoriasis     See annotation          Assessment / Plan:      Pathology Orders:     Normal Orders This Visit    Tissue Specimen To Pathology, Dermatology     Questions:    Directional Terms:  Other(comment)    Clinical Information:  r/o bcc vs other    Specific Site:  right superior preauricular        Neoplasm of uncertain behavior of skin  -     Tissue Specimen To Pathology, Dermatology  Shave biopsy procedure note:    Shave biopsy performed after verbal consent including risk of infection, scar, recurrence, need for additional treatment of site. Area prepped with alcohol, anesthetized with approximately 1.0cc of 1% lidocaine with epinephrine. Lesional  tissue shaved with razor blade. Hemostasis achieved with application of aluminum chloride followed by hyfrecation. No complications. Dressing applied. Wound care explained.    If biopsy positive for malignancy, refer to Dr. Godfrey for Mohs surgery consultation.    AK (actinic keratosis) - right arm  Cryosurgery Procedure Note    Verbal consent from the patient is obtained including, but not limited to, risk of hypopigmentation/hyperpigmentation, scar, recurrence of lesion. The patient is aware of the precancerous quality and need for treatment of these lesions. Liquid nitrogen cryosurgery is applied to the 1 actinic keratoses, as detailed in the physical exam, to produce a freeze injury. The patient is aware that blisters may form and is instructed on wound care with gentle cleansing and use of vaseline ointment to keep moist until healed. The patient is supplied a handout on cryosurgery and is instructed to call if lesions do not completely resolve.      Inflamed seborrheic keratosis - left preauricular  Procedure note for destruction via hyfrecation and curettage:    Verbal consent obtained. Risks of recurrence and scarring discussed.   1 lesions cleaned with alcohol and anesthetized with 1% lidocaine with epinephrine. Areas then lightly hyfrecated and curetted to remove gross lesion. Hemostasis achieved with aluminum chloride application. No complications.   Areas dressed with Vaseline jelly and bandage. Wound care discussed.      Seborrheic keratosis   - stable and chronic      Hemangioma, unspecified site   - stable and chronic      Multiple benign nevi   - stable and chronic      Dermatofibroma  This is a benign scar-like lesion secondary to minor trauma. No treatment required.       History of melanoma in situ and Personal history of other malignant neoplasm of skin  Area(s) of previous NMSC evaluated with no signs of recurrence.    Upper body skin examination performed today including at least 6 points as  noted in physical examination. Suspicious lesions noted.               Follow up in about 6 months (around 4/28/2020).

## 2019-10-28 NOTE — PATIENT INSTRUCTIONS
" Shave Biopsy Wound Care    Your doctor has performed a shave biopsy today.  A band aid and vaseline ointment has been placed over the site.  This should remain in place for 24 hours.  It is recommended that you keep the area dry for the first 24 hours.  After 24 hours, you may remove the band aid and wash the area with warm soap and water and apply Vaseline jelly.  Many patients prefer to use Neosporin or Bacitracin ointment.  This is acceptable; however, know that you can develop an allergy to this medication even if you have used it safely for years.  It is important to keep the area moist.  Letting it dry out and get air slows healing time, and will worsen the scar.  Band aid is optional after first 24 hours.      If you notice increasing redness, tenderness, pain, or yellow drainage at the biopsy site, please notify your doctor.  These are signs of an infection.    If your biopsy site is bleeding, apply firm pressure for 15 minutes straight.  Repeat for another 15 minutes, if it is still bleeding.   If the surgical site continues to bleed, then please contact your doctor.      For [facility] users:   You will receive a [facility] notification after the pathologist has finished reviewing your biopsy specimen. Pathology results, however, will not be released online so you will see a "no content" message. Once your dermatologist reviews and clinically correlates your biopsy results, you will either receive a letter in the mail with the results of a phone call from your doctor's office if further explanation or treatment is warranted.       [address] [place] [address]/ [phone] FAX/ [email or web address]      CRYOSURGERY      Your doctor has used a method called cryosurgery to treat your skin condition. Cryosurgery refers to the use of very cold substances to treat a variety of skin conditions such as warts, pre-skin cancers, molluscum contagiosum, sun spots, and several benign " growths. The substance we use in cryosurgery is liquid nitrogen and is so cold (-195 degrees Celsius) that is burns when administered.     Following treatment in the office, the skin may immediately burn and become red. You may find the area around the lesion is affected as well. It is sometimes necessary to treat not only the lesion, but a small area of the surrounding normal skin to achieve a good response.     A blister, and even a blood filled blister, may form after treatment.   This is a normal response. If the blister is painful, it is acceptable to sterilize a needle and with rubbing alcohol and gently pop the blister. It is important that you gently wash the area with soap and warm water as the blister fluid may contain wart virus if a wart was treated. Do no remove the roof of the blister.     The area treated can take anywhere from 1-3 weeks to heal. Healing time depends on the kind skin lesion treated, the location, and how aggressively the lesion was treated. It is recommended that the areas treated are covered with Vaseline or bacitracin ointment and a band-aid. If a band-aid is not practical, just ointment applied several times per day will do. Keeping these areas moist will speed the healing time.    Treatment with liquid nitrogen can leave a scar. In dark skin, it may be a light or dark scar, in light skin it may be a white or pink scar. These will generally fade with time, but may never go away completely.     If you have any concerns after your treatment, please feel free to call the office.       [facility] [place] [address]/ [phone] FAX/ [email or web address]    Wound Care    A band aid and vaseline ointment has been placed over the site.  This should remain in place for 24 hours.  It is recommended that you keep the area dry for the first 24 hours.  After 24 hours, you may remove the band aid and wash the area with warm soap and water and apply Vaseline jelly.   Many patients prefer to use Neosporin or Bacitracin ointment.  This is acceptable; however, know that you can develop an allergy to this medication even if you have used it safely for years.  It is important to keep the area moist.  Letting it dry out and get air slows healing time, and will worsen the scar.  Band aid is optional after first 24 hours.      If you notice increasing redness, tenderness, pain, or yellow drainage at the site, please notify your doctor.  These are signs of an infection.    If your site is bleeding, apply firm pressure for 15 minutes straight.  Repeat for another 15 minutes, if it is still bleeding.   If the surgical site continues to bleed, then please contact your doctor.      [facility] [place] [address]/ [phone] FAX/ [email or web address]

## 2019-11-07 ENCOUNTER — OFFICE VISIT (OUTPATIENT)
Dept: OPHTHALMOLOGY | Facility: CLINIC | Age: 74
End: 2019-11-07
Payer: MEDICARE

## 2019-11-07 DIAGNOSIS — H04.129 DRY EYE: ICD-10-CM

## 2019-11-07 DIAGNOSIS — H02.112 CICATRICIAL ECTROPION OF RIGHT LOWER EYELID: ICD-10-CM

## 2019-11-07 DIAGNOSIS — Z98.890 POST-OPERATIVE STATE: Primary | ICD-10-CM

## 2019-11-07 DIAGNOSIS — I48.0 PAROXYSMAL ATRIAL FIBRILLATION: ICD-10-CM

## 2019-11-07 DIAGNOSIS — I48.19 PERSISTENT ATRIAL FIBRILLATION: ICD-10-CM

## 2019-11-07 DIAGNOSIS — I10 ESSENTIAL HYPERTENSION: ICD-10-CM

## 2019-11-07 PROCEDURE — 99999 PR PBB SHADOW E&M-EST. PATIENT-LVL II: CPT | Mod: PBBFAC,,, | Performed by: OPHTHALMOLOGY

## 2019-11-07 PROCEDURE — 99024 POSTOP FOLLOW-UP VISIT: CPT | Mod: S$GLB,,, | Performed by: OPHTHALMOLOGY

## 2019-11-07 PROCEDURE — 92285 EXTERNAL PHOTOGRAPHY - OU - BOTH EYES: ICD-10-PCS | Mod: S$GLB,,, | Performed by: OPHTHALMOLOGY

## 2019-11-07 PROCEDURE — 99999 PR PBB SHADOW E&M-EST. PATIENT-LVL II: ICD-10-PCS | Mod: PBBFAC,,, | Performed by: OPHTHALMOLOGY

## 2019-11-07 PROCEDURE — 92285 EXTERNAL OCULAR PHOTOGRAPHY: CPT | Mod: S$GLB,,, | Performed by: OPHTHALMOLOGY

## 2019-11-07 PROCEDURE — 99024 PR POST-OP FOLLOW-UP VISIT: ICD-10-PCS | Mod: S$GLB,,, | Performed by: OPHTHALMOLOGY

## 2019-11-07 RX ORDER — GABAPENTIN 300 MG/1
CAPSULE ORAL
Qty: 270 CAPSULE | Refills: 1 | Status: SHIPPED | OUTPATIENT
Start: 2019-11-07 | End: 2020-04-13

## 2019-11-07 RX ORDER — GABAPENTIN 300 MG/1
300 CAPSULE ORAL 3 TIMES DAILY
Qty: 270 CAPSULE | Refills: 1 | Status: CANCELLED | OUTPATIENT
Start: 2019-11-07

## 2019-11-07 NOTE — TELEPHONE ENCOUNTER
----- Message from Kathy Colvin sent at 11/7/2019  1:43 PM CST -----  Contact: Spouse/Leticia [phone]  Prescription Request:     Name of medication:   apixaban 5 mg Tab  gabapentin (NEURONTIN) 300 MG capsule    Reason for request: Refill    Pharmacy: [facility] [address]    Please advise.    Thank You

## 2019-11-16 NOTE — PROGRESS NOTES
HPI     Pt here for post-op 3 month ectropion repair w/ skin graft/ Tarsorrhaphy   Right Lower Lid     Date of surgery: 08/14/2019  Patient complaint(s): patient reports that he is having some soreness   right eye temporally   States that his Dry eyes seems to have improved since having the ectropion   repair     Oral antibiotics: NONE  Eye meds: NONE    Ocular Surgery:   BCC left eye brow 12/2015  Ectropion repair  right lower lid 08/14/2019  Phaco IOL OD 05/14/2018  Phaco IOL OS  06/22/2018    Ocular History:   Cataracts   Ectropion RLL      Perform:  - [facility]  - Order externals [facility]      Last edited by Victoriano Arias on 11/7/2019  4:14 PM. (History)            Assessment /Plan     For exam results, see Encounter Report.    Post-operative state  -     External/Slit Lamp Photography; Future  -     External Photography - OU - Both Eyes    Cicatricial ectropion of right lower eyelid    Dry eye      Patient doing well! Post-operative instructions reviewed. All questions answered. Return prn sooner any worsening of vision/symptoms or any concerns.

## 2019-11-25 ENCOUNTER — PATIENT MESSAGE (OUTPATIENT)
Dept: INTERNAL MEDICINE | Facility: CLINIC | Age: 74
End: 2019-11-25

## 2019-12-04 ENCOUNTER — TELEPHONE (OUTPATIENT)
Dept: NEPHROLOGY | Facility: CLINIC | Age: 74
End: 2019-12-04

## 2019-12-04 ENCOUNTER — OFFICE VISIT (OUTPATIENT)
Dept: ORTHOPEDICS | Facility: CLINIC | Age: 74
End: 2019-12-04
Attending: ORTHOPAEDIC SURGERY
Payer: MEDICARE

## 2019-12-04 VITALS — WEIGHT: 263 LBS | HEIGHT: 70 IN | BODY MASS INDEX: 37.65 KG/M2

## 2019-12-04 DIAGNOSIS — M65.322 TRIGGER INDEX FINGER OF LEFT HAND: ICD-10-CM

## 2019-12-04 PROCEDURE — 99203 PR OFFICE/OUTPT VISIT, NEW, LEVL III, 30-44 MIN: ICD-10-PCS | Mod: 25,S$GLB,, | Performed by: ORTHOPAEDIC SURGERY

## 2019-12-04 PROCEDURE — 1159F MED LIST DOCD IN RCRD: CPT | Mod: S$GLB,,, | Performed by: ORTHOPAEDIC SURGERY

## 2019-12-04 PROCEDURE — 99999 PR PBB SHADOW E&M-EST. PATIENT-LVL IV: CPT | Mod: PBBFAC,,, | Performed by: ORTHOPAEDIC SURGERY

## 2019-12-04 PROCEDURE — 1125F PR PAIN SEVERITY QUANTIFIED, PAIN PRESENT: ICD-10-PCS | Mod: S$GLB,,, | Performed by: ORTHOPAEDIC SURGERY

## 2019-12-04 PROCEDURE — 99999 PR PBB SHADOW E&M-EST. PATIENT-LVL IV: ICD-10-PCS | Mod: PBBFAC,,, | Performed by: ORTHOPAEDIC SURGERY

## 2019-12-04 PROCEDURE — 3288F FALL RISK ASSESSMENT DOCD: CPT | Mod: CPTII,S$GLB,, | Performed by: ORTHOPAEDIC SURGERY

## 2019-12-04 PROCEDURE — 1100F PTFALLS ASSESS-DOCD GE2>/YR: CPT | Mod: CPTII,S$GLB,, | Performed by: ORTHOPAEDIC SURGERY

## 2019-12-04 PROCEDURE — 20550 NJX 1 TENDON SHEATH/LIGAMENT: CPT | Mod: F1,S$GLB,, | Performed by: ORTHOPAEDIC SURGERY

## 2019-12-04 PROCEDURE — 1159F PR MEDICATION LIST DOCUMENTED IN MEDICAL RECORD: ICD-10-PCS | Mod: S$GLB,,, | Performed by: ORTHOPAEDIC SURGERY

## 2019-12-04 PROCEDURE — 20550 PR INJECT TENDON SHEATH/LIGAMENT: ICD-10-PCS | Mod: F1,S$GLB,, | Performed by: ORTHOPAEDIC SURGERY

## 2019-12-04 PROCEDURE — 3288F PR FALLS RISK ASSESSMENT DOCUMENTED: ICD-10-PCS | Mod: CPTII,S$GLB,, | Performed by: ORTHOPAEDIC SURGERY

## 2019-12-04 PROCEDURE — 99203 OFFICE O/P NEW LOW 30 MIN: CPT | Mod: 25,S$GLB,, | Performed by: ORTHOPAEDIC SURGERY

## 2019-12-04 PROCEDURE — 1100F PR PT FALLS ASSESS DOC 2+ FALLS/FALL W/INJURY/YR: ICD-10-PCS | Mod: CPTII,S$GLB,, | Performed by: ORTHOPAEDIC SURGERY

## 2019-12-04 PROCEDURE — 1125F AMNT PAIN NOTED PAIN PRSNT: CPT | Mod: S$GLB,,, | Performed by: ORTHOPAEDIC SURGERY

## 2019-12-04 RX ORDER — TRIAMCINOLONE ACETONIDE 40 MG/ML
20 INJECTION, SUSPENSION INTRA-ARTICULAR; INTRAMUSCULAR
Status: COMPLETED | OUTPATIENT
Start: 2019-12-04 | End: 2019-12-04

## 2019-12-04 RX ADMIN — TRIAMCINOLONE ACETONIDE 20 MG: 40 INJECTION, SUSPENSION INTRA-ARTICULAR; INTRAMUSCULAR at 04:12

## 2019-12-04 NOTE — PROGRESS NOTES
"Subjective:      Patient ID: [facility] PAT Cantor Jr. is a 73 y.o. male.    Chief Complaint: Pain and Swelling of the Left Hand      HPI  Dov PAT Cantor Jr. is a  73 y.o. male presenting today for left index finger pain and stiffness.  There was not a history of trauma.  Onset of symptoms began several months ago  He is having difficulty flexing down the left index finger  No numbness or tingling reported.      Review of patient's allergies indicates:   Allergen Reactions    Niacin Itching and Other (See Comments)     Other reaction(s): facial flushing and causes hepatitis     Terbinafine Other (See Comments)     Other reaction(s): Hepatitis    "gave me rash"         Current Outpatient Medications   Medication Sig Dispense Refill    apixaban (ELIQUIS) 5 mg Tab Take 1 tablet (5 mg total) by mouth 2 (two) times daily. 180 tablet 3    azelastine (ASTELIN) 137 mcg (0.1 %) nasal spray 1 spray (137 mcg total) by Nasal route 2 (two) times daily. (Patient taking differently: 1 spray by Nasal route 2 (two) times daily as needed. ) 30 mL 3    BD INSULIN PEN NEEDLE UF ORIG 29 x 1/2 " Ndle   12    BD ULTRA-FINE BASIL PEN NEEDLE 32 gauge x 5/32" Ndle CHECK BLOOD SUGAR FOUR TIMES DAILY 400 each 3    blood sugar diagnostic Strp 1 strip by Misc.(Non-Drug; Combo Route) route 3 (three) times daily. 270 strip 6    blood-glucose meter Misc To check BG as discussed 1 each 0    ciclopirox (PENLAC) 8 % Soln Apply topically nightly. 6.6 mL 11    ciclopirox 0.77 % Gel APPLY TWICE DAILY 100 g 3    clobetasol (TEMOVATE) 0.05 % cream AAA finger bid 60 g 3    fenofibrate (TRICOR) 145 MG tablet TAKE ONE TABLET BY MOUTH EVERY DAY 90 tablet 3    furosemide (LASIX) 40 MG tablet Take 1 tablet (40 mg total) by mouth once daily. 30 tablet 1    gabapentin (NEURONTIN) 300 MG capsule TAKE ONE CAPSULE BY MOUTH THREE TIMES DAILY 270 capsule 1    insulin (BASAGLAR KWIKPEN U-100 INSULIN) glargine 100 units/mL (3mL) SubQ pen Inject 55-60 units at night. " (Patient taking differently: Inject 30 Units into the skin every evening. Take 30 Units every HS) 30 mL 6    insulin aspart (NOVOLOG FLEXPEN) 100 unit/mL InPn pen Novolog 20 units breakfast, 22 units lunch and dinner plus correction scale. Max daily dose=100 units (Patient taking differently: Novolog 6 units breakfast, 8 units lunch and dinner plus correction scale.) 2 Box 11    ketoconazole (NIZORAL) 2 % cream Apply topically once daily. 30 g 1    lancets 33 gauge Misc 1 lancet by Misc.(Non-Drug; Combo Route) route 4 (four) times daily. Pt uses True Result Meter, bg monitoring 4 times a day. 450 each 4    metoprolol succinate (TOPROL-XL) 100 MG 24 hr tablet TAKE ONE TABLET BY MOUTH EVERY DAY 90 tablet 1    nystatin-triamcinolone (MYCOLOG II) cream apply TWICE DAILY (Patient taking differently: apply TWICE DAILY-using as needed for rash) 60 g 1    pravastatin (PRAVACHOL) 10 MG tablet TAKE ONE TABLET BY MOUTH ONCE EVERY DAY (Patient taking differently: TAKE ONE TABLET BY MOUTH ONCE EVERY EVENING) 90 tablet 3    temazepam (RESTORIL) 15 mg Cap TAKE ONE CAPSULE BY MOUTH EVERY EVENING 30 capsule 5    triamcinolone acetonide 0.1% (KENALOG) 0.1 % cream Apply topically 2 (two) times daily. Apply to affected area twice daily as directed. 454 g 0    HYDROcodone-acetaminophen (NORCO) 5-325 mg per tablet Take 1 tablet by mouth every 6 (six) hours as needed for Pain. (Patient not taking: Reported on 12/4/2019) 16 tablet 0    levothyroxine (SYNTHROID) 25 MCG tablet TAKE ONE TABLET BY MOUTH ONCE EVERY DAY (Patient not taking: Reported on 12/4/2019) 90 tablet 3     Current Facility-Administered Medications   Medication Dose Route Frequency Provider Last Rate Last Dose    [COMPLETED] triamcinolone acetonide injection 20 mg  20 mg INTRABURSAL 1 time in Clinic/HOD Woo Elmore Jr., MD   20 mg at 12/04/19 1645       Past Medical History:   Diagnosis Date    *Atrial fibrillation     Eliquis    Anticoagulant long-term  use     apaxiban    Basal cell carcinoma 12/2015    left eye brow    BCC (basal cell carcinoma) 12/2015    left shoulder    Cataract     Chronic kidney disease     Diabetes mellitus with renal manifestations, uncontrolled     Dyslipidemia associated with type 2 diabetes mellitus     Fever blister     Hearing loss     HTN (hypertension)     Keloid cicatrix     Liver disease     Melanoma 12/07/2015    right cheek-in situ    Obesity     PN (peripheral neuropathy)     Primary osteoarthritis of right knee 1/25/2017    Screening for colon cancer 3/3/2016    Sleep apnea     wears CPAP at night    Thyroid disease     Type II or unspecified type diabetes mellitus with other specified manifestations, uncontrolled     Ulcer of left lower extremity, limited to breakdown of skin 9/22/2017       Past Surgical History:   Procedure Laterality Date    APPENDECTOMY      CARDIOVERSION      CATARACT EXTRACTION      CATARACT EXTRACTION Right 05/14/2018    Dr. Greer    COLONOSCOPY N/A 3/3/2016    Procedure: COLONOSCOPY;  Surgeon: Bob Poe MD;  Location: [facility] (4TH FLR);  Service: Endoscopy;  Laterality: N/A;  Holding Eliquis for 3 days prior to colonoscopy. EC    COLONOSCOPY N/A 9/20/2019    Procedure: COLONOSCOPY;  Surgeon: Akbar Curtis MD;  Location: [facility] (4TH FLR);  Service: Endoscopy;  Laterality: N/A;  Per Dr. José Granda, patient can HOLD Eliquis X2 days prior to procedure-see telphone encounter 8/22/19-[facility]    ECTROPION REPAIR Right 8/14/2019    Procedure: REPAIR, ECTROPION, EYELID /       #[id number]- Skin Flaps(Deep Tissue) (OD);  Surgeon: Edita Sabillon MD;  Location: [facility] 2ND FLR;  Service: Ophthalmology;  Laterality: Right;    epidural steroid injection      INTRAOCULAR PROSTHESES INSERTION Left 6/25/2018    Procedure: INSERTION, INTRAOCULAR LENS PROSTHESIS;  Surgeon: Lawrence Greer MD;  Location: [facility];  Service: Ophthalmology;  Laterality: Left;    JOINT REPLACEMENT       "Knee    Meniere's disease surgery      PHACOEMULSIFICATION OF CATARACT Left 6/25/2018    Procedure: PHACOEMULSIFICATION, CATARACT;  Surgeon: Lawrence Greer MD;  Location: [facility];  Service: Ophthalmology;  Laterality: Left;    PREPARATION OF WOUND PRIOR TO APPLICATION OF SKIN GRAFT Right 8/14/2019    Procedure: PREPARATION, WOUND, PRIOR TO SKIN GRAFT APPLICATION;  Surgeon: Edita Sabillon MD;  Location: [facility] FLR;  Service: Ophthalmology;  Laterality: Right;    REVISION OF KNEE ARTHROPLASTY Right 7/10/2018    Procedure: REVISION-ARTHROPLASTY-KNEE-SAMIR;  Surgeon: Miguel Stuart MD;  Location: [facility] FLR;  Service: Orthopedics;  Laterality: Right;  Samir    SKIN FULL THICKNESS GRAFT Right 8/14/2019    Procedure: APPLICATION, GRAFT, SKIN, FULL-THICKNESS;  Surgeon: Edita Sabillon MD;  Location: [facility] FLR;  Service: Ophthalmology;  Laterality: Right;    TARSORRHAPHY Right 8/14/2019    Procedure: BLEPHARORRHAPHY;  Surgeon: Edita Sabillon MD;  Location: [facility] FLR;  Service: Ophthalmology;  Laterality: Right;    TONSILLECTOMY      TOTAL KNEE ARTHROPLASTY Right 01/25/2017    TKR    TOTAL KNEE ARTHROPLASTY Left     TKR, 1990's       Review of Systems:  ROS    OBJECTIVE:     PHYSICAL EXAM:  Height: 5' 10" (177.8 cm) Weight: 119.3 kg (263 lb)  Vitals:    12/04/19 1608 12/04/19 1609   Weight: 119.3 kg (263 lb)    Height: 5' 10" (1.778 m)    PainSc:   9   9     Well developed, well nourished male in no acute distress  Alert and oriented x 3  HEENT- Normal exam  Lungs- Clear to auscultation  Heart- Regular rate and rhythm  Abdomen- Soft nontender  Extremity exam- examination left hand the left index finger is held slightly flexed  There is swelling and tenderness over the flexor tendon sheath  Range of motion limited secondary to pain  Has some mild triggering at the A1 pulley  Sensation intact Tinel sign negative    RADIOGRAPHS:  None  Comments: I have personally reviewed the imaging and I " agree with the above radiologist's report.    ASSESSMENT/PLAN:     IMPRESSION:  Flexor tenosynovitis with early triggering left index finger    PLAN:  I explained the nature of the problem to the patient. Recommended injection.  After pause for time-out identified the left index finger injected flexor tendon sheath combination Kenalog 20 mg 0.5 cc xylocaine sterile technique  Tolerated the procedure well without complication  Gentle range of motion  Advil or Motrin by mouth  Follow-up 3-4 weeks       - We talked at length about the anatomy and pathophysiology of   Encounter Diagnosis   Name Primary?    Trigger index finger of left hand            Disclaimer: This note has been generated using voice-recognition software. There may be typographical errors that have been missed during proof-reading.

## 2020-01-06 ENCOUNTER — PROCEDURE VISIT (OUTPATIENT)
Dept: DERMATOLOGY | Facility: CLINIC | Age: 75
End: 2020-01-06
Payer: MEDICARE

## 2020-01-06 VITALS
HEIGHT: 70 IN | DIASTOLIC BLOOD PRESSURE: 99 MMHG | BODY MASS INDEX: 37.65 KG/M2 | WEIGHT: 263 LBS | SYSTOLIC BLOOD PRESSURE: 142 MMHG | HEART RATE: 82 BPM

## 2020-01-06 DIAGNOSIS — C44.319 BASAL CELL CARCINOMA OF RIGHT PREAURICULAR REGION: Primary | ICD-10-CM

## 2020-01-06 PROCEDURE — 17311 MOHS 1 STAGE H/N/HF/G: CPT | Mod: S$GLB,,, | Performed by: DERMATOLOGY

## 2020-01-06 PROCEDURE — 99499 UNLISTED E&M SERVICE: CPT | Mod: S$GLB,,, | Performed by: DERMATOLOGY

## 2020-01-06 PROCEDURE — 99499 NO LOS: ICD-10-PCS | Mod: S$GLB,,, | Performed by: DERMATOLOGY

## 2020-01-06 PROCEDURE — 13131 PR RECMPL WND HEAD,FAC,HAND 1.1-2.5 CM: ICD-10-PCS | Mod: 59,S$GLB,, | Performed by: DERMATOLOGY

## 2020-01-06 PROCEDURE — 17311: ICD-10-PCS | Mod: S$GLB,,, | Performed by: DERMATOLOGY

## 2020-01-06 PROCEDURE — 13131 CMPLX RPR F/C/C/M/N/AX/G/H/F: CPT | Mod: 59,S$GLB,, | Performed by: DERMATOLOGY

## 2020-01-06 RX ORDER — HYDROCODONE BITARTRATE AND ACETAMINOPHEN 5; 325 MG/1; MG/1
1 TABLET ORAL EVERY 6 HOURS PRN
Qty: 10 TABLET | Refills: 0 | Status: SHIPPED | OUTPATIENT
Start: 2020-01-06 | End: 2020-09-14

## 2020-01-06 NOTE — PROGRESS NOTES
PROCEDURE: Mohs' Micrographic Surgery    INDICATION: Location in mask areas of face including central face, nose, eyelids, eyebrows, lips, chin, preauricular, temple, and ear. Biopsy-proven skin cancer of cosmetically and functionally important areas, including head, neck, genital, hand, foot, or areas known for having difficulty in healing, such as the lower anterior legs. Tumor with ill-defined borders. Aggressive histopathology including sclerosing, morpheaform/infiltrating, micronodular, superficial multicentric, poorly differentiated, basosquamous, or perineural invasion. In patient with proven history of difficult or aggressive skin cancer.    REFERRING MD: Paloma Gomez M.D.    CASE NUMBER:     ANESTHETIC: 3 cc 0.5% Lidocaine with Epi 1:200,000 mixed 1:1 with 0.5% Bupivacaine    SURGICAL PREP: Hibiclens    SURGEON: Facundo Godfrey MD    ASSISTANTS: Katie Stevens PA-C and Kim Lewis, Surg Tech    PREOPERATIVE DIAGNOSIS: basal cell carcinoma    POSTOPERATIVE DIAGNOSIS: basal cell carcinoma    PATHOLOGIC DIAGNOSIS: basal cell carcinoma- nodular, infiltrating    HISTOLOGY OF SPECIMENS IN FIRST STAGE:   Tumor Type: No tumor seen.    STAGES OF MOHS' SURGERY PERFORMED: 1    TUMOR-FREE PLANE ACHIEVED: Yes    HEMOSTASIS: electrocoagulation     SPECIMENS: 2    LOCATION: right superior preauricular. Patient verified location with hand held mirror.    INITIAL LESION SIZE: 0.4 x 0.4 cm    FINAL DEFECT SIZE: 0.8 x 1.0 cm    WOUND REPAIR/DISPOSITION: The patient tolerated Mohs' Micrographic Surgery for a basal cell carcinoma very well. When the tumor was completely removed, a repair of the surgical defect was undertaken.      PROCEDURE: Complex Linear Repair    INDICATION: Status post Mohs' Micrographic Surgery for basal cell carcinoma.    CASE NUMBER:     SURGEON: Facundo Godfrey MD    ASSISTANTS: Katie Stevens PA-C and Kim Lewis Surg Tech    ANESTHETIC: 3 cc 1% Lidocaine with Epinephrine  "1:100,000    SURGICAL PREP: Hibiclens, prepped by Kim Lewis, Surg Tech    LOCATION: right superior preauricular    DEFECT SIZE: 0.8 x 1.0 cm    WOUND REPAIR/DISPOSITION:  After the patient's carcinoma had been completely removed with Mohs' Micrographic Surgery, a repair of the surgical defect was undertaken. The patient was returned to the operating suite where the area of right superior preauricular was prepped, draped, and anesthetized in the usual sterile fashion. The wound was widely undermined in all directions. Then, electrocoagulation was used to obtain meticulous hemostasis. 4-0 Vicryl buried vertical mattress sutures were placed into the subcutaneous and dermal plane to close the wound and roman the cutaneous wound edge. Bilateral dog ears were identified and were removed by a standard Burow's triangle technique. The cutaneous wound edges were closed using interrupted 5-0 Prolene suture.    The patient tolerated the procedure well.    The area was cleaned and dressed appropriately and the patient was given wound care instructions, as well as appointment for follow-up evaluation. Patient was placed on Norco 5-325 mg prn postop pain.    LENGTH OF REPAIR: 2.3 cm    Vitals:    01/06/20 0731 01/06/20 0934   BP: (!) 149/86 (!) 142/99   BP Location: Right arm Right arm   Patient Position: Sitting Lying   BP Method: Large (Automatic) Large (Automatic)   Pulse: 61 82   Weight: 119.3 kg (263 lb)    Height: 5' 10" (1.778 m)          "

## 2020-01-13 ENCOUNTER — OFFICE VISIT (OUTPATIENT)
Dept: DERMATOLOGY | Facility: CLINIC | Age: 75
End: 2020-01-13
Payer: MEDICARE

## 2020-01-13 DIAGNOSIS — Z09 POSTOP CHECK: Primary | ICD-10-CM

## 2020-01-13 PROCEDURE — 99024 PR POST-OP FOLLOW-UP VISIT: ICD-10-PCS | Mod: S$GLB,,, | Performed by: DERMATOLOGY

## 2020-01-13 PROCEDURE — 99999 PR PBB SHADOW E&M-EST. PATIENT-LVL III: CPT | Mod: PBBFAC,,, | Performed by: DERMATOLOGY

## 2020-01-13 PROCEDURE — 99999 PR PBB SHADOW E&M-EST. PATIENT-LVL III: ICD-10-PCS | Mod: PBBFAC,,, | Performed by: DERMATOLOGY

## 2020-01-13 PROCEDURE — 99024 POSTOP FOLLOW-UP VISIT: CPT | Mod: S$GLB,,, | Performed by: DERMATOLOGY

## 2020-01-13 NOTE — PROGRESS NOTES
74 y.o. male patient is here for suture removal following Mohs' surgery.    Patient reports no problems with right superior preauricular.    WOUND PE:  The right superior preauricular sutures intact. Wound healing well. Good skin edges. No signs or symptoms of infection.    IMPRESSION:  Healing operative site from Mohs' surgery BCC, right superior preauricular s/p Mohs with CLC, postop day # 7.    PLAN:  Sutures removed today. Steri-strips applied.  Continue wound care.  Keep moist with Aquaphor.    RTC:  In 3-6 months with Paloma Gomez M.D. for skin check or sooner if new concern arises.

## 2020-01-16 RX ORDER — CICLOPIROX 7.7 MG/G
GEL TOPICAL
Qty: 100 G | Refills: 3 | OUTPATIENT
Start: 2020-01-16

## 2020-01-20 DIAGNOSIS — I87.2 VENOUS STASIS DERMATITIS OF RIGHT LOWER EXTREMITY: ICD-10-CM

## 2020-01-20 RX ORDER — TRIAMCINOLONE ACETONIDE 1 MG/G
CREAM TOPICAL
Qty: 30 G | Refills: 3 | Status: SHIPPED | OUTPATIENT
Start: 2020-01-20 | End: 2021-06-14

## 2020-01-27 ENCOUNTER — RESEARCH ENCOUNTER (OUTPATIENT)
Dept: RESEARCH | Facility: HOSPITAL | Age: 75
End: 2020-01-27

## 2020-01-27 NOTE — PROGRESS NOTES
Patient currently in follow up period of [employer] C Diff study. Patient's eDiary reminder calls noted below:    9/16/2019: Was contact made with the patient?:Yes    Spoke with subject and reminded subject to check in every thirty days into eDiary or he will be contacted by site staff. Gave subject notice that in the instance of experiencing three or more diarrheal episodes he must collect a sample, call a  for , and contact site staff as soon as possible. Subject was also reminded that if he leaves town or is hospitalized, the stool collection kit should be brought with them.       1/9/2020: Was contact made with the patient?:Yes    Spoke with subject and reminded subject to check in every thirty days into eDiary or he will be contacted by site staff. Gave subject notice that in the instance of experiencing three or more diarrheal episodes he must collect a sample, call a  for , and contact site staff as soon as possible. Subject was also reminded that if he leaves town or is hospitalized, the stool collection kit should be brought with them.

## 2020-02-10 ENCOUNTER — TELEPHONE (OUTPATIENT)
Dept: INTERNAL MEDICINE | Facility: CLINIC | Age: 75
End: 2020-02-10

## 2020-02-10 ENCOUNTER — OFFICE VISIT (OUTPATIENT)
Dept: ENDOCRINOLOGY | Facility: CLINIC | Age: 75
End: 2020-02-10
Payer: MEDICARE

## 2020-02-10 VITALS
HEIGHT: 70 IN | TEMPERATURE: 98 F | BODY MASS INDEX: 39.9 KG/M2 | HEART RATE: 93 BPM | SYSTOLIC BLOOD PRESSURE: 122 MMHG | DIASTOLIC BLOOD PRESSURE: 75 MMHG | WEIGHT: 278.69 LBS

## 2020-02-10 DIAGNOSIS — E11.69 DYSLIPIDEMIA ASSOCIATED WITH TYPE 2 DIABETES MELLITUS: ICD-10-CM

## 2020-02-10 DIAGNOSIS — E11.22 TYPE 2 DIABETES MELLITUS WITH STAGE 3 CHRONIC KIDNEY DISEASE, WITH LONG-TERM CURRENT USE OF INSULIN: ICD-10-CM

## 2020-02-10 DIAGNOSIS — N18.30 TYPE 2 DIABETES MELLITUS WITH STAGE 3 CHRONIC KIDNEY DISEASE, WITH LONG-TERM CURRENT USE OF INSULIN: ICD-10-CM

## 2020-02-10 DIAGNOSIS — Z79.4 TYPE 2 DIABETES MELLITUS WITH STAGE 3 CHRONIC KIDNEY DISEASE, WITH LONG-TERM CURRENT USE OF INSULIN: ICD-10-CM

## 2020-02-10 DIAGNOSIS — E66.01 CLASS 2 SEVERE OBESITY DUE TO EXCESS CALORIES WITH SERIOUS COMORBIDITY AND BODY MASS INDEX (BMI) OF 36.0 TO 36.9 IN ADULT: ICD-10-CM

## 2020-02-10 DIAGNOSIS — E78.5 DYSLIPIDEMIA ASSOCIATED WITH TYPE 2 DIABETES MELLITUS: ICD-10-CM

## 2020-02-10 PROCEDURE — 99214 PR OFFICE/OUTPT VISIT, EST, LEVL IV, 30-39 MIN: ICD-10-PCS | Mod: S$GLB,,, | Performed by: INTERNAL MEDICINE

## 2020-02-10 PROCEDURE — 99214 OFFICE O/P EST MOD 30 MIN: CPT | Mod: S$GLB,,, | Performed by: INTERNAL MEDICINE

## 2020-02-10 PROCEDURE — 99499 UNLISTED E&M SERVICE: CPT | Mod: S$GLB,,, | Performed by: INTERNAL MEDICINE

## 2020-02-10 PROCEDURE — 99499 RISK ADDL DX/OHS AUDIT: ICD-10-PCS | Mod: S$GLB,,, | Performed by: INTERNAL MEDICINE

## 2020-02-10 NOTE — LETTER
February 10, 2020        Desmond Barlow MD  [facility] [address]             [facility] [address]  [address] [place] [id number]  Phone: [phone]  Fax: [phone]   Patient: Stephen Cantor Jr.   MR Number: [id number]   YOB: 1945   Date of Visit: 2/10/2020       Dear Dr. Barlow:    I saw your patient, Stephen Cantor, today for evaluation. Attached you will find relevant portions of my assessment and plan of care.       If you have questions, please do not hesitate to call me. I look forward to following Stephen Cantor along with you.    Sincerely,      David Judd MD            CC  No Recipients    Enclosure

## 2020-02-10 NOTE — ASSESSMENT & PLAN NOTE
BMI elevated   - with DM, HTN, HLD   - encourage pt to work on healthy diet, exercise habits as able   - attempt to start GLP-1 agonist as above   - monitor weight

## 2020-02-10 NOTE — PROGRESS NOTES
Subjective:      Chief Complaint: Diabetes (type 2)    [place]: [facility] [place] Cant[place] Jr. is a 74 y.o. male who is here for a follow-up evaluation for diabetes. Last seen 2018, though this is his first visit with me.    With regards to the diabetes:  Diagnosed with T2DM since around  or so per patient. Insulin since around .    Known complications:     Retinopathy? No    Nephropathy? Yes    Neuropathy? Yes    CAD? No    CVA? No    Current regimen:   Basaglar 30 units qHS   Novolog 10 units with meals    Prior meds:   Metformin -> diarrhea, weight gain    Missed doses? denies     # times a day testinx/day  Log reviewed: Yes    Pre breakfast: 120-210  Pre lunch: 130-170s  Pre dinner: mid 100s    Hypoglycemic events? None     Current symptoms:   polyuria  Pt denies:   polydipsia, vision changes, nausea, vomit and diarrhea    Diabetes Management Status    Statin: Taking  ACE/ARB: Not taking    Screening or Prevention Patient's value Goal Complete/Controlled?   HgA1C Testing and Control   Lab Results   Component Value Date    HGBA1C 6.5 (H) 2019      q6months/Less than 7.5% Yes   Lipid profile : 2019 Annually Yes   LDL control Lab Results   Component Value Date    LDLCALC 104.2 2019    Annually/Less than 100 mg/dl  No   Nephropathy screening Lab Results   Component Value Date    MICALBCREAT 47.7 (H) 2015     Lab Results   Component Value Date    CREATININE 1.3 2019     Lab Results   Component Value Date    PROTEINUA Negative 2019    Annually Yes   Blood pressure BP Readings from Last 1 Encounters:   02/10/20 122/75    Less than 140/90 No   Dilated retinal exam : 2019 Annually Yes   Foot exam   : 2019 Annually Yes     Reviewed past medical, family, social history and updated as appropriate.    Review of Systems   Constitutional: Positive for unexpected weight change (gained weight).   HENT: Negative for trouble swallowing.    Eyes: Negative for visual disturbance.    Respiratory: Negative for shortness of breath (rare, with activity).    Cardiovascular: Positive for palpitations.   Gastrointestinal: Negative for diarrhea and nausea.   Endocrine: Positive for polyuria (on lasix). Negative for polydipsia.   Genitourinary: Positive for frequency. Negative for dysuria.   Musculoskeletal: Positive for back pain.   Neurological: Negative for light-headedness.     Objective:     Vitals:    02/10/20 1152   BP: 122/75   Pulse: 93   Temp: 98.1 °F (36.7 °C)     BP Readings from Last 5 Encounters:   02/10/20 122/75   01/06/20 (!) 142/99   09/20/19 127/82   08/14/19 (!) 120/59   08/13/19 110/72     Physical Exam   Constitutional: No distress.   Obese   Neck: No thyromegaly present.   Cardiovascular: Normal heart sounds.   Pulmonary/Chest: Effort normal.   Musculoskeletal: He exhibits edema (1+ edema).   Vitals reviewed.    Wt Readings from Last 10 Encounters:   02/10/20 1152 126.4 kg (278 lb 10.6 oz)   01/06/20 0731 119.3 kg (263 lb)   12/04/19 1608 119.3 kg (263 lb)   09/30/19 1359 119.4 kg (263 lb 5.4 oz)   09/20/19 0843 118.8 kg (262 lb)   09/20/19 0837 118.8 kg (262 lb)   08/14/19 1007 118.8 kg (262 lb)   08/13/19 1004 119.3 kg (263 lb 0.1 oz)   08/07/19 1458 119.7 kg (264 lb)   07/31/19 1531 119.7 kg (264 lb)   07/19/19 1308 119.3 kg (263 lb 0.1 oz)       Lab Results   Component Value Date    HGBA1C 6.5 (H) 08/13/2019     Lab Results   Component Value Date    CHOL 167 08/13/2019    HDL 35 (L) 08/13/2019    LDLCALC 104.2 08/13/2019    TRIG 139 08/13/2019    CHOLHDL 21.0 08/13/2019     Lab Results   Component Value Date     08/13/2019    K 4.2 08/13/2019     08/13/2019    CO2 29 08/13/2019     (H) 08/13/2019    BUN 24 (H) 08/13/2019    CREATININE 1.3 08/13/2019    CALCIUM 10.4 08/13/2019    PROT 6.9 08/13/2019    ALBUMIN 3.6 08/13/2019    BILITOT 0.9 08/13/2019    ALKPHOS 34 (L) 08/13/2019    AST 15 08/13/2019    ALT 11 08/13/2019    ANIONGAP 7 (L) 08/13/2019     ESTGFRAFRICA >60.0 08/13/2019    EGFRNONAA 53.4 (A) 08/13/2019    TSH 1.955 08/13/2019      Lab Results   Component Value Date    MENDOZALBCREAT 47.7 (H) 09/17/2015       Assessment/Plan:     Type 2 diabetes mellitus with stage 3 chronic kidney disease, with long-term current use of insulin  Reviewed goals of therapy - to get the best control we can without hypoglycemia. Goal <7.5   - last A1C below goal. No hypoglycemia   - pt has been gaining weight though   - will start GLP-1 weekly, decrease insulin doses, try to help with weight loss and ideally get pt off mealtime insulin   - Discussed further titration down if any hypoglycemia noted  Reviewed patient's current insulin regimen. Clarified proper insulin dose and timing in relation to meals, etc. Insulin injection sites and proper rotation instructed.   -Advised frequent self blood glucose monitoring. Patient encouraged to document glucose results and bring them to every clinic visit    -Hypoglycemia precautions discussed. Instructed on precautions before driving.    -Close adherence to lifestyle changes recommended.    -Periodic follow ups for eye evaluations, foot care suggested.      Dyslipidemia associated with type 2 diabetes mellitus  Dyslipidemia associated with diabetes:   - last LDL slightly above goal   - On statin per ADA recommendations, will continue.   - Monitor LDL yearly      Class 2 severe obesity due to excess calories with serious comorbidity in adult  BMI elevated   - with DM, HTN, HLD   - encourage pt to work on healthy diet, exercise habits as able   - attempt to start GLP-1 agonist as above   - monitor weight        Follow up in about 3 months (around 5/10/2020). To monitor effects of ozempic, see about increasing to full dose. If glucose/A1c remains okay could see patient twice a year or so.

## 2020-02-10 NOTE — ASSESSMENT & PLAN NOTE
Dyslipidemia associated with diabetes:   - last LDL slightly above goal   - On statin per ADA recommendations, will continue.   - Monitor LDL yearly

## 2020-02-10 NOTE — PATIENT INSTRUCTIONS
Start ozempic. Take 0.25 mg per week for the first 4 weeks, then increase to 0.5 mg per week after that.    When starting: Decrease the insulin doses:   - Decrease to 26 units basaglar   - Take 8 units of novolog plus your scale.    When increasing to the 0.5 mg dose:   - Decrease to 22 units Basaglar   - Take 6 units novolog plus the scale.    If you get any AM glucose under 90, decrease the basaglar dose by another 2 units.

## 2020-02-10 NOTE — ASSESSMENT & PLAN NOTE
Reviewed goals of therapy - to get the best control we can without hypoglycemia. Goal <7.5   - last A1C below goal. No hypoglycemia   - pt has been gaining weight though   - will start GLP-1 weekly, decrease insulin doses, try to help with weight loss and ideally get pt off mealtime insulin   - Discussed further titration down if any hypoglycemia noted  Reviewed patient's current insulin regimen. Clarified proper insulin dose and timing in relation to meals, etc. Insulin injection sites and proper rotation instructed.   -Advised frequent self blood glucose monitoring. Patient encouraged to document glucose results and bring them to every clinic visit    -Hypoglycemia precautions discussed. Instructed on precautions before driving.    -Close adherence to lifestyle changes recommended.    -Periodic follow ups for eye evaluations, foot care suggested.

## 2020-02-18 ENCOUNTER — TELEPHONE (OUTPATIENT)
Dept: RESEARCH | Facility: HOSPITAL | Age: 75
End: 2020-02-18

## 2020-02-18 NOTE — TELEPHONE ENCOUNTER
Was contact made with the patient?: yes    Spoke with subject and reminded subject to check in every thirty days into eDiary or he will be contacted by site staff. Gave subject notice that in the instance of experiencing three or more diarrheal episodes he must collect a sample, call a  for , and contact site staff as soon as possible. Subject was also reminded that if he leaves town or is hospitalized, the stool collection kit should be brought with them.

## 2020-03-03 ENCOUNTER — TELEPHONE (OUTPATIENT)
Dept: OPTOMETRY | Facility: CLINIC | Age: 75
End: 2020-03-03

## 2020-03-03 NOTE — TELEPHONE ENCOUNTER
----- Message from Jose G Man sent at 3/3/2020  1:52 PM CST -----  Contact: pt (wife)  calling to schedule appt for her , she would like to schedule his on the same date     As her's March 25th at 1pm     Call back: [phone]

## 2020-03-06 RX ORDER — PEN NEEDLE, DIABETIC 31 GX5/16"
NEEDLE, DISPOSABLE MISCELLANEOUS
Qty: 400 EACH | Refills: 3 | Status: SHIPPED | OUTPATIENT
Start: 2020-03-06 | End: 2020-11-06

## 2020-03-06 RX ORDER — METOPROLOL SUCCINATE 100 MG/1
TABLET, EXTENDED RELEASE ORAL
Qty: 90 TABLET | Refills: 1 | Status: SHIPPED | OUTPATIENT
Start: 2020-03-06 | End: 2020-06-08

## 2020-03-09 ENCOUNTER — TELEPHONE (OUTPATIENT)
Dept: RESEARCH | Facility: HOSPITAL | Age: 75
End: 2020-03-09

## 2020-03-09 NOTE — TELEPHONE ENCOUNTER
Was contact made with the patient?: yes    If Yes: Spoke with subject and reminded subject to check in every thirty days into eDiary or he will be contacted by site staff. Gave subject notice that in the instance of experiencing three or more diarrheal episodes he must collect a sample, call a  for , and contact site staff as soon as possible. Subject was also reminded that if he leaves town or is hospitalized, the stool collection kit should be brought with them.        Additional progress notes: also spoke about need to obtain nanocool set expiration date. Mr Cantor was driving but he said he would check as soon as he is able.

## 2020-03-11 DIAGNOSIS — E11.42 TYPE 2 DIABETES MELLITUS WITH DIABETIC POLYNEUROPATHY, WITH LONG-TERM CURRENT USE OF INSULIN: ICD-10-CM

## 2020-03-11 DIAGNOSIS — Z79.4 TYPE 2 DIABETES MELLITUS WITH DIABETIC POLYNEUROPATHY, WITH LONG-TERM CURRENT USE OF INSULIN: ICD-10-CM

## 2020-03-12 RX ORDER — INSULIN ASPART 100 [IU]/ML
INJECTION, SOLUTION INTRAVENOUS; SUBCUTANEOUS
Qty: 30 ML | Refills: 11 | Status: SHIPPED | OUTPATIENT
Start: 2020-03-12 | End: 2023-02-01 | Stop reason: SDUPTHER

## 2020-03-23 ENCOUNTER — TELEPHONE (OUTPATIENT)
Dept: ENDOCRINOLOGY | Facility: CLINIC | Age: 75
End: 2020-03-23

## 2020-03-25 ENCOUNTER — TELEPHONE (OUTPATIENT)
Dept: ENDOCRINOLOGY | Facility: CLINIC | Age: 75
End: 2020-03-25

## 2020-03-25 DIAGNOSIS — Z79.4 TYPE 2 DIABETES MELLITUS WITH DIABETIC POLYNEUROPATHY, WITH LONG-TERM CURRENT USE OF INSULIN: ICD-10-CM

## 2020-03-25 DIAGNOSIS — E11.42 TYPE 2 DIABETES MELLITUS WITH DIABETIC POLYNEUROPATHY, WITH LONG-TERM CURRENT USE OF INSULIN: ICD-10-CM

## 2020-03-25 RX ORDER — LANCETS 33 GAUGE
1 EACH MISCELLANEOUS 4 TIMES DAILY
Qty: 400 EACH | Refills: 4 | Status: SHIPPED | OUTPATIENT
Start: 2020-03-25

## 2020-03-25 NOTE — TELEPHONE ENCOUNTER
----- Message from Alva Paige MA sent at 3/24/2020 11:40 AM CDT -----  Contact:  Wendy with [employer]  Can you please put in an order for the lancets also Dr. Judd. I can have you print them tomorrow and I'll fax them over. Thanks you  ----- Message -----  From: Dann Man  Sent: 3/24/2020   9:11 AM CDT  To: [facility] Staff    Type: Needs Medical Advice    Who Called:  Wendy with [employer]   Symptoms (please be specific):    How long has patient had these symptoms:    Pharmacy name and phone #:    Best Call Back Number: [phone]   Additional Information: received the orders for diabetic strips but need an order for diabetic lancets, just need that faxed to them   Fax#[phone]

## 2020-04-03 ENCOUNTER — TELEPHONE (OUTPATIENT)
Dept: RESEARCH | Facility: HOSPITAL | Age: 75
End: 2020-04-03

## 2020-04-13 DIAGNOSIS — Z79.4 TYPE 2 DIABETES MELLITUS WITH STAGE 3 CHRONIC KIDNEY DISEASE, WITH LONG-TERM CURRENT USE OF INSULIN: ICD-10-CM

## 2020-04-13 DIAGNOSIS — I87.2 VENOUS INSUFFICIENCY OF BOTH LOWER EXTREMITIES: ICD-10-CM

## 2020-04-13 DIAGNOSIS — E11.22 TYPE 2 DIABETES MELLITUS WITH STAGE 3 CHRONIC KIDNEY DISEASE, WITH LONG-TERM CURRENT USE OF INSULIN: ICD-10-CM

## 2020-04-13 DIAGNOSIS — I50.31 ACUTE DIASTOLIC HEART FAILURE: ICD-10-CM

## 2020-04-13 DIAGNOSIS — M17.11 PRIMARY OSTEOARTHRITIS OF RIGHT KNEE: ICD-10-CM

## 2020-04-13 DIAGNOSIS — I48.20 CHRONIC ATRIAL FIBRILLATION: ICD-10-CM

## 2020-04-13 DIAGNOSIS — I10 ESSENTIAL HYPERTENSION: ICD-10-CM

## 2020-04-13 DIAGNOSIS — E03.4 HYPOTHYROIDISM DUE TO ACQUIRED ATROPHY OF THYROID: ICD-10-CM

## 2020-04-13 DIAGNOSIS — N18.30 CHRONIC KIDNEY DISEASE, STAGE III (MODERATE): ICD-10-CM

## 2020-04-13 DIAGNOSIS — N18.30 TYPE 2 DIABETES MELLITUS WITH STAGE 3 CHRONIC KIDNEY DISEASE, WITH LONG-TERM CURRENT USE OF INSULIN: ICD-10-CM

## 2020-04-13 RX ORDER — FUROSEMIDE 40 MG/1
40 TABLET ORAL DAILY PRN
Qty: 90 TABLET | Refills: 3 | Status: SHIPPED | OUTPATIENT
Start: 2020-04-13 | End: 2021-01-20

## 2020-04-13 RX ORDER — GABAPENTIN 300 MG/1
300 CAPSULE ORAL 3 TIMES DAILY
Qty: 270 CAPSULE | Refills: 3 | Status: SHIPPED | OUTPATIENT
Start: 2020-04-13 | End: 2021-01-18

## 2020-04-13 RX ORDER — FENOFIBRATE 145 MG/1
145 TABLET, FILM COATED ORAL DAILY
Qty: 90 TABLET | Refills: 3 | Status: SHIPPED | OUTPATIENT
Start: 2020-04-13 | End: 2021-03-23

## 2020-04-13 RX ORDER — LEVOTHYROXINE SODIUM 25 UG/1
25 TABLET ORAL DAILY
Qty: 90 TABLET | Refills: 3 | Status: SHIPPED | OUTPATIENT
Start: 2020-04-13 | End: 2021-03-23

## 2020-04-13 RX ORDER — PRAVASTATIN SODIUM 10 MG/1
10 TABLET ORAL DAILY
Qty: 90 TABLET | Refills: 3 | Status: SHIPPED | OUTPATIENT
Start: 2020-04-13 | End: 2020-07-13 | Stop reason: ALTCHOICE

## 2020-04-22 ENCOUNTER — TELEPHONE (OUTPATIENT)
Dept: RESEARCH | Facility: HOSPITAL | Age: 75
End: 2020-04-22

## 2020-05-06 ENCOUNTER — PATIENT OUTREACH (OUTPATIENT)
Dept: ADMINISTRATIVE | Facility: OTHER | Age: 75
End: 2020-05-06

## 2020-05-06 NOTE — PROGRESS NOTES
Chart reviewed.   Immunizations: Triggered Imm Registry     Orders placed: n/a  Upcoming appts to satisfy ROD topics: A1c 5/11/2020

## 2020-05-07 ENCOUNTER — OFFICE VISIT (OUTPATIENT)
Dept: PODIATRY | Facility: CLINIC | Age: 75
End: 2020-05-07
Payer: MEDICARE

## 2020-05-07 VITALS
HEIGHT: 70 IN | DIASTOLIC BLOOD PRESSURE: 90 MMHG | HEART RATE: 75 BPM | WEIGHT: 276.88 LBS | SYSTOLIC BLOOD PRESSURE: 142 MMHG | BODY MASS INDEX: 39.64 KG/M2 | TEMPERATURE: 98 F

## 2020-05-07 DIAGNOSIS — Z79.01 LONG TERM CURRENT USE OF ANTICOAGULANT THERAPY: Primary | ICD-10-CM

## 2020-05-07 DIAGNOSIS — B35.1 ONYCHOMYCOSIS OF MULTIPLE TOENAILS WITH TYPE 2 DIABETES MELLITUS AND PERIPHERAL ANGIOPATHY: ICD-10-CM

## 2020-05-07 DIAGNOSIS — E11.42 DIABETIC POLYNEUROPATHY ASSOCIATED WITH TYPE 2 DIABETES MELLITUS: ICD-10-CM

## 2020-05-07 DIAGNOSIS — E11.69 ONYCHOMYCOSIS OF MULTIPLE TOENAILS WITH TYPE 2 DIABETES MELLITUS AND PERIPHERAL ANGIOPATHY: ICD-10-CM

## 2020-05-07 DIAGNOSIS — E11.51 ONYCHOMYCOSIS OF MULTIPLE TOENAILS WITH TYPE 2 DIABETES MELLITUS AND PERIPHERAL ANGIOPATHY: ICD-10-CM

## 2020-05-07 PROCEDURE — 99499 UNLISTED E&M SERVICE: CPT | Mod: S$GLB,,, | Performed by: PODIATRIST

## 2020-05-07 PROCEDURE — 99999 PR PBB SHADOW E&M-EST. PATIENT-LVL III: ICD-10-PCS | Mod: PBBFAC,,, | Performed by: PODIATRIST

## 2020-05-07 PROCEDURE — 99499 NO LOS: ICD-10-PCS | Mod: S$GLB,,, | Performed by: PODIATRIST

## 2020-05-07 PROCEDURE — 11721 PR DEBRIDEMENT OF NAILS, 6 OR MORE: ICD-10-PCS | Mod: S$GLB,,, | Performed by: PODIATRIST

## 2020-05-07 PROCEDURE — 99999 PR PBB SHADOW E&M-EST. PATIENT-LVL III: CPT | Mod: PBBFAC,,, | Performed by: PODIATRIST

## 2020-05-07 PROCEDURE — 11721 DEBRIDE NAIL 6 OR MORE: CPT | Mod: S$GLB,,, | Performed by: PODIATRIST

## 2020-05-07 NOTE — PROGRESS NOTES
Subjective:      [place] [facility] [place] is a 74 y.o. male.    Chief Complaint: Diabetic Foot Exam (PCP Desmond Barlow last seen 8/13/19. A1c 6.5 8/13/19)    Stephen is a 74 y.o. male who presents to the clinic for evaluation and treatment of high risk feet. Stephen has a past medical history of *Atrial fibrillation, Anticoagulant long-term use, Basal cell carcinoma (12/2015), BCC (basal cell carcinoma) (12/2015), Cataract, Chronic kidney disease, Diabetes mellitus with renal manifestations, uncontrolled, Dyslipidemia associated with type 2 diabetes mellitus, Fever blister, Hearing loss, HTN (hypertension), Keloid cicatrix, Liver disease, Melanoma (12/07/2015), Obesity, PN (peripheral neuropathy), Primary osteoarthritis of right knee (1/25/2017), Screening for colon cancer (3/3/2016), Sleep apnea, Thyroid disease, Type II or unspecified type diabetes mellitus with other specified manifestations, uncontrolled, and Ulcer of left lower extremity, limited to breakdown of skin (9/22/2017). The patient's chief complaint is long, thick toenails.  Gradual onset, worsening over past several weeks, aggravated by increased weight bearing, shoe gear, pressure.  Periodic debridement helps symptoms. This patient has documented high risk feet requiring routine maintenance secondary to diabetes mellitis and those secondary complications of diabetes, as mentioned..    PCP: Desmond Barlow MD    Date Last Seen by PCP:   Chief Complaint   Patient presents with    Diabetic Foot Exam     PCP Desmond Barlwo last seen 8/13/19. A1c 6.5 8/13/19         Current shoe gear:  Affected Foot: Rx diabetic extra depth shoes and custom accommodative insoles     Unaffected Foot: Rx diabetic extra depth shoes and custom accommodative insoles    Hemoglobin A1C   Date Value Ref Range Status   08/13/2019 6.5 (H) 4.0 - 5.6 % Final     Comment:     ADA Screening Guidelines:  5.7-6.4%  Consistent with prediabetes  >or=6.5%  Consistent with diabetes  High  levels of fetal hemoglobin interfere with the HbA1C  assay. Heterozygous hemoglobin variants (HbS, HgC, etc)do  not significantly interfere with this assay.   However, presence of multiple variants may affect accuracy.     08/29/2018 5.9 (H) 4.0 - 5.6 % Final     Comment:     ADA Screening Guidelines:  5.7-6.4%  Consistent with prediabetes  >or=6.5%  Consistent with diabetes  High levels of fetal hemoglobin interfere with the HbA1C  assay. Heterozygous hemoglobin variants (HbS, HgC, etc)do  not significantly interfere with this assay.   However, presence of multiple variants may affect accuracy.     08/15/2018 6.5 (H) 4.0 - 5.6 % Final     Comment:     ADA Screening Guidelines:  5.7-6.4%  Consistent with prediabetes  >or=6.5%  Consistent with diabetes  High levels of fetal hemoglobin interfere with the HbA1C  assay. Heterozygous hemoglobin variants (HbS, HgC, etc)do  not significantly interfere with this assay.   However, presence of multiple variants may affect accuracy.         Review of Systems   Constitution: Negative for chills, fever, malaise/fatigue and night sweats.   Cardiovascular: Positive for leg swelling. Negative for claudication and cyanosis.   Skin: Positive for nail changes. Negative for color change, itching, poor wound healing, rash and suspicious lesions.   Musculoskeletal: Negative for falls, gout, joint pain and myalgias.   Neurological: Positive for focal weakness, numbness, paresthesias and sensory change.           Objective:      Physical Exam   Constitutional: He is oriented to person, place, and time. He appears well-developed and well-nourished. He is cooperative.   Oriented to time, place, and person.   Cardiovascular:   Pulses:       Dorsalis pedis pulses are 1+ on the right side, and 1+ on the left side.        Posterior tibial pulses are 1+ on the right side, and 1+ on the left side.   Capillary fill time 3-5 seconds.  All toes warm to touch.      2 + pitting lower extremity edema  bilateral.    Negative elevational pallor and dependent rubor bilateral.     Musculoskeletal:   Normal angle, base, station of gait. Decreased stride length, early heel off, moderately propulsive toe off bilateral.    All ten toes without clubbing, cyanosis, or signs of ischemia.      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    No pain to palpation bilateral lower extremities.      Range of motion, stability, muscle strength, and muscle tone are age and health appropriate normal bilateral feet and legs.       Lymphadenopathy:   Negative lymphadenopathy bilateral popliteal fossa and tarsal tunnel.  Negative lymphangitic streaking bilateral foot/ankle bilateral.     Neurological: He is alert and oriented to person, place, and time. He has normal strength. He is not disoriented. He displays no atrophy and no tremor. A sensory deficit is present. He exhibits normal muscle tone.   Reflex Scores:       Patellar reflexes are 2+ on the right side and 2+ on the left side.       Achilles reflexes are 2+ on the right side and 2+ on the left side.  Decreased/absent vibratory sensation bilateral feet to 128Hz tuning fork.    Paresthesias, and burning bilateral feet with no clearly identified trigger or source.     Skin: Skin is warm, dry and intact. No abrasion, no bruising, no burn, no ecchymosis, no laceration, no lesion, no petechiae and no rash noted. He is not diaphoretic. No cyanosis or erythema. No pallor. Nails show no clubbing.   Skin thin, atrophic, with decreased density and distribution of pedal hair bilateral, but without hyperpigmentation,   ulcers, masses, nodules or cords palpated bilateral feet and legs.    Dependent rubor bilateral leg/ankle/foot consistent with stasis.    Toenails 1st, 2nd, 3rd, 4th, 5th  bilateral are hypertrophic thickened 2-3 mm, dystrophic, discolored tanish brown with tan, gray crumbly subungual debris.  Tender to distal nail plate pressure, without periungual skin abnormality of each.                Assessment:       Encounter Diagnoses   Name Primary?    Long term current use of anticoagulant therapy Yes    Onychomycosis of multiple toenails with type 2 diabetes mellitus and peripheral angiopathy     Diabetic polyneuropathy associated with type 2 diabetes mellitus          Plan:       [facility] today for diabetic foot exam.    Diagnoses and all orders for this visit:    Long term current use of anticoagulant therapy    Onychomycosis of multiple toenails with type 2 diabetes mellitus and peripheral angiopathy    Diabetic polyneuropathy associated with type 2 diabetes mellitus      I counseled the patient on his conditions, their implications and medical management.    - Shoe inspection. Diabetic Foot Education. Patient reminded of the importance of good nutrition and blood sugar control to help prevent podiatric complications of diabetes. Patient instructed on proper foot hygeine. We discussed wearing proper shoe gear, daily foot inspections, never walking without protective shoe gear, never putting sharp instruments to feet, routine podiatric visits at least annually.      - With patient's permission, nails were aggressively reduced and debrided x 10 to their soft tissue attachment mechanically, removing all offending nail and debris. Patient relates relief following the procedure. He will continue to monitor the areas daily, inspect his feet, wear protective shoe gear when ambulatory, moisturizer to maintain skin integrity and follow in this office  p.r.n.      Discussed conservative treatment with shoes of adequate dimensions, material, and style to alleviate symptoms and delay or prevent surgical intervention.    Resume compression stockings and left afo he has at home.    Continue  penlac    Follow up if symptoms worsen or fail to improve.

## 2020-05-14 ENCOUNTER — PATIENT OUTREACH (OUTPATIENT)
Dept: ADMINISTRATIVE | Facility: OTHER | Age: 75
End: 2020-05-14

## 2020-05-14 NOTE — PROGRESS NOTES
Chart reviewed.   Immunizations: Triggered Imm Registry     Orders placed: n/a  Upcoming appts to satisfy ROD topics: n/a

## 2020-05-19 ENCOUNTER — TELEPHONE (OUTPATIENT)
Dept: INTERNAL MEDICINE | Facility: CLINIC | Age: 75
End: 2020-05-19

## 2020-05-19 NOTE — TELEPHONE ENCOUNTER
----- Message from Emma Alvarenga sent at 5/19/2020  2:25 PM CDT -----  Contact: [facility]   pharmacy is calling to speak with the nurse in ref to the pts medication can you please call [phone].    JUDITH     Spoke with pharmacy regarding patient medication being switched to Humalog from Novolog because the insurance will be able to cover medication.

## 2020-05-26 RX ORDER — CICLOPIROX 7.7 MG/G
GEL TOPICAL
Qty: 100 G | Refills: 3 | Status: SHIPPED | OUTPATIENT
Start: 2020-05-26 | End: 2022-02-08

## 2020-05-26 RX ORDER — CICLOPIROX 80 MG/ML
SOLUTION TOPICAL NIGHTLY
Qty: 6.6 ML | Refills: 11 | Status: SHIPPED | OUTPATIENT
Start: 2020-05-26 | End: 2022-02-08

## 2020-05-29 ENCOUNTER — TELEPHONE (OUTPATIENT)
Dept: RESEARCH | Facility: HOSPITAL | Age: 75
End: 2020-05-29

## 2020-05-29 NOTE — TELEPHONE ENCOUNTER
Was contact made with the patient?: yes    If Yes: Spoke with subject and reminded subject to check in every thirty days into eDiary or he will be contacted by site staff. Gave subject notice that in the instance of experiencing three or more diarrheal episodes he must collect a sample, call a  for , and contact site staff as soon as possible. Subject was also reminded that if he leaves town or is hospitalized, the stool collection kit should be brought with them.     Additional progress notes: Still has collection kit

## 2020-06-02 ENCOUNTER — PATIENT OUTREACH (OUTPATIENT)
Dept: ADMINISTRATIVE | Facility: OTHER | Age: 75
End: 2020-06-02

## 2020-06-02 NOTE — PROGRESS NOTES
Chart reviewed.   Immunizations: updated  Orders placed: n/a  Upcoming appts to satisfy ROD topics: Optometry 6/03

## 2020-06-30 ENCOUNTER — TELEPHONE (OUTPATIENT)
Dept: RESEARCH | Facility: HOSPITAL | Age: 75
End: 2020-06-30

## 2020-06-30 NOTE — TELEPHONE ENCOUNTER
Was contact made with the patient?: yes    If Yes: Spoke with subject and reminded subject to check in every thirty days into eDiary or he will be contacted by site staff. Gave subject notice that in the instance of experiencing three or more diarrheal episodes he must collect a sample, call a  for , and contact site staff as soon as possible. Subject was also reminded that if he leaves town or is hospitalized, the stool collection kit should be brought with them.

## 2020-07-06 ENCOUNTER — LAB VISIT (OUTPATIENT)
Dept: LAB | Facility: OTHER | Age: 75
End: 2020-07-06
Attending: INTERNAL MEDICINE
Payer: MEDICARE

## 2020-07-06 DIAGNOSIS — Z79.4 TYPE 2 DIABETES MELLITUS WITH STAGE 3 CHRONIC KIDNEY DISEASE, WITH LONG-TERM CURRENT USE OF INSULIN: ICD-10-CM

## 2020-07-06 DIAGNOSIS — N18.30 TYPE 2 DIABETES MELLITUS WITH STAGE 3 CHRONIC KIDNEY DISEASE, WITH LONG-TERM CURRENT USE OF INSULIN: ICD-10-CM

## 2020-07-06 DIAGNOSIS — E11.22 TYPE 2 DIABETES MELLITUS WITH STAGE 3 CHRONIC KIDNEY DISEASE, WITH LONG-TERM CURRENT USE OF INSULIN: ICD-10-CM

## 2020-07-06 LAB
ANION GAP SERPL CALC-SCNC: 11 MMOL/L (ref 8–16)
BUN SERPL-MCNC: 18 MG/DL (ref 8–23)
CALCIUM SERPL-MCNC: 9.9 MG/DL (ref 8.7–10.5)
CHLORIDE SERPL-SCNC: 104 MMOL/L (ref 95–110)
CHOLEST SERPL-MCNC: 192 MG/DL (ref 120–199)
CHOLEST/HDLC SERPL: 6.2 {RATIO} (ref 2–5)
CO2 SERPL-SCNC: 22 MMOL/L (ref 23–29)
CREAT SERPL-MCNC: 1.2 MG/DL (ref 0.5–1.4)
EST. GFR  (AFRICAN AMERICAN): >60 ML/MIN/1.73 M^2
EST. GFR  (NON AFRICAN AMERICAN): 58 ML/MIN/1.73 M^2
GLUCOSE SERPL-MCNC: 222 MG/DL (ref 70–110)
HDLC SERPL-MCNC: 31 MG/DL (ref 40–75)
HDLC SERPL: 16.1 % (ref 20–50)
LDLC SERPL CALC-MCNC: 115.6 MG/DL (ref 63–159)
NONHDLC SERPL-MCNC: 161 MG/DL
POTASSIUM SERPL-SCNC: 4 MMOL/L (ref 3.5–5.1)
SODIUM SERPL-SCNC: 137 MMOL/L (ref 136–145)
TRIGL SERPL-MCNC: 227 MG/DL (ref 30–150)

## 2020-07-06 PROCEDURE — 80061 LIPID PANEL: CPT

## 2020-07-06 PROCEDURE — 83036 HEMOGLOBIN GLYCOSYLATED A1C: CPT

## 2020-07-06 PROCEDURE — 36415 COLL VENOUS BLD VENIPUNCTURE: CPT

## 2020-07-06 PROCEDURE — 80048 BASIC METABOLIC PNL TOTAL CA: CPT

## 2020-07-07 LAB
ESTIMATED AVG GLUCOSE: 189 MG/DL (ref 68–131)
HBA1C MFR BLD HPLC: 8.2 % (ref 4–5.6)

## 2020-07-09 ENCOUNTER — PATIENT OUTREACH (OUTPATIENT)
Dept: ADMINISTRATIVE | Facility: OTHER | Age: 75
End: 2020-07-09

## 2020-07-09 DIAGNOSIS — Z79.4 TYPE 2 DIABETES MELLITUS WITH DIABETIC POLYNEUROPATHY, WITH LONG-TERM CURRENT USE OF INSULIN: Primary | ICD-10-CM

## 2020-07-09 DIAGNOSIS — E11.42 TYPE 2 DIABETES MELLITUS WITH DIABETIC POLYNEUROPATHY, WITH LONG-TERM CURRENT USE OF INSULIN: Primary | ICD-10-CM

## 2020-07-09 NOTE — PROGRESS NOTES
Chart reviewed.   Immunizations: Triggered Imm Registry     Orders placed: eye exam   Upcoming appts to satisfy ROD topics: n/a

## 2020-07-13 ENCOUNTER — OFFICE VISIT (OUTPATIENT)
Dept: ENDOCRINOLOGY | Facility: CLINIC | Age: 75
End: 2020-07-13
Payer: MEDICARE

## 2020-07-13 VITALS
HEART RATE: 94 BPM | WEIGHT: 276.88 LBS | TEMPERATURE: 99 F | BODY MASS INDEX: 39.64 KG/M2 | HEIGHT: 70 IN | DIASTOLIC BLOOD PRESSURE: 79 MMHG | SYSTOLIC BLOOD PRESSURE: 121 MMHG

## 2020-07-13 DIAGNOSIS — N18.30 TYPE 2 DIABETES MELLITUS WITH STAGE 3 CHRONIC KIDNEY DISEASE, WITH LONG-TERM CURRENT USE OF INSULIN: ICD-10-CM

## 2020-07-13 DIAGNOSIS — Z79.4 TYPE 2 DIABETES MELLITUS WITH STAGE 3 CHRONIC KIDNEY DISEASE, WITH LONG-TERM CURRENT USE OF INSULIN: ICD-10-CM

## 2020-07-13 DIAGNOSIS — E11.22 TYPE 2 DIABETES MELLITUS WITH STAGE 3 CHRONIC KIDNEY DISEASE, WITH LONG-TERM CURRENT USE OF INSULIN: ICD-10-CM

## 2020-07-13 DIAGNOSIS — Z79.4 TYPE 2 DIABETES MELLITUS WITH HYPERGLYCEMIA, WITH LONG-TERM CURRENT USE OF INSULIN: Primary | ICD-10-CM

## 2020-07-13 DIAGNOSIS — E11.69 DYSLIPIDEMIA ASSOCIATED WITH TYPE 2 DIABETES MELLITUS: ICD-10-CM

## 2020-07-13 DIAGNOSIS — E78.5 DYSLIPIDEMIA ASSOCIATED WITH TYPE 2 DIABETES MELLITUS: ICD-10-CM

## 2020-07-13 DIAGNOSIS — E66.01 CLASS 2 SEVERE OBESITY DUE TO EXCESS CALORIES WITH SERIOUS COMORBIDITY AND BODY MASS INDEX (BMI) OF 39.0 TO 39.9 IN ADULT: ICD-10-CM

## 2020-07-13 DIAGNOSIS — E11.65 TYPE 2 DIABETES MELLITUS WITH HYPERGLYCEMIA, WITH LONG-TERM CURRENT USE OF INSULIN: Primary | ICD-10-CM

## 2020-07-13 PROCEDURE — 3052F HG A1C>EQUAL 8.0%<EQUAL 9.0%: CPT | Mod: CPTII,S$GLB,, | Performed by: INTERNAL MEDICINE

## 2020-07-13 PROCEDURE — 3052F PR MOST RECENT HEMOGLOBIN A1C LEVEL 8.0 - < 9.0%: ICD-10-PCS | Mod: CPTII,S$GLB,, | Performed by: INTERNAL MEDICINE

## 2020-07-13 PROCEDURE — 99499 RISK ADDL DX/OHS AUDIT: ICD-10-PCS | Mod: S$GLB,,, | Performed by: INTERNAL MEDICINE

## 2020-07-13 PROCEDURE — 99214 PR OFFICE/OUTPT VISIT, EST, LEVL IV, 30-39 MIN: ICD-10-PCS | Mod: S$GLB,,, | Performed by: INTERNAL MEDICINE

## 2020-07-13 PROCEDURE — 99499 UNLISTED E&M SERVICE: CPT | Mod: S$GLB,,, | Performed by: INTERNAL MEDICINE

## 2020-07-13 PROCEDURE — 99214 OFFICE O/P EST MOD 30 MIN: CPT | Mod: S$GLB,,, | Performed by: INTERNAL MEDICINE

## 2020-07-13 RX ORDER — INSULIN GLARGINE 100 [IU]/ML
32 INJECTION, SOLUTION SUBCUTANEOUS NIGHTLY
Qty: 30 ML | Refills: 11 | Status: SHIPPED | OUTPATIENT
Start: 2020-07-13 | End: 2020-09-23

## 2020-07-13 RX ORDER — ATORVASTATIN CALCIUM 40 MG/1
40 TABLET, FILM COATED ORAL DAILY
Qty: 90 TABLET | Refills: 3 | Status: SHIPPED | OUTPATIENT
Start: 2020-07-13 | End: 2021-03-03

## 2020-07-13 NOTE — PATIENT INSTRUCTIONS
For the cholesterol:    stop pravastatin   start atorvastatin (lipitor)    For the diabetes:    Increase basaglar to 32 units at bedtime.    Goal AM glucose    - After a week or two, if morning sugar is still in the 160s or above you can go up to 34. If it takes more than this, let me know, can try to increase ozempic to 1 mg per week instead of 0.5 (this would require a new prescription)    Meal-time insulin    Take 10 units of novolog with each meal (works best when taken about 5-10 minutes before you eat). If you skip a meal, skip this meal-time insulin.    This dose of insulin can be adjusted based on what your sugar is like at mealtime as well as what kind of meal you're about to eat:  - If you're having a low-carb meal (like a salad), take 8 units instead  - If you're having a high-carb meal (like pasta, with bread, and/or adding a pastry/dessert), take 12 units instead    This amount can then further be adjusted based on your pre-meal glucose:    Pre-meal glucose Adjustment to make   <70 Eat a meal   70-90 Decrease by 2 units    No change   150-249 Add 1 units   250-349 Add 2 units   350-449 Add 3 units   450+ Add 4 units

## 2020-07-13 NOTE — PROGRESS NOTES
Subjective:      Chief Complaint: Diabetes (type 2) and Follow-up      HPI: [facility] PAT Cantor Jr. is a 74 y.o. male who is here for a follow-up evaluation for diabetes. Last seen 2/10/2020    With regards to the diabetes:  Diagnosed with T2DM since around 2003 or so per patient. Insulin since around 2008.     Known complications:     Retinopathy? No    Nephropathy? Yes    Neuropathy? Yes    CAD? No    CVA? No     Current regimen:   Basaglar 28 units qHS   Novolog 8 units plus scale (up to 14 units) with meals   Ozempic 0.5 weekly    Prior meds:   Metformin -> diarrhea, weight gain   Januvia   Invokana    # times a day testing: 3/day  Log reviewed: No    Pre breakfast: 180-220s  Pre lunch: 170s  Pre dinner: 180-220s    Hypoglycemic events? None    Current symptoms:   polyuria, polydipsia and vision changes, soft/loose stools but improved  Pt denies:   nausea, vomit and diarrhea    Diabetes Management Status    Statin: Taking  ACE/ARB: Not taking    Screening or Prevention Patient's value Goal Complete/Controlled?   HgA1C Testing and Control   Lab Results   Component Value Date    HGBA1C 8.2 (H) 07/06/2020      q6months/Less than 8% No   Lipid profile : 07/06/2020 Annually Yes   LDL control Lab Results   Component Value Date    LDLCALC 115.6 07/06/2020    Annually/Less than 100 mg/dl  No   Nephropathy screening Lab Results   Component Value Date    MICALBCREAT 47.7 (H) 09/17/2015     Lab Results   Component Value Date    CREATININE 1.2 07/06/2020     Lab Results   Component Value Date    PROTEINUA Negative 03/27/2019    Annually No   Blood pressure BP Readings from Last 1 Encounters:   07/13/20 121/79    Less than 140/90 Yes   Dilated retinal exam : 06/06/2019 Annually No   Foot exam   : 08/07/2019 Annually Yes     Reviewed past medical, family, social history and updated as appropriate.    Review of Systems   Constitutional: Positive for fatigue and unexpected weight change (gained weight).   HENT: Negative for trouble  swallowing.    Eyes: Positive for visual disturbance.   Respiratory: Negative for shortness of breath.    Cardiovascular: Negative for palpitations.   Gastrointestinal: Negative for diarrhea and nausea.   Endocrine: Positive for polydipsia and polyuria.   Genitourinary: Positive for frequency. Negative for dysuria.   Neurological: Negative for light-headedness.   Psychiatric/Behavioral: Negative for sleep disturbance.     Objective:     Vitals:    07/13/20 1321   BP: 121/79   Pulse: 94   Temp: 99.4 °F (37.4 °C)     BP Readings from Last 5 Encounters:   07/13/20 121/79   05/07/20 (!) 142/90   02/10/20 122/75   01/06/20 (!) 142/99   09/20/19 127/82       Physical Exam  Vitals signs reviewed.   Constitutional:       General: He is not in acute distress.     Comments: Obese   Neck:      Thyroid: No thyromegaly.   Cardiovascular:      Heart sounds: Normal heart sounds.   Pulmonary:      Effort: Pulmonary effort is normal.         Wt Readings from Last 10 Encounters:   07/13/20 1321 125.6 kg (276 lb 14.4 oz)   05/07/20 1343 125.6 kg (276 lb 14.4 oz)   02/10/20 1152 126.4 kg (278 lb 10.6 oz)   01/06/20 0731 119.3 kg (263 lb)   12/04/19 1608 119.3 kg (263 lb)   09/30/19 1359 119.4 kg (263 lb 5.4 oz)   09/20/19 0843 118.8 kg (262 lb)   09/20/19 0837 118.8 kg (262 lb)   08/14/19 1007 118.8 kg (262 lb)   08/13/19 1004 119.3 kg (263 lb 0.1 oz)   08/07/19 1458 119.7 kg (264 lb)       Lab Results   Component Value Date    HGBA1C 8.2 (H) 07/06/2020     Lab Results   Component Value Date    CHOL 192 07/06/2020    HDL 31 (L) 07/06/2020    LDLCALC 115.6 07/06/2020    TRIG 227 (H) 07/06/2020    CHOLHDL 16.1 (L) 07/06/2020     Lab Results   Component Value Date     07/06/2020    K 4.0 07/06/2020     07/06/2020    CO2 22 (L) 07/06/2020     (H) 07/06/2020    BUN 18 07/06/2020    CREATININE 1.2 07/06/2020    CALCIUM 9.9 07/06/2020    PROT 6.9 08/13/2019    ALBUMIN 3.6 08/13/2019    BILITOT 0.9 08/13/2019    ALKPHOS 34  (L) 08/13/2019    AST 15 08/13/2019    ALT 11 08/13/2019    ANIONGAP 11 07/06/2020    ESTGFRAFRICA >60 07/06/2020    EGFRNONAA 58 (A) 07/06/2020    TSH 1.955 08/13/2019      Lab Results   Component Value Date    MICALBCREAT 47.7 (H) 09/17/2015       Assessment/Plan:     Type 2 diabetes mellitus with stage 3 chronic kidney disease, with long-term current use of insulin  Reviewed goals of therapy - to get the best control we can without hypoglycemia. Goal <7.5   - last A1C increased a lot   - on GLP1. Increase dose   - continue other meds   - discussed insulin dose titration  Reviewed patient's current insulin regimen. Clarified proper insulin dose and timing in relation to meals, etc. Insulin injection sites and proper rotation instructed.   -Advised frequent self blood glucose monitoring. Patient encouraged to document glucose results and bring them to every clinic visit    -Close adherence to lifestyle changes recommended.    -Periodic follow ups for eye evaluations, foot care suggested.      Dyslipidemia associated with type 2 diabetes mellitus  Dyslipidemia associated with diabetes:   - last LDL above goal   - was on low intensity statin.   - change to lipitor 40   - Monitor LDL yearly      Class 2 severe obesity due to excess calories with serious comorbidity in adult  bmi elevated   - complicated by DM2, HTN, HLD   - increase GLP1 as above   - monitor weight        Follow up in about 3 months (around 10/13/2020) for lab review, further monitoring.      David Judd MD  Endocrinology

## 2020-07-14 NOTE — ASSESSMENT & PLAN NOTE
Reviewed goals of therapy - to get the best control we can without hypoglycemia. Goal <7.5   - last A1C increased a lot   - on GLP1. Increase dose   - continue other meds   - discussed insulin dose titration  Reviewed patient's current insulin regimen. Clarified proper insulin dose and timing in relation to meals, etc. Insulin injection sites and proper rotation instructed.   -Advised frequent self blood glucose monitoring. Patient encouraged to document glucose results and bring them to every clinic visit    -Close adherence to lifestyle changes recommended.    -Periodic follow ups for eye evaluations, foot care suggested.

## 2020-07-14 NOTE — ASSESSMENT & PLAN NOTE
Dyslipidemia associated with diabetes:   - last LDL above goal   - was on low intensity statin.   - change to lipitor 40   - Monitor LDL yearly

## 2020-07-22 ENCOUNTER — PATIENT OUTREACH (OUTPATIENT)
Dept: ADMINISTRATIVE | Facility: OTHER | Age: 75
End: 2020-07-22

## 2020-07-22 NOTE — PROGRESS NOTES
Chart reviewed.   Immunizations: updated  Orders placed: n/a  Upcoming appts to satisfy ROD topics: Optometry 7/23

## 2020-08-25 ENCOUNTER — PATIENT OUTREACH (OUTPATIENT)
Dept: ADMINISTRATIVE | Facility: OTHER | Age: 75
End: 2020-08-25

## 2020-08-25 NOTE — PROGRESS NOTES
Chart reviewed.   Immunizations: updated  Orders placed: n/a  Upcoming appts to satisfy ROD topics: Optometry 8/27

## 2020-09-08 ENCOUNTER — PATIENT OUTREACH (OUTPATIENT)
Dept: ADMINISTRATIVE | Facility: HOSPITAL | Age: 75
End: 2020-09-08

## 2020-09-08 ENCOUNTER — OFFICE VISIT (OUTPATIENT)
Dept: INTERNAL MEDICINE | Facility: CLINIC | Age: 75
End: 2020-09-08
Payer: MEDICARE

## 2020-09-08 ENCOUNTER — LAB VISIT (OUTPATIENT)
Dept: LAB | Facility: HOSPITAL | Age: 75
End: 2020-09-08
Attending: INTERNAL MEDICINE
Payer: MEDICARE

## 2020-09-08 VITALS
DIASTOLIC BLOOD PRESSURE: 80 MMHG | OXYGEN SATURATION: 97 % | WEIGHT: 283.31 LBS | HEIGHT: 70 IN | BODY MASS INDEX: 40.56 KG/M2 | HEART RATE: 85 BPM | SYSTOLIC BLOOD PRESSURE: 130 MMHG | TEMPERATURE: 98 F

## 2020-09-08 DIAGNOSIS — R10.30 LOWER ABDOMINAL PAIN: ICD-10-CM

## 2020-09-08 DIAGNOSIS — R10.9 LEFT FLANK PAIN: Primary | ICD-10-CM

## 2020-09-08 DIAGNOSIS — Z79.4 TYPE 2 DIABETES MELLITUS WITH STAGE 3 CHRONIC KIDNEY DISEASE, WITH LONG-TERM CURRENT USE OF INSULIN: ICD-10-CM

## 2020-09-08 DIAGNOSIS — R10.9 LEFT FLANK PAIN: ICD-10-CM

## 2020-09-08 DIAGNOSIS — E11.22 TYPE 2 DIABETES MELLITUS WITH STAGE 3 CHRONIC KIDNEY DISEASE, WITH LONG-TERM CURRENT USE OF INSULIN: ICD-10-CM

## 2020-09-08 DIAGNOSIS — N18.30 TYPE 2 DIABETES MELLITUS WITH STAGE 3 CHRONIC KIDNEY DISEASE, WITH LONG-TERM CURRENT USE OF INSULIN: ICD-10-CM

## 2020-09-08 LAB
ANION GAP SERPL CALC-SCNC: 9 MMOL/L (ref 8–16)
BASOPHILS # BLD AUTO: 0.05 K/UL (ref 0–0.2)
BASOPHILS NFR BLD: 0.6 % (ref 0–1.9)
BILIRUB UR QL STRIP: NEGATIVE
BUN SERPL-MCNC: 19 MG/DL (ref 8–23)
CALCIUM SERPL-MCNC: 10.1 MG/DL (ref 8.7–10.5)
CHLORIDE SERPL-SCNC: 103 MMOL/L (ref 95–110)
CLARITY UR REFRACT.AUTO: CLEAR
CO2 SERPL-SCNC: 28 MMOL/L (ref 23–29)
COLOR UR AUTO: YELLOW
CREAT SERPL-MCNC: 1.4 MG/DL (ref 0.5–1.4)
CRP SERPL-MCNC: 5.5 MG/L (ref 0–8.2)
DIFFERENTIAL METHOD: ABNORMAL
EOSINOPHIL # BLD AUTO: 0.3 K/UL (ref 0–0.5)
EOSINOPHIL NFR BLD: 3.6 % (ref 0–8)
ERYTHROCYTE [DISTWIDTH] IN BLOOD BY AUTOMATED COUNT: 13 % (ref 11.5–14.5)
EST. GFR  (AFRICAN AMERICAN): 56 ML/MIN/1.73 M^2
EST. GFR  (NON AFRICAN AMERICAN): 48.5 ML/MIN/1.73 M^2
GLUCOSE SERPL-MCNC: 191 MG/DL (ref 70–110)
GLUCOSE UR QL STRIP: NEGATIVE
HCT VFR BLD AUTO: 46.3 % (ref 40–54)
HGB BLD-MCNC: 15.7 G/DL (ref 14–18)
HGB UR QL STRIP: NEGATIVE
IMM GRANULOCYTES # BLD AUTO: 0.07 K/UL (ref 0–0.04)
IMM GRANULOCYTES NFR BLD AUTO: 0.8 % (ref 0–0.5)
KETONES UR QL STRIP: NEGATIVE
LEUKOCYTE ESTERASE UR QL STRIP: ABNORMAL
LYMPHOCYTES # BLD AUTO: 2.1 K/UL (ref 1–4.8)
LYMPHOCYTES NFR BLD: 23.8 % (ref 18–48)
MCH RBC QN AUTO: 31.8 PG (ref 27–31)
MCHC RBC AUTO-ENTMCNC: 33.9 G/DL (ref 32–36)
MCV RBC AUTO: 94 FL (ref 82–98)
MICROSCOPIC COMMENT: NORMAL
MONOCYTES # BLD AUTO: 0.8 K/UL (ref 0.3–1)
MONOCYTES NFR BLD: 9.7 % (ref 4–15)
NEUTROPHILS # BLD AUTO: 5.4 K/UL (ref 1.8–7.7)
NEUTROPHILS NFR BLD: 61.5 % (ref 38–73)
NITRITE UR QL STRIP: NEGATIVE
NRBC BLD-RTO: 0 /100 WBC
PH UR STRIP: 7 [PH] (ref 5–8)
PLATELET # BLD AUTO: 218 K/UL (ref 150–350)
PMV BLD AUTO: 12.2 FL (ref 9.2–12.9)
POTASSIUM SERPL-SCNC: 4.4 MMOL/L (ref 3.5–5.1)
PROT UR QL STRIP: NEGATIVE
RBC # BLD AUTO: 4.94 M/UL (ref 4.6–6.2)
SODIUM SERPL-SCNC: 140 MMOL/L (ref 136–145)
SP GR UR STRIP: 1.01 (ref 1–1.03)
SQUAMOUS #/AREA URNS AUTO: 0 /HPF
T4 FREE SERPL-MCNC: 1.15 NG/DL (ref 0.71–1.51)
TSH SERPL DL<=0.005 MIU/L-ACNC: 4.17 UIU/ML (ref 0.4–4)
URN SPEC COLLECT METH UR: ABNORMAL
WBC # BLD AUTO: 8.69 K/UL (ref 3.9–12.7)
WBC #/AREA URNS AUTO: 4 /HPF (ref 0–5)

## 2020-09-08 PROCEDURE — 3079F PR MOST RECENT DIASTOLIC BLOOD PRESSURE 80-89 MM HG: ICD-10-PCS | Mod: CPTII,S$GLB,, | Performed by: INTERNAL MEDICINE

## 2020-09-08 PROCEDURE — 3075F PR MOST RECENT SYSTOLIC BLOOD PRESS GE 130-139MM HG: ICD-10-PCS | Mod: CPTII,S$GLB,, | Performed by: INTERNAL MEDICINE

## 2020-09-08 PROCEDURE — 99999 PR PBB SHADOW E&M-EST. PATIENT-LVL V: CPT | Mod: PBBFAC,,, | Performed by: INTERNAL MEDICINE

## 2020-09-08 PROCEDURE — 81001 URINALYSIS AUTO W/SCOPE: CPT

## 2020-09-08 PROCEDURE — 84439 ASSAY OF FREE THYROXINE: CPT

## 2020-09-08 PROCEDURE — 85025 COMPLETE CBC W/AUTO DIFF WBC: CPT

## 2020-09-08 PROCEDURE — 1159F MED LIST DOCD IN RCRD: CPT | Mod: S$GLB,,, | Performed by: INTERNAL MEDICINE

## 2020-09-08 PROCEDURE — 3052F PR MOST RECENT HEMOGLOBIN A1C LEVEL 8.0 - < 9.0%: ICD-10-PCS | Mod: CPTII,S$GLB,, | Performed by: INTERNAL MEDICINE

## 2020-09-08 PROCEDURE — 3075F SYST BP GE 130 - 139MM HG: CPT | Mod: CPTII,S$GLB,, | Performed by: INTERNAL MEDICINE

## 2020-09-08 PROCEDURE — 3079F DIAST BP 80-89 MM HG: CPT | Mod: CPTII,S$GLB,, | Performed by: INTERNAL MEDICINE

## 2020-09-08 PROCEDURE — 99214 PR OFFICE/OUTPT VISIT, EST, LEVL IV, 30-39 MIN: ICD-10-PCS | Mod: S$GLB,,, | Performed by: INTERNAL MEDICINE

## 2020-09-08 PROCEDURE — 84443 ASSAY THYROID STIM HORMONE: CPT

## 2020-09-08 PROCEDURE — 1101F PT FALLS ASSESS-DOCD LE1/YR: CPT | Mod: CPTII,S$GLB,, | Performed by: INTERNAL MEDICINE

## 2020-09-08 PROCEDURE — 87086 URINE CULTURE/COLONY COUNT: CPT

## 2020-09-08 PROCEDURE — 36415 COLL VENOUS BLD VENIPUNCTURE: CPT

## 2020-09-08 PROCEDURE — 1101F PR PT FALLS ASSESS DOC 0-1 FALLS W/OUT INJ PAST YR: ICD-10-PCS | Mod: CPTII,S$GLB,, | Performed by: INTERNAL MEDICINE

## 2020-09-08 PROCEDURE — 1125F PR PAIN SEVERITY QUANTIFIED, PAIN PRESENT: ICD-10-PCS | Mod: S$GLB,,, | Performed by: INTERNAL MEDICINE

## 2020-09-08 PROCEDURE — 1159F PR MEDICATION LIST DOCUMENTED IN MEDICAL RECORD: ICD-10-PCS | Mod: S$GLB,,, | Performed by: INTERNAL MEDICINE

## 2020-09-08 PROCEDURE — 99999 PR PBB SHADOW E&M-EST. PATIENT-LVL V: ICD-10-PCS | Mod: PBBFAC,,, | Performed by: INTERNAL MEDICINE

## 2020-09-08 PROCEDURE — 99214 OFFICE O/P EST MOD 30 MIN: CPT | Mod: S$GLB,,, | Performed by: INTERNAL MEDICINE

## 2020-09-08 PROCEDURE — 1125F AMNT PAIN NOTED PAIN PRSNT: CPT | Mod: S$GLB,,, | Performed by: INTERNAL MEDICINE

## 2020-09-08 PROCEDURE — 3052F HG A1C>EQUAL 8.0%<EQUAL 9.0%: CPT | Mod: CPTII,S$GLB,, | Performed by: INTERNAL MEDICINE

## 2020-09-08 PROCEDURE — 86140 C-REACTIVE PROTEIN: CPT

## 2020-09-08 PROCEDURE — 80048 BASIC METABOLIC PNL TOTAL CA: CPT

## 2020-09-08 NOTE — PROGRESS NOTES
PAST MEDICAL HISTORY:    Type 2 diabetes with peripheral neuropathy and chronic kidney disease stage III.  Chronic atrial fibrillation.  Hypertension.  Hyperlipidemia.  Chronic diastolic dysfunction.  Sleep apnea, history of septoplasty and UPPP .  Pulmonary hypertension.  Chronic venous insufficiency  Lymphedema  Lumbar degenerative disc disease  BPH  Right total knee replacement and revision arthroplasty  Left total knee arthroplasty 2003  Left shoulder surgery.  Meniere's disease.  Lumbar degenerative disk disease.  Nephrolithiasis.  Status post eyelid surgery for removal of ectropion  Basis cell carcinoma, status post Mohs    CURRENT MEDICATIONS:  Eliquis 5 mg b.i.d.  Astelin nasal spray as needed.  Penlac solution.  Fenofibrate 145 mg.  Lasix 40 mg.  Gabapentin 300 mg two in the morning and one at night.  Basaglar insulin 34 units.  NovoLog  10 units with each meal plus sliding scale  Metoprolol  mg at night.  Atorvastatin 40 mg  Temazepam 15 mg at night as needed.      74-year-old male    For 5 days been having left flank pain, or midthoracic pain on the left that radiates to the left side, in for 2 weeks been feeling bloating  Two weeks ago he has noticed his urine to be dark but presently it is clear and has no dysuria  He reports regular bowel function    No shortness of breath, or chest pain    Examination  Weight 283 lb  Blood pressure 120/72  Pulse 80  Temperature is 98°  Lungs clear breath sounds  Heart irregular no murmurs  Abdominal exam tender when palpating right lower quadrant and at times in the left lower quadrant    Not tender while palpate over the left flank    Impression  Left flank pain  Lower abdominal discomfort  Type 2 diabetes    Plan  CBC, basic metabolic profile, C-reactive protein, urinalysis  Depending on test results may need to pursue CT of the abdomen

## 2020-09-09 LAB — BACTERIA UR CULT: NO GROWTH

## 2020-09-09 NOTE — PROGRESS NOTES
Lab testing reviewed    There appears to be no acute changes and urinalysis was normal and no signs of infection    Although he felt uncomfortable last night this afternoon he was feeling 50% better    At present will continue to observe and watch, can take Tylenol extra-strength as needed for back and abdominal pain L let me know if his situation worsens

## 2020-09-12 ENCOUNTER — NURSE TRIAGE (OUTPATIENT)
Dept: ADMINISTRATIVE | Facility: CLINIC | Age: 75
End: 2020-09-12

## 2020-09-12 NOTE — TELEPHONE ENCOUNTER
Reason for Disposition   [1] Shingles rash AND [2] spots start appearing other places on body    Additional Information   Negative: Difficult to awaken or acting confused (e.g., disoriented, slurred speech)   Negative: Sounds like a life-threatening emergency to the triager   Negative: Patient sounds very sick or weak to the triager   Negative: [1] Shingles rash (matches SYMPTOMS) AND [2] weak immune system (e.g., HIV positive,  cancer chemotherapy, chronic steroid treatment, splenectomy) AND [3] NOT taking antiviral medication   Negative: Shingles rash on the eyelid or tip of the nose   Negative: [1] Shingles rash of face AND [2] eye pain or blurred vision   Negative: [1] Shingles rash of face AND [2] facial weakness   Negative: [1] Shingles rash of face or ear AND [2] earache or ringing in the ear    Protocols used: SHINGLES-A-AH   Pt's daughter sabi stated Pt has shingles on his back that are now on his abdomen. Per triage protocol, advised to see a Physician within 4 hrs. Daughter accepted referral to [facility] anywhere care.

## 2020-09-14 ENCOUNTER — TELEPHONE (OUTPATIENT)
Dept: INTERNAL MEDICINE | Facility: CLINIC | Age: 75
End: 2020-09-14

## 2020-09-14 RX ORDER — HYDROCODONE BITARTRATE AND ACETAMINOPHEN 5; 325 MG/1; MG/1
1 TABLET ORAL EVERY 8 HOURS PRN
Qty: 21 TABLET | Refills: 0 | Status: SHIPPED | OUTPATIENT
Start: 2020-09-14 | End: 2020-09-14 | Stop reason: SDUPTHER

## 2020-09-14 RX ORDER — HYDROCODONE BITARTRATE AND ACETAMINOPHEN 5; 325 MG/1; MG/1
1 TABLET ORAL EVERY 8 HOURS PRN
Qty: 21 TABLET | Refills: 0 | Status: SHIPPED | OUTPATIENT
Start: 2020-09-14 | End: 2020-09-23 | Stop reason: SDUPTHER

## 2020-09-14 NOTE — TELEPHONE ENCOUNTER
Please send any medications prescribed to [facility]----- Message from Karon Sun sent at 9/14/2020  1:19 PM CDT -----  Contact: Wife [phone]  Would like to receive medical advice.      Pharmacy name/number (copy/paste from chart):  [facility]     Would they like a call back or a response via [employer]:  call back     Additional information:  Calling to speak with the nurse regarding pt have shingles. Wife states the pt is feeling very bad and requesting a call back with advice.

## 2020-09-14 NOTE — TELEPHONE ENCOUNTER
"Spoke with patient, continues to feel "bad", very tired and pain to left side, he is taking gabapentin 300mg bid and valtrex tid, as well as tylenol 325mg which is not helping. Please advise.----- Message from Karon Sun sent at 9/14/2020  1:19 PM CDT -----  Contact: Wife [phone]  Would like to receive medical advice.      Pharmacy name/number (copy/paste from chart):  [facility]     Would they like a call back or a response via [facility]:  call back     Additional information:  Calling to speak with the nurse regarding pt have shingles. Wife states the pt is feeling very bad and requesting a call back with advice.               "

## 2020-09-16 ENCOUNTER — TELEPHONE (OUTPATIENT)
Dept: RESEARCH | Facility: CLINIC | Age: 75
End: 2020-09-16

## 2020-09-23 ENCOUNTER — TELEPHONE (OUTPATIENT)
Dept: ENDOCRINOLOGY | Facility: CLINIC | Age: 75
End: 2020-09-23

## 2020-09-23 RX ORDER — HYDROCODONE BITARTRATE AND ACETAMINOPHEN 5; 325 MG/1; MG/1
1 TABLET ORAL EVERY 8 HOURS PRN
Qty: 10 TABLET | Refills: 0 | Status: SHIPPED | OUTPATIENT
Start: 2020-09-23 | End: 2020-10-03

## 2020-09-23 RX ORDER — INSULIN GLARGINE 100 [IU]/ML
32 INJECTION, SOLUTION SUBCUTANEOUS NIGHTLY
Qty: 30 ML | Refills: 3 | Status: SHIPPED | OUTPATIENT
Start: 2020-09-23 | End: 2021-03-29

## 2020-09-23 NOTE — TELEPHONE ENCOUNTER
----- Message from Mariia Muniz MA sent at 9/23/2020 11:55 AM CDT -----  Regarding: FW: prior auth    ----- Message -----  From: Maddie Thorpe  Sent: 9/23/2020  11:43 AM CDT  To: [facility] Staff  Subject: prior auth                                       Pt is completely out and needs a prior auth for insulin (BASAGLAR KWIKPEN U-100 INSULIN) glargine 100 units/mL (3mL) SubQ pen. Insurancne would prefer lantus tresiba or toujeo . Please give pharmacy a call for any questions.      [facility] - [place] - [address] [place] [address]  [address] [facility]  [place] [address]  Phone: [phone] Fax: [phone]    Attempted to speak with insurance company to initiate PA but was left on hold. Spoke with Mr. Weiss family member Dorothy and she stated she will contact insurance and provide office fax # so they can fax over necessary forms for completion.

## 2020-09-23 NOTE — TELEPHONE ENCOUNTER
----- Message from Alva Paige MA sent at 9/23/2020  2:22 PM CDT -----  Contact: YINKA powell/ [employer] [phone]  Attempted to start a PA for pt, but YINKA with [employer] would prefer you call in the Lantus. Is that something you would prefer?  ----- Message -----  From: Margie Ballard  Sent: 9/23/2020   1:56 PM CDT  To: [facility] Staff    Caller:  YINKA powell/ [employer] tel:  [phone] //suggesting that you call in [employer].    [facility]   [facility]   tel:  [phone]   / 4 mos. Supply.

## 2020-10-04 ENCOUNTER — PATIENT OUTREACH (OUTPATIENT)
Dept: ADMINISTRATIVE | Facility: OTHER | Age: 75
End: 2020-10-04

## 2020-10-04 NOTE — PROGRESS NOTES
Chart reviewed.   Immunizations: n/a  Orders placed: n/a  Upcoming appts to satisfy ROD topics: Hemoglobin A1c & Optometry 10/6

## 2020-10-09 ENCOUNTER — TELEPHONE (OUTPATIENT)
Dept: INTERNAL MEDICINE | Facility: CLINIC | Age: 75
End: 2020-10-09

## 2020-10-09 NOTE — TELEPHONE ENCOUNTER
----- Message from Pat Soni sent at 10/9/2020  2:48 PM CDT -----  Contact: Tyree (spouse) @ [phone]  Wanted to know something else he  can use for his Shingles pain.  Please call in an Rx.             [facility]  [phone] (Phone)  [phone] (Fax)

## 2020-10-10 NOTE — TELEPHONE ENCOUNTER
Recommend that he increases his gabapentin 300 mg 1 capsule 3 times a day, to 2 capsules 3 times a day    Otherwise I may need to refer from to the pain clinic

## 2020-10-14 ENCOUNTER — PES CALL (OUTPATIENT)
Dept: ADMINISTRATIVE | Facility: CLINIC | Age: 75
End: 2020-10-14

## 2020-10-24 ENCOUNTER — IMMUNIZATION (OUTPATIENT)
Dept: PRIMARY CARE CLINIC | Facility: CLINIC | Age: 75
End: 2020-10-24
Payer: MEDICARE

## 2020-10-24 PROCEDURE — G0008 FLU VACCINE - QUADRIVALENT - ADJUVANTED: ICD-10-PCS | Mod: S$GLB,,, | Performed by: FAMILY MEDICINE

## 2020-10-24 PROCEDURE — 90694 FLU VACCINE - QUADRIVALENT - ADJUVANTED: ICD-10-PCS | Mod: S$GLB,,, | Performed by: FAMILY MEDICINE

## 2020-10-24 PROCEDURE — 90694 VACC AIIV4 NO PRSRV 0.5ML IM: CPT | Mod: S$GLB,,, | Performed by: FAMILY MEDICINE

## 2020-10-24 PROCEDURE — G0008 ADMIN INFLUENZA VIRUS VAC: HCPCS | Mod: S$GLB,,, | Performed by: FAMILY MEDICINE

## 2020-11-04 ENCOUNTER — RESEARCH ENCOUNTER (OUTPATIENT)
Dept: RESEARCH | Facility: HOSPITAL | Age: 75
End: 2020-11-04
Payer: MEDICARE

## 2020-11-04 NOTE — RESEARCH
Sponsor: [employer]   Study Title/IRB Number: A Phase 3, Placebo-Controlled, Randomized, Observer-Blinded Study To Evaluate The Efficacy, Safety, And Tolerability Of A Clostridium Difficile Vaccine In Adults 50 Years Of Age And Older ([id number]) / 2017.094.B  Principle Investigator: Malinda Chino MD     Annual Retention Visit  Subject ID: [id number]     Present for Discussion: Mr. Cantor, Beulah Troy, research coordinator     Prior to the Informed Consent Addendum being signed, or any study protocol required data collection, testing, procedure, or intervention being performed, the following was done and/or discussed:  · Purpose of the addendum to the study he is already participating in   · In-person annual retention visits added, per addendum  · Confidentiality and HIPAA   · Participation in the research trial is voluntary and patient may withdraw at anytime  · Contact information for study related questions  · Stipend amount added, per study addendum        Patient verbalizes understanding of the above: Yes  Contact information for CRC and PI given to patient: Yes  Patient able to adequately summarize: the purpose of the study, the risks associated with the study, and all procedures, testing, and follow-ups associated with the study: Yes     Mr. Cantor signed the informed consent form for the [employer] [id number] research study addendum with an IRB approval date of 27Jan2020.  Each page of the consent form was reviewed with him and all questions answered satisfactorily. Mr. Cantor signed the consent form and received a copy. The original consent was scanned into electronic medical records (EPIC) and filed into the subject's research study binder.     Mr. Cantor was given a new clincard with the token [id number]. He was also provided with a new stool collection kit and Nanocooler for specimen shipping. Study staff also went over his eDiary to assure it was working properly, and reminded him to continue checking in  monthly.     Study staff also educated subject on what classifies as a qualifying stool sample to send in, and the contact information to schedule shipment.

## 2020-11-09 ENCOUNTER — PATIENT OUTREACH (OUTPATIENT)
Dept: ADMINISTRATIVE | Facility: OTHER | Age: 75
End: 2020-11-09

## 2020-11-10 NOTE — PROGRESS NOTES
Health Maintenance Due   Topic Date Due    Shingles Vaccine (2 of 3) 12/19/2013    Urine Microalbumin  03/27/2020    Eye Exam  06/06/2020    Foot Exam  08/07/2020     Updates were requested from care everywhere.  Chart was reviewed for overdue Proactive [facility] Encounters (ROD) topics (CRS, Breast Cancer Screening, Eye exam)  Health Maintenance has been updated.  LINKS immunization registry triggered.  Immunizations were reconciled.

## 2020-11-11 ENCOUNTER — OFFICE VISIT (OUTPATIENT)
Dept: ENDOCRINOLOGY | Facility: CLINIC | Age: 75
End: 2020-11-11
Payer: MEDICARE

## 2020-11-11 VITALS
HEART RATE: 92 BPM | SYSTOLIC BLOOD PRESSURE: 138 MMHG | OXYGEN SATURATION: 97 % | BODY MASS INDEX: 40.02 KG/M2 | WEIGHT: 279.56 LBS | HEIGHT: 70 IN | DIASTOLIC BLOOD PRESSURE: 62 MMHG

## 2020-11-11 DIAGNOSIS — E66.01 CLASS 3 SEVERE OBESITY DUE TO EXCESS CALORIES WITH SERIOUS COMORBIDITY AND BODY MASS INDEX (BMI) OF 40.0 TO 44.9 IN ADULT: ICD-10-CM

## 2020-11-11 DIAGNOSIS — E11.65 TYPE 2 DIABETES MELLITUS WITH HYPERGLYCEMIA, WITH LONG-TERM CURRENT USE OF INSULIN: Primary | ICD-10-CM

## 2020-11-11 DIAGNOSIS — E78.5 DYSLIPIDEMIA ASSOCIATED WITH TYPE 2 DIABETES MELLITUS: ICD-10-CM

## 2020-11-11 DIAGNOSIS — E11.22 TYPE 2 DIABETES MELLITUS WITH STAGE 3 CHRONIC KIDNEY DISEASE, WITH LONG-TERM CURRENT USE OF INSULIN, UNSPECIFIED WHETHER STAGE 3A OR 3B CKD: ICD-10-CM

## 2020-11-11 DIAGNOSIS — E11.69 DYSLIPIDEMIA ASSOCIATED WITH TYPE 2 DIABETES MELLITUS: ICD-10-CM

## 2020-11-11 DIAGNOSIS — Z79.4 TYPE 2 DIABETES MELLITUS WITH STAGE 3 CHRONIC KIDNEY DISEASE, WITH LONG-TERM CURRENT USE OF INSULIN, UNSPECIFIED WHETHER STAGE 3A OR 3B CKD: ICD-10-CM

## 2020-11-11 DIAGNOSIS — Z79.4 TYPE 2 DIABETES MELLITUS WITH HYPERGLYCEMIA, WITH LONG-TERM CURRENT USE OF INSULIN: Primary | ICD-10-CM

## 2020-11-11 DIAGNOSIS — E03.9 HYPOTHYROIDISM, UNSPECIFIED TYPE: ICD-10-CM

## 2020-11-11 DIAGNOSIS — N18.30 TYPE 2 DIABETES MELLITUS WITH STAGE 3 CHRONIC KIDNEY DISEASE, WITH LONG-TERM CURRENT USE OF INSULIN, UNSPECIFIED WHETHER STAGE 3A OR 3B CKD: ICD-10-CM

## 2020-11-11 PROCEDURE — 3052F PR MOST RECENT HEMOGLOBIN A1C LEVEL 8.0 - < 9.0%: ICD-10-PCS | Mod: CPTII,S$GLB,, | Performed by: INTERNAL MEDICINE

## 2020-11-11 PROCEDURE — 99499 UNLISTED E&M SERVICE: CPT | Mod: S$GLB,,, | Performed by: INTERNAL MEDICINE

## 2020-11-11 PROCEDURE — 99214 PR OFFICE/OUTPT VISIT, EST, LEVL IV, 30-39 MIN: ICD-10-PCS | Mod: S$GLB,,, | Performed by: INTERNAL MEDICINE

## 2020-11-11 PROCEDURE — 99214 OFFICE O/P EST MOD 30 MIN: CPT | Mod: S$GLB,,, | Performed by: INTERNAL MEDICINE

## 2020-11-11 PROCEDURE — 3052F HG A1C>EQUAL 8.0%<EQUAL 9.0%: CPT | Mod: CPTII,S$GLB,, | Performed by: INTERNAL MEDICINE

## 2020-11-11 PROCEDURE — 99499 RISK ADDL DX/OHS AUDIT: ICD-10-PCS | Mod: S$GLB,,, | Performed by: INTERNAL MEDICINE

## 2020-11-11 RX ORDER — SEMAGLUTIDE 1.34 MG/ML
INJECTION, SOLUTION SUBCUTANEOUS
Qty: 2 SYRINGE | Refills: 11 | Status: SHIPPED | OUTPATIENT
Start: 2020-11-11 | End: 2023-02-01

## 2020-11-11 NOTE — ASSESSMENT & PLAN NOTE
Reviewed goals of therapy - to get the best control we can without hypoglycemia. Goal <7.5   - no recent labs   - on GLP1. Increase dose to 1 mg/day   - increase basal insulin slightly    - discussed insulin dose titration  Reviewed patient's current insulin regimen. Clarified proper insulin dose and timing in relation to meals, etc. Insulin injection sites and proper rotation instructed.   -Advised frequent self blood glucose monitoring. Patient encouraged to document glucose results and bring them to every clinic visit    -Close adherence to lifestyle changes recommended.    -Periodic follow ups for eye evaluations, foot care suggested.    Pt interested in pump. On 4 shots per day, could be a candidate. Recommend DM education appointment to review different options there, see if insurance would cover anything

## 2020-11-11 NOTE — PATIENT INSTRUCTIONS
For the diabetes:  Increase ozempic to 1 mg/week    Basal Insulin (long acting)  Take 36 units of lantus (or basaglar) insulin.    Check your glucose in the morning before breakfast and keep a log.  Goal AM glucose:       After 3-5 days, if your average glucose is above 140, increase your dose by 2 units  If your AM glucose is under 90, decrease by 2 units.    Meal-time insulin    Take 12 units of novolog with each meal (works best when taken about 5-10 minutes before you eat). If you skip a meal, skip this meal-time insulin.    This dose of insulin can be adjusted based on what your sugar is like at mealtime as well as what kind of meal you're about to eat:  - If you're having a low-carb meal (like a salad), take 10 units instead  - If you're having a high-carb meal (like pasta, with bread, and/or adding a pastry/dessert), take 14 units instead    This amount can then further be adjusted based on your pre-meal glucose:    Pre-meal glucose Adjustment to make   <70 Eat a meal   70-90 Decrease by 2 units    No change   150-249 Add 1 units   250-349 Add 2 units   350-449 Add 3 units   450+ Add 4 units

## 2020-11-11 NOTE — ASSESSMENT & PLAN NOTE
Dyslipidemia associated with diabetes:   - last LDL slightly above goal   - continue statin  - Monitor LDL next year

## 2020-11-11 NOTE — PROGRESS NOTES
Subjective:      Chief Complaint: Follow-up and Diabetes (type 2)    [place] [facility] [place] is a 74 y.o. male who is here for a follow-up evaluation for diabetes. Last seen 7/13/2020    With regards to the diabetes:  Diagnosed with T2DM since around 2003 or so per patient. Insulin since around 2008.     Known complications:     Retinopathy? No    Nephropathy? Yes    Neuropathy? Yes    CAD? No    CVA? No     Current regimen:   Lantus 34 units qHS   Novolog 10 units plus scale (up to 16 units) with meals. More often 12 units.   Ozempic 0.5 weekly     Prior meds:   Metformin -> diarrhea, weight gain   Januvia   Invokana    Self-Monitored blood glucose.  # times a day testing: 3/day  Log reviewed: No    Pre breakfast: 150-200  Pre lunch: 170s-200  Pre dinner: 180-220    Hypoglycemic events? None lately.    Current symptoms:   polyuria, polydipsia and vision changes  Pt denies:   nausea, vomit and diarrhea    Diabetes Management Status    Statin: Taking  ACE/ARB: Not taking    Screening or Prevention Patient's value Goal Complete/Controlled?   HgA1C Testing and Control   Lab Results   Component Value Date    HGBA1C 8.2 (H) 07/06/2020      q6months/Less than 8% No   Lipid profile : 07/06/2020 Annually Yes   LDL control Lab Results   Component Value Date    LDLCALC 115.6 07/06/2020    Annually/Less than 100 mg/dl  No   Nephropathy screening Lab Results   Component Value Date    MICALBCREAT 47.7 (H) 09/17/2015     Lab Results   Component Value Date    CREATININE 1.4 09/08/2020     Lab Results   Component Value Date    PROTEINUA Negative 09/08/2020    Annually Yes   Blood pressure BP Readings from Last 1 Encounters:   11/11/20 138/62    Less than 140/90 Yes   Dilated retinal exam : 06/06/2019 Annually No   Foot exam   : 08/07/2019 Annually No     Reviewed past medical, family, social history and updated as appropriate.    Review of Systems   Constitutional: Positive for fatigue and unexpected weight change (up and down).    HENT: Negative for trouble swallowing.    Eyes: Positive for visual disturbance.   Respiratory: Negative for shortness of breath.    Cardiovascular: Negative for palpitations.   Gastrointestinal: Negative for diarrhea and nausea.   Endocrine: Positive for polydipsia and polyuria.   Genitourinary: Positive for frequency. Negative for dysuria.   Neurological: Negative for numbness.   Psychiatric/Behavioral: Negative for sleep disturbance.     Objective:     Vitals:    11/11/20 1325   BP: 138/62   Pulse: 92     BP Readings from Last 5 Encounters:   11/11/20 138/62   09/08/20 130/80   07/13/20 121/79   05/07/20 (!) 142/90   02/10/20 122/75       Physical Exam  Vitals signs reviewed.   Constitutional:       General: He is not in acute distress.     Comments: Obese   Neck:      Thyroid: No thyromegaly.   Cardiovascular:      Heart sounds: Normal heart sounds.   Pulmonary:      Effort: Pulmonary effort is normal.   Musculoskeletal:      Right lower leg: Edema (1+ edema) present.      Left lower leg: Edema (1+ edema) present.         Wt Readings from Last 10 Encounters:   11/11/20 1325 126.8 kg (279 lb 8.7 oz)   09/08/20 1103 128.5 kg (283 lb 4.7 oz)   07/13/20 1321 125.6 kg (276 lb 14.4 oz)   05/07/20 1343 125.6 kg (276 lb 14.4 oz)   02/10/20 1152 126.4 kg (278 lb 10.6 oz)   01/06/20 0731 119.3 kg (263 lb)   12/04/19 1608 119.3 kg (263 lb)   09/30/19 1359 119.4 kg (263 lb 5.4 oz)   09/20/19 0843 118.8 kg (262 lb)   09/20/19 0837 118.8 kg (262 lb)   08/14/19 1007 118.8 kg (262 lb)     Lab Results   Component Value Date    HGBA1C 8.2 (H) 07/06/2020     Lab Results   Component Value Date    CHOL 192 07/06/2020    HDL 31 (L) 07/06/2020    LDLCALC 115.6 07/06/2020    TRIG 227 (H) 07/06/2020    CHOLHDL 16.1 (L) 07/06/2020     Lab Results   Component Value Date     09/08/2020    K 4.4 09/08/2020     09/08/2020    CO2 28 09/08/2020     (H) 09/08/2020    BUN 19 09/08/2020    CREATININE 1.4 09/08/2020    CALCIUM  10.1 09/08/2020    PROT 6.9 08/13/2019    ALBUMIN 3.6 08/13/2019    BILITOT 0.9 08/13/2019    ALKPHOS 34 (L) 08/13/2019    AST 15 08/13/2019    ALT 11 08/13/2019    ANIONGAP 9 09/08/2020    ESTGFRAFRICA 56.0 (A) 09/08/2020    EGFRNONAA 48.5 (A) 09/08/2020    TSH 4.171 (H) 09/08/2020      Lab Results   Component Value Date    MICALBCREAT 47.7 (H) 09/17/2015     Assessment/Plan:     Type 2 diabetes mellitus with stage 3 chronic kidney disease, with long-term current use of insulin  Reviewed goals of therapy - to get the best control we can without hypoglycemia. Goal <7.5   - no recent labs   - on GLP1. Increase dose to 1 mg/day   - increase basal insulin slightly    - discussed insulin dose titration  Reviewed patient's current insulin regimen. Clarified proper insulin dose and timing in relation to meals, etc. Insulin injection sites and proper rotation instructed.   -Advised frequent self blood glucose monitoring. Patient encouraged to document glucose results and bring them to every clinic visit    -Close adherence to lifestyle changes recommended.    -Periodic follow ups for eye evaluations, foot care suggested.    Pt interested in pump. On 4 shots per day, could be a candidate. Recommend DM education appointment to review different options there, see if insurance would cover anything    Dyslipidemia associated with type 2 diabetes mellitus  Dyslipidemia associated with diabetes:   - last LDL slightly above goal   - continue statin  - Monitor LDL next year      Class 3 severe obesity due to excess calories with serious comorbidity in adult  bmi elevated, 40   - complicated by DM2, HTN, HLD   - increase GLP1 as above   - monitor weight        Follow up in about 3 months (around 2/11/2021) for lab review, further monitoring.      David Judd MD  Endocrinology

## 2020-11-13 ENCOUNTER — OFFICE VISIT (OUTPATIENT)
Dept: PODIATRY | Facility: CLINIC | Age: 75
End: 2020-11-13
Payer: MEDICARE

## 2020-11-13 VITALS
BODY MASS INDEX: 40.02 KG/M2 | HEIGHT: 70 IN | WEIGHT: 279.56 LBS | SYSTOLIC BLOOD PRESSURE: 139 MMHG | HEART RATE: 76 BPM | DIASTOLIC BLOOD PRESSURE: 67 MMHG

## 2020-11-13 DIAGNOSIS — E11.69 ONYCHOMYCOSIS OF MULTIPLE TOENAILS WITH TYPE 2 DIABETES MELLITUS AND PERIPHERAL ANGIOPATHY: ICD-10-CM

## 2020-11-13 DIAGNOSIS — B35.1 ONYCHOMYCOSIS OF MULTIPLE TOENAILS WITH TYPE 2 DIABETES MELLITUS AND PERIPHERAL ANGIOPATHY: ICD-10-CM

## 2020-11-13 DIAGNOSIS — E11.51 ONYCHOMYCOSIS OF MULTIPLE TOENAILS WITH TYPE 2 DIABETES MELLITUS AND PERIPHERAL ANGIOPATHY: ICD-10-CM

## 2020-11-13 DIAGNOSIS — Z79.01 LONG TERM CURRENT USE OF ANTICOAGULANT THERAPY: Primary | ICD-10-CM

## 2020-11-13 DIAGNOSIS — E11.42 DIABETIC POLYNEUROPATHY ASSOCIATED WITH TYPE 2 DIABETES MELLITUS: ICD-10-CM

## 2020-11-13 DIAGNOSIS — M24.573 EQUINUS CONTRACTURE OF ANKLE: ICD-10-CM

## 2020-11-13 PROCEDURE — 3075F PR MOST RECENT SYSTOLIC BLOOD PRESS GE 130-139MM HG: ICD-10-PCS | Mod: CPTII,S$GLB,, | Performed by: PODIATRIST

## 2020-11-13 PROCEDURE — 99213 OFFICE O/P EST LOW 20 MIN: CPT | Mod: 25,S$GLB,, | Performed by: PODIATRIST

## 2020-11-13 PROCEDURE — 3078F PR MOST RECENT DIASTOLIC BLOOD PRESSURE < 80 MM HG: ICD-10-PCS | Mod: CPTII,S$GLB,, | Performed by: PODIATRIST

## 2020-11-13 PROCEDURE — 1159F PR MEDICATION LIST DOCUMENTED IN MEDICAL RECORD: ICD-10-PCS | Mod: S$GLB,,, | Performed by: PODIATRIST

## 2020-11-13 PROCEDURE — 3288F FALL RISK ASSESSMENT DOCD: CPT | Mod: CPTII,S$GLB,, | Performed by: PODIATRIST

## 2020-11-13 PROCEDURE — 11721 PR DEBRIDEMENT OF NAILS, 6 OR MORE: ICD-10-PCS | Mod: Q9,S$GLB,, | Performed by: PODIATRIST

## 2020-11-13 PROCEDURE — 3075F SYST BP GE 130 - 139MM HG: CPT | Mod: CPTII,S$GLB,, | Performed by: PODIATRIST

## 2020-11-13 PROCEDURE — 1101F PR PT FALLS ASSESS DOC 0-1 FALLS W/OUT INJ PAST YR: ICD-10-PCS | Mod: CPTII,S$GLB,, | Performed by: PODIATRIST

## 2020-11-13 PROCEDURE — 99999 PR PBB SHADOW E&M-EST. PATIENT-LVL IV: ICD-10-PCS | Mod: PBBFAC,,, | Performed by: PODIATRIST

## 2020-11-13 PROCEDURE — 3078F DIAST BP <80 MM HG: CPT | Mod: CPTII,S$GLB,, | Performed by: PODIATRIST

## 2020-11-13 PROCEDURE — 3288F PR FALLS RISK ASSESSMENT DOCUMENTED: ICD-10-PCS | Mod: CPTII,S$GLB,, | Performed by: PODIATRIST

## 2020-11-13 PROCEDURE — 11721 DEBRIDE NAIL 6 OR MORE: CPT | Mod: Q9,S$GLB,, | Performed by: PODIATRIST

## 2020-11-13 PROCEDURE — 99999 PR PBB SHADOW E&M-EST. PATIENT-LVL IV: CPT | Mod: PBBFAC,,, | Performed by: PODIATRIST

## 2020-11-13 PROCEDURE — 1101F PT FALLS ASSESS-DOCD LE1/YR: CPT | Mod: CPTII,S$GLB,, | Performed by: PODIATRIST

## 2020-11-13 PROCEDURE — 99213 PR OFFICE/OUTPT VISIT, EST, LEVL III, 20-29 MIN: ICD-10-PCS | Mod: 25,S$GLB,, | Performed by: PODIATRIST

## 2020-11-13 PROCEDURE — 1126F PR PAIN SEVERITY QUANTIFIED, NO PAIN PRESENT: ICD-10-PCS | Mod: S$GLB,,, | Performed by: PODIATRIST

## 2020-11-13 PROCEDURE — 1126F AMNT PAIN NOTED NONE PRSNT: CPT | Mod: S$GLB,,, | Performed by: PODIATRIST

## 2020-11-13 PROCEDURE — 3052F PR MOST RECENT HEMOGLOBIN A1C LEVEL 8.0 - < 9.0%: ICD-10-PCS | Mod: CPTII,S$GLB,, | Performed by: PODIATRIST

## 2020-11-13 PROCEDURE — 1159F MED LIST DOCD IN RCRD: CPT | Mod: S$GLB,,, | Performed by: PODIATRIST

## 2020-11-13 PROCEDURE — 3052F HG A1C>EQUAL 8.0%<EQUAL 9.0%: CPT | Mod: CPTII,S$GLB,, | Performed by: PODIATRIST

## 2020-11-13 RX ORDER — CICLOPIROX 80 MG/ML
SOLUTION TOPICAL NIGHTLY
Qty: 6.6 ML | Refills: 11 | Status: SHIPPED | OUTPATIENT
Start: 2020-11-13 | End: 2021-03-19 | Stop reason: SDUPTHER

## 2020-11-13 NOTE — PROGRESS NOTES
Subjective:      Patient ID: VIJAY Cantor Jr. is a 74 y.o. male.    Chief Complaint: Diabetes Mellitus (Brittany Hernandez MARCE Lenze 11/11/2020 A1c 8.2n 7/6/2020)    BI is a 74 y.o. male who presents to the clinic for evaluation and treatment of high risk feet. BI has a past medical history of *Atrial fibrillation, Anticoagulant long-term use, Basal cell carcinoma (12/2015), BCC (basal cell carcinoma) (12/2015), Cataract, Chronic kidney disease, Diabetes mellitus with renal manifestations, uncontrolled, Dyslipidemia associated with type 2 diabetes mellitus, Fever blister, Hearing loss, HTN (hypertension), Keloid cicatrix, Liver disease, Melanoma (12/07/2015), Obesity, PN (peripheral neuropathy), Primary osteoarthritis of right knee (1/25/2017), Screening for colon cancer (3/3/2016), Sleep apnea, Thyroid disease, Type II or unspecified type diabetes mellitus with other specified manifestations, uncontrolled, and Ulcer of left lower extremity, limited to breakdown of skin (9/22/2017). The patient's chief complaint is long, thick toenails.  Gradual onset, worsening over past several weeks, aggravated by increased weight bearing, shoe gear, pressure.  Periodic debridement helps symptoms. This patient has documented high risk feet requiring routine maintenance secondary to diabetes mellitis and those secondary complications of diabetes, as mentioned..    PCP: Desmond Barlow MD    Date Last Seen by PCP:   Chief Complaint   Patient presents with    Diabetes Mellitus     Brittany Hernandez MARCE Judd 11/11/2020 A1c 8.2n 7/6/2020         Current shoe gear:  Affected Foot: Rx diabetic extra depth shoes and custom accommodative insoles     Unaffected Foot: Rx diabetic extra depth shoes and custom accommodative insoles    Hemoglobin A1C   Date Value Ref Range Status   07/06/2020 8.2 (H) 4.0 - 5.6 % Final     Comment:     ADA Screening Guidelines:  5.7-6.4%  Consistent with prediabetes  >or=6.5%  Consistent with diabetes  High levels of  fetal hemoglobin interfere with the HbA1C  assay. Heterozygous hemoglobin variants (HbS, HgC, etc)do  not significantly interfere with this assay.   However, presence of multiple variants may affect accuracy.     08/13/2019 6.5 (H) 4.0 - 5.6 % Final     Comment:     ADA Screening Guidelines:  5.7-6.4%  Consistent with prediabetes  >or=6.5%  Consistent with diabetes  High levels of fetal hemoglobin interfere with the HbA1C  assay. Heterozygous hemoglobin variants (HbS, HgC, etc)do  not significantly interfere with this assay.   However, presence of multiple variants may affect accuracy.     08/29/2018 5.9 (H) 4.0 - 5.6 % Final     Comment:     ADA Screening Guidelines:  5.7-6.4%  Consistent with prediabetes  >or=6.5%  Consistent with diabetes  High levels of fetal hemoglobin interfere with the HbA1C  assay. Heterozygous hemoglobin variants (HbS, HgC, etc)do  not significantly interfere with this assay.   However, presence of multiple variants may affect accuracy.         Review of Systems   Constitution: Negative for chills, fever, malaise/fatigue and night sweats.   Cardiovascular: Positive for leg swelling. Negative for claudication and cyanosis.   Skin: Positive for nail changes. Negative for color change, itching, poor wound healing, rash and suspicious lesions.   Musculoskeletal: Negative for falls, gout, joint pain and myalgias.   Neurological: Positive for focal weakness, numbness, paresthesias and sensory change.           Objective:      Physical Exam  Constitutional:       Appearance: He is well-developed. He is not diaphoretic.      Comments: Oriented to time, place, and person.   Cardiovascular:      Pulses:           Dorsalis pedis pulses are 1+ on the right side and 1+ on the left side.        Posterior tibial pulses are 1+ on the right side and 1+ on the left side.      Comments: Capillary fill time 3-5 seconds.  All toes warm to touch.      2 + pitting lower extremity edema bilateral.    Negative  elevational pallor and dependent rubor bilateral.    Musculoskeletal:      Comments: Normal angle, base, station of gait. Decreased stride length, early heel off, moderately propulsive toe off bilateral.    All ten toes without clubbing, cyanosis, or signs of ischemia.      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    No pain to palpation bilateral lower extremities.      Range of motion, stability, muscle strength, and muscle tone are age and health appropriate normal bilateral feet and legs.       Lymphadenopathy:      Comments: Negative lymphadenopathy bilateral popliteal fossa and tarsal tunnel.  Negative lymphangitic streaking bilateral foot/ankle bilateral.     Skin:     General: Skin is warm and dry.      Coloration: Skin is not pale.      Findings: No abrasion, bruising, burn, ecchymosis, erythema, laceration, lesion, petechiae or rash.      Nails: There is no clubbing.        Comments: Skin thin, atrophic, with decreased density and distribution of pedal hair bilateral, but without hyperpigmentation,   ulcers, masses, nodules or cords palpated bilateral feet and legs.    Dependent rubor bilateral leg/ankle/foot consistent with stasis.    Toenails 1st, 2nd, 3rd, 4th, 5th  bilateral are hypertrophic thickened 2-3 mm, dystrophic, discolored tanish brown with tan, gray crumbly subungual debris.  Tender to distal nail plate pressure, without periungual skin abnormality of each.     Neurological:      Mental Status: He is alert and oriented to person, place, and time. He is not disoriented.      Sensory: Sensory deficit present.      Motor: No tremor, atrophy or abnormal muscle tone.      Deep Tendon Reflexes:      Reflex Scores:       Patellar reflexes are 2+ on the right side and 2+ on the left side.       Achilles reflexes are 2+ on the right side and 2+ on the left side.     Comments: Decreased/absent vibratory sensation bilateral feet to 128Hz tuning fork.    Paresthesias, and burning bilateral feet with no  clearly identified trigger or source.     Psychiatric:         Behavior: Behavior is cooperative.               Assessment:       Encounter Diagnoses   Name Primary?    Long term current use of anticoagulant therapy Yes    Onychomycosis of multiple toenails with type 2 diabetes mellitus and peripheral angiopathy     Diabetic polyneuropathy associated with type 2 diabetes mellitus     Equinus contracture of ankle          Plan:       BI was seen today for diabetes mellitus.    Diagnoses and all orders for this visit:    Long term current use of anticoagulant therapy  -     DIABETIC SHOES FOR HOME USE    Onychomycosis of multiple toenails with type 2 diabetes mellitus and peripheral angiopathy  -     DIABETIC SHOES FOR HOME USE    Diabetic polyneuropathy associated with type 2 diabetes mellitus  -     DIABETIC SHOES FOR HOME USE    Equinus contracture of ankle  -     DIABETIC SHOES FOR HOME USE    Other orders  -     ciclopirox (PENLAC) 8 % Soln; Apply topically nightly.      I counseled the patient on his conditions, their implications and medical management.    - Shoe inspection. Diabetic Foot Education. Patient reminded of the importance of good nutrition and blood sugar control to help prevent podiatric complications of diabetes. Patient instructed on proper foot hygeine. We discussed wearing proper shoe gear, daily foot inspections, never walking without protective shoe gear, never putting sharp instruments to feet, routine podiatric visits at least annually.      - With patient's permission, nails were aggressively reduced and debrided x 10 to their soft tissue attachment mechanically, removing all offending nail and debris. Patient relates relief following the procedure. He will continue to monitor the areas daily, inspect his feet, wear protective shoe gear when ambulatory, moisturizer to maintain skin integrity and follow in this office  p.r.n.      Discussed conservative treatment with shoes of adequate  dimensions, material, and style to alleviate symptoms and delay or prevent surgical intervention.    Resume compression stockings and left afo he has at home.        Continue  penlac    No follow-ups on file.

## 2020-11-16 ENCOUNTER — RESEARCH ENCOUNTER (OUTPATIENT)
Dept: RESEARCH | Facility: HOSPITAL | Age: 75
End: 2020-11-16

## 2020-11-16 NOTE — PROGRESS NOTES
Sponsor: [employer]   Study Title/IRB Number: A Phase 3, Placebo-Controlled, Randomized, Observer-Blinded Study To Evaluate The Efficacy, Safety, And Tolerability Of A Clostridium Difficile Vaccine In Adults 50 Years Of Age And Older ([id number]) / 2017.094.B  Principle Investigator: Malinda Chino MD     Annual Retention Visit  Subject ID: [id number]    Study staff called Mr. Cantor to remind him to check into his eDiary. He reported that he did not remember the pin number, so coordinator will request a way to update his pin from the [facility].

## 2020-11-30 ENCOUNTER — PATIENT MESSAGE (OUTPATIENT)
Dept: DERMATOLOGY | Facility: CLINIC | Age: 75
End: 2020-11-30

## 2021-01-06 ENCOUNTER — PATIENT OUTREACH (OUTPATIENT)
Dept: ADMINISTRATIVE | Facility: OTHER | Age: 76
End: 2021-01-06

## 2021-01-09 ENCOUNTER — IMMUNIZATION (OUTPATIENT)
Dept: PRIMARY CARE CLINIC | Facility: CLINIC | Age: 76
End: 2021-01-09
Payer: MEDICARE

## 2021-01-09 DIAGNOSIS — Z23 NEED FOR VACCINATION: ICD-10-CM

## 2021-01-09 PROCEDURE — 91300 COVID-19, MRNA, LNP-S, PF, 30 MCG/0.3 ML DOSE VACCINE: CPT | Mod: PBBFAC | Performed by: FAMILY MEDICINE

## 2021-01-20 ENCOUNTER — RESEARCH ENCOUNTER (OUTPATIENT)
Dept: RESEARCH | Facility: HOSPITAL | Age: 76
End: 2021-01-20

## 2021-01-20 DIAGNOSIS — N18.30 CHRONIC KIDNEY DISEASE, STAGE III (MODERATE): ICD-10-CM

## 2021-01-20 DIAGNOSIS — I10 ESSENTIAL HYPERTENSION: ICD-10-CM

## 2021-01-20 DIAGNOSIS — I50.31 ACUTE DIASTOLIC HEART FAILURE: ICD-10-CM

## 2021-01-20 DIAGNOSIS — I87.2 VENOUS INSUFFICIENCY OF BOTH LOWER EXTREMITIES: ICD-10-CM

## 2021-01-20 DIAGNOSIS — E11.22 TYPE 2 DIABETES MELLITUS WITH STAGE 3 CHRONIC KIDNEY DISEASE, WITH LONG-TERM CURRENT USE OF INSULIN: ICD-10-CM

## 2021-01-20 DIAGNOSIS — I48.20 CHRONIC ATRIAL FIBRILLATION: ICD-10-CM

## 2021-01-20 DIAGNOSIS — Z79.4 TYPE 2 DIABETES MELLITUS WITH STAGE 3 CHRONIC KIDNEY DISEASE, WITH LONG-TERM CURRENT USE OF INSULIN: ICD-10-CM

## 2021-01-20 DIAGNOSIS — N18.30 TYPE 2 DIABETES MELLITUS WITH STAGE 3 CHRONIC KIDNEY DISEASE, WITH LONG-TERM CURRENT USE OF INSULIN: ICD-10-CM

## 2021-01-20 DIAGNOSIS — M17.11 PRIMARY OSTEOARTHRITIS OF RIGHT KNEE: ICD-10-CM

## 2021-01-20 RX ORDER — FUROSEMIDE 40 MG/1
TABLET ORAL
Qty: 90 TABLET | Refills: 1 | Status: SHIPPED | OUTPATIENT
Start: 2021-01-20 | End: 2021-07-18

## 2021-01-26 ENCOUNTER — OFFICE VISIT (OUTPATIENT)
Dept: DERMATOLOGY | Facility: CLINIC | Age: 76
End: 2021-01-26
Payer: MEDICARE

## 2021-01-26 DIAGNOSIS — L82.1 SEBORRHEIC KERATOSIS: ICD-10-CM

## 2021-01-26 DIAGNOSIS — L57.0 AK (ACTINIC KERATOSIS): Primary | ICD-10-CM

## 2021-01-26 DIAGNOSIS — L71.9 ROSACEA: ICD-10-CM

## 2021-01-26 DIAGNOSIS — R23.8 VENOUS LAKE: ICD-10-CM

## 2021-01-26 DIAGNOSIS — Z86.006 HISTORY OF MELANOMA IN SITU: ICD-10-CM

## 2021-01-26 PROCEDURE — 17003 DESTRUCTION, PREMALIGNANT LESIONS; SECOND THROUGH 14 LESIONS: ICD-10-PCS | Mod: S$GLB,,, | Performed by: DERMATOLOGY

## 2021-01-26 PROCEDURE — 99999 PR PBB SHADOW E&M-EST. PATIENT-LVL III: ICD-10-PCS | Mod: PBBFAC,,, | Performed by: DERMATOLOGY

## 2021-01-26 PROCEDURE — 1159F PR MEDICATION LIST DOCUMENTED IN MEDICAL RECORD: ICD-10-PCS | Mod: S$GLB,,, | Performed by: DERMATOLOGY

## 2021-01-26 PROCEDURE — 1101F PT FALLS ASSESS-DOCD LE1/YR: CPT | Mod: CPTII,S$GLB,, | Performed by: DERMATOLOGY

## 2021-01-26 PROCEDURE — 1101F PR PT FALLS ASSESS DOC 0-1 FALLS W/OUT INJ PAST YR: ICD-10-PCS | Mod: CPTII,S$GLB,, | Performed by: DERMATOLOGY

## 2021-01-26 PROCEDURE — 1126F AMNT PAIN NOTED NONE PRSNT: CPT | Mod: S$GLB,,, | Performed by: DERMATOLOGY

## 2021-01-26 PROCEDURE — 3288F PR FALLS RISK ASSESSMENT DOCUMENTED: ICD-10-PCS | Mod: CPTII,S$GLB,, | Performed by: DERMATOLOGY

## 2021-01-26 PROCEDURE — 17003 DESTRUCT PREMALG LES 2-14: CPT | Mod: S$GLB,,, | Performed by: DERMATOLOGY

## 2021-01-26 PROCEDURE — 1126F PR PAIN SEVERITY QUANTIFIED, NO PAIN PRESENT: ICD-10-PCS | Mod: S$GLB,,, | Performed by: DERMATOLOGY

## 2021-01-26 PROCEDURE — 1159F MED LIST DOCD IN RCRD: CPT | Mod: S$GLB,,, | Performed by: DERMATOLOGY

## 2021-01-26 PROCEDURE — 99999 PR PBB SHADOW E&M-EST. PATIENT-LVL III: CPT | Mod: PBBFAC,,, | Performed by: DERMATOLOGY

## 2021-01-26 PROCEDURE — 3288F FALL RISK ASSESSMENT DOCD: CPT | Mod: CPTII,S$GLB,, | Performed by: DERMATOLOGY

## 2021-01-26 PROCEDURE — 99213 PR OFFICE/OUTPT VISIT, EST, LEVL III, 20-29 MIN: ICD-10-PCS | Mod: 25,S$GLB,, | Performed by: DERMATOLOGY

## 2021-01-26 PROCEDURE — 17000 PR DESTRUCTION(LASER SURGERY,CRYOSURGERY,CHEMOSURGERY),PREMALIGNANT LESIONS,FIRST LESION: ICD-10-PCS | Mod: S$GLB,,, | Performed by: DERMATOLOGY

## 2021-01-26 PROCEDURE — 17000 DESTRUCT PREMALG LESION: CPT | Mod: S$GLB,,, | Performed by: DERMATOLOGY

## 2021-01-26 PROCEDURE — 99213 OFFICE O/P EST LOW 20 MIN: CPT | Mod: 25,S$GLB,, | Performed by: DERMATOLOGY

## 2021-01-30 ENCOUNTER — IMMUNIZATION (OUTPATIENT)
Dept: PRIMARY CARE CLINIC | Facility: CLINIC | Age: 76
End: 2021-01-30
Payer: MEDICARE

## 2021-01-30 DIAGNOSIS — Z23 NEED FOR VACCINATION: Primary | ICD-10-CM

## 2021-01-30 PROCEDURE — 91300 COVID-19, MRNA, LNP-S, PF, 30 MCG/0.3 ML DOSE VACCINE: CPT | Mod: PBBFAC | Performed by: EMERGENCY MEDICINE

## 2021-01-30 PROCEDURE — 0002A COVID-19, MRNA, LNP-S, PF, 30 MCG/0.3 ML DOSE VACCINE: CPT | Mod: PBBFAC | Performed by: EMERGENCY MEDICINE

## 2021-02-08 DIAGNOSIS — E03.4 HYPOTHYROIDISM DUE TO ACQUIRED ATROPHY OF THYROID: ICD-10-CM

## 2021-02-11 ENCOUNTER — PATIENT OUTREACH (OUTPATIENT)
Dept: ADMINISTRATIVE | Facility: OTHER | Age: 76
End: 2021-02-11

## 2021-02-12 ENCOUNTER — OFFICE VISIT (OUTPATIENT)
Dept: ENDOCRINOLOGY | Facility: CLINIC | Age: 76
End: 2021-02-12
Payer: MEDICARE

## 2021-02-12 VITALS
HEIGHT: 70 IN | DIASTOLIC BLOOD PRESSURE: 59 MMHG | SYSTOLIC BLOOD PRESSURE: 116 MMHG | BODY MASS INDEX: 39.42 KG/M2 | OXYGEN SATURATION: 98 % | HEART RATE: 68 BPM | WEIGHT: 275.38 LBS

## 2021-02-12 DIAGNOSIS — E78.5 DYSLIPIDEMIA ASSOCIATED WITH TYPE 2 DIABETES MELLITUS: ICD-10-CM

## 2021-02-12 DIAGNOSIS — E66.01 CLASS 3 SEVERE OBESITY DUE TO EXCESS CALORIES WITH SERIOUS COMORBIDITY AND BODY MASS INDEX (BMI) OF 40.0 TO 44.9 IN ADULT: ICD-10-CM

## 2021-02-12 DIAGNOSIS — Z79.4 TYPE 2 DIABETES MELLITUS WITH STAGE 3 CHRONIC KIDNEY DISEASE, WITH LONG-TERM CURRENT USE OF INSULIN, UNSPECIFIED WHETHER STAGE 3A OR 3B CKD: ICD-10-CM

## 2021-02-12 DIAGNOSIS — N18.30 TYPE 2 DIABETES MELLITUS WITH STAGE 3 CHRONIC KIDNEY DISEASE, WITH LONG-TERM CURRENT USE OF INSULIN, UNSPECIFIED WHETHER STAGE 3A OR 3B CKD: ICD-10-CM

## 2021-02-12 DIAGNOSIS — E11.22 TYPE 2 DIABETES MELLITUS WITH STAGE 3 CHRONIC KIDNEY DISEASE, WITH LONG-TERM CURRENT USE OF INSULIN, UNSPECIFIED WHETHER STAGE 3A OR 3B CKD: ICD-10-CM

## 2021-02-12 DIAGNOSIS — E11.69 DYSLIPIDEMIA ASSOCIATED WITH TYPE 2 DIABETES MELLITUS: ICD-10-CM

## 2021-02-12 PROCEDURE — 3288F PR FALLS RISK ASSESSMENT DOCUMENTED: ICD-10-PCS | Mod: CPTII,S$GLB,, | Performed by: INTERNAL MEDICINE

## 2021-02-12 PROCEDURE — 3288F FALL RISK ASSESSMENT DOCD: CPT | Mod: CPTII,S$GLB,, | Performed by: INTERNAL MEDICINE

## 2021-02-12 PROCEDURE — 1101F PR PT FALLS ASSESS DOC 0-1 FALLS W/OUT INJ PAST YR: ICD-10-PCS | Mod: CPTII,S$GLB,, | Performed by: INTERNAL MEDICINE

## 2021-02-12 PROCEDURE — 99214 PR OFFICE/OUTPT VISIT, EST, LEVL IV, 30-39 MIN: ICD-10-PCS | Mod: S$GLB,,, | Performed by: INTERNAL MEDICINE

## 2021-02-12 PROCEDURE — 3052F HG A1C>EQUAL 8.0%<EQUAL 9.0%: CPT | Mod: CPTII,S$GLB,, | Performed by: INTERNAL MEDICINE

## 2021-02-12 PROCEDURE — 1126F PR PAIN SEVERITY QUANTIFIED, NO PAIN PRESENT: ICD-10-PCS | Mod: S$GLB,,, | Performed by: INTERNAL MEDICINE

## 2021-02-12 PROCEDURE — 1126F AMNT PAIN NOTED NONE PRSNT: CPT | Mod: S$GLB,,, | Performed by: INTERNAL MEDICINE

## 2021-02-12 PROCEDURE — 1101F PT FALLS ASSESS-DOCD LE1/YR: CPT | Mod: CPTII,S$GLB,, | Performed by: INTERNAL MEDICINE

## 2021-02-12 PROCEDURE — 3052F PR MOST RECENT HEMOGLOBIN A1C LEVEL 8.0 - < 9.0%: ICD-10-PCS | Mod: CPTII,S$GLB,, | Performed by: INTERNAL MEDICINE

## 2021-02-12 PROCEDURE — 99214 OFFICE O/P EST MOD 30 MIN: CPT | Mod: S$GLB,,, | Performed by: INTERNAL MEDICINE

## 2021-02-17 DIAGNOSIS — E11.22 TYPE 2 DIABETES MELLITUS WITH STAGE 3 CHRONIC KIDNEY DISEASE, WITH LONG-TERM CURRENT USE OF INSULIN, UNSPECIFIED WHETHER STAGE 3A OR 3B CKD: Primary | ICD-10-CM

## 2021-02-17 DIAGNOSIS — N18.30 TYPE 2 DIABETES MELLITUS WITH STAGE 3 CHRONIC KIDNEY DISEASE, WITH LONG-TERM CURRENT USE OF INSULIN, UNSPECIFIED WHETHER STAGE 3A OR 3B CKD: Primary | ICD-10-CM

## 2021-02-17 DIAGNOSIS — Z79.4 TYPE 2 DIABETES MELLITUS WITH STAGE 3 CHRONIC KIDNEY DISEASE, WITH LONG-TERM CURRENT USE OF INSULIN, UNSPECIFIED WHETHER STAGE 3A OR 3B CKD: Primary | ICD-10-CM

## 2021-02-24 ENCOUNTER — RESEARCH ENCOUNTER (OUTPATIENT)
Dept: RESEARCH | Facility: HOSPITAL | Age: 76
End: 2021-02-24

## 2021-02-27 DIAGNOSIS — I10 ESSENTIAL HYPERTENSION: ICD-10-CM

## 2021-02-27 DIAGNOSIS — I48.19 PERSISTENT ATRIAL FIBRILLATION: ICD-10-CM

## 2021-02-27 DIAGNOSIS — I48.0 PAROXYSMAL ATRIAL FIBRILLATION: ICD-10-CM

## 2021-03-02 DIAGNOSIS — I48.20 CHRONIC ATRIAL FIBRILLATION: Primary | ICD-10-CM

## 2021-03-04 ENCOUNTER — TELEPHONE (OUTPATIENT)
Dept: PODIATRY | Facility: CLINIC | Age: 76
End: 2021-03-04

## 2021-03-10 DIAGNOSIS — I48.20 CHRONIC ATRIAL FIBRILLATION: Primary | ICD-10-CM

## 2021-03-15 ENCOUNTER — TELEPHONE (OUTPATIENT)
Dept: ELECTROPHYSIOLOGY | Facility: CLINIC | Age: 76
End: 2021-03-15

## 2021-03-18 ENCOUNTER — PATIENT OUTREACH (OUTPATIENT)
Dept: ADMINISTRATIVE | Facility: OTHER | Age: 76
End: 2021-03-18

## 2021-03-19 ENCOUNTER — HOSPITAL ENCOUNTER (OUTPATIENT)
Dept: CARDIOLOGY | Facility: CLINIC | Age: 76
Discharge: HOME | End: 2021-03-19
Payer: MEDICARE

## 2021-03-19 ENCOUNTER — PATIENT MESSAGE (OUTPATIENT)
Dept: ELECTROPHYSIOLOGY | Facility: CLINIC | Age: 76
End: 2021-03-19

## 2021-03-19 ENCOUNTER — OFFICE VISIT (OUTPATIENT)
Dept: ELECTROPHYSIOLOGY | Facility: CLINIC | Age: 76
End: 2021-03-19
Payer: MEDICARE

## 2021-03-19 VITALS
DIASTOLIC BLOOD PRESSURE: 70 MMHG | WEIGHT: 276.88 LBS | BODY MASS INDEX: 39.64 KG/M2 | HEART RATE: 59 BPM | HEIGHT: 70 IN | SYSTOLIC BLOOD PRESSURE: 118 MMHG

## 2021-03-19 DIAGNOSIS — Z79.01 LONG TERM CURRENT USE OF ANTICOAGULANT THERAPY: ICD-10-CM

## 2021-03-19 DIAGNOSIS — I27.20 PULMONARY HYPERTENSION: Primary | ICD-10-CM

## 2021-03-19 DIAGNOSIS — E78.2 MIXED HYPERLIPIDEMIA: ICD-10-CM

## 2021-03-19 DIAGNOSIS — I48.20 CHRONIC ATRIAL FIBRILLATION: ICD-10-CM

## 2021-03-19 DIAGNOSIS — Z79.02 ANTIPLATELET OR ANTITHROMBOTIC LONG-TERM USE: ICD-10-CM

## 2021-03-19 DIAGNOSIS — I10 ESSENTIAL HYPERTENSION: ICD-10-CM

## 2021-03-19 PROCEDURE — 93005 ELECTROCARDIOGRAM TRACING: CPT | Mod: S$GLB,,, | Performed by: INTERNAL MEDICINE

## 2021-03-19 PROCEDURE — 1126F AMNT PAIN NOTED NONE PRSNT: CPT | Mod: S$GLB,,, | Performed by: INTERNAL MEDICINE

## 2021-03-19 PROCEDURE — 99214 OFFICE O/P EST MOD 30 MIN: CPT | Mod: S$GLB,,, | Performed by: INTERNAL MEDICINE

## 2021-03-19 PROCEDURE — 99214 PR OFFICE/OUTPT VISIT, EST, LEVL IV, 30-39 MIN: ICD-10-PCS | Mod: S$GLB,,, | Performed by: INTERNAL MEDICINE

## 2021-03-19 PROCEDURE — 3078F PR MOST RECENT DIASTOLIC BLOOD PRESSURE < 80 MM HG: ICD-10-PCS | Mod: CPTII,S$GLB,, | Performed by: INTERNAL MEDICINE

## 2021-03-19 PROCEDURE — 3078F DIAST BP <80 MM HG: CPT | Mod: CPTII,S$GLB,, | Performed by: INTERNAL MEDICINE

## 2021-03-19 PROCEDURE — 3074F PR MOST RECENT SYSTOLIC BLOOD PRESSURE < 130 MM HG: ICD-10-PCS | Mod: CPTII,S$GLB,, | Performed by: INTERNAL MEDICINE

## 2021-03-19 PROCEDURE — 3288F PR FALLS RISK ASSESSMENT DOCUMENTED: ICD-10-PCS | Mod: CPTII,S$GLB,, | Performed by: INTERNAL MEDICINE

## 2021-03-19 PROCEDURE — 1126F PR PAIN SEVERITY QUANTIFIED, NO PAIN PRESENT: ICD-10-PCS | Mod: S$GLB,,, | Performed by: INTERNAL MEDICINE

## 2021-03-19 PROCEDURE — 99999 PR PBB SHADOW E&M-EST. PATIENT-LVL V: ICD-10-PCS | Mod: PBBFAC,,, | Performed by: INTERNAL MEDICINE

## 2021-03-19 PROCEDURE — 3074F SYST BP LT 130 MM HG: CPT | Mod: CPTII,S$GLB,, | Performed by: INTERNAL MEDICINE

## 2021-03-19 PROCEDURE — 99999 PR PBB SHADOW E&M-EST. PATIENT-LVL V: CPT | Mod: PBBFAC,,, | Performed by: INTERNAL MEDICINE

## 2021-03-19 PROCEDURE — 93010 ELECTROCARDIOGRAM REPORT: CPT | Mod: S$GLB,,, | Performed by: INTERNAL MEDICINE

## 2021-03-19 PROCEDURE — 93010 RHYTHM STRIP: ICD-10-PCS | Mod: S$GLB,,, | Performed by: INTERNAL MEDICINE

## 2021-03-19 PROCEDURE — 3288F FALL RISK ASSESSMENT DOCD: CPT | Mod: CPTII,S$GLB,, | Performed by: INTERNAL MEDICINE

## 2021-03-19 PROCEDURE — 1101F PT FALLS ASSESS-DOCD LE1/YR: CPT | Mod: CPTII,S$GLB,, | Performed by: INTERNAL MEDICINE

## 2021-03-19 PROCEDURE — 1159F PR MEDICATION LIST DOCUMENTED IN MEDICAL RECORD: ICD-10-PCS | Mod: S$GLB,,, | Performed by: INTERNAL MEDICINE

## 2021-03-19 PROCEDURE — 93005 RHYTHM STRIP: ICD-10-PCS | Mod: S$GLB,,, | Performed by: INTERNAL MEDICINE

## 2021-03-19 PROCEDURE — 1159F MED LIST DOCD IN RCRD: CPT | Mod: S$GLB,,, | Performed by: INTERNAL MEDICINE

## 2021-03-19 PROCEDURE — 1101F PR PT FALLS ASSESS DOC 0-1 FALLS W/OUT INJ PAST YR: ICD-10-PCS | Mod: CPTII,S$GLB,, | Performed by: INTERNAL MEDICINE

## 2021-03-19 RX ORDER — PRAVASTATIN SODIUM 10 MG/1
10 TABLET ORAL DAILY
COMMUNITY
Start: 2021-02-08 | End: 2021-03-23

## 2021-03-19 RX ORDER — INSULIN LISPRO 100 [IU]/ML
INJECTION, SOLUTION INTRAVENOUS; SUBCUTANEOUS
COMMUNITY
Start: 2021-02-23 | End: 2021-06-15

## 2021-03-22 DIAGNOSIS — I10 ESSENTIAL HYPERTENSION: ICD-10-CM

## 2021-03-22 DIAGNOSIS — I48.0 PAROXYSMAL ATRIAL FIBRILLATION: ICD-10-CM

## 2021-03-22 DIAGNOSIS — I48.19 PERSISTENT ATRIAL FIBRILLATION: ICD-10-CM

## 2021-03-23 ENCOUNTER — RESEARCH ENCOUNTER (OUTPATIENT)
Dept: RESEARCH | Facility: HOSPITAL | Age: 76
End: 2021-03-23

## 2021-03-23 DIAGNOSIS — I10 ESSENTIAL HYPERTENSION: ICD-10-CM

## 2021-03-23 DIAGNOSIS — I48.19 PERSISTENT ATRIAL FIBRILLATION: ICD-10-CM

## 2021-03-23 DIAGNOSIS — I48.0 PAROXYSMAL ATRIAL FIBRILLATION: ICD-10-CM

## 2021-03-23 RX ORDER — LEVOTHYROXINE SODIUM 25 UG/1
TABLET ORAL
Qty: 90 TABLET | Refills: 3 | Status: SHIPPED | OUTPATIENT
Start: 2021-03-23 | End: 2022-02-25

## 2021-03-23 RX ORDER — FENOFIBRATE 145 MG/1
TABLET, FILM COATED ORAL
Qty: 90 TABLET | Refills: 3 | OUTPATIENT
Start: 2021-03-23

## 2021-03-23 RX ORDER — PRAVASTATIN SODIUM 10 MG/1
TABLET ORAL
Qty: 90 TABLET | Refills: 3 | OUTPATIENT
Start: 2021-03-23

## 2021-03-31 ENCOUNTER — OFFICE VISIT (OUTPATIENT)
Dept: ORTHOPEDICS | Facility: CLINIC | Age: 76
End: 2021-03-31
Attending: ORTHOPAEDIC SURGERY
Payer: MEDICARE

## 2021-03-31 VITALS — WEIGHT: 276 LBS | HEIGHT: 70 IN | BODY MASS INDEX: 39.51 KG/M2

## 2021-03-31 DIAGNOSIS — M65.321 TRIGGER INDEX FINGER OF RIGHT HAND: Primary | ICD-10-CM

## 2021-03-31 PROCEDURE — 1125F AMNT PAIN NOTED PAIN PRSNT: CPT | Mod: S$GLB,,, | Performed by: ORTHOPAEDIC SURGERY

## 2021-03-31 PROCEDURE — 20550 PR INJECT TENDON SHEATH/LIGAMENT: ICD-10-PCS | Mod: 50,S$GLB,, | Performed by: ORTHOPAEDIC SURGERY

## 2021-03-31 PROCEDURE — 99999 PR PBB SHADOW E&M-EST. PATIENT-LVL IV: CPT | Mod: PBBFAC,,, | Performed by: ORTHOPAEDIC SURGERY

## 2021-03-31 PROCEDURE — 99213 PR OFFICE/OUTPT VISIT, EST, LEVL III, 20-29 MIN: ICD-10-PCS | Mod: 25,S$GLB,, | Performed by: ORTHOPAEDIC SURGERY

## 2021-03-31 PROCEDURE — 1125F PR PAIN SEVERITY QUANTIFIED, PAIN PRESENT: ICD-10-PCS | Mod: S$GLB,,, | Performed by: ORTHOPAEDIC SURGERY

## 2021-03-31 PROCEDURE — 1101F PT FALLS ASSESS-DOCD LE1/YR: CPT | Mod: CPTII,S$GLB,, | Performed by: ORTHOPAEDIC SURGERY

## 2021-03-31 PROCEDURE — 99213 OFFICE O/P EST LOW 20 MIN: CPT | Mod: 25,S$GLB,, | Performed by: ORTHOPAEDIC SURGERY

## 2021-03-31 PROCEDURE — 1159F PR MEDICATION LIST DOCUMENTED IN MEDICAL RECORD: ICD-10-PCS | Mod: S$GLB,,, | Performed by: ORTHOPAEDIC SURGERY

## 2021-03-31 PROCEDURE — 20550 NJX 1 TENDON SHEATH/LIGAMENT: CPT | Mod: 50,S$GLB,, | Performed by: ORTHOPAEDIC SURGERY

## 2021-03-31 PROCEDURE — 3288F FALL RISK ASSESSMENT DOCD: CPT | Mod: CPTII,S$GLB,, | Performed by: ORTHOPAEDIC SURGERY

## 2021-03-31 PROCEDURE — 1101F PR PT FALLS ASSESS DOC 0-1 FALLS W/OUT INJ PAST YR: ICD-10-PCS | Mod: CPTII,S$GLB,, | Performed by: ORTHOPAEDIC SURGERY

## 2021-03-31 PROCEDURE — 1159F MED LIST DOCD IN RCRD: CPT | Mod: S$GLB,,, | Performed by: ORTHOPAEDIC SURGERY

## 2021-03-31 PROCEDURE — 3288F PR FALLS RISK ASSESSMENT DOCUMENTED: ICD-10-PCS | Mod: CPTII,S$GLB,, | Performed by: ORTHOPAEDIC SURGERY

## 2021-03-31 PROCEDURE — 99999 PR PBB SHADOW E&M-EST. PATIENT-LVL IV: ICD-10-PCS | Mod: PBBFAC,,, | Performed by: ORTHOPAEDIC SURGERY

## 2021-03-31 RX ORDER — TRIAMCINOLONE ACETONIDE 40 MG/ML
40 INJECTION, SUSPENSION INTRA-ARTICULAR; INTRAMUSCULAR
Status: COMPLETED | OUTPATIENT
Start: 2021-03-31 | End: 2021-03-31

## 2021-03-31 RX ADMIN — TRIAMCINOLONE ACETONIDE 40 MG: 40 INJECTION, SUSPENSION INTRA-ARTICULAR; INTRAMUSCULAR at 03:03

## 2021-04-26 ENCOUNTER — TELEPHONE (OUTPATIENT)
Dept: RESEARCH | Facility: HOSPITAL | Age: 76
End: 2021-04-26

## 2021-05-12 ENCOUNTER — PATIENT OUTREACH (OUTPATIENT)
Dept: ADMINISTRATIVE | Facility: HOSPITAL | Age: 76
End: 2021-05-12

## 2021-05-24 ENCOUNTER — OFFICE VISIT (OUTPATIENT)
Dept: INTERNAL MEDICINE | Facility: CLINIC | Age: 76
End: 2021-05-24
Payer: MEDICARE

## 2021-05-24 ENCOUNTER — LAB VISIT (OUTPATIENT)
Dept: LAB | Facility: HOSPITAL | Age: 76
End: 2021-05-24
Attending: INTERNAL MEDICINE
Payer: MEDICARE

## 2021-05-24 VITALS
WEIGHT: 270.75 LBS | DIASTOLIC BLOOD PRESSURE: 62 MMHG | SYSTOLIC BLOOD PRESSURE: 108 MMHG | BODY MASS INDEX: 38.76 KG/M2 | HEART RATE: 64 BPM | HEIGHT: 70 IN

## 2021-05-24 DIAGNOSIS — I51.89 DIASTOLIC DYSFUNCTION: ICD-10-CM

## 2021-05-24 DIAGNOSIS — I48.20 CHRONIC ATRIAL FIBRILLATION: ICD-10-CM

## 2021-05-24 DIAGNOSIS — Z79.4 TYPE 2 DIABETES MELLITUS WITH DIABETIC POLYNEUROPATHY, WITH LONG-TERM CURRENT USE OF INSULIN: ICD-10-CM

## 2021-05-24 DIAGNOSIS — E11.42 TYPE 2 DIABETES MELLITUS WITH DIABETIC POLYNEUROPATHY, WITH LONG-TERM CURRENT USE OF INSULIN: ICD-10-CM

## 2021-05-24 DIAGNOSIS — Z79.4 TYPE 2 DIABETES MELLITUS WITH STAGE 3A CHRONIC KIDNEY DISEASE, WITH LONG-TERM CURRENT USE OF INSULIN: Primary | ICD-10-CM

## 2021-05-24 DIAGNOSIS — M19.90 OSTEOARTHRITIS, UNSPECIFIED OSTEOARTHRITIS TYPE, UNSPECIFIED SITE: ICD-10-CM

## 2021-05-24 DIAGNOSIS — I27.20 PULMONARY HYPERTENSION: ICD-10-CM

## 2021-05-24 DIAGNOSIS — N18.31 TYPE 2 DIABETES MELLITUS WITH STAGE 3A CHRONIC KIDNEY DISEASE, WITH LONG-TERM CURRENT USE OF INSULIN: ICD-10-CM

## 2021-05-24 DIAGNOSIS — E78.5 HYPERLIPIDEMIA, UNSPECIFIED HYPERLIPIDEMIA TYPE: ICD-10-CM

## 2021-05-24 DIAGNOSIS — E11.22 TYPE 2 DIABETES MELLITUS WITH STAGE 3A CHRONIC KIDNEY DISEASE, WITH LONG-TERM CURRENT USE OF INSULIN: ICD-10-CM

## 2021-05-24 DIAGNOSIS — I10 ESSENTIAL HYPERTENSION: ICD-10-CM

## 2021-05-24 DIAGNOSIS — Z79.4 TYPE 2 DIABETES MELLITUS WITH STAGE 3A CHRONIC KIDNEY DISEASE, WITH LONG-TERM CURRENT USE OF INSULIN: ICD-10-CM

## 2021-05-24 DIAGNOSIS — N18.31 TYPE 2 DIABETES MELLITUS WITH STAGE 3A CHRONIC KIDNEY DISEASE, WITH LONG-TERM CURRENT USE OF INSULIN: Primary | ICD-10-CM

## 2021-05-24 DIAGNOSIS — E11.22 TYPE 2 DIABETES MELLITUS WITH STAGE 3A CHRONIC KIDNEY DISEASE, WITH LONG-TERM CURRENT USE OF INSULIN: Primary | ICD-10-CM

## 2021-05-24 LAB
ALBUMIN SERPL BCP-MCNC: 3.9 G/DL (ref 3.5–5.2)
ALP SERPL-CCNC: 36 U/L (ref 55–135)
ALT SERPL W/O P-5'-P-CCNC: 17 U/L (ref 10–44)
ANION GAP SERPL CALC-SCNC: 10 MMOL/L (ref 8–16)
AST SERPL-CCNC: 20 U/L (ref 10–40)
BASOPHILS # BLD AUTO: 0.05 K/UL (ref 0–0.2)
BASOPHILS NFR BLD: 0.6 % (ref 0–1.9)
BILIRUB SERPL-MCNC: 0.8 MG/DL (ref 0.1–1)
BUN SERPL-MCNC: 22 MG/DL (ref 8–23)
CALCIUM SERPL-MCNC: 10.5 MG/DL (ref 8.7–10.5)
CHLORIDE SERPL-SCNC: 104 MMOL/L (ref 95–110)
CHOLEST SERPL-MCNC: 130 MG/DL (ref 120–199)
CHOLEST/HDLC SERPL: 4.2 {RATIO} (ref 2–5)
CO2 SERPL-SCNC: 28 MMOL/L (ref 23–29)
CREAT SERPL-MCNC: 1.6 MG/DL (ref 0.5–1.4)
DIFFERENTIAL METHOD: ABNORMAL
EOSINOPHIL # BLD AUTO: 0.3 K/UL (ref 0–0.5)
EOSINOPHIL NFR BLD: 3.4 % (ref 0–8)
ERYTHROCYTE [DISTWIDTH] IN BLOOD BY AUTOMATED COUNT: 13.2 % (ref 11.5–14.5)
EST. GFR  (AFRICAN AMERICAN): 47.3 ML/MIN/1.73 M^2
EST. GFR  (NON AFRICAN AMERICAN): 40.9 ML/MIN/1.73 M^2
ESTIMATED AVG GLUCOSE: 154 MG/DL (ref 68–131)
GLUCOSE SERPL-MCNC: 118 MG/DL (ref 70–110)
HBA1C MFR BLD: 7 % (ref 4–5.6)
HCT VFR BLD AUTO: 44.1 % (ref 40–54)
HDLC SERPL-MCNC: 31 MG/DL (ref 40–75)
HDLC SERPL: 23.8 % (ref 20–50)
HGB BLD-MCNC: 14.9 G/DL (ref 14–18)
IMM GRANULOCYTES # BLD AUTO: 0.04 K/UL (ref 0–0.04)
IMM GRANULOCYTES NFR BLD AUTO: 0.4 % (ref 0–0.5)
LDLC SERPL CALC-MCNC: 74.4 MG/DL (ref 63–159)
LYMPHOCYTES # BLD AUTO: 2 K/UL (ref 1–4.8)
LYMPHOCYTES NFR BLD: 22.1 % (ref 18–48)
MCH RBC QN AUTO: 31.1 PG (ref 27–31)
MCHC RBC AUTO-ENTMCNC: 33.8 G/DL (ref 32–36)
MCV RBC AUTO: 92 FL (ref 82–98)
MONOCYTES # BLD AUTO: 0.9 K/UL (ref 0.3–1)
MONOCYTES NFR BLD: 9.7 % (ref 4–15)
NEUTROPHILS # BLD AUTO: 5.7 K/UL (ref 1.8–7.7)
NEUTROPHILS NFR BLD: 63.8 % (ref 38–73)
NONHDLC SERPL-MCNC: 99 MG/DL
NRBC BLD-RTO: 0 /100 WBC
PLATELET # BLD AUTO: 205 K/UL (ref 150–450)
PMV BLD AUTO: 12.6 FL (ref 9.2–12.9)
POTASSIUM SERPL-SCNC: 4.5 MMOL/L (ref 3.5–5.1)
PROT SERPL-MCNC: 7.5 G/DL (ref 6–8.4)
RBC # BLD AUTO: 4.79 M/UL (ref 4.6–6.2)
SODIUM SERPL-SCNC: 142 MMOL/L (ref 136–145)
TRIGL SERPL-MCNC: 123 MG/DL (ref 30–150)
TSH SERPL DL<=0.005 MIU/L-ACNC: 3.16 UIU/ML (ref 0.4–4)
WBC # BLD AUTO: 8.9 K/UL (ref 3.9–12.7)

## 2021-05-24 PROCEDURE — 1159F MED LIST DOCD IN RCRD: CPT | Mod: S$GLB,,, | Performed by: INTERNAL MEDICINE

## 2021-05-24 PROCEDURE — 99214 OFFICE O/P EST MOD 30 MIN: CPT | Mod: S$GLB,,, | Performed by: INTERNAL MEDICINE

## 2021-05-24 PROCEDURE — 80061 LIPID PANEL: CPT | Performed by: INTERNAL MEDICINE

## 2021-05-24 PROCEDURE — 99999 PR PBB SHADOW E&M-EST. PATIENT-LVL II: CPT | Mod: PBBFAC,,, | Performed by: INTERNAL MEDICINE

## 2021-05-24 PROCEDURE — 84443 ASSAY THYROID STIM HORMONE: CPT | Performed by: INTERNAL MEDICINE

## 2021-05-24 PROCEDURE — 85025 COMPLETE CBC W/AUTO DIFF WBC: CPT | Performed by: INTERNAL MEDICINE

## 2021-05-24 PROCEDURE — 36415 COLL VENOUS BLD VENIPUNCTURE: CPT | Performed by: INTERNAL MEDICINE

## 2021-05-24 PROCEDURE — 99499 RISK ADDL DX/OHS AUDIT: ICD-10-PCS | Mod: S$GLB,,, | Performed by: INTERNAL MEDICINE

## 2021-05-24 PROCEDURE — 83036 HEMOGLOBIN GLYCOSYLATED A1C: CPT | Performed by: INTERNAL MEDICINE

## 2021-05-24 PROCEDURE — 99214 PR OFFICE/OUTPT VISIT, EST, LEVL IV, 30-39 MIN: ICD-10-PCS | Mod: S$GLB,,, | Performed by: INTERNAL MEDICINE

## 2021-05-24 PROCEDURE — 1159F PR MEDICATION LIST DOCUMENTED IN MEDICAL RECORD: ICD-10-PCS | Mod: S$GLB,,, | Performed by: INTERNAL MEDICINE

## 2021-05-24 PROCEDURE — 99499 UNLISTED E&M SERVICE: CPT | Mod: S$GLB,,, | Performed by: INTERNAL MEDICINE

## 2021-05-24 PROCEDURE — 80053 COMPREHEN METABOLIC PANEL: CPT | Performed by: INTERNAL MEDICINE

## 2021-05-24 PROCEDURE — 99999 PR PBB SHADOW E&M-EST. PATIENT-LVL II: ICD-10-PCS | Mod: PBBFAC,,, | Performed by: INTERNAL MEDICINE

## 2021-05-24 RX ORDER — TEMAZEPAM 15 MG/1
15 CAPSULE ORAL NIGHTLY PRN
Qty: 30 CAPSULE | Refills: 5 | Status: SHIPPED | OUTPATIENT
Start: 2021-05-24 | End: 2022-03-07 | Stop reason: SDUPTHER

## 2021-05-27 ENCOUNTER — RESEARCH ENCOUNTER (OUTPATIENT)
Dept: RESEARCH | Facility: HOSPITAL | Age: 76
End: 2021-05-27

## 2021-05-27 ENCOUNTER — OFFICE VISIT (OUTPATIENT)
Dept: PODIATRY | Facility: CLINIC | Age: 76
End: 2021-05-27
Payer: MEDICARE

## 2021-05-27 ENCOUNTER — PATIENT MESSAGE (OUTPATIENT)
Dept: DERMATOLOGY | Facility: CLINIC | Age: 76
End: 2021-05-27

## 2021-05-27 VITALS
DIASTOLIC BLOOD PRESSURE: 92 MMHG | WEIGHT: 270 LBS | HEIGHT: 70 IN | SYSTOLIC BLOOD PRESSURE: 148 MMHG | BODY MASS INDEX: 38.65 KG/M2 | HEART RATE: 81 BPM

## 2021-05-27 DIAGNOSIS — E11.51 ONYCHOMYCOSIS OF MULTIPLE TOENAILS WITH TYPE 2 DIABETES MELLITUS AND PERIPHERAL ANGIOPATHY: ICD-10-CM

## 2021-05-27 DIAGNOSIS — E11.69 ONYCHOMYCOSIS OF MULTIPLE TOENAILS WITH TYPE 2 DIABETES MELLITUS AND PERIPHERAL ANGIOPATHY: ICD-10-CM

## 2021-05-27 DIAGNOSIS — E11.42 DIABETIC POLYNEUROPATHY ASSOCIATED WITH TYPE 2 DIABETES MELLITUS: ICD-10-CM

## 2021-05-27 DIAGNOSIS — B35.1 ONYCHOMYCOSIS OF MULTIPLE TOENAILS WITH TYPE 2 DIABETES MELLITUS AND PERIPHERAL ANGIOPATHY: ICD-10-CM

## 2021-05-27 DIAGNOSIS — B35.3 TINEA PEDIS OF BOTH FEET: ICD-10-CM

## 2021-05-27 DIAGNOSIS — Z79.01 LONG TERM CURRENT USE OF ANTICOAGULANT THERAPY: Primary | ICD-10-CM

## 2021-05-27 PROCEDURE — 3051F PR MOST RECENT HEMOGLOBIN A1C LEVEL 7.0 - < 8.0%: ICD-10-PCS | Mod: CPTII,S$GLB,, | Performed by: PODIATRIST

## 2021-05-27 PROCEDURE — 99214 PR OFFICE/OUTPT VISIT, EST, LEVL IV, 30-39 MIN: ICD-10-PCS | Mod: 25,S$GLB,, | Performed by: PODIATRIST

## 2021-05-27 PROCEDURE — 1126F PR PAIN SEVERITY QUANTIFIED, NO PAIN PRESENT: ICD-10-PCS | Mod: S$GLB,,, | Performed by: PODIATRIST

## 2021-05-27 PROCEDURE — 99214 OFFICE O/P EST MOD 30 MIN: CPT | Mod: 25,S$GLB,, | Performed by: PODIATRIST

## 2021-05-27 PROCEDURE — 3288F PR FALLS RISK ASSESSMENT DOCUMENTED: ICD-10-PCS | Mod: CPTII,S$GLB,, | Performed by: PODIATRIST

## 2021-05-27 PROCEDURE — 1101F PT FALLS ASSESS-DOCD LE1/YR: CPT | Mod: CPTII,S$GLB,, | Performed by: PODIATRIST

## 2021-05-27 PROCEDURE — 11721 DEBRIDE NAIL 6 OR MORE: CPT | Mod: Q9,S$GLB,, | Performed by: PODIATRIST

## 2021-05-27 PROCEDURE — 1159F PR MEDICATION LIST DOCUMENTED IN MEDICAL RECORD: ICD-10-PCS | Mod: S$GLB,,, | Performed by: PODIATRIST

## 2021-05-27 PROCEDURE — 1101F PR PT FALLS ASSESS DOC 0-1 FALLS W/OUT INJ PAST YR: ICD-10-PCS | Mod: CPTII,S$GLB,, | Performed by: PODIATRIST

## 2021-05-27 PROCEDURE — 3051F HG A1C>EQUAL 7.0%<8.0%: CPT | Mod: CPTII,S$GLB,, | Performed by: PODIATRIST

## 2021-05-27 PROCEDURE — 1126F AMNT PAIN NOTED NONE PRSNT: CPT | Mod: S$GLB,,, | Performed by: PODIATRIST

## 2021-05-27 PROCEDURE — 99499 RISK ADDL DX/OHS AUDIT: ICD-10-PCS | Mod: S$GLB,,, | Performed by: PODIATRIST

## 2021-05-27 PROCEDURE — 11721 PR DEBRIDEMENT OF NAILS, 6 OR MORE: ICD-10-PCS | Mod: Q9,S$GLB,, | Performed by: PODIATRIST

## 2021-05-27 PROCEDURE — 99499 UNLISTED E&M SERVICE: CPT | Mod: S$GLB,,, | Performed by: PODIATRIST

## 2021-05-27 PROCEDURE — 99999 PR PBB SHADOW E&M-EST. PATIENT-LVL IV: CPT | Mod: PBBFAC,,, | Performed by: PODIATRIST

## 2021-05-27 PROCEDURE — 99999 PR PBB SHADOW E&M-EST. PATIENT-LVL IV: ICD-10-PCS | Mod: PBBFAC,,, | Performed by: PODIATRIST

## 2021-05-27 PROCEDURE — 3288F FALL RISK ASSESSMENT DOCD: CPT | Mod: CPTII,S$GLB,, | Performed by: PODIATRIST

## 2021-05-27 PROCEDURE — 1159F MED LIST DOCD IN RCRD: CPT | Mod: S$GLB,,, | Performed by: PODIATRIST

## 2021-05-27 RX ORDER — CICLOPIROX OLAMINE 7.7 MG/G
CREAM TOPICAL 2 TIMES DAILY
Qty: 90 G | Refills: 2 | Status: SHIPPED | OUTPATIENT
Start: 2021-05-27 | End: 2021-07-19

## 2021-06-07 ENCOUNTER — OFFICE VISIT (OUTPATIENT)
Dept: OTOLARYNGOLOGY | Facility: CLINIC | Age: 76
End: 2021-06-07
Payer: MEDICARE

## 2021-06-07 DIAGNOSIS — H61.21 IMPACTED CERUMEN OF RIGHT EAR: Primary | ICD-10-CM

## 2021-06-07 PROCEDURE — 69210 PR REMOVAL IMPACTED CERUMEN REQUIRING INSTRUMENTATION, UNILATERAL: ICD-10-PCS | Mod: S$GLB,,, | Performed by: OTOLARYNGOLOGY

## 2021-06-07 PROCEDURE — 99999 PR PBB SHADOW E&M-EST. PATIENT-LVL III: ICD-10-PCS | Mod: PBBFAC,,, | Performed by: OTOLARYNGOLOGY

## 2021-06-07 PROCEDURE — 3288F PR FALLS RISK ASSESSMENT DOCUMENTED: ICD-10-PCS | Mod: CPTII,S$GLB,, | Performed by: OTOLARYNGOLOGY

## 2021-06-07 PROCEDURE — 99499 NO LOS: ICD-10-PCS | Mod: S$GLB,,, | Performed by: OTOLARYNGOLOGY

## 2021-06-07 PROCEDURE — 1101F PT FALLS ASSESS-DOCD LE1/YR: CPT | Mod: CPTII,S$GLB,, | Performed by: OTOLARYNGOLOGY

## 2021-06-07 PROCEDURE — 99999 PR PBB SHADOW E&M-EST. PATIENT-LVL III: CPT | Mod: PBBFAC,,, | Performed by: OTOLARYNGOLOGY

## 2021-06-07 PROCEDURE — 1101F PR PT FALLS ASSESS DOC 0-1 FALLS W/OUT INJ PAST YR: ICD-10-PCS | Mod: CPTII,S$GLB,, | Performed by: OTOLARYNGOLOGY

## 2021-06-07 PROCEDURE — 99499 UNLISTED E&M SERVICE: CPT | Mod: S$GLB,,, | Performed by: OTOLARYNGOLOGY

## 2021-06-07 PROCEDURE — 69210 REMOVE IMPACTED EAR WAX UNI: CPT | Mod: S$GLB,,, | Performed by: OTOLARYNGOLOGY

## 2021-06-07 PROCEDURE — 1126F PR PAIN SEVERITY QUANTIFIED, NO PAIN PRESENT: ICD-10-PCS | Mod: S$GLB,,, | Performed by: OTOLARYNGOLOGY

## 2021-06-07 PROCEDURE — 3288F FALL RISK ASSESSMENT DOCD: CPT | Mod: CPTII,S$GLB,, | Performed by: OTOLARYNGOLOGY

## 2021-06-07 PROCEDURE — 1126F AMNT PAIN NOTED NONE PRSNT: CPT | Mod: S$GLB,,, | Performed by: OTOLARYNGOLOGY

## 2021-06-14 ENCOUNTER — OFFICE VISIT (OUTPATIENT)
Dept: ENDOCRINOLOGY | Facility: CLINIC | Age: 76
End: 2021-06-14
Payer: MEDICARE

## 2021-06-14 VITALS
WEIGHT: 274.5 LBS | TEMPERATURE: 99 F | DIASTOLIC BLOOD PRESSURE: 67 MMHG | OXYGEN SATURATION: 96 % | HEIGHT: 70 IN | SYSTOLIC BLOOD PRESSURE: 117 MMHG | BODY MASS INDEX: 39.3 KG/M2 | HEART RATE: 88 BPM

## 2021-06-14 DIAGNOSIS — E66.01 CLASS 2 SEVERE OBESITY DUE TO EXCESS CALORIES WITH SERIOUS COMORBIDITY AND BODY MASS INDEX (BMI) OF 39.0 TO 39.9 IN ADULT: ICD-10-CM

## 2021-06-14 DIAGNOSIS — E11.69 DYSLIPIDEMIA ASSOCIATED WITH TYPE 2 DIABETES MELLITUS: ICD-10-CM

## 2021-06-14 DIAGNOSIS — E78.5 DYSLIPIDEMIA ASSOCIATED WITH TYPE 2 DIABETES MELLITUS: ICD-10-CM

## 2021-06-14 DIAGNOSIS — N18.32 TYPE 2 DIABETES MELLITUS WITH STAGE 3B CHRONIC KIDNEY DISEASE, WITH LONG-TERM CURRENT USE OF INSULIN: Primary | ICD-10-CM

## 2021-06-14 DIAGNOSIS — Z79.4 TYPE 2 DIABETES MELLITUS WITH STAGE 3B CHRONIC KIDNEY DISEASE, WITH LONG-TERM CURRENT USE OF INSULIN: Primary | ICD-10-CM

## 2021-06-14 DIAGNOSIS — E11.22 TYPE 2 DIABETES MELLITUS WITH STAGE 3B CHRONIC KIDNEY DISEASE, WITH LONG-TERM CURRENT USE OF INSULIN: Primary | ICD-10-CM

## 2021-06-14 PROCEDURE — 99499 UNLISTED E&M SERVICE: CPT | Mod: S$GLB,,, | Performed by: INTERNAL MEDICINE

## 2021-06-14 PROCEDURE — 99214 PR OFFICE/OUTPT VISIT, EST, LEVL IV, 30-39 MIN: ICD-10-PCS | Mod: S$GLB,,, | Performed by: INTERNAL MEDICINE

## 2021-06-14 PROCEDURE — 3288F FALL RISK ASSESSMENT DOCD: CPT | Mod: CPTII,S$GLB,, | Performed by: INTERNAL MEDICINE

## 2021-06-14 PROCEDURE — 3051F HG A1C>EQUAL 7.0%<8.0%: CPT | Mod: CPTII,S$GLB,, | Performed by: INTERNAL MEDICINE

## 2021-06-14 PROCEDURE — 1126F PR PAIN SEVERITY QUANTIFIED, NO PAIN PRESENT: ICD-10-PCS | Mod: S$GLB,,, | Performed by: INTERNAL MEDICINE

## 2021-06-14 PROCEDURE — 3288F PR FALLS RISK ASSESSMENT DOCUMENTED: ICD-10-PCS | Mod: CPTII,S$GLB,, | Performed by: INTERNAL MEDICINE

## 2021-06-14 PROCEDURE — 99499 RISK ADDL DX/OHS AUDIT: ICD-10-PCS | Mod: S$GLB,,, | Performed by: INTERNAL MEDICINE

## 2021-06-14 PROCEDURE — 99214 OFFICE O/P EST MOD 30 MIN: CPT | Mod: S$GLB,,, | Performed by: INTERNAL MEDICINE

## 2021-06-14 PROCEDURE — 1101F PR PT FALLS ASSESS DOC 0-1 FALLS W/OUT INJ PAST YR: ICD-10-PCS | Mod: CPTII,S$GLB,, | Performed by: INTERNAL MEDICINE

## 2021-06-14 PROCEDURE — 1101F PT FALLS ASSESS-DOCD LE1/YR: CPT | Mod: CPTII,S$GLB,, | Performed by: INTERNAL MEDICINE

## 2021-06-14 PROCEDURE — 1126F AMNT PAIN NOTED NONE PRSNT: CPT | Mod: S$GLB,,, | Performed by: INTERNAL MEDICINE

## 2021-06-14 PROCEDURE — 3051F PR MOST RECENT HEMOGLOBIN A1C LEVEL 7.0 - < 8.0%: ICD-10-PCS | Mod: CPTII,S$GLB,, | Performed by: INTERNAL MEDICINE

## 2021-06-16 ENCOUNTER — TELEPHONE (OUTPATIENT)
Dept: OPHTHALMOLOGY | Facility: CLINIC | Age: 76
End: 2021-06-16

## 2021-06-16 ENCOUNTER — OFFICE VISIT (OUTPATIENT)
Dept: OPTOMETRY | Facility: CLINIC | Age: 76
End: 2021-06-16
Payer: MEDICARE

## 2021-06-16 DIAGNOSIS — E11.9 TYPE 2 DIABETES MELLITUS WITHOUT OPHTHALMIC MANIFESTATIONS: ICD-10-CM

## 2021-06-16 DIAGNOSIS — I10 ESSENTIAL HYPERTENSION: ICD-10-CM

## 2021-06-16 DIAGNOSIS — E11.22 TYPE 2 DIABETES MELLITUS WITH STAGE 3 CHRONIC KIDNEY DISEASE, WITH LONG-TERM CURRENT USE OF INSULIN, UNSPECIFIED WHETHER STAGE 3A OR 3B CKD: ICD-10-CM

## 2021-06-16 DIAGNOSIS — Z79.4 TYPE 2 DIABETES MELLITUS WITH STAGE 3 CHRONIC KIDNEY DISEASE, WITH LONG-TERM CURRENT USE OF INSULIN, UNSPECIFIED WHETHER STAGE 3A OR 3B CKD: ICD-10-CM

## 2021-06-16 DIAGNOSIS — G47.30 SLEEP APNEA, UNSPECIFIED TYPE: ICD-10-CM

## 2021-06-16 DIAGNOSIS — N18.30 TYPE 2 DIABETES MELLITUS WITH STAGE 3 CHRONIC KIDNEY DISEASE, WITH LONG-TERM CURRENT USE OF INSULIN, UNSPECIFIED WHETHER STAGE 3A OR 3B CKD: ICD-10-CM

## 2021-06-16 DIAGNOSIS — Z96.1 PSEUDOPHAKIA OF BOTH EYES: ICD-10-CM

## 2021-06-16 DIAGNOSIS — H10.13 CONJUNCTIVITIS, ALLERGIC, BILATERAL: ICD-10-CM

## 2021-06-16 DIAGNOSIS — H26.493 PCO (POSTERIOR CAPSULAR OPACIFICATION), BILATERAL: Primary | ICD-10-CM

## 2021-06-16 PROCEDURE — 2023F PR DILATED RETINAL EXAM W/O EVID OF RETINOPATHY: ICD-10-PCS | Mod: S$GLB,,, | Performed by: OPTOMETRIST

## 2021-06-16 PROCEDURE — 2023F DILAT RTA XM W/O RTNOPTHY: CPT | Mod: S$GLB,,, | Performed by: OPTOMETRIST

## 2021-06-16 PROCEDURE — 1101F PT FALLS ASSESS-DOCD LE1/YR: CPT | Mod: CPTII,S$GLB,, | Performed by: OPTOMETRIST

## 2021-06-16 PROCEDURE — 3288F FALL RISK ASSESSMENT DOCD: CPT | Mod: CPTII,S$GLB,, | Performed by: OPTOMETRIST

## 2021-06-16 PROCEDURE — 92014 PR EYE EXAM, EST PATIENT,COMPREHESV: ICD-10-PCS | Mod: S$GLB,,, | Performed by: OPTOMETRIST

## 2021-06-16 PROCEDURE — 1126F AMNT PAIN NOTED NONE PRSNT: CPT | Mod: S$GLB,,, | Performed by: OPTOMETRIST

## 2021-06-16 PROCEDURE — 1101F PR PT FALLS ASSESS DOC 0-1 FALLS W/OUT INJ PAST YR: ICD-10-PCS | Mod: CPTII,S$GLB,, | Performed by: OPTOMETRIST

## 2021-06-16 PROCEDURE — 99999 PR PBB SHADOW E&M-EST. PATIENT-LVL III: CPT | Mod: PBBFAC,,, | Performed by: OPTOMETRIST

## 2021-06-16 PROCEDURE — 3288F PR FALLS RISK ASSESSMENT DOCUMENTED: ICD-10-PCS | Mod: CPTII,S$GLB,, | Performed by: OPTOMETRIST

## 2021-06-16 PROCEDURE — 92014 COMPRE OPH EXAM EST PT 1/>: CPT | Mod: S$GLB,,, | Performed by: OPTOMETRIST

## 2021-06-16 PROCEDURE — 99999 PR PBB SHADOW E&M-EST. PATIENT-LVL III: ICD-10-PCS | Mod: PBBFAC,,, | Performed by: OPTOMETRIST

## 2021-06-16 PROCEDURE — 1126F PR PAIN SEVERITY QUANTIFIED, NO PAIN PRESENT: ICD-10-PCS | Mod: S$GLB,,, | Performed by: OPTOMETRIST

## 2021-06-19 PROBLEM — H25.13 NUCLEAR SCLEROSIS, BILATERAL: Status: RESOLVED | Noted: 2018-05-14 | Resolved: 2021-06-19

## 2021-06-29 ENCOUNTER — RESEARCH ENCOUNTER (OUTPATIENT)
Dept: RESEARCH | Facility: HOSPITAL | Age: 76
End: 2021-06-29

## 2021-07-02 DIAGNOSIS — I10 ESSENTIAL HYPERTENSION: ICD-10-CM

## 2021-07-02 DIAGNOSIS — N18.30 TYPE 2 DIABETES MELLITUS WITH STAGE 3 CHRONIC KIDNEY DISEASE, WITH LONG-TERM CURRENT USE OF INSULIN: ICD-10-CM

## 2021-07-02 DIAGNOSIS — E11.22 TYPE 2 DIABETES MELLITUS WITH STAGE 3 CHRONIC KIDNEY DISEASE, WITH LONG-TERM CURRENT USE OF INSULIN: ICD-10-CM

## 2021-07-02 DIAGNOSIS — N18.30 CHRONIC KIDNEY DISEASE, STAGE III (MODERATE): ICD-10-CM

## 2021-07-02 DIAGNOSIS — I87.2 VENOUS INSUFFICIENCY OF BOTH LOWER EXTREMITIES: ICD-10-CM

## 2021-07-02 DIAGNOSIS — I48.20 CHRONIC ATRIAL FIBRILLATION: ICD-10-CM

## 2021-07-02 DIAGNOSIS — I50.31 ACUTE DIASTOLIC HEART FAILURE: ICD-10-CM

## 2021-07-02 DIAGNOSIS — Z79.4 TYPE 2 DIABETES MELLITUS WITH STAGE 3 CHRONIC KIDNEY DISEASE, WITH LONG-TERM CURRENT USE OF INSULIN: ICD-10-CM

## 2021-07-02 DIAGNOSIS — M17.11 PRIMARY OSTEOARTHRITIS OF RIGHT KNEE: ICD-10-CM

## 2021-07-08 ENCOUNTER — PATIENT OUTREACH (OUTPATIENT)
Dept: ADMINISTRATIVE | Facility: OTHER | Age: 76
End: 2021-07-08

## 2021-07-09 ENCOUNTER — OFFICE VISIT (OUTPATIENT)
Dept: OPHTHALMOLOGY | Facility: CLINIC | Age: 76
End: 2021-07-09
Payer: MEDICARE

## 2021-07-09 DIAGNOSIS — H26.492 POSTERIOR CAPSULAR OPACIFICATION VISUALLY SIGNIFICANT, LEFT EYE: ICD-10-CM

## 2021-07-09 DIAGNOSIS — H26.491 POSTERIOR CAPSULAR OPACIFICATION VISUALLY SIGNIFICANT, RIGHT EYE: Primary | ICD-10-CM

## 2021-07-09 PROCEDURE — 1101F PR PT FALLS ASSESS DOC 0-1 FALLS W/OUT INJ PAST YR: ICD-10-PCS | Mod: CPTII,S$GLB,, | Performed by: OPHTHALMOLOGY

## 2021-07-09 PROCEDURE — 1159F MED LIST DOCD IN RCRD: CPT | Mod: S$GLB,,, | Performed by: OPHTHALMOLOGY

## 2021-07-09 PROCEDURE — 99999 PR PBB SHADOW E&M-EST. PATIENT-LVL III: ICD-10-PCS | Mod: PBBFAC,,, | Performed by: OPHTHALMOLOGY

## 2021-07-09 PROCEDURE — 99214 PR OFFICE/OUTPT VISIT, EST, LEVL IV, 30-39 MIN: ICD-10-PCS | Mod: 57,S$GLB,, | Performed by: OPHTHALMOLOGY

## 2021-07-09 PROCEDURE — 1126F AMNT PAIN NOTED NONE PRSNT: CPT | Mod: S$GLB,,, | Performed by: OPHTHALMOLOGY

## 2021-07-09 PROCEDURE — 3288F PR FALLS RISK ASSESSMENT DOCUMENTED: ICD-10-PCS | Mod: CPTII,S$GLB,, | Performed by: OPHTHALMOLOGY

## 2021-07-09 PROCEDURE — 66821 YAG CAPSULOTOMY - OS - LEFT EYE: ICD-10-PCS | Mod: LT,S$GLB,, | Performed by: OPHTHALMOLOGY

## 2021-07-09 PROCEDURE — 99214 OFFICE O/P EST MOD 30 MIN: CPT | Mod: 57,S$GLB,, | Performed by: OPHTHALMOLOGY

## 2021-07-09 PROCEDURE — 1101F PT FALLS ASSESS-DOCD LE1/YR: CPT | Mod: CPTII,S$GLB,, | Performed by: OPHTHALMOLOGY

## 2021-07-09 PROCEDURE — 66821 AFTER CATARACT LASER SURGERY: CPT | Mod: LT,S$GLB,, | Performed by: OPHTHALMOLOGY

## 2021-07-09 PROCEDURE — 1126F PR PAIN SEVERITY QUANTIFIED, NO PAIN PRESENT: ICD-10-PCS | Mod: S$GLB,,, | Performed by: OPHTHALMOLOGY

## 2021-07-09 PROCEDURE — 1159F PR MEDICATION LIST DOCUMENTED IN MEDICAL RECORD: ICD-10-PCS | Mod: S$GLB,,, | Performed by: OPHTHALMOLOGY

## 2021-07-09 PROCEDURE — 99999 PR PBB SHADOW E&M-EST. PATIENT-LVL III: CPT | Mod: PBBFAC,,, | Performed by: OPHTHALMOLOGY

## 2021-07-09 PROCEDURE — 3288F FALL RISK ASSESSMENT DOCD: CPT | Mod: CPTII,S$GLB,, | Performed by: OPHTHALMOLOGY

## 2021-07-18 RX ORDER — FUROSEMIDE 40 MG/1
TABLET ORAL
Qty: 90 TABLET | Refills: 1 | Status: SHIPPED | OUTPATIENT
Start: 2021-07-18 | End: 2021-12-22

## 2021-07-19 ENCOUNTER — TELEPHONE (OUTPATIENT)
Dept: OPHTHALMOLOGY | Facility: CLINIC | Age: 76
End: 2021-07-19

## 2021-07-20 ENCOUNTER — TELEPHONE (OUTPATIENT)
Dept: OPHTHALMOLOGY | Facility: CLINIC | Age: 76
End: 2021-07-20

## 2021-07-29 ENCOUNTER — HOSPITAL ENCOUNTER (OUTPATIENT)
Dept: RADIOLOGY | Facility: HOSPITAL | Age: 76
Discharge: HOME | End: 2021-07-29
Attending: INTERNAL MEDICINE
Payer: MEDICARE

## 2021-07-29 ENCOUNTER — LAB VISIT (OUTPATIENT)
Dept: LAB | Facility: HOSPITAL | Age: 76
End: 2021-07-29
Attending: INTERNAL MEDICINE
Payer: MEDICARE

## 2021-07-29 ENCOUNTER — OFFICE VISIT (OUTPATIENT)
Dept: INTERNAL MEDICINE | Facility: CLINIC | Age: 76
End: 2021-07-29
Payer: MEDICARE

## 2021-07-29 VITALS
DIASTOLIC BLOOD PRESSURE: 72 MMHG | HEART RATE: 89 BPM | WEIGHT: 269.63 LBS | TEMPERATURE: 97 F | HEIGHT: 70 IN | SYSTOLIC BLOOD PRESSURE: 122 MMHG | OXYGEN SATURATION: 98 % | BODY MASS INDEX: 38.6 KG/M2

## 2021-07-29 DIAGNOSIS — Z79.4 TYPE 2 DIABETES MELLITUS WITH STAGE 3A CHRONIC KIDNEY DISEASE, WITH LONG-TERM CURRENT USE OF INSULIN: ICD-10-CM

## 2021-07-29 DIAGNOSIS — R10.84 DIFFUSE ABDOMINAL PAIN: Primary | ICD-10-CM

## 2021-07-29 DIAGNOSIS — R10.84 DIFFUSE ABDOMINAL PAIN: ICD-10-CM

## 2021-07-29 DIAGNOSIS — K59.00 CONSTIPATION, UNSPECIFIED CONSTIPATION TYPE: ICD-10-CM

## 2021-07-29 DIAGNOSIS — N18.31 TYPE 2 DIABETES MELLITUS WITH STAGE 3A CHRONIC KIDNEY DISEASE, WITH LONG-TERM CURRENT USE OF INSULIN: ICD-10-CM

## 2021-07-29 DIAGNOSIS — E11.22 TYPE 2 DIABETES MELLITUS WITH STAGE 3A CHRONIC KIDNEY DISEASE, WITH LONG-TERM CURRENT USE OF INSULIN: ICD-10-CM

## 2021-07-29 PROCEDURE — 1159F MED LIST DOCD IN RCRD: CPT | Mod: CPTII,S$GLB,, | Performed by: INTERNAL MEDICINE

## 2021-07-29 PROCEDURE — 99999 PR PBB SHADOW E&M-EST. PATIENT-LVL V: ICD-10-PCS | Mod: PBBFAC,,, | Performed by: INTERNAL MEDICINE

## 2021-07-29 PROCEDURE — 1125F AMNT PAIN NOTED PAIN PRSNT: CPT | Mod: CPTII,S$GLB,, | Performed by: INTERNAL MEDICINE

## 2021-07-29 PROCEDURE — 3074F SYST BP LT 130 MM HG: CPT | Mod: CPTII,S$GLB,, | Performed by: INTERNAL MEDICINE

## 2021-07-29 PROCEDURE — 1159F PR MEDICATION LIST DOCUMENTED IN MEDICAL RECORD: ICD-10-PCS | Mod: CPTII,S$GLB,, | Performed by: INTERNAL MEDICINE

## 2021-07-29 PROCEDURE — 74019 RADEX ABDOMEN 2 VIEWS: CPT | Mod: TC

## 2021-07-29 PROCEDURE — 86140 C-REACTIVE PROTEIN: CPT | Performed by: INTERNAL MEDICINE

## 2021-07-29 PROCEDURE — 85025 COMPLETE CBC W/AUTO DIFF WBC: CPT | Performed by: INTERNAL MEDICINE

## 2021-07-29 PROCEDURE — 3051F PR MOST RECENT HEMOGLOBIN A1C LEVEL 7.0 - < 8.0%: ICD-10-PCS | Mod: CPTII,S$GLB,, | Performed by: INTERNAL MEDICINE

## 2021-07-29 PROCEDURE — 1160F RVW MEDS BY RX/DR IN RCRD: CPT | Mod: CPTII,S$GLB,, | Performed by: INTERNAL MEDICINE

## 2021-07-29 PROCEDURE — 3078F PR MOST RECENT DIASTOLIC BLOOD PRESSURE < 80 MM HG: ICD-10-PCS | Mod: CPTII,S$GLB,, | Performed by: INTERNAL MEDICINE

## 2021-07-29 PROCEDURE — 36415 COLL VENOUS BLD VENIPUNCTURE: CPT | Performed by: INTERNAL MEDICINE

## 2021-07-29 PROCEDURE — 80076 HEPATIC FUNCTION PANEL: CPT | Performed by: INTERNAL MEDICINE

## 2021-07-29 PROCEDURE — 80048 BASIC METABOLIC PNL TOTAL CA: CPT | Performed by: INTERNAL MEDICINE

## 2021-07-29 PROCEDURE — 99214 PR OFFICE/OUTPT VISIT, EST, LEVL IV, 30-39 MIN: ICD-10-PCS | Mod: S$GLB,,, | Performed by: INTERNAL MEDICINE

## 2021-07-29 PROCEDURE — 74019 RADEX ABDOMEN 2 VIEWS: CPT | Mod: 26,,, | Performed by: RADIOLOGY

## 2021-07-29 PROCEDURE — 83690 ASSAY OF LIPASE: CPT | Performed by: INTERNAL MEDICINE

## 2021-07-29 PROCEDURE — 99999 PR PBB SHADOW E&M-EST. PATIENT-LVL V: CPT | Mod: PBBFAC,,, | Performed by: INTERNAL MEDICINE

## 2021-07-29 PROCEDURE — 1160F PR REVIEW ALL MEDS BY PRESCRIBER/CLIN PHARMACIST DOCUMENTED: ICD-10-PCS | Mod: CPTII,S$GLB,, | Performed by: INTERNAL MEDICINE

## 2021-07-29 PROCEDURE — 99214 OFFICE O/P EST MOD 30 MIN: CPT | Mod: S$GLB,,, | Performed by: INTERNAL MEDICINE

## 2021-07-29 PROCEDURE — 1125F PR PAIN SEVERITY QUANTIFIED, PAIN PRESENT: ICD-10-PCS | Mod: CPTII,S$GLB,, | Performed by: INTERNAL MEDICINE

## 2021-07-29 PROCEDURE — 3051F HG A1C>EQUAL 7.0%<8.0%: CPT | Mod: CPTII,S$GLB,, | Performed by: INTERNAL MEDICINE

## 2021-07-29 PROCEDURE — 74019 XR ABDOMEN FLAT AND ERECT: ICD-10-PCS | Mod: 26,,, | Performed by: RADIOLOGY

## 2021-07-29 PROCEDURE — 3074F PR MOST RECENT SYSTOLIC BLOOD PRESSURE < 130 MM HG: ICD-10-PCS | Mod: CPTII,S$GLB,, | Performed by: INTERNAL MEDICINE

## 2021-07-29 PROCEDURE — 3078F DIAST BP <80 MM HG: CPT | Mod: CPTII,S$GLB,, | Performed by: INTERNAL MEDICINE

## 2021-07-30 ENCOUNTER — PATIENT MESSAGE (OUTPATIENT)
Dept: INTERNAL MEDICINE | Facility: CLINIC | Age: 76
End: 2021-07-30

## 2021-07-30 LAB
ALBUMIN SERPL BCP-MCNC: 3.2 G/DL (ref 3.5–5.2)
ALP SERPL-CCNC: 35 U/L (ref 55–135)
ALT SERPL W/O P-5'-P-CCNC: 13 U/L (ref 10–44)
ANION GAP SERPL CALC-SCNC: 13 MMOL/L (ref 8–16)
AST SERPL-CCNC: 17 U/L (ref 10–40)
BASOPHILS # BLD AUTO: 0.04 K/UL (ref 0–0.2)
BASOPHILS NFR BLD: 0.5 % (ref 0–1.9)
BILIRUB DIRECT SERPL-MCNC: 0.4 MG/DL (ref 0.1–0.3)
BILIRUB SERPL-MCNC: 0.8 MG/DL (ref 0.1–1)
BUN SERPL-MCNC: 18 MG/DL (ref 8–23)
CALCIUM SERPL-MCNC: 11.1 MG/DL (ref 8.7–10.5)
CHLORIDE SERPL-SCNC: 104 MMOL/L (ref 95–110)
CO2 SERPL-SCNC: 23 MMOL/L (ref 23–29)
CREAT SERPL-MCNC: 1.5 MG/DL (ref 0.5–1.4)
CRP SERPL-MCNC: 56.9 MG/L (ref 0–8.2)
DIFFERENTIAL METHOD: ABNORMAL
EOSINOPHIL # BLD AUTO: 0.4 K/UL (ref 0–0.5)
EOSINOPHIL NFR BLD: 4.7 % (ref 0–8)
ERYTHROCYTE [DISTWIDTH] IN BLOOD BY AUTOMATED COUNT: 13.2 % (ref 11.5–14.5)
EST. GFR  (AFRICAN AMERICAN): 51.2 ML/MIN/1.73 M^2
EST. GFR  (NON AFRICAN AMERICAN): 44.3 ML/MIN/1.73 M^2
GLUCOSE SERPL-MCNC: 254 MG/DL (ref 70–110)
HCT VFR BLD AUTO: 42.4 % (ref 40–54)
HGB BLD-MCNC: 13.9 G/DL (ref 14–18)
IMM GRANULOCYTES # BLD AUTO: 0.04 K/UL (ref 0–0.04)
IMM GRANULOCYTES NFR BLD AUTO: 0.5 % (ref 0–0.5)
LIPASE SERPL-CCNC: 37 U/L (ref 4–60)
LYMPHOCYTES # BLD AUTO: 1.7 K/UL (ref 1–4.8)
LYMPHOCYTES NFR BLD: 20.5 % (ref 18–48)
MCH RBC QN AUTO: 30.4 PG (ref 27–31)
MCHC RBC AUTO-ENTMCNC: 32.8 G/DL (ref 32–36)
MCV RBC AUTO: 93 FL (ref 82–98)
MONOCYTES # BLD AUTO: 0.7 K/UL (ref 0.3–1)
MONOCYTES NFR BLD: 9.1 % (ref 4–15)
NEUTROPHILS # BLD AUTO: 5.2 K/UL (ref 1.8–7.7)
NEUTROPHILS NFR BLD: 64.7 % (ref 38–73)
NRBC BLD-RTO: 0 /100 WBC
PLATELET # BLD AUTO: 282 K/UL (ref 150–450)
PMV BLD AUTO: 12.4 FL (ref 9.2–12.9)
POTASSIUM SERPL-SCNC: 4.3 MMOL/L (ref 3.5–5.1)
PROT SERPL-MCNC: 7.2 G/DL (ref 6–8.4)
RBC # BLD AUTO: 4.57 M/UL (ref 4.6–6.2)
SODIUM SERPL-SCNC: 140 MMOL/L (ref 136–145)
WBC # BLD AUTO: 8.03 K/UL (ref 3.9–12.7)

## 2021-07-30 RX ORDER — CIPROFLOXACIN 500 MG/1
500 TABLET ORAL 2 TIMES DAILY
Qty: 28 TABLET | Refills: 0 | Status: SHIPPED | OUTPATIENT
Start: 2021-07-30 | End: 2022-04-25 | Stop reason: SDUPTHER

## 2021-08-05 ENCOUNTER — TELEPHONE (OUTPATIENT)
Dept: RESEARCH | Facility: HOSPITAL | Age: 76
End: 2021-08-05

## 2021-09-08 ENCOUNTER — TELEPHONE (OUTPATIENT)
Dept: RESEARCH | Facility: HOSPITAL | Age: 76
End: 2021-09-08

## 2021-09-09 ENCOUNTER — PATIENT MESSAGE (OUTPATIENT)
Dept: ENDOCRINOLOGY | Facility: CLINIC | Age: 76
End: 2021-09-09

## 2021-09-14 ENCOUNTER — OFFICE VISIT (OUTPATIENT)
Dept: PODIATRY | Facility: CLINIC | Age: 76
End: 2021-09-14
Payer: MEDICARE

## 2021-09-14 VITALS
HEIGHT: 70 IN | DIASTOLIC BLOOD PRESSURE: 80 MMHG | BODY MASS INDEX: 38.51 KG/M2 | WEIGHT: 269 LBS | HEART RATE: 83 BPM | SYSTOLIC BLOOD PRESSURE: 140 MMHG

## 2021-09-14 DIAGNOSIS — B35.1 ONYCHOMYCOSIS OF MULTIPLE TOENAILS WITH TYPE 2 DIABETES MELLITUS AND PERIPHERAL ANGIOPATHY: ICD-10-CM

## 2021-09-14 DIAGNOSIS — E11.42 DIABETIC POLYNEUROPATHY ASSOCIATED WITH TYPE 2 DIABETES MELLITUS: ICD-10-CM

## 2021-09-14 DIAGNOSIS — E11.51 ONYCHOMYCOSIS OF MULTIPLE TOENAILS WITH TYPE 2 DIABETES MELLITUS AND PERIPHERAL ANGIOPATHY: ICD-10-CM

## 2021-09-14 DIAGNOSIS — Z79.01 LONG TERM CURRENT USE OF ANTICOAGULANT THERAPY: Primary | ICD-10-CM

## 2021-09-14 DIAGNOSIS — E11.69 ONYCHOMYCOSIS OF MULTIPLE TOENAILS WITH TYPE 2 DIABETES MELLITUS AND PERIPHERAL ANGIOPATHY: ICD-10-CM

## 2021-09-14 PROCEDURE — 3079F PR MOST RECENT DIASTOLIC BLOOD PRESSURE 80-89 MM HG: ICD-10-PCS | Mod: CPTII,S$GLB,, | Performed by: PODIATRIST

## 2021-09-14 PROCEDURE — 11721 DEBRIDE NAIL 6 OR MORE: CPT | Mod: Q9,S$GLB,, | Performed by: PODIATRIST

## 2021-09-14 PROCEDURE — 99499 NO LOS: ICD-10-PCS | Mod: S$GLB,,, | Performed by: PODIATRIST

## 2021-09-14 PROCEDURE — 3077F SYST BP >= 140 MM HG: CPT | Mod: CPTII,S$GLB,, | Performed by: PODIATRIST

## 2021-09-14 PROCEDURE — 1101F PT FALLS ASSESS-DOCD LE1/YR: CPT | Mod: CPTII,S$GLB,, | Performed by: PODIATRIST

## 2021-09-14 PROCEDURE — 1125F PR PAIN SEVERITY QUANTIFIED, PAIN PRESENT: ICD-10-PCS | Mod: CPTII,S$GLB,, | Performed by: PODIATRIST

## 2021-09-14 PROCEDURE — 99499 UNLISTED E&M SERVICE: CPT | Mod: S$GLB,,, | Performed by: PODIATRIST

## 2021-09-14 PROCEDURE — 3079F DIAST BP 80-89 MM HG: CPT | Mod: CPTII,S$GLB,, | Performed by: PODIATRIST

## 2021-09-14 PROCEDURE — 1159F MED LIST DOCD IN RCRD: CPT | Mod: CPTII,S$GLB,, | Performed by: PODIATRIST

## 2021-09-14 PROCEDURE — 1160F RVW MEDS BY RX/DR IN RCRD: CPT | Mod: CPTII,S$GLB,, | Performed by: PODIATRIST

## 2021-09-14 PROCEDURE — 1125F AMNT PAIN NOTED PAIN PRSNT: CPT | Mod: CPTII,S$GLB,, | Performed by: PODIATRIST

## 2021-09-14 PROCEDURE — 1159F PR MEDICATION LIST DOCUMENTED IN MEDICAL RECORD: ICD-10-PCS | Mod: CPTII,S$GLB,, | Performed by: PODIATRIST

## 2021-09-14 PROCEDURE — 11721 PR DEBRIDEMENT OF NAILS, 6 OR MORE: ICD-10-PCS | Mod: Q9,S$GLB,, | Performed by: PODIATRIST

## 2021-09-14 PROCEDURE — 3288F FALL RISK ASSESSMENT DOCD: CPT | Mod: CPTII,S$GLB,, | Performed by: PODIATRIST

## 2021-09-14 PROCEDURE — 1160F PR REVIEW ALL MEDS BY PRESCRIBER/CLIN PHARMACIST DOCUMENTED: ICD-10-PCS | Mod: CPTII,S$GLB,, | Performed by: PODIATRIST

## 2021-09-14 PROCEDURE — 1101F PR PT FALLS ASSESS DOC 0-1 FALLS W/OUT INJ PAST YR: ICD-10-PCS | Mod: CPTII,S$GLB,, | Performed by: PODIATRIST

## 2021-09-14 PROCEDURE — 99999 PR PBB SHADOW E&M-EST. PATIENT-LVL IV: CPT | Mod: PBBFAC,,, | Performed by: PODIATRIST

## 2021-09-14 PROCEDURE — 3077F PR MOST RECENT SYSTOLIC BLOOD PRESSURE >= 140 MM HG: ICD-10-PCS | Mod: CPTII,S$GLB,, | Performed by: PODIATRIST

## 2021-09-14 PROCEDURE — 3288F PR FALLS RISK ASSESSMENT DOCUMENTED: ICD-10-PCS | Mod: CPTII,S$GLB,, | Performed by: PODIATRIST

## 2021-09-14 PROCEDURE — 99999 PR PBB SHADOW E&M-EST. PATIENT-LVL IV: ICD-10-PCS | Mod: PBBFAC,,, | Performed by: PODIATRIST

## 2021-09-20 ENCOUNTER — TELEPHONE (OUTPATIENT)
Dept: ENDOCRINOLOGY | Facility: CLINIC | Age: 76
End: 2021-09-20

## 2021-09-24 ENCOUNTER — IMMUNIZATION (OUTPATIENT)
Dept: PRIMARY CARE CLINIC | Facility: CLINIC | Age: 76
End: 2021-09-24
Payer: MEDICARE

## 2021-09-24 DIAGNOSIS — Z23 NEED FOR VACCINATION: Primary | ICD-10-CM

## 2021-09-24 PROCEDURE — 91300 COVID-19, MRNA, LNP-S, PF, 30 MCG/0.3 ML DOSE VACCINE: CPT | Mod: PBBFAC | Performed by: EMERGENCY MEDICINE

## 2021-10-11 DIAGNOSIS — N18.30 STAGE 3 CHRONIC KIDNEY DISEASE, UNSPECIFIED WHETHER STAGE 3A OR 3B CKD: Primary | ICD-10-CM

## 2021-10-14 ENCOUNTER — IMMUNIZATION (OUTPATIENT)
Dept: PHARMACY | Facility: CLINIC | Age: 76
End: 2021-10-14
Payer: MEDICARE

## 2021-10-29 ENCOUNTER — TELEPHONE (OUTPATIENT)
Dept: RESEARCH | Facility: HOSPITAL | Age: 76
End: 2021-10-29
Payer: MEDICARE

## 2021-11-10 ENCOUNTER — TELEPHONE (OUTPATIENT)
Dept: NEPHROLOGY | Facility: CLINIC | Age: 76
End: 2021-11-10
Payer: MEDICARE

## 2021-11-18 DIAGNOSIS — E11.69 TYPE 2 DIABETES MELLITUS WITH OTHER SPECIFIED COMPLICATION, WITH LONG-TERM CURRENT USE OF INSULIN: Primary | ICD-10-CM

## 2021-11-18 DIAGNOSIS — Z79.4 TYPE 2 DIABETES MELLITUS WITH OTHER SPECIFIED COMPLICATION, WITH LONG-TERM CURRENT USE OF INSULIN: Primary | ICD-10-CM

## 2021-11-18 RX ORDER — BLOOD-GLUCOSE,RECEIVER,CONT
1 EACH MISCELLANEOUS CONTINUOUS
Qty: 1 EACH | Refills: 0 | Status: SHIPPED | OUTPATIENT
Start: 2021-11-18 | End: 2022-12-16 | Stop reason: SDUPTHER

## 2021-11-18 RX ORDER — BLOOD-GLUCOSE SENSOR
1 EACH MISCELLANEOUS
Qty: 9 EACH | Refills: 4 | Status: SHIPPED | OUTPATIENT
Start: 2021-11-18 | End: 2022-12-16 | Stop reason: SDUPTHER

## 2021-11-18 RX ORDER — BLOOD-GLUCOSE TRANSMITTER
1 EACH MISCELLANEOUS
Qty: 1 EACH | Refills: 3 | Status: SHIPPED | OUTPATIENT
Start: 2021-11-18 | End: 2022-12-16 | Stop reason: SDUPTHER

## 2021-11-23 ENCOUNTER — RESEARCH ENCOUNTER (OUTPATIENT)
Dept: RESEARCH | Facility: HOSPITAL | Age: 76
End: 2021-11-23

## 2021-12-16 ENCOUNTER — RESEARCH ENCOUNTER (OUTPATIENT)
Dept: INFECTIOUS DISEASES | Facility: CLINIC | Age: 76
End: 2021-12-16
Payer: MEDICARE

## 2021-12-22 ENCOUNTER — OFFICE VISIT (OUTPATIENT)
Dept: ENDOCRINOLOGY | Facility: CLINIC | Age: 76
End: 2021-12-22
Payer: MEDICARE

## 2021-12-22 VITALS
HEART RATE: 73 BPM | SYSTOLIC BLOOD PRESSURE: 111 MMHG | BODY MASS INDEX: 39.9 KG/M2 | DIASTOLIC BLOOD PRESSURE: 71 MMHG | WEIGHT: 278.69 LBS | HEIGHT: 70 IN

## 2021-12-22 DIAGNOSIS — I50.31 ACUTE DIASTOLIC HEART FAILURE: ICD-10-CM

## 2021-12-22 DIAGNOSIS — N18.31 TYPE 2 DIABETES MELLITUS WITH STAGE 3A CHRONIC KIDNEY DISEASE, WITH LONG-TERM CURRENT USE OF INSULIN: ICD-10-CM

## 2021-12-22 DIAGNOSIS — N18.30 TYPE 2 DIABETES MELLITUS WITH STAGE 3 CHRONIC KIDNEY DISEASE, WITH LONG-TERM CURRENT USE OF INSULIN: ICD-10-CM

## 2021-12-22 DIAGNOSIS — I10 ESSENTIAL HYPERTENSION: ICD-10-CM

## 2021-12-22 DIAGNOSIS — I48.20 CHRONIC ATRIAL FIBRILLATION: ICD-10-CM

## 2021-12-22 DIAGNOSIS — Z79.4 TYPE 2 DIABETES MELLITUS WITH STAGE 3A CHRONIC KIDNEY DISEASE, WITH LONG-TERM CURRENT USE OF INSULIN: ICD-10-CM

## 2021-12-22 DIAGNOSIS — M17.11 PRIMARY OSTEOARTHRITIS OF RIGHT KNEE: ICD-10-CM

## 2021-12-22 DIAGNOSIS — E11.69 DYSLIPIDEMIA ASSOCIATED WITH TYPE 2 DIABETES MELLITUS: ICD-10-CM

## 2021-12-22 DIAGNOSIS — E11.22 TYPE 2 DIABETES MELLITUS WITH STAGE 3 CHRONIC KIDNEY DISEASE, WITH LONG-TERM CURRENT USE OF INSULIN: ICD-10-CM

## 2021-12-22 DIAGNOSIS — E11.22 TYPE 2 DIABETES MELLITUS WITH STAGE 3A CHRONIC KIDNEY DISEASE, WITH LONG-TERM CURRENT USE OF INSULIN: ICD-10-CM

## 2021-12-22 DIAGNOSIS — Z79.4 TYPE 2 DIABETES MELLITUS WITH STAGE 3 CHRONIC KIDNEY DISEASE, WITH LONG-TERM CURRENT USE OF INSULIN: ICD-10-CM

## 2021-12-22 DIAGNOSIS — I87.2 VENOUS INSUFFICIENCY OF BOTH LOWER EXTREMITIES: ICD-10-CM

## 2021-12-22 DIAGNOSIS — E66.01 CLASS 2 SEVERE OBESITY DUE TO EXCESS CALORIES WITH SERIOUS COMORBIDITY AND BODY MASS INDEX (BMI) OF 39.0 TO 39.9 IN ADULT: ICD-10-CM

## 2021-12-22 DIAGNOSIS — E03.9 HYPOTHYROIDISM, UNSPECIFIED TYPE: Primary | ICD-10-CM

## 2021-12-22 DIAGNOSIS — N18.30 CHRONIC KIDNEY DISEASE, STAGE III (MODERATE): ICD-10-CM

## 2021-12-22 DIAGNOSIS — E78.5 DYSLIPIDEMIA ASSOCIATED WITH TYPE 2 DIABETES MELLITUS: ICD-10-CM

## 2021-12-22 PROCEDURE — 99499 RISK ADDL DX/OHS AUDIT: ICD-10-PCS | Mod: S$GLB,,, | Performed by: INTERNAL MEDICINE

## 2021-12-22 PROCEDURE — 1159F PR MEDICATION LIST DOCUMENTED IN MEDICAL RECORD: ICD-10-PCS | Mod: CPTII,S$GLB,, | Performed by: INTERNAL MEDICINE

## 2021-12-22 PROCEDURE — 1126F PR PAIN SEVERITY QUANTIFIED, NO PAIN PRESENT: ICD-10-PCS | Mod: CPTII,S$GLB,, | Performed by: INTERNAL MEDICINE

## 2021-12-22 PROCEDURE — 3288F FALL RISK ASSESSMENT DOCD: CPT | Mod: CPTII,S$GLB,, | Performed by: INTERNAL MEDICINE

## 2021-12-22 PROCEDURE — 3051F HG A1C>EQUAL 7.0%<8.0%: CPT | Mod: CPTII,S$GLB,, | Performed by: INTERNAL MEDICINE

## 2021-12-22 PROCEDURE — 3288F PR FALLS RISK ASSESSMENT DOCUMENTED: ICD-10-PCS | Mod: CPTII,S$GLB,, | Performed by: INTERNAL MEDICINE

## 2021-12-22 PROCEDURE — 99214 PR OFFICE/OUTPT VISIT, EST, LEVL IV, 30-39 MIN: ICD-10-PCS | Mod: S$GLB,,, | Performed by: INTERNAL MEDICINE

## 2021-12-22 PROCEDURE — 1160F RVW MEDS BY RX/DR IN RCRD: CPT | Mod: CPTII,S$GLB,, | Performed by: INTERNAL MEDICINE

## 2021-12-22 PROCEDURE — 1101F PR PT FALLS ASSESS DOC 0-1 FALLS W/OUT INJ PAST YR: ICD-10-PCS | Mod: CPTII,S$GLB,, | Performed by: INTERNAL MEDICINE

## 2021-12-22 PROCEDURE — 99214 OFFICE O/P EST MOD 30 MIN: CPT | Mod: S$GLB,,, | Performed by: INTERNAL MEDICINE

## 2021-12-22 PROCEDURE — 3074F PR MOST RECENT SYSTOLIC BLOOD PRESSURE < 130 MM HG: ICD-10-PCS | Mod: CPTII,S$GLB,, | Performed by: INTERNAL MEDICINE

## 2021-12-22 PROCEDURE — 1159F MED LIST DOCD IN RCRD: CPT | Mod: CPTII,S$GLB,, | Performed by: INTERNAL MEDICINE

## 2021-12-22 PROCEDURE — 1126F AMNT PAIN NOTED NONE PRSNT: CPT | Mod: CPTII,S$GLB,, | Performed by: INTERNAL MEDICINE

## 2021-12-22 PROCEDURE — 99499 UNLISTED E&M SERVICE: CPT | Mod: S$GLB,,, | Performed by: INTERNAL MEDICINE

## 2021-12-22 PROCEDURE — 1160F PR REVIEW ALL MEDS BY PRESCRIBER/CLIN PHARMACIST DOCUMENTED: ICD-10-PCS | Mod: CPTII,S$GLB,, | Performed by: INTERNAL MEDICINE

## 2021-12-22 PROCEDURE — 3078F DIAST BP <80 MM HG: CPT | Mod: CPTII,S$GLB,, | Performed by: INTERNAL MEDICINE

## 2021-12-22 PROCEDURE — 3078F PR MOST RECENT DIASTOLIC BLOOD PRESSURE < 80 MM HG: ICD-10-PCS | Mod: CPTII,S$GLB,, | Performed by: INTERNAL MEDICINE

## 2021-12-22 PROCEDURE — 3051F PR MOST RECENT HEMOGLOBIN A1C LEVEL 7.0 - < 8.0%: ICD-10-PCS | Mod: CPTII,S$GLB,, | Performed by: INTERNAL MEDICINE

## 2021-12-22 PROCEDURE — 1101F PT FALLS ASSESS-DOCD LE1/YR: CPT | Mod: CPTII,S$GLB,, | Performed by: INTERNAL MEDICINE

## 2021-12-22 PROCEDURE — 3074F SYST BP LT 130 MM HG: CPT | Mod: CPTII,S$GLB,, | Performed by: INTERNAL MEDICINE

## 2021-12-22 RX ORDER — INSULIN GLARGINE 100 [IU]/ML
36 INJECTION, SOLUTION SUBCUTANEOUS NIGHTLY
Qty: 30 ML | Refills: 11 | Status: SHIPPED | OUTPATIENT
Start: 2021-12-22 | End: 2021-12-28

## 2021-12-22 RX ORDER — FUROSEMIDE 40 MG/1
TABLET ORAL
Qty: 90 TABLET | Refills: 1 | Status: SHIPPED | OUTPATIENT
Start: 2021-12-22 | End: 2022-06-01

## 2022-01-19 RX ORDER — FENOFIBRATE 145 MG/1
TABLET, FILM COATED ORAL
Qty: 90 TABLET | Refills: 3 | Status: SHIPPED | OUTPATIENT
Start: 2022-01-19 | End: 2022-10-05

## 2022-01-19 NOTE — TELEPHONE ENCOUNTER
Encounter details require adjustment(s)/ updating by OR Staff  As of this time Protocols and CDM: did not populate or display   Adjustment(s) made: Department  CDM should display. Medication(s) delegated by the OR.  Will resend refill request encounter to P Centralized Refill Staff Pool.   [facility]   Note composed:3:59 PM 01/19/2022

## 2022-01-19 NOTE — TELEPHONE ENCOUNTER
No new care gaps identified.  Powered by [employer]. Reference number: [id number].   1/19/2022 4:01:16 PM CST

## 2022-01-28 ENCOUNTER — TELEPHONE (OUTPATIENT)
Dept: PODIATRY | Facility: CLINIC | Age: 77
End: 2022-01-28
Payer: MEDICARE

## 2022-01-28 NOTE — TELEPHONE ENCOUNTER
----- Message from Carson Moon sent at 1/28/2022  2:40 PM CST -----  Regarding: Appt Dave  Contact: [phone]  Request Appt    Who Called: Stephen  Appt Type: Annual   Call Back Number:[phone]  Additional Information: Pt call to schedule an annual check up. Please call the pt to schedule.

## 2022-01-28 NOTE — TELEPHONE ENCOUNTER
Spoke with patient to schedule him and is wife for an annual check up. Staff schedule appointments on 2/14/2022 at 3:15 p and 3:30 p for patient and his wife.      Patient verbalized understanding.

## 2022-02-08 ENCOUNTER — OFFICE VISIT (OUTPATIENT)
Dept: PODIATRY | Facility: CLINIC | Age: 77
End: 2022-02-08
Payer: MEDICARE

## 2022-02-08 VITALS
BODY MASS INDEX: 39.94 KG/M2 | HEART RATE: 82 BPM | WEIGHT: 279 LBS | SYSTOLIC BLOOD PRESSURE: 137 MMHG | DIASTOLIC BLOOD PRESSURE: 72 MMHG | HEIGHT: 70 IN

## 2022-02-08 DIAGNOSIS — E11.42 DIABETIC POLYNEUROPATHY ASSOCIATED WITH TYPE 2 DIABETES MELLITUS: ICD-10-CM

## 2022-02-08 DIAGNOSIS — M24.573 EQUINUS CONTRACTURE OF ANKLE: ICD-10-CM

## 2022-02-08 DIAGNOSIS — E11.51 ONYCHOMYCOSIS OF MULTIPLE TOENAILS WITH TYPE 2 DIABETES MELLITUS AND PERIPHERAL ANGIOPATHY: ICD-10-CM

## 2022-02-08 DIAGNOSIS — B35.1 ONYCHOMYCOSIS OF MULTIPLE TOENAILS WITH TYPE 2 DIABETES MELLITUS AND PERIPHERAL ANGIOPATHY: ICD-10-CM

## 2022-02-08 DIAGNOSIS — Z79.01 LONG TERM CURRENT USE OF ANTICOAGULANT THERAPY: Primary | ICD-10-CM

## 2022-02-08 DIAGNOSIS — E11.69 ONYCHOMYCOSIS OF MULTIPLE TOENAILS WITH TYPE 2 DIABETES MELLITUS AND PERIPHERAL ANGIOPATHY: ICD-10-CM

## 2022-02-08 PROCEDURE — 99214 PR OFFICE/OUTPT VISIT, EST, LEVL IV, 30-39 MIN: ICD-10-PCS | Mod: 25,S$GLB,, | Performed by: PODIATRIST

## 2022-02-08 PROCEDURE — 99499 UNLISTED E&M SERVICE: CPT | Mod: S$GLB,,, | Performed by: PODIATRIST

## 2022-02-08 PROCEDURE — 1159F PR MEDICATION LIST DOCUMENTED IN MEDICAL RECORD: ICD-10-PCS | Mod: CPTII,S$GLB,, | Performed by: PODIATRIST

## 2022-02-08 PROCEDURE — 99999 PR PBB SHADOW E&M-EST. PATIENT-LVL III: CPT | Mod: PBBFAC,,, | Performed by: PODIATRIST

## 2022-02-08 PROCEDURE — 3078F PR MOST RECENT DIASTOLIC BLOOD PRESSURE < 80 MM HG: ICD-10-PCS | Mod: CPTII,S$GLB,, | Performed by: PODIATRIST

## 2022-02-08 PROCEDURE — 1101F PR PT FALLS ASSESS DOC 0-1 FALLS W/OUT INJ PAST YR: ICD-10-PCS | Mod: CPTII,S$GLB,, | Performed by: PODIATRIST

## 2022-02-08 PROCEDURE — 3072F LOW RISK FOR RETINOPATHY: CPT | Mod: CPTII,S$GLB,, | Performed by: PODIATRIST

## 2022-02-08 PROCEDURE — 3051F PR MOST RECENT HEMOGLOBIN A1C LEVEL 7.0 - < 8.0%: ICD-10-PCS | Mod: CPTII,S$GLB,, | Performed by: PODIATRIST

## 2022-02-08 PROCEDURE — 1126F AMNT PAIN NOTED NONE PRSNT: CPT | Mod: CPTII,S$GLB,, | Performed by: PODIATRIST

## 2022-02-08 PROCEDURE — 3075F PR MOST RECENT SYSTOLIC BLOOD PRESS GE 130-139MM HG: ICD-10-PCS | Mod: CPTII,S$GLB,, | Performed by: PODIATRIST

## 2022-02-08 PROCEDURE — 3072F PR LOW RISK FOR RETINOPATHY: ICD-10-PCS | Mod: CPTII,S$GLB,, | Performed by: PODIATRIST

## 2022-02-08 PROCEDURE — 3288F FALL RISK ASSESSMENT DOCD: CPT | Mod: CPTII,S$GLB,, | Performed by: PODIATRIST

## 2022-02-08 PROCEDURE — 1101F PT FALLS ASSESS-DOCD LE1/YR: CPT | Mod: CPTII,S$GLB,, | Performed by: PODIATRIST

## 2022-02-08 PROCEDURE — 11721 PR DEBRIDEMENT OF NAILS, 6 OR MORE: ICD-10-PCS | Mod: Q9,S$GLB,, | Performed by: PODIATRIST

## 2022-02-08 PROCEDURE — 3051F HG A1C>EQUAL 7.0%<8.0%: CPT | Mod: CPTII,S$GLB,, | Performed by: PODIATRIST

## 2022-02-08 PROCEDURE — 3075F SYST BP GE 130 - 139MM HG: CPT | Mod: CPTII,S$GLB,, | Performed by: PODIATRIST

## 2022-02-08 PROCEDURE — 99999 PR PBB SHADOW E&M-EST. PATIENT-LVL III: ICD-10-PCS | Mod: PBBFAC,,, | Performed by: PODIATRIST

## 2022-02-08 PROCEDURE — 3288F PR FALLS RISK ASSESSMENT DOCUMENTED: ICD-10-PCS | Mod: CPTII,S$GLB,, | Performed by: PODIATRIST

## 2022-02-08 PROCEDURE — 1126F PR PAIN SEVERITY QUANTIFIED, NO PAIN PRESENT: ICD-10-PCS | Mod: CPTII,S$GLB,, | Performed by: PODIATRIST

## 2022-02-08 PROCEDURE — 99499 RISK ADDL DX/OHS AUDIT: ICD-10-PCS | Mod: S$GLB,,, | Performed by: PODIATRIST

## 2022-02-08 PROCEDURE — 11721 DEBRIDE NAIL 6 OR MORE: CPT | Mod: Q9,S$GLB,, | Performed by: PODIATRIST

## 2022-02-08 PROCEDURE — 1159F MED LIST DOCD IN RCRD: CPT | Mod: CPTII,S$GLB,, | Performed by: PODIATRIST

## 2022-02-08 PROCEDURE — 99214 OFFICE O/P EST MOD 30 MIN: CPT | Mod: 25,S$GLB,, | Performed by: PODIATRIST

## 2022-02-08 PROCEDURE — 3078F DIAST BP <80 MM HG: CPT | Mod: CPTII,S$GLB,, | Performed by: PODIATRIST

## 2022-02-08 RX ORDER — CICLOPIROX 80 MG/ML
SOLUTION TOPICAL NIGHTLY
Qty: 6.6 ML | Refills: 11 | Status: SHIPPED | OUTPATIENT
Start: 2022-02-08 | End: 2024-01-18

## 2022-02-08 NOTE — PROGRESS NOTES
Subjective:      Patient ID: VIJAY Cantor Jr. is a 76 y.o. male.    Chief Complaint: Diabetic Foot Exam (Brittany Judd 12/22/2021)    BI is a 76 y.o. male who presents to the clinic for evaluation and treatment of high risk feet. BI has a past medical history of *Atrial fibrillation, Anticoagulant long-term use, Basal cell carcinoma (12/2015), Basal cell carcinoma (10/28/2019), BCC (basal cell carcinoma) (12/2015), Cataract, Chronic kidney disease, Diabetes mellitus with renal manifestations, uncontrolled, Dyslipidemia associated with type 2 diabetes mellitus, Fever blister, Hearing loss, HTN (hypertension), Keloid cicatrix, Liver disease, Melanoma (12/07/2015), Obesity, PN (peripheral neuropathy), Primary osteoarthritis of right knee (1/25/2017), Screening for colon cancer (3/3/2016), Sleep apnea, Thyroid disease, Type II or unspecified type diabetes mellitus with other specified manifestations, uncontrolled, and Ulcer of left lower extremity, limited to breakdown of skin (9/22/2017). The patient's chief complaint is long, thick toenails.  Gradual onset, worsening over past several weeks, aggravated by increased weight bearing, shoe gear, pressure.  Periodic debridement helps symptoms. This patient has documented high risk feet requiring routine maintenance secondary to diabetes mellitis and those secondary complications of diabetes, as mentioned..    PCP: Desmond Barlow MD    Date Last Seen by PCP:   Chief Complaint   Patient presents with    Diabetic Foot Exam     Brittany Judd 12/22/2021         Current shoe gear:  Affected Foot: Rx diabetic extra depth shoes and custom accommodative insoles     Unaffected Foot: Rx diabetic extra depth shoes and custom accommodative insoles    Hemoglobin A1C   Date Value Ref Range Status   12/14/2021 7.3 (H) 4.0 - 5.6 % Final     Comment:     ADA Screening Guidelines:  5.7-6.4%  Consistent with prediabetes  >or=6.5%  Consistent with diabetes    High levels of  fetal hemoglobin interfere with the HbA1C  assay. Heterozygous hemoglobin variants (HbS, HgC, etc)do  not significantly interfere with this assay.   However, presence of multiple variants may affect accuracy.     05/24/2021 7.0 (H) 4.0 - 5.6 % Final     Comment:     ADA Screening Guidelines:  5.7-6.4%  Consistent with prediabetes  >or=6.5%  Consistent with diabetes    High levels of fetal hemoglobin interfere with the HbA1C  assay. Heterozygous hemoglobin variants (HbS, HgC, etc)do  not significantly interfere with this assay.   However, presence of multiple variants may affect accuracy.     02/15/2021 6.6 (H) 4.0 - 5.6 % Final     Comment:     ADA Screening Guidelines:  5.7-6.4%  Consistent with prediabetes  >or=6.5%  Consistent with diabetes    High levels of fetal hemoglobin interfere with the HbA1C  assay. Heterozygous hemoglobin variants (HbS, HgC, etc)do  not significantly interfere with this assay.   However, presence of multiple variants may affect accuracy.         Review of Systems   Constitutional: Negative for chills, fever, malaise/fatigue and night sweats.   Cardiovascular: Positive for leg swelling. Negative for claudication and cyanosis.   Skin: Positive for nail changes. Negative for color change, itching, poor wound healing, rash and suspicious lesions.   Musculoskeletal: Negative for falls, gout, joint pain and myalgias.   Neurological: Positive for focal weakness, numbness, paresthesias and sensory change.           Objective:      Physical Exam  Constitutional:       Appearance: He is well-developed. He is not diaphoretic.      Comments: Oriented to time, place, and person.   Cardiovascular:      Pulses:           Dorsalis pedis pulses are 1+ on the right side and 1+ on the left side.        Posterior tibial pulses are 1+ on the right side and 1+ on the left side.      Comments: Capillary fill time 3-5 seconds.  All toes warm to touch.      2 + pitting lower extremity edema  bilateral.    Negative elevational pallor and dependent rubor bilateral.    Musculoskeletal:      Comments: Normal angle, base, station of gait. Decreased stride length, early heel off, moderately propulsive toe off bilateral.    Ankle dorsiflexion decreased at <10 degrees bilateral with moderate increase with knee flexion bilateral.      All ten toes without clubbing, cyanosis, or signs of ischemia.      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    No pain to palpation bilateral lower extremities.      Range of motion, stability, muscle strength, and muscle tone are age and health appropriate normal bilateral feet and legs.       Lymphadenopathy:      Comments: Negative lymphadenopathy bilateral popliteal fossa and tarsal tunnel.  Negative lymphangitic streaking bilateral foot/ankle bilateral.     Skin:     General: Skin is warm and dry.      Coloration: Skin is not pale.      Findings: No abrasion, bruising, burn, ecchymosis, erythema, laceration, lesion, petechiae or rash.      Nails: There is no clubbing.      Comments: Skin thin, atrophic, with decreased density and distribution of pedal hair bilateral, but without hyperpigmentation,   ulcers, masses, nodules or cords palpated bilateral feet and legs.        Toenails 1st, 2nd, 3rd, 4th, 5th  bilateral are hypertrophic thickened 2-3 mm, dystrophic, discolored tanish brown with tan, gray crumbly subungual debris.  Tender to distal nail plate pressure, without periungual skin abnormality of each.     Neurological:      Mental Status: He is alert and oriented to person, place, and time. He is not disoriented.      Sensory: Sensory deficit present.      Motor: No tremor, atrophy or abnormal muscle tone.      Deep Tendon Reflexes:      Reflex Scores:       Patellar reflexes are 2+ on the right side and 2+ on the left side.       Achilles reflexes are 2+ on the right side and 2+ on the left side.     Comments: Decreased/absent vibratory sensation bilateral feet to 128Hz  tuning fork.    Paresthesias, and burning bilateral feet with no clearly identified trigger or source.     Psychiatric:         Behavior: Behavior is cooperative.               Assessment:       Encounter Diagnoses   Name Primary?    Long term current use of anticoagulant therapy Yes    Diabetic polyneuropathy associated with type 2 diabetes mellitus     Onychomycosis of multiple toenails with type 2 diabetes mellitus and peripheral angiopathy     Equinus contracture of ankle          Plan:       BI was seen today for diabetic foot exam.    Diagnoses and all orders for this visit:    Long term current use of anticoagulant therapy  -     DIABETIC SHOES FOR HOME USE  -      DIABETES FOOT EXAM    Diabetic polyneuropathy associated with type 2 diabetes mellitus  -     DIABETIC SHOES FOR HOME USE  -      DIABETES FOOT EXAM    Onychomycosis of multiple toenails with type 2 diabetes mellitus and peripheral angiopathy  -     DIABETIC SHOES FOR HOME USE  -     HM DIABETES FOOT EXAM    Equinus contracture of ankle  -     DIABETIC SHOES FOR HOME USE  -      DIABETES FOOT EXAM    Other orders  -     ciclopirox (PENLAC) 8 % Soln; Apply topically nightly.      I counseled the patient on his conditions, their implications and medical management.    - Shoe inspection. Diabetic Foot Education. Patient reminded of the importance of good nutrition and blood sugar control to help prevent podiatric complications of diabetes. Patient instructed on proper foot hygeine. We discussed wearing proper shoe gear, daily foot inspections, never walking without protective shoe gear, never putting sharp instruments to feet, routine podiatric visits at least annually.      - With patient's permission, nails were aggressively reduced and debrided x 10 to their soft tissue attachment mechanically, removing all offending nail and debris. Patient relates relief following the procedure. He will continue to monitor the areas daily, inspect his  feet, wear protective shoe gear when ambulatory, moisturizer to maintain skin integrity and follow in this office  ptanya.      Discussed conservative treatment with shoes of adequate dimensions, material, and style to alleviate symptoms and delay or prevent surgical intervention.    Resume compression stockings and left afo he has at home.        Continue  penlac    Follow up in about 1 year (around 2/8/2023).

## 2022-02-10 ENCOUNTER — PATIENT OUTREACH (OUTPATIENT)
Dept: ADMINISTRATIVE | Facility: HOSPITAL | Age: 77
End: 2022-02-10
Payer: MEDICARE

## 2022-02-10 NOTE — PROGRESS NOTES
Health Maintenance Due   Topic Date Due    Shingles Vaccine (2 of 3) 12/19/2013     Triggered LINKS.  Updated Care Everywhere.  Chart was reviewed as part of the PHN Attestation for 2021.

## 2022-02-22 ENCOUNTER — PES CALL (OUTPATIENT)
Dept: ADMINISTRATIVE | Facility: CLINIC | Age: 77
End: 2022-02-22
Payer: MEDICARE

## 2022-02-23 DIAGNOSIS — E03.4 HYPOTHYROIDISM DUE TO ACQUIRED ATROPHY OF THYROID: ICD-10-CM

## 2022-02-25 RX ORDER — LEVOTHYROXINE SODIUM 25 UG/1
25 TABLET ORAL DAILY
Qty: 90 TABLET | Refills: 1 | Status: SHIPPED | OUTPATIENT
Start: 2022-02-25 | End: 2022-05-21

## 2022-02-25 NOTE — TELEPHONE ENCOUNTER
Refill Authorization Note   [facility]  is requesting a refill authorization.  Brief Assessment and Rationale for Refill:  Approve     Medication Therapy Plan:  FLOS;    Medication Reconciliation Completed: No   Comments:   --->Care Gap information included below if applicable.   Orders Placed This Encounter    levothyroxine (SYNTHROID) 25 MCG tablet      Requested Prescriptions   Signed Prescriptions Disp Refills    levothyroxine (SYNTHROID) 25 MCG tablet 90 tablet 1     Sig: Take 1 tablet (25 mcg total) by mouth once daily.       Thyroid Hormones Protocol Passed - 2/23/2022  9:08 AM        Passed - Normal TSH within past 12 months      Lab Results   Component Value Date    TSH 3.160 05/24/2021    TSH 3.59 02/15/2021    TSH 4.171 (H) 09/08/2020              Passed - Visit with authorizing provider in past 12 months or upcoming 90 days        Endocrinology:  Hypothyroid Agents Passed - 2/25/2022 12:30 PM        Passed - Patient is at least 18 years old        Passed - Valid encounter within last 15 months     Recent Visits  Date Type Provider Dept   07/29/21 Office Visit Desmond Barlow MD [facility]   05/24/21 Office Visit Desmond Barlow MD [facility]   09/08/20 Office Visit Desmond Barlow MD [facility]   Showing recent visits within past 720 days and meeting all other requirements  Future Appointments  No visits were found meeting these conditions.  Showing future appointments within next 150 days and meeting all other requirements      Future Appointments              In 3 weeks LAB, [facility] [place] - Lab (Hospital), [facility]    In 1 month MD Dae Tafoya - Dermatology 11th Fl, Dae Velazquez    In 2 months ELLE Wheeler - Optical Shop 1st Fl, Dae Velazquez    In 2 months MD Dae Vanegas - Electrophysiology 3rd Fl, Dae Velazquez                Passed - Manual Review: FT4 is not required if last TSH is WNL.        Passed - TSH in normal range and  within 360 days     TSH   Date Value Ref Range Status   05/24/2021 3.160 0.400 - 4.000 uIU/mL Final   02/15/2021 3.59 0.45 - 5.33 uIU/mL Final   09/08/2020 4.171 (H) 0.400 - 4.000 uIU/mL Final              Passed - T4 free within 1080 days     Free T4   Date Value Ref Range Status   09/08/2020 1.15 0.71 - 1.51 ng/dL Final   11/07/2016 1.12 0.71 - 1.51 ng/dL Final   08/26/2016 0.93 0.71 - 1.51 ng/dL Final                  Appointments  past 12m or future 3m with PCP    Date Provider   Last Visit   7/29/2021 Desmond Barlow MD   Next Visit   Visit date not found Desmodn Barlow MD   ED visits in past 90 days: 0     Note composed:12:33 PM 02/25/2022

## 2022-02-25 NOTE — TELEPHONE ENCOUNTER
Care Due:                  Date            Visit Type   Department     Provider  --------------------------------------------------------------------------------                                EP -                              PRIMARY      [facility] PRIMARY  Last Visit: 07-      CARE ([facility])   SURINDER Barlow  Next Visit: None Scheduled  None         None Found                                                            Last  Test          Frequency    Reason                     Performed    Due Date  --------------------------------------------------------------------------------    Lipid Panel.  12 months..  fenofibrate..............  05- 05-    Powered by [employer]. Reference number: [id number].   2/25/2022 12:31:52 PM CST

## 2022-02-25 NOTE — TELEPHONE ENCOUNTER
Encounter details require adjustment(s)/ updating by [facility]C Staff  As of this time Protocols: did not populate or display   Adjustment(s) made: Department  CDM should display. Medication(s) delegated by the ORC.  Will resend refill request encounter to P Centralized Refill Staff Pool.   [facility]   Note composed:12:30 PM 02/25/2022

## 2022-03-03 NOTE — PLAN OF CARE
Comprehensive Nutrition Assessment    Type and Reason for Visit: Initial,Positive nutrition screen    Nutrition Recommendations/Plan:   - Continue current diet. Add 2gm Na restriction 2/2 ESRD. - Add oral supplement to optimize nutrition intake: Nepro once daily. Nutrition Assessment:  Admitted with acute metabolic encephalopathy, fluid overload, hyperkalemia, and covid-19. Good meal intake, consuming 100% of meals. Possible weight loss, but unable to determine if accurate. H/o weight loss (-6.3% x 8 months - not significant). Pt agreeable to Nepro supplementation to consume between meals 2/2 some fullness with meals. Receiving HD daily at this time. Malnutrition Assessment:  Malnutrition Status:  No malnutrition      Nutrition History and Allergies: Pertinent PMH: ESRD on HD, right AKA, DM, paranoid schizophrenia, HTN, NSTEMI, vitamin D deficiency. Presented with SOB and twitching. Pt did not receive HD for about 2 weeks PTA. Pt endorses some wt loss from UBW of 160-170 lb. Per chart pt with h/o UBW of 180 lb, down to 160 lb June 2021, but has been stable between 150-160 lb since. Reports good po intake PTA. NKFA. Estimated Daily Nutrient Needs:  Energy (kcal): 8381-0642; Weight Used for Energy Requirements: Current  Protein (g): 68-82; Weight Used for Protein Requirements: Current (1-1.2)  Fluid (ml/day): 750-1500; Method Used for Fluid Requirements: Standard renal      Nutrition Related Findings:  BM 3/2.  Pertinent meds: SSI, sodium bicarb, hectorol, phoslo, nephrocap      Wounds:    None       Current Nutrition Therapies:  ADULT DIET Regular; 60 to 80 gm    Anthropometric Measures:  · Height:  5' 7\" (170.2 cm)  · Current Body Wt:  68.1 kg (150 lb 2.1 oz)   · Admission Body Wt:  164 lb 14.5 oz (fluid overloaded)    · Usual Body Wt:  70.3 kg (155 lb)     · Ideal Body Wt:  148 lbs:  101.4 %   · Adjusted Body Weight:  165.3; Weight Adjustment for: Amputation   · Adjusted BMI:  25.9    · BMI Stephen Cantor Jr. has met all discharge criteria from Phase II. Vital Signs are stable, ambulating  without difficulty. Discharge instructions given, patient verbalized understanding. Discharged from facility via wheelchair in stable condition.        Category: Overweight (BMI 25.0-29. 9)       Nutrition Diagnosis:   · Predicted inadequate energy intake related to acute injury/trauma,increased demand for energy/nutrients,renal dysfunction as evidenced by weight loss,dialysis      Nutrition Interventions:   Food and/or Nutrient Delivery: Continue current diet,Start oral nutrition supplement,Vitamin supplement  Nutrition Education and Counseling: Education not indicated  Coordination of Nutrition Care: Continue to monitor while inpatient    Goals:  PO nutrition intake will meet >75% of patient estimated nutritional needs by next follow up date.        Nutrition Monitoring and Evaluation:   Behavioral-Environmental Outcomes: None identified  Food/Nutrient Intake Outcomes: Food and nutrient intake,Supplement intake,Vitamin/mineral intake  Physical Signs/Symptoms Outcomes: Biochemical data,Meal time behavior,Fluid status or edema,Nutrition focused physical findings    Discharge Planning:    No discharge needs at this time     Electronically signed by Jaclyn Alston RD on 3/3/2022 at 1:05 PM    Contact: [phone]

## 2022-03-08 RX ORDER — TEMAZEPAM 15 MG/1
15 CAPSULE ORAL NIGHTLY PRN
Qty: 30 CAPSULE | Refills: 5 | Status: SHIPPED | OUTPATIENT
Start: 2022-03-08 | End: 2022-07-27

## 2022-03-28 DIAGNOSIS — Z79.4 TYPE 2 DIABETES MELLITUS WITH STAGE 3A CHRONIC KIDNEY DISEASE, WITH LONG-TERM CURRENT USE OF INSULIN: Primary | ICD-10-CM

## 2022-03-28 DIAGNOSIS — N18.31 TYPE 2 DIABETES MELLITUS WITH STAGE 3A CHRONIC KIDNEY DISEASE, WITH LONG-TERM CURRENT USE OF INSULIN: Primary | ICD-10-CM

## 2022-03-28 DIAGNOSIS — E11.22 TYPE 2 DIABETES MELLITUS WITH STAGE 3A CHRONIC KIDNEY DISEASE, WITH LONG-TERM CURRENT USE OF INSULIN: Primary | ICD-10-CM

## 2022-04-08 ENCOUNTER — OFFICE VISIT (OUTPATIENT)
Dept: DERMATOLOGY | Facility: CLINIC | Age: 77
End: 2022-04-08
Payer: MEDICARE

## 2022-04-08 DIAGNOSIS — R23.8 VENOUS LAKE: ICD-10-CM

## 2022-04-08 DIAGNOSIS — L57.0 AK (ACTINIC KERATOSIS): Primary | ICD-10-CM

## 2022-04-08 DIAGNOSIS — Z86.006 HISTORY OF MELANOMA IN SITU: ICD-10-CM

## 2022-04-08 DIAGNOSIS — L82.1 SEBORRHEIC KERATOSIS: ICD-10-CM

## 2022-04-08 PROCEDURE — 1126F PR PAIN SEVERITY QUANTIFIED, NO PAIN PRESENT: ICD-10-PCS | Mod: CPTII,S$GLB,, | Performed by: DERMATOLOGY

## 2022-04-08 PROCEDURE — 1160F PR REVIEW ALL MEDS BY PRESCRIBER/CLIN PHARMACIST DOCUMENTED: ICD-10-PCS | Mod: CPTII,S$GLB,, | Performed by: DERMATOLOGY

## 2022-04-08 PROCEDURE — 17000 DESTRUCT PREMALG LESION: CPT | Mod: S$GLB,,, | Performed by: DERMATOLOGY

## 2022-04-08 PROCEDURE — 1101F PR PT FALLS ASSESS DOC 0-1 FALLS W/OUT INJ PAST YR: ICD-10-PCS | Mod: CPTII,S$GLB,, | Performed by: DERMATOLOGY

## 2022-04-08 PROCEDURE — 99999 PR PBB SHADOW E&M-EST. PATIENT-LVL II: CPT | Mod: PBBFAC,,, | Performed by: DERMATOLOGY

## 2022-04-08 PROCEDURE — 3072F LOW RISK FOR RETINOPATHY: CPT | Mod: CPTII,S$GLB,, | Performed by: DERMATOLOGY

## 2022-04-08 PROCEDURE — 1126F AMNT PAIN NOTED NONE PRSNT: CPT | Mod: CPTII,S$GLB,, | Performed by: DERMATOLOGY

## 2022-04-08 PROCEDURE — 1159F PR MEDICATION LIST DOCUMENTED IN MEDICAL RECORD: ICD-10-PCS | Mod: CPTII,S$GLB,, | Performed by: DERMATOLOGY

## 2022-04-08 PROCEDURE — 99213 OFFICE O/P EST LOW 20 MIN: CPT | Mod: 25,S$GLB,, | Performed by: DERMATOLOGY

## 2022-04-08 PROCEDURE — 99213 PR OFFICE/OUTPT VISIT, EST, LEVL III, 20-29 MIN: ICD-10-PCS | Mod: 25,S$GLB,, | Performed by: DERMATOLOGY

## 2022-04-08 PROCEDURE — 1160F RVW MEDS BY RX/DR IN RCRD: CPT | Mod: CPTII,S$GLB,, | Performed by: DERMATOLOGY

## 2022-04-08 PROCEDURE — 1101F PT FALLS ASSESS-DOCD LE1/YR: CPT | Mod: CPTII,S$GLB,, | Performed by: DERMATOLOGY

## 2022-04-08 PROCEDURE — 99999 PR PBB SHADOW E&M-EST. PATIENT-LVL II: ICD-10-PCS | Mod: PBBFAC,,, | Performed by: DERMATOLOGY

## 2022-04-08 PROCEDURE — 3288F PR FALLS RISK ASSESSMENT DOCUMENTED: ICD-10-PCS | Mod: CPTII,S$GLB,, | Performed by: DERMATOLOGY

## 2022-04-08 PROCEDURE — 3072F PR LOW RISK FOR RETINOPATHY: ICD-10-PCS | Mod: CPTII,S$GLB,, | Performed by: DERMATOLOGY

## 2022-04-08 PROCEDURE — 1159F MED LIST DOCD IN RCRD: CPT | Mod: CPTII,S$GLB,, | Performed by: DERMATOLOGY

## 2022-04-08 PROCEDURE — 3288F FALL RISK ASSESSMENT DOCD: CPT | Mod: CPTII,S$GLB,, | Performed by: DERMATOLOGY

## 2022-04-08 PROCEDURE — 17000 PR DESTRUCTION(LASER SURGERY,CRYOSURGERY,CHEMOSURGERY),PREMALIGNANT LESIONS,FIRST LESION: ICD-10-PCS | Mod: S$GLB,,, | Performed by: DERMATOLOGY

## 2022-04-08 NOTE — PATIENT INSTRUCTIONS

## 2022-04-08 NOTE — PROGRESS NOTES
Subjective:       Patient ID:  JMC PAT Cantor Jr. is a 76 y.o. male who presents for   Chief Complaint   Patient presents with    Skin Check     TBSE    Lesion     Left forearm     History of Present Illness: The patient presents for follow up of skin check.    The patient was last seen on: 01/26/2021 for cryosurgery to actinic keratoses which have resolved.  H/o MIS right cheek 11/2015    Other skin complaints: lesion left forearm  Patient with new complaint of lesion(s)  Location: left forearm  Duration: 2 weeks  Symptoms: getting larger  Relieving factors/Previous treatments: none        Review of Systems   Constitutional: Negative for fever, chills, weight loss (272# ), weight gain, fatigue and night sweats.   Musculoskeletal: Negative for muscle weakness (legs -resolved).   Skin: Positive for wears hat (for activities only). Negative for daily sunscreen use, activity-related sunscreen use (but wears hat) and recent sunburn.   Hematologic/Lymphatic: Negative for adenopathy. Bruises/bleeds easily (on eliquis).        Objective:    Physical Exam   Constitutional: He appears well-developed and well-nourished. He is obese.  No distress.   Neurological: He is alert and oriented to person, place, and time. He is not disoriented.   Psychiatric: He has a normal mood and affect.   Skin:   Areas Examined (abnormalities noted in diagram):   Scalp / Hair Palpated and Inspected  Head / Face Inspection Performed  Neck Inspection Performed  Chest / Axilla Inspection Performed  Abdomen Inspection Performed  Genitals / Buttocks / Groin Inspection Performed  Back Inspection Performed  RUE Inspected  LUE Inspection Performed  RLE Inspected  LLE Inspection Performed  Nails and Digits Inspection Performed                       Diagram Legend     Erythematous scaling macule/papule c/w actinic keratosis       Vascular papule c/w angioma      Pigmented verrucoid papule/plaque c/w seborrheic keratosis      Yellow umbilicated papule  c/w sebaceous hyperplasia      Irregularly shaped tan macule c/w lentigo     1-2 mm smooth white papules consistent with Milia      Movable subcutaneous cyst with punctum c/w epidermal inclusion cyst      Subcutaneous movable cyst c/w pilar cyst      Firm pink to brown papule c/w dermatofibroma      Pedunculated fleshy papule(s) c/w skin tag(s)      Evenly pigmented macule c/w junctional nevus     Mildly variegated pigmented, slightly irregular-bordered macule c/w mildly atypical nevus      Flesh colored to evenly pigmented papule c/w intradermal nevus       Pink pearly papule/plaque c/w basal cell carcinoma      Erythematous hyperkeratotic cursted plaque c/w SCC      Surgical scar with no sign of skin cancer recurrence      Open and closed comedones      Inflammatory papules and pustules      Verrucoid papule consistent consistent with wart     Erythematous eczematous patches and plaques     Dystrophic onycholytic nail with subungual debris c/w onychomycosis     Umbilicated papule    Erythematous-base heme-crusted tan verrucoid plaque consistent with inflamed seborrheic keratosis     Erythematous Silvery Scaling Plaque c/w Psoriasis     See annotation  Lab Results   Component Value Date    HGBA1C 7.5 (H) 03/22/2022         Assessment / Plan:        AK (actinic keratosis)  Cryosurgery Procedure Note    Verbal consent from the patient is obtained including, but not limited to, risk of hypopigmentation/hyperpigmentation, scar, recurrence of lesion. The patient is aware of the precancerous quality and need for treatment of these lesions. Liquid nitrogen cryosurgery is applied to the 1 actinic keratoses, as detailed in the physical exam, to produce a freeze injury. The patient is aware that blisters may form and is instructed on wound care with gentle cleansing and use of vaseline ointment to keep moist until healed. The patient is supplied a handout on cryosurgery and is instructed to call if lesions do not completely  resolve.      Seborrheic keratosis  These are benign inherited growths without a malignant potential. Reassurance given to patient. No treatment is necessary.       Venous lake   - minor problem and chronic.   Reassurance given to patient. No treatment necessary.       History of melanoma in situ  Area of previous melanoma in situ examined. Site well healed with no signs of recurrence.    Total body skin examination performed today including at least 12 points as noted in physical examination. No lesions suspicious for malignancy noted.    Recommend daily sun protection/avoidance, use of at least SPF 30, broad spectrum sunscreen (OTC drug), skin self examinations, and routine physician surveillance to optimize early detection               Follow up in about 1 year (around 4/8/2023).

## 2022-04-14 ENCOUNTER — PATIENT OUTREACH (OUTPATIENT)
Dept: ADMINISTRATIVE | Facility: OTHER | Age: 77
End: 2022-04-14
Payer: MEDICARE

## 2022-04-14 ENCOUNTER — OFFICE VISIT (OUTPATIENT)
Dept: ORTHOPEDICS | Facility: CLINIC | Age: 77
End: 2022-04-14
Payer: MEDICARE

## 2022-04-14 VITALS — BODY MASS INDEX: 39.94 KG/M2 | HEIGHT: 70 IN | WEIGHT: 279 LBS

## 2022-04-14 DIAGNOSIS — M65.331 TRIGGER MIDDLE FINGER OF RIGHT HAND: Primary | ICD-10-CM

## 2022-04-14 PROCEDURE — 99499 RISK ADDL DX/OHS AUDIT: ICD-10-PCS | Mod: S$GLB,,, | Performed by: ORTHOPAEDIC SURGERY

## 2022-04-14 PROCEDURE — 20550 PR INJECT TENDON SHEATH/LIGAMENT: ICD-10-PCS | Mod: 50,S$GLB,, | Performed by: ORTHOPAEDIC SURGERY

## 2022-04-14 PROCEDURE — 99999 PR PBB SHADOW E&M-EST. PATIENT-LVL III: CPT | Mod: PBBFAC,,, | Performed by: ORTHOPAEDIC SURGERY

## 2022-04-14 PROCEDURE — 3288F FALL RISK ASSESSMENT DOCD: CPT | Mod: CPTII,S$GLB,, | Performed by: ORTHOPAEDIC SURGERY

## 2022-04-14 PROCEDURE — 20550 NJX 1 TENDON SHEATH/LIGAMENT: CPT | Mod: 50,S$GLB,, | Performed by: ORTHOPAEDIC SURGERY

## 2022-04-14 PROCEDURE — 99213 PR OFFICE/OUTPT VISIT, EST, LEVL III, 20-29 MIN: ICD-10-PCS | Mod: 25,S$GLB,, | Performed by: ORTHOPAEDIC SURGERY

## 2022-04-14 PROCEDURE — 3072F PR LOW RISK FOR RETINOPATHY: ICD-10-PCS | Mod: CPTII,S$GLB,, | Performed by: ORTHOPAEDIC SURGERY

## 2022-04-14 PROCEDURE — 1159F MED LIST DOCD IN RCRD: CPT | Mod: CPTII,S$GLB,, | Performed by: ORTHOPAEDIC SURGERY

## 2022-04-14 PROCEDURE — 1125F PR PAIN SEVERITY QUANTIFIED, PAIN PRESENT: ICD-10-PCS | Mod: CPTII,S$GLB,, | Performed by: ORTHOPAEDIC SURGERY

## 2022-04-14 PROCEDURE — 99499 UNLISTED E&M SERVICE: CPT | Mod: S$GLB,,, | Performed by: ORTHOPAEDIC SURGERY

## 2022-04-14 PROCEDURE — 99213 OFFICE O/P EST LOW 20 MIN: CPT | Mod: 25,S$GLB,, | Performed by: ORTHOPAEDIC SURGERY

## 2022-04-14 PROCEDURE — 3288F PR FALLS RISK ASSESSMENT DOCUMENTED: ICD-10-PCS | Mod: CPTII,S$GLB,, | Performed by: ORTHOPAEDIC SURGERY

## 2022-04-14 PROCEDURE — 1101F PR PT FALLS ASSESS DOC 0-1 FALLS W/OUT INJ PAST YR: ICD-10-PCS | Mod: CPTII,S$GLB,, | Performed by: ORTHOPAEDIC SURGERY

## 2022-04-14 PROCEDURE — 99999 PR PBB SHADOW E&M-EST. PATIENT-LVL III: ICD-10-PCS | Mod: PBBFAC,,, | Performed by: ORTHOPAEDIC SURGERY

## 2022-04-14 PROCEDURE — 1101F PT FALLS ASSESS-DOCD LE1/YR: CPT | Mod: CPTII,S$GLB,, | Performed by: ORTHOPAEDIC SURGERY

## 2022-04-14 PROCEDURE — 1159F PR MEDICATION LIST DOCUMENTED IN MEDICAL RECORD: ICD-10-PCS | Mod: CPTII,S$GLB,, | Performed by: ORTHOPAEDIC SURGERY

## 2022-04-14 PROCEDURE — 1125F AMNT PAIN NOTED PAIN PRSNT: CPT | Mod: CPTII,S$GLB,, | Performed by: ORTHOPAEDIC SURGERY

## 2022-04-14 PROCEDURE — 3072F LOW RISK FOR RETINOPATHY: CPT | Mod: CPTII,S$GLB,, | Performed by: ORTHOPAEDIC SURGERY

## 2022-04-14 RX ORDER — TRIAMCINOLONE ACETONIDE 40 MG/ML
40 INJECTION, SUSPENSION INTRA-ARTICULAR; INTRAMUSCULAR
Status: COMPLETED | OUTPATIENT
Start: 2022-04-14 | End: 2022-04-14

## 2022-04-14 RX ADMIN — TRIAMCINOLONE ACETONIDE 40 MG: 40 INJECTION, SUSPENSION INTRA-ARTICULAR; INTRAMUSCULAR at 01:04

## 2022-04-14 NOTE — PROGRESS NOTES
"Subjective:      Patient ID: VIJAY Cantor Jr. is a 76 y.o. male.  Chief Complaint: Hand Problem (Bilateral hand pain and trigger fingers)      HPI  JACQUI PAT Cantor Jr. is a  76 y.o. male presenting today for follow up of triggering of the hand.  He reports that he is now having triggering of the middle fingers both hands  Last year he had index fingers which responded well to injection  No numbness or tingling reported does report stiffness and pain  Would like injections today.    Review of patient's allergies indicates:   Allergen Reactions    Niacin Itching and Other (See Comments)     Other reaction(s): facial flushing and causes hepatitis     Terbinafine Other (See Comments)     Other reaction(s): Hepatitis    "gave me rash"         Current Outpatient Medications   Medication Sig Dispense Refill    atorvastatin (LIPITOR) 40 MG tablet TAKE ONE TABLET BY MOUTH ONCE EVERY DAY 90 tablet 3    azelastine (ASTELIN) 137 mcg (0.1 %) nasal spray 1 spray (137 mcg total) by Nasal route 2 (two) times daily. (Patient taking differently: 1 spray by Nasal route 2 (two) times daily as needed.) 30 mL 3    BD INSULIN PEN NEEDLE UF ORIG 29 x 1/2 " Ndle   12    BD ULTRA-FINE BASIL PEN NEEDLE 32 gauge x 5/32" Ndle USE FOUR TIMES DAILY 400 each 3    blood sugar diagnostic Strp 1 strip by Misc.(Non-Drug; Combo Route) route 4 (four) times daily. To check blood glucose 400 strip 11    blood-glucose meter Misc To check BG as discussed 1 each 0    blood-glucose meter,continuous (DEXCOM G6 ) Misc 1 Device by Misc.(Non-Drug; Combo Route) route continuous. 1 each 0    blood-glucose sensor (DEXCOM G6 SENSOR) Mckenna 1 Device by Misc.(Non-Drug; Combo Route) route every 10 days. 9 each 4    blood-glucose transmitter (DEXCOM G6 TRANSMITTER) Mckenna 1 Device by Misc.(Non-Drug; Combo Route) route every 3 (three) months. 1 each 3    ciclopirox (LOPROX) 0.77 % Crea APPLY TOPICALLY TWICE DAILY 90 g 2    ciclopirox (PENLAC) 8 % Soln Apply " topically nightly. 6.6 mL 11    clobetasol (TEMOVATE) 0.05 % cream AAA finger bid 60 g 3    ELIQUIS 5 mg Tab TAKE ONE TABLET BY MOUTH TWICE DAILY 180 tablet 1    fenofibrate (TRICOR) 145 MG tablet TAKE ONE TABLET BY MOUTH EVERY DAY 90 tablet 3    furosemide (LASIX) 40 MG tablet TAKE ONE TABLET BY MOUTH EVERY DAY AS NEEDED 90 tablet 1    gabapentin (NEURONTIN) 300 MG capsule TAKE ONE CAPSULE BY MOUTH THREE TIMES DAILY 270 capsule 3    insulin aspart U-100 (NOVOLOG FLEXPEN U-100 INSULIN) 100 unit/mL (3 mL) InPn pen Novolog 8 units with meals plus correction scale. Max daily dose 50 units/day (Patient taking differently: 10 Units. Novolog 10 units with meals plus correction scale. Max daily dose 50 units/day) 30 mL 11    insulin lispro (HUMALOG KWIKPEN INSULIN) 100 unit/mL pen Inject 10-12 units 3 times daily with meals. Plus correction scale. Max daily dose 50 units/day 30 mL 11    ketoconazole (NIZORAL) 2 % cream Apply topically once daily. 30 g 1    lancets 33 gauge Misc 1 lancet by Misc.(Non-Drug; Combo Route) route 4 (four) times daily. Pt uses True Result Meter, bg monitoring 4 times a day. 400 each 4    LANTUS SOLOSTAR U-100 INSULIN glargine 100 units/mL (3mL) SubQ pen INJECT 32 UNITS EVERY EVENING. INCREASE AS DIRECTED FOR GOAL MORNING GLUCOSE . MAX 45 UNITS / DAY 30 mL 3    levothyroxine (SYNTHROID) 25 MCG tablet Take 1 tablet (25 mcg total) by mouth once daily. 90 tablet 1    metoprolol succinate (TOPROL-XL) 100 MG 24 hr tablet TAKE ONE TABLET BY MOUTH EVERY DAY 90 tablet 1    nystatin-triamcinolone (MYCOLOG II) cream apply TWICE DAILY 60 g 1    OZEMPIC 1 mg/dose (4 mg/3 mL) INJECT 1MG SUBCUTANEOUSLY ONCE A WEEK 1 pen 11    semaglutide (OZEMPIC) 1 mg/dose (2 mg/1.5 mL) PnIj Inject 1 mg under the skin once a week 2 Syringe 11    temazepam (RESTORIL) 15 mg Cap Take 1 capsule (15 mg total) by mouth nightly as needed. 30 capsule 5    triamcinolone acetonide 0.1% (KENALOG) 0.1 % cream APPLY  TO AFFECTED AREA(S) TWICE DAILY 30 g 3    varicella-zoster gE-AS01B, PF, (SHINGRIX, PF,) 50 mcg/0.5 mL injection Inject into the muscle. 0.5 each 1     No current facility-administered medications for this visit.       Past Medical History:   Diagnosis Date    *Atrial fibrillation     Eliquis    Anticoagulant long-term use     apaxiban    Basal cell carcinoma 12/2015    left eye brow    Basal cell carcinoma 10/28/2019    right superior preauricular    BCC (basal cell carcinoma) 12/2015    left shoulder    Cataract     Chronic kidney disease     Diabetes mellitus with renal manifestations, uncontrolled     Dyslipidemia associated with type 2 diabetes mellitus     Fever blister     Hearing loss     HTN (hypertension)     Keloid cicatrix     Liver disease     Melanoma 12/07/2015    right cheek-in situ    Obesity     PN (peripheral neuropathy)     Primary osteoarthritis of right knee 1/25/2017    Screening for colon cancer 3/3/2016    Sleep apnea     wears CPAP at night    Thyroid disease     Type II or unspecified type diabetes mellitus with other specified manifestations, uncontrolled     Ulcer of left lower extremity, limited to breakdown of skin 9/22/2017       Past Surgical History:   Procedure Laterality Date    APPENDECTOMY      CARDIOVERSION      CATARACT EXTRACTION      CATARACT EXTRACTION Right 05/14/2018    Dr. Greer    COLONOSCOPY N/A 3/3/2016    Procedure: COLONOSCOPY;  Surgeon: Bob Poe MD;  Location: [address]);  Service: Endoscopy;  Laterality: N/A;  Holding Eliquis for 3 days prior to colonoscopy. EC    COLONOSCOPY N/A 9/20/2019    Procedure: COLONOSCOPY;  Surgeon: Akbar Curtis MD;  Location: [address]);  Service: Endoscopy;  Laterality: N/A;  Per Dr. José Granda, patient can HOLD Eliquis X2 days prior to procedure-see telphone encounter 8/22/19-    ECTROPION REPAIR Right 8/14/2019    Procedure: REPAIR, ECTROPION, EYELID /       #[id number]-  "Skin Flaps(Deep Tissue) (OD);  Surgeon: Edita Sabillon MD;  Location: [facility] OR 2ND FLR;  Service: Ophthalmology;  Laterality: Right;    epidural steroid injection      INTRAOCULAR PROSTHESES INSERTION Left 6/25/2018    Procedure: INSERTION, INTRAOCULAR LENS PROSTHESIS;  Surgeon: Lawrence Greer MD;  Location: [facility];  Service: Ophthalmology;  Laterality: Left;    JOINT REPLACEMENT      Knee    Meniere's disease surgery      PHACOEMULSIFICATION OF CATARACT Left 6/25/2018    Procedure: PHACOEMULSIFICATION, CATARACT;  Surgeon: Lawrence Greer MD;  Location: [facility] OR;  Service: Ophthalmology;  Laterality: Left;    PREPARATION OF WOUND PRIOR TO APPLICATION OF SKIN GRAFT Right 8/14/2019    Procedure: PREPARATION, WOUND, PRIOR TO SKIN GRAFT APPLICATION;  Surgeon: Edita Sabillon MD;  [facility];  Service: Ophthalmology;  Laterality: Right;    REVISION OF KNEE ARTHROPLASTY Right 7/10/2018    Procedure: REVISION-ARTHROPLASTY-KNEE-SAMIR;  Surgeon: Miguel Stuart MD;  Location: [facility] OR [facility] FLR;  Service: Orthopedics;  Laterality: Right;  Samir    SKIN FULL THICKNESS GRAFT Right 8/14/2019    Procedure: APPLICATION, GRAFT, SKIN, FULL-THICKNESS;  Surgeon: Edita Sabillon MD;  [facility];  Service: Ophthalmology;  Laterality: Right;    TARSORRHAPHY Right 8/14/2019    Procedure: BLEPHARORRHAPHY;  Surgeon: Edita Sabillon MD;  Location: [facility] OR [facility] FLR;  Service: Ophthalmology;  Laterality: Right;    TONSILLECTOMY      TOTAL KNEE ARTHROPLASTY Right 01/25/2017    TKR    TOTAL KNEE ARTHROPLASTY Left     TKR, 1990's       OBJECTIVE:   PHYSICAL EXAM:  Height: 5' 10" (177.8 cm) Weight: 126.6 kg (279 lb)  Vitals:    04/14/22 1311   Weight: 126.6 kg (279 lb)   Height: 5' 10" (1.778 m)   PainSc: 10-Worst pain ever     Ortho/SPM Exam  Examination hands demonstrate mild swelling bilaterally  Tenderness along the flexor tendon sheath middle finger with limited range of motion and pain on passive " flexion with mild triggering of the middle fingers bilateral  Tinel sign negative    RADIOGRAPHS:  None  Comments: I have personally reviewed the imaging and I agree with the above radiologist's report.    ASSESSMENT/PLAN:     IMPRESSION:  Triggering of the middle fingers bilateral    PLAN:  Patient would like injections  After pause for time-out identified each hand injected middle finger flexor tendon sheath bilateral with combination Kenalog 20 mg 0.5 cc xylocaine sterile technique  Tolerated the procedure well without complication  I have recommended range of motion exercises to prevent stiffness    FOLLOW UP:  4-6 weeks  If symptoms persist consider surgical release    Disclaimer: This note has been generated using voice-recognition software. There may be typographical errors that have been missed during proof-reading.

## 2022-04-14 NOTE — PROGRESS NOTES
Patient, [facility] [place] [place] PAT Cantor Jr. (MRN #[id number]), presented with a recorded BMI of 40.03 kg/m^2 consistent with the definition of morbid obesity (ICD-10 E66.01). The patient's morbid obesity was monitored, evaluated, addressed and/or treated. This addendum to the medical record is made on 04/14/2022.

## 2022-04-14 NOTE — PROGRESS NOTES
Health Maintenance Due   Topic Date Due    Shingles Vaccine (2 of 3) 12/19/2013    COVID-19 Vaccine (4 - Booster for Pfizer series) 12/24/2021     Updates were requested from care everywhere.  Chart was reviewed for overdue Proactive [facility] Encounters (ROD) topics (CRS, Breast Cancer Screening, Eye exam)  Health Maintenance has been updated.  LINKS immunization registry triggered.  Immunizations were reconciled.

## 2022-04-25 ENCOUNTER — TELEPHONE (OUTPATIENT)
Dept: INTERNAL MEDICINE | Facility: CLINIC | Age: 77
End: 2022-04-25
Payer: MEDICARE

## 2022-04-25 RX ORDER — CIPROFLOXACIN 500 MG/1
500 TABLET ORAL 2 TIMES DAILY
Qty: 28 TABLET | Refills: 0 | Status: SHIPPED | OUTPATIENT
Start: 2022-04-25 | End: 2022-05-09

## 2022-04-25 NOTE — TELEPHONE ENCOUNTER
Pt wife called stating patient has frequent urine burning while urinating for 2 days no fever or chills she know it is a UTI he has had this before please advise thanks

## 2022-04-25 NOTE — TELEPHONE ENCOUNTER
----- Message from Patito Bradshaw sent at 4/25/2022  1:17 PM CDT -----  Contact: wife/Leticia [phone]  Patient would like to get medical advice.  Symptoms (please be specific):  urinary frequency, pain, and burning.   How long have you had these symptoms: 3 days  Would you like a call back, or a response through your [facility] portal?:   please call  Pharmacy name and phone # (copy from chart):    [facility] - INDIRA Rinaldi - [address] [place] [address]  [address] [place] [address]  Phone: [phone] Fax: [phone]  Comments:  Please call wife. Pharmacy closes early and delivers prior to a certain time if medication could be called in.

## 2022-05-04 DIAGNOSIS — E11.69 DYSLIPIDEMIA ASSOCIATED WITH TYPE 2 DIABETES MELLITUS: ICD-10-CM

## 2022-05-04 DIAGNOSIS — E78.5 DYSLIPIDEMIA ASSOCIATED WITH TYPE 2 DIABETES MELLITUS: ICD-10-CM

## 2022-05-04 RX ORDER — ATORVASTATIN CALCIUM 40 MG/1
40 TABLET, FILM COATED ORAL DAILY
Qty: 90 TABLET | Refills: 3 | Status: SHIPPED | OUTPATIENT
Start: 2022-05-04 | End: 2023-04-05

## 2022-05-19 ENCOUNTER — PES CALL (OUTPATIENT)
Dept: ADMINISTRATIVE | Facility: CLINIC | Age: 77
End: 2022-05-19
Payer: MEDICARE

## 2022-05-23 ENCOUNTER — OFFICE VISIT (OUTPATIENT)
Dept: OPTOMETRY | Facility: CLINIC | Age: 77
End: 2022-05-23
Payer: MEDICARE

## 2022-05-23 DIAGNOSIS — G47.30 SLEEP APNEA, UNSPECIFIED TYPE: ICD-10-CM

## 2022-05-23 DIAGNOSIS — H01.001 BLEPHARITIS OF UPPER EYELIDS OF BOTH EYES, UNSPECIFIED TYPE: ICD-10-CM

## 2022-05-23 DIAGNOSIS — H01.004 BLEPHARITIS OF UPPER EYELIDS OF BOTH EYES, UNSPECIFIED TYPE: ICD-10-CM

## 2022-05-23 DIAGNOSIS — E11.9 TYPE 2 DIABETES MELLITUS WITHOUT OPHTHALMIC MANIFESTATIONS: ICD-10-CM

## 2022-05-23 DIAGNOSIS — H26.493 PCO (POSTERIOR CAPSULAR OPACIFICATION), BILATERAL: ICD-10-CM

## 2022-05-23 DIAGNOSIS — Z96.1 PSEUDOPHAKIA OF BOTH EYES: ICD-10-CM

## 2022-05-23 DIAGNOSIS — I10 ESSENTIAL HYPERTENSION: ICD-10-CM

## 2022-05-23 DIAGNOSIS — Z79.4 TYPE 2 DIABETES MELLITUS WITH STAGE 3 CHRONIC KIDNEY DISEASE, WITH LONG-TERM CURRENT USE OF INSULIN, UNSPECIFIED WHETHER STAGE 3A OR 3B CKD: Primary | ICD-10-CM

## 2022-05-23 DIAGNOSIS — N18.30 TYPE 2 DIABETES MELLITUS WITH STAGE 3 CHRONIC KIDNEY DISEASE, WITH LONG-TERM CURRENT USE OF INSULIN, UNSPECIFIED WHETHER STAGE 3A OR 3B CKD: Primary | ICD-10-CM

## 2022-05-23 DIAGNOSIS — Z98.890 HISTORY OF ECTROPION REPAIR: ICD-10-CM

## 2022-05-23 DIAGNOSIS — E11.22 TYPE 2 DIABETES MELLITUS WITH STAGE 3 CHRONIC KIDNEY DISEASE, WITH LONG-TERM CURRENT USE OF INSULIN, UNSPECIFIED WHETHER STAGE 3A OR 3B CKD: Primary | ICD-10-CM

## 2022-05-23 PROCEDURE — 1160F RVW MEDS BY RX/DR IN RCRD: CPT | Mod: CPTII,S$GLB,, | Performed by: OPTOMETRIST

## 2022-05-23 PROCEDURE — 2023F PR DILATED RETINAL EXAM W/O EVID OF RETINOPATHY: ICD-10-PCS | Mod: CPTII,S$GLB,, | Performed by: OPTOMETRIST

## 2022-05-23 PROCEDURE — 1101F PT FALLS ASSESS-DOCD LE1/YR: CPT | Mod: CPTII,S$GLB,, | Performed by: OPTOMETRIST

## 2022-05-23 PROCEDURE — 99999 PR PBB SHADOW E&M-EST. PATIENT-LVL II: ICD-10-PCS | Mod: PBBFAC,,, | Performed by: OPTOMETRIST

## 2022-05-23 PROCEDURE — 3288F FALL RISK ASSESSMENT DOCD: CPT | Mod: CPTII,S$GLB,, | Performed by: OPTOMETRIST

## 2022-05-23 PROCEDURE — 1126F PR PAIN SEVERITY QUANTIFIED, NO PAIN PRESENT: ICD-10-PCS | Mod: CPTII,S$GLB,, | Performed by: OPTOMETRIST

## 2022-05-23 PROCEDURE — 99999 PR PBB SHADOW E&M-EST. PATIENT-LVL II: CPT | Mod: PBBFAC,,, | Performed by: OPTOMETRIST

## 2022-05-23 PROCEDURE — 1159F MED LIST DOCD IN RCRD: CPT | Mod: CPTII,S$GLB,, | Performed by: OPTOMETRIST

## 2022-05-23 PROCEDURE — 1126F AMNT PAIN NOTED NONE PRSNT: CPT | Mod: CPTII,S$GLB,, | Performed by: OPTOMETRIST

## 2022-05-23 PROCEDURE — 1160F PR REVIEW ALL MEDS BY PRESCRIBER/CLIN PHARMACIST DOCUMENTED: ICD-10-PCS | Mod: CPTII,S$GLB,, | Performed by: OPTOMETRIST

## 2022-05-23 PROCEDURE — 2023F DILAT RTA XM W/O RTNOPTHY: CPT | Mod: CPTII,S$GLB,, | Performed by: OPTOMETRIST

## 2022-05-23 PROCEDURE — 1101F PR PT FALLS ASSESS DOC 0-1 FALLS W/OUT INJ PAST YR: ICD-10-PCS | Mod: CPTII,S$GLB,, | Performed by: OPTOMETRIST

## 2022-05-23 PROCEDURE — 92014 COMPRE OPH EXAM EST PT 1/>: CPT | Mod: S$GLB,,, | Performed by: OPTOMETRIST

## 2022-05-23 PROCEDURE — 92014 PR EYE EXAM, EST PATIENT,COMPREHESV: ICD-10-PCS | Mod: S$GLB,,, | Performed by: OPTOMETRIST

## 2022-05-23 PROCEDURE — 1159F PR MEDICATION LIST DOCUMENTED IN MEDICAL RECORD: ICD-10-PCS | Mod: CPTII,S$GLB,, | Performed by: OPTOMETRIST

## 2022-05-23 PROCEDURE — 3288F PR FALLS RISK ASSESSMENT DOCUMENTED: ICD-10-PCS | Mod: CPTII,S$GLB,, | Performed by: OPTOMETRIST

## 2022-05-23 NOTE — PROGRESS NOTES
HPI     Diabetic eye exam    Pt c/o film over OD x last visit  No pain or discomfort  No gtt    POHx:  BCC left eye brow 12/2015   Ectropion repair  right lower lid 08/14/2019   Phaco IOL OD 05/14/2018   Phaco IOL OS  06/22/2018     Hemoglobin A1C       Date                     Value               Ref Range             Status                05/18/2022               8.0 (H)             4.0 - 5.6 %           Final                       03/22/2022               7.5 (H)             4.0 - 5.6 %           Final                       12/14/2021               7.3 (H)             4.0 - 5.6 %           Final                  ----------      Last edited by Darlene Booker on 5/23/2022  1:51 PM. (History)            Assessment /Plan     For exam results, see Encounter Report.       PCO (posterior capsular opacification), OD              Consult for YAG OD     Pseudophakia of both eyes  Phaco IOL OD 05/14/2018 Zoey  Phaco IOL OS  06/22/2018 zoey  S/p YAG OS [facility]  July 2021     Type 2 diabetes mellitus with stage 3 chronic kidney disease, with long-term current use of insulin, unspecified whether stage 3a or 3b CKD  Type 2 diabetes mellitus without ophthalmic manifestations  Essential hypertension     Sleep apnea, unspecified type    Blepharitis OU  Use ocusoft lid scrubs BID OU    Cicatricial ectropion of right lower eyelid  BCC left eye brow 12/2015  Ectropion repair  right lower lid 08/14/2019 Dr. Sabillon                                RTC 1 year, annual

## 2022-05-24 NOTE — PROGRESS NOTES
MEDICAL HISTORY:    Type 2 diabetes with peripheral neuropathy and chronic kidney disease stage III.  Chronic atrial fibrillation.  Hypertension.  Hyperlipidemia.  Chronic diastolic dysfunction.  Sleep apnea, history of septoplasty and UPPP .  Pulmonary hypertension.  Chronic venous insufficiency  Lymphedema  Lumbar degenerative disc disease  BPH  Right total knee replacement and revision arthroplasty  Left total knee arthroplasty 2003  Left shoulder surgery.  Meniere's disease.  Lumbar degenerative disk disease.  Nephlithiasis.  Status post eyelid surgery for removal of ectropion  Basis cell carcinoma, status post Mohs     SOCIAL HISTORY:  Tobacco use and alcohol use, none.   , has three children.  No formal exercise routine.     FAMILY HISTORY:  Mother is , had diabetes, stroke.  Father is , had diabetes and heart disease.  Three sisters, two sisters , one with brain cancer, another with leukemia.  Two brothers, one  from ALS, another  of brain cancer.  He has one brother that is alive     MEDICATIONS:  Eliquis 5 mg b.i.d.  Ozempic 1 g once a week  Fenofibrate 145 mg.  Lasix 40 mg.  Gabapentin 300 mg two capsules b.i.d.  Lantus insulin 36 units.  NovoLog  10 units with each meal plus sliding scale  Metoprolol  mg at night.  Atorvastatin 40 mg  Temazepam 15 mg at night as needed.    Component      Latest Ref Rng & Units 2022   WBC      3.90 - 12.70 K/uL 7.08   RBC      4.60 - 6.20 M/uL 5.14   Hemoglobin      14.0 - 18.0 g/dL 15.9   Hematocrit      40.0 - 54.0 % 48.3   MCV      82 - 98 fL 94   MCH      27.0 - 31.0 pg 30.9   MCHC      32.0 - 36.0 g/dL 32.9   RDW      11.5 - 14.5 % 13.0   Platelets      150 - 450 K/uL 224   MPV      9.2 - 12.9 fL 11.4   Immature Granulocytes      0.0 - 0.5 % 0.4   Gran # (ANC)      1.8 - 7.7 K/uL 4.6   Immature Grans (Abs)      0.00 - 0.04 K/uL 0.03   Lymph #      1.0 - 4.8 K/uL 1.5   Mono #      0.3 - 1.0 K/uL 0.6   Eos #      0.0 -  0.5 K/uL 0.3   Baso #      0.00 - 0.20 K/uL 0.03   nRBC      0 /100 WBC 0   Gran %      38.0 - 73.0 % 64.8   Lymph %      18.0 - 48.0 % 21.2   Mono %      4.0 - 15.0 % 8.8   Eosinophil %      0.0 - 8.0 % 4.4   Basophil %      0.0 - 1.9 % 0.4   Differential Method       Automated   Sodium      136 - 145 mmol/L 138   Potassium      3.5 - 5.1 mmol/L 4.9   Chloride      95 - 110 mmol/L 103   CO2      23 - 29 mmol/L 25   Glucose      70 - 110 mg/dL 186 (H)   BUN      8 - 23 mg/dL 21   Creatinine      0.5 - 1.4 mg/dL 1.5 (H)   Calcium      8.7 - 10.5 mg/dL 9.9   PROTEIN TOTAL      6.0 - 8.4 g/dL 7.3   Albumin      3.5 - 5.2 g/dL 3.9   BILIRUBIN TOTAL      0.1 - 1.0 mg/dL 1.0   Alkaline Phosphatase      55 - 135 U/L 34 (L)   AST      10 - 40 U/L 17   ALT      10 - 44 U/L 13   Anion Gap      8 - 16 mmol/L 10   eGFR if African American      >60 mL/min/1.73 m:2 50.8 (A)   eGFR if non African American      >60 mL/min/1.73 m:2 44.0 (A)   Cholesterol      120 - 199 mg/dL 135   Triglycerides      30 - 150 mg/dL 98   HDL      40 - 75 mg/dL 33 (L)   LDL Cholesterol External      63.0 - 159.0 mg/dL 82.4   HDL/Cholesterol Ratio      20.0 - 50.0 % 24.4   Total Cholesterol/HDL Ratio      2.0 - 5.0 4.1   Non-HDL Cholesterol      mg/dL 102   Hemoglobin A1C External      4.0 - 5.6 % 8.0 (H)   Estimated Avg Glucose      68 - 131 mg/dL 183 (H)   TSH      0.400 - 4.000 uIU/mL 4.598 (H)   Free T4      0.71 - 1.51 ng/dL 0.93       68-year-old male  Presents for routine annual exam    On April 25th ciprofloxacin was sent in for 2 weeks with suspected prostatitis.  He was having worsening urine frequency, difficulty initiating urine stream the stream was often on, urine was brown and uncomfortable.  At 2 was mostly the symptoms resolved but he still has urine frequency and urgency    It was noted that during this time he put a hold on his Lasix medication, at 2 was to not resume underway but resumed 5 days ago    He checks his blood glucose  readings average in the high 100 to low 200s.  This been a steady rise in hemoglobin A1c    Regarding medications, he has taken atorvastatin but cannot recognize that the fenofibrate    He is not on levothyroxine which was on before    Presently he reports no chest pain palpitations/shortness of breath.  Will have a degree of dyspnea on exertion.  There is no abdominal pain.  bowel function issues with few days of the week.  No other throughout his headaches    Examination  Weight 273 lb  Pulses 76  Blood pressure 130/62  Tympanic membranes normal  Nasal mucosa is clear  Oropharynx normal findings  Neck no thyromegaly no masses  Chest clear breath sounds  Heart irregular regular, no murmurs  Abdominal exam soft, nontender no masses  2+ carotid pulses no bruits, 2+ pedal pulses  Extremities 1+ pedal and pretibial edema there is hyperemia noted over the leaks.  Rectal stool is brown, possibly mildly enlarged smooth    Impression  Type 2 diabetes with peripheral neuropathy and chronic kidney disease stage 3  Hypertension  Hyperlipidemia  Chronic diastolic dysfunction  Chronic atrial fibrillation  Chronic venous insufficiency  BPH with history of prostatitis    Plan  PSA test today, urine for UA urine C&S  Recommended put a hold on Lasix.  Will initiate tamsulosin 0.4 mg daily.  And hold on Lasix use monitor weight and fluid retention  Medication list was given to make sure he is taking medications for  To be more mindful of diet habits which I feel will be the main way of controlling his diabetes

## 2022-05-25 ENCOUNTER — HOSPITAL ENCOUNTER (OUTPATIENT)
Dept: CARDIOLOGY | Facility: CLINIC | Age: 77
Discharge: HOME | End: 2022-05-25
Payer: MEDICARE

## 2022-05-25 ENCOUNTER — OFFICE VISIT (OUTPATIENT)
Dept: INTERNAL MEDICINE | Facility: CLINIC | Age: 77
End: 2022-05-25
Payer: MEDICARE

## 2022-05-25 ENCOUNTER — LAB VISIT (OUTPATIENT)
Dept: LAB | Facility: HOSPITAL | Age: 77
End: 2022-05-25
Attending: INTERNAL MEDICINE
Payer: MEDICARE

## 2022-05-25 ENCOUNTER — OFFICE VISIT (OUTPATIENT)
Dept: ELECTROPHYSIOLOGY | Facility: CLINIC | Age: 77
End: 2022-05-25
Payer: MEDICARE

## 2022-05-25 VITALS
BODY MASS INDEX: 39.16 KG/M2 | HEART RATE: 68 BPM | SYSTOLIC BLOOD PRESSURE: 128 MMHG | DIASTOLIC BLOOD PRESSURE: 68 MMHG | TEMPERATURE: 98 F | OXYGEN SATURATION: 98 % | HEIGHT: 70 IN | WEIGHT: 273.56 LBS

## 2022-05-25 VITALS
DIASTOLIC BLOOD PRESSURE: 68 MMHG | WEIGHT: 274.69 LBS | BODY MASS INDEX: 39.33 KG/M2 | SYSTOLIC BLOOD PRESSURE: 120 MMHG | HEART RATE: 71 BPM | HEIGHT: 70 IN

## 2022-05-25 DIAGNOSIS — Z12.5 ENCOUNTER FOR SCREENING FOR MALIGNANT NEOPLASM OF PROSTATE: ICD-10-CM

## 2022-05-25 DIAGNOSIS — I10 ESSENTIAL HYPERTENSION: ICD-10-CM

## 2022-05-25 DIAGNOSIS — G47.30 SLEEP APNEA, UNSPECIFIED TYPE: ICD-10-CM

## 2022-05-25 DIAGNOSIS — Z79.4 TYPE 2 DIABETES MELLITUS WITH DIABETIC POLYNEUROPATHY, WITH LONG-TERM CURRENT USE OF INSULIN: ICD-10-CM

## 2022-05-25 DIAGNOSIS — Z79.4 TYPE 2 DIABETES MELLITUS WITH STAGE 3A CHRONIC KIDNEY DISEASE, WITH LONG-TERM CURRENT USE OF INSULIN: Primary | ICD-10-CM

## 2022-05-25 DIAGNOSIS — N40.0 BENIGN PROSTATIC HYPERPLASIA, UNSPECIFIED WHETHER LOWER URINARY TRACT SYMPTOMS PRESENT: ICD-10-CM

## 2022-05-25 DIAGNOSIS — N40.1 BPH WITH OBSTRUCTION/LOWER URINARY TRACT SYMPTOMS: ICD-10-CM

## 2022-05-25 DIAGNOSIS — Z79.01 LONG TERM CURRENT USE OF ANTICOAGULANT THERAPY: ICD-10-CM

## 2022-05-25 DIAGNOSIS — M19.90 OSTEOARTHRITIS, UNSPECIFIED OSTEOARTHRITIS TYPE, UNSPECIFIED SITE: ICD-10-CM

## 2022-05-25 DIAGNOSIS — N18.30 STAGE 3 CHRONIC KIDNEY DISEASE, UNSPECIFIED WHETHER STAGE 3A OR 3B CKD: ICD-10-CM

## 2022-05-25 DIAGNOSIS — I27.20 PULMONARY HYPERTENSION: ICD-10-CM

## 2022-05-25 DIAGNOSIS — E78.5 HYPERLIPIDEMIA, UNSPECIFIED HYPERLIPIDEMIA TYPE: ICD-10-CM

## 2022-05-25 DIAGNOSIS — E11.22 TYPE 2 DIABETES MELLITUS WITH STAGE 3A CHRONIC KIDNEY DISEASE, WITH LONG-TERM CURRENT USE OF INSULIN: Primary | ICD-10-CM

## 2022-05-25 DIAGNOSIS — N18.31 TYPE 2 DIABETES MELLITUS WITH STAGE 3A CHRONIC KIDNEY DISEASE, WITH LONG-TERM CURRENT USE OF INSULIN: Primary | ICD-10-CM

## 2022-05-25 DIAGNOSIS — I48.20 CHRONIC ATRIAL FIBRILLATION: Primary | ICD-10-CM

## 2022-05-25 DIAGNOSIS — I51.89 DIASTOLIC DYSFUNCTION: ICD-10-CM

## 2022-05-25 DIAGNOSIS — I87.2 VENOUS INSUFFICIENCY OF BOTH LOWER EXTREMITIES: ICD-10-CM

## 2022-05-25 DIAGNOSIS — E78.00 PURE HYPERCHOLESTEROLEMIA: ICD-10-CM

## 2022-05-25 DIAGNOSIS — I48.20 CHRONIC ATRIAL FIBRILLATION: ICD-10-CM

## 2022-05-25 DIAGNOSIS — E11.42 TYPE 2 DIABETES MELLITUS WITH DIABETIC POLYNEUROPATHY, WITH LONG-TERM CURRENT USE OF INSULIN: ICD-10-CM

## 2022-05-25 DIAGNOSIS — N13.8 BPH WITH OBSTRUCTION/LOWER URINARY TRACT SYMPTOMS: ICD-10-CM

## 2022-05-25 LAB
BILIRUB UR QL STRIP: NEGATIVE
CLARITY UR REFRACT.AUTO: ABNORMAL
COLOR UR AUTO: YELLOW
COMPLEXED PSA SERPL-MCNC: 4 NG/ML (ref 0–4)
GLUCOSE UR QL STRIP: NEGATIVE
HGB UR QL STRIP: NEGATIVE
KETONES UR QL STRIP: NEGATIVE
LEUKOCYTE ESTERASE UR QL STRIP: NEGATIVE
NITRITE UR QL STRIP: NEGATIVE
PH UR STRIP: 8 [PH] (ref 5–8)
PROT UR QL STRIP: NEGATIVE
SP GR UR STRIP: 1.01 (ref 1–1.03)
URN SPEC COLLECT METH UR: ABNORMAL

## 2022-05-25 PROCEDURE — 3288F PR FALLS RISK ASSESSMENT DOCUMENTED: ICD-10-PCS | Mod: CPTII,S$GLB,, | Performed by: INTERNAL MEDICINE

## 2022-05-25 PROCEDURE — 93010 RHYTHM STRIP: ICD-10-PCS | Mod: S$GLB,,, | Performed by: INTERNAL MEDICINE

## 2022-05-25 PROCEDURE — 87086 URINE CULTURE/COLONY COUNT: CPT | Performed by: INTERNAL MEDICINE

## 2022-05-25 PROCEDURE — 3052F PR MOST RECENT HEMOGLOBIN A1C LEVEL 8.0 - < 9.0%: ICD-10-PCS | Mod: CPTII,S$GLB,, | Performed by: INTERNAL MEDICINE

## 2022-05-25 PROCEDURE — 1160F PR REVIEW ALL MEDS BY PRESCRIBER/CLIN PHARMACIST DOCUMENTED: ICD-10-PCS | Mod: CPTII,S$GLB,, | Performed by: INTERNAL MEDICINE

## 2022-05-25 PROCEDURE — 81003 URINALYSIS AUTO W/O SCOPE: CPT | Performed by: INTERNAL MEDICINE

## 2022-05-25 PROCEDURE — 84153 ASSAY OF PSA TOTAL: CPT | Performed by: INTERNAL MEDICINE

## 2022-05-25 PROCEDURE — 1101F PR PT FALLS ASSESS DOC 0-1 FALLS W/OUT INJ PAST YR: ICD-10-PCS | Mod: CPTII,S$GLB,, | Performed by: INTERNAL MEDICINE

## 2022-05-25 PROCEDURE — 99999 PR PBB SHADOW E&M-EST. PATIENT-LVL III: ICD-10-PCS | Mod: PBBFAC,,, | Performed by: INTERNAL MEDICINE

## 2022-05-25 PROCEDURE — 3078F DIAST BP <80 MM HG: CPT | Mod: CPTII,S$GLB,, | Performed by: INTERNAL MEDICINE

## 2022-05-25 PROCEDURE — 3074F SYST BP LT 130 MM HG: CPT | Mod: CPTII,S$GLB,, | Performed by: INTERNAL MEDICINE

## 2022-05-25 PROCEDURE — 93005 ELECTROCARDIOGRAM TRACING: CPT | Mod: S$GLB,,, | Performed by: INTERNAL MEDICINE

## 2022-05-25 PROCEDURE — 93010 ELECTROCARDIOGRAM REPORT: CPT | Mod: S$GLB,,, | Performed by: INTERNAL MEDICINE

## 2022-05-25 PROCEDURE — 3074F PR MOST RECENT SYSTOLIC BLOOD PRESSURE < 130 MM HG: ICD-10-PCS | Mod: CPTII,S$GLB,, | Performed by: INTERNAL MEDICINE

## 2022-05-25 PROCEDURE — 99499 UNLISTED E&M SERVICE: CPT | Mod: S$GLB,,, | Performed by: INTERNAL MEDICINE

## 2022-05-25 PROCEDURE — 3052F HG A1C>EQUAL 8.0%<EQUAL 9.0%: CPT | Mod: CPTII,S$GLB,, | Performed by: INTERNAL MEDICINE

## 2022-05-25 PROCEDURE — 1160F RVW MEDS BY RX/DR IN RCRD: CPT | Mod: CPTII,S$GLB,, | Performed by: INTERNAL MEDICINE

## 2022-05-25 PROCEDURE — 1159F PR MEDICATION LIST DOCUMENTED IN MEDICAL RECORD: ICD-10-PCS | Mod: CPTII,S$GLB,, | Performed by: INTERNAL MEDICINE

## 2022-05-25 PROCEDURE — 1101F PT FALLS ASSESS-DOCD LE1/YR: CPT | Mod: CPTII,S$GLB,, | Performed by: INTERNAL MEDICINE

## 2022-05-25 PROCEDURE — 99999 PR PBB SHADOW E&M-EST. PATIENT-LVL II: ICD-10-PCS | Mod: PBBFAC,,, | Performed by: INTERNAL MEDICINE

## 2022-05-25 PROCEDURE — 1126F AMNT PAIN NOTED NONE PRSNT: CPT | Mod: CPTII,S$GLB,, | Performed by: INTERNAL MEDICINE

## 2022-05-25 PROCEDURE — 3072F PR LOW RISK FOR RETINOPATHY: ICD-10-PCS | Mod: CPTII,S$GLB,, | Performed by: INTERNAL MEDICINE

## 2022-05-25 PROCEDURE — 3288F FALL RISK ASSESSMENT DOCD: CPT | Mod: CPTII,S$GLB,, | Performed by: INTERNAL MEDICINE

## 2022-05-25 PROCEDURE — 99999 PR PBB SHADOW E&M-EST. PATIENT-LVL II: CPT | Mod: PBBFAC,,, | Performed by: INTERNAL MEDICINE

## 2022-05-25 PROCEDURE — 3078F PR MOST RECENT DIASTOLIC BLOOD PRESSURE < 80 MM HG: ICD-10-PCS | Mod: CPTII,S$GLB,, | Performed by: INTERNAL MEDICINE

## 2022-05-25 PROCEDURE — 99215 PR OFFICE/OUTPT VISIT, EST, LEVL V, 40-54 MIN: ICD-10-PCS | Mod: S$GLB,,, | Performed by: INTERNAL MEDICINE

## 2022-05-25 PROCEDURE — 99499 RISK ADDL DX/OHS AUDIT: ICD-10-PCS | Mod: S$GLB,,, | Performed by: INTERNAL MEDICINE

## 2022-05-25 PROCEDURE — 93005 RHYTHM STRIP: ICD-10-PCS | Mod: S$GLB,,, | Performed by: INTERNAL MEDICINE

## 2022-05-25 PROCEDURE — 99214 PR OFFICE/OUTPT VISIT, EST, LEVL IV, 30-39 MIN: ICD-10-PCS | Mod: S$GLB,,, | Performed by: INTERNAL MEDICINE

## 2022-05-25 PROCEDURE — 99214 OFFICE O/P EST MOD 30 MIN: CPT | Mod: S$GLB,,, | Performed by: INTERNAL MEDICINE

## 2022-05-25 PROCEDURE — 1159F MED LIST DOCD IN RCRD: CPT | Mod: CPTII,S$GLB,, | Performed by: INTERNAL MEDICINE

## 2022-05-25 PROCEDURE — 3072F LOW RISK FOR RETINOPATHY: CPT | Mod: CPTII,S$GLB,, | Performed by: INTERNAL MEDICINE

## 2022-05-25 PROCEDURE — 1125F PR PAIN SEVERITY QUANTIFIED, PAIN PRESENT: ICD-10-PCS | Mod: CPTII,S$GLB,, | Performed by: INTERNAL MEDICINE

## 2022-05-25 PROCEDURE — 1126F PR PAIN SEVERITY QUANTIFIED, NO PAIN PRESENT: ICD-10-PCS | Mod: CPTII,S$GLB,, | Performed by: INTERNAL MEDICINE

## 2022-05-25 PROCEDURE — 1125F AMNT PAIN NOTED PAIN PRSNT: CPT | Mod: CPTII,S$GLB,, | Performed by: INTERNAL MEDICINE

## 2022-05-25 PROCEDURE — 99215 OFFICE O/P EST HI 40 MIN: CPT | Mod: S$GLB,,, | Performed by: INTERNAL MEDICINE

## 2022-05-25 PROCEDURE — 36415 COLL VENOUS BLD VENIPUNCTURE: CPT | Performed by: INTERNAL MEDICINE

## 2022-05-25 PROCEDURE — 99999 PR PBB SHADOW E&M-EST. PATIENT-LVL III: CPT | Mod: PBBFAC,,, | Performed by: INTERNAL MEDICINE

## 2022-05-25 RX ORDER — TAMSULOSIN HYDROCHLORIDE 0.4 MG/1
0.4 CAPSULE ORAL DAILY
Qty: 90 CAPSULE | Refills: 1 | Status: SHIPPED | OUTPATIENT
Start: 2022-05-25 | End: 2022-08-21

## 2022-05-25 NOTE — PROGRESS NOTES
"  Subjective:   Patient ID:  [facility] PAT Cantor Jr. is a 76 y.o. male who presents for follow-up of AF.     Mr. Cantor is a 76 y.o. male with pAF, HEIDI on CPAP, CKDIII here for follow up.     Background:  History of pAF.    Mr. Cantor underwent a DCCV (01/02/14). Following this, he wore an event monitor, which showed pAF with no change in symptoms. His rate was well controlled.   Mr. Cantor is doing well from a rhythm perspective; rate-controlled on Toprol-XL and anticoagulated on Eliquis.    UPDATE:  He has no cardiac complaints. Mr. Cantor denies chest pain with exertion or at rest, palpitations, SOB, BOYD, dizziness, or syncope. He reports no cardiac limitations in activity tolerance. He's limited entirely by musculoskeletal (back) pain. Walks with a cane.    I have personally reviewed the patient's EKG today, which shows rate-controlled atrial fibrillation    Recent Cardiac Tests:  echo 60% LVEF    Current Outpatient Prescriptions   Medication Sig    apixaban 5 mg Tab Take 1 tablet (5 mg total) by mouth 2 (two) times daily.    aspirin 325 MG tablet Take 325 mg by mouth once daily.    azelastine (ASTELIN) 137 mcg (0.1 %) nasal spray 1 spray (137 mcg total) by Nasal route 2 (two) times daily.    BD INSULIN PEN NEEDLE UF ORIG 29 x 1/2 " Ndle     BD ULTRA-FINE BASIL PEN NEEDLES 32 gauge x 5/32" Ndle CHECK BLOOD SUGAR FOUR TIMES DAILY    ciclopirox (PENLAC) 8 % Soln Apply topically nightly.    clobetasol (TEMOVATE) 0.05 % cream AAA finger bid    clotrimazole-betamethasone 1-0.05% (LOTRISONE) cream Apply topically 2 (two) times daily.    fenofibrate (TRICOR) 145 MG tablet TAKE ONE TABLET BY MOUTH EVERY DAY    furosemide (LASIX) 40 MG tablet TAKE ONE TABLET BY MOUTH EVERY DAY AS NEEDED (Patient taking differently: TAKE ONE TABLET BY MOUTH EVERY DAY)    gabapentin (NEURONTIN) 300 MG capsule Take 1 capsule (300 mg total) by mouth 3 (three) times daily.    insulin aspart (NOVOLOG FLEXPEN) 100 unit/mL InPn pen " Novolog 20 units breakfast, 22 units lunch and dinner plus correction scale. Max daily dose=100 units    insulin glargine (BASAGLAR KWIKPEN U-100 INSULIN) 100 unit/mL (3 mL) InPn pen Inject 60 Units into the skin once daily. (Patient taking differently: Inject 60 Units into the skin every evening. )    ketoconazole (NIZORAL) 2 % cream Apply topically once daily.    lancets 33 gauge Misc 1 lancet by Misc.(Non-Drug; Combo Route) route 4 (four) times daily. Pt uses True Result Meter, bg monitoring 4 times a day.    levothyroxine (SYNTHROID) 25 MCG tablet Take 1 tablet (25 mcg total) by mouth once daily.    losartan (COZAAR) 25 MG tablet Take 1 tablet (25 mg total) by mouth once daily.    metoprolol succinate (TOPROL-XL) 100 MG 24 hr tablet TAKE ONE TABLET BY MOUTH EVERY DAY    metronidazole (METROGEL) 0.75 % gel Apply topically 2 (two) times daily.    multivitamin capsule Take 1 capsule by mouth every morning.     nystatin-triamcinolone (MYCOLOG II) cream apply TWICE DAILY (Patient taking differently: apply TWICE DAILY-using as needed for rash)    omega-3 acid ethyl esters (LOVAZA) 1 gram capsule TAKE THREE CAPSULE BY MOUTH TWICE DAILY    pravastatin (PRAVACHOL) 10 MG tablet Take 1 tablet (10 mg total) by mouth once daily. (Patient taking differently: Take 10 mg by mouth every evening. )    tamsulosin (FLOMAX) 0.4 mg Cp24 Take 1 capsule (0.4 mg total) by mouth once daily.    temazepam (RESTORIL) 15 mg Cap TAKE ONE CAPSULE BY MOUTH EVERY EVENING    tiZANidine (ZANAFLEX) 4 MG tablet TAKE ONE TABLET BY MOUTH EVERY 6 HOURS AS NEEDED    triamcinolone acetonide 0.1% (KENALOG) 0.1 % cream Apply topically 2 (two) times daily. Apply to affected area twice daily as directed.    vitamin E 1000 UNIT capsule Take 1,000 Units by mouth every morning. 1 Capsule Oral Every day     No current facility-administered medications for this visit.      Review of Systems   Constitutional: Negative. Negative for malaise/fatigue.  "  HENT: Negative.  Negative for ear pain and tinnitus.    Eyes: Positive for blurred vision, vision loss in left eye, vision loss in right eye and visual disturbance.   Cardiovascular: Negative.  Negative for chest pain, dyspnea on exertion, irregular heartbeat, leg swelling, near-syncope, palpitations and syncope.   Respiratory: Negative.  Negative for shortness of breath.    Endocrine: Negative.  Negative for polyuria.   Hematologic/Lymphatic: Negative for bleeding problem. Does not bruise/bleed easily.   Skin: Negative.  Negative for rash.   Musculoskeletal: Negative.  Negative for joint pain, muscle weakness and myalgias.   Gastrointestinal: Negative.  Negative for abdominal pain, change in bowel habit, hematemesis, hematochezia and nausea.   Genitourinary: Negative for frequency and hematuria.   Neurological: Negative.  Negative for dizziness, light-headedness and weakness.   Psychiatric/Behavioral: Negative.  Negative for altered mental status and depression. The patient is not nervous/anxious.    Allergic/Immunologic: Negative for environmental allergies and persistent infections.     Objective:        /68   Pulse 71   Ht 5' 10" (1.778 m)   Wt 124.6 kg (274 lb 11.1 oz)   BMI 39.41 kg/m²   NAD  irreg irreg  CTA  abd not distended  1+ edema bilat    Lab Results   Component Value Date     05/18/2022    K 4.9 05/18/2022    MG 1.9 07/30/2018    BUN 21 05/18/2022    CREATININE 1.5 (H) 05/18/2022    ALT 13 05/18/2022    AST 17 05/18/2022    HGB 15.9 05/18/2022    HCT 48.3 05/18/2022    HCT 35 (L) 08/05/2018    TSH 4.598 (H) 05/18/2022    LDLCALC 82.4 05/18/2022               Assessment:         1. Chronic atrial fibrillation    2. Diastolic dysfunction    3. Essential hypertension    4. Pure hypercholesterolemia    5. Stage 3 chronic kidney disease, unspecified whether stage 3a or 3b CKD    6. Long term current use of anticoagulant therapy    7. Sleep apnea, unspecified type      Plan:     In " summary, Mr. Cantor is a 76 y.o. male with pAF, HEIDI on CPAP, CKDIII here for follow up. He is doing well in a rate control strategy, with normal EF. He remains anticoagulated on eliquis and rate controlled on metoprolol. Will make no changes.    Return in 1 year with echo, or earlier prn.

## 2022-05-25 NOTE — PROGRESS NOTES
Patient, [facility] PAT Cantor Jr. (MRN #[id number]), presented with a recorded BMI of 39.41 kg/m^2 and a documented comorbidity(s):  - Hypertension  - Hyperlipidemia  - Atrial Fibrillation  to which the severe obesity is a contributing factor. This is consistent with the definition of severe obesity (BMI 35.0-39.9) with comorbidity (ICD-10 E66.01, Z68.35). The patient's severe obesity was monitored, evaluated, addressed and/or treated. This addendum to the medical record is made on 05/25/2022.

## 2022-05-26 ENCOUNTER — PATIENT MESSAGE (OUTPATIENT)
Dept: INTERNAL MEDICINE | Facility: CLINIC | Age: 77
End: 2022-05-26
Payer: MEDICARE

## 2022-05-26 RX ORDER — CIPROFLOXACIN 500 MG/1
500 TABLET ORAL 2 TIMES DAILY
Qty: 28 TABLET | Refills: 0 | Status: SHIPPED | OUTPATIENT
Start: 2022-05-26 | End: 2022-06-09

## 2022-05-26 NOTE — PROGRESS NOTES
Addendum  PSA was 4.0, urinalysis looks normal, cultures pending    Proceed with the use of Flomax.  Will initiate ciprofloxacin for another 2 weeks.  He feels that his urination is not quite back to normal

## 2022-05-27 ENCOUNTER — OFFICE VISIT (OUTPATIENT)
Dept: ORTHOPEDICS | Facility: CLINIC | Age: 77
End: 2022-05-27
Payer: MEDICARE

## 2022-05-27 VITALS — HEIGHT: 70 IN | BODY MASS INDEX: 39.08 KG/M2 | WEIGHT: 273 LBS

## 2022-05-27 DIAGNOSIS — M65.321 TRIGGER FINGER, RIGHT INDEX FINGER: ICD-10-CM

## 2022-05-27 DIAGNOSIS — M65.332 TRIGGER MIDDLE FINGER OF LEFT HAND: ICD-10-CM

## 2022-05-27 DIAGNOSIS — M65.331 TRIGGER MIDDLE FINGER OF RIGHT HAND: Primary | ICD-10-CM

## 2022-05-27 LAB — BACTERIA UR CULT: NO GROWTH

## 2022-05-27 PROCEDURE — 3072F PR LOW RISK FOR RETINOPATHY: ICD-10-PCS | Mod: CPTII,S$GLB,, | Performed by: ORTHOPAEDIC SURGERY

## 2022-05-27 PROCEDURE — 1125F AMNT PAIN NOTED PAIN PRSNT: CPT | Mod: CPTII,S$GLB,, | Performed by: ORTHOPAEDIC SURGERY

## 2022-05-27 PROCEDURE — 99213 PR OFFICE/OUTPT VISIT, EST, LEVL III, 20-29 MIN: ICD-10-PCS | Mod: 25,S$GLB,, | Performed by: ORTHOPAEDIC SURGERY

## 2022-05-27 PROCEDURE — 1101F PR PT FALLS ASSESS DOC 0-1 FALLS W/OUT INJ PAST YR: ICD-10-PCS | Mod: CPTII,S$GLB,, | Performed by: ORTHOPAEDIC SURGERY

## 2022-05-27 PROCEDURE — 3288F PR FALLS RISK ASSESSMENT DOCUMENTED: ICD-10-PCS | Mod: CPTII,S$GLB,, | Performed by: ORTHOPAEDIC SURGERY

## 2022-05-27 PROCEDURE — 99999 PR PBB SHADOW E&M-EST. PATIENT-LVL IV: ICD-10-PCS | Mod: PBBFAC,,, | Performed by: ORTHOPAEDIC SURGERY

## 2022-05-27 PROCEDURE — 1159F MED LIST DOCD IN RCRD: CPT | Mod: CPTII,S$GLB,, | Performed by: ORTHOPAEDIC SURGERY

## 2022-05-27 PROCEDURE — 1101F PT FALLS ASSESS-DOCD LE1/YR: CPT | Mod: CPTII,S$GLB,, | Performed by: ORTHOPAEDIC SURGERY

## 2022-05-27 PROCEDURE — 1159F PR MEDICATION LIST DOCUMENTED IN MEDICAL RECORD: ICD-10-PCS | Mod: CPTII,S$GLB,, | Performed by: ORTHOPAEDIC SURGERY

## 2022-05-27 PROCEDURE — 3072F LOW RISK FOR RETINOPATHY: CPT | Mod: CPTII,S$GLB,, | Performed by: ORTHOPAEDIC SURGERY

## 2022-05-27 PROCEDURE — 20550 TENDON SHEATH: ICD-10-PCS | Mod: RT,S$GLB,, | Performed by: ORTHOPAEDIC SURGERY

## 2022-05-27 PROCEDURE — 99999 PR PBB SHADOW E&M-EST. PATIENT-LVL IV: CPT | Mod: PBBFAC,,, | Performed by: ORTHOPAEDIC SURGERY

## 2022-05-27 PROCEDURE — 99213 OFFICE O/P EST LOW 20 MIN: CPT | Mod: 25,S$GLB,, | Performed by: ORTHOPAEDIC SURGERY

## 2022-05-27 PROCEDURE — 20550 NJX 1 TENDON SHEATH/LIGAMENT: CPT | Mod: RT,S$GLB,, | Performed by: ORTHOPAEDIC SURGERY

## 2022-05-27 PROCEDURE — 1125F PR PAIN SEVERITY QUANTIFIED, PAIN PRESENT: ICD-10-PCS | Mod: CPTII,S$GLB,, | Performed by: ORTHOPAEDIC SURGERY

## 2022-05-27 PROCEDURE — 3288F FALL RISK ASSESSMENT DOCD: CPT | Mod: CPTII,S$GLB,, | Performed by: ORTHOPAEDIC SURGERY

## 2022-05-27 RX ORDER — TRIAMCINOLONE ACETONIDE 40 MG/ML
20 INJECTION, SUSPENSION INTRA-ARTICULAR; INTRAMUSCULAR
Status: DISCONTINUED | OUTPATIENT
Start: 2022-05-27 | End: 2022-05-27 | Stop reason: HOSPADM

## 2022-05-27 RX ADMIN — TRIAMCINOLONE ACETONIDE 20 MG: 40 INJECTION, SUSPENSION INTRA-ARTICULAR; INTRAMUSCULAR at 02:05

## 2022-05-27 NOTE — PROCEDURES
Tendon Sheath    Date/Time: 5/27/2022 2:30 PM  Performed by: Carlota Buckner PA-C  Authorized by: Carlota Buckner PA-C     Location:  Long finger  Site:  R long flexor tendon sheath  Medications:  20 mg triamcinolone acetonide 40 mg/mL  Tendon Sheath    Date/Time: 5/27/2022 2:30 PM  Performed by: Carlota Buckner PA-C  Authorized by: Carlota Buckner PA-C     Location:  Index finger  Site:  R index flexor tendon sheath  Medications:  20 mg triamcinolone acetonide 40 mg/mL      PROCEDURE:  I have explained the risks, benefits, and alternatives of the procedure in detail.  The patient voices understanding, gives consent, and all questions have been answered.  Pause for timeout. A sterile prep of the skin performed, then the right index and middle finger flexor tendon is injected using a 25 gauge needle with a combination of 0.5 cc 1% plain xylocaine and 20 mg of Kenalog.  The remaining 20 mg of Kenolog was properly wasted. The patient is cautioned and immediate relief of pain is secondary to the local anesthetic and will be temporary.  After the anesthetic wears off there may be a increase in pain that may last for a few hours or a few days and they should use ice to help alleviate this flair up of pain. Patient tolerated the procedure well.

## 2022-05-27 NOTE — PROGRESS NOTES
"Subjective:      Patient ID: BI Cantor Jr. is a 76 y.o. male.    Chief Complaint: Follow-up ([place]ulshan Hands)      HPI: BI Cantor Jr. is here in follow-up of bilateral hand pain. He was last seen and treated by Dr. Elmore about 6 weeks ago with CSI of the bilateral middle trigger fingers. He reports injections were 85% helpful on the left, but the right middle and index fingers are still getting stuck.     Past Medical History:   Diagnosis Date    *Atrial fibrillation     Eliquis    Anticoagulant long-term use     apaxiban    Basal cell carcinoma 12/2015    left eye brow    Basal cell carcinoma 10/28/2019    right superior preauricular    BCC (basal cell carcinoma) 12/2015    left shoulder    Cataract     Chronic kidney disease     Diabetes mellitus with renal manifestations, uncontrolled     Dyslipidemia associated with type 2 diabetes mellitus     Fever blister     Hearing loss     HTN (hypertension)     Keloid cicatrix     Liver disease     Melanoma 12/07/2015    right cheek-in situ    Obesity     PN (peripheral neuropathy)     Primary osteoarthritis of right knee 1/25/2017    Screening for colon cancer 3/3/2016    Sleep apnea     wears CPAP at night    Thyroid disease     Type II or unspecified type diabetes mellitus with other specified manifestations, uncontrolled     Ulcer of left lower extremity, limited to breakdown of skin 9/22/2017       Current Outpatient Medications:     atorvastatin (LIPITOR) 40 MG tablet, Take 1 tablet (40 mg total) by mouth once daily., Disp: 90 tablet, Rfl: 3    azelastine (ASTELIN) 137 mcg (0.1 %) nasal spray, 1 spray (137 mcg total) by Nasal route 2 (two) times daily. (Patient taking differently: 1 spray by Nasal route 2 (two) times daily as needed.), Disp: 30 mL, Rfl: 3    BD INSULIN PEN NEEDLE UF ORIG 29 x 1/2 " Ndle, , Disp: , Rfl: 12    BD ULTRA-FINE BASIL PEN NEEDLE 32 gauge x 5/32" Ndle, USE FOUR TIMES DAILY, Disp: 400 each, Rfl: 3    blood sugar " diagnostic Strp, 1 strip by Misc.(Non-Drug; Combo Route) route 4 (four) times daily. To check blood glucose, Disp: 400 strip, Rfl: 11    blood-glucose meter Misc, To check BG as discussed, Disp: 1 each, Rfl: 0    blood-glucose meter,continuous (DEXCOM G6 ) Misc, 1 Device by Misc.(Non-Drug; Combo Route) route continuous., Disp: 1 each, Rfl: 0    blood-glucose sensor (DEXCOM G6 SENSOR) Mckenna, 1 Device by Misc.(Non-Drug; Combo Route) route every 10 days., Disp: 9 each, Rfl: 4    blood-glucose transmitter (DEXCOM G6 TRANSMITTER) Mckenna, 1 Device by Misc.(Non-Drug; Combo Route) route every 3 (three) months., Disp: 1 each, Rfl: 3    ciclopirox (LOPROX) 0.77 % Crea, APPLY TOPICALLY TWICE DAILY, Disp: 90 g, Rfl: 2    ciclopirox (PENLAC) 8 % Soln, Apply topically nightly., Disp: 6.6 mL, Rfl: 11    ciprofloxacin HCl (CIPRO) 500 MG tablet, Take 1 tablet (500 mg total) by mouth 2 (two) times daily. for 14 days, Disp: 28 tablet, Rfl: 0    clobetasol (TEMOVATE) 0.05 % cream, AAA finger bid, Disp: 60 g, Rfl: 3    ELIQUIS 5 mg Tab, TAKE ONE TABLET BY MOUTH TWICE DAILY, Disp: 180 tablet, Rfl: 1    fenofibrate (TRICOR) 145 MG tablet, TAKE ONE TABLET BY MOUTH EVERY DAY, Disp: 90 tablet, Rfl: 3    furosemide (LASIX) 40 MG tablet, TAKE ONE TABLET BY MOUTH EVERY DAY AS NEEDED, Disp: 90 tablet, Rfl: 1    gabapentin (NEURONTIN) 300 MG capsule, TAKE ONE CAPSULE BY MOUTH THREE TIMES DAILY (Patient taking differently: 2 (two) times daily.), Disp: 270 capsule, Rfl: 3    insulin aspart U-100 (NOVOLOG FLEXPEN U-100 INSULIN) 100 unit/mL (3 mL) InPn pen, Novolog 8 units with meals plus correction scale. Max daily dose 50 units/day (Patient taking differently: 10 Units. Novolog 10 units with meals plus correction scale. Max daily dose 50 units/day), Disp: 30 mL, Rfl: 11    insulin lispro (HUMALOG KWIKPEN INSULIN) 100 unit/mL pen, Inject 10-12 units 3 times daily with meals. Plus correction scale. Max daily dose 50 units/day,  "Disp: 30 mL, Rfl: 11    ketoconazole (NIZORAL) 2 % cream, Apply topically once daily., Disp: 30 g, Rfl: 1    lancets 33 gauge Misc, 1 lancet by Misc.(Non-Drug; Combo Route) route 4 (four) times daily. Pt uses True Result Meter, bg monitoring 4 times a day., Disp: 400 each, Rfl: 4    LANTUS SOLOSTAR U-100 INSULIN glargine 100 units/mL (3mL) SubQ pen, INJECT 32 UNITS EVERY EVENING. INCREASE AS DIRECTED FOR GOAL MORNING GLUCOSE . MAX 45 UNITS / DAY, Disp: 30 mL, Rfl: 3    metoprolol succinate (TOPROL-XL) 100 MG 24 hr tablet, TAKE ONE TABLET BY MOUTH EVERY DAY, Disp: 90 tablet, Rfl: 1    nystatin-triamcinolone (MYCOLOG II) cream, apply TWICE DAILY, Disp: 60 g, Rfl: 1    OZEMPIC 1 mg/dose (4 mg/3 mL), INJECT 1MG SUBCUTANEOUSLY ONCE A WEEK, Disp: 1 pen, Rfl: 11    semaglutide (OZEMPIC) 1 mg/dose (2 mg/1.5 mL) PnIj, Inject 1 mg under the skin once a week, Disp: 2 Syringe, Rfl: 11    tamsulosin (FLOMAX) 0.4 mg Cap, Take 1 capsule (0.4 mg total) by mouth once daily., Disp: 90 capsule, Rfl: 1    temazepam (RESTORIL) 15 mg Cap, Take 1 capsule (15 mg total) by mouth nightly as needed., Disp: 30 capsule, Rfl: 5    triamcinolone acetonide 0.1% (KENALOG) 0.1 % cream, APPLY TO AFFECTED AREA(S) TWICE DAILY, Disp: 30 g, Rfl: 3    varicella-zoster gE-AS01B, PF, (SHINGRIX, PF,) 50 mcg/0.5 mL injection, Inject into the muscle., Disp: 0.5 each, Rfl: 1    levothyroxine (SYNTHROID) 50 MCG tablet, Take 1 tablet (50 mcg total) by mouth once daily. (Patient not taking: No sig reported), Disp: 90 tablet, Rfl: 1  Review of patient's allergies indicates:   Allergen Reactions    Niacin Itching and Other (See Comments)     Other reaction(s): facial flushing and causes hepatitis     Terbinafine Other (See Comments)     Other reaction(s): Hepatitis    "gave me rash"       Ht 5' 10" (1.778 m)   Wt 123.8 kg (273 lb)   BMI 39.17 kg/m²     ROS      Objective:    Ortho Exam       Right UE: Skin dry. Swelling moderate. TTP felxor " tendon,locking of the middle finger. ROM limited index finegr. Sensation intact. Tinels neg. Pulses present. Cap refill brisk.   GEN: Well developed, well nourished male. AAOX3. No acute distress.   Normocephalic, atraumatic.   PEDRO  Breathing unlabored.  Mood and affect appropriate.     Assessment:     Imaging: No new        1. Trigger middle finger of right hand    2. Trigger middle finger of left hand    3. Trigger finger, right index finger          Plan:       Previous injections have worked well; the left hand is significantly improved but the right middle finger did not, now having sx on the index finger  I recommended a repeat injection of the middle and a injection of the index.     I explained the potential role of surgery in the treatment of this condition. They understand that if non surgical measures do not adequately control symptoms, surgery will be considered in the future.     Orders Placed This Encounter    Tendon Sheath    Tendon Sheath     Follow up in about 6 weeks (around 7/8/2022).

## 2022-06-01 DIAGNOSIS — I10 ESSENTIAL HYPERTENSION: ICD-10-CM

## 2022-06-01 DIAGNOSIS — Z79.4 TYPE 2 DIABETES MELLITUS WITH STAGE 3 CHRONIC KIDNEY DISEASE, WITH LONG-TERM CURRENT USE OF INSULIN: ICD-10-CM

## 2022-06-01 DIAGNOSIS — I87.2 VENOUS INSUFFICIENCY OF BOTH LOWER EXTREMITIES: ICD-10-CM

## 2022-06-01 DIAGNOSIS — N18.30 CHRONIC KIDNEY DISEASE, STAGE III (MODERATE): ICD-10-CM

## 2022-06-01 DIAGNOSIS — M17.11 PRIMARY OSTEOARTHRITIS OF RIGHT KNEE: ICD-10-CM

## 2022-06-01 DIAGNOSIS — N18.30 TYPE 2 DIABETES MELLITUS WITH STAGE 3 CHRONIC KIDNEY DISEASE, WITH LONG-TERM CURRENT USE OF INSULIN: ICD-10-CM

## 2022-06-01 DIAGNOSIS — I50.31 ACUTE DIASTOLIC HEART FAILURE: ICD-10-CM

## 2022-06-01 DIAGNOSIS — I48.20 CHRONIC ATRIAL FIBRILLATION: ICD-10-CM

## 2022-06-01 DIAGNOSIS — E11.22 TYPE 2 DIABETES MELLITUS WITH STAGE 3 CHRONIC KIDNEY DISEASE, WITH LONG-TERM CURRENT USE OF INSULIN: ICD-10-CM

## 2022-06-01 RX ORDER — FUROSEMIDE 40 MG/1
TABLET ORAL
Qty: 90 TABLET | Refills: 1 | Status: SHIPPED | OUTPATIENT
Start: 2022-06-01 | End: 2022-11-22

## 2022-06-01 NOTE — TELEPHONE ENCOUNTER
No new care gaps identified.  [facility] Embedded Care Gaps. Reference number: [id number]. 6/01/2022   11:59:34 AM BEVT

## 2022-06-01 NOTE — TELEPHONE ENCOUNTER
Refill Routing Note   Medication(s) are not appropriate for processing by [facility] for the following reason(s):      - Required laboratory values are outdated    [facility] action(s):  Route          Medication reconciliation completed: No     Appointments  past 12m or future 3m with PCP    Date Provider   Last Visit   5/25/2022 Desmond Barlow MD   Next Visit   Visit date not found Desmond Barlow MD   ED visits in past 90 days: 0        Note composed:2:28 PM 06/01/2022

## 2022-07-01 ENCOUNTER — OFFICE VISIT (OUTPATIENT)
Dept: ENDOCRINOLOGY | Facility: CLINIC | Age: 77
End: 2022-07-01
Payer: MEDICARE

## 2022-07-01 VITALS
HEIGHT: 70 IN | DIASTOLIC BLOOD PRESSURE: 69 MMHG | WEIGHT: 275 LBS | HEART RATE: 56 BPM | BODY MASS INDEX: 39.37 KG/M2 | SYSTOLIC BLOOD PRESSURE: 137 MMHG

## 2022-07-01 DIAGNOSIS — Z79.4 TYPE 2 DIABETES MELLITUS WITH HYPERGLYCEMIA, WITH LONG-TERM CURRENT USE OF INSULIN: Primary | ICD-10-CM

## 2022-07-01 DIAGNOSIS — Z79.4 TYPE 2 DIABETES MELLITUS WITH STAGE 3A CHRONIC KIDNEY DISEASE, WITH LONG-TERM CURRENT USE OF INSULIN: ICD-10-CM

## 2022-07-01 DIAGNOSIS — E11.69 DYSLIPIDEMIA ASSOCIATED WITH TYPE 2 DIABETES MELLITUS: ICD-10-CM

## 2022-07-01 DIAGNOSIS — E11.65 TYPE 2 DIABETES MELLITUS WITH HYPERGLYCEMIA, WITH LONG-TERM CURRENT USE OF INSULIN: Primary | ICD-10-CM

## 2022-07-01 DIAGNOSIS — N18.31 TYPE 2 DIABETES MELLITUS WITH STAGE 3A CHRONIC KIDNEY DISEASE, WITH LONG-TERM CURRENT USE OF INSULIN: ICD-10-CM

## 2022-07-01 DIAGNOSIS — E11.22 TYPE 2 DIABETES MELLITUS WITH STAGE 3A CHRONIC KIDNEY DISEASE, WITH LONG-TERM CURRENT USE OF INSULIN: ICD-10-CM

## 2022-07-01 DIAGNOSIS — E66.01 CLASS 2 SEVERE OBESITY DUE TO EXCESS CALORIES WITH SERIOUS COMORBIDITY AND BODY MASS INDEX (BMI) OF 39.0 TO 39.9 IN ADULT: ICD-10-CM

## 2022-07-01 DIAGNOSIS — E78.5 DYSLIPIDEMIA ASSOCIATED WITH TYPE 2 DIABETES MELLITUS: ICD-10-CM

## 2022-07-01 PROCEDURE — 1160F PR REVIEW ALL MEDS BY PRESCRIBER/CLIN PHARMACIST DOCUMENTED: ICD-10-PCS | Mod: CPTII,S$GLB,, | Performed by: INTERNAL MEDICINE

## 2022-07-01 PROCEDURE — 1126F AMNT PAIN NOTED NONE PRSNT: CPT | Mod: CPTII,S$GLB,, | Performed by: INTERNAL MEDICINE

## 2022-07-01 PROCEDURE — 99214 PR OFFICE/OUTPT VISIT, EST, LEVL IV, 30-39 MIN: ICD-10-PCS | Mod: S$GLB,,, | Performed by: INTERNAL MEDICINE

## 2022-07-01 PROCEDURE — 1159F PR MEDICATION LIST DOCUMENTED IN MEDICAL RECORD: ICD-10-PCS | Mod: CPTII,S$GLB,, | Performed by: INTERNAL MEDICINE

## 2022-07-01 PROCEDURE — 3078F PR MOST RECENT DIASTOLIC BLOOD PRESSURE < 80 MM HG: ICD-10-PCS | Mod: CPTII,S$GLB,, | Performed by: INTERNAL MEDICINE

## 2022-07-01 PROCEDURE — 3072F LOW RISK FOR RETINOPATHY: CPT | Mod: CPTII,S$GLB,, | Performed by: INTERNAL MEDICINE

## 2022-07-01 PROCEDURE — 3075F PR MOST RECENT SYSTOLIC BLOOD PRESS GE 130-139MM HG: ICD-10-PCS | Mod: CPTII,S$GLB,, | Performed by: INTERNAL MEDICINE

## 2022-07-01 PROCEDURE — 3075F SYST BP GE 130 - 139MM HG: CPT | Mod: CPTII,S$GLB,, | Performed by: INTERNAL MEDICINE

## 2022-07-01 PROCEDURE — 1101F PT FALLS ASSESS-DOCD LE1/YR: CPT | Mod: CPTII,S$GLB,, | Performed by: INTERNAL MEDICINE

## 2022-07-01 PROCEDURE — 3078F DIAST BP <80 MM HG: CPT | Mod: CPTII,S$GLB,, | Performed by: INTERNAL MEDICINE

## 2022-07-01 PROCEDURE — 3052F HG A1C>EQUAL 8.0%<EQUAL 9.0%: CPT | Mod: CPTII,S$GLB,, | Performed by: INTERNAL MEDICINE

## 2022-07-01 PROCEDURE — 99214 OFFICE O/P EST MOD 30 MIN: CPT | Mod: S$GLB,,, | Performed by: INTERNAL MEDICINE

## 2022-07-01 PROCEDURE — 1101F PR PT FALLS ASSESS DOC 0-1 FALLS W/OUT INJ PAST YR: ICD-10-PCS | Mod: CPTII,S$GLB,, | Performed by: INTERNAL MEDICINE

## 2022-07-01 PROCEDURE — 3288F PR FALLS RISK ASSESSMENT DOCUMENTED: ICD-10-PCS | Mod: CPTII,S$GLB,, | Performed by: INTERNAL MEDICINE

## 2022-07-01 PROCEDURE — 1160F RVW MEDS BY RX/DR IN RCRD: CPT | Mod: CPTII,S$GLB,, | Performed by: INTERNAL MEDICINE

## 2022-07-01 PROCEDURE — 3288F FALL RISK ASSESSMENT DOCD: CPT | Mod: CPTII,S$GLB,, | Performed by: INTERNAL MEDICINE

## 2022-07-01 PROCEDURE — 3052F PR MOST RECENT HEMOGLOBIN A1C LEVEL 8.0 - < 9.0%: ICD-10-PCS | Mod: CPTII,S$GLB,, | Performed by: INTERNAL MEDICINE

## 2022-07-01 PROCEDURE — 1159F MED LIST DOCD IN RCRD: CPT | Mod: CPTII,S$GLB,, | Performed by: INTERNAL MEDICINE

## 2022-07-01 PROCEDURE — 3072F PR LOW RISK FOR RETINOPATHY: ICD-10-PCS | Mod: CPTII,S$GLB,, | Performed by: INTERNAL MEDICINE

## 2022-07-01 PROCEDURE — 1126F PR PAIN SEVERITY QUANTIFIED, NO PAIN PRESENT: ICD-10-PCS | Mod: CPTII,S$GLB,, | Performed by: INTERNAL MEDICINE

## 2022-07-01 RX ORDER — GLUCAGON INJECTION, SOLUTION 1 MG/.2ML
1 INJECTION, SOLUTION SUBCUTANEOUS
Qty: 2 EACH | Refills: 3 | Status: SHIPPED | OUTPATIENT
Start: 2022-07-01

## 2022-07-01 NOTE — ASSESSMENT & PLAN NOTE
Reviewed goals of therapy - to get the best control we can without hypoglycemia. Goal <8   - last a1c increased to top of goal range. Repeat in process   - no hypoglycemia   - continue GLP1   - continue insulin, reviewed self-titration. Details in AVS. Decrease dose if low sugars    Reviewed patient's current insulin regimen. Clarified proper insulin dose and timing in relation to meals, etc. Insulin injection sites and proper rotation instructed.   -Advised frequent self blood glucose monitoring. Patient encouraged to document glucose results and bring them to every clinic visit    -Close adherence to lifestyle changes recommended.    -Periodic follow ups for eye evaluations, foot care suggested.      Offered glucagon prescription. Reviewed nasal vs injection vs vial/syringe systems. Pt/family prefers gvoke system. Script sent in. 2 pack, since both pt and wife have diabetes would benefit in having a few around. Offer to send script for both of them, due to costs pt/family prefers just 1 prescription at this time.

## 2022-07-01 NOTE — PROGRESS NOTES
Subjective:      Chief Complaint: Diabetes    HPI: [facility] [place] PAT Cantor Jr. is a 76 y.o. male who is here for a follow-up evaluation for diabetes. Last seen 12/22/2021    Has HLD, on statin     With regards to the diabetes:  Diagnosed with T2DM since around 2003 or so per patient. Insulin since around 2008.     Known complications:     Retinopathy? No    Nephropathy? Yes    Neuropathy? Yes    CAD? No    CVA? No     Current regimen:   Lantus 36 units qHS   Novolog 10-12 units based on scale/meal content (10 if under 150, 12 up to 200, 14 over 200)   Ozempic 1 mg weekly     Prior meds:   Metformin -> diarrhea, weight gain   Januvia   Invokana    Self-Monitored blood glucose.  # times a day testing: dexcom CGM  Log reviewed: No, clarity luisa not working in clinic today.    Lately: 100-150s mostly.    Hypoglycemic events? None lately. Sometimes to 90s.    Current Symptoms  No   Yes  []    [x]  Polydipsia  []    [x]  Polyuria  []    [x]  Vision changes lately. Right eye better, left eye pending treatment  [x]    []  Nausea  [x]    []  Diarrhea  [x]    []  Weight gain  [x]    []  Weight loss    Sometimes lightheaded     Diabetes Management Status    Statin: Taking  ACE/ARB: Not taking    Screening or Prevention Patient's value Goal Complete/Controlled?   HgA1C Testing and Control   Lab Results   Component Value Date    HGBA1C 8.0 (H) 05/18/2022      q6months/Less than 8% Yes   Lipid profile : 05/18/2022 Annually Yes   LDL control Lab Results   Component Value Date    LDLCALC 82.4 05/18/2022    Annually/Less than 100 mg/dl  Yes   Nephropathy screening Lab Results   Component Value Date    MICALBCREAT 116.0 (H) 12/14/2021     Lab Results   Component Value Date    CREATININE 1.5 (H) 05/18/2022     Lab Results   Component Value Date    PROTEINUA Negative 05/25/2022    Annually Yes   Blood pressure BP Readings from Last 1 Encounters:   05/25/22 128/68    Less than 140/90 Yes   Dilated retinal exam : 05/23/2022 Annually Yes   Foot  exam   : 02/08/2022 Annually Yes     Reviewed past medical, family, social history and updated as appropriate.    Review of Systems  As above    Objective:     Vitals:    07/01/22 1457   BP: 137/69   Pulse: (!) 56     BP Readings from Last 5 Encounters:   05/25/22 128/68   05/25/22 120/68   02/08/22 137/72   12/22/21 111/71   09/14/21 (!) 140/80     Physical Exam  Vitals reviewed.   Constitutional:       General: He is not in acute distress.     Comments: Obese   Neck:      Thyroid: No thyromegaly.   Cardiovascular:      Heart sounds: Normal heart sounds.   Pulmonary:      Effort: Pulmonary effort is normal.   Musculoskeletal:      Right lower leg: Edema present.      Left lower leg: Edema present.       Wt Readings from Last 10 Encounters:   07/01/22 1457 124.7 kg (275 lb)   05/27/22 1410 123.8 kg (273 lb)   05/25/22 1352 124.1 kg (273 lb 9.5 oz)   05/25/22 0814 124.6 kg (274 lb 11.1 oz)   04/14/22 1311 126.6 kg (279 lb)   02/08/22 1511 126.6 kg (279 lb)   12/22/21 1331 126.4 kg (278 lb 10.6 oz)   09/14/21 1154 122 kg (269 lb)   07/29/21 1358 122.3 kg (269 lb 10 oz)   07/23/21 2023 124.2 kg (273 lb 13 oz)     Lab Results   Component Value Date    HGBA1C 8.0 (H) 05/18/2022     Lab Results   Component Value Date    CHOL 135 05/18/2022    HDL 33 (L) 05/18/2022    LDLCALC 82.4 05/18/2022    TRIG 98 05/18/2022    CHOLHDL 24.4 05/18/2022     Lab Results   Component Value Date     05/18/2022    K 4.9 05/18/2022     05/18/2022    CO2 25 05/18/2022     (H) 05/18/2022    BUN 21 05/18/2022    CREATININE 1.5 (H) 05/18/2022    CALCIUM 9.9 05/18/2022    PROT 7.3 05/18/2022    ALBUMIN 3.9 05/18/2022    BILITOT 1.0 05/18/2022    ALKPHOS 34 (L) 05/18/2022    AST 17 05/18/2022    ALT 13 05/18/2022    ANIONGAP 10 05/18/2022    ESTGFRAFRICA 50.8 (A) 05/18/2022    EGFRNONAA 44.0 (A) 05/18/2022    TSH 4.598 (H) 05/18/2022      Lab Results   Component Value Date    MICALBCREAT 116.0 (H) 12/14/2021     Assessment/Plan:      Type 2 diabetes mellitus with stage 3 chronic kidney disease, with long-term current use of insulin  Reviewed goals of therapy - to get the best control we can without hypoglycemia. Goal <8   - last a1c increased to top of goal range. Repeat in process   - no hypoglycemia   - continue GLP1   - continue insulin, reviewed self-titration. Details in AVS. Decrease dose if low sugars    Reviewed patient's current insulin regimen. Clarified proper insulin dose and timing in relation to meals, etc. Insulin injection sites and proper rotation instructed.   -Advised frequent self blood glucose monitoring. Patient encouraged to document glucose results and bring them to every clinic visit    -Close adherence to lifestyle changes recommended.    -Periodic follow ups for eye evaluations, foot care suggested.      Offered glucagon prescription. Reviewed nasal vs injection vs vial/syringe systems. Pt/family prefers gvoke system. Script sent in. 2 pack, since both pt and wife have diabetes would benefit in having a few around. Offer to send script for both of them, due to costs pt/family prefers just 1 prescription at this time.    Dyslipidemia associated with type 2 diabetes mellitus  Dyslipidemia associated with diabetes:  Lab Results   Component Value Date    LDLCALC 82.4 05/18/2022    - last LDL at goal   - continue statin   - monitor yearly    Class 2 severe obesity due to excess calories with serious comorbidity in adult  There is no height or weight on file to calculate BMI.   - complicated by DM2, HTN, HLD   stable   - continue GLP1 as above   - monitor weight.         Follow up in about 6 months (around 1/1/2023) for further monitoring, lab review.      David Judd MD  Endocrinology

## 2022-07-01 NOTE — ASSESSMENT & PLAN NOTE
There is no height or weight on file to calculate BMI.   - complicated by DM2, HTN, HLD   stable   - continue GLP1 as above   - monitor weight.

## 2022-07-01 NOTE — ASSESSMENT & PLAN NOTE
Dyslipidemia associated with diabetes:  Lab Results   Component Value Date    LDLCALC 82.4 05/18/2022    - last LDL at goal   - continue statin   - monitor yearly

## 2022-07-01 NOTE — PATIENT INSTRUCTIONS
For diabetes:    Continue ozempic once a week    Basal Insulin  Take 34 units of lantus insulin.    Check your glucose in the morning before breakfast and keep a log.  Goal AM glucose:       After 3-5 days, if your average glucose is above 140, increase your dose by 2 units  If your AM glucose is under 90, decrease by 2 units.       Meal-time insulin    Take 10 units novolog plus the scale

## 2022-07-27 ENCOUNTER — OFFICE VISIT (OUTPATIENT)
Dept: PODIATRY | Facility: CLINIC | Age: 77
End: 2022-07-27
Payer: MEDICARE

## 2022-07-27 ENCOUNTER — PATIENT MESSAGE (OUTPATIENT)
Dept: INTERNAL MEDICINE | Facility: CLINIC | Age: 77
End: 2022-07-27
Payer: MEDICARE

## 2022-07-27 VITALS
HEART RATE: 80 BPM | WEIGHT: 275 LBS | BODY MASS INDEX: 39.37 KG/M2 | HEIGHT: 70 IN | DIASTOLIC BLOOD PRESSURE: 65 MMHG | SYSTOLIC BLOOD PRESSURE: 138 MMHG

## 2022-07-27 DIAGNOSIS — Z79.01 LONG TERM CURRENT USE OF ANTICOAGULANT THERAPY: Primary | ICD-10-CM

## 2022-07-27 DIAGNOSIS — B35.1 ONYCHOMYCOSIS OF MULTIPLE TOENAILS WITH TYPE 2 DIABETES MELLITUS AND PERIPHERAL ANGIOPATHY: ICD-10-CM

## 2022-07-27 DIAGNOSIS — E11.51 ONYCHOMYCOSIS OF MULTIPLE TOENAILS WITH TYPE 2 DIABETES MELLITUS AND PERIPHERAL ANGIOPATHY: ICD-10-CM

## 2022-07-27 DIAGNOSIS — E11.42 DIABETIC POLYNEUROPATHY ASSOCIATED WITH TYPE 2 DIABETES MELLITUS: ICD-10-CM

## 2022-07-27 DIAGNOSIS — E11.69 ONYCHOMYCOSIS OF MULTIPLE TOENAILS WITH TYPE 2 DIABETES MELLITUS AND PERIPHERAL ANGIOPATHY: ICD-10-CM

## 2022-07-27 PROCEDURE — 1126F AMNT PAIN NOTED NONE PRSNT: CPT | Mod: CPTII,S$GLB,, | Performed by: PODIATRIST

## 2022-07-27 PROCEDURE — 1160F RVW MEDS BY RX/DR IN RCRD: CPT | Mod: CPTII,S$GLB,, | Performed by: PODIATRIST

## 2022-07-27 PROCEDURE — 1159F PR MEDICATION LIST DOCUMENTED IN MEDICAL RECORD: ICD-10-PCS | Mod: CPTII,S$GLB,, | Performed by: PODIATRIST

## 2022-07-27 PROCEDURE — 11721 PR DEBRIDEMENT OF NAILS, 6 OR MORE: ICD-10-PCS | Mod: Q9,S$GLB,, | Performed by: PODIATRIST

## 2022-07-27 PROCEDURE — 99999 PR PBB SHADOW E&M-EST. PATIENT-LVL V: CPT | Mod: PBBFAC,,, | Performed by: PODIATRIST

## 2022-07-27 PROCEDURE — 3078F DIAST BP <80 MM HG: CPT | Mod: CPTII,S$GLB,, | Performed by: PODIATRIST

## 2022-07-27 PROCEDURE — 3288F PR FALLS RISK ASSESSMENT DOCUMENTED: ICD-10-PCS | Mod: CPTII,S$GLB,, | Performed by: PODIATRIST

## 2022-07-27 PROCEDURE — 1101F PT FALLS ASSESS-DOCD LE1/YR: CPT | Mod: CPTII,S$GLB,, | Performed by: PODIATRIST

## 2022-07-27 PROCEDURE — 3072F PR LOW RISK FOR RETINOPATHY: ICD-10-PCS | Mod: CPTII,S$GLB,, | Performed by: PODIATRIST

## 2022-07-27 PROCEDURE — 3072F LOW RISK FOR RETINOPATHY: CPT | Mod: CPTII,S$GLB,, | Performed by: PODIATRIST

## 2022-07-27 PROCEDURE — 3075F SYST BP GE 130 - 139MM HG: CPT | Mod: CPTII,S$GLB,, | Performed by: PODIATRIST

## 2022-07-27 PROCEDURE — 3078F PR MOST RECENT DIASTOLIC BLOOD PRESSURE < 80 MM HG: ICD-10-PCS | Mod: CPTII,S$GLB,, | Performed by: PODIATRIST

## 2022-07-27 PROCEDURE — 3288F FALL RISK ASSESSMENT DOCD: CPT | Mod: CPTII,S$GLB,, | Performed by: PODIATRIST

## 2022-07-27 PROCEDURE — 3075F PR MOST RECENT SYSTOLIC BLOOD PRESS GE 130-139MM HG: ICD-10-PCS | Mod: CPTII,S$GLB,, | Performed by: PODIATRIST

## 2022-07-27 PROCEDURE — 99999 PR PBB SHADOW E&M-EST. PATIENT-LVL V: ICD-10-PCS | Mod: PBBFAC,,, | Performed by: PODIATRIST

## 2022-07-27 PROCEDURE — 1126F PR PAIN SEVERITY QUANTIFIED, NO PAIN PRESENT: ICD-10-PCS | Mod: CPTII,S$GLB,, | Performed by: PODIATRIST

## 2022-07-27 PROCEDURE — 99499 UNLISTED E&M SERVICE: CPT | Mod: S$GLB,,, | Performed by: PODIATRIST

## 2022-07-27 PROCEDURE — 11721 DEBRIDE NAIL 6 OR MORE: CPT | Mod: Q9,S$GLB,, | Performed by: PODIATRIST

## 2022-07-27 PROCEDURE — 1160F PR REVIEW ALL MEDS BY PRESCRIBER/CLIN PHARMACIST DOCUMENTED: ICD-10-PCS | Mod: CPTII,S$GLB,, | Performed by: PODIATRIST

## 2022-07-27 PROCEDURE — 1159F MED LIST DOCD IN RCRD: CPT | Mod: CPTII,S$GLB,, | Performed by: PODIATRIST

## 2022-07-27 PROCEDURE — 99499 NO LOS: ICD-10-PCS | Mod: S$GLB,,, | Performed by: PODIATRIST

## 2022-07-27 PROCEDURE — 1101F PR PT FALLS ASSESS DOC 0-1 FALLS W/OUT INJ PAST YR: ICD-10-PCS | Mod: CPTII,S$GLB,, | Performed by: PODIATRIST

## 2022-07-27 RX ORDER — TEMAZEPAM 15 MG/1
15 CAPSULE ORAL NIGHTLY PRN
Qty: 30 CAPSULE | Refills: 5 | Status: SHIPPED | OUTPATIENT
Start: 2022-07-27 | End: 2023-01-11

## 2022-07-27 NOTE — PROGRESS NOTES
Subjective:      Patient ID: VIJAY Cantor Jr. is a 76 y.o. male.    Chief Complaint: Diabetic Foot Exam (Foot Exam/PCP Desmond Barlow MD/Endo 0701/22 )    BI is a 76 y.o. male who presents to the clinic for evaluation and treatment of high risk feet. BI has a past medical history of *Atrial fibrillation, Anticoagulant long-term use, Basal cell carcinoma (12/2015), Basal cell carcinoma (10/28/2019), BCC (basal cell carcinoma) (12/2015), Cataract, Chronic kidney disease, Diabetes mellitus with renal manifestations, uncontrolled, Dyslipidemia associated with type 2 diabetes mellitus, Fever blister, Hearing loss, HTN (hypertension), Keloid cicatrix, Liver disease, Melanoma (12/07/2015), Obesity, PN (peripheral neuropathy), Primary osteoarthritis of right knee (1/25/2017), Screening for colon cancer (3/3/2016), Sleep apnea, Thyroid disease, Type II or unspecified type diabetes mellitus with other specified manifestations, uncontrolled, and Ulcer of left lower extremity, limited to breakdown of skin (9/22/2017). The patient's chief complaint is long, thick toenails.  Gradual onset, worsening over past several weeks, aggravated by increased weight bearing, shoe gear, pressure.  Periodic debridement helps symptoms. This patient has documented high risk feet requiring routine maintenance secondary to diabetes mellitis and those secondary complications of diabetes, as mentioned..    PCP: Desmond Barlow MD    Date Last Seen by PCP:   Chief Complaint   Patient presents with    Diabetic Foot Exam     Foot Exam/PCP Desmond Barlow MD/Endo 0701/22          Current shoe gear:  Affected Foot: Rx diabetic extra depth shoes and custom accommodative insoles     Unaffected Foot: Rx diabetic extra depth shoes and custom accommodative insoles    Hemoglobin A1C   Date Value Ref Range Status   07/01/2022 7.5 (H) 4.0 - 5.6 % Final     Comment:     ADA Screening Guidelines:  5.7-6.4%  Consistent with prediabetes  >or=6.5%   Consistent with diabetes    High levels of fetal hemoglobin interfere with the HbA1C  assay. Heterozygous hemoglobin variants (HbS, HgC, etc)do  not significantly interfere with this assay.   However, presence of multiple variants may affect accuracy.     05/18/2022 8.0 (H) 4.0 - 5.6 % Final     Comment:     ADA Screening Guidelines:  5.7-6.4%  Consistent with prediabetes  >or=6.5%  Consistent with diabetes    High levels of fetal hemoglobin interfere with the HbA1C  assay. Heterozygous hemoglobin variants (HbS, HgC, etc)do  not significantly interfere with this assay.   However, presence of multiple variants may affect accuracy.     03/22/2022 7.5 (H) 4.0 - 5.6 % Final     Comment:     ADA Screening Guidelines:  5.7-6.4%  Consistent with prediabetes  >or=6.5%  Consistent with diabetes    High levels of fetal hemoglobin interfere with the HbA1C  assay. Heterozygous hemoglobin variants (HbS, HgC, etc)do  not significantly interfere with this assay.   However, presence of multiple variants may affect accuracy.         Review of Systems   Constitutional: Negative for chills, fever, malaise/fatigue and night sweats.   Cardiovascular: Positive for leg swelling. Negative for claudication and cyanosis.   Skin: Positive for nail changes. Negative for color change, itching, poor wound healing, rash and suspicious lesions.   Musculoskeletal: Negative for falls, gout, joint pain and myalgias.   Neurological: Positive for focal weakness, numbness, paresthesias and sensory change.           Objective:      Physical Exam  Constitutional:       Appearance: He is well-developed. He is not diaphoretic.      Comments: Oriented to time, place, and person.   Cardiovascular:      Pulses:           Dorsalis pedis pulses are 1+ on the right side and 1+ on the left side.        Posterior tibial pulses are 1+ on the right side and 1+ on the left side.      Comments: Capillary fill time 3-5 seconds.  All toes warm to touch.      2 + pitting  lower extremity edema bilateral.    Negative elevational pallor and dependent rubor bilateral.    Musculoskeletal:      Comments: Normal angle, base, station of gait. Decreased stride length, early heel off, moderately propulsive toe off bilateral.    Ankle dorsiflexion decreased at <10 degrees bilateral with moderate increase with knee flexion bilateral.      All ten toes without clubbing, cyanosis, or signs of ischemia.      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    No pain to palpation bilateral lower extremities.      Range of motion, stability, muscle strength, and muscle tone are age and health appropriate normal bilateral feet and legs.       Lymphadenopathy:      Comments: Negative lymphadenopathy bilateral popliteal fossa and tarsal tunnel.  Negative lymphangitic streaking bilateral foot/ankle bilateral.     Skin:     General: Skin is warm and dry.      Coloration: Skin is not pale.      Findings: No abrasion, bruising, burn, ecchymosis, erythema, laceration, lesion, petechiae or rash.      Nails: There is no clubbing.      Comments: Skin thin, atrophic, with decreased density and distribution of pedal hair bilateral, but without hyperpigmentation,   ulcers, masses, nodules or cords palpated bilateral feet and legs.        Toenails 1st, 2nd, 3rd, 4th, 5th  bilateral are hypertrophic thickened 2-3 mm, dystrophic, discolored tanish brown with tan, gray crumbly subungual debris.  Tender to distal nail plate pressure, without periungual skin abnormality of each.     Neurological:      Mental Status: He is alert and oriented to person, place, and time. He is not disoriented.      Sensory: Sensory deficit present.      Motor: No tremor, atrophy or abnormal muscle tone.      Deep Tendon Reflexes:      Reflex Scores:       Patellar reflexes are 2+ on the right side and 2+ on the left side.       Achilles reflexes are 2+ on the right side and 2+ on the left side.     Comments: Decreased/absent vibratory sensation  bilateral feet to 128Hz tuning fork.    Paresthesias, and burning bilateral feet with no clearly identified trigger or source.     Psychiatric:         Behavior: Behavior is cooperative.               Assessment:       Encounter Diagnoses   Name Primary?    Long term current use of anticoagulant therapy Yes    Onychomycosis of multiple toenails with type 2 diabetes mellitus and peripheral angiopathy     Diabetic polyneuropathy associated with type 2 diabetes mellitus          Plan:       [facility] [place] exam.    Diagnoses and all orders for this visit:    Long term current use of anticoagulant therapy    Onychomycosis of multiple toenails with type 2 diabetes mellitus and peripheral angiopathy    Diabetic polyneuropathy associated with type 2 diabetes mellitus      I counseled the patient on his conditions, their implications and medical management.    - Shoe inspection. Diabetic Foot Education. Patient reminded of the importance of good nutrition and blood sugar control to help prevent podiatric complications of diabetes. Patient instructed on proper foot hygeine. We discussed wearing proper shoe gear, daily foot inspections, never walking without protective shoe gear, never putting sharp instruments to feet, routine podiatric visits at least annually.      - With patient's permission, nails were aggressively reduced and debrided x 10 to their soft tissue attachment mechanically, removing all offending nail and debris. Patient relates relief following the procedure. He will continue to monitor the areas daily, inspect his feet, wear protective shoe gear when ambulatory, moisturizer to maintain skin integrity and follow in this office  p.r.n.      Discussed conservative treatment with shoes of adequate dimensions, material, and style to alleviate symptoms and delay or prevent surgical intervention.    Resume compression stockings and left afo he has at home.        Continue  penlac    No  follow-ups on file.

## 2022-07-28 ENCOUNTER — OFFICE VISIT (OUTPATIENT)
Dept: ORTHOPEDICS | Facility: CLINIC | Age: 77
End: 2022-07-28
Payer: MEDICARE

## 2022-07-28 VITALS — WEIGHT: 275 LBS | BODY MASS INDEX: 39.37 KG/M2 | HEIGHT: 70 IN

## 2022-07-28 DIAGNOSIS — M65.341 TRIGGER RING FINGER OF RIGHT HAND: Primary | ICD-10-CM

## 2022-07-28 PROCEDURE — 1159F PR MEDICATION LIST DOCUMENTED IN MEDICAL RECORD: ICD-10-PCS | Mod: CPTII,S$GLB,, | Performed by: ORTHOPAEDIC SURGERY

## 2022-07-28 PROCEDURE — 3288F PR FALLS RISK ASSESSMENT DOCUMENTED: ICD-10-PCS | Mod: CPTII,S$GLB,, | Performed by: ORTHOPAEDIC SURGERY

## 2022-07-28 PROCEDURE — 99999 PR PBB SHADOW E&M-EST. PATIENT-LVL IV: ICD-10-PCS | Mod: PBBFAC,,, | Performed by: ORTHOPAEDIC SURGERY

## 2022-07-28 PROCEDURE — 3288F FALL RISK ASSESSMENT DOCD: CPT | Mod: CPTII,S$GLB,, | Performed by: ORTHOPAEDIC SURGERY

## 2022-07-28 PROCEDURE — 99213 OFFICE O/P EST LOW 20 MIN: CPT | Mod: 25,S$GLB,, | Performed by: ORTHOPAEDIC SURGERY

## 2022-07-28 PROCEDURE — 3072F PR LOW RISK FOR RETINOPATHY: ICD-10-PCS | Mod: CPTII,S$GLB,, | Performed by: ORTHOPAEDIC SURGERY

## 2022-07-28 PROCEDURE — 1101F PR PT FALLS ASSESS DOC 0-1 FALLS W/OUT INJ PAST YR: ICD-10-PCS | Mod: CPTII,S$GLB,, | Performed by: ORTHOPAEDIC SURGERY

## 2022-07-28 PROCEDURE — 99999 PR PBB SHADOW E&M-EST. PATIENT-LVL IV: CPT | Mod: PBBFAC,,, | Performed by: ORTHOPAEDIC SURGERY

## 2022-07-28 PROCEDURE — 99213 PR OFFICE/OUTPT VISIT, EST, LEVL III, 20-29 MIN: ICD-10-PCS | Mod: 25,S$GLB,, | Performed by: ORTHOPAEDIC SURGERY

## 2022-07-28 PROCEDURE — 1159F MED LIST DOCD IN RCRD: CPT | Mod: CPTII,S$GLB,, | Performed by: ORTHOPAEDIC SURGERY

## 2022-07-28 PROCEDURE — 3072F LOW RISK FOR RETINOPATHY: CPT | Mod: CPTII,S$GLB,, | Performed by: ORTHOPAEDIC SURGERY

## 2022-07-28 PROCEDURE — 20550 PR INJECT TENDON SHEATH/LIGAMENT: ICD-10-PCS | Mod: 50,S$GLB,, | Performed by: ORTHOPAEDIC SURGERY

## 2022-07-28 PROCEDURE — 20550 NJX 1 TENDON SHEATH/LIGAMENT: CPT | Mod: 50,S$GLB,, | Performed by: ORTHOPAEDIC SURGERY

## 2022-07-28 PROCEDURE — 1125F PR PAIN SEVERITY QUANTIFIED, PAIN PRESENT: ICD-10-PCS | Mod: CPTII,S$GLB,, | Performed by: ORTHOPAEDIC SURGERY

## 2022-07-28 PROCEDURE — 1101F PT FALLS ASSESS-DOCD LE1/YR: CPT | Mod: CPTII,S$GLB,, | Performed by: ORTHOPAEDIC SURGERY

## 2022-07-28 PROCEDURE — 1125F AMNT PAIN NOTED PAIN PRSNT: CPT | Mod: CPTII,S$GLB,, | Performed by: ORTHOPAEDIC SURGERY

## 2022-07-28 RX ORDER — TRIAMCINOLONE ACETONIDE 40 MG/ML
40 INJECTION, SUSPENSION INTRA-ARTICULAR; INTRAMUSCULAR
Status: COMPLETED | OUTPATIENT
Start: 2022-07-28 | End: 2022-07-28

## 2022-07-28 RX ADMIN — TRIAMCINOLONE ACETONIDE 40 MG: 40 INJECTION, SUSPENSION INTRA-ARTICULAR; INTRAMUSCULAR at 02:07

## 2022-07-28 NOTE — PROGRESS NOTES
"Subjective:      Patient ID: BI Cantor Jr. is a 76 y.o. male.  Chief Complaint: Follow-up ([place]ulshan Hands)      HPI  BI Cantor Jr. is a  76 y.o. male presenting today for follow up of triggering of both hands.  He reports that he is having ongoing triggering in the right ring and left index finger the other digits are better  No numbness or tingling reported.    Review of patient's allergies indicates:   Allergen Reactions    Niacin Itching and Other (See Comments)     Other reaction(s): facial flushing and causes hepatitis     Terbinafine Other (See Comments)     Other reaction(s): Hepatitis    "gave me rash"         Current Outpatient Medications   Medication Sig Dispense Refill    atorvastatin (LIPITOR) 40 MG tablet Take 1 tablet (40 mg total) by mouth once daily. 90 tablet 3    azelastine (ASTELIN) 137 mcg (0.1 %) nasal spray 1 spray (137 mcg total) by Nasal route 2 (two) times daily. (Patient taking differently: 1 spray by Nasal route 2 (two) times daily as needed.) 30 mL 3    BD INSULIN PEN NEEDLE UF ORIG 29 x 1/2 " Ndle   12    BD ULTRA-FINE BASIL PEN NEEDLE 32 gauge x 5/32" Ndle USE FOUR TIMES DAILY 400 each 3    blood sugar diagnostic Strp 1 strip by Misc.(Non-Drug; Combo Route) route 4 (four) times daily. To check blood glucose 400 strip 11    blood-glucose meter Misc To check BG as discussed 1 each 0    blood-glucose meter,continuous (DEXCOM G6 ) Misc 1 Device by Misc.(Non-Drug; Combo Route) route continuous. 1 each 0    blood-glucose sensor (DEXCOM G6 SENSOR) Mckenna 1 Device by Misc.(Non-Drug; Combo Route) route every 10 days. 9 each 4    blood-glucose transmitter (DEXCOM G6 TRANSMITTER) Mckenna 1 Device by Misc.(Non-Drug; Combo Route) route every 3 (three) months. 1 each 3    ciclopirox (LOPROX) 0.77 % Crea APPLY TOPICALLY TWICE DAILY 90 g 2    ciclopirox (PENLAC) 8 % Soln Apply topically nightly. 6.6 mL 11    clobetasol (TEMOVATE) 0.05 % cream AAA finger bid 60 g 3    ELIQUIS 5 mg " Tab TAKE ONE TABLET BY MOUTH TWICE DAILY 180 tablet 1    fenofibrate (TRICOR) 145 MG tablet TAKE ONE TABLET BY MOUTH EVERY DAY 90 tablet 3    furosemide (LASIX) 40 MG tablet TAKE ONE TABLET BY MOUTH EVERY DAY AS NEEDED 90 tablet 1    gabapentin (NEURONTIN) 300 MG capsule TAKE ONE CAPSULE BY MOUTH THREE TIMES DAILY (Patient taking differently: 2 (two) times daily.) 270 capsule 3    glucagon (GVOKE HYPOPEN 2-PACK) 1 mg/0.2 mL AtIn Inject 1 mg into the skin as needed (severe hypoglycemia). 2 each 3    insulin aspart U-100 (NOVOLOG FLEXPEN U-100 INSULIN) 100 unit/mL (3 mL) InPn pen Novolog 8 units with meals plus correction scale. Max daily dose 50 units/day (Patient taking differently: 10 Units. Novolog 10 units with meals plus correction scale. Max daily dose 50 units/day) 30 mL 11    insulin lispro (HUMALOG KWIKPEN INSULIN) 100 unit/mL pen Inject 10-12 units 3 times daily with meals. Plus correction scale. Max daily dose 50 units/day 30 mL 11    ketoconazole (NIZORAL) 2 % cream Apply topically once daily. 30 g 1    lancets 33 gauge Misc 1 lancet by Misc.(Non-Drug; Combo Route) route 4 (four) times daily. Pt uses True Result Meter, bg monitoring 4 times a day. 400 each 4    LANTUS SOLOSTAR U-100 INSULIN glargine 100 units/mL (3mL) SubQ pen INJECT 32 UNITS EVERY EVENING. INCREASE AS DIRECTED FOR GOAL MORNING GLUCOSE . MAX 45 UNITS / DAY 30 mL 3    levothyroxine (SYNTHROID) 50 MCG tablet Take 1 tablet (50 mcg total) by mouth once daily. 90 tablet 1    metoprolol succinate (TOPROL-XL) 100 MG 24 hr tablet TAKE ONE TABLET BY MOUTH EVERY DAY 90 tablet 1    nystatin-triamcinolone (MYCOLOG II) cream apply TWICE DAILY 60 g 1    OZEMPIC 1 mg/dose (4 mg/3 mL) INJECT 1MG SUBCUTANEOUSLY ONCE A WEEK 1 pen 11    semaglutide (OZEMPIC) 1 mg/dose (2 mg/1.5 mL) PnIj Inject 1 mg under the skin once a week 2 Syringe 11    tamsulosin (FLOMAX) 0.4 mg Cap Take 1 capsule (0.4 mg total) by mouth once daily. 90 capsule 1     temazepam (RESTORIL) 15 mg Cap Take 1 capsule (15 mg total) by mouth nightly as needed. 30 capsule 5    triamcinolone acetonide 0.1% (KENALOG) 0.1 % cream APPLY TO AFFECTED AREA(S) TWICE DAILY 30 g 3    varicella-zoster gE-AS01B, PF, (SHINGRIX, PF,) 50 mcg/0.5 mL injection Inject into the muscle. 0.5 each 1     No current facility-administered medications for this visit.       Past Medical History:   Diagnosis Date    *Atrial fibrillation     Eliquis    Anticoagulant long-term use     apaxiban    Basal cell carcinoma 12/2015    left eye brow    Basal cell carcinoma 10/28/2019    right superior preauricular    BCC (basal cell carcinoma) 12/2015    left shoulder    Cataract     Chronic kidney disease     Diabetes mellitus with renal manifestations, uncontrolled     Dyslipidemia associated with type 2 diabetes mellitus     Fever blister     Hearing loss     HTN (hypertension)     Keloid cicatrix     Liver disease     Melanoma 12/07/2015    right cheek-in situ    Obesity     PN (peripheral neuropathy)     Primary osteoarthritis of right knee 1/25/2017    Screening for colon cancer 3/3/2016    Sleep apnea     wears CPAP at night    Thyroid disease     Type II or unspecified type diabetes mellitus with other specified manifestations, uncontrolled     Ulcer of left lower extremity, limited to breakdown of skin 9/22/2017       Past Surgical History:   Procedure Laterality Date    APPENDECTOMY      CARDIOVERSION      CATARACT EXTRACTION      CATARACT EXTRACTION Right 05/14/2018    Dr. Greer    COLONOSCOPY N/A 3/3/2016    Procedure: COLONOSCOPY;  Surgeon: Bob Poe MD;  Location: [address]);  Service: Endoscopy;  Laterality: N/A;  Holding Eliquis for 3 days prior to colonoscopy. EC    COLONOSCOPY N/A 9/20/2019    Procedure: COLONOSCOPY;  Surgeon: Akbar Curtis MD;  Location: [address]);  Service: Endoscopy;  Laterality: N/A;  Per Dr. José Granda, patient can HOLD  "Eliquis X2 days prior to procedure-see telphone encounter 8/22/19-[facility]    ECTROPION REPAIR Right 8/14/2019    Procedure: REPAIR, ECTROPION, EYELID /       #[id number]- Skin Flaps(Deep Tissue) (OD);  Surgeon: Edita Sabillon MD;  Location: [facility];  Service: Ophthalmology;  Laterality: Right;    epidural steroid injection      INTRAOCULAR PROSTHESES INSERTION Left 6/25/2018    Procedure: INSERTION, INTRAOCULAR LENS PROSTHESIS;  Surgeon: Lawrence Greer MD;  Location: [facility];  Service: Ophthalmology;  Laterality: Left;    JOINT REPLACEMENT      Knee    Meniere's disease surgery      PHACOEMULSIFICATION OF CATARACT Left 6/25/2018    Procedure: PHACOEMULSIFICATION, CATARACT;  Surgeon: Lawrence Greer MD;  Location: [facility];  Service: Ophthalmology;  Laterality: Left;    PREPARATION OF WOUND PRIOR TO APPLICATION OF SKIN GRAFT Right 8/14/2019    Procedure: PREPARATION, WOUND, PRIOR TO SKIN GRAFT APPLICATION;  Surgeon: Edita Sabillon MD;  [facility];  Service: Ophthalmology;  Laterality: Right;    REVISION OF KNEE ARTHROPLASTY Right 7/10/2018    Procedure: REVISION-ARTHROPLASTY-KNEE-DAKOTA;  Surgeon: Miguel Stuart MD;  [facility];  Service: Orthopedics;  Laterality: Right;  [place]    SKIN FULL THICKNESS GRAFT Right 8/14/2019    Procedure: APPLICATION, GRAFT, SKIN, FULL-THICKNESS;  Surgeon: Edita Sabillon MD;  [facility];  Service: Ophthalmology;  Laterality: Right;    TARSORRHAPHY Right 8/14/2019    Procedure: BLEPHARORRHAPHY;  Surgeon: Edita Sabillon MD;  Location: [facility];  Service: Ophthalmology;  Laterality: Right;    TONSILLECTOMY      TOTAL KNEE ARTHROPLASTY Right 01/25/2017    TKR    TOTAL KNEE ARTHROPLASTY Left     TKR, 1990's       OBJECTIVE:   PHYSICAL EXAM:  Height: 5' 10" (177.8 cm) Weight: 124.7 kg (275 lb)  Vitals:    07/28/22 1356   Weight: 124.7 kg (275 lb)   Height: 5' 10" (1.778 m)   PainSc:   8     Ortho/SPM Exam  Examination hands " demonstrates some mild swelling tenderness and triggering of the right ring and left index finger the left index finger has limited range of motion as well  Sensation intact Tinel sign negative    RADIOGRAPHS:  None  Comments: I have personally reviewed the imaging and I agree with the above radiologist's report.    ASSESSMENT/PLAN:     IMPRESSION:  1. Triggering of the right ring finger.    2. Triggering of left index finger with stiffness    PLAN:  I explained the nature of the problem to the patient  Recommended injections for both  After pause for time-out identified the right ring and left index finger each injected with combination Kenalog 20 mg 0.5 cc xylocaine sterile technique  Tolerated the procedure well without complication  Recommended range of motion exercises prevent stiffness especially the left index finger  If symptoms persist consider surgical treatment      FOLLOW UP:  6 weeks    Disclaimer: This note has been generated using voice-recognition software. There may be typographical errors that have been missed during proof-reading.

## 2022-08-09 ENCOUNTER — PATIENT OUTREACH (OUTPATIENT)
Dept: ADMINISTRATIVE | Facility: HOSPITAL | Age: 77
End: 2022-08-09
Payer: MEDICARE

## 2022-08-12 ENCOUNTER — PROCEDURE VISIT (OUTPATIENT)
Dept: OPHTHALMOLOGY | Facility: CLINIC | Age: 77
End: 2022-08-12
Attending: OPHTHALMOLOGY
Payer: MEDICARE

## 2022-08-12 DIAGNOSIS — H26.491 PCO (POSTERIOR CAPSULAR OPACIFICATION), RIGHT: Primary | ICD-10-CM

## 2022-08-12 PROCEDURE — 99214 OFFICE O/P EST MOD 30 MIN: CPT | Mod: 57,S$GLB,, | Performed by: OPHTHALMOLOGY

## 2022-08-12 PROCEDURE — 66821 AFTER CATARACT LASER SURGERY: CPT | Mod: RT,S$GLB,, | Performed by: OPHTHALMOLOGY

## 2022-08-12 PROCEDURE — 99214 PR OFFICE/OUTPT VISIT, EST, LEVL IV, 30-39 MIN: ICD-10-PCS | Mod: 57,S$GLB,, | Performed by: OPHTHALMOLOGY

## 2022-08-12 PROCEDURE — 66821 PR DISCISSION,2ND CATARACT,LASER: ICD-10-PCS | Mod: RT,S$GLB,, | Performed by: OPHTHALMOLOGY

## 2022-08-12 NOTE — PROGRESS NOTES
HPI     Patient presents today for a YAG OD. Patient notes vision as blurry.   Patient notes difficulty  with glare. Patient notes no eye pain.     Last edited by Yonathan Chatterjee on 8/12/2022  3:07 PM. (History)            Assessment /Plan     For exam results, see Encounter Report.    PCO (posterior capsular opacification), right      Visually significant posterior capsular opacity present.  Discussed risks, benefits, and alternatives to laser surgery.  YAG laser capsulotomy Procedure Note:   Informed consent obtained and correct eye(s) verified with patient.  1 drop of topical Proparacaine and Iopidine instilled, and eye(s) dilated with 1% Tropicamide 2.5% Phenylephrine.  YAG laser applied to posterior capsule in cruciate pattern OD  Patient tolerated procedure well. No complications. Follow up in 1 month/PRN.

## 2022-08-26 DIAGNOSIS — E03.4 HYPOTHYROIDISM DUE TO ACQUIRED ATROPHY OF THYROID: ICD-10-CM

## 2022-08-26 RX ORDER — LEVOTHYROXINE SODIUM 50 UG/1
50 TABLET ORAL DAILY
Qty: 90 TABLET | Refills: 2 | Status: SHIPPED | OUTPATIENT
Start: 2022-08-26 | End: 2023-04-24

## 2022-08-26 NOTE — TELEPHONE ENCOUNTER
No new care gaps identified.  [facility] Embedded Care Gaps. Reference number: [id number]. 8/26/2022   9:00:55 AM CDT

## 2022-08-26 NOTE — TELEPHONE ENCOUNTER
Refill Decision Note   [facility]  is requesting a refill authorization.  Brief Assessment and Rationale for Refill:  Approve     Medication Therapy Plan:  Acceptable use per OR protocol    Medication Reconciliation Completed: No   Comments:     No Care Gaps recommended.     Note composed:9:50 AM 08/26/2022             quit marijuana five years ago

## 2022-09-22 ENCOUNTER — PES CALL (OUTPATIENT)
Dept: ADMINISTRATIVE | Facility: CLINIC | Age: 77
End: 2022-09-22
Payer: MEDICARE

## 2022-09-22 ENCOUNTER — OFFICE VISIT (OUTPATIENT)
Dept: ORTHOPEDICS | Facility: CLINIC | Age: 77
End: 2022-09-22
Payer: MEDICARE

## 2022-09-22 VITALS — BODY MASS INDEX: 39.37 KG/M2 | HEIGHT: 70 IN | WEIGHT: 275 LBS

## 2022-09-22 DIAGNOSIS — M65.341 TRIGGER RING FINGER OF RIGHT HAND: Primary | ICD-10-CM

## 2022-09-22 PROCEDURE — 1125F AMNT PAIN NOTED PAIN PRSNT: CPT | Mod: CPTII,S$GLB,, | Performed by: ORTHOPAEDIC SURGERY

## 2022-09-22 PROCEDURE — 3288F PR FALLS RISK ASSESSMENT DOCUMENTED: ICD-10-PCS | Mod: CPTII,S$GLB,, | Performed by: ORTHOPAEDIC SURGERY

## 2022-09-22 PROCEDURE — 3072F PR LOW RISK FOR RETINOPATHY: ICD-10-PCS | Mod: CPTII,S$GLB,, | Performed by: ORTHOPAEDIC SURGERY

## 2022-09-22 PROCEDURE — 1101F PT FALLS ASSESS-DOCD LE1/YR: CPT | Mod: CPTII,S$GLB,, | Performed by: ORTHOPAEDIC SURGERY

## 2022-09-22 PROCEDURE — 20550 PR INJECT TENDON SHEATH/LIGAMENT: ICD-10-PCS | Mod: RT,S$GLB,, | Performed by: ORTHOPAEDIC SURGERY

## 2022-09-22 PROCEDURE — 3288F FALL RISK ASSESSMENT DOCD: CPT | Mod: CPTII,S$GLB,, | Performed by: ORTHOPAEDIC SURGERY

## 2022-09-22 PROCEDURE — 1101F PR PT FALLS ASSESS DOC 0-1 FALLS W/OUT INJ PAST YR: ICD-10-PCS | Mod: CPTII,S$GLB,, | Performed by: ORTHOPAEDIC SURGERY

## 2022-09-22 PROCEDURE — 1159F MED LIST DOCD IN RCRD: CPT | Mod: CPTII,S$GLB,, | Performed by: ORTHOPAEDIC SURGERY

## 2022-09-22 PROCEDURE — 20550 NJX 1 TENDON SHEATH/LIGAMENT: CPT | Mod: RT,S$GLB,, | Performed by: ORTHOPAEDIC SURGERY

## 2022-09-22 PROCEDURE — 1159F PR MEDICATION LIST DOCUMENTED IN MEDICAL RECORD: ICD-10-PCS | Mod: CPTII,S$GLB,, | Performed by: ORTHOPAEDIC SURGERY

## 2022-09-22 PROCEDURE — 99999 PR PBB SHADOW E&M-EST. PATIENT-LVL IV: ICD-10-PCS | Mod: PBBFAC,,, | Performed by: ORTHOPAEDIC SURGERY

## 2022-09-22 PROCEDURE — 99999 PR PBB SHADOW E&M-EST. PATIENT-LVL IV: CPT | Mod: PBBFAC,,, | Performed by: ORTHOPAEDIC SURGERY

## 2022-09-22 PROCEDURE — 99213 PR OFFICE/OUTPT VISIT, EST, LEVL III, 20-29 MIN: ICD-10-PCS | Mod: 25,S$GLB,, | Performed by: ORTHOPAEDIC SURGERY

## 2022-09-22 PROCEDURE — 99213 OFFICE O/P EST LOW 20 MIN: CPT | Mod: 25,S$GLB,, | Performed by: ORTHOPAEDIC SURGERY

## 2022-09-22 PROCEDURE — 3072F LOW RISK FOR RETINOPATHY: CPT | Mod: CPTII,S$GLB,, | Performed by: ORTHOPAEDIC SURGERY

## 2022-09-22 PROCEDURE — 1125F PR PAIN SEVERITY QUANTIFIED, PAIN PRESENT: ICD-10-PCS | Mod: CPTII,S$GLB,, | Performed by: ORTHOPAEDIC SURGERY

## 2022-09-22 RX ORDER — TRIAMCINOLONE ACETONIDE 40 MG/ML
20 INJECTION, SUSPENSION INTRA-ARTICULAR; INTRAMUSCULAR
Status: COMPLETED | OUTPATIENT
Start: 2022-09-22 | End: 2022-09-22

## 2022-09-22 RX ADMIN — TRIAMCINOLONE ACETONIDE 20 MG: 40 INJECTION, SUSPENSION INTRA-ARTICULAR; INTRAMUSCULAR at 02:09

## 2022-09-22 NOTE — PROGRESS NOTES
"Subjective:      Patient ID: BI Cantor Jr. is a 76 y.o. male.  Chief Complaint: Follow-up of the Left Hand and Follow-up of the Right Hand      HPI  BI Cantor Jr. is a  76 y.o. male presenting today for follow up of tearing of both hands.  He reports that he is doing well with the left hand but having some recurrent triggering of the right ring finger   Symptoms worse with use  No numbness or tingling reported.    Review of patient's allergies indicates:   Allergen Reactions    Niacin Itching and Other (See Comments)     Other reaction(s): facial flushing and causes hepatitis     Terbinafine Other (See Comments)     Other reaction(s): Hepatitis    "gave me rash"         Current Outpatient Medications   Medication Sig Dispense Refill    atorvastatin (LIPITOR) 40 MG tablet Take 1 tablet (40 mg total) by mouth once daily. 90 tablet 3    azelastine (ASTELIN) 137 mcg (0.1 %) nasal spray 1 spray (137 mcg total) by Nasal route 2 (two) times daily. (Patient taking differently: 1 spray by Nasal route 2 (two) times daily as needed.) 30 mL 3    BD INSULIN PEN NEEDLE UF ORIG 29 x 1/2 " Ndle   12    blood sugar diagnostic Strp 1 strip by Misc.(Non-Drug; Combo Route) route 4 (four) times daily. To check blood glucose 400 strip 11    blood-glucose meter Misc To check BG as discussed 1 each 0    blood-glucose meter,continuous (DEXCOM G6 ) Misc 1 Device by Misc.(Non-Drug; Combo Route) route continuous. 1 each 0    blood-glucose sensor (DEXCOM G6 SENSOR) Mckenna 1 Device by Misc.(Non-Drug; Combo Route) route every 10 days. 9 each 4    blood-glucose transmitter (DEXCOM G6 TRANSMITTER) Mckenna 1 Device by Misc.(Non-Drug; Combo Route) route every 3 (three) months. 1 each 3    ciclopirox (LOPROX) 0.77 % Crea APPLY TOPICALLY TWICE DAILY 90 g 2    ciclopirox (PENLAC) 8 % Soln Apply topically nightly. 6.6 mL 11    clobetasol (TEMOVATE) 0.05 % cream AAA finger bid 60 g 3    ELIQUIS 5 mg Tab TAKE ONE TABLET BY MOUTH TWICE DAILY 180 " "tablet 1    fenofibrate (TRICOR) 145 MG tablet TAKE ONE TABLET BY MOUTH EVERY DAY 90 tablet 3    furosemide (LASIX) 40 MG tablet TAKE ONE TABLET BY MOUTH EVERY DAY AS NEEDED 90 tablet 1    gabapentin (NEURONTIN) 300 MG capsule TAKE ONE CAPSULE BY MOUTH THREE TIMES DAILY (Patient taking differently: 2 (two) times daily.) 270 capsule 3    glucagon (GVOKE HYPOPEN 2-PACK) 1 mg/0.2 mL AtIn Inject 1 mg into the skin as needed (severe hypoglycemia). 2 each 3    insulin aspart U-100 (NOVOLOG FLEXPEN U-100 INSULIN) 100 unit/mL (3 mL) InPn pen Novolog 8 units with meals plus correction scale. Max daily dose 50 units/day (Patient taking differently: 10 Units. Novolog 10 units with meals plus correction scale. Max daily dose 50 units/day) 30 mL 11    insulin lispro (HUMALOG KWIKPEN INSULIN) 100 unit/mL pen Inject 10-12 units 3 times daily with meals. Plus correction scale. Max daily dose 50 units/day 30 mL 11    ketoconazole (NIZORAL) 2 % cream Apply topically once daily. 30 g 1    lancets 33 gauge Misc 1 lancet by Misc.(Non-Drug; Combo Route) route 4 (four) times daily. Pt uses True Result Meter, bg monitoring 4 times a day. 400 each 4    LANTUS SOLOSTAR U-100 INSULIN glargine 100 units/mL (3mL) SubQ pen INJECT 32 UNITS EVERY EVENING. INCREASE AS DIRECTED FOR GOAL MORNING GLUCOSE . MAX 45 UNITS / DAY 30 mL 3    levothyroxine (SYNTHROID) 50 MCG tablet Take 1 tablet (50 mcg total) by mouth once daily. 90 tablet 2    metoprolol succinate (TOPROL-XL) 100 MG 24 hr tablet TAKE ONE TABLET BY MOUTH EVERY DAY 90 tablet 1    nystatin-triamcinolone (MYCOLOG II) cream apply TWICE DAILY 60 g 1    OZEMPIC 1 mg/dose (4 mg/3 mL) INJECT 1MG SUBCUTANEOUSLY ONCE A WEEK 1 pen 11    pen needle, diabetic (BD ULTRA-FINE BASIL PEN NEEDLE) 32 gauge x 5/32" Ndle USE FOUR TIMES DAILY 400 each 3    semaglutide (OZEMPIC) 1 mg/dose (2 mg/1.5 mL) PnIj Inject 1 mg under the skin once a week 2 Syringe 11    tamsulosin (FLOMAX) 0.4 mg Cap Take 1 capsule " (0.4 mg total) by mouth once daily. 90 capsule 1    temazepam (RESTORIL) 15 mg Cap Take 1 capsule (15 mg total) by mouth nightly as needed. 30 capsule 5    triamcinolone acetonide 0.1% (KENALOG) 0.1 % cream APPLY TO AFFECTED AREA(S) TWICE DAILY 30 g 3    varicella-zoster gE-AS01B, PF, (SHINGRIX, PF,) 50 mcg/0.5 mL injection Inject into the muscle. 0.5 each 1     No current facility-administered medications for this visit.       Past Medical History:   Diagnosis Date    *Atrial fibrillation     Eliquis    Anticoagulant long-term use     apaxiban    Basal cell carcinoma 12/2015    left eye brow    Basal cell carcinoma 10/28/2019    right superior preauricular    BCC (basal cell carcinoma) 12/2015    left shoulder    Cataract     Chronic kidney disease     Diabetes mellitus with renal manifestations, uncontrolled     Dyslipidemia associated with type 2 diabetes mellitus     Fever blister     Hearing loss     HTN (hypertension)     Keloid cicatrix     Liver disease     Melanoma 12/07/2015    right cheek-in situ    Obesity     PN (peripheral neuropathy)     Primary osteoarthritis of right knee 1/25/2017    Screening for colon cancer 3/3/2016    Sleep apnea     wears CPAP at night    Thyroid disease     Type II or unspecified type diabetes mellitus with other specified manifestations, uncontrolled     Ulcer of left lower extremity, limited to breakdown of skin 9/22/2017       Past Surgical History:   Procedure Laterality Date    APPENDECTOMY      CARDIOVERSION      CATARACT EXTRACTION      CATARACT EXTRACTION Right 05/14/2018    Dr. Greer    COLONOSCOPY N/A 3/3/2016    Procedure: COLONOSCOPY;  Surgeon: Bob Poe MD;  Location: [address]);  Service: Endoscopy;  Laterality: N/A;  Holding Eliquis for 3 days prior to colonoscopy. EC    COLONOSCOPY N/A 9/20/2019    Procedure: COLONOSCOPY;  Surgeon: Akbar Curtis MD;  Location: [address]);  Service: Endoscopy;  Laterality: N/A;  Per Dr. José Granda,  "patient can HOLD Eliquis X2 days prior to procedure-see telphone encounter 8/22/19-    ECTROPION REPAIR Right 8/14/2019    Procedure: REPAIR, ECTROPION, EYELID /       #91154- Skin Flaps(Deep Tissue) (OD);  Surgeon: Edita Sabillon MD;  Location: [facility];  Service: Ophthalmology;  Laterality: Right;    epidural steroid injection      INTRAOCULAR PROSTHESES INSERTION Left 6/25/2018    Procedure: INSERTION, INTRAOCULAR LENS PROSTHESIS;  Surgeon: Lawrence Greer MD;  Location: [facility];  Service: Ophthalmology;  Laterality: Left;    JOINT REPLACEMENT      Knee    Meniere's disease surgery      PHACOEMULSIFICATION OF CATARACT Left 6/25/2018    Procedure: PHACOEMULSIFICATION, CATARACT;  Surgeon: Lawrence Greer MD;  Location: [facility];  Service: Ophthalmology;  Laterality: Left;    PREPARATION OF WOUND PRIOR TO APPLICATION OF SKIN GRAFT Right 8/14/2019    Procedure: PREPARATION, WOUND, PRIOR TO SKIN GRAFT APPLICATION;  Surgeon: Edita Sabillon MD;  [facility];  Service: Ophthalmology;  Laterality: Right;    REVISION OF KNEE ARTHROPLASTY Right 7/10/2018    Procedure: REVISION-ARTHROPLASTY-KNEE-SAMIR;  Surgeon: Migule Stuart MD;  [facility];  Service: Orthopedics;  Laterality: Right;  Samir    SKIN FULL THICKNESS GRAFT Right 8/14/2019    Procedure: APPLICATION, GRAFT, SKIN, FULL-THICKNESS;  Surgeon: Edita Sabillon MD;  Location: [facility];  Service: Ophthalmology;  Laterality: Right;    TARSORRHAPHY Right 8/14/2019    Procedure: BLEPHARORRHAPHY;  Surgeon: Edita Sabillon MD;  Location: [facility];  Service: Ophthalmology;  Laterality: Right;    TONSILLECTOMY      TOTAL KNEE ARTHROPLASTY Right 01/25/2017    TKR    TOTAL KNEE ARTHROPLASTY Left     TKR, 1990's       OBJECTIVE:   PHYSICAL EXAM:  Height: 5' 10" (177.8 cm) Weight: 124.7 kg (275 lb)  Vitals:    09/22/22 1303   Weight: 124.7 kg (275 lb)   Height: 5' 10" (1.778 m)   PainSc:   7     Ortho/SPM Exam  Examination " right hand demonstrates mild tenderness and triggering of the right ring finger  strength decreased   Tinel sign negative   Sensation intact       RADIOGRAPHS:  None  Comments: I have personally reviewed the imaging and I agree with the above radiologist's report.    ASSESSMENT/PLAN:     IMPRESSION:  Triggering of the right ring finger    PLAN:  We discussed options including injection versus surgery   He would like another injection   After pause for time-out identified the right ring finger injected flexor tendon sheath with combination Kenalog 20 mg 0.5 cc xylocaine sterile technique  Tolerated the procedure well without complication  I would like him to keep an eye on symptoms consider surgery if symptoms persist    FOLLOW UP:  2-3 months    Disclaimer: This note has been generated using voice-recognition software. There may be typographical errors that have been missed during proof-reading.

## 2022-09-25 NOTE — PROGRESS NOTES
MEDICAL HISTORY:    Type 2 diabetes with peripheral neuropathy and chronic kidney disease stage III.  Chronic atrial fibrillation.  Hypertension.  Hyperlipidemia.  Chronic diastolic dysfunction.  Sleep apnea, history of septoplasty and UPPP .  Pulmonary hypertension.  Chronic venous insufficiency  Lymphedema  Lumbar degenerative disc disease  BPH  Right total knee replacement and revision arthroplasty  Left total knee arthroplasty 2003  Left shoulder surgery.  Meniere's disease.  Lumbar degenerative disk disease.  Nephlithiasis.  Status post eyelid surgery for removal of ectropion  Basis cell carcinoma, status post Mohs     SOCIAL HISTORY:  Tobacco use and alcohol use, none.         MEDICATIONS:  Eliquis 5 mg b.i.d.  Ozempic 1 g once a week  Fenofibrate 145 mg.  Lasix 40 mg.  Gabapentin 300 mg two capsules b.i.d.  Lantus insulin 36 units.  NovoLog  10 units with each meal plus sliding scale  Metoprolol  mg at night.  Atorvastatin 40 mg  Temazepam 15 mg at night as needed.          76-year-old male   He finds that he is having worsening edema involving his lower extremity as well as having shortness of breath.  Also compared to his last visit with me in May his weight is 10 lb higher.  It was at that time he was started on tamsulosin and was asked to put a hold on furosemide 40 mg because of frequent urination.  In July there was a message saying that he was having swelling and weight gain.  It was recommended a re-initiate furosemide along with taking tamsulosin.  However he has stop the tamsulosin is back on furosemide 40 mg daily.    It is also noted that he was previously on the medicine levothyroxine but has not been taking the medication.    There is no chest pain     Examination   Weight 281 lb   Pulse 76   Blood pressure 128/80   Chest clear breath sounds   Heart irregular regular no murmurs gallops  Two to 3+ pedal pretibial edema on the right and 3+ on the left    Impression   Lower extremity edema    Chronic diastolic dysfunction   Type 2 diabetes with chronic kidney disease stage IIIA and peripheral neuropathy   Hyper tension   Chronic atrial fibrillation  Pulmonary hypertension    Plan  Basic metabolic profile, hemoglobin A1c, BNP, TSH  Disposition to follow regarding the use of medications

## 2022-09-26 ENCOUNTER — OFFICE VISIT (OUTPATIENT)
Dept: INTERNAL MEDICINE | Facility: CLINIC | Age: 77
End: 2022-09-26
Payer: MEDICARE

## 2022-09-26 ENCOUNTER — LAB VISIT (OUTPATIENT)
Dept: LAB | Facility: HOSPITAL | Age: 77
End: 2022-09-26
Attending: INTERNAL MEDICINE
Payer: MEDICARE

## 2022-09-26 VITALS
OXYGEN SATURATION: 97 % | SYSTOLIC BLOOD PRESSURE: 116 MMHG | WEIGHT: 281.75 LBS | HEART RATE: 78 BPM | HEIGHT: 70 IN | DIASTOLIC BLOOD PRESSURE: 80 MMHG | BODY MASS INDEX: 40.34 KG/M2

## 2022-09-26 DIAGNOSIS — I50.32 CHRONIC DIASTOLIC HEART FAILURE: ICD-10-CM

## 2022-09-26 DIAGNOSIS — R60.0 BILATERAL LEG EDEMA: Primary | ICD-10-CM

## 2022-09-26 DIAGNOSIS — N18.31 TYPE 2 DIABETES MELLITUS WITH STAGE 3A CHRONIC KIDNEY DISEASE, WITH LONG-TERM CURRENT USE OF INSULIN: ICD-10-CM

## 2022-09-26 DIAGNOSIS — I48.20 CHRONIC ATRIAL FIBRILLATION: ICD-10-CM

## 2022-09-26 DIAGNOSIS — I10 ESSENTIAL HYPERTENSION: ICD-10-CM

## 2022-09-26 DIAGNOSIS — R60.0 BILATERAL LEG EDEMA: ICD-10-CM

## 2022-09-26 DIAGNOSIS — Z79.4 TYPE 2 DIABETES MELLITUS WITH STAGE 3A CHRONIC KIDNEY DISEASE, WITH LONG-TERM CURRENT USE OF INSULIN: ICD-10-CM

## 2022-09-26 DIAGNOSIS — E11.22 TYPE 2 DIABETES MELLITUS WITH STAGE 3A CHRONIC KIDNEY DISEASE, WITH LONG-TERM CURRENT USE OF INSULIN: ICD-10-CM

## 2022-09-26 PROBLEM — T84.019A PROSTHETIC JOINT IMPLANT FAILURE: Status: RESOLVED | Noted: 2018-07-10 | Resolved: 2022-09-26

## 2022-09-26 LAB
ANION GAP SERPL CALC-SCNC: 7 MMOL/L (ref 8–16)
BNP SERPL-MCNC: 116 PG/ML (ref 0–99)
BUN SERPL-MCNC: 21 MG/DL (ref 8–23)
CALCIUM SERPL-MCNC: 9.6 MG/DL (ref 8.7–10.5)
CHLORIDE SERPL-SCNC: 103 MMOL/L (ref 95–110)
CO2 SERPL-SCNC: 27 MMOL/L (ref 23–29)
CREAT SERPL-MCNC: 1.2 MG/DL (ref 0.5–1.4)
EST. GFR  (NO RACE VARIABLE): >60 ML/MIN/1.73 M^2
ESTIMATED AVG GLUCOSE: 171 MG/DL (ref 68–131)
GLUCOSE SERPL-MCNC: 256 MG/DL (ref 70–110)
HBA1C MFR BLD: 7.6 % (ref 4–5.6)
POTASSIUM SERPL-SCNC: 4.7 MMOL/L (ref 3.5–5.1)
SODIUM SERPL-SCNC: 137 MMOL/L (ref 136–145)
TSH SERPL DL<=0.005 MIU/L-ACNC: 3.76 UIU/ML (ref 0.4–4)

## 2022-09-26 PROCEDURE — 83880 ASSAY OF NATRIURETIC PEPTIDE: CPT | Performed by: INTERNAL MEDICINE

## 2022-09-26 PROCEDURE — 99214 OFFICE O/P EST MOD 30 MIN: CPT | Mod: S$GLB,,, | Performed by: INTERNAL MEDICINE

## 2022-09-26 PROCEDURE — 1101F PT FALLS ASSESS-DOCD LE1/YR: CPT | Mod: CPTII,S$GLB,, | Performed by: INTERNAL MEDICINE

## 2022-09-26 PROCEDURE — 1126F AMNT PAIN NOTED NONE PRSNT: CPT | Mod: CPTII,S$GLB,, | Performed by: INTERNAL MEDICINE

## 2022-09-26 PROCEDURE — 3079F PR MOST RECENT DIASTOLIC BLOOD PRESSURE 80-89 MM HG: ICD-10-PCS | Mod: CPTII,S$GLB,, | Performed by: INTERNAL MEDICINE

## 2022-09-26 PROCEDURE — 99499 RISK ADDL DX/OHS AUDIT: ICD-10-PCS | Mod: S$GLB,,, | Performed by: INTERNAL MEDICINE

## 2022-09-26 PROCEDURE — 1101F PR PT FALLS ASSESS DOC 0-1 FALLS W/OUT INJ PAST YR: ICD-10-PCS | Mod: CPTII,S$GLB,, | Performed by: INTERNAL MEDICINE

## 2022-09-26 PROCEDURE — 99499 UNLISTED E&M SERVICE: CPT | Mod: S$GLB,,, | Performed by: INTERNAL MEDICINE

## 2022-09-26 PROCEDURE — 3288F PR FALLS RISK ASSESSMENT DOCUMENTED: ICD-10-PCS | Mod: CPTII,S$GLB,, | Performed by: INTERNAL MEDICINE

## 2022-09-26 PROCEDURE — 1159F PR MEDICATION LIST DOCUMENTED IN MEDICAL RECORD: ICD-10-PCS | Mod: CPTII,S$GLB,, | Performed by: INTERNAL MEDICINE

## 2022-09-26 PROCEDURE — 3074F SYST BP LT 130 MM HG: CPT | Mod: CPTII,S$GLB,, | Performed by: INTERNAL MEDICINE

## 2022-09-26 PROCEDURE — 99999 PR PBB SHADOW E&M-EST. PATIENT-LVL IV: ICD-10-PCS | Mod: PBBFAC,,, | Performed by: INTERNAL MEDICINE

## 2022-09-26 PROCEDURE — 36415 COLL VENOUS BLD VENIPUNCTURE: CPT | Performed by: INTERNAL MEDICINE

## 2022-09-26 PROCEDURE — 3072F PR LOW RISK FOR RETINOPATHY: ICD-10-PCS | Mod: CPTII,S$GLB,, | Performed by: INTERNAL MEDICINE

## 2022-09-26 PROCEDURE — 1126F PR PAIN SEVERITY QUANTIFIED, NO PAIN PRESENT: ICD-10-PCS | Mod: CPTII,S$GLB,, | Performed by: INTERNAL MEDICINE

## 2022-09-26 PROCEDURE — 3074F PR MOST RECENT SYSTOLIC BLOOD PRESSURE < 130 MM HG: ICD-10-PCS | Mod: CPTII,S$GLB,, | Performed by: INTERNAL MEDICINE

## 2022-09-26 PROCEDURE — 3288F FALL RISK ASSESSMENT DOCD: CPT | Mod: CPTII,S$GLB,, | Performed by: INTERNAL MEDICINE

## 2022-09-26 PROCEDURE — 3079F DIAST BP 80-89 MM HG: CPT | Mod: CPTII,S$GLB,, | Performed by: INTERNAL MEDICINE

## 2022-09-26 PROCEDURE — 83036 HEMOGLOBIN GLYCOSYLATED A1C: CPT | Performed by: INTERNAL MEDICINE

## 2022-09-26 PROCEDURE — 1159F MED LIST DOCD IN RCRD: CPT | Mod: CPTII,S$GLB,, | Performed by: INTERNAL MEDICINE

## 2022-09-26 PROCEDURE — 3072F LOW RISK FOR RETINOPATHY: CPT | Mod: CPTII,S$GLB,, | Performed by: INTERNAL MEDICINE

## 2022-09-26 PROCEDURE — 80048 BASIC METABOLIC PNL TOTAL CA: CPT | Performed by: INTERNAL MEDICINE

## 2022-09-26 PROCEDURE — 99214 PR OFFICE/OUTPT VISIT, EST, LEVL IV, 30-39 MIN: ICD-10-PCS | Mod: S$GLB,,, | Performed by: INTERNAL MEDICINE

## 2022-09-26 PROCEDURE — 84443 ASSAY THYROID STIM HORMONE: CPT | Performed by: INTERNAL MEDICINE

## 2022-09-26 PROCEDURE — 99999 PR PBB SHADOW E&M-EST. PATIENT-LVL IV: CPT | Mod: PBBFAC,,, | Performed by: INTERNAL MEDICINE

## 2022-09-26 RX ORDER — LEVOTHYROXINE SODIUM 25 UG/1
25 TABLET ORAL DAILY
COMMUNITY
Start: 2022-07-09

## 2022-09-27 NOTE — TRANSFER OF CARE
"Anesthesia Transfer of Care Note    Patient: [facility] PAT Cantor Jr.    Procedure(s) Performed: Procedure(s) (LRB):  REVISION-ARTHROPLASTY-KNEE-DAKOTA (Right)    Patient location: PACU    Anesthesia Type: CSE and general    Transport from OR: Transported from OR on 2-3 L/min O2 by NC with adequate spontaneous ventilation    Post pain: adequate analgesia    Post assessment: no apparent anesthetic complications and tolerated procedure well    Post vital signs: stable    Level of consciousness: awake and alert    Nausea/Vomiting: no nausea/vomiting    Complications: none    Transfer of care protocol was followed      Last vitals:   Visit Vitals  /71 (BP Location: Right arm, Patient Position: Lying)   Pulse 69   Temp 36.3 °C (97.3 °F) (Axillary)   Resp 19   Ht 5' 10" (1.778 m)   Wt (!) 144.7 kg (319 lb)   SpO2 97%   BMI 45.77 kg/m²     " This one is not covered either.    Covermymeds [id number].

## 2022-10-20 NOTE — TELEPHONE ENCOUNTER
Left a voicemail for the patient giving surgery arrival time 7:30am, also do not eat anything after 9:00pm the night before surgery. Patient may have water, Gatorade, or Powerade from 9:00pm until he/she leave their home the morning of surgery.       Chonodrocutaneous Helical Advancement Flap Text: The defect edges were debeveled with a #15 scalpel blade.  Given the location of the defect and the proximity to free margins a chondrocutaneous helical advancement flap was deemed most appropriate.  Using a sterile surgical marker, the appropriate advancement flap was drawn incorporating the defect and placing the expected incisions within the relaxed skin tension lines where possible.    The area thus outlined was incised deep to adipose tissue with a #15 scalpel blade.  The skin margins were undermined to an appropriate distance in all directions utilizing iris scissors.

## 2022-10-27 RX ORDER — METOPROLOL SUCCINATE 100 MG/1
TABLET, EXTENDED RELEASE ORAL
Qty: 90 TABLET | Refills: 3 | Status: SHIPPED | OUTPATIENT
Start: 2022-10-27 | End: 2023-01-23

## 2022-10-27 NOTE — TELEPHONE ENCOUNTER
Refill Decision Note   VIJAY Cantor  is requesting a refill authorization.  Brief Assessment and Rationale for Refill:  Approve     Medication Therapy Plan:       Medication Reconciliation Completed: No   Comments:     No Care Gaps recommended.     Note composed:11:58 AM 10/27/2022

## 2022-10-27 NOTE — TELEPHONE ENCOUNTER
No new care gaps identified.  [facility] Embedded Care Gaps. Reference number: [id number]. 10/27/2022   9:12:16 AM CDT

## 2022-11-21 DIAGNOSIS — M17.11 PRIMARY OSTEOARTHRITIS OF RIGHT KNEE: ICD-10-CM

## 2022-11-21 DIAGNOSIS — Z79.4 TYPE 2 DIABETES MELLITUS WITH STAGE 3 CHRONIC KIDNEY DISEASE, WITH LONG-TERM CURRENT USE OF INSULIN: ICD-10-CM

## 2022-11-21 DIAGNOSIS — I87.2 VENOUS INSUFFICIENCY OF BOTH LOWER EXTREMITIES: ICD-10-CM

## 2022-11-21 DIAGNOSIS — I10 ESSENTIAL HYPERTENSION: ICD-10-CM

## 2022-11-21 DIAGNOSIS — N18.30 CHRONIC KIDNEY DISEASE, STAGE III (MODERATE): ICD-10-CM

## 2022-11-21 DIAGNOSIS — I50.31 ACUTE DIASTOLIC HEART FAILURE: ICD-10-CM

## 2022-11-21 DIAGNOSIS — I48.20 CHRONIC ATRIAL FIBRILLATION: ICD-10-CM

## 2022-11-21 DIAGNOSIS — E11.22 TYPE 2 DIABETES MELLITUS WITH STAGE 3 CHRONIC KIDNEY DISEASE, WITH LONG-TERM CURRENT USE OF INSULIN: ICD-10-CM

## 2022-11-21 DIAGNOSIS — N18.30 TYPE 2 DIABETES MELLITUS WITH STAGE 3 CHRONIC KIDNEY DISEASE, WITH LONG-TERM CURRENT USE OF INSULIN: ICD-10-CM

## 2022-11-21 NOTE — TELEPHONE ENCOUNTER
No new care gaps identified.  [facility] Embedded Care Gaps. Reference number: [id number]. 11/21/2022   9:13:36 AM CST

## 2022-11-22 RX ORDER — FUROSEMIDE 40 MG/1
TABLET ORAL
Qty: 90 TABLET | Refills: 3 | Status: SHIPPED | OUTPATIENT
Start: 2022-11-22 | End: 2023-10-21

## 2022-11-22 NOTE — TELEPHONE ENCOUNTER
Refill Routing Note   Medication(s) are not appropriate for processing by [facility] for the following reason(s):      - Drug-Disease Interaction (furosemide and Benign hypertensive renal disease without renal failure)    [facility] action(s):  Defer Medication-related problems identified: Drug-disease interaction        Medication reconciliation completed: No     Appointments  past 12m or future 3m with PCP    Date Provider   Last Visit   9/26/2022 Desmond Barlow MD   Next Visit   Visit date not found Desmond Barlow MD   ED visits in past 90 days: 0        Note composed:4:19 PM 11/22/2022

## 2022-12-13 RX ORDER — GABAPENTIN 300 MG/1
600 CAPSULE ORAL 2 TIMES DAILY
Qty: 360 CAPSULE | Refills: 1 | Status: SHIPPED | OUTPATIENT
Start: 2022-12-13 | End: 2023-03-16

## 2022-12-13 NOTE — TELEPHONE ENCOUNTER
----- Message from Kaye Fox sent at 12/13/2022  3:04 PM CST -----  Contact: [facility] Mireya [phone]  Requesting an RX refill or new RX.  Is this a refill or new RX: refill  RX name and strength (copy/paste from chart):  gabapentin (NEURONTIN) 300 MG capsule  Is this a 30 day or 90 day RX: 90  Pharmacy name and phone # (copy/paste from chart):    [facility] - INDIRA Rinaldi - [address] [place] [address]  [address]  [place] [address]  Phone: [phone] Fax: [phone]  The doctors have asked that we provide their patients with the following 2 reminders -- prescription refills can take up to 72 hours, and a friendly reminder that in the future you can use your [facility] account to request refills: call back

## 2022-12-16 ENCOUNTER — TELEPHONE (OUTPATIENT)
Dept: ENDOCRINOLOGY | Facility: CLINIC | Age: 77
End: 2022-12-16
Payer: MEDICARE

## 2022-12-16 DIAGNOSIS — Z79.4 TYPE 2 DIABETES MELLITUS WITH OTHER SPECIFIED COMPLICATION, WITH LONG-TERM CURRENT USE OF INSULIN: ICD-10-CM

## 2022-12-16 DIAGNOSIS — E11.69 TYPE 2 DIABETES MELLITUS WITH OTHER SPECIFIED COMPLICATION, WITH LONG-TERM CURRENT USE OF INSULIN: ICD-10-CM

## 2022-12-16 RX ORDER — BLOOD-GLUCOSE,RECEIVER,CONT
1 EACH MISCELLANEOUS CONTINUOUS
Qty: 1 EACH | Refills: 0 | Status: SHIPPED | OUTPATIENT
Start: 2022-12-16 | End: 2022-12-22 | Stop reason: SDUPTHER

## 2022-12-16 RX ORDER — BLOOD-GLUCOSE TRANSMITTER
1 EACH MISCELLANEOUS
Qty: 1 EACH | Refills: 3 | Status: SHIPPED | OUTPATIENT
Start: 2022-12-16 | End: 2022-12-22 | Stop reason: SDUPTHER

## 2022-12-16 RX ORDER — BLOOD-GLUCOSE SENSOR
1 EACH MISCELLANEOUS
Qty: 9 EACH | Refills: 4 | Status: SHIPPED | OUTPATIENT
Start: 2022-12-16 | End: 2022-12-22 | Stop reason: SDUPTHER

## 2022-12-16 NOTE — TELEPHONE ENCOUNTER
----- Message from Gumaro Wilson MA sent at 12/16/2022  2:34 PM CST -----  Contact: pt    ----- Message -----  From: Mallory Leong  Sent: 12/16/2022   2:32 PM CST  To: [facility] Staff    Pt is calling requesting a new prescription to be sent in for his DexCom G6 device. He would like a call back once this has been processed.     Confirmed contact below:  Contact Name:BI Cantor  Phone Number: [phone]

## 2022-12-22 DIAGNOSIS — Z79.4 TYPE 2 DIABETES MELLITUS WITH OTHER SPECIFIED COMPLICATION, WITH LONG-TERM CURRENT USE OF INSULIN: Primary | ICD-10-CM

## 2022-12-22 DIAGNOSIS — E11.69 TYPE 2 DIABETES MELLITUS WITH OTHER SPECIFIED COMPLICATION, WITH LONG-TERM CURRENT USE OF INSULIN: Primary | ICD-10-CM

## 2022-12-22 RX ORDER — BLOOD-GLUCOSE,RECEIVER,CONT
1 EACH MISCELLANEOUS CONTINUOUS
Qty: 1 EACH | Refills: 0 | Status: SHIPPED | OUTPATIENT
Start: 2022-12-22 | End: 2023-12-22

## 2022-12-22 RX ORDER — BLOOD-GLUCOSE TRANSMITTER
1 EACH MISCELLANEOUS
Qty: 1 EACH | Refills: 3 | Status: SHIPPED | OUTPATIENT
Start: 2022-12-22 | End: 2024-01-26 | Stop reason: SDUPTHER

## 2022-12-22 RX ORDER — BLOOD-GLUCOSE SENSOR
1 EACH MISCELLANEOUS
Qty: 9 EACH | Refills: 4 | Status: SHIPPED | OUTPATIENT
Start: 2022-12-22 | End: 2024-01-26 | Stop reason: SDUPTHER

## 2022-12-22 NOTE — TELEPHONE ENCOUNTER
----- Message from Amitaviviana Florian sent at 12/22/2022 10:42 AM CST -----  Regarding: Prior Authorization  Contact: pt @ [phone]  Rx Refill/Request    Is this a Refill or New Rx:Refill    Rx Name and Strength:blood-glucose meter,continuous (DEXCOM G6 ) Misc    blood-glucose sensor (DEXCOM G6 SENSOR) Mckenna    blood-glucose transmitter (DEXCOM G6 TRANSMITTER) Mckenna    Preferred Pharmacy with phone number:[facility]    Communication Preference:pt @ [phone]    Additional Information:Need a new prescription and Prior Authorization is needed per Mike

## 2023-01-03 ENCOUNTER — OFFICE VISIT (OUTPATIENT)
Dept: PODIATRY | Facility: CLINIC | Age: 78
End: 2023-01-03
Payer: MEDICARE

## 2023-01-03 VITALS
SYSTOLIC BLOOD PRESSURE: 134 MMHG | HEIGHT: 70 IN | DIASTOLIC BLOOD PRESSURE: 83 MMHG | WEIGHT: 281 LBS | BODY MASS INDEX: 40.23 KG/M2

## 2023-01-03 DIAGNOSIS — E11.69 ONYCHOMYCOSIS OF MULTIPLE TOENAILS WITH TYPE 2 DIABETES MELLITUS AND PERIPHERAL ANGIOPATHY: ICD-10-CM

## 2023-01-03 DIAGNOSIS — B35.3 TINEA PEDIS OF BOTH FEET: ICD-10-CM

## 2023-01-03 DIAGNOSIS — E11.51 ONYCHOMYCOSIS OF MULTIPLE TOENAILS WITH TYPE 2 DIABETES MELLITUS AND PERIPHERAL ANGIOPATHY: ICD-10-CM

## 2023-01-03 DIAGNOSIS — Z79.01 LONG TERM CURRENT USE OF ANTICOAGULANT THERAPY: Primary | ICD-10-CM

## 2023-01-03 DIAGNOSIS — E11.42 DIABETIC POLYNEUROPATHY ASSOCIATED WITH TYPE 2 DIABETES MELLITUS: ICD-10-CM

## 2023-01-03 DIAGNOSIS — B35.1 ONYCHOMYCOSIS OF MULTIPLE TOENAILS WITH TYPE 2 DIABETES MELLITUS AND PERIPHERAL ANGIOPATHY: ICD-10-CM

## 2023-01-03 PROCEDURE — 99214 OFFICE O/P EST MOD 30 MIN: CPT | Mod: 25,S$GLB,, | Performed by: PODIATRIST

## 2023-01-03 PROCEDURE — 1159F MED LIST DOCD IN RCRD: CPT | Mod: CPTII,S$GLB,, | Performed by: PODIATRIST

## 2023-01-03 PROCEDURE — 1101F PT FALLS ASSESS-DOCD LE1/YR: CPT | Mod: CPTII,S$GLB,, | Performed by: PODIATRIST

## 2023-01-03 PROCEDURE — 3288F FALL RISK ASSESSMENT DOCD: CPT | Mod: CPTII,S$GLB,, | Performed by: PODIATRIST

## 2023-01-03 PROCEDURE — 99499 RISK ADDL DX/OHS AUDIT: ICD-10-PCS | Mod: S$GLB,,, | Performed by: PODIATRIST

## 2023-01-03 PROCEDURE — 99214 PR OFFICE/OUTPT VISIT, EST, LEVL IV, 30-39 MIN: ICD-10-PCS | Mod: 25,S$GLB,, | Performed by: PODIATRIST

## 2023-01-03 PROCEDURE — 99999 PR PBB SHADOW E&M-EST. PATIENT-LVL IV: ICD-10-PCS | Mod: PBBFAC,,, | Performed by: PODIATRIST

## 2023-01-03 PROCEDURE — 3075F SYST BP GE 130 - 139MM HG: CPT | Mod: CPTII,S$GLB,, | Performed by: PODIATRIST

## 2023-01-03 PROCEDURE — 3072F PR LOW RISK FOR RETINOPATHY: ICD-10-PCS | Mod: CPTII,S$GLB,, | Performed by: PODIATRIST

## 2023-01-03 PROCEDURE — 3075F PR MOST RECENT SYSTOLIC BLOOD PRESS GE 130-139MM HG: ICD-10-PCS | Mod: CPTII,S$GLB,, | Performed by: PODIATRIST

## 2023-01-03 PROCEDURE — 1126F PR PAIN SEVERITY QUANTIFIED, NO PAIN PRESENT: ICD-10-PCS | Mod: CPTII,S$GLB,, | Performed by: PODIATRIST

## 2023-01-03 PROCEDURE — 3079F DIAST BP 80-89 MM HG: CPT | Mod: CPTII,S$GLB,, | Performed by: PODIATRIST

## 2023-01-03 PROCEDURE — 1101F PR PT FALLS ASSESS DOC 0-1 FALLS W/OUT INJ PAST YR: ICD-10-PCS | Mod: CPTII,S$GLB,, | Performed by: PODIATRIST

## 2023-01-03 PROCEDURE — 3072F LOW RISK FOR RETINOPATHY: CPT | Mod: CPTII,S$GLB,, | Performed by: PODIATRIST

## 2023-01-03 PROCEDURE — 1126F AMNT PAIN NOTED NONE PRSNT: CPT | Mod: CPTII,S$GLB,, | Performed by: PODIATRIST

## 2023-01-03 PROCEDURE — 11721 DEBRIDE NAIL 6 OR MORE: CPT | Mod: Q9,S$GLB,, | Performed by: PODIATRIST

## 2023-01-03 PROCEDURE — 3288F PR FALLS RISK ASSESSMENT DOCUMENTED: ICD-10-PCS | Mod: CPTII,S$GLB,, | Performed by: PODIATRIST

## 2023-01-03 PROCEDURE — 99999 PR PBB SHADOW E&M-EST. PATIENT-LVL IV: CPT | Mod: PBBFAC,,, | Performed by: PODIATRIST

## 2023-01-03 PROCEDURE — 11721 PR DEBRIDEMENT OF NAILS, 6 OR MORE: ICD-10-PCS | Mod: Q9,S$GLB,, | Performed by: PODIATRIST

## 2023-01-03 PROCEDURE — 99499 UNLISTED E&M SERVICE: CPT | Mod: S$GLB,,, | Performed by: PODIATRIST

## 2023-01-03 PROCEDURE — 1159F PR MEDICATION LIST DOCUMENTED IN MEDICAL RECORD: ICD-10-PCS | Mod: CPTII,S$GLB,, | Performed by: PODIATRIST

## 2023-01-03 PROCEDURE — 3079F PR MOST RECENT DIASTOLIC BLOOD PRESSURE 80-89 MM HG: ICD-10-PCS | Mod: CPTII,S$GLB,, | Performed by: PODIATRIST

## 2023-01-03 RX ORDER — CICLOPIROX OLAMINE 7.7 MG/G
CREAM TOPICAL 2 TIMES DAILY
Qty: 90 G | Refills: 2 | Status: SHIPPED | OUTPATIENT
Start: 2023-01-03 | End: 2023-06-11

## 2023-01-03 RX ORDER — HYDROCODONE BITARTRATE AND ACETAMINOPHEN 5; 325 MG/1; MG/1
1 TABLET ORAL EVERY 6 HOURS PRN
COMMUNITY
Start: 2022-11-07 | End: 2023-05-03

## 2023-01-03 RX ORDER — CICLOPIROX 80 MG/ML
SOLUTION TOPICAL NIGHTLY
Qty: 6.6 ML | Refills: 11 | Status: SHIPPED | OUTPATIENT
Start: 2023-01-03 | End: 2024-01-18

## 2023-01-03 RX ORDER — AMOXICILLIN 500 MG/1
CAPSULE ORAL
COMMUNITY
Start: 2022-10-26 | End: 2023-05-03

## 2023-01-03 RX ORDER — AMMONIUM LACTATE 12 G/100G
CREAM TOPICAL
Qty: 140 G | Refills: 11 | Status: SHIPPED | OUTPATIENT
Start: 2023-01-03 | End: 2024-01-18

## 2023-01-03 RX ORDER — CLINDAMYCIN HYDROCHLORIDE 150 MG/1
150 CAPSULE ORAL 4 TIMES DAILY
COMMUNITY
Start: 2022-11-02 | End: 2023-05-03

## 2023-01-03 NOTE — PROGRESS NOTES
Subjective:      Patient ID: BI Cantor Jr. is a 77 y.o. male.    Chief Complaint: Foot Ulcer (Possible sore beneath right foot and nails need to be clipped.)    BI is a 77 y.o. male who presents to the clinic for evaluation and treatment of high risk feet. BI has a past medical history of *Atrial fibrillation, Anticoagulant long-term use, Basal cell carcinoma (12/2015), Basal cell carcinoma (10/28/2019), BCC (basal cell carcinoma) (12/2015), Cataract, Chronic kidney disease, Diabetes mellitus with renal manifestations, uncontrolled, Dyslipidemia associated with type 2 diabetes mellitus, Fever blister, Hearing loss, HTN (hypertension), Keloid cicatrix, Liver disease, Melanoma (12/07/2015), Obesity, PN (peripheral neuropathy), Primary osteoarthritis of right knee (1/25/2017), Screening for colon cancer (3/3/2016), Sleep apnea, Thyroid disease, Type II or unspecified type diabetes mellitus with other specified manifestations, uncontrolled, and Ulcer of left lower extremity, limited to breakdown of skin (9/22/2017). The patient's chief complaint is long, thick toenails.  Gradual onset, worsening over past several weeks, aggravated by increased weight bearing, shoe gear, pressure.  Periodic debridement helps symptoms. This patient has documented high risk feet requiring routine maintenance secondary to diabetes mellitis and those secondary complications of diabetes, as mentioned..    PCP: Desmond Barlow MD    Date Last Seen by PCP:   Chief Complaint   Patient presents with    Foot Ulcer     Possible sore beneath right foot and nails need to be clipped.         Current shoe gear:  Affected Foot: Rx diabetic extra depth shoes and custom accommodative insoles     Unaffected Foot: Rx diabetic extra depth shoes and custom accommodative insoles    Hemoglobin A1C   Date Value Ref Range Status   12/05/2022 7.3 (H) 4.0 - 5.6 % Final     Comment:     ADA Screening Guidelines:  5.7-6.4%  Consistent with  prediabetes  >or=6.5%  Consistent with diabetes    High levels of fetal hemoglobin interfere with the HbA1C  assay. Heterozygous hemoglobin variants (HbS, HgC, etc)do  not significantly interfere with this assay.   However, presence of multiple variants may affect accuracy.     09/26/2022 7.6 (H) 4.0 - 5.6 % Final     Comment:     ADA Screening Guidelines:  5.7-6.4%  Consistent with prediabetes  >or=6.5%  Consistent with diabetes    High levels of fetal hemoglobin interfere with the HbA1C  assay. Heterozygous hemoglobin variants (HbS, HgC, etc)do  not significantly interfere with this assay.   However, presence of multiple variants may affect accuracy.     07/01/2022 7.5 (H) 4.0 - 5.6 % Final     Comment:     ADA Screening Guidelines:  5.7-6.4%  Consistent with prediabetes  >or=6.5%  Consistent with diabetes    High levels of fetal hemoglobin interfere with the HbA1C  assay. Heterozygous hemoglobin variants (HbS, HgC, etc)do  not significantly interfere with this assay.   However, presence of multiple variants may affect accuracy.         Review of Systems   Constitutional: Negative for chills, fever, malaise/fatigue and night sweats.   Cardiovascular:  Positive for leg swelling. Negative for claudication and cyanosis.   Skin:  Positive for dry skin, itching and nail changes. Negative for color change, poor wound healing, rash and suspicious lesions.   Musculoskeletal:  Negative for falls, gout, joint pain and myalgias.   Neurological:  Positive for focal weakness, numbness, paresthesias and sensory change.           Objective:      Physical Exam  Constitutional:       Appearance: He is well-developed. He is not diaphoretic.      Comments: Oriented to time, place, and person.   Cardiovascular:      Pulses:           Dorsalis pedis pulses are 1+ on the right side and 1+ on the left side.        Posterior tibial pulses are 1+ on the right side and 1+ on the left side.      Comments: Capillary fill time 3-5 seconds.   All toes warm to touch.      2 + pitting lower extremity edema bilateral.    Negative elevational pallor and dependent rubor bilateral.    Musculoskeletal:      Comments: Normal angle, base, station of gait. Decreased stride length, early heel off, moderately propulsive toe off bilateral.    Ankle dorsiflexion decreased at <10 degrees bilateral with moderate increase with knee flexion bilateral.      All ten toes without clubbing, cyanosis, or signs of ischemia.      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    No pain to palpation bilateral lower extremities.      Range of motion, stability, muscle strength, and muscle tone are age and health appropriate normal bilateral feet and legs.       Lymphadenopathy:      Comments: Negative lymphadenopathy bilateral popliteal fossa and tarsal tunnel.  Negative lymphangitic streaking bilateral foot/ankle bilateral.     Skin:     General: Skin is warm and dry.      Coloration: Skin is not pale.      Findings: No abrasion, bruising, burn, ecchymosis, erythema, laceration, lesion, petechiae or rash.      Nails: There is no clubbing.      Comments: Dry scale with superficial flakes over an erythematous base  without ulceration, drainage, pus, tracking, fluctuance, malodor, or cardinal signs infection.    Otherwise, Skin thin, atrophic, with decreased density and distribution of pedal hair bilateral, but without hyperpigmentation,   ulcers, masses, nodules or cords palpated bilateral feet and legs.        Toenails 1st, 2nd, 3rd, 4th, 5th  bilateral are hypertrophic thickened 2-3 mm, dystrophic, discolored tanish brown with tan, gray crumbly subungual debris.  Tender to distal nail plate pressure, without periungual skin abnormality of each.     Neurological:      Mental Status: He is alert and oriented to person, place, and time. He is not disoriented.      Sensory: Sensory deficit present.      Motor: No tremor, atrophy or abnormal muscle tone.      Comments: Decreased/absent  vibratory sensation bilateral feet to 128Hz tuning fork.    Paresthesias, and burning bilateral feet with no clearly identified trigger or source.     Psychiatric:         Behavior: Behavior is cooperative.             Assessment:       Encounter Diagnoses   Name Primary?    Long term current use of anticoagulant therapy Yes    Onychomycosis of multiple toenails with type 2 diabetes mellitus and peripheral angiopathy     Diabetic polyneuropathy associated with type 2 diabetes mellitus     Tinea pedis of both feet          Plan:       [facility] for foot ulcer.    Diagnoses and all orders for this visit:    Long term current use of anticoagulant therapy  -      DIABETES FOOT EXAM    Onychomycosis of multiple toenails with type 2 diabetes mellitus and peripheral angiopathy  -      DIABETES FOOT EXAM    Diabetic polyneuropathy associated with type 2 diabetes mellitus  -      DIABETES FOOT EXAM    Tinea pedis of both feet  -      DIABETES FOOT EXAM    Other orders  -     ciclopirox (LOPROX) 0.77 % Crea; Apply topically 2 (two) times daily.  -     ciclopirox (PENLAC) 8 % Soln; Apply topically nightly.  -     ammonium lactate 12 % Crea; Apply twice daily to affected parts both feet as needed.    I counseled the patient on his conditions, their implications and medical management.    - Shoe inspection. Diabetic Foot Education. Patient reminded of the importance of good nutrition and blood sugar control to help prevent podiatric complications of diabetes. Patient instructed on proper foot hygeine. We discussed wearing proper shoe gear, daily foot inspections, never walking without protective shoe gear, never putting sharp instruments to feet, routine podiatric visits at least annually.      - With patient's permission, nails were aggressively reduced and debrided x 10 to their soft tissue attachment mechanically, removing all offending nail and debris. Patient relates relief following the procedure. He  will continue to monitor the areas daily, inspect his feet, wear protective shoe gear when ambulatory, moisturizer to maintain skin integrity and follow in this office  p.r.n.      Discussed conservative treatment with shoes of adequate dimensions, material, and style to alleviate symptoms and delay or prevent surgical intervention.    Resume compression stockings and left afo he has at home.        Continue  penlac    Follow up in about 1 year (around 1/3/2024).

## 2023-01-04 ENCOUNTER — TELEPHONE (OUTPATIENT)
Dept: ENDOCRINOLOGY | Facility: CLINIC | Age: 78
End: 2023-01-04
Payer: MEDICARE

## 2023-01-04 NOTE — TELEPHONE ENCOUNTER
Filled out DME for dexcom      Last seen 7/1/2022  Lab Results   Component Value Date    HGBA1C 7.3 (H) 12/05/2022     Sending in

## 2023-01-11 RX ORDER — TEMAZEPAM 15 MG/1
15 CAPSULE ORAL NIGHTLY PRN
Qty: 30 CAPSULE | Refills: 5 | Status: SHIPPED | OUTPATIENT
Start: 2023-01-11 | End: 2023-12-21

## 2023-01-21 RX ORDER — TAMSULOSIN HYDROCHLORIDE 0.4 MG/1
0.4 CAPSULE ORAL DAILY
Qty: 90 CAPSULE | Refills: 2 | Status: SHIPPED | OUTPATIENT
Start: 2023-01-21 | End: 2023-08-31

## 2023-01-21 NOTE — TELEPHONE ENCOUNTER
Refill Decision Note   [facility]  is requesting a refill authorization.  Brief Assessment and Rationale for Refill:  Approve     Medication Therapy Plan:       Medication Reconciliation Completed: No   Comments:     No Care Gaps recommended.     Note composed:3:12 PM 01/21/2023

## 2023-01-21 NOTE — TELEPHONE ENCOUNTER
No new care gaps identified.  [facility] Embedded Care Gaps. Reference number: [id number]. 1/21/2023   8:49:44 AM CST

## 2023-01-23 RX ORDER — METOPROLOL SUCCINATE 100 MG/1
TABLET, EXTENDED RELEASE ORAL
Qty: 90 TABLET | Refills: 2 | Status: SHIPPED | OUTPATIENT
Start: 2023-01-23 | End: 2023-11-08

## 2023-01-23 NOTE — TELEPHONE ENCOUNTER
No new care gaps identified.  [facility] Embedded Care Gaps. Reference number: [id number]. 1/23/2023   9:06:50 AM CST

## 2023-01-23 NOTE — TELEPHONE ENCOUNTER
Refill Decision Note   [facility]  is requesting a refill authorization.  Brief Assessment and Rationale for Refill:  Approve     Medication Therapy Plan:       Medication Reconciliation Completed: No   Comments:     No Care Gaps recommended.     Note composed:1:39 PM 01/23/2023

## 2023-02-01 ENCOUNTER — OFFICE VISIT (OUTPATIENT)
Dept: ENDOCRINOLOGY | Facility: CLINIC | Age: 78
End: 2023-02-01
Payer: MEDICARE

## 2023-02-01 VITALS
DIASTOLIC BLOOD PRESSURE: 56 MMHG | SYSTOLIC BLOOD PRESSURE: 125 MMHG | WEIGHT: 279.56 LBS | OXYGEN SATURATION: 95 % | HEART RATE: 72 BPM | BODY MASS INDEX: 40.11 KG/M2

## 2023-02-01 DIAGNOSIS — E11.69 DYSLIPIDEMIA ASSOCIATED WITH TYPE 2 DIABETES MELLITUS: ICD-10-CM

## 2023-02-01 DIAGNOSIS — Z79.4 TYPE 2 DIABETES MELLITUS WITH STAGE 3A CHRONIC KIDNEY DISEASE, WITH LONG-TERM CURRENT USE OF INSULIN: ICD-10-CM

## 2023-02-01 DIAGNOSIS — Z79.4 TYPE 2 DIABETES MELLITUS WITH DIABETIC POLYNEUROPATHY, WITH LONG-TERM CURRENT USE OF INSULIN: ICD-10-CM

## 2023-02-01 DIAGNOSIS — Z79.4 TYPE 2 DIABETES MELLITUS WITH HYPERGLYCEMIA, WITH LONG-TERM CURRENT USE OF INSULIN: Primary | ICD-10-CM

## 2023-02-01 DIAGNOSIS — E78.5 DYSLIPIDEMIA ASSOCIATED WITH TYPE 2 DIABETES MELLITUS: ICD-10-CM

## 2023-02-01 DIAGNOSIS — E11.22 TYPE 2 DIABETES MELLITUS WITH STAGE 3A CHRONIC KIDNEY DISEASE, WITH LONG-TERM CURRENT USE OF INSULIN: ICD-10-CM

## 2023-02-01 DIAGNOSIS — E11.65 TYPE 2 DIABETES MELLITUS WITH HYPERGLYCEMIA, WITH LONG-TERM CURRENT USE OF INSULIN: Primary | ICD-10-CM

## 2023-02-01 DIAGNOSIS — E11.42 TYPE 2 DIABETES MELLITUS WITH DIABETIC POLYNEUROPATHY, WITH LONG-TERM CURRENT USE OF INSULIN: ICD-10-CM

## 2023-02-01 DIAGNOSIS — E66.01 CLASS 3 SEVERE OBESITY DUE TO EXCESS CALORIES WITH SERIOUS COMORBIDITY AND BODY MASS INDEX (BMI) OF 40.0 TO 44.9 IN ADULT: ICD-10-CM

## 2023-02-01 DIAGNOSIS — N18.31 TYPE 2 DIABETES MELLITUS WITH STAGE 3A CHRONIC KIDNEY DISEASE, WITH LONG-TERM CURRENT USE OF INSULIN: ICD-10-CM

## 2023-02-01 PROCEDURE — 3078F DIAST BP <80 MM HG: CPT | Mod: CPTII,S$GLB,, | Performed by: INTERNAL MEDICINE

## 2023-02-01 PROCEDURE — 3072F LOW RISK FOR RETINOPATHY: CPT | Mod: CPTII,S$GLB,, | Performed by: INTERNAL MEDICINE

## 2023-02-01 PROCEDURE — 1160F PR REVIEW ALL MEDS BY PRESCRIBER/CLIN PHARMACIST DOCUMENTED: ICD-10-PCS | Mod: CPTII,S$GLB,, | Performed by: INTERNAL MEDICINE

## 2023-02-01 PROCEDURE — 99214 PR OFFICE/OUTPT VISIT, EST, LEVL IV, 30-39 MIN: ICD-10-PCS | Mod: S$GLB,,, | Performed by: INTERNAL MEDICINE

## 2023-02-01 PROCEDURE — 1101F PR PT FALLS ASSESS DOC 0-1 FALLS W/OUT INJ PAST YR: ICD-10-PCS | Mod: CPTII,S$GLB,, | Performed by: INTERNAL MEDICINE

## 2023-02-01 PROCEDURE — 3078F PR MOST RECENT DIASTOLIC BLOOD PRESSURE < 80 MM HG: ICD-10-PCS | Mod: CPTII,S$GLB,, | Performed by: INTERNAL MEDICINE

## 2023-02-01 PROCEDURE — 1160F RVW MEDS BY RX/DR IN RCRD: CPT | Mod: CPTII,S$GLB,, | Performed by: INTERNAL MEDICINE

## 2023-02-01 PROCEDURE — 1126F PR PAIN SEVERITY QUANTIFIED, NO PAIN PRESENT: ICD-10-PCS | Mod: CPTII,S$GLB,, | Performed by: INTERNAL MEDICINE

## 2023-02-01 PROCEDURE — 99214 OFFICE O/P EST MOD 30 MIN: CPT | Mod: S$GLB,,, | Performed by: INTERNAL MEDICINE

## 2023-02-01 PROCEDURE — 1101F PT FALLS ASSESS-DOCD LE1/YR: CPT | Mod: CPTII,S$GLB,, | Performed by: INTERNAL MEDICINE

## 2023-02-01 PROCEDURE — 3288F FALL RISK ASSESSMENT DOCD: CPT | Mod: CPTII,S$GLB,, | Performed by: INTERNAL MEDICINE

## 2023-02-01 PROCEDURE — 3074F SYST BP LT 130 MM HG: CPT | Mod: CPTII,S$GLB,, | Performed by: INTERNAL MEDICINE

## 2023-02-01 PROCEDURE — 3074F PR MOST RECENT SYSTOLIC BLOOD PRESSURE < 130 MM HG: ICD-10-PCS | Mod: CPTII,S$GLB,, | Performed by: INTERNAL MEDICINE

## 2023-02-01 PROCEDURE — 3072F PR LOW RISK FOR RETINOPATHY: ICD-10-PCS | Mod: CPTII,S$GLB,, | Performed by: INTERNAL MEDICINE

## 2023-02-01 PROCEDURE — 1159F MED LIST DOCD IN RCRD: CPT | Mod: CPTII,S$GLB,, | Performed by: INTERNAL MEDICINE

## 2023-02-01 PROCEDURE — 3288F PR FALLS RISK ASSESSMENT DOCUMENTED: ICD-10-PCS | Mod: CPTII,S$GLB,, | Performed by: INTERNAL MEDICINE

## 2023-02-01 PROCEDURE — 1159F PR MEDICATION LIST DOCUMENTED IN MEDICAL RECORD: ICD-10-PCS | Mod: CPTII,S$GLB,, | Performed by: INTERNAL MEDICINE

## 2023-02-01 PROCEDURE — 1126F AMNT PAIN NOTED NONE PRSNT: CPT | Mod: CPTII,S$GLB,, | Performed by: INTERNAL MEDICINE

## 2023-02-01 RX ORDER — SEMAGLUTIDE 1.34 MG/ML
INJECTION, SOLUTION SUBCUTANEOUS
Qty: 1 PEN | Refills: 11 | Status: SHIPPED | OUTPATIENT
Start: 2023-02-01 | End: 2023-08-15 | Stop reason: ALTCHOICE

## 2023-02-01 RX ORDER — INSULIN GLARGINE 100 [IU]/ML
36 INJECTION, SOLUTION SUBCUTANEOUS NIGHTLY
Qty: 15 ML | Refills: 11 | Status: SHIPPED | OUTPATIENT
Start: 2023-02-01

## 2023-02-01 RX ORDER — INSULIN ASPART 100 [IU]/ML
INJECTION, SOLUTION INTRAVENOUS; SUBCUTANEOUS
Qty: 30 ML | Refills: 11 | Status: SHIPPED | OUTPATIENT
Start: 2023-02-01 | End: 2023-02-02

## 2023-02-01 NOTE — ASSESSMENT & PLAN NOTE
Reviewed goals of therapy - to get the best control we can without hypoglycemia. Goal <8   - last a1c remains below goal   - not having much hypoglycemia   - continue GLP1, ozempic 1 mg/week   - continue insulin, 36 units/day lantus, novolog 10 units plus scale. reviewed self-titration.  Decrease dose if low sugars    Reviewed patient's current insulin regimen. Clarified proper insulin dose and timing in relation to meals, etc. Insulin injection sites and proper rotation instructed.   -Advised frequent self blood glucose monitoring. Patient encouraged to document glucose results and bring them to every clinic visit    -Close adherence to lifestyle changes recommended.    -Periodic follow ups for eye evaluations, foot care suggested.

## 2023-02-01 NOTE — ASSESSMENT & PLAN NOTE
Body mass index is 40.11 kg/m².   - complicated by DM2, HTN, HLD   - continue GLP1 as above   - monitor weight.

## 2023-02-01 NOTE — PROGRESS NOTES
Subjective:      Chief Complaint: Diabetes    HPI: [facility] PAT Cantor Jr. is a 77 y.o. male who is here for a follow-up evaluation for diabetes. Last seen 7/1/2022    Has HLD, on statin     With regards to the diabetes:  Diagnosed with T2DM since around 2003 or so per patient. Insulin since around 2008.     Known complications:     Retinopathy? No    Nephropathy? Yes    Neuropathy? Yes    CAD? No    CVA? No     Current regimen:   Lantus 36 units qHS   Novolog 10-12 units based on scale/meal content (10 if under 150, 12 up to 200, 14 over 200)   Ozempic 1 mg weekly     Prior meds:   Metformin -> diarrhea, weight gain   Januvia   Invokana    Self-Monitored blood glucose.  # times a day testing: dexcom CGM  Log reviewed: No, clarity luisa not working in clinic today.    167 on the last blood test    Lately: mid 100s mostly, up to 220 or so    Hypoglycemic events? None lately    Current Symptoms  No   Yes  []    [x]  Polydipsia  []    [x]  Polyuria  []    [x]  Vision changes lately. Right eye better, left eye pending treatment  [x]    []  Nausea  [x]    []  Diarrhea  [x]    []  Weight gain  [x]    []  Weight loss    +fatigue    Diabetes Management Status    Statin: Taking  ACE/ARB: Not taking    Screening or Prevention Patient's value Goal Complete/Controlled?   HgA1C Testing and Control   Lab Results   Component Value Date    HGBA1C 7.3 (H) 12/05/2022      q6months/Less than 8% Yes   Lipid profile : 05/18/2022 Annually Yes   LDL control Lab Results   Component Value Date    LDLCALC 82.4 05/18/2022    Annually/Less than 100 mg/dl  Yes   Nephropathy screening Lab Results   Component Value Date    MICALBCREAT 508.0 (H) 12/05/2022     Lab Results   Component Value Date    CREATININE 1.2 12/05/2022     Lab Results   Component Value Date    PROTEINUA Negative 05/25/2022    Annually Yes   Blood pressure BP Readings from Last 1 Encounters:   02/01/23 (!) 125/56    Less than 140/90 Yes   Dilated retinal exam : 05/23/2022 Annually  Yes   Foot exam   : 01/03/2023 Annually Yes     Reviewed past medical, family, social history and updated as appropriate.    Review of Systems  As above    Objective:     Vitals:    02/01/23 1523   BP: (!) 125/56   Pulse: 72     BP Readings from Last 5 Encounters:   02/01/23 (!) 125/56   01/03/23 134/83   09/26/22 116/80   07/27/22 138/65   07/01/22 137/69     Physical Exam  Vitals reviewed.   Constitutional:       General: He is not in acute distress.     Comments: Obese   Neck:      Thyroid: No thyromegaly.   Cardiovascular:      Heart sounds: Normal heart sounds.   Pulmonary:      Effort: Pulmonary effort is normal.   Musculoskeletal:      Right lower leg: Edema present.      Left lower leg: Edema present.     Wt Readings from Last 10 Encounters:   02/01/23 1523 126.8 kg (279 lb 8.7 oz)   01/03/23 1348 127.5 kg (281 lb)   09/26/22 1414 127.8 kg (281 lb 12 oz)   09/22/22 1303 124.7 kg (275 lb)   07/28/22 1356 124.7 kg (275 lb)   07/27/22 1122 124.7 kg (275 lb)   07/01/22 1457 124.7 kg (275 lb)   05/27/22 1410 123.8 kg (273 lb)   05/25/22 1352 124.1 kg (273 lb 9.5 oz)   05/25/22 0814 124.6 kg (274 lb 11.1 oz)     Lab Results   Component Value Date    HGBA1C 7.3 (H) 12/05/2022     Lab Results   Component Value Date    CHOL 135 05/18/2022    HDL 33 (L) 05/18/2022    LDLCALC 82.4 05/18/2022    TRIG 98 05/18/2022    CHOLHDL 24.4 05/18/2022     Lab Results   Component Value Date     12/05/2022    K 4.4 12/05/2022     12/05/2022    CO2 22 (L) 12/05/2022     (H) 12/05/2022    BUN 12 12/05/2022    CREATININE 1.2 12/05/2022    CALCIUM 10.0 12/05/2022    PROT 7.3 05/18/2022    ALBUMIN 3.9 05/18/2022    BILITOT 1.0 05/18/2022    ALKPHOS 34 (L) 05/18/2022    AST 17 05/18/2022    ALT 13 05/18/2022    ANIONGAP 10 12/05/2022    ESTGFRAFRICA >60 07/01/2022    EGFRNONAA 52 (A) 07/01/2022    TSH 3.757 09/26/2022      Lab Results   Component Value Date    MICALBCREAT 508.0 (H) 12/05/2022     Assessment/Plan:      Type 2 diabetes mellitus with stage 3 chronic kidney disease, with long-term current use of insulin  Reviewed goals of therapy - to get the best control we can without hypoglycemia. Goal <8   - last a1c remains below goal   - not having much hypoglycemia   - continue GLP1, ozempic 1 mg/week   - continue insulin, 36 units/day lantus, novolog 10 units plus scale. reviewed self-titration.  Decrease dose if low sugars    Reviewed patient's current insulin regimen. Clarified proper insulin dose and timing in relation to meals, etc. Insulin injection sites and proper rotation instructed.   -Advised frequent self blood glucose monitoring. Patient encouraged to document glucose results and bring them to every clinic visit    -Close adherence to lifestyle changes recommended.    -Periodic follow ups for eye evaluations, foot care suggested.        Dyslipidemia associated with type 2 diabetes mellitus  Dyslipidemia associated with diabetes:  Lab Results   Component Value Date    LDLCALC 82.4 05/18/2022    - last LDL at goal   - continue statin   - monitor yearly    Class 3 severe obesity due to excess calories with serious comorbidity in adult  Body mass index is 40.11 kg/m².   - complicated by DM2, HTN, HLD   - continue GLP1 as above   - monitor weight.       Follow up in about 6 months (around 8/1/2023) for lab review, further monitoring.      David Judd MD  Endocrinology

## 2023-02-02 ENCOUNTER — TELEPHONE (OUTPATIENT)
Dept: ENDOCRINOLOGY | Facility: CLINIC | Age: 78
End: 2023-02-02
Payer: MEDICARE

## 2023-02-02 DIAGNOSIS — E11.65 TYPE 2 DIABETES MELLITUS WITH HYPERGLYCEMIA, WITH LONG-TERM CURRENT USE OF INSULIN: Primary | ICD-10-CM

## 2023-02-02 DIAGNOSIS — Z79.4 TYPE 2 DIABETES MELLITUS WITH HYPERGLYCEMIA, WITH LONG-TERM CURRENT USE OF INSULIN: Primary | ICD-10-CM

## 2023-02-02 RX ORDER — INSULIN LISPRO 100 [IU]/ML
INJECTION, SOLUTION INTRAVENOUS; SUBCUTANEOUS
Qty: 30 ML | Refills: 11 | Status: SHIPPED | OUTPATIENT
Start: 2023-02-02

## 2023-02-02 NOTE — TELEPHONE ENCOUNTER
----- Message from Isabel Mcmillan RN sent at 2/1/2023  5:27 PM CST -----  Contact: Kelsie  Good afternoon!  Please see message above/below & reply. Thanks!  ----- Message -----  From: Florencia Logan  Sent: 2/1/2023   4:23 PM CST  To: [facility] calling to advise that rx below is not covered but Humalog is    insulin aspart U-100 (NOVOLOG FLEXPEN U-100 INSULIN) 100 unit/mL (3 mL) [facility]        [facility] - [place], [place] - [address] [place] [address]  [address] [facility] [address]  Phone: [phone] Fax: [phone]

## 2023-02-17 ENCOUNTER — TELEPHONE (OUTPATIENT)
Dept: ORTHOPEDICS | Facility: CLINIC | Age: 78
End: 2023-02-17
Payer: MEDICARE

## 2023-02-17 DIAGNOSIS — M51.36 DDD (DEGENERATIVE DISC DISEASE), LUMBAR: Primary | ICD-10-CM

## 2023-02-27 ENCOUNTER — OFFICE VISIT (OUTPATIENT)
Dept: ORTHOPEDICS | Facility: CLINIC | Age: 78
End: 2023-02-27
Payer: MEDICARE

## 2023-02-27 ENCOUNTER — HOSPITAL ENCOUNTER (OUTPATIENT)
Dept: RADIOLOGY | Facility: HOSPITAL | Age: 78
Discharge: HOME | End: 2023-02-27
Attending: ORTHOPAEDIC SURGERY
Payer: MEDICARE

## 2023-02-27 VITALS — HEIGHT: 70 IN | BODY MASS INDEX: 40.02 KG/M2 | WEIGHT: 279.56 LBS

## 2023-02-27 DIAGNOSIS — M43.10 ISTHMIC SPONDYLOLISTHESIS: ICD-10-CM

## 2023-02-27 DIAGNOSIS — M43.06 LUMBAR SPONDYLOLYSIS: Primary | ICD-10-CM

## 2023-02-27 DIAGNOSIS — M51.36 DDD (DEGENERATIVE DISC DISEASE), LUMBAR: ICD-10-CM

## 2023-02-27 DIAGNOSIS — M47.816 LUMBAR SPONDYLOSIS: ICD-10-CM

## 2023-02-27 PROCEDURE — 1160F RVW MEDS BY RX/DR IN RCRD: CPT | Mod: CPTII,S$GLB,, | Performed by: ORTHOPAEDIC SURGERY

## 2023-02-27 PROCEDURE — 3072F LOW RISK FOR RETINOPATHY: CPT | Mod: CPTII,S$GLB,, | Performed by: ORTHOPAEDIC SURGERY

## 2023-02-27 PROCEDURE — 99204 OFFICE O/P NEW MOD 45 MIN: CPT | Mod: S$GLB,,, | Performed by: ORTHOPAEDIC SURGERY

## 2023-02-27 PROCEDURE — 1125F PR PAIN SEVERITY QUANTIFIED, PAIN PRESENT: ICD-10-PCS | Mod: CPTII,S$GLB,, | Performed by: ORTHOPAEDIC SURGERY

## 2023-02-27 PROCEDURE — 1101F PT FALLS ASSESS-DOCD LE1/YR: CPT | Mod: CPTII,S$GLB,, | Performed by: ORTHOPAEDIC SURGERY

## 2023-02-27 PROCEDURE — 72110 XR LUMBAR SPINE AP AND LAT WITH FLEX/EXT: ICD-10-PCS | Mod: 26,,, | Performed by: RADIOLOGY

## 2023-02-27 PROCEDURE — 3072F PR LOW RISK FOR RETINOPATHY: ICD-10-PCS | Mod: CPTII,S$GLB,, | Performed by: ORTHOPAEDIC SURGERY

## 2023-02-27 PROCEDURE — 99999 PR PBB SHADOW E&M-EST. PATIENT-LVL V: ICD-10-PCS | Mod: PBBFAC,,, | Performed by: ORTHOPAEDIC SURGERY

## 2023-02-27 PROCEDURE — 1101F PR PT FALLS ASSESS DOC 0-1 FALLS W/OUT INJ PAST YR: ICD-10-PCS | Mod: CPTII,S$GLB,, | Performed by: ORTHOPAEDIC SURGERY

## 2023-02-27 PROCEDURE — 1159F PR MEDICATION LIST DOCUMENTED IN MEDICAL RECORD: ICD-10-PCS | Mod: CPTII,S$GLB,, | Performed by: ORTHOPAEDIC SURGERY

## 2023-02-27 PROCEDURE — 1159F MED LIST DOCD IN RCRD: CPT | Mod: CPTII,S$GLB,, | Performed by: ORTHOPAEDIC SURGERY

## 2023-02-27 PROCEDURE — 99999 PR PBB SHADOW E&M-EST. PATIENT-LVL V: CPT | Mod: PBBFAC,,, | Performed by: ORTHOPAEDIC SURGERY

## 2023-02-27 PROCEDURE — 3288F PR FALLS RISK ASSESSMENT DOCUMENTED: ICD-10-PCS | Mod: CPTII,S$GLB,, | Performed by: ORTHOPAEDIC SURGERY

## 2023-02-27 PROCEDURE — 72110 X-RAY EXAM L-2 SPINE 4/>VWS: CPT | Mod: 26,,, | Performed by: RADIOLOGY

## 2023-02-27 PROCEDURE — 99204 PR OFFICE/OUTPT VISIT, NEW, LEVL IV, 45-59 MIN: ICD-10-PCS | Mod: S$GLB,,, | Performed by: ORTHOPAEDIC SURGERY

## 2023-02-27 PROCEDURE — 1160F PR REVIEW ALL MEDS BY PRESCRIBER/CLIN PHARMACIST DOCUMENTED: ICD-10-PCS | Mod: CPTII,S$GLB,, | Performed by: ORTHOPAEDIC SURGERY

## 2023-02-27 PROCEDURE — 72110 X-RAY EXAM L-2 SPINE 4/>VWS: CPT | Mod: TC

## 2023-02-27 PROCEDURE — 3288F FALL RISK ASSESSMENT DOCD: CPT | Mod: CPTII,S$GLB,, | Performed by: ORTHOPAEDIC SURGERY

## 2023-02-27 PROCEDURE — 1125F AMNT PAIN NOTED PAIN PRSNT: CPT | Mod: CPTII,S$GLB,, | Performed by: ORTHOPAEDIC SURGERY

## 2023-02-27 NOTE — PROGRESS NOTES
DATE: 2/27/2023  PATIENT: [facility] [place].    Attending Physician: Ced Taylor M.D.    CHIEF COMPLAINT: LBP    HISTORY:  JACQUI PAT Cantor Jr. is a 77 y.o. male presents for initial evaluation of low back pain (Back - 10). The pain has been present for 40 years. The patient describes the pain as dull but it does not go down legs.  The pain is worse with activity and improved by rest. There is no associated numbness and tingling. There is no subjective weakness. Prior treatments have included meds (norco, gabapentin), PT (helped somewhat), YANDY in 2018, but no surgery.    The Patient denies myelopathic symptoms such as handwriting changes or difficulty with buttons/coins/keys. Denies perineal paresthesias, bowel/bladder dysfunction.    The patient does not smoke or endorse IVDU. He has DM (HA1C of 7.3). The patient takes Eliquis for Afib. 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    PAST MEDICAL/SURGICAL HISTORY:  Past Medical History:   Diagnosis Date    *Atrial fibrillation     Eliquis    Anticoagulant long-term use     apaxiban    Basal cell carcinoma 12/2015    left eye brow    Basal cell carcinoma 10/28/2019    right superior preauricular    BCC (basal cell carcinoma) 12/2015    left shoulder    Cataract     Chronic kidney disease     Diabetes mellitus with renal manifestations, uncontrolled     Dyslipidemia associated with type 2 diabetes mellitus     Fever blister     Hearing loss     HTN (hypertension)     Keloid cicatrix     Liver disease     Melanoma 12/07/2015    right cheek-in situ    Obesity     PN (peripheral neuropathy)     Primary osteoarthritis of right knee 1/25/2017    Screening for colon cancer 3/3/2016    Sleep apnea     wears CPAP at night    Thyroid disease     Type II or unspecified type diabetes mellitus with other specified manifestations, uncontrolled     Ulcer of left lower extremity, limited to breakdown of skin 9/22/2017     Past Surgical History:   Procedure  Laterality Date    APPENDECTOMY      CARDIOVERSION      CATARACT EXTRACTION      CATARACT EXTRACTION Right 05/14/2018    Dr. Greer    COLONOSCOPY N/A 3/3/2016    Procedure: COLONOSCOPY;  Surgeon: Bob Poe MD;  Location: [facility] ENDO (4TH FLR);  Service: Endoscopy;  Laterality: N/A;  Holding Eliquis for 3 days prior to colonoscopy. EC    COLONOSCOPY N/A 9/20/2019    Procedure: COLONOSCOPY;  Surgeon: Akbar Curtis MD;  Location: [facility] ENDO (4TH FLR);  Service: Endoscopy;  Laterality: N/A;  Per Dr. José Granda, patient can HOLD Eliquis X2 days prior to procedure-see telphone encounter 8/22/19-[facility]    ECTROPION REPAIR Right 8/14/2019    Procedure: REPAIR, ECTROPION, EYELID /       #[id number]- Skin Flaps(Deep Tissue) (OD);  Surgeon: Edita Sabillon MD;  Location: [facility] OR [address] [place] [address];  Service: Ophthalmology;  Laterality: Right;    epidural steroid injection      INTRAOCULAR PROSTHESES INSERTION Left 6/25/2018    Procedure: INSERTION, INTRAOCULAR LENS PROSTHESIS;  Surgeon: Lawrence Greer MD;  Location: [facility];  Service: Ophthalmology;  Laterality: Left;    JOINT REPLACEMENT      Knee    Meniere's disease surgery      PHACOEMULSIFICATION OF CATARACT Left 6/25/2018    Procedure: PHACOEMULSIFICATION, CATARACT;  Surgeon: Lawrence Greer MD;  Location: [facility];  Service: Ophthalmology;  Laterality: Left;    PREPARATION OF WOUND PRIOR TO APPLICATION OF SKIN GRAFT Right 8/14/2019    Procedure: PREPARATION, WOUND, PRIOR TO SKIN GRAFT APPLICATION;  Surgeon: Edita Sabillon MD;  Location: [facility] OR [address] [place] [address];  Service: Ophthalmology;  Laterality: Right;    REVISION OF KNEE ARTHROPLASTY Right 7/10/2018    Procedure: REVISION-ARTHROPLASTY-KNEE-DAKOTA;  Surgeon: Miguel Stuart MD;  [facility];  Service: Orthopedics;  Laterality: Right;  [place]    SKIN FULL THICKNESS GRAFT Right 8/14/2019    Procedure: APPLICATION, GRAFT, SKIN, FULL-THICKNESS;  Surgeon: Edita Sabillon MD;  Location: [facility] OR [address] [place] [address];  Service:  "Ophthalmology;  Laterality: Right;    TARSORRHAPHY Right 2019    Procedure: BLEPHARORRHAPHY;  Surgeon: Edita Sabillon MD;  Location: [facility] OR [address] [place] [address];  Service: Ophthalmology;  Laterality: Right;    TONSILLECTOMY      TOTAL KNEE ARTHROPLASTY Right 2017    TKR    TOTAL KNEE ARTHROPLASTY Left     TKR,        Current Medications:   Current Outpatient Medications:     ammonium lactate 12 % Crea, Apply twice daily to affected parts both feet as needed., Disp: 140 g, Rfl: 11    amoxicillin (AMOXIL) 500 MG capsule, SMARTSI Capsule(s) By Mouth, Disp: , Rfl:     atorvastatin (LIPITOR) 40 MG tablet, Take 1 tablet (40 mg total) by mouth once daily., Disp: 90 tablet, Rfl: 3    azelastine (ASTELIN) 137 mcg (0.1 %) nasal spray, 1 spray (137 mcg total) by Nasal route 2 (two) times daily. (Patient taking differently: 1 spray by Nasal route 2 (two) times daily as needed.), Disp: 30 mL, Rfl: 3    BD INSULIN PEN NEEDLE UF ORIG 29 x 1/2 " Ndle, , Disp: , Rfl: 12    blood sugar diagnostic Strp, 1 strip by Misc.(Non-Drug; Combo Route) route 4 (four) times daily. To check blood glucose, Disp: 400 strip, Rfl: 11    blood-glucose meter Misc, To check BG as discussed, Disp: 1 each, Rfl: 0    blood-glucose meter,continuous (DEXCOM G6 ) Misc, 1 Device by Misc.(Non-Drug; Combo Route) route continuous., Disp: 1 each, Rfl: 0    blood-glucose sensor (DEXCOM G6 SENSOR) Mckenna, 1 Device by Misc.(Non-Drug; Combo Route) route every 10 days., Disp: 9 each, Rfl: 4    blood-glucose transmitter (DEXCOM G6 TRANSMITTER) Mckenna, 1 Device by Misc.(Non-Drug; Combo Route) route every 3 (three) months., Disp: 1 each, Rfl: 3    ciclopirox (LOPROX) 0.77 % Crea, APPLY TOPICALLY TWICE DAILY, Disp: 90 g, Rfl: 2    ciclopirox (LOPROX) 0.77 % Crea, Apply topically 2 (two) times daily., Disp: 90 g, Rfl: 2    ciclopirox (PENLAC) 8 % Soln, Apply topically nightly., Disp: 6.6 mL, Rfl: 11    ciclopirox (PENLAC) 8 % Soln, Apply topically nightly., " Disp: 6.6 mL, Rfl: 11    clindamycin (CLEOCIN) 150 MG capsule, Take 150 mg by mouth 4 (four) times daily., Disp: , Rfl:     clobetasol (TEMOVATE) 0.05 % cream, AAA finger bid, Disp: 60 g, Rfl: 3    ELIQUIS 5 mg Tab, TAKE ONE TABLET BY MOUTH TWICE DAILY, Disp: 180 tablet, Rfl: 1    fenofibrate (TRICOR) 145 MG tablet, TAKE ONE TABLET BY MOUTH EVERY DAY, Disp: 90 tablet, Rfl: 2    furosemide (LASIX) 40 MG tablet, TAKE ONE TABLET BY MOUTH EVERY DAY AS NEEDED, Disp: 90 tablet, Rfl: 3    gabapentin (NEURONTIN) 300 MG capsule, Take 2 capsules (600 mg total) by mouth 2 (two) times daily., Disp: 360 capsule, Rfl: 1    glucagon (GVOKE HYPOPEN 2-PACK) 1 mg/0.2 mL AtIn, Inject 1 mg into the skin as needed (severe hypoglycemia)., Disp: 2 each, Rfl: 3    HYDROcodone-acetaminophen (NORCO) 5-325 mg per tablet, Take 1 tablet by mouth every 6 (six) hours as needed., Disp: , Rfl:     insulin (LANTUS SOLOSTAR U-100 INSULIN) glargine 100 units/mL SubQ pen, Inject 36 Units into the skin every evening. Adjust dose as directed until AM glucose at goal . Max daily dose 45 units/day, Disp: 15 mL, Rfl: 11    insulin lispro (HUMALOG KWIKPEN INSULIN) 100 unit/mL pen, Inject 10 units 3 times daily with meals. Plus correction scale. Max daily dose 50 units/day, Disp: 30 mL, Rfl: 11    ketoconazole (NIZORAL) 2 % cream, Apply topically once daily., Disp: 30 g, Rfl: 1    lancets 33 gauge Misc, 1 lancet by Misc.(Non-Drug; Combo Route) route 4 (four) times daily. Pt uses True Result Meter, bg monitoring 4 times a day., Disp: 400 each, Rfl: 4    levothyroxine (SYNTHROID) 25 MCG tablet, Take 25 mcg by mouth once daily., Disp: , Rfl:     levothyroxine (SYNTHROID) 50 MCG tablet, Take 1 tablet (50 mcg total) by mouth once daily., Disp: 90 tablet, Rfl: 2    metoprolol succinate (TOPROL-XL) 100 MG 24 hr tablet, TAKE ONE TABLET BY MOUTH EVERY DAY, Disp: 90 tablet, Rfl: 2    nystatin-triamcinolone (MYCOLOG II) cream, apply TWICE DAILY, Disp: 60 g, Rfl:  "1    pen needle, diabetic (BD ULTRA-FINE BASIL PEN NEEDLE) 32 gauge x 5/32" Ndle, USE FOUR TIMES DAILY, Disp: 400 each, Rfl: 3    semaglutide (OZEMPIC) 1 mg/dose (4 mg/3 mL), INJECT 1MG SUBCUTANEOUSLY ONCE A WEEK, Disp: 1 pen, Rfl: 11    tamsulosin (FLOMAX) 0.4 mg Cap, Take 1 capsule (0.4 mg total) by mouth once daily., Disp: 90 capsule, Rfl: 2    temazepam (RESTORIL) 15 mg Cap, Take 1 capsule (15 mg total) by mouth nightly as needed., Disp: 30 capsule, Rfl: 5    triamcinolone acetonide 0.1% (KENALOG) 0.1 % cream, APPLY TO AFFECTED AREA(S) TWICE DAILY, Disp: 30 g, Rfl: 3    varicella-zoster gE-AS01B, PF, (SHINGRIX, PF,) 50 mcg/0.5 mL injection, Inject into the muscle., Disp: 0.5 each, Rfl: 1    Social History:   Social History     Socioeconomic History    Marital status:     Number of children: 3   Occupational History    Occupation: retired     Employer: RETIRED   Tobacco Use    Smoking status: Former     Types: Cigarettes     Quit date: 7/10/1985     Years since quittin.6    Smokeless tobacco: Never   Substance and Sexual Activity    Alcohol use: No     Alcohol/week: 0.0 standard drinks     Comment: quit - had excess in the past    Drug use: No    Sexual activity: Not Currently     Social Determinants of Health     Food Insecurity: No Food Insecurity    Worried About Running Out of Food in the Last Year: Never true    Ran Out of Food in the Last Year: Never true   Transportation Needs: No Transportation Needs    Lack of Transportation (Medical): No    Lack of Transportation (Non-Medical): No   Physical Activity: Inactive    Days of Exercise per Week: 0 days    Minutes of Exercise per Session: 10 min   Stress: No Stress Concern Present    Feeling of Stress : Only a little   Social Connections: Unknown    Frequency of Communication with Friends and Family: More than three times a week    Frequency of Social Gatherings with Friends and Family: More than three times a week    Attends Club or " "Organization Meetings: Never    Marital Status:    Housing Stability: Unknown    Unable to Pay for Housing in the Last Year: No    Unstable Housing in the Last Year: No       REVIEW OF SYSTEMS:  Constitution: Negative. Negative for chills, fever and night sweats.   Cardiovascular: Negative for chest pain and syncope.   Respiratory: Negative for cough and shortness of breath.   Gastrointestinal: See HPI. Negative for nausea/vomiting. Negative for abdominal pain.  Genitourinary: See HPI. Negative for discoloration or dysuria.  Skin: Negative for dry skin, itching and rash.   Hematologic/Lymphatic: negative for bleeding/clotting disorders.   Musculoskeletal: Negative for falls and muscle weakness.   Neurological: See HPI. no history of seizures. no history of cranial surgery or shunts.  Endocrine: Negative for polydipsia, polyphagia and polyuria.   Allergic/Immunologic: Negative for hives and persistent infections.    PHYSICAL EXAMINATION:    Ht 5' 10" (1.778 m)   Wt 126.8 kg (279 lb 8.7 oz)   BMI 40.11 kg/m²     General: The patient is a 77 y.o. male in no apparent distress, the patient is orientatied to person, place and time.   Psych: Normal mood and affect  HEENT: Vision grossly intact, hearing intact to the spoken word.  Lungs: Respirations unlabored.  Gait: Normal station and gait, no difficulty with toe or heel walk.   Skin: Dorsal lumbar skin negative for rashes, lesions, hairy patches and surgical scars.  Range of motion: Lumbar range of motion is acceptable. There is lower lumbar tenderness to palpation.  Spinal Balance: Global saggital and coronal spinal balance acceptable, no significant for scoliosis and kyphosis.  Musculoskeletal: No pain with the range of motion of the bilateral hips. No trochanteric tenderness to palpation.  Vascular: Bilateral lower extremities warm and well perfused, Dorsalis pedis pulses 2+ bilaterally.  Neurological: Normal strength and tone in all major motor groups in the " bilateral lower extremities except for 4/5 in L TA/EHL. Normal sensation to light touch in the L2-S1 dermatomes bilaterally.  Deep tendon reflexes symmetric 2+ in the bilateral lower extremities.  Negative Babinski bilaterally.    IMAGING:   Today I independently reviewed the following images and my interpretations are as follows:    AP, Lat and Flex/Ex upright L-spine films demonstrate lumbar spondylosis and DDD. L5 b/l pars defects. L5-S1 isthmic spondylolisthesis measuring 5mm.    MRI lumbar in 2018 showed moderate R L4-5 foraminal stenosis. No significant central stenosis.    Body mass index is 40.11 kg/m².  Hemoglobin A1C   Date Value Ref Range Status   12/05/2022 7.3 (H) 4.0 - 5.6 % Final     Comment:     ADA Screening Guidelines:  5.7-6.4%  Consistent with prediabetes  >or=6.5%  Consistent with diabetes    High levels of fetal hemoglobin interfere with the HbA1C  assay. Heterozygous hemoglobin variants (HbS, HgC, etc)do  not significantly interfere with this assay.   However, presence of multiple variants may affect accuracy.     09/26/2022 7.6 (H) 4.0 - 5.6 % Final     Comment:     ADA Screening Guidelines:  5.7-6.4%  Consistent with prediabetes  >or=6.5%  Consistent with diabetes    High levels of fetal hemoglobin interfere with the HbA1C  assay. Heterozygous hemoglobin variants (HbS, HgC, etc)do  not significantly interfere with this assay.   However, presence of multiple variants may affect accuracy.     07/01/2022 7.5 (H) 4.0 - 5.6 % Final     Comment:     ADA Screening Guidelines:  5.7-6.4%  Consistent with prediabetes  >or=6.5%  Consistent with diabetes    High levels of fetal hemoglobin interfere with the HbA1C  assay. Heterozygous hemoglobin variants (HbS, HgC, etc)do  not significantly interfere with this assay.   However, presence of multiple variants may affect accuracy.           ASSESSMENT/PLAN:    BI was seen today for pain.    Diagnoses and all orders for this visit:    Lumbar  spondylolysis  -     Ambulatory referral/consult to [facility]; Future  -     Ambulatory referral/consult to Pain Clinic; Future    Isthmic spondylolisthesis    Lumbar spondylosis    DDD (degenerative disc disease), lumbar      Follow up if symptoms worsen or fail to improve.    Patient has lumbar spondylolysis and isthmic spondylolisthesis, but he does not have severe stenosis. I discussed the natural history of their diagnoses as well as surgical and nonsurgical treatment options. I educated the patient on the importance of core/back strengthening, correct posture, bending/lifting ergonomics, and low-impact aerobic exercises (walking, elliptical, and aquatherapy). Continue medications. I will refer the patient to [facility] for core/back strengthening. I referred pt to Pain Management for possible facet blocks. Patient will follow up PRN.    Ced Taylor MD  Orthopaedic Spine Surgeon  Department of Orthopaedic Surgery  [phone]

## 2023-03-02 ENCOUNTER — PES CALL (OUTPATIENT)
Dept: ADMINISTRATIVE | Facility: CLINIC | Age: 78
End: 2023-03-02
Payer: MEDICARE

## 2023-03-14 DIAGNOSIS — I48.0 PAROXYSMAL ATRIAL FIBRILLATION: ICD-10-CM

## 2023-03-14 DIAGNOSIS — I48.19 PERSISTENT ATRIAL FIBRILLATION: ICD-10-CM

## 2023-03-14 DIAGNOSIS — I10 ESSENTIAL HYPERTENSION: ICD-10-CM

## 2023-03-16 ENCOUNTER — OFFICE VISIT (OUTPATIENT)
Dept: ORTHOPEDICS | Facility: CLINIC | Age: 78
End: 2023-03-16
Payer: MEDICARE

## 2023-03-16 VITALS — BODY MASS INDEX: 40.11 KG/M2 | HEIGHT: 70 IN

## 2023-03-16 DIAGNOSIS — M65.342 TRIGGER RING FINGER OF LEFT HAND: Primary | ICD-10-CM

## 2023-03-16 PROCEDURE — 1159F PR MEDICATION LIST DOCUMENTED IN MEDICAL RECORD: ICD-10-PCS | Mod: CPTII,S$GLB,, | Performed by: ORTHOPAEDIC SURGERY

## 2023-03-16 PROCEDURE — 3288F PR FALLS RISK ASSESSMENT DOCUMENTED: ICD-10-PCS | Mod: CPTII,S$GLB,, | Performed by: ORTHOPAEDIC SURGERY

## 2023-03-16 PROCEDURE — 3288F FALL RISK ASSESSMENT DOCD: CPT | Mod: CPTII,S$GLB,, | Performed by: ORTHOPAEDIC SURGERY

## 2023-03-16 PROCEDURE — 1159F MED LIST DOCD IN RCRD: CPT | Mod: CPTII,S$GLB,, | Performed by: ORTHOPAEDIC SURGERY

## 2023-03-16 PROCEDURE — 20550 PR INJECT TENDON SHEATH/LIGAMENT: ICD-10-PCS | Mod: LT,S$GLB,, | Performed by: ORTHOPAEDIC SURGERY

## 2023-03-16 PROCEDURE — 1101F PT FALLS ASSESS-DOCD LE1/YR: CPT | Mod: CPTII,S$GLB,, | Performed by: ORTHOPAEDIC SURGERY

## 2023-03-16 PROCEDURE — 99213 OFFICE O/P EST LOW 20 MIN: CPT | Mod: 25,S$GLB,, | Performed by: ORTHOPAEDIC SURGERY

## 2023-03-16 PROCEDURE — 20550 NJX 1 TENDON SHEATH/LIGAMENT: CPT | Mod: LT,S$GLB,, | Performed by: ORTHOPAEDIC SURGERY

## 2023-03-16 PROCEDURE — 1101F PR PT FALLS ASSESS DOC 0-1 FALLS W/OUT INJ PAST YR: ICD-10-PCS | Mod: CPTII,S$GLB,, | Performed by: ORTHOPAEDIC SURGERY

## 2023-03-16 PROCEDURE — 99999 PR PBB SHADOW E&M-EST. PATIENT-LVL IV: CPT | Mod: PBBFAC,,, | Performed by: ORTHOPAEDIC SURGERY

## 2023-03-16 PROCEDURE — 99213 PR OFFICE/OUTPT VISIT, EST, LEVL III, 20-29 MIN: ICD-10-PCS | Mod: 25,S$GLB,, | Performed by: ORTHOPAEDIC SURGERY

## 2023-03-16 PROCEDURE — 1125F AMNT PAIN NOTED PAIN PRSNT: CPT | Mod: CPTII,S$GLB,, | Performed by: ORTHOPAEDIC SURGERY

## 2023-03-16 PROCEDURE — 3072F LOW RISK FOR RETINOPATHY: CPT | Mod: CPTII,S$GLB,, | Performed by: ORTHOPAEDIC SURGERY

## 2023-03-16 PROCEDURE — 3072F PR LOW RISK FOR RETINOPATHY: ICD-10-PCS | Mod: CPTII,S$GLB,, | Performed by: ORTHOPAEDIC SURGERY

## 2023-03-16 PROCEDURE — 99999 PR PBB SHADOW E&M-EST. PATIENT-LVL IV: ICD-10-PCS | Mod: PBBFAC,,, | Performed by: ORTHOPAEDIC SURGERY

## 2023-03-16 PROCEDURE — 1125F PR PAIN SEVERITY QUANTIFIED, PAIN PRESENT: ICD-10-PCS | Mod: CPTII,S$GLB,, | Performed by: ORTHOPAEDIC SURGERY

## 2023-03-16 RX ORDER — TRIAMCINOLONE ACETONIDE 40 MG/ML
20 INJECTION, SUSPENSION INTRA-ARTICULAR; INTRAMUSCULAR
Status: COMPLETED | OUTPATIENT
Start: 2023-03-16 | End: 2023-03-16

## 2023-03-16 RX ORDER — APIXABAN 5 MG/1
TABLET, FILM COATED ORAL
Qty: 180 TABLET | Refills: 1 | Status: SHIPPED | OUTPATIENT
Start: 2023-03-16 | End: 2023-10-04

## 2023-03-16 RX ORDER — GABAPENTIN 300 MG/1
CAPSULE ORAL
Qty: 360 CAPSULE | Refills: 1 | Status: SHIPPED | OUTPATIENT
Start: 2023-03-16 | End: 2023-09-05

## 2023-03-16 RX ADMIN — TRIAMCINOLONE ACETONIDE 20 MG: 40 INJECTION, SUSPENSION INTRA-ARTICULAR; INTRAMUSCULAR at 02:03

## 2023-03-16 NOTE — PROGRESS NOTES
"Subjective:      Patient ID: BI Cantor Jr. is a 77 y.o. male.  Chief Complaint: Finger Pain (Bilateral trigger finger)      HPI  JACQUI PAT Cantor Jr. is a  77 y.o. male presenting today for follow up of triggering of the left hand ring finger.  He reports that he is having a flare-up   The injection worked for about 3 or 4 months but it seems to have recurred now no numbness or tingling reported.    Review of patient's allergies indicates:   Allergen Reactions    Niacin Itching and Other (See Comments)     Other reaction(s): facial flushing and causes hepatitis     Terbinafine Other (See Comments)     Other reaction(s): Hepatitis    "gave me rash"         Current Outpatient Medications   Medication Sig Dispense Refill    ammonium lactate 12 % Crea Apply twice daily to affected parts both feet as needed. 140 g 11    amoxicillin (AMOXIL) 500 MG capsule SMARTSI Capsule(s) By Mouth      atorvastatin (LIPITOR) 40 MG tablet Take 1 tablet (40 mg total) by mouth once daily. 90 tablet 3    azelastine (ASTELIN) 137 mcg (0.1 %) nasal spray 1 spray (137 mcg total) by Nasal route 2 (two) times daily. (Patient taking differently: 1 spray by Nasal route 2 (two) times daily as needed.) 30 mL 3    BD INSULIN PEN NEEDLE UF ORIG 29 x 1/2 " Ndle   12    blood sugar diagnostic Strp 1 strip by Misc.(Non-Drug; Combo Route) route 4 (four) times daily. To check blood glucose 400 strip 11    blood-glucose meter Misc To check BG as discussed 1 each 0    blood-glucose meter,continuous (DEXCOM G6 ) Misc 1 Device by Misc.(Non-Drug; Combo Route) route continuous. 1 each 0    blood-glucose sensor (DEXCOM G6 SENSOR) Mckenna 1 Device by Misc.(Non-Drug; Combo Route) route every 10 days. 9 each 4    blood-glucose transmitter (DEXCOM G6 TRANSMITTER) Mckenna 1 Device by Misc.(Non-Drug; Combo Route) route every 3 (three) months. 1 each 3    ciclopirox (LOPROX) 0.77 % Crea APPLY TOPICALLY TWICE DAILY 90 g 2    ciclopirox (LOPROX) 0.77 % Crea Apply " "topically 2 (two) times daily. 90 g 2    ciclopirox (PENLAC) 8 % Soln Apply topically nightly. 6.6 mL 11    ciclopirox (PENLAC) 8 % Soln Apply topically nightly. 6.6 mL 11    clindamycin (CLEOCIN) 150 MG capsule Take 150 mg by mouth 4 (four) times daily.      clobetasol (TEMOVATE) 0.05 % cream AAA finger bid 60 g 3    ELIQUIS 5 mg Tab TAKE ONE TABLET BY MOUTH TWICE DAILY 180 tablet 1    fenofibrate (TRICOR) 145 MG tablet TAKE ONE TABLET BY MOUTH EVERY DAY 90 tablet 2    furosemide (LASIX) 40 MG tablet TAKE ONE TABLET BY MOUTH EVERY DAY AS NEEDED 90 tablet 3    gabapentin (NEURONTIN) 300 MG capsule Take 2 capsules (600 mg total) by mouth 2 (two) times daily. 360 capsule 1    glucagon (GVOKE HYPOPEN 2-PACK) 1 mg/0.2 mL AtIn Inject 1 mg into the skin as needed (severe hypoglycemia). 2 each 3    HYDROcodone-acetaminophen (NORCO) 5-325 mg per tablet Take 1 tablet by mouth every 6 (six) hours as needed.      insulin (LANTUS SOLOSTAR U-100 INSULIN) glargine 100 units/mL SubQ pen Inject 36 Units into the skin every evening. Adjust dose as directed until AM glucose at goal . Max daily dose 45 units/day 15 mL 11    insulin lispro (HUMALOG KWIKPEN INSULIN) 100 unit/mL pen Inject 10 units 3 times daily with meals. Plus correction scale. Max daily dose 50 units/day 30 mL 11    ketoconazole (NIZORAL) 2 % cream Apply topically once daily. 30 g 1    lancets 33 gauge Misc 1 lancet by Misc.(Non-Drug; Combo Route) route 4 (four) times daily. Pt uses True Result Meter, bg monitoring 4 times a day. 400 each 4    levothyroxine (SYNTHROID) 25 MCG tablet Take 25 mcg by mouth once daily.      levothyroxine (SYNTHROID) 50 MCG tablet Take 1 tablet (50 mcg total) by mouth once daily. 90 tablet 2    metoprolol succinate (TOPROL-XL) 100 MG 24 hr tablet TAKE ONE TABLET BY MOUTH EVERY DAY 90 tablet 2    nystatin-triamcinolone (MYCOLOG II) cream apply TWICE DAILY 60 g 1    pen needle, diabetic (BD ULTRA-FINE BASIL PEN NEEDLE) 32 gauge x 5/32" " Ndle USE FOUR TIMES DAILY 400 each 3    semaglutide (OZEMPIC) 1 mg/dose (4 mg/3 mL) INJECT 1MG SUBCUTANEOUSLY ONCE A WEEK 1 pen 11    tamsulosin (FLOMAX) 0.4 mg Cap Take 1 capsule (0.4 mg total) by mouth once daily. 90 capsule 2    temazepam (RESTORIL) 15 mg Cap Take 1 capsule (15 mg total) by mouth nightly as needed. 30 capsule 5    triamcinolone acetonide 0.1% (KENALOG) 0.1 % cream APPLY TO AFFECTED AREA(S) TWICE DAILY 30 g 3    varicella-zoster gE-AS01B, PF, (SHINGRIX, PF,) 50 mcg/0.5 mL injection Inject into the muscle. 0.5 each 1     No current facility-administered medications for this visit.       Past Medical History:   Diagnosis Date    *Atrial fibrillation     Eliquis    Anticoagulant long-term use     apaxiban    Basal cell carcinoma 12/2015    left eye brow    Basal cell carcinoma 10/28/2019    right superior preauricular    BCC (basal cell carcinoma) 12/2015    left shoulder    Cataract     Chronic kidney disease     Diabetes mellitus with renal manifestations, uncontrolled     Dyslipidemia associated with type 2 diabetes mellitus     Fever blister     Hearing loss     HTN (hypertension)     Keloid cicatrix     Liver disease     Melanoma 12/07/2015    right cheek-in situ    Obesity     PN (peripheral neuropathy)     Primary osteoarthritis of right knee 1/25/2017    Screening for colon cancer 3/3/2016    Sleep apnea     wears CPAP at night    Thyroid disease     Type II or unspecified type diabetes mellitus with other specified manifestations, uncontrolled     Ulcer of left lower extremity, limited to breakdown of skin 9/22/2017       Past Surgical History:   Procedure Laterality Date    APPENDECTOMY      CARDIOVERSION      CATARACT EXTRACTION      CATARACT EXTRACTION Right 05/14/2018    Dr. Greer    COLONOSCOPY N/A 3/3/2016    Procedure: COLONOSCOPY;  Surgeon: Bob Poe MD;  Location: [facility] ([address] [place] [address]);  Service: Endoscopy;  Laterality: N/A;  Holding Eliquis for 3 days prior to colonoscopy. EC     COLONOSCOPY N/A 9/20/2019    Procedure: COLONOSCOPY;  Surgeon: Akbar Curtis MD;  Location: [facility] ENDO (4TH FLR);  Service: Endoscopy;  Laterality: N/A;  Per Dr. José Granda, patient can HOLD Eliquis X2 days prior to procedure-see telphone encounter 8/22/19-[facility]    ECTROPION REPAIR Right 8/14/2019    Procedure: REPAIR, ECTROPION, EYELID /       #[id number]- Skin Flaps(Deep Tissue) (OD);  Surgeon: Edita Sabillon MD;  Location: [facility] OR 2ND FLR;  Service: Ophthalmology;  Laterality: Right;    epidural steroid injection      INTRAOCULAR PROSTHESES INSERTION Left 6/25/2018    Procedure: INSERTION, INTRAOCULAR LENS PROSTHESIS;  Surgeon: Lawrence Greer MD;  Location: [facility];  Service: Ophthalmology;  Laterality: Left;    JOINT REPLACEMENT      Knee    Meniere's disease surgery      PHACOEMULSIFICATION OF CATARACT Left 6/25/2018    Procedure: PHACOEMULSIFICATION, CATARACT;  Surgeon: Lawrence Greer MD;  Location: [facility];  Service: Ophthalmology;  Laterality: Left;    PREPARATION OF WOUND PRIOR TO APPLICATION OF SKIN GRAFT Right 8/14/2019    Procedure: PREPARATION, WOUND, PRIOR TO SKIN GRAFT APPLICATION;  Surgeon: Edtia Sabillon MD;  [facility];  Service: Ophthalmology;  Laterality: Right;    REVISION OF KNEE ARTHROPLASTY Right 7/10/2018    Procedure: REVISION-ARTHROPLASTY-KNEE-SAMIR;  Surgeon: Miguel Stuart MD;  [facility];  Service: Orthopedics;  Laterality: Right;  [place]    SKIN FULL THICKNESS GRAFT Right 8/14/2019    Procedure: APPLICATION, GRAFT, SKIN, FULL-THICKNESS;  Surgeon: Edita Sabillon MD;  [facility];  Service: Ophthalmology;  Laterality: Right;    TARSORRHAPHY Right 8/14/2019    Procedure: BLEPHARORRHAPHY;  Surgeon: Edita Sabillon MD;  [facility];  Service: Ophthalmology;  Laterality: Right;    TONSILLECTOMY      TOTAL KNEE ARTHROPLASTY Right 01/25/2017    TKR    TOTAL KNEE ARTHROPLASTY Left     TKR, 1990's       OBJECTIVE:   PHYSICAL  "EXAM:  Height: 5' 10" (177.8 cm)    Vitals:    03/16/23 1347   Height: 5' 10" (1.778 m)   PainSc: 10-Worst pain ever   PainLoc: Finger     Ortho/SPM Exam  Examination left hand there is mild tenderness and triggering left ring finger   Tinel sign negative  strength slightly decreased    RADIOGRAPHS:  None  Comments: I have personally reviewed the imaging and I agree with the above radiologist's report.    ASSESSMENT/PLAN:     IMPRESSION:  Triggering left ring finger    PLAN:  We discussed options including repeat injection versus surgery   He would like to have the injection but consider surgery in the future  After pause for time-out identified the left ring finger injected with Kenalog 20 mg 0.5 cc xylocaine sterile technique  Tolerated the procedure well without complication       FOLLOW UP:  2-3 months    Disclaimer: This note has been generated using voice-recognition software. There may be typographical errors that have been missed during proof-reading.     "

## 2023-03-23 ENCOUNTER — TELEPHONE (OUTPATIENT)
Dept: ORTHOPEDICS | Facility: CLINIC | Age: 78
End: 2023-03-23
Payer: MEDICARE

## 2023-03-23 RX ORDER — CELECOXIB 100 MG/1
100 CAPSULE ORAL DAILY
Qty: 30 CAPSULE | Refills: 1 | Status: SHIPPED | OUTPATIENT
Start: 2023-03-23 | End: 2023-04-20

## 2023-03-23 NOTE — TELEPHONE ENCOUNTER
----- Message from Petr Colin sent at 3/22/2023  5:18 PM CDT -----  .Type:  Needs Medical Advice    Who Called: [facility]    Would the patient rather a call back or a response via [facility]? Call back  Best Call Back Number:   Additional Information:     Pt stated he was supposed to get medication called in for his arthritis and nothing was sent to the pharmacy to please call pharmacy back

## 2023-04-14 ENCOUNTER — PES CALL (OUTPATIENT)
Dept: ADMINISTRATIVE | Facility: CLINIC | Age: 78
End: 2023-04-14
Payer: MEDICARE

## 2023-05-01 ENCOUNTER — PATIENT MESSAGE (OUTPATIENT)
Dept: INTERNAL MEDICINE | Facility: CLINIC | Age: 78
End: 2023-05-01
Payer: MEDICARE

## 2023-05-02 NOTE — PROGRESS NOTES
MEDICAL HISTORY:    Type 2 diabetes with peripheral neuropathy and chronic kidney disease stage III.  Chronic atrial fibrillation.  Hypertension.  Hyperlipidemia.  Chronic diastolic dysfunction.  Sleep apnea, history of septoplasty and UPPP .  Pulmonary hypertension.  Chronic venous insufficiency  Lymphedema  Lumbar degenerative disc disease  BPH  Right total knee replacement and revision arthroplasty  Left total knee arthroplasty 2003  Left shoulder surgery.  Meniere's disease.  Lumbar degenerative disk disease.  Nephlithiasis.  Status post eyelid surgery for removal of ectropion  Basis cell carcinoma, status post Mohs     SOCIAL HISTORY:  Tobacco use and alcohol use, none.        MEDICATIONS:  Eliquis 5 mg b.i.d.  Ozempic 1 g once a week  Fenofibrate 145 mg.  Lasix 40 mg.  Gabapentin 300 mg two capsules b.i.d.  Lantus insulin 36 units.  NovoLog  10 units with each meal plus sliding scale  Metoprolol  mg at night.  Atorvastatin 40 mg  Temazepam 15 mg q.h.s. as needed    77-year-old male   Reason for his visit for the past week to 2 weeks he has been having loose stools sometimes watery diarrhea.  On occasion MA BS offer solid stool but is been consistently diarrhea.  Occasionally lower abdominal cramps after having a bowel function.  No fever except maybe 1 time subjectively felt this way.  There is no difficulty during after meal.  There has been no recent antibiotic use.  He does not recall eating or drinking anything to account for this    Reports no shortness of breath chest pain or palpitations and no difficulty urinating    He reports glucose readings being in the 100s anywhere from 130-150     Examination   Weight 265 lb  Pulse 88   Blood pressure 128/62  Chest clear breath sounds   Heart irregular regular no murmurs   Abdominal exam nontender, soft, no hepatosplenomegaly abdominal masses  Pulses 2+ carotid pulses  1+ pedal pulses  Extremities 1 to 2+ edema.  Hyperemia involving the left  leg    Impression   Diarrhea  Type 2 diabetes with chronic kidney disease stage 3 in peripheral neuropathy   Chronic atrial fibrillation   Hypertension  Hyperlipidemia  Chronic venous insufficiency and lymphedema    Plan   Routine labs   Stool for culture and C diff  Metamucildaily

## 2023-05-03 ENCOUNTER — LAB VISIT (OUTPATIENT)
Dept: LAB | Facility: HOSPITAL | Age: 78
End: 2023-05-03
Attending: INTERNAL MEDICINE
Payer: MEDICARE

## 2023-05-03 ENCOUNTER — OFFICE VISIT (OUTPATIENT)
Dept: INTERNAL MEDICINE | Facility: CLINIC | Age: 78
End: 2023-05-03
Payer: MEDICARE

## 2023-05-03 VITALS
HEART RATE: 94 BPM | HEIGHT: 70 IN | OXYGEN SATURATION: 96 % | SYSTOLIC BLOOD PRESSURE: 128 MMHG | WEIGHT: 265.63 LBS | BODY MASS INDEX: 38.03 KG/M2 | DIASTOLIC BLOOD PRESSURE: 60 MMHG

## 2023-05-03 DIAGNOSIS — I10 ESSENTIAL HYPERTENSION: ICD-10-CM

## 2023-05-03 DIAGNOSIS — Z79.4 TYPE 2 DIABETES MELLITUS WITH DIABETIC POLYNEUROPATHY, WITH LONG-TERM CURRENT USE OF INSULIN: ICD-10-CM

## 2023-05-03 DIAGNOSIS — Z79.4 TYPE 2 DIABETES MELLITUS WITH STAGE 3A CHRONIC KIDNEY DISEASE, WITH LONG-TERM CURRENT USE OF INSULIN: ICD-10-CM

## 2023-05-03 DIAGNOSIS — N18.31 TYPE 2 DIABETES MELLITUS WITH STAGE 3A CHRONIC KIDNEY DISEASE, WITH LONG-TERM CURRENT USE OF INSULIN: ICD-10-CM

## 2023-05-03 DIAGNOSIS — R19.7 DIARRHEA, UNSPECIFIED TYPE: Primary | ICD-10-CM

## 2023-05-03 DIAGNOSIS — E78.5 HYPERLIPIDEMIA, UNSPECIFIED HYPERLIPIDEMIA TYPE: ICD-10-CM

## 2023-05-03 DIAGNOSIS — E11.42 TYPE 2 DIABETES MELLITUS WITH DIABETIC POLYNEUROPATHY, WITH LONG-TERM CURRENT USE OF INSULIN: ICD-10-CM

## 2023-05-03 DIAGNOSIS — E11.22 TYPE 2 DIABETES MELLITUS WITH STAGE 3A CHRONIC KIDNEY DISEASE, WITH LONG-TERM CURRENT USE OF INSULIN: ICD-10-CM

## 2023-05-03 DIAGNOSIS — R19.7 DIARRHEA, UNSPECIFIED TYPE: ICD-10-CM

## 2023-05-03 DIAGNOSIS — I87.2 VENOUS INSUFFICIENCY OF BOTH LOWER EXTREMITIES: ICD-10-CM

## 2023-05-03 LAB
ALBUMIN SERPL BCP-MCNC: 3.7 G/DL (ref 3.5–5.2)
ALP SERPL-CCNC: 37 U/L (ref 55–135)
ALT SERPL W/O P-5'-P-CCNC: 17 U/L (ref 10–44)
ANION GAP SERPL CALC-SCNC: 8 MMOL/L (ref 8–16)
AST SERPL-CCNC: 25 U/L (ref 10–40)
BASOPHILS # BLD AUTO: 0.04 K/UL (ref 0–0.2)
BASOPHILS NFR BLD: 0.6 % (ref 0–1.9)
BILIRUB SERPL-MCNC: 1.1 MG/DL (ref 0.1–1)
BUN SERPL-MCNC: 15 MG/DL (ref 8–23)
CALCIUM SERPL-MCNC: 10.4 MG/DL (ref 8.7–10.5)
CHLORIDE SERPL-SCNC: 108 MMOL/L (ref 95–110)
CHOLEST SERPL-MCNC: 137 MG/DL (ref 120–199)
CHOLEST/HDLC SERPL: 5.7 {RATIO} (ref 2–5)
CO2 SERPL-SCNC: 25 MMOL/L (ref 23–29)
CREAT SERPL-MCNC: 1.3 MG/DL (ref 0.5–1.4)
DIFFERENTIAL METHOD: NORMAL
EOSINOPHIL # BLD AUTO: 0.2 K/UL (ref 0–0.5)
EOSINOPHIL NFR BLD: 3.7 % (ref 0–8)
ERYTHROCYTE [DISTWIDTH] IN BLOOD BY AUTOMATED COUNT: 13.4 % (ref 11.5–14.5)
EST. GFR  (NO RACE VARIABLE): 55.9 ML/MIN/1.73 M^2
ESTIMATED AVG GLUCOSE: 148 MG/DL (ref 68–131)
GLUCOSE SERPL-MCNC: 116 MG/DL (ref 70–110)
HBA1C MFR BLD: 6.8 % (ref 4–5.6)
HCT VFR BLD AUTO: 42.4 % (ref 40–54)
HDLC SERPL-MCNC: 24 MG/DL (ref 40–75)
HDLC SERPL: 17.5 % (ref 20–50)
HGB BLD-MCNC: 14.2 G/DL (ref 14–18)
IMM GRANULOCYTES # BLD AUTO: 0.02 K/UL (ref 0–0.04)
IMM GRANULOCYTES NFR BLD AUTO: 0.3 % (ref 0–0.5)
LDLC SERPL CALC-MCNC: 83.2 MG/DL (ref 63–159)
LYMPHOCYTES # BLD AUTO: 1.5 K/UL (ref 1–4.8)
LYMPHOCYTES NFR BLD: 22.9 % (ref 18–48)
MCH RBC QN AUTO: 30.9 PG (ref 27–31)
MCHC RBC AUTO-ENTMCNC: 33.5 G/DL (ref 32–36)
MCV RBC AUTO: 92 FL (ref 82–98)
MONOCYTES # BLD AUTO: 0.6 K/UL (ref 0.3–1)
MONOCYTES NFR BLD: 8.8 % (ref 4–15)
NEUTROPHILS # BLD AUTO: 4.1 K/UL (ref 1.8–7.7)
NEUTROPHILS NFR BLD: 63.7 % (ref 38–73)
NONHDLC SERPL-MCNC: 113 MG/DL
NRBC BLD-RTO: 0 /100 WBC
PLATELET # BLD AUTO: 230 K/UL (ref 150–450)
PMV BLD AUTO: 11.5 FL (ref 9.2–12.9)
POTASSIUM SERPL-SCNC: 4.2 MMOL/L (ref 3.5–5.1)
PROT SERPL-MCNC: 7.3 G/DL (ref 6–8.4)
RBC # BLD AUTO: 4.6 M/UL (ref 4.6–6.2)
SODIUM SERPL-SCNC: 141 MMOL/L (ref 136–145)
TRIGL SERPL-MCNC: 149 MG/DL (ref 30–150)
WBC # BLD AUTO: 6.45 K/UL (ref 3.9–12.7)

## 2023-05-03 PROCEDURE — 3074F PR MOST RECENT SYSTOLIC BLOOD PRESSURE < 130 MM HG: ICD-10-PCS | Mod: CPTII,S$GLB,, | Performed by: INTERNAL MEDICINE

## 2023-05-03 PROCEDURE — 3288F PR FALLS RISK ASSESSMENT DOCUMENTED: ICD-10-PCS | Mod: CPTII,S$GLB,, | Performed by: INTERNAL MEDICINE

## 2023-05-03 PROCEDURE — 99999 PR PBB SHADOW E&M-EST. PATIENT-LVL III: CPT | Mod: PBBFAC,,, | Performed by: INTERNAL MEDICINE

## 2023-05-03 PROCEDURE — 80053 COMPREHEN METABOLIC PANEL: CPT | Performed by: INTERNAL MEDICINE

## 2023-05-03 PROCEDURE — 3078F DIAST BP <80 MM HG: CPT | Mod: CPTII,S$GLB,, | Performed by: INTERNAL MEDICINE

## 2023-05-03 PROCEDURE — 36415 COLL VENOUS BLD VENIPUNCTURE: CPT | Performed by: INTERNAL MEDICINE

## 2023-05-03 PROCEDURE — 1101F PT FALLS ASSESS-DOCD LE1/YR: CPT | Mod: CPTII,S$GLB,, | Performed by: INTERNAL MEDICINE

## 2023-05-03 PROCEDURE — 3074F SYST BP LT 130 MM HG: CPT | Mod: CPTII,S$GLB,, | Performed by: INTERNAL MEDICINE

## 2023-05-03 PROCEDURE — 99214 OFFICE O/P EST MOD 30 MIN: CPT | Mod: S$GLB,,, | Performed by: INTERNAL MEDICINE

## 2023-05-03 PROCEDURE — 3078F PR MOST RECENT DIASTOLIC BLOOD PRESSURE < 80 MM HG: ICD-10-PCS | Mod: CPTII,S$GLB,, | Performed by: INTERNAL MEDICINE

## 2023-05-03 PROCEDURE — 1101F PR PT FALLS ASSESS DOC 0-1 FALLS W/OUT INJ PAST YR: ICD-10-PCS | Mod: CPTII,S$GLB,, | Performed by: INTERNAL MEDICINE

## 2023-05-03 PROCEDURE — 1159F PR MEDICATION LIST DOCUMENTED IN MEDICAL RECORD: ICD-10-PCS | Mod: CPTII,S$GLB,, | Performed by: INTERNAL MEDICINE

## 2023-05-03 PROCEDURE — 3072F LOW RISK FOR RETINOPATHY: CPT | Mod: CPTII,S$GLB,, | Performed by: INTERNAL MEDICINE

## 2023-05-03 PROCEDURE — 1126F AMNT PAIN NOTED NONE PRSNT: CPT | Mod: CPTII,S$GLB,, | Performed by: INTERNAL MEDICINE

## 2023-05-03 PROCEDURE — 1159F MED LIST DOCD IN RCRD: CPT | Mod: CPTII,S$GLB,, | Performed by: INTERNAL MEDICINE

## 2023-05-03 PROCEDURE — 85025 COMPLETE CBC W/AUTO DIFF WBC: CPT | Performed by: INTERNAL MEDICINE

## 2023-05-03 PROCEDURE — 3288F FALL RISK ASSESSMENT DOCD: CPT | Mod: CPTII,S$GLB,, | Performed by: INTERNAL MEDICINE

## 2023-05-03 PROCEDURE — 80061 LIPID PANEL: CPT | Performed by: INTERNAL MEDICINE

## 2023-05-03 PROCEDURE — 99999 PR PBB SHADOW E&M-EST. PATIENT-LVL III: ICD-10-PCS | Mod: PBBFAC,,, | Performed by: INTERNAL MEDICINE

## 2023-05-03 PROCEDURE — 1160F PR REVIEW ALL MEDS BY PRESCRIBER/CLIN PHARMACIST DOCUMENTED: ICD-10-PCS | Mod: CPTII,S$GLB,, | Performed by: INTERNAL MEDICINE

## 2023-05-03 PROCEDURE — 3072F PR LOW RISK FOR RETINOPATHY: ICD-10-PCS | Mod: CPTII,S$GLB,, | Performed by: INTERNAL MEDICINE

## 2023-05-03 PROCEDURE — 83036 HEMOGLOBIN GLYCOSYLATED A1C: CPT | Performed by: INTERNAL MEDICINE

## 2023-05-03 PROCEDURE — 1160F RVW MEDS BY RX/DR IN RCRD: CPT | Mod: CPTII,S$GLB,, | Performed by: INTERNAL MEDICINE

## 2023-05-03 PROCEDURE — 1126F PR PAIN SEVERITY QUANTIFIED, NO PAIN PRESENT: ICD-10-PCS | Mod: CPTII,S$GLB,, | Performed by: INTERNAL MEDICINE

## 2023-05-03 PROCEDURE — 99214 PR OFFICE/OUTPT VISIT, EST, LEVL IV, 30-39 MIN: ICD-10-PCS | Mod: S$GLB,,, | Performed by: INTERNAL MEDICINE

## 2023-05-10 ENCOUNTER — TELEPHONE (OUTPATIENT)
Dept: ENDOCRINOLOGY | Facility: CLINIC | Age: 78
End: 2023-05-10
Payer: MEDICARE

## 2023-05-10 NOTE — TELEPHONE ENCOUNTER
Sending info to bill to try for G7 dexcom    Last seen 2/1/2023    Lab Results   Component Value Date    HGBA1C 6.8 (H) 05/03/2023     E11.65

## 2023-05-30 ENCOUNTER — PATIENT MESSAGE (OUTPATIENT)
Dept: INTERNAL MEDICINE | Facility: CLINIC | Age: 78
End: 2023-05-30
Payer: MEDICARE

## 2023-05-30 DIAGNOSIS — Z79.4 TYPE 2 DIABETES MELLITUS WITH STAGE 3A CHRONIC KIDNEY DISEASE, WITH LONG-TERM CURRENT USE OF INSULIN: Primary | ICD-10-CM

## 2023-05-30 DIAGNOSIS — E11.22 TYPE 2 DIABETES MELLITUS WITH STAGE 3A CHRONIC KIDNEY DISEASE, WITH LONG-TERM CURRENT USE OF INSULIN: Primary | ICD-10-CM

## 2023-05-30 DIAGNOSIS — N18.31 TYPE 2 DIABETES MELLITUS WITH STAGE 3A CHRONIC KIDNEY DISEASE, WITH LONG-TERM CURRENT USE OF INSULIN: Primary | ICD-10-CM

## 2023-06-01 DIAGNOSIS — I48.20 CHRONIC ATRIAL FIBRILLATION: Primary | ICD-10-CM

## 2023-06-02 NOTE — TELEPHONE ENCOUNTER
Refill Routing Note   Medication(s) are not appropriate for processing by [facility] for the following reason(s):      Drug-disease interaction    [facility] action(s):  Defer None identified     Medication Therapy Plan: Drug-Disease: fenofibrate and Chronic kidney disease, stage III (moderate); Type 2 diabetes mellitus with stage 3 chronic kidney disease, with long-term current use of insulin; Type 2 diabetes mellitus with stage 3a chronic kidney disease, with long-term current use of insulin      Appointments  past 12m or future 3m with PCP    Date Provider   Last Visit   5/3/2023 Desmond Barlow MD   Next Visit   Visit date not found Desmond Barlow MD   ED visits in past 90 days: 0        Note composed:12:08 PM 06/02/2023

## 2023-06-02 NOTE — PROGRESS NOTES
ENDOCRINOLOGY CLINIC  06/08/2023       Subjective:      CC: Type 2 diabetes    HPI:   BI Cantor is a 77 y.o. male who presents for management of type 2 diabetes.  Patient was last seen in the Endocrinolgy Clinic on 02/01/2023 by Dr. Judd.     Diabetes Hx:  Diagnosed w/ DM: Dx around 2003, on insulin since 2008  Complications:   Retinopathy: Denies, Gets annual eye exam and has appointment coming up.    Last eye exam: : 05/23/2022  Neuropathy: Yes, sees podiatrist and has an appointment tomorrow. Has right LE blisters and open wound. On gabapentin.    Last foot exam: : 01/03/2023  Nephropathy: Albuminuria, severely increased, CKD 3. Follows with nephrology.   Cardiovascular: Diastolic dysfunction    Hypoglycemia: No h/o severe hypoglycemia, Hypoglycemia unawareness: : No, Glucagon: No    Current meds:   Ozempic 1 mg/week  Lantus 36 units daily  Humalog 10 units daily + SSI    Compliance with meds: Yes     Previous meds:  Metformin - diarrhea  Januvia  Invokana    Home glucose checks: On Dexcom G7   Compliant: Yes.  CGM could not be downloaded and patient did not have clarity.   His current blood sugars was 150.  Denies recent hypoglycemia    Diet/Exercise: Walks with cane due to risk of falls. Patient missed his PT appointment for his back and wants a new referral. Appetite is good.     Denies history of pancreatitis or medullary thyroid cancer.  History recurrent UTI: Yes, no episodes in the past few years.   History of recurrent fungal infection: Prior infections    Polyuria: No  Polydipsia: No    Last A1c:     Lab Results   Component Value Date    HGBA1C 6.8 (H) 05/03/2023    HGBA1C 7.3 (H) 12/05/2022    HGBA1C 7.6 (H) 09/26/2022       microalbumin:   Lab Results   Component Value Date    LABMICR 254.0 12/05/2022    CREATRANDUR 50.0 12/05/2022    MICALBCREAT 508.0 (H) 12/05/2022     Lipids:   Lab Results   Component Value Date    CHOL 137 05/03/2023    TRIG 149 05/03/2023    HDL 24 (L) 05/03/2023    LDLCALC  "83.2 05/03/2023    CHOLHDL 17.5 (L) 05/03/2023       TSH:  Lab Results   Component Value Date    TSH 3.757 09/26/2022     Vitamin B12 No results found for: V12       Aspirin: No  Statins: Yes, on atorvastatin, + fenofibrate  ACEI/ARB: No    Patient has hypothyroidism on levothyroxine and follows with PCP.     FHx of DM: Yes, Father and paternal grandmother    ROS: see HPI     Objective:   Physical Exam   /76 (BP Location: Left arm, Patient Position: Sitting, BP Method: Medium (Manual))   Ht 5' 10" (1.778 m)   Wt 124.4 kg (274 lb 4 oz)   BMI 39.35 kg/m²   Wt Readings from Last 3 Encounters:   06/06/23 122.9 kg (271 lb)   06/05/23 124.4 kg (274 lb 4 oz)   05/03/23 120.5 kg (265 lb 10.5 oz)   ]    Constitutional:  Pleasant,  in no acute distress.   HENT:   Head:    Normocephalic and atraumatic.   Eyes:    EOMI. No scleral icterus.   Cardiovascular:  Normal rate, regular rhythm  Respiratory:   Effort normal, breath sounds normal  Gastrointestinal: Soft, nontender  Neurological:  No tremor  Skin:    Skin is warm,dry, injection sites normal w/o lipo hypertrophy  Extremity:  B/L pedal edema, erythema, blisters and open wound seen on the Right LE        LABORATORY REVIEW:  See HPI for other labs reviewed today      Chemistry        Component Value Date/Time     05/03/2023 1207    K 4.2 05/03/2023 1207     05/03/2023 1207    CO2 25 05/03/2023 1207    BUN 15 05/03/2023 1207    CREATININE 1.3 05/03/2023 1207     (H) 05/03/2023 1207        Component Value Date/Time    CALCIUM 10.4 05/03/2023 1207    ALKPHOS 37 (L) 05/03/2023 1207    AST 25 05/03/2023 1207    ALT 17 05/03/2023 1207    BILITOT 1.1 (H) 05/03/2023 1207    ESTGFRAFRICA >60 07/01/2022 1319    EGFRNONAA 52 (A) 07/01/2022 1319          Lab Results   Component Value Date    HGBA1C 6.8 (H) 05/03/2023    HGBA1C 7.3 (H) 12/05/2022    HGBA1C 7.6 (H) 09/26/2022     Other labs reviewed today in HPI    Assessment/Plan:     Problem List Items " Addressed This Visit          Neuro    Lumbar radiculopathy     Patient reports fall risk and had missed his PT appointment.   Requested a referral for PT at [facility]             Relevant Orders    Ambulatory referral/consult to Physical/Occupational Therapy       Cardiac/Vascular    Dyslipidemia associated with type 2 diabetes mellitus     Continue statins  Monitor lipids    Lab Results   Component Value Date    LDLCALC 83.2 05/03/2023               Endocrine    Type 2 diabetes mellitus with stage 3 chronic kidney disease, with long-term current use of insulin - Primary       Good control of blood sugars  Denies recent hypoglycemia, Discussed risks of hypoglycemia and hypoglycemia management   Encouraged good dietary plan    Continue current diabetes regimen  Continue Dexcom G7                 Follow up in about 6 months (around 12/5/2023).     Madeline Dempsey MD

## 2023-06-02 NOTE — TELEPHONE ENCOUNTER
[facility] Department  [address]  [facility] [address]  Phone:  [phone]  Fax:  [phone]                                    Randee Millard   MRN: [id number]    Department:  [facility] Emergency Department   Date of Visit:  3/20/2020           After Visit Summary Signature Page    I have received my discharge instructions, and my questions have been answered. I have discussed any challenges I see with this plan with the nurse or doctor.    ..........................................................................................................................................  Patient/Patient Representative Signature      ..........................................................................................................................................  Patient Representative Print Name and Relationship to Patient    ..................................................               ................................................  Date                                   Time    ..........................................................................................................................................  Reviewed by Signature/Title    ...................................................              ..............................................  Date                                               Time          22EPIC Rev 08/18        No care due was identified.  [facility] Embedded Care Due Messages. Reference number: [id number].   6/02/2023 8:49:09 AM CDT

## 2023-06-03 RX ORDER — FENOFIBRATE 145 MG/1
TABLET, FILM COATED ORAL
Qty: 90 TABLET | Refills: 3 | Status: SHIPPED | OUTPATIENT
Start: 2023-06-03

## 2023-06-03 NOTE — TELEPHONE ENCOUNTER
Refill Decision Note   VIJAY Cantor  is requesting a refill authorization.  Brief Assessment and Rationale for Refill:  Approve     Medication Therapy Plan:       Medication Reconciliation Completed: No   Comments:     No Care Gaps recommended.     Note composed:4:02 AM 06/03/2023

## 2023-06-05 ENCOUNTER — OFFICE VISIT (OUTPATIENT)
Dept: ENDOCRINOLOGY | Facility: CLINIC | Age: 78
End: 2023-06-05
Payer: MEDICARE

## 2023-06-05 VITALS
BODY MASS INDEX: 39.26 KG/M2 | SYSTOLIC BLOOD PRESSURE: 123 MMHG | DIASTOLIC BLOOD PRESSURE: 76 MMHG | WEIGHT: 274.25 LBS | HEIGHT: 70 IN

## 2023-06-05 DIAGNOSIS — E11.22 TYPE 2 DIABETES MELLITUS WITH STAGE 3A CHRONIC KIDNEY DISEASE, WITH LONG-TERM CURRENT USE OF INSULIN: Primary | ICD-10-CM

## 2023-06-05 DIAGNOSIS — N18.31 TYPE 2 DIABETES MELLITUS WITH STAGE 3A CHRONIC KIDNEY DISEASE, WITH LONG-TERM CURRENT USE OF INSULIN: Primary | ICD-10-CM

## 2023-06-05 DIAGNOSIS — E78.5 DYSLIPIDEMIA ASSOCIATED WITH TYPE 2 DIABETES MELLITUS: ICD-10-CM

## 2023-06-05 DIAGNOSIS — E11.69 DYSLIPIDEMIA ASSOCIATED WITH TYPE 2 DIABETES MELLITUS: ICD-10-CM

## 2023-06-05 DIAGNOSIS — M54.16 LUMBAR RADICULOPATHY: ICD-10-CM

## 2023-06-05 DIAGNOSIS — Z79.4 TYPE 2 DIABETES MELLITUS WITH STAGE 3A CHRONIC KIDNEY DISEASE, WITH LONG-TERM CURRENT USE OF INSULIN: Primary | ICD-10-CM

## 2023-06-05 PROCEDURE — 99214 OFFICE O/P EST MOD 30 MIN: CPT | Mod: S$GLB,,, | Performed by: INTERNAL MEDICINE

## 2023-06-05 PROCEDURE — 1101F PT FALLS ASSESS-DOCD LE1/YR: CPT | Mod: CPTII,S$GLB,, | Performed by: INTERNAL MEDICINE

## 2023-06-05 PROCEDURE — 3074F PR MOST RECENT SYSTOLIC BLOOD PRESSURE < 130 MM HG: ICD-10-PCS | Mod: CPTII,S$GLB,, | Performed by: INTERNAL MEDICINE

## 2023-06-05 PROCEDURE — 3072F LOW RISK FOR RETINOPATHY: CPT | Mod: CPTII,S$GLB,, | Performed by: INTERNAL MEDICINE

## 2023-06-05 PROCEDURE — 1125F AMNT PAIN NOTED PAIN PRSNT: CPT | Mod: CPTII,S$GLB,, | Performed by: INTERNAL MEDICINE

## 2023-06-05 PROCEDURE — 99999 PR PBB SHADOW E&M-EST. PATIENT-LVL V: CPT | Mod: PBBFAC,,, | Performed by: INTERNAL MEDICINE

## 2023-06-05 PROCEDURE — 1125F PR PAIN SEVERITY QUANTIFIED, PAIN PRESENT: ICD-10-PCS | Mod: CPTII,S$GLB,, | Performed by: INTERNAL MEDICINE

## 2023-06-05 PROCEDURE — 3072F PR LOW RISK FOR RETINOPATHY: ICD-10-PCS | Mod: CPTII,S$GLB,, | Performed by: INTERNAL MEDICINE

## 2023-06-05 PROCEDURE — 3288F PR FALLS RISK ASSESSMENT DOCUMENTED: ICD-10-PCS | Mod: CPTII,S$GLB,, | Performed by: INTERNAL MEDICINE

## 2023-06-05 PROCEDURE — 3078F DIAST BP <80 MM HG: CPT | Mod: CPTII,S$GLB,, | Performed by: INTERNAL MEDICINE

## 2023-06-05 PROCEDURE — 3078F PR MOST RECENT DIASTOLIC BLOOD PRESSURE < 80 MM HG: ICD-10-PCS | Mod: CPTII,S$GLB,, | Performed by: INTERNAL MEDICINE

## 2023-06-05 PROCEDURE — 3074F SYST BP LT 130 MM HG: CPT | Mod: CPTII,S$GLB,, | Performed by: INTERNAL MEDICINE

## 2023-06-05 PROCEDURE — 3288F FALL RISK ASSESSMENT DOCD: CPT | Mod: CPTII,S$GLB,, | Performed by: INTERNAL MEDICINE

## 2023-06-05 PROCEDURE — 99214 PR OFFICE/OUTPT VISIT, EST, LEVL IV, 30-39 MIN: ICD-10-PCS | Mod: S$GLB,,, | Performed by: INTERNAL MEDICINE

## 2023-06-05 PROCEDURE — 1101F PR PT FALLS ASSESS DOC 0-1 FALLS W/OUT INJ PAST YR: ICD-10-PCS | Mod: CPTII,S$GLB,, | Performed by: INTERNAL MEDICINE

## 2023-06-05 PROCEDURE — 99999 PR PBB SHADOW E&M-EST. PATIENT-LVL V: ICD-10-PCS | Mod: PBBFAC,,, | Performed by: INTERNAL MEDICINE

## 2023-06-05 NOTE — PATIENT INSTRUCTIONS
Continue current medications for diabetes  Will call you for the physical therapy appointment  Follow up in 6 months.

## 2023-06-05 NOTE — ASSESSMENT & PLAN NOTE
Continue statins  Monitor lipids    Lab Results   Component Value Date    LDLCALC 83.2 05/03/2023

## 2023-06-05 NOTE — ASSESSMENT & PLAN NOTE
Good control of blood sugars  Denies recent hypoglycemia, Discussed risks of hypoglycemia and hypoglycemia management   Encouraged good dietary plan    Continue current diabetes regimen  Continue Dexcom G7

## 2023-06-05 NOTE — ASSESSMENT & PLAN NOTE
Patient reports fall risk and had missed his PT appointment.   Requested a referral for PT at [facility]

## 2023-06-06 ENCOUNTER — HOSPITAL ENCOUNTER (OUTPATIENT)
Dept: RADIOLOGY | Facility: HOSPITAL | Age: 78
Discharge: HOME | End: 2023-06-06
Attending: PODIATRIST
Payer: MEDICARE

## 2023-06-06 ENCOUNTER — OFFICE VISIT (OUTPATIENT)
Dept: PODIATRY | Facility: CLINIC | Age: 78
End: 2023-06-06
Payer: MEDICARE

## 2023-06-06 ENCOUNTER — PATIENT MESSAGE (OUTPATIENT)
Dept: INTERNAL MEDICINE | Facility: CLINIC | Age: 78
End: 2023-06-06
Payer: MEDICARE

## 2023-06-06 VITALS
BODY MASS INDEX: 38.8 KG/M2 | DIASTOLIC BLOOD PRESSURE: 57 MMHG | WEIGHT: 271 LBS | HEART RATE: 94 BPM | HEIGHT: 70 IN | SYSTOLIC BLOOD PRESSURE: 99 MMHG

## 2023-06-06 DIAGNOSIS — M79.605 LEG PAIN, LEFT: ICD-10-CM

## 2023-06-06 DIAGNOSIS — E11.51 ONYCHOMYCOSIS OF MULTIPLE TOENAILS WITH TYPE 2 DIABETES MELLITUS AND PERIPHERAL ANGIOPATHY: ICD-10-CM

## 2023-06-06 DIAGNOSIS — B35.1 ONYCHOMYCOSIS OF MULTIPLE TOENAILS WITH TYPE 2 DIABETES MELLITUS AND PERIPHERAL ANGIOPATHY: ICD-10-CM

## 2023-06-06 DIAGNOSIS — Z79.01 LONG TERM CURRENT USE OF ANTICOAGULANT THERAPY: ICD-10-CM

## 2023-06-06 DIAGNOSIS — R60.0 LEG EDEMA, LEFT: ICD-10-CM

## 2023-06-06 DIAGNOSIS — E11.42 DIABETIC POLYNEUROPATHY ASSOCIATED WITH TYPE 2 DIABETES MELLITUS: ICD-10-CM

## 2023-06-06 DIAGNOSIS — R60.0 LEG EDEMA, LEFT: Primary | ICD-10-CM

## 2023-06-06 DIAGNOSIS — E11.69 ONYCHOMYCOSIS OF MULTIPLE TOENAILS WITH TYPE 2 DIABETES MELLITUS AND PERIPHERAL ANGIOPATHY: ICD-10-CM

## 2023-06-06 PROCEDURE — 99999 PR PBB SHADOW E&M-EST. PATIENT-LVL V: CPT | Mod: PBBFAC,,, | Performed by: PODIATRIST

## 2023-06-06 PROCEDURE — 3078F DIAST BP <80 MM HG: CPT | Mod: CPTII,S$GLB,, | Performed by: PODIATRIST

## 2023-06-06 PROCEDURE — 1101F PR PT FALLS ASSESS DOC 0-1 FALLS W/OUT INJ PAST YR: ICD-10-PCS | Mod: CPTII,S$GLB,, | Performed by: PODIATRIST

## 2023-06-06 PROCEDURE — 1159F MED LIST DOCD IN RCRD: CPT | Mod: CPTII,S$GLB,, | Performed by: PODIATRIST

## 2023-06-06 PROCEDURE — 3072F LOW RISK FOR RETINOPATHY: CPT | Mod: CPTII,S$GLB,, | Performed by: PODIATRIST

## 2023-06-06 PROCEDURE — 99999 PR PBB SHADOW E&M-EST. PATIENT-LVL V: ICD-10-PCS | Mod: PBBFAC,,, | Performed by: PODIATRIST

## 2023-06-06 PROCEDURE — 3074F SYST BP LT 130 MM HG: CPT | Mod: CPTII,S$GLB,, | Performed by: PODIATRIST

## 2023-06-06 PROCEDURE — 93971 EXTREMITY STUDY: CPT | Mod: 26,LT,, | Performed by: INTERNAL MEDICINE

## 2023-06-06 PROCEDURE — 1160F RVW MEDS BY RX/DR IN RCRD: CPT | Mod: CPTII,S$GLB,, | Performed by: PODIATRIST

## 2023-06-06 PROCEDURE — 3074F PR MOST RECENT SYSTOLIC BLOOD PRESSURE < 130 MM HG: ICD-10-PCS | Mod: CPTII,S$GLB,, | Performed by: PODIATRIST

## 2023-06-06 PROCEDURE — 93971 EXTREMITY STUDY: CPT | Mod: TC,LT

## 2023-06-06 PROCEDURE — 11721 PR DEBRIDEMENT OF NAILS, 6 OR MORE: ICD-10-PCS | Mod: Q9,S$GLB,, | Performed by: PODIATRIST

## 2023-06-06 PROCEDURE — 1125F PR PAIN SEVERITY QUANTIFIED, PAIN PRESENT: ICD-10-PCS | Mod: CPTII,S$GLB,, | Performed by: PODIATRIST

## 2023-06-06 PROCEDURE — 3288F FALL RISK ASSESSMENT DOCD: CPT | Mod: CPTII,S$GLB,, | Performed by: PODIATRIST

## 2023-06-06 PROCEDURE — 3072F PR LOW RISK FOR RETINOPATHY: ICD-10-PCS | Mod: CPTII,S$GLB,, | Performed by: PODIATRIST

## 2023-06-06 PROCEDURE — 99214 OFFICE O/P EST MOD 30 MIN: CPT | Mod: 25,S$GLB,, | Performed by: PODIATRIST

## 2023-06-06 PROCEDURE — 1101F PT FALLS ASSESS-DOCD LE1/YR: CPT | Mod: CPTII,S$GLB,, | Performed by: PODIATRIST

## 2023-06-06 PROCEDURE — 3288F PR FALLS RISK ASSESSMENT DOCUMENTED: ICD-10-PCS | Mod: CPTII,S$GLB,, | Performed by: PODIATRIST

## 2023-06-06 PROCEDURE — 3078F PR MOST RECENT DIASTOLIC BLOOD PRESSURE < 80 MM HG: ICD-10-PCS | Mod: CPTII,S$GLB,, | Performed by: PODIATRIST

## 2023-06-06 PROCEDURE — 99214 PR OFFICE/OUTPT VISIT, EST, LEVL IV, 30-39 MIN: ICD-10-PCS | Mod: 25,S$GLB,, | Performed by: PODIATRIST

## 2023-06-06 PROCEDURE — 1160F PR REVIEW ALL MEDS BY PRESCRIBER/CLIN PHARMACIST DOCUMENTED: ICD-10-PCS | Mod: CPTII,S$GLB,, | Performed by: PODIATRIST

## 2023-06-06 PROCEDURE — 1159F PR MEDICATION LIST DOCUMENTED IN MEDICAL RECORD: ICD-10-PCS | Mod: CPTII,S$GLB,, | Performed by: PODIATRIST

## 2023-06-06 PROCEDURE — 1125F AMNT PAIN NOTED PAIN PRSNT: CPT | Mod: CPTII,S$GLB,, | Performed by: PODIATRIST

## 2023-06-06 PROCEDURE — 11721 DEBRIDE NAIL 6 OR MORE: CPT | Mod: Q9,S$GLB,, | Performed by: PODIATRIST

## 2023-06-06 PROCEDURE — 93971 US LOWER EXTREMITY VEINS LEFT: ICD-10-PCS | Mod: 26,LT,, | Performed by: INTERNAL MEDICINE

## 2023-06-06 NOTE — PROGRESS NOTES
Subjective:      [place]: [facility] [place] Jr. is a 77 y.o. male.    Chief Complaint: Diabetic Foot Exam (PCP Madeline SHANNON Rai, MD 6/5/23/Blisters lower L leg)    Thick hard discolored misshapen toenails.  Gradual onset, worsening over the past several months.  Aggravated by socks shoes pressure ambulation.  Periodic treatment here improve symptoms.      Cc2  tenderness pain and swelling left leg ankle and foot.  Gradual onset, worsening over the past week or so.  Aggravated by prolonged standing some shoes.  Patient has but does not wear compression stockings.    Review of Systems   Constitutional: Negative for chills, diaphoresis, fever, malaise/fatigue and night sweats.   Cardiovascular:  Positive for leg swelling. Negative for claudication, cyanosis and syncope.   Skin:  Positive for nail changes. Negative for color change, dry skin, poor wound healing, rash, suspicious lesions and unusual hair distribution.   Musculoskeletal:  Negative for falls, joint pain, joint swelling, muscle cramps, muscle weakness and stiffness.   Gastrointestinal:  Negative for constipation, diarrhea, nausea and vomiting.   Neurological:  Positive for numbness, paresthesias and sensory change. Negative for brief paralysis, disturbances in coordination, focal weakness and tremors.         Objective:      Physical Exam  Constitutional:       General: He is not in acute distress.     Appearance: He is well-developed. He is not diaphoretic.      Comments: Oriented to time, place, and person.   Cardiovascular:      Pulses:           Dorsalis pedis pulses are 1+ on the right side and 1+ on the left side.        Posterior tibial pulses are 1+ on the right side and 1+ on the left side.      Comments: Capillary refill 3 seconds all toes/distal feet, all toes/both feet warm to touch.      Negative lymphadenopathy bilateral popliteal fossa and tarsal tunnel.      2+ pitting lower extremity edema bilateral.      Positive Armando test and Homans sign left  lower extremity.  Musculoskeletal:      Right ankle: No swelling, deformity, ecchymosis or lacerations. Normal range of motion. Normal pulse.      Right Achilles Tendon: Normal. No defects. Dawson's test negative.      Comments: Normal angle, base, station of gait. Decreased stride length, early heel off, moderately propulsive toe off bilateral.    Ankle dorsiflexion decreased at <10 degrees bilateral with moderate increase with knee flexion bilateral.      All ten toes without clubbing, cyanosis, or signs of ischemia.      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    No pain to palpation bilateral lower extremities.      Range of motion, stability, muscle strength, and muscle tone are age and health appropriate normal bilateral feet and legs.       Lymphadenopathy:      Lower Body: No right inguinal adenopathy. No left inguinal adenopathy.      Comments: Negative lymphadenopathy bilateral popliteal fossa and tarsal tunnel.    Negative lymphangitic streaking bilateral feet/ankles/legs.   Skin:     General: Skin is warm and dry.      Capillary Refill: Capillary refill takes 2 to 3 seconds.      Coloration: Skin is not pale.      Findings: No abrasion, bruising, burn, ecchymosis, erythema, laceration, lesion, petechiae or rash.      Nails: There is no clubbing.      Comments:   Toenails 1st, 2nd, 3rd, 4th, 5th  bilateral are hypertrophic thickened 2-3 mm, dystrophic, discolored tanish brown with tan, gray crumbly subungual debris.  Tender to distal nail plate pressure, without periungual skin abnormality of each.    Skin thin, shiny, atrophic, with decreased density and distribution of pedal hair bilateral, but without hyperpigmentation, cedric discoloration,  ulcers, masses, nodules or cords palpated bilateral feet and legs.    Left lower extremity has erythema edema consistent with chronic stasis and superficial weeping without cedric ulceration or signs of infection.   Neurological:      Mental Status: He is alert and  oriented to person, place, and time. He is not disoriented.      Sensory: No sensory deficit.      Motor: No tremor, atrophy or abnormal muscle tone.      Gait: Gait normal.      Deep Tendon Reflexes:      Reflex Scores:       Patellar reflexes are 2+ on the right side and 2+ on the left side.       Achilles reflexes are 2+ on the right side and 2+ on the left side.     Comments: Decreased/absent vibratory sensation bilateral feet to 128Hz tuning fork.    Paresthesias, and burning bilateral feet with no clearly identified trigger or source.     Psychiatric:         Behavior: Behavior is cooperative.           Assessment:       Encounter Diagnoses   Name Primary?    Leg edema, left Yes    Leg pain, left     Long term current use of anticoagulant therapy     Onychomycosis of multiple toenails with type 2 diabetes mellitus and peripheral angiopathy     Diabetic polyneuropathy associated with type 2 diabetes mellitus          Plan:       [facility] was seen today for diabetic foot exam.    Diagnoses and all orders for this visit:    Leg edema, left  -     US Lower Extremity Veins Left; Future    Leg pain, left  -     US Lower Extremity Veins Left; Future    Long term current use of anticoagulant therapy    Onychomycosis of multiple toenails with type 2 diabetes mellitus and peripheral angiopathy    Diabetic polyneuropathy associated with type 2 diabetes mellitus      I counseled the patient on his conditions, their implications and medical management.        Ultrasound left lower extremity.-clot?    Continue diabetic shoes and inserts.  Continue daily foot inspections.      With the patient's permission, I debrided all ten toenails with a sterile nipper and curette, removing all offending nail and debris.  Patient tolerated the procedure well and related significant relief.              No follow-ups on file.

## 2023-06-08 ENCOUNTER — DOCUMENTATION ONLY (OUTPATIENT)
Dept: INTERNAL MEDICINE | Facility: CLINIC | Age: 78
End: 2023-06-08
Payer: MEDICARE

## 2023-06-08 PROBLEM — M54.16 LUMBAR RADICULOPATHY: Status: RESOLVED | Noted: 2018-04-24 | Resolved: 2023-06-08

## 2023-06-08 RX ORDER — METOLAZONE 5 MG/1
5 TABLET ORAL DAILY
Qty: 7 TABLET | Refills: 0 | Status: SHIPPED | OUTPATIENT
Start: 2023-06-08 | End: 2023-06-15

## 2023-06-08 RX ORDER — AMOXICILLIN AND CLAVULANATE POTASSIUM 875; 125 MG/1; MG/1
1 TABLET, FILM COATED ORAL 2 TIMES DAILY
Qty: 20 TABLET | Refills: 0 | Status: SHIPPED | OUTPATIENT
Start: 2023-06-08 | End: 2023-06-18

## 2023-06-08 NOTE — PROGRESS NOTES
Internal medicine note    Patient has been having persistent swelling and hyperemia and pain involving the left lower extremity.  A venous Doppler today did not reveal a blood clot but there was enlarged left inguinal node.  I suspect that this may be reactive from stasis dermatitis and possibly cellulitis    For 7 days metolazone 5 mg to take along with his Lasix 40 mg was added and Augmentin 875 mg twice a day for the next 10 days    Afterwards will make arrangements to repeat a chemistry to evaluate kidney function and also possibly to do a CT of the abdomen and pelvis in regard to the lymphadenopathy

## 2023-07-28 ENCOUNTER — OFFICE VISIT (OUTPATIENT)
Dept: OTOLARYNGOLOGY | Facility: CLINIC | Age: 78
End: 2023-07-28
Payer: MEDICARE

## 2023-07-28 VITALS
BODY MASS INDEX: 38.39 KG/M2 | WEIGHT: 268.19 LBS | HEIGHT: 70 IN | HEART RATE: 67 BPM | DIASTOLIC BLOOD PRESSURE: 58 MMHG | OXYGEN SATURATION: 97 % | SYSTOLIC BLOOD PRESSURE: 129 MMHG

## 2023-07-28 DIAGNOSIS — H69.93 UNSPECIFIED EUSTACHIAN TUBE DISORDER, BILATERAL: ICD-10-CM

## 2023-07-28 DIAGNOSIS — H93.299 ABNORMAL AUDITORY PERCEPTION, UNSPECIFIED LATERALITY: ICD-10-CM

## 2023-07-28 DIAGNOSIS — J34.2 NASAL SEPTAL DEVIATION: ICD-10-CM

## 2023-07-28 DIAGNOSIS — H61.21 IMPACTED CERUMEN OF RIGHT EAR: Primary | ICD-10-CM

## 2023-07-28 DIAGNOSIS — G47.33 OBSTRUCTIVE SLEEP APNEA: ICD-10-CM

## 2023-07-28 DIAGNOSIS — J30.9 ALLERGIC RHINITIS, UNSPECIFIED SEASONALITY, UNSPECIFIED TRIGGER: ICD-10-CM

## 2023-07-28 PROCEDURE — 1126F AMNT PAIN NOTED NONE PRSNT: CPT | Mod: CPTII,S$GLB,, | Performed by: SPECIALIST

## 2023-07-28 PROCEDURE — 1126F PR PAIN SEVERITY QUANTIFIED, NO PAIN PRESENT: ICD-10-PCS | Mod: CPTII,S$GLB,, | Performed by: SPECIALIST

## 2023-07-28 PROCEDURE — 3074F SYST BP LT 130 MM HG: CPT | Mod: CPTII,S$GLB,, | Performed by: SPECIALIST

## 2023-07-28 PROCEDURE — 1160F PR REVIEW ALL MEDS BY PRESCRIBER/CLIN PHARMACIST DOCUMENTED: ICD-10-PCS | Mod: CPTII,S$GLB,, | Performed by: SPECIALIST

## 2023-07-28 PROCEDURE — 3288F PR FALLS RISK ASSESSMENT DOCUMENTED: ICD-10-PCS | Mod: CPTII,S$GLB,, | Performed by: SPECIALIST

## 2023-07-28 PROCEDURE — 69210 REMOVE IMPACTED EAR WAX UNI: CPT | Mod: RT,S$GLB,, | Performed by: SPECIALIST

## 2023-07-28 PROCEDURE — 3074F PR MOST RECENT SYSTOLIC BLOOD PRESSURE < 130 MM HG: ICD-10-PCS | Mod: CPTII,S$GLB,, | Performed by: SPECIALIST

## 2023-07-28 PROCEDURE — 1101F PR PT FALLS ASSESS DOC 0-1 FALLS W/OUT INJ PAST YR: ICD-10-PCS | Mod: CPTII,S$GLB,, | Performed by: SPECIALIST

## 2023-07-28 PROCEDURE — 1159F MED LIST DOCD IN RCRD: CPT | Mod: CPTII,S$GLB,, | Performed by: SPECIALIST

## 2023-07-28 PROCEDURE — 3288F FALL RISK ASSESSMENT DOCD: CPT | Mod: CPTII,S$GLB,, | Performed by: SPECIALIST

## 2023-07-28 PROCEDURE — 3078F DIAST BP <80 MM HG: CPT | Mod: CPTII,S$GLB,, | Performed by: SPECIALIST

## 2023-07-28 PROCEDURE — 99214 OFFICE O/P EST MOD 30 MIN: CPT | Mod: 25,S$GLB,, | Performed by: SPECIALIST

## 2023-07-28 PROCEDURE — 1159F PR MEDICATION LIST DOCUMENTED IN MEDICAL RECORD: ICD-10-PCS | Mod: CPTII,S$GLB,, | Performed by: SPECIALIST

## 2023-07-28 PROCEDURE — 99999 PR PBB SHADOW E&M-EST. PATIENT-LVL V: CPT | Mod: PBBFAC,,, | Performed by: SPECIALIST

## 2023-07-28 PROCEDURE — 3078F PR MOST RECENT DIASTOLIC BLOOD PRESSURE < 80 MM HG: ICD-10-PCS | Mod: CPTII,S$GLB,, | Performed by: SPECIALIST

## 2023-07-28 PROCEDURE — 1160F RVW MEDS BY RX/DR IN RCRD: CPT | Mod: CPTII,S$GLB,, | Performed by: SPECIALIST

## 2023-07-28 PROCEDURE — 1101F PT FALLS ASSESS-DOCD LE1/YR: CPT | Mod: CPTII,S$GLB,, | Performed by: SPECIALIST

## 2023-07-28 PROCEDURE — 99214 PR OFFICE/OUTPT VISIT, EST, LEVL IV, 30-39 MIN: ICD-10-PCS | Mod: 25,S$GLB,, | Performed by: SPECIALIST

## 2023-07-28 PROCEDURE — 3072F PR LOW RISK FOR RETINOPATHY: ICD-10-PCS | Mod: CPTII,S$GLB,, | Performed by: SPECIALIST

## 2023-07-28 PROCEDURE — 99999 PR PBB SHADOW E&M-EST. PATIENT-LVL V: ICD-10-PCS | Mod: PBBFAC,,, | Performed by: SPECIALIST

## 2023-07-28 PROCEDURE — 3072F LOW RISK FOR RETINOPATHY: CPT | Mod: CPTII,S$GLB,, | Performed by: SPECIALIST

## 2023-07-28 PROCEDURE — 69210 PR REMOVAL IMPACTED CERUMEN REQUIRING INSTRUMENTATION, UNILATERAL: ICD-10-PCS | Mod: RT,S$GLB,, | Performed by: SPECIALIST

## 2023-07-28 RX ORDER — LORATADINE 10 MG/1
10 TABLET ORAL DAILY
Qty: 30 TABLET | Refills: 11
Start: 2023-07-28 | End: 2023-08-25 | Stop reason: SDUPTHER

## 2023-07-28 NOTE — PROCEDURES
"Ear Cerumen Removal    Date/Time: 7/28/2023 11:20 AM  Performed by: ELLA Owen MD  Authorized by: ELLA Owen MD     Time out: Immediately prior to procedure a "time out" was called to verify the correct patient, procedure, equipment, support staff and site/side marked as required.    Consent Done?:  Yes (Verbal)    Local anesthetic:  None  Location details:  Right ear  Procedure type comment:  8 Fr suction and irrigation  Cerumen  Removal Results:  Cerumen completely removed  Patient tolerance:  Patient tolerated the procedure well with no immediate complications  "

## 2023-07-28 NOTE — PROGRESS NOTES
Subjective:       Patient ID: [facility] PAT Cantor Jr. is a 77 y.o. male.    Chief Complaint: No chief complaint on file.      The patient is coming in to discuss multiple problems:  1. Blockage of the right ear:  The patient has had blockage in his right ear for the last 2 weeks.  He did use some type of orally drops for wax removal which did not help the problem.  He is not having otorrhea or pain.  2. Nasal blockage:  The patient is noticed blockage in the left side of his nose attendant with the blockage in his right ear.  3. Obstructive sleep apnea:  The patient is not using his CPAP.  Whenever he uses it a cause him to have significant rhinorrhea.  He is tired in the mornings when he wakes up and has daytime somnolence.            Review of Systems     Constitutional: Positive for fatigue.  Negative for appetite change, chills, fever and unexpected weight loss.      HENT: Positive for hearing loss, postnasal drip, ringing in the ears, runny nose, sinus pressure, sore throat and stuffy nose.  Negative for ear discharge, ear infection, ear pain, facial swelling, mouth sores, nosebleeds, sinus infection, tonsil infection, dental problems, trouble swallowing and voice change.      Eyes:  Negative for change in eyesight, eye drainage, eye itching and photophobia.     Respiratory:  Positive for cough. Negative for shortness of breath, sleep apnea, snoring and wheezing.      Cardiovascular:  Negative for chest pain, foot swelling, irregular heartbeat and swollen veins.     Gastrointestinal:  Negative for abdominal pain, acid reflux, constipation, diarrhea, heartburn and vomiting.     Genitourinary: Negative for difficulty urinating, sexual problems and frequent urination.     Musc: Positive for aching joints, aching muscles, back pain and neck pain.     Skin: Negative for rash.     Allergy: Positive for seasonal allergies. Negative for food allergies.     Endocrine: Negative for cold intolerance and heat intolerance.       Neurological: Positive for headaches. Negative for dizziness, light-headedness, seizures and tremors.      Hematologic: Negative for bruises/bleeds easily.      Psychiatric: Negative for decreased concentration, depression, nervous/anxious and sleep disturbance.                Objective:      Physical Exam  Vitals and nursing note reviewed. Exam conducted with a chaperone present (Patient's grandson accompanies him to the visit).   Constitutional:       General: He is awake.      Appearance: Normal appearance. He is well-developed, well-groomed and normal weight.   HENT:      Head: Normocephalic.      Jaw: There is normal jaw occlusion.      Salivary Glands: Right salivary gland is not diffusely enlarged or tender. Left salivary gland is not diffusely enlarged or tender.      Right Ear: Ear canal and external ear normal. Decreased hearing noted. Tympanic membrane is retracted.      Left Ear: Ear canal and external ear normal. Decreased hearing noted. There is impacted cerumen. Tympanic membrane is retracted.      Nose: Septal deviation (to the right), mucosal edema (cyanotic, boggy inferior turbinates bilaterally) and rhinorrhea (clear mucus bilaterally) present. No nasal deformity. Rhinorrhea is clear.      Right Turbinates: Enlarged and pale.      Left Turbinates: Enlarged and pale.      Mouth/Throat:      Lips: No lesions.      Mouth: No oral lesions.      Dentition: No gum lesions.      Tongue: No lesions.      Palate: Lesions (UPPP defect) present. No mass.      Pharynx: Oropharynx is clear. Uvula midline.      Tonsils: 0 on the left.   Eyes:      General: Lids are normal.         Right eye: No discharge.         Left eye: No discharge.      Conjunctiva/sclera:      Right eye: Right conjunctiva is injected. No exudate.     Left eye: Left conjunctiva is injected. No exudate.     Pupils: Pupils are equal, round, and reactive to light.   Neck:      Thyroid: No thyroid mass or thyromegaly.      Trachea: Trachea  normal. No tracheal deviation.   Cardiovascular:      Rate and Rhythm: Normal rate and regular rhythm.      Pulses: Normal pulses.      Heart sounds: Normal heart sounds.   Pulmonary:      Effort: Pulmonary effort is normal.      Breath sounds: Normal breath sounds. No stridor. No decreased breath sounds, wheezing, rhonchi or rales.   Abdominal:      General: Bowel sounds are normal.      Palpations: Abdomen is soft.      Tenderness: There is no abdominal tenderness.   Musculoskeletal:         General: Normal range of motion.      Cervical back: Normal range of motion. No muscular tenderness.   Lymphadenopathy:      Head:      Right side of head: No submental, submandibular, preauricular, posterior auricular or occipital adenopathy.      Left side of head: No submental, submandibular, preauricular, posterior auricular or occipital adenopathy.      Cervical: No cervical adenopathy.   Skin:     General: Skin is warm and dry.      Findings: No petechiae or rash.      Nails: There is no clubbing.   Neurological:      Mental Status: He is alert and oriented to person, place, and time.      Cranial Nerves: No cranial nerve deficit.      Sensory: No sensory deficit.      Gait: Gait normal.   Psychiatric:         Speech: Speech normal.         Behavior: Behavior normal. Behavior is cooperative.         Thought Content: Thought content normal.         Judgment: Judgment normal.         Procedure-cerumen impactions removed from right ear using 8 Arabic suction and irrigation.  The patient tolerated procedure well was discharged post procedure    Assessment:       1. Impacted cerumen of right ear    2. Abnormal auditory perception, unspecified laterality    3. Allergic rhinitis, unspecified seasonality, unspecified trigger    4. Nasal septal deviation    5. Obstructive sleep apnea    6. Unspecified eustachian tube disorder, bilateral        Plan:       I am scheduling the patient for a comprehensive auditory evaluation.  I am  starting him on loratadine 10 mg daily.  I will recheck him in 4 weeks.                DISCLAIMER: This note was prepared with [employer] voice recognition transcription software. Garbled syntax, mangled pronouns, and other bizarre constructions may be attributed to that software system. While efforts were made to correct any mistakes made by this voice recognition program, some errors and/or omissions may remain in the note that were missed when the note was originally created.

## 2023-08-02 ENCOUNTER — HOSPITAL ENCOUNTER (OUTPATIENT)
Dept: CARDIOLOGY | Facility: HOSPITAL | Age: 78
Discharge: HOME | End: 2023-08-02
Attending: INTERNAL MEDICINE
Payer: MEDICARE

## 2023-08-02 VITALS
SYSTOLIC BLOOD PRESSURE: 130 MMHG | DIASTOLIC BLOOD PRESSURE: 60 MMHG | HEART RATE: 60 BPM | BODY MASS INDEX: 38.8 KG/M2 | WEIGHT: 271 LBS | HEIGHT: 70 IN

## 2023-08-02 DIAGNOSIS — I48.20 CHRONIC ATRIAL FIBRILLATION: ICD-10-CM

## 2023-08-02 LAB
ASCENDING AORTA: 3.22 CM
AV INDEX (PROSTH): 0.67
AV MEAN GRADIENT: 4 MMHG
AV PEAK GRADIENT: 7 MMHG
AV VALVE AREA BY VELOCITY RATIO: 2.38 CM²
AV VALVE AREA: 2.5 CM²
AV VELOCITY RATIO: 0.64
BSA FOR ECHO PROCEDURE: 2.46 M2
CV ECHO LV RWT: 0.33 CM
DOP CALC AO PEAK VEL: 1.3 M/S
DOP CALC AO VTI: 29.4 CM
DOP CALC LVOT AREA: 3.7 CM2
DOP CALC LVOT DIAMETER: 2.18 CM
DOP CALC LVOT PEAK VEL: 0.83 M/S
DOP CALC LVOT STROKE VOLUME: 73.61 CM3
DOP CALCLVOT PEAK VEL VTI: 19.73 CM
E/E' RATIO: 10.86 M/S
ECHO LV POSTERIOR WALL: 0.8 CM (ref 0.6–1.1)
EJECTION FRACTION: 60 %
FRACTIONAL SHORTENING: 29 % (ref 28–44)
INTERVENTRICULAR SEPTUM: 0.85 CM (ref 0.6–1.1)
IVRT: 45.67 MSEC
LA MAJOR: 6.01 CM
LA MINOR: 5.95 CM
LA WIDTH: 4.09 CM
LEFT ATRIUM SIZE: 4 CM
LEFT ATRIUM VOLUME INDEX MOD: 37.5 ML/M2
LEFT ATRIUM VOLUME INDEX: 35.1 ML/M2
LEFT ATRIUM VOLUME MOD: 88.86 CM3
LEFT ATRIUM VOLUME: 83.16 CM3
LEFT INTERNAL DIMENSION IN SYSTOLE: 3.43 CM (ref 2.1–4)
LEFT VENTRICLE DIASTOLIC VOLUME INDEX: 46.46 ML/M2
LEFT VENTRICLE DIASTOLIC VOLUME: 110.12 ML
LEFT VENTRICLE MASS INDEX: 57 G/M2
LEFT VENTRICLE SYSTOLIC VOLUME INDEX: 20.5 ML/M2
LEFT VENTRICLE SYSTOLIC VOLUME: 48.52 ML
LEFT VENTRICULAR INTERNAL DIMENSION IN DIASTOLE: 4.85 CM (ref 3.5–6)
LEFT VENTRICULAR MASS: 134.17 G
LV LATERAL E/E' RATIO: 10.36 M/S
LV SEPTAL E/E' RATIO: 11.4 M/S
MV A" WAVE DURATION": 25.12 MSEC
MV PEAK E VEL: 1.14 M/S
PISA TR MAX VEL: 2.56 M/S
PULM VEIN S/D RATIO: 0.29
PV PEAK D VEL: 0.76 M/S
PV PEAK S VEL: 0.22 M/S
RA MAJOR: 5.9 CM
RA PRESSURE ESTIMATED: 8 MMHG
RA WIDTH: 4.54 CM
RIGHT VENTRICLE DIASTOLIC BASEL DIMENSION: 3.7 CM
RIGHT VENTRICULAR END-DIASTOLIC DIMENSION: 3.73 CM
RV TB RVSP: 11 MMHG
SINUS: 3.34 CM
STJ: 2.76 CM
TDI LATERAL: 0.11 M/S
TDI SEPTAL: 0.1 M/S
TDI: 0.11 M/S
TR MAX PG: 26 MMHG
TRICUSPID ANNULAR PLANE SYSTOLIC EXCURSION: 2.7 CM
TV REST PULMONARY ARTERY PRESSURE: 34 MMHG
Z-SCORE OF LEFT VENTRICULAR DIMENSION IN END DIASTOLE: -7.48
Z-SCORE OF LEFT VENTRICULAR DIMENSION IN END SYSTOLE: -4.62

## 2023-08-02 PROCEDURE — 93306 ECHO (CUPID ONLY): ICD-10-PCS | Mod: 26,,, | Performed by: INTERNAL MEDICINE

## 2023-08-02 PROCEDURE — 93306 TTE W/DOPPLER COMPLETE: CPT | Mod: 26,,, | Performed by: INTERNAL MEDICINE

## 2023-08-02 PROCEDURE — 93306 TTE W/DOPPLER COMPLETE: CPT

## 2023-08-14 DIAGNOSIS — I48.20 CHRONIC ATRIAL FIBRILLATION: Primary | ICD-10-CM

## 2023-08-15 ENCOUNTER — OFFICE VISIT (OUTPATIENT)
Dept: ENDOCRINOLOGY | Facility: CLINIC | Age: 78
End: 2023-08-15
Payer: MEDICARE

## 2023-08-15 VITALS
SYSTOLIC BLOOD PRESSURE: 106 MMHG | OXYGEN SATURATION: 97 % | DIASTOLIC BLOOD PRESSURE: 70 MMHG | HEART RATE: 78 BPM | WEIGHT: 272.5 LBS | BODY MASS INDEX: 39.1 KG/M2

## 2023-08-15 DIAGNOSIS — E11.22 TYPE 2 DIABETES MELLITUS WITH STAGE 3A CHRONIC KIDNEY DISEASE, WITH LONG-TERM CURRENT USE OF INSULIN: Primary | ICD-10-CM

## 2023-08-15 DIAGNOSIS — N18.31 TYPE 2 DIABETES MELLITUS WITH STAGE 3A CHRONIC KIDNEY DISEASE, WITH LONG-TERM CURRENT USE OF INSULIN: Primary | ICD-10-CM

## 2023-08-15 DIAGNOSIS — E78.2 MIXED HYPERLIPIDEMIA: ICD-10-CM

## 2023-08-15 DIAGNOSIS — Z79.4 TYPE 2 DIABETES MELLITUS WITH STAGE 3A CHRONIC KIDNEY DISEASE, WITH LONG-TERM CURRENT USE OF INSULIN: Primary | ICD-10-CM

## 2023-08-15 PROCEDURE — 3072F PR LOW RISK FOR RETINOPATHY: ICD-10-PCS | Mod: CPTII,S$GLB,, | Performed by: INTERNAL MEDICINE

## 2023-08-15 PROCEDURE — 1159F PR MEDICATION LIST DOCUMENTED IN MEDICAL RECORD: ICD-10-PCS | Mod: CPTII,S$GLB,, | Performed by: INTERNAL MEDICINE

## 2023-08-15 PROCEDURE — 99999 PR PBB SHADOW E&M-EST. PATIENT-LVL V: CPT | Mod: PBBFAC,,, | Performed by: INTERNAL MEDICINE

## 2023-08-15 PROCEDURE — 1160F RVW MEDS BY RX/DR IN RCRD: CPT | Mod: CPTII,S$GLB,, | Performed by: INTERNAL MEDICINE

## 2023-08-15 PROCEDURE — 3074F SYST BP LT 130 MM HG: CPT | Mod: CPTII,S$GLB,, | Performed by: INTERNAL MEDICINE

## 2023-08-15 PROCEDURE — 1101F PR PT FALLS ASSESS DOC 0-1 FALLS W/OUT INJ PAST YR: ICD-10-PCS | Mod: CPTII,S$GLB,, | Performed by: INTERNAL MEDICINE

## 2023-08-15 PROCEDURE — 1126F AMNT PAIN NOTED NONE PRSNT: CPT | Mod: CPTII,S$GLB,, | Performed by: INTERNAL MEDICINE

## 2023-08-15 PROCEDURE — 1101F PT FALLS ASSESS-DOCD LE1/YR: CPT | Mod: CPTII,S$GLB,, | Performed by: INTERNAL MEDICINE

## 2023-08-15 PROCEDURE — 99214 PR OFFICE/OUTPT VISIT, EST, LEVL IV, 30-39 MIN: ICD-10-PCS | Mod: S$GLB,,, | Performed by: INTERNAL MEDICINE

## 2023-08-15 PROCEDURE — 1159F MED LIST DOCD IN RCRD: CPT | Mod: CPTII,S$GLB,, | Performed by: INTERNAL MEDICINE

## 2023-08-15 PROCEDURE — 3288F PR FALLS RISK ASSESSMENT DOCUMENTED: ICD-10-PCS | Mod: CPTII,S$GLB,, | Performed by: INTERNAL MEDICINE

## 2023-08-15 PROCEDURE — 3074F PR MOST RECENT SYSTOLIC BLOOD PRESSURE < 130 MM HG: ICD-10-PCS | Mod: CPTII,S$GLB,, | Performed by: INTERNAL MEDICINE

## 2023-08-15 PROCEDURE — 99999 PR PBB SHADOW E&M-EST. PATIENT-LVL V: ICD-10-PCS | Mod: PBBFAC,,, | Performed by: INTERNAL MEDICINE

## 2023-08-15 PROCEDURE — 3078F PR MOST RECENT DIASTOLIC BLOOD PRESSURE < 80 MM HG: ICD-10-PCS | Mod: CPTII,S$GLB,, | Performed by: INTERNAL MEDICINE

## 2023-08-15 PROCEDURE — 1126F PR PAIN SEVERITY QUANTIFIED, NO PAIN PRESENT: ICD-10-PCS | Mod: CPTII,S$GLB,, | Performed by: INTERNAL MEDICINE

## 2023-08-15 PROCEDURE — 3072F LOW RISK FOR RETINOPATHY: CPT | Mod: CPTII,S$GLB,, | Performed by: INTERNAL MEDICINE

## 2023-08-15 PROCEDURE — 3288F FALL RISK ASSESSMENT DOCD: CPT | Mod: CPTII,S$GLB,, | Performed by: INTERNAL MEDICINE

## 2023-08-15 PROCEDURE — 3078F DIAST BP <80 MM HG: CPT | Mod: CPTII,S$GLB,, | Performed by: INTERNAL MEDICINE

## 2023-08-15 PROCEDURE — 1160F PR REVIEW ALL MEDS BY PRESCRIBER/CLIN PHARMACIST DOCUMENTED: ICD-10-PCS | Mod: CPTII,S$GLB,, | Performed by: INTERNAL MEDICINE

## 2023-08-15 PROCEDURE — 99214 OFFICE O/P EST MOD 30 MIN: CPT | Mod: S$GLB,,, | Performed by: INTERNAL MEDICINE

## 2023-08-15 RX ORDER — TIRZEPATIDE 5 MG/.5ML
INJECTION, SOLUTION SUBCUTANEOUS
Qty: 4 PEN | Refills: 2 | Status: SHIPPED | OUTPATIENT
Start: 2023-08-15 | End: 2023-11-21 | Stop reason: DRUGHIGH

## 2023-08-15 RX ORDER — TIRZEPATIDE 2.5 MG/.5ML
INJECTION, SOLUTION SUBCUTANEOUS
Qty: 4 PEN | Refills: 0 | Status: SHIPPED | OUTPATIENT
Start: 2023-08-15 | End: 2023-11-21 | Stop reason: DRUGHIGH

## 2023-08-15 NOTE — PROGRESS NOTES
END[facility]CRINOLOGY CLINIC  08/15/2023       Subjective:      CC: Type 2 diabetes    HPI:   BI Cantor is a 77 y.o. male who presents for management of type 2 diabetes.  Patient was last seen in by me on 06/05/2023.    Diabetes Hx:  Diagnosed w/ DM: Dx around 2003, on insulin since 2008  Complications:   Retinopathy: Denies, Gets annual eye exam.   Last eye exam: : 05/23/2022  Neuropathy: Yes, sees podiatrist. On gabapentin.    Last foot exam: : 01/03/2023  Nephropathy: Albuminuria, severely increased, CKD 3. Follows with nephrology.   Cardiovascular: Diastolic dysfunction    Hypoglycemia: No h/o severe hypoglycemia, Hypoglycemia unawareness: : No, Glucagon: No    Current meds:   Ozempic 1 mg/week  Lantus 36 units daily  Humalog 10 units daily + SSI    Compliance with meds: Yes     Previous meds:  Metformin - diarrhea  Januvia  Invokana    Home glucose checks: On Dexcom G7   Compliant: Yes.  Average blood sugar the past 2 weeks was 184.    Diet/Exercise: Walks with cane due to risk of falls. Patient missed his PT appointment for his back and wants a new referral. Appetite is good.     Denies history of pancreatitis or medullary thyroid cancer.  History recurrent UTI: Yes, no episodes in the past few years.   History of recurrent fungal infection: Prior infections.    Polyuria: No  Polydipsia: No    Last A1c:     Lab Results   Component Value Date    HGBA1C 6.6 (H) 07/30/2023    HGBA1C 6.5 (H) 06/19/2023    HGBA1C 6.8 (H) 05/03/2023       microalbumin:   Lab Results   Component Value Date    LABMICR 254.0 12/05/2022    CREATRANDUR 50.0 12/05/2022    MICALBCREAT 508.0 (H) 12/05/2022     Lipids:   Lab Results   Component Value Date    CHOL 137 05/03/2023    TRIG 149 05/03/2023    HDL 24 (L) 05/03/2023    LDLCALC 83.2 05/03/2023    CHOLHDL 17.5 (L) 05/03/2023       TSH:  Lab Results   Component Value Date    TSH 3.757 09/26/2022       Aspirin: No  Statins: Yes, on atorvastatin, + fenofibrate  ACEI/ARB: No    Patient has  hypothyroidism on levothyroxine and follows with PCP.     FHx of DM: Yes, Father and paternal grandmother    ROS: see HPI     Objective:   Physical Exam   /70 (BP Location: Right arm, Patient Position: Sitting, BP Method: Large (Automatic))   Pulse 78   Wt 123.6 kg (272 lb 7.8 oz)   SpO2 97%   BMI 39.10 kg/m²     Wt Readings from Last 3 Encounters:   08/15/23 123.6 kg (272 lb 7.8 oz)   08/02/23 122.9 kg (271 lb)   07/28/23 121.6 kg (268 lb 3 oz)     Physical Exam  Constitutional:       General: He is not in acute distress.     Appearance: He is not ill-appearing.   HENT:      Head: Normocephalic and atraumatic.   Eyes:      Conjunctiva/sclera: Conjunctivae normal.   Cardiovascular:      Rate and Rhythm: Normal rate.   Pulmonary:      Effort: Pulmonary effort is normal.   Neurological:      Mental Status: He is alert and oriented to person, place, and time.          LABORATORY REVIEW:  See HPI for other labs reviewed today      Chemistry        Component Value Date/Time     07/30/2023 1109    K 4.4 07/30/2023 1109     07/30/2023 1109    CO2 25 07/30/2023 1109    BUN 18 07/30/2023 1109    CREATININE 1.3 07/30/2023 1109     (H) 07/30/2023 1109        Component Value Date/Time    CALCIUM 10.0 07/30/2023 1109    ALKPHOS 37 (L) 05/03/2023 1207    AST 25 05/03/2023 1207    ALT 17 05/03/2023 1207    BILITOT 1.1 (H) 05/03/2023 1207    ESTGFRAFRICA >60 07/01/2022 1319    EGFRNONAA 52 (A) 07/01/2022 1319          Lab Results   Component Value Date    HGBA1C 6.6 (H) 07/30/2023    HGBA1C 6.5 (H) 06/19/2023    HGBA1C 6.8 (H) 05/03/2023     Other labs reviewed today in HPI    Assessment/Plan:     Problem List Items Addressed This Visit          Cardiac/Vascular    Hyperlipidemia       Continue statin therapy.      Lipids:   Lab Results   Component Value Date    CHOL 137 05/03/2023    TRIG 149 05/03/2023    HDL 24 (L) 05/03/2023    LDLCALC 83.2 05/03/2023    CHOLHDL 17.5 (L) 05/03/2023                  Endocrine    Type 2 diabetes mellitus with stage 3 chronic kidney disease, with long-term current use of insulin - Primary       Recent A1c was stable at 6.6%.  Patient is doing well on his current regimen.  Patient says he would like to switch from Ozempic to Mounjaro.  Start Mounjaro 2.5 mg weekly for 4 weeks and if tolerated increase to 5 mg weekly.  Continue his Lantus and Humalog.    Call if any side effects from the medications    Call if blood sugars are persistently less than 70 or greater than 300.       Discussed importance of diet and lifestyle modifications for diabetes management  Hypoglycemia management discussed. Carry glucose tablets or snacks at all times.     Complications:  Follow up for regular diabetes eye exam  Daily self examination of feet  CKD 3:  Continue follow-up in Nephrology.    Last A1c:   Lab Results   Component Value Date    HGBA1C 6.6 (H) 07/30/2023     microalbumin:   Lab Results   Component Value Date    LABMICR 254.0 12/05/2022    CREATRANDUR 50.0 12/05/2022    MICALBCREAT 508.0 (H) 12/05/2022                Follow up in about 3 months (around 11/15/2023).     Madeline Dempsey MD

## 2023-08-15 NOTE — ASSESSMENT & PLAN NOTE
Continue statin therapy.      Lipids:   Lab Results   Component Value Date    CHOL 137 05/03/2023    TRIG 149 05/03/2023    HDL 24 (L) 05/03/2023    LDLCALC 83.2 05/03/2023    CHOLHDL 17.5 (L) 05/03/2023

## 2023-08-15 NOTE — PATIENT INSTRUCTIONS
Start Mounjaro 2.5 mg once a week for 4 weeks. If tolerated increase to 5 mg once a week.   Tone if you have any nausea, vomiting, diarrhea, constipation.     Carry glucose tablets at all time.   Call if you have frequent blood sugars less than 70 or greater than 300.     Follow up in 3-4 months.

## 2023-08-15 NOTE — ASSESSMENT & PLAN NOTE
Recent A1c was stable at 6.6%.  Patient is doing well on his current regimen.  Patient says he would like to switch from Ozempic to Mounjaro.  Start Mounjaro 2.5 mg weekly for 4 weeks and if tolerated increase to 5 mg weekly.  Continue his Lantus and Humalog.    Call if any side effects from the medications    Call if blood sugars are persistently less than 70 or greater than 300.       Discussed importance of diet and lifestyle modifications for diabetes management  Hypoglycemia management discussed. Carry glucose tablets or snacks at all times.     Complications:  Follow up for regular diabetes eye exam  Daily self examination of feet  CKD 3:  Continue follow-up in Nephrology.    Last A1c:   Lab Results   Component Value Date    HGBA1C 6.6 (H) 07/30/2023       microalbumin:   Lab Results   Component Value Date    LABMICR 254.0 12/05/2022    CREATRANDUR 50.0 12/05/2022    MICALBCREAT 508.0 (H) 12/05/2022

## 2023-08-16 ENCOUNTER — CLINICAL SUPPORT (OUTPATIENT)
Dept: AUDIOLOGY | Facility: CLINIC | Age: 78
End: 2023-08-16
Payer: MEDICARE

## 2023-08-16 ENCOUNTER — OFFICE VISIT (OUTPATIENT)
Dept: ELECTROPHYSIOLOGY | Facility: CLINIC | Age: 78
End: 2023-08-16
Payer: MEDICARE

## 2023-08-16 ENCOUNTER — HOSPITAL ENCOUNTER (OUTPATIENT)
Dept: CARDIOLOGY | Facility: CLINIC | Age: 78
Discharge: HOME | End: 2023-08-16
Payer: MEDICARE

## 2023-08-16 ENCOUNTER — TELEPHONE (OUTPATIENT)
Dept: OTOLARYNGOLOGY | Facility: CLINIC | Age: 78
End: 2023-08-16
Payer: MEDICARE

## 2023-08-16 VITALS
SYSTOLIC BLOOD PRESSURE: 133 MMHG | WEIGHT: 273.13 LBS | HEIGHT: 70 IN | HEART RATE: 71 BPM | BODY MASS INDEX: 39.1 KG/M2 | DIASTOLIC BLOOD PRESSURE: 62 MMHG

## 2023-08-16 DIAGNOSIS — E66.01 CLASS 3 SEVERE OBESITY DUE TO EXCESS CALORIES WITH SERIOUS COMORBIDITY AND BODY MASS INDEX (BMI) OF 40.0 TO 44.9 IN ADULT: ICD-10-CM

## 2023-08-16 DIAGNOSIS — E78.2 MIXED HYPERLIPIDEMIA: ICD-10-CM

## 2023-08-16 DIAGNOSIS — I48.20 CHRONIC ATRIAL FIBRILLATION: ICD-10-CM

## 2023-08-16 DIAGNOSIS — H69.93 UNSPECIFIED EUSTACHIAN TUBE DISORDER, BILATERAL: ICD-10-CM

## 2023-08-16 DIAGNOSIS — I10 ESSENTIAL HYPERTENSION: ICD-10-CM

## 2023-08-16 DIAGNOSIS — H90.3 SENSORINEURAL HEARING LOSS (SNHL), BILATERAL: Primary | ICD-10-CM

## 2023-08-16 DIAGNOSIS — N18.30 STAGE 3 CHRONIC KIDNEY DISEASE, UNSPECIFIED WHETHER STAGE 3A OR 3B CKD: ICD-10-CM

## 2023-08-16 DIAGNOSIS — E11.22 TYPE 2 DIABETES MELLITUS WITH STAGE 3A CHRONIC KIDNEY DISEASE, WITH LONG-TERM CURRENT USE OF INSULIN: ICD-10-CM

## 2023-08-16 DIAGNOSIS — H61.21 IMPACTED CERUMEN OF RIGHT EAR: ICD-10-CM

## 2023-08-16 DIAGNOSIS — E78.5 DYSLIPIDEMIA ASSOCIATED WITH TYPE 2 DIABETES MELLITUS: Primary | ICD-10-CM

## 2023-08-16 DIAGNOSIS — Z79.4 TYPE 2 DIABETES MELLITUS WITH DIABETIC POLYNEUROPATHY, WITH LONG-TERM CURRENT USE OF INSULIN: ICD-10-CM

## 2023-08-16 DIAGNOSIS — Z92.89 HISTORY OF CARDIOVERSION: ICD-10-CM

## 2023-08-16 DIAGNOSIS — Z79.01 LONG TERM CURRENT USE OF ANTICOAGULANT THERAPY: ICD-10-CM

## 2023-08-16 DIAGNOSIS — E11.42 TYPE 2 DIABETES MELLITUS WITH DIABETIC POLYNEUROPATHY, WITH LONG-TERM CURRENT USE OF INSULIN: ICD-10-CM

## 2023-08-16 DIAGNOSIS — E11.69 DYSLIPIDEMIA ASSOCIATED WITH TYPE 2 DIABETES MELLITUS: Primary | ICD-10-CM

## 2023-08-16 DIAGNOSIS — I50.32 CHRONIC DIASTOLIC HEART FAILURE: ICD-10-CM

## 2023-08-16 DIAGNOSIS — I27.20 PULMONARY HYPERTENSION: ICD-10-CM

## 2023-08-16 DIAGNOSIS — N18.31 TYPE 2 DIABETES MELLITUS WITH STAGE 3A CHRONIC KIDNEY DISEASE, WITH LONG-TERM CURRENT USE OF INSULIN: ICD-10-CM

## 2023-08-16 DIAGNOSIS — Z79.4 TYPE 2 DIABETES MELLITUS WITH STAGE 3A CHRONIC KIDNEY DISEASE, WITH LONG-TERM CURRENT USE OF INSULIN: ICD-10-CM

## 2023-08-16 PROCEDURE — 1159F PR MEDICATION LIST DOCUMENTED IN MEDICAL RECORD: ICD-10-PCS | Mod: CPTII,S$GLB,, | Performed by: INTERNAL MEDICINE

## 2023-08-16 PROCEDURE — 1101F PT FALLS ASSESS-DOCD LE1/YR: CPT | Mod: CPTII,S$GLB,, | Performed by: INTERNAL MEDICINE

## 2023-08-16 PROCEDURE — 93010 RHYTHM STRIP: ICD-10-PCS | Mod: S$GLB,,, | Performed by: INTERNAL MEDICINE

## 2023-08-16 PROCEDURE — 1159F MED LIST DOCD IN RCRD: CPT | Mod: CPTII,S$GLB,, | Performed by: INTERNAL MEDICINE

## 2023-08-16 PROCEDURE — 3075F PR MOST RECENT SYSTOLIC BLOOD PRESS GE 130-139MM HG: ICD-10-PCS | Mod: CPTII,S$GLB,, | Performed by: INTERNAL MEDICINE

## 2023-08-16 PROCEDURE — 3078F PR MOST RECENT DIASTOLIC BLOOD PRESSURE < 80 MM HG: ICD-10-PCS | Mod: CPTII,S$GLB,, | Performed by: INTERNAL MEDICINE

## 2023-08-16 PROCEDURE — 3078F DIAST BP <80 MM HG: CPT | Mod: CPTII,S$GLB,, | Performed by: INTERNAL MEDICINE

## 2023-08-16 PROCEDURE — 99999 PR PBB SHADOW E&M-EST. PATIENT-LVL IV: CPT | Mod: PBBFAC,,, | Performed by: INTERNAL MEDICINE

## 2023-08-16 PROCEDURE — 99999 PR PBB SHADOW E&M-EST. PATIENT-LVL IV: ICD-10-PCS | Mod: PBBFAC,,, | Performed by: INTERNAL MEDICINE

## 2023-08-16 PROCEDURE — 3288F PR FALLS RISK ASSESSMENT DOCUMENTED: ICD-10-PCS | Mod: CPTII,S$GLB,, | Performed by: INTERNAL MEDICINE

## 2023-08-16 PROCEDURE — 1126F AMNT PAIN NOTED NONE PRSNT: CPT | Mod: CPTII,S$GLB,, | Performed by: INTERNAL MEDICINE

## 2023-08-16 PROCEDURE — 93005 ELECTROCARDIOGRAM TRACING: CPT | Mod: S$GLB,,, | Performed by: INTERNAL MEDICINE

## 2023-08-16 PROCEDURE — 93010 ELECTROCARDIOGRAM REPORT: CPT | Mod: S$GLB,,, | Performed by: INTERNAL MEDICINE

## 2023-08-16 PROCEDURE — 3288F FALL RISK ASSESSMENT DOCD: CPT | Mod: CPTII,S$GLB,, | Performed by: INTERNAL MEDICINE

## 2023-08-16 PROCEDURE — 92567 PR TYMPA2METRY: ICD-10-PCS | Mod: S$GLB,,,

## 2023-08-16 PROCEDURE — 92557 PR COMPREHENSIVE HEARING TEST: ICD-10-PCS | Mod: S$GLB,,,

## 2023-08-16 PROCEDURE — 99999 PR PBB SHADOW E&M-EST. PATIENT-LVL I: CPT | Mod: PBBFAC,,,

## 2023-08-16 PROCEDURE — 99214 OFFICE O/P EST MOD 30 MIN: CPT | Mod: S$GLB,,, | Performed by: INTERNAL MEDICINE

## 2023-08-16 PROCEDURE — 3072F LOW RISK FOR RETINOPATHY: CPT | Mod: CPTII,S$GLB,, | Performed by: INTERNAL MEDICINE

## 2023-08-16 PROCEDURE — 3072F PR LOW RISK FOR RETINOPATHY: ICD-10-PCS | Mod: CPTII,S$GLB,, | Performed by: INTERNAL MEDICINE

## 2023-08-16 PROCEDURE — 99214 PR OFFICE/OUTPT VISIT, EST, LEVL IV, 30-39 MIN: ICD-10-PCS | Mod: S$GLB,,, | Performed by: INTERNAL MEDICINE

## 2023-08-16 PROCEDURE — 92567 TYMPANOMETRY: CPT | Mod: S$GLB,,,

## 2023-08-16 PROCEDURE — 1101F PR PT FALLS ASSESS DOC 0-1 FALLS W/OUT INJ PAST YR: ICD-10-PCS | Mod: CPTII,S$GLB,, | Performed by: INTERNAL MEDICINE

## 2023-08-16 PROCEDURE — 1160F PR REVIEW ALL MEDS BY PRESCRIBER/CLIN PHARMACIST DOCUMENTED: ICD-10-PCS | Mod: CPTII,S$GLB,, | Performed by: INTERNAL MEDICINE

## 2023-08-16 PROCEDURE — 1126F PR PAIN SEVERITY QUANTIFIED, NO PAIN PRESENT: ICD-10-PCS | Mod: CPTII,S$GLB,, | Performed by: INTERNAL MEDICINE

## 2023-08-16 PROCEDURE — 3075F SYST BP GE 130 - 139MM HG: CPT | Mod: CPTII,S$GLB,, | Performed by: INTERNAL MEDICINE

## 2023-08-16 PROCEDURE — 93005 RHYTHM STRIP: ICD-10-PCS | Mod: S$GLB,,, | Performed by: INTERNAL MEDICINE

## 2023-08-16 PROCEDURE — 1160F RVW MEDS BY RX/DR IN RCRD: CPT | Mod: CPTII,S$GLB,, | Performed by: INTERNAL MEDICINE

## 2023-08-16 PROCEDURE — 92557 COMPREHENSIVE HEARING TEST: CPT | Mod: S$GLB,,,

## 2023-08-16 PROCEDURE — 99999 PR PBB SHADOW E&M-EST. PATIENT-LVL I: ICD-10-PCS | Mod: PBBFAC,,,

## 2023-08-16 NOTE — PROGRESS NOTES
"[facility] [place] Jr. was seen today in the clinic for an audiologic evaluation.  Patient's main complaint was decreased hearing, bilaterally. Mr. Cantor reported before having the bilateral cerumen impactions removed by Dr. Owen, he was experiencing "ringing" the ears. He reported this has since resolved. 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 Mr. Cantor denied otalgia, aural fullness, and true vertigo.    Tympanometry revealed Type A in the right ear and Type A in the left ear.     Audiogram results revealed a mild to moderately-severe sensorineural hearing loss (SNHL), bilaterally.    Speech reception thresholds were noted at 25 dBHL in the right ear and 20 dBHL in the left ear.    Speech discrimination scores were 100% in the right ear and 100% in the left ear.    Today's results were discussed with the patient and hearing aids were recommended bilaterally. Mr. Cantor is interested in a hearing aid consultation and would like to utilize any insurance benefits he has for hearing aids through [employer]. At this time, our clinic is not able to utilize insurance benefits for hearing aid coverage. I recommended that Mr. Cantor contact his insurance provider to assess his benefits and request a list of providers who accept coverage for hearing aids through his plan. Mr. Cantor expressed understanding of today's results and the plan. Today's findings will be shared with Dr. Owen for review.    Recommendations:  Otologic evaluation  Hearing aid consultation  Annual audiogram or sooner if change perceived  Hearing protection in noise      "

## 2023-08-16 NOTE — PROGRESS NOTES
"  Subjective:   Patient ID:  [facility] [place] Cant[place] Jr. is a 77 y.o. male who presents for follow-up of AF.     Mr. Cantor is a 77 y.o. male with pAF, HEIDI on CPAP, CKDIII here for follow up.     Background:  History of pAF.  hx face melanoma, s/p resection    Mr. Cantor underwent a DCCV (01/02/14). Following this, he wore an event monitor, which showed pAF with no change in symptoms. His rate was well controlled.   Mr. Cantor is doing well from a rhythm perspective; rate-controlled on Toprol-XL and anticoagulated on Eliquis.    He has no cardiac complaints. Mr. Cantor denies chest pain with exertion or at rest, palpitations, SOB, BOYD, dizziness, or syncope. He reports no cardiac limitations in activity tolerance. He's limited by musculoskeletal (back) pain. Walks with a cane.    I have personally reviewed the patient's EKG today, which shows rate-controlled atrial fibrillation    Recent Cardiac Tests:  echo 60% LVEF    Current Outpatient Prescriptions   Medication Sig    apixaban 5 mg Tab Take 1 tablet (5 mg total) by mouth 2 (two) times daily.    aspirin 325 MG tablet Take 325 mg by mouth once daily.    azelastine (ASTELIN) 137 mcg (0.1 %) nasal spray 1 spray (137 mcg total) by Nasal route 2 (two) times daily.    BD INSULIN PEN NEEDLE UF ORIG 29 x 1/2 " Ndle     BD ULTRA-FINE BASIL PEN NEEDLES 32 gauge x 5/32" Ndle CHECK BLOOD SUGAR FOUR TIMES DAILY    ciclopirox (PENLAC) 8 % Soln Apply topically nightly.    clobetasol (TEMOVATE) 0.05 % cream AAA finger bid    clotrimazole-betamethasone 1-0.05% (LOTRISONE) cream Apply topically 2 (two) times daily.    fenofibrate (TRICOR) 145 MG tablet TAKE ONE TABLET BY MOUTH EVERY DAY    furosemide (LASIX) 40 MG tablet TAKE ONE TABLET BY MOUTH EVERY DAY AS NEEDED (Patient taking differently: TAKE ONE TABLET BY MOUTH EVERY DAY)    gabapentin (NEURONTIN) 300 MG capsule Take 1 capsule (300 mg total) by mouth 3 (three) times daily.    insulin aspart (NOVOLOG FLEXPEN) 100 unit/mL InPn pen " Novolog 20 units breakfast, 22 units lunch and dinner plus correction scale. Max daily dose=100 units    insulin glargine (BASAGLAR KWIKPEN U-100 INSULIN) 100 unit/mL (3 mL) InPn pen Inject 60 Units into the skin once daily. (Patient taking differently: Inject 60 Units into the skin every evening. )    ketoconazole (NIZORAL) 2 % cream Apply topically once daily.    lancets 33 gauge Misc 1 lancet by Misc.(Non-Drug; Combo Route) route 4 (four) times daily. Pt uses True Result Meter, bg monitoring 4 times a day.    levothyroxine (SYNTHROID) 25 MCG tablet Take 1 tablet (25 mcg total) by mouth once daily.    losartan (COZAAR) 25 MG tablet Take 1 tablet (25 mg total) by mouth once daily.    metoprolol succinate (TOPROL-XL) 100 MG 24 hr tablet TAKE ONE TABLET BY MOUTH EVERY DAY    metronidazole (METROGEL) 0.75 % gel Apply topically 2 (two) times daily.    multivitamin capsule Take 1 capsule by mouth every morning.     nystatin-triamcinolone (MYCOLOG II) cream apply TWICE DAILY (Patient taking differently: apply TWICE DAILY-using as needed for rash)    omega-3 acid ethyl esters (LOVAZA) 1 gram capsule TAKE THREE CAPSULE BY MOUTH TWICE DAILY    pravastatin (PRAVACHOL) 10 MG tablet Take 1 tablet (10 mg total) by mouth once daily. (Patient taking differently: Take 10 mg by mouth every evening. )    tamsulosin (FLOMAX) 0.4 mg Cp24 Take 1 capsule (0.4 mg total) by mouth once daily.    temazepam (RESTORIL) 15 mg Cap TAKE ONE CAPSULE BY MOUTH EVERY EVENING    tiZANidine (ZANAFLEX) 4 MG tablet TAKE ONE TABLET BY MOUTH EVERY 6 HOURS AS NEEDED    triamcinolone acetonide 0.1% (KENALOG) 0.1 % cream Apply topically 2 (two) times daily. Apply to affected area twice daily as directed.    vitamin E 1000 UNIT capsule Take 1,000 Units by mouth every morning. 1 Capsule Oral Every day     No current facility-administered medications for this visit.      Review of Systems   Constitutional: Negative. Negative for malaise/fatigue.   HENT:  "Negative.  Negative for ear pain and tinnitus.    Eyes:  Negative for discharge and redness.   Cardiovascular: Negative.  Negative for chest pain, dyspnea on exertion, irregular heartbeat, leg swelling, near-syncope, palpitations and syncope.   Respiratory: Negative.  Negative for shortness of breath.    Endocrine: Negative.  Negative for polyuria.   Hematologic/Lymphatic: Negative for bleeding problem. Does not bruise/bleed easily.   Skin: Negative.  Negative for rash.   Musculoskeletal: Negative.  Negative for joint pain, muscle weakness and myalgias.   Gastrointestinal: Negative.  Negative for abdominal pain, change in bowel habit, hematemesis, hematochezia and nausea.   Genitourinary:  Negative for frequency and hematuria.   Neurological: Negative.  Negative for dizziness, light-headedness and weakness.   Psychiatric/Behavioral: Negative.  Negative for altered mental status and depression. The patient is not nervous/anxious.    Allergic/Immunologic: Negative for environmental allergies and persistent infections.     Objective:        /62   Pulse 71   Ht 5' 10" (1.778 m)   Wt 123.9 kg (273 lb 2.4 oz)   BMI 39.19 kg/m²   NAD  irreg irreg  CTA  abd not distended  1+ edema bilat    Lab Results   Component Value Date     07/30/2023    K 4.4 07/30/2023    MG 1.9 07/30/2018    BUN 18 07/30/2023    CREATININE 1.3 07/30/2023    ALT 17 05/03/2023    AST 25 05/03/2023    HGB 14.2 05/03/2023    HCT 42.4 05/03/2023    HCT 35 (L) 08/05/2018    TSH 3.757 09/26/2022    LDLCALC 83.2 05/03/2023               Assessment:         1. Dyslipidemia associated with type 2 diabetes mellitus    2. Chronic diastolic heart failure    3. Chronic atrial fibrillation    4. Essential hypertension    5. History of cardioversion    6. Mixed hyperlipidemia    7. Pulmonary hypertension- 7/6/2018 - The estimated PA systolic pressure is 35 mmHg    8. Stage 3 chronic kidney disease, unspecified whether stage 3a or 3b CKD    9. Long " term current use of anticoagulant therapy    10. Class 3 severe obesity due to excess calories with serious comorbidity and body mass index (BMI) of 40.0 to 44.9 in adult    11. Type 2 diabetes mellitus with diabetic polyneuropathy, with long-term current use of insulin    12. Type 2 diabetes mellitus with stage 3a chronic kidney disease, with long-term current use of insulin      Plan:     In summary, Mr. Cantor is a 77 y.o. male with pAF, HEIDI on CPAP, CKDIII here for follow up. He is doing well in a rate control strategy, with normal EF. He remains anticoagulated on eliquis and rate controlled on metoprolol.     Return in 1 year, or earlier prn.

## 2023-08-16 NOTE — TELEPHONE ENCOUNTER
----- Message from ELLA Owen MD sent at 8/16/2023  2:08 PM CDT -----  Patient has significant hearing loss on audiologic testing.  I will discuss it with him at his follow-up visit.  ----- Message -----  From: Ankita Richards Au.D, CCC-A  Sent: 8/16/2023  10:37 AM CDT  To: ELLA Owen MD    Hi Dr. Owen,    Please see the results of Mr. Cantor's audiogram from today. If you have any questions, please let me know.     Thank you!  Kirit Díaz, CCC-A

## 2023-08-25 ENCOUNTER — OFFICE VISIT (OUTPATIENT)
Dept: OTOLARYNGOLOGY | Facility: CLINIC | Age: 78
End: 2023-08-25
Payer: MEDICARE

## 2023-08-25 VITALS
SYSTOLIC BLOOD PRESSURE: 141 MMHG | DIASTOLIC BLOOD PRESSURE: 63 MMHG | HEART RATE: 74 BPM | WEIGHT: 275.56 LBS | OXYGEN SATURATION: 97 % | BODY MASS INDEX: 39.54 KG/M2

## 2023-08-25 DIAGNOSIS — J30.9 ALLERGIC RHINITIS, UNSPECIFIED SEASONALITY, UNSPECIFIED TRIGGER: ICD-10-CM

## 2023-08-25 DIAGNOSIS — G47.33 OBSTRUCTIVE SLEEP APNEA: ICD-10-CM

## 2023-08-25 DIAGNOSIS — H90.3 SENSORINEURAL HEARING LOSS (SNHL) OF BOTH EARS: Primary | ICD-10-CM

## 2023-08-25 DIAGNOSIS — J34.2 NASAL SEPTAL DEVIATION: ICD-10-CM

## 2023-08-25 DIAGNOSIS — H93.13 TINNITUS OF BOTH EARS: ICD-10-CM

## 2023-08-25 PROCEDURE — 99999 PR PBB SHADOW E&M-EST. PATIENT-LVL V: CPT | Mod: PBBFAC,,, | Performed by: SPECIALIST

## 2023-08-25 PROCEDURE — 99214 OFFICE O/P EST MOD 30 MIN: CPT | Mod: S$GLB,,, | Performed by: SPECIALIST

## 2023-08-25 PROCEDURE — 3288F PR FALLS RISK ASSESSMENT DOCUMENTED: ICD-10-PCS | Mod: CPTII,S$GLB,, | Performed by: SPECIALIST

## 2023-08-25 PROCEDURE — 1126F AMNT PAIN NOTED NONE PRSNT: CPT | Mod: CPTII,S$GLB,, | Performed by: SPECIALIST

## 2023-08-25 PROCEDURE — 3077F SYST BP >= 140 MM HG: CPT | Mod: CPTII,S$GLB,, | Performed by: SPECIALIST

## 2023-08-25 PROCEDURE — 1101F PT FALLS ASSESS-DOCD LE1/YR: CPT | Mod: CPTII,S$GLB,, | Performed by: SPECIALIST

## 2023-08-25 PROCEDURE — 3078F DIAST BP <80 MM HG: CPT | Mod: CPTII,S$GLB,, | Performed by: SPECIALIST

## 2023-08-25 PROCEDURE — 1160F RVW MEDS BY RX/DR IN RCRD: CPT | Mod: CPTII,S$GLB,, | Performed by: SPECIALIST

## 2023-08-25 PROCEDURE — 99999 PR PBB SHADOW E&M-EST. PATIENT-LVL V: ICD-10-PCS | Mod: PBBFAC,,, | Performed by: SPECIALIST

## 2023-08-25 PROCEDURE — 3078F PR MOST RECENT DIASTOLIC BLOOD PRESSURE < 80 MM HG: ICD-10-PCS | Mod: CPTII,S$GLB,, | Performed by: SPECIALIST

## 2023-08-25 PROCEDURE — 3072F LOW RISK FOR RETINOPATHY: CPT | Mod: CPTII,S$GLB,, | Performed by: SPECIALIST

## 2023-08-25 PROCEDURE — 1159F PR MEDICATION LIST DOCUMENTED IN MEDICAL RECORD: ICD-10-PCS | Mod: CPTII,S$GLB,, | Performed by: SPECIALIST

## 2023-08-25 PROCEDURE — 1126F PR PAIN SEVERITY QUANTIFIED, NO PAIN PRESENT: ICD-10-PCS | Mod: CPTII,S$GLB,, | Performed by: SPECIALIST

## 2023-08-25 PROCEDURE — 3077F PR MOST RECENT SYSTOLIC BLOOD PRESSURE >= 140 MM HG: ICD-10-PCS | Mod: CPTII,S$GLB,, | Performed by: SPECIALIST

## 2023-08-25 PROCEDURE — 99214 PR OFFICE/OUTPT VISIT, EST, LEVL IV, 30-39 MIN: ICD-10-PCS | Mod: S$GLB,,, | Performed by: SPECIALIST

## 2023-08-25 PROCEDURE — 1159F MED LIST DOCD IN RCRD: CPT | Mod: CPTII,S$GLB,, | Performed by: SPECIALIST

## 2023-08-25 PROCEDURE — 1160F PR REVIEW ALL MEDS BY PRESCRIBER/CLIN PHARMACIST DOCUMENTED: ICD-10-PCS | Mod: CPTII,S$GLB,, | Performed by: SPECIALIST

## 2023-08-25 PROCEDURE — 3072F PR LOW RISK FOR RETINOPATHY: ICD-10-PCS | Mod: CPTII,S$GLB,, | Performed by: SPECIALIST

## 2023-08-25 PROCEDURE — 3288F FALL RISK ASSESSMENT DOCD: CPT | Mod: CPTII,S$GLB,, | Performed by: SPECIALIST

## 2023-08-25 PROCEDURE — 1101F PR PT FALLS ASSESS DOC 0-1 FALLS W/OUT INJ PAST YR: ICD-10-PCS | Mod: CPTII,S$GLB,, | Performed by: SPECIALIST

## 2023-08-25 RX ORDER — LORATADINE 10 MG/1
10 TABLET ORAL DAILY
Qty: 90 TABLET | Refills: 3 | Status: SHIPPED | OUTPATIENT
Start: 2023-08-25 | End: 2024-08-24

## 2023-08-25 NOTE — PROGRESS NOTES
Subjective:       Patient ID: [id number] PAT Cantor Jr. is a 77 y.o. male.    Chief Complaint: Follow-up      The patient is coming in to discuss multiple problems:  1. Blockage of the right ear:   The patient continues to have blockage in his ear.  He is returning for results of his audiologic evaluation  2. Nasal blockage:  The patient is noticed blockage in the left side of his nose attendant with the blockage in his right ear.  3. Obstructive sleep apnea:  The patient is not using his CPAP.  Whenever he uses it a cause him to have significant rhinorrhea.  He is tired in the mornings when he wakes up and has daytime somnolence.            Review of Systems     Constitutional: Positive for fatigue.  Negative for appetite change, chills, fever and unexpected weight loss.      HENT: Positive for hearing loss, postnasal drip, ringing in the ears, runny nose, sinus pressure, sore throat and stuffy nose.  Negative for ear discharge, ear infection, ear pain, facial swelling, mouth sores, nosebleeds, sinus infection, tonsil infection, dental problems, trouble swallowing and voice change.      Eyes:  Negative for change in eyesight, eye drainage, eye itching and photophobia.     Respiratory:  Positive for cough. Negative for shortness of breath, sleep apnea, snoring and wheezing.      Cardiovascular:  Positive for foot swelling. Negative for chest pain, irregular heartbeat and swollen veins.     Gastrointestinal:  Negative for abdominal pain, acid reflux, constipation, diarrhea, heartburn and vomiting.     Genitourinary: Negative for difficulty urinating, sexual problems and frequent urination.     Musc: Positive for aching joints, aching muscles, back pain and neck pain.     Skin: Negative for rash.     Allergy: Positive for seasonal allergies. Negative for food allergies.     Endocrine: Negative for cold intolerance and heat intolerance.      Neurological: Positive for headaches. Negative for dizziness, light-headedness,  seizures and tremors.      Hematologic: Negative for bruises/bleeds easily and swollen glands.      Psychiatric: Negative for decreased concentration, depression, nervous/anxious and sleep disturbance.                Objective:      Physical Exam  Vitals and nursing note reviewed. Exam conducted with a chaperone present (Patient's grandson accompanies him to the visit).   Constitutional:       General: He is awake.      Appearance: Normal appearance. He is well-developed, well-groomed and normal weight.   HENT:      Head: Normocephalic.      Jaw: There is normal jaw occlusion.      Salivary Glands: Right salivary gland is not diffusely enlarged or tender. Left salivary gland is not diffusely enlarged or tender.      Right Ear: Ear canal and external ear normal. Decreased hearing noted. Tympanic membrane is retracted.      Left Ear: Ear canal and external ear normal. Decreased hearing noted. There is impacted cerumen. Tympanic membrane is retracted.      Nose: Septal deviation (to the right), mucosal edema (cyanotic, boggy inferior turbinates bilaterally) and rhinorrhea (clear mucus bilaterally) present. No nasal deformity. Rhinorrhea is clear.      Right Turbinates: Enlarged and pale.      Left Turbinates: Enlarged and pale.      Mouth/Throat:      Lips: No lesions.      Mouth: No oral lesions.      Dentition: No gum lesions.      Tongue: No lesions.      Palate: Lesions (UPPP defect) present. No mass.      Pharynx: Oropharynx is clear. Uvula midline.      Tonsils: 0 on the left.   Eyes:      General: Lids are normal.         Right eye: No discharge.         Left eye: No discharge.      Conjunctiva/sclera:      Right eye: Right conjunctiva is injected. No exudate.     Left eye: Left conjunctiva is injected. No exudate.     Pupils: Pupils are equal, round, and reactive to light.   Neck:      Thyroid: No thyroid mass or thyromegaly.      Trachea: Trachea normal. No tracheal deviation.   Cardiovascular:      Rate and  Rhythm: Normal rate and regular rhythm.      Pulses: Normal pulses.      Heart sounds: Normal heart sounds.   Pulmonary:      Effort: Pulmonary effort is normal.      Breath sounds: Normal breath sounds. No stridor. No decreased breath sounds, wheezing, rhonchi or rales.   Abdominal:      General: Bowel sounds are normal.      Palpations: Abdomen is soft.      Tenderness: There is no abdominal tenderness.   Musculoskeletal:         General: Normal range of motion.      Cervical back: Normal range of motion. No muscular tenderness.   Lymphadenopathy:      Head:      Right side of head: No submental, submandibular, preauricular, posterior auricular or occipital adenopathy.      Left side of head: No submental, submandibular, preauricular, posterior auricular or occipital adenopathy.      Cervical: No cervical adenopathy.   Skin:     General: Skin is warm and dry.      Findings: No petechiae or rash.      Nails: There is no clubbing.   Neurological:      Mental Status: He is alert and oriented to person, place, and time.      Cranial Nerves: No cranial nerve deficit.      Sensory: No sensory deficit.      Gait: Gait normal.   Psychiatric:         Speech: Speech normal.         Behavior: Behavior normal. Behavior is cooperative.         Thought Content: Thought content normal.         Judgment: Judgment normal.           I personally reviewed the above audiogram and discussed in full detail with the patient during his visit.  I provided him a copy of the audiogram.  Pertinent findings:  Mild-to-moderate symmetrical sensorineural hearing loss in the speech range with moderate to severe in the higher frequencies, discrimination is 100% bilaterally at 65 dB level, tympanograms are Type A bilaterally    Assessment:       1. Sensorineural hearing loss (SNHL) of both ears    2. Tinnitus of both ears    3. Allergic rhinitis, unspecified seasonality, unspecified trigger    4. Nasal septal deviation    5. Obstructive sleep apnea         Plan:       I  will have the patient continue to use loratadine on an everyday basis until he recheck here in 8 weeks.  I am clearing him medically for bilateral amplification.  Needs to take his copy of the audiogram to the hearing aid provider of choice for his insurance company to be fitted for bilateral hearing aids.            DISCLAIMER: This note was prepared with [employer] voice recognition transcription software. Garbled syntax, mangled pronouns, and other bizarre constructions may be attributed to that software system. While efforts were made to correct any mistakes made by this voice recognition program, some errors and/or omissions may remain in the note that were missed when the note was originally created.

## 2023-08-31 RX ORDER — TAMSULOSIN HYDROCHLORIDE 0.4 MG/1
0.4 CAPSULE ORAL DAILY
Qty: 90 CAPSULE | Refills: 2 | Status: SHIPPED | OUTPATIENT
Start: 2023-08-31

## 2023-08-31 NOTE — TELEPHONE ENCOUNTER
No care due was identified.  [facility] Embedded Care Due Messages. Reference number: [id number].   8/31/2023 8:50:37 AM CDT

## 2023-08-31 NOTE — TELEPHONE ENCOUNTER
Refill Decision Note   [facility]  is requesting a refill authorization.  Brief Assessment and Rationale for Refill:  Approve     Medication Therapy Plan:         Comments:     Note composed:12:05 PM 08/31/2023             Appointments     Last Visit   5/3/2023 Desmond Barlow MD   Next Visit   Visit date not found Desmond Barlow MD

## 2023-09-06 ENCOUNTER — OFFICE VISIT (OUTPATIENT)
Dept: INTERNAL MEDICINE | Facility: CLINIC | Age: 78
End: 2023-09-06
Payer: MEDICARE

## 2023-09-06 VITALS
DIASTOLIC BLOOD PRESSURE: 60 MMHG | OXYGEN SATURATION: 96 % | WEIGHT: 276.44 LBS | BODY MASS INDEX: 41.9 KG/M2 | SYSTOLIC BLOOD PRESSURE: 118 MMHG | HEART RATE: 61 BPM | HEIGHT: 68 IN

## 2023-09-06 DIAGNOSIS — E03.9 HYPOTHYROIDISM, UNSPECIFIED TYPE: ICD-10-CM

## 2023-09-06 DIAGNOSIS — I12.9 BENIGN HYPERTENSIVE RENAL DISEASE WITHOUT RENAL FAILURE: ICD-10-CM

## 2023-09-06 DIAGNOSIS — E11.29 PROTEINURIA DUE TO TYPE 2 DIABETES MELLITUS: ICD-10-CM

## 2023-09-06 DIAGNOSIS — I10 ESSENTIAL HYPERTENSION: ICD-10-CM

## 2023-09-06 DIAGNOSIS — Z92.89 HISTORY OF CARDIOVERSION: ICD-10-CM

## 2023-09-06 DIAGNOSIS — M54.50 CHRONIC BILATERAL LOW BACK PAIN WITHOUT SCIATICA: ICD-10-CM

## 2023-09-06 DIAGNOSIS — Z79.4 TYPE 2 DIABETES MELLITUS WITH STAGE 3A CHRONIC KIDNEY DISEASE, WITH LONG-TERM CURRENT USE OF INSULIN: ICD-10-CM

## 2023-09-06 DIAGNOSIS — M25.561 CHRONIC PAIN OF RIGHT KNEE: ICD-10-CM

## 2023-09-06 DIAGNOSIS — H90.3 SENSORY HEARING LOSS, BILATERAL: ICD-10-CM

## 2023-09-06 DIAGNOSIS — Z86.010 HISTORY OF COLON POLYPS: ICD-10-CM

## 2023-09-06 DIAGNOSIS — Z78.9 PRESENCE OF SURGICAL INCISION: ICD-10-CM

## 2023-09-06 DIAGNOSIS — Z00.00 ENCOUNTER FOR PREVENTIVE HEALTH EXAMINATION: Primary | ICD-10-CM

## 2023-09-06 DIAGNOSIS — R31.29 MICROHEMATURIA: ICD-10-CM

## 2023-09-06 DIAGNOSIS — E11.22 TYPE 2 DIABETES MELLITUS WITH STAGE 3A CHRONIC KIDNEY DISEASE, WITH LONG-TERM CURRENT USE OF INSULIN: ICD-10-CM

## 2023-09-06 DIAGNOSIS — N18.31 TYPE 2 DIABETES MELLITUS WITH STAGE 3A CHRONIC KIDNEY DISEASE, WITH LONG-TERM CURRENT USE OF INSULIN: ICD-10-CM

## 2023-09-06 DIAGNOSIS — B35.1 ONYCHOMYCOSIS OF MULTIPLE TOENAILS WITH TYPE 2 DIABETES MELLITUS AND PERIPHERAL ANGIOPATHY: ICD-10-CM

## 2023-09-06 DIAGNOSIS — M54.16 LUMBAR RADICULOPATHY: ICD-10-CM

## 2023-09-06 DIAGNOSIS — E11.69 DYSLIPIDEMIA ASSOCIATED WITH TYPE 2 DIABETES MELLITUS: ICD-10-CM

## 2023-09-06 DIAGNOSIS — B35.3 TINEA PEDIS OF BOTH FEET: ICD-10-CM

## 2023-09-06 DIAGNOSIS — E11.51 ONYCHOMYCOSIS OF MULTIPLE TOENAILS WITH TYPE 2 DIABETES MELLITUS AND PERIPHERAL ANGIOPATHY: ICD-10-CM

## 2023-09-06 DIAGNOSIS — I48.20 CHRONIC ATRIAL FIBRILLATION: ICD-10-CM

## 2023-09-06 DIAGNOSIS — H02.102 ECTROPION OF RIGHT LOWER EYELID, UNSPECIFIED ECTROPION TYPE: ICD-10-CM

## 2023-09-06 DIAGNOSIS — M70.61 TROCHANTERIC BURSITIS OF RIGHT HIP: ICD-10-CM

## 2023-09-06 DIAGNOSIS — Z98.890 MOHS DEFECT OF RIGHT CHEEK: ICD-10-CM

## 2023-09-06 DIAGNOSIS — I27.20 PULMONARY HYPERTENSION: ICD-10-CM

## 2023-09-06 DIAGNOSIS — G89.29 CHRONIC PAIN OF RIGHT KNEE: ICD-10-CM

## 2023-09-06 DIAGNOSIS — M17.11 PRIMARY OSTEOARTHRITIS OF RIGHT KNEE: ICD-10-CM

## 2023-09-06 DIAGNOSIS — N28.1 ACQUIRED CYST OF KIDNEY: ICD-10-CM

## 2023-09-06 DIAGNOSIS — E78.2 MIXED HYPERLIPIDEMIA: ICD-10-CM

## 2023-09-06 DIAGNOSIS — I87.2 VENOUS STASIS DERMATITIS OF BOTH LOWER EXTREMITIES: ICD-10-CM

## 2023-09-06 DIAGNOSIS — I87.2 VENOUS INSUFFICIENCY OF BOTH LOWER EXTREMITIES: ICD-10-CM

## 2023-09-06 DIAGNOSIS — E66.01 CLASS 3 SEVERE OBESITY DUE TO EXCESS CALORIES WITH SERIOUS COMORBIDITY AND BODY MASS INDEX (BMI) OF 40.0 TO 44.9 IN ADULT: ICD-10-CM

## 2023-09-06 DIAGNOSIS — N18.30 STAGE 3 CHRONIC KIDNEY DISEASE, UNSPECIFIED WHETHER STAGE 3A OR 3B CKD: ICD-10-CM

## 2023-09-06 DIAGNOSIS — E11.69 ONYCHOMYCOSIS OF MULTIPLE TOENAILS WITH TYPE 2 DIABETES MELLITUS AND PERIPHERAL ANGIOPATHY: ICD-10-CM

## 2023-09-06 DIAGNOSIS — Z79.4 TYPE 2 DIABETES MELLITUS WITH DIABETIC POLYNEUROPATHY, WITH LONG-TERM CURRENT USE OF INSULIN: ICD-10-CM

## 2023-09-06 DIAGNOSIS — E11.42 TYPE 2 DIABETES MELLITUS WITH DIABETIC POLYNEUROPATHY, WITH LONG-TERM CURRENT USE OF INSULIN: ICD-10-CM

## 2023-09-06 DIAGNOSIS — Z96.651 STATUS POST TOTAL KNEE REPLACEMENT, RIGHT: ICD-10-CM

## 2023-09-06 DIAGNOSIS — M65.342 TRIGGER RING FINGER OF LEFT HAND: ICD-10-CM

## 2023-09-06 DIAGNOSIS — G89.29 CHRONIC BILATERAL LOW BACK PAIN WITHOUT SCIATICA: ICD-10-CM

## 2023-09-06 DIAGNOSIS — Z79.01 LONG TERM CURRENT USE OF ANTICOAGULANT THERAPY: ICD-10-CM

## 2023-09-06 DIAGNOSIS — L81.8 TATTOOS: ICD-10-CM

## 2023-09-06 DIAGNOSIS — M76.891 ENTHESOPATHY OF RIGHT HIP REGION: ICD-10-CM

## 2023-09-06 DIAGNOSIS — N40.0 ENLARGED PROSTATE: ICD-10-CM

## 2023-09-06 DIAGNOSIS — L84 CORNS AND CALLOSITIES: ICD-10-CM

## 2023-09-06 DIAGNOSIS — I89.0 LYMPHEDEMA OF BOTH LOWER EXTREMITIES: ICD-10-CM

## 2023-09-06 DIAGNOSIS — D69.2 SENILE PURPURA: ICD-10-CM

## 2023-09-06 DIAGNOSIS — Z79.02 ANTIPLATELET OR ANTITHROMBOTIC LONG-TERM USE: ICD-10-CM

## 2023-09-06 DIAGNOSIS — M95.2 MOHS DEFECT OF RIGHT CHEEK: ICD-10-CM

## 2023-09-06 DIAGNOSIS — R53.81 DEBILITY: ICD-10-CM

## 2023-09-06 DIAGNOSIS — E78.5 DYSLIPIDEMIA ASSOCIATED WITH TYPE 2 DIABETES MELLITUS: ICD-10-CM

## 2023-09-06 DIAGNOSIS — G47.30 SLEEP APNEA, UNSPECIFIED TYPE: ICD-10-CM

## 2023-09-06 DIAGNOSIS — I51.89 DIASTOLIC DYSFUNCTION: ICD-10-CM

## 2023-09-06 DIAGNOSIS — N20.0 CALCULUS OF KIDNEY: ICD-10-CM

## 2023-09-06 DIAGNOSIS — R60.0 BILATERAL LEG EDEMA: ICD-10-CM

## 2023-09-06 DIAGNOSIS — E55.9 VITAMIN D DEFICIENCY: ICD-10-CM

## 2023-09-06 DIAGNOSIS — R80.9 PROTEINURIA DUE TO TYPE 2 DIABETES MELLITUS: ICD-10-CM

## 2023-09-06 PROCEDURE — 1159F PR MEDICATION LIST DOCUMENTED IN MEDICAL RECORD: ICD-10-PCS | Mod: CPTII,S$GLB,, | Performed by: NURSE PRACTITIONER

## 2023-09-06 PROCEDURE — 3288F PR FALLS RISK ASSESSMENT DOCUMENTED: ICD-10-PCS | Mod: CPTII,S$GLB,, | Performed by: NURSE PRACTITIONER

## 2023-09-06 PROCEDURE — 1160F RVW MEDS BY RX/DR IN RCRD: CPT | Mod: CPTII,S$GLB,, | Performed by: NURSE PRACTITIONER

## 2023-09-06 PROCEDURE — 1101F PR PT FALLS ASSESS DOC 0-1 FALLS W/OUT INJ PAST YR: ICD-10-PCS | Mod: CPTII,S$GLB,, | Performed by: NURSE PRACTITIONER

## 2023-09-06 PROCEDURE — 1170F PR FUNCTIONAL STATUS ASSESSED: ICD-10-PCS | Mod: CPTII,S$GLB,, | Performed by: NURSE PRACTITIONER

## 2023-09-06 PROCEDURE — 1170F FXNL STATUS ASSESSED: CPT | Mod: CPTII,S$GLB,, | Performed by: NURSE PRACTITIONER

## 2023-09-06 PROCEDURE — 1126F AMNT PAIN NOTED NONE PRSNT: CPT | Mod: CPTII,S$GLB,, | Performed by: NURSE PRACTITIONER

## 2023-09-06 PROCEDURE — 1101F PT FALLS ASSESS-DOCD LE1/YR: CPT | Mod: CPTII,S$GLB,, | Performed by: NURSE PRACTITIONER

## 2023-09-06 PROCEDURE — 3078F PR MOST RECENT DIASTOLIC BLOOD PRESSURE < 80 MM HG: ICD-10-PCS | Mod: CPTII,S$GLB,, | Performed by: NURSE PRACTITIONER

## 2023-09-06 PROCEDURE — 1159F MED LIST DOCD IN RCRD: CPT | Mod: CPTII,S$GLB,, | Performed by: NURSE PRACTITIONER

## 2023-09-06 PROCEDURE — 3074F PR MOST RECENT SYSTOLIC BLOOD PRESSURE < 130 MM HG: ICD-10-PCS | Mod: CPTII,S$GLB,, | Performed by: NURSE PRACTITIONER

## 2023-09-06 PROCEDURE — G0439 PR MEDICARE ANNUAL WELLNESS SUBSEQUENT VISIT: ICD-10-PCS | Mod: S$GLB,,, | Performed by: NURSE PRACTITIONER

## 2023-09-06 PROCEDURE — 99999 PR PBB SHADOW E&M-EST. PATIENT-LVL V: CPT | Mod: PBBFAC,,, | Performed by: NURSE PRACTITIONER

## 2023-09-06 PROCEDURE — 3072F LOW RISK FOR RETINOPATHY: CPT | Mod: CPTII,S$GLB,, | Performed by: NURSE PRACTITIONER

## 2023-09-06 PROCEDURE — 3288F FALL RISK ASSESSMENT DOCD: CPT | Mod: CPTII,S$GLB,, | Performed by: NURSE PRACTITIONER

## 2023-09-06 PROCEDURE — 1126F PR PAIN SEVERITY QUANTIFIED, NO PAIN PRESENT: ICD-10-PCS | Mod: CPTII,S$GLB,, | Performed by: NURSE PRACTITIONER

## 2023-09-06 PROCEDURE — 3078F DIAST BP <80 MM HG: CPT | Mod: CPTII,S$GLB,, | Performed by: NURSE PRACTITIONER

## 2023-09-06 PROCEDURE — 99999 PR PBB SHADOW E&M-EST. PATIENT-LVL V: ICD-10-PCS | Mod: PBBFAC,,, | Performed by: NURSE PRACTITIONER

## 2023-09-06 PROCEDURE — G0439 PPPS, SUBSEQ VISIT: HCPCS | Mod: S$GLB,,, | Performed by: NURSE PRACTITIONER

## 2023-09-06 PROCEDURE — 1160F PR REVIEW ALL MEDS BY PRESCRIBER/CLIN PHARMACIST DOCUMENTED: ICD-10-PCS | Mod: CPTII,S$GLB,, | Performed by: NURSE PRACTITIONER

## 2023-09-06 PROCEDURE — 3072F PR LOW RISK FOR RETINOPATHY: ICD-10-PCS | Mod: CPTII,S$GLB,, | Performed by: NURSE PRACTITIONER

## 2023-09-06 PROCEDURE — 3074F SYST BP LT 130 MM HG: CPT | Mod: CPTII,S$GLB,, | Performed by: NURSE PRACTITIONER

## 2023-09-06 NOTE — PROGRESS NOTES
"  BI Cantor presented for a  Medicare AWV and comprehensive Health Risk Assessment today. The following components were reviewed and updated:    Medical history  Family History  Social history  Allergies and Current Medications  Health Risk Assessment  Health Maintenance  Care Team         ** See Completed Assessments for Annual Wellness Visit within the encounter summary.**         The following assessments were completed:  Living Situation  CAGE  Depression Screening  Timed Get Up and Go  Whisper Test  Cognitive Function Screening  Nutrition Screening  ADL Screening  PAQ Screening            Vitals:    09/06/23 1331   BP: 118/60   BP Location: Right arm   Patient Position: Sitting   Pulse: 61   SpO2: 96%   Weight: 125.4 kg (276 lb 7.3 oz)   Height: 5' 8" (1.727 m)     Body mass index is 42.04 kg/m².    Physical Exam  Vitals reviewed.   Constitutional:       Appearance: He is well-developed. He is obese.   HENT:      Head: Normocephalic and atraumatic. Not macrocephalic and not microcephalic. No raccoon eyes, Luna's sign, abrasion, contusion, right periorbital erythema, left periorbital erythema or laceration. Hair is normal.      Right Ear: No decreased hearing noted. No laceration, drainage, swelling or tenderness. No middle ear effusion. No foreign body. No mastoid tenderness. No hemotympanum. Tympanic membrane is not injected, scarred, perforated, erythematous, retracted or bulging. Tympanic membrane has normal mobility.      Left Ear: No decreased hearing noted. No laceration, drainage, swelling or tenderness.  No middle ear effusion. No foreign body. No mastoid tenderness. No hemotympanum. Tympanic membrane is not injected, scarred, perforated, erythematous, retracted or bulging. Tympanic membrane has normal mobility.      Nose: Nose normal. No nasal deformity, laceration or mucosal edema.      Mouth/Throat:      Pharynx: Uvula midline.   Eyes:      General: Lids are normal. No scleral icterus.     " Conjunctiva/sclera: Conjunctivae normal.   Neck:      Thyroid: No thyroid mass or thyromegaly.      Trachea: Trachea normal.   Cardiovascular:      Rate and Rhythm: Normal rate and regular rhythm.   Pulmonary:      Effort: Pulmonary effort is normal. No respiratory distress.      Breath sounds: Normal breath sounds.   Abdominal:      Palpations: Abdomen is soft.   Musculoskeletal:         General: Normal range of motion.      Cervical back: Neck supple. No edema or erythema. No spinous process tenderness or muscular tenderness. Normal range of motion.   Lymphadenopathy:      Head:      Right side of head: No submental, submandibular, tonsillar, preauricular or posterior auricular adenopathy.      Left side of head: No submental, submandibular, tonsillar, preauricular, posterior auricular or occipital adenopathy.   Skin:     General: Skin is warm and dry.   Neurological:      Mental Status: He is alert and oriented to person, place, and time.      Cranial Nerves: No cranial nerve deficit.      Sensory: No sensory deficit.   Psychiatric:         Behavior: Behavior normal.         Thought Content: Thought content normal.         Judgment: Judgment normal.             Diagnoses and health risks identified today and associated recommendations/orders:    1. Encounter for preventive health examination  Annual Health Risk Assessment (HRA) visit today.  Counseling and referral of health maintenance and preventative health measures performed.  Patient given annual wellness paperwork to take home.  Encouraged to return in 1 year for subsequent HRA visit.     2. Sensory hearing loss, bilateral  Chronic. Stable. Continue current treatment plan as previously prescribed by PCP.    3. Onychomycosis of multiple toenails with type 2 diabetes mellitus and peripheral angiopathy  Chronic. Stable. Continue current treatment plan as previously prescribed by PCP.    4. Tinea pedis of both feet  Chronic. Stable. Continue current treatment  plan as previously prescribed by PCP.    5. Chronic atrial fibrillation  Chronic. Stable. Continue current treatment plan as previously prescribed by PCP.    6. Venous stasis dermatitis of both lower extremities  Chronic. Stable. Continue current treatment plan as previously prescribed by PCP.    7. Diastolic dysfunction  Chronic. Stable. Continue current treatment plan as previously prescribed by PCP.    8. Dyslipidemia associated with type 2 diabetes mellitus  Chronic. Stable. Continue current treatment plan as previously prescribed by PCP.    9. Essential hypertension  Chronic. Stable. Continue current treatment plan as previously prescribed by PCP.    10. History of cardioversion  Chronic. Stable. Continue current treatment plan as previously prescribed by PCP.    11. Mixed hyperlipidemia  Chronic. Stable. Continue current treatment plan as previously prescribed by PCP.    12. Pulmonary hypertension- 7/6/2018 - The estimated PA systolic pressure is 35 mmHg  Chronic. Stable. Continue current treatment plan as previously prescribed by PCP.    13. Venous insufficiency of both lower extremities  Chronic. Stable. Continue current treatment plan as previously prescribed by PCP.    14. Acquired cyst of kidney  Chronic. Stable. Continue current treatment plan as previously prescribed by PCP.    15. Benign hypertensive renal disease without renal failure  Chronic. Stable. Continue current treatment plan as previously prescribed by PCP.    16. Calculus of kidney  Chronic. Stable. Continue current treatment plan as previously prescribed by PCP.    17. Stage 3 chronic kidney disease, unspecified whether stage 3a or 3b CKD  Chronic. Stable. Continue current treatment plan as previously prescribed by PCP.    18. Enlarged prostate  Chronic. Stable. Continue current treatment plan as previously prescribed by PCP.    19. Microhematuria  Chronic. Stable. Continue current treatment plan as previously prescribed by PCP.    20.  Tattoos  Chronic. Stable. Continue current treatment plan as previously prescribed by PCP.    21. Corns and callosities  Chronic. Stable. Continue current treatment plan as previously prescribed by PCP.    22. Antiplatelet or antithrombotic long-term use  Chronic. Stable. Continue current treatment plan as previously prescribed by PCP.    23. Long term current use of anticoagulant therapy  Chronic. Stable. Continue current treatment plan as previously prescribed by PCP.    24. Class 3 severe obesity due to excess calories with serious comorbidity and body mass index (BMI) of 40.0 to 44.9 in adult  Chronic. Stable. Continue current treatment plan as previously prescribed by PCP.    25. Hypothyroidism, unspecified type  Chronic. Stable. Continue current treatment plan as previously prescribed by PCP.    26. Proteinuria due to type 2 diabetes mellitus  Chronic. Stable. Continue current treatment plan as previously prescribed by PCP.    27. Lumbar radiculopathy  Chronic. Stable. Continue current treatment plan as previously prescribed by PCP.    28. Ectropion of right lower eyelid, unspecified ectropion type  Chronic. Stable. Continue current treatment plan as previously prescribed by PCP.    29. Type 2 diabetes mellitus with diabetic polyneuropathy, with long-term current use of insulin  Chronic. Stable. Continue current treatment plan as previously prescribed by PCP.    30. Type 2 diabetes mellitus with stage 3a chronic kidney disease, with long-term current use of insulin  Chronic. Stable. Controlled. Last Hgb A1c=6.6 from 7/30/23. Continue current treatment plan as previously prescribed by PCP.    31. Vitamin D deficiency  Chronic. Stable. Continue current treatment plan as previously prescribed by PCP.    32. History of colon polyps  Chronic. Stable. Continue current treatment plan as previously prescribed by PCP.    33. Chronic bilateral low back pain without sciatica  Chronic. Stable. Continue current treatment  plan as previously prescribed by PCP.    34. Chronic pain of right knee  Chronic. Stable. Continue current treatment plan as previously prescribed by PCP.    35. Enthesopathy of right hip region  Chronic. Stable. Continue current treatment plan as previously prescribed by PCP.    36. Mohs defect of right cheek  Chronic. Stable. Continue current treatment plan as previously prescribed by PCP.    37. Presence of surgical incision  Chronic. Stable. Continue current treatment plan as previously prescribed by PCP.    38. Primary osteoarthritis of right knee  Chronic. Stable. Continue current treatment plan as previously prescribed by PCP.    39. Status post total knee replacement, right  Chronic. Stable. Continue current treatment plan as previously prescribed by PCP.    40. Trigger ring finger of left hand  Chronic. Stable. Continue current treatment plan as previously prescribed by PCP.    41. Trochanteric bursitis of right hip  Chronic. Stable. Continue current treatment plan as previously prescribed by PCP.    42. Bilateral leg edema  Chronic. Stable. Continue current treatment plan as previously prescribed by PCP.    43. Debility  Chronic. Stable. Continue current treatment plan as previously prescribed by PCP.    44. Lymphedema of both lower extremities  Chronic. Stable. Continue current treatment plan as previously prescribed by PCP.    45. Sleep apnea, unspecified type  Chronic. Stable. Continue current treatment plan as previously prescribed by PCP.    46. Senile purpura  Chronic. Stable. Continue current treatment plan as previously prescribed by PCP.        Provided [facility] with a 5-10 year written screening schedule and personal prevention plan. Recommendations were developed using the USPSTF age appropriate recommendations. Education, counseling, and referrals were provided as needed. After Visit Summary printed and given to patient which includes a list of additional screenings\tests needed.      I offered to  discuss end of life issues, including information on how to make advance directives that the patient could use to name someone who would make medical decisions on their behalf if they became too ill to make themselves.    ___Patient declined  _X_Patient is interested, I provided paper work and offered to discuss.    Follow up in about 1 year (around 9/6/2024).    Guero Longo NPI offered to discuss advanced care planning, including how to pick a person who would make decisions for you if you were unable to make them for yourself, called a health care power of , and what kind of decisions you might make such as use of life sustaining treatments such as ventilators and tube feeding when faced with a life limiting illness recorded on a living will that they will need to know. (How you want to be cared for as you near the end of your natural life)     X Patient is interested in learning more about how to make advanced directives.  I provided them paperwork and offered to discuss this with them.

## 2023-09-06 NOTE — PATIENT INSTRUCTIONS
Counseling and Referral of Other Preventative  (Italic type indicates deductible and co-insurance are waived)    Patient Name: BI [place]  Today's Date: 9/6/2023    Health Maintenance       Date Due Completion Date    Shingles Vaccine (2 of 3) 12/19/2013 10/24/2013    COVID-19 Vaccine (4 - Pfizer risk series) 11/19/2021 9/24/2021    Influenza Vaccine (1) 09/01/2023 10/11/2021    Diabetes Urine Screening 12/05/2023 12/5/2022    Foot Exam 01/03/2024 1/3/2023    Override on 11/13/2020: Done    Override on 8/7/2019: Done    Override on 12/12/2018: Done    Hemoglobin A1c 01/30/2024 7/30/2023    Lipid Panel 05/03/2024 5/3/2023    Colonoscopy 09/20/2024 9/20/2019    TETANUS VACCINE 09/30/2029 9/30/2019        No orders of the defined types were placed in this encounter.      The following information is provided to all patients.  This information is to help you find resources for any of the problems found today that may be affecting your health:                Living healthy guide: www.[facility].[place].gov      Understanding Diabetes: www.diabetes.org      Eating healthy: www.cdc.gov/healthyweight      CDC home safety checklist: www.cdc.gov/steadi/patient.html      Agency on Aging: www.goea.[place].[facility]      Alcoholics anonymous (AA): www.aa.org      Physical Activity: www.ambrocio.nih.gov/lb4noyn      Tobacco use: www.quitwithusla.org

## 2023-09-08 NOTE — PLAN OF CARE
08/01/2018  9:58 AM    Patient to discharge home today with assist of family. EMERALD Blanco faxed home health orders to  Insurance. Awaiting call back to see which agency patient placed with.    Future Appointments  Date Time Provider Department Center   8/2/2018 2:20 PM Christina Wilhelm NP [facility] SLEEP [facility]   8/13/2018 9:30 AM Diego Mack MD [facility] IM [facility]   8/15/2018 1:00 PM Sandy Adams NP [facility] ORTHO [facility]        08/01/18 0958   Final Note   Assessment Type Discharge Planning Assessment   Discharge Disposition Home   What phone number can be called within the next 1-3 days to see how you are doing after discharge? [phone]   Hospital Follow Up  Appt(s) scheduled? Yes   Discharge plans and expectations educations in teach back method with documentation complete? Yes     Paloma Richardson RN, CM Skilled  [id number]       <--- Click to Launch ICDx for PreOp, PostOp and Procedure

## 2023-09-18 ENCOUNTER — TELEPHONE (OUTPATIENT)
Dept: DERMATOLOGY | Facility: CLINIC | Age: 78
End: 2023-09-18
Payer: MEDICARE

## 2023-09-21 ENCOUNTER — PATIENT MESSAGE (OUTPATIENT)
Dept: INTERNAL MEDICINE | Facility: CLINIC | Age: 78
End: 2023-09-21
Payer: MEDICARE

## 2023-10-05 ENCOUNTER — PATIENT MESSAGE (OUTPATIENT)
Dept: OTOLARYNGOLOGY | Facility: CLINIC | Age: 78
End: 2023-10-05
Payer: MEDICARE

## 2023-10-13 DIAGNOSIS — E11.22 TYPE 2 DIABETES MELLITUS WITH STAGE 3 CHRONIC KIDNEY DISEASE, WITH LONG-TERM CURRENT USE OF INSULIN: ICD-10-CM

## 2023-10-13 DIAGNOSIS — I48.20 CHRONIC ATRIAL FIBRILLATION: ICD-10-CM

## 2023-10-13 DIAGNOSIS — I50.31 ACUTE DIASTOLIC HEART FAILURE: ICD-10-CM

## 2023-10-13 DIAGNOSIS — I87.2 VENOUS INSUFFICIENCY OF BOTH LOWER EXTREMITIES: ICD-10-CM

## 2023-10-13 DIAGNOSIS — M17.11 PRIMARY OSTEOARTHRITIS OF RIGHT KNEE: ICD-10-CM

## 2023-10-13 DIAGNOSIS — I10 ESSENTIAL HYPERTENSION: ICD-10-CM

## 2023-10-13 DIAGNOSIS — N18.30 TYPE 2 DIABETES MELLITUS WITH STAGE 3 CHRONIC KIDNEY DISEASE, WITH LONG-TERM CURRENT USE OF INSULIN: ICD-10-CM

## 2023-10-13 DIAGNOSIS — Z79.4 TYPE 2 DIABETES MELLITUS WITH STAGE 3 CHRONIC KIDNEY DISEASE, WITH LONG-TERM CURRENT USE OF INSULIN: ICD-10-CM

## 2023-10-13 DIAGNOSIS — N18.30 CHRONIC KIDNEY DISEASE, STAGE III (MODERATE): ICD-10-CM

## 2023-10-13 NOTE — TELEPHONE ENCOUNTER
No care due was identified.  [facility] Embedded Care Due Messages. Reference number: [id number].   10/13/2023 8:50:25 AM CDT

## 2023-10-13 NOTE — TELEPHONE ENCOUNTER
Refill Routing Note   Medication(s) are not appropriate for processing by [facility] for the following reason(s):      Drug-disease interaction    [facility] action(s):  Defer None identified     Medication Therapy Plan: Drug-Disease: furosemide and Benign hypertensive renal disease without renal failure  Medication reconciliation completed: No     Appointments  past 12m or future 3m with PCP    Date Provider   Last Visit   5/3/2023 Desmond Barlow MD   Next Visit   Visit date not found Desmond Barlow MD   ED visits in past 90 days: 0        Note composed:1:47 PM 10/13/2023

## 2023-10-13 NOTE — TELEPHONE ENCOUNTER
Refill Routing Note   Medication(s) are not appropriate for processing by [facility] for the following reason(s):      Medication outside of protocol    [facility] action(s):  Route Care Due:  None identified     Medication Therapy Plan: PRN outside of [facility] protocol    Pharmacist review requested: Yes   Extended chart review required: Yes     Appointments  past 12m or future 3m with PCP    Date Provider   Last Visit   5/3/2023 Desmond Barlow MD   Next Visit   Visit date not found Desmond Barlow MD   ED visits in past 90 days: 0        Note composed:2:00 PM 10/13/2023

## 2023-10-18 NOTE — PROGRESS NOTES
MEDICAL HISTORY:    Type 2 diabetes with peripheral neuropathy and chronic kidney disease stage III.  Chronic atrial fibrillation.  Hypertension.  Hyperlipidemia.  Chronic diastolic dysfunction.  Sleep apnea, history of septoplasty and UPPP .  Pulmonary hypertension.  Chronic venous insufficiency  Lymphedema  Lumbar degenerative disc disease  BPH  Right total knee replacement and revision arthroplasty  Left total knee arthroplasty 2003  Left shoulder surgery.  Meniere's disease.  Lumbar degenerative disk disease.  Nephlithiasis.  Status post eyelid surgery for removal of ectropion  Basis cell carcinoma, status post Mohs     SOCIAL HISTORY:  Tobacco use and alcohol use, none.        MEDICATIONS:  Eliquis 5 mg b.i.d.  Mounjaro 5 mg q.week  Fenofibrate 145 mg.  Lasix 40 mg.  Gabapentin 300 mg two capsules b.i.d.  Lantus insulin 36 units.  NovoLog  10 units with each meal plus sliding scale  Metoprolol  mg at night.  Atorvastatin 40 mg  Temazepam 15 mg q.h.s. as needed  Tamsulosin 0.4 mg      77-year-old male    For the past week he felt feverish in the sense that he is felt cold, shaky, chills, headache, body aches.  He did not experience acute shortness a breath chest pain or abdominal pain.  No diarrhea bowel function is better with the use of probiotic and Metamucil.  There has been urine frequency but this is nothing new.  No dysuria he feels better past 2 days.  Couple days ago he got home thermometer, in his temperature was normal.    In regard to diabetes in weight, he is now on medicine Mon Albert instead of Ozempic he is doing well with the medication.  Glucose readings now consistently below 200    Examination   Weight 265   Temperature 97.7°  BMI 40.43   Pulse 72   Blood pressure 110/62   Chest clear breath sounds  Heart irregular regular, no murmurs   Abdominal soft nontender no masses    Impression   Probable viral syndrome  Type 2 diabetes with chronic kidney disease stage IIIA and peripheral  neuropathy  Hypertension  Permanent atrial fibrillation    Plan  CBC chemistry hemoglobin A1c urinalysis urine culture  Maintain proper hydration continue with attention to proper diet physical activity and current medications  BPH with urine frequency

## 2023-10-19 ENCOUNTER — TELEPHONE (OUTPATIENT)
Dept: INTERNAL MEDICINE | Facility: CLINIC | Age: 78
End: 2023-10-19

## 2023-10-19 ENCOUNTER — LAB VISIT (OUTPATIENT)
Dept: LAB | Facility: HOSPITAL | Age: 78
End: 2023-10-19
Attending: INTERNAL MEDICINE
Payer: MEDICARE

## 2023-10-19 ENCOUNTER — OFFICE VISIT (OUTPATIENT)
Dept: INTERNAL MEDICINE | Facility: CLINIC | Age: 78
End: 2023-10-19
Payer: MEDICARE

## 2023-10-19 VITALS
HEART RATE: 73 BPM | DIASTOLIC BLOOD PRESSURE: 68 MMHG | OXYGEN SATURATION: 96 % | BODY MASS INDEX: 40.3 KG/M2 | SYSTOLIC BLOOD PRESSURE: 110 MMHG | HEIGHT: 68 IN | WEIGHT: 265.88 LBS

## 2023-10-19 DIAGNOSIS — I10 ESSENTIAL HYPERTENSION: ICD-10-CM

## 2023-10-19 DIAGNOSIS — E11.22 TYPE 2 DIABETES MELLITUS WITH STAGE 3A CHRONIC KIDNEY DISEASE, WITH LONG-TERM CURRENT USE OF INSULIN: ICD-10-CM

## 2023-10-19 DIAGNOSIS — Z79.4 TYPE 2 DIABETES MELLITUS WITH STAGE 3A CHRONIC KIDNEY DISEASE, WITH LONG-TERM CURRENT USE OF INSULIN: ICD-10-CM

## 2023-10-19 DIAGNOSIS — N40.1 BENIGN PROSTATIC HYPERPLASIA WITH URINARY FREQUENCY: ICD-10-CM

## 2023-10-19 DIAGNOSIS — Z79.4 TYPE 2 DIABETES MELLITUS WITH DIABETIC POLYNEUROPATHY, WITH LONG-TERM CURRENT USE OF INSULIN: ICD-10-CM

## 2023-10-19 DIAGNOSIS — R35.0 BENIGN PROSTATIC HYPERPLASIA WITH URINARY FREQUENCY: ICD-10-CM

## 2023-10-19 DIAGNOSIS — I48.20 CHRONIC ATRIAL FIBRILLATION: ICD-10-CM

## 2023-10-19 DIAGNOSIS — N18.31 TYPE 2 DIABETES MELLITUS WITH STAGE 3A CHRONIC KIDNEY DISEASE, WITH LONG-TERM CURRENT USE OF INSULIN: ICD-10-CM

## 2023-10-19 DIAGNOSIS — E11.42 TYPE 2 DIABETES MELLITUS WITH DIABETIC POLYNEUROPATHY, WITH LONG-TERM CURRENT USE OF INSULIN: ICD-10-CM

## 2023-10-19 DIAGNOSIS — B34.9 VIRAL SYNDROME: ICD-10-CM

## 2023-10-19 DIAGNOSIS — B34.9 VIRAL SYNDROME: Primary | ICD-10-CM

## 2023-10-19 LAB
BACTERIA #/AREA URNS AUTO: ABNORMAL /HPF
BILIRUB UR QL STRIP: NEGATIVE
CLARITY UR REFRACT.AUTO: CLEAR
COLOR UR AUTO: YELLOW
GLUCOSE UR QL STRIP: NEGATIVE
HGB UR QL STRIP: NEGATIVE
KETONES UR QL STRIP: NEGATIVE
LEUKOCYTE ESTERASE UR QL STRIP: ABNORMAL
MICROSCOPIC COMMENT: ABNORMAL
NITRITE UR QL STRIP: NEGATIVE
PH UR STRIP: 7 [PH] (ref 5–8)
PROT UR QL STRIP: ABNORMAL
RBC #/AREA URNS AUTO: 1 /HPF (ref 0–4)
SP GR UR STRIP: 1.01 (ref 1–1.03)
SQUAMOUS #/AREA URNS AUTO: 0 /HPF
URN SPEC COLLECT METH UR: ABNORMAL
WBC #/AREA URNS AUTO: 6 /HPF (ref 0–5)

## 2023-10-19 PROCEDURE — 36415 COLL VENOUS BLD VENIPUNCTURE: CPT | Performed by: INTERNAL MEDICINE

## 2023-10-19 PROCEDURE — 1101F PT FALLS ASSESS-DOCD LE1/YR: CPT | Mod: CPTII,S$GLB,, | Performed by: INTERNAL MEDICINE

## 2023-10-19 PROCEDURE — 99999 PR PBB SHADOW E&M-EST. PATIENT-LVL V: ICD-10-PCS | Mod: PBBFAC,,, | Performed by: INTERNAL MEDICINE

## 2023-10-19 PROCEDURE — 80048 BASIC METABOLIC PNL TOTAL CA: CPT | Performed by: INTERNAL MEDICINE

## 2023-10-19 PROCEDURE — 3072F LOW RISK FOR RETINOPATHY: CPT | Mod: CPTII,S$GLB,, | Performed by: INTERNAL MEDICINE

## 2023-10-19 PROCEDURE — 3078F PR MOST RECENT DIASTOLIC BLOOD PRESSURE < 80 MM HG: ICD-10-PCS | Mod: CPTII,S$GLB,, | Performed by: INTERNAL MEDICINE

## 2023-10-19 PROCEDURE — 3074F PR MOST RECENT SYSTOLIC BLOOD PRESSURE < 130 MM HG: ICD-10-PCS | Mod: CPTII,S$GLB,, | Performed by: INTERNAL MEDICINE

## 2023-10-19 PROCEDURE — 3288F FALL RISK ASSESSMENT DOCD: CPT | Mod: CPTII,S$GLB,, | Performed by: INTERNAL MEDICINE

## 2023-10-19 PROCEDURE — 3288F PR FALLS RISK ASSESSMENT DOCUMENTED: ICD-10-PCS | Mod: CPTII,S$GLB,, | Performed by: INTERNAL MEDICINE

## 2023-10-19 PROCEDURE — 3074F SYST BP LT 130 MM HG: CPT | Mod: CPTII,S$GLB,, | Performed by: INTERNAL MEDICINE

## 2023-10-19 PROCEDURE — 99214 OFFICE O/P EST MOD 30 MIN: CPT | Mod: S$GLB,,, | Performed by: INTERNAL MEDICINE

## 2023-10-19 PROCEDURE — 1125F PR PAIN SEVERITY QUANTIFIED, PAIN PRESENT: ICD-10-PCS | Mod: CPTII,S$GLB,, | Performed by: INTERNAL MEDICINE

## 2023-10-19 PROCEDURE — 1125F AMNT PAIN NOTED PAIN PRSNT: CPT | Mod: CPTII,S$GLB,, | Performed by: INTERNAL MEDICINE

## 2023-10-19 PROCEDURE — 81001 URINALYSIS AUTO W/SCOPE: CPT | Performed by: INTERNAL MEDICINE

## 2023-10-19 PROCEDURE — 87086 URINE CULTURE/COLONY COUNT: CPT | Performed by: INTERNAL MEDICINE

## 2023-10-19 PROCEDURE — 1159F MED LIST DOCD IN RCRD: CPT | Mod: CPTII,S$GLB,, | Performed by: INTERNAL MEDICINE

## 2023-10-19 PROCEDURE — 1159F PR MEDICATION LIST DOCUMENTED IN MEDICAL RECORD: ICD-10-PCS | Mod: CPTII,S$GLB,, | Performed by: INTERNAL MEDICINE

## 2023-10-19 PROCEDURE — 1160F PR REVIEW ALL MEDS BY PRESCRIBER/CLIN PHARMACIST DOCUMENTED: ICD-10-PCS | Mod: CPTII,S$GLB,, | Performed by: INTERNAL MEDICINE

## 2023-10-19 PROCEDURE — 99999 PR PBB SHADOW E&M-EST. PATIENT-LVL V: CPT | Mod: PBBFAC,,, | Performed by: INTERNAL MEDICINE

## 2023-10-19 PROCEDURE — 3072F PR LOW RISK FOR RETINOPATHY: ICD-10-PCS | Mod: CPTII,S$GLB,, | Performed by: INTERNAL MEDICINE

## 2023-10-19 PROCEDURE — 85025 COMPLETE CBC W/AUTO DIFF WBC: CPT | Performed by: INTERNAL MEDICINE

## 2023-10-19 PROCEDURE — 1101F PR PT FALLS ASSESS DOC 0-1 FALLS W/OUT INJ PAST YR: ICD-10-PCS | Mod: CPTII,S$GLB,, | Performed by: INTERNAL MEDICINE

## 2023-10-19 PROCEDURE — 3078F DIAST BP <80 MM HG: CPT | Mod: CPTII,S$GLB,, | Performed by: INTERNAL MEDICINE

## 2023-10-19 PROCEDURE — 99214 PR OFFICE/OUTPT VISIT, EST, LEVL IV, 30-39 MIN: ICD-10-PCS | Mod: S$GLB,,, | Performed by: INTERNAL MEDICINE

## 2023-10-19 PROCEDURE — 83036 HEMOGLOBIN GLYCOSYLATED A1C: CPT | Performed by: INTERNAL MEDICINE

## 2023-10-19 PROCEDURE — 1160F RVW MEDS BY RX/DR IN RCRD: CPT | Mod: CPTII,S$GLB,, | Performed by: INTERNAL MEDICINE

## 2023-10-20 LAB
ANION GAP SERPL CALC-SCNC: 9 MMOL/L (ref 8–16)
BASOPHILS # BLD AUTO: 0.04 K/UL (ref 0–0.2)
BASOPHILS NFR BLD: 0.5 % (ref 0–1.9)
BUN SERPL-MCNC: 15 MG/DL (ref 8–23)
CALCIUM SERPL-MCNC: 9.8 MG/DL (ref 8.7–10.5)
CHLORIDE SERPL-SCNC: 106 MMOL/L (ref 95–110)
CO2 SERPL-SCNC: 24 MMOL/L (ref 23–29)
CREAT SERPL-MCNC: 1.3 MG/DL (ref 0.5–1.4)
DIFFERENTIAL METHOD: ABNORMAL
EOSINOPHIL # BLD AUTO: 0.3 K/UL (ref 0–0.5)
EOSINOPHIL NFR BLD: 4.2 % (ref 0–8)
ERYTHROCYTE [DISTWIDTH] IN BLOOD BY AUTOMATED COUNT: 13.3 % (ref 11.5–14.5)
EST. GFR  (NO RACE VARIABLE): 55.9 ML/MIN/1.73 M^2
ESTIMATED AVG GLUCOSE: 148 MG/DL (ref 68–131)
GLUCOSE SERPL-MCNC: 141 MG/DL (ref 70–110)
HBA1C MFR BLD: 6.8 % (ref 4–5.6)
HCT VFR BLD AUTO: 45.2 % (ref 40–54)
HGB BLD-MCNC: 14.3 G/DL (ref 14–18)
IMM GRANULOCYTES # BLD AUTO: 0.02 K/UL (ref 0–0.04)
IMM GRANULOCYTES NFR BLD AUTO: 0.3 % (ref 0–0.5)
LYMPHOCYTES # BLD AUTO: 1.9 K/UL (ref 1–4.8)
LYMPHOCYTES NFR BLD: 24.5 % (ref 18–48)
MCH RBC QN AUTO: 30.8 PG (ref 27–31)
MCHC RBC AUTO-ENTMCNC: 31.6 G/DL (ref 32–36)
MCV RBC AUTO: 97 FL (ref 82–98)
MONOCYTES # BLD AUTO: 0.6 K/UL (ref 0.3–1)
MONOCYTES NFR BLD: 8.4 % (ref 4–15)
NEUTROPHILS # BLD AUTO: 4.7 K/UL (ref 1.8–7.7)
NEUTROPHILS NFR BLD: 62.1 % (ref 38–73)
NRBC BLD-RTO: 0 /100 WBC
PLATELET # BLD AUTO: 212 K/UL (ref 150–450)
PMV BLD AUTO: 11.7 FL (ref 9.2–12.9)
POTASSIUM SERPL-SCNC: 4.1 MMOL/L (ref 3.5–5.1)
RBC # BLD AUTO: 4.64 M/UL (ref 4.6–6.2)
SODIUM SERPL-SCNC: 139 MMOL/L (ref 136–145)
WBC # BLD AUTO: 7.54 K/UL (ref 3.9–12.7)

## 2023-10-21 LAB — BACTERIA UR CULT: NORMAL

## 2023-10-21 RX ORDER — FUROSEMIDE 40 MG/1
TABLET ORAL
Qty: 90 TABLET | Refills: 2 | Status: SHIPPED | OUTPATIENT
Start: 2023-10-21

## 2023-10-21 NOTE — PROGRESS NOTES
Test results reviewed.  All appeared to be stable.  Continue with current medicines.  Continue to be attentive and mindful to proper diet physical activity

## 2023-10-26 ENCOUNTER — OFFICE VISIT (OUTPATIENT)
Dept: ORTHOPEDICS | Facility: CLINIC | Age: 78
End: 2023-10-26
Payer: MEDICARE

## 2023-10-26 ENCOUNTER — OFFICE VISIT (OUTPATIENT)
Dept: DERMATOLOGY | Facility: CLINIC | Age: 78
End: 2023-10-26
Payer: MEDICARE

## 2023-10-26 VITALS — WEIGHT: 264 LBS | HEIGHT: 68 IN | BODY MASS INDEX: 40.01 KG/M2

## 2023-10-26 DIAGNOSIS — D18.01 ANGIOMA OF SKIN: ICD-10-CM

## 2023-10-26 DIAGNOSIS — D23.9 DERMATOFIBROMA: ICD-10-CM

## 2023-10-26 DIAGNOSIS — L21.9 SEBORRHEIC DERMATITIS: ICD-10-CM

## 2023-10-26 DIAGNOSIS — L57.0 AK (ACTINIC KERATOSIS): Primary | ICD-10-CM

## 2023-10-26 DIAGNOSIS — L81.4 LENTIGO: ICD-10-CM

## 2023-10-26 DIAGNOSIS — M65.332 TRIGGER MIDDLE FINGER OF LEFT HAND: Primary | ICD-10-CM

## 2023-10-26 DIAGNOSIS — L82.1 SK (SEBORRHEIC KERATOSIS): ICD-10-CM

## 2023-10-26 DIAGNOSIS — Z86.006 HISTORY OF MELANOMA IN SITU: ICD-10-CM

## 2023-10-26 PROCEDURE — 99214 OFFICE O/P EST MOD 30 MIN: CPT | Mod: 25,S$GLB,, | Performed by: DERMATOLOGY

## 2023-10-26 PROCEDURE — 20550 PR INJECT TENDON SHEATH/LIGAMENT: ICD-10-PCS | Mod: 50,S$GLB,, | Performed by: ORTHOPAEDIC SURGERY

## 2023-10-26 PROCEDURE — 17003 DESTRUCT PREMALG LES 2-14: CPT | Mod: S$GLB,,, | Performed by: DERMATOLOGY

## 2023-10-26 PROCEDURE — 99999 PR PBB SHADOW E&M-EST. PATIENT-LVL IV: CPT | Mod: PBBFAC,,, | Performed by: DERMATOLOGY

## 2023-10-26 PROCEDURE — 99213 OFFICE O/P EST LOW 20 MIN: CPT | Mod: 25,S$GLB,, | Performed by: ORTHOPAEDIC SURGERY

## 2023-10-26 PROCEDURE — 3072F LOW RISK FOR RETINOPATHY: CPT | Mod: CPTII,S$GLB,, | Performed by: DERMATOLOGY

## 2023-10-26 PROCEDURE — 3288F FALL RISK ASSESSMENT DOCD: CPT | Mod: CPTII,S$GLB,, | Performed by: ORTHOPAEDIC SURGERY

## 2023-10-26 PROCEDURE — 1159F MED LIST DOCD IN RCRD: CPT | Mod: CPTII,S$GLB,, | Performed by: DERMATOLOGY

## 2023-10-26 PROCEDURE — 17000 DESTRUCT PREMALG LESION: CPT | Mod: S$GLB,,, | Performed by: DERMATOLOGY

## 2023-10-26 PROCEDURE — 99999 PR PBB SHADOW E&M-EST. PATIENT-LVL III: ICD-10-PCS | Mod: PBBFAC,,, | Performed by: ORTHOPAEDIC SURGERY

## 2023-10-26 PROCEDURE — 99999 PR PBB SHADOW E&M-EST. PATIENT-LVL III: CPT | Mod: PBBFAC,,, | Performed by: ORTHOPAEDIC SURGERY

## 2023-10-26 PROCEDURE — 99999 PR PBB SHADOW E&M-EST. PATIENT-LVL IV: ICD-10-PCS | Mod: PBBFAC,,, | Performed by: DERMATOLOGY

## 2023-10-26 PROCEDURE — 1101F PT FALLS ASSESS-DOCD LE1/YR: CPT | Mod: CPTII,S$GLB,, | Performed by: ORTHOPAEDIC SURGERY

## 2023-10-26 PROCEDURE — 1126F AMNT PAIN NOTED NONE PRSNT: CPT | Mod: CPTII,S$GLB,, | Performed by: DERMATOLOGY

## 2023-10-26 PROCEDURE — 17003 DESTRUCTION, PREMALIGNANT LESIONS; SECOND THROUGH 14 LESIONS: ICD-10-PCS | Mod: S$GLB,,, | Performed by: DERMATOLOGY

## 2023-10-26 PROCEDURE — 1160F RVW MEDS BY RX/DR IN RCRD: CPT | Mod: CPTII,S$GLB,, | Performed by: DERMATOLOGY

## 2023-10-26 PROCEDURE — 1125F AMNT PAIN NOTED PAIN PRSNT: CPT | Mod: CPTII,S$GLB,, | Performed by: ORTHOPAEDIC SURGERY

## 2023-10-26 PROCEDURE — 3072F PR LOW RISK FOR RETINOPATHY: ICD-10-PCS | Mod: CPTII,S$GLB,, | Performed by: DERMATOLOGY

## 2023-10-26 PROCEDURE — 17000 PR DESTRUCTION(LASER SURGERY,CRYOSURGERY,CHEMOSURGERY),PREMALIGNANT LESIONS,FIRST LESION: ICD-10-PCS | Mod: S$GLB,,, | Performed by: DERMATOLOGY

## 2023-10-26 PROCEDURE — 1160F PR REVIEW ALL MEDS BY PRESCRIBER/CLIN PHARMACIST DOCUMENTED: ICD-10-PCS | Mod: CPTII,S$GLB,, | Performed by: DERMATOLOGY

## 2023-10-26 PROCEDURE — 1101F PT FALLS ASSESS-DOCD LE1/YR: CPT | Mod: CPTII,S$GLB,, | Performed by: DERMATOLOGY

## 2023-10-26 PROCEDURE — 99214 PR OFFICE/OUTPT VISIT, EST, LEVL IV, 30-39 MIN: ICD-10-PCS | Mod: 25,S$GLB,, | Performed by: DERMATOLOGY

## 2023-10-26 PROCEDURE — 1101F PR PT FALLS ASSESS DOC 0-1 FALLS W/OUT INJ PAST YR: ICD-10-PCS | Mod: CPTII,S$GLB,, | Performed by: DERMATOLOGY

## 2023-10-26 PROCEDURE — 3288F PR FALLS RISK ASSESSMENT DOCUMENTED: ICD-10-PCS | Mod: CPTII,S$GLB,, | Performed by: ORTHOPAEDIC SURGERY

## 2023-10-26 PROCEDURE — 3288F FALL RISK ASSESSMENT DOCD: CPT | Mod: CPTII,S$GLB,, | Performed by: DERMATOLOGY

## 2023-10-26 PROCEDURE — 1126F PR PAIN SEVERITY QUANTIFIED, NO PAIN PRESENT: ICD-10-PCS | Mod: CPTII,S$GLB,, | Performed by: DERMATOLOGY

## 2023-10-26 PROCEDURE — 1159F PR MEDICATION LIST DOCUMENTED IN MEDICAL RECORD: ICD-10-PCS | Mod: CPTII,S$GLB,, | Performed by: DERMATOLOGY

## 2023-10-26 PROCEDURE — 99213 PR OFFICE/OUTPT VISIT, EST, LEVL III, 20-29 MIN: ICD-10-PCS | Mod: 25,S$GLB,, | Performed by: ORTHOPAEDIC SURGERY

## 2023-10-26 PROCEDURE — 3288F PR FALLS RISK ASSESSMENT DOCUMENTED: ICD-10-PCS | Mod: CPTII,S$GLB,, | Performed by: DERMATOLOGY

## 2023-10-26 PROCEDURE — 20550 NJX 1 TENDON SHEATH/LIGAMENT: CPT | Mod: 50,S$GLB,, | Performed by: ORTHOPAEDIC SURGERY

## 2023-10-26 PROCEDURE — 3072F PR LOW RISK FOR RETINOPATHY: ICD-10-PCS | Mod: CPTII,S$GLB,, | Performed by: ORTHOPAEDIC SURGERY

## 2023-10-26 PROCEDURE — 1125F PR PAIN SEVERITY QUANTIFIED, PAIN PRESENT: ICD-10-PCS | Mod: CPTII,S$GLB,, | Performed by: ORTHOPAEDIC SURGERY

## 2023-10-26 PROCEDURE — 3072F LOW RISK FOR RETINOPATHY: CPT | Mod: CPTII,S$GLB,, | Performed by: ORTHOPAEDIC SURGERY

## 2023-10-26 PROCEDURE — 1101F PR PT FALLS ASSESS DOC 0-1 FALLS W/OUT INJ PAST YR: ICD-10-PCS | Mod: CPTII,S$GLB,, | Performed by: ORTHOPAEDIC SURGERY

## 2023-10-26 RX ORDER — TRIAMCINOLONE ACETONIDE 0.25 MG/ML
LOTION TOPICAL
Qty: 60 ML | Refills: 2 | Status: SHIPPED | OUTPATIENT
Start: 2023-10-26 | End: 2023-12-28

## 2023-10-26 RX ORDER — TRIAMCINOLONE ACETONIDE 40 MG/ML
40 INJECTION, SUSPENSION INTRA-ARTICULAR; INTRAMUSCULAR
Status: COMPLETED | OUTPATIENT
Start: 2023-10-26 | End: 2023-10-26

## 2023-10-26 RX ADMIN — TRIAMCINOLONE ACETONIDE 40 MG: 40 INJECTION, SUSPENSION INTRA-ARTICULAR; INTRAMUSCULAR at 01:10

## 2023-10-26 NOTE — PROGRESS NOTES
"Subjective:      Patient ID: BI Cantor Jr. is a 77 y.o. male.  Chief Complaint: Hand Pain (Gulshan Hand )      HPI  BI Cantor Jr. is a  77 y.o. male presenting today for follow up of hand symptoms including triggering.  He reports that he is having triggering of the left middle and the right index fingers  Previously had triggering of the left ring finger about 6 months ago which resolved after injection   No numbness or tingling is reported   Symptoms worse with gripping   .    Review of patient's allergies indicates:   Allergen Reactions    Niacin Itching and Other (See Comments)     Other reaction(s): facial flushing and causes hepatitis     Terbinafine Other (See Comments)     Other reaction(s): Hepatitis    "gave me rash"         Current Outpatient Medications   Medication Sig Dispense Refill    ammonium lactate 12 % Crea Apply twice daily to affected parts both feet as needed. 140 g 11    atorvastatin (LIPITOR) 40 MG tablet TAKE ONE TABLET BY MOUTH EVERY DAY (Patient taking differently: Take 40 mg by mouth once daily.) 90 tablet 3    azelastine (ASTELIN) 137 mcg (0.1 %) nasal spray 1 spray (137 mcg total) by Nasal route 2 (two) times daily. 30 mL 3    BD INSULIN PEN NEEDLE UF ORIG 29 x 1/2 " Ndle   12    blood sugar diagnostic Strp 1 strip by Misc.(Non-Drug; Combo Route) route 4 (four) times daily. To check blood glucose 400 strip 11    blood-glucose meter Misc To check BG as discussed 1 each 0    blood-glucose meter,continuous (DEXCOM G6 ) Misc 1 Device by Misc.(Non-Drug; Combo Route) route continuous. 1 each 0    blood-glucose sensor (DEXCOM G6 SENSOR) Mckenna 1 Device by Misc.(Non-Drug; Combo Route) route every 10 days. 9 each 4    blood-glucose transmitter (DEXCOM G6 TRANSMITTER) Mckenna 1 Device by Misc.(Non-Drug; Combo Route) route every 3 (three) months. 1 each 3    clobetasol (TEMOVATE) 0.05 % cream AAA finger bid 60 g 3    ELIQUIS 5 mg Tab TAKE ONE TABLET BY MOUTH TWICE DAILY 180 tablet 1    " "fenofibrate (TRICOR) 145 MG tablet TAKE ONE TABLET BY MOUTH EVERY DAY 90 tablet 3    furosemide (LASIX) 40 MG tablet TAKE ONE TABLET BY MOUTH EVERY DAY AS NEEDED 90 tablet 2    gabapentin (NEURONTIN) 300 MG capsule TAKE TWO capsules BY MOUTH TWICE DAILY 360 capsule 1    glucagon (GVOKE HYPOPEN 2-PACK) 1 mg/0.2 mL AtIn Inject 1 mg into the skin as needed (severe hypoglycemia). 2 each 3    insulin (LANTUS SOLOSTAR U-100 INSULIN) glargine 100 units/mL SubQ pen Inject 36 Units into the skin every evening. Adjust dose as directed until AM glucose at goal . Max daily dose 45 units/day 15 mL 11    insulin lispro (HUMALOG KWIKPEN INSULIN) 100 unit/mL pen Inject 10 units 3 times daily with meals. Plus correction scale. Max daily dose 50 units/day 30 mL 11    lancets 33 gauge Misc 1 lancet by Misc.(Non-Drug; Combo Route) route 4 (four) times daily. Pt uses True Result Meter, bg monitoring 4 times a day. 400 each 4    levothyroxine (SYNTHROID) 25 MCG tablet Take 25 mcg by mouth once daily.      metoprolol succinate (TOPROL-XL) 100 MG 24 hr tablet TAKE ONE TABLET BY MOUTH EVERY DAY 90 tablet 2    pen needle, diabetic (BD ULTRA-FINE BASIL PEN NEEDLE) 32 gauge x 5/32" Ndle USE FOUR TIMES DAILY 400 each 3    tamsulosin (FLOMAX) 0.4 mg Cap Take 1 capsule (0.4 mg total) by mouth once daily. 90 capsule 2    temazepam (RESTORIL) 15 mg Cap Take 1 capsule (15 mg total) by mouth nightly as needed. 30 capsule 5    tirzepatide (MOUNJARO) 5 mg/0.5 mL PnIj Inject 5 mg under the skin once a week 4 pen 2    triamcinolone acetonide 0.1% (KENALOG) 0.1 % cream APPLY TO AFFECTED AREA(S) TWICE DAILY 30 g 3    celecoxib (CELEBREX) 100 MG capsule Take 1 capsule (100 mg total) by mouth once daily. (Patient not taking: Reported on 10/19/2023) 30 capsule 1    ciclopirox (PENLAC) 8 % Soln Apply topically nightly. (Patient not taking: Reported on 10/19/2023) 6.6 mL 11    ciclopirox (PENLAC) 8 % Soln Apply topically nightly. (Patient not taking: " Reported on 10/19/2023) 6.6 mL 11    ketoconazole (NIZORAL) 2 % cream Apply topically once daily. (Patient not taking: Reported on 10/19/2023) 30 g 1    loratadine (CLARITIN) 10 mg tablet Take 1 tablet (10 mg total) by mouth once daily. (Patient not taking: Reported on 10/19/2023) 90 tablet 3    metOLazone (ZAROXOLYN) 5 MG tablet Take 1 tablet (5 mg total) by mouth once daily. for 7 days (Patient not taking: Reported on 10/19/2023) 7 tablet 0    nystatin-triamcinolone (MYCOLOG II) cream apply TWICE DAILY (Patient not taking: Reported on 10/19/2023) 60 g 1    tirzepatide (MOUNJARO) 2.5 mg/0.5 mL PnIj Inject 2.5 mg under the skin once a week for 4 weeks then if you are tolerating the medication increase to the next dose of 5 mg weekly. (Patient not taking: Reported on 10/19/2023) 4 pen 0     No current facility-administered medications for this visit.       Past Medical History:   Diagnosis Date    *Atrial fibrillation     Eliquis    Anticoagulant long-term use     apaxiban    Basal cell carcinoma 12/2015    left eye brow    Basal cell carcinoma 10/28/2019    right superior preauricular    BCC (basal cell carcinoma) 12/2015    left shoulder    Cataract     Chronic kidney disease     Diabetes mellitus with renal manifestations, uncontrolled     Dyslipidemia associated with type 2 diabetes mellitus     Fever blister     Hearing loss     HTN (hypertension)     Keloid cicatrix     Liver disease     Melanoma 12/07/2015    right cheek-in situ    Obesity     PN (peripheral neuropathy)     Primary osteoarthritis of right knee 1/25/2017    Screening for colon cancer 3/3/2016    Sleep apnea     wears CPAP at night    Thyroid disease     Type II or unspecified type diabetes mellitus with other specified manifestations, uncontrolled     Ulcer of left lower extremity, limited to breakdown of skin 9/22/2017       Past Surgical History:   Procedure Laterality Date    APPENDECTOMY      CARDIOVERSION      CATARACT EXTRACTION       CATARACT EXTRACTION Right 05/14/2018    Dr. Greer    COLONOSCOPY N/A 3/3/2016    Procedure: COLONOSCOPY;  Surgeon: Bob Poe MD;  Location: [facility] ENDO (4TH FLR);  Service: Endoscopy;  Laterality: N/A;  Holding Eliquis for 3 days prior to colonoscopy. EC    COLONOSCOPY N/A 9/20/2019    Procedure: COLONOSCOPY;  Surgeon: Akbar Curtis MD;  Location: [facility] ENDO (4TH FLR);  Service: Endoscopy;  Laterality: N/A;  Per Dr. José Granda, patient can HOLD Eliquis X2 days prior to procedure-see telphone encounter 8/22/19-[facility]    ECTROPION REPAIR Right 8/14/2019    Procedure: REPAIR, ECTROPION, EYELID /       #[id number]- Skin Flaps(Deep Tissue) (OD);  Surgeon: Edita Sabillon MD;  Location: [facility] OR 2ND FLR;  Service: Ophthalmology;  Laterality: Right;    epidural steroid injection      INTRAOCULAR PROSTHESES INSERTION Left 6/25/2018    Procedure: INSERTION, INTRAOCULAR LENS PROSTHESIS;  Surgeon: Lawrence Greer MD;  Location: [facility];  Service: Ophthalmology;  Laterality: Left;    JOINT REPLACEMENT      Knee    Meniere's disease surgery      PHACOEMULSIFICATION OF CATARACT Left 6/25/2018    Procedure: PHACOEMULSIFICATION, CATARACT;  Surgeon: Lawrence Greer MD;  Location: [facility];  Service: Ophthalmology;  Laterality: Left;    PREPARATION OF WOUND PRIOR TO APPLICATION OF SKIN GRAFT Right 8/14/2019    Procedure: PREPARATION, WOUND, PRIOR TO SKIN GRAFT APPLICATION;  Surgeon: Edita Sabillon MD;  Location: [facility] OR [address] [place] [address];  Service: Ophthalmology;  Laterality: Right;    REVISION OF KNEE ARTHROPLASTY Right 7/10/2018    Procedure: REVISION-ARTHROPLASTY-KNEE-SAMIR;  Surgeon: Miguel Stuart MD;  [facility];  Service: Orthopedics;  Laterality: Right;  Samir    SKIN FULL THICKNESS GRAFT Right 8/14/2019    Procedure: APPLICATION, GRAFT, SKIN, FULL-THICKNESS;  Surgeon: Edita Sabillon MD;  Location: [facility] OR [address] [place] [address];  Service: Ophthalmology;  Laterality: Right;    TARSORRHAPHY Right 8/14/2019    Procedure:  "BLEPHARORRHAPHY;  Surgeon: Edita Sabillon MD;  Location: [facility] OR [address] [place] [address];  Service: Ophthalmology;  Laterality: Right;    TONSILLECTOMY      TOTAL KNEE ARTHROPLASTY Right 01/25/2017    TKR    TOTAL KNEE ARTHROPLASTY Left     TKR, 1990's       OBJECTIVE:   PHYSICAL EXAM:  Height: 5' 8" (172.7 cm) Weight: 119.7 kg (264 lb)  Vitals:    10/26/23 1303   Weight: 119.7 kg (264 lb)   Height: 5' 8" (1.727 m)   PainSc: 10-Worst pain ever     Ortho/SPM Exam  Examination hands there is tenderness over the flexor tendon sheath to the right index and left middle fingers   Range of motion is limited especially in the left middle finger due to triggering and pain with both active and passive flexion   Slight flexion contracture noted on the left middle finger       RADIOGRAPHS:  None  Comments: I have personally reviewed the imaging and I agree with the above radiologist's report.    ASSESSMENT/PLAN:     IMPRESSION:  1. Triggering of the right index finger.      2. Triggering of the left middle finger with slight contracture    PLAN:  I explained the nature of the problem to the patient   He would like injections today  After pause for time-out identified the right index and left middle finger each injected with combination Kenalog 20 mg 0.5 cc xylocaine sterile technique  Tolerated the procedure well without complication   In the future we may recommend surgical treatment especially for the left middle finger which does have a slight contracture    FOLLOW UP:  1-2 months    Disclaimer: This note has been generated using voice-recognition software. There may be typographical errors that have been missed during proof-reading.     "

## 2023-10-26 NOTE — PATIENT INSTRUCTIONS
FACE:  Recommend wash face daily with SebaMed or Zinc 2% bar or shampoo (Vanicream-zinc). You may find these in local drugstores or search online.        CRYOSURGERY      Your doctor has used a method called cryosurgery to treat your skin condition. Cryosurgery refers to the use of very cold substances to treat a variety of skin conditions such as warts, pre-skin cancers, molluscum contagiosum, sun spots, and several benign growths. The substance we use in cryosurgery is liquid nitrogen and is so cold (-195 degrees Celsius) that is burns when administered.     Following treatment in the office, the skin may immediately burn and become red. You may find the area around the lesion is affected as well. It is sometimes necessary to treat not only the lesion, but a small area of the surrounding normal skin to achieve a good response.     A blister, and even a blood filled blister, may form after treatment.   This is a normal response. If the blister is painful, it is acceptable to sterilize a needle and with rubbing alcohol and gently pop the blister. It is important that you gently wash the area with soap and warm water as the blister fluid may contain wart virus if a wart was treated. Do no remove the roof of the blister.     The area treated can take anywhere from 1-3 weeks to heal. Healing time depends on the kind skin lesion treated, the location, and how aggressively the lesion was treated. It is recommended that the areas treated are covered with Vaseline or bacitracin ointment and a band-aid. If a band-aid is not practical, just ointment applied several times per day will do. Keeping these areas moist will speed the healing time.    Treatment with liquid nitrogen can leave a scar. In dark skin, it may be a light or dark scar, in light skin it may be a white or pink scar. These will generally fade with time, but may never go away completely.     If you have any concerns after your treatment, please feel free to  call the office.       [facility] [place] [address]/ [phone] FAX/ [email or web address]

## 2023-10-26 NOTE — PROGRESS NOTES
Subjective:      Patient ID:  [facility] PAT Cantor Jr. is a 77 y.o. male who presents for   Chief Complaint   Patient presents with    Skin Check     TBSE    Lesion     R upper thigh     History of Present Illness: The patient presents for follow up of skin check.    The patient was last seen on: 4/8/22 for cryosurgery to actinic keratoses which have resolved.   This is a high risk patient here to check for the development of new lesions.    H/o MIS right cheek 11/2015    Other skin complaints:   Patient with new complaint of lesion(s)  Location: R upper thigh  Duration: unsure  Symptoms: bigger  Relieving factors/Previous treatments: none            Review of Systems   Constitutional:  Negative for fever, chills, weight loss (272# ), weight gain, fatigue and night sweats.   Musculoskeletal:  Negative for muscle weakness (legs -resolved).   Skin:  Positive for wears hat (for activities only). Negative for daily sunscreen use, activity-related sunscreen use (but wears hat) and recent sunburn.   Hematologic/Lymphatic: Negative for adenopathy. Bruises/bleeds easily (on eliquis).       Objective:   Physical Exam   Constitutional: He appears well-developed and well-nourished. He is obese.  No distress.   Neurological: He is alert and oriented to person, place, and time. He is not disoriented.   Psychiatric: He has a normal mood and affect.   Skin:   Areas Examined (abnormalities noted in diagram):   Scalp / Hair Palpated and Inspected  Head / Face Inspection Performed  Neck Inspection Performed  Chest / Axilla Inspection Performed  Abdomen Inspection Performed  Back Inspection Performed  RUE Inspected  LUE Inspection Performed  RLE Inspected  Nails and Digits Inspection Performed                     Diagram Legend     Erythematous scaling macule/papule c/w actinic keratosis       Vascular papule c/w angioma      Pigmented verrucoid papule/plaque c/w seborrheic keratosis      Yellow umbilicated papule c/w sebaceous hyperplasia       Irregularly shaped tan macule c/w lentigo     1-2 mm smooth white papules consistent with Milia      Movable subcutaneous cyst with punctum c/w epidermal inclusion cyst      Subcutaneous movable cyst c/w pilar cyst      Firm pink to brown papule c/w dermatofibroma      Pedunculated fleshy papule(s) c/w skin tag(s)      Evenly pigmented macule c/w junctional nevus     Mildly variegated pigmented, slightly irregular-bordered macule c/w mildly atypical nevus      Flesh colored to evenly pigmented papule c/w intradermal nevus       Pink pearly papule/plaque c/w basal cell carcinoma      Erythematous hyperkeratotic cursted plaque c/w SCC      Surgical scar with no sign of skin cancer recurrence      Open and closed comedones      Inflammatory papules and pustules      Verrucoid papule consistent consistent with wart     Erythematous eczematous patches and plaques     Dystrophic onycholytic nail with subungual debris c/w onychomycosis     Umbilicated papule    Erythematous-base heme-crusted tan verrucoid plaque consistent with inflamed seborrheic keratosis     Erythematous Silvery Scaling Plaque c/w Psoriasis     See annotation      Assessment / Plan:        AK (actinic keratosis)  Cryosurgery Procedure Note    Verbal consent from the patient is obtained including, but not limited to, risk of hypopigmentation/hyperpigmentation, scar, recurrence of lesion. The patient is aware of the precancerous quality and need for treatment of these lesions. Liquid nitrogen cryosurgery is applied to the 4 actinic keratoses, as detailed in the physical exam, to produce a freeze injury. The patient is aware that blisters may form and is instructed on wound care with gentle cleansing and use of vaseline ointment to keep moist until healed. The patient is supplied a handout on cryosurgery and is instructed to call if lesions do not completely resolve.    Wear hat always!    Seborrheic dermatitis  -     triamcinolone acetonide 0.025 %  Lotn; AAA face bid prn red and/or scaly  Dispense: 60 mL; Refill: 2  FACE:  Recommend wash face daily with SebaMed or Zinc 2% bar or shampoo (Vanicream-zinc). You may find these in local drugstores or search online.      SK (seborrheic keratosis)   - minor problem and chronic.   Reassurance given to patient. No treatment necessary.     Lentigo   - minor problem and chronic.   Reassurance given to patient. No treatment necessary.       Angioma of skin   - minor problem and chronic.   Reassurance given to patient. No treatment necessary.       Dermatofibroma  This is a benign scar-like lesion secondary to minor trauma. No treatment required.       History of melanoma in situ  Area of previous melanoma in situ examined. Site well healed with no signs of recurrence.    Recommend daily sun protection/avoidance, use of at least SPF 30, broad spectrum sunscreen (OTC drug), skin self examinations, and routine physician surveillance to optimize early detection               Follow up in about 1 year (around 10/26/2024), or if symptoms worsen or fail to improve.

## 2023-11-08 RX ORDER — METOPROLOL SUCCINATE 100 MG/1
TABLET, EXTENDED RELEASE ORAL
Qty: 90 TABLET | Refills: 3 | Status: SHIPPED | OUTPATIENT
Start: 2023-11-08 | End: 2024-01-23

## 2023-11-08 NOTE — TELEPHONE ENCOUNTER
No care due was identified.  [facility] Embedded Care Due Messages. Reference number: [id number].   11/08/2023 9:06:36 AM CST

## 2023-11-08 NOTE — TELEPHONE ENCOUNTER
Refill Decision Note   [facility]  is requesting a refill authorization.  Brief Assessment and Rationale for Refill:  Approve     Medication Therapy Plan:       Medication Reconciliation Completed: No   Comments:     No Care Gaps recommended.     Note composed:4:24 PM 11/08/2023

## 2023-11-08 NOTE — TELEPHONE ENCOUNTER
Refill Routing Note   Medication(s) are not appropriate for processing by [facility] for the following reason(s):      Drug-disease interaction    [facility] action(s):  Defer Care Due:  None identified     Medication Therapy Plan: Drug-Disease: metoprolol succinate and Dermatitis, stasis; Venous insufficiency of both lower extremities; Onychomycosis of multiple toenails with type 2 diabetes mellitus and peripheral angiopathy    Pharmacist review requested: Yes     Appointments  past 12m or future 3m with PCP    Date Provider   Last Visit   10/19/2023 Desmond Barlow MD   Next Visit   Visit date not found Desmond Barlow MD   ED visits in past 90 days: 0        Note composed:2:20 PM 11/08/2023

## 2023-11-21 ENCOUNTER — OFFICE VISIT (OUTPATIENT)
Dept: ENDOCRINOLOGY | Facility: CLINIC | Age: 78
End: 2023-11-21
Payer: MEDICARE

## 2023-11-21 ENCOUNTER — LAB VISIT (OUTPATIENT)
Dept: LAB | Facility: HOSPITAL | Age: 78
End: 2023-11-21
Attending: INTERNAL MEDICINE
Payer: MEDICARE

## 2023-11-21 VITALS
WEIGHT: 264.75 LBS | SYSTOLIC BLOOD PRESSURE: 120 MMHG | OXYGEN SATURATION: 97 % | BODY MASS INDEX: 40.26 KG/M2 | HEART RATE: 67 BPM | DIASTOLIC BLOOD PRESSURE: 75 MMHG

## 2023-11-21 DIAGNOSIS — N18.31 TYPE 2 DIABETES MELLITUS WITH STAGE 3A CHRONIC KIDNEY DISEASE, WITH LONG-TERM CURRENT USE OF INSULIN: ICD-10-CM

## 2023-11-21 DIAGNOSIS — N18.31 TYPE 2 DIABETES MELLITUS WITH STAGE 3A CHRONIC KIDNEY DISEASE, WITH LONG-TERM CURRENT USE OF INSULIN: Primary | ICD-10-CM

## 2023-11-21 DIAGNOSIS — Z79.4 TYPE 2 DIABETES MELLITUS WITH STAGE 3A CHRONIC KIDNEY DISEASE, WITH LONG-TERM CURRENT USE OF INSULIN: ICD-10-CM

## 2023-11-21 DIAGNOSIS — E11.22 TYPE 2 DIABETES MELLITUS WITH STAGE 3A CHRONIC KIDNEY DISEASE, WITH LONG-TERM CURRENT USE OF INSULIN: Primary | ICD-10-CM

## 2023-11-21 DIAGNOSIS — E11.22 TYPE 2 DIABETES MELLITUS WITH STAGE 3A CHRONIC KIDNEY DISEASE, WITH LONG-TERM CURRENT USE OF INSULIN: ICD-10-CM

## 2023-11-21 DIAGNOSIS — Z79.4 TYPE 2 DIABETES MELLITUS WITH STAGE 3A CHRONIC KIDNEY DISEASE, WITH LONG-TERM CURRENT USE OF INSULIN: Primary | ICD-10-CM

## 2023-11-21 DIAGNOSIS — E78.2 MIXED HYPERLIPIDEMIA: ICD-10-CM

## 2023-11-21 LAB
ALBUMIN/CREAT UR: 52.4 UG/MG (ref 0–30)
CREAT UR-MCNC: 105 MG/DL (ref 23–375)
MICROALBUMIN UR DL<=1MG/L-MCNC: 55 UG/ML

## 2023-11-21 PROCEDURE — 99214 PR OFFICE/OUTPT VISIT, EST, LEVL IV, 30-39 MIN: ICD-10-PCS | Mod: S$GLB,,, | Performed by: INTERNAL MEDICINE

## 2023-11-21 PROCEDURE — 3288F PR FALLS RISK ASSESSMENT DOCUMENTED: ICD-10-PCS | Mod: CPTII,S$GLB,, | Performed by: INTERNAL MEDICINE

## 2023-11-21 PROCEDURE — 1159F MED LIST DOCD IN RCRD: CPT | Mod: CPTII,S$GLB,, | Performed by: INTERNAL MEDICINE

## 2023-11-21 PROCEDURE — 1126F AMNT PAIN NOTED NONE PRSNT: CPT | Mod: CPTII,S$GLB,, | Performed by: INTERNAL MEDICINE

## 2023-11-21 PROCEDURE — 3072F LOW RISK FOR RETINOPATHY: CPT | Mod: CPTII,S$GLB,, | Performed by: INTERNAL MEDICINE

## 2023-11-21 PROCEDURE — 1101F PR PT FALLS ASSESS DOC 0-1 FALLS W/OUT INJ PAST YR: ICD-10-PCS | Mod: CPTII,S$GLB,, | Performed by: INTERNAL MEDICINE

## 2023-11-21 PROCEDURE — 99214 OFFICE O/P EST MOD 30 MIN: CPT | Mod: S$GLB,,, | Performed by: INTERNAL MEDICINE

## 2023-11-21 PROCEDURE — 3078F PR MOST RECENT DIASTOLIC BLOOD PRESSURE < 80 MM HG: ICD-10-PCS | Mod: CPTII,S$GLB,, | Performed by: INTERNAL MEDICINE

## 2023-11-21 PROCEDURE — 3072F PR LOW RISK FOR RETINOPATHY: ICD-10-PCS | Mod: CPTII,S$GLB,, | Performed by: INTERNAL MEDICINE

## 2023-11-21 PROCEDURE — 1160F PR REVIEW ALL MEDS BY PRESCRIBER/CLIN PHARMACIST DOCUMENTED: ICD-10-PCS | Mod: CPTII,S$GLB,, | Performed by: INTERNAL MEDICINE

## 2023-11-21 PROCEDURE — 1159F PR MEDICATION LIST DOCUMENTED IN MEDICAL RECORD: ICD-10-PCS | Mod: CPTII,S$GLB,, | Performed by: INTERNAL MEDICINE

## 2023-11-21 PROCEDURE — 99999 PR PBB SHADOW E&M-EST. PATIENT-LVL IV: ICD-10-PCS | Mod: PBBFAC,,, | Performed by: INTERNAL MEDICINE

## 2023-11-21 PROCEDURE — 3288F FALL RISK ASSESSMENT DOCD: CPT | Mod: CPTII,S$GLB,, | Performed by: INTERNAL MEDICINE

## 2023-11-21 PROCEDURE — 1160F RVW MEDS BY RX/DR IN RCRD: CPT | Mod: CPTII,S$GLB,, | Performed by: INTERNAL MEDICINE

## 2023-11-21 PROCEDURE — 1126F PR PAIN SEVERITY QUANTIFIED, NO PAIN PRESENT: ICD-10-PCS | Mod: CPTII,S$GLB,, | Performed by: INTERNAL MEDICINE

## 2023-11-21 PROCEDURE — 3078F DIAST BP <80 MM HG: CPT | Mod: CPTII,S$GLB,, | Performed by: INTERNAL MEDICINE

## 2023-11-21 PROCEDURE — 99999 PR PBB SHADOW E&M-EST. PATIENT-LVL IV: CPT | Mod: PBBFAC,,, | Performed by: INTERNAL MEDICINE

## 2023-11-21 PROCEDURE — 1101F PT FALLS ASSESS-DOCD LE1/YR: CPT | Mod: CPTII,S$GLB,, | Performed by: INTERNAL MEDICINE

## 2023-11-21 PROCEDURE — 3074F PR MOST RECENT SYSTOLIC BLOOD PRESSURE < 130 MM HG: ICD-10-PCS | Mod: CPTII,S$GLB,, | Performed by: INTERNAL MEDICINE

## 2023-11-21 PROCEDURE — 82043 UR ALBUMIN QUANTITATIVE: CPT | Performed by: INTERNAL MEDICINE

## 2023-11-21 PROCEDURE — 3074F SYST BP LT 130 MM HG: CPT | Mod: CPTII,S$GLB,, | Performed by: INTERNAL MEDICINE

## 2023-11-21 NOTE — ASSESSMENT & PLAN NOTE
Recent A1c was stable at 6.8%.  Average blood sugar was 184 on his CGM, 50% in range and 0% below range.  Patient is doing well on his current regimen.  Discussed increasing Mounjaro to 7.5 mg daily.  Continue his Lantus and Humalog.  Discussed SGLT2 inhibitor if he has persistent albuminuria.    Call if any side effects from the medications  Continue his Dexcom CGM.  Call if blood sugars are persistently less than 70 or greater than 200.     Discussed importance of diet and lifestyle modifications for diabetes management  Hypoglycemia management discussed. Carry glucose tablets or snacks at all times.     Complications:  Follow up for regular diabetes eye exam  Daily self examination of feet  CKD 3:  Continue follow-up in Nephrology.    Last A1c:   Lab Results   Component Value Date    HGBA1C 6.8 (H) 10/19/2023       microalbumin:   Lab Results   Component Value Date    LABMICR 254.0 12/05/2022    CREATRANDUR 50.0 12/05/2022    MICALBCREAT 508.0 (H) 12/05/2022

## 2023-11-21 NOTE — PROGRESS NOTES
ENDOCRINOLOGY CLINIC  11/21/2023       Subjective:      CC: Type 2 diabetes    HPI:   BI Cantor is a 77 y.o. male who presents for management of type 2 diabetes.  Patient was last seen in the clinic on 08/15/2023.    Diabetes Hx:  Diagnosed w/ DM: Dx around 2003, on insulin since 2008  Complications:   Retinopathy: Denies, Gets annual eye exam and will schedule a follow-up appointment.   Last eye exam: : 05/23/2022  Neuropathy: Yes, sees podiatrist for onychomycosis. On gabapentin.    Last foot exam: : 01/03/2023  Nephropathy: Albuminuria, severely increased, CKD 3.  Has appointment coming up with nephrology.   Cardiovascular: Diastolic dysfunction    Hypoglycemia: No h/o severe hypoglycemia, Hypoglycemia unawareness: : No    Current meds:   Mounjaro 5 mg weekly - switched last visit and is tolerating well.  Lantus 36 units daily  Humalog 10 units daily + SSI     Compliance with meds: Yes     Previous meds:  Metformin - diarrhea  Januvia  Invokana  Ozempic 1 mg/week    Home glucose checks: On Dexcom G7   Compliant: Yes.          Pattern of significant highs between 8:20 p.m. and 10:10 p.m..    Diet/Exercise:   Walks with cane due to risk of falls.   Appetite is good and takes 3 meals a day.   Weight loss of about 10 lb since the last visit.    Denies history of pancreatitis or medullary thyroid cancer.  History recurrent UTI: Yes, no episodes in the past few years.   History of recurrent fungal infection: Prior infections.    Polyuria: No  Polydipsia: No    Last A1c:   Lab Results   Component Value Date    HGBA1C 6.8 (H) 10/19/2023    HGBA1C 6.6 (H) 07/30/2023    HGBA1C 6.5 (H) 06/19/2023     Microalbumin:   Lab Results   Component Value Date    LABMICR 254.0 12/05/2022    CREATRANDUR 50.0 12/05/2022    MICALBCREAT 508.0 (H) 12/05/2022       Lab Results   Component Value Date    CREATININE 1.3 10/19/2023    EGFRNORACEVR 55.9 (A) 10/19/2023     Lipids:   Lab Results   Component Value Date    CHOL 137 05/03/2023     TRIG 149 05/03/2023    HDL 24 (L) 05/03/2023    LDLCALC 83.2 05/03/2023    CHOLHDL 17.5 (L) 05/03/2023     TSH:  Lab Results   Component Value Date    TSH 3.757 09/26/2022     Lab Results   Component Value Date    HGB 14.3 10/19/2023       Aspirin: No  Statins: Yes, on atorvastatin, + fenofibrate  ACEI/ARB: No    Patient has hypothyroidism on levothyroxine and follows with PCP.     FHx of DM: Yes, Father and paternal grandmother    ROS: see HPI     Objective:   Physical Exam     /75 (BP Location: Right arm, Patient Position: Sitting, BP Method: Large (Automatic))   Pulse 67   Wt 120.1 kg (264 lb 12.4 oz)   SpO2 97%   BMI 40.26 kg/m²     Wt Readings from Last 3 Encounters:   11/21/23 120.1 kg (264 lb 12.4 oz)   10/26/23 119.7 kg (264 lb)   10/19/23 120.6 kg (265 lb 14 oz)     Physical Exam  Constitutional:       General: He is not in acute distress.     Appearance: He is not ill-appearing.   HENT:      Head: Normocephalic and atraumatic.   Eyes:      Conjunctiva/sclera: Conjunctivae normal.   Cardiovascular:      Rate and Rhythm: Normal rate.   Pulmonary:      Effort: Pulmonary effort is normal.   Neurological:      Mental Status: He is alert and oriented to person, place, and time.        Foot exam:  Decreased sensation on monofilament testing. No foot ulcers. Onychomycosis seen bilaterally.     LABORATORY REVIEW:  See HPI for other labs reviewed today      Chemistry        Component Value Date/Time     10/19/2023 1224    K 4.1 10/19/2023 1224     10/19/2023 1224    CO2 24 10/19/2023 1224    BUN 15 10/19/2023 1224    CREATININE 1.3 10/19/2023 1224     (H) 10/19/2023 1224        Component Value Date/Time    CALCIUM 9.8 10/19/2023 1224    ALKPHOS 37 (L) 05/03/2023 1207    AST 25 05/03/2023 1207    ALT 17 05/03/2023 1207    BILITOT 1.1 (H) 05/03/2023 1207    ESTGFRAFRICA >60 07/01/2022 1319    EGFRNONAA 52 (A) 07/01/2022 1319          Lab Results   Component Value Date    HGBA1C 6.8 (H)  10/19/2023    HGBA1C 6.6 (H) 07/30/2023    HGBA1C 6.5 (H) 06/19/2023     Other labs reviewed today in HPI    Assessment/Plan:     Problem List Items Addressed This Visit          Cardiac/Vascular    Hyperlipidemia       Continue statin therapy.      Lipids:   Lab Results   Component Value Date    CHOL 137 05/03/2023    TRIG 149 05/03/2023    HDL 24 (L) 05/03/2023    LDLCALC 83.2 05/03/2023    CHOLHDL 17.5 (L) 05/03/2023               Endocrine    Type 2 diabetes mellitus with stage 3a chronic kidney disease, with long-term current use of insulin - Primary       Recent A1c was stable at 6.8%.  Average blood sugar was 184 on his CGM, 50% in range and 0% below range.  Patient is doing well on his current regimen.  Discussed increasing Mounjaro to 7.5 mg daily.  Continue his Lantus and Humalog.  Discussed SGLT2 inhibitor if he has persistent albuminuria.    Call if any side effects from the medications  Continue his Dexcom CGM.  Call if blood sugars are persistently less than 70 or greater than 200.     Discussed importance of diet and lifestyle modifications for diabetes management  Hypoglycemia management discussed. Carry glucose tablets or snacks at all times.     Complications:  Follow up for regular diabetes eye exam  Daily self examination of feet  CKD 3:  Continue follow-up in Nephrology.    Last A1c:   Lab Results   Component Value Date    HGBA1C 6.8 (H) 10/19/2023     microalbumin:   Lab Results   Component Value Date    LABMICR 254.0 12/05/2022    CREATRANDUR 50.0 12/05/2022    MICALBCREAT 508.0 (H) 12/05/2022            Relevant Medications    tirzepatide 7.5 mg/0.5 mL PnIj    Other Relevant Orders    Microalbumin/Creatinine Ratio, Urine      Follow up in about 3 months (around 2/21/2024).     Madeline Dempsey MD

## 2023-12-21 RX ORDER — TEMAZEPAM 15 MG/1
15 CAPSULE ORAL NIGHTLY PRN
Qty: 30 CAPSULE | Refills: 5 | Status: SHIPPED | OUTPATIENT
Start: 2023-12-21

## 2023-12-21 NOTE — TELEPHONE ENCOUNTER
Refill Routing Note   Medication(s) are not appropriate for processing by [facility] for the following reason(s):        Outside of protocol    [facility] action(s):  Route               Appointments  past 12m or future 3m with PCP    Date Provider   Last Visit   10/19/2023 Desmond Barlow MD   Next Visit   Visit date not found Desmond Barlow MD   ED visits in past 90 days: 0        Note composed:10:45 AM 12/21/2023

## 2023-12-28 DIAGNOSIS — L21.9 SEBORRHEIC DERMATITIS: ICD-10-CM

## 2023-12-28 RX ORDER — TRIAMCINOLONE ACETONIDE 0.25 MG/ML
LOTION TOPICAL
Qty: 60 ML | Refills: 2 | Status: SHIPPED | OUTPATIENT
Start: 2023-12-28 | End: 2024-03-18

## 2024-01-18 ENCOUNTER — OFFICE VISIT (OUTPATIENT)
Dept: PODIATRY | Facility: CLINIC | Age: 79
End: 2024-01-18
Payer: MEDICARE

## 2024-01-18 VITALS
WEIGHT: 264 LBS | BODY MASS INDEX: 40.01 KG/M2 | HEART RATE: 73 BPM | SYSTOLIC BLOOD PRESSURE: 107 MMHG | HEIGHT: 68 IN | DIASTOLIC BLOOD PRESSURE: 63 MMHG

## 2024-01-18 DIAGNOSIS — Z79.01 LONG TERM CURRENT USE OF ANTICOAGULANT THERAPY: Primary | ICD-10-CM

## 2024-01-18 DIAGNOSIS — L84 CORN OR CALLUS: ICD-10-CM

## 2024-01-18 DIAGNOSIS — E11.51 ONYCHOMYCOSIS OF MULTIPLE TOENAILS WITH TYPE 2 DIABETES MELLITUS AND PERIPHERAL ANGIOPATHY: ICD-10-CM

## 2024-01-18 DIAGNOSIS — E11.42 DIABETIC POLYNEUROPATHY ASSOCIATED WITH TYPE 2 DIABETES MELLITUS: ICD-10-CM

## 2024-01-18 DIAGNOSIS — E11.69 ONYCHOMYCOSIS OF MULTIPLE TOENAILS WITH TYPE 2 DIABETES MELLITUS AND PERIPHERAL ANGIOPATHY: ICD-10-CM

## 2024-01-18 DIAGNOSIS — B35.1 ONYCHOMYCOSIS OF MULTIPLE TOENAILS WITH TYPE 2 DIABETES MELLITUS AND PERIPHERAL ANGIOPATHY: ICD-10-CM

## 2024-01-18 DIAGNOSIS — B35.3 TINEA PEDIS OF BOTH FEET: ICD-10-CM

## 2024-01-18 PROCEDURE — 11056 PARNG/CUTG B9 HYPRKR LES 2-4: CPT | Mod: Q9,59,S$GLB, | Performed by: PODIATRIST

## 2024-01-18 PROCEDURE — 11721 DEBRIDE NAIL 6 OR MORE: CPT | Mod: 59,Q9,S$GLB, | Performed by: PODIATRIST

## 2024-01-18 PROCEDURE — 99999 PR PBB SHADOW E&M-EST. PATIENT-LVL IV: CPT | Mod: PBBFAC,,, | Performed by: PODIATRIST

## 2024-01-18 PROCEDURE — 99214 OFFICE O/P EST MOD 30 MIN: CPT | Mod: 25,S$GLB,, | Performed by: PODIATRIST

## 2024-01-18 RX ORDER — CICLOPIROX 80 MG/ML
SOLUTION TOPICAL NIGHTLY
Qty: 6.6 ML | Refills: 11 | Status: SHIPPED | OUTPATIENT
Start: 2024-01-18

## 2024-01-18 RX ORDER — AMMONIUM LACTATE 12 G/100G
1 CREAM TOPICAL 2 TIMES DAILY
Qty: 140 G | Refills: 11 | Status: SHIPPED | OUTPATIENT
Start: 2024-01-18

## 2024-01-18 RX ORDER — CICLOPIROX OLAMINE 7.7 MG/G
CREAM TOPICAL 2 TIMES DAILY
Qty: 90 G | Refills: 2 | Status: SHIPPED | OUTPATIENT
Start: 2024-01-18

## 2024-01-18 NOTE — PROGRESS NOTES
Subjective:      Patient ID: VIJAY Cantor Jr. is a 78 y.o. male.    Chief Complaint: Diabetic Foot Exam (Foot Exam/PCP PCP Desmond Barlow MD  10/19/23)    BI is a 78 y.o. male who presents to the clinic for evaluation and treatment of high risk feet. BI has a past medical history of *Atrial fibrillation, Anticoagulant long-term use, Basal cell carcinoma (12/2015), Basal cell carcinoma (10/28/2019), BCC (basal cell carcinoma) (12/2015), Cataract, Chronic kidney disease, Diabetes mellitus with renal manifestations, uncontrolled, Dyslipidemia associated with type 2 diabetes mellitus, Fever blister, Hearing loss, HTN (hypertension), Keloid cicatrix, Liver disease, Melanoma (12/07/2015), Obesity, PN (peripheral neuropathy), Primary osteoarthritis of right knee (1/25/2017), Screening for colon cancer (3/3/2016), Sleep apnea, Thyroid disease, Type II or unspecified type diabetes mellitus with other specified manifestations, uncontrolled, and Ulcer of left lower extremity, limited to breakdown of skin (9/22/2017). The patient's chief complaint is long, thick toenails.  Gradual onset, worsening over past several weeks, aggravated by increased weight bearing, shoe gear, pressure.  Periodic debridement helps symptoms. This patient has documented high risk feet requiring routine maintenance secondary to diabetes mellitis and those secondary complications of diabetes, as mentioned..    PCP: Desmond Barlow MD    Date Last Seen by PCP:   Chief Complaint   Patient presents with    Diabetic Foot Exam     Foot Exam/PCP PCP Desmond Barlow MD  10/19/23         Current shoe gear:  Affected Foot: Rx diabetic extra depth shoes and custom accommodative insoles     Unaffected Foot: Rx diabetic extra depth shoes and custom accommodative insoles    Hemoglobin A1C   Date Value Ref Range Status   10/19/2023 6.8 (H) 4.0 - 5.6 % Final     Comment:     ADA Screening Guidelines:  5.7-6.4%  Consistent with prediabetes  >or=6.5%   Consistent with diabetes    High levels of fetal hemoglobin interfere with the HbA1C  assay. Heterozygous hemoglobin variants (HbS, HgC, etc)do  not significantly interfere with this assay.   However, presence of multiple variants may affect accuracy.     07/30/2023 6.6 (H) 4.0 - 5.6 % Final     Comment:     ADA Screening Guidelines:  5.7-6.4%  Consistent with prediabetes  >or=6.5%  Consistent with diabetes    High levels of fetal hemoglobin interfere with the HbA1C  assay. Heterozygous hemoglobin variants (HbS, HgC, etc)do  not significantly interfere with this assay.   However, presence of multiple variants may affect accuracy.     06/19/2023 6.5 (H) 4.0 - 5.6 % Final     Comment:     ADA Screening Guidelines:  5.7-6.4%  Consistent with prediabetes  >or=6.5%  Consistent with diabetes    High levels of fetal hemoglobin interfere with the HbA1C  assay. Heterozygous hemoglobin variants (HbS, HgC, etc)do  not significantly interfere with this assay.   However, presence of multiple variants may affect accuracy.         Review of Systems   Constitutional: Negative for chills, fever, malaise/fatigue and night sweats.   Cardiovascular:  Positive for leg swelling. Negative for claudication and cyanosis.   Skin:  Positive for dry skin, itching, nail changes and suspicious lesions. Negative for color change, poor wound healing and rash.   Musculoskeletal:  Negative for falls, gout, joint pain and myalgias.   Neurological:  Positive for focal weakness, numbness, paresthesias and sensory change.           Objective:      Physical Exam  Constitutional:       General: He is not in acute distress.     Appearance: He is well-developed. He is not diaphoretic.      Comments: Oriented to time, place, and person.   Cardiovascular:      Pulses:           Popliteal pulses are 2+ on the right side and 2+ on the left side.        Dorsalis pedis pulses are 1+ on the right side and 1+ on the left side.        Posterior tibial pulses are 1+  on the right side and 1+ on the left side.      Comments: Capillary fill time 3-5 seconds.  All toes warm to touch.      2 + pitting lower extremity edema bilateral.    Negative elevational pallor and dependent rubor bilateral.    Musculoskeletal:      Right ankle: No swelling, deformity, ecchymosis or lacerations. Normal range of motion. Normal pulse.      Right Achilles Tendon: Normal. No defects. Dawson's test negative.      Comments:     Ankle dorsiflexion decreased at <10 degrees bilateral with moderate increase with knee flexion bilateral.      All ten toes without clubbing, cyanosis, or signs of ischemia.      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    No pain to palpation bilateral lower extremities.      Range of motion, stability, muscle strength, and muscle tone are age and health appropriate normal bilateral feet and legs.       Lymphadenopathy:      Lower Body: No right inguinal adenopathy. No left inguinal adenopathy.      Comments: Negative lymphadenopathy bilateral popliteal fossa and tarsal tunnel.  Negative lymphangitic streaking bilateral foot/ankle bilateral.     Skin:     General: Skin is warm and dry.      Capillary Refill: Capillary refill takes 2 to 3 seconds.      Coloration: Skin is not pale.      Findings: No abrasion, bruising, burn, ecchymosis, erythema, laceration, lesion, petechiae or rash.      Nails: There is no clubbing.      Comments: Dry scale with superficial flakes over an erythematous base  without ulceration, drainage, pus, tracking, fluctuance, malodor, or cardinal signs infection.    Focal hyperkeratotic lesion consisting entirely of hyperkeratotic tissue without open skin, drainage, pus, fluctuance, malodor, or signs of infection right and left plantar 1st mtpj.     Otherwise, Skin thin, atrophic, with decreased density and distribution of pedal hair bilateral, but without hyperpigmentation,   ulcers, masses, nodules or cords palpated bilateral feet and legs.        Toenails  1st, 2nd, 3rd, 4th, 5th  bilateral are hypertrophic thickened 2-3 mm, dystrophic, discolored tanish brown with tan, gray crumbly subungual debris.  Tender to distal nail plate pressure, without periungual skin abnormality of each.     Neurological:      Mental Status: He is alert and oriented to person, place, and time. He is not disoriented.      Sensory: Sensory deficit present.      Motor: No tremor, atrophy or abnormal muscle tone.      Gait: Gait normal.      Comments: Decreased/absent vibratory sensation bilateral feet to 128Hz tuning fork.    Paresthesias, and burning bilateral feet with no clearly identified trigger or source.     Psychiatric:         Behavior: Behavior is cooperative.             Assessment:       Encounter Diagnoses   Name Primary?    Long term current use of anticoagulant therapy Yes    Onychomycosis of multiple toenails with type 2 diabetes mellitus and peripheral angiopathy     Diabetic polyneuropathy associated with type 2 diabetes mellitus     Tinea pedis of both feet     Corn or callus          Plan:       [facility] today for diabetic foot exam.    Diagnoses and all orders for this visit:    Long term current use of anticoagulant therapy  -     DIABETIC SHOES FOR HOME USE    Onychomycosis of multiple toenails with type 2 diabetes mellitus and peripheral angiopathy  -     DIABETIC SHOES FOR HOME USE    Diabetic polyneuropathy associated with type 2 diabetes mellitus  -     DIABETIC SHOES FOR HOME USE    Tinea pedis of both feet  -     DIABETIC SHOES FOR HOME USE    Corn or callus  -     DIABETIC SHOES FOR HOME USE    Other orders  -     ammonium lactate 12 % Crea; Apply 1 application  topically 2 (two) times daily.  -     ciclopirox (LOPROX) 0.77 % Crea; Apply topically 2 (two) times daily.  -     ciclopirox (PENLAC) 8 % Soln; Apply topically nightly.      I counseled the patient on his conditions, their implications and medical management.    - Shoe inspection. Diabetic Foot  Education. Patient reminded of the importance of good nutrition and blood sugar control to help prevent podiatric complications of diabetes. Patient instructed on proper foot hygeine. We discussed wearing proper shoe gear, daily foot inspections, never walking without protective shoe gear, never putting sharp instruments to feet, routine podiatric visits at least annually.      - With patient's permission, nails were aggressively reduced and debrided x 10 to their soft tissue attachment mechanically, removing all offending nail and debris. Patient relates relief following the procedure. He will continue to monitor the areas daily, inspect his feet, wear protective shoe gear when ambulatory, moisturizer to maintain skin integrity and follow in this office  p.r.n.      Discussed conservative treatment with shoes of adequate dimensions, material, and style to alleviate symptoms and delay or prevent surgical intervention.    Resume compression stockings and left afo he has at home.        Continue  penlac    Follow up in about 1 year (around 1/18/2025).

## 2024-01-23 RX ORDER — METOPROLOL SUCCINATE 100 MG/1
TABLET, EXTENDED RELEASE ORAL
Qty: 90 TABLET | Refills: 2 | Status: SHIPPED | OUTPATIENT
Start: 2024-01-23

## 2024-01-23 NOTE — TELEPHONE ENCOUNTER
No care due was identified.  [facility] Embedded Care Due Messages. Reference number: [id number].   1/23/2024 9:04:31 AM CST

## 2024-01-23 NOTE — TELEPHONE ENCOUNTER
Refill Routing Note   Medication(s) are not appropriate for processing by [facility] for the following reason(s):        Drug-disease interaction    [facility] action(s):  Defer        Medication Therapy Plan: Drug-Disease: metoprolol succinate and Onychomycosis of multiple toenails with type 2 diabetes mellitus and peripheral angiopathy    Pharmacist review requested: Yes     Appointments  past 12m or future 3m with PCP    Date Provider   Last Visit   10/19/2023 Desmond Barlow MD   Next Visit   Visit date not found Desmond Barlow MD   ED visits in past 90 days: 0        Note composed:12:12 PM 01/23/2024

## 2024-01-23 NOTE — TELEPHONE ENCOUNTER
Refill Decision Note   [facility]  is requesting a refill authorization.  Brief Assessment and Rationale for Refill:  Approve     Medication Therapy Plan:  Drug-Disease: metoprolol succinate and Onychomycosis of multiple toenails with type 2 diabetes mellitus and peripheral angiopathy    Medication Reconciliation Completed: No   Comments:     No Care Gaps recommended.     Note composed:12:16 PM 01/23/2024

## 2024-01-26 DIAGNOSIS — E11.69 TYPE 2 DIABETES MELLITUS WITH OTHER SPECIFIED COMPLICATION, WITH LONG-TERM CURRENT USE OF INSULIN: ICD-10-CM

## 2024-01-26 DIAGNOSIS — Z79.4 TYPE 2 DIABETES MELLITUS WITH OTHER SPECIFIED COMPLICATION, WITH LONG-TERM CURRENT USE OF INSULIN: ICD-10-CM

## 2024-01-26 RX ORDER — BLOOD-GLUCOSE SENSOR
1 EACH MISCELLANEOUS
Qty: 9 EACH | Refills: 4 | Status: SHIPPED | OUTPATIENT
Start: 2024-01-26 | End: 2024-04-25

## 2024-01-26 RX ORDER — BLOOD-GLUCOSE TRANSMITTER
1 EACH MISCELLANEOUS
Qty: 1 EACH | Refills: 3 | Status: SHIPPED | OUTPATIENT
Start: 2024-01-26 | End: 2024-04-25

## 2024-02-28 RX ORDER — GABAPENTIN 300 MG/1
CAPSULE ORAL
Qty: 360 CAPSULE | Refills: 1 | Status: SHIPPED | OUTPATIENT
Start: 2024-02-28

## 2024-03-16 DIAGNOSIS — L21.9 SEBORRHEIC DERMATITIS: ICD-10-CM

## 2024-03-18 RX ORDER — TRIAMCINOLONE ACETONIDE 0.25 MG/ML
LOTION TOPICAL
Qty: 60 ML | Refills: 2 | Status: SHIPPED | OUTPATIENT
Start: 2024-03-18 | End: 2024-06-03

## 2024-04-12 NOTE — ASSESSMENT & PLAN NOTE
Evaluated on 7/16/18  · 1+ edema to left lower extremity with small amount of mild erythema to lower leg (normal appearanc according to daughter at bedside)  · 2+ edema to right leg with large area of erythema to shin region with intact blisters (daughter reports normal appearance when fluid in leg increases)  · Started on keflex, ? Cellulitis,  patient to see orthopedic in clinic tomorrow  · Keep lower extremities elevated when not in therapy  · Teds hose  · Low Na diet   Ruptured

## 2024-04-16 ENCOUNTER — TELEPHONE (OUTPATIENT)
Dept: PODIATRY | Facility: CLINIC | Age: 79
End: 2024-04-16
Payer: MEDICARE

## 2024-04-16 NOTE — TELEPHONE ENCOUNTER
Staff informed patient he can come get diabetic shoe order at clinic.    Patient stated he will  order or grandson will pick the order up.    Staff verbalized understanding.      ----- Message from Stephanie Ballard sent at 4/16/2024 12:01 PM CDT -----   Name of Who is Calling:     What is the request in detail: request call back in reference to type of diabetic  shoe patient need  Please contact to further discuss and advise      Can the clinic reply by [facility]:     What Number to Call Back if not in [facility]:  [facility] / elin /  request contact patient   [phone]

## 2024-04-25 ENCOUNTER — LAB VISIT (OUTPATIENT)
Dept: LAB | Facility: HOSPITAL | Age: 79
End: 2024-04-25
Attending: INTERNAL MEDICINE
Payer: MEDICARE

## 2024-04-25 ENCOUNTER — OFFICE VISIT (OUTPATIENT)
Dept: ENDOCRINOLOGY | Facility: CLINIC | Age: 79
End: 2024-04-25
Payer: MEDICARE

## 2024-04-25 VITALS
HEART RATE: 72 BPM | WEIGHT: 265.88 LBS | DIASTOLIC BLOOD PRESSURE: 76 MMHG | BODY MASS INDEX: 40.43 KG/M2 | OXYGEN SATURATION: 98 % | SYSTOLIC BLOOD PRESSURE: 112 MMHG

## 2024-04-25 DIAGNOSIS — E03.9 ACQUIRED HYPOTHYROIDISM: ICD-10-CM

## 2024-04-25 DIAGNOSIS — E78.2 MIXED HYPERLIPIDEMIA: ICD-10-CM

## 2024-04-25 DIAGNOSIS — Z79.4 TYPE 2 DIABETES MELLITUS WITH STAGE 3A CHRONIC KIDNEY DISEASE, WITH LONG-TERM CURRENT USE OF INSULIN: Primary | ICD-10-CM

## 2024-04-25 DIAGNOSIS — N18.31 TYPE 2 DIABETES MELLITUS WITH STAGE 3A CHRONIC KIDNEY DISEASE, WITH LONG-TERM CURRENT USE OF INSULIN: Primary | ICD-10-CM

## 2024-04-25 DIAGNOSIS — Z79.4 TYPE 2 DIABETES MELLITUS WITH HYPERGLYCEMIA, WITH LONG-TERM CURRENT USE OF INSULIN: ICD-10-CM

## 2024-04-25 DIAGNOSIS — E11.22 TYPE 2 DIABETES MELLITUS WITH STAGE 3A CHRONIC KIDNEY DISEASE, WITH LONG-TERM CURRENT USE OF INSULIN: Primary | ICD-10-CM

## 2024-04-25 DIAGNOSIS — Z79.4 TYPE 2 DIABETES MELLITUS WITH STAGE 3A CHRONIC KIDNEY DISEASE, WITH LONG-TERM CURRENT USE OF INSULIN: ICD-10-CM

## 2024-04-25 DIAGNOSIS — E66.01 CLASS 3 SEVERE OBESITY DUE TO EXCESS CALORIES WITH SERIOUS COMORBIDITY AND BODY MASS INDEX (BMI) OF 40.0 TO 44.9 IN ADULT: ICD-10-CM

## 2024-04-25 DIAGNOSIS — E11.22 TYPE 2 DIABETES MELLITUS WITH STAGE 3A CHRONIC KIDNEY DISEASE, WITH LONG-TERM CURRENT USE OF INSULIN: ICD-10-CM

## 2024-04-25 DIAGNOSIS — N18.31 TYPE 2 DIABETES MELLITUS WITH STAGE 3A CHRONIC KIDNEY DISEASE, WITH LONG-TERM CURRENT USE OF INSULIN: ICD-10-CM

## 2024-04-25 DIAGNOSIS — E11.65 TYPE 2 DIABETES MELLITUS WITH HYPERGLYCEMIA, WITH LONG-TERM CURRENT USE OF INSULIN: ICD-10-CM

## 2024-04-25 LAB
ALBUMIN/CREAT UR: 51.2 UG/MG (ref 0–30)
CREAT SERPL-MCNC: 1.4 MG/DL (ref 0.5–1.4)
CREAT UR-MCNC: 170 MG/DL (ref 23–375)
EST. GFR  (NO RACE VARIABLE): 50.8 ML/MIN/1.73 M^2
ESTIMATED AVG GLUCOSE: 143 MG/DL (ref 68–131)
HBA1C MFR BLD: 6.6 % (ref 4–5.6)
MICROALBUMIN UR DL<=1MG/L-MCNC: 87 UG/ML
T4 FREE SERPL-MCNC: 1.05 NG/DL (ref 0.71–1.51)
TSH SERPL DL<=0.005 MIU/L-ACNC: 3.15 UIU/ML (ref 0.4–4)

## 2024-04-25 PROCEDURE — 3288F FALL RISK ASSESSMENT DOCD: CPT | Mod: CPTII,S$GLB,, | Performed by: INTERNAL MEDICINE

## 2024-04-25 PROCEDURE — 36415 COLL VENOUS BLD VENIPUNCTURE: CPT | Performed by: INTERNAL MEDICINE

## 2024-04-25 PROCEDURE — 83036 HEMOGLOBIN GLYCOSYLATED A1C: CPT | Performed by: INTERNAL MEDICINE

## 2024-04-25 PROCEDURE — 84443 ASSAY THYROID STIM HORMONE: CPT | Performed by: INTERNAL MEDICINE

## 2024-04-25 PROCEDURE — 99999 PR PBB SHADOW E&M-EST. PATIENT-LVL III: CPT | Mod: PBBFAC,,, | Performed by: INTERNAL MEDICINE

## 2024-04-25 PROCEDURE — 95251 CONT GLUC MNTR ANALYSIS I&R: CPT | Mod: S$GLB,,, | Performed by: INTERNAL MEDICINE

## 2024-04-25 PROCEDURE — 84439 ASSAY OF FREE THYROXINE: CPT | Performed by: INTERNAL MEDICINE

## 2024-04-25 PROCEDURE — 3074F SYST BP LT 130 MM HG: CPT | Mod: CPTII,S$GLB,, | Performed by: INTERNAL MEDICINE

## 2024-04-25 PROCEDURE — 82565 ASSAY OF CREATININE: CPT | Performed by: INTERNAL MEDICINE

## 2024-04-25 PROCEDURE — 99214 OFFICE O/P EST MOD 30 MIN: CPT | Mod: 25,S$GLB,, | Performed by: INTERNAL MEDICINE

## 2024-04-25 PROCEDURE — 82043 UR ALBUMIN QUANTITATIVE: CPT | Performed by: INTERNAL MEDICINE

## 2024-04-25 PROCEDURE — 3078F DIAST BP <80 MM HG: CPT | Mod: CPTII,S$GLB,, | Performed by: INTERNAL MEDICINE

## 2024-04-25 PROCEDURE — 1159F MED LIST DOCD IN RCRD: CPT | Mod: CPTII,S$GLB,, | Performed by: INTERNAL MEDICINE

## 2024-04-25 PROCEDURE — 1101F PT FALLS ASSESS-DOCD LE1/YR: CPT | Mod: CPTII,S$GLB,, | Performed by: INTERNAL MEDICINE

## 2024-04-25 PROCEDURE — 1160F RVW MEDS BY RX/DR IN RCRD: CPT | Mod: CPTII,S$GLB,, | Performed by: INTERNAL MEDICINE

## 2024-04-25 PROCEDURE — 1126F AMNT PAIN NOTED NONE PRSNT: CPT | Mod: CPTII,S$GLB,, | Performed by: INTERNAL MEDICINE

## 2024-04-25 RX ORDER — BLOOD-GLUCOSE SENSOR
EACH MISCELLANEOUS
Qty: 3 EACH | Refills: 11 | Status: SHIPPED | OUTPATIENT
Start: 2024-04-25

## 2024-04-25 NOTE — PATIENT INSTRUCTIONS
Labs today and will contact you with the results.     Change in your diabetes medications:   Increase Mounjaro to 10 mg once a week  Continue your Lantus and Humalog doses.      Call if you have any side effects from the medication:   Mounjaro:  Nausea, vomiting, diarrhea, constipation, decreased appetite, abdominal pain, pancreatitis.     Check blood sugars before meals and bedtime.   Call if blood sugars are frequently less than 70 or above 200.    Call if you need prescriptions for medications.  Carry glucose tablets or snacks with you at all times.       Follow-up in 4-5 months.

## 2024-04-25 NOTE — PROGRESS NOTES
ENDOCRINOLOGY CLINIC    Subjective:      CC: Type 2 diabetes    HPI:   BI Cantor is a 78 y.o. male who presents for follow-up of type 2 diabetes.  Patient was last seen in the clinic on 11/21/2023.    Diabetes Hx:  Diagnosed w/ DM: Dx around 2003, on insulin since 2008  Complications:   Retinopathy: Denies, Gets annual eye exam and will schedule a follow-up appointment.   Last eye exam: : 08/12/2022  Neuropathy: Yes, sees podiatrist for onychomycosis. On gabapentin.    Last foot exam: : 01/03/2023  Nephropathy: Albuminuria, recently improved, CKD 3.  Follow-up with nephrology.   Cardiovascular: Diastolic dysfunction    Hypoglycemia: No h/o severe hypoglycemia, Hypoglycemia unawareness: :  Denies      Current meds:   Mounjaro 7.5 mg weekly - tolerating well.  Lantus 36 units daily  Humalog 10 units daily + SSI     Compliance with meds: Yes     Previous meds:  Metformin - diarrhea  Januvia  Invokana - was on Invokana started in March 2016 and was discontinued in May 2016 related to decreased renal function.   Ozempic 1 mg/week    Home glucose checks: On Dexcom G7   Compliant: Yes.    Data from dates 04/12/2024 to 04/25/2024 reviewed    SUMMARY of KEY FINDINGS:      Average glucose:  196  Above 180 mg/dL:  53%  (Above 250 mg/dL:  10%)  Within  mg/dL:  37%  Below 70 mg/dL:  0%  (Below 54 mg/dL:  0%)  GMI:  8.0  Coefficient of variation (CV):  20.5%  SD:  40      Patient had a pattern of significant highs between 12:00 a.m. and 1:15 a.m. and between 10:30 a.m. and 2:40 p.m..      Diet/Exercise:   Walks with cane due to risk of falls.   Appetite is good and takes 3 meals a day.   Previously had weight loss of about 10 lb.  Weight is stable since the last visit.    Denies history of pancreatitis or personal/family history of medullary thyroid cancer.  History recurrent UTI: Yes, no episodes in the past few years.   History of recurrent fungal infection: Prior infections.    Polyuria/Polydipsia:  Denies    Last A1c:    Lab Results   Component Value Date    HGBA1C 6.8 (H) 10/19/2023    HGBA1C 6.6 (H) 07/30/2023    HGBA1C 6.5 (H) 06/19/2023       Microalbumin:   Lab Results   Component Value Date    LABMICR 55.0 11/21/2023    CREATRANDUR 105.0 11/21/2023    MICALBCREAT 52.4 (H) 11/21/2023    MICALBCREAT 508.0 (H) 12/05/2022       Lab Results   Component Value Date    CREATININE 1.3 10/19/2023    EGFRNORACEVR 55.9 (A) 10/19/2023     Lipids:   Lab Results   Component Value Date    CHOL 137 05/03/2023    TRIG 149 05/03/2023    HDL 24 (L) 05/03/2023    LDLCALC 83.2 05/03/2023    CHOLHDL 17.5 (L) 05/03/2023     TSH:  Lab Results   Component Value Date    TSH 3.757 09/26/2022     Lab Results   Component Value Date    HGB 14.3 10/19/2023       Aspirin: No  Statins: Yes, on atorvastatin, + fenofibrate  ACEI/ARB: No    Patient has hypothyroidism on levothyroxine [not taking it for a while] and follows with PCP.     FHx of DM: Yes, Father and paternal grandmother    ROS: see HPI     Objective:   Physical Exam     /76 (BP Location: Right arm, Patient Position: Sitting, BP Method: Large (Automatic))   Pulse 72   Wt 120.6 kg (265 lb 14 oz)   SpO2 98%   BMI 40.43 kg/m²     Wt Readings from Last 3 Encounters:   01/18/24 119.7 kg (264 lb)   11/21/23 120.1 kg (264 lb 12.4 oz)   10/26/23 119.7 kg (264 lb)     Physical Exam  Constitutional:       General: He is not in acute distress.     Appearance: He is not ill-appearing.   HENT:      Head: Normocephalic and atraumatic.   Eyes:      Conjunctiva/sclera: Conjunctivae normal.   Cardiovascular:      Rate and Rhythm: Normal rate.   Pulmonary:      Effort: Pulmonary effort is normal.   Neurological:      Mental Status: He is alert and oriented to person, place, and time.        Foot exam:  Decreased sensation on monofilament testing. No foot ulcers. Onychomycosis seen bilaterally.     LABORATORY REVIEW:  See HPI for other labs reviewed today      Chemistry        Component Value Date/Time      10/19/2023 1224    K 4.1 10/19/2023 1224     10/19/2023 1224    CO2 24 10/19/2023 1224    BUN 15 10/19/2023 1224    CREATININE 1.3 10/19/2023 1224     (H) 10/19/2023 1224        Component Value Date/Time    CALCIUM 9.8 10/19/2023 1224    ALKPHOS 37 (L) 05/03/2023 1207    AST 25 05/03/2023 1207    ALT 17 05/03/2023 1207    BILITOT 1.1 (H) 05/03/2023 1207    ESTGFRAFRICA >60 07/01/2022 1319    EGFRNONAA 52 (A) 07/01/2022 1319          Lab Results   Component Value Date    HGBA1C 6.8 (H) 10/19/2023    HGBA1C 6.6 (H) 07/30/2023    HGBA1C 6.5 (H) 06/19/2023     Other labs reviewed today in HPI    Assessment/Plan:     Problem List Items Addressed This Visit          Cardiac/Vascular    Hyperlipidemia       Continue statin.  Monitor lipids.              Endocrine    Type 2 diabetes mellitus with stage 3a chronic kidney disease, with long-term current use of insulin - Primary       Recent A1c was stable at 6.8%.  Average blood sugar in the last 2 weeks was 196 on his CGM, 37% n range and 0% below range.    Patient is tolerating the Mounjaro.  Discussed increasing Mounjaro to 10 mg daily.  Continue his Lantus and Humalog.  Discussed SGLT2 inhibitor if he has persistent albuminuria.    Call if any side effects from the medications  Continue his Dexcom CGM.  Call if blood sugars are persistently less than 70 or greater than 200.     Discussed importance of diet and lifestyle modifications for diabetes management  Hypoglycemia management discussed. Carry glucose tablets or snacks at all times.     Complications:  Follow up for regular diabetes eye exam  Daily self examination of feet  CKD 3:  Continue follow-up with Nephrology.           Relevant Medications    blood-glucose sensor (DEXCOM G7 SENSOR) Mckenna    tirzepatide 10 mg/0.5 mL PnIj    Class 3 severe obesity due to excess calories with serious comorbidity in adult       Encouraged good diet and lifestyle modification.  Continue Mounjaro.             Follow-up in 4-5 months.    Madeline Dempsey MD

## 2024-04-30 RX ORDER — INSULIN LISPRO 100 [IU]/ML
INJECTION, SOLUTION INTRAVENOUS; SUBCUTANEOUS
Qty: 45.9 ML | Refills: 3 | Status: SHIPPED | OUTPATIENT
Start: 2024-04-30

## 2024-05-01 DIAGNOSIS — E11.65 TYPE 2 DIABETES MELLITUS WITH HYPERGLYCEMIA, WITH LONG-TERM CURRENT USE OF INSULIN: ICD-10-CM

## 2024-05-01 DIAGNOSIS — Z79.4 TYPE 2 DIABETES MELLITUS WITH HYPERGLYCEMIA, WITH LONG-TERM CURRENT USE OF INSULIN: ICD-10-CM

## 2024-05-01 RX ORDER — INSULIN LISPRO 100 [IU]/ML
INJECTION, SOLUTION INTRAVENOUS; SUBCUTANEOUS
Qty: 45.9 ML | Refills: 3 | OUTPATIENT
Start: 2024-05-01

## 2024-05-01 NOTE — TELEPHONE ENCOUNTER
Called and spoke to patients daughter. Informed medication was sent to pharmacy.    ----- Message from Migdalia Lonog sent at 5/1/2024  3:52 PM CDT -----  Regarding: pharm auth  Contact: Sydnee [phone]  Sydnee/daughter calling to advise pharmacy has not received approval on insulin. Pls resend .

## 2024-05-05 NOTE — ASSESSMENT & PLAN NOTE
Recent A1c was stable at 6.8%.  Average blood sugar in the last 2 weeks was 196 on his CGM, 37% n range and 0% below range.    Patient is tolerating the Mounjaro.  Discussed increasing Mounjaro to 10 mg daily.  Continue his Lantus and Humalog.  Discussed SGLT2 inhibitor if he has persistent albuminuria.    Call if any side effects from the medications  Continue his Dexcom CGM.  Call if blood sugars are persistently less than 70 or greater than 200.     Discussed importance of diet and lifestyle modifications for diabetes management  Hypoglycemia management discussed. Carry glucose tablets or snacks at all times.     Complications:  Follow up for regular diabetes eye exam  Daily self examination of feet  CKD 3:  Continue follow-up with Nephrology.

## 2024-05-06 DIAGNOSIS — I10 ESSENTIAL HYPERTENSION: ICD-10-CM

## 2024-05-06 DIAGNOSIS — I48.19 PERSISTENT ATRIAL FIBRILLATION: ICD-10-CM

## 2024-05-06 DIAGNOSIS — I48.0 PAROXYSMAL ATRIAL FIBRILLATION: ICD-10-CM

## 2024-05-06 NOTE — TELEPHONE ENCOUNTER
Care Due:                  Date            Visit Type   Department     Provider  --------------------------------------------------------------------------------                                MYCHART                              FOLLOWUP/OF  [facility] INTERNAL  Last Visit: 10-      FICE VISIT   MEDICINE       Desmond Barlow  Next Visit: None Scheduled  None         None Found                                                            Last  Test          Frequency    Reason                     Performed    Due Date  --------------------------------------------------------------------------------    CMP.........  12 months..  fenofibrate..............  05- 04-    Lipid Panel.  12 months..  fenofibrate..............  05- 04-    [employer] Embedded Care Due Messages. Reference number: [id number].   5/06/2024 2:03:59 PM CDT

## 2024-05-07 RX ORDER — APIXABAN 5 MG/1
TABLET, FILM COATED ORAL
Qty: 180 TABLET | Refills: 1 | Status: SHIPPED | OUTPATIENT
Start: 2024-05-07

## 2024-05-07 RX ORDER — FENOFIBRATE 145 MG/1
145 TABLET, FILM COATED ORAL DAILY
Qty: 90 TABLET | Refills: 1 | Status: SHIPPED | OUTPATIENT
Start: 2024-05-07

## 2024-05-07 NOTE — TELEPHONE ENCOUNTER
Refill Routing Note   Medication(s) are not appropriate for processing by [facility] for the following reason(s):        Required labs outdated  Outside of protocol    [facility] action(s):  Defer  Route     Requires labs : Yes             Appointments  past 12m or future 3m with PCP    Date Provider   Last Visit   10/19/2023 Desmond Barlow MD   Next Visit   Visit date not found Desmond Barlow MD   ED visits in past 90 days: 0        Note composed:7:56 PM 05/06/2024

## 2024-05-09 ENCOUNTER — PATIENT MESSAGE (OUTPATIENT)
Dept: INTERNAL MEDICINE | Facility: CLINIC | Age: 79
End: 2024-05-09
Payer: MEDICARE

## 2024-05-10 RX ORDER — CIPROFLOXACIN 500 MG/1
500 TABLET ORAL 2 TIMES DAILY
Qty: 20 TABLET | Refills: 0 | Status: SHIPPED | OUTPATIENT
Start: 2024-05-10 | End: 2024-05-20

## 2024-05-15 RX ORDER — TAMSULOSIN HYDROCHLORIDE 0.4 MG/1
0.4 CAPSULE ORAL DAILY
Qty: 90 CAPSULE | Refills: 1 | Status: SHIPPED | OUTPATIENT
Start: 2024-05-15

## 2024-05-15 NOTE — TELEPHONE ENCOUNTER
Refill Decision Note   BI Cantor  is requesting a refill authorization.  Brief Assessment and Rationale for Refill:  Approve     Medication Therapy Plan:         Comments:     Note composed:11:05 AM 05/15/2024

## 2024-05-15 NOTE — TELEPHONE ENCOUNTER
No care due was identified.  [facility] Embedded Care Due Messages. Reference number: [id number].   5/15/2024 9:02:04 AM CDT

## 2024-05-30 DIAGNOSIS — L21.9 SEBORRHEIC DERMATITIS: ICD-10-CM

## 2024-06-03 RX ORDER — TRIAMCINOLONE ACETONIDE 0.25 MG/ML
LOTION TOPICAL
Qty: 60 ML | Refills: 1 | Status: SHIPPED | OUTPATIENT
Start: 2024-06-03

## 2024-06-04 ENCOUNTER — PATIENT MESSAGE (OUTPATIENT)
Dept: ENDOCRINOLOGY | Facility: CLINIC | Age: 79
End: 2024-06-04
Payer: MEDICARE

## 2024-06-04 DIAGNOSIS — Z79.4 TYPE 2 DIABETES MELLITUS WITH HYPERGLYCEMIA, WITH LONG-TERM CURRENT USE OF INSULIN: ICD-10-CM

## 2024-06-04 DIAGNOSIS — E11.65 TYPE 2 DIABETES MELLITUS WITH HYPERGLYCEMIA, WITH LONG-TERM CURRENT USE OF INSULIN: ICD-10-CM

## 2024-06-04 RX ORDER — INSULIN GLARGINE 100 [IU]/ML
36 INJECTION, SOLUTION SUBCUTANEOUS NIGHTLY
Qty: 10.8 ML | Refills: 11 | Status: SHIPPED | OUTPATIENT
Start: 2024-06-04 | End: 2025-06-04

## 2024-06-10 ENCOUNTER — PATIENT MESSAGE (OUTPATIENT)
Dept: INTERNAL MEDICINE | Facility: CLINIC | Age: 79
End: 2024-06-10
Payer: MEDICARE

## 2024-06-14 DIAGNOSIS — Z79.4 TYPE 2 DIABETES MELLITUS WITH HYPERGLYCEMIA, WITH LONG-TERM CURRENT USE OF INSULIN: ICD-10-CM

## 2024-06-14 DIAGNOSIS — E11.65 TYPE 2 DIABETES MELLITUS WITH HYPERGLYCEMIA, WITH LONG-TERM CURRENT USE OF INSULIN: ICD-10-CM

## 2024-06-14 RX ORDER — PEN NEEDLE, DIABETIC 30 GX3/16"
NEEDLE, DISPOSABLE MISCELLANEOUS
Qty: 400 EACH | Refills: 3 | Status: SHIPPED | OUTPATIENT
Start: 2024-06-14

## 2024-06-21 DIAGNOSIS — M79.642 BILATERAL HAND PAIN: Primary | ICD-10-CM

## 2024-06-21 DIAGNOSIS — M79.641 BILATERAL HAND PAIN: Primary | ICD-10-CM

## 2024-06-23 NOTE — PROGRESS NOTES
Subjective:       Patient ID: Jmc PAT Cantor Jr. is a 71 y.o. male.    Chief Complaint: No chief complaint on file.    Wound Check        This patient is seen today for reevaluation of an ulcer to the left posterior leg.  This wound began as a blister about in early September that erupted leaving an ulcer in its place.  An unna boot was placed on the leg on the last visit.  The wound is healing as evidenced by wound contracture and epithelialization.  He is afebrile.  He denies increased redness or purulent drainage but does report increased leg swelling.  He does not complain of pain.  His medical history is significant for type II diabetes, chronic kidney disease and venous insufficiency.   Review of Systems    Unchanged from prior visit except for increased shortness of breath.  Objective:      Physical Exam   Constitutional: He is oriented to person, place, and time. He appears well-developed and well-nourished. No distress.   HENT:   Head: Normocephalic and atraumatic.   Cardiovascular: Intact distal pulses.    Musculoskeletal: Normal range of motion. He exhibits edema (3-4+ lower leg). He exhibits no tenderness.        Legs:  Neurological: He is alert and oriented to person, place, and time.   Skin: Skin is warm and dry. No rash noted. He is not diaphoretic. No erythema.   Psychiatric: He has a normal mood and affect. His behavior is normal. Judgment and thought content normal.   Nursing note and vitals reviewed.      ..  Hemoglobin A1C   Date Value Ref Range Status   09/22/2017 7.1 (H) 4.0 - 5.6 % Final     Comment:     According to ADA guidelines, hemoglobin A1c <7.0% represents  optimal control in non-pregnant diabetic patients. Different  metrics may apply to specific patient populations.   Standards of Medical Care in Diabetes-2016.  For the purpose of screening for the presence of diabetes:  <5.7%     Consistent with the absence of diabetes  5.7-6.4%  Consistent with increasing risk for diabetes    (prediabetes)  >or=6.5%  Consistent with diabetes  Currently, no consensus exists for use of hemoglobin A1c  for diagnosis of diabetes for children.  This Hemoglobin A1c assay has significant interference with fetal   hemoglobin   (HbF). The results are invalid for patients with abnormal amounts of   HbF,   including those with known Hereditary Persistence   of Fetal Hemoglobin. Heterozygous hemoglobin variants (HbAS, HbAC,   HbAD, HbAE, HbA2) do not significantly interfere with this assay;   however, presence of multiple variants in a sample may impact the %   interference.     07/13/2017 6.7 (H) 4.0 - 5.6 % Final     Comment:     According to ADA guidelines, hemoglobin A1c <7.0% represents  optimal control in non-pregnant diabetic patients. Different  metrics may apply to specific patient populations.   Standards of Medical Care in Diabetes-2016.  For the purpose of screening for the presence of diabetes:  <5.7%     Consistent with the absence of diabetes  5.7-6.4%  Consistent with increasing risk for diabetes   (prediabetes)  >or=6.5%  Consistent with diabetes  Currently, no consensus exists for use of hemoglobin A1c  for diagnosis of diabetes for children.  This Hemoglobin A1c assay has significant interference with fetal   hemoglobin   (HbF). The results are invalid for patients with abnormal amounts of   HbF,   including those with known Hereditary Persistence   of Fetal Hemoglobin. Heterozygous hemoglobin variants (HbAS, HbAC,   HbAD, HbAE, HbA2) do not significantly interfere with this assay;   however, presence of multiple variants in a sample may impact the %   interference.     06/22/2017 7.1 (H) 4.0 - 5.6 % Final     Comment:     According to ADA guidelines, hemoglobin A1c <7.0% represents  optimal control in non-pregnant diabetic patients. Different  metrics may apply to specific patient populations.   Standards of Medical Care in Diabetes-2016.  For the purpose of screening for the presence of  diabetes:  <5.7%     Consistent with the absence of diabetes  5.7-6.4%  Consistent with increasing risk for diabetes   (prediabetes)  >or=6.5%  Consistent with diabetes  Currently, no consensus exists for use of hemoglobin A1c  for diagnosis of diabetes for children.  This Hemoglobin A1c assay has significant interference with fetal   hemoglobin   (HbF). The results are invalid for patients with abnormal amounts of   HbF,   including those with known Hereditary Persistence   of Fetal Hemoglobin. Heterozygous hemoglobin variants (HbAS, HbAC,   HbAD, HbAE, HbA2) do not significantly interfere with this assay;   however, presence of multiple variants in a sample may impact the %   interference.       Stephen was seen in the clinic room and placed in the supine position on the treatment table.  The left leg was cleansed with Easi-clense sponges and dried thoroughly.  A hydrofiber dressing was applied to the wound.  Eucerin cream was applied to the lower legs.  The patient's foot was positioned at a 90 degree angle.  A zinc oxide wrap with calamine, followed by kerlix roll gauze and coban were applied using a spiral technique avoiding creases or folds.  The wrap was started behind the first metatarsal and ended below the tibial tubercle of the knee.  There was overlap of each turn half the width of the previous turn.  The compression wrap will be changed every 2-3 days.    Assessment:       1. Ulcer of left lower extremity, limited to breakdown of skin        Plan:           Calamine unna boot left lower leg as detailed above.  Patient was warned not to get the dressings wet and to use cast covers for showering.  Should the dressing become wet, he is to remove it, place a wet-to-dry dressing over the wound, cover with gauze and roll gauze and use ace wraps for compression and to secure bandages.  He should then notify this office as soon as possible to have a new dressing applied.  Return to clinic in one  week.                                                                  No

## 2024-06-24 ENCOUNTER — OFFICE VISIT (OUTPATIENT)
Dept: ORTHOPEDICS | Facility: CLINIC | Age: 79
End: 2024-06-24
Payer: MEDICARE

## 2024-06-24 ENCOUNTER — HOSPITAL ENCOUNTER (OUTPATIENT)
Dept: RADIOLOGY | Facility: HOSPITAL | Age: 79
Discharge: HOME | End: 2024-06-24
Payer: MEDICARE

## 2024-06-24 DIAGNOSIS — M79.642 BILATERAL HAND PAIN: ICD-10-CM

## 2024-06-24 DIAGNOSIS — M79.641 BILATERAL HAND PAIN: ICD-10-CM

## 2024-06-24 DIAGNOSIS — M65.321 TRIGGER FINGER, RIGHT INDEX FINGER: ICD-10-CM

## 2024-06-24 DIAGNOSIS — M79.641 BILATERAL HAND PAIN: Primary | ICD-10-CM

## 2024-06-24 DIAGNOSIS — N18.31 TYPE 2 DIABETES MELLITUS WITH STAGE 3A CHRONIC KIDNEY DISEASE, WITH LONG-TERM CURRENT USE OF INSULIN: ICD-10-CM

## 2024-06-24 DIAGNOSIS — M65.322 TRIGGER FINGER, LEFT INDEX FINGER: ICD-10-CM

## 2024-06-24 DIAGNOSIS — E11.22 TYPE 2 DIABETES MELLITUS WITH STAGE 3A CHRONIC KIDNEY DISEASE, WITH LONG-TERM CURRENT USE OF INSULIN: ICD-10-CM

## 2024-06-24 DIAGNOSIS — M65.331 TRIGGER FINGER, RIGHT MIDDLE FINGER: ICD-10-CM

## 2024-06-24 DIAGNOSIS — M79.642 BILATERAL HAND PAIN: Primary | ICD-10-CM

## 2024-06-24 DIAGNOSIS — Z79.4 TYPE 2 DIABETES MELLITUS WITH STAGE 3A CHRONIC KIDNEY DISEASE, WITH LONG-TERM CURRENT USE OF INSULIN: ICD-10-CM

## 2024-06-24 PROCEDURE — 73130 X-RAY EXAM OF HAND: CPT | Mod: TC,50,PN

## 2024-06-24 PROCEDURE — 99213 OFFICE O/P EST LOW 20 MIN: CPT | Mod: 25,S$GLB,,

## 2024-06-24 PROCEDURE — 1160F RVW MEDS BY RX/DR IN RCRD: CPT | Mod: CPTII,S$GLB,,

## 2024-06-24 PROCEDURE — 20550 NJX 1 TENDON SHEATH/LIGAMENT: CPT | Mod: LT,S$GLB,,

## 2024-06-24 PROCEDURE — 99999 PR PBB SHADOW E&M-EST. PATIENT-LVL V: CPT | Mod: PBBFAC,,,

## 2024-06-24 PROCEDURE — 73130 X-RAY EXAM OF HAND: CPT | Mod: 26,50,, | Performed by: RADIOLOGY

## 2024-06-24 PROCEDURE — 3288F FALL RISK ASSESSMENT DOCD: CPT | Mod: CPTII,S$GLB,,

## 2024-06-24 PROCEDURE — 1101F PT FALLS ASSESS-DOCD LE1/YR: CPT | Mod: CPTII,S$GLB,,

## 2024-06-24 PROCEDURE — 1159F MED LIST DOCD IN RCRD: CPT | Mod: CPTII,S$GLB,,

## 2024-06-24 RX ORDER — CEFAZOLIN SODIUM 2 G/50ML
2 SOLUTION INTRAVENOUS
OUTPATIENT
Start: 2024-06-24

## 2024-06-24 RX ORDER — MUPIROCIN 20 MG/G
OINTMENT TOPICAL
OUTPATIENT
Start: 2024-06-24

## 2024-06-24 RX ORDER — TRIAMCINOLONE ACETONIDE 40 MG/ML
20 INJECTION, SUSPENSION INTRA-ARTICULAR; INTRAMUSCULAR
Status: DISCONTINUED | OUTPATIENT
Start: 2024-06-24 | End: 2024-06-24 | Stop reason: HOSPADM

## 2024-06-24 RX ADMIN — TRIAMCINOLONE ACETONIDE 20 MG: 40 INJECTION, SUSPENSION INTRA-ARTICULAR; INTRAMUSCULAR at 01:06

## 2024-06-24 NOTE — PROCEDURES
Tendon Sheath    Date/Time: 6/24/2024 1:00 PM    Performed by: Seda Marquis PA-C  Authorized by: Seda Marquis PA-C    Consent Done?:  Yes (Verbal)  Indications:  Pain and joint swelling  Site marked: the procedure site was marked    Timeout: prior to procedure the correct patient, procedure, and site was verified    Prep: patient was prepped and draped in usual sterile fashion      Location:  Index finger  Site:  L index flexor tendon sheath  Ultrasonic guidance for needle placement?: No    Needle size:  25 G  Approach:  Volar  Medications:  20 mg triamcinolone acetonide 40 mg/mL  Patient tolerance:  Patient tolerated the procedure well with no immediate complications

## 2024-06-24 NOTE — PROGRESS NOTES
"Subjective:      Patient ID: [facility] [place] Cantor Jr. is a 78 y.o. male.  Chief Complaint: Pain of the Right Hand (Pain - 10 ongoing x's two months) and Pain of the Left Hand (Pain-8  ongoing x's two months)      HPI  JACQUI PAT Cantor Jr. is a  78 y.o. male presenting today for follow up of bilateral hand symptoms   Last saw Dr. Elmore on 10/26/2023 for triggering of the left middle finger and right index finger  Today, patient reports symptoms have returned to left index, right index, and right middle fingers, although he reports the pain somewhat different than his usual trigger finger pain.  Describes the pain as stiffness and pain with movement of the fingers  Is interested in repeat CSI    Previous history:   Patient is a multiyear history of trigger finger to bilateral hands.  Has been doing well with occasional CSI to affected fingers  No history of trauma   Denies numbness or tingling    Review of patient's allergies indicates:   Allergen Reactions    Niacin Itching and Other (See Comments)     Other reaction(s): facial flushing and causes hepatitis     Terbinafine Other (See Comments)     Other reaction(s): Hepatitis    "gave me rash"         Current Outpatient Medications   Medication Sig Dispense Refill    ammonium lactate 12 % Crea Apply 1 application  topically 2 (two) times daily. 140 g 11    atorvastatin (LIPITOR) 40 MG tablet TAKE ONE TABLET BY MOUTH EVERY DAY (Patient taking differently: Take 40 mg by mouth once daily.) 90 tablet 3    BD INSULIN PEN NEEDLE UF ORIG 29 x 1/2 " Ndle   12    blood sugar diagnostic Strp 1 strip by Misc.(Non-Drug; Combo Route) route 4 (four) times daily. To check blood glucose 400 strip 11    blood-glucose meter Misc To check BG as discussed 1 each 0    blood-glucose sensor (DEXCOM G7 SENSOR) Mckenna Apply one sensor to the skin every 10 days 3 each 11    celecoxib (CELEBREX) 100 MG capsule Take 1 capsule (100 mg total) by mouth once daily. 30 capsule 1    ciclopirox (LOPROX) 0.77 % " "Crea Apply topically 2 (two) times daily. 90 g 2    ciclopirox (PENLAC) 8 % Soln Apply topically nightly. 6.6 mL 11    clobetasol (TEMOVATE) 0.05 % cream AAA finger bid 60 g 3    ELIQUIS 5 mg Tab TAKE ONE TABLET BY MOUTH TWICE DAILY 180 tablet 1    fenofibrate (TRICOR) 145 MG tablet Take 1 tablet (145 mg total) by mouth once daily. 90 tablet 1    furosemide (LASIX) 40 MG tablet TAKE ONE TABLET BY MOUTH EVERY DAY AS NEEDED 90 tablet 2    gabapentin (NEURONTIN) 300 MG capsule TAKE TWO CAPSULES BY MOUTH TWICE DAILY 360 capsule 1    glucagon (GVOKE HYPOPEN 2-PACK) 1 mg/0.2 mL AtIn Inject 1 mg into the skin as needed (severe hypoglycemia). 2 each 3    insulin glargine U-100, Lantus, (LANTUS SOLOSTAR U-100 INSULIN) 100 unit/mL (3 mL) InPn pen Inject 36 Units into the skin every evening. 10.8 mL 11    insulin lispro (HUMALOG KWIKPEN INSULIN) 100 unit/mL pen Inject 10 units 3 times daily with meals. Plus correction scale. Max daily dose 50 units/day 45.9 mL 3    ketoconazole (NIZORAL) 2 % cream Apply topically once daily. 30 g 1    lancets 33 gauge Misc 1 lancet by Misc.(Non-Drug; Combo Route) route 4 (four) times daily. Pt uses True Result Meter, bg monitoring 4 times a day. 400 each 4    levothyroxine (SYNTHROID) 25 MCG tablet Take 25 mcg by mouth once daily.      loratadine (CLARITIN) 10 mg tablet Take 1 tablet (10 mg total) by mouth once daily. 90 tablet 3    metoprolol succinate (TOPROL-XL) 100 MG 24 hr tablet TAKE ONE TABLET BY MOUTH EVERY DAY 90 tablet 2    nystatin-triamcinolone (MYCOLOG II) cream apply TWICE DAILY 60 g 1    pen needle, diabetic (BD ULTRA-FINE BASIL PEN NEEDLE) 32 gauge x 5/32" Ndle To use q.i.d. 400 each 3    tamsulosin (FLOMAX) 0.4 mg Cap Take 1 capsule (0.4 mg total) by mouth once daily. 90 capsule 1    temazepam (RESTORIL) 15 mg Cap Take 1 capsule (15 mg total) by mouth nightly as needed. 30 capsule 5    tirzepatide 10 mg/0.5 mL PnIj Inject 10 mg into the skin every 7 days. 4 Pen 6    " triamcinolone acetonide 0.025 % Lotn APPLY TO AFFECTED AREA(S) FACE TWICE DAILY AS NEEDED red and/or scaly 60 mL 1    triamcinolone acetonide 0.1% (KENALOG) 0.1 % cream APPLY TO AFFECTED AREA(S) TWICE DAILY 30 g 3    azelastine (ASTELIN) 137 mcg (0.1 %) nasal spray 1 spray (137 mcg total) by Nasal route 2 (two) times daily. (Patient not taking: Reported on 6/24/2024) 30 mL 3    metOLazone (ZAROXOLYN) 5 MG tablet Take 1 tablet (5 mg total) by mouth once daily. for 7 days (Patient not taking: Reported on 10/19/2023) 7 tablet 0     No current facility-administered medications for this visit.       Past Medical History:   Diagnosis Date    *Atrial fibrillation     Eliquis    Anticoagulant long-term use     apaxiban    Basal cell carcinoma 12/2015    left eye brow    Basal cell carcinoma 10/28/2019    right superior preauricular    BCC (basal cell carcinoma) 12/2015    left shoulder    Cataract     Chronic kidney disease     Diabetes mellitus with renal manifestations, uncontrolled     Dyslipidemia associated with type 2 diabetes mellitus     Fever blister     Hearing loss     HTN (hypertension)     Keloid cicatrix     Liver disease     Melanoma 12/07/2015    right cheek-in situ    Obesity     PN (peripheral neuropathy)     Primary osteoarthritis of right knee 1/25/2017    Screening for colon cancer 3/3/2016    Sleep apnea     wears CPAP at night    Thyroid disease     Type II or unspecified type diabetes mellitus with other specified manifestations, uncontrolled     Ulcer of left lower extremity, limited to breakdown of skin 9/22/2017       Past Surgical History:   Procedure Laterality Date    APPENDECTOMY      CARDIOVERSION      CATARACT EXTRACTION      CATARACT EXTRACTION Right 05/14/2018    Dr. Greer    COLONOSCOPY N/A 3/3/2016    Procedure: COLONOSCOPY;  Surgeon: Bob Poe MD;  Location: [facility] ([address] [place] [address]);  Service: Endoscopy;  Laterality: N/A;  Holding Eliquis for 3 days prior to colonoscopy. EC     COLONOSCOPY N/A 9/20/2019    Procedure: COLONOSCOPY;  Surgeon: Akbar Curtis MD;  Location: [facility] ENDO (4TH FLR);  Service: Endoscopy;  Laterality: N/A;  Per Dr. José Granda, patient can HOLD Eliquis X2 days prior to procedure-see telphone encounter 8/22/19-[facility]    ECTROPION REPAIR Right 8/14/2019    Procedure: REPAIR, ECTROPION, EYELID /       #97702- Skin Flaps(Deep Tissue) (OD);  Surgeon: Edita Sabillon MD;  Location: [facility] OR 2ND FLR;  Service: Ophthalmology;  Laterality: Right;    epidural steroid injection      INTRAOCULAR PROSTHESES INSERTION Left 6/25/2018    Procedure: INSERTION, INTRAOCULAR LENS PROSTHESIS;  Surgeon: Lawrence Greer MD;  Location: [facility];  Service: Ophthalmology;  Laterality: Left;    JOINT REPLACEMENT      Knee    Meniere's disease surgery      PHACOEMULSIFICATION OF CATARACT Left 6/25/2018    Procedure: PHACOEMULSIFICATION, CATARACT;  Surgeon: Lawrence Greer MD;  Location: [facility];  Service: Ophthalmology;  Laterality: Left;    PREPARATION OF WOUND PRIOR TO APPLICATION OF SKIN GRAFT Right 8/14/2019    Procedure: PREPARATION, WOUND, PRIOR TO SKIN GRAFT APPLICATION;  Surgeon: Edita Sabillon MD;  [facility];  Service: Ophthalmology;  Laterality: Right;    REVISION OF KNEE ARTHROPLASTY Right 7/10/2018    Procedure: REVISION-ARTHROPLASTY-KNEE-DAKOTA;  Surgeon: Miguel Stuart MD;  [facility];  Service: Orthopedics;  Laterality: Right;  [place]    SKIN FULL THICKNESS GRAFT Right 8/14/2019    Procedure: APPLICATION, GRAFT, SKIN, FULL-THICKNESS;  Surgeon: Edita Sabillon MD;  [facility];  Service: Ophthalmology;  Laterality: Right;    TARSORRHAPHY Right 8/14/2019    Procedure: BLEPHARORRHAPHY;  Surgeon: Edita Sabillon MD;  [facility];  Service: Ophthalmology;  Laterality: Right;    TONSILLECTOMY      TOTAL KNEE ARTHROPLASTY Right 01/25/2017    TKR    TOTAL KNEE ARTHROPLASTY Left     TKR, 1990's       OBJECTIVE:   PHYSICAL EXAM:        There were no vitals filed for this visit.  Physical Exam  Vitals reviewed.   Constitutional:       General: He is not in acute distress.     Appearance: Normal appearance. He is normal weight. He is not toxic-appearing.   HENT:      Head: Normocephalic and atraumatic.      Nose: Nose normal.   Eyes:      Extraocular Movements: Extraocular movements intact.      Conjunctiva/sclera: Conjunctivae normal.      Pupils: Pupils are equal, round, and reactive to light.   Cardiovascular:      Rate and Rhythm: Normal rate and regular rhythm.      Pulses: Normal pulses.      Heart sounds: Normal heart sounds. No murmur heard.     No friction rub. No gallop.   Pulmonary:      Effort: Pulmonary effort is normal.      Breath sounds: Normal breath sounds. No stridor. No wheezing, rhonchi or rales.   Abdominal:      General: Abdomen is flat. Bowel sounds are normal.      Palpations: Abdomen is soft.   Musculoskeletal:         General: Normal range of motion.      Cervical back: Normal range of motion and neck supple.      Right lower leg: No edema.      Left lower leg: No edema.   Skin:     General: Skin is warm and dry.      Capillary Refill: Capillary refill takes less than 2 seconds.      Coloration: Skin is not jaundiced.   Neurological:      General: No focal deficit present.      Mental Status: He is alert and oriented to person, place, and time.   Psychiatric:         Mood and Affect: Mood normal.         Behavior: Behavior normal.         Thought Content: Thought content normal.         Judgment: Judgment normal.         General    Vitals reviewed.  Constitutional: He is oriented to person, place, and time.  Non-toxic appearance. No distress.   HENT:   Head: Normocephalic and atraumatic.   Nose: Nose normal.   Eyes: Conjunctivae are normal. Pupils are equal, round, and reactive to light.   Neck: Neck supple.   Cardiovascular:  Normal rate, regular rhythm, normal heart sounds and normal pulses.      Exam reveals no gallop  and no friction rub.       No murmur heard.  Pulmonary/Chest: Effort normal and breath sounds normal. No stridor. He has no wheezes. He has no rhonchi. He has no rales.   Abdominal: Soft. Normal appearance and bowel sounds are normal.   Neurological: He is alert and oriented to person, place, and time.   Psychiatric: His behavior is normal. Mood, judgment and thought content normal.             Vascular Exam       Edema  Right Lower Leg: absent  Left Lower Leg: absent    Examination bilateral hands   TTP to A1 pulley and PIP joints of the left index, right index, and right middle fingers   Moderate swelling noted to right index and ring fingers  AROM and P ROM limited secondary to pain to index, right index, and right middle fingers  Pain with manipulation of the left index, right index, and right middle PIP joints  Triggering appreciated  Slight flexion contracture noted on the right index and middle fingers      RADIOGRAPHS:  I independently interpreted the patient's imaging. Xrays of the patient's bilateral hands demonstrate no evidence of any acute fractures or dislocations or significant degenerative changes.      ASSESSMENT/PLAN:   Patient is an 78-year-old male with trigger finger to the right index, left index, and left middle fingers  Plan: The patient and I had a thorough discussion today.  We discussed the working diagnosis as well as several other potential alternative diagnoses.    We discussed conservative and surgical treatment options. Conservative options include rest, activity modifications, workplace modifications, po and topical analgesia (OTC and rx), formal or home PT, and others. Surgical treatment was also mentioned.    At this time, the patient would like to proceed with the following, which I agree with.    Surgery: Pt has failed conservative tx including rest, ice, NSAIDs, PT, CSI.  I discussed my concern with the patient that he has flexion contractures of his fingers secondary to  decreased flexion the affected fingers secondary to pain with ROM.  Pt has elected to proceed with surgical intervention. The surgery was explained as well as risks and benefits, and consents were signed for right index and middle trigger finger release.  Patient is interested in left index trigger finger release in the future status post right hand surgery  Medications:  OTC analgesia as needed for pain  Procedures:  CSI given to left index finger today  Work status:  WBAT with limitation secondary to pain    All of the patient's questions were answered and the patient will contact us if they have any questions or concerns in the interim.      Seda Marquis PA-C  [facility]  Orthopedic Surgery    Disclaimer: This note has been generated using voice-recognition software. There may be typographical errors that have been missed during proof-reading.

## 2024-06-26 ENCOUNTER — TELEPHONE (OUTPATIENT)
Dept: PREADMISSION TESTING | Facility: HOSPITAL | Age: 79
End: 2024-06-26
Payer: MEDICARE

## 2024-06-26 DIAGNOSIS — Z01.818 PRE-OP EVALUATION: ICD-10-CM

## 2024-06-26 DIAGNOSIS — Z79.4 TYPE 2 DIABETES MELLITUS WITH DIABETIC POLYNEUROPATHY, WITH LONG-TERM CURRENT USE OF INSULIN: ICD-10-CM

## 2024-06-26 DIAGNOSIS — E11.42 TYPE 2 DIABETES MELLITUS WITH DIABETIC POLYNEUROPATHY, WITH LONG-TERM CURRENT USE OF INSULIN: ICD-10-CM

## 2024-06-26 DIAGNOSIS — I10 HYPERTENSION, UNSPECIFIED TYPE: Primary | ICD-10-CM

## 2024-06-29 NOTE — TELEPHONE ENCOUNTER
No care due was identified.  [facility] Embedded Care Due Messages. Reference number: [id number].   6/29/2024 11:17:54 AM CDT

## 2024-07-02 RX ORDER — TEMAZEPAM 15 MG/1
15 CAPSULE ORAL NIGHTLY PRN
Qty: 30 CAPSULE | Refills: 5 | Status: CANCELLED | OUTPATIENT
Start: 2024-07-02

## 2024-07-02 RX ORDER — TEMAZEPAM 15 MG/1
15 CAPSULE ORAL NIGHTLY PRN
Qty: 30 CAPSULE | Refills: 5 | OUTPATIENT
Start: 2024-07-02

## 2024-07-02 RX ORDER — TEMAZEPAM 15 MG/1
15 CAPSULE ORAL NIGHTLY PRN
Qty: 30 CAPSULE | Refills: 5 | Status: SHIPPED | OUTPATIENT
Start: 2024-07-02

## 2024-07-02 NOTE — TELEPHONE ENCOUNTER
No care due was identified.  [facility] Embedded Care Due Messages. Reference number: [id number].   7/02/2024 3:12:57 PM CDT

## 2024-07-02 NOTE — TELEPHONE ENCOUNTER
----- Message from Sylvie Coon sent at 7/2/2024  2:58 PM CDT -----  Contact: [phone]  Prescription refill request.    RX name and strength (copy/paste from chart):   temazepam (RESTORIL) 15 mg Cap    Is this a 30 day or 90 day RX:  30 days     Pharmacy name and phone # (copy/paste from chart):       [id number] - [place] - [address] [facility] [address]  [address] [address]  Phone: [phone] Fax: [phone]        Additional information:   Please call to advise

## 2024-08-01 ENCOUNTER — TELEPHONE (OUTPATIENT)
Dept: PREADMISSION TESTING | Facility: HOSPITAL | Age: 79
End: 2024-08-01
Payer: MEDICARE

## 2024-08-01 ENCOUNTER — HOSPITAL ENCOUNTER (OUTPATIENT)
Dept: PREADMISSION TESTING | Facility: HOSPITAL | Age: 79
Discharge: HOME | End: 2024-08-01
Attending: NURSE PRACTITIONER
Payer: MEDICARE

## 2024-08-01 ENCOUNTER — ANESTHESIA EVENT (OUTPATIENT)
Dept: SURGERY | Facility: HOSPITAL | Age: 79
End: 2024-08-01
Payer: MEDICARE

## 2024-08-01 NOTE — PRE-PROCEDURE INSTRUCTIONS
Daughter.    Allergies, medical, surgical, family and psychosocial histories reviewed with patient. Periop plan of care reviewed. Preop instructions given, including medications to take and to hold. Hibiclens soap and instructions on use given. Time allotted for questions to be addressed.      Arrival time 0530.    Instructed to take Lantus 28 units the night before your surgery. Stop Trizepatide on 8/3/24.

## 2024-08-01 NOTE — ANESTHESIA PREPROCEDURE EVALUATION
08/01/2024  [facility] PAT Cantor Jr. is a 78 y.o., male scheduled for RELEASE, TRIGGER FINGER (Right: Hand) 8/13/24.    Past Medical History:   Diagnosis Date    *Atrial fibrillation     Eliquis    Anticoagulant long-term use     apaxiban    Basal cell carcinoma 12/2015    left eye brow    Basal cell carcinoma 10/28/2019    right superior preauricular    BCC (basal cell carcinoma) 12/2015    left shoulder    Cataract     Chronic kidney disease     Diabetes mellitus with renal manifestations, uncontrolled     Dyslipidemia associated with type 2 diabetes mellitus     Fever blister     Hearing loss     HTN (hypertension)     Keloid cicatrix     Liver disease     Melanoma 12/07/2015    right cheek-in situ    Obesity     PN (peripheral neuropathy)     Primary osteoarthritis of right knee 1/25/2017    Screening for colon cancer 3/3/2016    Sleep apnea     wears CPAP at night    Thyroid disease     Type II or unspecified type diabetes mellitus with other specified manifestations, uncontrolled     Ulcer of left lower extremity, limited to breakdown of skin 9/22/2017     Past Surgical History:   Procedure Laterality Date    APPENDECTOMY      CARDIOVERSION      CATARACT EXTRACTION      CATARACT EXTRACTION Right 05/14/2018    Dr. Greer    COLONOSCOPY N/A 3/3/2016    Procedure: COLONOSCOPY;  Surgeon: Bob Poe MD;  Location: [facility] ([address] [place] [address]);  Service: Endoscopy;  Laterality: N/A;  Holding Eliquis for 3 days prior to colonoscopy. EC    COLONOSCOPY N/A 9/20/2019    Procedure: COLONOSCOPY;  Surgeon: Akbar Curtis MD;  Location: [facility] ([address] [place] [address]);  Service: Endoscopy;  Laterality: N/A;  Per Dr. José Granda, patient can HOLD Eliquis X2 days prior to procedure-see telphone encounter 8/22/19-[facility]    ECTROPION REPAIR Right 8/14/2019    Procedure: REPAIR, ECTROPION, EYELID /       #99165- Skin Flaps(Deep Tissue) (OD);   "Surgeon: Edita Sabillon MD;  Location: [facility] FLR;  Service: Ophthalmology;  Laterality: Right;    epidural steroid injection      INTRAOCULAR PROSTHESES INSERTION Left 6/25/2018    Procedure: INSERTION, INTRAOCULAR LENS PROSTHESIS;  Surgeon: Lawrence Greer MD;  Location: [facility];  Service: Ophthalmology;  Laterality: Left;    JOINT REPLACEMENT      Knee    Meniere's disease surgery      PHACOEMULSIFICATION OF CATARACT Left 6/25/2018    Procedure: PHACOEMULSIFICATION, CATARACT;  Surgeon: Lawrence Greer MD;  Location: [facility];  Service: Ophthalmology;  Laterality: Left;    PREPARATION OF WOUND PRIOR TO APPLICATION OF SKIN GRAFT Right 8/14/2019    Procedure: PREPARATION, WOUND, PRIOR TO SKIN GRAFT APPLICATION;  Surgeon: Edita Sabillon MD;  [facility];  Service: Ophthalmology;  Laterality: Right;    REVISION OF KNEE ARTHROPLASTY Right 7/10/2018    Procedure: REVISION-ARTHROPLASTY-KNEE-SAMIR;  Surgeon: Miguel Stuart MD;  [address]  Service: Orthopedics;  Laterality: Right;  [place]    SKIN FULL THICKNESS GRAFT Right 8/14/2019    Procedure: APPLICATION, GRAFT, SKIN, FULL-THICKNESS;  Surgeon: Edita Sabillon MD;  [facility];  Service: Ophthalmology;  Laterality: Right;    TARSORRHAPHY Right 8/14/2019    Procedure: BLEPHARORRHAPHY;  Surgeon: Edita Sabillon MD;  [facility];  Service: Ophthalmology;  Laterality: Right;    TONSILLECTOMY      TOTAL KNEE ARTHROPLASTY Right 01/25/2017    TKR    TOTAL KNEE ARTHROPLASTY Left     TKR, 1990's     Review of patient's allergies indicates:   Allergen Reactions    Niacin Itching and Other (See Comments)     Other reaction(s): facial flushing and causes hepatitis     Terbinafine Other (See Comments)     Other reaction(s): Hepatitis    "gave me rash"     Current Outpatient Medications   Medication Instructions    ammonium lactate 12 % Crea 1 application , Topical (Top), 2 times daily    atorvastatin (LIPITOR) 40 MG " "tablet TAKE ONE TABLET BY MOUTH EVERY DAY    azelastine (ASTELIN) 137 mcg, Nasal, 2 times daily    BD INSULIN PEN NEEDLE UF ORIG 29 x 1/2 " Ndle No dose, route, or frequency recorded.    blood sugar diagnostic Strp 1 strip, Misc.(Non-Drug; Combo Route), 4 times daily, To check blood glucose    blood-glucose meter Misc To check BG as discussed    blood-glucose sensor (DEXCOM G7 SENSOR) Mckenna Apply one sensor to the skin every 10 days    celecoxib (CELEBREX) 100 mg, Oral, Daily    ciclopirox (LOPROX) 0.77 % Crea Topical (Top), 2 times daily    ciclopirox (PENLAC) 8 % Soln Topical (Top), Nightly    clobetasol (TEMOVATE) 0.05 % cream AAA finger bid    ELIQUIS 5 mg Tab TAKE ONE TABLET BY MOUTH TWICE DAILY    fenofibrate (TRICOR) 145 mg, Oral, Daily    furosemide (LASIX) 40 MG tablet TAKE ONE TABLET BY MOUTH EVERY DAY AS NEEDED    gabapentin (NEURONTIN) 300 MG capsule TAKE TWO CAPSULES BY MOUTH TWICE DAILY    GVOKE HYPOPEN 2-PACK 1 mg, Subcutaneous, As needed (PRN)    insulin lispro (HUMALOG KWIKPEN INSULIN) 100 unit/mL pen Inject 10 units 3 times daily with meals. Plus correction scale. Max daily dose 50 units/day    ketoconazole (NIZORAL) 2 % cream Topical (Top), Daily    lancets 33 gauge Misc 1 lancet , Misc.(Non-Drug; Combo Route), 4 times daily, Pt uses True Result Meter, bg monitoring 4 times a day.    LANTUS SOLOSTAR U-100 INSULIN 36 Units, Subcutaneous, Nightly    loratadine (CLARITIN) 10 mg, Oral, Daily    metoprolol succinate (TOPROL-XL) 100 MG 24 hr tablet TAKE ONE TABLET BY MOUTH EVERY DAY    nystatin-triamcinolone (MYCOLOG II) cream apply TWICE DAILY    pen needle, diabetic (BD ULTRA-FINE BASIL PEN NEEDLE) 32 gauge x 5/32" Ndle To use q.i.d.    tamsulosin (FLOMAX) 0.4 mg, Oral, Daily    temazepam (RESTORIL) 15 mg, Oral, Nightly PRN    tirzepatide 10 mg, Subcutaneous, Every 7 days    triamcinolone acetonide 0.025 % Lotn APPLY TO AFFECTED AREA(S) FACE TWICE DAILY AS NEEDED red and/or scaly    triamcinolone " acetonide 0.1% (KENALOG) 0.1 % cream APPLY TO AFFECTED AREA(S) TWICE DAILY       Pre-op Assessment    I have reviewed the Patient Summary Reports.    I have reviewed the NPO Status.   I have reviewed the Medications.     Review of Systems  Anesthesia Hx:  No problems with previous Anesthesia   History of prior surgery of interest to airway management or planning:          Denies Family Hx of Anesthesia complications.    Denies Personal Hx of Anesthesia complications.                    Social:  Former Smoker, No Alcohol Use       Hematology/Oncology:       -- Denies Anemia:                                  Cardiovascular:  Exercise tolerance: poor   Denies Pacemaker. Hypertension  Denies Valvular problems/Murmurs.   Denies CAD.    Dysrhythmias atrial fibrillation      hyperlipidemia  Denies BOYD.           Chronic Venous Insufficiency, bilateral lower extremity                  Pulmonary:    Denies COPD.  Denies Asthma.    Sleep Apnea, CPAP           Education provided regarding risk of obstructive sleep apnea         Pulmonary Hypertension      Renal/:  Chronic Renal Disease, CKD                Hepatic/GI:     GERD, well controlled Liver Disease,            Musculoskeletal:  Arthritis               Neurological:  Denies TIA.  Denies CVA. Neuromuscular Disease,   Denies Seizures.          Peripheral Neuropathy                          Endocrine:  Diabetes (a1c 7.0 7/2024), using insulin Hypothyroidism              Physical Exam  General: Cooperative, Oriented and Alert    Airway:  Mallampati: / II  Mouth Opening: Normal  TM Distance: Normal  Tongue: Normal  Neck ROM: Normal ROM  Neck: Girth Increased    Dental:  Intact      Lab Results   Component Value Date    WBC 7.54 10/19/2023    HGB 14.3 10/19/2023    HCT 45.2 10/19/2023     10/19/2023    CHOL 137 05/03/2023    TRIG 149 05/03/2023    HDL 24 (L) 05/03/2023    ALT 17 05/03/2023    AST 25 05/03/2023     07/24/2024    K 4.1 07/24/2024      07/24/2024    CREATININE 1.4 07/24/2024    BUN 18 07/24/2024    CO2 25 07/24/2024    TSH 3.150 04/25/2024    PSA 4.0 05/25/2022    INR 1.0 08/04/2018    HGBA1C 7.0 (H) 07/24/2024     Results for orders placed or performed during the hospital encounter of 07/24/24   EKG 12-lead    Collection Time: 07/24/24 12:32 PM   Result Value Ref Range    QRS Duration 90 ms    [facility] QTC Calculation 448 ms    Narrative    Test Reason : I10,Z01.818,E11.42,Z79.4,    Vent. Rate : 067 BPM     Atrial Rate : 150 BPM     P-R Int : 000 ms          QRS Dur : 090 ms      QT Int : 424 ms       P-R-T Axes : 000 022 020 degrees     QTc Int : 448 ms    Atrial fibrillation  Abnormal ECG  When compared with ECG of 16-AUG-2023 10:58,  No significant change was found  Confirmed by Hilda VILLALTA, Cuco FLOYD ([id number]) on 7/25/2024 2:31:01 PM    Referred By: BRITTANY RODRIGUES           Confirmed By:Cuco Stevens MD         Anesthesia Plan  Type of Anesthesia, risks & benefits discussed:    Anesthesia Type: Gen Natural Airway  Intra-op Monitoring Plan: Standard ASA Monitors  Post Op Pain Control Plan: multimodal analgesia  Induction:  IV  ASA Score: 3    Ready For Surgery From Anesthesia Perspective.     .

## 2024-08-01 NOTE — ANESTHESIA PREPROCEDURE EVALUATION
08/01/2024  [facility] [place] a 78 y.o., male scheduled for RELEASE, TRIGGER FINGER (Right: Hand) 8/13/24.    Past Medical History:   Diagnosis Date    *Atrial fibrillation     Eliquis    Anticoagulant long-term use     apaxiban    Basal cell carcinoma 12/2015    left eye brow    Basal cell carcinoma 10/28/2019    right superior preauricular    BCC (basal cell carcinoma) 12/2015    left shoulder    Cataract     Chronic kidney disease     Diabetes mellitus with renal manifestations, uncontrolled     Dyslipidemia associated with type 2 diabetes mellitus     Fever blister     Hearing loss     HTN (hypertension)     Keloid cicatrix     Liver disease     Melanoma 12/07/2015    right cheek-in situ    Obesity     PN (peripheral neuropathy)     Primary osteoarthritis of right knee 1/25/2017    Screening for colon cancer 3/3/2016    Sleep apnea     wears CPAP at night    Thyroid disease     Type II or unspecified type diabetes mellitus with other specified manifestations, uncontrolled     Ulcer of left lower extremity, limited to breakdown of skin 9/22/2017     Past Surgical History:   Procedure Laterality Date    APPENDECTOMY      CARDIOVERSION      CATARACT EXTRACTION      CATARACT EXTRACTION Right 05/14/2018    Dr. Greer    COLONOSCOPY N/A 3/3/2016    Procedure: COLONOSCOPY;  Surgeon: Bob Poe MD;  Location: [facility] ([address] [place] [address]);  Service: Endoscopy;  Laterality: N/A;  Holding Eliquis for 3 days prior to colonoscopy. EC    COLONOSCOPY N/A 9/20/2019    Procedure: COLONOSCOPY;  Surgeon: Akbar Curtis MD;  Location: [facility] ([address] [place] [address]);  Service: Endoscopy;  Laterality: N/A;  Per Dr. José Granda, patient can HOLD Eliquis X2 days prior to procedure-see telphone encounter 8/22/19-    ECTROPION REPAIR Right 8/14/2019    Procedure: REPAIR, ECTROPION, EYELID /       #[id number]-  "Skin Flaps(Deep Tissue) (OD);  Surgeon: Edita Sabillon MD;  Location: [facility];  Service: Ophthalmology;  Laterality: Right;    epidural steroid injection      INTRAOCULAR PROSTHESES INSERTION Left 6/25/2018    Procedure: INSERTION, INTRAOCULAR LENS PROSTHESIS;  Surgeon: Lawrence Greer MD;  Location: [facility];  Service: Ophthalmology;  Laterality: Left;    JOINT REPLACEMENT      Knee    Meniere's disease surgery      PHACOEMULSIFICATION OF CATARACT Left 6/25/2018    Procedure: PHACOEMULSIFICATION, CATARACT;  Surgeon: Lawrence Greer MD;  Location: [facility];  Service: Ophthalmology;  Laterality: Left;    PREPARATION OF WOUND PRIOR TO APPLICATION OF SKIN GRAFT Right 8/14/2019    Procedure: PREPARATION, WOUND, PRIOR TO SKIN GRAFT APPLICATION;  Surgeon: Edita Sabillon MD;  Location: [facility] OR [address] [place] [address];  Service: Ophthalmology;  Laterality: Right;    REVISION OF KNEE ARTHROPLASTY Right 7/10/2018    Procedure: REVISION-ARTHROPLASTY-KNEE-SAMIR;  Surgeon: Miguel Stuart MD;  [address];  Service: Orthopedics;  Laterality: Right;  Samir    SKIN FULL THICKNESS GRAFT Right 8/14/2019    Procedure: APPLICATION, GRAFT, SKIN, FULL-THICKNESS;  Surgeon: Edita Sabillon MD;  Location: [facility] OR [address] [place] [address];  Service: Ophthalmology;  Laterality: Right;    TARSORRHAPHY Right 8/14/2019    Procedure: BLEPHARORRHAPHY;  Surgeon: Edita Sabillon MD;  Location: [facility] OR [address] [place] [address];  Service: Ophthalmology;  Laterality: Right;    TONSILLECTOMY      TOTAL KNEE ARTHROPLASTY Right 01/25/2017    TKR    TOTAL KNEE ARTHROPLASTY Left     TKR, 1990's     Review of patient's allergies indicates:   Allergen Reactions    Niacin Itching and Other (See Comments)     Other reaction(s): facial flushing and causes hepatitis     Terbinafine Other (See Comments)     Other reaction(s): Hepatitis    "gave me rash"     Current Outpatient Medications   Medication Instructions    ammonium lactate 12 % Crea 1 application , Topical (Top), " "2 times daily    atorvastatin (LIPITOR) 40 MG tablet TAKE ONE TABLET BY MOUTH EVERY DAY    azelastine (ASTELIN) 137 mcg, Nasal, 2 times daily    BD INSULIN PEN NEEDLE UF ORIG 29 x 1/2 " Ndle No dose, route, or frequency recorded.    blood sugar diagnostic Strp 1 strip, Misc.(Non-Drug; Combo Route), 4 times daily, To check blood glucose    blood-glucose meter Misc To check BG as discussed    blood-glucose sensor (DEXCOM G7 SENSOR) Mckenna Apply one sensor to the skin every 10 days    celecoxib (CELEBREX) 100 mg, Oral, Daily    ciclopirox (LOPROX) 0.77 % Crea Topical (Top), 2 times daily    ciclopirox (PENLAC) 8 % Soln Topical (Top), Nightly    clobetasol (TEMOVATE) 0.05 % cream AAA finger bid    ELIQUIS 5 mg Tab TAKE ONE TABLET BY MOUTH TWICE DAILY    fenofibrate (TRICOR) 145 mg, Oral, Daily    furosemide (LASIX) 40 MG tablet TAKE ONE TABLET BY MOUTH EVERY DAY AS NEEDED    gabapentin (NEURONTIN) 300 MG capsule TAKE TWO CAPSULES BY MOUTH TWICE DAILY    GVOKE HYPOPEN 2-PACK 1 mg, Subcutaneous, As needed (PRN)    insulin lispro (HUMALOG KWIKPEN INSULIN) 100 unit/mL pen Inject 10 units 3 times daily with meals. Plus correction scale. Max daily dose 50 units/day    ketoconazole (NIZORAL) 2 % cream Topical (Top), Daily    lancets 33 gauge Misc 1 lancet , Misc.(Non-Drug; Combo Route), 4 times daily, Pt uses True Result Meter, bg monitoring 4 times a day.    LANTUS SOLOSTAR U-100 INSULIN 36 Units, Subcutaneous, Nightly    levothyroxine (SYNTHROID) 25 mcg, Oral, Daily    loratadine (CLARITIN) 10 mg, Oral, Daily    metOLazone (ZAROXOLYN) 5 mg, Oral, Daily    metoprolol succinate (TOPROL-XL) 100 MG 24 hr tablet TAKE ONE TABLET BY MOUTH EVERY DAY    nystatin-triamcinolone (MYCOLOG II) cream apply TWICE DAILY    pen needle, diabetic (BD ULTRA-FINE BASIL PEN NEEDLE) 32 gauge x 5/32" Ndle To use q.i.d.    tamsulosin (FLOMAX) 0.4 mg, Oral, Daily    temazepam (RESTORIL) 15 mg, Oral, Nightly PRN    tirzepatide " 10 mg, Subcutaneous, Every 7 days    triamcinolone acetonide 0.025 % Lotn APPLY TO AFFECTED AREA(S) FACE TWICE DAILY AS NEEDED red and/or scaly    triamcinolone acetonide 0.1% (KENALOG) 0.1 % cream APPLY TO AFFECTED AREA(S) TWICE DAILY       Pre-op Assessment    I have reviewed the Patient Summary Reports.     I have reviewed the Nursing Notes.    I have reviewed the Medications.     Review of Systems  Cardiovascular:     Hypertension    Dysrhythmias atrial fibrillation      hyperlipidemia    Diastolic dysfunction                         Pulmonary:        Sleep Apnea                Renal/:  Chronic Renal Disease, CKD renal calculi               Hepatic/GI:      Liver Disease,            Endocrine:  Diabetes (a1c 7.0 7/24/24), type 2, using insulin Hypothyroidism        Obesity / BMI > 30          .

## 2024-08-01 NOTE — TELEPHONE ENCOUNTER
Good morning, he is scheduled for trigger finger on 8/13/24 and his medication list indicated he takes Eliquis and will need recommendations prior to surgery. Thanks

## 2024-08-01 NOTE — DISCHARGE INSTRUCTIONS
Your surgery is scheduled for 8/13/24.    Please report to [facility] L[facility] on the 1st Floor at 0530 a.m.    THIS TIME IS SUBJECT TO CHANGE.  YOU WILL RECEIVE A PHONE CALL THE DAY BEFORE SURGERY BY 3:30 PM TO CONFIRM YOUR TIME OF ARRIVAL.  IF YOU HAVE NOT RECEIVED A PHONE CALL BY 3:30 PM THE DAY BEFORE YOUR SURGERY PLEASE CALL [phone]     INSTRUCTIONS IMPORTANT!!!  ¨ Do not eat or drink 8 hours prior to arrival time-including water, candy, gum, & mints. OK to brush teeth.      Please take Metoprolol the morning of surgery and Lantus 28 units the night before your surgery. Stop Trizepatide on 8/3/24.         ____  Do not wear makeup, including mascara.  ____  No powder, lotions or creams to surgical area.  ____  Please remove all jewelry, including piercings and leave at home.  ____  No money or valuables needed. Please leave at home.  ____  Please bring any documents given by your doctor.  ____  If going home the same day, arrange for a ride home. You will not be able to             drive if Anesthesia was used.  ____  Children under 18 years require a parent / guardian present the entire time             they are in surgery / recovery.  ____  Wear loose fitting clothing. Allow for dressings, bandages.  ____  Stop Aspirin and blood thinners as directed by prescribing provider.  ____  Do not take any herbal, vitamins, and or supplements 2 weeks prior to surgery.  ____  Stop NSAIDS (Naproxen, Aleve, Voltaren, Celebrex, Melxoicam), Goody's, and BC powder 7 days prior to surgery.  ____  Wash the surgical area with Hibiclens or Dial Antibacterial bar soap the night before surgery, and again the             morning of surgery.  Be sure to rinse hibiclens or Dial Antibacterial bar soap off completely (if instructed by   nurse).  ____  If you take diabetic medication including Metformin, Glimepiride, Glipizide, Glyburide, Byetta, Januvia, Actos, do not take am of surgery unless            instructed by  Doctor.  ____  Hold Invokana, Farxiga, and Jardiance for 3 days prior to surgery.   ____  Call MD for temperature above 101 degrees or any other signs of infection such as Urinary (bladder) infection, Upper respiratory infection, skin boils,             etc.  ____ Do Not wear your contact lenses the day of your procedure.  You may wear your glasses.      ____ Do not shave surgical site for 3 days prior to surgery.  ____ Practice Good hand washing before, during, and after procedure.      I have read or had read and explained to me, and understand the above information.  Additional comments or instructions:  For additional questions call [phone]      ANESTHESIA SIDE EFFECTS  -For the first 24 hours after surgery:  Do not drive, use heavy equipment, make important decisions, or drink alcohol  -It is normal to feel sleepy for several hours.  Rest until you are more awake.  -Have someone stay with you, if needed.  They can watch for problems and help keep you safe.  -Some possible post anesthesia side effects include: nausea and vomiting, sore throat and hoarseness, sleepiness, and dizziness.        Pre-Op Bathing Instructions    Before surgery, you can play an important role in your own health.    Because skin is not sterile, we need to be sure that your skin is as free of germs as possible. By following the instructions below, you can reduce the number of germs on your skin before surgery.    IMPORTANT: You will need to shower with a special soap called Hibiclens*, available at any pharmacy.  If you are allergic to Chlorhexidine (the antiseptic in Hibiclens), use an antibacterial soap such as Dial Soap for your preoperative shower.  You will shower with Hibiclens both the night before your surgery and the morning of your surgery.  Do not use Hibiclens on the head, face or genitals to avoid injury to those areas.    STEP #1: THE NIGHT BEFORE YOUR SURGERY     Do not shave the area of your body where your surgery will  be performed.  Shower and wash your hair and body as usual with your normal soap and shampoo.  Rinse your hair and body thoroughly after you shower to remove all soap residue.  With your hand, apply one packet of Hibiclens soap to the surgical site.   Wash the site gently for five (5) minutes. Do not scrub your skin too hard.   Do not wash with your regular soap after Hibiclens is used.  Rinse your body thoroughly.  Pat yourself dry with a clean, soft towel.  Do not use lotion, cream, or powder.  Wear clean clothes.    STEP #2: THE MORNING OF YOUR SURGERY     Repeat Step #1.    * Not to be used by people allergic to Chlorhexidine.

## 2024-08-13 ENCOUNTER — ANESTHESIA (OUTPATIENT)
Dept: SURGERY | Facility: HOSPITAL | Age: 79
End: 2024-08-13
Payer: MEDICARE

## 2024-08-13 ENCOUNTER — HOSPITAL ENCOUNTER (OUTPATIENT)
Facility: HOSPITAL | Age: 79
Discharge: HOME | End: 2024-08-13
Attending: ORTHOPAEDIC SURGERY | Admitting: ORTHOPAEDIC SURGERY
Payer: MEDICARE

## 2024-08-13 DIAGNOSIS — M65.331 TRIGGER FINGER, RIGHT MIDDLE FINGER: ICD-10-CM

## 2024-08-13 DIAGNOSIS — M79.642 BILATERAL HAND PAIN: ICD-10-CM

## 2024-08-13 DIAGNOSIS — M79.641 BILATERAL HAND PAIN: ICD-10-CM

## 2024-08-13 DIAGNOSIS — M65.321 TRIGGER FINGER, RIGHT INDEX FINGER: ICD-10-CM

## 2024-08-13 LAB — POCT GLUCOSE: 136 MG/DL (ref 70–110)

## 2024-08-13 PROCEDURE — 71000015 HC POSTOP RECOV 1ST HR: Performed by: ORTHOPAEDIC SURGERY

## 2024-08-13 PROCEDURE — 37000008 HC ANESTHESIA 1ST 15 MINUTES: Performed by: ORTHOPAEDIC SURGERY

## 2024-08-13 PROCEDURE — 25000003 PHARM REV CODE 250: Performed by: ORTHOPAEDIC SURGERY

## 2024-08-13 PROCEDURE — 36000707: Performed by: ORTHOPAEDIC SURGERY

## 2024-08-13 PROCEDURE — 63600175 PHARM REV CODE 636 W HCPCS: Mod: JZ,JG | Performed by: ORTHOPAEDIC SURGERY

## 2024-08-13 PROCEDURE — 36000706: Performed by: ORTHOPAEDIC SURGERY

## 2024-08-13 PROCEDURE — 26055 INCISE FINGER TENDON SHEATH: CPT | Mod: F6,,, | Performed by: ORTHOPAEDIC SURGERY

## 2024-08-13 PROCEDURE — 71000016 HC POSTOP RECOV ADDL HR: Performed by: ORTHOPAEDIC SURGERY

## 2024-08-13 PROCEDURE — 37000009 HC ANESTHESIA EA ADD 15 MINS: Performed by: ORTHOPAEDIC SURGERY

## 2024-08-13 PROCEDURE — 63600175 PHARM REV CODE 636 W HCPCS: Performed by: NURSE PRACTITIONER

## 2024-08-13 PROCEDURE — 63600175 PHARM REV CODE 636 W HCPCS: Performed by: NURSE ANESTHETIST, CERTIFIED REGISTERED

## 2024-08-13 PROCEDURE — 25000003 PHARM REV CODE 250: Performed by: NURSE ANESTHETIST, CERTIFIED REGISTERED

## 2024-08-13 RX ORDER — LIDOCAINE HYDROCHLORIDE 10 MG/ML
1 INJECTION, SOLUTION EPIDURAL; INFILTRATION; INTRACAUDAL; PERINEURAL ONCE
Status: DISCONTINUED | OUTPATIENT
Start: 2024-08-13 | End: 2024-08-13 | Stop reason: HOSPADM

## 2024-08-13 RX ORDER — ONDANSETRON 8 MG/1
8 TABLET, ORALLY DISINTEGRATING ORAL EVERY 8 HOURS PRN
Status: DISCONTINUED | OUTPATIENT
Start: 2024-08-13 | End: 2024-08-13 | Stop reason: HOSPADM

## 2024-08-13 RX ORDER — LIDOCAINE HYDROCHLORIDE 20 MG/ML
INJECTION INTRAVENOUS
Status: DISCONTINUED | OUTPATIENT
Start: 2024-08-13 | End: 2024-08-13

## 2024-08-13 RX ORDER — PROPOFOL 10 MG/ML
VIAL (ML) INTRAVENOUS CONTINUOUS PRN
Status: DISCONTINUED | OUTPATIENT
Start: 2024-08-13 | End: 2024-08-13

## 2024-08-13 RX ORDER — SODIUM CHLORIDE, SODIUM LACTATE, POTASSIUM CHLORIDE, CALCIUM CHLORIDE 600; 310; 30; 20 MG/100ML; MG/100ML; MG/100ML; MG/100ML
INJECTION, SOLUTION INTRAVENOUS CONTINUOUS
Status: DISCONTINUED | OUTPATIENT
Start: 2024-08-13 | End: 2024-08-13 | Stop reason: HOSPADM

## 2024-08-13 RX ORDER — HYDROCODONE BITARTRATE AND ACETAMINOPHEN 5; 325 MG/1; MG/1
1 TABLET ORAL EVERY 4 HOURS PRN
Qty: 12 TABLET | Refills: 0 | Status: SHIPPED | OUTPATIENT
Start: 2024-08-13

## 2024-08-13 RX ORDER — ACETAMINOPHEN 325 MG/1
650 TABLET ORAL EVERY 4 HOURS PRN
Status: DISCONTINUED | OUTPATIENT
Start: 2024-08-13 | End: 2024-08-13 | Stop reason: HOSPADM

## 2024-08-13 RX ORDER — MUPIROCIN 20 MG/G
OINTMENT TOPICAL
Status: DISCONTINUED | OUTPATIENT
Start: 2024-08-13 | End: 2024-08-13 | Stop reason: HOSPADM

## 2024-08-13 RX ORDER — LIDOCAINE HYDROCHLORIDE 10 MG/ML
INJECTION, SOLUTION EPIDURAL; INFILTRATION; INTRACAUDAL; PERINEURAL
Status: DISCONTINUED | OUTPATIENT
Start: 2024-08-13 | End: 2024-08-13 | Stop reason: HOSPADM

## 2024-08-13 RX ORDER — PHENYLEPHRINE HYDROCHLORIDE 10 MG/ML
INJECTION INTRAVENOUS
Status: DISCONTINUED | OUTPATIENT
Start: 2024-08-13 | End: 2024-08-13

## 2024-08-13 RX ORDER — ONDANSETRON HYDROCHLORIDE 2 MG/ML
INJECTION, SOLUTION INTRAVENOUS
Status: DISCONTINUED | OUTPATIENT
Start: 2024-08-13 | End: 2024-08-13

## 2024-08-13 RX ORDER — OXYCODONE HYDROCHLORIDE 5 MG/1
10 TABLET ORAL EVERY 4 HOURS PRN
Status: DISCONTINUED | OUTPATIENT
Start: 2024-08-13 | End: 2024-08-13 | Stop reason: HOSPADM

## 2024-08-13 RX ORDER — CEFAZOLIN SODIUM 1 G/3ML
INJECTION, POWDER, FOR SOLUTION INTRAMUSCULAR; INTRAVENOUS
Status: DISCONTINUED | OUTPATIENT
Start: 2024-08-13 | End: 2024-08-13

## 2024-08-13 RX ORDER — KETOROLAC TROMETHAMINE 30 MG/ML
15 INJECTION, SOLUTION INTRAMUSCULAR; INTRAVENOUS ONCE
Status: DISCONTINUED | OUTPATIENT
Start: 2024-08-13 | End: 2024-08-13 | Stop reason: HOSPADM

## 2024-08-13 RX ORDER — BUPIVACAINE HYDROCHLORIDE 5 MG/ML
INJECTION, SOLUTION EPIDURAL; INTRACAUDAL
Status: DISCONTINUED | OUTPATIENT
Start: 2024-08-13 | End: 2024-08-13 | Stop reason: HOSPADM

## 2024-08-13 RX ORDER — PROPOFOL 10 MG/ML
VIAL (ML) INTRAVENOUS
Status: DISCONTINUED | OUTPATIENT
Start: 2024-08-13 | End: 2024-08-13

## 2024-08-13 RX ORDER — CEFAZOLIN SODIUM 2 G/50ML
2 SOLUTION INTRAVENOUS
Status: DISCONTINUED | OUTPATIENT
Start: 2024-08-13 | End: 2024-08-13 | Stop reason: HOSPADM

## 2024-08-13 RX ORDER — FENTANYL CITRATE 50 UG/ML
INJECTION, SOLUTION INTRAMUSCULAR; INTRAVENOUS
Status: DISCONTINUED | OUTPATIENT
Start: 2024-08-13 | End: 2024-08-13

## 2024-08-13 RX ADMIN — PROPOFOL 75 MCG/KG/MIN: 10 INJECTION, EMULSION INTRAVENOUS at 07:08

## 2024-08-13 RX ADMIN — PHENYLEPHRINE HYDROCHLORIDE 100 MCG: 10 INJECTION INTRAVENOUS at 08:08

## 2024-08-13 RX ADMIN — ONDANSETRON 4 MG: 2 INJECTION, SOLUTION INTRAMUSCULAR; INTRAVENOUS at 07:08

## 2024-08-13 RX ADMIN — FENTANYL CITRATE 25 MCG: 50 INJECTION INTRAMUSCULAR; INTRAVENOUS at 07:08

## 2024-08-13 RX ADMIN — Medication 50 MG: at 07:08

## 2024-08-13 RX ADMIN — PHENYLEPHRINE HYDROCHLORIDE 50 MCG: 10 INJECTION INTRAVENOUS at 08:08

## 2024-08-13 RX ADMIN — SODIUM CHLORIDE, SODIUM LACTATE, POTASSIUM CHLORIDE, AND CALCIUM CHLORIDE: .6; .31; .03; .02 INJECTION, SOLUTION INTRAVENOUS at 06:08

## 2024-08-13 RX ADMIN — PHENYLEPHRINE HYDROCHLORIDE 50 MCG: 10 INJECTION INTRAVENOUS at 07:08

## 2024-08-13 RX ADMIN — CEFAZOLIN 2 G: 330 INJECTION, POWDER, FOR SOLUTION INTRAMUSCULAR; INTRAVENOUS at 07:08

## 2024-08-13 RX ADMIN — LIDOCAINE HYDROCHLORIDE 100 MG: 20 INJECTION, SOLUTION INTRAVENOUS at 07:08

## 2024-08-13 RX ADMIN — Medication 30 MG: at 07:08

## 2024-08-13 NOTE — TRANSFER OF CARE
"Anesthesia Transfer of Care Note    [place] [facility] [place]    Procedure(s) Performed: Procedure(s) (LRB):  RELEASE, TRIGGER FINGER (Right)    Patient location: OPS    Anesthesia Type: general    Transport from OR: Transported from OR on room air with adequate spontaneous ventilation    Post pain: adequate analgesia    Post assessment: no apparent anesthetic complications    Post vital signs: stable    Level of consciousness: awake    Nausea/Vomiting: no nausea/vomiting    Complications: none    Transfer of care protocol was followed      Last vitals: Visit Vitals  /73   Pulse 68   Temp 36.3 °C (97.3 °F) (Skin)   Resp 16   Ht 5' 9" (1.753 m)   Wt 117.9 kg (260 lb)   SpO2 94%   BMI 38.40 kg/m²     "

## 2024-08-13 NOTE — OP NOTE
[facility] - [facility] (Hospital)  Operative Note      Date of Procedure: 8/13/2024     Procedure: Procedure(s) (LRB):  RELEASE, TRIGGER FINGER (Right)     Surgeons and Role:     * Woo Elmore Jr., MD - Primary    Assisting Surgeon: None    Pre-Operative Diagnosis: Trigger finger, right index finger [M65.321]  Trigger finger, right middle finger [M65.331]    Post-Operative Diagnosis: Post-Op Diagnosis Codes:     * Trigger finger, right index finger [M65.321]     * Trigger finger, right middle finger [M65.331]    Anesthesia: Local MAC    Operative Findings (including complications, if any):  Triggering of the right index and right middle fingers    Description of Technical Procedures:     Preop diagnosis: 1. Triggering right index finger.      2. Triggering right middle finger.      Postop diagnosis: Same.      Operative procedure: 1. Trigger release right index finger.      2. Trigger release right middle finger.      Surgeon: Ramírez.      First assistant: Benitez.      Anesthesia: Mac.      EBL: Minimal.      Specimen: None.      Complications: None.      Operative procedure in detail as follows:     After operative consent was obtained patient brought the operating room placed supine on the operating table.  Anesthesia by IV sedation followed by injection of xylocaine Marcaine combination operative site right palm.  Tourniquet applied right arm right upper extremity prepped and draped out in the normal sterile fashion.  The Esmarch used to exsanguinated the limb and the tourniquet inflated 225 mmHg.      Following this the index finger was approached with a volar oblique incision at the base of the index finger with a 15 blade.  Careful dissection used to expose the A1 pulley which was noted to be tight and thickened.  It was released longitudinally with the [place] blade and scissors including a small portion of the A2 pulley.  Range of motion then checked noted to be full without triggering.  The wound irrigated and  then closed with interrupted 5 O nylon horizontal mattress suture.      A 2nd incision made at the volar base of the right middle finger with a 15 blade.  In a similar manner the A1 pulley was exposed carefully protecting the digital nerves and then released longitudinally including a small portion of A2 range of motion then checked noted to be full the wound irrigated and closed with interrupted 5 O nylon horizontal mattress suture.  Sterile dressings applied to both incisions followed by light wrap and the tourniquet deflated.  Patient brought to the recovery room in stable condition.  All sponge needle counts reported as correct no complications    Significant Surgical Tasks Conducted by the Assistant(s), if Applicable: retraction    Estimated Blood Loss (EBL): * No values recorded between 8/13/2024  7:48 AM and 8/13/2024  8:16 AM *           Implants: * No implants in log *    Specimens:   Specimen (24h ago, onward)      None                    Condition: Good    Disposition: PACU - hemodynamically stable.    Attestation: I was present and scrubbed for the entire procedure.    Discharge Note    OUTCOME: Patient tolerated treatment/procedure well without complication and is now ready for discharge.    DISPOSITION: Home or Self Care    FINAL DIAGNOSIS:  Trigger finger, right index finger    FOLLOWUP: In clinic    DISCHARGE INSTRUCTIONS:    Discharge Procedure Orders   Diet general     Call MD for:  temperature >100.4     Call MD for:  persistent nausea and vomiting     Call MD for:  severe uncontrolled pain     Keep surgical extremity elevated     Change dressing (specify)   Order Comments: Dressing change: REmove bandage in 5 days then OK for soap and water        Communicable/Infectious

## 2024-08-13 NOTE — H&P
"Subjective  Patient ID: [facility] PAT Cantor Jr. is a 78 y.o. male.  Chief Complaint: Pain of the Right Hand (Pain - 10 ongoing x's two months) and Pain of the Left Hand (Pain-8  ongoing x's two months)        HPI  [facility] PAT Cantor Jr. is a  78 y.o. male presenting today for follow up of bilateral hand symptoms   Last saw Dr. Elmore on 10/26/2023 for triggering of the left middle finger and right index finger  Today, patient reports symptoms have returned to left index, right index, and right middle fingers, although he reports the pain somewhat different than his usual trigger finger pain.  Describes the pain as stiffness and pain with movement of the fingers  Is interested in repeat CSI     Previous history:   Patient is a multiyear history of trigger finger to bilateral hands.  Has been doing well with occasional CSI to affected fingers  No history of trauma   Denies numbness or tingling           Review of patient's allergies indicates:   Allergen Reactions    Niacin Itching and Other (See Comments)       Other reaction(s): facial flushing and causes hepatitis     Terbinafine Other (See Comments)       Other reaction(s): Hepatitis    "gave me rash"          Current Medications          Current Outpatient Medications   Medication Sig Dispense Refill    ammonium lactate 12 % Crea Apply 1 application  topically 2 (two) times daily. 140 g 11    atorvastatin (LIPITOR) 40 MG tablet TAKE ONE TABLET BY MOUTH EVERY DAY (Patient taking differently: Take 40 mg by mouth once daily.) 90 tablet 3    BD INSULIN PEN NEEDLE UF ORIG 29 x 1/2 " Ndle     12    blood sugar diagnostic Strp 1 strip by Misc.(Non-Drug; Combo Route) route 4 (four) times daily. To check blood glucose 400 strip 11    blood-glucose meter Misc To check BG as discussed 1 each 0    blood-glucose sensor (DEXCOM G7 SENSOR) Mckenna Apply one sensor to the skin every 10 days 3 each 11    celecoxib (CELEBREX) 100 MG capsule Take 1 capsule (100 mg total) by mouth once daily. 30 " "capsule 1    ciclopirox (LOPROX) 0.77 % Crea Apply topically 2 (two) times daily. 90 g 2    ciclopirox (PENLAC) 8 % Soln Apply topically nightly. 6.6 mL 11    clobetasol (TEMOVATE) 0.05 % cream AAA finger bid 60 g 3    ELIQUIS 5 mg Tab TAKE ONE TABLET BY MOUTH TWICE DAILY 180 tablet 1    fenofibrate (TRICOR) 145 MG tablet Take 1 tablet (145 mg total) by mouth once daily. 90 tablet 1    furosemide (LASIX) 40 MG tablet TAKE ONE TABLET BY MOUTH EVERY DAY AS NEEDED 90 tablet 2    gabapentin (NEURONTIN) 300 MG capsule TAKE TWO CAPSULES BY MOUTH TWICE DAILY 360 capsule 1    glucagon (GVOKE HYPOPEN 2-PACK) 1 mg/0.2 mL AtIn Inject 1 mg into the skin as needed (severe hypoglycemia). 2 each 3    insulin glargine U-100, Lantus, (LANTUS SOLOSTAR U-100 INSULIN) 100 unit/mL (3 mL) InPn pen Inject 36 Units into the skin every evening. 10.8 mL 11    insulin lispro (HUMALOG KWIKPEN INSULIN) 100 unit/mL pen Inject 10 units 3 times daily with meals. Plus correction scale. Max daily dose 50 units/day 45.9 mL 3    ketoconazole (NIZORAL) 2 % cream Apply topically once daily. 30 g 1    lancets 33 gauge Misc 1 lancet by Misc.(Non-Drug; Combo Route) route 4 (four) times daily. Pt uses True Result Meter, bg monitoring 4 times a day. 400 each 4    levothyroxine (SYNTHROID) 25 MCG tablet Take 25 mcg by mouth once daily.        loratadine (CLARITIN) 10 mg tablet Take 1 tablet (10 mg total) by mouth once daily. 90 tablet 3    metoprolol succinate (TOPROL-XL) 100 MG 24 hr tablet TAKE ONE TABLET BY MOUTH EVERY DAY 90 tablet 2    nystatin-triamcinolone (MYCOLOG II) cream apply TWICE DAILY 60 g 1    pen needle, diabetic (BD ULTRA-FINE BASIL PEN NEEDLE) 32 gauge x 5/32" Ndle To use q.i.d. 400 each 3    tamsulosin (FLOMAX) 0.4 mg Cap Take 1 capsule (0.4 mg total) by mouth once daily. 90 capsule 1    temazepam (RESTORIL) 15 mg Cap Take 1 capsule (15 mg total) by mouth nightly as needed. 30 capsule 5    tirzepatide 10 mg/0.5 mL PnIj Inject 10 mg into the " skin every 7 days. 4 Pen 6    triamcinolone acetonide 0.025 % Lotn APPLY TO AFFECTED AREA(S) FACE TWICE DAILY AS NEEDED red and/or scaly 60 mL 1    triamcinolone acetonide 0.1% (KENALOG) 0.1 % cream APPLY TO AFFECTED AREA(S) TWICE DAILY 30 g 3    azelastine (ASTELIN) 137 mcg (0.1 %) nasal spray 1 spray (137 mcg total) by Nasal route 2 (two) times daily. (Patient not taking: Reported on 6/24/2024) 30 mL 3    metOLazone (ZAROXOLYN) 5 MG tablet Take 1 tablet (5 mg total) by mouth once daily. for 7 days (Patient not taking: Reported on 10/19/2023) 7 tablet 0      No current facility-administered medications for this visit.                 Past Medical History:   Diagnosis Date    *Atrial fibrillation       Eliquis    Anticoagulant long-term use       apaxiban    Basal cell carcinoma 12/2015     left eye brow    Basal cell carcinoma 10/28/2019     right superior preauricular    BCC (basal cell carcinoma) 12/2015     left shoulder    Cataract      Chronic kidney disease      Diabetes mellitus with renal manifestations, uncontrolled      Dyslipidemia associated with type 2 diabetes mellitus      Fever blister      Hearing loss      HTN (hypertension)      Keloid cicatrix      Liver disease      Melanoma 12/07/2015     right cheek-in situ    Obesity      PN (peripheral neuropathy)      Primary osteoarthritis of right knee 1/25/2017    Screening for colon cancer 3/3/2016    Sleep apnea       wears CPAP at night    Thyroid disease      Type II or unspecified type diabetes mellitus with other specified manifestations, uncontrolled      Ulcer of left lower extremity, limited to breakdown of skin 9/22/2017               Past Surgical History:   Procedure Laterality Date    APPENDECTOMY        CARDIOVERSION        CATARACT EXTRACTION        CATARACT EXTRACTION Right 05/14/2018     Dr. Greer    COLONOSCOPY N/A 3/3/2016     Procedure: COLONOSCOPY;  Surgeon: Bob Poe MD;  Location: [facility] ([address] [place] [address]);  Service: Endoscopy;   Laterality: N/A;  Holding Eliquis for 3 days prior to colonoscopy. EC    COLONOSCOPY N/A 9/20/2019     Procedure: COLONOSCOPY;  Surgeon: Akbar Curtis MD;  Location: [facility] (4TH FLR);  Service: Endoscopy;  Laterality: N/A;  Per Dr. José Granda, patient can HOLD Eliquis X2 days prior to procedure-see telphone encounter 8/22/19-[facility]    ECTROPION REPAIR Right 8/14/2019     Procedure: REPAIR, ECTROPION, EYELID /       #[id number]- Skin Flaps(Deep Tissue) (OD);  Surgeon: Edita Sabillon MD;  Location: [facility] OR 2ND FLR;  Service: Ophthalmology;  Laterality: Right;    epidural steroid injection        INTRAOCULAR PROSTHESES INSERTION Left 6/25/2018     Procedure: INSERTION, INTRAOCULAR LENS PROSTHESIS;  Surgeon: Lawrence Greer MD;  Location: [facility];  Service: Ophthalmology;  Laterality: Left;    JOINT REPLACEMENT         Knee    Meniere's disease surgery        PHACOEMULSIFICATION OF CATARACT Left 6/25/2018     Procedure: PHACOEMULSIFICATION, CATARACT;  Surgeon: Lawrence Greer MD;  Location: [facility];  Service: Ophthalmology;  Laterality: Left;    PREPARATION OF WOUND PRIOR TO APPLICATION OF SKIN GRAFT Right 8/14/2019     Procedure: PREPARATION, WOUND, PRIOR TO SKIN GRAFT APPLICATION;  Surgeon: Edita Sabillon MD;  Location: [address];  Service: Ophthalmology;  Laterality: Right;    REVISION OF KNEE ARTHROPLASTY Right 7/10/2018     Procedure: REVISION-ARTHROPLASTY-KNEE-SAMIR;  Surgeon: Miguel Stuart MD;  Location: [facility] OR [address] [place] [address];  Service: Orthopedics;  Laterality: Right;  Samir    SKIN FULL THICKNESS GRAFT Right 8/14/2019     Procedure: APPLICATION, GRAFT, SKIN, FULL-THICKNESS;  Surgeon: Edita Sabillon MD;  [facility];  Service: Ophthalmology;  Laterality: Right;    TARSORRHAPHY Right 8/14/2019     Procedure: BLEPHARORRHAPHY;  Surgeon: Edita Sabillon MD;  Location: [facility] OR [address] [place] [address];  Service: Ophthalmology;  Laterality: Right;    TONSILLECTOMY        TOTAL KNEE ARTHROPLASTY Right 01/25/2017      TKR    TOTAL KNEE ARTHROPLASTY Left       TKR, 1990's         OBJECTIVE:   PHYSICAL EXAM:       There were no vitals filed for this visit.  Physical Exam  Vitals reviewed.   Constitutional:       General: He is not in acute distress.     Appearance: Normal appearance. He is normal weight. He is not toxic-appearing.   HENT:      Head: Normocephalic and atraumatic.      Nose: Nose normal.   Eyes:      Extraocular Movements: Extraocular movements intact.      Conjunctiva/sclera: Conjunctivae normal.      Pupils: Pupils are equal, round, and reactive to light.   Cardiovascular:      Rate and Rhythm: Normal rate and regular rhythm.      Pulses: Normal pulses.      Heart sounds: Normal heart sounds. No murmur heard.     No friction rub. No gallop.   Pulmonary:      Effort: Pulmonary effort is normal.      Breath sounds: Normal breath sounds. No stridor. No wheezing, rhonchi or rales.   Abdominal:      General: Abdomen is flat. Bowel sounds are normal.      Palpations: Abdomen is soft.   Musculoskeletal:         General: Normal range of motion.      Cervical back: Normal range of motion and neck supple.      Right lower leg: No edema.      Left lower leg: No edema.   Skin:     General: Skin is warm and dry.      Capillary Refill: Capillary refill takes less than 2 seconds.      Coloration: Skin is not jaundiced.   Neurological:      General: No focal deficit present.      Mental Status: He is alert and oriented to person, place, and time.   Psychiatric:         Mood and Affect: Mood normal.         Behavior: Behavior normal.         Thought Content: Thought content normal.         Judgment: Judgment normal.            General     Vitals reviewed.  Constitutional: He is oriented to person, place, and time.  Non-toxic appearance. No distress.   HENT:   Head: Normocephalic and atraumatic.   Nose: Nose normal.   Eyes: Conjunctivae are normal. Pupils are equal, round, and reactive to light.   Neck: Neck supple.    Cardiovascular:  Normal rate, regular rhythm, normal heart sounds and normal pulses.      Exam reveals no gallop and no friction rub.       No murmur heard.  Pulmonary/Chest: Effort normal and breath sounds normal. No stridor. He has no wheezes. He has no rhonchi. He has no rales.   Abdominal: Soft. Normal appearance and bowel sounds are normal.   Neurological: He is alert and oriented to person, place, and time.   Psychiatric: His behavior is normal. Mood, judgment and thought content normal.                  Vascular Exam         Edema  Right Lower Leg: absent  Left Lower Leg: absent     Examination bilateral hands   TTP to A1 pulley and PIP joints of the left index, right index, and right middle fingers   Moderate swelling noted to right index and ring fingers  AROM and P ROM limited secondary to pain to index, right index, and right middle fingers  Pain with manipulation of the left index, right index, and right middle PIP joints  Triggering appreciated  Slight flexion contracture noted on the right index and middle fingers        RADIOGRAPHS:  I independently interpreted the patient's imaging. Xrays of the patient's bilateral hands demonstrate no evidence of any acute fractures or dislocations or significant degenerative changes.        ASSESSMENT/PLAN:   Patient is an 78-year-old male with trigger finger to the right index, left index, and left middle fingers  Plan: The patient and I had a thorough discussion today.  We discussed the working diagnosis as well as several other potential alternative diagnoses.    We discussed conservative and surgical treatment options. Conservative options include rest, activity modifications, workplace modifications, po and topical analgesia (OTC and rx), formal or home PT, and others. Surgical treatment was also mentioned.     At this time, the patient would like to proceed with the following, which I agree with.     Surgery: Pt has failed conservative tx including rest,  ice, NSAIDs, PT, CSI.  I discussed my concern with the patient that he has flexion contractures of his fingers secondary to decreased flexion the affected fingers secondary to pain with ROM.  Pt has elected to proceed with surgical intervention. The surgery was explained as well as risks and benefits, and consents were signed for right index and middle trigger finger release.  Patient is interested in left index trigger finger release in the future status post right hand surgery  Medications:  OTC analgesia as needed for pain  Procedures:  CSI given to left index finger today  Work status:  WBAT with limitation secondary to pain     All of the patient's questions were answered and the patient will contact us if they have any questions or concerns in the interim.        Seda Marquis PA-C  [facility]  Orthopedic Surgery

## 2024-08-13 NOTE — OPERATIVE NOTE ADDENDUM
Certification of Assistant at Surgery       Surgery Date: 8/13/2024     Participating Surgeons:  Surgeons and Role:     * Woo Elmore Jr., MD - Primary    Procedures:  Procedure(s) (LRB):  RELEASE, TRIGGER FINGER (Right)    Assistant Surgeon's Certification of Necessity:  I understand that section 1842 (b) (6) (d) of the Social Security Act generally prohibits Medicare Part B reasonable charge payment for the services of assistants at surgery in teaching hospitals when qualified residents are available to furnish such services. I certify that the services for which payment is claimed were medically necessary, and that no qualified resident was available to perform the services. I further understand that these services are subject to post-payment review by the Medicare carrier.      Seda Marquis PA-C    08/13/2024  8:19 AM

## 2024-08-14 NOTE — ANESTHESIA POSTPROCEDURE EVALUATION
Anesthesia Post Evaluation    Patient: [facility] PAT Sakshi JrJorge    Procedure(s) Performed: Procedure(s) (LRB):  RELEASE, TRIGGER FINGER (Right)    Final Anesthesia Type: general      Patient location during evaluation: PACU  Patient participation: Yes- Able to Participate  Level of consciousness: awake and alert, oriented and awake  Post-procedure vital signs: reviewed and stable  Pain management: adequate  Airway patency: patent    PONV status at discharge: No PONV  Anesthetic complications: no      Cardiovascular status: stable  Respiratory status: unassisted and room air  Hydration status: euvolemic  Follow-up not needed.              Vitals Value Taken Time   /60 08/13/24 0910   Temp 36.3 °C (97.3 °F) 08/13/24 0910   Pulse 68 08/13/24 0910   Resp 17 08/13/24 0910   SpO2 98 % 08/13/24 0910         No case tracking events are documented in the log.      Pain/Bessie Score: Bessie Score: 10 (8/13/2024  9:10 AM)

## 2024-08-15 VITALS
HEART RATE: 68 BPM | OXYGEN SATURATION: 98 % | WEIGHT: 260 LBS | HEIGHT: 69 IN | DIASTOLIC BLOOD PRESSURE: 60 MMHG | TEMPERATURE: 97 F | BODY MASS INDEX: 38.51 KG/M2 | SYSTOLIC BLOOD PRESSURE: 114 MMHG | RESPIRATION RATE: 17 BRPM

## 2024-08-26 ENCOUNTER — OFFICE VISIT (OUTPATIENT)
Dept: ORTHOPEDICS | Facility: CLINIC | Age: 79
End: 2024-08-26
Payer: MEDICARE

## 2024-08-26 VITALS — HEIGHT: 69 IN | BODY MASS INDEX: 38.4 KG/M2

## 2024-08-26 DIAGNOSIS — M65.321 TRIGGER FINGER, RIGHT INDEX FINGER: Primary | ICD-10-CM

## 2024-08-26 PROCEDURE — 3288F FALL RISK ASSESSMENT DOCD: CPT | Mod: CPTII,S$GLB,, | Performed by: ORTHOPAEDIC SURGERY

## 2024-08-26 PROCEDURE — 99024 POSTOP FOLLOW-UP VISIT: CPT | Mod: S$GLB,,, | Performed by: ORTHOPAEDIC SURGERY

## 2024-08-26 PROCEDURE — 1125F AMNT PAIN NOTED PAIN PRSNT: CPT | Mod: CPTII,S$GLB,, | Performed by: ORTHOPAEDIC SURGERY

## 2024-08-26 PROCEDURE — 1101F PT FALLS ASSESS-DOCD LE1/YR: CPT | Mod: CPTII,S$GLB,, | Performed by: ORTHOPAEDIC SURGERY

## 2024-08-26 PROCEDURE — 1159F MED LIST DOCD IN RCRD: CPT | Mod: CPTII,S$GLB,, | Performed by: ORTHOPAEDIC SURGERY

## 2024-08-26 PROCEDURE — 99999 PR PBB SHADOW E&M-EST. PATIENT-LVL II: CPT | Mod: PBBFAC,,, | Performed by: ORTHOPAEDIC SURGERY

## 2024-08-26 RX ORDER — GABAPENTIN 300 MG/1
CAPSULE ORAL
Qty: 360 CAPSULE | Refills: 1 | Status: SHIPPED | OUTPATIENT
Start: 2024-08-26

## 2024-08-26 NOTE — PROGRESS NOTES
"Subjective:      Patient ID: [facility] PAT Cantor Jr. is a 78 y.o. male.  Chief Complaint: Post-op Evaluation (Trigger finger)      HPI  [facility] PAT Cantor Jr. is a  78 y.o. male presenting today for post op visit.  He is s/p trigger release right hand x2 he is 2 weeks postop doing.     Review of patient's allergies indicates:   Allergen Reactions    Niacin Itching and Other (See Comments)     Other reaction(s): facial flushing and causes hepatitis     Terbinafine Other (See Comments)     Other reaction(s): Hepatitis    "gave me rash"         Current Outpatient Medications   Medication Sig Dispense Refill    ammonium lactate 12 % Crea Apply 1 application  topically 2 (two) times daily. 140 g 11    atorvastatin (LIPITOR) 40 MG tablet TAKE ONE TABLET BY MOUTH EVERY DAY (Patient taking differently: Take 40 mg by mouth once daily.) 90 tablet 3    BD INSULIN PEN NEEDLE UF ORIG 29 x 1/2 " Ndle   12    blood sugar diagnostic Strp 1 strip by Misc.(Non-Drug; Combo Route) route 4 (four) times daily. To check blood glucose 400 strip 11    blood-glucose meter Misc To check BG as discussed 1 each 0    blood-glucose sensor (DEXCOM G7 SENSOR) Mckenna Apply one sensor to the skin every 10 days 3 each 11    celecoxib (CELEBREX) 100 MG capsule Take 1 capsule (100 mg total) by mouth once daily. 30 capsule 1    ciclopirox (LOPROX) 0.77 % Crea Apply topically 2 (two) times daily. 90 g 2    ciclopirox (PENLAC) 8 % Soln Apply topically nightly. 6.6 mL 11    clobetasol (TEMOVATE) 0.05 % cream AAA finger bid 60 g 3    ELIQUIS 5 mg Tab TAKE ONE TABLET BY MOUTH TWICE DAILY 180 tablet 1    fenofibrate (TRICOR) 145 MG tablet Take 1 tablet (145 mg total) by mouth once daily. 90 tablet 1    furosemide (LASIX) 40 MG tablet TAKE ONE TABLET BY MOUTH EVERY DAY AS NEEDED 90 tablet 2    gabapentin (NEURONTIN) 300 MG capsule TAKE TWO CAPSULES BY MOUTH TWICE DAILY 360 capsule 1    glucagon (GVOKE HYPOPEN 2-PACK) 1 mg/0.2 mL AtIn Inject 1 mg into the skin as needed " Procedure:  Recall, previous colonoscopy with Dr Santos  Requested Via:  Phone call received from patient  Chart:  [facility] ([place])  Preferred Facility:  [facility]      "(severe hypoglycemia). 2 each 3    HYDROcodone-acetaminophen (NORCO) 5-325 mg per tablet Take 1 tablet by mouth every 4 (four) hours as needed for Pain. 12 tablet 0    insulin glargine U-100, Lantus, (LANTUS SOLOSTAR U-100 INSULIN) 100 unit/mL (3 mL) InPn pen Inject 36 Units into the skin every evening. 10.8 mL 11    insulin lispro (HUMALOG KWIKPEN INSULIN) 100 unit/mL pen Inject 10 units 3 times daily with meals. Plus correction scale. Max daily dose 50 units/day 45.9 mL 3    ketoconazole (NIZORAL) 2 % cream Apply topically once daily. 30 g 1    lancets 33 gauge Misc 1 lancet by Misc.(Non-Drug; Combo Route) route 4 (four) times daily. Pt uses True Result Meter, bg monitoring 4 times a day. 400 each 4    metoprolol succinate (TOPROL-XL) 100 MG 24 hr tablet TAKE ONE TABLET BY MOUTH EVERY DAY 90 tablet 2    nystatin-triamcinolone (MYCOLOG II) cream apply TWICE DAILY 60 g 1    pen needle, diabetic (BD ULTRA-FINE BASIL PEN NEEDLE) 32 gauge x 5/32" Ndle To use q.i.d. 400 each 3    tamsulosin (FLOMAX) 0.4 mg Cap Take 1 capsule (0.4 mg total) by mouth once daily. 90 capsule 1    temazepam (RESTORIL) 15 mg Cap Take 1 capsule (15 mg total) by mouth nightly as needed. 30 capsule 5    tirzepatide 10 mg/0.5 mL PnIj Inject 10 mg into the skin every 7 days. 4 Pen 6    triamcinolone acetonide 0.025 % Lotn APPLY TO AFFECTED AREA(S) FACE TWICE DAILY AS NEEDED red and/or scaly 60 mL 1    triamcinolone acetonide 0.1% (KENALOG) 0.1 % cream APPLY TO AFFECTED AREA(S) TWICE DAILY 30 g 3    loratadine (CLARITIN) 10 mg tablet Take 1 tablet (10 mg total) by mouth once daily. 90 tablet 3     No current facility-administered medications for this visit.       Past Medical History:   Diagnosis Date    *Atrial fibrillation     Eliquis    Anticoagulant long-term use     apaxiban    Basal cell carcinoma 12/2015    left eye brow    Basal cell carcinoma 10/28/2019    right superior preauricular    BCC (basal cell carcinoma) 12/2015    left shoulder    " Cataract     Chronic kidney disease     Diabetes mellitus with renal manifestations, uncontrolled     Dyslipidemia associated with type 2 diabetes mellitus     Fever blister     Hearing loss     HTN (hypertension)     Keloid cicatrix     Liver disease     Melanoma 12/07/2015    right cheek-in situ    Obesity     PN (peripheral neuropathy)     Primary osteoarthritis of right knee 1/25/2017    Screening for colon cancer 3/3/2016    Sleep apnea     wears CPAP at night    Thyroid disease     Type II or unspecified type diabetes mellitus with other specified manifestations, uncontrolled     Ulcer of left lower extremity, limited to breakdown of skin 9/22/2017       Past Surgical History:   Procedure Laterality Date    APPENDECTOMY      CARDIOVERSION      CATARACT EXTRACTION      CATARACT EXTRACTION Right 05/14/2018    Dr. Greer    COLONOSCOPY N/A 3/3/2016    Procedure: COLONOSCOPY;  Surgeon: Bob Poe MD;  Location: [facility] (4TH FLR);  Service: Endoscopy;  Laterality: N/A;  Holding Eliquis for 3 days prior to colonoscopy. EC    COLONOSCOPY N/A 9/20/2019    Procedure: COLONOSCOPY;  Surgeon: Akbar Curtis MD;  Location: [facility] (4TH FLR);  Service: Endoscopy;  Laterality: N/A;  Per Dr. José Granda, patient can HOLD Eliquis X2 days prior to procedure-see telphone encounter 8/22/19-[facility]    ECTROPION REPAIR Right 8/14/2019    Procedure: REPAIR, ECTROPION, EYELID /       #93647- Skin Flaps(Deep Tissue) (OD);  Surgeon: Edita Sabillon MD;  Location: [facility] 2ND FLR;  Service: Ophthalmology;  Laterality: Right;    epidural steroid injection      INTRAOCULAR PROSTHESES INSERTION Left 6/25/2018    Procedure: INSERTION, INTRAOCULAR LENS PROSTHESIS;  Surgeon: Lawrence Greer MD;  Location: [facility];  Service: Ophthalmology;  Laterality: Left;    JOINT REPLACEMENT      Knee    Meniere's disease surgery      PHACOEMULSIFICATION OF CATARACT Left 6/25/2018    Procedure: PHACOEMULSIFICATION, CATARACT;  Surgeon: Lawrence Greer,  "MD;  Location: [facility];  Service: Ophthalmology;  Laterality: Left;    PREPARATION OF WOUND PRIOR TO APPLICATION OF SKIN GRAFT Right 8/14/2019    Procedure: PREPARATION, WOUND, PRIOR TO SKIN GRAFT APPLICATION;  Surgeon: Edita Sabillon MD;  [address]  Service: Ophthalmology;  Laterality: Right;    REVISION OF KNEE ARTHROPLASTY Right 7/10/2018    Procedure: REVISION-ARTHROPLASTY-KNEE-DAKOTA;  Surgeon: Miguel Stuart MD;  [address];  Service: Orthopedics;  Laterality: Right;  [place]    SKIN FULL THICKNESS GRAFT Right 8/14/2019    Procedure: APPLICATION, GRAFT, SKIN, FULL-THICKNESS;  Surgeon: Edita Sabillon MD;  Location: [address];  Service: Ophthalmology;  Laterality: Right;    TARSORRHAPHY Right 8/14/2019    Procedure: BLEPHARORRHAPHY;  Surgeon: Edita Sabillon MD;  [address];  Service: Ophthalmology;  Laterality: Right;    TONSILLECTOMY      TOTAL KNEE ARTHROPLASTY Right 01/25/2017    TKR    TOTAL KNEE ARTHROPLASTY Left     TKR, 1990's    TRIGGER FINGER RELEASE Right 8/13/2024    Procedure: RELEASE, TRIGGER FINGER;  Surgeon: Woo Elmore Jr., MD;  Location: [facility];  Service: Orthopedics;  Laterality: Right;       OBJECTIVE:   PHYSICAL EXAM:  Height: 5' 9" (175.3 cm)    Vitals:    08/26/24 1354   Height: 5' 9" (1.753 m)   PainSc:   7   PainLoc: Finger     Ortho/SPM Exam  Examination right hand incisions healing well sutures in place slight swelling minimal tenderness range of motion fingers slightly decreased sensation intact    RADIOGRAPHS:      Comments: I have personally reviewed the imaging and I agree with the above radiologist's report.    ASSESSMENT/PLAN:     IMPRESSION:  Status post trigger release X 2 right hand    PLAN:  Sutures removed instructed in appropriate wound care   I have also ordered some OT for range of motion stretching right hand   Light activities no heavy lifting    FOLLOW UP:  3 weeks    Disclaimer: This note has been " generated using voice-recognition software. There may be typographical errors that have been missed during proof-reading.

## 2024-08-26 NOTE — TELEPHONE ENCOUNTER
Refill Routing Note   Medication(s) are not appropriate for processing by [facility] for the following reason(s):        Outside of protocol    [facility] action(s):  Route               Appointments  past 12m or future 3m with PCP    Date Provider   Last Visit   10/19/2023 Desmond Barlow MD   Next Visit   Visit date not found Desmond Barlow MD   ED visits in past 90 days: 0        Note composed:10:16 AM 08/26/2024

## 2024-08-28 DIAGNOSIS — Z79.4 TYPE 2 DIABETES MELLITUS WITH STAGE 3 CHRONIC KIDNEY DISEASE, WITH LONG-TERM CURRENT USE OF INSULIN: ICD-10-CM

## 2024-08-28 DIAGNOSIS — I50.31 ACUTE DIASTOLIC HEART FAILURE: ICD-10-CM

## 2024-08-28 DIAGNOSIS — I87.2 VENOUS INSUFFICIENCY OF BOTH LOWER EXTREMITIES: ICD-10-CM

## 2024-08-28 DIAGNOSIS — E11.22 TYPE 2 DIABETES MELLITUS WITH STAGE 3 CHRONIC KIDNEY DISEASE, WITH LONG-TERM CURRENT USE OF INSULIN: ICD-10-CM

## 2024-08-28 DIAGNOSIS — N18.30 TYPE 2 DIABETES MELLITUS WITH STAGE 3 CHRONIC KIDNEY DISEASE, WITH LONG-TERM CURRENT USE OF INSULIN: ICD-10-CM

## 2024-08-28 DIAGNOSIS — M17.11 PRIMARY OSTEOARTHRITIS OF RIGHT KNEE: ICD-10-CM

## 2024-08-28 DIAGNOSIS — I10 ESSENTIAL HYPERTENSION: ICD-10-CM

## 2024-08-28 DIAGNOSIS — I48.20 CHRONIC ATRIAL FIBRILLATION: ICD-10-CM

## 2024-08-28 DIAGNOSIS — N18.30 CHRONIC KIDNEY DISEASE, STAGE III (MODERATE): ICD-10-CM

## 2024-08-28 NOTE — TELEPHONE ENCOUNTER
Care Due:                  Date            Visit Type   Department     Provider  --------------------------------------------------------------------------------                                MYCHART                              FOLLOWUP/OF  [facility] INTERNAL  Last Visit: 10-      FICE VISIT   MEDICINE       Desmond Barlow  Next Visit: None Scheduled  None         None Found                                                            Last  Test          Frequency    Reason                     Performed    Due Date  --------------------------------------------------------------------------------    CBC.........  12 months..  fenofibrate..............  10-   10-    CMP.........  12 months..  fenofibrate..............  05- 04-    Lipid Panel.  12 months..  fenofibrate..............  05- 04-    [employer] Embedded Care Due Messages. Reference number: [id number].   8/28/2024 5:31:37 PM CDT

## 2024-08-29 ENCOUNTER — CLINICAL SUPPORT (OUTPATIENT)
Dept: REHABILITATION | Facility: HOSPITAL | Age: 79
End: 2024-08-29
Attending: ORTHOPAEDIC SURGERY
Payer: MEDICARE

## 2024-08-29 DIAGNOSIS — M79.641 RIGHT HAND PAIN: Primary | ICD-10-CM

## 2024-08-29 DIAGNOSIS — M65.321 TRIGGER FINGER, RIGHT INDEX FINGER: ICD-10-CM

## 2024-08-29 PROCEDURE — 97022 WHIRLPOOL THERAPY: CPT | Mod: PO

## 2024-08-29 PROCEDURE — 97530 THERAPEUTIC ACTIVITIES: CPT | Mod: PO

## 2024-08-29 PROCEDURE — 97165 OT EVAL LOW COMPLEX 30 MIN: CPT | Mod: PO

## 2024-08-29 RX ORDER — FUROSEMIDE 40 MG/1
TABLET ORAL
Qty: 90 TABLET | Refills: 0 | Status: SHIPPED | OUTPATIENT
Start: 2024-08-29

## 2024-08-29 NOTE — TELEPHONE ENCOUNTER
Refill Routing Note   Medication(s) are not appropriate for processing by [facility] for the following reason(s):        Drug-disease interaction    [facility] action(s):  Defer     Requires labs : Yes      Medication Therapy Plan: Drug-Disease: furosemide and Benign hypertensive renal disease without renal failure    Pharmacist review requested: Yes     Appointments  past 12m or future 3m with PCP    Date Provider   Last Visit   10/19/2023 Desmond Barlow MD   Next Visit   Visit date not found Desmond Barlow MD   ED visits in past 90 days: 0        Note composed:7:58 AM 08/29/2024

## 2024-08-29 NOTE — PATIENT INSTRUCTIONS
Heat your hand 10-25 minutes prior to exercising if possible      PIP Extension (Passive)        Use thumb of other hand on top of joint and two fingers under- neath on either side to straighten middle joint of index and middle fingers. Hold 10 seconds.  Repeat 10 times. Do 3 sessions per day.     MP / PIP / DIP Composite Flexion (Passive Stretch)        Use other hand to bend all fingers at all three joints. Hold 10 seconds.  Repeat 10 times. Do 3 sessions per day.     Flexor Tendon Gliding (Active Hook Fist)        With fingers and knuckles straight, bend middle and tip joints. Do not bend large knuckles.  Repeat 20 times. Do 3 sessions per day.        Flexor Tendon Gliding (Active Full Fist)        Straighten all fingers, then make a fist, bending all joints.  Repeat 20 times. Do 3 sessions per day.        MP Extension (Active)        With palm on table, straighten fingers completely at large knuckles, and lift fingers individually off table.   Repeat 20 times. Do 3 sessions per day.      PIP Extension (Active)        Hold index and middle fingers with other hand. Straighten finger fully at middle joint.  Repeat 20 times. Do 3 sessions per day.      Scar Tissue Massage        Place pad of fingertip on scar area. Apply steady downward pressure while moving in circular fashion.   Perform 3-5 minutes 3 times a day

## 2024-08-29 NOTE — TELEPHONE ENCOUNTER
Provider Staff:  Action required for this patient    Requires labs      Please see care gap opportunities below in Care Due Message.    Thanks!  [facility]     Appointments      Date Provider   Last Visit   10/19/2023 Desmond Barlow MD   Next Visit   Visit date not found Desmond Barlow MD     Refill Decision Note   [facility]  is requesting a refill authorization.  Brief Assessment and Rationale for Refill:  Approve     Medication Therapy Plan:         Pharmacist review requested: Yes   Comments:     Note composed:9:55 AM 08/29/2024

## 2024-08-29 NOTE — PLAN OF CARE
[facility] Occupational Therapy  Initial Evaluation     Date: 8/29/2024  Patient: [facility] PAT Cantor Jr.  Chart Number: [id number]  Referring Physician: Woo Elmore Jr., *  Therapy Diagnosis:   1. Right hand pain        2. Trigger finger, right index finger  Ambulatory referral/consult to Physical/Occupational Therapy          Medical Diagnosis: M65.321 (ICD-10-CM) - Trigger finger, right index finger   Physician Orders: Eval/tx  Evaluation Date: 8/29/2024  Plan of Care Certification Date: 11/29/2024  Authorization Period: 8/26/2024  Surgery Date and Procedure: Right IF/LF trigger release 8/13/2024  Date of Return to MD: 9/17/2024    Visit #: 1 of 1  Time In: 1:15 PM  Time Out: 2:00 PM  Total Billable Time: 45 min    Precautions: Standard,     Subjective     Involved Side: Right  Dominant Side: Right  Date of Onset: Years  History of Current Condition: Pt w/ long-standing trigger finger right IF/LF failed conservative tx  Imaging: N/A  Previous Therapy: None    Patient's Goals for Therapy: Maximize RUE function    Pain:  Functional Pain Scale Rating 0-10:   0/10 on average  0/10at best  9/10 at worst  Location: Right IF, LF  Description: Achy  Aggravating Factors: Heavy gripping  Easing Factors: Rest    Previous Level of Function Independent w/ ADL's    Current Level of Function Difficulty w/ gripping and pinching    Occupation:  Retired      Past Medical History/Physical Systems Review:   [employer] PAT Cantor Jr.  has a past medical history of *Atrial fibrillation, Anticoagulant long-term use, Basal cell carcinoma, Basal cell carcinoma, BCC (basal cell carcinoma), Cataract, Chronic kidney disease, Diabetes mellitus with renal manifestations, uncontrolled, Dyslipidemia associated with type 2 diabetes mellitus, Fever blister, Hearing loss, HTN (hypertension), Keloid cicatrix, Liver disease, Melanoma, Obesity, PN (peripheral neuropathy), Primary osteoarthritis of right knee, Screening for colon cancer,  "Sleep apnea, Thyroid disease, Type II or unspecified type diabetes mellitus with other specified manifestations, uncontrolled, and Ulcer of left lower extremity, limited to breakdown of skin.    [facility] Cantor Jr.  has a past surgical history that includes Appendectomy; Joint replacement; Meniere's disease surgery; Tonsillectomy; Cardioversion; Colonoscopy (N/A, 3/3/2016); Total knee arthroplasty (Right, 01/25/2017); Total knee arthroplasty (Left); epidural steroid injection; Cataract extraction; Cataract extraction (Right, 05/14/2018); Phacoemulsification of cataract (Left, 6/25/2018); Intraocular prosthesis insertion (Left, 6/25/2018); Revision of knee arthroplasty (Right, 7/10/2018); Ectropion repair (Right, 8/14/2019); Preparation of wound prior to application of skin graft (Right, 8/14/2019); Full thickness skin graft (Right, 8/14/2019); Tarsorrhaphy (Right, 8/14/2019); Colonoscopy (N/A, 9/20/2019); and Trigger finger release (Right, 8/13/2024).    [facility] has a current medication list which includes the following prescription(s): ammonium lactate, atorvastatin, bd ultra-fine orig pen needle, blood sugar diagnostic, blood-glucose meter, dexcom g7 sensor, celecoxib, ciclopirox, ciclopirox, clobetasol, eliquis, fenofibrate, furosemide, gabapentin, gvoke hypopen 2-pack, hydrocodone-acetaminophen, lantus solostar u-100 insulin, insulin lispro, ketoconazole, lancets, loratadine, metoprolol succinate, nystatin-triamcinolone, pen needle, diabetic, tamsulosin, temazepam, tirzepatide, triamcinolone acetonide, and triamcinolone acetonide 0.1%.    Review of patient's allergies indicates:   Allergen Reactions    Niacin Itching and Other (See Comments)     Other reaction(s): facial flushing and causes hepatitis     Terbinafine Other (See Comments)     Other reaction(s): Hepatitis    "gave me rash"          Objective     Mental status: alert    Observation:   Incisions healing well      Sensation:   WNL    Edema: Circumferential " measurements: In centimters     8/29/2024 8/29/2024    Left Right   Index:       P1 7.7 8.0    PIP 7.2 7.5   P2 6.5 6.5   Long:       P1 7.6 7.4    PIP 7.1 7.0   P2 6.1 6.3      Left Right   MP's 21.0  21.7      Range of Motion:   Right      Finger AROM   INDEX  LONG    MP  0/76 0/75   PIP  24/81 30/85   DIP  0/40 0/50   PINEDA 173 180      Strength: (Measured in psi)     Left Right   Rung II 53 33       Pinch Strength (Measured in psi)     Left Right   Key Pinch 20  14    3pt Pinch 19  8        Treatment     Treatment Time In: 1:35 PM  Treatment Time Out: 2:00 PM  Total Treatment time separate from Evaluation time:25 min    [facility] received the following modalities after being cleared for contraindications for 10 minutes in order to promote increased blood flow and tissue elasticity:   -Fluidotherapy    [facility] participated in dynamic functional therapeutic activities to improve functional performance for 15  minutes, including:  -Pathophysiology education  -HEP training    Home Exercise Program/Education:  Issued HEP (see patient instructions in EMR) and educated on modality use for pain management . Exercises were reviewed and [facility] was able to demonstrate them prior to the end of the session.   Pt received a written copy of exercises to perform at home. [facility] demonstrated good  understanding of the education provided.  Pt was advised to perform these exercises free of pain, and to stop performing them if pain occurs.    Patient/Family Education: role of OT, goals for OT, scheduling/cancellations - pt verbalized understanding. Discussed insurance limitations with patient.    Additional Education provided: Use of heat      Assessment     [facility] PAT Cantor Jr. is a 78 y.o. male presents with limitations as described in problem list. Patient can benefit from Occupational Therapy services for Iontophoresis, ultrasound, moist heat, therapeutic exercises, home exercise program provied with written instructions, ice and strengthening  and orthotics, if deemed necessary . The following goals were discussed with the patient and she is in agreement with them as to be addressed in the treatment plan.    The patient's rehab potential is Good.     Anticipated barriers to occupational therapy: Advanced age  Pt has no cultural, educational or language barriers to learning provided.    Profile and History Assessment of Occupational Performance Level of Clinical Decision Making Complexity Score   Occupational Profile:   [facility] PAT Cantor Jr. is a 78 y.o. male who is currently employed [employer] PAT Cantor Jr. has difficulty with  ADLs and IADLs as listed previously, which  affecting his/her daily functional abilities.      Comorbidities:    has a past medical history of *Atrial fibrillation, Anticoagulant long-term use, Basal cell carcinoma, Basal cell carcinoma, BCC (basal cell carcinoma), Cataract, Chronic kidney disease, Diabetes mellitus with renal manifestations, uncontrolled, Dyslipidemia associated with type 2 diabetes mellitus, Fever blister, Hearing loss, HTN (hypertension), Keloid cicatrix, Liver disease, Melanoma, Obesity, PN (peripheral neuropathy), Primary osteoarthritis of right knee, Screening for colon cancer, Sleep apnea, Thyroid disease, Type II or unspecified type diabetes mellitus with other specified manifestations, uncontrolled, and Ulcer of left lower extremity, limited to breakdown of skin.    Medical and Therapy History Review:   Brief               Performance Deficits    Physical:  Joint Mobility  Muscle Power/Strength  Muscle Endurance  Edema   Strength  Pinch Strength  Pain    Cognitive:  No Deficits    Psychosocial:    No Deficits     Clinical Decision Making:  moderate    Modification/Need for Assistance:  Not Necessary    Intervention Selection:  Multiple Treatment Options       moderate  Based on PMHX, co morbidities , data from assessments and functional level of assistance required with task and clinical presentation directly  impacting function.         Goals:    LTG's (8 weeks):  1)   Increase PINEDA to 220 degrees in right IF/LF to increase functional hand use for grasping small objects. Progressing  2)   Increase  strength 20 lbs. to grasp pot handle. Progressing  3)   Increase 3J pinch 8 psis for opening bags. Progressing  4)   Decrease complaints of pain to  3 out of 10 at worst to increase functional hand use for ADL/work/leisure activities. Progressing  5)   Pt will return to near to prior level of function for ADLs and household management reporting I or Mod I with ADLs (dressing, feeding, grooming, toileting). Progressing    STG's (4 weeks)  1)   Patient to be IND with HEP and modalities for pain/edema managment. Progressing  2)   Increase PINEDA to 2000 degrees in right IF/LF to increase functional hand use for grasping small objects. Progressing  3)   Increase  strength 10 lbs. to grasp pot handle. Progressing  4)   Increase 3J pinch 4 psis for opening bags. Progressing  5)   Decrease complaints of pain to  5 out of 10 at worst to increase functional hand use for ADL/work/leisure activities. Progressing      Plan     Pt to be treated by Occupational Therapy 1 times per week for 8 weeks during the certification period from 8/29/2024 to 11/29/2024 to achieve the established goals.     Treatment to include: Paraffin, Fluidotherapy, Manual therapy/joint mobilizations, US 3 mhz, Therapeutic exercises/activities., Strengthening, Edema Control, Scar Management, and Joint Protection, as well as any other treatments deemed necessary based on the patient's needs or progress.     BENNY Oshea  OTR/L, CHT  Occupational therapist, Certified Hand Therapist                 palpation/walking

## 2024-09-25 DIAGNOSIS — Z00.00 ENCOUNTER FOR MEDICARE ANNUAL WELLNESS EXAM: ICD-10-CM

## 2024-10-14 ENCOUNTER — PATIENT MESSAGE (OUTPATIENT)
Dept: ENDOCRINOLOGY | Facility: CLINIC | Age: 79
End: 2024-10-14
Payer: MEDICARE

## 2024-10-14 RX ORDER — METOPROLOL SUCCINATE 100 MG/1
TABLET, EXTENDED RELEASE ORAL
Qty: 90 TABLET | Refills: 0 | Status: SHIPPED | OUTPATIENT
Start: 2024-10-14

## 2024-10-14 NOTE — TELEPHONE ENCOUNTER
Provider Staff:  Action required for this patient     Please see care gap opportunities below in Care Due Message.    Thanks!  [facility]     Appointments      Date Provider   Last Visit   10/19/2023 Desmond Barlow MD   Next Visit   Visit date not found Desmond Barlow MD      Refill Decision Note   [facility]  is requesting a refill authorization.  Brief Assessment and Rationale for Refill:  Approve     Medication Therapy Plan:         Pharmacist review requested: Yes   Extended chart review required: Yes   Comments:     Note composed:2:36 PM 10/14/2024

## 2024-10-14 NOTE — TELEPHONE ENCOUNTER
Care Due:                  Date            Visit Type   Department     Provider  --------------------------------------------------------------------------------                                MYCHART                              FOLLOWUP/OF  [facility] INTERNAL  Last Visit: 10-      FICE VISIT   MEDICINE       Desmond Barlow  Next Visit: None Scheduled  None         None Found                                                            Last  Test          Frequency    Reason                     Performed    Due Date  --------------------------------------------------------------------------------    Office Visit  15 months..  fenofibrate, tamsulosin..  10-   01-    [facility] Embedded Care Due Messages. Reference number: [id number].   10/14/2024 9:28:05 AM CDT

## 2024-10-14 NOTE — TELEPHONE ENCOUNTER
Refill Routing Note   Medication(s) are not appropriate for processing by [facility] for the following reason(s):        Drug-disease interaction:     [facility] action(s):  Defer   Requires appointment : Yes    Medication Therapy Plan: Drug-Disease: metoprolol succinate and Onychomycosis of multiple toenails with type 2 diabetes mellitus and peripheral angiopathy    Pharmacist review requested: Yes     Appointments  past 12m or future 3m with PCP    Date Provider   Last Visit   10/19/2023 Desmond Barlow MD   Next Visit   Visit date not found Desmond Barlow MD   ED visits in past 90 days: 0        Note composed:2:12 PM 10/14/2024

## 2024-10-16 ENCOUNTER — OFFICE VISIT (OUTPATIENT)
Dept: ENDOCRINOLOGY | Facility: CLINIC | Age: 79
End: 2024-10-16
Payer: MEDICARE

## 2024-10-16 DIAGNOSIS — Z79.4 TYPE 2 DIABETES MELLITUS WITH STAGE 3A CHRONIC KIDNEY DISEASE, WITH LONG-TERM CURRENT USE OF INSULIN: Primary | ICD-10-CM

## 2024-10-16 DIAGNOSIS — N18.31 TYPE 2 DIABETES MELLITUS WITH STAGE 3A CHRONIC KIDNEY DISEASE, WITH LONG-TERM CURRENT USE OF INSULIN: Primary | ICD-10-CM

## 2024-10-16 DIAGNOSIS — E78.2 MIXED HYPERLIPIDEMIA: ICD-10-CM

## 2024-10-16 DIAGNOSIS — E11.22 TYPE 2 DIABETES MELLITUS WITH STAGE 3A CHRONIC KIDNEY DISEASE, WITH LONG-TERM CURRENT USE OF INSULIN: Primary | ICD-10-CM

## 2024-10-16 PROCEDURE — 99214 OFFICE O/P EST MOD 30 MIN: CPT | Mod: 25,95,, | Performed by: INTERNAL MEDICINE

## 2024-10-16 PROCEDURE — 1159F MED LIST DOCD IN RCRD: CPT | Mod: CPTII,95,, | Performed by: INTERNAL MEDICINE

## 2024-10-16 PROCEDURE — 1160F RVW MEDS BY RX/DR IN RCRD: CPT | Mod: CPTII,95,, | Performed by: INTERNAL MEDICINE

## 2024-10-16 NOTE — PROGRESS NOTES
ENDOCRINOLOGY CLINIC      The patient location is:  [place]  The chief complaint leading to consultation is:  Type 2 diabetes    Visit type: audiovisual    Face to Face time with patient:  12 minutes  25 minutes of total time spent on the encounter, which includes face to face time and non-face to face time preparing to see the patient (eg, review of tests), Obtaining and/or reviewing separately obtained history, Documenting clinical information in the electronic or other health record, Independently interpreting results (not separately reported) and communicating results to the patient/family/caregiver, or Care coordination (not separately reported).       Each patient to whom he or she provides medical services by telemedicine is:  (1) informed of the relationship between the physician and patient and the respective role of any other health care provider with respect to management of the patient; and (2) notified that he or she may decline to receive medical services by telemedicine and may withdraw from such care at any time.    Notes:    Subjective:      CC: Type 2 diabetes    HPI:   BI Cantor is a 78 y.o. male who presents for follow-up of type 2 diabetes.  Patient was last seen in the clinic on 4/25/2024.     Diabetes Hx:  Diagnosed w/ DM: Dx around 2003, on insulin since 2008  Complications:   Retinopathy: Denies, patient will schedule a follow-up appointment.   Last eye exam: : 08/12/2022  Neuropathy: Yes, sees podiatrist for onychomycosis. On gabapentin.    Last foot exam: : 01/03/2023  Nephropathy: Albuminuria, CKD 3.  Follow-up with nephrology.   Cardiovascular: Diastolic dysfunction    Hypoglycemia:  Denies h/o severe hypoglycemia  Hypoglycemia unawareness: :  Denies      Current meds:   Mounjaro 10 mg weekly - tolerating well.  Lantus 36 units daily  Humalog 10 units daily + SSI     Compliance with meds: Yes     Previous meds:  Metformin - diarrhea  Januvia  Invokana - was on Invokana started in  March 2016 and was discontinued in May 2016 related to decreased renal function.   Ozempic 1 mg/week    Home glucose checks: On Dexcom G7.   Compliant: Yes.    CGM Data from dates 10/03/2024 to 10/16/2024 reviewed.            Diet/Exercise:   Walks with cane due to risk of falls.   Appetite is good and takes 3 meals a day.   Previously had weight loss of about 10 lb.  Weight is stable since the last visit.    Denies history of pancreatitis or personal/family history of medullary thyroid cancer.  History recurrent UTI: Yes, no episodes in the past few years.   History of recurrent fungal infection: Prior infections.    Polyuria/Polydipsia:  Denies    Last A1c:   Lab Results   Component Value Date    HGBA1C 7.0 (H) 07/24/2024    HGBA1C 6.6 (H) 04/25/2024    HGBA1C 6.8 (H) 10/19/2023       Microalbumin:   Lab Results   Component Value Date    LABMICR 87.0 04/25/2024    CREATRANDUR 170.0 04/25/2024    MICALBCREAT 51.2 (H) 04/25/2024    MICALBCREAT 52.4 (H) 11/21/2023       Lab Results   Component Value Date    CREATININE 1.4 07/24/2024    EGFRNORACEVR 50.8 (A) 07/24/2024     Lipids:   Lab Results   Component Value Date    CHOL 137 05/03/2023    TRIG 149 05/03/2023    HDL 24 (L) 05/03/2023    LDLCALC 83.2 05/03/2023    CHOLHDL 17.5 (L) 05/03/2023     TSH:  Lab Results   Component Value Date    TSH 3.150 04/25/2024     Lab Results   Component Value Date    HGB 14.3 10/19/2023       Aspirin: No, on Eliquis  Statins: Yes, on atorvastatin, + fenofibrate  ACEI/ARB: No    Patient has hypothyroidism on levothyroxine [not taking it for a while] and follows with PCP.     FHx of DM: Yes, Father and paternal grandmother    ROS: see HPI     Objective:   Physical Exam     There were no vitals taken for this visit.    Wt Readings from Last 3 Encounters:   08/13/24 117.9 kg (260 lb)   04/25/24 120.6 kg (265 lb 14 oz)   01/18/24 119.7 kg (264 lb)       Physical Exam  Constitutional:       General: He is not in acute distress.      Appearance: He is not ill-appearing.   HENT:      Head: Normocephalic and atraumatic.   Eyes:      Conjunctiva/sclera: Conjunctivae normal.   Pulmonary:      Effort: Pulmonary effort is normal.   Neurological:      Mental Status: He is alert and oriented to person, place, and time.          LABORATORY REVIEW:  See HPI for other labs reviewed today      Chemistry        Component Value Date/Time     07/24/2024 1241    K 4.1 07/24/2024 1241     07/24/2024 1241    CO2 25 07/24/2024 1241    BUN 18 07/24/2024 1241    CREATININE 1.4 07/24/2024 1241     (H) 07/24/2024 1241        Component Value Date/Time    CALCIUM 9.4 07/24/2024 1241    ALKPHOS 37 (L) 05/03/2023 1207    AST 25 05/03/2023 1207    ALT 17 05/03/2023 1207    BILITOT 1.1 (H) 05/03/2023 1207    ESTGFRAFRICA >60 07/01/2022 1319    EGFRNONAA 52 (A) 07/01/2022 1319          Lab Results   Component Value Date    HGBA1C 7.0 (H) 07/24/2024    HGBA1C 6.6 (H) 04/25/2024    HGBA1C 6.8 (H) 10/19/2023     Other labs reviewed today in HPI    Assessment/Plan:     Problem List Items Addressed This Visit       Hyperlipidemia       Continue statin.  Monitor lipids.           Type 2 diabetes mellitus with stage 3a chronic kidney disease, with long-term current use of insulin - Primary       Recent A1c was stable at 7.0%.  Average blood sugar in the last 2 weeks was 167 on his CGM, 59% in range and 0% below range.  GMI 7.3%.    Patient is tolerating the Mounjaro.  Continue current diabetes regimen.    Call if any side effects from the medications  Continue his Dexcom CGM.  Call if blood sugars are persistently less than 70 or greater than 200.     Discussed importance of diet and lifestyle modifications for diabetes management  Hypoglycemia management discussed. Carry glucose tablets or snacks at all times.     Complications:  Follow up for regular diabetes eye exam  Daily self examination of feet  CKD 3:  Continue follow-up with Nephrology.    Lab Results    Component Value Date    HGBA1C 7.0 (H) 07/24/2024                 Follow-up in 6 months.    Madeline Dempsey MD

## 2024-10-22 NOTE — PATIENT INSTRUCTIONS
Continue current diabetes medications.    Call if you have any side effects from the medication:   Mounjaro:  Nausea, vomiting, diarrhea, constipation, decreased appetite, abdominal pain, pancreatitis, gastroparesis, worsening of retinopathy, dehydration and acute kidney injury. Avoid this medication if you have any history of pancreatitis or have personal or family history of medullary thyroid cancer.   Metformin:  Nausea, diarrhea      Check blood sugars before meals and bedtime.   Call if blood sugars are frequently less than 70 or above 200.    Call if you need prescriptions for medications.  Carry glucose tablets or snacks with you at all times.       Follow-up in 6 months.

## 2024-10-25 NOTE — TELEPHONE ENCOUNTER
No care due was identified.  [facility] Embedded Care Due Messages. Reference number: [id number].   10/25/2024 8:54:19 AM CDT

## 2024-10-26 RX ORDER — FENOFIBRATE 145 MG/1
145 TABLET, FILM COATED ORAL
Qty: 90 TABLET | Refills: 1 | Status: SHIPPED | OUTPATIENT
Start: 2024-10-26

## 2024-10-26 NOTE — TELEPHONE ENCOUNTER
Refill Routing Note   Medication(s) are not appropriate for processing by [facility] for the following reason(s):        Required labs outdated    [facility] action(s):  Defer               Appointments  past 12m or future 3m with PCP    Date Provider   Last Visit   10/19/2023 Desmond Barlow MD   Next Visit   Visit date not found Desmond Barlow MD   ED visits in past 90 days: 0        Note composed:11:45 AM 10/26/2024

## 2024-10-30 NOTE — PLAN OF CARE
Problem: Physical Therapy Goal  Goal: Physical Therapy Goal  Goals to be met by: 2 weeks (  )    Patient will increase functional independence with mobility by performin. Supine to sit with Contact Guard Assistance. Met (2018)  2. Sit to supine with Contact Guard Assistance. Met (2018)  3. Sit to stand transfer with Contact Guard Assistance. Met (2018)  4. Bed to chair transfer with Contact Guard Assistance using Rolling Walker  5. Gait  x 75 feet with Contact Guard Assistance using Rolling Walker. Met (2018)  5a. Gait x 150 feet with stand-by assistance using a rolling walker with swing-through gait pattern, proper posture. Met (2018)  5b. Gait x 25 feet with Min A without use of a rolling walker, as this is the patient's goal (to ambulate without an assistive device).    6. Ascend/descend 4 stair with bilateral Handrails Contact Guard Assistance, as his house has four PUNEET with B HR. Met (2018)  7. Ascend/Descend 4 inch curb step with Contact Guard Assistance using Rolling Walker. Met (2018)  8. Stand for 3 minutes with Contact Guard Assistance using Rolling Walker while performing a task.  9. Lower extremity exercise program x 20 reps per handout, with assistance as needed.  10. Pt will improve his R knee active extension from -10 degrees to -5 degrees to allow for better functional mobility..  11. Pt will improve his L knee active flexion from 68 degrees to 80 degrees to allow for better functional mobility.-met 18  Revised (2018):  Pt will improve his L knee active flexion from 80 degrees to 90 degrees to allow for better functional mobility. Met (2018)            Goals remain appropriate at time. Continue with PT POC as indicated.        Update History & Physical    The patient's History and Physical of October 16, 2024 was reviewed with the patient and I examined the patient. There was no change. The surgical site was confirmed by the patient and me.     Plan: The risks, benefits, expected outcome, and alternative to the recommended procedure have been discussed with the patient. Patient understands and wants to proceed with the procedure.     Electronically signed by Taurus Richardson MD on 10/30/2024 at 6:42 AM       Additional Notes: Patient consent was obtained to proceed with the visit and recommended plan of care after discussion of all risks and benefits, including the risks of COVID-19 exposure. Detail Level: Simple

## 2024-11-05 DIAGNOSIS — I48.0 PAROXYSMAL ATRIAL FIBRILLATION: ICD-10-CM

## 2024-11-05 DIAGNOSIS — I48.19 PERSISTENT ATRIAL FIBRILLATION: ICD-10-CM

## 2024-11-05 DIAGNOSIS — I10 ESSENTIAL HYPERTENSION: ICD-10-CM

## 2024-11-06 RX ORDER — APIXABAN 5 MG/1
TABLET, FILM COATED ORAL
Qty: 180 TABLET | Refills: 1 | Status: SHIPPED | OUTPATIENT
Start: 2024-11-06

## 2024-11-19 DIAGNOSIS — G47.00 INSOMNIA, UNSPECIFIED TYPE: Primary | ICD-10-CM

## 2024-11-19 RX ORDER — TEMAZEPAM 15 MG/1
CAPSULE ORAL
Qty: 30 CAPSULE | Refills: 5 | Status: SHIPPED | OUTPATIENT
Start: 2024-11-19

## 2024-11-19 NOTE — TELEPHONE ENCOUNTER
No care due was identified.  [facility] Embedded Care Due Messages. Reference number: [id number].   11/19/2024 9:11:29 AM CST

## 2024-12-07 ENCOUNTER — PATIENT MESSAGE (OUTPATIENT)
Dept: ENDOCRINOLOGY | Facility: CLINIC | Age: 79
End: 2024-12-07
Payer: MEDICARE

## 2024-12-09 DIAGNOSIS — Z79.4 TYPE 2 DIABETES MELLITUS WITH STAGE 3A CHRONIC KIDNEY DISEASE, WITH LONG-TERM CURRENT USE OF INSULIN: ICD-10-CM

## 2024-12-09 DIAGNOSIS — E11.22 TYPE 2 DIABETES MELLITUS WITH STAGE 3A CHRONIC KIDNEY DISEASE, WITH LONG-TERM CURRENT USE OF INSULIN: ICD-10-CM

## 2024-12-09 DIAGNOSIS — N18.31 TYPE 2 DIABETES MELLITUS WITH STAGE 3A CHRONIC KIDNEY DISEASE, WITH LONG-TERM CURRENT USE OF INSULIN: ICD-10-CM

## 2025-01-02 ENCOUNTER — OFFICE VISIT (OUTPATIENT)
Dept: INTERNAL MEDICINE | Facility: CLINIC | Age: 80
End: 2025-01-02
Payer: MEDICARE

## 2025-01-02 ENCOUNTER — OFFICE VISIT (OUTPATIENT)
Dept: DERMATOLOGY | Facility: CLINIC | Age: 80
End: 2025-01-02
Payer: MEDICARE

## 2025-01-02 ENCOUNTER — IMMUNIZATION (OUTPATIENT)
Dept: INTERNAL MEDICINE | Facility: CLINIC | Age: 80
End: 2025-01-02
Payer: MEDICARE

## 2025-01-02 ENCOUNTER — OFFICE VISIT (OUTPATIENT)
Dept: CARDIOLOGY | Facility: CLINIC | Age: 80
End: 2025-01-02
Payer: MEDICARE

## 2025-01-02 ENCOUNTER — LAB VISIT (OUTPATIENT)
Dept: LAB | Facility: HOSPITAL | Age: 80
End: 2025-01-02
Attending: INTERNAL MEDICINE
Payer: MEDICARE

## 2025-01-02 VITALS
SYSTOLIC BLOOD PRESSURE: 102 MMHG | DIASTOLIC BLOOD PRESSURE: 62 MMHG | BODY MASS INDEX: 38.99 KG/M2 | OXYGEN SATURATION: 99 % | WEIGHT: 263.25 LBS | HEIGHT: 69 IN | HEART RATE: 106 BPM

## 2025-01-02 VITALS
SYSTOLIC BLOOD PRESSURE: 112 MMHG | HEIGHT: 69 IN | OXYGEN SATURATION: 99 % | HEART RATE: 80 BPM | BODY MASS INDEX: 39.29 KG/M2 | WEIGHT: 265.31 LBS | DIASTOLIC BLOOD PRESSURE: 62 MMHG

## 2025-01-02 DIAGNOSIS — E11.69 ONYCHOMYCOSIS OF MULTIPLE TOENAILS WITH TYPE 2 DIABETES MELLITUS AND PERIPHERAL ANGIOPATHY: ICD-10-CM

## 2025-01-02 DIAGNOSIS — B35.3 TINEA PEDIS OF BOTH FEET: ICD-10-CM

## 2025-01-02 DIAGNOSIS — Z79.4 TYPE 2 DIABETES MELLITUS WITH STAGE 3A CHRONIC KIDNEY DISEASE, WITH LONG-TERM CURRENT USE OF INSULIN: ICD-10-CM

## 2025-01-02 DIAGNOSIS — Z86.006 HISTORY OF MELANOMA IN SITU: ICD-10-CM

## 2025-01-02 DIAGNOSIS — E66.01 CLASS 2 SEVERE OBESITY DUE TO EXCESS CALORIES WITH SERIOUS COMORBIDITY AND BODY MASS INDEX (BMI) OF 38.0 TO 38.9 IN ADULT: ICD-10-CM

## 2025-01-02 DIAGNOSIS — Z23 NEED FOR VACCINATION: Primary | ICD-10-CM

## 2025-01-02 DIAGNOSIS — I48.21 PERMANENT ATRIAL FIBRILLATION: Primary | ICD-10-CM

## 2025-01-02 DIAGNOSIS — I48.20 CHRONIC ATRIAL FIBRILLATION: ICD-10-CM

## 2025-01-02 DIAGNOSIS — E11.51 ONYCHOMYCOSIS OF MULTIPLE TOENAILS WITH TYPE 2 DIABETES MELLITUS AND PERIPHERAL ANGIOPATHY: ICD-10-CM

## 2025-01-02 DIAGNOSIS — L82.1 SK (SEBORRHEIC KERATOSIS): ICD-10-CM

## 2025-01-02 DIAGNOSIS — B35.1 ONYCHOMYCOSIS OF MULTIPLE TOENAILS WITH TYPE 2 DIABETES MELLITUS AND PERIPHERAL ANGIOPATHY: ICD-10-CM

## 2025-01-02 DIAGNOSIS — I51.89 DIASTOLIC DYSFUNCTION: ICD-10-CM

## 2025-01-02 DIAGNOSIS — I87.2: ICD-10-CM

## 2025-01-02 DIAGNOSIS — I87.2 VENOUS STASIS DERMATITIS OF BOTH LOWER EXTREMITIES: ICD-10-CM

## 2025-01-02 DIAGNOSIS — E66.812 CLASS 2 SEVERE OBESITY DUE TO EXCESS CALORIES WITH SERIOUS COMORBIDITY AND BODY MASS INDEX (BMI) OF 38.0 TO 38.9 IN ADULT: Primary | ICD-10-CM

## 2025-01-02 DIAGNOSIS — E66.01 CLASS 2 SEVERE OBESITY DUE TO EXCESS CALORIES WITH SERIOUS COMORBIDITY AND BODY MASS INDEX (BMI) OF 38.0 TO 38.9 IN ADULT: Primary | ICD-10-CM

## 2025-01-02 DIAGNOSIS — Z00.00 ANNUAL PHYSICAL EXAM: ICD-10-CM

## 2025-01-02 DIAGNOSIS — E78.2 MIXED HYPERLIPIDEMIA: ICD-10-CM

## 2025-01-02 DIAGNOSIS — L21.9 SEBORRHEIC DERMATITIS: Primary | ICD-10-CM

## 2025-01-02 DIAGNOSIS — I10 PRIMARY HYPERTENSION: ICD-10-CM

## 2025-01-02 DIAGNOSIS — N18.31 TYPE 2 DIABETES MELLITUS WITH STAGE 3A CHRONIC KIDNEY DISEASE, WITH LONG-TERM CURRENT USE OF INSULIN: ICD-10-CM

## 2025-01-02 DIAGNOSIS — E11.22 TYPE 2 DIABETES MELLITUS WITH STAGE 3A CHRONIC KIDNEY DISEASE, WITH LONG-TERM CURRENT USE OF INSULIN: ICD-10-CM

## 2025-01-02 DIAGNOSIS — D23.9 DERMATOFIBROMA: ICD-10-CM

## 2025-01-02 DIAGNOSIS — E66.812 CLASS 2 SEVERE OBESITY DUE TO EXCESS CALORIES WITH SERIOUS COMORBIDITY AND BODY MASS INDEX (BMI) OF 38.0 TO 38.9 IN ADULT: ICD-10-CM

## 2025-01-02 DIAGNOSIS — L81.4 LENTIGO: ICD-10-CM

## 2025-01-02 PROBLEM — I27.20 PULMONARY HYPERTENSION: Status: RESOLVED | Noted: 2017-01-10 | Resolved: 2025-01-02

## 2025-01-02 PROBLEM — D69.2 SENILE PURPURA: Status: RESOLVED | Noted: 2023-09-06 | Resolved: 2025-01-02

## 2025-01-02 LAB
25(OH)D3+25(OH)D2 SERPL-MCNC: 9 NG/ML (ref 30–96)
ALBUMIN SERPL BCP-MCNC: 3.7 G/DL (ref 3.5–5.2)
ALP SERPL-CCNC: 36 U/L (ref 40–150)
ALT SERPL W/O P-5'-P-CCNC: 10 U/L (ref 10–44)
ANION GAP SERPL CALC-SCNC: 8 MMOL/L (ref 8–16)
AST SERPL-CCNC: 18 U/L (ref 10–40)
BASOPHILS # BLD AUTO: 0.02 K/UL (ref 0–0.2)
BASOPHILS NFR BLD: 0.3 % (ref 0–1.9)
BILIRUB SERPL-MCNC: 0.8 MG/DL (ref 0.1–1)
BUN SERPL-MCNC: 14 MG/DL (ref 8–23)
CALCIUM SERPL-MCNC: 9.7 MG/DL (ref 8.7–10.5)
CHLORIDE SERPL-SCNC: 106 MMOL/L (ref 95–110)
CHOLEST SERPL-MCNC: 158 MG/DL (ref 120–199)
CHOLEST/HDLC SERPL: 4.8 {RATIO} (ref 2–5)
CO2 SERPL-SCNC: 26 MMOL/L (ref 23–29)
CREAT SERPL-MCNC: 1.4 MG/DL (ref 0.5–1.4)
DIFFERENTIAL METHOD BLD: ABNORMAL
EOSINOPHIL # BLD AUTO: 0.3 K/UL (ref 0–0.5)
EOSINOPHIL NFR BLD: 3.8 % (ref 0–8)
ERYTHROCYTE [DISTWIDTH] IN BLOOD BY AUTOMATED COUNT: 13.4 % (ref 11.5–14.5)
EST. GFR  (NO RACE VARIABLE): 50.5 ML/MIN/1.73 M^2
ESTIMATED AVG GLUCOSE: 131 MG/DL (ref 68–131)
GLUCOSE SERPL-MCNC: 136 MG/DL (ref 70–110)
HBA1C MFR BLD: 6.2 % (ref 4–5.6)
HCT VFR BLD AUTO: 41.5 % (ref 40–54)
HDLC SERPL-MCNC: 33 MG/DL (ref 40–75)
HDLC SERPL: 20.9 % (ref 20–50)
HGB BLD-MCNC: 14 G/DL (ref 14–18)
IMM GRANULOCYTES # BLD AUTO: 0.03 K/UL (ref 0–0.04)
IMM GRANULOCYTES NFR BLD AUTO: 0.4 % (ref 0–0.5)
LDLC SERPL CALC-MCNC: 107 MG/DL (ref 63–159)
LYMPHOCYTES # BLD AUTO: 1.5 K/UL (ref 1–4.8)
LYMPHOCYTES NFR BLD: 21.8 % (ref 18–48)
MAGNESIUM SERPL-MCNC: 1.9 MG/DL (ref 1.6–2.6)
MCH RBC QN AUTO: 32.8 PG (ref 27–31)
MCHC RBC AUTO-ENTMCNC: 33.7 G/DL (ref 32–36)
MCV RBC AUTO: 97 FL (ref 82–98)
MONOCYTES # BLD AUTO: 0.6 K/UL (ref 0.3–1)
MONOCYTES NFR BLD: 8.8 % (ref 4–15)
NEUTROPHILS # BLD AUTO: 4.6 K/UL (ref 1.8–7.7)
NEUTROPHILS NFR BLD: 64.9 % (ref 38–73)
NONHDLC SERPL-MCNC: 125 MG/DL
NRBC BLD-RTO: 0 /100 WBC
PLATELET # BLD AUTO: 202 K/UL (ref 150–450)
PMV BLD AUTO: 11.9 FL (ref 9.2–12.9)
POTASSIUM SERPL-SCNC: 4.4 MMOL/L (ref 3.5–5.1)
PROT SERPL-MCNC: 7.5 G/DL (ref 6–8.4)
RBC # BLD AUTO: 4.27 M/UL (ref 4.6–6.2)
SODIUM SERPL-SCNC: 140 MMOL/L (ref 136–145)
TRIGL SERPL-MCNC: 90 MG/DL (ref 30–150)
TSH SERPL DL<=0.005 MIU/L-ACNC: 3.57 UIU/ML (ref 0.4–4)
WBC # BLD AUTO: 7.02 K/UL (ref 3.9–12.7)

## 2025-01-02 PROCEDURE — 80053 COMPREHEN METABOLIC PANEL: CPT | Performed by: INTERNAL MEDICINE

## 2025-01-02 PROCEDURE — 80061 LIPID PANEL: CPT | Performed by: INTERNAL MEDICINE

## 2025-01-02 PROCEDURE — 1159F MED LIST DOCD IN RCRD: CPT | Mod: CPTII,S$GLB,, | Performed by: INTERNAL MEDICINE

## 2025-01-02 PROCEDURE — G0008 ADMIN INFLUENZA VIRUS VAC: HCPCS | Mod: S$GLB,,, | Performed by: INTERNAL MEDICINE

## 2025-01-02 PROCEDURE — 1160F RVW MEDS BY RX/DR IN RCRD: CPT | Mod: CPTII,S$GLB,, | Performed by: INTERNAL MEDICINE

## 2025-01-02 PROCEDURE — 99213 OFFICE O/P EST LOW 20 MIN: CPT | Mod: S$GLB,,, | Performed by: DERMATOLOGY

## 2025-01-02 PROCEDURE — 3288F FALL RISK ASSESSMENT DOCD: CPT | Mod: CPTII,S$GLB,, | Performed by: DERMATOLOGY

## 2025-01-02 PROCEDURE — 1126F AMNT PAIN NOTED NONE PRSNT: CPT | Mod: CPTII,S$GLB,, | Performed by: INTERNAL MEDICINE

## 2025-01-02 PROCEDURE — 1160F RVW MEDS BY RX/DR IN RCRD: CPT | Mod: CPTII,S$GLB,, | Performed by: DERMATOLOGY

## 2025-01-02 PROCEDURE — 1101F PT FALLS ASSESS-DOCD LE1/YR: CPT | Mod: CPTII,S$GLB,, | Performed by: INTERNAL MEDICINE

## 2025-01-02 PROCEDURE — 83036 HEMOGLOBIN GLYCOSYLATED A1C: CPT | Performed by: INTERNAL MEDICINE

## 2025-01-02 PROCEDURE — 82306 VITAMIN D 25 HYDROXY: CPT | Performed by: INTERNAL MEDICINE

## 2025-01-02 PROCEDURE — 99999 PR PBB SHADOW E&M-EST. PATIENT-LVL III: CPT | Mod: PBBFAC,,, | Performed by: DERMATOLOGY

## 2025-01-02 PROCEDURE — 3074F SYST BP LT 130 MM HG: CPT | Mod: CPTII,S$GLB,, | Performed by: INTERNAL MEDICINE

## 2025-01-02 PROCEDURE — 3288F FALL RISK ASSESSMENT DOCD: CPT | Mod: CPTII,S$GLB,, | Performed by: INTERNAL MEDICINE

## 2025-01-02 PROCEDURE — 83735 ASSAY OF MAGNESIUM: CPT | Performed by: INTERNAL MEDICINE

## 2025-01-02 PROCEDURE — 85025 COMPLETE CBC W/AUTO DIFF WBC: CPT | Performed by: INTERNAL MEDICINE

## 2025-01-02 PROCEDURE — 99999 PR PBB SHADOW E&M-EST. PATIENT-LVL V: CPT | Mod: PBBFAC,,, | Performed by: INTERNAL MEDICINE

## 2025-01-02 PROCEDURE — 1159F MED LIST DOCD IN RCRD: CPT | Mod: CPTII,S$GLB,, | Performed by: DERMATOLOGY

## 2025-01-02 PROCEDURE — 84443 ASSAY THYROID STIM HORMONE: CPT | Performed by: INTERNAL MEDICINE

## 2025-01-02 PROCEDURE — 1126F AMNT PAIN NOTED NONE PRSNT: CPT | Mod: CPTII,S$GLB,, | Performed by: DERMATOLOGY

## 2025-01-02 PROCEDURE — 1101F PT FALLS ASSESS-DOCD LE1/YR: CPT | Mod: CPTII,S$GLB,, | Performed by: DERMATOLOGY

## 2025-01-02 PROCEDURE — 99999 PR PBB SHADOW E&M-EST. PATIENT-LVL III: CPT | Mod: PBBFAC,,, | Performed by: INTERNAL MEDICINE

## 2025-01-02 PROCEDURE — 90653 IIV ADJUVANT VACCINE IM: CPT | Mod: S$GLB,,, | Performed by: INTERNAL MEDICINE

## 2025-01-02 PROCEDURE — 99204 OFFICE O/P NEW MOD 45 MIN: CPT | Mod: S$GLB,,, | Performed by: INTERNAL MEDICINE

## 2025-01-02 PROCEDURE — 3078F DIAST BP <80 MM HG: CPT | Mod: CPTII,S$GLB,, | Performed by: INTERNAL MEDICINE

## 2025-01-02 RX ORDER — KETOCONAZOLE 200 MG/1
200 TABLET ORAL DAILY
Qty: 10 TABLET | Refills: 0 | Status: SHIPPED | OUTPATIENT
Start: 2025-01-02 | End: 2025-01-12

## 2025-01-02 RX ORDER — TRIAMCINOLONE ACETONIDE 0.25 MG/ML
LOTION TOPICAL
Qty: 60 ML | Refills: 1 | Status: SHIPPED | OUTPATIENT
Start: 2025-01-02

## 2025-01-02 NOTE — PROGRESS NOTES
MEDICAL HISTORY:    Type 2 diabetes with peripheral neuropathy and chronic kidney disease stage III.  Chronic atrial fibrillation.  Hypertension.  Hyperlipidemia.  Chronic diastolic dysfunction.  Sleep apnea, history of septoplasty and UPPP .  Pulmonary hypertension., resolved  Chronic venous insufficiency  Lymphedema  Lumbar degenerative disc disease  BPH  Right total knee replacement and revision arthroplasty  Left total knee arthroplasty 2003  Left shoulder surgery.  Meniere's disease.  Lumbar degenerative disk disease.  Nephlithiasis.  Status post eyelid surgery for removal of ectropion  Basis cell carcinoma, status post Mohs     SOCIAL HISTORY:  Tobacco use and alcohol use, none.        MEDICATIONS:  Eliquis 5 mg b.i.d.  Mounjaro 10 mg q.week  Fenofibrate 145 mg.  Lasix 40 mg.  Gabapentin 300 mg two capsules b.i.d.  Lantus insulin 36 units.  NovoLog  10 units with each meal plus sliding scale  Metoprolol  mg at night.  Temazepam 15 mg q.h.s. as needed  Tamsulosin 0.4 mg        79-year-old male   Presents for follow-up visit   Early today saw Dermatology.  That has upcoming appointments with Cardiology later today , then upcoming appointments with Orthopedics in which he had trigger finger release right index and middle finger.  In his upcoming appointment with Podiatry.  He is also followed regularly by endocrinology.    Regarding diabetes, medications listed above.  He uses Dexcom 7.  Glucose readings averaged around 150.    He does not exercise moves around that has much as compared to before.    He is on gabapentin 2 capsules b.i.d..  In the past he was on 4 capsules b.i.d..  He does not see much change in actually he is reporting no numbness or paresthesia or tingling burning sensation involving the legs or feet    Review of symptoms.  Reports no chest pain or palpitations.  That has no difficulty breathing no abdominal pain.  Bowel functions regularly takes Metamucil twice a day.  Urination is  frequent throughout the day and urgent.  But he is able to control his bladder.  No heartburn indigestion on occasion may take Tums.  And currently no headaches arthralgias    Examination   Weight 263 lb little bit less compared to before   BMI 38.87   Pulse 80   Blood pressure 110/62  HEENT exam, no gross abnormal findings  Neck no thyromegaly   Chest clear breath sounds   Heart irregular regular no murmurs   Abdominal exam nontender soft no hepatosplenomegaly abdominal masses  Pulses 2+ carotid pulses no bruits 1+ dorsalis pedal pulses  Extremities 1+ pedal pretibial edema   Skin noted is area of hyperemia with in his inguinal area.  That has also scaly erythematous circular spots right calf.  Stasis dermatitis changes.  Musculoskeletal bilateral knee enlargement   Feet.  Skin  very hyperkeratotic.  Hyperkeratotic onychomycosis changes of the toenails  Impression     General exam   Type 2 diabetes with chronic kidney disease stage IIIA and peripheral neuropathy  Hypertension   Hyperlipidemia   Chronic atrial fibrillation   Chronic diastolic dysfunction   Chronic venous insufficiency with stasis dermatitis  BPH with frequency   Fungal dermatitis  Class 2 2 obesity with multiple comorbidities    Plan  Routine labs  Ketoconazole 200 mg once a day for the next 10 days.  When he met with Dermatology triamcinolone lotion was prescribed  Proper hygiene discussed.  Maintain follow-up visit with Cardiology, Podiatry, Orthopedics, endocrinology  He can attempt to lower gabapentin from 2 tablets twice a day to 1 tablet twice a day in his if he feels well with this can try then 1 tablet at bedtime  Proper activity, walking routine was discussed with him not to stay stagnated  Influenza vaccine            Answers submitted by the patient for this visit:  Review of Systems Questionnaire (Submitted on 12/29/2024)  activity change: No  unexpected weight change: No  neck pain: No  hearing loss: Yes  rhinorrhea: No  trouble  swallowing: No  eye discharge: No  visual disturbance: No  chest tightness: No  wheezing: No  chest pain: No  palpitations: No  blood in stool: No  constipation: No  vomiting: No  diarrhea: No  polydipsia: No  polyuria: No  difficulty urinating: No  urgency: No  hematuria: No  joint swelling: No  arthralgias: No  headaches: No  weakness: No  confusion: Yes  dysphoric mood: No

## 2025-01-02 NOTE — PROGRESS NOTES
Subjective:      Patient ID:  [facility] PAT Cantor Jr. is a 79 y.o. male who presents for   Chief Complaint   Patient presents with    Skin Check     UBSE      History of Present Illness: The patient presents for follow up of skin check.    The patient was last seen on: 10/26/2023  for cryosurgery to actinic keratoses which have resolved.   This is a high risk patient here to check for the development of new lesions.    H/o MIS right cheek 11/2015 - pt defers TBSE today. Only desires UBSE    Other skin complaints:   Patient with new complaint of lesion(s)  Location: [place]   Duration: 1 week   Symptoms: redness   Relieving factors/Previous treatments: none              Review of Systems   Constitutional:  Negative for fever, chills, weight loss (272# ), weight gain, fatigue, night sweats and malaise.   HENT:  Negative for headaches.    Respiratory:  Negative for cough and shortness of breath.    Gastrointestinal:  Negative for indigestion.   Musculoskeletal:  Negative for muscle weakness (legs -resolved).   Skin:  Positive for wears hat (for activities only). Negative for itching, rash, dry skin, daily sunscreen use, activity-related sunscreen use (but wears hat) and recent sunburn.   Neurological:  Negative for headaches.   Hematologic/Lymphatic: Negative for adenopathy. Bruises/bleeds easily (on eliquis).       Objective:   Physical Exam   Constitutional: He appears well-developed and well-nourished. He is obese.  No distress.   Neurological: He is alert and oriented to person, place, and time. He is not disoriented.   Psychiatric: He has a normal mood and affect.   Skin:   Areas Examined (abnormalities noted in diagram):   Scalp / Hair Palpated and Inspected  Head / Face Inspection Performed  Neck Inspection Performed  Chest / Axilla Inspection Performed  Back Inspection Performed  RUE Inspected  Nails and Digits Inspection Performed                     Diagram Legend     Erythematous scaling macule/papule c/w  actinic keratosis       Vascular papule c/w angioma      Pigmented verrucoid papule/plaque c/w seborrheic keratosis      Yellow umbilicated papule c/w sebaceous hyperplasia      Irregularly shaped tan macule c/w lentigo     1-2 mm smooth white papules consistent with Milia      Movable subcutaneous cyst with punctum c/w epidermal inclusion cyst      Subcutaneous movable cyst c/w pilar cyst      Firm pink to brown papule c/w dermatofibroma      Pedunculated fleshy papule(s) c/w skin tag(s)      Evenly pigmented macule c/w junctional nevus     Mildly variegated pigmented, slightly irregular-bordered macule c/w mildly atypical nevus      Flesh colored to evenly pigmented papule c/w intradermal nevus       Pink pearly papule/plaque c/w basal cell carcinoma      Erythematous hyperkeratotic cursted plaque c/w SCC      Surgical scar with no sign of skin cancer recurrence      Open and closed comedones      Inflammatory papules and pustules      Verrucoid papule consistent consistent with wart     Erythematous eczematous patches and plaques     Dystrophic onycholytic nail with subungual debris c/w onychomycosis     Umbilicated papule    Erythematous-base heme-crusted tan verrucoid plaque consistent with inflamed seborrheic keratosis     Erythematous Silvery Scaling Plaque c/w Psoriasis     See annotation      Assessment / Plan:        Seborrheic dermatitis  -     triamcinolone acetonide 0.025 % Lotn; APPLY TO AFFECTED AREA(S) FACE TWICE DAILY AS NEEDED red and/or scaly  Dispense: 60 mL; Refill: 1    SK (seborrheic keratosis)  These are benign inherited growths without a malignant potential. Reassurance given to patient. No treatment is necessary.       Lentigo   - minor problem and chronic.   Reassurance given to patient. No treatment necessary.       Dermatofibroma   - minor problem and chronic.   Reassurance given to patient. No treatment necessary.       History of melanoma in situ - pt defers TBSE today  Area(s) of  previous MIS evaluated with no signs of recurrence.    Upper body skin examination performed today including at least 6 points as noted in physical examination. No lesions suspicious for malignancy noted.    Recommend daily sun protection/avoidance and use of at least SPF 30, broad spectrum sunscreen (OTC drug).                No follow-ups on file.

## 2025-01-02 NOTE — PROGRESS NOTES
"[facility]    Chief Complaint  Chief Complaint   Patient presents with    Atrial Fibrillation       HPI:    Presents to establish follow-up for atrial fibrillation after the departure of Dr. Granda.  Was diagnosed with atrial fibrillation over 10 years ago.  Efforts at maintenance of sinus rhythm were unsuccessful.  He has been rate controlled and tolerating anticoagulation.  Has not been as active for the past several months.  He has been helping care for his wife who has a fractured spine.  With what he does, no exertional dyspnea or chest discomfort.  No palpitations.  No syncope.      Medications  Current Outpatient Medications   Medication Sig Dispense Refill    ammonium lactate 12 % Crea Apply 1 application  topically 2 (two) times daily. 140 g 11    atorvastatin (LIPITOR) 40 MG tablet TAKE ONE TABLET BY MOUTH EVERY DAY 90 tablet 3    BD INSULIN PEN NEEDLE UF ORIG 29 x 1/2 " Ndle   12    blood-glucose sensor (DEXCOM G7 SENSOR) Mckenna Apply one sensor to the skin every 10 days 3 each 11    ciclopirox (LOPROX) 0.77 % Crea Apply topically 2 (two) times daily. 90 g 2    ciclopirox (PENLAC) 8 % Soln Apply topically nightly. 6.6 mL 11    ELIQUIS 5 mg Tab TAKE ONE TABLET BY MOUTH TWICE DAILY 180 tablet 1    fenofibrate (TRICOR) 145 MG tablet TAKE ONE TABLET BY MOUTH ONCE DAILY 90 tablet 1    furosemide (LASIX) 40 MG tablet TAKE ONE TABLET BY MOUTH EVERY DAY AS NEEDED 90 tablet 0    gabapentin (NEURONTIN) 300 MG capsule TAKE TWO CAPSULES BY MOUTH TWICE DAILY 360 capsule 1    insulin glargine U-100, Lantus, (LANTUS SOLOSTAR U-100 INSULIN) 100 unit/mL (3 mL) InPn pen Inject 36 Units into the skin every evening. 10.8 mL 11    insulin lispro (HUMALOG KWIKPEN INSULIN) 100 unit/mL pen Inject 10 units 3 times daily with meals. Plus correction scale. Max daily dose 50 units/day 45.9 mL 3    ketoconazole (NIZORAL) 200 mg Tab Take 1 tablet (200 mg total) by mouth once daily. for 10 days 10 tablet 0    lancets 33 " "gauge Misc 1 lancet by Misc.(Non-Drug; Combo Route) route 4 (four) times daily. Pt uses True Result Meter, bg monitoring 4 times a day. 400 each 4    metoprolol succinate (TOPROL-XL) 100 MG 24 hr tablet TAKE ONE TABLET BY MOUTH EVERY DAY 90 tablet 0    tamsulosin (FLOMAX) 0.4 mg Cap Take 1 capsule (0.4 mg total) by mouth once daily. 90 capsule 0    temazepam (RESTORIL) 15 mg Cap TAKE ONE CAPSULE BY MOUTH NIGHTLY AS NEEDED 30 capsule 5    tirzepatide 10 mg/0.5 mL PnIj Inject 10 mg into the skin every 7 days. 4 Pen 6    triamcinolone acetonide 0.025 % Lotn APPLY TO AFFECTED AREA(S) FACE TWICE DAILY AS NEEDED red and/or scaly 60 mL 1    blood sugar diagnostic Strp 1 strip by Misc.(Non-Drug; Combo Route) route 4 (four) times daily. To check blood glucose (Patient not taking: Reported on 1/2/2025) 400 strip 11    blood-glucose meter Misc To check BG as discussed (Patient not taking: Reported on 1/2/2025) 1 each 0    glucagon (GVOKE HYPOPEN 2-PACK) 1 mg/0.2 mL AtIn Inject 1 mg into the skin as needed (severe hypoglycemia). 2 each 3    loratadine (CLARITIN) 10 mg tablet Take 1 tablet (10 mg total) by mouth once daily. 90 tablet 3    nystatin-triamcinolone (MYCOLOG II) cream apply TWICE DAILY (Patient not taking: Reported on 1/2/2025) 60 g 1    pen needle, diabetic (BD ULTRA-FINE BASIL PEN NEEDLE) 32 gauge x 5/32" Ndle To use q.i.d. (Patient not taking: Reported on 1/2/2025) 400 each 3     No current facility-administered medications for this visit.        History  Past Medical History:   Diagnosis Date    *Atrial fibrillation     Eliquis    Anticoagulant long-term use     apaxiban    Basal cell carcinoma 12/2015    left eye brow    Basal cell carcinoma 10/28/2019    right superior preauricular    BCC (basal cell carcinoma) 12/2015    left shoulder    Cataract     Chronic kidney disease     Diabetes mellitus with renal manifestations, uncontrolled     Dyslipidemia associated with type 2 diabetes mellitus     Fever blister  "    Hearing loss     HTN (hypertension)     Keloid cicatrix     Liver disease     Melanoma 12/07/2015    right cheek-in situ    Obesity     PN (peripheral neuropathy)     Primary osteoarthritis of right knee 1/25/2017    Screening for colon cancer 3/3/2016    Sleep apnea     wears CPAP at night    Thyroid disease     Type II or unspecified type diabetes mellitus with other specified manifestations, uncontrolled     Ulcer of left lower extremity, limited to breakdown of skin 9/22/2017     Past Surgical History:   Procedure Laterality Date    APPENDECTOMY      CARDIOVERSION      CATARACT EXTRACTION      CATARACT EXTRACTION Right 05/14/2018    Dr. Greer    COLONOSCOPY N/A 3/3/2016    Procedure: COLONOSCOPY;  Surgeon: Bob Poe MD;  Location: [facility] ENDO (4TH FLR);  Service: Endoscopy;  Laterality: N/A;  Holding Eliquis for 3 days prior to colonoscopy. EC    COLONOSCOPY N/A 9/20/2019    Procedure: COLONOSCOPY;  Surgeon: Akbar Curtis MD;  Location: [facility] ENDO (4TH FLR);  Service: Endoscopy;  Laterality: N/A;  Per Dr. José Granda, patient can HOLD Eliquis X2 days prior to procedure-see telphone encounter 8/22/19-[facility]    ECTROPION REPAIR Right 8/14/2019    Procedure: REPAIR, ECTROPION, EYELID /       #06286- Skin Flaps(Deep Tissue) (OD);  Surgeon: Edita Sabillon MD;  Location: [facility] 2ND FLR;  Service: Ophthalmology;  Laterality: Right;    epidural steroid injection      INTRAOCULAR PROSTHESES INSERTION Left 6/25/2018    Procedure: INSERTION, INTRAOCULAR LENS PROSTHESIS;  Surgeon: Lawrence Greer MD;  Location: [facility];  Service: Ophthalmology;  Laterality: Left;    JOINT REPLACEMENT      Knee    Meniere's disease surgery      PHACOEMULSIFICATION OF CATARACT Left 6/25/2018    Procedure: PHACOEMULSIFICATION, CATARACT;  Surgeon: Lawrence Greer MD;  Location: [facility];  Service: Ophthalmology;  Laterality: Left;    PREPARATION OF WOUND PRIOR TO APPLICATION OF SKIN GRAFT Right 8/14/2019    Procedure: PREPARATION, WOUND,  PRIOR TO SKIN GRAFT APPLICATION;  Surgeon: Edita Sabillon MD;  Location: [address];  Service: Ophthalmology;  Laterality: Right;    REVISION OF KNEE ARTHROPLASTY Right 7/10/2018    Procedure: REVISION-ARTHROPLASTY-KNEE-DAKOTA;  Surgeon: Miguel Stuart MD;  Location: [address];  Service: Orthopedics;  Laterality: Right;  [place]    SKIN FULL THICKNESS GRAFT Right 2019    Procedure: APPLICATION, GRAFT, SKIN, FULL-THICKNESS;  Surgeon: Edita Sabillon MD;  Location: [address];  Service: Ophthalmology;  Laterality: Right;    TARSORRHAPHY Right 2019    Procedure: BLEPHARORRHAPHY;  Surgeon: Edita Sabillon MD;  Location: [address];  Service: Ophthalmology;  Laterality: Right;    TONSILLECTOMY      TOTAL KNEE ARTHROPLASTY Right 2017    TKR    TOTAL KNEE ARTHROPLASTY Left     TKR,     TRIGGER FINGER RELEASE Right 2024    Procedure: RELEASE, TRIGGER FINGER;  Surgeon: Woo Elmore Jr., MD;  Location: [facility];  Service: Orthopedics;  Laterality: Right;     Social History     Socioeconomic History    Marital status:     Number of children: 3   Occupational History    Occupation: retired     Employer: RETIRED   Tobacco Use    Smoking status: Former     Current packs/day: 0.00     Types: Cigarettes     Quit date: 7/10/1985     Years since quittin.5    Smokeless tobacco: Never   Substance and Sexual Activity    Alcohol use: No     Alcohol/week: 0.0 standard drinks of alcohol     Comment: quit - had excess in the past    Drug use: No    Sexual activity: Not Currently     Social Drivers of Health     Financial Resource Strain: Medium Risk (2024)    Overall Financial Resource Strain (CARDIA)     Difficulty of Paying Living Expenses: Somewhat hard   Food Insecurity: Food Insecurity Present (2024)    Hunger Vital Sign     Worried About Running Out of Food in the Last Year: Sometimes true     Ran Out of Food in the Last Year: Sometimes true    Transportation Needs: No Transportation Needs (10/26/2023)    PRAPARE - Transportation     Lack of Transportation (Medical): No     Lack of Transportation (Non-Medical): No   Physical Activity: Inactive (7/21/2024)    Exercise Vital Sign     Days of Exercise per Week: 0 days     Minutes of Exercise per Session: 20 min   Stress: No Stress Concern Present (7/21/2024)    [employer] [place] [employer] - Occupational Stress Questionnaire     Feeling of Stress : Only a little   Housing Stability: Unknown (10/26/2023)    Housing Stability Vital Sign     Unable to Pay for Housing in the Last Year: No     Unstable Housing in the Last Year: No     Family History   Problem Relation Name Age of Onset    Diabetes Mother      Stroke Mother      Diabetes Father      Heart disease Father          over 45 years of age    Hypertension Father      Cancer Sister          brain    Eczema Sister      Cancer Sister          leukemia, stomach cancer    No Known Problems Sister      Cancer Brother          brain    ALS Brother          stomach cancer    Cancer Brother      No Known Problems Brother      No Known Problems Maternal Aunt      No Known Problems Maternal Uncle      No Known Problems Paternal Aunt      No Known Problems Paternal Uncle      No Known Problems Maternal Grandmother      No Known Problems Maternal Grandfather      No Known Problems Paternal Grandmother      No Known Problems Paternal Grandfather      Amblyopia Neg Hx      Blindness Neg Hx      Cataracts Neg Hx      Glaucoma Neg Hx      Macular degeneration Neg Hx      Retinal detachment Neg Hx      Strabismus Neg Hx      Thyroid disease Neg Hx      Melanoma Neg Hx      Psoriasis Neg Hx      Lupus Neg Hx      Acne Neg Hx          Allergies  Review of patient's allergies indicates:   Allergen Reactions    Niacin Itching and Other (See Comments)     Other reaction(s): facial flushing and causes hepatitis     Terbinafine Other (See Comments)     Other  "reaction(s): Hepatitis    "gave me rash"       Review of Systems   Review of Systems   Constitutional: Negative for malaise/fatigue, weight gain and weight loss.   Eyes:  Negative for visual disturbance.   Cardiovascular:  Positive for leg swelling. Negative for chest pain, claudication (chronic and unchanged), cyanosis, dyspnea on exertion, irregular heartbeat, near-syncope, orthopnea, palpitations, paroxysmal nocturnal dyspnea and syncope.   Respiratory:  Negative for cough, hemoptysis, shortness of breath, sleep disturbances due to breathing and wheezing.    Hematologic/Lymphatic: Negative for bleeding problem. Does not bruise/bleed easily.   Skin:  Negative for poor wound healing.   Musculoskeletal:  Negative for muscle cramps and myalgias.   Gastrointestinal:  Negative for abdominal pain, anorexia, diarrhea, heartburn, hematemesis, hematochezia, melena, nausea and vomiting.   Genitourinary:  Negative for hematuria and nocturia.   Neurological:  Negative for excessive daytime sleepiness, dizziness, focal weakness, light-headedness and weakness.       Physical Exam  Vitals:    01/02/25 1457   BP: 112/62   Pulse: 80     Wt Readings from Last 1 Encounters:   01/02/25 120.3 kg (265 lb 4.8 oz)     Physical Exam  Vitals and nursing note reviewed.   Constitutional:       General: He is not in acute distress.     Appearance: He is obese. He is not toxic-appearing or diaphoretic.   HENT:      Head: Normocephalic and atraumatic.      Mouth/Throat:      Lips: Pink.      Mouth: Mucous membranes are moist.   Eyes:      General: No scleral icterus.     Conjunctiva/sclera: Conjunctivae normal.   Neck:      Thyroid: No thyromegaly.      Vascular: No carotid bruit, hepatojugular reflux or JVD.      Trachea: Trachea normal.   Cardiovascular:      Rate and Rhythm: Normal rate. Rhythm irregularly irregular. No extrasystoles are present.     Chest Wall: PMI is not displaced.      Pulses:           Carotid pulses are 2+ on the right " side and 2+ on the left side.       Radial pulses are 2+ on the right side and 2+ on the left side.      Heart sounds: S1 normal and S2 normal. No murmur heard.     No friction rub. No S3 or S4 sounds.   Pulmonary:      Effort: Pulmonary effort is normal. No tachypnea, bradypnea, accessory muscle usage or respiratory distress.      Breath sounds: Normal breath sounds and air entry. No decreased breath sounds, wheezing, rhonchi or rales.   Abdominal:      General: Bowel sounds are normal. There is no distension or abdominal bruit.      Palpations: Abdomen is soft. There is no hepatomegaly, splenomegaly or pulsatile mass.      Tenderness: There is no abdominal tenderness.   Musculoskeletal:         General: No tenderness or deformity.      Right lower leg: Swelling present.      Left lower leg: Swelling present.   Skin:     General: Skin is warm and dry.      Capillary Refill: Capillary refill takes less than 2 seconds.      Coloration: Skin is not cyanotic or pale.      Nails: There is no clubbing.   Neurological:      General: No focal deficit present.      Mental Status: He is alert and oriented to person, place, and time.   Psychiatric:         Attention and Perception: Attention normal.         Mood and Affect: Mood normal.         Speech: Speech normal.         Behavior: Behavior normal. Behavior is cooperative.         Labs  Admission on 08/13/2024, Discharged on 08/13/2024   Component Date Value Ref Range Status    POCT Glucose 08/13/2024 136 (H)  70 - 110 mg/dL Final   Hospital Outpatient Visit on 07/24/2024   Component Date Value Ref Range Status    QRS Duration 07/24/2024 90  ms Final    [facility] QTC Calculation 07/24/2024 448  ms Final   Lab Visit on 07/24/2024   Component Date Value Ref Range Status    Sodium 07/24/2024 140  136 - 145 mmol/L Final    Potassium 07/24/2024 4.1  3.5 - 5.1 mmol/L Final    Chloride 07/24/2024 107  95 - 110 mmol/L Final    CO2 07/24/2024 25  23 - 29 mmol/L Final    Glucose  07/24/2024 228 (H)  70 - 110 mg/dL Final    BUN 07/24/2024 18  8 - 23 mg/dL Final    Creatinine 07/24/2024 1.4  0.5 - 1.4 mg/dL Final    Calcium 07/24/2024 9.4  8.7 - 10.5 mg/dL Final    Anion Gap 07/24/2024 8  8 - 16 mmol/L Final    eGFR 07/24/2024 50.8 (A)  >60 mL/min/1.73 m^2 Final    Hemoglobin A1C 07/24/2024 7.0 (H)  4.0 - 5.6 % Final    Comment: ADA Screening Guidelines:  5.7-6.4%  Consistent with prediabetes  >or=6.5%  Consistent with diabetes    High levels of fetal hemoglobin interfere with the HbA1C  assay. Heterozygous hemoglobin variants (HbS, HgC, etc)do  not significantly interfere with this assay.   However, presence of multiple variants may affect accuracy.      Estimated Avg Glucose 07/24/2024 154 (H)  68 - 131 mg/dL Final       Imaging  No results found.    Assessment  1. Permanent atrial fibrillation   Controlled, asymptomatic, and tolerating anticoagulation      Plan and Discussion    No change to present management plan.  Encouraged to try to resume some regular exercise and activity.    The ASCVD Risk score ([place] DK, et al., 2019) failed to calculate for the following reasons:    The 2019 ASCVD risk score is only valid for ages 40 to 79     Follow Up  Follow up in about 1 year (around 1/2/2026).      Cash Stokes MD

## 2025-01-16 ENCOUNTER — OFFICE VISIT (OUTPATIENT)
Dept: ORTHOPEDICS | Facility: CLINIC | Age: 80
End: 2025-01-16
Payer: MEDICARE

## 2025-01-16 VITALS — HEIGHT: 69 IN | BODY MASS INDEX: 39.18 KG/M2

## 2025-01-16 DIAGNOSIS — M65.332 TRIGGER MIDDLE FINGER OF LEFT HAND: Primary | ICD-10-CM

## 2025-01-16 PROCEDURE — 99999 PR PBB SHADOW E&M-EST. PATIENT-LVL II: CPT | Mod: PBBFAC,,, | Performed by: ORTHOPAEDIC SURGERY

## 2025-01-16 PROCEDURE — 1125F AMNT PAIN NOTED PAIN PRSNT: CPT | Mod: CPTII,S$GLB,, | Performed by: ORTHOPAEDIC SURGERY

## 2025-01-16 PROCEDURE — 3288F FALL RISK ASSESSMENT DOCD: CPT | Mod: CPTII,S$GLB,, | Performed by: ORTHOPAEDIC SURGERY

## 2025-01-16 PROCEDURE — 1101F PT FALLS ASSESS-DOCD LE1/YR: CPT | Mod: CPTII,S$GLB,, | Performed by: ORTHOPAEDIC SURGERY

## 2025-01-16 PROCEDURE — 99214 OFFICE O/P EST MOD 30 MIN: CPT | Mod: S$GLB,,, | Performed by: ORTHOPAEDIC SURGERY

## 2025-01-16 RX ORDER — CEFAZOLIN SODIUM 2 G/50ML
2 SOLUTION INTRAVENOUS
OUTPATIENT
Start: 2025-01-16

## 2025-01-16 NOTE — PROGRESS NOTES
CC:  Left middle and left ring fingers triggering        [place]  [facility] [place] is a very pleasant 79 y.o. male with painful locking of the left middle and ring fingers  He had similar problems on his right hand which eventually required surgery   He would like to schedule surgery for the left hand   No numbness or tingling reported         PAST MEDICAL HISTORY:   Past Medical History:   Diagnosis Date    *Atrial fibrillation     Eliquis    Anticoagulant long-term use     apaxiban    Basal cell carcinoma 12/2015    left eye brow    Basal cell carcinoma 10/28/2019    right superior preauricular    BCC (basal cell carcinoma) 12/2015    left shoulder    Cataract     Chronic kidney disease     Diabetes mellitus with renal manifestations, uncontrolled     Dyslipidemia associated with type 2 diabetes mellitus     Fever blister     Hearing loss     HTN (hypertension)     Keloid cicatrix     Liver disease     Melanoma 12/07/2015    right cheek-in situ    Obesity     PN (peripheral neuropathy)     Primary osteoarthritis of right knee 1/25/2017    Screening for colon cancer 3/3/2016    Sleep apnea     wears CPAP at night    Thyroid disease     Type II or unspecified type diabetes mellitus with other specified manifestations, uncontrolled     Ulcer of left lower extremity, limited to breakdown of skin 9/22/2017     PAST SURGICAL HISTORY:   Past Surgical History:   Procedure Laterality Date    APPENDECTOMY      CARDIOVERSION      CATARACT EXTRACTION      CATARACT EXTRACTION Right 05/14/2018    Dr. Greer    COLONOSCOPY N/A 3/3/2016    Procedure: COLONOSCOPY;  Surgeon: Bob Poe MD;  Location: [address]);  Service: Endoscopy;  Laterality: N/A;  Holding Eliquis for 3 days prior to colonoscopy. EC    COLONOSCOPY N/A 9/20/2019    Procedure: COLONOSCOPY;  Surgeon: Akbar Curtis MD;  Location: [address]);  Service: Endoscopy;  Laterality: N/A;  Per Dr. José Granda, patient can HOLD Eliquis X2 days prior  to procedure-see telphone encounter 8/22/19-    ECTROPION REPAIR Right 8/14/2019    Procedure: REPAIR, ECTROPION, EYELID /       #[id number]- Skin Flaps(Deep Tissue) (OD);  Surgeon: Edita Sabillon MD;  Location: [facility] OR [address] [place] [address];  Service: Ophthalmology;  Laterality: Right;    epidural steroid injection      INTRAOCULAR PROSTHESES INSERTION Left 6/25/2018    Procedure: INSERTION, INTRAOCULAR LENS PROSTHESIS;  Surgeon: Lawrence Greer MD;  Location: [facility];  Service: Ophthalmology;  Laterality: Left;    JOINT REPLACEMENT      Knee    Meniere's disease surgery      PHACOEMULSIFICATION OF CATARACT Left 6/25/2018    Procedure: PHACOEMULSIFICATION, CATARACT;  Surgeon: Lawrence Greer MD;  Location: [facility];  Service: Ophthalmology;  Laterality: Left;    PREPARATION OF WOUND PRIOR TO APPLICATION OF SKIN GRAFT Right 8/14/2019    Procedure: PREPARATION, WOUND, PRIOR TO SKIN GRAFT APPLICATION;  Surgeon: Edita Sabillon MD;  Location: [address];  Service: Ophthalmology;  Laterality: Right;    REVISION OF KNEE ARTHROPLASTY Right 7/10/2018    Procedure: REVISION-ARTHROPLASTY-KNEE-DAKOTA;  Surgeon: Miguel Stuart MD;  Location: [address];  Service: Orthopedics;  Laterality: Right;  [place]    SKIN FULL THICKNESS GRAFT Right 8/14/2019    Procedure: APPLICATION, GRAFT, SKIN, FULL-THICKNESS;  Surgeon: Edita Sabillon MD;  Location: [address];  Service: Ophthalmology;  Laterality: Right;    TARSORRHAPHY Right 8/14/2019    Procedure: BLEPHARORRHAPHY;  Surgeon: Edita Sabillon MD;  Location: [address];  Service: Ophthalmology;  Laterality: Right;    TONSILLECTOMY      TOTAL KNEE ARTHROPLASTY Right 01/25/2017    TKR    TOTAL KNEE ARTHROPLASTY Left     TKR, 1990's    TRIGGER FINGER RELEASE Right 8/13/2024    Procedure: RELEASE, TRIGGER FINGER;  Surgeon: Woo Elmore Jr., MD;  Location: [facility];  Service: Orthopedics;  Laterality: Right;     FAMILY HISTORY:   Family History   Problem Relation Name Age of Onset     Diabetes Mother      Stroke Mother      Diabetes Father      Heart disease Father          over 45 years of age    Hypertension Father      Cancer Sister          brain    Eczema Sister      Cancer Sister          leukemia, stomach cancer    No Known Problems Sister      Cancer Brother          brain    ALS Brother          stomach cancer    Cancer Brother      No Known Problems Brother      No Known Problems Maternal Aunt      No Known Problems Maternal Uncle      No Known Problems Paternal Aunt      No Known Problems Paternal Uncle      No Known Problems Maternal Grandmother      No Known Problems Maternal Grandfather      No Known Problems Paternal Grandmother      No Known Problems Paternal Grandfather      Amblyopia Neg Hx      Blindness Neg Hx      Cataracts Neg Hx      Glaucoma Neg Hx      Macular degeneration Neg Hx      Retinal detachment Neg Hx      Strabismus Neg Hx      Thyroid disease Neg Hx      Melanoma Neg Hx      Psoriasis Neg Hx      Lupus Neg Hx      Acne Neg Hx       SOCIAL HISTORY:   Social History     Socioeconomic History    Marital status:     Number of children: 3   Occupational History    Occupation: retired     Employer: RETIRED   Tobacco Use    Smoking status: Former     Current packs/day: 0.00     Types: Cigarettes     Quit date: 7/10/1985     Years since quittin.5    Smokeless tobacco: Never   Substance and Sexual Activity    Alcohol use: No     Alcohol/week: 0.0 standard drinks of alcohol     Comment: quit - had excess in the past    Drug use: No    Sexual activity: Not Currently     Social Drivers of Health     Financial Resource Strain: Medium Risk (2024)    Overall Financial Resource Strain (CARDIA)     Difficulty of Paying Living Expenses: Somewhat hard   Food Insecurity: Food Insecurity Present (2024)    Hunger Vital Sign     Worried About Running Out of Food in the Last Year: Sometimes true     Ran Out of Food in the Last Year: Sometimes true  "  Transportation Needs: No Transportation Needs (10/26/2023)    PRAPARE - Transportation     Lack of Transportation (Medical): No     Lack of Transportation (Non-Medical): No   Physical Activity: Inactive (7/21/2024)    Exercise Vital Sign     Days of Exercise per Week: 0 days     Minutes of Exercise per Session: 20 min   Stress: No Stress Concern Present (7/21/2024)    [employer] - Occupational Stress Questionnaire     Feeling of Stress : Only a little   Housing Stability: Unknown (10/26/2023)    Housing Stability Vital Sign     Unable to Pay for Housing in the Last Year: No     Unstable Housing in the Last Year: No       MEDICATIONS:   Current Outpatient Medications:     ammonium lactate 12 % Crea, Apply 1 application  topically 2 (two) times daily., Disp: 140 g, Rfl: 11    atorvastatin (LIPITOR) 40 MG tablet, TAKE ONE TABLET BY MOUTH EVERY DAY, Disp: 90 tablet, Rfl: 3    BD INSULIN PEN NEEDLE UF ORIG 29 x 1/2 " Ndle, , Disp: , Rfl: 12    blood sugar diagnostic Strp, 1 strip by Misc.(Non-Drug; Combo Route) route 4 (four) times daily. To check blood glucose, Disp: 400 strip, Rfl: 11    blood-glucose meter Misc, To check BG as discussed, Disp: 1 each, Rfl: 0    blood-glucose sensor (DEXCOM G7 SENSOR) [place], Apply one sensor to the skin every 10 days, Disp: 3 each, Rfl: 11    ciclopirox (LOPROX) 0.77 % Crea, Apply topically 2 (two) times daily., Disp: 90 g, Rfl: 2    ciclopirox (PENLAC) 8 % Soln, Apply topically nightly., Disp: 6.6 mL, Rfl: 11    ELIQUIS 5 mg Tab, TAKE ONE TABLET BY MOUTH TWICE DAILY, Disp: 180 tablet, Rfl: 1    fenofibrate (TRICOR) 145 MG tablet, TAKE ONE TABLET BY MOUTH ONCE DAILY, Disp: 90 tablet, Rfl: 1    furosemide (LASIX) 40 MG tablet, TAKE ONE TABLET BY MOUTH EVERY DAY AS NEEDED, Disp: 90 tablet, Rfl: 0    gabapentin (NEURONTIN) 300 MG capsule, TAKE TWO CAPSULES BY MOUTH TWICE DAILY, Disp: 360 capsule, Rfl: 1    glucagon (GVOKE HYPOPEN 2-PACK) 1 mg/0.2 mL AtIn, " "Inject 1 mg into the skin as needed (severe hypoglycemia)., Disp: 2 each, Rfl: 3    insulin glargine U-100, Lantus, (LANTUS SOLOSTAR U-100 INSULIN) 100 unit/mL (3 mL) InPn pen, Inject 36 Units into the skin every evening., Disp: 10.8 mL, Rfl: 11    insulin lispro (HUMALOG KWIKPEN INSULIN) 100 unit/mL pen, Inject 10 units 3 times daily with meals. Plus correction scale. Max daily dose 50 units/day, Disp: 45.9 mL, Rfl: 3    lancets 33 gauge Misc, 1 lancet by Misc.(Non-Drug; Combo Route) route 4 (four) times daily. Pt uses True Result Meter, bg monitoring 4 times a day., Disp: 400 each, Rfl: 4    metoprolol succinate (TOPROL-XL) 100 MG 24 hr tablet, TAKE ONE TABLET BY MOUTH EVERY DAY, Disp: 90 tablet, Rfl: 0    nystatin-triamcinolone (MYCOLOG II) cream, apply TWICE DAILY, Disp: 60 g, Rfl: 1    pen needle, diabetic (BD ULTRA-FINE BASIL PEN NEEDLE) 32 gauge x 5/32" Ndle, To use q.i.d., Disp: 400 each, Rfl: 3    tamsulosin (FLOMAX) 0.4 mg Cap, Take 1 capsule (0.4 mg total) by mouth once daily., Disp: 90 capsule, Rfl: 0    temazepam (RESTORIL) 15 mg Cap, TAKE ONE CAPSULE BY MOUTH NIGHTLY AS NEEDED, Disp: 30 capsule, Rfl: 5    tirzepatide 10 mg/0.5 mL PnIj, Inject 10 mg into the skin every 7 days., Disp: 4 Pen, Rfl: 6    triamcinolone acetonide 0.025 % Lotn, APPLY TO AFFECTED AREA(S) FACE TWICE DAILY AS NEEDED red and/or scaly, Disp: 60 mL, Rfl: 1    loratadine (CLARITIN) 10 mg tablet, Take 1 tablet (10 mg total) by mouth once daily., Disp: 90 tablet, Rfl: 3  ALLERGIES:   Review of patient's allergies indicates:   Allergen Reactions    Niacin Itching and Other (See Comments)     Other reaction(s): facial flushing and causes hepatitis     Terbinafine Other (See Comments)     Other reaction(s): Hepatitis    "gave me rash"       Review of Systems:  Constitutional: no fever or chills  ENT: no nasal congestion or sore throat  Respiratory: no cough or shortness of breath  Cardiovascular: no chest pain or " "palpitations  Gastrointestinal: no nausea or vomiting, PUD, GERD, NSAID intolerance  Genitourinary: no hematuria or dysuria  Integument/Breast: no rash or pruritis  Hematologic/Lymphatic: no easy bruising or lymphadenopathy  Musculoskeletal: see HPI  Neurological: no seizures or tremors  Behavioral/Psych: no auditory or visual hallucinations      Physical Exam   Vitals:    01/16/25 1311   Height: 5' 9" (1.753 m)   PainSc:   8   PainLoc: Hand       Constitutional: Oriented to person, place, and time. Appears well-developed and well-nourished.   HENT:   Head: Normocephalic and atraumatic.   Nose: Nose normal.   Eyes: No scleral icterus.   Neck: Normal range of motion.   Cardiovascular: Normal rate and regular rhythm.    Pulses:       Radial pulses are 2+ on the right side, and 2+ on the left side.   Pulmonary/Chest: Effort normal and breath sounds normal.   Abdominal: Soft.   Neurological: Alert and oriented to person, place, and time.   Skin: Skin is warm.   Psychiatric: Normal mood and affect.     MUSCULOSKELETAL UPPER EXTREMITY:  Examination left hand there is tenderness along the flexor tendon sheath left middle and ring fingers range of motion limited positive triggering   Tinel sign negative               Diagnostic Studies:  None        Assessment:  Triggering left middle and left ring fingers    Plan:  Patient would like to set up outpatient surgery for trigger release left middle and left ring fingers      The risks and benefits of surgery were discussed with the patient today and they understand.  The consent was signed in the office for surgery.      Woo Elmore MD (Jay)  [facility]  Orthopedic Upper Extremity Surgery       "

## 2025-01-17 ENCOUNTER — OFFICE VISIT (OUTPATIENT)
Dept: PODIATRY | Facility: CLINIC | Age: 80
End: 2025-01-17
Payer: MEDICARE

## 2025-01-17 VITALS
SYSTOLIC BLOOD PRESSURE: 126 MMHG | DIASTOLIC BLOOD PRESSURE: 71 MMHG | HEART RATE: 82 BPM | WEIGHT: 265.19 LBS | BODY MASS INDEX: 39.28 KG/M2 | HEIGHT: 69 IN

## 2025-01-17 DIAGNOSIS — Z79.01 LONG TERM CURRENT USE OF ANTICOAGULANT THERAPY: Primary | ICD-10-CM

## 2025-01-17 DIAGNOSIS — E11.42 DIABETIC POLYNEUROPATHY ASSOCIATED WITH TYPE 2 DIABETES MELLITUS: ICD-10-CM

## 2025-01-17 DIAGNOSIS — B35.1 ONYCHOMYCOSIS OF MULTIPLE TOENAILS WITH TYPE 2 DIABETES MELLITUS AND PERIPHERAL ANGIOPATHY: ICD-10-CM

## 2025-01-17 DIAGNOSIS — E11.69 ONYCHOMYCOSIS OF MULTIPLE TOENAILS WITH TYPE 2 DIABETES MELLITUS AND PERIPHERAL ANGIOPATHY: ICD-10-CM

## 2025-01-17 DIAGNOSIS — E11.51 ONYCHOMYCOSIS OF MULTIPLE TOENAILS WITH TYPE 2 DIABETES MELLITUS AND PERIPHERAL ANGIOPATHY: ICD-10-CM

## 2025-01-17 DIAGNOSIS — L84 CORN OR CALLUS: ICD-10-CM

## 2025-01-17 PROCEDURE — 3074F SYST BP LT 130 MM HG: CPT | Mod: CPTII,S$GLB,, | Performed by: PODIATRIST

## 2025-01-17 PROCEDURE — 1126F AMNT PAIN NOTED NONE PRSNT: CPT | Mod: CPTII,S$GLB,, | Performed by: PODIATRIST

## 2025-01-17 PROCEDURE — 99999 PR PBB SHADOW E&M-EST. PATIENT-LVL IV: CPT | Mod: PBBFAC,,, | Performed by: PODIATRIST

## 2025-01-17 PROCEDURE — 1160F RVW MEDS BY RX/DR IN RCRD: CPT | Mod: CPTII,S$GLB,, | Performed by: PODIATRIST

## 2025-01-17 PROCEDURE — 1159F MED LIST DOCD IN RCRD: CPT | Mod: CPTII,S$GLB,, | Performed by: PODIATRIST

## 2025-01-17 PROCEDURE — 3288F FALL RISK ASSESSMENT DOCD: CPT | Mod: CPTII,S$GLB,, | Performed by: PODIATRIST

## 2025-01-17 PROCEDURE — 11721 DEBRIDE NAIL 6 OR MORE: CPT | Mod: Q9,S$GLB,, | Performed by: PODIATRIST

## 2025-01-17 PROCEDURE — 1101F PT FALLS ASSESS-DOCD LE1/YR: CPT | Mod: CPTII,S$GLB,, | Performed by: PODIATRIST

## 2025-01-17 PROCEDURE — 3078F DIAST BP <80 MM HG: CPT | Mod: CPTII,S$GLB,, | Performed by: PODIATRIST

## 2025-01-17 PROCEDURE — 99214 OFFICE O/P EST MOD 30 MIN: CPT | Mod: 25,S$GLB,, | Performed by: PODIATRIST

## 2025-01-17 RX ORDER — CICLOPIROX 80 MG/ML
SOLUTION TOPICAL NIGHTLY
Qty: 6.6 ML | Refills: 11 | Status: SHIPPED | OUTPATIENT
Start: 2025-01-17

## 2025-01-17 NOTE — PROGRESS NOTES
Subjective:      Patient ID: VIJAY Cantor Jr. is a 79 y.o. male.    Chief Complaint: Diabetic Foot Exam (PCP Desmond Barlow MD 01/02/2025/[facility])    BI is a 79 y.o. male who presents to the clinic for evaluation and treatment of high risk feet. BI has a past medical history of *Atrial fibrillation, Anticoagulant long-term use, Basal cell carcinoma (12/2015), Basal cell carcinoma (10/28/2019), BCC (basal cell carcinoma) (12/2015), Cataract, Chronic kidney disease, Diabetes mellitus with renal manifestations, uncontrolled, Dyslipidemia associated with type 2 diabetes mellitus, Fever blister, Hearing loss, HTN (hypertension), Keloid cicatrix, Liver disease, Melanoma (12/07/2015), Obesity, PN (peripheral neuropathy), Primary osteoarthritis of right knee (1/25/2017), Screening for colon cancer (3/3/2016), Sleep apnea, Thyroid disease, Type II or unspecified type diabetes mellitus with other specified manifestations, uncontrolled, and Ulcer of left lower extremity, limited to breakdown of skin (9/22/2017). The patient's chief complaint is long, thick toenails.  Gradual onset, worsening over past several weeks, aggravated by increased weight bearing, shoe gear, pressure.  Periodic debridement helps symptoms. This patient has documented high risk feet requiring routine maintenance secondary to diabetes mellitis and those secondary complications of diabetes, as mentioned..    PCP: Desmond Barlow MD    Date Last Seen by PCP:   Chief Complaint   Patient presents with    Diabetic Foot Exam     PCP Desmond Barlow MD 01/02/2025  Nail care         Current shoe gear:  Affected Foot: Rx diabetic extra depth shoes and custom accommodative insoles     Unaffected Foot: Rx diabetic extra depth shoes and custom accommodative insoles    Hemoglobin A1C   Date Value Ref Range Status   01/02/2025 6.2 (H) 4.0 - 5.6 % Final     Comment:     ADA Screening Guidelines:  5.7-6.4%  Consistent with prediabetes  >or=6.5%  Consistent  with diabetes    High levels of fetal hemoglobin interfere with the HbA1C  assay. Heterozygous hemoglobin variants (HbS, HgC, etc)do  not significantly interfere with this assay.   However, presence of multiple variants may affect accuracy.     07/24/2024 7.0 (H) 4.0 - 5.6 % Final     Comment:     ADA Screening Guidelines:  5.7-6.4%  Consistent with prediabetes  >or=6.5%  Consistent with diabetes    High levels of fetal hemoglobin interfere with the HbA1C  assay. Heterozygous hemoglobin variants (HbS, HgC, etc)do  not significantly interfere with this assay.   However, presence of multiple variants may affect accuracy.     04/25/2024 6.6 (H) 4.0 - 5.6 % Final     Comment:     ADA Screening Guidelines:  5.7-6.4%  Consistent with prediabetes  >or=6.5%  Consistent with diabetes    High levels of fetal hemoglobin interfere with the HbA1C  assay. Heterozygous hemoglobin variants (HbS, HgC, etc)do  not significantly interfere with this assay.   However, presence of multiple variants may affect accuracy.         Review of Systems   Constitutional: Negative for chills, fever, malaise/fatigue and night sweats.   Cardiovascular:  Positive for leg swelling. Negative for claudication and cyanosis.   Skin:  Positive for dry skin, itching, nail changes and suspicious lesions. Negative for color change, poor wound healing and rash.   Musculoskeletal:  Negative for falls, gout, joint pain and myalgias.   Neurological:  Positive for focal weakness, numbness, paresthesias and sensory change.           Objective:      Physical Exam  Constitutional:       General: He is not in acute distress.     Appearance: He is well-developed. He is not diaphoretic.      Comments: Oriented to time, place, and person.   Cardiovascular:      Pulses:           Popliteal pulses are 2+ on the right side and 2+ on the left side.        Dorsalis pedis pulses are 1+ on the right side and 1+ on the left side.        Posterior tibial pulses are 1+ on the  right side and 1+ on the left side.      Comments: Capillary fill time 3-5 seconds.  All toes warm to touch.      2 + pitting lower extremity edema bilateral.    Negative elevational pallor and dependent rubor bilateral.    Musculoskeletal:      Right ankle: No swelling, deformity, ecchymosis or lacerations. Normal range of motion. Normal pulse.      Right Achilles Tendon: Normal. No defects. Dawson's test negative.      Comments:     Ankle dorsiflexion decreased at <10 degrees bilateral with moderate increase with knee flexion bilateral.      All ten toes without clubbing, cyanosis, or signs of ischemia.      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    No pain to palpation bilateral lower extremities.      Range of motion, stability, muscle strength, and muscle tone are age and health appropriate normal bilateral feet and legs.       Lymphadenopathy:      Lower Body: No right inguinal adenopathy. No left inguinal adenopathy.      Comments: Negative lymphadenopathy bilateral popliteal fossa and tarsal tunnel.  Negative lymphangitic streaking bilateral foot/ankle bilateral.     Skin:     General: Skin is warm and dry.      Capillary Refill: Capillary refill takes 2 to 3 seconds.      Coloration: Skin is not pale.      Findings: No abrasion, bruising, burn, ecchymosis, erythema, laceration, lesion, petechiae or rash.      Nails: There is no clubbing.      Comments: Dry scale with superficial flakes over an erythematous base  without ulceration, drainage, pus, tracking, fluctuance, malodor, or cardinal signs infection.    Focal hyperkeratotic lesion consisting entirely of hyperkeratotic tissue without open skin, drainage, pus, fluctuance, malodor, or signs of infection right and left plantar 1st mtpj.     Otherwise, Skin thin, atrophic, with decreased density and distribution of pedal hair bilateral, but without hyperpigmentation,   ulcers, masses, nodules or cords palpated bilateral feet and legs.        Toenails 1st,  2nd, 3rd, 4th, 5th  bilateral are hypertrophic thickened 2-3 mm, dystrophic, discolored tanish brown with tan, gray crumbly subungual debris.  Tender to distal nail plate pressure, without periungual skin abnormality of each.     Neurological:      Mental Status: He is alert and oriented to person, place, and time. He is not disoriented.      Sensory: Sensory deficit present.      Motor: No tremor, atrophy or abnormal muscle tone.      Gait: Gait normal.      Comments: Decreased/absent vibratory sensation bilateral feet to 128Hz tuning fork.    Paresthesias, and burning bilateral feet with no clearly identified trigger or source.     Psychiatric:         Behavior: Behavior is cooperative.               Assessment:       Encounter Diagnoses   Name Primary?    Long term current use of anticoagulant therapy Yes    Onychomycosis of multiple toenails with type 2 diabetes mellitus and peripheral angiopathy     Diabetic polyneuropathy associated with type 2 diabetes mellitus     Corn or callus            Plan:       [facility] today for diabetic foot exam.    Diagnoses and all orders for this visit:    Long term current use of anticoagulant therapy    Onychomycosis of multiple toenails with type 2 diabetes mellitus and peripheral angiopathy    Diabetic polyneuropathy associated with type 2 diabetes mellitus    Corn or callus    Other orders  -     ciclopirox (PENLAC) 8 % Soln; Apply topically nightly.        I counseled the patient on his conditions, their implications and medical management.    - Shoe inspection. Diabetic Foot Education. Patient reminded of the importance of good nutrition and blood sugar control to help prevent podiatric complications of diabetes. Patient instructed on proper foot hygeine. We discussed wearing proper shoe gear, daily foot inspections, never walking without protective shoe gear, never putting sharp instruments to feet, routine podiatric visits at least annually.      - With patient's  permission, nails were aggressively reduced and debrided x 10 to their soft tissue attachment mechanically, removing all offending nail and debris. Patient relates relief following the procedure. He will continue to monitor the areas daily, inspect his feet, wear protective shoe gear when ambulatory, moisturizer to maintain skin integrity and follow in this office  p.r.n.      Discussed conservative treatment with shoes of adequate dimensions, material, and style to alleviate symptoms and delay or prevent surgical intervention.    Resume compression stockings and left afo he has at home.        Rx [facility]    Follow up in about 1 year (around 1/17/2026).

## 2025-01-18 DIAGNOSIS — L21.9 SEBORRHEIC DERMATITIS: ICD-10-CM

## 2025-01-21 RX ORDER — TRIAMCINOLONE ACETONIDE 0.25 MG/ML
LOTION TOPICAL
Qty: 60 ML | Refills: 1 | Status: SHIPPED | OUTPATIENT
Start: 2025-01-21

## 2025-02-10 ENCOUNTER — TELEPHONE (OUTPATIENT)
Dept: PREADMISSION TESTING | Facility: HOSPITAL | Age: 80
End: 2025-02-10
Payer: MEDICARE

## 2025-02-10 ENCOUNTER — ANESTHESIA EVENT (OUTPATIENT)
Dept: SURGERY | Facility: HOSPITAL | Age: 80
End: 2025-02-10
Payer: MEDICARE

## 2025-02-10 ENCOUNTER — PATIENT MESSAGE (OUTPATIENT)
Dept: PREADMISSION TESTING | Facility: HOSPITAL | Age: 80
End: 2025-02-10

## 2025-02-10 ENCOUNTER — HOSPITAL ENCOUNTER (OUTPATIENT)
Dept: PREADMISSION TESTING | Facility: HOSPITAL | Age: 80
Discharge: HOME | End: 2025-02-10
Attending: NURSE PRACTITIONER
Payer: MEDICARE

## 2025-02-10 DIAGNOSIS — I48.20 CHRONIC ATRIAL FIBRILLATION: ICD-10-CM

## 2025-02-10 DIAGNOSIS — Z01.818 PRE-OP EVALUATION: Primary | ICD-10-CM

## 2025-02-10 NOTE — DISCHARGE INSTRUCTIONS

## 2025-02-10 NOTE — PRE-PROCEDURE INSTRUCTIONS
Grand son    Allergies, medical, surgical, family and psychosocial histories reviewed with patient. Periop plan of care reviewed. Preop instructions given, including medications to take and to hold. Hibiclens soap and instructions on use given. Time allotted for questions to be addressed.      Arrival time 0530.     Please take Lantus 28 units the night before surgery. Trizepatide last dose on 2/8/25.     Surgery instructions reviewed and surgery booklet sent or emailed to the patient via the portal.

## 2025-02-10 NOTE — TELEPHONE ENCOUNTER
Mr. Cantor is scheduled for trigger release on 2/18/25 with Dr. Elmore. Medication list indicates he is currently taking Eliquis. He will need recommendations on when to stop prior to surgery. Please advise. Thanks

## 2025-02-10 NOTE — ANESTHESIA PREPROCEDURE EVALUATION
02/10/2025  [facility] PAT Cantor Jr. is a 79 y.o., male scheduled for RELEASE, TRIGGER FINGER (Left: Hand) 2/18/25.    Past Medical History:   Diagnosis Date    *Atrial fibrillation     Eliquis    Anticoagulant long-term use     apaxiban    Basal cell carcinoma 12/2015    left eye brow    Basal cell carcinoma 10/28/2019    right superior preauricular    BCC (basal cell carcinoma) 12/2015    left shoulder    Cataract     Chronic kidney disease     Diabetes mellitus with renal manifestations, uncontrolled     Dyslipidemia associated with type 2 diabetes mellitus     Fever blister     Hearing loss     HTN (hypertension)     Keloid cicatrix     Liver disease     Melanoma 12/07/2015    right cheek-in situ    Obesity     PN (peripheral neuropathy)     Primary osteoarthritis of right knee 1/25/2017    Screening for colon cancer 3/3/2016    Sleep apnea     wears CPAP at night    Thyroid disease     Type II or unspecified type diabetes mellitus with other specified manifestations, uncontrolled     Ulcer of left lower extremity, limited to breakdown of skin 9/22/2017     Past Surgical History:   Procedure Laterality Date    APPENDECTOMY      CARDIOVERSION      CATARACT EXTRACTION      CATARACT EXTRACTION Right 05/14/2018    Dr. Greer    COLONOSCOPY N/A 3/3/2016    Procedure: COLONOSCOPY;  Surgeon: Bob Poe MD;  Location: [facility] ([address] [place] [address]);  Service: Endoscopy;  Laterality: N/A;  Holding Eliquis for 3 days prior to colonoscopy. EC    COLONOSCOPY N/A 9/20/2019    Procedure: COLONOSCOPY;  Surgeon: Akbar Curtis MD;  Location: [facility] ([address] [place] [address]);  Service: Endoscopy;  Laterality: N/A;  Per Dr. José Granda, patient can HOLD Eliquis X2 days prior to procedure-see telphone encounter 8/22/19-[facility]    ECTROPION REPAIR Right 8/14/2019    Procedure: REPAIR, ECTROPION, EYELID /       #71024- Skin Flaps(Deep Tissue) (OD);   Surgeon: Edita Sabillon MD;  [address];  Service: Ophthalmology;  Laterality: Right;    epidural steroid injection      INTRAOCULAR PROSTHESES INSERTION Left 6/25/2018    Procedure: INSERTION, INTRAOCULAR LENS PROSTHESIS;  Surgeon: Lawrence Greer MD;  Location: [facility];  Service: Ophthalmology;  Laterality: Left;    JOINT REPLACEMENT      Knee    Meniere's disease surgery      PHACOEMULSIFICATION OF CATARACT Left 6/25/2018    Procedure: PHACOEMULSIFICATION, CATARACT;  Surgeon: Lawrence Greer MD;  Location: [facility];  Service: Ophthalmology;  Laterality: Left;    PREPARATION OF WOUND PRIOR TO APPLICATION OF SKIN GRAFT Right 8/14/2019    Procedure: PREPARATION, WOUND, PRIOR TO SKIN GRAFT APPLICATION;  Surgeon: Edita Sabillon MD;  Location: [address];  Service: Ophthalmology;  Laterality: Right;    REVISION OF KNEE ARTHROPLASTY Right 7/10/2018    Procedure: REVISION-ARTHROPLASTY-KNEE-SAMIR;  Surgeon: Miguel Stuart MD;  Location: [address];  Service: Orthopedics;  Laterality: Right;  Samir    SKIN FULL THICKNESS GRAFT Right 8/14/2019    Procedure: APPLICATION, GRAFT, SKIN, FULL-THICKNESS;  Surgeon: Edita Sabillon MD;  [address];  Service: Ophthalmology;  Laterality: Right;    TARSORRHAPHY Right 8/14/2019    Procedure: BLEPHARORRHAPHY;  Surgeon: Edita Sabillon MD;  [address];  Service: Ophthalmology;  Laterality: Right;    TONSILLECTOMY      TOTAL KNEE ARTHROPLASTY Right 01/25/2017    TKR    TOTAL KNEE ARTHROPLASTY Left     TKR, 1990's    TRIGGER FINGER RELEASE Right 8/13/2024    Procedure: RELEASE, TRIGGER FINGER;  Surgeon: Woo Elmore Jr., MD;  Location: [facility];  Service: Orthopedics;  Laterality: Right;     Review of patient's allergies indicates:   Allergen Reactions    Niacin Itching and Other (See Comments)     Other reaction(s): facial flushing and causes hepatitis     Terbinafine Other (See Comments)     Other reaction(s): Hepatitis     ""gave me rash"     Current Outpatient Medications   Medication Instructions    ammonium lactate 12 % Crea 1 application , Topical (Top), 2 times daily    atorvastatin (LIPITOR) 40 MG tablet TAKE ONE TABLET BY MOUTH EVERY DAY    BD INSULIN PEN NEEDLE UF ORIG 29 x 1/2 " Ndle No dose, route, or frequency recorded.    blood sugar diagnostic Strp 1 strip, Misc.(Non-Drug; Combo Route), 4 times daily, To check blood glucose    blood-glucose meter Misc To check BG as discussed    blood-glucose sensor (DEXCOM G7 SENSOR) Mckenna Apply one sensor to the skin every 10 days    ciclopirox (LOPROX) 0.77 % Crea Topical (Top), 2 times daily    ciclopirox (PENLAC) 8 % Soln Topical (Top), Nightly    ELIQUIS 5 mg Tab TAKE ONE TABLET BY MOUTH TWICE DAILY    fenofibrate (TRICOR) 145 mg, Oral    furosemide (LASIX) 40 MG tablet TAKE ONE TABLET BY MOUTH EVERY DAY AS NEEDED    gabapentin (NEURONTIN) 300 MG capsule TAKE TWO CAPSULES BY MOUTH TWICE DAILY    GVOKE HYPOPEN 2-PACK 1 mg, Subcutaneous, As needed (PRN)    insulin lispro (HUMALOG KWIKPEN INSULIN) 100 unit/mL pen Inject 10 units 3 times daily with meals. Plus correction scale. Max daily dose 50 units/day    lancets 33 gauge Misc 1 lancet , Misc.(Non-Drug; Combo Route), 4 times daily, Pt uses True Result Meter, bg monitoring 4 times a day.    LANTUS SOLOSTAR U-100 INSULIN 36 Units, Subcutaneous, Nightly    loratadine (CLARITIN) 10 mg, Oral, Daily    metoprolol succinate (TOPROL-XL) 100 MG 24 hr tablet TAKE ONE TABLET BY MOUTH EVERY DAY    nystatin-triamcinolone (MYCOLOG II) cream apply TWICE DAILY    pen needle, diabetic (BD ULTRA-FINE BASIL PEN NEEDLE) 32 gauge x 5/32" Ndle To use q.i.d.    tamsulosin (FLOMAX) 0.4 mg, Oral, Daily    temazepam (RESTORIL) 15 mg Cap TAKE ONE CAPSULE BY MOUTH NIGHTLY AS NEEDED    tirzepatide 10 mg, Subcutaneous, Every 7 days    triamcinolone acetonide 0.025 % Lotn APPLY TO AFFECTED AREA(S) FACE TWICE DAILY AS NEEDED red and/or scaly       Pre-op " Assessment    I have reviewed the Patient Summary Reports.     I have reviewed the Nursing Notes.    I have reviewed the Medications.     Review of Systems  Anesthesia Hx:  No problems with previous Anesthesia   History of prior surgery of interest to airway management or planning:          Denies Family Hx of Anesthesia complications.    Denies Personal Hx of Anesthesia complications.                    Social:  Former Smoker, No Alcohol Use       Hematology/Oncology:       -- Denies Anemia:                                  Cardiovascular:  Exercise tolerance: poor   Denies Pacemaker. Hypertension  Denies Valvular problems/Murmurs.   Denies CAD.    Dysrhythmias atrial fibrillation      hyperlipidemia  Denies BOYD.             Chronic Venous Insufficiency, bilateral lower extremity                  Pulmonary:    Denies COPD.  Denies Asthma.    Sleep Apnea           Education provided regarding risk of obstructive sleep apnea            Renal/:  Chronic Renal Disease, CKD                Hepatic/GI:     GERD, well controlled Liver Disease,   Taking GLP-1 Agonists Instructed to Hold for 7 Days           Musculoskeletal:  Arthritis               Neurological:  Denies TIA.  Denies CVA. Neuromuscular Disease,   Denies Seizures.          Peripheral Neuropathy                          Endocrine:  Diabetes (a1c 6.2 1/2/25), well controlled, type 2, using insulin Hypothyroidism  Dexcom      Morbid Obesity / BMI > 40      Physical Exam  General: Cooperative and Oriented    Airway:  Mallampati: III   Neck: Girth Increased    Dental:  Intact      Lab Results   Component Value Date    WBC 7.02 01/02/2025    HGB 14.0 01/02/2025    HCT 41.5 01/02/2025     01/02/2025    CHOL 158 01/02/2025    TRIG 90 01/02/2025    HDL 33 (L) 01/02/2025    ALT 10 01/02/2025    AST 18 01/02/2025     01/02/2025    K 4.4 01/02/2025     01/02/2025    CREATININE 1.4 01/02/2025    BUN 14 01/02/2025    CO2 26 01/02/2025    TSH 3.566  01/02/2025    PSA 4.0 05/25/2022    INR 1.0 08/04/2018    HGBA1C 6.2 (H) 01/02/2025     Results for orders placed or performed during the hospital encounter of 07/24/24   EKG 12-lead    Collection Time: 07/24/24 12:32 PM   Result Value Ref Range    QRS Duration 90 ms    [facility] QTC Calculation 448 ms    Narrative    Test Reason : I10,Z01.818,E11.42,Z79.4,    Vent. Rate : 067 BPM     Atrial Rate : 150 BPM     P-R Int : 000 ms          QRS Dur : 090 ms      QT Int : 424 ms       P-R-T Axes : 000 022 020 degrees     QTc Int : 448 ms    Atrial fibrillation  Abnormal ECG  When compared with ECG of 16-AUG-2023 10:58,  No significant change was found  Confirmed by Cuco Stevens MD ([id number]) on 7/25/2024 2:31:01 PM    Referred By: BRITTANY RODRIGUES           Confirmed By:Cuco Stevens MD     Results for orders placed during the hospital encounter of 08/02/23    Echo    Interpretation Summary    Left Ventricle: The left ventricle is normal in size. Ventricular mass is normal. Normal wall thickness. Normal wall motion. There is normal systolic function. Ejection fraction by visual approximation is 60%. Unable to assess diastolic function due to arrhythmia.    Left Atrium: Left atrium is mildly dilated. The left atrium volume index is 37.5 mL/m2.    Right Ventricle: Normal right ventricular cavity size. Wall thickness is normal. Right ventricle wall motion  is normal. Systolic function is normal.    Right Atrium: Right atrium is mildly dilated.    Aortic Valve: There is mild aortic valve sclerosis. There is normal leaflet mobility. There is no significant regurgitation.    Mitral Valve: The mitral valve is structurally normal. There is normal leaflet mobility. There is no significant regurgitation.    Tricuspid Valve: The tricuspid valve is structurally normal. There is normal leaflet mobility. There is mild transvalvular regurgitation.    Pulmonic Valve: The pulmonic valve is structurally normal. There is normal leaflet mobility.  There is no significant regurgitation.    Aorta: Aortic root is normal in size measuring 3.34 cm. Ascending aorta is normal measuring 3.22 cm.    Pulmonary Artery: No pulmonary hypertension. The estimated pulmonary artery systolic pressure is 34 mmHg.    IVC/SVC: Intermediate venous pressure at 8 mmHg.    Pericardium: The pericardium is normal. There is no pericardial effusion.      Anesthesia Plan  Type of Anesthesia, risks & benefits discussed:    Anesthesia Type: Gen Natural Airway  Intra-op Monitoring Plan: Standard ASA Monitors  Post Op Pain Control Plan: multimodal analgesia  Induction:  IV  Informed Consent: Informed consent signed with the Patient and all parties understand the risks and agree with anesthesia plan.  All questions answered.   ASA Score: 3  Day of Surgery Review of History & Physical: H&P Update referred to the surgeon/provider.  Anesthesia Plan Notes: Anesthesia consent to be signed prior to surgery 2/18/25      Ready For Surgery From Anesthesia Perspective.     .

## 2025-02-17 DIAGNOSIS — I10 ESSENTIAL HYPERTENSION: ICD-10-CM

## 2025-02-17 DIAGNOSIS — I50.31 ACUTE DIASTOLIC HEART FAILURE: ICD-10-CM

## 2025-02-17 DIAGNOSIS — N18.30 CHRONIC KIDNEY DISEASE, STAGE III (MODERATE): ICD-10-CM

## 2025-02-17 DIAGNOSIS — N18.30 TYPE 2 DIABETES MELLITUS WITH STAGE 3 CHRONIC KIDNEY DISEASE, WITH LONG-TERM CURRENT USE OF INSULIN: ICD-10-CM

## 2025-02-17 DIAGNOSIS — I87.2 VENOUS INSUFFICIENCY OF BOTH LOWER EXTREMITIES: ICD-10-CM

## 2025-02-17 DIAGNOSIS — Z79.4 TYPE 2 DIABETES MELLITUS WITH STAGE 3 CHRONIC KIDNEY DISEASE, WITH LONG-TERM CURRENT USE OF INSULIN: ICD-10-CM

## 2025-02-17 DIAGNOSIS — I48.20 CHRONIC ATRIAL FIBRILLATION: ICD-10-CM

## 2025-02-17 DIAGNOSIS — M17.11 PRIMARY OSTEOARTHRITIS OF RIGHT KNEE: ICD-10-CM

## 2025-02-17 DIAGNOSIS — E11.22 TYPE 2 DIABETES MELLITUS WITH STAGE 3 CHRONIC KIDNEY DISEASE, WITH LONG-TERM CURRENT USE OF INSULIN: ICD-10-CM

## 2025-02-17 RX ORDER — FUROSEMIDE 40 MG/1
40 TABLET ORAL DAILY PRN
Qty: 90 TABLET | Refills: 3 | Status: SHIPPED | OUTPATIENT
Start: 2025-02-17

## 2025-02-17 NOTE — TELEPHONE ENCOUNTER
No care due was identified.  [facility] Embedded Care Due Messages. Reference number: [id number].   2/17/2025 9:03:30 AM CST

## 2025-02-18 ENCOUNTER — HOSPITAL ENCOUNTER (OUTPATIENT)
Facility: HOSPITAL | Age: 80
Discharge: HOME | End: 2025-02-18
Attending: ORTHOPAEDIC SURGERY | Admitting: ORTHOPAEDIC SURGERY
Payer: MEDICARE

## 2025-02-18 ENCOUNTER — ANESTHESIA (OUTPATIENT)
Dept: SURGERY | Facility: HOSPITAL | Age: 80
End: 2025-02-18
Payer: MEDICARE

## 2025-02-18 VITALS
OXYGEN SATURATION: 99 % | HEIGHT: 70 IN | DIASTOLIC BLOOD PRESSURE: 73 MMHG | RESPIRATION RATE: 19 BRPM | HEART RATE: 76 BPM | SYSTOLIC BLOOD PRESSURE: 142 MMHG | TEMPERATURE: 98 F | WEIGHT: 260 LBS | BODY MASS INDEX: 37.22 KG/M2

## 2025-02-18 DIAGNOSIS — M65.332 TRIGGER MIDDLE FINGER OF LEFT HAND: ICD-10-CM

## 2025-02-18 DIAGNOSIS — M65.321 TRIGGER FINGER, RIGHT INDEX FINGER: Primary | ICD-10-CM

## 2025-02-18 LAB — POCT GLUCOSE: 129 MG/DL (ref 70–110)

## 2025-02-18 PROCEDURE — 37000009 HC ANESTHESIA EA ADD 15 MINS: Performed by: ORTHOPAEDIC SURGERY

## 2025-02-18 PROCEDURE — 63600175 PHARM REV CODE 636 W HCPCS: Performed by: ORTHOPAEDIC SURGERY

## 2025-02-18 PROCEDURE — 37000008 HC ANESTHESIA 1ST 15 MINUTES: Performed by: ORTHOPAEDIC SURGERY

## 2025-02-18 PROCEDURE — 63600175 PHARM REV CODE 636 W HCPCS: Performed by: NURSE ANESTHETIST, CERTIFIED REGISTERED

## 2025-02-18 PROCEDURE — 71000015 HC POSTOP RECOV 1ST HR: Performed by: ORTHOPAEDIC SURGERY

## 2025-02-18 PROCEDURE — 25000003 PHARM REV CODE 250: Performed by: NURSE ANESTHETIST, CERTIFIED REGISTERED

## 2025-02-18 PROCEDURE — 36000706: Performed by: ORTHOPAEDIC SURGERY

## 2025-02-18 PROCEDURE — 36000707: Performed by: ORTHOPAEDIC SURGERY

## 2025-02-18 PROCEDURE — 71000016 HC POSTOP RECOV ADDL HR: Performed by: ORTHOPAEDIC SURGERY

## 2025-02-18 RX ORDER — DEXMEDETOMIDINE HYDROCHLORIDE 100 UG/ML
INJECTION, SOLUTION INTRAVENOUS
Status: DISCONTINUED | OUTPATIENT
Start: 2025-02-18 | End: 2025-02-18

## 2025-02-18 RX ORDER — KETOROLAC TROMETHAMINE 30 MG/ML
15 INJECTION, SOLUTION INTRAMUSCULAR; INTRAVENOUS ONCE
Status: DISCONTINUED | OUTPATIENT
Start: 2025-02-18 | End: 2025-02-18 | Stop reason: HOSPADM

## 2025-02-18 RX ORDER — PHENYLEPHRINE HYDROCHLORIDE 10 MG/ML
INJECTION INTRAVENOUS
Status: DISCONTINUED | OUTPATIENT
Start: 2025-02-18 | End: 2025-02-18

## 2025-02-18 RX ORDER — PROPOFOL 10 MG/ML
VIAL (ML) INTRAVENOUS
Status: DISCONTINUED | OUTPATIENT
Start: 2025-02-18 | End: 2025-02-18

## 2025-02-18 RX ORDER — LIDOCAINE HYDROCHLORIDE 20 MG/ML
INJECTION INTRAVENOUS
Status: DISCONTINUED | OUTPATIENT
Start: 2025-02-18 | End: 2025-02-18

## 2025-02-18 RX ORDER — ACETAMINOPHEN 325 MG/1
650 TABLET ORAL EVERY 4 HOURS PRN
Status: DISCONTINUED | OUTPATIENT
Start: 2025-02-18 | End: 2025-02-18 | Stop reason: HOSPADM

## 2025-02-18 RX ORDER — OXYCODONE HYDROCHLORIDE 5 MG/1
10 TABLET ORAL EVERY 4 HOURS PRN
Status: DISCONTINUED | OUTPATIENT
Start: 2025-02-18 | End: 2025-02-18 | Stop reason: HOSPADM

## 2025-02-18 RX ORDER — LIDOCAINE HYDROCHLORIDE 10 MG/ML
1 INJECTION, SOLUTION EPIDURAL; INFILTRATION; INTRACAUDAL; PERINEURAL ONCE
Status: DISCONTINUED | OUTPATIENT
Start: 2025-02-18 | End: 2025-02-18 | Stop reason: HOSPADM

## 2025-02-18 RX ORDER — CEFAZOLIN 2 G/1
2 INJECTION, POWDER, FOR SOLUTION INTRAMUSCULAR; INTRAVENOUS
Status: DISCONTINUED | OUTPATIENT
Start: 2025-02-18 | End: 2025-02-18 | Stop reason: HOSPADM

## 2025-02-18 RX ORDER — BUPIVACAINE HYDROCHLORIDE 5 MG/ML
INJECTION, SOLUTION EPIDURAL; INTRACAUDAL
Status: DISCONTINUED | OUTPATIENT
Start: 2025-02-18 | End: 2025-02-18 | Stop reason: HOSPADM

## 2025-02-18 RX ORDER — PROPOFOL 10 MG/ML
VIAL (ML) INTRAVENOUS CONTINUOUS PRN
Status: DISCONTINUED | OUTPATIENT
Start: 2025-02-18 | End: 2025-02-18

## 2025-02-18 RX ORDER — HYDROCODONE BITARTRATE AND ACETAMINOPHEN 5; 325 MG/1; MG/1
1 TABLET ORAL EVERY 4 HOURS PRN
Qty: 12 TABLET | Refills: 0 | Status: SHIPPED | OUTPATIENT
Start: 2025-02-18

## 2025-02-18 RX ORDER — ONDANSETRON HYDROCHLORIDE 2 MG/ML
INJECTION, SOLUTION INTRAVENOUS
Status: DISCONTINUED | OUTPATIENT
Start: 2025-02-18 | End: 2025-02-18

## 2025-02-18 RX ORDER — LIDOCAINE HYDROCHLORIDE 10 MG/ML
INJECTION, SOLUTION EPIDURAL; INFILTRATION; INTRACAUDAL; PERINEURAL
Status: DISCONTINUED | OUTPATIENT
Start: 2025-02-18 | End: 2025-02-18 | Stop reason: HOSPADM

## 2025-02-18 RX ORDER — ONDANSETRON 8 MG/1
8 TABLET, ORALLY DISINTEGRATING ORAL EVERY 8 HOURS PRN
Status: DISCONTINUED | OUTPATIENT
Start: 2025-02-18 | End: 2025-02-18 | Stop reason: HOSPADM

## 2025-02-18 RX ADMIN — LIDOCAINE HYDROCHLORIDE 100 MG: 20 INJECTION, SOLUTION INTRAVENOUS at 07:02

## 2025-02-18 RX ADMIN — PHENYLEPHRINE HYDROCHLORIDE 100 MCG: 10 INJECTION INTRAVENOUS at 07:02

## 2025-02-18 RX ADMIN — PROPOFOL 30 MG: 10 INJECTION, EMULSION INTRAVENOUS at 07:02

## 2025-02-18 RX ADMIN — DEXMEDETOMIDINE 4 MCG: 200 INJECTION, SOLUTION INTRAVENOUS at 07:02

## 2025-02-18 RX ADMIN — SODIUM CHLORIDE: 0.9 INJECTION, SOLUTION INTRAVENOUS at 06:02

## 2025-02-18 RX ADMIN — ONDANSETRON 8 MG: 2 INJECTION, SOLUTION INTRAMUSCULAR; INTRAVENOUS at 07:02

## 2025-02-18 RX ADMIN — CEFAZOLIN 2 G: 2 INJECTION, POWDER, FOR SOLUTION INTRAMUSCULAR; INTRAVENOUS at 07:02

## 2025-02-18 RX ADMIN — PROPOFOL 100 MCG/KG/MIN: 10 INJECTION, EMULSION INTRAVENOUS at 07:02

## 2025-02-18 NOTE — ANESTHESIA POSTPROCEDURE EVALUATION
Anesthesia Post Evaluation    Patient: [facility] PAT [place] JrJorge    Procedure(s) Performed: Procedure(s) (LRB):  RELEASE, TRIGGER FINGER (Left)    Final Anesthesia Type: general      Patient location during evaluation: [facility]  Patient participation: Yes- Able to Participate  Level of consciousness: awake and alert, oriented and awake  Post-procedure vital signs: reviewed and stable  Pain management: adequate  Airway patency: patent  HEIDI mitigation strategies: Multimodal analgesia  PONV status at discharge: No PONV  Anesthetic complications: no      Cardiovascular status: blood pressure returned to baseline and hemodynamically stable  Respiratory status: unassisted and spontaneous ventilation  Hydration status: euvolemic  Follow-up not needed.              Vitals Value Taken Time   /73 02/18/25 09:25   Temp 36.7 °C (98 °F) 02/18/25 09:25   Pulse 76 02/18/25 09:25   Resp 19 02/18/25 09:25   SpO2 99 % 02/18/25 09:25         No case tracking events are documented in the log.      Pain/Bessie Score: Bessie Score: 10 (2/18/2025  9:25 AM)

## 2025-02-18 NOTE — PLAN OF CARE
Patient oob and urinated with no issues, vss, all instructions given, discharged safely in  to car

## 2025-02-18 NOTE — TELEPHONE ENCOUNTER
Refill Routing Note   Medication(s) are not appropriate for processing by [facility] for the following reason(s):      Medication outside of protocol    [facility] action(s):  Approve  Route Care Due:  None identified            Appointments  past 12m or future 3m with PCP    Date Provider   Last Visit   1/2/2025 Desmond Barlow MD   Next Visit   Visit date not found Desmond Barlow MD   ED visits in past 90 days: 0        Note composed:8:24 PM 02/17/2025

## 2025-02-18 NOTE — OP NOTE
[facility] - Surgery (Hospital)  Operative Note      Date of Procedure: 2/18/2025     Procedure: Procedure(s) (LRB):  RELEASE, TRIGGER FINGER (Left)     Surgeons and Role:     * Woo Elmore Jr., MD - Primary    Assisting Surgeon: None    Pre-Operative Diagnosis: Trigger middle finger of left hand [M65.332]    Post-Operative Diagnosis: Post-Op Diagnosis Codes:     * Trigger middle finger of left hand [M65.332]    Anesthesia: Local MAC    Operative Findings (including complications, if any):  Triggering left middle and left ring fingers    Description of Technical Procedures:     Preop diagnosis: 1. Triggering left middle finger.      2. Triggering left ring finger.      Postop diagnosis: Same.      Operative procedure:  1. Trigger release left middle finger.      2. Trigger release left ring finger.      Surgeon: Ramírez.      First assistant: Benitez.      Anesthesia:  Mac.      EBL: Minimal.      Complications: None.      Operative procedure in detail as follows:     After operative consent was obtained patient brought the operating room placed supine on the operating table.  Anesthesia by IV sedation followed by injection of xylocaine Marcaine combination operative site left palm.  Tourniquet applied left arm left upper extremity prepped and draped out in the normal sterile fashion.  Esmarch used to exsanguinated tourniquet inflated 225 mmHg.      Following this a volar oblique incision made at the base of left ring finger with a 15 blade.  Careful dissection used to expose the A1 pulley which was released longitudinally carefully protecting the digital nerves.  After complete release range of motion check noted to be full without triggering.  The wound irrigated and closed with interrupted 5 O nylon horizontal mattress suture.      A 2nd incision made at the volar base of left middle finger with a 15 blade.  In a similar manner the A1 pulley was exposed carefully protecting the digital nerves.  Pulley released  longitudinally with the [place] blade and scissors.  Range of motion then checked noted to be full without triggering.  The wound irrigated and closed with interrupted 5 O nylon horizontal mattress suture.  Sterile dressings applied to both incisions followed by light wrap and the tourniquet deflated.  Patient brought to the recovery room in stable condition.  All sponge needle counts reported as correct.  No complications    Significant Surgical Tasks Conducted by the Assistant(s), if Applicable: retraction    Estimated Blood Loss (EBL): * No values recorded between 2/18/2025  7:30 AM and 2/18/2025  7:50 AM *           Implants: * No implants in log *    Specimens:   Specimen (24h ago, onward)      None                    Condition: Good    Disposition: PACU - hemodynamically stable.    Attestation: I was present and scrubbed for the entire procedure.    Discharge Note    OUTCOME: Patient tolerated treatment/procedure well without complication and is now ready for discharge.    DISPOSITION: Home or Self Care    FINAL DIAGNOSIS:  Trigger middle finger of left hand    FOLLOWUP: In clinic    DISCHARGE INSTRUCTIONS:    Discharge Procedure Orders   Diet general     Call MD for:  temperature >100.4     Call MD for:  persistent nausea and vomiting     Call MD for:  severe uncontrolled pain     Keep surgical extremity elevated     Remove dressing in 72 hours

## 2025-02-18 NOTE — TRANSFER OF CARE
"Anesthesia Transfer of Care Note    Patient: [facility] PAT Cantor Jr.    Procedure(s) Performed: Procedure(s) (LRB):  RELEASE, TRIGGER FINGER (Left)    Patient location: OPS    Anesthesia Type: general    Transport from OR: Transported from OR on room air with adequate spontaneous ventilation    Post pain: adequate analgesia    Post assessment: no apparent anesthetic complications    Post vital signs: stable    Level of consciousness: awake and alert    Nausea/Vomiting: no nausea/vomiting    Complications: none    Transfer of care protocol was followed      Last vitals: Visit Vitals  BP (!) 157/79 (BP Location: Right arm, Patient Position: Lying)   Pulse 77   Temp 36.4 °C (97.5 °F) (Skin)   Resp 18   Ht 5' 10" (1.778 m)   Wt 117.9 kg (260 lb)   SpO2 100%   BMI 37.31 kg/m²     "

## 2025-02-18 NOTE — OPERATIVE NOTE ADDENDUM
Certification of Assistant at Surgery       Surgery Date: 2/18/2025     Participating Surgeons:  Surgeons and Role:     * Woo Elmore Jr., MD - Primary    Procedures:  Procedure(s) (LRB):  RELEASE, TRIGGER FINGER (Left)    Assistant Surgeon's Certification of Necessity:  I understand that section 1842 (b) (6) (d) of the Social Security Act generally prohibits Medicare Part B reasonable charge payment for the services of assistants at surgery in teaching hospitals when qualified residents are available to furnish such services. I certify that the services for which payment is claimed were medically necessary, and that no qualified resident was available to perform the services. I further understand that these services are subject to post-payment review by the Medicare carrier.      Seda Marquis PA-C    02/18/2025  2:44 PM

## 2025-02-18 NOTE — H&P
CC:  Left middle and left ring fingers triggering           HPI:  [facility] PAT Cantor Jr. is a very pleasant 79 y.o. male with painful locking of the left middle and ring fingers  He had similar problems on his right hand which eventually required surgery   He would like to schedule surgery for the left hand   No numbness or tingling reported            PAST MEDICAL HISTORY:        Past Medical History:   Diagnosis Date    *Atrial fibrillation       Eliquis    Anticoagulant long-term use       apaxiban    Basal cell carcinoma 12/2015     left eye brow    Basal cell carcinoma 10/28/2019     right superior preauricular    BCC (basal cell carcinoma) 12/2015     left shoulder    Cataract      Chronic kidney disease      Diabetes mellitus with renal manifestations, uncontrolled      Dyslipidemia associated with type 2 diabetes mellitus      Fever blister      Hearing loss      HTN (hypertension)      Keloid cicatrix      Liver disease      Melanoma 12/07/2015     right cheek-in situ    Obesity      PN (peripheral neuropathy)      Primary osteoarthritis of right knee 1/25/2017    Screening for colon cancer 3/3/2016    Sleep apnea       wears CPAP at night    Thyroid disease      Type II or unspecified type diabetes mellitus with other specified manifestations, uncontrolled      Ulcer of left lower extremity, limited to breakdown of skin 9/22/2017      PAST SURGICAL HISTORY:         Past Surgical History:   Procedure Laterality Date    APPENDECTOMY        CARDIOVERSION        CATARACT EXTRACTION        CATARACT EXTRACTION Right 05/14/2018     Dr. Greer    COLONOSCOPY N/A 3/3/2016     Procedure: COLONOSCOPY;  Surgeon: Bob Poe MD;  Location: [address]);  Service: Endoscopy;  Laterality: N/A;  Holding Eliquis for 3 days prior to colonoscopy. EC    COLONOSCOPY N/A 9/20/2019     Procedure: COLONOSCOPY;  Surgeon: Akbar Curtis MD;  Location: [address]);  Service: Endoscopy;  Laterality: N/A;  Per   Josémichael Millern, patient can HOLD Eliquis X2 days prior to procedure-see telphone encounter 8/22/19-    ECTROPION REPAIR Right 8/14/2019     Procedure: REPAIR, ECTROPION, EYELID /       #10197- Skin Flaps(Deep Tissue) (OD);  Surgeon: Edita Sabillon MD;  [facility];  Service: Ophthalmology;  Laterality: Right;    epidural steroid injection        INTRAOCULAR PROSTHESES INSERTION Left 6/25/2018     Procedure: INSERTION, INTRAOCULAR LENS PROSTHESIS;  Surgeon: Lawrence Greer MD;  Location: [facility];  Service: Ophthalmology;  Laterality: Left;    JOINT REPLACEMENT         Knee    Meniere's disease surgery        PHACOEMULSIFICATION OF CATARACT Left 6/25/2018     Procedure: PHACOEMULSIFICATION, CATARACT;  Surgeon: Lawrence Greer MD;  Location: [facility];  Service: Ophthalmology;  Laterality: Left;    PREPARATION OF WOUND PRIOR TO APPLICATION OF SKIN GRAFT Right 8/14/2019     Procedure: PREPARATION, WOUND, PRIOR TO SKIN GRAFT APPLICATION;  Surgeon: Edita Sabillon MD;  [address];  Service: Ophthalmology;  Laterality: Right;    REVISION OF KNEE ARTHROPLASTY Right 7/10/2018     Procedure: REVISION-ARTHROPLASTY-KNEE-DAKOTA;  Surgeon: Miguel Stuart MD;  [facility];  Service: Orthopedics;  Laterality: Right;  [place]    SKIN FULL THICKNESS GRAFT Right 8/14/2019     Procedure: APPLICATION, GRAFT, SKIN, FULL-THICKNESS;  Surgeon: Edita Sabillon MD;  Location: [facility];  Service: Ophthalmology;  Laterality: Right;    TARSORRHAPHY Right 8/14/2019     Procedure: BLEPHARORRHAPHY;  Surgeon: Edita Sabillon MD;  [facility];  Service: Ophthalmology;  Laterality: Right;    TONSILLECTOMY        TOTAL KNEE ARTHROPLASTY Right 01/25/2017     TKR    TOTAL KNEE ARTHROPLASTY Left       TKR, 1990's    TRIGGER FINGER RELEASE Right 8/13/2024     Procedure: RELEASE, TRIGGER FINGER;  Surgeon: Woo Elmore Jr., MD;  Location: [facility];  Service: Orthopedics;  Laterality: Right;       FAMILY HISTORY:          Family History   Problem Relation Name Age of Onset    Diabetes Mother        Stroke Mother        Diabetes Father        Heart disease Father             over 45 years of age    Hypertension Father        Cancer Sister             brain    Eczema Sister        Cancer Sister             leukemia, stomach cancer    No Known Problems Sister        Cancer Brother             brain    ALS Brother             stomach cancer    Cancer Brother        No Known Problems Brother        No Known Problems Maternal Aunt        No Known Problems Maternal Uncle        No Known Problems Paternal Aunt        No Known Problems Paternal Uncle        No Known Problems Maternal Grandmother        No Known Problems Maternal Grandfather        No Known Problems Paternal Grandmother        No Known Problems Paternal Grandfather        Amblyopia Neg Hx        Blindness Neg Hx        Cataracts Neg Hx        Glaucoma Neg Hx        Macular degeneration Neg Hx        Retinal detachment Neg Hx        Strabismus Neg Hx        Thyroid disease Neg Hx        Melanoma Neg Hx        Psoriasis Neg Hx        Lupus Neg Hx        Acne Neg Hx          SOCIAL HISTORY:   Social History            Socioeconomic History    Marital status:     Number of children: 3   Occupational History    Occupation: retired       Employer: RETIRED   Tobacco Use    Smoking status: Former       Current packs/day: 0.00       Types: Cigarettes       Quit date: 7/10/1985       Years since quittin.5    Smokeless tobacco: Never   Substance and Sexual Activity    Alcohol use: No       Alcohol/week: 0.0 standard drinks of alcohol       Comment: quit - had excess in the past    Drug use: No    Sexual activity: Not Currently      Social Drivers of Health           Financial Resource Strain: Medium Risk (2024)     Overall Financial Resource Strain (CARDIA)      Difficulty of Paying Living Expenses: Somewhat hard   Food Insecurity: Food  "Insecurity Present (7/21/2024)     Hunger Vital Sign      Worried About Running Out of Food in the Last Year: Sometimes true      Ran Out of Food in the Last Year: Sometimes true   Transportation Needs: No Transportation Needs (10/26/2023)     PRAPARE - Transportation      Lack of Transportation (Medical): No      Lack of Transportation (Non-Medical): No   Physical Activity: Inactive (7/21/2024)     Exercise Vital Sign      Days of Exercise per Week: 0 days      Minutes of Exercise per Session: 20 min   Stress: No Stress Concern Present (7/21/2024)     [facility] - Occupational Stress Questionnaire      Feeling of Stress : Only a little   Housing Stability: Unknown (10/26/2023)     Housing Stability Vital Sign      Unable to Pay for Housing in the Last Year: No      Unstable Housing in the Last Year: No         MEDICATIONS:   Current Medications   Current Outpatient Medications:     ammonium lactate 12 % Crea, Apply 1 application  topically 2 (two) times daily., Disp: 140 g, Rfl: 11    atorvastatin (LIPITOR) 40 MG tablet, TAKE ONE TABLET BY MOUTH EVERY DAY, Disp: 90 tablet, Rfl: 3    BD INSULIN PEN NEEDLE UF ORIG 29 x 1/2 " Ndle, , Disp: , Rfl: 12    blood sugar diagnostic Strp, 1 strip by Misc.(Non-Drug; Combo Route) route 4 (four) times daily. To check blood glucose, Disp: 400 strip, Rfl: 11    blood-glucose meter Misc, To check BG as discussed, Disp: 1 each, Rfl: 0    blood-glucose sensor (DEXCOM G7 SENSOR) Mckenna, Apply one sensor to the skin every 10 days, Disp: 3 each, Rfl: 11    ciclopirox (LOPROX) 0.77 % Crea, Apply topically 2 (two) times daily., Disp: 90 g, Rfl: 2    ciclopirox (PENLAC) 8 % Soln, Apply topically nightly., Disp: 6.6 mL, Rfl: 11    ELIQUIS 5 mg Tab, TAKE ONE TABLET BY MOUTH TWICE DAILY, Disp: 180 tablet, Rfl: 1    fenofibrate (TRICOR) 145 MG tablet, TAKE ONE TABLET BY MOUTH ONCE DAILY, Disp: 90 tablet, Rfl: 1    furosemide (LASIX) 40 MG tablet, TAKE ONE TABLET BY " "MOUTH EVERY DAY AS NEEDED, Disp: 90 tablet, Rfl: 0    gabapentin (NEURONTIN) 300 MG capsule, TAKE TWO CAPSULES BY MOUTH TWICE DAILY, Disp: 360 capsule, Rfl: 1    glucagon (GVOKE HYPOPEN 2-PACK) 1 mg/0.2 mL AtIn, Inject 1 mg into the skin as needed (severe hypoglycemia)., Disp: 2 each, Rfl: 3    insulin glargine U-100, Lantus, (LANTUS SOLOSTAR U-100 INSULIN) 100 unit/mL (3 mL) InPn pen, Inject 36 Units into the skin every evening., Disp: 10.8 mL, Rfl: 11    insulin lispro (HUMALOG KWIKPEN INSULIN) 100 unit/mL pen, Inject 10 units 3 times daily with meals. Plus correction scale. Max daily dose 50 units/day, Disp: 45.9 mL, Rfl: 3    lancets 33 gauge Misc, 1 lancet by Misc.(Non-Drug; Combo Route) route 4 (four) times daily. Pt uses True Result Meter, bg monitoring 4 times a day., Disp: 400 each, Rfl: 4    metoprolol succinate (TOPROL-XL) 100 MG 24 hr tablet, TAKE ONE TABLET BY MOUTH EVERY DAY, Disp: 90 tablet, Rfl: 0    nystatin-triamcinolone (MYCOLOG II) cream, apply TWICE DAILY, Disp: 60 g, Rfl: 1    pen needle, diabetic (BD ULTRA-FINE BASIL PEN NEEDLE) 32 gauge x 5/32" Ndle, To use q.i.d., Disp: 400 each, Rfl: 3    tamsulosin (FLOMAX) 0.4 mg Cap, Take 1 capsule (0.4 mg total) by mouth once daily., Disp: 90 capsule, Rfl: 0    temazepam (RESTORIL) 15 mg Cap, TAKE ONE CAPSULE BY MOUTH NIGHTLY AS NEEDED, Disp: 30 capsule, Rfl: 5    tirzepatide 10 mg/0.5 mL PnIj, Inject 10 mg into the skin every 7 days., Disp: 4 Pen, Rfl: 6    triamcinolone acetonide 0.025 % Lotn, APPLY TO AFFECTED AREA(S) FACE TWICE DAILY AS NEEDED red and/or scaly, Disp: 60 mL, Rfl: 1    loratadine (CLARITIN) 10 mg tablet, Take 1 tablet (10 mg total) by mouth once daily., Disp: 90 tablet, Rfl: 3     ALLERGIES:         Review of patient's allergies indicates:   Allergen Reactions    Niacin Itching and Other (See Comments)       Other reaction(s): facial flushing and causes hepatitis     Terbinafine Other (See Comments)       Other reaction(s): Hepatitis   " " "gave me rash"         Review of Systems:  Constitutional: no fever or chills  ENT: no nasal congestion or sore throat  Respiratory: no cough or shortness of breath  Cardiovascular: no chest pain or palpitations  Gastrointestinal: no nausea or vomiting, PUD, GERD, NSAID intolerance  Genitourinary: no hematuria or dysuria  Integument/Breast: no rash or pruritis  Hematologic/Lymphatic: no easy bruising or lymphadenopathy  Musculoskeletal: see HPI  Neurological: no seizures or tremors  Behavioral/Psych: no auditory or visual hallucinations        Physical Exam       Vitals:     01/16/25 1311   Height: 5' 9" (1.753 m)   PainSc:   8   PainLoc: Hand         Constitutional: Oriented to person, place, and time. Appears well-developed and well-nourished.   HENT:   Head: Normocephalic and atraumatic.   Nose: Nose normal.   Eyes: No scleral icterus.   Neck: Normal range of motion.   Cardiovascular: Normal rate and regular rhythm.    Pulses:       Radial pulses are 2+ on the right side, and 2+ on the left side.   Pulmonary/Chest: Effort normal and breath sounds normal.   Abdominal: Soft.   Neurological: Alert and oriented to person, place, and time.   Skin: Skin is warm.   Psychiatric: Normal mood and affect.      MUSCULOSKELETAL UPPER EXTREMITY:  Examination left hand there is tenderness along the flexor tendon sheath left middle and ring fingers range of motion limited positive triggering   Tinel sign negative                     Diagnostic Studies:  None           Assessment:  Triggering left middle and left ring fingers     Plan:  Patient would like to set up outpatient surgery for trigger release left middle and left ring fingers        The risks and benefits of surgery were discussed with the patient today and they understand.  The consent was signed in the office for surgery.        Woo Elmore MD (Jay)  [facility]  Orthopedic Upper Extremity Surgery     "

## 2025-02-24 DIAGNOSIS — Z00.00 ENCOUNTER FOR MEDICARE ANNUAL WELLNESS EXAM: ICD-10-CM

## 2025-03-05 ENCOUNTER — OFFICE VISIT (OUTPATIENT)
Dept: ORTHOPEDICS | Facility: CLINIC | Age: 80
End: 2025-03-05
Payer: MEDICARE

## 2025-03-05 DIAGNOSIS — M65.332 TRIGGER MIDDLE FINGER OF LEFT HAND: ICD-10-CM

## 2025-03-05 DIAGNOSIS — M65.322 TRIGGER FINGER, LEFT INDEX FINGER: ICD-10-CM

## 2025-03-05 DIAGNOSIS — M65.321 TRIGGER FINGER, RIGHT INDEX FINGER: ICD-10-CM

## 2025-03-05 DIAGNOSIS — Z98.890 S/P TRIGGER FINGER RELEASE: Primary | ICD-10-CM

## 2025-03-05 DIAGNOSIS — M65.331 TRIGGER FINGER, RIGHT MIDDLE FINGER: ICD-10-CM

## 2025-03-05 PROCEDURE — 3288F FALL RISK ASSESSMENT DOCD: CPT | Mod: CPTII,S$GLB,, | Performed by: PHYSICIAN ASSISTANT

## 2025-03-05 PROCEDURE — 1160F RVW MEDS BY RX/DR IN RCRD: CPT | Mod: CPTII,S$GLB,, | Performed by: PHYSICIAN ASSISTANT

## 2025-03-05 PROCEDURE — 1101F PT FALLS ASSESS-DOCD LE1/YR: CPT | Mod: CPTII,S$GLB,, | Performed by: PHYSICIAN ASSISTANT

## 2025-03-05 PROCEDURE — 99024 POSTOP FOLLOW-UP VISIT: CPT | Mod: S$GLB,,, | Performed by: PHYSICIAN ASSISTANT

## 2025-03-05 PROCEDURE — 99999 PR PBB SHADOW E&M-EST. PATIENT-LVL IV: CPT | Mod: PBBFAC,,, | Performed by: PHYSICIAN ASSISTANT

## 2025-03-05 PROCEDURE — 1159F MED LIST DOCD IN RCRD: CPT | Mod: CPTII,S$GLB,, | Performed by: PHYSICIAN ASSISTANT

## 2025-03-05 PROCEDURE — 1126F AMNT PAIN NOTED NONE PRSNT: CPT | Mod: CPTII,S$GLB,, | Performed by: PHYSICIAN ASSISTANT

## 2025-03-05 NOTE — PROGRESS NOTES
"    Subjective:     Patient ID: BI Cantor Jr. is a 79 y.o. male.    Chief Complaint: Postop Follow Up    HPI:   BI Cantor Jr.  returns for a postop visit approximately 2 weeks following:    Surgery: LEFT middle and ring trigger finger release  Date of Surgery:  2-  Surgeon: Dr. Elmore    Overall, the patient is doing well with no complaints of fever, chills, or drainage to surgical incision. The patient reports that pain has been managed with medication. Triggering has resolved. Post-operative complaints include: difficulties fully extending digits    Past Medical History:   Diagnosis Date    *Atrial fibrillation     Eliquis    Anticoagulant long-term use     apaxiban    Basal cell carcinoma 12/2015    left eye brow    Basal cell carcinoma 10/28/2019    right superior preauricular    BCC (basal cell carcinoma) 12/2015    left shoulder    Cataract     Chronic kidney disease     Diabetes mellitus with renal manifestations, uncontrolled     Dyslipidemia associated with type 2 diabetes mellitus     Fever blister     Hearing loss     HTN (hypertension)     Keloid cicatrix     Liver disease     Melanoma 12/07/2015    right cheek-in situ    Obesity     PN (peripheral neuropathy)     Primary osteoarthritis of right knee 1/25/2017    Screening for colon cancer 3/3/2016    Sleep apnea     wears CPAP at night    Thyroid disease     Type II or unspecified type diabetes mellitus with other specified manifestations, uncontrolled     Ulcer of left lower extremity, limited to breakdown of skin 9/22/2017     Current Medications[1]  Review of patient's allergies indicates:   Allergen Reactions    Niacin Itching and Other (See Comments)     Other reaction(s): facial flushing and causes hepatitis     Terbinafine Other (See Comments)     Other reaction(s): Hepatitis    "gave me rash"       Review of Systems   Constitutional:  Negative for chills and fever.   Respiratory:  Negative for shortness of breath.  " "  Cardiovascular:  Negative for chest pain and leg swelling.   Gastrointestinal:  Negative for nausea and vomiting.   Genitourinary:  Negative for dysuria.   Musculoskeletal:  Negative for joint pain. Negative for falls.   Skin:  Negative for rash.   Neurological:  Negative for dizziness. Negative for sensory change.    Objective:   Orthopedic Physical Exam  There were no vitals taken for this visit.  GEN: Well developed, well nourished male. AAOX3. No acute distress.   Breathing unlabored.  Mood and affect appropriate.     LEFT hand:    Inspection:   (+) Small incision over A1 pulley(s) with sutures in place.  Wound margins are well approximated.  Small region of erythema to both incision sites with no wound dehiscence, active drainage or purulence.  ROM: digits intact without ongoing triggering. (+) slight stiffness full digit extension bilaterally  Strength:  strength not tested today.    NV: Sensation intact to finger pads. Pulses present.    Assessment:       ICD-10-CM ICD-9-CM    1. S/P trigger finger release  Z98.890 V45.89           Plan:   2wks s/p   LEFT Middle and Ring Trigger Finger Release    Wound Care: Regular wound care explained with soap and water. Neosporin to the incision for the next few days, then patient can start scar massage with cocoa butter or vitamin-E oil.  Activity: Start using hand for light activities  Pain Control: Rest, ice, OTC analgesics  PT:  Formal referral provided to work primarily on digit extension    Follow Up: 2 week wound check              [1]   Current Outpatient Medications:     ammonium lactate 12 % Crea, Apply 1 application  topically 2 (two) times daily., Disp: 140 g, Rfl: 11    atorvastatin (LIPITOR) 40 MG tablet, TAKE ONE TABLET BY MOUTH EVERY DAY, Disp: 90 tablet, Rfl: 3    BD INSULIN PEN NEEDLE UF ORIG 29 x 1/2 " Ndle, , Disp: , Rfl: 12    blood sugar diagnostic Strp, 1 strip by Misc.(Non-Drug; Combo Route) route 4 (four) times daily. To check blood glucose, " Disp: 400 strip, Rfl: 11    blood-glucose meter Misc, To check BG as discussed, Disp: 1 each, Rfl: 0    blood-glucose sensor (DEXCOM G7 SENSOR) Mckenna, Apply one sensor to the skin every 10 days, Disp: 3 each, Rfl: 11    ciclopirox (LOPROX) 0.77 % Crea, Apply topically 2 (two) times daily., Disp: 90 g, Rfl: 2    ciclopirox (PENLAC) 8 % Soln, Apply topically nightly., Disp: 6.6 mL, Rfl: 11    ELIQUIS 5 mg Tab, TAKE ONE TABLET BY MOUTH TWICE DAILY, Disp: 180 tablet, Rfl: 1    fenofibrate (TRICOR) 145 MG tablet, TAKE ONE TABLET BY MOUTH ONCE DAILY, Disp: 90 tablet, Rfl: 1    furosemide (LASIX) 40 MG tablet, TAKE ONE TABLET BY MOUTH EVERY DAY AS NEEDED, Disp: 90 tablet, Rfl: 3    gabapentin (NEURONTIN) 300 MG capsule, TAKE TWO CAPSULES BY MOUTH TWICE DAILY, Disp: 360 capsule, Rfl: 1    glucagon (GVOKE HYPOPEN 2-PACK) 1 mg/0.2 mL AtIn, Inject 1 mg into the skin as needed (severe hypoglycemia)., Disp: 2 each, Rfl: 3    HYDROcodone-acetaminophen (NORCO) 5-325 mg per tablet, Take 1 tablet by mouth every 4 (four) hours as needed for Pain., Disp: 12 tablet, Rfl: 0    insulin glargine U-100, Lantus, (LANTUS SOLOSTAR U-100 INSULIN) 100 unit/mL (3 mL) InPn pen, Inject 36 Units into the skin every evening., Disp: 10.8 mL, Rfl: 11    insulin lispro (HUMALOG KWIKPEN INSULIN) 100 unit/mL pen, Inject 10 units 3 times daily with meals. Plus correction scale. Max daily dose 50 units/day, Disp: 45.9 mL, Rfl: 3    lancets 33 gauge Misc, 1 lancet by Misc.(Non-Drug; Combo Route) route 4 (four) times daily. Pt uses True Result Meter, bg monitoring 4 times a day., Disp: 400 each, Rfl: 4    loratadine (CLARITIN) 10 mg tablet, Take 1 tablet (10 mg total) by mouth once daily., Disp: 90 tablet, Rfl: 3    metoprolol succinate (TOPROL-XL) 100 MG 24 hr tablet, TAKE ONE TABLET BY MOUTH EVERY DAY, Disp: 90 tablet, Rfl: 0    nystatin-triamcinolone (MYCOLOG II) cream, apply TWICE DAILY, Disp: 60 g, Rfl: 1    pen needle, diabetic (BD ULTRA-FINE BASIL PEN  "NEEDLE) 32 gauge x 5/32" Ndle, To use q.i.d., Disp: 400 each, Rfl: 3    tamsulosin (FLOMAX) 0.4 mg Cap, Take 1 capsule (0.4 mg total) by mouth once daily., Disp: 90 capsule, Rfl: 0    temazepam (RESTORIL) 15 mg Cap, TAKE ONE CAPSULE BY MOUTH NIGHTLY AS NEEDED, Disp: 30 capsule, Rfl: 5    tirzepatide 10 mg/0.5 mL PnIj, Inject 10 mg into the skin every 7 days., Disp: 4 Pen, Rfl: 6    triamcinolone acetonide 0.025 % Lotn, APPLY TO AFFECTED AREA(S) FACE TWICE DAILY AS NEEDED red and/or scaly, Disp: 60 mL, Rfl: 1    "

## 2025-03-12 DIAGNOSIS — Z79.4 TYPE 2 DIABETES MELLITUS WITH HYPERGLYCEMIA, WITH LONG-TERM CURRENT USE OF INSULIN: ICD-10-CM

## 2025-03-12 DIAGNOSIS — E11.65 TYPE 2 DIABETES MELLITUS WITH HYPERGLYCEMIA, WITH LONG-TERM CURRENT USE OF INSULIN: ICD-10-CM

## 2025-03-12 RX ORDER — PEN NEEDLE, DIABETIC 31 GX5/16"
NEEDLE, DISPOSABLE MISCELLANEOUS 4 TIMES DAILY
Qty: 400 EACH | Refills: 3 | Status: SHIPPED | OUTPATIENT
Start: 2025-03-12

## 2025-03-12 NOTE — TELEPHONE ENCOUNTER
Refill Decision Note   BI Cantor  is requesting a refill authorization.  Brief Assessment and Rationale for Refill:  Approve     Medication Therapy Plan:        Comments:     Note composed:5:02 PM 03/12/2025

## 2025-03-12 NOTE — TELEPHONE ENCOUNTER
No care due was identified.  [facility] Embedded Care Due Messages. Reference number: [id number].   3/12/2025 9:04:48 AM CDT

## 2025-03-13 ENCOUNTER — PATIENT MESSAGE (OUTPATIENT)
Dept: ADMINISTRATIVE | Facility: CLINIC | Age: 80
End: 2025-03-13
Payer: MEDICARE

## 2025-03-17 ENCOUNTER — TELEPHONE (OUTPATIENT)
Dept: ADMINISTRATIVE | Facility: CLINIC | Age: 80
End: 2025-03-17
Payer: MEDICARE

## 2025-03-17 NOTE — TELEPHONE ENCOUNTER
Called pt; informed pt I was calling to confirm his virtual EAWV on 3/18/25 at 3:00pm and to see if he needed any help; pt stated he did not need any help and would complete e-pre check later today; pt informed to login 10 minutes prior to appt time

## 2025-03-18 ENCOUNTER — OFFICE VISIT (OUTPATIENT)
Dept: HOME HEALTH SERVICES | Facility: CLINIC | Age: 80
End: 2025-03-18
Payer: MEDICARE

## 2025-03-18 ENCOUNTER — TELEPHONE (OUTPATIENT)
Dept: ADMINISTRATIVE | Facility: CLINIC | Age: 80
End: 2025-03-18
Payer: MEDICARE

## 2025-03-18 DIAGNOSIS — Z79.4 TYPE 2 DIABETES MELLITUS WITH STAGE 3A CHRONIC KIDNEY DISEASE, WITH LONG-TERM CURRENT USE OF INSULIN: ICD-10-CM

## 2025-03-18 DIAGNOSIS — Z99.89 DEPENDENCE ON OTHER ENABLING MACHINES AND DEVICES: Primary | ICD-10-CM

## 2025-03-18 DIAGNOSIS — E11.22 TYPE 2 DIABETES MELLITUS WITH STAGE 3A CHRONIC KIDNEY DISEASE, WITH LONG-TERM CURRENT USE OF INSULIN: ICD-10-CM

## 2025-03-18 DIAGNOSIS — I48.20 CHRONIC ATRIAL FIBRILLATION: ICD-10-CM

## 2025-03-18 DIAGNOSIS — N18.30 STAGE 3 CHRONIC KIDNEY DISEASE, UNSPECIFIED WHETHER STAGE 3A OR 3B CKD: ICD-10-CM

## 2025-03-18 DIAGNOSIS — E11.69 DYSLIPIDEMIA ASSOCIATED WITH TYPE 2 DIABETES MELLITUS: ICD-10-CM

## 2025-03-18 DIAGNOSIS — N18.31 TYPE 2 DIABETES MELLITUS WITH STAGE 3A CHRONIC KIDNEY DISEASE, WITH LONG-TERM CURRENT USE OF INSULIN: ICD-10-CM

## 2025-03-18 DIAGNOSIS — Z00.00 ENCOUNTER FOR MEDICARE ANNUAL WELLNESS EXAM: ICD-10-CM

## 2025-03-18 DIAGNOSIS — E78.5 DYSLIPIDEMIA ASSOCIATED WITH TYPE 2 DIABETES MELLITUS: ICD-10-CM

## 2025-03-18 NOTE — PROGRESS NOTES
The patient location is: [place]  The chief complaint leading to consultation is: awv    Visit type: audiovisual    Face to Face time with patient: 25  60 minutes of total time spent on the encounter, which includes face to face time and non-face to face time preparing to see the patient (eg, review of tests), Obtaining and/or reviewing separately obtained history, Documenting clinical information in the electronic or other health record, Independently interpreting results (not separately reported) and communicating results to the patient/family/caregiver, or Care coordination (not separately reported).         Each patient to whom he or she provides medical services by telemedicine is:  (1) informed of the relationship between the physician and patient and the respective role of any other health care provider with respect to management of the patient; and (2) notified that he or she may decline to receive medical services by telemedicine and may withdraw from such care at any time.    Notes:                         JACQUIVIJAY Cantor presented for an initial Medicare AWV today. The following components were reviewed and updated:    Medical history  Family History  Social history  Allergies and Current Medications  Health Risk Assessment  Health Maintenance  Care Team    **See Completed Assessments for Annual Wellness visit with in the encounter summary    The following assessments were completed:  Depression Screening  Cognitive function Screening  Timed Get Up Test  Whisper Test      Opioid documentation:      Patient does not have a current opioid prescription.          There were no vitals filed for this visit.  There is no height or weight on file to calculate BMI.       Physical Exam  Constitutional:       Appearance: Normal appearance.   Neurological:      Mental Status: He is alert.   Psychiatric:         Attention and Perception: Attention and perception normal.         Mood and Affect: Mood and affect normal.          Speech: Speech normal.         Behavior: Behavior normal. Behavior is cooperative.         Thought Content: Thought content normal.         Cognition and Memory: Cognition normal. He exhibits impaired recent memory.         Judgment: Judgment normal.           Diagnoses and health risks identified today and associated recommendations/orders:  1. Encounter for Medicare annual wellness exam  Stable, followed by provider  - Referral to Enhanced Annual Wellness Visit (eAWV) W+1    2. Dependence on other enabling machines and devices  Stable, followed by provider, uses a walker for ambulation     3. Chronic atrial fibrillation  Stable, followed by provider, takes eliquis, metoprolol    4. Dyslipidemia associated with type 2 diabetes mellitus  Stable, followed by provider    5. Stage 3 chronic kidney disease, unspecified whether stage 3a or 3b CKD  Stable, followed by provider, takes lasix for BP control     6. Type 2 diabetes mellitus with stage 3a chronic kidney disease, with long-term current use of insulin  Stable, followed by provider, uses insulin      Provided [facility] with a 5-10 year written screening schedule and personal prevention plan. Recommendations were developed using the USPSTF age appropriate recommendations. Education, counseling, and referrals were provided as needed.  After Visit Summary printed and given to patient which includes a list of additional screenings\tests needed.    Follow up in about 1 year (around 3/18/2026) for your next annual wellness visit.      Benji Lantigua NP  I offered to discuss advanced care planning, including how to pick a person who would make decisions for you if you were unable to make them for yourself, called a health care power of , and what kind of decisions you might make such as use of life sustaining treatments such as ventilators and tube feeding when faced with a life limiting illness recorded on a living will that they will need to know. (How you  want to be cared for as you near the end of your natural life)     X Patient is interested in learning more about how to make advanced directives.  I provided them paperwork and offered to discuss this with them.

## 2025-03-18 NOTE — PATIENT INSTRUCTIONS
Counseling and Referral of Other Preventative  (Italic type indicates deductible and co-insurance are waived)    Patient Name: BI [place]  Today's Date: 3/18/2025    Health Maintenance       Date Due Completion Date    RSV Vaccine (Age 60+ and Pregnant patients) (1 - 1-dose 75+ series) Never done ---    Shingles Vaccine (3 of 3) 11/01/2023 9/6/2023    Foot Exam 01/03/2024 1/3/2023    Override on 11/13/2020: Done    Override on 8/7/2019: Done    Override on 12/12/2018: Done    COVID-19 Vaccine (4 - 2024-25 season) 09/01/2024 9/24/2021    Colonoscopy 09/20/2024 9/20/2019    Diabetes Urine Screening 04/25/2025 4/25/2024    Hemoglobin A1c 07/02/2025 1/2/2025    Lipid Panel 01/02/2026 1/2/2025    TETANUS VACCINE 09/30/2029 9/30/2019        No orders of the defined types were placed in this encounter.      The following information is provided to all patients.  This information is to help you find resources for any of the problems found today that may be affecting your health:                  Living healthy guide: www.[facility].[place].gov      Understanding Diabetes: www.diabetes.org      Eating healthy: www.cdc.gov/healthyweight      CDC home safety checklist: www.cdc.gov/steadi/patient.html      Agency on Aging: www.goea.[place].[facility]      Alcoholics anonymous (AA): www.aa.org      Physical Activity: www.ambrocio.nih.gov/rq4zuqv      Tobacco use: www.quitwithusla.org

## 2025-03-20 ENCOUNTER — OFFICE VISIT (OUTPATIENT)
Dept: ORTHOPEDICS | Facility: CLINIC | Age: 80
End: 2025-03-20
Payer: MEDICARE

## 2025-03-20 DIAGNOSIS — M65.332 TRIGGER MIDDLE FINGER OF LEFT HAND: Primary | ICD-10-CM

## 2025-03-20 PROCEDURE — 99999 PR PBB SHADOW E&M-EST. PATIENT-LVL III: CPT | Mod: PBBFAC,,, | Performed by: ORTHOPAEDIC SURGERY

## 2025-03-20 PROCEDURE — 1125F AMNT PAIN NOTED PAIN PRSNT: CPT | Mod: CPTII,S$GLB,, | Performed by: ORTHOPAEDIC SURGERY

## 2025-03-20 PROCEDURE — 1159F MED LIST DOCD IN RCRD: CPT | Mod: CPTII,S$GLB,, | Performed by: ORTHOPAEDIC SURGERY

## 2025-03-20 PROCEDURE — 99024 POSTOP FOLLOW-UP VISIT: CPT | Mod: S$GLB,,, | Performed by: ORTHOPAEDIC SURGERY

## 2025-03-20 RX ORDER — DICLOFENAC SODIUM 10 MG/G
2 GEL TOPICAL 3 TIMES DAILY
Qty: 100 G | Refills: 1 | Status: SHIPPED | OUTPATIENT
Start: 2025-03-20

## 2025-03-20 NOTE — PROGRESS NOTES
"Subjective:      Patient ID: BI Cantor Jr. is a 79 y.o. male.  Chief Complaint: No chief complaint on file.      HPI  BI Cantor Jr. is a  79 y.o. male presenting today for post op visit.  He is s/p trigger release left middle and ring fingers he has about a month postop is doing well little bit of mild swelling and thickening of the scar.     Review of patient's allergies indicates:   Allergen Reactions    Niacin Itching and Other (See Comments)     Other reaction(s): facial flushing and causes hepatitis     Terbinafine Other (See Comments)     Other reaction(s): Hepatitis    "gave me rash"         Current Medications[1]    Past Medical History:   Diagnosis Date    *Atrial fibrillation     Eliquis    Anticoagulant long-term use     apaxiban    Basal cell carcinoma 12/2015    left eye brow    Basal cell carcinoma 10/28/2019    right superior preauricular    BCC (basal cell carcinoma) 12/2015    left shoulder    Cataract     Chronic kidney disease     Diabetes mellitus with renal manifestations, uncontrolled     Dyslipidemia associated with type 2 diabetes mellitus     Fever blister     Hearing loss     HTN (hypertension)     Keloid cicatrix     Liver disease     Melanoma 12/07/2015    right cheek-in situ    Obesity     PN (peripheral neuropathy)     Primary osteoarthritis of right knee 1/25/2017    Screening for colon cancer 3/3/2016    Sleep apnea     wears CPAP at night    Thyroid disease     Type II or unspecified type diabetes mellitus with other specified manifestations, uncontrolled     Ulcer of left lower extremity, limited to breakdown of skin 9/22/2017       Past Surgical History:   Procedure Laterality Date    APPENDECTOMY      CARDIOVERSION      CATARACT EXTRACTION      CATARACT EXTRACTION Right 05/14/2018    Dr. Greer    COLONOSCOPY N/A 3/3/2016    Procedure: COLONOSCOPY;  Surgeon: Bob Poe MD;  Location: [facility] ([address] [place] [address]);  Service: Endoscopy;  Laterality: N/A;  Holding Eliquis for 3 " days prior to colonoscopy. EC    COLONOSCOPY N/A 9/20/2019    Procedure: COLONOSCOPY;  Surgeon: Akbar Curtis MD;  Location: [facility] ENDO (4TH FLR);  Service: Endoscopy;  Laterality: N/A;  Per Dr. José Granda, patient can HOLD Eliquis X2 days prior to procedure-see telphone encounter 8/22/19-[facility]    ECTROPION REPAIR Right 8/14/2019    Procedure: REPAIR, ECTROPION, EYELID /       #22528- Skin Flaps(Deep Tissue) (OD);  Surgeon: Edita Sabillon MD;  Location: [facility] OR 2ND FLR;  Service: Ophthalmology;  Laterality: Right;    epidural steroid injection      INTRAOCULAR PROSTHESES INSERTION Left 6/25/2018    Procedure: INSERTION, INTRAOCULAR LENS PROSTHESIS;  Surgeon: Lawrence Greer MD;  Location: [facility];  Service: Ophthalmology;  Laterality: Left;    JOINT REPLACEMENT      Knee    Meniere's disease surgery      PHACOEMULSIFICATION OF CATARACT Left 6/25/2018    Procedure: PHACOEMULSIFICATION, CATARACT;  Surgeon: Lawrence Greer MD;  Location: [facility];  Service: Ophthalmology;  Laterality: Left;    PREPARATION OF WOUND PRIOR TO APPLICATION OF SKIN GRAFT Right 8/14/2019    Procedure: PREPARATION, WOUND, PRIOR TO SKIN GRAFT APPLICATION;  Surgeon: Edita Sabillon MD;  [facility];  Service: Ophthalmology;  Laterality: Right;    REVISION OF KNEE ARTHROPLASTY Right 7/10/2018    Procedure: REVISION-ARTHROPLASTY-KNEE-SAMIR;  Surgeon: Miguel Stuart MD;  [facility];  Service: Orthopedics;  Laterality: Right;  [place]    SKIN FULL THICKNESS GRAFT Right 8/14/2019    Procedure: APPLICATION, GRAFT, SKIN, FULL-THICKNESS;  Surgeon: Edita Sabillon MD;  [facility];  Service: Ophthalmology;  Laterality: Right;    TARSORRHAPHY Right 8/14/2019    Procedure: BLEPHARORRHAPHY;  Surgeon: Edita Sabillon MD;  [facility];  Service: Ophthalmology;  Laterality: Right;    TONSILLECTOMY      TOTAL KNEE ARTHROPLASTY Right 01/25/2017    TKR    TOTAL KNEE ARTHROPLASTY Left     TKR, 1990's     "TRIGGER FINGER RELEASE Right 8/13/2024    Procedure: RELEASE, TRIGGER FINGER;  Surgeon: Woo Elmore Jr., MD;  Location: [facility] OR;  Service: Orthopedics;  Laterality: Right;    TRIGGER FINGER RELEASE Left 2/18/2025    Procedure: RELEASE, TRIGGER FINGER;  Surgeon: Woo Elmore Jr., MD;  Location: [facility] OR;  Service: Orthopedics;  Laterality: Left;       OBJECTIVE:   PHYSICAL EXAM:       Vitals:    03/20/25 0939   PainSc:   8   PainLoc: Hand     Ortho/SPM Exam  Examination left hand the incisions are well healed mild thickening in the palm range of motion fingers full    strength decreased no triggering   No evidence of infection       RADIOGRAPHS:  None  Comments: I have personally reviewed the imaging and I agree with the above radiologist's report.    ASSESSMENT/PLAN:     IMPRESSION:  Status post trigger release left hand    PLAN:  I have given him a squeeze ball to work on strengthening   Recommended Voltaren gel topical for the palm   Increase activities as tolerated    FOLLOW UP:  4-6 weeks    Disclaimer: This note has been generated using voice-recognition software. There may be typographical errors that have been missed during proof-reading.          [1]   Current Outpatient Medications   Medication Sig Dispense Refill    ammonium lactate 12 % Crea Apply 1 application  topically 2 (two) times daily. 140 g 11    atorvastatin (LIPITOR) 40 MG tablet TAKE ONE TABLET BY MOUTH EVERY DAY 90 tablet 3    BD INSULIN PEN NEEDLE UF ORIG 29 x 1/2 " Ndle   12    blood sugar diagnostic Strp 1 strip by Misc.(Non-Drug; Combo Route) route 4 (four) times daily. To check blood glucose 400 strip 11    blood-glucose meter Misc To check BG as discussed 1 each 0    blood-glucose sensor (DEXCOM G7 SENSOR) Mckenna Apply one sensor to the skin every 10 days 3 each 11    ciclopirox (LOPROX) 0.77 % Crea Apply topically 2 (two) times daily. 90 g 2    ciclopirox (PENLAC) 8 % Soln Apply topically nightly. 6.6 mL 11    ELIQUIS 5 mg " "Tab TAKE ONE TABLET BY MOUTH TWICE DAILY 180 tablet 1    fenofibrate (TRICOR) 145 MG tablet TAKE ONE TABLET BY MOUTH ONCE DAILY 90 tablet 1    furosemide (LASIX) 40 MG tablet TAKE ONE TABLET BY MOUTH EVERY DAY AS NEEDED 90 tablet 3    gabapentin (NEURONTIN) 300 MG capsule TAKE TWO CAPSULES BY MOUTH TWICE DAILY 360 capsule 1    glucagon (GVOKE HYPOPEN 2-PACK) 1 mg/0.2 mL AtIn Inject 1 mg into the skin as needed (severe hypoglycemia). 2 each 3    HYDROcodone-acetaminophen (NORCO) 5-325 mg per tablet Take 1 tablet by mouth every 4 (four) hours as needed for Pain. 12 tablet 0    insulin glargine U-100, Lantus, (LANTUS SOLOSTAR U-100 INSULIN) 100 unit/mL (3 mL) InPn pen Inject 36 Units into the skin every evening. 10.8 mL 11    insulin lispro (HUMALOG KWIKPEN INSULIN) 100 unit/mL pen Inject 10 units 3 times daily with meals. Plus correction scale. Max daily dose 50 units/day 45.9 mL 3    lancets 33 gauge Misc 1 lancet by Misc.(Non-Drug; Combo Route) route 4 (four) times daily. Pt uses True Result Meter, bg monitoring 4 times a day. 400 each 4    metoprolol succinate (TOPROL-XL) 100 MG 24 hr tablet TAKE ONE TABLET BY MOUTH EVERY DAY 90 tablet 0    nystatin-triamcinolone (MYCOLOG II) cream apply TWICE DAILY 60 g 1    pen needle, diabetic (COMFORT EZ PEN NEEDLES) 32 gauge x 5/32" Ndle USE FOUR TIMES DAILY 400 each 3    tamsulosin (FLOMAX) 0.4 mg Cap Take 1 capsule (0.4 mg total) by mouth once daily. 90 capsule 0    temazepam (RESTORIL) 15 mg Cap TAKE ONE CAPSULE BY MOUTH NIGHTLY AS NEEDED 30 capsule 5    tirzepatide 10 mg/0.5 mL PnIj Inject 10 mg into the skin every 7 days. 4 Pen 6    triamcinolone acetonide 0.025 % Lotn APPLY TO AFFECTED AREA(S) FACE TWICE DAILY AS NEEDED red and/or scaly 60 mL 1    diclofenac sodium (VOLTAREN) 1 % Gel Apply 2 g topically 3 (three) times daily. 100 g 1    loratadine (CLARITIN) 10 mg tablet Take 1 tablet (10 mg total) by mouth once daily. 90 tablet 3     No current facility-administered " medications for this visit.

## 2025-03-29 NOTE — TELEPHONE ENCOUNTER
No care due was identified.  [facility] Embedded Care Due Messages. Reference number: [id number].   3/29/2025 9:38:37 AM CDT

## 2025-03-30 NOTE — TELEPHONE ENCOUNTER
Refill Routing Note   Medication(s) are not appropriate for processing by [facility] for the following reason(s):        Drug-disease interaction    [facility] action(s):  Defer      Medication Therapy Plan: High Drug-Disease: fenofibrate and Chronic kidney disease, stage III (moderate); Stage 3 chronic kidney disease, unspecified whether stage 3a or 3b CKD      Appointments  past 12m or future 3m with PCP    Date Provider   Last Visit   1/2/2025 Desmond Barlow MD   Next Visit   Visit date not found Desmond Barlow MD   ED visits in past 90 days: 0        Note composed:6:56 PM 03/30/2025

## 2025-03-31 ENCOUNTER — PATIENT MESSAGE (OUTPATIENT)
Dept: INTERNAL MEDICINE | Facility: CLINIC | Age: 80
End: 2025-03-31
Payer: MEDICARE

## 2025-03-31 RX ORDER — FENOFIBRATE 145 MG/1
145 TABLET, FILM COATED ORAL
Qty: 90 TABLET | Refills: 3 | Status: SHIPPED | OUTPATIENT
Start: 2025-03-31

## 2025-03-31 RX ORDER — GABAPENTIN 300 MG/1
600 CAPSULE ORAL 2 TIMES DAILY
Qty: 360 CAPSULE | Refills: 1 | Status: SHIPPED | OUTPATIENT
Start: 2025-03-31

## 2025-03-31 RX ORDER — ERGOCALCIFEROL 1.25 MG/1
50000 CAPSULE ORAL
Qty: 12 CAPSULE | Refills: 1 | Status: SHIPPED | OUTPATIENT
Start: 2025-03-31

## 2025-04-04 RX ORDER — TAMSULOSIN HYDROCHLORIDE 0.4 MG/1
0.4 CAPSULE ORAL DAILY
Qty: 90 CAPSULE | Refills: 3 | Status: SHIPPED | OUTPATIENT
Start: 2025-04-04

## 2025-04-04 NOTE — TELEPHONE ENCOUNTER
No care due was identified.  [facility] Embedded Care Due Messages. Reference number: [id number].   4/04/2025 8:52:51 AM CDT

## 2025-04-04 NOTE — TELEPHONE ENCOUNTER
BI Cantor  is requesting a refill authorization.  Brief Assessment and Rationale for Refill:  Approve     Medication Therapy Plan:         Comments:     Note composed:10:57 AM 04/04/2025

## 2025-04-17 ENCOUNTER — OFFICE VISIT (OUTPATIENT)
Dept: ORTHOPEDICS | Facility: CLINIC | Age: 80
End: 2025-04-17
Payer: MEDICARE

## 2025-04-17 VITALS — BODY MASS INDEX: 37.31 KG/M2 | HEIGHT: 70 IN

## 2025-04-17 DIAGNOSIS — M65.332 TRIGGER MIDDLE FINGER OF LEFT HAND: Primary | ICD-10-CM

## 2025-04-17 PROCEDURE — 99999 PR PBB SHADOW E&M-EST. PATIENT-LVL III: CPT | Mod: PBBFAC,,, | Performed by: ORTHOPAEDIC SURGERY

## 2025-04-17 PROCEDURE — 1125F AMNT PAIN NOTED PAIN PRSNT: CPT | Mod: CPTII,S$GLB,, | Performed by: ORTHOPAEDIC SURGERY

## 2025-04-17 PROCEDURE — 1101F PT FALLS ASSESS-DOCD LE1/YR: CPT | Mod: CPTII,S$GLB,, | Performed by: ORTHOPAEDIC SURGERY

## 2025-04-17 PROCEDURE — 1159F MED LIST DOCD IN RCRD: CPT | Mod: CPTII,S$GLB,, | Performed by: ORTHOPAEDIC SURGERY

## 2025-04-17 PROCEDURE — 3288F FALL RISK ASSESSMENT DOCD: CPT | Mod: CPTII,S$GLB,, | Performed by: ORTHOPAEDIC SURGERY

## 2025-04-17 PROCEDURE — 99024 POSTOP FOLLOW-UP VISIT: CPT | Mod: S$GLB,,, | Performed by: ORTHOPAEDIC SURGERY

## 2025-04-17 NOTE — PROGRESS NOTES
"Subjective:      Patient ID: BI Cantor Jr. is a 79 y.o. male.  Chief Complaint: Post-op Evaluation (Left trigger finger)      HPI  BI Cantor Jr. is a  79 y.o. male presenting today for post op visit.  He is s/p trigger release left middle and ring finger currently in therapy.     Review of patient's allergies indicates:   Allergen Reactions    Niacin Itching and Other (See Comments)     Other reaction(s): facial flushing and causes hepatitis     Terbinafine Other (See Comments)     Other reaction(s): Hepatitis    "gave me rash"         Current Medications[1]    Past Medical History:   Diagnosis Date    *Atrial fibrillation     Eliquis    Anticoagulant long-term use     apaxiban    Basal cell carcinoma 12/2015    left eye brow    Basal cell carcinoma 10/28/2019    right superior preauricular    BCC (basal cell carcinoma) 12/2015    left shoulder    Cataract     Chronic kidney disease     Diabetes mellitus with renal manifestations, uncontrolled     Dyslipidemia associated with type 2 diabetes mellitus     Fever blister     Hearing loss     HTN (hypertension)     Keloid cicatrix     Liver disease     Melanoma 12/07/2015    right cheek-in situ    Obesity     PN (peripheral neuropathy)     Primary osteoarthritis of right knee 1/25/2017    Screening for colon cancer 3/3/2016    Sleep apnea     wears CPAP at night    Thyroid disease     Type II or unspecified type diabetes mellitus with other specified manifestations, uncontrolled     Ulcer of left lower extremity, limited to breakdown of skin 9/22/2017       Past Surgical History:   Procedure Laterality Date    APPENDECTOMY      CARDIOVERSION      CATARACT EXTRACTION      CATARACT EXTRACTION Right 05/14/2018    Dr. Greer    COLONOSCOPY N/A 3/3/2016    Procedure: COLONOSCOPY;  Surgeon: Bob Poe MD;  Location: [address];  Service: Endoscopy;  Laterality: N/A;  Holding Eliquis for 3 days prior to colonoscopy. EC    COLONOSCOPY N/A 9/20/2019    " Procedure: COLONOSCOPY;  Surgeon: Akbar Curtis MD;  Location: [facility] ENDO (4TH FLR);  Service: Endoscopy;  Laterality: N/A;  Per Dr. José Granda, patient can HOLD Eliquis X2 days prior to procedure-see telphone encounter 8/22/19-[facility]    ECTROPION REPAIR Right 8/14/2019    Procedure: REPAIR, ECTROPION, EYELID /       #97854- Skin Flaps(Deep Tissue) (OD);  Surgeon: Edita Sabillon MD;  Location: [facility] OR 2ND FLR;  Service: Ophthalmology;  Laterality: Right;    epidural steroid injection      INTRAOCULAR PROSTHESES INSERTION Left 6/25/2018    Procedure: INSERTION, INTRAOCULAR LENS PROSTHESIS;  Surgeon: Lawrence Greer MD;  Location: [facility];  Service: Ophthalmology;  Laterality: Left;    JOINT REPLACEMENT      Knee    Meniere's disease surgery      PHACOEMULSIFICATION OF CATARACT Left 6/25/2018    Procedure: PHACOEMULSIFICATION, CATARACT;  Surgeon: Lawrence Greer MD;  Location: [facility];  Service: Ophthalmology;  Laterality: Left;    PREPARATION OF WOUND PRIOR TO APPLICATION OF SKIN GRAFT Right 8/14/2019    Procedure: PREPARATION, WOUND, PRIOR TO SKIN GRAFT APPLICATION;  Surgeon: Edita Sabillon MD;  [facility];  Service: Ophthalmology;  Laterality: Right;    REVISION OF KNEE ARTHROPLASTY Right 7/10/2018    Procedure: REVISION-ARTHROPLASTY-KNEE-DAKOTA;  Surgeon: Miguel Stuart MD;  [facility];  Service: Orthopedics;  Laterality: Right;  [place]    SKIN FULL THICKNESS GRAFT Right 8/14/2019    Procedure: APPLICATION, GRAFT, SKIN, FULL-THICKNESS;  Surgeon: Edita Sabillon MD;  [facility];  Service: Ophthalmology;  Laterality: Right;    TARSORRHAPHY Right 8/14/2019    Procedure: BLEPHARORRHAPHY;  Surgeon: Edita Sabillon MD;  [facility];  Service: Ophthalmology;  Laterality: Right;    TONSILLECTOMY      TOTAL KNEE ARTHROPLASTY Right 01/25/2017    TKR    TOTAL KNEE ARTHROPLASTY Left     TKR, 1990's    TRIGGER FINGER RELEASE Right 8/13/2024    Procedure: RELEASE,  "TRIGGER FINGER;  Surgeon: Woo Elmore Jr., MD;  Location: [facility] OR;  Service: Orthopedics;  Laterality: Right;    TRIGGER FINGER RELEASE Left 2/18/2025    Procedure: RELEASE, TRIGGER FINGER;  Surgeon: Woo Elmore Jr., MD;  Location: [facility] OR;  Service: Orthopedics;  Laterality: Left;       OBJECTIVE:   PHYSICAL EXAM:  Height: 5' 10" (177.8 cm)    Vitals:    04/17/25 0925   Height: 5' 10" (1.778 m)   PainSc: 10-Worst pain ever   PainLoc: Hand     Ortho/SPM Exam  Examination left hand incisions well healed slight flexion contracture of the middle finger good flexion no triggering    RADIOGRAPHS:  None  Comments: I have personally reviewed the imaging and I agree with the above radiologist's report.    ASSESSMENT/PLAN:     IMPRESSION:  Status post trigger release left middle and ring finger    PLAN:  Continue therapy to work on stretching and strengthening   Squeeze ball for home use  Advance to full activity    FOLLOW UP:  4-6 weeks    Disclaimer: This note has been generated using voice-recognition software. There may be typographical errors that have been missed during proof-reading.          [1]   Current Outpatient Medications   Medication Sig Dispense Refill    ammonium lactate 12 % Crea Apply 1 application  topically 2 (two) times daily. 140 g 11    atorvastatin (LIPITOR) 40 MG tablet TAKE ONE TABLET BY MOUTH EVERY DAY 90 tablet 3    BD INSULIN PEN NEEDLE UF ORIG 29 x 1/2 " Ndle   12    blood sugar diagnostic Strp 1 strip by Misc.(Non-Drug; Combo Route) route 4 (four) times daily. To check blood glucose 400 strip 11    blood-glucose meter Misc To check BG as discussed 1 each 0    blood-glucose sensor (DEXCOM G7 SENSOR) Mckenna Apply one sensor to the skin every 10 days 3 each 11    ciclopirox (LOPROX) 0.77 % Crea Apply topically 2 (two) times daily. 90 g 2    ciclopirox (PENLAC) 8 % Soln Apply topically nightly. 6.6 mL 11    diclofenac sodium (VOLTAREN) 1 % Gel Apply 2 g topically 3 (three) times daily. " "100 g 1    ELIQUIS 5 mg Tab TAKE ONE TABLET BY MOUTH TWICE DAILY 180 tablet 1    ergocalciferol (VITAMIN D2) 50,000 unit Cap Take 1 capsule (50,000 Units total) by mouth every 7 days. 12 capsule 1    fenofibrate (TRICOR) 145 MG tablet TAKE ONE TABLET BY MOUTH ONCE DAILY 90 tablet 3    furosemide (LASIX) 40 MG tablet TAKE ONE TABLET BY MOUTH EVERY DAY AS NEEDED 90 tablet 3    gabapentin (NEURONTIN) 300 MG capsule TAKE TWO CAPSULES BY MOUTH TWICE DAILY 360 capsule 1    glucagon (GVOKE HYPOPEN 2-PACK) 1 mg/0.2 mL AtIn Inject 1 mg into the skin as needed (severe hypoglycemia). 2 each 3    HYDROcodone-acetaminophen (NORCO) 5-325 mg per tablet Take 1 tablet by mouth every 4 (four) hours as needed for Pain. 12 tablet 0    insulin glargine U-100, Lantus, (LANTUS SOLOSTAR U-100 INSULIN) 100 unit/mL (3 mL) InPn pen Inject 36 Units into the skin every evening. 10.8 mL 11    insulin lispro (HUMALOG KWIKPEN INSULIN) 100 unit/mL pen Inject 10 units 3 times daily with meals. Plus correction scale. Max daily dose 50 units/day 45.9 mL 3    lancets 33 gauge Misc 1 lancet by Misc.(Non-Drug; Combo Route) route 4 (four) times daily. Pt uses True Result Meter, bg monitoring 4 times a day. 400 each 4    metoprolol succinate (TOPROL-XL) 100 MG 24 hr tablet TAKE ONE TABLET BY MOUTH EVERY DAY 90 tablet 0    nystatin-triamcinolone (MYCOLOG II) cream apply TWICE DAILY 60 g 1    pen needle, diabetic (COMFORT EZ PEN NEEDLES) 32 gauge x 5/32" Ndle USE FOUR TIMES DAILY 400 each 3    tamsulosin (FLOMAX) 0.4 mg Cap Take 1 capsule (0.4 mg total) by mouth once daily. 90 capsule 3    temazepam (RESTORIL) 15 mg Cap TAKE ONE CAPSULE BY MOUTH NIGHTLY AS NEEDED 30 capsule 5    tirzepatide 10 mg/0.5 mL PnIj Inject 10 mg into the skin every 7 days. 4 Pen 6    triamcinolone acetonide 0.025 % Lotn APPLY TO AFFECTED AREA(S) FACE TWICE DAILY AS NEEDED red and/or scaly 60 mL 1    loratadine (CLARITIN) 10 mg tablet Take 1 tablet (10 mg total) by mouth once daily. " 90 tablet 3     No current facility-administered medications for this visit.

## 2025-05-01 DIAGNOSIS — N18.31 TYPE 2 DIABETES MELLITUS WITH STAGE 3A CHRONIC KIDNEY DISEASE, WITH LONG-TERM CURRENT USE OF INSULIN: ICD-10-CM

## 2025-05-01 DIAGNOSIS — E11.22 TYPE 2 DIABETES MELLITUS WITH STAGE 3A CHRONIC KIDNEY DISEASE, WITH LONG-TERM CURRENT USE OF INSULIN: ICD-10-CM

## 2025-05-01 DIAGNOSIS — Z79.4 TYPE 2 DIABETES MELLITUS WITH STAGE 3A CHRONIC KIDNEY DISEASE, WITH LONG-TERM CURRENT USE OF INSULIN: ICD-10-CM

## 2025-05-01 RX ORDER — BLOOD-GLUCOSE SENSOR
EACH MISCELLANEOUS
Qty: 3 EACH | Refills: 11 | Status: SHIPPED | OUTPATIENT
Start: 2025-05-01

## 2025-05-05 DIAGNOSIS — G47.00 INSOMNIA, UNSPECIFIED TYPE: ICD-10-CM

## 2025-05-05 RX ORDER — TEMAZEPAM 15 MG/1
CAPSULE ORAL
Qty: 30 CAPSULE | Refills: 5 | Status: SHIPPED | OUTPATIENT
Start: 2025-05-05

## 2025-05-05 NOTE — TELEPHONE ENCOUNTER
Refill Routing Note   Medication(s) are not appropriate for processing by [facility] for the following reason(s):        Outside of protocol    [facility] action(s):  Route               Appointments  past 12m or future 3m with PCP    Date Provider   Last Visit   1/2/2025 Desmond Barlow MD   Next Visit   Visit date not found Desmond Barlow MD   ED visits in past 90 days: 0        Note composed:2:46 PM 05/05/2025

## 2025-05-05 NOTE — TELEPHONE ENCOUNTER
No care due was identified.  [facility] Embedded Care Due Messages. Reference number: [id number].   5/05/2025 9:12:55 AM CDT

## 2025-05-19 NOTE — TELEPHONE ENCOUNTER
No care due was identified.  [facility] Embedded Care Due Messages. Reference number: [id number].   5/19/2025 8:54:37 AM CDT

## 2025-05-20 RX ORDER — METOPROLOL SUCCINATE 100 MG/1
100 TABLET, EXTENDED RELEASE ORAL DAILY
Qty: 90 TABLET | Refills: 2 | Status: SHIPPED | OUTPATIENT
Start: 2025-05-20

## 2025-05-20 NOTE — TELEPHONE ENCOUNTER
Refill Routing Note   Medication(s) are not appropriate for processing by [facility] for the following reason(s):        Required vitals abnormal    [facility] action(s):  Defer             Appointments  past 12m or future 3m with PCP    Date Provider   Last Visit   1/2/2025 Desmond Barlow MD   Next Visit   Visit date not found Desmond Barlow MD   ED visits in past 90 days: 0        Note composed:1:32 AM 05/20/2025

## 2025-05-26 RX ORDER — AMMONIUM LACTATE 12 G/100G
1 CREAM TOPICAL 2 TIMES DAILY
Qty: 140 G | Refills: 0 | Status: SHIPPED | OUTPATIENT
Start: 2025-05-26

## 2025-05-26 NOTE — TELEPHONE ENCOUNTER
Refill Routing Note   Medication(s) are not appropriate for processing by [facility] for the following reason(s):        Outside of protocol    [facility] action(s):  Route             Appointments  past 12m or future 3m with PCP    Date Provider   Last Visit   1/2/2025 Desmond Barlow MD   Next Visit   Visit date not found Desmond Barlow MD   ED visits in past 90 days: 0        Note composed:7:59 AM 05/26/2025

## 2025-06-19 RX ORDER — ERGOCALCIFEROL 1.25 MG/1
50000 CAPSULE ORAL
Qty: 12 CAPSULE | Refills: 1 | Status: SHIPPED | OUTPATIENT
Start: 2025-06-19

## 2025-06-19 NOTE — TELEPHONE ENCOUNTER
Refill Routing Note   Medication(s) are not appropriate for processing by [facility] for the following reason(s):        Outside of protocol    [facility] action(s):  Route               Appointments  past 12m or future 3m with PCP    Date Provider   Last Visit   1/2/2025 Desmond Barlow MD   Next Visit   Visit date not found Desmond Barlow MD   ED visits in past 90 days: 0        Note composed:10:34 AM 06/19/2025

## 2025-06-19 NOTE — TELEPHONE ENCOUNTER
No care due was identified.  [facility] Embedded Care Due Messages. Reference number: [id number].   6/19/2025 9:30:19 AM CDT

## 2025-06-24 RX ORDER — FENOFIBRATE 145 MG/1
145 TABLET, FILM COATED ORAL
Qty: 90 TABLET | Refills: 1 | Status: SHIPPED | OUTPATIENT
Start: 2025-06-24

## 2025-06-24 NOTE — TELEPHONE ENCOUNTER
Refill Decision Note   BI Cantor  is requesting a refill authorization.  Brief Assessment and Rationale for Refill:  Approve     Medication Therapy Plan:        Comments:     Note composed:10:55 AM 06/24/2025

## 2025-06-24 NOTE — TELEPHONE ENCOUNTER
No care due was identified.  [facility] Embedded Care Due Messages. Reference number: [id number].   6/24/2025 9:06:37 AM CDT

## 2025-07-02 ENCOUNTER — PATIENT MESSAGE (OUTPATIENT)
Dept: ENDOCRINOLOGY | Facility: CLINIC | Age: 80
End: 2025-07-02
Payer: MEDICARE

## 2025-07-02 DIAGNOSIS — N18.31 TYPE 2 DIABETES MELLITUS WITH STAGE 3A CHRONIC KIDNEY DISEASE, WITH LONG-TERM CURRENT USE OF INSULIN: ICD-10-CM

## 2025-07-02 DIAGNOSIS — Z79.4 TYPE 2 DIABETES MELLITUS WITH STAGE 3A CHRONIC KIDNEY DISEASE, WITH LONG-TERM CURRENT USE OF INSULIN: ICD-10-CM

## 2025-07-02 DIAGNOSIS — E11.22 TYPE 2 DIABETES MELLITUS WITH STAGE 3A CHRONIC KIDNEY DISEASE, WITH LONG-TERM CURRENT USE OF INSULIN: ICD-10-CM

## 2025-07-07 DIAGNOSIS — M25.511 ACUTE PAIN OF BOTH SHOULDERS: Primary | ICD-10-CM

## 2025-07-07 DIAGNOSIS — M25.512 ACUTE PAIN OF BOTH SHOULDERS: Primary | ICD-10-CM

## 2025-07-08 ENCOUNTER — OFFICE VISIT (OUTPATIENT)
Dept: ORTHOPEDICS | Facility: CLINIC | Age: 80
End: 2025-07-08
Payer: MEDICARE

## 2025-07-08 ENCOUNTER — HOSPITAL ENCOUNTER (OUTPATIENT)
Facility: HOSPITAL | Age: 80
Discharge: HOME | End: 2025-07-08
Attending: ORTHOPAEDIC SURGERY
Payer: MEDICARE

## 2025-07-08 VITALS — HEIGHT: 70 IN | WEIGHT: 245.38 LBS | BODY MASS INDEX: 35.13 KG/M2

## 2025-07-08 DIAGNOSIS — M75.42 SUBACROMIAL IMPINGEMENT OF LEFT SHOULDER: Primary | ICD-10-CM

## 2025-07-08 DIAGNOSIS — M25.511 ACUTE PAIN OF BOTH SHOULDERS: ICD-10-CM

## 2025-07-08 DIAGNOSIS — M25.512 ACUTE PAIN OF BOTH SHOULDERS: ICD-10-CM

## 2025-07-08 PROCEDURE — 20610 DRAIN/INJ JOINT/BURSA W/O US: CPT | Mod: LT,S$GLB,, | Performed by: ORTHOPAEDIC SURGERY

## 2025-07-08 PROCEDURE — 99204 OFFICE O/P NEW MOD 45 MIN: CPT | Mod: 25,S$GLB,, | Performed by: ORTHOPAEDIC SURGERY

## 2025-07-08 PROCEDURE — 99999 PR PBB SHADOW E&M-EST. PATIENT-LVL IV: CPT | Mod: PBBFAC,,, | Performed by: ORTHOPAEDIC SURGERY

## 2025-07-08 PROCEDURE — 73030 X-RAY EXAM OF SHOULDER: CPT | Mod: 26,50,, | Performed by: INTERNAL MEDICINE

## 2025-07-08 PROCEDURE — 1101F PT FALLS ASSESS-DOCD LE1/YR: CPT | Mod: CPTII,S$GLB,, | Performed by: ORTHOPAEDIC SURGERY

## 2025-07-08 PROCEDURE — 1125F AMNT PAIN NOTED PAIN PRSNT: CPT | Mod: CPTII,S$GLB,, | Performed by: ORTHOPAEDIC SURGERY

## 2025-07-08 PROCEDURE — 1159F MED LIST DOCD IN RCRD: CPT | Mod: CPTII,S$GLB,, | Performed by: ORTHOPAEDIC SURGERY

## 2025-07-08 PROCEDURE — 1160F RVW MEDS BY RX/DR IN RCRD: CPT | Mod: CPTII,S$GLB,, | Performed by: ORTHOPAEDIC SURGERY

## 2025-07-08 PROCEDURE — 3288F FALL RISK ASSESSMENT DOCD: CPT | Mod: CPTII,S$GLB,, | Performed by: ORTHOPAEDIC SURGERY

## 2025-07-08 PROCEDURE — 73030 X-RAY EXAM OF SHOULDER: CPT | Mod: TC,50,PN

## 2025-07-08 RX ADMIN — TRIAMCINOLONE ACETONIDE 40 MG: 40 INJECTION, SUSPENSION INTRA-ARTICULAR; INTRAMUSCULAR at 01:07

## 2025-07-08 RX ADMIN — LIDOCAINE HYDROCHLORIDE 4 ML: 10 INJECTION, SOLUTION INFILTRATION; PERINEURAL at 01:07

## 2025-07-08 NOTE — PROGRESS NOTES
"Patient ID:   [facility] PAT Cantor Jr. is a 79 y.o. male.    Chief Complaint:   Bilateral shoulder pain    HPI:   The patient is an 79-year-old male with recent onset of bilateral shoulder pain.  The pain is worse on the left side.  He reports pain when he attempts to raise his arm.  Pain level 9/10.  Aggravating factors also include reaching behind his back.  He is having night pain.  He denies any relieving factors.    Medications:  Current Medications[1]    Allergies:  Review of patient's allergies indicates:   Allergen Reactions    Niacin Itching and Other (See Comments)     Other reaction(s): facial flushing and causes hepatitis     Terbinafine Other (See Comments)     Other reaction(s): Hepatitis    "gave me rash"       Past Medical History:  Past Medical History:   Diagnosis Date    *Atrial fibrillation     Eliquis    Anticoagulant long-term use     apaxiban    Basal cell carcinoma 12/2015    left eye brow    Basal cell carcinoma 10/28/2019    right superior preauricular    BCC (basal cell carcinoma) 12/2015    left shoulder    Cataract     Chronic kidney disease     Diabetes mellitus with renal manifestations, uncontrolled     Dyslipidemia associated with type 2 diabetes mellitus     Fever blister     Hearing loss     HTN (hypertension)     Keloid cicatrix     Liver disease     Melanoma 12/07/2015    right cheek-in situ    Obesity     PN (peripheral neuropathy)     Primary osteoarthritis of right knee 1/25/2017    Screening for colon cancer 3/3/2016    Sleep apnea     wears CPAP at night    Thyroid disease     Type II or unspecified type diabetes mellitus with other specified manifestations, uncontrolled     Ulcer of left lower extremity, limited to breakdown of skin 9/22/2017        Past Surgical History:  Past Surgical History:   Procedure Laterality Date    APPENDECTOMY      CARDIOVERSION      CATARACT EXTRACTION      CATARACT EXTRACTION Right 05/14/2018    Dr. Greer    COLONOSCOPY N/A 3/3/2016    Procedure: " COLONOSCOPY;  Surgeon: Bob Poe MD;  Location: [facility] ENDO (4TH FLR);  Service: Endoscopy;  Laterality: N/A;  Holding Eliquis for 3 days prior to colonoscopy. EC    COLONOSCOPY N/A 9/20/2019    Procedure: COLONOSCOPY;  Surgeon: Akbar Curtis MD;  Location: [facility] ENDO (4TH FLR);  Service: Endoscopy;  Laterality: N/A;  Per Dr. José Granda, patient can HOLD Eliquis X2 days prior to procedure-see telphone encounter 8/22/19-[facility]    ECTROPION REPAIR Right 8/14/2019    Procedure: REPAIR, ECTROPION, EYELID /       #06446- Skin Flaps(Deep Tissue) (OD);  Surgeon: Edita Sabillon MD;  Location: [facility] OR [address] [place] [address];  Service: Ophthalmology;  Laterality: Right;    epidural steroid injection      INTRAOCULAR PROSTHESES INSERTION Left 6/25/2018    Procedure: INSERTION, INTRAOCULAR LENS PROSTHESIS;  Surgeon: Lawrence Greer MD;  Location: [facility];  Service: Ophthalmology;  Laterality: Left;    JOINT REPLACEMENT      Knee    Meniere's disease surgery      PHACOEMULSIFICATION OF CATARACT Left 6/25/2018    Procedure: PHACOEMULSIFICATION, CATARACT;  Surgeon: Lawrence Greer MD;  Location: [facility];  Service: Ophthalmology;  Laterality: Left;    PREPARATION OF WOUND PRIOR TO APPLICATION OF SKIN GRAFT Right 8/14/2019    Procedure: PREPARATION, WOUND, PRIOR TO SKIN GRAFT APPLICATION;  Surgeon: Edita Sabillon MD;  Location: [facility] OR [address] [place] [address];  Service: Ophthalmology;  Laterality: Right;    REVISION OF KNEE ARTHROPLASTY Right 7/10/2018    Procedure: REVISION-ARTHROPLASTY-KNEE-DAKOTA;  Surgeon: Miguel Stuart MD;  Location: [address];  Service: Orthopedics;  Laterality: Right;  [place]    SKIN FULL THICKNESS GRAFT Right 8/14/2019    Procedure: APPLICATION, GRAFT, SKIN, FULL-THICKNESS;  Surgeon: Edita Sabillon MD;  Location: [facility] OR [address] [place] [address];  Service: Ophthalmology;  Laterality: Right;    TARSORRHAPHY Right 8/14/2019    Procedure: BLEPHARORRHAPHY;  Surgeon: Edita Sabillon MD;  Location: [facility] OR [address] [place] [address];  Service: Ophthalmology;   Laterality: Right;    TONSILLECTOMY      TOTAL KNEE ARTHROPLASTY Right 2017    TKR    TOTAL KNEE ARTHROPLASTY Left     TKR,     TRIGGER FINGER RELEASE Right 2024    Procedure: RELEASE, TRIGGER FINGER;  Surgeon: Woo Elmore Jr., MD;  Location: [facility];  Service: Orthopedics;  Laterality: Right;    TRIGGER FINGER RELEASE Left 2025    Procedure: RELEASE, TRIGGER FINGER;  Surgeon: Woo Elmore Jr., MD;  Location: [facility] OR;  Service: Orthopedics;  Laterality: Left;       Social History:  Social History     Occupational History    Occupation: retired     Employer: RETIRED   Tobacco Use    Smoking status: Former     Current packs/day: 0.00     Types: Cigarettes     Quit date: 7/10/1985     Years since quittin.0    Smokeless tobacco: Never   Substance and Sexual Activity    Alcohol use: No     Alcohol/week: 0.0 standard drinks of alcohol     Comment: quit - had excess in the past    Drug use: No    Sexual activity: Not Currently       Family History:  Family History   Problem Relation Name Age of Onset    Diabetes Mother      Stroke Mother      Diabetes Father      Heart disease Father          over 45 years of age    Hypertension Father      Cancer Sister          brain    Eczema Sister      Cancer Sister          leukemia, stomach cancer    No Known Problems Sister      Cancer Brother          brain    ALS Brother          stomach cancer    Cancer Brother      No Known Problems Brother      No Known Problems Maternal Aunt      No Known Problems Maternal Uncle      No Known Problems Paternal Aunt      No Known Problems Paternal Uncle      No Known Problems Maternal Grandmother      No Known Problems Maternal Grandfather      No Known Problems Paternal Grandmother      No Known Problems Paternal Grandfather      Amblyopia Neg Hx      Blindness Neg Hx      Cataracts Neg Hx      Glaucoma Neg Hx      Macular degeneration Neg Hx      Retinal detachment Neg Hx      Strabismus Neg Hx       "Thyroid disease Neg Hx      Melanoma Neg Hx      Psoriasis Neg Hx      Lupus Neg Hx      Acne Neg Hx          ROS:  Review of Systems   Musculoskeletal:  Positive for joint pain, muscle weakness and myalgias.   All other systems reviewed and are negative.      Vitals:  Ht 5' 10" (1.778 m)   Wt 111.3 kg (245 lb 6 oz)   BMI 35.21 kg/m²     Physical Examination:  Comprehensive Orthopaedic Musculoskeletal Exam    General      Constitutional: appears stated age, moderately obese, well-developed and well-nourished    Scleral icterus: no    Labored breathing: no    Psychiatric: normal mood and affect and no acute distress    Neurological: alert and oriented x3    Skin: intact    Lymphadenopathy: none     Ortho Exam   Left shoulder exam:   No obvious deformity.  Protracted posture.  Tenderness over the biceps.  Mild tenderness over the AC joint.  Range of motion is limited actively.  Passively I can elevate him to 170.  He has weakness with Georgiana's and external rotation.  Negative belly press positive Neer and Virgen    Imaging:  I have ordered and independently reviewed the following imaging studies performed at [facility] today    X-Ray Shoulder 2 or more views Bilat  Narrative: EXAMINATION:  XR SHOULDER COMPLETE 2 OR MORE VIEWS BILATERAL    CLINICAL HISTORY:  Pain in right shoulder    TECHNIQUE:  Six views obtained    COMPARISON:  August 2011    FINDINGS:  left: There is no dislocation or evidence of fracture.  Mild degenerative changes noted at the acromioclavicular joint and the glenohumeral joint.    Right: There is no dislocation or evidence of fracture.  There is mild degenerative change of the acromioclavicular joint and the glenohumeral joint.    Vascular calcification along the wall of the aorta.  Impression: Degenerative changes.    Electronically signed by: Paloma Vergara MD  Date:    07/09/2025  Time:    11:10    Assessment:  1. Subacromial impingement of left shoulder      Plan:  The patient has impingement " "findings on exam and likely has a rotator cuff tear.  I have offered to provide him with a cortisone injection.  He wishes to proceed today.     No follow-ups on file.              [1]   Current Outpatient Medications:     ammonium lactate 12 % Crea, Apply 1 application topically 2 (two) times daily., Disp: 140 g, Rfl: 0    atorvastatin (LIPITOR) 40 MG tablet, TAKE ONE TABLET BY MOUTH EVERY DAY, Disp: 90 tablet, Rfl: 3    BD INSULIN PEN NEEDLE UF ORIG 29 x 1/2 " Ndle, , Disp: , Rfl: 12    blood sugar diagnostic Strp, 1 strip by Misc.(Non-Drug; Combo Route) route 4 (four) times daily. To check blood glucose, Disp: 400 strip, Rfl: 11    blood-glucose meter Misc, To check BG as discussed, Disp: 1 each, Rfl: 0    blood-glucose sensor (DEXCOM G7 SENSOR) Mck[place], Apply one sensor to the skin every 10 days, Disp: 3 each, Rfl: 11    ciclopirox (LOPROX) 0.77 % Crea, Apply topically 2 (two) times daily., Disp: 90 g, Rfl: 2    ciclopirox (PENLAC) 8 % Soln, Apply topically nightly., Disp: 6.6 mL, Rfl: 11    diclofenac sodium (VOLTAREN) 1 % Gel, Apply 2 g topically 3 (three) times daily., Disp: 100 g, Rfl: 1    ELIQUIS 5 mg Tab, TAKE ONE TABLET BY MOUTH TWICE DAILY, Disp: 180 tablet, Rfl: 1    ergocalciferol (ERGOCALCIFEROL) 50,000 unit Cap, TAKE ONE CAPSULE BY MOUTH EVERY WEEK, Disp: 12 capsule, Rfl: 1    fenofibrate (TRICOR) 145 MG tablet, TAKE ONE TABLET BY MOUTH ONCE DAILY, Disp: 90 tablet, Rfl: 1    furosemide (LASIX) 40 MG tablet, TAKE ONE TABLET BY MOUTH EVERY DAY AS NEEDED, Disp: 90 tablet, Rfl: 3    gabapentin (NEURONTIN) 300 MG capsule, TAKE TWO CAPSULES BY MOUTH TWICE DAILY, Disp: 360 capsule, Rfl: 1    glucagon (GVOKE HYPOPEN 2-PACK) 1 mg/0.2 mL AtIn, Inject 1 mg into the skin as needed (severe hypoglycemia)., Disp: 2 each, Rfl: 3    HYDROcodone-acetaminophen (NORCO) 5-325 mg per tablet, Take 1 tablet by mouth every 4 (four) hours as needed for Pain., Disp: 12 tablet, Rfl: 0    insulin lispro (HUMALOG KWIKPEN INSULIN) " "100 unit/mL pen, Inject 10 units 3 times daily with meals. Plus correction scale. Max daily dose 50 units/day, Disp: 45.9 mL, Rfl: 3    lancets 33 gauge Misc, 1 lancet by Misc.(Non-Drug; Combo Route) route 4 (four) times daily. Pt uses True Result Meter, bg monitoring 4 times a day., Disp: 400 each, Rfl: 4    metoprolol succinate (TOPROL-XL) 100 MG 24 hr tablet, Take 1 tablet (100 mg total) by mouth once daily., Disp: 90 tablet, Rfl: 2    nystatin-triamcinolone (MYCOLOG II) cream, apply TWICE DAILY, Disp: 60 g, Rfl: 1    pen needle, diabetic (COMFORT EZ PEN NEEDLES) 32 gauge x 5/32" Ndle, USE FOUR TIMES DAILY, Disp: 400 each, Rfl: 3    tamsulosin (FLOMAX) 0.4 mg Cap, Take 1 capsule (0.4 mg total) by mouth once daily., Disp: 90 capsule, Rfl: 3    temazepam (RESTORIL) 15 mg Cap, TAKE ONE CAPSULE BY MOUTH NIGHTLY AS NEEDED, Disp: 30 capsule, Rfl: 5    tirzepatide 10 mg/0.5 mL PnIj, Inject 10 mg into the skin every 7 days., Disp: 4 Pen, Rfl: 6    triamcinolone acetonide 0.025 % Lotn, APPLY TO AFFECTED AREA(S) FACE TWICE DAILY AS NEEDED red and/or scaly, Disp: 60 mL, Rfl: 1    insulin glargine U-100, Lantus, (LANTUS SOLOSTAR U-100 INSULIN) 100 unit/mL (3 mL) InPn pen, Inject 36 Units into the skin every evening., Disp: 10.8 mL, Rfl: 11    loratadine (CLARITIN) 10 mg tablet, Take 1 tablet (10 mg total) by mouth once daily., Disp: 90 tablet, Rfl: 3    "

## 2025-07-09 RX ORDER — LIDOCAINE HYDROCHLORIDE 10 MG/ML
4 INJECTION, SOLUTION INFILTRATION; PERINEURAL
Status: DISCONTINUED | OUTPATIENT
Start: 2025-07-08 | End: 2025-07-09 | Stop reason: HOSPADM

## 2025-07-09 RX ORDER — TRIAMCINOLONE ACETONIDE 40 MG/ML
40 INJECTION, SUSPENSION INTRA-ARTICULAR; INTRAMUSCULAR
Status: DISCONTINUED | OUTPATIENT
Start: 2025-07-08 | End: 2025-07-09 | Stop reason: HOSPADM

## 2025-07-09 NOTE — PROCEDURES
Large Joint Aspiration/Injection: L subacromial bursa    Date/Time: 7/8/2025 1:30 PM    Performed by: Jose G Rausch MD  Authorized by: Jose G Rausch MD    Consent Done?:  Yes (Verbal)  Indications:  Pain  Site marked: the procedure site was marked    Timeout: prior to procedure the correct patient, procedure, and site was verified    Prep: patient was prepped and draped in usual sterile fashion      Local anesthesia used?: Yes    Local anesthetic:  Topical anesthetic and lidocaine 1% without epinephrine  Anesthetic total (ml):  4      Details:  Needle Size:  22 G  Ultrasonic Guidance for needle placement?: No    Approach:  Posterior  Location:  Shoulder  Site:  L subacromial bursa  Medications:  4 mL LIDOcaine HCL 10 mg/ml (1%) 10 mg/mL (1 %); 40 mg triamcinolone acetonide 40 mg/mL  Patient tolerance:  Patient tolerated the procedure well with no immediate complications

## 2025-07-10 DIAGNOSIS — I10 ESSENTIAL HYPERTENSION: ICD-10-CM

## 2025-07-10 DIAGNOSIS — I48.19 PERSISTENT ATRIAL FIBRILLATION: ICD-10-CM

## 2025-07-10 DIAGNOSIS — I48.0 PAROXYSMAL ATRIAL FIBRILLATION: ICD-10-CM

## 2025-07-10 RX ORDER — APIXABAN 5 MG/1
5 TABLET, FILM COATED ORAL 2 TIMES DAILY
Qty: 180 TABLET | Refills: 1 | Status: SHIPPED | OUTPATIENT
Start: 2025-07-10

## 2025-07-10 NOTE — TELEPHONE ENCOUNTER
No care due was identified.  [facility] Embedded Care Due Messages. Reference number: [id number].   7/10/2025 1:17:26 PM CDT

## 2025-07-10 NOTE — TELEPHONE ENCOUNTER
Refill Routing Note   Medication(s) are not appropriate for processing by [facility] for the following reason(s):        Outside of protocol    [facility] action(s):  Route             Appointments  past 12m or future 3m with PCP    Date Provider   Last Visit   1/2/2025 Desmond Barlow MD   Next Visit   Visit date not found Desmond Barlow MD   ED visits in past 90 days: 0        Note composed:4:57 PM 07/10/2025

## 2025-07-16 ENCOUNTER — OFFICE VISIT (OUTPATIENT)
Dept: OTOLARYNGOLOGY | Facility: CLINIC | Age: 80
End: 2025-07-16
Payer: MEDICARE

## 2025-07-16 ENCOUNTER — CLINICAL SUPPORT (OUTPATIENT)
Facility: CLINIC | Age: 80
End: 2025-07-16
Payer: MEDICARE

## 2025-07-16 VITALS
BODY MASS INDEX: 35.13 KG/M2 | DIASTOLIC BLOOD PRESSURE: 75 MMHG | WEIGHT: 245.38 LBS | SYSTOLIC BLOOD PRESSURE: 125 MMHG | HEIGHT: 70 IN

## 2025-07-16 DIAGNOSIS — H90.3 SENSORINEURAL HEARING LOSS (SNHL) OF BOTH EARS: Primary | ICD-10-CM

## 2025-07-16 DIAGNOSIS — H93.8X3 EAR FULLNESS, BILATERAL: ICD-10-CM

## 2025-07-16 DIAGNOSIS — H61.23 CERUMEN DEBRIS ON TYMPANIC MEMBRANE OF BOTH EARS: ICD-10-CM

## 2025-07-16 DIAGNOSIS — R42 DYSEQUILIBRIUM: ICD-10-CM

## 2025-07-16 DIAGNOSIS — Z98.890 HISTORY OF UVULOPALATOPHARYNGOPLASTY: ICD-10-CM

## 2025-07-16 DIAGNOSIS — G47.33 OSA (OBSTRUCTIVE SLEEP APNEA): ICD-10-CM

## 2025-07-16 DIAGNOSIS — R53.81 DEBILITY: ICD-10-CM

## 2025-07-16 PROCEDURE — 92567 TYMPANOMETRY: CPT | Mod: S$GLB,,, | Performed by: AUDIOLOGIST

## 2025-07-16 PROCEDURE — 92557 COMPREHENSIVE HEARING TEST: CPT | Mod: S$GLB,,, | Performed by: AUDIOLOGIST

## 2025-07-16 RX ORDER — FLUTICASONE PROPIONATE 50 MCG
2 SPRAY, SUSPENSION (ML) NASAL DAILY
Qty: 16 G | Refills: 2 | Status: SHIPPED | OUTPATIENT
Start: 2025-07-16 | End: 2025-10-14

## 2025-07-16 NOTE — PROGRESS NOTES
[facility] PAT Cantor Jr[facility] a 79 y.o. male, was seen today in the clinic for an audiologic evaluation.  Patient's main complaint was hearing loss. Patient reported aural fullness bilaterally and lightheadedness. Patient also reported a history of occupational noise exposure in the  working on [employer]. Patient denied true vertigo.      Tympanometry revealed Type A in the right ear and Type A in the left ear.     Audiogram results revealed normal to severe sensorineural hearing loss bilaterally. Speech reception thresholds were noted at 20 dB in the right ear and 25 dB in the left ear.  Speech discrimination scores were 100% in the right ear and 100% in the left ear.    Recommendations:  Otologic evaluation  Annual audiogram  Hearing protection when in noise  Hearing aid consultation

## 2025-07-17 NOTE — PROGRESS NOTES
Subjective:     History of Present Illness    CHIEF COMPLAINT:  Mr. Cantor presents with a complaint of ear fullness for approximately one month, initially attributing it to earwax.    HPI:  Mr. Cantor reports ear fullness for about a month, initially attributing it to earwax. He attempted treatment with Debrox and an oil, without improvement. The fullness has remained constant. Mr. Cantor describes a sensation of a foreign body in his ear, prompting a desire to rub it, though he denies pain. He mentions a longstanding mild tinnitus, which is not a new symptom.    Hearing difficulties, particularly in noisy environments like restaurants, where he often has to request repetition from others. His most recent hearing test was about 7-8 years ago.    Mr. Cantor describes intermittent dizziness, characterized as a sensation of swaying or rocking on a boat. This occurs almost daily, particularly when standing still, and is transient. He denies noticing it with position changes. He has had this dizziness for years. Not room spinning.     Mr. Cantor has a history of sleep apnea, for which he underwent surgery to remove his tonsils, uvula, and part of his palate approximately 35-40 years ago. He previously used a CPAP machine but discontinued due to excessive nasal drainage. His last sleep study was around 30 years ago.    Mr. Cantor notes that he currently has a cold, which may be contributing to the blockage on one side of his ear. Many years ago, he had an ear procedure that resulted in eardrum perforation requiring patching.    Mr. Cantor denies ear pain, new onset of tinnitus, room-spinning vertigo, use of nasal sprays, sensation of ears not equalizing, daytime sleepiness, glaucoma, and increased eye pressure.     History: [place]        ROS:  ENT: -ear pain, +nasal congestion, +ear pressure, +tinnitus, +difficulty hearing         Past Medical History  He has a past medical history of *Atrial  fibrillation, Anticoagulant long-term use, Basal cell carcinoma, Basal cell carcinoma, BCC (basal cell carcinoma), Cataract, Chronic kidney disease, Diabetes mellitus with renal manifestations, uncontrolled, Dyslipidemia associated with type 2 diabetes mellitus, Fever blister, Hearing loss, HTN (hypertension), Keloid cicatrix, Liver disease, Melanoma, Obesity, PN (peripheral neuropathy), Primary osteoarthritis of right knee, Screening for colon cancer, Sleep apnea, Thyroid disease, Type II or unspecified type diabetes mellitus with other specified manifestations, uncontrolled, and Ulcer of left lower extremity, limited to breakdown of skin.    Past Surgical History  He has a past surgical history that includes Appendectomy; Joint replacement; Meniere's disease surgery; Tonsillectomy; Cardioversion; Colonoscopy (N/A, 3/3/2016); Total knee arthroplasty (Right, 01/25/2017); Total knee arthroplasty (Left); epidural steroid injection; Cataract extraction; Cataract extraction (Right, 05/14/2018); Phacoemulsification of cataract (Left, 6/25/2018); Intraocular prosthesis insertion (Left, 6/25/2018); Revision of knee arthroplasty (Right, 7/10/2018); Ectropion repair (Right, 8/14/2019); Preparation of wound prior to application of skin graft (Right, 8/14/2019); Full thickness skin graft (Right, 8/14/2019); Tarsorrhaphy (Right, 8/14/2019); Colonoscopy (N/A, 9/20/2019); Trigger finger release (Right, 8/13/2024); and Trigger finger release (Left, 2/18/2025).    Family History  His family history includes ALS in his brother; Cancer in his brother, brother, sister, and sister; Diabetes in his father and mother; Eczema in his sister; Heart disease in his father; Hypertension in his father; No Known Problems in his brother, maternal aunt, maternal grandfather, maternal grandmother, maternal uncle, paternal aunt, paternal grandfather, paternal grandmother, paternal uncle, and sister; Stroke in his mother.    Social History  He  reports that he quit smoking about 40 years ago. His smoking use included cigarettes. He has never used smokeless tobacco. He reports that he does not drink alcohol and does not use drugs.    Allergies  He is allergic to niacin and terbinafine.    Medications  He has a current medication list which includes the following prescription(s): ammonium lactate, atorvastatin, bd ultra-fine orig pen needle, blood sugar diagnostic, blood-glucose meter, dexcom g7 sensor, ciclopirox, ciclopirox, diclofenac sodium, eliquis, ergocalciferol, fenofibrate, furosemide, gabapentin, gvoke hypopen 2-pack, hydrocodone-acetaminophen, insulin lispro, lancets, metoprolol succinate, nystatin-triamcinolone, comfort ez pen needles, tamsulosin, temazepam, tirzepatide, triamcinolone acetonide, fluticasone propionate, lantus solostar u-100 insulin, and loratadine.  Review of Systems     Constitutional: Positive for fatigue.      HENT: Positive for hearing loss.      Eyes:  Negative for change in eyesight, eye drainage, eye itching and photophobia.     Respiratory:  Negative for cough, shortness of breath, sleep apnea, snoring and wheezing.      Cardiovascular:  Positive for foot swelling and irregular heartbeat.     Gastrointestinal:  Negative for abdominal pain, acid reflux, constipation, diarrhea, heartburn and vomiting.     Genitourinary: Positive for frequent urination.     Musc: Positive for back pain.     Skin: Negative for rash.     Allergy: Negative for food allergies and seasonal allergies.     Endocrine: Positive for cold intolerance.     Neurological: Positive for dizziness and light-headedness.     Hematologic: Negative for bruises/bleeds easily and swollen glands.      Psychiatric: Positive for decreased concentration, depression, nervous/anxious and sleep disturbance.         Objective:     Constitutional:   He appears well-developed and well-nourished.   Ambulates with cane. Significant edema of legs.      Head:  Normocephalic.      Ears:    Right Ear: No drainage, swelling or tenderness. No middle ear effusion.   Left Ear: No drainage, swelling or tenderness.  No middle ear effusion.   Cerumenous debris on bilat TM, removed under microscopy     Mouth/Throat  Oropharynx clear and moist without lesions or asymmetry.   Surgical scarring s/p UPPP.       Pulmonary/Chest:   Effort normal.     Vestibular:   Lateral gaze: Normal  Romberg: Negative   Carli Hallpike: DNT   Fukuda: DNT    Procedure  Cerumen removal performed.  See procedure note.    Imaging  No pertinent imaging available.  Audiogram              I independently reviewed the tracings of the complete audiometric evaluation. I reviewed the audiogram with the patient as well. Pertinent findings include binaural sloping HF sensorineural hearing loss with normal tymps.     Assessment and Plan:     Assessment & Plan    IMPRESSION:  - Ear fullness likely due to wax buildup near eardrum.  - High-frequency hearing loss consistent with age and noise exposure.  - Dysequilibrium unlikely to be ear-related based on symmetric hearing and patient description. He does have a significant cardiac history and significant leg edema.   - Nasal congestion may be contributing to ear fullness on one side.    Sensorineural hearing loss (SNHL) of both ears  - Educated on differences between OTC hearing aids and personalized options.  - Mr. Cantor to contact insurance company to inquire about hearing aid benefits.  - Follow up for hearing aid consultation with audiologist to discuss options and pricing. Provided him with [employer] card.     Ear fullness, bilateral  -     fluticasone propionate (FLONASE) 50 mcg/actuation nasal spray; 2 sprays (100 mcg total) by Each Nostril route once daily.  - Explained relationship between nasal congestion and ear fullness due to eustachian tube function.   - Started Flonase or Azelastine nasal spray as needed for nasal congestion and potential ear fullness  relief.    Cerumen debris on tympanic membrane of both ears  - Removed today in clinic.     Debility  Dysequilibrium  -     Ambulatory Referral/Consult to Physical Therapy  - Referred to vestibular physical therapy for balance issues for multiple years.     HEIDI (obstructive sleep apnea)  History of uvulopalatopharyngoplasty            This note was generated with the assistance of ambient listening technology. Verbal consent was obtained by the patient and accompanying visitor(s) for the recording of patient appointment to facilitate this note. I attest to having reviewed and edited the generated note for accuracy, though some syntax or spelling errors may persist. Please contact the author of this note for any clarification.

## 2025-07-21 ENCOUNTER — PATIENT MESSAGE (OUTPATIENT)
Dept: ADMINISTRATIVE | Facility: HOSPITAL | Age: 80
End: 2025-07-21
Payer: MEDICARE

## 2025-07-25 ENCOUNTER — PATIENT OUTREACH (OUTPATIENT)
Dept: ADMINISTRATIVE | Facility: HOSPITAL | Age: 80
End: 2025-07-25
Payer: MEDICARE

## 2025-07-25 ENCOUNTER — PATIENT MESSAGE (OUTPATIENT)
Dept: INTERNAL MEDICINE | Facility: CLINIC | Age: 80
End: 2025-07-25
Payer: MEDICARE

## 2025-07-25 DIAGNOSIS — E11.22 TYPE 2 DIABETES MELLITUS WITH STAGE 3A CHRONIC KIDNEY DISEASE, WITH LONG-TERM CURRENT USE OF INSULIN: ICD-10-CM

## 2025-07-25 DIAGNOSIS — Z79.4 TYPE 2 DIABETES MELLITUS WITH STAGE 3A CHRONIC KIDNEY DISEASE, WITH LONG-TERM CURRENT USE OF INSULIN: ICD-10-CM

## 2025-07-25 DIAGNOSIS — Z12.11 ENCOUNTER FOR COLORECTAL CANCER SCREENING: Primary | ICD-10-CM

## 2025-07-25 DIAGNOSIS — N18.31 TYPE 2 DIABETES MELLITUS WITH STAGE 3A CHRONIC KIDNEY DISEASE, WITH LONG-TERM CURRENT USE OF INSULIN: ICD-10-CM

## 2025-07-25 DIAGNOSIS — Z12.12 ENCOUNTER FOR COLORECTAL CANCER SCREENING: Primary | ICD-10-CM

## 2025-07-25 NOTE — PROGRESS NOTES
Chart reviewed and updated. Reconciled immunizations, health maintenance and care everywhere.  Placed refendo, lab, urine and responded to campaign msg.      [place], [facility]  Panel Care Coordinator  [facility]  [phone]  For Desmond Barlow MD

## 2025-07-29 ENCOUNTER — TELEPHONE (OUTPATIENT)
Dept: ENDOSCOPY | Facility: HOSPITAL | Age: 80
End: 2025-07-29

## 2025-07-29 ENCOUNTER — CLINICAL SUPPORT (OUTPATIENT)
Dept: ENDOSCOPY | Facility: HOSPITAL | Age: 80
End: 2025-07-29
Attending: INTERNAL MEDICINE
Payer: MEDICARE

## 2025-07-29 VITALS — BODY MASS INDEX: 35.07 KG/M2 | HEIGHT: 70 IN | WEIGHT: 245 LBS

## 2025-07-29 DIAGNOSIS — Z12.12 ENCOUNTER FOR COLORECTAL CANCER SCREENING: ICD-10-CM

## 2025-07-29 DIAGNOSIS — Z12.11 ENCOUNTER FOR COLORECTAL CANCER SCREENING: ICD-10-CM

## 2025-07-29 DIAGNOSIS — Z12.11 SPECIAL SCREENING FOR MALIGNANT NEOPLASMS, COLON: Primary | ICD-10-CM

## 2025-07-29 RX ORDER — SODIUM, POTASSIUM,MAG SULFATES 17.5-3.13G
1 SOLUTION, RECONSTITUTED, ORAL ORAL DAILY
Qty: 1 KIT | Refills: 0 | Status: SHIPPED | OUTPATIENT
Start: 2025-09-15 | End: 2025-09-17

## 2025-07-29 NOTE — PLAN OF CARE
Patient is scheduled for a Colonoscopy on 09/22/2025 with Dr. ERON Paredes  Referral for procedure from PAT appointment

## 2025-07-29 NOTE — PATIENT INSTRUCTIONS
[facility] Prep and Procedure Instructions  SUPREP  Sodium Sulfate, Potassium Sulfate, and Magnesium Sulfate Oral Solution    Date of procedure: 09/22/2025 - Arrive at 11:30 AM  Location of Department:   [facility] [address] [place] [address]  Take the [facility] Elevators to 4th Floor Endoscopy Lab    Getting ready for your procedure:    If your procedure requires the administration of anesthesia, it is necessary for a responsible adult to drive you home. The designated adult is strongly encouraged to remain in the endoscopy area until the patient is discharged. (Medical Transportation, Uber, Lyft, Taxi, etc. may ONLY be used if a responsible adult is present to accompany you home. The responsible adult CANNOT be the  of the service.)   person must be available to return to pick you up within 15 minutes of being notified of discharge.  Please bring a picture ID, insurance card, & co-pay.  Leave all jewelry and valuables at home on the day of the procedure.  Do not follow the instructions that come in the prep box - use these instructions instead.    Medications:    If you begin taking any new blood thinning, weight loss, or diabetic medications please call Endoscopy Scheduling as soon as possible at [phone].  If you take heart, blood pressure, seizure, pain, (including inhalers/nebulizers), anti-rejection (transplant patients) or mental health medications, please take at your regular times with a sip of water.  If you are taking diabetic or weight loss medications, see the attached instructions.      COLONOSCOPY PREP TIMELINE    ONE WEEK PRIOR TO PROCEDURE   the prep kit and Dulcolax laxative tablets (bisacodyl 5mg - available over the counter) from your local pharmacy.  Purchase clear liquids for use during the prep referencing the chart below.    CLEAR LIQUIDS NOT CLEAR LIQUIDS (DO NOT DRINK)   Water X Milk   Clear broth (chicken, beef, or  vegetable) X Smoothies   Apple Juice (no pulp) X Hot Chocolate   White grape juice X Juices with pulp (orange juice, prune juice)   Sports drinks (AVOID red, purple, or blue) X Coffee WITH cream or milk   Clear soda X Beverages with particles (shakes, milkshakes)   Tea or coffee (black, NO MILK)    Clear gelatin (Jell-o, AVOID red, purple, or blue)      THE DAY BEFORE YOUR PROCEDURE: 09/21/2025    Consume ONLY CLEAR LIQUIDS for the entire day.  Stay hydrated.  NO SOLID FOOD    2:00 PM - Take four (4) Dulcolax laxative (bisacodyl 5mg) tablets with at least one full glass of clear liquids.      6:00 - 7:00 PM  Pour ONE (1) 6-ounce bottle of SUPREP liquid into the mixing container.  Add 10 ounces of cool drinking water to the 16-ounce line on the container and mix.  Drink ALL the liquid in the container.  You must drink two (2) more 16-ounce containers of water over the next 1 hour.  If you experience nausea, bloating, or cramping, slow down drinking until your symptoms decrease.    THE DAY OF YOUR PROCEDURE: 09/22/2025    2:00 - 3:00 AM  Pour ONE (1) 6-ounce bottle of SUPREP liquid into the mixing container.  Add 10 ounces of cool drinking water to the 16-ounce line on the container and mix.  Drink ALL the liquid in the container.  You must drink two (2) more 16-ounce containers of water over the next 1 hour.    ** May continue to drink clear liquids until 4 hours prior to arrival time for procedure **  Who to call if you have questions:    To reschedule / cancel, or for prep questions: [phone] / Hours of operation Monday-Friday 8:00 AM-4:30 PM  Insurance or financial information: [phone] or [phone]  After hours concerns, call [facility] at [phone] or [phone]      Please watch this informational video:  [email or web address]              Please contact your Diabetes Care Provider if you have questions or are preparing for more than one day before  your procedure. For any scheduling questions, contact the Endoscopy Schedulers at [phone]. Please disregard this guide if you do not take any of these medications.   Weight loss and Diabetes Medication Holding Instructions:         Oral Medicine   Day before procedure Day of Procedure            Glyburide       Do NOT take morning of procedure. If you        Glucotrol (Glipizide)   Do NOT take   take twice daily, take with dinner.        Amaryl (Glimepiride)                                     Glucophage       Do NOT take morning of procedure. Take        Glumetza   Take as   when you start eating again.          Fortamet (Metformin)   prescribed                                 Januvia (Sitaglipitin)       Do NOT take morning of procedure. Take        Nesina (Aloglipitin)   Take as   when you start eating again.          Onglyza (Saxaglipitin)   prescribed                  Tradjenta (Linaglipitin)                                     Invokana (canagliflozin)                    Farxiga (dapagliflozin)   see prep    Do NOT take morning of procedure. Take        Jardiance (empagliflozin) instructions   when you start eating again.          Steglatro (ertugliflozin)   for date of                  Brenzavvy (bexagliflozin) last dose                  Inpefa (sotagliflozin)                      Invokamet/Invokamet XR (Invokana + metformin)                    Xigduo (Farxiga + metformin)                    Synjardy  XR (Jardiance + metformin)                    Segluromet (Steglatro + metformin)                    Qtern (Farxiga + saxagliptin)                    Glyxambi (Jardiance + linagliptin)                    Steglujan (Steglatro + sitagliptin)                                   Actos (Pioglitazone)   Take as   Do NOT take morning of procedure. Take            prescribed.   when you start eating again.                         Rybelsus (semaglutide) Take as    Do NOT take morning of procedure. Take              prescribed.   when you start eating again.                          Adipex/Lomaria (phentermine) see prep   Do NOT take morning of procedure. Take        Qsymia (phentermine  + topiramate) instructions   when you start eating again.              for date of                      last dose                               Tenuate/Tepanil (diethylpropion) see prep   Do NOT take morning of procedure. Take           instructions   when you start eating again.             for date of                     last dose                              Xenical (orilstat)   see prep    Do NOT take morning of procedure. Take           instructions   when you start eating again.             for date of                     last dose                                Injectable & Combination Day before Procedure Day of Procedure            Medicine (taken daily)                      Victoza/Saxenda (liragluide) Take as prescribed Do NOT take morning of procedure. Take        Byetta (exenatide)       when you start eating again.          Gattex (teduglutide)                      Adlyxin (lixisenatide) *                      Soliqua (lixisenatide + insulin glargine)                    Xultophy (liraglutide + insulin degludec)                                    Injectable  Medicine   Day before Procedure Day of Procedure             (taken weekly)                      Bydureon bcise (exenatide ER) see prep   Do NOT take morning of procedure. Take         Mounjaro/Zepbound (tirzepatide) instructions   when you start eating again.          Ozempic/Wegovy (semaglutide) for date of                  Tanzeum (albiglutide) * last dose                  Trulicity (dulaglutide)                      It is important to monitor your blood sugar while doing the bowel preparation. On the day of your bowel prep, when you are on a clear liquid diet, you may drink beverages with sugar as your source of glucose. Be sure to mix the prep with water or sugar free  liquid only. Below are instructions on how to adjust your diabetic medications prior to your scheduled procedure. Call the healthcare provider who manages your diabetes if you have questions.      Insulin for Type 1 Diabetes Mellitus   Day before Procedure                                        Day of procedure         Basaglar                         If you use in the morning, take as prescribed.    If you take in the morning,       Lantus                         If you use in the evening, inject 70% of dose.   inject 80% of dose. If you take      Levemir           in the evenings, inject usual       Toujeo           dose.           David Monroe                      Count carbs and adjust dose           Do NOT take morining of procedure.       Apidra                      accordingly. If not carb counting,         Take when you start eating again.       Humalog 100                      take 25% of usual meal dose.                  Humalog 200                      May use correction dose every                 Novolog                      4 hours. Do NOT use once sugar-free                                       liquid prep is started.                                 Insulin Pump                     SEE BELOW PUMP GUIDANCE                                                                                                                Insulin for Type 2 Diabetes Mellitus   Day before Procedure                                        Day of procedure         Basaglar                       If you use in the morning, take as prescribed.    If you take in the morning,       Lantus                       If you use in the evening, inject 70% of dose.   inject 80% of dose. If you take      Levemir           in the evenings, inject usual       Toujeo           dose.           David Monroe                        Inject 50 % of dose with clear liquid diet.      Do NOT take morning of       Apidra                      Do NOT use once sugar-free clear liquid prep   procedure. Take when you       Fiasp                      is started. If you are using a correction scale,    start eating meals again.       Humalog 100                      take dose every 4 hours as needed.                 Humalog 200                      Novolog                                     NPH                        Inject 50% of breakfast and dinner      Do NOT take morning of                             doses with clear liquid diet.        procedure. Take usual dose                  when you eat dinner.                      Regular                       Inject 50% of breakfast dose and do NOT take   Do NOT take morning of                             dinner dose once sugar-free liquid prep has    procedure. Take usual dose                             started. If using correction scale, may take dose   when you eat dinner.                             every 6 hours as needed.                                  Novolog Mix 70/30                     Inject 50% of breakfast dose. Inject 25% of dinner dose.     Do NOT take breakfast dose.       Humolog Mix 75/25                              Take usual dose when you eat       Humolog Mix 50/50           dinner.                          U500                      Take 50% of AM or breakfast unit lupe dose.   Do NOT take mornining of                              Take 25% of lunch or dinner or PM unit lupe dose.    procedure. Take when you                   start eating again.                        V-Go                       Continue to wear your V-Go device. Do NOT click once sugar-free   clear liquid prep is started.   Continue to wear your V-Go                                device. Resume clicks with                   meals.                                         INSULIN PUMP GUIDANCE Day before  "Procedure Day of Procedure   Pump and CGM do not communicate      Count carbs and bolus for carbs and correction as needed. May use temp basal rate of 70% once sugar-free clear prep is started. Continue until after procedure the following day Continue use of 70% temp basal rate until procedure complete and you are eating normally        Pump and CGM do communicate     Tandem Tslim and Mobi Count carbs and bolus for carbs and correction as needed. Start "Exercise" mode once sugar-free clear prep is started. Continue until after procedure the following day Continue use of "Exercise" mode until procedure complete and you are eating normally        Omnipod 5 Count carbs and bolus for carbs and correction as needed. Start "Activity" mode once sugar-free clear prep is started. Continue until after procedure the following day Continue use of "Activity" mode until procedure complete and you are eating normally        Medtronic 670g/770g/780g Count carbs and bolus for carbs and correction as needed. Start "Temp target" mode once sugar-free clear prep is started. Continue until after procedure the following day Continue use of temp target until procedure complete and you are eating normally             Beta Bionic ilet Count carbs and bolus for carbs and correction as needed. Let pump run as you usually would Let pump run as you usually would       "

## 2025-08-12 DIAGNOSIS — Z79.4 TYPE 2 DIABETES MELLITUS WITH STAGE 3 CHRONIC KIDNEY DISEASE, WITH LONG-TERM CURRENT USE OF INSULIN: ICD-10-CM

## 2025-08-12 DIAGNOSIS — I87.2 VENOUS INSUFFICIENCY OF BOTH LOWER EXTREMITIES: ICD-10-CM

## 2025-08-12 DIAGNOSIS — E11.22 TYPE 2 DIABETES MELLITUS WITH STAGE 3 CHRONIC KIDNEY DISEASE, WITH LONG-TERM CURRENT USE OF INSULIN: ICD-10-CM

## 2025-08-12 DIAGNOSIS — N18.30 TYPE 2 DIABETES MELLITUS WITH STAGE 3 CHRONIC KIDNEY DISEASE, WITH LONG-TERM CURRENT USE OF INSULIN: ICD-10-CM

## 2025-08-12 RX ORDER — GABAPENTIN 300 MG/1
600 CAPSULE ORAL 2 TIMES DAILY
Qty: 360 CAPSULE | Refills: 1 | Status: SHIPPED | OUTPATIENT
Start: 2025-08-12

## 2025-08-28 ENCOUNTER — TELEPHONE (OUTPATIENT)
Dept: ENDOSCOPY | Facility: HOSPITAL | Age: 80
End: 2025-08-28
Payer: MEDICARE

## 2025-08-28 ENCOUNTER — E-CONSULT (OUTPATIENT)
Dept: CARDIOLOGY | Facility: CLINIC | Age: 80
End: 2025-08-28
Payer: MEDICARE

## 2025-08-28 DIAGNOSIS — Z01.810 PREOPERATIVE CARDIOVASCULAR EXAMINATION: Primary | ICD-10-CM

## 2025-08-28 DIAGNOSIS — I48.21 PERMANENT ATRIAL FIBRILLATION: ICD-10-CM

## 2025-09-02 ENCOUNTER — PATIENT MESSAGE (OUTPATIENT)
Dept: INTERNAL MEDICINE | Facility: CLINIC | Age: 80
End: 2025-09-02
Payer: MEDICARE

## 2025-09-02 DIAGNOSIS — R39.89 URINARY PROBLEM: Primary | ICD-10-CM

## (undated) DEVICE — SYR 10CC LUER LOCK

## (undated) DEVICE — GOWN POLY REINF BRTH SLV XL

## (undated) DEVICE — CONTAINER SPECIMEN STRL 4OZ

## (undated) DEVICE — SPONGE COTTON TRAY 4X4IN

## (undated) DEVICE — SHEET EENT SPLIT

## (undated) DEVICE — DRAPE STERI INSTRUMENT 1018

## (undated) DEVICE — NDL 22GA X1 1/2 REG BEVEL

## (undated) DEVICE — BLADE SURG #15 CARBON STEEL

## (undated) DEVICE — BANDAGE MATRIX HK LOOP 2IN 5YD

## (undated) DEVICE — TRAY MUSCLE LID EYE

## (undated) DEVICE — APPLICATOR STERILE 3IN

## (undated) DEVICE — BLADE SAG 13.0 X1.27X90

## (undated) DEVICE — ELECTRODE REM PLYHSV RETURN 9

## (undated) DEVICE — SET CEMENT (SCULP)

## (undated) DEVICE — NDL RETROBULAR AKTKINSON 23G

## (undated) DEVICE — SEE L#120831

## (undated) DEVICE — TAPE SURG TRANSPORE 1 SNG USE

## (undated) DEVICE — PROTECTOR CORNEAL CROUCH PED

## (undated) DEVICE — PAD CAST 2 IN X 4YDS STERILE

## (undated) DEVICE — SUT ETHILON 5-0 PS-2 18IN

## (undated) DEVICE — MASK FLYTE HOOD PEEL AWAY

## (undated) DEVICE — BLADE SCALP OPHTL BEVEL STR

## (undated) DEVICE — Device

## (undated) DEVICE — TOURNIQUET SB QC DP 18X4IN

## (undated) DEVICE — KIT IRR SUCTION HND PIECE

## (undated) DEVICE — DRAPE THREE-QTR REINF 53X77IN

## (undated) DEVICE — DRESSING EYE OVAL LF

## (undated) DEVICE — DRAPE STERI-DRAPE 1000 17X11IN

## (undated) DEVICE — SKINMARKER & RULER REGULAR X-F

## (undated) DEVICE — DRESSING AQUACEL AG ADV 3.5X12

## (undated) DEVICE — CASSETTE INFINITI

## (undated) DEVICE — SOL WATER STRL IRR 1000ML

## (undated) DEVICE — NDL HYPO REG 25G X 1 1/2

## (undated) DEVICE — GLOVE SENSICARE PI MICRO 7.5

## (undated) DEVICE — PAD COLD THERAPY KNEE WRAP ON

## (undated) DEVICE — SEE MEDLINE ITEM 152622

## (undated) DEVICE — TAPE SURG DURAPORE 2 X10YD

## (undated) DEVICE — BANDAGE ESMARK ELASTIC ST 4X9

## (undated) DEVICE — SEE MEDLINE ITEM 146298

## (undated) DEVICE — SUT SILK 6-0 BLK BR P-1 P-1

## (undated) DEVICE — SEE MEDLINE ITEM 152515

## (undated) DEVICE — DRAPE HAND STERILE

## (undated) DEVICE — CLEANER TIP ELECSURG 2X2IN

## (undated) DEVICE — ALCOHOL 70% ISOP W/GREEN 16OZ

## (undated) DEVICE — PACK ECLIPSE BASIC III SURG

## (undated) DEVICE — STOCKINET 4INX48

## (undated) DEVICE — DRAPE INCISE IOBAN 2 23X33IN

## (undated) DEVICE — GLOVE BIOGEL SKINSENSE PI 7.5

## (undated) DEVICE — DRESSING XEROFORM NONADH 1X8IN

## (undated) DEVICE — SYR 50CC LL

## (undated) DEVICE — SOL IRR 0.9% NACL 500ML PB

## (undated) DEVICE — SCRUB 10% POVIDONE IODINE 4OZ

## (undated) DEVICE — SOL BETADINE 5%

## (undated) DEVICE — PROTECTOR CORNEAL CROUCH ST

## (undated) DEVICE — COVER LIGHT HANDLE 80/CA

## (undated) DEVICE — GOWN POLY REINF BRTH SLV LG

## (undated) DEVICE — SUT VICRYL PLUS 2-0 CT1 18

## (undated) DEVICE — DRESSING LEUKOPLAST FLEX 1X3IN

## (undated) DEVICE — DISCONTINUED SEE L# 4355

## (undated) DEVICE — ADHESIVE DERMABOND ADVANCED

## (undated) DEVICE — NDL 18GA X1 1/2 REG BEVEL

## (undated) DEVICE — DENTAL ROLL 1 1/2 X 3/8

## (undated) DEVICE — PAD PREP CUFFED NS 24X48IN

## (undated) DEVICE — CLOSURE SKIN STERI STRIP 1/4X3

## (undated) DEVICE — GAUZE SPONGE 4X4 12PLY

## (undated) DEVICE — SUT VICRYL PLUS 0 CT1 18IN

## (undated) DEVICE — SYR ONLY LUER LOCK 20CC

## (undated) DEVICE — SOL IRR NACL .9% 3000ML

## (undated) DEVICE — APPLICATOR CHLORAPREP ORN 26ML

## (undated) DEVICE — PENCIL SMK EVAC CONNECTOR 10FT

## (undated) DEVICE — NDL HYPO A BEVEL 30X1/2

## (undated) DEVICE — MANIFOLD 4 PORT

## (undated) DEVICE — SYR 5CC LUER LOCK W/O NDL

## (undated) DEVICE — SUT ETHILON 6-0 BLK PC-1

## (undated) DEVICE — GLOVE SURG BIOGEL LATEX SZ 7.5

## (undated) DEVICE — NDL STRAIGHT 4CM LEIBINGER

## (undated) DEVICE — BOWL CEMENT

## (undated) DEVICE — PENCIL ROCKER SWITCH 10FT CORD

## (undated) DEVICE — CANISTER INFOV.A.C WOUND 500ML

## (undated) DEVICE — TOURNIQUET SB QC DP 34X4IN

## (undated) DEVICE — SEE MEDLINE ITEM 154981

## (undated) DEVICE — PACK SURGERY START

## (undated) DEVICE — SUT MONOCRYL 3-0 PS-1

## (undated) DEVICE — KIT PREVENA PLUS

## (undated) DEVICE — SEE MEDLINE ITEM 152487

## (undated) DEVICE — BLADE SAGITTAL 18 X 1.27 X 90M

## (undated) DEVICE — CUP MEDICINE STERILE 2OZ

## (undated) DEVICE — GOWN SURGICAL X-LARGE

## (undated) DEVICE — ADHESIVE MASTISOL VIAL 48/BX

## (undated) DEVICE — SUT VICRYL+ 1 CT1 18IN

## (undated) DEVICE — PUMP COLD THERAPY

## (undated) DEVICE — SOL BSS BALANCED SALT

## (undated) DEVICE — GLOVE BIOGEL SKINSENSE PI 6.5

## (undated) DEVICE — BANDAGE ACE ELASTIC 6"

## (undated) DEVICE — TRAY FOLEY 16FR INFECTION CONT

## (undated) DEVICE — PADDING CAST SPECIALIST 6X4YD

## (undated) DEVICE — KIT TOTAL KNEE TKOFG